# Patient Record
Sex: MALE | Race: OTHER | HISPANIC OR LATINO | URBAN - METROPOLITAN AREA
[De-identification: names, ages, dates, MRNs, and addresses within clinical notes are randomized per-mention and may not be internally consistent; named-entity substitution may affect disease eponyms.]

---

## 2018-06-08 ENCOUNTER — OUTPATIENT (OUTPATIENT)
Dept: OUTPATIENT SERVICES | Facility: HOSPITAL | Age: 78
LOS: 1 days | Discharge: HOME | End: 2018-06-08

## 2018-06-08 ENCOUNTER — RESULT REVIEW (OUTPATIENT)
Age: 78
End: 2018-06-08

## 2018-06-08 VITALS
RESPIRATION RATE: 18 BRPM | TEMPERATURE: 98 F | DIASTOLIC BLOOD PRESSURE: 78 MMHG | SYSTOLIC BLOOD PRESSURE: 166 MMHG | OXYGEN SATURATION: 98 % | HEART RATE: 85 BPM | WEIGHT: 175.05 LBS | HEIGHT: 70 IN

## 2018-06-08 VITALS — DIASTOLIC BLOOD PRESSURE: 80 MMHG | SYSTOLIC BLOOD PRESSURE: 160 MMHG | HEART RATE: 82 BPM | RESPIRATION RATE: 17 BRPM

## 2018-06-08 DIAGNOSIS — Z12.11 ENCOUNTER FOR SCREENING FOR MALIGNANT NEOPLASM OF COLON: Chronic | ICD-10-CM

## 2018-06-08 NOTE — H&P PST ADULT - HISTORY OF PRESENT ILLNESS
77-year-old male for colorectal screening  HPI: The patient has never had a colonoscopy.  He has no nausea or vomiting or blood in the stools or change in bowel habit  Past Medical History:  COPD, hyperlipidemia    Allergies:  [None]  Medications: And metformin trazodone atorvastatin olanzapine Lantus budesonide  Social History:[Nonsmoker, Nondrinker]  Family History:[Noncontributory]  Chronic Diseases Present      [None]

## 2018-06-08 NOTE — PRE-ANESTHESIA EVALUATION ADULT - NSANTHOSAYNRD_GEN_A_CORE
No. OLIMPIA screening performed.  STOP BANG Legend: 0-2 = LOW Risk; 3-4 = INTERMEDIATE Risk; 5-8 = HIGH Risk

## 2018-06-08 NOTE — PRE-ANESTHESIA EVALUATION ADULT - NSANTHADDINFOFT_GEN_ALL_CORE
77 y/o WM evaluated for colonoscopy.  ASA 3.  Plan IV sedation / GA backup.  Plan, benefits, foreseeable risks, viable alternatives discussed with patient and all his pertinent questions answered and he understands and elects to proceed.

## 2018-06-11 LAB — SURGICAL PATHOLOGY STUDY: SIGNIFICANT CHANGE UP

## 2018-06-12 DIAGNOSIS — J44.9 CHRONIC OBSTRUCTIVE PULMONARY DISEASE, UNSPECIFIED: ICD-10-CM

## 2018-06-12 DIAGNOSIS — D12.4 BENIGN NEOPLASM OF DESCENDING COLON: ICD-10-CM

## 2018-06-12 DIAGNOSIS — Z79.4 LONG TERM (CURRENT) USE OF INSULIN: ICD-10-CM

## 2018-06-12 DIAGNOSIS — F20.9 SCHIZOPHRENIA, UNSPECIFIED: ICD-10-CM

## 2018-06-12 DIAGNOSIS — D12.5 BENIGN NEOPLASM OF SIGMOID COLON: ICD-10-CM

## 2018-06-12 DIAGNOSIS — Z12.11 ENCOUNTER FOR SCREENING FOR MALIGNANT NEOPLASM OF COLON: ICD-10-CM

## 2018-06-12 DIAGNOSIS — K64.1 SECOND DEGREE HEMORRHOIDS: ICD-10-CM

## 2018-06-12 DIAGNOSIS — D12.3 BENIGN NEOPLASM OF TRANSVERSE COLON: ICD-10-CM

## 2018-06-12 DIAGNOSIS — E11.9 TYPE 2 DIABETES MELLITUS WITHOUT COMPLICATIONS: ICD-10-CM

## 2018-06-12 DIAGNOSIS — D12.0 BENIGN NEOPLASM OF CECUM: ICD-10-CM

## 2018-06-12 DIAGNOSIS — F17.210 NICOTINE DEPENDENCE, CIGARETTES, UNCOMPLICATED: ICD-10-CM

## 2019-07-07 ENCOUNTER — INPATIENT (INPATIENT)
Facility: HOSPITAL | Age: 79
LOS: 3 days | Discharge: NURSING, REHAB OR LONG-TERM CARE | End: 2019-07-11
Attending: INTERNAL MEDICINE | Admitting: INTERNAL MEDICINE
Payer: MEDICARE

## 2019-07-07 VITALS
HEART RATE: 86 BPM | DIASTOLIC BLOOD PRESSURE: 64 MMHG | TEMPERATURE: 98 F | OXYGEN SATURATION: 100 % | SYSTOLIC BLOOD PRESSURE: 130 MMHG | RESPIRATION RATE: 15 BRPM

## 2019-07-07 DIAGNOSIS — Z12.11 ENCOUNTER FOR SCREENING FOR MALIGNANT NEOPLASM OF COLON: Chronic | ICD-10-CM

## 2019-07-07 LAB
ALBUMIN SERPL ELPH-MCNC: 4.5 G/DL — SIGNIFICANT CHANGE UP (ref 3.5–5.2)
ALP SERPL-CCNC: 100 U/L — SIGNIFICANT CHANGE UP (ref 30–115)
ALT FLD-CCNC: 14 U/L — SIGNIFICANT CHANGE UP (ref 0–41)
ANION GAP SERPL CALC-SCNC: 15 MMOL/L — HIGH (ref 7–14)
APTT BLD: 31.4 SEC — SIGNIFICANT CHANGE UP (ref 27–39.2)
AST SERPL-CCNC: 31 U/L — SIGNIFICANT CHANGE UP (ref 0–41)
BASOPHILS # BLD AUTO: 0.02 K/UL — SIGNIFICANT CHANGE UP (ref 0–0.2)
BASOPHILS NFR BLD AUTO: 0.2 % — SIGNIFICANT CHANGE UP (ref 0–1)
BILIRUB SERPL-MCNC: 0.4 MG/DL — SIGNIFICANT CHANGE UP (ref 0.2–1.2)
BUN SERPL-MCNC: 7 MG/DL — LOW (ref 10–20)
CALCIUM SERPL-MCNC: 9.6 MG/DL — SIGNIFICANT CHANGE UP (ref 8.5–10.1)
CHLORIDE SERPL-SCNC: 84 MMOL/L — LOW (ref 98–110)
CO2 SERPL-SCNC: 22 MMOL/L — SIGNIFICANT CHANGE UP (ref 17–32)
CREAT SERPL-MCNC: 1.1 MG/DL — SIGNIFICANT CHANGE UP (ref 0.7–1.5)
EOSINOPHIL # BLD AUTO: 0.02 K/UL — SIGNIFICANT CHANGE UP (ref 0–0.7)
EOSINOPHIL NFR BLD AUTO: 0.2 % — SIGNIFICANT CHANGE UP (ref 0–8)
GLUCOSE SERPL-MCNC: 115 MG/DL — HIGH (ref 70–99)
HCT VFR BLD CALC: 39.3 % — LOW (ref 42–52)
HGB BLD-MCNC: 13.9 G/DL — LOW (ref 14–18)
IMM GRANULOCYTES NFR BLD AUTO: 0.3 % — SIGNIFICANT CHANGE UP (ref 0.1–0.3)
INR BLD: 1.18 RATIO — SIGNIFICANT CHANGE UP (ref 0.65–1.3)
LYMPHOCYTES # BLD AUTO: 2.06 K/UL — SIGNIFICANT CHANGE UP (ref 1.2–3.4)
LYMPHOCYTES # BLD AUTO: 22.9 % — SIGNIFICANT CHANGE UP (ref 20.5–51.1)
MCHC RBC-ENTMCNC: 32.8 PG — HIGH (ref 27–31)
MCHC RBC-ENTMCNC: 35.4 G/DL — SIGNIFICANT CHANGE UP (ref 32–37)
MCV RBC AUTO: 92.7 FL — SIGNIFICANT CHANGE UP (ref 80–94)
MONOCYTES # BLD AUTO: 0.69 K/UL — HIGH (ref 0.1–0.6)
MONOCYTES NFR BLD AUTO: 7.7 % — SIGNIFICANT CHANGE UP (ref 1.7–9.3)
NEUTROPHILS # BLD AUTO: 6.16 K/UL — SIGNIFICANT CHANGE UP (ref 1.4–6.5)
NEUTROPHILS NFR BLD AUTO: 68.7 % — SIGNIFICANT CHANGE UP (ref 42.2–75.2)
NRBC # BLD: 0 /100 WBCS — SIGNIFICANT CHANGE UP (ref 0–0)
PLATELET # BLD AUTO: 183 K/UL — SIGNIFICANT CHANGE UP (ref 130–400)
POTASSIUM SERPL-MCNC: 4.6 MMOL/L — SIGNIFICANT CHANGE UP (ref 3.5–5)
POTASSIUM SERPL-SCNC: 4.6 MMOL/L — SIGNIFICANT CHANGE UP (ref 3.5–5)
PROT SERPL-MCNC: 7.2 G/DL — SIGNIFICANT CHANGE UP (ref 6–8)
PROTHROM AB SERPL-ACNC: 13.5 SEC — HIGH (ref 9.95–12.87)
RBC # BLD: 4.24 M/UL — LOW (ref 4.7–6.1)
RBC # FLD: 13.1 % — SIGNIFICANT CHANGE UP (ref 11.5–14.5)
SODIUM SERPL-SCNC: 121 MMOL/L — LOW (ref 135–146)
TROPONIN T SERPL-MCNC: <0.01 NG/ML — SIGNIFICANT CHANGE UP
WBC # BLD: 8.98 K/UL — SIGNIFICANT CHANGE UP (ref 4.8–10.8)
WBC # FLD AUTO: 8.98 K/UL — SIGNIFICANT CHANGE UP (ref 4.8–10.8)

## 2019-07-07 PROCEDURE — 99285 EMERGENCY DEPT VISIT HI MDM: CPT

## 2019-07-07 PROCEDURE — 71045 X-RAY EXAM CHEST 1 VIEW: CPT | Mod: 26

## 2019-07-07 PROCEDURE — 93010 ELECTROCARDIOGRAM REPORT: CPT

## 2019-07-07 RX ORDER — SODIUM CHLORIDE 9 MG/ML
2000 INJECTION, SOLUTION INTRAVENOUS ONCE
Refills: 0 | Status: COMPLETED | OUTPATIENT
Start: 2019-07-07 | End: 2019-07-07

## 2019-07-07 RX ADMIN — SODIUM CHLORIDE 2000 MILLILITER(S): 9 INJECTION, SOLUTION INTRAVENOUS at 23:01

## 2019-07-07 NOTE — ED ADULT NURSE NOTE - CHIEF COMPLAINT QUOTE
PT BIBA from [facility] for altered mental status and slurred speech starting this morning, pt found with increased lethargy, arousal only upon pain stimulation starting one hour ago, pupils pinpoint upon arrival. Upon assessment pt, continues to say only "Nothing."

## 2019-07-07 NOTE — ED ADULT NURSE NOTE - NSIMPLEMENTINTERV_GEN_ALL_ED
Implemented All Fall with Harm Risk Interventions:  [place] to call system. Call bell, personal items and telephone within reach. Instruct patient to call for assistance. Room bathroom lighting operational. Non-slip footwear when patient is off stretcher. Physically safe environment: no spills, clutter or unnecessary equipment. Stretcher in lowest position, wheels locked, appropriate side rails in place. Provide visual cue, wrist band, yellow gown, etc. Monitor gait and stability. Monitor for mental status changes and reorient to person, place, and time. Review medications for side effects contributing to fall risk. Reinforce activity limits and safety measures with patient and family. Provide visual clues: red socks.

## 2019-07-07 NOTE — ED PROVIDER NOTE - ATTENDING CONTRIBUTION TO CARE
78yo M history of HTN DL DM schizophrenia COPD BIBEMS for AMS- per EMS and SNF, pt has been having fluctuating mental status and confusion since this AM. Approx 1hr PTA, pt was found slumped in front of TV but was easily awakened. Per SNF, no recent illness- no fevers/chills, cough, vomiting, diarrhea, rash. Pt currently with no complaints. Constitutional: Mildly disheveled  Eyes: pupils 1mm b/l. Extraocular movements intact, no entrapment. Conjunctiva normal.   ENT: No nasal discharge. Moist mucus membranes.  Neck: Supple, non tender, full range of motion.  CV: RRR no murmurs, rubs, or gallops. +S1S2.   Pulm: Clear to auscultation bilaterally. Normal work of breathing.  Abd: soft NT ND +BS.   Ext: Warm and well perfused x4, moving all extremities, no edema.   Psy: Cooperative, appropriate.   Skin: Warm, dry, no rash  Neuro: A+Ox1. CN2-12 grossly intact no sensory or motor deficits throughout, no drift    a/p: AMS - labs, IVF, r/o sepsis, CTH, reeval

## 2019-07-07 NOTE — ED PROVIDER NOTE - CLINICAL SUMMARY MEDICAL DECISION MAKING FREE TEXT BOX
78yo M with PMHx HTN, DLD, DM, COPD, schizophrenia, BIBEMS for AMS from SNF. Per EMS, pt with fluctuating mental status, confusion since this morning. Was found slumped over in chair in front of TV 1 hour PTA. Pt without complaints. Pupils constricted on initial exam though no narcan given as pt without narcotic use history and is awake and answering questions without evidence of respiratory depression. Workup revealed hyponatremia. Pt remains AAOx1. No seizures or worsening of mental status in ED. Remainder of ED workup largely unrevealing. Admitted for hyponatremia and AMS.

## 2019-07-07 NOTE — ED PROVIDER NOTE - PHYSICAL EXAMINATION
Constitutional: Mildly disheveled  Eyes: pupils 1mm b/l. Extraocular movements intact, no entrapment. Conjunctiva normal.   ENT: No nasal discharge. Moist mucus membranes.  Neck: Supple, non tender, full range of motion.  CV: RRR no murmurs, rubs, or gallops. +S1S2.   Pulm: Clear to auscultation bilaterally. Normal work of breathing.  Abd: soft NT ND +BS.   Ext: Warm and well perfused x4, moving all extremities, no edema.   Psy: Cooperative, appropriate.   Skin: Warm, dry, no rash  Neuro: A+Ox1. CN2-12 grossly intact no sensory or motor deficits throughout, no drift

## 2019-07-07 NOTE — ED ADULT NURSE NOTE - OBJECTIVE STATEMENT
pt biba for possible alter mental status. as per EMS the patient was sleeping in the TV room at the adult home and as per home staff the patient has been not himself all day. Pt is alert and oriented in the ER. pt biba for possible alter mental status. as per EMS the patient was sleeping in the TV room at the adult home and as per home staff the patient has been not himself all day. Pt  responds when you ask him questions in  the ER.

## 2019-07-07 NOTE — ED PROVIDER NOTE - NS ED ROS FT
Constitutional: No fever  Eyes:  No visual changes  ENMT:  No neck pain  Cardiac:  No chest pain  Respiratory:  No cough, SOB  GI:  No nausea, vomiting, diarrhea, abdominal pain.  :  No dysuria  MS:  No back pain.  Neuro:  No headache   Skin:  No skin rash  Endocrine: No history of thyroid disease or diabetes.  Except as documented in the HPI,  all other systems are negative.

## 2019-07-07 NOTE — ED ADULT TRIAGE NOTE - CHIEF COMPLAINT QUOTE
PT BIBA from [facility] for altered mental status and slurred speech starting this morning, pt found with increased lethargy, arousal only upon pain stimulation starting one hour ago, pupils pinpoint upon arrival. Upon assessment pt, continues to say only "Nothing."

## 2019-07-07 NOTE — ED PROVIDER NOTE - PROGRESS NOTE DETAILS
mental status stable, pending [facility] read, signed out to Dr. Swenson PT wandering around, A&Ox1, will admit, for hyponatremia. VSS

## 2019-07-07 NOTE — ED PROVIDER NOTE - OBJECTIVE STATEMENT
79 y.o. M assisted living facility [facility] of schizophrenia, BIBA for AMS. Pt was found slouching infront of the TV. Pt was noted to have multiple episodes of AMS since this morning, no nausea/vomitting, no diarrhea, no fever, no headache.

## 2019-07-08 LAB
ALBUMIN SERPL ELPH-MCNC: 4.4 G/DL — SIGNIFICANT CHANGE UP (ref 3.5–5.2)
ALP SERPL-CCNC: 94 U/L — SIGNIFICANT CHANGE UP (ref 30–115)
ALT FLD-CCNC: 14 U/L — SIGNIFICANT CHANGE UP (ref 0–41)
ANION GAP SERPL CALC-SCNC: 14 MMOL/L — SIGNIFICANT CHANGE UP (ref 7–14)
ANION GAP SERPL CALC-SCNC: 14 MMOL/L — SIGNIFICANT CHANGE UP (ref 7–14)
APPEARANCE UR: CLEAR — SIGNIFICANT CHANGE UP
AST SERPL-CCNC: 39 U/L — SIGNIFICANT CHANGE UP (ref 0–41)
BASOPHILS # BLD AUTO: 0.01 K/UL — SIGNIFICANT CHANGE UP (ref 0–0.2)
BASOPHILS NFR BLD AUTO: 0.1 % — SIGNIFICANT CHANGE UP (ref 0–1)
BILIRUB SERPL-MCNC: 0.5 MG/DL — SIGNIFICANT CHANGE UP (ref 0.2–1.2)
BILIRUB UR-MCNC: NEGATIVE — SIGNIFICANT CHANGE UP
BUN SERPL-MCNC: 8 MG/DL — LOW (ref 10–20)
BUN SERPL-MCNC: 8 MG/DL — LOW (ref 10–20)
CALCIUM SERPL-MCNC: 9.4 MG/DL — SIGNIFICANT CHANGE UP (ref 8.5–10.1)
CALCIUM SERPL-MCNC: 9.5 MG/DL — SIGNIFICANT CHANGE UP (ref 8.5–10.1)
CHLORIDE SERPL-SCNC: 85 MMOL/L — LOW (ref 98–110)
CHLORIDE SERPL-SCNC: 85 MMOL/L — LOW (ref 98–110)
CHOLEST SERPL-MCNC: 98 MG/DL — LOW (ref 100–200)
CO2 SERPL-SCNC: 23 MMOL/L — SIGNIFICANT CHANGE UP (ref 17–32)
CO2 SERPL-SCNC: 23 MMOL/L — SIGNIFICANT CHANGE UP (ref 17–32)
COLOR SPEC: YELLOW — SIGNIFICANT CHANGE UP
CREAT SERPL-MCNC: 0.9 MG/DL — SIGNIFICANT CHANGE UP (ref 0.7–1.5)
CREAT SERPL-MCNC: 0.9 MG/DL — SIGNIFICANT CHANGE UP (ref 0.7–1.5)
DIFF PNL FLD: NEGATIVE — SIGNIFICANT CHANGE UP
EOSINOPHIL # BLD AUTO: 0 K/UL — SIGNIFICANT CHANGE UP (ref 0–0.7)
EOSINOPHIL NFR BLD AUTO: 0 % — SIGNIFICANT CHANGE UP (ref 0–8)
GLUCOSE BLDC GLUCOMTR-MCNC: 110 MG/DL — HIGH (ref 70–99)
GLUCOSE BLDC GLUCOMTR-MCNC: 114 MG/DL — HIGH (ref 70–99)
GLUCOSE BLDC GLUCOMTR-MCNC: 148 MG/DL — HIGH (ref 70–99)
GLUCOSE SERPL-MCNC: 138 MG/DL — HIGH (ref 70–99)
GLUCOSE SERPL-MCNC: 140 MG/DL — HIGH (ref 70–99)
GLUCOSE UR QL: NEGATIVE MG/DL — SIGNIFICANT CHANGE UP
HCT VFR BLD CALC: 36.9 % — LOW (ref 42–52)
HDLC SERPL-MCNC: 52 MG/DL — SIGNIFICANT CHANGE UP
HGB BLD-MCNC: 13.3 G/DL — LOW (ref 14–18)
IMM GRANULOCYTES NFR BLD AUTO: 0.5 % — HIGH (ref 0.1–0.3)
KETONES UR-MCNC: NEGATIVE — SIGNIFICANT CHANGE UP
LEUKOCYTE ESTERASE UR-ACNC: NEGATIVE — SIGNIFICANT CHANGE UP
LIPID PNL WITH DIRECT LDL SERPL: 40 MG/DL — SIGNIFICANT CHANGE UP (ref 4–129)
LYMPHOCYTES # BLD AUTO: 0.61 K/UL — LOW (ref 1.2–3.4)
LYMPHOCYTES # BLD AUTO: 5.7 % — LOW (ref 20.5–51.1)
MCHC RBC-ENTMCNC: 32.8 PG — HIGH (ref 27–31)
MCHC RBC-ENTMCNC: 36 G/DL — SIGNIFICANT CHANGE UP (ref 32–37)
MCV RBC AUTO: 90.9 FL — SIGNIFICANT CHANGE UP (ref 80–94)
MONOCYTES # BLD AUTO: 0.65 K/UL — HIGH (ref 0.1–0.6)
MONOCYTES NFR BLD AUTO: 6.1 % — SIGNIFICANT CHANGE UP (ref 1.7–9.3)
NEUTROPHILS # BLD AUTO: 9.41 K/UL — HIGH (ref 1.4–6.5)
NEUTROPHILS NFR BLD AUTO: 87.6 % — HIGH (ref 42.2–75.2)
NITRITE UR-MCNC: NEGATIVE — SIGNIFICANT CHANGE UP
NRBC # BLD: 0 /100 WBCS — SIGNIFICANT CHANGE UP (ref 0–0)
OSMOLALITY SERPL: 255 MOSMOL/KG — LOW (ref 280–301)
OSMOLALITY UR: 245 MOS/KG — SIGNIFICANT CHANGE UP (ref 50–1400)
PH UR: 7.5 — SIGNIFICANT CHANGE UP (ref 5–8)
PLATELET # BLD AUTO: 186 K/UL — SIGNIFICANT CHANGE UP (ref 130–400)
POTASSIUM SERPL-MCNC: 4 MMOL/L — SIGNIFICANT CHANGE UP (ref 3.5–5)
POTASSIUM SERPL-MCNC: 4 MMOL/L — SIGNIFICANT CHANGE UP (ref 3.5–5)
POTASSIUM SERPL-SCNC: 4 MMOL/L — SIGNIFICANT CHANGE UP (ref 3.5–5)
POTASSIUM SERPL-SCNC: 4 MMOL/L — SIGNIFICANT CHANGE UP (ref 3.5–5)
PROT SERPL-MCNC: 7 G/DL — SIGNIFICANT CHANGE UP (ref 6–8)
PROT UR-MCNC: NEGATIVE MG/DL — SIGNIFICANT CHANGE UP
RBC # BLD: 4.06 M/UL — LOW (ref 4.7–6.1)
RBC # FLD: 12.7 % — SIGNIFICANT CHANGE UP (ref 11.5–14.5)
SODIUM SERPL-SCNC: 122 MMOL/L — LOW (ref 135–146)
SODIUM SERPL-SCNC: 122 MMOL/L — LOW (ref 135–146)
SODIUM UR-SCNC: 72 MMOL/L — SIGNIFICANT CHANGE UP
SP GR SPEC: <=1.005 — SIGNIFICANT CHANGE UP (ref 1.01–1.03)
TOTAL CHOLESTEROL/HDL RATIO MEASUREMENT: 1.9 RATIO — LOW (ref 4–5.5)
TRIGL SERPL-MCNC: 73 MG/DL — SIGNIFICANT CHANGE UP (ref 10–149)
UROBILINOGEN FLD QL: 0.2 MG/DL — SIGNIFICANT CHANGE UP (ref 0.2–0.2)
WBC # BLD: 10.73 K/UL — SIGNIFICANT CHANGE UP (ref 4.8–10.8)
WBC # FLD AUTO: 10.73 K/UL — SIGNIFICANT CHANGE UP (ref 4.8–10.8)

## 2019-07-08 PROCEDURE — 70450 CT HEAD/BRAIN W/O DYE: CPT | Mod: 26

## 2019-07-08 PROCEDURE — 99223 1ST HOSP IP/OBS HIGH 75: CPT | Mod: AI

## 2019-07-08 RX ORDER — HALOPERIDOL DECANOATE 100 MG/ML
5 INJECTION INTRAMUSCULAR ONCE
Refills: 0 | Status: COMPLETED | OUTPATIENT
Start: 2019-07-08 | End: 2019-07-08

## 2019-07-08 RX ORDER — CHLORHEXIDINE GLUCONATE 213 G/1000ML
1 SOLUTION TOPICAL
Refills: 0 | Status: DISCONTINUED | OUTPATIENT
Start: 2019-07-08 | End: 2019-07-11

## 2019-07-08 RX ORDER — LABETALOL HCL 100 MG
10 TABLET ORAL ONCE
Refills: 0 | Status: COMPLETED | OUTPATIENT
Start: 2019-07-08 | End: 2019-07-08

## 2019-07-08 RX ORDER — GABAPENTIN 400 MG/1
600 CAPSULE ORAL
Refills: 0 | Status: DISCONTINUED | OUTPATIENT
Start: 2019-07-08 | End: 2019-07-11

## 2019-07-08 RX ORDER — BUDESONIDE, MICRONIZED 100 %
0.5 POWDER (GRAM) MISCELLANEOUS
Refills: 0 | Status: DISCONTINUED | OUTPATIENT
Start: 2019-07-08 | End: 2019-07-10

## 2019-07-08 RX ORDER — OLANZAPINE 15 MG/1
25 TABLET, FILM COATED ORAL AT BEDTIME
Refills: 0 | Status: DISCONTINUED | OUTPATIENT
Start: 2019-07-08 | End: 2019-07-11

## 2019-07-08 RX ORDER — TRAZODONE HCL 50 MG
150 TABLET ORAL AT BEDTIME
Refills: 0 | Status: DISCONTINUED | OUTPATIENT
Start: 2019-07-08 | End: 2019-07-09

## 2019-07-08 RX ORDER — ATORVASTATIN CALCIUM 80 MG/1
10 TABLET, FILM COATED ORAL DAILY
Refills: 0 | Status: DISCONTINUED | OUTPATIENT
Start: 2019-07-08 | End: 2019-07-11

## 2019-07-08 RX ORDER — ENOXAPARIN SODIUM 100 MG/ML
40 INJECTION SUBCUTANEOUS DAILY
Refills: 0 | Status: DISCONTINUED | OUTPATIENT
Start: 2019-07-08 | End: 2019-07-11

## 2019-07-08 RX ADMIN — Medication 10 MILLIGRAM(S): at 05:51

## 2019-07-08 RX ADMIN — OLANZAPINE 25 MILLIGRAM(S): 15 TABLET, FILM COATED ORAL at 22:29

## 2019-07-08 RX ADMIN — Medication 150 MILLIGRAM(S): at 22:28

## 2019-07-08 RX ADMIN — GABAPENTIN 600 MILLIGRAM(S): 400 CAPSULE ORAL at 17:43

## 2019-07-08 RX ADMIN — ENOXAPARIN SODIUM 40 MILLIGRAM(S): 100 INJECTION SUBCUTANEOUS at 13:43

## 2019-07-08 RX ADMIN — Medication 0.5 MILLIGRAM(S): at 20:00

## 2019-07-08 RX ADMIN — HALOPERIDOL DECANOATE 5 MILLIGRAM(S): 100 INJECTION INTRAMUSCULAR at 04:41

## 2019-07-08 RX ADMIN — ATORVASTATIN CALCIUM 10 MILLIGRAM(S): 80 TABLET, FILM COATED ORAL at 22:31

## 2019-07-08 NOTE — H&P ADULT - NSICDXPASTMEDICALHX_GEN_ALL_CORE_FT
PAST MEDICAL HISTORY:  COPD (chronic obstructive pulmonary disease)     Diabetes type 2, controlled     Dyslipidemia     Schizophrenia

## 2019-07-08 NOTE — H&P ADULT - NSHPLABSRESULTS_GEN_ALL_CORE
13.9   8.98  )-----------( 183      ( 07 Jul 2019 22:35 )             39.3       07-07    121<L>  |  84<L>  |  7<L>  ----------------------------<  115<H>  4.6   |  22  |  1.1    Ca    9.6      07 Jul 2019 22:35    TPro  7.2  /  Alb  4.5  /  TBili  0.4  /  DBili  x   /  AST  31  /  ALT  14  /  AlkPhos  100  07-07    PT/INR - ( 07 Jul 2019 22:35 )   PT: 13.50 sec;   INR: 1.18 ratio      PTT - ( 07 Jul 2019 22:35 )  PTT:31.4 sec    CARDIAC MARKERS ( 07 Jul 2019 22:35 )  x     / <0.01 ng/mL / x     / x     / x        POCT Blood Glucose.: 148 mg/dL (08 Jul 2019 10:54)    ======    < from: CT Head No Cont (07.08.19 @ 01:00) >    IMPRESSION:     1.  No acute intra-cranial pathology.    2.  Moderate to severe chronic microvascular ischemic changes.     3.  Moderate sized cystic-appearing space within the anterior left   temporal lobe/middle cranial fossa may represent an arachnoid cyst vs. an   area of encephalomalacia.     < end of copied text >

## 2019-07-08 NOTE — H&P ADULT - ASSESSMENT
79 year old male with PMH of Schizophrenia, COPD, DM II, DLD presents from [facility] for confusion and altered mentation.    # Metabolic encephalopathy - likely due to hyponatremia (baseline usually ~130)  - Sepsis ruled out on admission  - CTH negative for any acute intracranial pathology; moderate-severe chronic microvascular ischemic changes, moderate sized cystic-appearing space  - CXR portable: pending report however without any apparent focal consolidation  - Correct hyponatremia  - If no improvement after sodium correction, consider neurology eval    # Hyponatremia - possibly SIADH secondary to antipsychotic medications   - Na 121, baseline usually ~130  - Urine lytes pending  - Check serum and urine osmolality  - Repeat BMP  - Fluid restriction    # Urinary retention   - Suprapubic area distended and tender  - Bladder scan and straight catheterization for PVR    # COPD - stable  - Continue Budesonide inhaler    # Schizophrenia  - Continue Olanzapine and Trazodone    # DM II - controlled  - On Metformin and Basaglar at home  - Check FSG  - Start insulin regimen if persistently >180  - DASH diet    # DLD - Continue Lipitor    GI prophylaxis: Not indicated  DVT prophylaxis: Lovenox    Dispo: From [facility] 79 year old male with PMH of Schizophrenia, COPD, DM II, DLD, non-Hodgkin lymphoma in remission presents from [facility] for confusion and altered mentation.    # Metabolic encephalopathy - likely due to hyponatremia (baseline usually ~130)  - Sepsis ruled out on admission  - CTH negative for any acute intracranial pathology; moderate-severe chronic microvascular ischemic changes, moderate sized cystic-appearing space  - CXR portable: pending report however without any apparent focal consolidation  - Correct hyponatremia  - If no improvement after sodium correction, consider neurology eval    # Hyponatremia - possibly SIADH secondary to antipsychotic medications   - Na 121, baseline usually ~130  - Urine lytes pending  - Check serum and urine osmolality  - Repeat BMP  - Fluid restriction    # Urinary retention   - Suprapubic area distended and tender  - Bladder scan and straight catheterization for PVR    # COPD - stable  - Continue Budesonide inhaler    # Schizophrenia  - Continue Olanzapine and Trazodone    # DM II - controlled  - On Metformin and Basaglar at home  - Check FSG  - Start insulin regimen if persistently >180  - DASH diet    # DLD - Continue Lipitor    GI prophylaxis: Not indicated  DVT prophylaxis: Lovenox    DIET: NPO for now. Will start on carb consistent diet when mental status improves    Dispo: From [facility] 79 year old male with PMH of Schizophrenia, COPD, DM II, DLD, non-Hodgkin lymphoma in remission presents from [facility] for confusion and altered mentation.    # Metabolic encephalopathy - likely due to hyponatremia (baseline usually ~130)  - Sepsis ruled out on admission  - CTH negative for any acute intracranial pathology; moderate-severe chronic microvascular ischemic changes, moderate sized cystic-appearing space  - CXR portable: pending report however without any apparent focal consolidation  - Correct hyponatremia  - If no improvement after sodium correction, consider neurology eval    # Hyponatremia - possibly SIADH secondary to antipsychotic medications   - Na 121, baseline usually ~130  - Urine lytes pending  - Check serum and urine osmolality  - Repeat BMP  - Fluid restriction    # Urinary retention   - Suprapubic area distended and tender  - Bladder scan and straight catheterization for PVR    # COPD - stable  - Continue Budesonide inhaler    # Schizophrenia  - Continue Olanzapine and Trazodone    # DM II - controlled  - On Metformin and Basaglar at home  - Check FSG  - Start insulin regimen if persistently >180  - DASH diet    # DLD - Continue Lipitor    # Tobacco use disorder - Will  on smoking cessation and offer nicotine replacement therapy when mentation improves    GI prophylaxis: Not indicated  DVT prophylaxis: Lovenox    DIET: NPO for now. Will start on carb consistent diet when mental status improves    Dispo: From [facility] 79 year old male with PMH of Schizophrenia, COPD, DM II, DLD, non-Hodgkin lymphoma in remission presents from [facility] for confusion and altered mentation.    # Metabolic encephalopathy - likely due to hyponatremia (baseline usually ~130) vs urinary retention  - Sepsis ruled out on admission  - CTH negative for any acute intracranial pathology; moderate-severe chronic microvascular ischemic changes, moderate sized cystic-appearing space  - CXR portable: pending report however without any apparent focal consolidation  - Correct hyponatremia  - Bladder decompression with straight catheterization  - If no improvement after sodium correction, consider neurology eval    # Hyponatremia - possibly SIADH secondary to antipsychotic medications   - Na 121, baseline usually ~130  - Urine lytes pending  - Check serum and urine osmolality  - Repeat BMP  - Fluid restriction    # Urinary retention   - Suprapubic area distended and tender  - Bladder scan and decompression with straight catheterization for PVR    # COPD - stable  - Continue Budesonide inhaler    # Schizophrenia  - Continue Olanzapine and Trazodone    # DM II - controlled  - On Metformin and Basaglar at home  - Check FSG  - Start insulin regimen if persistently >180  - DASH diet    # DLD - Continue Lipitor    # Tobacco use disorder - Will  on smoking cessation and offer nicotine replacement therapy when mentation improves    GI prophylaxis: Not indicated  DVT prophylaxis: Lovenox    DIET: NPO for now. Will start on carb consistent diet when mental status improves    Dispo: From [facility]

## 2019-07-08 NOTE — H&P ADULT - ATTENDING COMMENTS
Patient is seen and examined independently, I agree with resident note above and plan of care -edited and corrected where applicable- with addition:    HOME MEDICATIONS:  atorvastatin 10 mg oral tablet: 1 tab(s) orally once a day (08 Jun 2018 10:07)  bisacodyl 5 mg oral delayed release tablet: orally 2 times a day (08 Jun 2018 10:07)  budesonide 0.5 mg/2 mL inhalation suspension: 2 milliliter(s) inhaled 2 times a day (08 Jun 2018 10:07)  gabapentin 600 mg oral tablet: orally 2 times a day (08 Jun 2018 10:07)  Lantus Solostar Pen 100 units/mL subcutaneous solution: 25 unit(s) subcutaneous once a day (at bedtime) (08 Jun 2018 10:07)  metFORMIN 500 mg oral tablet: 1 tab(s) orally 2 times a day (08 Jun 2018 10:07)  OLANZapine 20 mg oral tablet: 1 tab(s) orally once a day (at bedtime) (08 Jun 2018 10:07)  OLANZapine 5 mg oral tablet: 1 tab(s) orally once a day (at bedtime) (08 Jun 2018 10:07)  traZODone 100 mg oral tablet: orally once a day (at bedtime) (08 Jun 2018 10:07)  traZODone 50 mg oral tablet: orally once a day (at bedtime) (08 Jun 2018 10:07)      TESTS & MEASUREMENTS:                        13.3   10.73 )-----------( 186      ( 08 Jul 2019 12:25 )             36.9     PT/INR - ( 07 Jul 2019 22:35 )   PT: 13.50 sec;   INR: 1.18 ratio         PTT - ( 07 Jul 2019 22:35 )  PTT:31.4 sec  07-08    122<L>  |  85<L>  |  8<L>  ----------------------------<  140<H>  4.0   |  23  |  0.9    Ca    9.5      08 Jul 2019 12:25    TPro  7.0  /  Alb  4.4  /  TBili  0.5  /  DBili  x   /  AST  39  /  ALT  14  /  AlkPhos  94  07-08    LIVER FUNCTIONS - ( 08 Jul 2019 12:25 )  Alb: 4.4 g/dL / Pro: 7.0 g/dL / ALK PHOS: 94 U/L / ALT: 14 U/L / AST: 39 U/L / GGT: x           CARDIAC MARKERS ( 07 Jul 2019 22:35 )  x     / <0.01 ng/mL / x     / x     / x            Urinalysis Basic - ( 07 Jul 2019 22:35 )    Color: Yellow / Appearance: Clear / SG: <=1.005 / pH: x  Gluc: x / Ketone: Negative  / Bili: Negative / Urobili: 0.2 mg/dL   Blood: x / Protein: Negative mg/dL / Nitrite: Negative   Leuk Esterase: Negative / RBC: x / WBC x   Sq Epi: x / Non Sq Epi: x / Bacteria: x      In summary:  .. Hyponatremia, likely euvolemic and with differential diagnoses including SIADH (pain)  .. Urinary retention, severe; possible etiology: outlet issues (BPH) vs detrusor muscle weakness  .. Metabolic encephalopathy, acute; likely due to underlined acute illness     P L A N   .. Will need bains drainage until clear etiology of urinary retention  .. Kidney and bladder sono (attention to prostate)  .. F/U Na level, if SIADH due to pain/retention: should start to improve Patient is seen and examined independently, I agree with resident note above and plan of care -edited and corrected where applicable- with addition:    HOME MEDICATIONS:  atorvastatin 10 mg oral tablet: 1 tab(s) orally once a day (08 Jun 2018 10:07)  bisacodyl 5 mg oral delayed release tablet: orally 2 times a day (08 Jun 2018 10:07)  budesonide 0.5 mg/2 mL inhalation suspension: 2 milliliter(s) inhaled 2 times a day (08 Jun 2018 10:07)  gabapentin 600 mg oral tablet: orally 2 times a day (08 Jun 2018 10:07)  Lantus Solostar Pen 100 units/mL subcutaneous solution: 25 unit(s) subcutaneous once a day (at bedtime) (08 Jun 2018 10:07)  metFORMIN 500 mg oral tablet: 1 tab(s) orally 2 times a day (08 Jun 2018 10:07)  OLANZapine 20 mg oral tablet: 1 tab(s) orally once a day (at bedtime) (08 Jun 2018 10:07)  OLANZapine 5 mg oral tablet: 1 tab(s) orally once a day (at bedtime) (08 Jun 2018 10:07)  traZODone 100 mg oral tablet: orally once a day (at bedtime) (08 Jun 2018 10:07)  traZODone 50 mg oral tablet: orally once a day (at bedtime) (08 Jun 2018 10:07)      TESTS & MEASUREMENTS:                        13.3   10.73 )-----------( 186      ( 08 Jul 2019 12:25 )             36.9     PT/INR - ( 07 Jul 2019 22:35 )   PT: 13.50 sec;   INR: 1.18 ratio         PTT - ( 07 Jul 2019 22:35 )  PTT:31.4 sec  07-08    122<L>  |  85<L>  |  8<L>  ----------------------------<  140<H>  4.0   |  23  |  0.9    Ca    9.5      08 Jul 2019 12:25    TPro  7.0  /  Alb  4.4  /  TBili  0.5  /  DBili  x   /  AST  39  /  ALT  14  /  AlkPhos  94  07-08    LIVER FUNCTIONS - ( 08 Jul 2019 12:25 )  Alb: 4.4 g/dL / Pro: 7.0 g/dL / ALK PHOS: 94 U/L / ALT: 14 U/L / AST: 39 U/L / GGT: x           CARDIAC MARKERS ( 07 Jul 2019 22:35 )  x     / <0.01 ng/mL / x     / x     / x            Urinalysis Basic - ( 07 Jul 2019 22:35 )    Color: Yellow / Appearance: Clear / SG: <=1.005 / pH: x  Gluc: x / Ketone: Negative  / Bili: Negative / Urobili: 0.2 mg/dL   Blood: x / Protein: Negative mg/dL / Nitrite: Negative   Leuk Esterase: Negative / RBC: x / WBC x   Sq Epi: x / Non Sq Epi: x / Bacteria: x      In summary:  .. Hyponatremia, likely euvolemic and with differential diagnoses including SIADH (pain)  .. Urinary retention, severe; possible etiology: outlet issues (BPH) vs detrusor muscle weakness (e.g. gabapentin)   .. Metabolic encephalopathy, acute; likely due to underlined acute illness     P L A N   .. Will need bains drainage until clear etiology of urinary retention  .. Kidney and bladder sono (attention to prostate)  .. F/U Na level, if SIADH due to pain/retention: should start to improve.

## 2019-07-08 NOTE — H&P ADULT - NSHPSOCIALHISTORY_GEN_ALL_CORE
Former smoker (unknown current status)  Denies alcohol or drug use Active smoker all of his adult life >half PPD  Denies alcohol or drug use

## 2019-07-08 NOTE — H&P ADULT - NSHPPHYSICALEXAM_GEN_ALL_CORE
GENERAL: NAD  HEAD:  Atraumatic, Normocephalic  EYES: EOMI, PERRLA, anicteric sclera  NECK: Supple, No JVD  CHEST/LUNG: Decreased breath sounds bilaterally; No wheeze; No crackles; No rhonchi. No accessory muscles used  HEART: Regular rate and rhythm; No murmurs;   ABDOMEN: Soft, Nontender, Nondistended; Bowel sounds present; No guarding. Suprapubic area mild distention and pressure like sensation  EXTREMITIES:  2+ Peripheral Pulses, No cyanosis or edema  PSYCH: AAOx2 (disoriented to time)  NEUROLOGY: non-focal  SKIN: No rashes or lesions

## 2019-07-08 NOTE — H&P ADULT - HISTORY OF PRESENT ILLNESS
79 year old male with PMH of Schizophrenia, COPD, DM II, DLD presents from [facility] for confusion and altered mentation. He is a poor historian at his current state. As per EMS and assisted living (AL) documentation, the patient had fluctuating mental status since the morning of presentation. About an hour prior to presentation he was found slumped in front of TV however easily arousable. As per AL documentation, he did not have any recent fever, chills, cough, dyspnea, CP, abdominal pain.     In ED: On arrival /64, HR 96, RR 15, Temp 98 F. No leukocytosis. Troponin pito. Na 121. Received 2L lactated ringers  CTH negative for any acute intracranial pathology. Moderate-severe chronic microvascular ischemic changes, moderate sized cystic-appearng space.  CXR pending report, however, no apparent focal consolidation  Overnight: agitation and given Haldol 5 mg IM. /90 s/p Labetalol 10 mg IV 79 year old male with PMH of Schizophrenia, COPD, DM II, DLD, non-Hodgkin lymphoma in remission presents from [facility] for confusion and altered mentation. He is a poor historian at his current state. As per EMS and assisted living (AL) documentation, the patient had fluctuating mental status since the morning of presentation. About an hour prior to presentation he was found slumped in front of TV however easily arousable. As per AL documentation, he did not have any recent fever, chills, cough, dyspnea, CP, abdominal pain.     In ED: On arrival /64, HR 96, RR 15, Temp 98 F. No leukocytosis. Troponin pito. Na 121. Received 2L lactated ringers  CTH negative for any acute intracranial pathology. Moderate-severe chronic microvascular ischemic changes, moderate sized cystic-appearng space.  CXR pending report, however, no apparent focal consolidation  Overnight: agitation and given Haldol 5 mg IM. /90 s/p Labetalol 10 mg IV

## 2019-07-09 DIAGNOSIS — R41.0 DISORIENTATION, UNSPECIFIED: ICD-10-CM

## 2019-07-09 DIAGNOSIS — F20.3 UNDIFFERENTIATED SCHIZOPHRENIA: ICD-10-CM

## 2019-07-09 LAB
ALBUMIN SERPL ELPH-MCNC: 3.7 G/DL — SIGNIFICANT CHANGE UP (ref 3.5–5.2)
ALP SERPL-CCNC: 75 U/L — SIGNIFICANT CHANGE UP (ref 30–115)
ALT FLD-CCNC: 13 U/L — SIGNIFICANT CHANGE UP (ref 0–41)
ANION GAP SERPL CALC-SCNC: 11 MMOL/L — SIGNIFICANT CHANGE UP (ref 7–14)
AST SERPL-CCNC: 35 U/L — SIGNIFICANT CHANGE UP (ref 0–41)
BASOPHILS # BLD AUTO: 0.01 K/UL — SIGNIFICANT CHANGE UP (ref 0–0.2)
BASOPHILS NFR BLD AUTO: 0.1 % — SIGNIFICANT CHANGE UP (ref 0–1)
BILIRUB SERPL-MCNC: 0.5 MG/DL — SIGNIFICANT CHANGE UP (ref 0.2–1.2)
BUN SERPL-MCNC: 10 MG/DL — SIGNIFICANT CHANGE UP (ref 10–20)
CALCIUM SERPL-MCNC: 8.3 MG/DL — LOW (ref 8.5–10.1)
CHLORIDE SERPL-SCNC: 88 MMOL/L — LOW (ref 98–110)
CO2 SERPL-SCNC: 26 MMOL/L — SIGNIFICANT CHANGE UP (ref 17–32)
CREAT SERPL-MCNC: 1 MG/DL — SIGNIFICANT CHANGE UP (ref 0.7–1.5)
CULTURE RESULTS: NO GROWTH — SIGNIFICANT CHANGE UP
EOSINOPHIL # BLD AUTO: 0.02 K/UL — SIGNIFICANT CHANGE UP (ref 0–0.7)
EOSINOPHIL NFR BLD AUTO: 0.3 % — SIGNIFICANT CHANGE UP (ref 0–8)
GLUCOSE BLDC GLUCOMTR-MCNC: 111 MG/DL — HIGH (ref 70–99)
GLUCOSE BLDC GLUCOMTR-MCNC: 134 MG/DL — HIGH (ref 70–99)
GLUCOSE BLDC GLUCOMTR-MCNC: 97 MG/DL — SIGNIFICANT CHANGE UP (ref 70–99)
GLUCOSE SERPL-MCNC: 96 MG/DL — SIGNIFICANT CHANGE UP (ref 70–99)
HCT VFR BLD CALC: 33.9 % — LOW (ref 42–52)
HGB BLD-MCNC: 12.3 G/DL — LOW (ref 14–18)
IMM GRANULOCYTES NFR BLD AUTO: 0.4 % — HIGH (ref 0.1–0.3)
LYMPHOCYTES # BLD AUTO: 1.65 K/UL — SIGNIFICANT CHANGE UP (ref 1.2–3.4)
LYMPHOCYTES # BLD AUTO: 23.5 % — SIGNIFICANT CHANGE UP (ref 20.5–51.1)
MCHC RBC-ENTMCNC: 33 PG — HIGH (ref 27–31)
MCHC RBC-ENTMCNC: 36.3 G/DL — SIGNIFICANT CHANGE UP (ref 32–37)
MCV RBC AUTO: 90.9 FL — SIGNIFICANT CHANGE UP (ref 80–94)
MONOCYTES # BLD AUTO: 0.67 K/UL — HIGH (ref 0.1–0.6)
MONOCYTES NFR BLD AUTO: 9.6 % — HIGH (ref 1.7–9.3)
NEUTROPHILS # BLD AUTO: 4.63 K/UL — SIGNIFICANT CHANGE UP (ref 1.4–6.5)
NEUTROPHILS NFR BLD AUTO: 66.1 % — SIGNIFICANT CHANGE UP (ref 42.2–75.2)
NRBC # BLD: 0 /100 WBCS — SIGNIFICANT CHANGE UP (ref 0–0)
PLATELET # BLD AUTO: 165 K/UL — SIGNIFICANT CHANGE UP (ref 130–400)
POTASSIUM SERPL-MCNC: 4.3 MMOL/L — SIGNIFICANT CHANGE UP (ref 3.5–5)
POTASSIUM SERPL-SCNC: 4.3 MMOL/L — SIGNIFICANT CHANGE UP (ref 3.5–5)
PROT SERPL-MCNC: 6 G/DL — SIGNIFICANT CHANGE UP (ref 6–8)
RBC # BLD: 3.73 M/UL — LOW (ref 4.7–6.1)
RBC # FLD: 13.1 % — SIGNIFICANT CHANGE UP (ref 11.5–14.5)
SODIUM SERPL-SCNC: 125 MMOL/L — LOW (ref 135–146)
SPECIMEN SOURCE: SIGNIFICANT CHANGE UP
WBC # BLD: 7.01 K/UL — SIGNIFICANT CHANGE UP (ref 4.8–10.8)
WBC # FLD AUTO: 7.01 K/UL — SIGNIFICANT CHANGE UP (ref 4.8–10.8)

## 2019-07-09 PROCEDURE — 99232 SBSQ HOSP IP/OBS MODERATE 35: CPT

## 2019-07-09 PROCEDURE — 76770 US EXAM ABDO BACK WALL COMP: CPT | Mod: 26

## 2019-07-09 PROCEDURE — 99221 1ST HOSP IP/OBS SF/LOW 40: CPT

## 2019-07-09 RX ORDER — FUROSEMIDE 40 MG
40 TABLET ORAL ONCE
Refills: 0 | Status: COMPLETED | OUTPATIENT
Start: 2019-07-09 | End: 2019-07-09

## 2019-07-09 RX ORDER — TAMSULOSIN HYDROCHLORIDE 0.4 MG/1
0.4 CAPSULE ORAL AT BEDTIME
Refills: 0 | Status: DISCONTINUED | OUTPATIENT
Start: 2019-07-09 | End: 2019-07-11

## 2019-07-09 RX ORDER — TRAZODONE HCL 50 MG
100 TABLET ORAL DAILY
Refills: 0 | Status: DISCONTINUED | OUTPATIENT
Start: 2019-07-10 | End: 2019-07-10

## 2019-07-09 RX ADMIN — Medication 0.5 MILLIGRAM(S): at 21:18

## 2019-07-09 RX ADMIN — Medication 40 MILLIGRAM(S): at 17:11

## 2019-07-09 RX ADMIN — CHLORHEXIDINE GLUCONATE 1 APPLICATION(S): 213 SOLUTION TOPICAL at 05:50

## 2019-07-09 RX ADMIN — GABAPENTIN 600 MILLIGRAM(S): 400 CAPSULE ORAL at 18:42

## 2019-07-09 RX ADMIN — OLANZAPINE 25 MILLIGRAM(S): 15 TABLET, FILM COATED ORAL at 21:14

## 2019-07-09 RX ADMIN — TAMSULOSIN HYDROCHLORIDE 0.4 MILLIGRAM(S): 0.4 CAPSULE ORAL at 21:13

## 2019-07-09 RX ADMIN — ATORVASTATIN CALCIUM 10 MILLIGRAM(S): 80 TABLET, FILM COATED ORAL at 11:29

## 2019-07-09 RX ADMIN — GABAPENTIN 600 MILLIGRAM(S): 400 CAPSULE ORAL at 05:51

## 2019-07-09 RX ADMIN — ENOXAPARIN SODIUM 40 MILLIGRAM(S): 100 INJECTION SUBCUTANEOUS at 11:29

## 2019-07-09 NOTE — PROGRESS NOTE ADULT - ASSESSMENT
79 year old male with PMH of Schizophrenia, COPD, DM II, DLD, non-Hodgkin lymphoma in remission presents from [facility] for confusion and altered mentation.    # Metabolic encephalopathy ( hyponatremia likely more than urinary retention)  - likely due to hyponatremia (baseline usually ~130)   - possibly SIADH secondary to antipsychotic medications   - psych consult  - fluid restriction   - moniter BMP  - CTH negative for any acute intracranial pathology; moderate-severe chronic microvascular ischemic changes, moderate sized cystic-appearing space    # Urinary retention   - bains catheter in situ  - will get bladder sonogram  - flomax daily    # COPD - stable  - Continue Budesonide inhaler    # Schizophrenia  - Continue Olanzapine and Trazodone  - psych consult    # DM II - controlled  - On Metformin and Basaglar at home  - Start insulin regimen if persistently >180    # DLD - Continue Lipitor    GI prophylaxis: Not indicated  DVT prophylaxis: Lovenox    DIET: carb consistent diet    Dispo: From [facility] 79 year old male with PMH of Schizophrenia, COPD, DM II, DLD, non-Hodgkin lymphoma in remission presents from [facility] for confusion and altered mentation.    # Metabolic encephalopathy ( hyponatremia likely more than urinary retention)  - likely due to hyponatremia (baseline usually ~130)     # Hyponatremia, euvolemic; chronic with worsening  - possibly SIADH secondary to antipsychotic medications   - fluid restriction   - moniter BMP  - CTH negative for any acute intracranial pathology; moderate-severe chronic microvascular ischemic changes, moderate sized cystic-appearing space    # Urinary retention   - bains catheter in situ  - will get bladder sonogram  - flomax daily    # COPD - stable  - Continue Budesonide inhaler    # Schizophrenia  - Continue Olanzapine and Trazodone  - psych consult    # DM II - controlled  - On Metformin and Basaglar at home  - Start insulin regimen if persistently >180    # DLD - Continue Lipitor    GI prophylaxis: Not indicated  DVT prophylaxis: Lovenox    DIET: carb consistent diet    Dispo: From [facility]

## 2019-07-09 NOTE — BEHAVIORAL HEALTH ASSESSMENT NOTE - NSBHCONSULTRECOMMENDOTHER_PSY_A_CORE FT
Plan:    - Recommend stopping Trazodone which can worsen hyponatremia     - Taper dose by 50mg a day to prevent antidepressant withdrawal symptoms  - Recommend keeping the patient on Olanzapine 25mg nightly to prevent decompensation of psychosis  - Recommend Atarax 50mg PRN at bedtime for insomnia    Plan has been relayed to Dr. Kidd at [facility] Plan:    - Recommend stopping Trazodone which can worsen hyponatremia     - Taper dose by 50mg a day to prevent antidepressant withdrawal symptoms  - Recommend keeping the patient on Olanzapine 25mg nightly to prevent decompensation of psychosis  - Recommend Atarax 50mg Q 6 hrs PRN at bedtime for insomnia    Plan has been relayed to Dr. Kidd at [facility]

## 2019-07-09 NOTE — BEHAVIORAL HEALTH ASSESSMENT NOTE - OTHER
Unable to assess Natalie (nurse) and Dr. Kidd at [facility] [phone] I don't know

## 2019-07-09 NOTE — BEHAVIORAL HEALTH ASSESSMENT NOTE - PROFESSIONAL COLLATERAL NAME
Natalie (nurse) and Dr. Kidd at [facility] Natalie (nurse) and Dr. Kidd at [facility]

## 2019-07-09 NOTE — PROGRESS NOTE ADULT - ATTENDING COMMENTS
Patient is seen and examined independently. I agree with resident note above and plan of care -edited and corrected where applicable- with addition:   ... Hyponatremia, euvolemic; differential diagnoses includes: trazodone side effect and SIADH (severe pain, urinary retention)   ... Urinary retention, differential diagnoses includes BPH or detrusor muscle dysfunction  ... Chronic schizophrenia, deemed in remission on medications    PLAN    .. Fluid restrictions trials  .. Lasix x1  .. Psych eval re: current pharmacological specimen  .. US bladder if patient allows  .. discussed with patient's niece at bedside and his sister Kaylyn over the phone. Patient is seen and examined independently. I agree with resident note above and plan of care -edited and corrected where applicable- with addition:   ... Hyponatremia, euvolemic; differential diagnoses includes: trazodone side effect and SIADH (severe pain, urinary retention)   ... Urinary retention, differential diagnoses includes BPH or detrusor muscle dysfunction  ... Chronic schizophrenia, deemed in remission on medications    PLAN    .. Cobos for now and urology evaluation  .. Fluid restrictions trials  .. Lasix x1  .. Psych eval re: current pharmacological specimen  .. US bladder if patient allows  .. discussed with patient's niece at bedside and his sister Kaylyn over the phone.

## 2019-07-09 NOTE — BEHAVIORAL HEALTH ASSESSMENT NOTE - CASE SUMMARY
Mr Laguna is a 79 year old man with a history of Schizophrenia who was admitted for altered mental status and was found to have hyponatremia . Psychiatry consult was called for medication management as there is a concern that the hyponatremia was caused by patient's psychotropic medication.   based on the report by the medical resident that patient's altered mental status and sodium level is improving with fluid restriction and the collateral by patient's outpatient providers that patient drinks a lot of coffee and has psychogenic polydipsia, the cause of his hyponatremia is most likely not as a result of his psychotropic medication. Also patient has been said to be on these medications for a long time and has tolerated them very well with no side effects.  Patient continues to be somewhat delirious however does not seem acutely psychotic , manic or depressed.   At this time , patient is not considered a danger to himself or others and does not need inpatient psychiatric hospitalization. Please note that it is not likely that the hyponatremia is caused by Trazodone as patient has been taking this medication for many years and has tolerated it well. In addition, it should be noted that it is rare although not impossible that Zyprexa will contribute to hyponatremia. so, weigh the risks and benefits or adjusting patient's medication, it will be more beneficial to take patient off trazodone ( a taper is recommended to prevent withdrawals symptoms or discontinuation syndrome) and risk reoccurrence of insomnia versus taking patient off Zyprexa and risk acute decompensation of Schizophrenia. Trazodone can be restarted by patient PMD at his discretion after the hyponatremia resolves.

## 2019-07-09 NOTE — BEHAVIORAL HEALTH ASSESSMENT NOTE - NSBHREFERDETAILS_PSY_A_CORE_FT
functional schizophrenia from assisted living facility admitted for AMS.  Hyponatremia likely due to antipsychotic medication.  please evaluate for possible switching of antipsychotic medication

## 2019-07-09 NOTE — BEHAVIORAL HEALTH ASSESSMENT NOTE - NSBHCONSFOLLOWNEEDS_PSY_A_CORE
Reassess medications tomorrow Patient needs further psychiatric safety assessment prior to discharge

## 2019-07-09 NOTE — PROGRESS NOTE ADULT - SUBJECTIVE AND OBJECTIVE BOX
SUBJECTIVE:    Patient is a 79y old Male who presents with a chief complaint of Altered Mental Status (08 Jul 2019 10:37)    Currently admitted to medicine with the primary diagnosis of Hyponatremia     Today is hospital day 1d. This morning he is resting comfortably in bed and reports no new issues or overnight events.     PAST MEDICAL & SURGICAL HISTORY  Dyslipidemia  COPD (chronic obstructive pulmonary disease)  Diabetes type 2, controlled  Schizophrenia  Encounter for screening colonoscopy     ALLERGIES:  No Known Allergies    MEDICATIONS:  STANDING MEDICATIONS  atorvastatin Oral Tab/Cap - Peds 10 milliGRAM(s) Oral daily  buDESOnide   0.5 milliGRAM(s) Respule 0.5 milliGRAM(s) Inhalation two times a day  chlorhexidine 4% Liquid 1 Application(s) Topical <User Schedule>  enoxaparin Injectable 40 milliGRAM(s) SubCutaneous daily  gabapentin 600 milliGRAM(s) Oral two times a day  OLANZapine 25 milliGRAM(s) Oral at bedtime  traZODone 150 milliGRAM(s) Oral at bedtime    PRN MEDICATIONS  bisacodyl 5 milliGRAM(s) Oral every 12 hours PRN    VITALS:   T(F): 96.6  HR: 69  BP: 160/71  RR: 17  SpO2: --    LABS:                        12.3   7.01  )-----------( 165      ( 09 Jul 2019 07:00 )             33.9     07-08    122<L>  |  85<L>  |  8<L>  ----------------------------<  140<H>  4.0   |  23  |  0.9    Ca    9.5      08 Jul 2019 12:25    TPro  7.0  /  Alb  4.4  /  TBili  0.5  /  DBili  x   /  AST  39  /  ALT  14  /  AlkPhos  94  07-08    PT/INR - ( 07 Jul 2019 22:35 )   PT: 13.50 sec;   INR: 1.18 ratio         PTT - ( 07 Jul 2019 22:35 )  PTT:31.4 sec  Urinalysis Basic - ( 07 Jul 2019 22:35 )    Color: Yellow / Appearance: Clear / SG: <=1.005 / pH: x  Gluc: x / Ketone: Negative  / Bili: Negative / Urobili: 0.2 mg/dL   Blood: x / Protein: Negative mg/dL / Nitrite: Negative   Leuk Esterase: Negative / RBC: x / WBC x   Sq Epi: x / Non Sq Epi: x / Bacteria: x    CARDIAC MARKERS ( 07 Jul 2019 22:35 )  x     / <0.01 ng/mL / x     / x     / x          PHYSICAL EXAM:  GEN: No acute distress  LUNGS: Clear to auscultation bilaterally   HEART: Regular  ABD: Soft, non-tender, non-distended.  EXT:   NEURO: AAOX3 SUBJECTIVE:    Patient is a 79y old Male who presents with a chief complaint of Altered Mental Status (08 Jul 2019 10:37)    Currently admitted to medicine with the primary diagnosis of Hyponatremia     Today is hospital day 1d.   Mental status improving  bains catheter in situ  refused for US yesterday  no events overnight.    PAST MEDICAL & SURGICAL HISTORY  Dyslipidemia  COPD (chronic obstructive pulmonary disease)  Diabetes type 2, controlled  Schizophrenia  Encounter for screening colonoscopy     ALLERGIES:  No Known Allergies    MEDICATIONS:  STANDING MEDICATIONS  atorvastatin Oral Tab/Cap - Peds 10 milliGRAM(s) Oral daily  buDESOnide   0.5 milliGRAM(s) Respule 0.5 milliGRAM(s) Inhalation two times a day  chlorhexidine 4% Liquid 1 Application(s) Topical <User Schedule>  enoxaparin Injectable 40 milliGRAM(s) SubCutaneous daily  gabapentin 600 milliGRAM(s) Oral two times a day  OLANZapine 25 milliGRAM(s) Oral at bedtime  traZODone 150 milliGRAM(s) Oral at bedtime    PRN MEDICATIONS  bisacodyl 5 milliGRAM(s) Oral every 12 hours PRN    VITALS:   T(F): 96.6  HR: 69  BP: 160/71  RR: 17  SpO2: --    LABS:                        12.3   7.01  )-----------( 165      ( 09 Jul 2019 07:00 )             33.9     07-08    122<L>  |  85<L>  |  8<L>  ----------------------------<  140<H>  4.0   |  23  |  0.9    Ca    9.5      08 Jul 2019 12:25    TPro  7.0  /  Alb  4.4  /  TBili  0.5  /  DBili  x   /  AST  39  /  ALT  14  /  AlkPhos  94  07-08    PT/INR - ( 07 Jul 2019 22:35 )   PT: 13.50 sec;   INR: 1.18 ratio         PTT - ( 07 Jul 2019 22:35 )  PTT:31.4 sec  Urinalysis Basic - ( 07 Jul 2019 22:35 )    Color: Yellow / Appearance: Clear / SG: <=1.005 / pH: x  Gluc: x / Ketone: Negative  / Bili: Negative / Urobili: 0.2 mg/dL   Blood: x / Protein: Negative mg/dL / Nitrite: Negative   Leuk Esterase: Negative / RBC: x / WBC x   Sq Epi: x / Non Sq Epi: x / Bacteria: x    CARDIAC MARKERS ( 07 Jul 2019 22:35 )  x     / <0.01 ng/mL / x     / x     / x          PHYSICAL EXAM:  GEN: No acute distress  LUNGS: Clear to auscultation bilaterally   HEART: Regular  ABD: Soft, non-tender, non-distended.  EXT: intact  NEURO: AAOX2 SUBJECTIVE:    Patient is a 79y old Male who presents with a chief complaint of Altered Mental Status (08 Jul 2019 10:37)    Currently admitted to medicine with the primary diagnosis of Hyponatremia     Today is hospital day 1d.   Mental status improving  bains catheter in site  refused for US yesterday  no events overnight.    PAST MEDICAL & SURGICAL HISTORY  Dyslipidemia  COPD (chronic obstructive pulmonary disease)  Diabetes type 2, controlled  Schizophrenia      ALLERGIES:  No Known Allergies    MEDICATIONS:  STANDING MEDICATIONS  atorvastatin Oral Tab/Cap - Peds 10 milliGRAM(s) Oral daily  buDESOnide   0.5 milliGRAM(s) Respule 0.5 milliGRAM(s) Inhalation two times a day  chlorhexidine 4% Liquid 1 Application(s) Topical <User Schedule>  enoxaparin Injectable 40 milliGRAM(s) SubCutaneous daily  gabapentin 600 milliGRAM(s) Oral two times a day  OLANZapine 25 milliGRAM(s) Oral at bedtime  traZODone 150 milliGRAM(s) Oral at bedtime    PRN MEDICATIONS  bisacodyl 5 milliGRAM(s) Oral every 12 hours PRN    VITALS:   T(F): 96.6  HR: 69  BP: 160/71  RR: 17      LABS:                        12.3   7.01  )-----------( 165      ( 09 Jul 2019 07:00 )             33.9     07-08    122<L>  |  85<L>  |  8<L>  ----------------------------<  140<H>  4.0   |  23  |  0.9    Ca    9.5      08 Jul 2019 12:25    TPro  7.0  /  Alb  4.4  /  TBili  0.5  /  DBili  x   /  AST  39  /  ALT  14  /  AlkPhos  94  07-08    PT/INR - ( 07 Jul 2019 22:35 )   PT: 13.50 sec;   INR: 1.18 ratio         PTT - ( 07 Jul 2019 22:35 )  PTT:31.4 sec  Urinalysis Basic - ( 07 Jul 2019 22:35 )    Color: Yellow / Appearance: Clear / SG: <=1.005 / pH: x  Gluc: x / Ketone: Negative  / Bili: Negative / Urobili: 0.2 mg/dL   Blood: x / Protein: Negative mg/dL / Nitrite: Negative   Leuk Esterase: Negative / RBC: x / WBC x   Sq Epi: x / Non Sq Epi: x / Bacteria: x    CARDIAC MARKERS ( 07 Jul 2019 22:35 )  x     / <0.01 ng/mL / x     / x     / x          PHYSICAL EXAM:  GEN: No acute distress  LUNGS: Clear to auscultation bilaterally   HEART: Regular  ABD: Soft, non-tender, non-distended.  EXT: intact  NEURO: AAOX3 currently

## 2019-07-09 NOTE — BEHAVIORAL HEALTH ASSESSMENT NOTE - NSBHCHARTREVIEWVS_PSY_A_CORE FT
Vital Signs Last 24 Hrs  T(C): 35.9 (09 Jul 2019 07:27), Max: 36.6 (09 Jul 2019 05:58)  T(F): 96.6 (09 Jul 2019 07:27), Max: 97.8 (09 Jul 2019 05:58)  HR: 69 (09 Jul 2019 07:27) (69 - 92)  BP: 160/71 (09 Jul 2019 07:27) (151/67 - 175/78)  BP(mean): --  RR: 17 (09 Jul 2019 07:27) (17 - 18)  SpO2: --

## 2019-07-09 NOTE — BEHAVIORAL HEALTH ASSESSMENT NOTE - RISK ASSESSMENT
Patient is at high risk while delirious due to poor insight and judgement and inability to perform safety planning

## 2019-07-09 NOTE — BEHAVIORAL HEALTH ASSESSMENT NOTE - NSBHCHARTREVIEWLAB_PSY_A_CORE FT
07-09    125<L>  |  88<L>  |  10  ----------------------------<  96  4.3   |  26  |  1.0    Ca    8.3<L>      09 Jul 2019 07:00    TPro  6.0  /  Alb  3.7  /  TBili  0.5  /  DBili  x   /  AST  35  /  ALT  13  /  AlkPhos  75  07-09                          12.3   7.01  )-----------( 165      ( 09 Jul 2019 07:00 )             33.9   Urinalysis Basic - ( 07 Jul 2019 22:35 )    Color: Yellow / Appearance: Clear / SG: <=1.005 / pH: x  Gluc: x / Ketone: Negative  / Bili: Negative / Urobili: 0.2 mg/dL   Blood: x / Protein: Negative mg/dL / Nitrite: Negative   Leuk Esterase: Negative

## 2019-07-09 NOTE — BEHAVIORAL HEALTH ASSESSMENT NOTE - SUMMARY
Mango     Patient appears delirious secondary to hyponatremia and unable to participate in psychiatric interview. 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 Patient is on Trazodone which can worsen hyponatremia so we will suggest tapering him off the medication to prevent discontinuation syndrome or antidepressant withdrawal symptoms. Patient is on Olanzipine which is much less likely to worsen hyponatremia than trazodone. Given the risk of potential acute decompensation of his psychosis vs the the decompensation of his insomnia we recommend keeping him on olanzapine and discontinuing trazonone Mango Laguna is a 78yo Male, single, living at [facility] living [facility], has one daughter, is on social security disability, with past psych history of Schizophrenia and past medical history of Diabetes, HLD, and asthma, admitted for AMS secondary to hyponatremia. Psychiatry is being consulted to review meds as a possible cause for the patient's hyponatremia. Patient appears delirious secondary to hyponatremia and unable to participate in psychiatric interview. 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 Patient is on Trazodone which can worsen hyponatremia so we will suggest tapering him off the medication to prevent discontinuation syndrome or antidepressant withdrawal symptoms. Patient is on Olanzapine which is much less likely to worsen hyponatremia than trazodone. Given the risk of potential acute decompensation of his psychosis vs the the decompensation of his insomnia we recommend keeping him on olanzapine and discontinuing trazodone.

## 2019-07-09 NOTE — BEHAVIORAL HEALTH ASSESSMENT NOTE - HPI (INCLUDE ILLNESS QUALITY, SEVERITY, DURATION, TIMING, CONTEXT, MODIFYING FACTORS, ASSOCIATED SIGNS AND SYMPTOMS)
Manog Laguna is a 80yo Male, single, living at [facility] assisted living facility, has one daughter, is on social security disability, with past psych history of Schizophrenia and past medical history of Diabetes, HLD, and asthma, admitted for hyponatremia. Psychiatry is being consulted to review meds as a possible cause for the patient's hyponatremia. Upon approach patient was laying in bed, wearing a hospital gown, hanging over the armrest of the bed. Patient is a poor historian, answering most questions with "I don't know" or "I don't know how I got here". Patient reports he was diagnosed with something years ago and can not recall his medications. The patient stopped the interview to ask for a cup of coffee. He denies any feeling of hurting himself or others. He is also denying hearing voices, seeing things that arent there, or other symptoms of psychosis.    Collateral provided by Nurse Krissy and Dr. Kidd from [facility] at [phone].    Krissy reports that the patient has been having altered mental status for one week. She describes this as periods of increased confusion or mind wandering. On the day of admission his AMS worsened and he slumped to the ground and became minimally responsive, which prompted them to call an ambulance. She reports that at baseline he walks on his own, talks to himself, and can perform his ADLs with the help of an aid. When asked if he drinks a lot of water she reports that he doesn't drink water, he drinks coffee instead. She always sees him with a cup of coffee in his hand and will drink it with every meal and inbetween meals.     Dr. Kidd is the patient's primary care doctor. He confirmed the patient's medications as Olanzapine 25mg at nightime and Trazodone 150mg at nighttime. The last time these meds were changed was in August of 2017 when they were increased, and the patient has been compliant with his medications. He reports that the patient has no known history of hyponatremia and his last sodium was 136 on April 3rd 2019. Unprompted the doctor suggested that this is most likely psychogenic polydipsia. Mango Laguna is a 80yo Male, single, living at [facility] assisted living facility, has one daughter, is on social security disability, with past psych history of Schizophrenia and past medical history of Diabetes, HLD, and asthma, admitted for AMS secondary to hyponatremia. Psychiatry is being consulted to review meds as a possible cause for the patient's hyponatremia. Upon approach patient was laying in bed, wearing a hospital gown, hanging over the armrest of the bed. Patient is a poor historian, answering most questions with "I don't know" or "I don't know how I got here". Patient reports he was diagnosed with something years ago and can not recall his medications. The patient stopped the interview to ask for a cup of coffee. He denies any feeling of hurting himself or others. He is also denying hearing voices, seeing things that arent there, feeling persecuted or followed, or other psychosis symptoms. Patient denies symptoms of depression or dillon.    Collateral provided by Nurse Krissy and Dr. Kidd from [facility] at [phone].    Krissy reports that the patient has been having altered mental status for one week. She describes this as periods of increased confusion or mind wandering. On the day of admission his AMS worsened and he slumped to the ground and became minimally responsive, which prompted them to call an ambulance. She reports that at baseline he walks on his own, talks to himself, and can perform his ADLs with the help of an aid. When asked if he drinks a lot of water she reports that he doesn't drink water, he drinks coffee instead. She always sees him with a cup of coffee in his hand and will drink it with every meal and inbetween meals.     Dr. Kidd is the patient's primary care doctor. He confirmed the patient's medications as Olanzapine 25mg at nightime and Trazodone 150mg at nighttime. The last time these meds were changed was in August of 2017 when they were increased, and the patient has been compliant with his medications. He reports that the patient has no known history of hyponatremia and his last sodium was 136 on April 3rd 2019. Unprompted the doctor suggested that this is most likely psychogenic polydipsia.

## 2019-07-09 NOTE — BEHAVIORAL HEALTH ASSESSMENT NOTE - NSBHCHARTREVIEWIMAGING_PSY_A_CORE FT
< from: CT Head No Cont (07.08.19 @ 01:00) >    IMPRESSION:     1.  No acute intra-cranial pathology.    2.  Moderate to severe chronic microvascular ischemic changes.     3.  Moderate sized cystic-appearing space within the anterior left   temporal lobe/middle cranial fossa may represent an arachnoid cyst vs. an   area of encephalomalacia.

## 2019-07-09 NOTE — BEHAVIORAL HEALTH ASSESSMENT NOTE - NSBHCHARTREVIEWINVESTIGATE_PSY_A_CORE FT
< from: 12 Lead ECG (07.07.19 @ 23:45) >      Ventricular Rate 67 BPM    Atrial Rate 67 BPM    P-R Interval 186 ms    QRS Duration 92 ms    Q-T Interval 424 ms    QTC Calculation(Bezet) 448 ms    P Axis 62 degrees    R Axis 25 degrees    T Axis 94 degrees    Diagnosis Line Normal sinus rhythm  Left ventricular hypertrophy with repolarization abnormality  Abnormal ECG

## 2019-07-09 NOTE — BEHAVIORAL HEALTH ASSESSMENT NOTE - OTHER PAST PSYCHIATRIC HISTORY (INCLUDE DETAILS REGARDING ONSET, COURSE OF ILLNESS, INPATIENT/OUTPATIENT TREATMENT)
Past psychiatric history of Schizophrenia. Has been stable on Olanzapine 25mg nightly and Trazadone 150mg nightly.

## 2019-07-10 LAB
ANION GAP SERPL CALC-SCNC: 10 MMOL/L — SIGNIFICANT CHANGE UP (ref 7–14)
ANION GAP SERPL CALC-SCNC: 12 MMOL/L — SIGNIFICANT CHANGE UP (ref 7–14)
BASOPHILS # BLD AUTO: 0.01 K/UL — SIGNIFICANT CHANGE UP (ref 0–0.2)
BASOPHILS NFR BLD AUTO: 0.1 % — SIGNIFICANT CHANGE UP (ref 0–1)
BUN SERPL-MCNC: 13 MG/DL — SIGNIFICANT CHANGE UP (ref 10–20)
BUN SERPL-MCNC: 15 MG/DL — SIGNIFICANT CHANGE UP (ref 10–20)
CALCIUM SERPL-MCNC: 9 MG/DL — SIGNIFICANT CHANGE UP (ref 8.5–10.1)
CALCIUM SERPL-MCNC: 9.3 MG/DL — SIGNIFICANT CHANGE UP (ref 8.5–10.1)
CHLORIDE SERPL-SCNC: 91 MMOL/L — LOW (ref 98–110)
CHLORIDE SERPL-SCNC: 94 MMOL/L — LOW (ref 98–110)
CO2 SERPL-SCNC: 27 MMOL/L — SIGNIFICANT CHANGE UP (ref 17–32)
CO2 SERPL-SCNC: 29 MMOL/L — SIGNIFICANT CHANGE UP (ref 17–32)
CREAT SERPL-MCNC: 1 MG/DL — SIGNIFICANT CHANGE UP (ref 0.7–1.5)
CREAT SERPL-MCNC: 1.1 MG/DL — SIGNIFICANT CHANGE UP (ref 0.7–1.5)
EOSINOPHIL # BLD AUTO: 0.02 K/UL — SIGNIFICANT CHANGE UP (ref 0–0.7)
EOSINOPHIL NFR BLD AUTO: 0.3 % — SIGNIFICANT CHANGE UP (ref 0–8)
GLUCOSE BLDC GLUCOMTR-MCNC: 109 MG/DL — HIGH (ref 70–99)
GLUCOSE BLDC GLUCOMTR-MCNC: 120 MG/DL — HIGH (ref 70–99)
GLUCOSE BLDC GLUCOMTR-MCNC: 124 MG/DL — HIGH (ref 70–99)
GLUCOSE SERPL-MCNC: 102 MG/DL — HIGH (ref 70–99)
GLUCOSE SERPL-MCNC: 70 MG/DL — SIGNIFICANT CHANGE UP (ref 70–99)
HCT VFR BLD CALC: 37.6 % — LOW (ref 42–52)
HGB BLD-MCNC: 13.2 G/DL — LOW (ref 14–18)
IMM GRANULOCYTES NFR BLD AUTO: 0.5 % — HIGH (ref 0.1–0.3)
LYMPHOCYTES # BLD AUTO: 1.68 K/UL — SIGNIFICANT CHANGE UP (ref 1.2–3.4)
LYMPHOCYTES # BLD AUTO: 21.6 % — SIGNIFICANT CHANGE UP (ref 20.5–51.1)
MCHC RBC-ENTMCNC: 32.9 PG — HIGH (ref 27–31)
MCHC RBC-ENTMCNC: 35.1 G/DL — SIGNIFICANT CHANGE UP (ref 32–37)
MCV RBC AUTO: 93.8 FL — SIGNIFICANT CHANGE UP (ref 80–94)
MONOCYTES # BLD AUTO: 0.88 K/UL — HIGH (ref 0.1–0.6)
MONOCYTES NFR BLD AUTO: 11.3 % — HIGH (ref 1.7–9.3)
NEUTROPHILS # BLD AUTO: 5.16 K/UL — SIGNIFICANT CHANGE UP (ref 1.4–6.5)
NEUTROPHILS NFR BLD AUTO: 66.2 % — SIGNIFICANT CHANGE UP (ref 42.2–75.2)
NRBC # BLD: 0 /100 WBCS — SIGNIFICANT CHANGE UP (ref 0–0)
PLATELET # BLD AUTO: 173 K/UL — SIGNIFICANT CHANGE UP (ref 130–400)
POTASSIUM SERPL-MCNC: 4 MMOL/L — SIGNIFICANT CHANGE UP (ref 3.5–5)
POTASSIUM SERPL-MCNC: 4.6 MMOL/L — SIGNIFICANT CHANGE UP (ref 3.5–5)
POTASSIUM SERPL-SCNC: 4 MMOL/L — SIGNIFICANT CHANGE UP (ref 3.5–5)
POTASSIUM SERPL-SCNC: 4.6 MMOL/L — SIGNIFICANT CHANGE UP (ref 3.5–5)
RBC # BLD: 4.01 M/UL — LOW (ref 4.7–6.1)
RBC # FLD: 13.6 % — SIGNIFICANT CHANGE UP (ref 11.5–14.5)
SODIUM SERPL-SCNC: 131 MMOL/L — LOW (ref 135–146)
SODIUM SERPL-SCNC: 132 MMOL/L — LOW (ref 135–146)
TSH SERPL-MCNC: 2.08 UIU/ML — SIGNIFICANT CHANGE UP (ref 0.27–4.2)
WBC # BLD: 7.79 K/UL — SIGNIFICANT CHANGE UP (ref 4.8–10.8)
WBC # FLD AUTO: 7.79 K/UL — SIGNIFICANT CHANGE UP (ref 4.8–10.8)

## 2019-07-10 PROCEDURE — 99233 SBSQ HOSP IP/OBS HIGH 50: CPT

## 2019-07-10 RX ORDER — TRAZODONE HCL 50 MG
100 TABLET ORAL AT BEDTIME
Refills: 0 | Status: COMPLETED | OUTPATIENT
Start: 2019-07-10 | End: 2019-07-10

## 2019-07-10 RX ORDER — IPRATROPIUM/ALBUTEROL SULFATE 18-103MCG
3 AEROSOL WITH ADAPTER (GRAM) INHALATION EVERY 6 HOURS
Refills: 0 | Status: DISCONTINUED | OUTPATIENT
Start: 2019-07-10 | End: 2019-07-11

## 2019-07-10 RX ORDER — SODIUM CHLORIDE 9 MG/ML
1000 INJECTION INTRAMUSCULAR; INTRAVENOUS; SUBCUTANEOUS
Refills: 0 | Status: DISCONTINUED | OUTPATIENT
Start: 2019-07-10 | End: 2019-07-10

## 2019-07-10 RX ORDER — TRAZODONE HCL 50 MG
50 TABLET ORAL AT BEDTIME
Refills: 0 | Status: DISCONTINUED | OUTPATIENT
Start: 2019-07-11 | End: 2019-07-11

## 2019-07-10 RX ORDER — NIFEDIPINE 30 MG
30 TABLET, EXTENDED RELEASE 24 HR ORAL ONCE
Refills: 0 | Status: COMPLETED | OUTPATIENT
Start: 2019-07-10 | End: 2019-07-10

## 2019-07-10 RX ORDER — BUDESONIDE AND FORMOTEROL FUMARATE DIHYDRATE 160; 4.5 UG/1; UG/1
2 AEROSOL RESPIRATORY (INHALATION)
Refills: 0 | Status: DISCONTINUED | OUTPATIENT
Start: 2019-07-10 | End: 2019-07-11

## 2019-07-10 RX ADMIN — TAMSULOSIN HYDROCHLORIDE 0.4 MILLIGRAM(S): 0.4 CAPSULE ORAL at 22:54

## 2019-07-10 RX ADMIN — GABAPENTIN 600 MILLIGRAM(S): 400 CAPSULE ORAL at 18:00

## 2019-07-10 RX ADMIN — OLANZAPINE 25 MILLIGRAM(S): 15 TABLET, FILM COATED ORAL at 22:58

## 2019-07-10 RX ADMIN — Medication 3 MILLILITER(S): at 20:54

## 2019-07-10 RX ADMIN — Medication 0.5 MILLIGRAM(S): at 11:23

## 2019-07-10 RX ADMIN — GABAPENTIN 600 MILLIGRAM(S): 400 CAPSULE ORAL at 05:33

## 2019-07-10 RX ADMIN — Medication 30 MILLIGRAM(S): at 22:57

## 2019-07-10 RX ADMIN — ATORVASTATIN CALCIUM 10 MILLIGRAM(S): 80 TABLET, FILM COATED ORAL at 11:25

## 2019-07-10 RX ADMIN — Medication 100 MILLIGRAM(S): at 22:54

## 2019-07-10 RX ADMIN — SODIUM CHLORIDE 100 MILLILITER(S): 9 INJECTION INTRAMUSCULAR; INTRAVENOUS; SUBCUTANEOUS at 11:54

## 2019-07-10 RX ADMIN — ENOXAPARIN SODIUM 40 MILLIGRAM(S): 100 INJECTION SUBCUTANEOUS at 11:26

## 2019-07-10 NOTE — PROGRESS NOTE ADULT - ASSESSMENT
Assessment and Plan:  79 year old male with PMH of Schizophrenia, COPD, DM II, DLD, non-Hodgkin lymphoma in remission presents from [facility] for confusion and altered mentation. He is a poor historian at his current state. As per EMS and assisted living (AL) documentation, the patient had fluctuating mental status since the morning of presentation. About an hour prior to presentation he was found slumped in front of TV however easily arousable. As per AL documentation, he did not have any recent fever, chills, cough, dyspnea, CP, abdominal pain. Found to be hyponatremic, Na 121      1. Toxic metabolic encephalopathy  unclear etiology unlikely due to hyponatremia, currently a+o x2  confirm baseline with family  needs further collateral for recent medication changes  titrate off trazodone, given 100 mg today, 50 mg last dose tomorrow   Psych consult pending    2. Hyponatremia  responded to LR  cont NS at 100 cc/hr  trend bmp q8  check tsh, uric acid  possible SIADH 2/2 hyponatremia    3. COPD  start symbicort  duoneb q6    4. DM2  wean off gabapentin if possible  fs controlled off medications    5. Schizophrenia  zyprexa 25    6. Nonhodgkins lymphoma  outpt f/u  7. DVT ppx  lovenox

## 2019-07-10 NOTE — PROGRESS NOTE ADULT - SUBJECTIVE AND OBJECTIVE BOX
SUBJECTIVE:       Today is hospital day 2d. This morning he is resting comfortably in bed and reports no new issues or overnight events. Denies cp, sob, abdominal pain.    PAST MEDICAL & SURGICAL HISTORY  Dyslipidemia  COPD (chronic obstructive pulmonary disease)  Diabetes type 2, controlled  Schizophrenia  Encounter for screening colonoscopy    SOCIAL HISTORY:  Negative for smoking/alcohol/drug use.     ALLERGIES:  No Known Allergies    MEDICATIONS:  STANDING MEDICATIONS  ALBUTerol/ipratropium for Nebulization 3 milliLiter(s) Nebulizer every 6 hours  atorvastatin Oral Tab/Cap - Peds 10 milliGRAM(s) Oral daily  buDESOnide 160 MICROgram(s)/formoterol 4.5 MICROgram(s) Inhaler 2 Puff(s) Inhalation two times a day  chlorhexidine 4% Liquid 1 Application(s) Topical <User Schedule>  enoxaparin Injectable 40 milliGRAM(s) SubCutaneous daily  gabapentin 600 milliGRAM(s) Oral two times a day  OLANZapine 25 milliGRAM(s) Oral at bedtime  tamsulosin 0.4 milliGRAM(s) Oral at bedtime  traZODone 100 milliGRAM(s) Oral at bedtime    PRN MEDICATIONS  bisacodyl 5 milliGRAM(s) Oral every 12 hours PRN    VITALS:   T(F): 99.6  HR: 80  BP: 159/70  RR: 18  SpO2: 97%    LABS:                        13.2   7.79  )-----------( 173      ( 10 Jul 2019 12:09 )             37.6     07-10    132<L>  |  91<L>  |  13  ----------------------------<  70  4.0   |  29  |  1.1    Ca    9.3      10 Jul 2019 12:09    TPro  6.0  /  Alb  3.7  /  TBili  0.5  /  DBili  x   /  AST  35  /  ALT  13  /  AlkPhos  75  07-09              Culture - Blood (collected 07 Jul 2019 22:35)  Source: .Blood Blood-Peripheral  Preliminary Report (09 Jul 2019 09:01):    No growth to date.    Culture - Blood (collected 07 Jul 2019 22:35)  Source: .Blood Blood-Peripheral  Preliminary Report (09 Jul 2019 09:01):    No growth to date.    Culture - Urine (collected 07 Jul 2019 22:30)  Source: .Urine Clean Catch (Midstream)  Final Report (09 Jul 2019 19:43):    No growth        PHYSICAL EXAM:  GEN: No acute distress  LUNGS: expiratory wheezes appreciated  HEART: S1/S2 present. RRR.   ABD: Soft, non-tender, non-distended. Bowel sounds present  EXT: No LE edema  NEURO: AAOX3

## 2019-07-10 NOTE — PROGRESS NOTE ADULT - ASSESSMENT
79M PMHx schizophrenia, COPD, DM2, HLD, non Hodgkins lymphoma here with toxic metabolic encephalopathy; found to have hyponatremia.    1. Toxic metabolic encephalopathy  unclear etiology unlikely due to hyponatremia, currently a+o x2  confirm baseline with family  needs further collateral for recent medication changes  titrate off trazodone if possible, psych input appreciated  2. Hyponatremia  responded to LR  cont NS at 100 cc/hr  trend bmp q8  check tsh, uric acid  unlikely siadh  3. COPD  start symbicort  duoneb q6  4. DM2  wean off gabapentin if possible  fs controlled off medications  5. Schizophrenia  zyprexa 25  6. Nonhodgkins lymphoma  outpt f/u  7. DVT ppx  lovenox    #Progress Note Handoff:  Pending (specify):  Consults_________, Tests________, Test Results_______, Other____NS_____  Family discussion:to reach out to family  Disposition: Home___/SNF__x_/Other________/Unknown at this time________ 79M PMHx schizophrenia, COPD, DM2, HLD, non Hodgkins lymphoma here with toxic metabolic encephalopathy; found to have hyponatremia.    1. Toxic metabolic encephalopathy  unclear etiology unlikely due to hyponatremia, currently a+o x2  confirm baseline with family  needs further collateral for recent medication changes  titrate off trazodone if possible, psych input appreciated  2. Hyponatremia  responded to LR  cont NS at 100 cc/hr  trend bmp q8  check tsh, uric acid  ?siadh due to antipsychotic  3. COPD  start symbicort  duoneb q6  4. DM2  wean off gabapentin if possible  fs controlled off medications  5. Schizophrenia  zyprexa 25  6. Nonhodgkins lymphoma  outpt f/u  7. DVT ppx  lovenox    #Progress Note Handoff:  Pending (specify):  Consults_________, Tests________, Test Results_______, Other____NS_____  Family discussion:to reach out to family  Disposition: Home___/SNF__x_/Other________/Unknown at this time________

## 2019-07-10 NOTE — PROGRESS NOTE ADULT - SUBJECTIVE AND OBJECTIVE BOX
Patient is a 79y old  Male who presents with a chief complaint of Altered Mental Status (09 Jul 2019 08:31)      SUBJECTIVE / OVERNIGHT EVENTS:  no cp, sob, abd pain, fever  no sob, orthopnea, pnd, cough    MEDICATIONS  (STANDING):  atorvastatin Oral Tab/Cap - Peds 10 milliGRAM(s) Oral daily  chlorhexidine 4% Liquid 1 Application(s) Topical <User Schedule>  enoxaparin Injectable 40 milliGRAM(s) SubCutaneous daily  gabapentin 600 milliGRAM(s) Oral two times a day  OLANZapine 25 milliGRAM(s) Oral at bedtime  sodium chloride 0.9%. 1000 milliLiter(s) (100 mL/Hr) IV Continuous <Continuous>  tamsulosin 0.4 milliGRAM(s) Oral at bedtime  traZODone 100 milliGRAM(s) Oral at bedtime    MEDICATIONS  (PRN):  bisacodyl 5 milliGRAM(s) Oral every 12 hours PRN Constipation        CAPILLARY BLOOD GLUCOSE      POCT Blood Glucose.: 124 mg/dL (10 Jul 2019 07:21)  POCT Blood Glucose.: 134 mg/dL (09 Jul 2019 21:25)  POCT Blood Glucose.: 111 mg/dL (09 Jul 2019 16:37)    I&O's Summary    09 Jul 2019 07:01  -  10 Jul 2019 07:00  --------------------------------------------------------  IN: 0 mL / OUT: 4350 mL / NET: -4350 mL        PHYSICAL EXAM:  GENERAL: nad, a+o x2  EYES:  NECK:   CHEST/LUNG: exp wheeze R>L  HEART: rrr no m/g/r  ABDOMEN: soft nt nd  EXTREMITIES:  no edema bl  PSYCH:   NEUROLOGY:   SKIN:    LABS:                        12.3   7.01  )-----------( 165      ( 09 Jul 2019 07:00 )             33.9     07-09    125<L>  |  88<L>  |  10  ----------------------------<  96  4.3   |  26  |  1.0    Ca    8.3<L>      09 Jul 2019 07:00    TPro  6.0  /  Alb  3.7  /  TBili  0.5  /  DBili  x   /  AST  35  /  ALT  13  /  AlkPhos  75  07-09              RADIOLOGY & ADDITIONAL TESTS:    Imaging Personally Reviewed:  Yes    Consultant(s) Notes Reviewed:      Care Discussed with Consultants/Other Providers:    Assessment and Plan   PAST MEDICAL & SURGICAL HISTORY:  Dyslipidemia  COPD (chronic obstructive pulmonary disease)  Diabetes type 2, controlled  Schizophrenia  Encounter for screening colonoscopy

## 2019-07-11 ENCOUNTER — TRANSCRIPTION ENCOUNTER (OUTPATIENT)
Age: 79
End: 2019-07-11

## 2019-07-11 VITALS — HEART RATE: 96 BPM | SYSTOLIC BLOOD PRESSURE: 180 MMHG | DIASTOLIC BLOOD PRESSURE: 78 MMHG

## 2019-07-11 LAB
ANION GAP SERPL CALC-SCNC: 13 MMOL/L — SIGNIFICANT CHANGE UP (ref 7–14)
BASOPHILS # BLD AUTO: 0.02 K/UL — SIGNIFICANT CHANGE UP (ref 0–0.2)
BASOPHILS NFR BLD AUTO: 0.3 % — SIGNIFICANT CHANGE UP (ref 0–1)
BUN SERPL-MCNC: 12 MG/DL — SIGNIFICANT CHANGE UP (ref 10–20)
CALCIUM SERPL-MCNC: 9.7 MG/DL — SIGNIFICANT CHANGE UP (ref 8.5–10.1)
CHLORIDE SERPL-SCNC: 95 MMOL/L — LOW (ref 98–110)
CO2 SERPL-SCNC: 27 MMOL/L — SIGNIFICANT CHANGE UP (ref 17–32)
CREAT SERPL-MCNC: 0.9 MG/DL — SIGNIFICANT CHANGE UP (ref 0.7–1.5)
EOSINOPHIL # BLD AUTO: 0.04 K/UL — SIGNIFICANT CHANGE UP (ref 0–0.7)
EOSINOPHIL NFR BLD AUTO: 0.6 % — SIGNIFICANT CHANGE UP (ref 0–8)
GLUCOSE BLDC GLUCOMTR-MCNC: 100 MG/DL — HIGH (ref 70–99)
GLUCOSE BLDC GLUCOMTR-MCNC: 116 MG/DL — HIGH (ref 70–99)
GLUCOSE BLDC GLUCOMTR-MCNC: 118 MG/DL — HIGH (ref 70–99)
GLUCOSE BLDC GLUCOMTR-MCNC: 95 MG/DL — SIGNIFICANT CHANGE UP (ref 70–99)
GLUCOSE SERPL-MCNC: 117 MG/DL — HIGH (ref 70–99)
HCT VFR BLD CALC: 41.6 % — LOW (ref 42–52)
HGB BLD-MCNC: 14.5 G/DL — SIGNIFICANT CHANGE UP (ref 14–18)
IMM GRANULOCYTES NFR BLD AUTO: 0.6 % — HIGH (ref 0.1–0.3)
LYMPHOCYTES # BLD AUTO: 1.83 K/UL — SIGNIFICANT CHANGE UP (ref 1.2–3.4)
LYMPHOCYTES # BLD AUTO: 27.3 % — SIGNIFICANT CHANGE UP (ref 20.5–51.1)
MCHC RBC-ENTMCNC: 33 PG — HIGH (ref 27–31)
MCHC RBC-ENTMCNC: 34.9 G/DL — SIGNIFICANT CHANGE UP (ref 32–37)
MCV RBC AUTO: 94.5 FL — HIGH (ref 80–94)
MONOCYTES # BLD AUTO: 0.53 K/UL — SIGNIFICANT CHANGE UP (ref 0.1–0.6)
MONOCYTES NFR BLD AUTO: 7.9 % — SIGNIFICANT CHANGE UP (ref 1.7–9.3)
NEUTROPHILS # BLD AUTO: 4.24 K/UL — SIGNIFICANT CHANGE UP (ref 1.4–6.5)
NEUTROPHILS NFR BLD AUTO: 63.3 % — SIGNIFICANT CHANGE UP (ref 42.2–75.2)
NRBC # BLD: 0 /100 WBCS — SIGNIFICANT CHANGE UP (ref 0–0)
PLATELET # BLD AUTO: 181 K/UL — SIGNIFICANT CHANGE UP (ref 130–400)
POTASSIUM SERPL-MCNC: 4.2 MMOL/L — SIGNIFICANT CHANGE UP (ref 3.5–5)
POTASSIUM SERPL-SCNC: 4.2 MMOL/L — SIGNIFICANT CHANGE UP (ref 3.5–5)
RBC # BLD: 4.4 M/UL — LOW (ref 4.7–6.1)
RBC # FLD: 13.4 % — SIGNIFICANT CHANGE UP (ref 11.5–14.5)
SODIUM SERPL-SCNC: 135 MMOL/L — SIGNIFICANT CHANGE UP (ref 135–146)
WBC # BLD: 6.7 K/UL — SIGNIFICANT CHANGE UP (ref 4.8–10.8)
WBC # FLD AUTO: 6.7 K/UL — SIGNIFICANT CHANGE UP (ref 4.8–10.8)

## 2019-07-11 PROCEDURE — 99232 SBSQ HOSP IP/OBS MODERATE 35: CPT | Mod: GC

## 2019-07-11 PROCEDURE — 99239 HOSP IP/OBS DSCHRG MGMT >30: CPT

## 2019-07-11 RX ORDER — METOPROLOL TARTRATE 50 MG
25 TABLET ORAL ONCE
Refills: 0 | Status: COMPLETED | OUTPATIENT
Start: 2019-07-11 | End: 2019-07-11

## 2019-07-11 RX ORDER — METOPROLOL TARTRATE 50 MG
1 TABLET ORAL
Qty: 60 | Refills: 0
Start: 2019-07-11 | End: 2019-08-09

## 2019-07-11 RX ORDER — TRAZODONE HCL 50 MG
1 TABLET ORAL
Qty: 30 | Refills: 0
Start: 2019-07-11 | End: 2019-08-09

## 2019-07-11 RX ORDER — TRAZODONE HCL 50 MG
100 TABLET ORAL AT BEDTIME
Refills: 0 | Status: DISCONTINUED | OUTPATIENT
Start: 2019-07-11 | End: 2019-07-11

## 2019-07-11 RX ORDER — TAMSULOSIN HYDROCHLORIDE 0.4 MG/1
1 CAPSULE ORAL
Qty: 0 | Refills: 0 | DISCHARGE
Start: 2019-07-11

## 2019-07-11 RX ADMIN — GABAPENTIN 600 MILLIGRAM(S): 400 CAPSULE ORAL at 05:44

## 2019-07-11 RX ADMIN — ENOXAPARIN SODIUM 40 MILLIGRAM(S): 100 INJECTION SUBCUTANEOUS at 11:46

## 2019-07-11 RX ADMIN — ATORVASTATIN CALCIUM 10 MILLIGRAM(S): 80 TABLET, FILM COATED ORAL at 11:46

## 2019-07-11 RX ADMIN — GABAPENTIN 600 MILLIGRAM(S): 400 CAPSULE ORAL at 17:33

## 2019-07-11 RX ADMIN — BUDESONIDE AND FORMOTEROL FUMARATE DIHYDRATE 2 PUFF(S): 160; 4.5 AEROSOL RESPIRATORY (INHALATION) at 11:50

## 2019-07-11 RX ADMIN — Medication 25 MILLIGRAM(S): at 20:20

## 2019-07-11 RX ADMIN — CHLORHEXIDINE GLUCONATE 1 APPLICATION(S): 213 SOLUTION TOPICAL at 05:44

## 2019-07-11 NOTE — DISCHARGE NOTE PROVIDER - NSDCCPCAREPLAN_GEN_ALL_CORE_FT
PRINCIPAL DISCHARGE DIAGNOSIS  Diagnosis: Hyponatremia  Assessment and Plan of Treatment: Resolved with help of IV Fluids  Please follow up with PCP in 1-3 days.   Please see health provider if worsening mental status, shortness of breath, fevers      SECONDARY DISCHARGE DIAGNOSES  Diagnosis: Urinary retention  Assessment and Plan of Treatment: US showed enlarged prostate, please follow up with Urology as outpatient in 1-3 days    Diagnosis: Altered mental status  Assessment and Plan of Treatment:

## 2019-07-11 NOTE — DISCHARGE NOTE NURSING/CASE MANAGEMENT/SOCIAL WORK - NSDCDPATPORTLINK_GEN_ALL_CORE
You can access the [facility], offered by [facility], by registering with the following website: [email or web address]

## 2019-07-11 NOTE — PHYSICAL THERAPY INITIAL EVALUATION ADULT - IMPAIRMENTS FOUND, PT EVAL
aerobic capacity/endurance/arousal, attention, and cognition/cognitive impairment/gait, locomotion, and balance/gross motor/poor safety awareness/posture/ROM

## 2019-07-11 NOTE — DISCHARGE NOTE PROVIDER - CARE PROVIDER_API CALL
Silverio Hayes)  [address] [place], [place] [id number]  [address] [place] [address]  Phone: [phone]  Fax: [phone]  Follow Up Time: 1-3 days

## 2019-07-11 NOTE — PROGRESS NOTE BEHAVIORAL HEALTH - SUMMARY
Mango Laguna is a 80yo Male, single, living at [facility] living [facility], has one daughter, is on social security disability, with past psych history of Schizophrenia and past medical history of Diabetes, HLD, and asthma, admitted for AMS secondary to hyponatremia. Psychiatry is being consulted to review meds as a possible cause for the patient's hyponatremia. Patient no longer appears delirious and is able to participate in conversation during the interview. 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 Patient was on Trazodone which can worsen hyponatremia, currently being tapered, with tonight being his last dose. Patient is on Olanzapine which is much less likely to worsen hyponatremia than trazodone. Given the risk of potential acute decompensation of his psychosis vs the the decompensation of his insomnia we recommend keeping him on olanzapine and discontinuing trazodone.    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 Mango Laguna is a 80yo Male, single, living at [facility] living [facility], has one daughter, is on social security disability, with past psych history of Schizophrenia and past medical history of Diabetes, HLD, and asthma, admitted for AMS secondary to hyponatremia. Psychiatry is being consulted to review meds as a possible cause for the patient's hyponatremia. Patient no longer appears delirious and is able to participate in conversation during the interview. 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 Patient is on Trazodone which can worsen hyponatremia, which was tapered down to 100mg. Patient is on Olanzapine which is much less likely to worsen hyponatremia than trazodone. Given the normalization of his Na while on still on his medications, we recommend keeping him on olanzapine and trazodone at their current dose.    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

## 2019-07-11 NOTE — PROGRESS NOTE BEHAVIORAL HEALTH - NSBHCONSULTRECOMMENDOTHER_PSY_A_CORE FT
Plan:    - Recommend stopping Trazodone which can worsen hyponatremia     - Taper dose by 50mg a day to prevent antidepressant withdrawal symptoms     - Last dose to be given tonight  - Recommend keeping the patient on Olanzapine 25mg nightly to prevent decompensation of psychosis  - Recommend Atarax 50mg Q 6 hrs PRN at bedtime for insomnia  - Patient safe to discharge to Nursing home. Plan:    - Recommend keeping Trazodone 100mg nightly, now that his Na has been corrected  - Recommend keeping the patient on Olanzapine 25mg nightly to prevent decompensation of psychosis  - Patient safe to discharge to Nursing home.  - Nursing home made aware of medication changes

## 2019-07-11 NOTE — PROGRESS NOTE BEHAVIORAL HEALTH - CASE SUMMARY
Mr Laguna is a 79 year old man with a history of Schizophrenia who was admitted for altered mental status and was found to have hyponatremia . Psychiatry consult was called for medication management as there is a concern that the hyponatremia was caused by patient's psychotropic medication.   Patient appears less delirious today. His sodium level appears to be within normal limits and he seems to have had good night sleep despite the reduction in the dose of Trazodone.   Patient also continues to be at his baseline mental status with regards to his psychiatric illness .   At this time, patient is not considered a danger to himself or others and does not need inpatient psychiatric hospitalization. Upon discharge, patient can follow up with his PMD and or Psychiatrist affiliated with [facility] on the current doses of his psychotropic medication.

## 2019-07-11 NOTE — DISCHARGE NOTE PROVIDER - CARE PROVIDERS DIRECT ADDRESSES
[email or web address]

## 2019-07-11 NOTE — PROGRESS NOTE BEHAVIORAL HEALTH - NSBHCHARTREVIEWLAB_PSY_A_CORE FT
07-11    135  |  95<L>  |  12  ----------------------------<  117<H>  4.2   |  27  |  0.9    Ca    9.7      11 Jul 2019 05:51                          14.5   6.70  )-----------( 181      ( 11 Jul 2019 05:51 )             41.6

## 2019-07-11 NOTE — PROGRESS NOTE BEHAVIORAL HEALTH - NSBHCHARTREVIEWINVESTIGATE_PSY_A_CORE FT
< from: 12 Lead ECG (07.07.19 @ 23:45) >    Ventricular Rate 67 BPM    Atrial Rate 67 BPM    P-R Interval 186 ms    QRS Duration 92 ms    Q-T Interval 424 ms    QTC Calculation(Bezet) 448 ms    P Axis 62 degrees    R Axis 25 degrees    T Axis 94 degrees    Diagnosis Line Normal sinus rhythm  Left ventricular hypertrophy with repolarization abnormality  Abnormal ECG      < end of copied text >

## 2019-07-11 NOTE — PROGRESS NOTE BEHAVIORAL HEALTH - RISK ASSESSMENT
Patient is at low risk due to no longer being delirious, having fair insight and judgement, and having a stable facility to be discharged to where he will be observed and cared for by a full time staff. There is no current concern for the patient's safety or the safety of others.

## 2019-07-11 NOTE — CONSULT NOTE ADULT - ASSESSMENT
IMPRESSION: Rehab of gait ab encephalopathy    PRECAUTIONS: [    ] Cardiac  [    ] Respiratory  [    ] Seizures [    ] Contact Isolation  [    ] Droplet Isolation  [    ] Other    Weight Bearing Status:     RECOMMENDATION:    Out of Bed to Chair     DVT/Decubiti Prophylaxis    REHAB PLAN:     [   x  ] Bedside P/T 3-5 times a week   [     ] Bedside O/T  2-3 times a week   [     ] No Rehab Therapy Indicated   [     ]  Speech Therapy   Conditioning/ROM                                 ADL  Bed Mobility                                            Conditioning/ROM  Transfers                                                  Bed Mobility  Sitting /Standing Balance                      Transfers                                        Gait Training                                            Sitting/Standing Balance  Stair Training [   ]Applicable                 Home equipment Eval                                                                     Splinting  [   ] Only      GOALS:   ADL   [  x  ]   Independent         Transfers  [  x  ] Independent            Ambulation  [   x  ] Independent     [    x ] With device                            [    ]  CG                                               [    ]  CG                                                    [     ] CG                            [    ] Min A                                          [    ] Min A                                                [     ] Min  A          DISCHARGE PLAN:   [     ]  Good candidate for Intensive Rehabilitation/Hospital based                                             Will tolerate 3hrs Intensive Rehab Daily                                       [  x    ]  Short Term Rehab in Skilled Nursing Facility DC TO [facility] AS PER SOC SERVICE                                       [      ]  Home with Outpatient or VN services                                         [      ]  Possible Candidate for Intensive Hospital based Rehab

## 2019-07-11 NOTE — PHYSICAL THERAPY INITIAL EVALUATION ADULT - TRANSFER SAFETY CONCERNS NOTED: BED/CHAIR, REHAB EVAL
decreased balance during turns/decreased safety awareness/losing balance/decreased proprioception/decreased sequencing ability/stand pivot

## 2019-07-11 NOTE — DISCHARGE NOTE NURSING/CASE MANAGEMENT/SOCIAL WORK - NSDCFUADDAPPT_GEN_ALL_CORE_FT
Please call [phone] to schedule an appt with Urology clinic to further evaluate the urinary retention and enlarged prostate. Follow up with Psychiatrist and Primary Care provider within 1-3 days of discharge

## 2019-07-11 NOTE — PROGRESS NOTE ADULT - ASSESSMENT
79 year old male with PMH of Schizophrenia, COPD, DM II, DLD, non-Hodgkin lymphoma in remission presents from [facility] for confusion and altered mentation. He is a poor historian at his current state. As per EMS and assisted living (AL) documentation, the patient had fluctuating mental status since the morning of presentation. About an hour prior to presentation he was found slumped in front of TV however easily arousable. As per AL documentation, he did not have any recent fever, chills, cough, dyspnea, CP, abdominal pain. Found to be hyponatremic, Na 121      1. Toxic metabolic encephalopathy  unclear etiology unlikely due to hyponatremia, currently a+o x2  confirm baseline with family  needs further collateral for recent medication changes  titrate off trazodone, 50 mg last dose today  Psych consult pending    2. Hyponatremia  responded to LR and NS  trend bmp q8, Na 135  check tsh wnl  possible SIADH 2/2 hyponatremia    3. COPD  saturating well on room air  on symbicort  duoneb q6    4. DM2  wean off gabapentin if possible  fs controlled off medications    5. Schizophrenia  zyprexa 25    6. Nonhodgkins lymphoma  outpt f/u  7. DVT ppx  lovenox

## 2019-07-11 NOTE — PHYSICAL THERAPY INITIAL EVALUATION ADULT - PLANNED THERAPY INTERVENTIONS, PT EVAL
balance training/bed mobility training/gait training/neuromuscular re-education/postural re-education/ROM/strengthening/stretching/transfer training

## 2019-07-11 NOTE — PHYSICAL THERAPY INITIAL EVALUATION ADULT - PERTINENT HX OF CURRENT PROBLEM, REHAB EVAL
altered mental status and confusion while at assisted living facility, likely d/t hyponatremia and urinary retention

## 2019-07-11 NOTE — PROGRESS NOTE ADULT - SUBJECTIVE AND OBJECTIVE BOX
Patient is a 79y old  Male who presents with a chief complaint of Altered Mental Status (11 Jul 2019 07:51)    Patient was seen and examined.  Pt awake , alert  Denies any complaints  All systems reviewed positive history as mentioned above.    PAST MEDICAL & SURGICAL HISTORY:  Dyslipidemia  COPD (chronic obstructive pulmonary disease)  Diabetes type 2, controlled  Schizophrenia    Allergies  No Known Allergies    MEDICATIONS  (STANDING):  ALBUTerol/ipratropium for Nebulization 3 milliLiter(s) Nebulizer every 6 hours  atorvastatin Oral Tab/Cap - Peds 10 milliGRAM(s) Oral daily  buDESOnide 160 MICROgram(s)/formoterol 4.5 MICROgram(s) Inhaler 2 Puff(s) Inhalation two times a day  chlorhexidine 4% Liquid 1 Application(s) Topical <User Schedule>  enoxaparin Injectable 40 milliGRAM(s) SubCutaneous daily  gabapentin 600 milliGRAM(s) Oral two times a day  OLANZapine 25 milliGRAM(s) Oral at bedtime  tamsulosin 0.4 milliGRAM(s) Oral at bedtime  traZODone 100 milliGRAM(s) Oral at bedtime    MEDICATIONS  (PRN):  bisacodyl 5 milliGRAM(s) Oral every 12 hours PRN Constipation    Vital Signs Last 24 Hrs  T(C): 36.6  T(F): 97.9  HR: 108  BP: 179/81  BP(mean): --  RR: 18  SpO2: 96%    O/E:  Awake, alert, not in distress.  HEENT: atraumatic, EOMI.  Chest: clear.  CVS: SIS2 +, no murmur.  P/A: Soft, BS+  CNS: non focal.  Ext: no edema feet.  Skin: no rash, no ulcers.  All systems reviewed positive findings as above.  CAPILLARY BLOOD GLUCOSE      POCT Blood Glucose.: 95 mg/dL (11 Jul 2019 10:58)  POCT Blood Glucose.: 118 mg/dL (11 Jul 2019 07:36)  POCT Blood Glucose.: 109 mg/dL (10 Jul 2019 21:52)  POCT Blood Glucose.: 120 mg/dL (10 Jul 2019 16:35)  CAPILLARY BLOOD GLUCOSE      POCT Blood Glucose.: 95 mg/dL (11 Jul 2019 10:58)                          14.5   6.70  )-----------( 181      ( 11 Jul 2019 05:51 )             41.6<L>                        13.2<L>  7.79  )-----------( 173      ( 10 Jul 2019 12:09 )             37.6<L>    07-11    135  |  95<L>  |  12  ----------------------------<  117<H>  4.2   |  27  |  0.9  07-10    131<L>  |  94<L>  |  15  ----------------------------<  102<H>  4.6   |  27  |  1.0    Ca    9.7      11 Jul 2019 05:51  Ca    9.0      10 Jul 2019 21:12  Ca    9.3      10 Jul 2019 12:09

## 2019-07-11 NOTE — PROGRESS NOTE ADULT - REASON FOR ADMISSION
Altered Mental Status

## 2019-07-11 NOTE — PROGRESS NOTE BEHAVIORAL HEALTH - NSBHFUPINTERVALHXFT_PSY_A_CORE
Mango Lagnua is a 78yo Male, single, living at [facility] assisted living [facility], has one daughter, is on social security disability, with past psych history of Schizophrenia and past medical history of Diabetes, HLD, and asthma, admitted for AMS secondary to hyponatremia. Psychiatry is being consulted to review meds as a possible cause for the patient's hyponatremia. This is the patient's third day in the hospital. Upon approach patient was laying in bed, wearing a hospital gown, hanging over the armrest of the bed. Patient did not understand why he was still in the hospital. He reports feeling better and is persistently asking about discharge. He is oriented to self and place, but when asked the date says "Thursday, June 2019." He reports having no trouble sleeping since tapering the Trazodone, and has no change in appetite. He has required no PRNs for sleep or agitation, and his Na is now 135. Patient is denying any thoughts of self harm, as well as denying hearing voices or seeing things that are not there. Patient was seen and interviewed.   This is the patient's third day in the hospital. Upon approach patient was laying in bed, wearing a hospital gown, hanging over the armrest of the bed. Patient did not understand why he was still in the hospital. He reports feeling better and is persistently asking about discharge. He is oriented to self and place, but when asked the date says "Thursday, June 2019." He reports having no trouble sleeping since tapering the Trazodone, and has no change in appetite. He has required no PRNs for sleep or agitation, and his Na is now 135. Patient is denying any thoughts of self harm, as well as denying hearing voices or seeing things that are not there.

## 2019-07-11 NOTE — CONSULT NOTE ADULT - SUBJECTIVE AND OBJECTIVE BOX
Patient is a 79y old  Male who presents with a chief complaint of Altered Mental Status (11 Jul 2019 14:02)    HPI:  79 year old male with PMH of Schizophrenia, COPD, DM II, DLD, non-Hodgkin lymphoma in remission presents from [facility] for confusion and altered mentation. He is a poor historian at his current state. As per EMS and assisted living (AL) documentation, the patient had fluctuating mental status since the morning of presentation. About an hour prior to presentation he was found slumped in front of TV however easily arousable. As per AL documentation, he did not have any recent fever, chills, cough, dyspnea, CP, abdominal pain.     In ED: On arrival /64, HR 96, RR 15, Temp 98 F. No leukocytosis. Troponin pito. Na 121. Received 2L lactated ringers  CTH negative for any acute intracranial pathology. Moderate-severe chronic microvascular ischemic changes, moderate sized cystic-appearng space.  CXR pending report, however, no apparent focal consolidation  Overnight: agitation and given Haldol 5 mg IM. /90 s/p Labetalol 10 mg IV (08 Jul 2019 10:37)      PAST MEDICAL & SURGICAL HISTORY:  Dyslipidemia  COPD (chronic obstructive pulmonary disease)  Diabetes type 2, controlled  Schizophrenia  Encounter for screening colonoscopy      Hospital Course: Metabolic encephalopathy sec to hyponatremia- resolved  -  CT Head No Cont (07.08.19 @ 01:00) >No acute intra-cranial pathology.  Moderate to severe chronic microvascular ischemic changes.  Moderate sized cystic-appearing space within the anterior left temporal lobe/middle cranial fossa may represent an arachnoid cyst vs. an area of encephalomalacia.   - Blood  culture, urine culture neg  - TSH- 2.08    # Hypertension  - Start metoprolol 25 mg q12    # Hyponatremia/ SIADH probably sec to tazodone- resolved  - Osmolality, Serum: 255 mosmol/kg (07.08.19 @ 12:25)  - Osmolality, Random Urine: 245 mos/kg (07.08.19 @ 13:30)  - Sodium, Random Urine: 72.0: Reference Ranges have NOT been established for random urine analytes due  to variability in fluid intake and concentration. mmoL/L (07.08.19 @ 13:30)  - dose of trazodone reduced    # Urinary retention- resolved  -c/w flomax  - US Kidney and Bladder (07.09.19 @ 22:17) >0.8 cm calculus within the rightrenal pelvis without hydronephrosis. Multiple bilateral renal cysts. Prostatomegaly with a volume of 71 cc.    #  H/o Schizophrenia  - C/w olanzapine, tazodone    # H/o COPD  - C/w duoneb, budesonide    # H/o dyslipidemia  - C/w statin    # DM type 2  - monitor FS    # DVT prophylaxis    TODAY'S SUBJECTIVE & REVIEW OF SYMPTOMS:     Constitutional Weakness   Cardio WNL   Resp WNL   GI WNL  Heme WNL  Endo WNL  Skin WNL  MSK WNL  Neuro WNL  Cognitive WNL  Psych WNL      MEDICATIONS  (STANDING):  ALBUTerol/ipratropium for Nebulization 3 milliLiter(s) Nebulizer every 6 hours  atorvastatin Oral Tab/Cap - Peds 10 milliGRAM(s) Oral daily  buDESOnide 160 MICROgram(s)/formoterol 4.5 MICROgram(s) Inhaler 2 Puff(s) Inhalation two times a day  chlorhexidine 4% Liquid 1 Application(s) Topical <User Schedule>  enoxaparin Injectable 40 milliGRAM(s) SubCutaneous daily  gabapentin 600 milliGRAM(s) Oral two times a day  OLANZapine 25 milliGRAM(s) Oral at bedtime  tamsulosin 0.4 milliGRAM(s) Oral at bedtime  traZODone 100 milliGRAM(s) Oral at bedtime    MEDICATIONS  (PRN):  bisacodyl 5 milliGRAM(s) Oral every 12 hours PRN Constipation      FAMILY HISTORY:      Allergies    No Known Allergies    Intolerances        SOCIAL HISTORY:    [    ] Etoh  [ x   ] Smoking  [    ] Substance abuse     Home Environment:  [   x ] Home Alone  [    ] Lives with Family  [    ] Home Health Aid    Dwelling:  [    ] Apartment  [    ] Private House  [  x  ] Adult Home  [    ] Skilled Nursing Facility      [    ] Short Term  [    ] Long Term  [    ] Stairs                           [    ] Elevator   NBV[facility]  FUNCTIONAL STATUS PTA: (Check all that apply)  Ambulation: [  x   ]Independent    [    ] Dependent     [    ] Non-Ambulatory  Assistive Device: [    ] SA Cane  [    ]  Q Cane  [    ] Walker  [    ]  Wheelchair  ADL : [  x  ] Independent  [    ]  Dependent       Vital Signs Last 24 Hrs  T(C): 36.6 (11 Jul 2019 12:21), Max: 36.6 (10 Jul 2019 21:52)  T(F): 97.9 (11 Jul 2019 12:21), Max: 97.9 (10 Jul 2019 21:52)  HR: 108 (11 Jul 2019 12:21) (77 - 108)  BP: 179/81 (11 Jul 2019 12:21) (132/65 - 186/70)  BP(mean): --  RR: 18 (11 Jul 2019 12:21) (18 - 18)  SpO2: 96% (10 Jul 2019 21:52) (96% - 96%)      PHYSICAL EXAM: Alert & Oriented X2 cooperative  GENERAL: NAD, well-groomed, well-developed  HEAD:  Atraumatic, Normocephalic  EYES: EOMI, PERRLA, conjunctiva and sclera clear  NECK: Supple  CHEST/LUNG: Clear bilaterally  HEART: Regular rate and rhythm  ABDOMEN: Soft, Nontender, Nondistended; Bowel sounds present  EXTREMITIES:  no calf tenderness,no edema BLES    NERVOUS SYSTEM:  Cranial Nerves 2-12 intact [  x  ] Abnormal  [    ]  ROM: WFL all extremities [  x  ]  Abnormal [     ]  Motor Strength: WFL all extremities  [ x   ]  Abnormal [    ]  Sensation: intact to light touch [   x ] Abnormal [    ]      FUNCTIONAL STATUS:  Bed Mobility: [   ]  Independent [    ]  Supervision [  x  ]  Needs Assistance [  ]  N/A  Transfers: [    ]  Independent [    ]  Supervision [  x  ]  Needs Assistance [    ]  N/A    Ambulation:  [    ]  Independent [    ]  Supervision [ x   ]  Needs Assistance [    ]  N/A   ADL:  [    ]   Independent [  x  ] Requires Assistance [    ] N/A       LABS:                        14.5   6.70  )-----------( 181      ( 11 Jul 2019 05:51 )             41.6     07-11    135  |  95<L>  |  12  ----------------------------<  117<H>  4.2   |  27  |  0.9    Ca    9.7      11 Jul 2019 05:51            RADIOLOGY & ADDITIONAL STUDIES:

## 2019-07-11 NOTE — PROGRESS NOTE BEHAVIORAL HEALTH - NSBHCHARTREVIEWIMAGING_PSY_A_CORE FT
< from: CT Head No Cont (07.08.19 @ 01:00) >    IMPRESSION:     1.  No acute intra-cranial pathology.    2.  Moderate to severe chronic microvascular ischemic changes.     3.  Moderate sized cystic-appearing space within the anterior left   temporal lobe/middle cranial fossa may represent an arachnoid cyst vs. an   area of encephalomalacia.     < end of copied text >

## 2019-07-11 NOTE — PROGRESS NOTE ADULT - ASSESSMENT
79 year old male with PMH of Schizophrenia, COPD, DM II, DLD, non-Hodgkin lymphoma in remission presents from [facility] for confusion and altered mentation.     # Metabolic encephalopathy sec to hyponatremia- resolved  -  CT Head No Cont (07.08.19 @ 01:00) >No acute intra-cranial pathology.  Moderate to severe chronic microvascular ischemic changes.  Moderate sized cystic-appearing space within the anterior left temporal lobe/middle cranial fossa may represent an arachnoid cyst vs. an area of encephalomalacia.   - Blood  culture, urine culture neg  - TSH- 2.08    # Hypertension  - Start metoprolol 25 mg q12    # Hyponatremia/ SIADH probably sec to tazodone- resolved  - Osmolality, Serum: 255 mosmol/kg (07.08.19 @ 12:25)  - Osmolality, Random Urine: 245 mos/kg (07.08.19 @ 13:30)  - Sodium, Random Urine: 72.0: Reference Ranges have NOT been established for random urine analytes due  to variability in fluid intake and concentration. mmoL/L (07.08.19 @ 13:30)  - dose of trazodone reduced    # Urinary retention- resolved  -c/w flomax  - US Kidney and Bladder (07.09.19 @ 22:17) >0.8 cm calculus within the rightrenal pelvis without hydronephrosis. Multiple bilateral renal cysts. Prostatomegaly with a volume of 71 cc.    #  H/o Schizophrenia  - C/w olanzapine, tazodone    # H/o COPD  - C/w duoneb, budesonide    # H/o dyslipidemia  - C/w statin    # DM type 2  - monitor FS    # DVT prophylaxis    # PT eval    # Pending : PT eval  # Discussed with patient  # Disposition: STR - PT eval pending

## 2019-07-11 NOTE — PROGRESS NOTE BEHAVIORAL HEALTH - NSBHCHARTREVIEWVS_PSY_A_CORE FT
Vital Signs Last 24 Hrs  T(C): 36.6 (10 Jul 2019 21:52), Max: 37.6 (10 Jul 2019 14:56)  T(F): 97.9 (10 Jul 2019 21:52), Max: 99.6 (10 Jul 2019 14:56)  HR: 77 (10 Jul 2019 21:52) (77 - 80)  BP: 132/65 (11 Jul 2019 05:46) (132/65 - 186/70)  BP(mean): --  RR: 18 (10 Jul 2019 21:52) (18 - 18)  SpO2: 96% (10 Jul 2019 21:52) (96% - 97%)

## 2019-07-11 NOTE — PROGRESS NOTE BEHAVIORAL HEALTH - NSBHCONSULTFOLLOWAFTERCARE_PSY_A_CORE FT
Follow up with PCP and Psychiatrist at [facility]. Follow up with PCP and Psychiatrist at [facility].

## 2019-07-11 NOTE — PHYSICAL THERAPY INITIAL EVALUATION ADULT - GAIT DEVIATIONS NOTED, PT EVAL
narrow HARDIK/decreased sunil/decreased velocity of limb motion/decreased step length/decreased stride length

## 2019-07-11 NOTE — CHART NOTE - NSCHARTNOTEFT_GEN_A_CORE
Spoke with sister Fatmata via telephone and updated her about the patients condition and discharge plan. Patient had Cobos removed and passed trial of void which is now resolved in CEU. Made aware that patient will follow up with Urology as outpatient to further evaluate the urinary retention and prostatomegaly seen on Kidney and Bladder US. She agrees with the plan and will  patient this afternoon.

## 2019-07-11 NOTE — DISCHARGE NOTE PROVIDER - NSDCFUADDAPPT_GEN_ALL_CORE_FT
Please call [phone] to schedule an appt with Urology clinic to further evaluate the urinary retention and enlarged prostate. Follow up with Psychiatrist and Primary Care provider within 1-3 days of discharge

## 2019-07-11 NOTE — PROGRESS NOTE ADULT - SUBJECTIVE AND OBJECTIVE BOX
SUBJECTIVE:    Today is hospital day 3d. This morning he is resting comfortably in bed, inquiring about discharge. Denies any pain or shortness of breath overnight.     PAST MEDICAL & SURGICAL HISTORY  Dyslipidemia  COPD (chronic obstructive pulmonary disease)  Diabetes type 2, controlled  Schizophrenia  Encounter for screening colonoscopy    SOCIAL HISTORY:  Negative for smoking/alcohol/drug use.     ALLERGIES:  No Known Allergies    MEDICATIONS:  STANDING MEDICATIONS  ALBUTerol/ipratropium for Nebulization 3 milliLiter(s) Nebulizer every 6 hours  atorvastatin Oral Tab/Cap - Peds 10 milliGRAM(s) Oral daily  buDESOnide 160 MICROgram(s)/formoterol 4.5 MICROgram(s) Inhaler 2 Puff(s) Inhalation two times a day  chlorhexidine 4% Liquid 1 Application(s) Topical <User Schedule>  enoxaparin Injectable 40 milliGRAM(s) SubCutaneous daily  gabapentin 600 milliGRAM(s) Oral two times a day  OLANZapine 25 milliGRAM(s) Oral at bedtime  tamsulosin 0.4 milliGRAM(s) Oral at bedtime  traZODone 50 milliGRAM(s) Oral at bedtime    PRN MEDICATIONS  bisacodyl 5 milliGRAM(s) Oral every 12 hours PRN    VITALS:   T(F): 97.9  HR: 77  BP: 132/65  RR: 18  SpO2: 96%    LABS:                        14.5   6.70  )-----------( 181      ( 11 Jul 2019 05:51 )             41.6     07-11    135  |  95<L>  |  12  ----------------------------<  117<H>  4.2   |  27  |  0.9    Ca    9.7      11 Jul 2019 05:51          RADIOLOGY:    < from: US Kidney and Bladder (07.09.19 @ 22:17) >  0.8 cm calculus within the rightrenal pelvis without hydronephrosis.    Multiple bilateral renal cysts.    Prostatomegaly with a volume of 71 cc.    Urinary bladder volume of 60 cc with a Cobos catheter. Underdistention   limits further evaluation.            PHYSICAL EXAM:  GEN: NAD  LUNGS: Prolonged expiratory phase, but no respiratory distress  HEART: Regular rate and rhythm.   ABD: Soft, non-tender, non-distended. Bowel sounds present  EXT: No LE edema  NEURO: Normal UE and LE strength

## 2019-07-11 NOTE — DISCHARGE NOTE PROVIDER - HOSPITAL COURSE
79 year old male with PMH of Schizophrenia, COPD, DM II, DLD, non-Hodgkin lymphoma in remission presents from [facility] assisted [facility] for confusion and altered mentation. He is a poor historian at his current state. As per EMS and assisted living (AL) documentation, the patient had fluctuating mental status since the morning of presentation. About an hour prior to presentation he was found slumped in front of TV however easily arousable. As per AL documentation, he did not have any recent fever, chills, cough, dyspnea, CP, abdominal pain. Found to have a sodium of 121 and to be  urinary retention. Cobos was placed, Kidney and bladder US showed an enlarged prostate. Patient was sucessfully taken off the Cobos and began to void spontaneously. Low blood sodium resolved with IV fluids. Seen by Behavioral Health and cleared for discharge. 79 year old male with PMH of Schizophrenia, COPD, DM II, DLD, non-Hodgkin lymphoma in remission presents from [facility] for confusion and altered mentation. He is a poor historian at his current state. As per EMS and assisted living (AL) documentation, the patient had fluctuating mental status since the morning of presentation. About an hour prior to presentation he was found slumped in front of TV however easily arousable. As per AL documentation, he did not have any recent fever, chills, cough, dyspnea, CP, abdominal pain. Found to have a sodium of 121 and to be  urinary retention. Cobos was placed, Kidney and bladder US showed an enlarged prostate. Patient was sucessfully taken off the Cobos and began to void spontaneously. Low blood sodium resolved with IV fluids. Seen by Behavioral Health and cleared for discharge.            # Metabolic encephalopathy sec to hyponatremia- resolved    -  CT Head No Cont (07.08.19 @ 01:00) >No acute intra-cranial pathology.  Moderate to severe chronic microvascular ischemic changes.  Moderate sized cystic-appearing space within the anterior left temporal lobe/middle cranial fossa may represent an arachnoid cyst vs. an area of encephalomalacia.     - Blood  culture, urine culture neg    - TSH- 2.08        # Hypertension    - Started on metoprolol 25 mg q12        # Hyponatremia/ SIADH probably sec to tazodone- resolved    - Osmolality, Serum: 255 mosmol/kg (07.08.19 @ 12:25)    - Osmolality, Random Urine: 245 mos/kg (07.08.19 @ 13:30)    - Sodium, Random Urine: 72.0: Reference Ranges have NOT been established for random urine analytes due    to variability in fluid intake and concentration. mmoL/L (07.08.19 @ 13:30)    - dose of trazodone reduced        # Urinary retention- resolved    -c/w flomax    - US Kidney and Bladder (07.09.19 @ 22:17) >0.8 cm calculus within the rightrenal pelvis without hydronephrosis. Multiple bilateral renal cysts. Prostatomegaly with a volume of 71 cc.        #  H/o Schizophrenia    - C/w olanzapine, tazodone        # H/o COPD    - C/w duoneb, budesonide        # H/o dyslipidemia    - C/w statin        # DM type 2    - monitor FS    -Pt did not require isulin during his hospital stay

## 2019-07-13 LAB
CULTURE RESULTS: SIGNIFICANT CHANGE UP
CULTURE RESULTS: SIGNIFICANT CHANGE UP
SPECIMEN SOURCE: SIGNIFICANT CHANGE UP
SPECIMEN SOURCE: SIGNIFICANT CHANGE UP

## 2019-07-15 PROBLEM — Z00.00 ENCOUNTER FOR PREVENTIVE HEALTH EXAMINATION: Status: ACTIVE | Noted: 2019-07-15

## 2019-07-16 DIAGNOSIS — R41.82 ALTERED MENTAL STATUS, UNSPECIFIED: ICD-10-CM

## 2019-07-16 DIAGNOSIS — C85.90 NON-HODGKIN LYMPHOMA, UNSPECIFIED, UNSPECIFIED SITE: ICD-10-CM

## 2019-07-16 DIAGNOSIS — E11.9 TYPE 2 DIABETES MELLITUS WITHOUT COMPLICATIONS: ICD-10-CM

## 2019-07-16 DIAGNOSIS — R33.9 RETENTION OF URINE, UNSPECIFIED: ICD-10-CM

## 2019-07-16 DIAGNOSIS — E78.5 HYPERLIPIDEMIA, UNSPECIFIED: ICD-10-CM

## 2019-07-16 DIAGNOSIS — E22.2 SYNDROME OF INAPPROPRIATE SECRETION OF ANTIDIURETIC HORMONE: ICD-10-CM

## 2019-07-16 DIAGNOSIS — E87.1 HYPO-OSMOLALITY AND HYPONATREMIA: ICD-10-CM

## 2019-07-16 DIAGNOSIS — G93.41 METABOLIC ENCEPHALOPATHY: ICD-10-CM

## 2019-07-16 DIAGNOSIS — F17.210 NICOTINE DEPENDENCE, CIGARETTES, UNCOMPLICATED: ICD-10-CM

## 2019-07-16 DIAGNOSIS — F20.9 SCHIZOPHRENIA, UNSPECIFIED: ICD-10-CM

## 2019-07-16 DIAGNOSIS — J44.9 CHRONIC OBSTRUCTIVE PULMONARY DISEASE, UNSPECIFIED: ICD-10-CM

## 2019-08-29 ENCOUNTER — APPOINTMENT (OUTPATIENT)
Dept: UROLOGY | Facility: CLINIC | Age: 79
End: 2019-08-29

## 2019-09-12 ENCOUNTER — EMERGENCY (EMERGENCY)
Facility: HOSPITAL | Age: 79
LOS: 0 days | Discharge: NURSING, REHAB OR LONG-TERM CARE | End: 2019-09-12
Attending: EMERGENCY MEDICINE | Admitting: EMERGENCY MEDICINE
Payer: MEDICARE

## 2019-09-12 VITALS
HEART RATE: 77 BPM | DIASTOLIC BLOOD PRESSURE: 76 MMHG | HEIGHT: 66 IN | SYSTOLIC BLOOD PRESSURE: 172 MMHG | TEMPERATURE: 99 F | OXYGEN SATURATION: 99 % | RESPIRATION RATE: 18 BRPM | WEIGHT: 175.93 LBS

## 2019-09-12 VITALS
HEART RATE: 74 BPM | TEMPERATURE: 98 F | SYSTOLIC BLOOD PRESSURE: 137 MMHG | RESPIRATION RATE: 18 BRPM | OXYGEN SATURATION: 98 % | DIASTOLIC BLOOD PRESSURE: 72 MMHG

## 2019-09-12 DIAGNOSIS — N39.0 URINARY TRACT INFECTION, SITE NOT SPECIFIED: ICD-10-CM

## 2019-09-12 DIAGNOSIS — Z12.11 ENCOUNTER FOR SCREENING FOR MALIGNANT NEOPLASM OF COLON: Chronic | ICD-10-CM

## 2019-09-12 DIAGNOSIS — F17.200 NICOTINE DEPENDENCE, UNSPECIFIED, UNCOMPLICATED: ICD-10-CM

## 2019-09-12 DIAGNOSIS — J44.9 CHRONIC OBSTRUCTIVE PULMONARY DISEASE, UNSPECIFIED: ICD-10-CM

## 2019-09-12 DIAGNOSIS — R33.9 RETENTION OF URINE, UNSPECIFIED: ICD-10-CM

## 2019-09-12 DIAGNOSIS — E11.9 TYPE 2 DIABETES MELLITUS WITHOUT COMPLICATIONS: ICD-10-CM

## 2019-09-12 DIAGNOSIS — T83.098A OTHER MECHANICAL COMPLICATION OF OTHER URINARY CATHETER, INITIAL ENCOUNTER: ICD-10-CM

## 2019-09-12 LAB
ALBUMIN SERPL ELPH-MCNC: 4.1 G/DL — SIGNIFICANT CHANGE UP (ref 3.5–5.2)
ALP SERPL-CCNC: 97 U/L — SIGNIFICANT CHANGE UP (ref 30–115)
ALT FLD-CCNC: 10 U/L — SIGNIFICANT CHANGE UP (ref 0–41)
ANION GAP SERPL CALC-SCNC: 11 MMOL/L — SIGNIFICANT CHANGE UP (ref 7–14)
APPEARANCE UR: ABNORMAL
AST SERPL-CCNC: 20 U/L — SIGNIFICANT CHANGE UP (ref 0–41)
BACTERIA # UR AUTO: ABNORMAL
BILIRUB SERPL-MCNC: <0.2 MG/DL — SIGNIFICANT CHANGE UP (ref 0.2–1.2)
BILIRUB UR-MCNC: ABNORMAL
BUN SERPL-MCNC: 21 MG/DL — HIGH (ref 10–20)
CALCIUM SERPL-MCNC: 8.5 MG/DL — SIGNIFICANT CHANGE UP (ref 8.5–10.1)
CHLORIDE SERPL-SCNC: 105 MMOL/L — SIGNIFICANT CHANGE UP (ref 98–110)
CO2 SERPL-SCNC: 25 MMOL/L — SIGNIFICANT CHANGE UP (ref 17–32)
COLOR SPEC: ABNORMAL
CREAT SERPL-MCNC: 1.9 MG/DL — HIGH (ref 0.7–1.5)
DIFF PNL FLD: ABNORMAL
EPI CELLS # UR: ABNORMAL /HPF
GLUCOSE SERPL-MCNC: 121 MG/DL — HIGH (ref 70–99)
GLUCOSE UR QL: NEGATIVE MG/DL — SIGNIFICANT CHANGE UP
HCT VFR BLD CALC: 33.8 % — LOW (ref 42–52)
HGB BLD-MCNC: 10.8 G/DL — LOW (ref 14–18)
KETONES UR-MCNC: ABNORMAL
LEUKOCYTE ESTERASE UR-ACNC: ABNORMAL
MCHC RBC-ENTMCNC: 32 G/DL — SIGNIFICANT CHANGE UP (ref 32–37)
MCHC RBC-ENTMCNC: 32.4 PG — HIGH (ref 27–31)
MCV RBC AUTO: 101.5 FL — HIGH (ref 80–94)
NITRITE UR-MCNC: POSITIVE
NRBC # BLD: 0 /100 WBCS — SIGNIFICANT CHANGE UP (ref 0–0)
PH UR: 7 — SIGNIFICANT CHANGE UP (ref 5–8)
PLATELET # BLD AUTO: 158 K/UL — SIGNIFICANT CHANGE UP (ref 130–400)
POTASSIUM SERPL-MCNC: 4.3 MMOL/L — SIGNIFICANT CHANGE UP (ref 3.5–5)
POTASSIUM SERPL-SCNC: 4.3 MMOL/L — SIGNIFICANT CHANGE UP (ref 3.5–5)
PROT SERPL-MCNC: 7.2 G/DL — SIGNIFICANT CHANGE UP (ref 6–8)
PROT UR-MCNC: >=300 MG/DL
RBC # BLD: 3.33 M/UL — LOW (ref 4.7–6.1)
RBC # FLD: 14.2 % — SIGNIFICANT CHANGE UP (ref 11.5–14.5)
RBC CASTS # UR COMP ASSIST: >50 /HPF
SODIUM SERPL-SCNC: 141 MMOL/L — SIGNIFICANT CHANGE UP (ref 135–146)
SP GR SPEC: 1.02 — SIGNIFICANT CHANGE UP (ref 1.01–1.03)
UROBILINOGEN FLD QL: 0.2 MG/DL — SIGNIFICANT CHANGE UP (ref 0.2–0.2)
WBC # BLD: 7.29 K/UL — SIGNIFICANT CHANGE UP (ref 4.8–10.8)
WBC # FLD AUTO: 7.29 K/UL — SIGNIFICANT CHANGE UP (ref 4.8–10.8)
WBC UR QL: SIGNIFICANT CHANGE UP /HPF

## 2019-09-12 PROCEDURE — 76770 US EXAM ABDO BACK WALL COMP: CPT | Mod: 26

## 2019-09-12 PROCEDURE — 99284 EMERGENCY DEPT VISIT MOD MDM: CPT

## 2019-09-12 RX ORDER — SODIUM CHLORIDE 9 MG/ML
3 INJECTION INTRAMUSCULAR; INTRAVENOUS; SUBCUTANEOUS EVERY 8 HOURS
Refills: 0 | Status: DISCONTINUED | OUTPATIENT
Start: 2019-09-12 | End: 2019-09-12

## 2019-09-12 RX ORDER — ACETAMINOPHEN 500 MG
650 TABLET ORAL ONCE
Refills: 0 | Status: COMPLETED | OUTPATIENT
Start: 2019-09-12 | End: 2019-09-12

## 2019-09-12 RX ORDER — CEFPODOXIME PROXETIL 100 MG
1 TABLET ORAL
Qty: 14 | Refills: 0
Start: 2019-09-12 | End: 2019-09-18

## 2019-09-12 RX ADMIN — Medication 650 MILLIGRAM(S): at 14:00

## 2019-09-12 RX ADMIN — SODIUM CHLORIDE 3 MILLILITER(S): 9 INJECTION INTRAMUSCULAR; INTRAVENOUS; SUBCUTANEOUS at 14:00

## 2019-09-12 NOTE — ED PROVIDER NOTE - PATIENT PORTAL LINK FT
You can access the FollowMyHealth Patient Portal offered by [facility] by registering at the following website: [email or web address]. By joining [employer]’s FollowMyHealth portal, you will also be able to view your health information using other applications (apps) compatible with our system.

## 2019-09-12 NOTE — ED PROVIDER NOTE - CLINICAL SUMMARY MEDICAL DECISION MAKING FREE TEXT BOX
Results reviewed, d/w patient.  Pt received abx at Urology office.   I spoke with Dr Holt, will send po abx.  Pt will remain with leg bag.  Will follow up with Dr Holt and Dr Hayes. Results reviewed, d/w patient.  Pt received abx at Urology office.   I spoke with Dr Holt, will send po abx.  Pt will remain with leg bag.  Will follow up with Dr Holt and Dr Hayes. Pt will return if any worsening symptoms, fevers, chills, or any other concerns.

## 2019-09-12 NOTE — ED PROVIDER NOTE - PMH
COPD (chronic obstructive pulmonary disease)    Diabetes type 2, controlled    Dyslipidemia    Schizophrenia

## 2019-09-12 NOTE — ED ADULT NURSE NOTE - NSIMPLEMENTINTERV_GEN_ALL_ED
Implemented All Fall Risk Interventions:  [place] to call system. Call bell, personal items and telephone within reach. Instruct patient to call for assistance. Room bathroom lighting operational. Non-slip footwear when patient is off stretcher. Physically safe environment: no spills, clutter or unnecessary equipment. Stretcher in lowest position, wheels locked, appropriate side rails in place. Provide visual cue, wrist band, yellow gown, etc. Monitor gait and stability. Monitor for mental status changes and reorient to person, place, and time. Review medications for side effects contributing to fall risk. Reinforce activity limits and safety measures with patient and family.

## 2019-09-12 NOTE — ED PROVIDER NOTE - OBJECTIVE STATEMENT
80yo M with a h/o DM BIBA for eval and monitoring of electrolytes and vitals. Pt was seen in the Urology office today and had a bains catheter placed with an output of 2.5L of bloody urine. Pt was given 1gm of Rocehpin. Pt denies any fever, nausea, vomiting, no abdominal pain. 80yo M with a h/o DM BIBA for eval and monitoring of electrolytes and vitals. Pt was seen in the Urology office today and had a bains catheter placed with an output of 2.5L of bloody urine. Pt was given 1gm of Rocephin. Pt denies any fever, nausea, vomiting, no abdominal pain.

## 2019-09-12 NOTE — ED PROVIDER NOTE - ATTENDING CONTRIBUTION TO CARE
80 yo M PMHx noted including enlarged prostate presents from Urology office today for evaluation after Cobos catheter was placed.  Pt with 2.5 Liters output, reports feeling better, but urine in bag is bloody.  He received 1 gram of Rocephin while at Urologist.  On exam pt in NAD AAAO x 3, MMM and pink, abd is soft nt nd, + Cobos in place with blood in bag,

## 2019-09-12 NOTE — ED PROVIDER NOTE - CARE PROVIDER_API CALL
Ramos Holt)  Urology  [address] [place] [address]  Phone: [phone]  Fax: [phone]  Follow Up Time:     Silverio Hayes)  [address] [address]  Phone: [phone]  Fax: [phone]  Follow Up Time:

## 2019-09-13 LAB
CULTURE RESULTS: NO GROWTH — SIGNIFICANT CHANGE UP
SPECIMEN SOURCE: SIGNIFICANT CHANGE UP

## 2019-09-14 ENCOUNTER — EMERGENCY (EMERGENCY)
Facility: HOSPITAL | Age: 79
LOS: 0 days | Discharge: HOME | End: 2019-09-15
Attending: EMERGENCY MEDICINE | Admitting: EMERGENCY MEDICINE
Payer: MEDICARE

## 2019-09-14 VITALS
HEART RATE: 92 BPM | OXYGEN SATURATION: 99 % | TEMPERATURE: 97 F | RESPIRATION RATE: 18 BRPM | SYSTOLIC BLOOD PRESSURE: 167 MMHG | DIASTOLIC BLOOD PRESSURE: 78 MMHG

## 2019-09-14 VITALS
RESPIRATION RATE: 18 BRPM | HEART RATE: 94 BPM | OXYGEN SATURATION: 98 % | WEIGHT: 175.05 LBS | SYSTOLIC BLOOD PRESSURE: 169 MMHG | TEMPERATURE: 98 F | DIASTOLIC BLOOD PRESSURE: 74 MMHG

## 2019-09-14 DIAGNOSIS — R31.9 HEMATURIA, UNSPECIFIED: ICD-10-CM

## 2019-09-14 DIAGNOSIS — Z12.11 ENCOUNTER FOR SCREENING FOR MALIGNANT NEOPLASM OF COLON: Chronic | ICD-10-CM

## 2019-09-14 PROCEDURE — 99284 EMERGENCY DEPT VISIT MOD MDM: CPT | Mod: GC

## 2019-09-14 NOTE — ED PROVIDER NOTE - NS_EDPROVIDERDISPOUSERTYPE_ED_A_ED
Attending Attestation (For Attendings USE Only)... Attending Attestation (For Attendings USE Only).../Medical/PA/NP Students Attestation (For Medical/PA/NP Student USE Only)...

## 2019-09-14 NOTE — ED PROVIDER NOTE - PATIENT PORTAL LINK FT
You can access the FollowMyHealth Patient Portal offered by [facility] by registering at the following website: [email or web address]. By joining [employer]’s FollowMyHealth portal, you will also be able to view your health information using other applications (apps) compatible with our system.

## 2019-09-14 NOTE — ED ADULT NURSE NOTE - OBJECTIVE STATEMENT
patient sent from nursing facility with complaints of hematuria and has a bains previously placed to coming to the ED.  Patient denies any pain or difficulty with urination. Patient denies chest pain and no SOB noted.

## 2019-09-14 NOTE — ED PROVIDER NOTE - PHYSICAL EXAMINATION
VITAL SIGNS: AFebrile, vital signs stable  CONSTITUTIONAL: Well-developed; well-nourished; in no acute distress.  SKIN: Skin exam is warm and dry, no acute rash.  HEAD: Normocephalic; atraumatic.  EYES: Pupils equal round reactive to light, Extraocular movements intact; conjunctiva and sclera clear.  ENT: No nasal discharge; airway clear. Moist mucus membranes.  NECK: Supple; non tender. No rigidity  CARD: Regular rate and rhythm. Normal S1, S2; no murmurs, gallops, or rubs.  RESP: Lungs clear to auscultation bilaterally. No wheezes, rales or rhonchi.  ABD: Abdomen soft; non-tender; non-distended;  no hepatosplenomegaly. No costovertebral angle tenderness.   EXT: Normal ROM. No clubbing, cyanosis or edema. No calf tenderness to palpation.  PSYCH: Non-cooperative with altered judgment. VITAL SIGNS: Afebrile, vital signs stable  CONSTITUTIONAL: Well-developed; well-nourished; in no acute distress.  SKIN: Skin exam is warm and dry, no acute rash.  HEAD: Normocephalic; atraumatic.  EYES: Pupils equal round reactive to light, conjunctiva and sclera clear.  ENT: No nasal discharge; airway clear. Moist mucus membranes.  NECK: Supple; non tender. No rigidity  CARD: Regular rate and rhythm. Normal S1, S2; no murmurs, gallops, or rubs.  RESP: Lungs clear to auscultation bilaterally. No wheezes, rales or rhonchi.  ABD: Abdomen soft; non-tender; non-distended. +Cobos in place with hematuria.  EXT: Normal ROM.   PSYCH: Non-cooperative with altered judgment.

## 2019-09-14 NOTE — ED PROVIDER NOTE - CARE PROVIDER_API CALL
Ramos Holt)  Urology  [address] [place] [address]  Phone: [phone]  Fax: [phone]  Follow Up Time:

## 2019-09-14 NOTE — ED PROVIDER NOTE - OBJECTIVE STATEMENT
78 y/o m w/ PMH of Schizophrenia, COPD, DM II, DLD, non-Hodgkin lymphoma, and recent bains placement as outpt on 9/12 for urinary retention (urology Dr. Holt) presents from [facility] for blood in urine bag.  Pt was seen in the ED on 9/12 for similar symptoms and blood in urine.  Pt was noted to have DAVID with BUN/Cr 21/1.9.  Pt was treated for a UTI and discussion was had with Dr. Holt who reports outpt follow up.  Pt is a poor historian but states that he does not have any complaints at this time.  Denies fevers, headaches, chest pain, SOB, N/V, abdominal pain, diarrhea, and constipation.

## 2019-09-14 NOTE — ED PROVIDER NOTE - NSFOLLOWUPINSTRUCTIONS_ED_ALL_ED_FT
Please follow-up with Dr. Holt as soon as possible.    Hematuria, Adult  Hematuria is blood in the urine. Blood may be visible in the urine, or it may be identified with a test. This condition can be caused by infections of the bladder, urethra, kidney, or prostate. Other possible causes include:  Kidney stones.  Cancer of the urinary tract.  Too much calcium in the urine.  Conditions that are passed from parent to child (inherited conditions).   Exercise that requires a lot of energy.  Infections can usually be treated with medicine, and a kidney stone usually will pass through your urine. If neither of these is the cause of your hematuria, more tests may be needed to identify the cause of your symptoms.    It is very important to tell your health care provider about any blood in your urine, even if it is painless or the blood stops without treatment. Blood in the urine, when it happens and then stops and then happens again, can be a symptom of a very serious condition, including cancer. There is no pain in the initial stages of many urinary cancers.    Follow these instructions at home:  Medicines     Take over-the-counter and prescription medicines only as told by your health care provider.  If you were prescribed an antibiotic medicine, take it as told by your health care provider. Do not stop taking the antibiotic even if you start to feel better.  Eating and drinking     Drink enough fluid to keep your urine clear or pale yellow. It is recommended that you drink 3–4 quarts (2.8–3.8 L) a day. If you have been diagnosed with an infection, it is recommended that you drink cranberry juice in addition to large amounts of water.  Avoid caffeine, tea, and carbonated beverages. These tend to irritate the bladder.  Avoid alcohol because it may irritate the prostate (men).  General instructions     If you have been diagnosed with a kidney stone, follow your health care provider's instructions about straining your urine to catch the stone.  Empty your bladder often. Avoid holding urine for long periods of time.  If you are female:  After a bowel movement, wipe from front to back and use each piece of toilet paper only once.  Empty your bladder before and after sex.  Pay attention to any changes in your symptoms. Tell your health care provider about any changes or any new symptoms.  It is your responsibility to get your test results. Ask your health care provider, or the department performing the test, when your results will be ready.  Keep all follow-up visits as told by your health care provider. This is important.  Contact a health care provider if:  You develop back pain.  You have a fever.  You have nausea or vomiting.  Your symptoms do not improve after 3 days.  Your symptoms get worse.  Get help right away if:  You develop severe vomiting and are unable take medicine without vomiting.  You develop severe pain in your back or abdomen even though you are taking medicine.  You pass a large amount of blood in your urine.  You pass blood clots in your urine.  You feel very weak or like you might faint.  You faint.  Summary  Hematuria is blood in the urine. It has many possible causes.  It is very important that you tell your health care provider about any blood in your urine, even if it is painless or the blood stops without treatment.  Take over-the-counter and prescription medicines only as told by your health care provider.  Drink enough fluid to keep your urine clear or pale yellow.

## 2019-09-14 NOTE — ED PROVIDER NOTE - CLINICAL SUMMARY MEDICAL DECISION MAKING FREE TEXT BOX
all results reviewed and understood, bains in place and draining, hgb stable, bun/cre stable from 9/12, urology report follow up as outpt, pt aware, copy of results provided, pt reports no symptoms, will follow up as discussed.

## 2019-09-14 NOTE — ED PROVIDER NOTE - NS ED ROS FT
Pt is a poor historian but denies all ROS.   Review of Systems:  •	CONSTITUTIONAL - No fever, No diaphoresis, No weight change  •	SKIN - No rash  •	HEMATOLOGIC - No abnormal bleeding or bruising  •	EYES - No blurred vision  •	ENT - No sore throat, No neck pain, No rhinorrhea, No ear pain  •	RESPIRATORY - No shortness of breath, No cough  •	CARDIAC -No chest pain, No palpitations  •	GI - No abdominal pain, No nausea, No vomiting, No diarrhea, No constipation, No bright red blood per rectum or melena. No flank pain  •                 - No dysuria, frequency.  •	ENDO - No polydypsia, No polyuria, No heat/cold intolerance  •	MUSCULOSKELETAL - No joint paint, No swelling, No back pain  •	NEUROLOGIC - No numbness, No focal weakness, No headache, No dizziness  All other systems negative, unless specified in HPI Pt is a poor historian but denies all ROS.   Review of Systems:  •	CONSTITUTIONAL - No fever  •	SKIN - No rash  •	EYES - No blurred vision  •	ENT - No neck pain  •	RESPIRATORY - No shortness of breath, No cough  •	CARDIAC -No chest pain, No palpitations  •	GI - No abdominal pain, No nausea, No vomiting, No diarrhea, No constipation, No bright red blood per rectum or melena.  •             - No dysuria, frequency. +Hematuria.  •	MUSCULOSKELETAL - No joint paint, No swelling, No back pain.  •	NEUROLOGIC - No headache, No dizziness  All other systems negative, unless specified in HPI

## 2019-09-14 NOTE — ED PROVIDER NOTE - PROGRESS NOTE DETAILS
CBC, CMP, U/A pending. Spoke with uro. State patient may f/u outpatient with Dr. Holt, his uro. Full DC instructions and precaution signs and symptoms discussed. Proper follow up ensured.  Medications administered and prescribed/OTC home meds discussed.  All questions and concerns from patient addressed.  Understanding of instructions verbalized.

## 2019-09-14 NOTE — ED PROVIDER NOTE - CARE PLAN
Assessment and plan of treatment:	Plan: labs, urine, reassess. Principal Discharge DX:	Hematuria  Assessment and plan of treatment:	Plan: labs, urine, reassess.

## 2019-09-15 LAB
ALBUMIN SERPL ELPH-MCNC: 4 G/DL — SIGNIFICANT CHANGE UP (ref 3.5–5.2)
ALP SERPL-CCNC: 91 U/L — SIGNIFICANT CHANGE UP (ref 30–115)
ALT FLD-CCNC: 11 U/L — SIGNIFICANT CHANGE UP (ref 0–41)
ANION GAP SERPL CALC-SCNC: 15 MMOL/L — HIGH (ref 7–14)
APPEARANCE UR: ABNORMAL
AST SERPL-CCNC: 30 U/L — SIGNIFICANT CHANGE UP (ref 0–41)
BACTERIA # UR AUTO: NEGATIVE — SIGNIFICANT CHANGE UP
BILIRUB SERPL-MCNC: <0.2 MG/DL — SIGNIFICANT CHANGE UP (ref 0.2–1.2)
BILIRUB UR-MCNC: NEGATIVE — SIGNIFICANT CHANGE UP
BUN SERPL-MCNC: 27 MG/DL — HIGH (ref 10–20)
CALCIUM SERPL-MCNC: 8.9 MG/DL — SIGNIFICANT CHANGE UP (ref 8.5–10.1)
CHLORIDE SERPL-SCNC: 99 MMOL/L — SIGNIFICANT CHANGE UP (ref 98–110)
CO2 SERPL-SCNC: 22 MMOL/L — SIGNIFICANT CHANGE UP (ref 17–32)
COLOR SPEC: ABNORMAL
CREAT SERPL-MCNC: 1.9 MG/DL — HIGH (ref 0.7–1.5)
DIFF PNL FLD: ABNORMAL
EPI CELLS # UR: 4 /HPF — SIGNIFICANT CHANGE UP (ref 0–5)
GLUCOSE SERPL-MCNC: 102 MG/DL — HIGH (ref 70–99)
GLUCOSE UR QL: NEGATIVE — SIGNIFICANT CHANGE UP
HCT VFR BLD CALC: 35.3 % — LOW (ref 42–52)
HGB BLD-MCNC: 11.5 G/DL — LOW (ref 14–18)
HYALINE CASTS # UR AUTO: 6 /LPF — SIGNIFICANT CHANGE UP (ref 0–7)
KETONES UR-MCNC: NEGATIVE — SIGNIFICANT CHANGE UP
LEUKOCYTE ESTERASE UR-ACNC: NEGATIVE — SIGNIFICANT CHANGE UP
MCHC RBC-ENTMCNC: 32 PG — HIGH (ref 27–31)
MCHC RBC-ENTMCNC: 32.6 G/DL — SIGNIFICANT CHANGE UP (ref 32–37)
MCV RBC AUTO: 98.3 FL — HIGH (ref 80–94)
NITRITE UR-MCNC: NEGATIVE — SIGNIFICANT CHANGE UP
NRBC # BLD: 0 /100 WBCS — SIGNIFICANT CHANGE UP (ref 0–0)
PH UR: 6.5 — SIGNIFICANT CHANGE UP (ref 5–8)
PLATELET # BLD AUTO: 153 K/UL — SIGNIFICANT CHANGE UP (ref 130–400)
POTASSIUM SERPL-MCNC: 4.8 MMOL/L — SIGNIFICANT CHANGE UP (ref 3.5–5)
POTASSIUM SERPL-SCNC: 4.8 MMOL/L — SIGNIFICANT CHANGE UP (ref 3.5–5)
PROT SERPL-MCNC: 7.2 G/DL — SIGNIFICANT CHANGE UP (ref 6–8)
PROT UR-MCNC: ABNORMAL
RBC # BLD: 3.59 M/UL — LOW (ref 4.7–6.1)
RBC # FLD: 14 % — SIGNIFICANT CHANGE UP (ref 11.5–14.5)
RBC CASTS # UR COMP ASSIST: >720 /HPF — HIGH (ref 0–4)
SODIUM SERPL-SCNC: 136 MMOL/L — SIGNIFICANT CHANGE UP (ref 135–146)
SP GR SPEC: 1.01 — SIGNIFICANT CHANGE UP (ref 1.01–1.02)
UROBILINOGEN FLD QL: SIGNIFICANT CHANGE UP
WBC # BLD: 6.91 K/UL — SIGNIFICANT CHANGE UP (ref 4.8–10.8)
WBC # FLD AUTO: 6.91 K/UL — SIGNIFICANT CHANGE UP (ref 4.8–10.8)
WBC UR QL: 14 /HPF — HIGH (ref 0–5)

## 2019-09-15 RX ORDER — SODIUM CHLORIDE 9 MG/ML
1000 INJECTION INTRAMUSCULAR; INTRAVENOUS; SUBCUTANEOUS ONCE
Refills: 0 | Status: COMPLETED | OUTPATIENT
Start: 2019-09-15 | End: 2019-09-15

## 2019-09-15 RX ADMIN — SODIUM CHLORIDE 1000 MILLILITER(S): 9 INJECTION INTRAMUSCULAR; INTRAVENOUS; SUBCUTANEOUS at 00:49

## 2019-09-15 NOTE — ED ADULT NURSE REASSESSMENT NOTE - NS ED NURSE REASSESS COMMENT FT1
patient transport ambulette here.  Patient has urine on his sweatpants. and was offered paper gown but patient refused and stated. "I want to wear my own sweatpants". Asst. Grove made aware.. Patient in stable condition and nad. Patient A&Ox4 and ambulates with a steady gait.

## 2019-09-16 LAB
CULTURE RESULTS: SIGNIFICANT CHANGE UP
SPECIMEN SOURCE: SIGNIFICANT CHANGE UP

## 2019-09-26 ENCOUNTER — EMERGENCY (EMERGENCY)
Facility: HOSPITAL | Age: 79
LOS: 0 days | Discharge: NURSING, REHAB OR LONG-TERM CARE | End: 2019-09-26
Attending: EMERGENCY MEDICINE
Payer: MEDICARE

## 2019-09-26 VITALS
OXYGEN SATURATION: 95 % | SYSTOLIC BLOOD PRESSURE: 168 MMHG | DIASTOLIC BLOOD PRESSURE: 60 MMHG | HEART RATE: 95 BPM | TEMPERATURE: 99 F | RESPIRATION RATE: 18 BRPM

## 2019-09-26 VITALS
HEART RATE: 98 BPM | WEIGHT: 160.06 LBS | DIASTOLIC BLOOD PRESSURE: 77 MMHG | RESPIRATION RATE: 17 BRPM | TEMPERATURE: 102 F | OXYGEN SATURATION: 96 % | SYSTOLIC BLOOD PRESSURE: 169 MMHG

## 2019-09-26 DIAGNOSIS — Z02.9 ENCOUNTER FOR ADMINISTRATIVE EXAMINATIONS, UNSPECIFIED: ICD-10-CM

## 2019-09-26 DIAGNOSIS — Z12.11 ENCOUNTER FOR SCREENING FOR MALIGNANT NEOPLASM OF COLON: Chronic | ICD-10-CM

## 2019-09-26 LAB
ALBUMIN SERPL ELPH-MCNC: 3.8 G/DL — SIGNIFICANT CHANGE UP (ref 3.5–5.2)
ALP SERPL-CCNC: 99 U/L — SIGNIFICANT CHANGE UP (ref 30–115)
ALT FLD-CCNC: 13 U/L — SIGNIFICANT CHANGE UP (ref 0–41)
ANION GAP SERPL CALC-SCNC: 12 MMOL/L — SIGNIFICANT CHANGE UP (ref 7–14)
APPEARANCE UR: ABNORMAL
APTT BLD: 34.9 SEC — SIGNIFICANT CHANGE UP (ref 27–39.2)
AST SERPL-CCNC: 19 U/L — SIGNIFICANT CHANGE UP (ref 0–41)
BACTERIA # UR AUTO: ABNORMAL /HPF
BASE EXCESS BLDV CALC-SCNC: -3.3 MMOL/L — LOW (ref -2–2)
BASOPHILS # BLD AUTO: 0.01 K/UL — SIGNIFICANT CHANGE UP (ref 0–0.2)
BASOPHILS NFR BLD AUTO: 0.1 % — SIGNIFICANT CHANGE UP (ref 0–1)
BILIRUB SERPL-MCNC: 0.2 MG/DL — SIGNIFICANT CHANGE UP (ref 0.2–1.2)
BILIRUB UR-MCNC: NEGATIVE — SIGNIFICANT CHANGE UP
BUN SERPL-MCNC: 24 MG/DL — HIGH (ref 10–20)
CA-I SERPL-SCNC: 1.11 MMOL/L — LOW (ref 1.12–1.3)
CALCIUM SERPL-MCNC: 8.2 MG/DL — LOW (ref 8.5–10.1)
CHLORIDE SERPL-SCNC: 97 MMOL/L — LOW (ref 98–110)
CO2 SERPL-SCNC: 23 MMOL/L — SIGNIFICANT CHANGE UP (ref 17–32)
COLOR SPEC: YELLOW — SIGNIFICANT CHANGE UP
CREAT SERPL-MCNC: 1.5 MG/DL — SIGNIFICANT CHANGE UP (ref 0.7–1.5)
DIFF PNL FLD: ABNORMAL
EOSINOPHIL # BLD AUTO: 0 K/UL — SIGNIFICANT CHANGE UP (ref 0–0.7)
EOSINOPHIL NFR BLD AUTO: 0 % — SIGNIFICANT CHANGE UP (ref 0–8)
GAS PNL BLDV: 132 MMOL/L — LOW (ref 136–145)
GAS PNL BLDV: SIGNIFICANT CHANGE UP
GLUCOSE SERPL-MCNC: 122 MG/DL — HIGH (ref 70–99)
GLUCOSE UR QL: NEGATIVE MG/DL — SIGNIFICANT CHANGE UP
HCO3 BLDV-SCNC: 22 MMOL/L — SIGNIFICANT CHANGE UP (ref 22–29)
HCT VFR BLD CALC: 32.3 % — LOW (ref 42–52)
HCT VFR BLDA CALC: 28.8 % — LOW (ref 34–44)
HGB BLD CALC-MCNC: 9.4 G/DL — LOW (ref 14–18)
HGB BLD-MCNC: 10.7 G/DL — LOW (ref 14–18)
HOROWITZ INDEX BLDV+IHG-RTO: 21 — SIGNIFICANT CHANGE UP
IMM GRANULOCYTES NFR BLD AUTO: 0.5 % — HIGH (ref 0.1–0.3)
INR BLD: 1.4 RATIO — HIGH (ref 0.65–1.3)
KETONES UR-MCNC: NEGATIVE — SIGNIFICANT CHANGE UP
LACTATE BLDV-MCNC: 2.2 MMOL/L — HIGH (ref 0.5–1.6)
LEUKOCYTE ESTERASE UR-ACNC: SIGNIFICANT CHANGE UP
LYMPHOCYTES # BLD AUTO: 0.91 K/UL — LOW (ref 1.2–3.4)
LYMPHOCYTES # BLD AUTO: 8.3 % — LOW (ref 20.5–51.1)
MCHC RBC-ENTMCNC: 32.1 PG — HIGH (ref 27–31)
MCHC RBC-ENTMCNC: 33.1 G/DL — SIGNIFICANT CHANGE UP (ref 32–37)
MCV RBC AUTO: 97 FL — HIGH (ref 80–94)
MONOCYTES # BLD AUTO: 0.8 K/UL — HIGH (ref 0.1–0.6)
MONOCYTES NFR BLD AUTO: 7.3 % — SIGNIFICANT CHANGE UP (ref 1.7–9.3)
NEUTROPHILS # BLD AUTO: 9.24 K/UL — HIGH (ref 1.4–6.5)
NEUTROPHILS NFR BLD AUTO: 83.8 % — HIGH (ref 42.2–75.2)
NITRITE UR-MCNC: NEGATIVE — SIGNIFICANT CHANGE UP
NRBC # BLD: 0 /100 WBCS — SIGNIFICANT CHANGE UP (ref 0–0)
PCO2 BLDV: 37 MMHG — LOW (ref 41–51)
PH BLDV: 7.37 — SIGNIFICANT CHANGE UP (ref 7.26–7.43)
PH UR: 6 — SIGNIFICANT CHANGE UP (ref 5–8)
PLATELET # BLD AUTO: 186 K/UL — SIGNIFICANT CHANGE UP (ref 130–400)
PO2 BLDV: 37 MMHG — SIGNIFICANT CHANGE UP (ref 20–40)
POTASSIUM BLDV-SCNC: 4.2 MMOL/L — SIGNIFICANT CHANGE UP (ref 3.3–5.6)
POTASSIUM SERPL-MCNC: 4.8 MMOL/L — SIGNIFICANT CHANGE UP (ref 3.5–5)
POTASSIUM SERPL-SCNC: 4.8 MMOL/L — SIGNIFICANT CHANGE UP (ref 3.5–5)
PROT SERPL-MCNC: 7 G/DL — SIGNIFICANT CHANGE UP (ref 6–8)
PROT UR-MCNC: 30 MG/DL
PROTHROM AB SERPL-ACNC: 16.1 SEC — HIGH (ref 9.95–12.87)
RBC # BLD: 3.33 M/UL — LOW (ref 4.7–6.1)
RBC # FLD: 13.4 % — SIGNIFICANT CHANGE UP (ref 11.5–14.5)
RBC CASTS # UR COMP ASSIST: ABNORMAL /HPF
SAO2 % BLDV: 72 % — SIGNIFICANT CHANGE UP
SODIUM SERPL-SCNC: 132 MMOL/L — LOW (ref 135–146)
SP GR SPEC: 1.01 — SIGNIFICANT CHANGE UP (ref 1.01–1.03)
TROPONIN T SERPL-MCNC: <0.01 NG/ML — SIGNIFICANT CHANGE UP
UROBILINOGEN FLD QL: 0.2 MG/DL — SIGNIFICANT CHANGE UP (ref 0.2–0.2)
WBC # BLD: 11.01 K/UL — HIGH (ref 4.8–10.8)
WBC # FLD AUTO: 11.01 K/UL — HIGH (ref 4.8–10.8)
WBC UR QL: >50 /HPF

## 2019-09-26 PROCEDURE — 71045 X-RAY EXAM CHEST 1 VIEW: CPT | Mod: 26

## 2019-09-26 PROCEDURE — 99285 EMERGENCY DEPT VISIT HI MDM: CPT

## 2019-09-26 PROCEDURE — 74177 CT ABD & PELVIS W/CONTRAST: CPT | Mod: 26

## 2019-09-26 RX ORDER — CEFPODOXIME PROXETIL 100 MG
1 TABLET ORAL
Qty: 14 | Refills: 0
Start: 2019-09-26 | End: 2019-10-02

## 2019-09-26 RX ORDER — ACETAMINOPHEN 500 MG
975 TABLET ORAL ONCE
Refills: 0 | Status: COMPLETED | OUTPATIENT
Start: 2019-09-26 | End: 2019-09-26

## 2019-09-26 RX ORDER — SODIUM CHLORIDE 9 MG/ML
2300 INJECTION INTRAMUSCULAR; INTRAVENOUS; SUBCUTANEOUS ONCE
Refills: 0 | Status: COMPLETED | OUTPATIENT
Start: 2019-09-26 | End: 2019-09-26

## 2019-09-26 RX ORDER — IPRATROPIUM/ALBUTEROL SULFATE 18-103MCG
3 AEROSOL WITH ADAPTER (GRAM) INHALATION ONCE
Refills: 0 | Status: COMPLETED | OUTPATIENT
Start: 2019-09-26 | End: 2019-09-26

## 2019-09-26 RX ADMIN — Medication 3 MILLILITER(S): at 15:35

## 2019-09-26 RX ADMIN — Medication 975 MILLIGRAM(S): at 14:30

## 2019-09-26 RX ADMIN — SODIUM CHLORIDE 2300 MILLILITER(S): 9 INJECTION INTRAMUSCULAR; INTRAVENOUS; SUBCUTANEOUS at 14:50

## 2019-09-26 NOTE — ED PROVIDER NOTE - OBJECTIVE STATEMENT
78 y/o male with hx of COPD, hyperlipidemia, schizophrenia, lymphoma,  neuropathy, NIDDM, hyponatremia, smoker, metabolic enceophalopathy, urinary incontincne with bains catheter presents from [facility] for fever and weakness. patient denies any abdominal pain, back pain, sore throat, headache. patient c/o cough and congestion. patient without any rashes. patient denies any cp, vomiting, diarrhea. patient with decreased po intake

## 2019-09-26 NOTE — ED PROVIDER NOTE - GASTROINTESTINAL, MLM
Abdomen soft, non-tender, no guarding. +soft mass palpated lower abdomen, +bains draining cloudy urine Abdomen soft, non-tender, no guarding. +distended bladder palpated lower abdomen, +bains draining cloudy urine

## 2019-09-26 NOTE — ED PROVIDER NOTE - PROGRESS NOTE DETAILS
bains catheter repositioned into bladder. output 2300 cc cloudy urine. patient abdomen soft. patient feeling better

## 2019-09-26 NOTE — ED PROVIDER NOTE - NSFOLLOWUPINSTRUCTIONS_ED_ALL_ED_FT
Urinary Tract Infection    A urinary tract infection (UTI) is an infection of any part of the urinary tract, which includes the kidneys, ureters, bladder, and urethra. Risk factors include ignoring your need to urinate, wiping back to front if female, being an uncircumcised male, and having diabetes or a weak immune system. Symptoms include frequent urination, pain or burning with urination, foul smelling urine, cloudy urine, pain in the lower abdomen, blood in the urine, and fever. If you were prescribed an antibiotic medicine, take it as told by your health care provider. Do not stop taking the antibiotic even if you start to feel better.    SEEK IMMEDIATE MEDICAL CARE IF YOU HAVE ANY OF THE FOLLOWING SYMPTOMS: severe back or abdominal pain, fever, inability to keep fluids or medicine down, dizziness/lightheadedness, or a change in mental status.      Fever    A fever is an increase in the body's temperature above 100.4°F (38°C) or higher. In adults and children older than three months, a brief mild or moderate fever generally has no long-term effect, and it usually does not require treatment. Many times, fevers are the result of viral infections, which are self-resolving.  However, certain symptoms or diagnostic tests may suggest a bacterial infection that may respond to antibiotics. Take medications as directed by your health care provider.    SEEK IMMEDIATE MEDICAL CARE IF YOU OR YOUR CHILD HAVE ANY OF THE FOLLOWING SYMPTOMS : shortness of breath, seizure, rash/stiff neck/headache, severe abdominal pain, persistent vomiting, any signs of dehydration, or if your child has a fever for over five (5) days.

## 2019-09-26 NOTE — ED PROVIDER NOTE - ATTENDING CONTRIBUTION TO CARE
78 y/o male with hx of COPD, hyperlipidemia, schizophrenia, lymphoma,  neuropathy, NIDDM, hyponatremia, smoker, metabolic enceophalopathy presents with fever and weakness. Coming from NH. Patient has indwellign bains catheter. no n/v/d, no abdominal pain, no cp, no sob, no uri symptoms.     CONSTITUTIONAL: Well-developed; well-nourished; in no acute distress.   SKIN: warm, dry  HEAD: Normocephalic; atraumatic.  EYES: PERRL, EOMI, no conjunctival erythema  ENT: No nasal discharge; airway clear.  NECK: Supple; non tender.  CARD: S1, S2 normal;  Regular rate and rhythm.   RESP: No wheezes, rales or rhonchi.  ABD: soft non tender, + distension, no rebound or guarding  EXT: Normal ROM.    LYMPH: No acute cervical adenopathy.  NEURO: Alert, oriented, grossly unremarkable.

## 2019-09-26 NOTE — ED PROVIDER NOTE - CLINICAL SUMMARY MEDICAL DECISION MAKING FREE TEXT BOX
Patient presented with fever and weakness. labs, xray, ua ct done. found to have bains cathether in wrong place. Upon repositioning patient urinated 2L into bains. UTI found. Discharged with antibiotics.

## 2019-09-26 NOTE — ED ADULT NURSE NOTE - NSIMPLEMENTINTERV_GEN_ALL_ED
Implemented All Universal Safety Interventions:  [place] to call system. Call bell, personal items and telephone within reach. Instruct patient to call for assistance. Room bathroom lighting operational. Non-slip footwear when patient is off stretcher. Physically safe environment: no spills, clutter or unnecessary equipment. Stretcher in lowest position, wheels locked, appropriate side rails in place.

## 2019-09-26 NOTE — ED PROVIDER NOTE - CARE PLAN
Principal Discharge DX:	Fever  Secondary Diagnosis:	UTI (urinary tract infection)  Secondary Diagnosis:	Cobos catheter problem

## 2019-09-27 ENCOUNTER — INPATIENT (INPATIENT)
Facility: HOSPITAL | Age: 79
LOS: 4 days | Discharge: NURSING, REHAB OR LONG-TERM CARE | End: 2019-10-02
Attending: INTERNAL MEDICINE | Admitting: INTERNAL MEDICINE
Payer: MEDICARE

## 2019-09-27 VITALS
OXYGEN SATURATION: 97 % | TEMPERATURE: 101 F | RESPIRATION RATE: 18 BRPM | DIASTOLIC BLOOD PRESSURE: 93 MMHG | SYSTOLIC BLOOD PRESSURE: 204 MMHG | HEART RATE: 113 BPM

## 2019-09-27 DIAGNOSIS — Z12.11 ENCOUNTER FOR SCREENING FOR MALIGNANT NEOPLASM OF COLON: Chronic | ICD-10-CM

## 2019-09-27 LAB
ALBUMIN SERPL ELPH-MCNC: 3.9 G/DL — SIGNIFICANT CHANGE UP (ref 3.5–5.2)
ALP SERPL-CCNC: 92 U/L — SIGNIFICANT CHANGE UP (ref 30–115)
ALT FLD-CCNC: 11 U/L — SIGNIFICANT CHANGE UP (ref 0–41)
ANION GAP SERPL CALC-SCNC: 16 MMOL/L — HIGH (ref 7–14)
APPEARANCE UR: CLEAR — SIGNIFICANT CHANGE UP
AST SERPL-CCNC: 17 U/L — SIGNIFICANT CHANGE UP (ref 0–41)
BACTERIA # UR AUTO: ABNORMAL
BASOPHILS # BLD AUTO: 0.02 K/UL — SIGNIFICANT CHANGE UP (ref 0–0.2)
BASOPHILS NFR BLD AUTO: 0.1 % — SIGNIFICANT CHANGE UP (ref 0–1)
BILIRUB SERPL-MCNC: 0.4 MG/DL — SIGNIFICANT CHANGE UP (ref 0.2–1.2)
BILIRUB UR-MCNC: NEGATIVE — SIGNIFICANT CHANGE UP
BUN SERPL-MCNC: 19 MG/DL — SIGNIFICANT CHANGE UP (ref 10–20)
CALCIUM SERPL-MCNC: 8.7 MG/DL — SIGNIFICANT CHANGE UP (ref 8.5–10.1)
CHLORIDE SERPL-SCNC: 103 MMOL/L — SIGNIFICANT CHANGE UP (ref 98–110)
CO2 SERPL-SCNC: 20 MMOL/L — SIGNIFICANT CHANGE UP (ref 17–32)
COLOR SPEC: SIGNIFICANT CHANGE UP
CREAT SERPL-MCNC: 1.4 MG/DL — SIGNIFICANT CHANGE UP (ref 0.7–1.5)
CULTURE RESULTS: SIGNIFICANT CHANGE UP
DIFF PNL FLD: ABNORMAL
EOSINOPHIL # BLD AUTO: 0 K/UL — SIGNIFICANT CHANGE UP (ref 0–0.7)
EOSINOPHIL NFR BLD AUTO: 0 % — SIGNIFICANT CHANGE UP (ref 0–8)
EPI CELLS # UR: 0 /HPF — SIGNIFICANT CHANGE UP (ref 0–5)
GLUCOSE BLDC GLUCOMTR-MCNC: 109 MG/DL — HIGH (ref 70–99)
GLUCOSE SERPL-MCNC: 132 MG/DL — HIGH (ref 70–99)
GLUCOSE UR QL: NEGATIVE — SIGNIFICANT CHANGE UP
HCT VFR BLD CALC: 32.3 % — LOW (ref 42–52)
HGB BLD-MCNC: 10.7 G/DL — LOW (ref 14–18)
HYALINE CASTS # UR AUTO: 2 /LPF — SIGNIFICANT CHANGE UP (ref 0–7)
IMM GRANULOCYTES NFR BLD AUTO: 0.6 % — HIGH (ref 0.1–0.3)
KETONES UR-MCNC: NEGATIVE — SIGNIFICANT CHANGE UP
LACTATE SERPL-SCNC: 1.4 MMOL/L — SIGNIFICANT CHANGE UP (ref 0.5–2.2)
LEUKOCYTE ESTERASE UR-ACNC: ABNORMAL
LIDOCAIN IGE QN: 19 U/L — SIGNIFICANT CHANGE UP (ref 7–60)
LYMPHOCYTES # BLD AUTO: 0.78 K/UL — LOW (ref 1.2–3.4)
LYMPHOCYTES # BLD AUTO: 5.7 % — LOW (ref 20.5–51.1)
MCHC RBC-ENTMCNC: 32.4 PG — HIGH (ref 27–31)
MCHC RBC-ENTMCNC: 33.1 G/DL — SIGNIFICANT CHANGE UP (ref 32–37)
MCV RBC AUTO: 97.9 FL — HIGH (ref 80–94)
MONOCYTES # BLD AUTO: 1.12 K/UL — HIGH (ref 0.1–0.6)
MONOCYTES NFR BLD AUTO: 8.2 % — SIGNIFICANT CHANGE UP (ref 1.7–9.3)
NEUTROPHILS # BLD AUTO: 11.62 K/UL — HIGH (ref 1.4–6.5)
NEUTROPHILS NFR BLD AUTO: 85.4 % — HIGH (ref 42.2–75.2)
NITRITE UR-MCNC: NEGATIVE — SIGNIFICANT CHANGE UP
NRBC # BLD: 0 /100 WBCS — SIGNIFICANT CHANGE UP (ref 0–0)
PH UR: 6.5 — SIGNIFICANT CHANGE UP (ref 5–8)
PLATELET # BLD AUTO: 194 K/UL — SIGNIFICANT CHANGE UP (ref 130–400)
POTASSIUM SERPL-MCNC: 4.8 MMOL/L — SIGNIFICANT CHANGE UP (ref 3.5–5)
POTASSIUM SERPL-SCNC: 4.8 MMOL/L — SIGNIFICANT CHANGE UP (ref 3.5–5)
PROT SERPL-MCNC: 7 G/DL — SIGNIFICANT CHANGE UP (ref 6–8)
PROT UR-MCNC: ABNORMAL
RBC # BLD: 3.3 M/UL — LOW (ref 4.7–6.1)
RBC # FLD: 13.7 % — SIGNIFICANT CHANGE UP (ref 11.5–14.5)
RBC CASTS # UR COMP ASSIST: 20 /HPF — HIGH (ref 0–4)
SODIUM SERPL-SCNC: 139 MMOL/L — SIGNIFICANT CHANGE UP (ref 135–146)
SP GR SPEC: 1.02 — SIGNIFICANT CHANGE UP (ref 1.01–1.02)
SPECIMEN SOURCE: SIGNIFICANT CHANGE UP
TROPONIN T SERPL-MCNC: <0.01 NG/ML — SIGNIFICANT CHANGE UP
UROBILINOGEN FLD QL: SIGNIFICANT CHANGE UP
WBC # BLD: 13.62 K/UL — HIGH (ref 4.8–10.8)
WBC # FLD AUTO: 13.62 K/UL — HIGH (ref 4.8–10.8)
WBC UR QL: 91 /HPF — HIGH (ref 0–5)

## 2019-09-27 PROCEDURE — 99223 1ST HOSP IP/OBS HIGH 75: CPT | Mod: AI

## 2019-09-27 PROCEDURE — 93010 ELECTROCARDIOGRAM REPORT: CPT

## 2019-09-27 PROCEDURE — 99285 EMERGENCY DEPT VISIT HI MDM: CPT

## 2019-09-27 PROCEDURE — 71045 X-RAY EXAM CHEST 1 VIEW: CPT | Mod: 26

## 2019-09-27 RX ORDER — IBUPROFEN 200 MG
600 TABLET ORAL ONCE
Refills: 0 | Status: COMPLETED | OUTPATIENT
Start: 2019-09-27 | End: 2019-09-27

## 2019-09-27 RX ORDER — OLANZAPINE 15 MG/1
20 TABLET, FILM COATED ORAL DAILY
Refills: 0 | Status: DISCONTINUED | OUTPATIENT
Start: 2019-09-27 | End: 2019-10-02

## 2019-09-27 RX ORDER — PIPERACILLIN AND TAZOBACTAM 4; .5 G/20ML; G/20ML
3.38 INJECTION, POWDER, LYOPHILIZED, FOR SOLUTION INTRAVENOUS EVERY 8 HOURS
Refills: 0 | Status: DISCONTINUED | OUTPATIENT
Start: 2019-09-27 | End: 2019-09-28

## 2019-09-27 RX ORDER — PIPERACILLIN AND TAZOBACTAM 4; .5 G/20ML; G/20ML
INJECTION, POWDER, LYOPHILIZED, FOR SOLUTION INTRAVENOUS
Refills: 0 | Status: DISCONTINUED | OUTPATIENT
Start: 2019-09-27 | End: 2019-09-27

## 2019-09-27 RX ORDER — PIPERACILLIN AND TAZOBACTAM 4; .5 G/20ML; G/20ML
3.38 INJECTION, POWDER, LYOPHILIZED, FOR SOLUTION INTRAVENOUS ONCE
Refills: 0 | Status: DISCONTINUED | OUTPATIENT
Start: 2019-09-27 | End: 2019-09-27

## 2019-09-27 RX ORDER — SODIUM CHLORIDE 9 MG/ML
1 INJECTION INTRAMUSCULAR; INTRAVENOUS; SUBCUTANEOUS DAILY
Refills: 0 | Status: DISCONTINUED | OUTPATIENT
Start: 2019-09-27 | End: 2019-10-02

## 2019-09-27 RX ORDER — ACETAMINOPHEN 500 MG
650 TABLET ORAL ONCE
Refills: 0 | Status: COMPLETED | OUTPATIENT
Start: 2019-09-27 | End: 2019-09-27

## 2019-09-27 RX ORDER — METOPROLOL TARTRATE 50 MG
25 TABLET ORAL ONCE
Refills: 0 | Status: COMPLETED | OUTPATIENT
Start: 2019-09-27 | End: 2019-09-27

## 2019-09-27 RX ORDER — METOPROLOL TARTRATE 50 MG
25 TABLET ORAL
Refills: 0 | Status: DISCONTINUED | OUTPATIENT
Start: 2019-09-28 | End: 2019-10-02

## 2019-09-27 RX ORDER — TAMSULOSIN HYDROCHLORIDE 0.4 MG/1
0.4 CAPSULE ORAL AT BEDTIME
Refills: 0 | Status: DISCONTINUED | OUTPATIENT
Start: 2019-09-27 | End: 2019-10-02

## 2019-09-27 RX ORDER — SODIUM CHLORIDE 9 MG/ML
2000 INJECTION, SOLUTION INTRAVENOUS ONCE
Refills: 0 | Status: COMPLETED | OUTPATIENT
Start: 2019-09-27 | End: 2019-09-27

## 2019-09-27 RX ORDER — HEPARIN SODIUM 5000 [USP'U]/ML
5000 INJECTION INTRAVENOUS; SUBCUTANEOUS EVERY 12 HOURS
Refills: 0 | Status: DISCONTINUED | OUTPATIENT
Start: 2019-09-27 | End: 2019-10-02

## 2019-09-27 RX ORDER — ACETAMINOPHEN 500 MG
650 TABLET ORAL EVERY 6 HOURS
Refills: 0 | Status: DISCONTINUED | OUTPATIENT
Start: 2019-09-27 | End: 2019-10-02

## 2019-09-27 RX ORDER — CEFTRIAXONE 500 MG/1
1000 INJECTION, POWDER, FOR SOLUTION INTRAMUSCULAR; INTRAVENOUS ONCE
Refills: 0 | Status: COMPLETED | OUTPATIENT
Start: 2019-09-27 | End: 2019-09-27

## 2019-09-27 RX ORDER — IPRATROPIUM/ALBUTEROL SULFATE 18-103MCG
3 AEROSOL WITH ADAPTER (GRAM) INHALATION EVERY 6 HOURS
Refills: 0 | Status: DISCONTINUED | OUTPATIENT
Start: 2019-09-27 | End: 2019-10-02

## 2019-09-27 RX ORDER — TRAZODONE HCL 50 MG
100 TABLET ORAL AT BEDTIME
Refills: 0 | Status: DISCONTINUED | OUTPATIENT
Start: 2019-09-27 | End: 2019-10-02

## 2019-09-27 RX ORDER — SIMVASTATIN 20 MG/1
20 TABLET, FILM COATED ORAL AT BEDTIME
Refills: 0 | Status: DISCONTINUED | OUTPATIENT
Start: 2019-09-27 | End: 2019-10-02

## 2019-09-27 RX ORDER — CHLORHEXIDINE GLUCONATE 213 G/1000ML
1 SOLUTION TOPICAL
Refills: 0 | Status: DISCONTINUED | OUTPATIENT
Start: 2019-09-27 | End: 2019-10-02

## 2019-09-27 RX ORDER — GABAPENTIN 400 MG/1
600 CAPSULE ORAL
Refills: 0 | Status: DISCONTINUED | OUTPATIENT
Start: 2019-09-27 | End: 2019-10-02

## 2019-09-27 RX ADMIN — Medication 25 MILLIGRAM(S): at 16:30

## 2019-09-27 RX ADMIN — TAMSULOSIN HYDROCHLORIDE 0.4 MILLIGRAM(S): 0.4 CAPSULE ORAL at 21:19

## 2019-09-27 RX ADMIN — PIPERACILLIN AND TAZOBACTAM 25 GRAM(S): 4; .5 INJECTION, POWDER, LYOPHILIZED, FOR SOLUTION INTRAVENOUS at 22:47

## 2019-09-27 RX ADMIN — CEFTRIAXONE 100 MILLIGRAM(S): 500 INJECTION, POWDER, FOR SOLUTION INTRAMUSCULAR; INTRAVENOUS at 12:24

## 2019-09-27 RX ADMIN — SODIUM CHLORIDE 2000 MILLILITER(S): 9 INJECTION, SOLUTION INTRAVENOUS at 08:30

## 2019-09-27 RX ADMIN — Medication 600 MILLIGRAM(S): at 17:00

## 2019-09-27 RX ADMIN — Medication 100 MILLIGRAM(S): at 21:19

## 2019-09-27 RX ADMIN — Medication 650 MILLIGRAM(S): at 09:28

## 2019-09-27 RX ADMIN — Medication 650 MILLIGRAM(S): at 16:00

## 2019-09-27 RX ADMIN — Medication 650 MILLIGRAM(S): at 13:41

## 2019-09-27 RX ADMIN — Medication 650 MILLIGRAM(S): at 15:33

## 2019-09-27 RX ADMIN — Medication 650 MILLIGRAM(S): at 21:19

## 2019-09-27 NOTE — ED PROVIDER NOTE - OBJECTIVE STATEMENT
78yo M with PMHx COPD, HLD, non-Hodgkin lymphoma in remission, DM, neuropathy, schizophrenia, urinary incontinence with bains catheter sent from [facility] after pulling out bains. Pt was seen in ED yesterday for fever and weakness, had +ve UA, discharged with PO ABx. Bains was also found to be in prostate causing obstruction, adjusted, was functioning well before discharge. On current presentation, febrile, tachycardic. Patient is relatively poor historian. Is c/o generalized weakness, decreased PO intake. Denies headache, lightheadedness, CP, SOB, cough, nausea, vomiting, diarrhea, abd pain, leg swelling.

## 2019-09-27 NOTE — ED PROVIDER NOTE - PHYSICAL EXAMINATION
Vital signs reviewed  GENERAL: Patient nontoxic appearing, frail elderly male, lying in bed incontinent of urine  HEAD: NCAT  EYES: Anicteric  ENT: Dry MM  RESPIRATORY: Normal respiratory effort. CTA B/L. No wheezing, rales, rhonchi  CARDIOVASCULAR: Tachycardic with regular rhythm  ABDOMEN: Soft. Nondistended. Nontender. No guarding or rebound. No CVA tenderness.  MUSCULOSKELETAL/EXTREMITIES: Brisk cap refill. 2+ radial pulses. No leg edema.  SKIN:  Warm and dry  NEURO: AAOx3. No gross FND.

## 2019-09-27 NOTE — H&P ADULT - NSHPPHYSICALEXAM_GEN_ALL_CORE
Constitutional:  Eyes:  ENMT:  Neck:  Breasts:  Back:  Respiratory:  Cardiovascular:  Gastrointestinal:  Genitourinary:  Extremities:  Vascular:  Neurological:  Skin:  Lymph Nodes:  Musculoskeletal:  Psychiatric: Constitutional: NAD, tremors in all four extremities, non-engaging in interview, disheveled, well-nourished  Eyes: PERRLA, EOMI  Neck: trachea midline, no JVD noted  Respiratory: CTA b/l, no rales, rhonchi, or wheezing  Cardiovascular: +S1+S2, tachycardic, regular rhythm, no M/R/G  Gastrointestinal: refused abdominal exam  Extremities: no edema present b/l LE  Neurological: AAOx1 (to name- according to adult home this is baseline)

## 2019-09-27 NOTE — H&P ADULT - ATTENDING COMMENTS
patient seen and examined , agree with pgy 1 assesment and plan except as indicated above,  discussed case with resident  GEN Lying in no acute distress  HEENT Pupils equal and reactive to light and accommodationSupple Neck  PULM Clear to auscultation bilaterally  CV s1s2 regular rate and rhythm  GI + bowel sounds nontnender  EXT no cyanosis or edema  INTEG No Lesions  NEURO JIMENEZ  Progress Note Handoff    Pending:  urinary culture    Family discussion: discussed with eleazar (sister) over telephone at 5:59 PM    Disposition: [facility] patient seen and examined , agree with pgy 1 assesment and plan except as indicated above,  discussed case with resident  GEN Lying in no acute distress  HEENT Pupils equal and reactive to light and accommodationSupple Neck  PULM Clear to auscultation bilaterally  CV s1s2 regular rate and rhythm  GI + bowel sounds nontnender  EXT no cyanosis or edema  INTEG No Lesions  NEURO JIMENEZ  #Atelectasis deep breathing   Progress Note Handoff    Pending:  urinary culture    Family discussion: discussed with eleazar (sister) over telephone at 5:59 PM    Disposition: [facility]

## 2019-09-27 NOTE — H&P ADULT - NSHPREVIEWOFSYSTEMS_GEN_ALL_CORE
General:	  Skin:	  Ophthalmologic:	  ENMT:	  Respiratory and Thorax:	  Cardiovascular:	  Gastrointestinal:	  Genitourinary:	  Musculoskeletal:	  Neurological:	  Psychiatric:	  Hematology/Lymphatics:	  Endocrine: General: no acute distress, non-engaging portions of interview   Skin: did not answer  Ophthalmologic: denies vision changes	  ENMT: did not answer  Respiratory and Thorax: denies shortness of breath, reports cough (did not answer timing or quality)	  Cardiovascular: denies cp	  Gastrointestinal: denies abdominal pain  Genitourinary: denies dysuria	  Musculoskeletal: denies generalized pain

## 2019-09-27 NOTE — ED ADULT NURSE NOTE - NSIMPLEMENTINTERV_GEN_ALL_ED
Implemented All Fall with Harm Risk Interventions:  [place] to call system. Call bell, personal items and telephone within reach. Instruct patient to call for assistance. Room bathroom lighting operational. Non-slip footwear when patient is off stretcher. Physically safe environment: no spills, clutter or unnecessary equipment. Stretcher in lowest position, wheels locked, appropriate side rails in place. Provide visual cue, wrist band, yellow gown, etc. Monitor gait and stability. Monitor for mental status changes and reorient to person, place, and time. Review medications for side effects contributing to fall risk. Reinforce activity limits and safety measures with patient and family. Provide visual clues: red socks.

## 2019-09-27 NOTE — H&P ADULT - HISTORY OF PRESENT ILLNESS
This is a 79 year-old male with PMH of schizophrenia, COPD, hyperlipidemia, lymphoma, neuropathy, T2DM (non-insulin dependent), urinary incontinence with bains catheter presents from [facility] for fever and weakness after pulling out chronic indwelling bains catheter. Patient was seen in ED 9/26 for same complaints and was found to have positive UA for which he was prescribed PO antibiotics and discharged without admission to hospital. During that time, his chronic indwelling bains was found to be in the wrong place, causing obstruction, and re-adjusted. After discharge when patient left hospital, he reportedly pulled out his bains and re-presented on 9/27 with same complaints. Was found this time to be febrile (TMax 102.2) with elevated white count (13.6) and sinus tachycardia. Also complaining of cough, congestion, and decreased PO intake. Patient denies any abdominal pain, back pain, sore throat, headache. Denies any cp, vomiting, diarrhea. This is a 79 year-old male with PMH of schizophrenia, COPD, hyperlipidemia, lymphoma, neuropathy, T2DM (non-insulin dependent), urinary incontinence with bains catheter presents from [facility] for fever and weakness after pulling out chronic indwelling bains catheter. Patient was seen in ED 9/26 for same complaints and was found to have positive UA for which he was prescribed PO antibiotics and discharged without admission to hospital. During that time, his chronic indwelling bains was found to be in the wrong place, causing obstruction, and re-adjusted. After discharge when patient left hospital, he reportedly pulled out his bains and re-presented on 9/27 with same complaints. Was found this time to be febrile (TMax 102.2) with elevated white count (13.6) and sinus tachycardia. Also complaining of cough, congestion, and decreased PO intake. Patient denies any abdominal pain, back pain, sore throat, headache. Denies any cp, vomiting, diarrhea.    In ED, received IV Rocephin 1 g x1, 2L LR bolus, rectal Tylenol x1 This is a 79 year-old male with PMH of schizophrenia, COPD, hyperlipidemia, non-Hodgkin lymphoma in remission, neuropathy, T2DM (non-insulin dependent), urinary incontinence with bains catheter presents from [facility] for fever and weakness after pulling out chronic indwelling bains catheter. Patient was seen in ED 9/26 for same complaints and was found to have positive UA for which he was prescribed PO antibiotics and discharged without admission to hospital. During that time, his chronic indwelling bains was found to be in the wrong place, causing obstruction, and re-adjusted. After discharge when patient left hospital, he reportedly pulled out his bains and re-presented on 9/27 with same complaints. Was found this time to be febrile (TMax 102.2) with elevated white count (13.6) and sinus tachycardia. Also complaining of cough, congestion, and decreased PO intake. Patient denies any abdominal pain, back pain, sore throat, headache. Denies any cp, vomiting, diarrhea.    In ED, received IV Rocephin 1 g x1, 2L LR bolus, rectal Tylenol x1

## 2019-09-27 NOTE — ED PROVIDER NOTE - NS ED ROS FT
Constitutional: +fever, generalized weakness   Eyes:  No visual changes  ENMT:  No neck pain  Cardiac:  No chest pain  Respiratory:  No cough, SOB  GI:  No nausea, vomiting, diarrhea, abdominal pain  :  No dysuria, hematuria. +bains problem  MS:  No back pain.  Neuro:  No headache or lightheadedness  Skin:  No skin rash  Endocrine: No history of thyroid disease or diabetes.  Except as documented in the HPI,  all other systems are negative.

## 2019-09-27 NOTE — H&P ADULT - ASSESSMENT
This is a 79 year-old male with PMH of schizophrenia, COPD, hyperlipidemia, lymphoma, neuropathy, T2DM (non-insulin dependent), urinary incontinence with chronic bains catheter presenting from [facility] for fever and weakness.                   #Activity:  #Diet:  #DVT ppx:  #GI ppx:  #Dispo:  #Code status:  #Follow-up: ______________ This is a 79 year-old male with PMH of schizophrenia, COPD, hyperlipidemia, lymphoma, neuropathy, T2DM (non-insulin dependent), urinary incontinence with chronic bains catheter presenting from [facility] for fever and weakness.     # Sepsis 2/2 urinary tract infection  - Febrile with elevated white count and tachycardia  - Patient with chronic bains that was replaced 9/27 after patient removed (bains had been re-adjusted 9/26 on previous ED visit due to it being out of place)  - C/w IV Ceftriaxone 1 g qD for now  - Urine culture sent. F/u results  - Blood cultures sent. F/u results    # Anemia  - Hgb 10.7, MCV 97  - B12/folate ordered. F/u results  - Iron panel ordered. F/u results    # COPD  - Med rec completed with [facility]. Most recent medication list ordered to continue while hospitalized  - Of note, patient now on Prednisone 25 mg qD (no prior history in our system). Will continue for now    # Hypertension  - Med rec completed with [facility]. Most recent medication list ordered to continue while hospitalized  - Of note, no longer taking Metoprolol Tartrate per home. Not on anti-hypertensive  - Monitor BP and treat accordingly    # Schizophrenia  - Med rec completed with [facility]. Most recent medication list ordered to continue while hospitalized  - C/w Trazodone, Olanzapine    #Activity: out of bed with assistance  #Diet: DASH/TLC (no food consistency restriction per home)  #DVT ppx: Heparin 5000U subcutaneous BID  #GI ppx: not indicated  #Dispo: admit to medical floor for now. Med rec completed as above- unclear why patient is taking Gabapentin, Trazodone, Flomax, daily salt tab. Unable to reach primary physician on admission  #Code status: FULL CODE  #Follow-up: __Further medical history reconciliation____________ This is a 79 year-old male with PMH of schizophrenia, COPD, hyperlipidemia, lymphoma, neuropathy, T2DM (non-insulin dependent), urinary incontinence with chronic bains catheter presenting from [facility] for fever and weakness.     # Sepsis 2/2 urinary tract infection  - Febrile with elevated white count and tachycardia  - Patient with chronic bains that was replaced 9/27 after patient removed (bains had been re-adjusted 9/26 on previous ED visit due to it being out of place)  - Received 1x dose IV Rocephin in ED. Will start on IV Zosyn (3.325 mg q6h) for now. De-escalate coverage based on cultures  - Urine culture sent. F/u results  - Blood cultures sent. F/u results    # Anemia  - Hgb 10.7, MCV 97  - B12/folate ordered. F/u results  - Iron panel ordered. F/u results    # COPD  - Med rec completed with [facility]. Most recent medication list ordered to continue while hospitalized  - Of note, patient now on Prednisone 25 mg qD (no prior history in our system). Will continue for now    # Hypertension  - Med rec completed with [facility]. Most recent medication list ordered to continue while hospitalized  - Of note, no longer taking Metoprolol Tartrate per home (was discharged with this medication July 2019). Not on anti-hypertensive  - Monitor BP and treat accordingly    # Schizophrenia  - Med rec completed with [facility]. Most recent medication list ordered to continue while hospitalized  - C/w Trazodone, Olanzapine    # T2DM non-insulin dependent  - Monitor fingersticks. If consistently >180, start insulin basal/bolus with sliding scale regimen    # Chronic hyponatremia 2/2 Trazodone  - C/w NaCl salt tab 1g qD  - Trazodone dose reduced July 2019  - Follow AM BMP    # Urinary retention  - C/w chronic indwelling bains     #Activity: out of bed with assistance  #Diet: DASH/TLC (no food consistency restriction per home)  #DVT ppx: Heparin 5000U subcutaneous BID  #GI ppx: not indicated  #Dispo: admit to medical floor for now. Med rec completed as above- unclear why patient is taking Gabapentin. Unable to reach primary physician on admission  #Code status: FULL CODE  #Follow-up: __Further medical history reconciliation____________ This is a 79 year-old male with PMH of schizophrenia, COPD, hyperlipidemia, lymphoma, neuropathy, T2DM (non-insulin dependent), urinary incontinence with chronic bains catheter presenting from [facility] for fever and weakness.     # Sepsis 2/2 urinary tract infection  - Febrile with elevated white count and tachycardia  - Patient with chronic bains that was replaced 9/27 after patient removed (bains had been re-adjusted 9/26 on previous ED visit due to it being out of place)  - Received 1x dose IV Rocephin in ED. Will start on IV Zosyn (3.325 mg q6h) for now. De-escalate coverage based on cultures  - Urine culture sent. F/u results  - Blood cultures sent. F/u results  - Tylenol ordered q8h PRN fever    # Anemia  - Hgb 10.7, MCV 97  - B12/folate ordered. F/u results  - Iron panel ordered. F/u results    # COPD  - Med rec completed with [facility]. Most recent medication list ordered to continue while hospitalized  - Of note, patient now on Prednisone 25 mg qD per home; unclear why (no prior history in our system). Will hold for now as does not appear to be in acute exacerbation    # Hypertension  - Med rec completed with [facility]. Most recent medication list ordered to continue while hospitalized  - Of note, no longer taking Metoprolol Tartrate per home (was discharged with this medication July 2019). Was not on anti-hypertensive on admission. Will restart Lopressor 25 mg BID (first dose now) as has been hypertensive since admission  - Monitor BP and treat accordingly    # Schizophrenia  - Med rec completed with [facility]. Most recent medication list ordered to continue while hospitalized  - C/w Trazodone, Olanzapine    # T2DM non-insulin dependent  - Monitor fingersticks. If consistently >180, start insulin basal/bolus with sliding scale regimen    # Chronic hyponatremia 2/2 Trazodone  - C/w NaCl salt tab 1g qD  - Trazodone dose reduced July 2019  - Follow AM BMP    # Urinary retention  - C/w chronic indwelling bains   - C/w Flomax 0.4 mg daily    #Activity: out of bed with assistance  #Diet: DASH/TLC (no food consistency restriction per home)  #DVT ppx: Heparin 5000U subcutaneous BID  #GI ppx: not indicated  #Dispo: admit to medical floor for now. Med rec completed as above- unclear why patient is taking Gabapentin. Unable to reach primary physician on admission  #Code status: FULL CODE  #Follow-up: __Further medical history reconciliation____________ This is a 79 year-old male with PMH of schizophrenia, COPD, hyperlipidemia, lymphoma, neuropathy, T2DM (non-insulin dependent), urinary incontinence with chronic bains catheter presenting from [facility] for fever and weakness.     # Sepsis 2/2 urinary tract infection  - Febrile with elevated white count and tachycardia  - Patient with chronic bains that was replaced 9/27 after patient removed (bains had been re-adjusted 9/26 on previous ED visit due to it being out of place)  - Received 1x dose IV Rocephin in ED. Will start on IV Zosyn (3.325 mg q6h) for now. De-escalate coverage based on cultures  - Urine culture sent. F/u results  - Blood cultures sent. F/u results  - Tylenol ordered q6h PRN fever    # Anemia  - Hgb 10.7, MCV 97  - B12/folate ordered. F/u results  - Iron panel ordered. F/u results    # COPD  - Med rec completed with [facility]. Most recent medication list ordered to continue while hospitalized  - Duonebs ordered PRN for sob/wheezing  - Of note, patient now on Prednisone 25 mg qD per home; unclear why (no prior history in our system). Will hold for now as does not appear to be in acute exacerbation    # Hypertension  - Med rec completed with [facility]. Most recent medication list ordered to continue while hospitalized  - Of note, no longer taking Metoprolol Tartrate per home (was discharged with this medication July 2019). Was not on anti-hypertensive on admission. Will restart Lopressor 25 mg BID (first dose now) as has been hypertensive since admission. D/w attending  - Monitor BP and treat accordingly    # Schizophrenia  - Med rec completed with [facility]. Most recent medication list ordered to continue while hospitalized  - C/w Trazodone, Olanzapine    # T2DM non-insulin dependent  - Monitor fingersticks. If consistently >180, start insulin basal/bolus with sliding scale regimen    # Chronic hyponatremia 2/2 Trazodone  - C/w NaCl salt tab 1g qD  - Trazodone dose reduced July 2019  - Follow AM BMP    # Urinary retention  - C/w chronic indwelling bains   - C/w Flomax 0.4 mg daily    # Neuropathy  - C/w Gabapentin    #Activity: out of bed with assistance  #Diet: DASH/TLC (no food consistency restriction per home)  #DVT ppx: Heparin 5000U subcutaneous BID  #GI ppx: not indicated  #Dispo: admit to medical floor for now. Med rec completed as above. Unable to reach primary physician on admission  #Code status: FULL CODE  #Follow-up: __Further medical history reconciliation____________ This is a 79 year-old male with PMH of schizophrenia, COPD, hyperlipidemia, lymphoma, neuropathy, T2DM (non-insulin dependent), urinary incontinence with chronic bains catheter presenting from [facility] for fever and weakness.     # Sepsis 2/2 urinary tract infection  - Febrile with elevated white count and tachycardia  - Patient with chronic bains that was replaced 9/27 after patient removed (bains had been re-adjusted 9/26 on previous ED visit due to it being out of place)  - Received 1x dose IV Rocephin in ED. Will start on IV Zosyn (3.325 mg q6h) for now. De-escalate coverage based on cultures  - Urine culture sent. F/u results  - Blood cultures sent. F/u results  - Tylenol ordered q6h PRN fever    # Anemia  - Hgb 10.7, MCV 97  - B12/folate ordered. F/u results  - Iron panel ordered. F/u results    # COPD  - Med rec completed with [facility]. Most recent medication list ordered to continue while hospitalized  - Duonebs ordered PRN for sob/wheezing  - Of note, patient now on Prednisone 25 mg qD per home; unclear why (no prior history in our system). Will hold for now as does not appear to be in acute exacerbation    # Hypertension  - Med rec completed with [facility]. Most recent medication list ordered to continue while hospitalized  - Of note, no longer taking Metoprolol Tartrate per home (was discharged with this medication July 2019). Was not on anti-hypertensive on admission. Will restart Lopressor 25 mg BID (first dose now) as has been hypertensive since admission. D/w attending  - Monitor BP and treat accordingly    # Schizophrenia  - Med rec completed with [facility]. Most recent medication list ordered to continue while hospitalized  - C/w Trazodone, Olanzapine    # T2DM non-insulin dependent  - Monitor fingersticks. If consistently >180, start insulin basal/bolus with sliding scale regimen    # Chronic hyponatremia 2/2 Trazodone  - C/w NaCl salt tab 1g qD  - Trazodone dose reduced July 2019  - Follow AM BMP    # Urinary retention  - C/w chronic indwelling bains   - C/w Flomax 0.4 mg daily  - Order renal ultrasound tomorrow after 48 hours of drainage from new bains placement    # Neuropathy  - C/w Gabapentin    #Activity: out of bed with assistance  #Diet: DASH/TLC (no food consistency restriction per home)  #DVT ppx: Heparin 5000U subcutaneous BID  #GI ppx: not indicated  #Dispo: admit to medical floor for now. Med rec completed as above  #Code status: FULL CODE  #Follow-up: __Further medical history reconciliation____________

## 2019-09-27 NOTE — ED ADULT NURSE NOTE - OBJECTIVE STATEMENT
Pt sent from adult home for Cobos pulled out. Pt is febrile, HR tachy. Pt is A&Ox1-2. Denies any pain or discomfort.

## 2019-09-27 NOTE — ED PROVIDER NOTE - CLINICAL SUMMARY MEDICAL DECISION MAKING FREE TEXT BOX
80yo M sent from NH for pulled out bains. Here yesterday fever, found to have UTI, Bains problem causing obstruction. Today febrile, tachycardic, leukocytosis 11 > 13. Remains tachycardic after 30cc/kg fluid bolus. IV ABx given. Will admit for IV ABx.

## 2019-09-27 NOTE — H&P ADULT - NSHPLABSRESULTS_GEN_ALL_CORE
10.7   13.62 )-----------( 194      ( 27 Sep 2019 08:07 )             32.3         139  |  103  |  19  ----------------------------<  132<H>  4.8   |  20  |  1.4    Ca    8.7      27 Sep 2019 08:07    TPro  7.0  /  Alb  3.9  /  TBili  0.4  /  DBili  x   /  AST  17  /  ALT  11  /  AlkPhos  92      Urinalysis Basic - ( 27 Sep 2019 08:32 )  Color: Light Yellow / Appearance: Clear / S.016 / pH: x  Gluc: x / Ketone: Negative  / Bili: Negative / Urobili: <2 mg/dL   Blood: x / Protein: 30 mg/dL / Nitrite: Negative   Leuk Esterase: Large / RBC: 20 /HPF / WBC 91 /HPF   Sq Epi: x / Non Sq Epi: 0 /HPF / Bacteria: Few    Lactate 1.4 | Lipase 14 | Trop neg x1    CT A/P (): 1. Moderate bilateral hydroureteronephrosis extending to a moderately dilated urinary bladder, compatible with bladder outlet obstruction. 2. Cobos catheter in suboptimal position, with Cobos balloon inflated within the prostatic urethra. 3. Cholelithiasis.    CXR (): Trace bibasilar subsegmental atelectasis. No focal consolidation, effusion, or pneumothorax. 10.7   13.62 )-----------( 194      ( 27 Sep 2019 08:07 )             32.3         139  |  103  |  19  ----------------------------<  132<H>  4.8   |  20  |  1.4    Ca    8.7      27 Sep 2019 08:07    TPro  7.0  /  Alb  3.9  /  TBili  0.4  /  DBili  x   /  AST  17  /  ALT  11  /  AlkPhos  92      Urinalysis Basic - ( 27 Sep 2019 08:32 )  Color: Light Yellow / Appearance: Clear / S.016 / pH: x  Gluc: x / Ketone: Negative  / Bili: Negative / Urobili: <2 mg/dL   Blood: x / Protein: 30 mg/dL / Nitrite: Negative   Leuk Esterase: Large / RBC: 20 /HPF / WBC 91 /HPF   Sq Epi: x / Non Sq Epi: 0 /HPF / Bacteria: Few    Lactate 1.4 | Lipase 14 | Trop neg x1    CT A/P (): 1. Moderate bilateral hydroureteronephrosis extending to a moderately dilated urinary bladder, compatible with bladder outlet obstruction. 2. Cobos catheter in suboptimal position, with Cobos balloon inflated within the prostatic urethra. 3. Cholelithiasis.    CXR (): Trace bibasilar subsegmental atelectasis. No focal consolidation, effusion, or pneumothorax.    < from: 12 Lead ECG (19 @ 12:23) >    Sinus tachycardia  Possible Left atrial enlargement  Left ventricular hypertrophy  Abnormal ECG    < end of copied text >

## 2019-09-27 NOTE — H&P ADULT - NSICDXFAMILYHX_GEN_ALL_CORE_FT
FAMILY HISTORY:  No pertinent family history in first degree relatives FAMILY HISTORY:  No pertinent family history in first degree relatives, Unable to ascertain 2/2 chronic psychiatric condition

## 2019-09-28 LAB
ANION GAP SERPL CALC-SCNC: 15 MMOL/L — HIGH (ref 7–14)
BASOPHILS # BLD AUTO: 0.02 K/UL — SIGNIFICANT CHANGE UP (ref 0–0.2)
BASOPHILS NFR BLD AUTO: 0.1 % — SIGNIFICANT CHANGE UP (ref 0–1)
BUN SERPL-MCNC: 20 MG/DL — SIGNIFICANT CHANGE UP (ref 10–20)
CALCIUM SERPL-MCNC: 7.7 MG/DL — LOW (ref 8.5–10.1)
CHLORIDE SERPL-SCNC: 98 MMOL/L — SIGNIFICANT CHANGE UP (ref 98–110)
CO2 SERPL-SCNC: 20 MMOL/L — SIGNIFICANT CHANGE UP (ref 17–32)
CREAT SERPL-MCNC: 1.4 MG/DL — SIGNIFICANT CHANGE UP (ref 0.7–1.5)
EOSINOPHIL # BLD AUTO: 0 K/UL — SIGNIFICANT CHANGE UP (ref 0–0.7)
EOSINOPHIL NFR BLD AUTO: 0 % — SIGNIFICANT CHANGE UP (ref 0–8)
FOLATE SERPL-MCNC: 8.3 NG/ML — SIGNIFICANT CHANGE UP
GLUCOSE BLDC GLUCOMTR-MCNC: 105 MG/DL — HIGH (ref 70–99)
GLUCOSE BLDC GLUCOMTR-MCNC: 131 MG/DL — HIGH (ref 70–99)
GLUCOSE BLDC GLUCOMTR-MCNC: 135 MG/DL — HIGH (ref 70–99)
GLUCOSE BLDC GLUCOMTR-MCNC: 146 MG/DL — HIGH (ref 70–99)
GLUCOSE BLDC GLUCOMTR-MCNC: 441 MG/DL — HIGH (ref 70–99)
GLUCOSE SERPL-MCNC: 113 MG/DL — HIGH (ref 70–99)
HCT VFR BLD CALC: 26.9 % — LOW (ref 42–52)
HGB BLD-MCNC: 9.2 G/DL — LOW (ref 14–18)
IMM GRANULOCYTES NFR BLD AUTO: 1 % — HIGH (ref 0.1–0.3)
LYMPHOCYTES # BLD AUTO: 0.56 K/UL — LOW (ref 1.2–3.4)
LYMPHOCYTES # BLD AUTO: 3.9 % — LOW (ref 20.5–51.1)
MAGNESIUM SERPL-MCNC: 1.5 MG/DL — LOW (ref 1.8–2.4)
MCHC RBC-ENTMCNC: 32.7 PG — HIGH (ref 27–31)
MCHC RBC-ENTMCNC: 34.2 G/DL — SIGNIFICANT CHANGE UP (ref 32–37)
MCV RBC AUTO: 95.7 FL — HIGH (ref 80–94)
MONOCYTES # BLD AUTO: 0.91 K/UL — HIGH (ref 0.1–0.6)
MONOCYTES NFR BLD AUTO: 6.3 % — SIGNIFICANT CHANGE UP (ref 1.7–9.3)
NEUTROPHILS # BLD AUTO: 12.84 K/UL — HIGH (ref 1.4–6.5)
NEUTROPHILS NFR BLD AUTO: 88.7 % — HIGH (ref 42.2–75.2)
NRBC # BLD: 0 /100 WBCS — SIGNIFICANT CHANGE UP (ref 0–0)
PLATELET # BLD AUTO: 163 K/UL — SIGNIFICANT CHANGE UP (ref 130–400)
POTASSIUM SERPL-MCNC: 3.7 MMOL/L — SIGNIFICANT CHANGE UP (ref 3.5–5)
POTASSIUM SERPL-SCNC: 3.7 MMOL/L — SIGNIFICANT CHANGE UP (ref 3.5–5)
RBC # BLD: 2.81 M/UL — LOW (ref 4.7–6.1)
RBC # FLD: 13.7 % — SIGNIFICANT CHANGE UP (ref 11.5–14.5)
SODIUM SERPL-SCNC: 133 MMOL/L — LOW (ref 135–146)
VIT B12 SERPL-MCNC: 221 PG/ML — LOW (ref 232–1245)
WBC # BLD: 14.47 K/UL — HIGH (ref 4.8–10.8)
WBC # FLD AUTO: 14.47 K/UL — HIGH (ref 4.8–10.8)

## 2019-09-28 PROCEDURE — 99233 SBSQ HOSP IP/OBS HIGH 50: CPT

## 2019-09-28 RX ORDER — CEFTRIAXONE 500 MG/1
1000 INJECTION, POWDER, FOR SOLUTION INTRAMUSCULAR; INTRAVENOUS EVERY 24 HOURS
Refills: 0 | Status: DISCONTINUED | OUTPATIENT
Start: 2019-09-28 | End: 2019-09-30

## 2019-09-28 RX ORDER — INSULIN LISPRO 100/ML
10 VIAL (ML) SUBCUTANEOUS ONCE
Refills: 0 | Status: COMPLETED | OUTPATIENT
Start: 2019-09-28 | End: 2019-09-28

## 2019-09-28 RX ORDER — IBUPROFEN 200 MG
600 TABLET ORAL ONCE
Refills: 0 | Status: COMPLETED | OUTPATIENT
Start: 2019-09-28 | End: 2019-09-28

## 2019-09-28 RX ORDER — MAGNESIUM SULFATE 500 MG/ML
2 VIAL (ML) INJECTION ONCE
Refills: 0 | Status: COMPLETED | OUTPATIENT
Start: 2019-09-28 | End: 2019-09-28

## 2019-09-28 RX ADMIN — Medication 10 UNIT(S): at 13:35

## 2019-09-28 RX ADMIN — CHLORHEXIDINE GLUCONATE 1 APPLICATION(S): 213 SOLUTION TOPICAL at 06:29

## 2019-09-28 RX ADMIN — Medication 25 MILLIGRAM(S): at 06:21

## 2019-09-28 RX ADMIN — PIPERACILLIN AND TAZOBACTAM 25 GRAM(S): 4; .5 INJECTION, POWDER, LYOPHILIZED, FOR SOLUTION INTRAVENOUS at 06:21

## 2019-09-28 RX ADMIN — HEPARIN SODIUM 5000 UNIT(S): 5000 INJECTION INTRAVENOUS; SUBCUTANEOUS at 17:16

## 2019-09-28 RX ADMIN — GABAPENTIN 600 MILLIGRAM(S): 400 CAPSULE ORAL at 17:14

## 2019-09-28 RX ADMIN — SIMVASTATIN 20 MILLIGRAM(S): 20 TABLET, FILM COATED ORAL at 21:20

## 2019-09-28 RX ADMIN — Medication 100 MILLIGRAM(S): at 21:20

## 2019-09-28 RX ADMIN — OLANZAPINE 20 MILLIGRAM(S): 15 TABLET, FILM COATED ORAL at 12:51

## 2019-09-28 RX ADMIN — Medication 50 GRAM(S): at 09:32

## 2019-09-28 RX ADMIN — Medication 650 MILLIGRAM(S): at 05:32

## 2019-09-28 RX ADMIN — HEPARIN SODIUM 5000 UNIT(S): 5000 INJECTION INTRAVENOUS; SUBCUTANEOUS at 06:29

## 2019-09-28 RX ADMIN — Medication 650 MILLIGRAM(S): at 04:59

## 2019-09-28 RX ADMIN — Medication 650 MILLIGRAM(S): at 15:10

## 2019-09-28 RX ADMIN — Medication 650 MILLIGRAM(S): at 11:51

## 2019-09-28 RX ADMIN — GABAPENTIN 600 MILLIGRAM(S): 400 CAPSULE ORAL at 07:23

## 2019-09-28 RX ADMIN — Medication 600 MILLIGRAM(S): at 13:30

## 2019-09-28 RX ADMIN — CEFTRIAXONE 100 MILLIGRAM(S): 500 INJECTION, POWDER, FOR SOLUTION INTRAMUSCULAR; INTRAVENOUS at 09:35

## 2019-09-28 RX ADMIN — TAMSULOSIN HYDROCHLORIDE 0.4 MILLIGRAM(S): 0.4 CAPSULE ORAL at 21:20

## 2019-09-28 RX ADMIN — Medication 25 MILLIGRAM(S): at 17:15

## 2019-09-28 RX ADMIN — Medication 600 MILLIGRAM(S): at 13:35

## 2019-09-28 RX ADMIN — SIMVASTATIN 20 MILLIGRAM(S): 20 TABLET, FILM COATED ORAL at 01:22

## 2019-09-28 NOTE — PROGRESS NOTE ADULT - SUBJECTIVE AND OBJECTIVE BOX
S : No new events/complaints      All other pertinent ROS negative.      09-27-19 @ 07:01  -  09-28-19 @ 07:00  --------------------------------------------------------  IN: 400 mL / OUT: 4550 mL / NET: -4150 mL    09-28-19 @ 07:01  -  09-28-19 @ 12:12  --------------------------------------------------------  IN: 150 mL / OUT: 0 mL / NET: 150 mL      Vital Signs Last 24 Hrs  T(C): 37.4 (28 Sep 2019 06:40), Max: 39.1 (27 Sep 2019 15:15)  T(F): 99.4 (28 Sep 2019 06:40), Max: 102.4 (27 Sep 2019 15:15)  HR: 102 (28 Sep 2019 05:00) (72 - 114)  BP: 155/70 (28 Sep 2019 05:00) (113/58 - 196/81)  BP(mean): --  RR: 17 (28 Sep 2019 05:00) (16 - 17)  SpO2: 100% (27 Sep 2019 19:06) (96% - 100%)  PHYSICAL EXAM:    Constitutional: NAD, awake and alert, well-developed  HEENT: PERR, EOMI, Normal Hearing, MMM  Neck: Soft and supple, No LAD, No JVD  Respiratory: Breath sounds are clear bilaterally, No wheezing, rales or rhonchi  Cardiovascular: S1 and S2, regular rate and rhythm, no Murmurs, gallops or rubs  Gastrointestinal: Bowel Sounds present, soft, nontender, nondistended, no guarding, no rebound  Extremities: No peripheral edema  bains +nt    MEDICATIONS:  MEDICATIONS  (STANDING):  cefTRIAXone   IVPB 1000 milliGRAM(s) IV Intermittent every 24 hours  chlorhexidine 4% Liquid 1 Application(s) Topical <User Schedule>  gabapentin 600 milliGRAM(s) Oral two times a day  heparin  Injectable 5000 Unit(s) SubCutaneous every 12 hours  insulin lispro Injectable (HumaLOG) 10 Unit(s) SubCutaneous once  metoprolol tartrate 25 milliGRAM(s) Oral two times a day  OLANZapine 20 milliGRAM(s) Oral daily  simvastatin 20 milliGRAM(s) Oral at bedtime  sodium chloride 1 Gram(s) Oral daily  tamsulosin 0.4 milliGRAM(s) Oral at bedtime  traZODone 100 milliGRAM(s) Oral at bedtime      LABS: All Labs Reviewed:                        9.2    14.47 )-----------( 163      ( 28 Sep 2019 05:45 )             26.9     09-28    133<L>  |  98  |  20  ----------------------------<  113<H>  3.7   |  20  |  1.4    Ca    7.7<L>      28 Sep 2019 05:45  Mg     1.5     09-28    TPro  7.0  /  Alb  3.9  /  TBili  0.4  /  DBili  x   /  AST  17  /  ALT  11  /  AlkPhos  92  09-27    PT/INR - ( 26 Sep 2019 14:55 )   PT: 16.10 sec;   INR: 1.40 ratio         PTT - ( 26 Sep 2019 14:55 )  PTT:34.9 sec  CARDIAC MARKERS ( 27 Sep 2019 08:07 )  x     / <0.01 ng/mL / x     / x     / x      CARDIAC MARKERS ( 26 Sep 2019 14:55 )  x     / <0.01 ng/mL / x     / x     / x          Blood Culture: 09-26 @ 17:15  Organism --  Gram Stain Blood -- Gram Stain --  Specimen Source .Urine Clean Catch (Midstream)  Culture-Blood --    09-26 @ 14:55  Organism --  Gram Stain Blood -- Gram Stain --  Specimen Source .Blood Blood-Peripheral  Culture-Blood --        Radiology: reviewed

## 2019-09-28 NOTE — PROGRESS NOTE ADULT - ASSESSMENT
79 year-old male with PMH of schizophrenia, COPD, hyperlipidemia, lymphoma, neuropathy, T2DM (non-insulin dependent), urinary incontinence with chronic bains catheter presenting from [facility] for fever and weakness.     # Sepsis (POA) 2/2 urinary tract infection  UTI is secondary to traumatic removal of his chronic bains. So it's NOT CAUTI.   - Febrile with elevated white count and tachycardia  IV CTX for now.   - Urine culture sent. F/u results  - Blood cultures sent. F/u results  - Tylenol ordered q6h PRN fever    # chronic Anemia  - Hgb 10.7, MCV 97      # COPD  -home meds    # Hypertension  home meds    # Schizophrenia  - C/w Trazodone, Olanzapine    # T2DM non-insulin dependent  - Monitor fingersticks. If consistently >180, start insulin basal/bolus with sliding scale regimen    # Chronic hyponatremia 2/2 Trazodone  - C/w NaCl salt tab 1g qD  - Trazodone dose reduced July 2019  monitor    # Urinary retention  - C/w chronic indwelling bains (replaced this admission)  - C/w Flomax 0.4 mg daily      # Neuropathy  - C/w Gabapentin    #Activity: out of bed with assistance  #Diet: DASH/TLC (no food consistency restriction per home)  #DVT ppx: Heparin 5000U subcutaneous BID  #GI ppx: not indicated  #Dispo: admit to medical floor for now.  #Code status: FULL CODE

## 2019-09-29 LAB
-  AMPICILLIN: SIGNIFICANT CHANGE UP
-  CIPROFLOXACIN: SIGNIFICANT CHANGE UP
-  LEVOFLOXACIN: SIGNIFICANT CHANGE UP
-  NITROFURANTOIN: SIGNIFICANT CHANGE UP
-  TETRACYCLINE: SIGNIFICANT CHANGE UP
-  VANCOMYCIN: SIGNIFICANT CHANGE UP
ANION GAP SERPL CALC-SCNC: 14 MMOL/L — SIGNIFICANT CHANGE UP (ref 7–14)
BUN SERPL-MCNC: 26 MG/DL — HIGH (ref 10–20)
CALCIUM SERPL-MCNC: 7.9 MG/DL — LOW (ref 8.5–10.1)
CHLORIDE SERPL-SCNC: 100 MMOL/L — SIGNIFICANT CHANGE UP (ref 98–110)
CO2 SERPL-SCNC: 22 MMOL/L — SIGNIFICANT CHANGE UP (ref 17–32)
CREAT SERPL-MCNC: 1.7 MG/DL — HIGH (ref 0.7–1.5)
GLUCOSE BLDC GLUCOMTR-MCNC: 100 MG/DL — HIGH (ref 70–99)
GLUCOSE BLDC GLUCOMTR-MCNC: 137 MG/DL — HIGH (ref 70–99)
GLUCOSE BLDC GLUCOMTR-MCNC: 148 MG/DL — HIGH (ref 70–99)
GLUCOSE BLDC GLUCOMTR-MCNC: 150 MG/DL — HIGH (ref 70–99)
GLUCOSE SERPL-MCNC: 107 MG/DL — HIGH (ref 70–99)
HCT VFR BLD CALC: 28.6 % — LOW (ref 42–52)
HGB BLD-MCNC: 9.6 G/DL — LOW (ref 14–18)
MAGNESIUM SERPL-MCNC: 2.3 MG/DL — SIGNIFICANT CHANGE UP (ref 1.8–2.4)
MCHC RBC-ENTMCNC: 32.2 PG — HIGH (ref 27–31)
MCHC RBC-ENTMCNC: 33.6 G/DL — SIGNIFICANT CHANGE UP (ref 32–37)
MCV RBC AUTO: 96 FL — HIGH (ref 80–94)
METHOD TYPE: SIGNIFICANT CHANGE UP
NRBC # BLD: 0 /100 WBCS — SIGNIFICANT CHANGE UP (ref 0–0)
PLATELET # BLD AUTO: 176 K/UL — SIGNIFICANT CHANGE UP (ref 130–400)
POTASSIUM SERPL-MCNC: 3.5 MMOL/L — SIGNIFICANT CHANGE UP (ref 3.5–5)
POTASSIUM SERPL-SCNC: 3.5 MMOL/L — SIGNIFICANT CHANGE UP (ref 3.5–5)
RBC # BLD: 2.98 M/UL — LOW (ref 4.7–6.1)
RBC # FLD: 13.7 % — SIGNIFICANT CHANGE UP (ref 11.5–14.5)
SODIUM SERPL-SCNC: 136 MMOL/L — SIGNIFICANT CHANGE UP (ref 135–146)
WBC # BLD: 9.01 K/UL — SIGNIFICANT CHANGE UP (ref 4.8–10.8)
WBC # FLD AUTO: 9.01 K/UL — SIGNIFICANT CHANGE UP (ref 4.8–10.8)

## 2019-09-29 PROCEDURE — 99233 SBSQ HOSP IP/OBS HIGH 50: CPT

## 2019-09-29 RX ORDER — SODIUM CHLORIDE 9 MG/ML
1000 INJECTION INTRAMUSCULAR; INTRAVENOUS; SUBCUTANEOUS
Refills: 0 | Status: DISCONTINUED | OUTPATIENT
Start: 2019-09-29 | End: 2019-10-02

## 2019-09-29 RX ADMIN — GABAPENTIN 600 MILLIGRAM(S): 400 CAPSULE ORAL at 05:30

## 2019-09-29 RX ADMIN — TAMSULOSIN HYDROCHLORIDE 0.4 MILLIGRAM(S): 0.4 CAPSULE ORAL at 21:14

## 2019-09-29 RX ADMIN — GABAPENTIN 600 MILLIGRAM(S): 400 CAPSULE ORAL at 17:21

## 2019-09-29 RX ADMIN — CEFTRIAXONE 100 MILLIGRAM(S): 500 INJECTION, POWDER, FOR SOLUTION INTRAMUSCULAR; INTRAVENOUS at 11:23

## 2019-09-29 RX ADMIN — Medication 650 MILLIGRAM(S): at 00:38

## 2019-09-29 RX ADMIN — Medication 25 MILLIGRAM(S): at 05:30

## 2019-09-29 RX ADMIN — SODIUM CHLORIDE 1 GRAM(S): 9 INJECTION INTRAMUSCULAR; INTRAVENOUS; SUBCUTANEOUS at 11:25

## 2019-09-29 RX ADMIN — HEPARIN SODIUM 5000 UNIT(S): 5000 INJECTION INTRAVENOUS; SUBCUTANEOUS at 17:21

## 2019-09-29 RX ADMIN — Medication 25 MILLIGRAM(S): at 17:21

## 2019-09-29 RX ADMIN — Medication 650 MILLIGRAM(S): at 17:35

## 2019-09-29 RX ADMIN — Medication 650 MILLIGRAM(S): at 00:08

## 2019-09-29 RX ADMIN — Medication 650 MILLIGRAM(S): at 11:36

## 2019-09-29 RX ADMIN — SIMVASTATIN 20 MILLIGRAM(S): 20 TABLET, FILM COATED ORAL at 21:14

## 2019-09-29 RX ADMIN — SODIUM CHLORIDE 75 MILLILITER(S): 9 INJECTION INTRAMUSCULAR; INTRAVENOUS; SUBCUTANEOUS at 18:10

## 2019-09-29 RX ADMIN — OLANZAPINE 20 MILLIGRAM(S): 15 TABLET, FILM COATED ORAL at 11:25

## 2019-09-29 RX ADMIN — Medication 100 MILLIGRAM(S): at 21:14

## 2019-09-29 RX ADMIN — HEPARIN SODIUM 5000 UNIT(S): 5000 INJECTION INTRAVENOUS; SUBCUTANEOUS at 05:31

## 2019-09-29 NOTE — PROGRESS NOTE ADULT - SUBJECTIVE AND OBJECTIVE BOX
ADA VILLAGOMEZ 79y Male  MRN#: [id number]   CODE STATUS:________      SUBJECTIVE  Patient is a 79y old Male who presents with a chief complaint of Sepsis 2/2 urinary tract infection (28 Sep 2019 12:11)  Currently admitted to medicine with the primary diagnosis of UTI (urinary tract infection)    Today is hospital day 2d, no overnight complaints    Present Today:           Hicks Catheter ()No/ ()Yes? Indication:          Central Line ()No/ ()Yes? Indication:          IV Fluids ()No/ ()Yes? Type:  Rate:  Indication:      OBJECTIVE  PAST MEDICAL & SURGICAL HISTORY  Dyslipidemia  COPD (chronic obstructive pulmonary disease)  Diabetes type 2, controlled  Schizophrenia  Encounter for screening colonoscopy    ALLERGIES:  No Known Allergies    MEDICATIONS:  STANDING MEDICATIONS  cefTRIAXone   IVPB 1000 milliGRAM(s) IV Intermittent every 24 hours  chlorhexidine 4% Liquid 1 Application(s) Topical <User Schedule>  gabapentin 600 milliGRAM(s) Oral two times a day  heparin  Injectable 5000 Unit(s) SubCutaneous every 12 hours  metoprolol tartrate 25 milliGRAM(s) Oral two times a day  OLANZapine 20 milliGRAM(s) Oral daily  simvastatin 20 milliGRAM(s) Oral at bedtime  sodium chloride 1 Gram(s) Oral daily  tamsulosin 0.4 milliGRAM(s) Oral at bedtime  traZODone 100 milliGRAM(s) Oral at bedtime    PRN MEDICATIONS  acetaminophen   Tablet .. 650 milliGRAM(s) Oral every 6 hours PRN  ALBUTerol/ipratropium for Nebulization 3 milliLiter(s) Nebulizer every 6 hours PRN      VITAL SIGNS: Last 24 Hours  T(C): 36.8 (29 Sep 2019 05:19), Max: 39.4 (28 Sep 2019 11:00)  T(F): 98.2 (29 Sep 2019 05:19), Max: 103 (28 Sep 2019 11:00)  HR: 92 (29 Sep 2019 05:19) (73 - 112)  BP: 152/68 (29 Sep 2019 05:19) (125/58 - 152/68)  BP(mean): --  RR: 18 (29 Sep 2019 05:19) (16 - 18)  SpO2: 97% (28 Sep 2019 20:06) (97% - 98%)    LABS:                        9.6    9.01  )-----------( 176      ( 29 Sep 2019 06:10 )             28.6     09-29    136  |  100  |  26<H>  ----------------------------<  107<H>  3.5   |  22  |  1.7<H>    Ca    7.9<L>      29 Sep 2019 06:10  Mg     2.3     09-29                Culture - Urine (collected 27 Sep 2019 08:32)  Source: .Urine Catheterized  Preliminary Report (28 Sep 2019 18:25):    >100,000 CFU/ml Gram Negative Rods    >100,000 CFU/ml Gram positive organisms    Culture - Blood (collected 27 Sep 2019 08:07)  Source: .Blood Blood  Preliminary Report (28 Sep 2019 14:01):    No growth to date.    Culture - Blood (collected 27 Sep 2019 08:07)  Source: .Blood Blood  Preliminary Report (28 Sep 2019 14:01):    No growth to date.    Culture - Urine (collected 26 Sep 2019 17:15)  Source: .Urine Clean Catch (Midstream)  Final Report (27 Sep 2019 22:43):    >=3 organisms. Probable collection contamination.    Culture - Blood (collected 26 Sep 2019 14:55)  Source: .Blood Blood-Peripheral  Preliminary Report (27 Sep 2019 23:01):    No growth to date.    Culture - Blood (collected 26 Sep 2019 14:55)  Source: .Blood Blood-Peripheral  Preliminary Report (27 Sep 2019 23:01):    No growth to date.          RADIOLOGY:      PHYSICAL EXAM:    GENERAL: NAD, well-developed, AAOx3  HEENT:  Atraumatic, Normocephalic. EOMI, PERRLA, conjunctiva and sclera clear, No JVD  PULMONARY: Clear to auscultation bilaterally; No wheeze  CARDIOVASCULAR: Regular rate and rhythm; No murmurs, rubs, or gallops  GASTROINTESTINAL: Soft, Nontender, Nondistended; Bowel sounds present  MUSCULOSKELETAL:  2+ Peripheral Pulses, No clubbing, cyanosis, or edema  NEUROLOGY: non-focal  SKIN: No rashes or lesions      ADMISSION SUMMARY  Patient is a 79y old Male who presents with a chief complaint of Sepsis 2/2 urinary tract infection (28 Sep 2019 12:11)  Currently admitted to medicine with the primary diagnosis of UTI (urinary tract infection)  Hospital course has been complicated by _______.       ASSESSMENT & PLAN    1. TUI HICKS CATHETER PROBLEM      2.    3. Dyslipidemia  COPD (chronic obstructive pulmonary disease)  Diabetes type 2, controlled  Schizophrenia        Present today:  ( ) Congestive Heart Failure, Yes? ( )Acute / ( )Acute on Chronic / ( )Chronic  :  ( )Systolic / ( )Diastolic               Plan:  ( ) Complicated Pneumonia, Type?  ( )Parapneumonic effusion / ( )Abscess / ( ) Multilobar / ( )Other               Plan:  ( ) Morbid Obesity, Yes? BMI:               Plan:  ( ) Functional Quadriplegia               Plan:  ( ) Encephalopathy               Plan:    ( ) Discussion with patient and/or family regarding goals of care  ( ) Discussed Case and Plan with Medical Attending, Name: ADA VILLAGOMEZ 79y Male  MRN#: [id number]   CODE STATUS: Full    SUBJECTIVE  Patient is a 79y old Male who presents with a chief complaint of Sepsis 2/2 urinary tract infection (28 Sep 2019 12:11)  Currently admitted to medicine with the primary diagnosis of UTI (urinary tract infection)    Today is hospital day 2d, no overnight complaints      OBJECTIVE  PAST MEDICAL & SURGICAL HISTORY  Dyslipidemia  COPD (chronic obstructive pulmonary disease)  Diabetes type 2, controlled  Schizophrenia  Encounter for screening colonoscopy    ALLERGIES:  No Known Allergies    MEDICATIONS:  STANDING MEDICATIONS  cefTRIAXone   IVPB 1000 milliGRAM(s) IV Intermittent every 24 hours  chlorhexidine 4% Liquid 1 Application(s) Topical <User Schedule>  gabapentin 600 milliGRAM(s) Oral two times a day  heparin  Injectable 5000 Unit(s) SubCutaneous every 12 hours  metoprolol tartrate 25 milliGRAM(s) Oral two times a day  OLANZapine 20 milliGRAM(s) Oral daily  simvastatin 20 milliGRAM(s) Oral at bedtime  sodium chloride 1 Gram(s) Oral daily  tamsulosin 0.4 milliGRAM(s) Oral at bedtime  traZODone 100 milliGRAM(s) Oral at bedtime    PRN MEDICATIONS  acetaminophen   Tablet .. 650 milliGRAM(s) Oral every 6 hours PRN  ALBUTerol/ipratropium for Nebulization 3 milliLiter(s) Nebulizer every 6 hours PRN      VITAL SIGNS: Last 24 Hours  T(C): 36.8 (29 Sep 2019 05:19), Max: 39.4 (28 Sep 2019 11:00)  T(F): 98.2 (29 Sep 2019 05:19), Max: 103 (28 Sep 2019 11:00)  HR: 92 (29 Sep 2019 05:19) (73 - 112)  BP: 152/68 (29 Sep 2019 05:19) (125/58 - 152/68)  BP(mean): --  RR: 18 (29 Sep 2019 05:19) (16 - 18)  SpO2: 97% (28 Sep 2019 20:06) (97% - 98%)    LABS:                        9.6    9.01  )-----------( 176      ( 29 Sep 2019 06:10 )             28.6     09-29    136  |  100  |  26<H>  ----------------------------<  107<H>  3.5   |  22  |  1.7<H>    Ca    7.9<L>      29 Sep 2019 06:10  Mg     2.3     09-29                Culture - Urine (collected 27 Sep 2019 08:32)  Source: .Urine Catheterized  Preliminary Report (28 Sep 2019 18:25):    >100,000 CFU/ml Gram Negative Rods    >100,000 CFU/ml Gram positive organisms    Culture - Blood (collected 27 Sep 2019 08:07)  Source: .Blood Blood  Preliminary Report (28 Sep 2019 14:01):    No growth to date.    Culture - Blood (collected 27 Sep 2019 08:07)  Source: .Blood Blood  Preliminary Report (28 Sep 2019 14:01):    No growth to date.    Culture - Urine (collected 26 Sep 2019 17:15)  Source: .Urine Clean Catch (Midstream)  Final Report (27 Sep 2019 22:43):    >=3 organisms. Probable collection contamination.    Culture - Blood (collected 26 Sep 2019 14:55)  Source: .Blood Blood-Peripheral  Preliminary Report (27 Sep 2019 23:01):    No growth to date.    Culture - Blood (collected 26 Sep 2019 14:55)  Source: .Blood Blood-Peripheral  Preliminary Report (27 Sep 2019 23:01):    No growth to date.          RADIOLOGY:      PHYSICAL EXAM:    GENERAL: NAD, well-developed, AAOx3  HEENT:  Atraumatic, Normocephalic. EOMI, PERRLA, conjunctiva and sclera clear, No JVD  PULMONARY: Clear to auscultation bilaterally; No wheeze  CARDIOVASCULAR: Regular rate and rhythm; No murmurs, rubs, or gallops  GASTROINTESTINAL: Soft, Nontender, Nondistended; Bowel sounds present  MUSCULOSKELETAL:  2+ Peripheral Pulses, No clubbing, cyanosis, or edema  NEUROLOGY: non-focal  SKIN: No rashes or lesions ADA VILLAGOMEZ 79y Male  MRN#: [id number]   CODE STATUS: Full    SUBJECTIVE  Patient is a 79y old Male who presents with a chief complaint of Sepsis 2/2 urinary tract infection (28 Sep 2019 12:11)  Currently admitted to medicine with the primary diagnosis of UTI (urinary tract infection)    Today is hospital day 2d, no overnight complaints, patient has bains denies any burning, frequency or any pain. patient wants to be left aloen      OBJECTIVE  PAST MEDICAL & SURGICAL HISTORY  Dyslipidemia  COPD (chronic obstructive pulmonary disease)  Diabetes type 2, controlled  Schizophrenia  Encounter for screening colonoscopy    ALLERGIES:  No Known Allergies    MEDICATIONS:  STANDING MEDICATIONS  cefTRIAXone   IVPB 1000 milliGRAM(s) IV Intermittent every 24 hours  chlorhexidine 4% Liquid 1 Application(s) Topical <User Schedule>  gabapentin 600 milliGRAM(s) Oral two times a day  heparin  Injectable 5000 Unit(s) SubCutaneous every 12 hours  metoprolol tartrate 25 milliGRAM(s) Oral two times a day  OLANZapine 20 milliGRAM(s) Oral daily  simvastatin 20 milliGRAM(s) Oral at bedtime  sodium chloride 1 Gram(s) Oral daily  tamsulosin 0.4 milliGRAM(s) Oral at bedtime  traZODone 100 milliGRAM(s) Oral at bedtime    PRN MEDICATIONS  acetaminophen   Tablet .. 650 milliGRAM(s) Oral every 6 hours PRN  ALBUTerol/ipratropium for Nebulization 3 milliLiter(s) Nebulizer every 6 hours PRN      VITAL SIGNS: Last 24 Hours  T(C): 36.8 (29 Sep 2019 05:19), Max: 39.4 (28 Sep 2019 11:00)  T(F): 98.2 (29 Sep 2019 05:19), Max: 103 (28 Sep 2019 11:00)  HR: 92 (29 Sep 2019 05:19) (73 - 112)  BP: 152/68 (29 Sep 2019 05:19) (125/58 - 152/68)  BP(mean): --  RR: 18 (29 Sep 2019 05:19) (16 - 18)  SpO2: 97% (28 Sep 2019 20:06) (97% - 98%)    LABS:                        9.6    9.01  )-----------( 176      ( 29 Sep 2019 06:10 )             28.6     09-29    136  |  100  |  26<H>  ----------------------------<  107<H>  3.5   |  22  |  1.7<H>    Ca    7.9<L>      29 Sep 2019 06:10  Mg     2.3     09-29                Culture - Urine (collected 27 Sep 2019 08:32)  Source: .Urine Catheterized  Preliminary Report (28 Sep 2019 18:25):    >100,000 CFU/ml Gram Negative Rods    >100,000 CFU/ml Gram positive organisms    Culture - Blood (collected 27 Sep 2019 08:07)  Source: .Blood Blood  Preliminary Report (28 Sep 2019 14:01):    No growth to date.    Culture - Blood (collected 27 Sep 2019 08:07)  Source: .Blood Blood  Preliminary Report (28 Sep 2019 14:01):    No growth to date.    Culture - Urine (collected 26 Sep 2019 17:15)  Source: .Urine Clean Catch (Midstream)  Final Report (27 Sep 2019 22:43):    >=3 organisms. Probable collection contamination.    Culture - Blood (collected 26 Sep 2019 14:55)  Source: .Blood Blood-Peripheral  Preliminary Report (27 Sep 2019 23:01):    No growth to date.    Culture - Blood (collected 26 Sep 2019 14:55)  Source: .Blood Blood-Peripheral  Preliminary Report (27 Sep 2019 23:01):    No growth to date.          RADIOLOGY:      PHYSICAL EXAM:    GENERAL: NAD, well-developed, AAOx3  HEENT:  Atraumatic, Normocephalic. EOMI, PERRLA, conjunctiva and sclera clear, No JVD  PULMONARY: Clear to auscultation bilaterally; No wheeze  CARDIOVASCULAR: Regular rate and rhythm; No murmurs, rubs, or gallops  GASTROINTESTINAL: Soft, Nontender, Nondistended; Bowel sounds present  MUSCULOSKELETAL:  2+ Peripheral Pulses, No clubbing, cyanosis, or edema  NEUROLOGY: non-focal  SKIN: No rashes or lesions

## 2019-09-29 NOTE — PROGRESS NOTE ADULT - ASSESSMENT
79 year-old male with PMH of schizophrenia, COPD, hyperlipidemia, lymphoma, neuropathy, T2DM (non-insulin dependent), urinary incontinence with chronic bains catheter presenting from [facility] for fever and weakness.     # Sepsis (POA) 2/2 urinary tract infection  UTI is secondary to traumatic removal of his chronic bains. So it's NOT CAUTI.   - not febrile, WBC improved  IV CTX for now.   - Urine culture sent. F/u results  - Blood cultures sent. F/u results  - Tylenol ordered q6h PRN fever    # chronic Anemia  - Hgb 10.7, MCV 97    # COPD  -home meds    # Hypertension  home meds    # Schizophrenia  - C/w Trazodone, Olanzapine    # T2DM non-insulin dependent  - Monitor fingersticks. If consistently >180, start insulin basal/bolus with sliding scale regimen    # Chronic hyponatremia 2/2 Trazodone  - C/w NaCl salt tab 1g qD  - Trazodone dose reduced July 2019  monitor    # Urinary retention  - C/w chronic indwelling bains (replaced this admission)  - C/w Flomax 0.4 mg daily      # Neuropathy  - C/w Gabapentin    #Activity: out of bed with assistance  #Diet: DASH/TLC (no food consistency restriction per home)  #DVT ppx: Heparin 5000U subcutaneous BID  #GI ppx: not indicated  #Dispo: admit to medical floor for now.  #Code status: FULL CODE

## 2019-09-30 LAB
-  AMIKACIN: SIGNIFICANT CHANGE UP
-  AMPICILLIN/SULBACTAM: SIGNIFICANT CHANGE UP
-  CEFEPIME: SIGNIFICANT CHANGE UP
-  CEFTAZIDIME: SIGNIFICANT CHANGE UP
-  CEFTRIAXONE: SIGNIFICANT CHANGE UP
-  CIPROFLOXACIN: SIGNIFICANT CHANGE UP
-  GENTAMICIN: SIGNIFICANT CHANGE UP
-  IMIPENEM: SIGNIFICANT CHANGE UP
-  LEVOFLOXACIN: SIGNIFICANT CHANGE UP
-  MEROPENEM: SIGNIFICANT CHANGE UP
-  PIPERACILLIN/TAZOBACTAM: SIGNIFICANT CHANGE UP
-  TOBRAMYCIN: SIGNIFICANT CHANGE UP
-  TRIMETHOPRIM/SULFAMETHOXAZOLE: SIGNIFICANT CHANGE UP
ANION GAP SERPL CALC-SCNC: 15 MMOL/L — HIGH (ref 7–14)
BASOPHILS # BLD AUTO: 0.02 K/UL — SIGNIFICANT CHANGE UP (ref 0–0.2)
BASOPHILS NFR BLD AUTO: 0.3 % — SIGNIFICANT CHANGE UP (ref 0–1)
BUN SERPL-MCNC: 24 MG/DL — HIGH (ref 10–20)
CALCIUM SERPL-MCNC: 8.2 MG/DL — LOW (ref 8.5–10.1)
CHLORIDE SERPL-SCNC: 104 MMOL/L — SIGNIFICANT CHANGE UP (ref 98–110)
CO2 SERPL-SCNC: 20 MMOL/L — SIGNIFICANT CHANGE UP (ref 17–32)
CREAT SERPL-MCNC: 1.4 MG/DL — SIGNIFICANT CHANGE UP (ref 0.7–1.5)
CULTURE RESULTS: SIGNIFICANT CHANGE UP
EOSINOPHIL # BLD AUTO: 0.08 K/UL — SIGNIFICANT CHANGE UP (ref 0–0.7)
EOSINOPHIL NFR BLD AUTO: 1.1 % — SIGNIFICANT CHANGE UP (ref 0–8)
GLUCOSE BLDC GLUCOMTR-MCNC: 118 MG/DL — HIGH (ref 70–99)
GLUCOSE BLDC GLUCOMTR-MCNC: 119 MG/DL — HIGH (ref 70–99)
GLUCOSE BLDC GLUCOMTR-MCNC: 129 MG/DL — HIGH (ref 70–99)
GLUCOSE BLDC GLUCOMTR-MCNC: 138 MG/DL — HIGH (ref 70–99)
GLUCOSE SERPL-MCNC: 118 MG/DL — HIGH (ref 70–99)
HCT VFR BLD CALC: 32.4 % — LOW (ref 42–52)
HGB BLD-MCNC: 10.5 G/DL — LOW (ref 14–18)
IMM GRANULOCYTES NFR BLD AUTO: 0.8 % — HIGH (ref 0.1–0.3)
LYMPHOCYTES # BLD AUTO: 1.15 K/UL — LOW (ref 1.2–3.4)
LYMPHOCYTES # BLD AUTO: 16 % — LOW (ref 20.5–51.1)
MAGNESIUM SERPL-MCNC: 2.1 MG/DL — SIGNIFICANT CHANGE UP (ref 1.8–2.4)
MCHC RBC-ENTMCNC: 31.8 PG — HIGH (ref 27–31)
MCHC RBC-ENTMCNC: 32.4 G/DL — SIGNIFICANT CHANGE UP (ref 32–37)
MCV RBC AUTO: 98.2 FL — HIGH (ref 80–94)
METHOD TYPE: SIGNIFICANT CHANGE UP
METHOD TYPE: SIGNIFICANT CHANGE UP
MONOCYTES # BLD AUTO: 0.57 K/UL — SIGNIFICANT CHANGE UP (ref 0.1–0.6)
MONOCYTES NFR BLD AUTO: 7.9 % — SIGNIFICANT CHANGE UP (ref 1.7–9.3)
NEUTROPHILS # BLD AUTO: 5.33 K/UL — SIGNIFICANT CHANGE UP (ref 1.4–6.5)
NEUTROPHILS NFR BLD AUTO: 73.9 % — SIGNIFICANT CHANGE UP (ref 42.2–75.2)
NRBC # BLD: 0 /100 WBCS — SIGNIFICANT CHANGE UP (ref 0–0)
ORGANISM # SPEC MICROSCOPIC CNT: SIGNIFICANT CHANGE UP
PLATELET # BLD AUTO: 173 K/UL — SIGNIFICANT CHANGE UP (ref 130–400)
POTASSIUM SERPL-MCNC: 4 MMOL/L — SIGNIFICANT CHANGE UP (ref 3.5–5)
POTASSIUM SERPL-SCNC: 4 MMOL/L — SIGNIFICANT CHANGE UP (ref 3.5–5)
RBC # BLD: 3.3 M/UL — LOW (ref 4.7–6.1)
RBC # FLD: 13.8 % — SIGNIFICANT CHANGE UP (ref 11.5–14.5)
SODIUM SERPL-SCNC: 139 MMOL/L — SIGNIFICANT CHANGE UP (ref 135–146)
SPECIMEN SOURCE: SIGNIFICANT CHANGE UP
WBC # BLD: 7.21 K/UL — SIGNIFICANT CHANGE UP (ref 4.8–10.8)
WBC # FLD AUTO: 7.21 K/UL — SIGNIFICANT CHANGE UP (ref 4.8–10.8)

## 2019-09-30 PROCEDURE — 99233 SBSQ HOSP IP/OBS HIGH 50: CPT

## 2019-09-30 RX ORDER — AMPICILLIN SODIUM AND SULBACTAM SODIUM 250; 125 MG/ML; MG/ML
1.5 INJECTION, POWDER, FOR SUSPENSION INTRAMUSCULAR; INTRAVENOUS EVERY 6 HOURS
Refills: 0 | Status: DISCONTINUED | OUTPATIENT
Start: 2019-10-01 | End: 2019-10-01

## 2019-09-30 RX ORDER — AMPICILLIN TRIHYDRATE 250 MG
1 CAPSULE ORAL EVERY 8 HOURS
Refills: 0 | Status: DISCONTINUED | OUTPATIENT
Start: 2019-09-30 | End: 2019-09-30

## 2019-09-30 RX ORDER — AMPICILLIN SODIUM AND SULBACTAM SODIUM 250; 125 MG/ML; MG/ML
INJECTION, POWDER, FOR SUSPENSION INTRAMUSCULAR; INTRAVENOUS
Refills: 0 | Status: DISCONTINUED | OUTPATIENT
Start: 2019-09-30 | End: 2019-10-01

## 2019-09-30 RX ORDER — AMPICILLIN TRIHYDRATE 250 MG
CAPSULE ORAL
Refills: 0 | Status: DISCONTINUED | OUTPATIENT
Start: 2019-09-30 | End: 2019-09-30

## 2019-09-30 RX ORDER — AMPICILLIN SODIUM AND SULBACTAM SODIUM 250; 125 MG/ML; MG/ML
1.5 INJECTION, POWDER, FOR SUSPENSION INTRAMUSCULAR; INTRAVENOUS ONCE
Refills: 0 | Status: COMPLETED | OUTPATIENT
Start: 2019-09-30 | End: 2019-09-30

## 2019-09-30 RX ORDER — AMPICILLIN TRIHYDRATE 250 MG
1 CAPSULE ORAL ONCE
Refills: 0 | Status: COMPLETED | OUTPATIENT
Start: 2019-09-30 | End: 2019-09-30

## 2019-09-30 RX ADMIN — GABAPENTIN 600 MILLIGRAM(S): 400 CAPSULE ORAL at 17:45

## 2019-09-30 RX ADMIN — Medication 650 MILLIGRAM(S): at 00:05

## 2019-09-30 RX ADMIN — OLANZAPINE 20 MILLIGRAM(S): 15 TABLET, FILM COATED ORAL at 11:04

## 2019-09-30 RX ADMIN — TAMSULOSIN HYDROCHLORIDE 0.4 MILLIGRAM(S): 0.4 CAPSULE ORAL at 22:29

## 2019-09-30 RX ADMIN — Medication 100 MILLIGRAM(S): at 22:29

## 2019-09-30 RX ADMIN — HEPARIN SODIUM 5000 UNIT(S): 5000 INJECTION INTRAVENOUS; SUBCUTANEOUS at 17:46

## 2019-09-30 RX ADMIN — CHLORHEXIDINE GLUCONATE 1 APPLICATION(S): 213 SOLUTION TOPICAL at 05:43

## 2019-09-30 RX ADMIN — SODIUM CHLORIDE 1 GRAM(S): 9 INJECTION INTRAMUSCULAR; INTRAVENOUS; SUBCUTANEOUS at 11:10

## 2019-09-30 RX ADMIN — SODIUM CHLORIDE 75 MILLILITER(S): 9 INJECTION INTRAMUSCULAR; INTRAVENOUS; SUBCUTANEOUS at 22:28

## 2019-09-30 RX ADMIN — HEPARIN SODIUM 5000 UNIT(S): 5000 INJECTION INTRAVENOUS; SUBCUTANEOUS at 05:42

## 2019-09-30 RX ADMIN — SODIUM CHLORIDE 75 MILLILITER(S): 9 INJECTION INTRAMUSCULAR; INTRAVENOUS; SUBCUTANEOUS at 06:26

## 2019-09-30 RX ADMIN — CEFTRIAXONE 100 MILLIGRAM(S): 500 INJECTION, POWDER, FOR SOLUTION INTRAMUSCULAR; INTRAVENOUS at 11:03

## 2019-09-30 RX ADMIN — GABAPENTIN 600 MILLIGRAM(S): 400 CAPSULE ORAL at 05:42

## 2019-09-30 RX ADMIN — SIMVASTATIN 20 MILLIGRAM(S): 20 TABLET, FILM COATED ORAL at 22:29

## 2019-09-30 RX ADMIN — Medication 25 MILLIGRAM(S): at 05:42

## 2019-09-30 RX ADMIN — Medication 650 MILLIGRAM(S): at 00:35

## 2019-09-30 RX ADMIN — AMPICILLIN SODIUM AND SULBACTAM SODIUM 100 GRAM(S): 250; 125 INJECTION, POWDER, FOR SUSPENSION INTRAMUSCULAR; INTRAVENOUS at 23:47

## 2019-09-30 RX ADMIN — Medication 108 GRAM(S): at 14:21

## 2019-09-30 RX ADMIN — Medication 25 MILLIGRAM(S): at 17:45

## 2019-09-30 NOTE — PROGRESS NOTE ADULT - SUBJECTIVE AND OBJECTIVE BOX
S : No new events/complaints  Feels fine.       All other pertinent ROS negative.      09-29-19 @ 07:01  -  09-30-19 @ 07:00  --------------------------------------------------------  IN: 75 mL / OUT: 1000 mL / NET: -925 mL    09-30-19 @ 07:01  -  09-30-19 @ 17:48  --------------------------------------------------------  IN: 0 mL / OUT: 2400 mL / NET: -2400 mL      Vital Signs Last 24 Hrs  T(C): 36.4 (30 Sep 2019 13:38), Max: 38.1 (30 Sep 2019 00:04)  T(F): 97.5 (30 Sep 2019 13:38), Max: 100.6 (30 Sep 2019 00:04)  HR: 93 (30 Sep 2019 13:38) (80 - 93)  BP: 139/63 (30 Sep 2019 13:38) (139/63 - 163/70)  BP(mean): --  RR: 18 (30 Sep 2019 13:38) (18 - 18)  SpO2: 97% (30 Sep 2019 07:45) (96% - 97%)  PHYSICAL EXAM:    Constitutional: NAD, awake and alert, well-developed  HEENT: PERR, EOMI, Normal Hearing, MMM  Neck: Soft and supple, No LAD, No JVD  Respiratory: Breath sounds are clear bilaterally, No wheezing, rales or rhonchi  Cardiovascular: S1 and S2, regular rate and rhythm, no Murmurs, gallops or rubs  Gastrointestinal: Bowel Sounds present, soft, nontender, nondistended, no guarding, no rebound  Extremities: No peripheral edema  : Cobos +nt    MEDICATIONS:  MEDICATIONS  (STANDING):  ampicillin  IVPB      ampicillin  IVPB 1 Gram(s) IV Intermittent every 8 hours  cefTRIAXone   IVPB 1000 milliGRAM(s) IV Intermittent every 24 hours  chlorhexidine 4% Liquid 1 Application(s) Topical <User Schedule>  gabapentin 600 milliGRAM(s) Oral two times a day  heparin  Injectable 5000 Unit(s) SubCutaneous every 12 hours  metoprolol tartrate 25 milliGRAM(s) Oral two times a day  OLANZapine 20 milliGRAM(s) Oral daily  simvastatin 20 milliGRAM(s) Oral at bedtime  sodium chloride 1 Gram(s) Oral daily  sodium chloride 0.9%. 1000 milliLiter(s) (75 mL/Hr) IV Continuous <Continuous>  tamsulosin 0.4 milliGRAM(s) Oral at bedtime  traZODone 100 milliGRAM(s) Oral at bedtime      LABS: All Labs Reviewed:                        10.5   7.21  )-----------( 173      ( 30 Sep 2019 12:06 )             32.4     09-30    139  |  104  |  24<H>  ----------------------------<  118<H>  4.0   |  20  |  1.4    Ca    8.2<L>      30 Sep 2019 12:06  Mg     2.1     09-30            Blood Culture: 09-27 @ 08:32  Organism Acinetobacter baumannii  Gram Stain Blood -- Gram Stain --  Specimen Source .Urine Catheterized  Culture-Blood --    09-27 @ 08:07  Organism --  Gram Stain Blood -- Gram Stain --  Specimen Source .Blood Blood  Culture-Blood --    09-26 @ 17:15  Organism --  Gram Stain Blood -- Gram Stain --  Specimen Source .Urine Clean Catch (Midstream)  Culture-Blood --    09-26 @ 14:55  Organism --  Gram Stain Blood -- Gram Stain --  Specimen Source .Blood Blood-Peripheral  Culture-Blood --        Radiology: reviewed

## 2019-09-30 NOTE — PROGRESS NOTE ADULT - ASSESSMENT
79 year-old male with PMH of schizophrenia, COPD, hyperlipidemia, lymphoma, neuropathy, T2DM (non-insulin dependent), urinary incontinence with chronic bains catheter presenting from [facility] for fever and weakness.     # Sepsis (POA) 2/2 urinary tract infection  UTI is secondary to traumatic removal of his chronic bains. So it's NOT CAUTI.   - Febrile with elevated white count and tachycardia    - Urine culture : Acinetobacter baumannii and E. fecalis. Ampicillin/Sulbactam should take care of both. ID c/s.   - Blood cultures NGTD  - Tylenol ordered q6h PRN fever    #DAVID(9/29): NS @75    # chronic Anemia  - Hgb 10.7, MCV 97      # COPD  -home meds    # Hypertension  home meds    # Schizophrenia  - C/w Trazodone, Olanzapine    # T2DM non-insulin dependent  - Monitor fingersticks. If consistently >180, start insulin basal/bolus with sliding scale regimen    # Chronic hyponatremia 2/2 Trazodone  - C/w NaCl salt tab 1g qD  - Trazodone dose reduced July 2019  monitor    # Urinary retention  - C/w chronic indwelling bains (replaced this admission)  - C/w Flomax 0.4 mg daily      # Neuropathy  - C/w Gabapentin    #Activity: out of bed with assistance  #Diet: DASH/TLC (no food consistency restriction per home)  #DVT ppx: Heparin 5000U subcutaneous BID  #GI ppx: not indicated  #Dispo: admit to medical floor for now.  #Code status: FULL CODE .

## 2019-09-30 NOTE — PROGRESS NOTE ADULT - ASSESSMENT
79 year-old male with PMH of schizophrenia, COPD, hyperlipidemia, lymphoma, neuropathy, T2DM (non-insulin dependent), urinary incontinence with chronic bains catheter presenting from [facility] for fever and weakness.     # Sepsis (POA) 2/2 urinary tract infection - resolved  UTI is secondary to traumatic removal of his chronic bains. So it's NOT CAUTI.   - not febrile, WBC improved  - IV CTX for now.   - Urine culture sent. E. Fecalis and A baumanii +ve   - Blood cultures sent. negative  - Tylenol ordered q6h PRN fever  - To cover GP and GN Ceftriaxone and ampicillin started    # chronic Anemia  - Hgb 10.7, MCV 97    # COPD  -home meds    # Hypertension  home meds    # Schizophrenia  - C/w Trazodone, Olanzapine    # T2DM non-insulin dependent  - Monitor fingersticks. If consistently >180, start insulin basal/bolus with sliding scale regimen    # Chronic hyponatremia 2/2 Trazodone  - C/w NaCl salt tab 1g qD  - Trazodone dose reduced July 2019  monitor    # Urinary retention  - C/w chronic indwelling bains (replaced this admission)  - C/w Flomax 0.4 mg daily      # Neuropathy  - C/w Gabapentin    #Activity: out of bed with assistance  #Diet: DASH/TLC (no food consistency restriction per home)  #DVT ppx: Heparin 5000U subcutaneous BID  #GI ppx: not indicated  #Dispo: physiatry   #Code status: FULL CODE 79 year-old male with PMH of schizophrenia, COPD, hyperlipidemia, lymphoma, neuropathy, T2DM (non-insulin dependent), urinary incontinence with chronic bains catheter presenting from [facility] for fever and weakness.     # Sepsis (POA) 2/2 urinary tract infection - resolved  UTI is secondary to traumatic removal of his chronic bains. So it's NOT CAUTI.   - not febrile, WBC improved  - IV CTX for now.   - Urine culture : Acinetobacter baumannii and E. fecalis. Ampicillin/Sulbactam should take care of both. ID c/s.  - Blood cultures sent. negative  - Tylenol ordered q6h PRN fever    # chronic Anemia  - Hgb 10.7, MCV 97    # COPD  -home meds    # Hypertension  home meds    # Schizophrenia  - C/w Trazodone, Olanzapine    # T2DM non-insulin dependent  - Monitor fingersticks. If consistently >180, start insulin basal/bolus with sliding scale regimen    # Chronic hyponatremia 2/2 Trazodone  - C/w NaCl salt tab 1g qD  - Trazodone dose reduced July 2019  monitor    # Urinary retention  - C/w chronic indwelling bains (replaced this admission)  - C/w Flomax 0.4 mg daily      # Neuropathy  - C/w Gabapentin    #Activity: out of bed with assistance  #Diet: DASH/TLC (no food consistency restriction per home)  #DVT ppx: Heparin 5000U subcutaneous BID  #GI ppx: not indicated  #Dispo: physiatry   #Code status: FULL CODE

## 2019-09-30 NOTE — PROGRESS NOTE ADULT - SUBJECTIVE AND OBJECTIVE BOX
LENGTH OF HOSPITAL STAY: 3d    CHIEF COMPLAINT:   Patient is a 79y old  Male who presents with a chief complaint of Sepsis 2/2 urinary tract infection (29 Sep 2019 09:31)      Overnight events:    No acute events overnight    ALLERGIES:  No Known Allergies    MEDICATIONS:  STANDING MEDICATIONS  ampicillin  IVPB      ampicillin  IVPB 1 Gram(s) IV Intermittent every 8 hours  cefTRIAXone   IVPB 1000 milliGRAM(s) IV Intermittent every 24 hours  chlorhexidine 4% Liquid 1 Application(s) Topical <User Schedule>  gabapentin 600 milliGRAM(s) Oral two times a day  heparin  Injectable 5000 Unit(s) SubCutaneous every 12 hours  metoprolol tartrate 25 milliGRAM(s) Oral two times a day  OLANZapine 20 milliGRAM(s) Oral daily  simvastatin 20 milliGRAM(s) Oral at bedtime  sodium chloride 1 Gram(s) Oral daily  sodium chloride 0.9%. 1000 milliLiter(s) IV Continuous <Continuous>  tamsulosin 0.4 milliGRAM(s) Oral at bedtime  traZODone 100 milliGRAM(s) Oral at bedtime    PRN MEDICATIONS  acetaminophen   Tablet .. 650 milliGRAM(s) Oral every 6 hours PRN  ALBUTerol/ipratropium for Nebulization 3 milliLiter(s) Nebulizer every 6 hours PRN    VITALS:   T(F): 97.5  HR: 93  BP: 139/63  RR: 18  SpO2: 97%    LABS:                        10.5   7.21  )-----------( 173      ( 30 Sep 2019 12:06 )             32.4     09-30    139  |  104  |  24<H>  ----------------------------<  118<H>  4.0   |  20  |  1.4    Ca    8.2<L>      30 Sep 2019 12:06  Mg     2.1     09-30                      Cultures:      RADIOLOGY:        < from: Xray Chest 1 View AP/PA (09.27.19 @ 09:01) >    Impression:      Trace bibasilar subsegmental atelectasis. No focal consolidation,   effusion, or pneumothorax.    < end of copied text >  PHYSICAL EXAM:  GENERAL: NAD, well-developed, AAOx3  HEENT:  Atraumatic, Normocephalic. EOMI, PERRLA, conjunctiva and sclera clear, No JVD  PULMONARY: Clear to auscultation bilaterally; No wheeze  CARDIOVASCULAR: Regular rate and rhythm; No murmurs, rubs, or gallops  GASTROINTESTINAL: Soft, Nontender, Nondistended; Bowel sounds present  MUSCULOSKELETAL:  2+ Peripheral Pulses, No clubbing, cyanosis, or edema  NEUROLOGY: non-focal  SKIN: No rashes or lesions

## 2019-10-01 LAB
ACANTHOCYTES BLD QL SMEAR: SLIGHT — SIGNIFICANT CHANGE UP
ANION GAP SERPL CALC-SCNC: 12 MMOL/L — SIGNIFICANT CHANGE UP (ref 7–14)
APTT BLD: 32.5 SEC — SIGNIFICANT CHANGE UP (ref 27–39.2)
BASOPHILS # BLD AUTO: 0 K/UL — SIGNIFICANT CHANGE UP (ref 0–0.2)
BASOPHILS NFR BLD AUTO: 0 % — SIGNIFICANT CHANGE UP (ref 0–1)
BUN SERPL-MCNC: 22 MG/DL — HIGH (ref 10–20)
CALCIUM SERPL-MCNC: 8.5 MG/DL — SIGNIFICANT CHANGE UP (ref 8.5–10.1)
CHLORIDE SERPL-SCNC: 105 MMOL/L — SIGNIFICANT CHANGE UP (ref 98–110)
CO2 SERPL-SCNC: 26 MMOL/L — SIGNIFICANT CHANGE UP (ref 17–32)
CREAT SERPL-MCNC: 1.4 MG/DL — SIGNIFICANT CHANGE UP (ref 0.7–1.5)
CULTURE RESULTS: SIGNIFICANT CHANGE UP
CULTURE RESULTS: SIGNIFICANT CHANGE UP
ELLIPTOCYTES BLD QL SMEAR: SLIGHT — SIGNIFICANT CHANGE UP
EOSINOPHIL # BLD AUTO: 0.17 K/UL — SIGNIFICANT CHANGE UP (ref 0–0.7)
EOSINOPHIL NFR BLD AUTO: 1.8 % — SIGNIFICANT CHANGE UP (ref 0–8)
GIANT PLATELETS BLD QL SMEAR: PRESENT — SIGNIFICANT CHANGE UP
GLUCOSE BLDC GLUCOMTR-MCNC: 104 MG/DL — HIGH (ref 70–99)
GLUCOSE BLDC GLUCOMTR-MCNC: 121 MG/DL — HIGH (ref 70–99)
GLUCOSE BLDC GLUCOMTR-MCNC: 141 MG/DL — HIGH (ref 70–99)
GLUCOSE BLDC GLUCOMTR-MCNC: 98 MG/DL — SIGNIFICANT CHANGE UP (ref 70–99)
GLUCOSE SERPL-MCNC: 125 MG/DL — HIGH (ref 70–99)
HCT VFR BLD CALC: 32.6 % — LOW (ref 42–52)
HGB BLD-MCNC: 10.5 G/DL — LOW (ref 14–18)
LYMPHOCYTES # BLD AUTO: 1.3 K/UL — SIGNIFICANT CHANGE UP (ref 1.2–3.4)
LYMPHOCYTES # BLD AUTO: 13.5 % — LOW (ref 20.5–51.1)
MANUAL SMEAR VERIFICATION: SIGNIFICANT CHANGE UP
MCHC RBC-ENTMCNC: 31.8 PG — HIGH (ref 27–31)
MCHC RBC-ENTMCNC: 32.2 G/DL — SIGNIFICANT CHANGE UP (ref 32–37)
MCV RBC AUTO: 98.8 FL — HIGH (ref 80–94)
MICROCYTES BLD QL: SLIGHT — SIGNIFICANT CHANGE UP
MONOCYTES # BLD AUTO: 0.43 K/UL — SIGNIFICANT CHANGE UP (ref 0.1–0.6)
MONOCYTES NFR BLD AUTO: 4.5 % — SIGNIFICANT CHANGE UP (ref 1.7–9.3)
NEUTROPHILS # BLD AUTO: 7.31 K/UL — HIGH (ref 1.4–6.5)
NEUTROPHILS NFR BLD AUTO: 73 % — SIGNIFICANT CHANGE UP (ref 42.2–75.2)
NEUTS BAND # BLD: 2.7 % — SIGNIFICANT CHANGE UP (ref 0–6)
NEUTS HYPERSEG # BLD: PRESENT — SIGNIFICANT CHANGE UP
PLAT MORPH BLD: NORMAL — SIGNIFICANT CHANGE UP
PLATELET # BLD AUTO: 215 K/UL — SIGNIFICANT CHANGE UP (ref 130–400)
POTASSIUM SERPL-MCNC: 4.2 MMOL/L — SIGNIFICANT CHANGE UP (ref 3.5–5)
POTASSIUM SERPL-SCNC: 4.2 MMOL/L — SIGNIFICANT CHANGE UP (ref 3.5–5)
RBC # BLD: 3.3 M/UL — LOW (ref 4.7–6.1)
RBC # FLD: 13.7 % — SIGNIFICANT CHANGE UP (ref 11.5–14.5)
RBC BLD AUTO: ABNORMAL
SODIUM SERPL-SCNC: 143 MMOL/L — SIGNIFICANT CHANGE UP (ref 135–146)
SPECIMEN SOURCE: SIGNIFICANT CHANGE UP
SPECIMEN SOURCE: SIGNIFICANT CHANGE UP
VARIANT LYMPHS # BLD: 4.5 % — SIGNIFICANT CHANGE UP (ref 0–5)
WBC # BLD: 9.65 K/UL — SIGNIFICANT CHANGE UP (ref 4.8–10.8)
WBC # FLD AUTO: 9.65 K/UL — SIGNIFICANT CHANGE UP (ref 4.8–10.8)

## 2019-10-01 PROCEDURE — 99233 SBSQ HOSP IP/OBS HIGH 50: CPT

## 2019-10-01 RX ORDER — NITROFURANTOIN MACROCRYSTAL 50 MG
1 CAPSULE ORAL
Qty: 14 | Refills: 0
Start: 2019-10-01 | End: 2019-10-07

## 2019-10-01 RX ORDER — AMPICILLIN SODIUM AND SULBACTAM SODIUM 250; 125 MG/ML; MG/ML
3 INJECTION, POWDER, FOR SUSPENSION INTRAMUSCULAR; INTRAVENOUS EVERY 6 HOURS
Refills: 0 | Status: DISCONTINUED | OUTPATIENT
Start: 2019-10-01 | End: 2019-10-02

## 2019-10-01 RX ORDER — HYDRALAZINE HCL 50 MG
10 TABLET ORAL ONCE
Refills: 0 | Status: COMPLETED | OUTPATIENT
Start: 2019-10-01 | End: 2019-10-01

## 2019-10-01 RX ADMIN — GABAPENTIN 600 MILLIGRAM(S): 400 CAPSULE ORAL at 05:58

## 2019-10-01 RX ADMIN — GABAPENTIN 600 MILLIGRAM(S): 400 CAPSULE ORAL at 12:09

## 2019-10-01 RX ADMIN — Medication 10 MILLIGRAM(S): at 22:31

## 2019-10-01 RX ADMIN — Medication 25 MILLIGRAM(S): at 17:26

## 2019-10-01 RX ADMIN — AMPICILLIN SODIUM AND SULBACTAM SODIUM 200 GRAM(S): 250; 125 INJECTION, POWDER, FOR SUSPENSION INTRAMUSCULAR; INTRAVENOUS at 17:25

## 2019-10-01 RX ADMIN — GABAPENTIN 600 MILLIGRAM(S): 400 CAPSULE ORAL at 17:25

## 2019-10-01 RX ADMIN — Medication 25 MILLIGRAM(S): at 05:58

## 2019-10-01 RX ADMIN — Medication 650 MILLIGRAM(S): at 14:43

## 2019-10-01 RX ADMIN — TAMSULOSIN HYDROCHLORIDE 0.4 MILLIGRAM(S): 0.4 CAPSULE ORAL at 21:30

## 2019-10-01 RX ADMIN — Medication 100 MILLIGRAM(S): at 21:30

## 2019-10-01 RX ADMIN — AMPICILLIN SODIUM AND SULBACTAM SODIUM 100 GRAM(S): 250; 125 INJECTION, POWDER, FOR SUSPENSION INTRAMUSCULAR; INTRAVENOUS at 05:58

## 2019-10-01 RX ADMIN — HEPARIN SODIUM 5000 UNIT(S): 5000 INJECTION INTRAVENOUS; SUBCUTANEOUS at 17:26

## 2019-10-01 RX ADMIN — HEPARIN SODIUM 5000 UNIT(S): 5000 INJECTION INTRAVENOUS; SUBCUTANEOUS at 05:58

## 2019-10-01 RX ADMIN — SODIUM CHLORIDE 1 GRAM(S): 9 INJECTION INTRAMUSCULAR; INTRAVENOUS; SUBCUTANEOUS at 12:09

## 2019-10-01 RX ADMIN — OLANZAPINE 20 MILLIGRAM(S): 15 TABLET, FILM COATED ORAL at 12:09

## 2019-10-01 RX ADMIN — SIMVASTATIN 20 MILLIGRAM(S): 20 TABLET, FILM COATED ORAL at 21:30

## 2019-10-01 NOTE — PROGRESS NOTE ADULT - SUBJECTIVE AND OBJECTIVE BOX
LENGTH OF HOSPITAL STAY: 4d    CHIEF COMPLAINT:   Patient is a 79y old  Male who presents with a chief complaint of Sepsis 2/2 urinary tract infection (01 Oct 2019 10:26)      Overnight events:    No acute events overnight    ALLERGIES:  No Known Allergies    MEDICATIONS:  STANDING MEDICATIONS  ampicillin/sulbactam  IVPB 3 Gram(s) IV Intermittent every 6 hours  chlorhexidine 4% Liquid 1 Application(s) Topical <User Schedule>  gabapentin 600 milliGRAM(s) Oral two times a day  heparin  Injectable 5000 Unit(s) SubCutaneous every 12 hours  metoprolol tartrate 25 milliGRAM(s) Oral two times a day  OLANZapine 20 milliGRAM(s) Oral daily  simvastatin 20 milliGRAM(s) Oral at bedtime  sodium chloride 1 Gram(s) Oral daily  sodium chloride 0.9%. 1000 milliLiter(s) IV Continuous <Continuous>  tamsulosin 0.4 milliGRAM(s) Oral at bedtime  traZODone 100 milliGRAM(s) Oral at bedtime    PRN MEDICATIONS  acetaminophen   Tablet .. 650 milliGRAM(s) Oral every 6 hours PRN  ALBUTerol/ipratropium for Nebulization 3 milliLiter(s) Nebulizer every 6 hours PRN    VITALS:   T(F): 98.2  HR: 81  BP: 176/74  RR: 18  SpO2: --    LABS:                        10.5   9.65  )-----------( 215      ( 01 Oct 2019 07:17 )             32.6     10-01    143  |  105  |  22<H>  ----------------------------<  125<H>  4.2   |  26  |  1.4    Ca    8.5      01 Oct 2019 07:17  Mg     2.1     09-30      PTT - ( 01 Oct 2019 07:17 )  PTT:32.5 sec                Cultures:      RADIOLOGY:    PHYSICAL EXAM:  GEN: No acute distress  HEENT: atraumatic, normocephalic	  LUNGS: Clear to auscultation bilaterally   HEART: S1/S2 present. RRR.   ABD: Soft, non-tender, non-distended. Bowel sounds present  EXT: non edenmatous non erethymatous  NEURO: AAOX1

## 2019-10-01 NOTE — PROGRESS NOTE ADULT - ASSESSMENT
79 year-old male with PMH of schizophrenia, COPD, hyperlipidemia, lymphoma, neuropathy, T2DM (non-insulin dependent), urinary incontinence with chronic bains catheter presenting from [facility] for fever and weakness.     # Sepsis 2/2 urinary tract infection  UTI is secondary to traumatic removal of his chronic bains. So it's NOT CAUTI.   - not febrile, WBC improved  - IV CTX for now.   - Urine culture : Acinetobacter baumannii and E. fecalis. Ampicillin/Sulbactam should take care of both. 3gm q6 for 14 days. Last day will be 10/14  - Blood cultures sent. Negative on admission. f/u repeat  - Tylenol ordered q6h PRN fever    # chronic Anemia  - Hgb 10.7, MCV 97    # COPD  -home meds    # Hypertension  home meds    # Schizophrenia  - C/w Trazodone, Olanzapine    # T2DM non-insulin dependent  - Monitor fingersticks. If consistently >180, start insulin basal/bolus with sliding scale regimen    # Chronic hyponatremia 2/2 Trazodone  - C/w NaCl salt tab 1g qD  - Trazodone dose reduced July 2019  monitor    # Urinary retention  - C/w chronic indwelling bains (replaced this admission)  - C/w Flomax 0.4 mg daily      # Neuropathy  - C/w Gabapentin    #Activity: out of bed with assistance  #Diet: DASH/TLC (no food consistency restriction per home)  #DVT ppx: Heparin 5000U subcutaneous BID  #GI ppx: not indicated  #Dispo: physiatry   #Code status: FULL CODE

## 2019-10-01 NOTE — CONSULT NOTE ADULT - SUBJECTIVE AND OBJECTIVE BOX
HERNANDO ADA  79y, Male  Allergy: No Known Allergies      HPI:  This is a 79 year-old male with PMH of schizophrenia, COPD, hyperlipidemia, non-Hodgkin lymphoma in remission, neuropathy, T2DM (non-insulin dependent), urinary incontinence with bains catheter presents from [facility] for fever and weakness after pulling out chronic indwelling bains catheter. Patient was seen in ED 9/26 for same complaints and was found to have positive UA for which he was prescribed PO antibiotics and discharged without admission to hospital. During that time, his chronic indwelling bains was found to be in the wrong place, causing obstruction, and re-adjusted. After discharge when patient left hospital, he reportedly pulled out his bains and re-presented on 9/27 with same complaints. Was found this time to be febrile (TMax 102.2) with elevated white count (13.6) and sinus tachycardia. Also complaining of cough, congestion, and decreased PO intake. Patient denies any abdominal pain, back pain, sore throat, headache. Denies any cp, vomiting, diarrhea.    In ED, received IV Rocephin 1 g x1, 2L LR bolus, rectal Tylenol x1 (27 Sep 2019 13:28)    FAMILY HISTORY:  No pertinent family history in first degree relatives: Unable to ascertain 2/2 chronic psychiatric condition    PAST MEDICAL & SURGICAL HISTORY:  Dyslipidemia  COPD (chronic obstructive pulmonary disease)  Diabetes type 2, controlled  Schizophrenia  Encounter for screening colonoscopy        VITALS:  T(F): 98.2, Max: 98.8 (09-30-19 @ 21:00)  HR: 81  BP: 176/74  RR: 18Vital Signs Last 24 Hrs  T(C): 36.8 (01 Oct 2019 05:32), Max: 37.1 (30 Sep 2019 21:00)  T(F): 98.2 (01 Oct 2019 05:32), Max: 98.8 (30 Sep 2019 21:00)  HR: 81 (01 Oct 2019 06:58) (81 - 93)  BP: 176/74 (01 Oct 2019 06:58) (139/63 - 183/84)  BP(mean): --  RR: 18 (01 Oct 2019 05:32) (18 - 18)  SpO2: --    TESTS & MEASUREMENTS:                        10.5   9.65  )-----------( 215      ( 01 Oct 2019 07:17 )             32.6     10-01    143  |  105  |  22<H>  ----------------------------<  125<H>  4.2   |  26  |  1.4    Ca    8.5      01 Oct 2019 07:17  Mg     2.1     09-30          Culture - Urine (collected 09-27-19 @ 08:32)  Source: .Urine Catheterized  Final Report (09-30-19 @ 16:46):    >100,000 CFU/ml Acinetobacter baumannii    >100,000 CFU/ml Enterococcus faecalis  Organism: Acinetobacter baumannii  Acinetobacter baumannii  Enterococcus faecalis (09-30-19 @ 16:46)  Organism: Enterococcus faecalis (09-30-19 @ 16:46)      -  Ampicillin: S <=2 Predicts results to ampicillin/sulbactam, amoxacillin-clavulanate and  piperacillin-tazobactam.      -  Ciprofloxacin: R >2      -  Levofloxacin: R >4      -  Nitrofurantoin: S <=32 Should not be used to treat pyelonephritis.      -  Tetra/Doxy: R >8      -  Vancomycin: S 2      Method Type: IZABELLA  Organism: Acinetobacter baumannii (09-30-19 @ 16:46)      -  Imipenem: S      -  Piperacillin/Tazobactam: S      Method Type: KB  Organism: Acinetobacter baumannii (09-30-19 @ 16:46)      -  Amikacin: S <=16      -  Ampicillin/Sulbactam: S <=8/4      -  Cefepime: I 16      -  Ceftazidime: S 8      -  Ceftriaxone: I 32      -  Ciprofloxacin: R >2      -  Gentamicin: S <=4      -  Levofloxacin: R >4      -  Meropenem: S <=1      -  Tobramycin: S <=4      -  Trimethoprim/Sulfamethoxazole: R >2/38      Method Type: IZABELLA    Culture - Blood (collected 09-27-19 @ 08:07)  Source: .Blood Blood  Preliminary Report (09-28-19 @ 14:01):    No growth to date.    Culture - Blood (collected 09-27-19 @ 08:07)  Source: .Blood Blood  Preliminary Report (09-28-19 @ 14:01):    No growth to date.    Culture - Urine (collected 09-26-19 @ 17:15)  Source: .Urine Clean Catch (Midstream)  Final Report (09-27-19 @ 22:43):    >=3 organisms. Probable collection contamination.    Culture - Blood (collected 09-26-19 @ 14:55)  Source: .Blood Blood-Peripheral  Preliminary Report (09-27-19 @ 23:01):    No growth to date.    Culture - Blood (collected 09-26-19 @ 14:55)  Source: .Blood Blood-Peripheral  Preliminary Report (09-27-19 @ 23:01):    No growth to date.            RADIOLOGY & ADDITIONAL TESTS:    ANTIBIOTICS:  ampicillin/sulbactam  IVPB      ampicillin/sulbactam  IVPB 1.5 Gram(s) IV Intermittent once  ampicillin/sulbactam  IVPB 1.5 Gram(s) IV Intermittent every 6 hours

## 2019-10-01 NOTE — CONSULT NOTE ADULT - ASSESSMENT
IMPRESSION: Rehab of gait dysfunction    PRECAUTIONS: [  ] Cardiac  [  ] Respiratory  [  ] Seizures [  ] Contact Isolation  [  ] Droplet Isolation  [  ] Other    Weight Bearing Status:     RECOMMENDATION:    Out of Bed to Chair     DVT/Decubiti Prophylaxis    REHAB PLAN:     [ x  ] Bedside P/T 3-5 times a week   [   ]   Bedside O/T  2-3 times a week             [   ] No Rehab Therapy Indicated                   [   ]  Speech Therapy   Conditioning/ROM                                    ADL  Bed Mobility                                               Conditioning/ROM  Transfers                                                     Bed Mobility  Sitting /Standing Balance                         Transfers                                        Gait Training                                               Sitting/Standing Balance  Stair Training [   ]Applicable                    Home equipment Eval                                                                        Splinting  [   ] Only      GOALS:   ADL   [   ]   Independent                    Transfers  [ x  ] Independent                          Ambulation  [ x  ] Independent     [  x  ] With device                            [   ]  CG                                                         [   ]  CG                                                                  [   ] CG                            [    ] Min A                                                   [   ] Min A                                                              [   ] Min  A          DISCHARGE PLAN:   [   ]  Good candidate for Intensive Rehabilitation/Hospital based                                             Will tolerate 3hrs Intensive Rehab Daily                                       [ x   ]  Short Term Rehab in Skilled Nursing Facility / adult home                                       [    ]  Home with Outpatient or  services                                         [    ]  Possible Candidate for Intensive Hospital based Rehab

## 2019-10-01 NOTE — PROGRESS NOTE ADULT - ATTENDING COMMENTS
79 year-old male with PMH of schizophrenia, COPD, hyperlipidemia, lymphoma, neuropathy, T2DM (non-insulin dependent), urinary incontinence with chronic bains catheter presenting from [facility] for fever and weakness.     # Sepsis (POA) 2/2 urinary tract infection  UTI is secondary to traumatic removal of his chronic bains. So it's NOT CAUTI.   - Febrile with elevated white count and tachycardia    - Urine culture : Acinetobacter baumannii and E. fecalis. Ampicillin/Sulbactam per ID for total 14 days.   - Blood cultures NGTD  - Tylenol ordered q6h PRN fever    #DAVID(9/29): NS @75    # chronic Anemia  - Hgb 10.7, MCV 97      # COPD  -home meds    # Hypertension  home meds    # Schizophrenia  - C/w Trazodone, Olanzapine    # T2DM non-insulin dependent  - Monitor fingersticks. If consistently >180, start insulin basal/bolus with sliding scale regimen    # Chronic hyponatremia 2/2 Trazodone  - C/w NaCl salt tab 1g qD  - Trazodone dose reduced July 2019  monitor    # Urinary retention  - C/w chronic indwelling bains (replaced this admission)  - C/w Flomax 0.4 mg daily      # Neuropathy  - C/w Gabapentin    #Activity: out of bed with assistance  #Diet: DASH/TLC (no food consistency restriction per home)  #DVT ppx: Heparin 5000U subcutaneous BID  #GI ppx: not indicated  #Dispo: admit to medical floor for now.  #Code status: FULL CODE .   anticipate d/c tomorrow.
79 year-old male with PMH of schizophrenia, COPD, hyperlipidemia, lymphoma, neuropathy, T2DM (non-insulin dependent), urinary incontinence with chronic bains catheter presenting from [facility] for fever and weakness.     # Sepsis (POA) 2/2 urinary tract infection  UTI is secondary to traumatic removal of his chronic bains. So it's NOT CAUTI.   - Febrile with elevated white count and tachycardia  IV CTX for now.   - Urine culture sent. F/u results  - Blood cultures sent. F/u results  - Tylenol ordered q6h PRN fever    #DAVID(9/29): NS @75    # chronic Anemia  - Hgb 10.7, MCV 97      # COPD  -home meds    # Hypertension  home meds    # Schizophrenia  - C/w Trazodone, Olanzapine    # T2DM non-insulin dependent  - Monitor fingersticks. If consistently >180, start insulin basal/bolus with sliding scale regimen    # Chronic hyponatremia 2/2 Trazodone  - C/w NaCl salt tab 1g qD  - Trazodone dose reduced July 2019  monitor    # Urinary retention  - C/w chronic indwelling bains (replaced this admission)  - C/w Flomax 0.4 mg daily      # Neuropathy  - C/w Gabapentin    #Activity: out of bed with assistance  #Diet: DASH/TLC (no food consistency restriction per home)  #DVT ppx: Heparin 5000U subcutaneous BID  #GI ppx: not indicated  #Dispo: admit to medical floor for now.  #Code status: FULL CODE

## 2019-10-01 NOTE — PROCEDURE NOTE - NSPROCDETAILS_GEN_ALL_CORE
location identified, draped/prepped, sterile technique used/sterile dressing applied/sterile technique, catheter placed/supine position/ultrasound guidance

## 2019-10-01 NOTE — CONSULT NOTE ADULT - SUBJECTIVE AND OBJECTIVE BOX
HPI:  This is a 79 year-old male with PMH of schizophrenia, COPD, hyperlipidemia, non-Hodgkin lymphoma in remission, neuropathy, T2DM (non-insulin dependent), urinary incontinence with bains catheter presents from [facility] for fever and weakness after pulling out chronic indwelling bains catheter. Patient was seen in ED 9/26 for same complaints and was found to have positive UA for which he was prescribed PO antibiotics and discharged without admission to hospital. During that time, his chronic indwelling bains was found to be in the wrong place, causing obstruction, and re-adjusted. After discharge when patient left hospital, he reportedly pulled out his bains and re-presented on 9/27 with same complaints. Was found this time to be febrile (TMax 102.2) with elevated white count (13.6) and sinus tachycardia. Also complaining of cough, congestion, and decreased PO intake. Patient denies any abdominal pain, back pain, sore throat, headache. Denies any cp, vomiting, diarrhea.    In ED, received IV Rocephin 1 g x1, 2L LR bolus, rectal Tylenol x1 (27 Sep 2019 13:28)      PAST MEDICAL & SURGICAL HISTORY:  Dyslipidemia  COPD (chronic obstructive pulmonary disease)  Diabetes type 2, controlled  Schizophrenia  Encounter for screening colonoscopy      Hospital Course:    TODAY'S SUBJECTIVE & REVIEW OF SYMPTOMS:     Constitutional WNL   Cardio WNL   Resp WNL   GI WNL  Heme WNL  Endo WNL  Skin WNL  MSK Weakness  Neuro WNL  Cognitive WNL  Psych schizophrenia      MEDICATIONS  (STANDING):  ampicillin/sulbactam  IVPB 3 Gram(s) IV Intermittent every 6 hours  chlorhexidine 4% Liquid 1 Application(s) Topical <User Schedule>  gabapentin 600 milliGRAM(s) Oral two times a day  heparin  Injectable 5000 Unit(s) SubCutaneous every 12 hours  metoprolol tartrate 25 milliGRAM(s) Oral two times a day  OLANZapine 20 milliGRAM(s) Oral daily  simvastatin 20 milliGRAM(s) Oral at bedtime  sodium chloride 1 Gram(s) Oral daily  sodium chloride 0.9%. 1000 milliLiter(s) (75 mL/Hr) IV Continuous <Continuous>  tamsulosin 0.4 milliGRAM(s) Oral at bedtime  traZODone 100 milliGRAM(s) Oral at bedtime    MEDICATIONS  (PRN):  acetaminophen   Tablet .. 650 milliGRAM(s) Oral every 6 hours PRN Temp greater or equal to 38C (100.4F)  ALBUTerol/ipratropium for Nebulization 3 milliLiter(s) Nebulizer every 6 hours PRN Shortness of Breath and/or Wheezing      FAMILY HISTORY:  No pertinent family history in first degree relatives: Unable to ascertain 2/2 chronic psychiatric condition      Allergies    No Known Allergies    Intolerances        SOCIAL HISTORY:    [  ] Etoh  [  ] Smoking  [  ] Substance abuse     Home Environment:  [  ] Home Alone  [ x ] Lives with Family  [  ] Home Health Aid    Dwelling:  [  ] Apartment  [  ] Private House  [x  ] Adult Home  [  ] Skilled Nursing Facility      [  ] Short Term  [  ] Long Term  [  ] Stairs       Elevator [  ]    FUNCTIONAL STATUS PTA: (Check all that apply)  Ambulation: [ x  ]Independent    [  ] Dependent     [  ] Non-Ambulatory  Assistive Device: [  ] SA Cane  [  ]  Q Cane  [x  ] Walker  [  ]  Wheelchair  ADL : [  ] Independent  [  ]  Dependent       Vital Signs Last 24 Hrs  T(C): 36.8 (01 Oct 2019 05:32), Max: 37.1 (30 Sep 2019 21:00)  T(F): 98.2 (01 Oct 2019 05:32), Max: 98.8 (30 Sep 2019 21:00)  HR: 81 (01 Oct 2019 06:58) (81 - 91)  BP: 176/74 (01 Oct 2019 06:58) (163/81 - 183/84)  BP(mean): --  RR: 18 (01 Oct 2019 05:32) (18 - 18)  SpO2: --      PHYSICAL EXAM: Alert & awake  GENERAL: NAD  HEAD:  Atraumatic, Normocephalic  CHEST/LUNG: Clear   HEART: S1S2+  ABDOMEN: Soft, Nontender  EXTREMITIES:  no calf tenderness    NERVOUS SYSTEM:  Cranial Nerves 2-12 intact [  ] Abnormal  [  ]  ROM: WFL all extremities [x  ]  Abnormal [  ]  Motor Strength: WFL all extremities  [  ]  Abnormal [x  ]4/5 all ext  Sensation: intact to light touch [ x ] Abnormal [  ]  Reflexes: Symmetric [  ]  Abnormal [  ]    FUNCTIONAL STATUS:  Bed Mobility: Independent [  ]  Supervision [  ]  Needs Assistance [ x ]  N/A [  ]  Transfers: Independent [  ]  Supervision [  ]  Needs Assistance [x  ]  N/A [  ]   Ambulation: Independent [  ]  Supervision [  ]  Needs Assistance [  ]  N/A [  ]  ADL: Independent [  ] Requires Assistance [  ] N/A [  ]      LABS:                        10.5   9.65  )-----------( 215      ( 01 Oct 2019 07:17 )             32.6     10-01    143  |  105  |  22<H>  ----------------------------<  125<H>  4.2   |  26  |  1.4    Ca    8.5      01 Oct 2019 07:17  Mg     2.1     09-30      PTT - ( 01 Oct 2019 07:17 )  PTT:32.5 sec      RADIOLOGY & ADDITIONAL STUDIES:    Assesment:

## 2019-10-01 NOTE — CONSULT NOTE ADULT - ASSESSMENT
79 year-old male with PMH of schizophrenia, COPD, hyperlipidemia, lymphoma, neuropathy, T2DM (non-insulin dependent), urinary incontinence with chronic bains catheter presenting from [facility] for fever and weakness.     IMPRESSION:  sepsis secondary to acute pyelonephritis secondary to Acinetobacter/ E fecalis  BCx 9/26, 27 Ng  WBC  7.2    RECOMMENDATIONS;  Unasyn 3 gm iv q6h for 14days  midline 79 year-old male with PMH of schizophrenia, COPD, hyperlipidemia, lymphoma, neuropathy, T2DM (non-insulin dependent), urinary incontinence with chronic bains catheter presenting from [facility] for fever and weakness.     IMPRESSION:  sepsis secondary to acute pyelonephritis secondary to Acinetobacter/ E fecalis  BCx 9/26, 27 Ng  WBC  7.2    RECOMMENDATIONS;  Unasyn 3 gm iv q6h for 14days  midline  recall prn please

## 2019-10-02 ENCOUNTER — TRANSCRIPTION ENCOUNTER (OUTPATIENT)
Age: 79
End: 2019-10-02

## 2019-10-02 VITALS
RESPIRATION RATE: 18 BRPM | TEMPERATURE: 97 F | DIASTOLIC BLOOD PRESSURE: 60 MMHG | HEART RATE: 85 BPM | SYSTOLIC BLOOD PRESSURE: 130 MMHG

## 2019-10-02 LAB
ANION GAP SERPL CALC-SCNC: 12 MMOL/L — SIGNIFICANT CHANGE UP (ref 7–14)
APTT BLD: 32.1 SEC — SIGNIFICANT CHANGE UP (ref 27–39.2)
BASOPHILS # BLD AUTO: 0.02 K/UL — SIGNIFICANT CHANGE UP (ref 0–0.2)
BASOPHILS NFR BLD AUTO: 0.2 % — SIGNIFICANT CHANGE UP (ref 0–1)
BUN SERPL-MCNC: 22 MG/DL — HIGH (ref 10–20)
CALCIUM SERPL-MCNC: 8.3 MG/DL — LOW (ref 8.5–10.1)
CHLORIDE SERPL-SCNC: 107 MMOL/L — SIGNIFICANT CHANGE UP (ref 98–110)
CO2 SERPL-SCNC: 24 MMOL/L — SIGNIFICANT CHANGE UP (ref 17–32)
CREAT SERPL-MCNC: 1.6 MG/DL — HIGH (ref 0.7–1.5)
CULTURE RESULTS: SIGNIFICANT CHANGE UP
CULTURE RESULTS: SIGNIFICANT CHANGE UP
EOSINOPHIL # BLD AUTO: 0.11 K/UL — SIGNIFICANT CHANGE UP (ref 0–0.7)
EOSINOPHIL NFR BLD AUTO: 1.1 % — SIGNIFICANT CHANGE UP (ref 0–8)
GLUCOSE BLDC GLUCOMTR-MCNC: 125 MG/DL — HIGH (ref 70–99)
GLUCOSE BLDC GLUCOMTR-MCNC: 136 MG/DL — HIGH (ref 70–99)
GLUCOSE BLDC GLUCOMTR-MCNC: 154 MG/DL — HIGH (ref 70–99)
GLUCOSE SERPL-MCNC: 102 MG/DL — HIGH (ref 70–99)
HCT VFR BLD CALC: 30.3 % — LOW (ref 42–52)
HGB BLD-MCNC: 9.9 G/DL — LOW (ref 14–18)
IMM GRANULOCYTES NFR BLD AUTO: 0.8 % — HIGH (ref 0.1–0.3)
LYMPHOCYTES # BLD AUTO: 1.73 K/UL — SIGNIFICANT CHANGE UP (ref 1.2–3.4)
LYMPHOCYTES # BLD AUTO: 17.3 % — LOW (ref 20.5–51.1)
MCHC RBC-ENTMCNC: 31.8 PG — HIGH (ref 27–31)
MCHC RBC-ENTMCNC: 32.7 G/DL — SIGNIFICANT CHANGE UP (ref 32–37)
MCV RBC AUTO: 97.4 FL — HIGH (ref 80–94)
MONOCYTES # BLD AUTO: 0.66 K/UL — HIGH (ref 0.1–0.6)
MONOCYTES NFR BLD AUTO: 6.6 % — SIGNIFICANT CHANGE UP (ref 1.7–9.3)
NEUTROPHILS # BLD AUTO: 7.4 K/UL — HIGH (ref 1.4–6.5)
NEUTROPHILS NFR BLD AUTO: 74 % — SIGNIFICANT CHANGE UP (ref 42.2–75.2)
NRBC # BLD: 0 /100 WBCS — SIGNIFICANT CHANGE UP (ref 0–0)
PLATELET # BLD AUTO: 233 K/UL — SIGNIFICANT CHANGE UP (ref 130–400)
POTASSIUM SERPL-MCNC: 4.3 MMOL/L — SIGNIFICANT CHANGE UP (ref 3.5–5)
POTASSIUM SERPL-SCNC: 4.3 MMOL/L — SIGNIFICANT CHANGE UP (ref 3.5–5)
RBC # BLD: 3.11 M/UL — LOW (ref 4.7–6.1)
RBC # FLD: 13.8 % — SIGNIFICANT CHANGE UP (ref 11.5–14.5)
SODIUM SERPL-SCNC: 143 MMOL/L — SIGNIFICANT CHANGE UP (ref 135–146)
SPECIMEN SOURCE: SIGNIFICANT CHANGE UP
SPECIMEN SOURCE: SIGNIFICANT CHANGE UP
WBC # BLD: 10 K/UL — SIGNIFICANT CHANGE UP (ref 4.8–10.8)
WBC # FLD AUTO: 10 K/UL — SIGNIFICANT CHANGE UP (ref 4.8–10.8)

## 2019-10-02 PROCEDURE — 99239 HOSP IP/OBS DSCHRG MGMT >30: CPT

## 2019-10-02 RX ORDER — FOLIC ACID 0.8 MG
1 TABLET ORAL DAILY
Refills: 0 | Status: DISCONTINUED | OUTPATIENT
Start: 2019-10-02 | End: 2019-10-02

## 2019-10-02 RX ORDER — PREGABALIN 225 MG/1
1 CAPSULE ORAL
Qty: 14 | Refills: 0
Start: 2019-10-02 | End: 2019-10-15

## 2019-10-02 RX ORDER — PREGABALIN 225 MG/1
1 CAPSULE ORAL
Qty: 30 | Refills: 0
Start: 2019-10-02

## 2019-10-02 RX ORDER — GABAPENTIN 400 MG/1
1 CAPSULE ORAL
Qty: 0 | Refills: 0 | DISCHARGE
Start: 2019-10-02

## 2019-10-02 RX ORDER — METOPROLOL TARTRATE 50 MG
1 TABLET ORAL
Qty: 0 | Refills: 0 | DISCHARGE
Start: 2019-10-02

## 2019-10-02 RX ORDER — GABAPENTIN 400 MG/1
300 CAPSULE ORAL EVERY 12 HOURS
Refills: 0 | Status: DISCONTINUED | OUTPATIENT
Start: 2019-10-02 | End: 2019-10-02

## 2019-10-02 RX ORDER — FOLIC ACID 0.8 MG
1 TABLET ORAL
Qty: 0 | Refills: 0 | DISCHARGE
Start: 2019-10-02

## 2019-10-02 RX ORDER — IPRATROPIUM/ALBUTEROL SULFATE 18-103MCG
3 AEROSOL WITH ADAPTER (GRAM) INHALATION
Qty: 0 | Refills: 0 | DISCHARGE
Start: 2019-10-02

## 2019-10-02 RX ORDER — PREGABALIN 225 MG/1
1000 CAPSULE ORAL ONCE
Refills: 0 | Status: COMPLETED | OUTPATIENT
Start: 2019-10-02 | End: 2019-10-02

## 2019-10-02 RX ORDER — NIFEDIPINE 30 MG
1 TABLET, EXTENDED RELEASE 24 HR ORAL
Qty: 0 | Refills: 0 | DISCHARGE
Start: 2019-10-02

## 2019-10-02 RX ORDER — LACTOBACILLUS ACIDOPHILUS 100MM CELL
1 CAPSULE ORAL
Qty: 0 | Refills: 0 | DISCHARGE
Start: 2019-10-02

## 2019-10-02 RX ORDER — LACTOBACILLUS ACIDOPHILUS 100MM CELL
1 CAPSULE ORAL
Refills: 0 | Status: DISCONTINUED | OUTPATIENT
Start: 2019-10-02 | End: 2019-10-02

## 2019-10-02 RX ORDER — NIFEDIPINE 30 MG
30 TABLET, EXTENDED RELEASE 24 HR ORAL DAILY
Refills: 0 | Status: DISCONTINUED | OUTPATIENT
Start: 2019-10-02 | End: 2019-10-02

## 2019-10-02 RX ORDER — AMPICILLIN SODIUM AND SULBACTAM SODIUM 250; 125 MG/ML; MG/ML
1 INJECTION, POWDER, FOR SUSPENSION INTRAMUSCULAR; INTRAVENOUS
Qty: 0 | Refills: 0 | DISCHARGE
Start: 2019-10-02

## 2019-10-02 RX ORDER — PREGABALIN 225 MG/1
1000 CAPSULE ORAL DAILY
Refills: 0 | Status: DISCONTINUED | OUTPATIENT
Start: 2019-10-02 | End: 2019-10-02

## 2019-10-02 RX ADMIN — SODIUM CHLORIDE 1 GRAM(S): 9 INJECTION INTRAMUSCULAR; INTRAVENOUS; SUBCUTANEOUS at 11:46

## 2019-10-02 RX ADMIN — AMPICILLIN SODIUM AND SULBACTAM SODIUM 200 GRAM(S): 250; 125 INJECTION, POWDER, FOR SUSPENSION INTRAMUSCULAR; INTRAVENOUS at 06:15

## 2019-10-02 RX ADMIN — AMPICILLIN SODIUM AND SULBACTAM SODIUM 200 GRAM(S): 250; 125 INJECTION, POWDER, FOR SUSPENSION INTRAMUSCULAR; INTRAVENOUS at 00:03

## 2019-10-02 RX ADMIN — GABAPENTIN 600 MILLIGRAM(S): 400 CAPSULE ORAL at 06:16

## 2019-10-02 RX ADMIN — HEPARIN SODIUM 5000 UNIT(S): 5000 INJECTION INTRAVENOUS; SUBCUTANEOUS at 06:16

## 2019-10-02 RX ADMIN — Medication 30 MILLIGRAM(S): at 12:19

## 2019-10-02 RX ADMIN — CHLORHEXIDINE GLUCONATE 1 APPLICATION(S): 213 SOLUTION TOPICAL at 06:16

## 2019-10-02 RX ADMIN — AMPICILLIN SODIUM AND SULBACTAM SODIUM 200 GRAM(S): 250; 125 INJECTION, POWDER, FOR SUSPENSION INTRAMUSCULAR; INTRAVENOUS at 17:08

## 2019-10-02 RX ADMIN — AMPICILLIN SODIUM AND SULBACTAM SODIUM 200 GRAM(S): 250; 125 INJECTION, POWDER, FOR SUSPENSION INTRAMUSCULAR; INTRAVENOUS at 12:33

## 2019-10-02 RX ADMIN — SODIUM CHLORIDE 75 MILLILITER(S): 9 INJECTION INTRAMUSCULAR; INTRAVENOUS; SUBCUTANEOUS at 00:03

## 2019-10-02 RX ADMIN — Medication 25 MILLIGRAM(S): at 17:16

## 2019-10-02 RX ADMIN — HEPARIN SODIUM 5000 UNIT(S): 5000 INJECTION INTRAVENOUS; SUBCUTANEOUS at 17:08

## 2019-10-02 RX ADMIN — Medication 25 MILLIGRAM(S): at 06:16

## 2019-10-02 RX ADMIN — GABAPENTIN 300 MILLIGRAM(S): 400 CAPSULE ORAL at 17:07

## 2019-10-02 RX ADMIN — PREGABALIN 1000 MICROGRAM(S): 225 CAPSULE ORAL at 17:08

## 2019-10-02 RX ADMIN — Medication 1 TABLET(S): at 17:16

## 2019-10-02 RX ADMIN — OLANZAPINE 20 MILLIGRAM(S): 15 TABLET, FILM COATED ORAL at 11:46

## 2019-10-02 NOTE — DISCHARGE NOTE PROVIDER - NSDCCPCAREPLAN_GEN_ALL_CORE_FT
PRINCIPAL DISCHARGE DIAGNOSIS  Diagnosis: UTI (urinary tract infection)  Assessment and Plan of Treatment: You came here with bains problem. urine culture was positive and came back positive with Acinobacter Baumanii.  We started IV antibiotics for 14 days. Last day will be 10/14/2019. Please follow your PCP in 1 week and get your CBC and BMP labs check in 1 week with you rprimary doctor.  Get your midline removed after finishing course of your antibiotic      SECONDARY DISCHARGE DIAGNOSES  Diagnosis: Hypertension  Assessment and Plan of Treatment: Your blood pressure was continuously high. So we added 1 drug Nefidipine XL 3o mg a day . Please tale it regularly and Please follow up with your PCP and cardiologist to adjust your Blood pressure medicines.    Diagnosis: DAVID (acute kidney injury)  Assessment and Plan of Treatment: When you came your Creatine level went up to 1.7 so you were diagnosed with DAVID and we started normal saline. It trended down. Please follow up with your PC with bmp and kidney function check in 1 week

## 2019-10-02 NOTE — DISCHARGE NOTE PROVIDER - CARE PROVIDER_API CALL
Silverio Hayes)  [id number]  [address] [place] [address]  Phone: [phone]  Fax: [phone]  Follow Up Time: 1 week

## 2019-10-02 NOTE — DISCHARGE NOTE PROVIDER - HOSPITAL COURSE
This is a 79 year-old male with PMH of schizophrenia, COPD, hyperlipidemia, non-Hodgkin lymphoma in remission, neuropathy, T2DM (non-insulin dependent), urinary incontinence with bains catheter presents from [facility] for fever and weakness after pulling out chronic indwelling bains catheter. Patient was seen in ED 9/26 for same complaints and was found to have positive UA for which he was prescribed PO antibiotics and discharged without admission to hospital. During that time, his chronic indwelling bains was found to be in the wrong place, causing obstruction, and re-adjusted. After discharge when patient left hospital, he reportedly pulled out his bains and re-presented on 9/27 with same complaints. Was found this time to be febrile (TMax 102.2) with elevated white count (13.6) and sinus tachycardia. Also complaining of cough, congestion, and decreased PO intake.    In ED, received IV Rocephin 1 g x1, 2L LR bolus, rectal Tylenol x1    urine culture was positive and came back with Acinobacter Baumanii     Started IV Unasyn 3g for 14 days. Last day will be 10/14/2019

## 2019-10-02 NOTE — DISCHARGE NOTE NURSING/CASE MANAGEMENT/SOCIAL WORK - PATIENT PORTAL LINK FT
You can access the FollowMyHealth Patient Portal offered by [facility] by registering at the following website: [email or web address]. By joining [facility]’s FollowMyHealth portal, you will also be able to view your health information using other applications (apps) compatible with our system.

## 2019-10-02 NOTE — PROGRESS NOTE ADULT - SUBJECTIVE AND OBJECTIVE BOX
ADA VILLAGOMEZ  [facility] T2-3A 021 B ([facility] T2-3A)            Patient was evaluated and examined  by bedside, remains confused, afebrile, tolerating diet well, patient had 3 loose bm's today                REVIEW OF SYSTEMS:  unable to obtain due to patient's confusion state      T(C): , Max: 37.5 (10-01-19 @ 21:00)  HR: 85 (10-02-19 @ 12:39)  BP: 115/64 (10-02-19 @ 12:39)  RR: 18 (10-02-19 @ 12:39)  SpO2: --  CAPILLARY BLOOD GLUCOSE      POCT Blood Glucose.: 125 mg/dL (02 Oct 2019 11:46)  POCT Blood Glucose.: 154 mg/dL (02 Oct 2019 07:51)  POCT Blood Glucose.: 98 mg/dL (01 Oct 2019 22:01)  POCT Blood Glucose.: 104 mg/dL (01 Oct 2019 16:41)      PHYSICAL EXAM:  General: NAD, Awake, confused, patient is sitting comfortably in a chair  HEENT: AT, NC, Supple, NO JVD, NO CB  Lungs: CTA B/L, no wheezing, no rhonchi  CVS: normal S1, S2, RRR, NO M/G/R  Abdomen: soft, bowel sounds present, non-tender, non-distended  : chronic bains  Extremities: no edema, no clubbing, no cyanosis, positive peripheral pulses b/l  Neuro: no acute focal neurological deficits, confused  Skin: no rash, no ecchymosis      LAB  CBC  Date: 10-02-19 @ 04:56  Mean cell Ybvngmuvlu78.8  Mean cell Hemoglobin Conc32.7  Mean cell Volum 97.4  Platelet count-Automate 233  RBC Count 3.11  Red Cell Distrib Width13.8  WBC Count10.00  % Albumin, Urine--  Hematocrit 30.3  Hemoglobin 9.9  CBC  Date: 10-01-19 @ 07:17  Mean cell Ufyorbhxok23.8  Mean cell Hemoglobin Conc32.2  Mean cell Volum 98.8  Platelet count-Automate 215  RBC Count 3.30  Red Cell Distrib Width13.7  WBC Count9.65  % Albumin, Urine--  Hematocrit 32.6  Hemoglobin 10.5  CBC  Date: 09-30-19 @ 12:06  Mean cell Heexbrhghe47.8  Mean cell Hemoglobin Conc32.4  Mean cell Volum 98.2  Platelet count-Automate 173  RBC Count 3.30  Red Cell Distrib Width13.8  WBC Count7.21  % Albumin, Urine--  Hematocrit 32.4  Hemoglobin 10.5  CBC  Date: 09-29-19 @ 06:10  Mean cell Ibdehjllid79.2  Mean cell Hemoglobin Conc33.6  Mean cell Volum 96.0  Platelet count-Automate 176  RBC Count 2.98  Red Cell Distrib Width13.7  WBC Count9.01  % Albumin, Urine--  Hematocrit 28.6  Hemoglobin 9.6  CBC  Date: 09-28-19 @ 05:45  Mean cell Efufirnhma89.7  Mean cell Hemoglobin Conc34.2  Mean cell Volum 95.7  Platelet count-Automate 163  RBC Count 2.81  Red Cell Distrib Width13.7  WBC Count14.47  % Albumin, Urine--  Hematocrit 26.9  Hemoglobin 9.2  CBC  Date: 09-27-19 @ 08:07  Mean cell Hxtvymszky60.4  Mean cell Hemoglobin Conc33.1  Mean cell Volum 97.9  Platelet count-Automate 194  RBC Count 3.30  Red Cell Distrib Width13.7  WBC Count13.62  % Albumin, Urine--  Hematocrit 32.3  Hemoglobin 10.7  CBC  Date: 09-26-19 @ 14:55  Mean cell Jihyawzslx37.1  Mean cell Hemoglobin Conc33.1  Mean cell Volum 97.0  Platelet count-Automate 186  RBC Count 3.33  Red Cell Distrib Width13.4  WBC Count11.01  % Albumin, Urine--  Hematocrit 32.3  Hemoglobin 10.7    BMP  10-02-19 @ 04:56  Blood Gas Arterial-Calcium,Ionized--  Blood Urea Nitrogen, Serum 22 mg/dL<H> [10 - 20]  Carbon Dioxide, Serum24 mmol/L [17 - 32]  Chloride, Oyayr694 mmol/L [98 - 110]  Creatinie, Serum1.6 mg/dL<H> [0.7 - 1.5]  Glucose, Sedxe647 mg/dL<H> [70 - 99]  Potassium, Serum4.3 mmol/L [3.5 - 5.0]  Sodium, Serum 143 mmol/L [135 - 146]  [facility]  10-01-19 @ 07:17  Blood Gas Arterial-Calcium,Ionized--  Blood Urea Nitrogen, Serum 22 mg/dL<H> [10 - 20]  Carbon Dioxide, Serum26 mmol/L [17 - 32]  Chloride, Mamtu115 mmol/L [98 - 110]  Creatinie, Serum1.4 mg/dL [0.7 - 1.5]  Glucose, Gbrcu176 mg/dL<H> [70 - 99]  Potassium, Serum4.2 mmol/L [3.5 - 5.0]  Sodium, Serum 143 mmol/L [135 - 146]  [facility]  09-30-19 @ 12:06  Blood Gas Arterial-Calcium,Ionized--  Blood Urea Nitrogen, Serum 24 mg/dL<H> [10 - 20]  Carbon Dioxide, Serum20 mmol/L [17 - 32]  Chloride, Oabbu006 mmol/L [98 - 110]  Creatinie, Serum1.4 mg/dL [0.7 - 1.5]  Glucose, Xtgbr672 mg/dL<H> [70 - 99]  Potassium, Serum4.0 mmol/L [3.5 - 5.0]  Sodium, Serum 139 mmol/L [135 - 146]  [facility]  09-29-19 @ 06:10  Blood Gas Arterial-Calcium,Ionized--  Blood Urea Nitrogen, Serum 26 mg/dL<H> [10 - 20]  Carbon Dioxide, Serum22 mmol/L [17 - 32]  Chloride, Mdigo575 mmol/L [98 - 110]  Creatinie, Serum1.7 mg/dL<H> [0.7 - 1.5]  Glucose, Cdtob498 mg/dL<H> [70 - 99]  Potassium, Serum3.5 mmol/L [3.5 - 5.0]  Sodium, Serum 136 mmol/L [135 - 146]  [facility]  09-28-19 @ 05:45  Blood Gas Arterial-Calcium,Ionized--  Blood Urea Nitrogen, Serum 20 mg/dL [10 - 20]  Carbon Dioxide, Serum20 mmol/L [17 - 32]  Chloride, Serum98 mmol/L [98 - 110]  Creatinie, Serum1.4 mg/dL [0.7 - 1.5]  Glucose, Zxepm866 mg/dL<H> [70 - 99]  Potassium, Serum3.7 mmol/L [3.5 - 5.0]  Sodium, Serum 133 mmol/L<L> [135 - 146]        PTT - ( 02 Oct 2019 04:56 )  PTT:32.1 sec      Microbiology:    Culture - Blood (collected 09-30-19 @ 18:00)  Source: .Blood Blood-Peripheral  Preliminary Report (10-02-19 @ 02:00):    No growth to date.    Culture - Urine (collected 09-27-19 @ 08:32)  Source: .Urine Catheterized  Final Report (09-30-19 @ 16:46):    >100,000 CFU/ml Acinetobacter baumannii    >100,000 CFU/ml Enterococcus faecalis  Organism: Acinetobacter baumannii  Acinetobacter baumannii  Enterococcus faecalis (09-30-19 @ 16:46)  Organism: Enterococcus faecalis (09-30-19 @ 16:46)      -  Ampicillin: S <=2 Predicts results to ampicillin/sulbactam, amoxacillin-clavulanate and  piperacillin-tazobactam.      -  Ciprofloxacin: R >2      -  Levofloxacin: R >4      -  Nitrofurantoin: S <=32 Should not be used to treat pyelonephritis.      -  Tetra/Doxy: R >8      -  Vancomycin: S 2      Method Type: IZABELLA  Organism: Acinetobacter baumannii (09-30-19 @ 16:46)      -  Imipenem: S      -  Piperacillin/Tazobactam: S      Method Type: KB  Organism: Acinetobacter baumannii (09-30-19 @ 16:46)      -  Amikacin: S <=16      -  Ampicillin/Sulbactam: S <=8/4      -  Cefepime: I 16      -  Ceftazidime: S 8      -  Ceftriaxone: I 32      -  Ciprofloxacin: R >2      -  Gentamicin: S <=4      -  Levofloxacin: R >4      -  Meropenem: S <=1      -  Tobramycin: S <=4      -  Trimethoprim/Sulfamethoxazole: R >2/38      Method Type: IZABELLA    Culture - Blood (collected 09-27-19 @ 08:07)  Source: .Blood Blood  Preliminary Report (09-28-19 @ 14:01):    No growth to date.    Culture - Blood (collected 09-27-19 @ 08:07)  Source: .Blood Blood  Preliminary Report (09-28-19 @ 14:01):    No growth to date.    Culture - Urine (collected 09-26-19 @ 17:15)  Source: .Urine Clean Catch (Midstream)  Final Report (09-27-19 @ 22:43):    >=3 organisms. Probable collection contamination.    Culture - Blood (collected 09-26-19 @ 14:55)  Source: .Blood Blood-Peripheral  Final Report (10-01-19 @ 23:00):    No growth at 5 days.    Culture - Blood (collected 09-26-19 @ 14:55)  Source: .Blood Blood-Peripheral  Final Report (10-01-19 @ 23:00):    No growth at 5 days.    Culture - Urine (collected 09-15-19 @ 00:23)  Source: .Urine Clean Catch (Midstream)  Final Report (09-16-19 @ 06:49):    <10,000 CFU/mL Normal Urogenital Janey    Culture - Urine (collected 09-12-19 @ 14:13)  Source: .Urine Catheterized  Final Report (09-13-19 @ 20:12):    No growth        Medications:  acetaminophen   Tablet .. 650 milliGRAM(s) Oral every 6 hours PRN  ALBUTerol/ipratropium for Nebulization 3 milliLiter(s) Nebulizer every 6 hours PRN  ampicillin/sulbactam  IVPB 3 Gram(s) IV Intermittent every 6 hours  chlorhexidine 4% Liquid 1 Application(s) Topical <User Schedule>  cyanocobalamin Injectable 1000 MICROGram(s) IntraMuscular daily  folic acid 1 milliGRAM(s) Oral daily  gabapentin 300 milliGRAM(s) Oral every 12 hours  heparin  Injectable 5000 Unit(s) SubCutaneous every 12 hours  metoprolol tartrate 25 milliGRAM(s) Oral two times a day  NIFEdipine XL 30 milliGRAM(s) Oral daily  OLANZapine 20 milliGRAM(s) Oral daily  simvastatin 20 milliGRAM(s) Oral at bedtime  sodium chloride 1 Gram(s) Oral daily  tamsulosin 0.4 milliGRAM(s) Oral at bedtime  traZODone 100 milliGRAM(s) Oral at bedtime        Assessment and Plan:  79 year-old male with PMH of schizophrenia, COPD, hyperlipidemia, lymphoma, neuropathy, T2DM (non-insulin dependent), urinary incontinence with chronic bains catheter presenting from [facility] for fever and weakness.     # Sepsis (POA) 2/2 urinary tract infection- resolved  UTI is secondary to traumatic removal of his chronic bains. So it's NOT CAUTI.     - Urine culture : Acinetobacter baumannii and E. fecalis. Ampicillin/Sulbactam per ID for total 14 days via midlien  - Blood cultures NGTD      #DAVID(9/29): renal function remains stable, patient will need close outpatient renal function monitoring    # chronic Anemia- vit b 12 deficiency- started on vitamins tx.  - Hgb 10.7, MCV 97      # COPD  -home meds    # Hypertension  home meds    # Schizophrenia  - C/w Trazodone, Olanzapine    # T2DM non-insulin dependent  - Monitor fingersticks. If consistently >180, start insulin basal/bolus with sliding scale regimen    # Chronic hyponatremia 2/2 Trazodone  - C/w NaCl salt tab 1g qD  - Trazodone dose reduced July 2019  monitor    # Urinary retention  - C/w chronic indwelling bains (replaced this admission)  - C/w Flomax 0.4 mg daily      # Neuropathy  - C/w Gabapentin    #Activity: out of bed with assistance  #Diet: DASH/TLC (no food consistency restriction per home)  #DVT ppx: Heparin 5000U subcutaneous BID  #GI ppx: not indicated  #Dispo: d/c today  #Code status: FULL CODE .   anticipate d/c today

## 2019-10-02 NOTE — PHYSICAL THERAPY INITIAL EVALUATION ADULT - GENERAL OBSERVATIONS, REHAB EVAL
[id number] am Chart reviewed. Pt. seen out of bed in bedside chair in No apparent distress, somnolent but arousable, IV lock, bains catheter, Pt. agreed to activity/therapy.

## 2019-10-02 NOTE — PROGRESS NOTE ADULT - REASON FOR ADMISSION
Sepsis 2/2 urinary tract infection

## 2019-10-02 NOTE — PHYSICAL THERAPY INITIAL EVALUATION ADULT - GAIT DISTANCE, PT EVAL
2 feet ; however upon starting ambulation, pt had sudden onset of BM; assisted to return to chair to provide hygiene care, with ELLEN Kramer.

## 2019-10-03 ENCOUNTER — OUTPATIENT (OUTPATIENT)
Dept: OUTPATIENT SERVICES | Facility: HOSPITAL | Age: 79
LOS: 1 days | Discharge: HOME | End: 2019-10-03

## 2019-10-03 DIAGNOSIS — Z12.11 ENCOUNTER FOR SCREENING FOR MALIGNANT NEOPLASM OF COLON: Chronic | ICD-10-CM

## 2019-10-03 DIAGNOSIS — N39.0 URINARY TRACT INFECTION, SITE NOT SPECIFIED: ICD-10-CM

## 2019-10-04 ENCOUNTER — OUTPATIENT (OUTPATIENT)
Dept: OUTPATIENT SERVICES | Facility: HOSPITAL | Age: 79
LOS: 1 days | Discharge: HOME | End: 2019-10-04

## 2019-10-04 DIAGNOSIS — E78.5 HYPERLIPIDEMIA, UNSPECIFIED: ICD-10-CM

## 2019-10-04 DIAGNOSIS — Z12.11 ENCOUNTER FOR SCREENING FOR MALIGNANT NEOPLASM OF COLON: Chronic | ICD-10-CM

## 2019-10-06 LAB
CULTURE RESULTS: SIGNIFICANT CHANGE UP
SPECIMEN SOURCE: SIGNIFICANT CHANGE UP

## 2019-10-07 DIAGNOSIS — E87.1 HYPO-OSMOLALITY AND HYPONATREMIA: ICD-10-CM

## 2019-10-07 DIAGNOSIS — C85.90 NON-HODGKIN LYMPHOMA, UNSPECIFIED, UNSPECIFIED SITE: ICD-10-CM

## 2019-10-07 DIAGNOSIS — T43.215A ADVERSE EFFECT OF SELECTIVE SEROTONIN AND NOREPINEPHRINE REUPTAKE INHIBITORS, INITIAL ENCOUNTER: ICD-10-CM

## 2019-10-07 DIAGNOSIS — N39.0 URINARY TRACT INFECTION, SITE NOT SPECIFIED: ICD-10-CM

## 2019-10-07 DIAGNOSIS — B95.2 ENTEROCOCCUS AS THE CAUSE OF DISEASES CLASSIFIED ELSEWHERE: ICD-10-CM

## 2019-10-07 DIAGNOSIS — E53.8 DEFICIENCY OF OTHER SPECIFIED B GROUP VITAMINS: ICD-10-CM

## 2019-10-07 DIAGNOSIS — R32 UNSPECIFIED URINARY INCONTINENCE: ICD-10-CM

## 2019-10-07 DIAGNOSIS — E11.40 TYPE 2 DIABETES MELLITUS WITH DIABETIC NEUROPATHY, UNSPECIFIED: ICD-10-CM

## 2019-10-07 DIAGNOSIS — R00.0 TACHYCARDIA, UNSPECIFIED: ICD-10-CM

## 2019-10-07 DIAGNOSIS — T83.021A DISPLACEMENT OF INDWELLING URETHRAL CATHETER, INITIAL ENCOUNTER: ICD-10-CM

## 2019-10-07 DIAGNOSIS — I10 ESSENTIAL (PRIMARY) HYPERTENSION: ICD-10-CM

## 2019-10-07 DIAGNOSIS — S37.30XA UNSPECIFIED INJURY OF URETHRA, INITIAL ENCOUNTER: ICD-10-CM

## 2019-10-07 DIAGNOSIS — D64.9 ANEMIA, UNSPECIFIED: ICD-10-CM

## 2019-10-07 DIAGNOSIS — A41.9 SEPSIS, UNSPECIFIED ORGANISM: ICD-10-CM
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
## 2019-10-07 DIAGNOSIS — R33.9 RETENTION OF URINE, UNSPECIFIED: ICD-10-CM

## 2019-10-07 DIAGNOSIS — E78.5 HYPERLIPIDEMIA, UNSPECIFIED: ICD-10-CM

## 2019-10-07 DIAGNOSIS — T83.091A OTHER MECHANICAL COMPLICATION OF INDWELLING URETHRAL CATHETER, INITIAL ENCOUNTER: ICD-10-CM

## 2019-10-07 DIAGNOSIS — N17.9 ACUTE KIDNEY FAILURE, UNSPECIFIED: ICD-10-CM

## 2019-10-07 DIAGNOSIS — F20.9 SCHIZOPHRENIA, UNSPECIFIED: ICD-10-CM

## 2019-10-07 DIAGNOSIS — J44.9 CHRONIC OBSTRUCTIVE PULMONARY DISEASE, UNSPECIFIED: ICD-10-CM
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
## 2019-10-08 DIAGNOSIS — N10 ACUTE PYELONEPHRITIS: ICD-10-CM

## 2019-10-08 DIAGNOSIS — B96.89 OTHER SPECIFIED BACTERIAL AGENTS AS THE CAUSE OF DISEASES CLASSIFIED ELSEWHERE: ICD-10-CM

## 2019-11-02 ENCOUNTER — INPATIENT (INPATIENT)
Facility: HOSPITAL | Age: 79
LOS: 3 days | Discharge: NURSING, REHAB OR LONG-TERM CARE | End: 2019-11-06
Attending: HOSPITALIST | Admitting: HOSPITALIST
Payer: MEDICARE

## 2019-11-02 VITALS
HEART RATE: 124 BPM | OXYGEN SATURATION: 99 % | TEMPERATURE: 103 F | DIASTOLIC BLOOD PRESSURE: 67 MMHG | SYSTOLIC BLOOD PRESSURE: 153 MMHG | RESPIRATION RATE: 24 BRPM

## 2019-11-02 DIAGNOSIS — F17.210 NICOTINE DEPENDENCE, CIGARETTES, UNCOMPLICATED: ICD-10-CM

## 2019-11-02 DIAGNOSIS — E11.40 TYPE 2 DIABETES MELLITUS WITH DIABETIC NEUROPATHY, UNSPECIFIED: ICD-10-CM

## 2019-11-02 DIAGNOSIS — Z91.81 HISTORY OF FALLING: ICD-10-CM

## 2019-11-02 DIAGNOSIS — R53.81 OTHER MALAISE: ICD-10-CM

## 2019-11-02 DIAGNOSIS — E87.2 ACIDOSIS: ICD-10-CM

## 2019-11-02 DIAGNOSIS — E87.1 HYPO-OSMOLALITY AND HYPONATREMIA: ICD-10-CM

## 2019-11-02 DIAGNOSIS — R33.8 OTHER RETENTION OF URINE: ICD-10-CM

## 2019-11-02 DIAGNOSIS — T83.511A INFECTION AND INFLAMMATORY REACTION DUE TO INDWELLING URETHRAL CATHETER, INITIAL ENCOUNTER: ICD-10-CM

## 2019-11-02 DIAGNOSIS — C85.90 NON-HODGKIN LYMPHOMA, UNSPECIFIED, UNSPECIFIED SITE: ICD-10-CM

## 2019-11-02 DIAGNOSIS — N39.0 URINARY TRACT INFECTION, SITE NOT SPECIFIED: ICD-10-CM

## 2019-11-02 DIAGNOSIS — Z12.11 ENCOUNTER FOR SCREENING FOR MALIGNANT NEOPLASM OF COLON: Chronic | ICD-10-CM

## 2019-11-02 DIAGNOSIS — N10 ACUTE PYELONEPHRITIS: ICD-10-CM

## 2019-11-02 DIAGNOSIS — E78.5 HYPERLIPIDEMIA, UNSPECIFIED: ICD-10-CM

## 2019-11-02 DIAGNOSIS — A41.9 SEPSIS, UNSPECIFIED ORGANISM: ICD-10-CM

## 2019-11-02 DIAGNOSIS — J44.9 CHRONIC OBSTRUCTIVE PULMONARY DISEASE, UNSPECIFIED: ICD-10-CM

## 2019-11-02 DIAGNOSIS — I10 ESSENTIAL (PRIMARY) HYPERTENSION: ICD-10-CM
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
## 2019-11-02 DIAGNOSIS — F20.9 SCHIZOPHRENIA, UNSPECIFIED: ICD-10-CM

## 2019-11-02 DIAGNOSIS — F03.90 UNSPECIFIED DEMENTIA WITHOUT BEHAVIORAL DISTURBANCE: ICD-10-CM

## 2019-11-02 LAB
ALBUMIN SERPL ELPH-MCNC: 4 G/DL — SIGNIFICANT CHANGE UP (ref 3.5–5.2)
ALP SERPL-CCNC: 90 U/L — SIGNIFICANT CHANGE UP (ref 30–115)
ALT FLD-CCNC: 6 U/L — SIGNIFICANT CHANGE UP (ref 0–41)
ANION GAP SERPL CALC-SCNC: 16 MMOL/L — HIGH (ref 7–14)
APPEARANCE UR: ABNORMAL
APTT BLD: 31.6 SEC — SIGNIFICANT CHANGE UP (ref 27–39.2)
AST SERPL-CCNC: 12 U/L — SIGNIFICANT CHANGE UP (ref 0–41)
BACTERIA # UR AUTO: ABNORMAL
BASE EXCESS BLDV CALC-SCNC: 0.3 MMOL/L — SIGNIFICANT CHANGE UP (ref -2–2)
BASOPHILS # BLD AUTO: 0.01 K/UL — SIGNIFICANT CHANGE UP (ref 0–0.2)
BASOPHILS NFR BLD AUTO: 0.1 % — SIGNIFICANT CHANGE UP (ref 0–1)
BILIRUB SERPL-MCNC: 0.3 MG/DL — SIGNIFICANT CHANGE UP (ref 0.2–1.2)
BILIRUB UR-MCNC: NEGATIVE — SIGNIFICANT CHANGE UP
BUN SERPL-MCNC: 22 MG/DL — HIGH (ref 10–20)
CA-I SERPL-SCNC: 1.13 MMOL/L — SIGNIFICANT CHANGE UP (ref 1.12–1.3)
CALCIUM SERPL-MCNC: 8.4 MG/DL — LOW (ref 8.5–10.1)
CHLORIDE SERPL-SCNC: 95 MMOL/L — LOW (ref 98–110)
CO2 SERPL-SCNC: 22 MMOL/L — SIGNIFICANT CHANGE UP (ref 17–32)
COLOR SPEC: ABNORMAL
CREAT SERPL-MCNC: 1.4 MG/DL — SIGNIFICANT CHANGE UP (ref 0.7–1.5)
DIFF PNL FLD: ABNORMAL
EOSINOPHIL # BLD AUTO: 0 K/UL — SIGNIFICANT CHANGE UP (ref 0–0.7)
EOSINOPHIL NFR BLD AUTO: 0 % — SIGNIFICANT CHANGE UP (ref 0–8)
EPI CELLS # UR: 15 /HPF — HIGH (ref 0–5)
GAS PNL BLDV: 133 MMOL/L — LOW (ref 136–145)
GAS PNL BLDV: SIGNIFICANT CHANGE UP
GLUCOSE SERPL-MCNC: 131 MG/DL — HIGH (ref 70–99)
GLUCOSE UR QL: NEGATIVE — SIGNIFICANT CHANGE UP
HCO3 BLDV-SCNC: 25 MMOL/L — SIGNIFICANT CHANGE UP (ref 22–29)
HCT VFR BLD CALC: 30.1 % — LOW (ref 42–52)
HCT VFR BLDA CALC: 35.3 % — SIGNIFICANT CHANGE UP (ref 34–44)
HGB BLD CALC-MCNC: 11.5 G/DL — LOW (ref 14–18)
HGB BLD-MCNC: 10.2 G/DL — LOW (ref 14–18)
HYALINE CASTS # UR AUTO: NEGATIVE /LPF — SIGNIFICANT CHANGE UP
IMM GRANULOCYTES NFR BLD AUTO: 0.6 % — HIGH (ref 0.1–0.3)
INR BLD: 1.55 RATIO — HIGH (ref 0.65–1.3)
KETONES UR-MCNC: NEGATIVE — SIGNIFICANT CHANGE UP
LACTATE BLDV-MCNC: 1.7 MMOL/L — HIGH (ref 0.5–1.6)
LEUKOCYTE ESTERASE UR-ACNC: ABNORMAL
LYMPHOCYTES # BLD AUTO: 0.81 K/UL — LOW (ref 1.2–3.4)
LYMPHOCYTES # BLD AUTO: 4.8 % — LOW (ref 20.5–51.1)
MCHC RBC-ENTMCNC: 32.8 PG — HIGH (ref 27–31)
MCHC RBC-ENTMCNC: 33.9 G/DL — SIGNIFICANT CHANGE UP (ref 32–37)
MCV RBC AUTO: 96.8 FL — HIGH (ref 80–94)
MONOCYTES # BLD AUTO: 1.27 K/UL — HIGH (ref 0.1–0.6)
MONOCYTES NFR BLD AUTO: 7.5 % — SIGNIFICANT CHANGE UP (ref 1.7–9.3)
NEUTROPHILS # BLD AUTO: 14.84 K/UL — HIGH (ref 1.4–6.5)
NEUTROPHILS NFR BLD AUTO: 87 % — HIGH (ref 42.2–75.2)
NITRITE UR-MCNC: POSITIVE
NRBC # BLD: 0 /100 WBCS — SIGNIFICANT CHANGE UP (ref 0–0)
PCO2 BLDV: 42 MMHG — SIGNIFICANT CHANGE UP (ref 41–51)
PH BLDV: 7.39 — SIGNIFICANT CHANGE UP (ref 7.26–7.43)
PH UR: 6.5 — SIGNIFICANT CHANGE UP (ref 5–8)
PLATELET # BLD AUTO: 176 K/UL — SIGNIFICANT CHANGE UP (ref 130–400)
PO2 BLDV: 20 MMHG — SIGNIFICANT CHANGE UP (ref 20–40)
POTASSIUM BLDV-SCNC: 4 MMOL/L — SIGNIFICANT CHANGE UP (ref 3.3–5.6)
POTASSIUM SERPL-MCNC: 4.4 MMOL/L — SIGNIFICANT CHANGE UP (ref 3.5–5)
POTASSIUM SERPL-SCNC: 4.4 MMOL/L — SIGNIFICANT CHANGE UP (ref 3.5–5)
PROT SERPL-MCNC: 7.3 G/DL — SIGNIFICANT CHANGE UP (ref 6–8)
PROT UR-MCNC: ABNORMAL
PROTHROM AB SERPL-ACNC: 17.7 SEC — HIGH (ref 9.95–12.87)
RBC # BLD: 3.11 M/UL — LOW (ref 4.7–6.1)
RBC # FLD: 13.8 % — SIGNIFICANT CHANGE UP (ref 11.5–14.5)
RBC CASTS # UR COMP ASSIST: 28 /HPF — HIGH (ref 0–4)
SAO2 % BLDV: 34 % — SIGNIFICANT CHANGE UP
SODIUM SERPL-SCNC: 133 MMOL/L — LOW (ref 135–146)
SP GR SPEC: 1.01 — LOW (ref 1.01–1.02)
TROPONIN T SERPL-MCNC: <0.01 NG/ML — SIGNIFICANT CHANGE UP
UROBILINOGEN FLD QL: SIGNIFICANT CHANGE UP
WBC # BLD: 17.03 K/UL — HIGH (ref 4.8–10.8)
WBC # FLD AUTO: 17.03 K/UL — HIGH (ref 4.8–10.8)
WBC UR QL: >720 /HPF — HIGH (ref 0–5)

## 2019-11-02 PROCEDURE — 99291 CRITICAL CARE FIRST HOUR: CPT

## 2019-11-02 PROCEDURE — 70450 CT HEAD/BRAIN W/O DYE: CPT | Mod: 26

## 2019-11-02 PROCEDURE — 72170 X-RAY EXAM OF PELVIS: CPT | Mod: 26

## 2019-11-02 PROCEDURE — 72125 CT NECK SPINE W/O DYE: CPT | Mod: 26

## 2019-11-02 PROCEDURE — 71045 X-RAY EXAM CHEST 1 VIEW: CPT | Mod: 26

## 2019-11-02 PROCEDURE — 93010 ELECTROCARDIOGRAM REPORT: CPT

## 2019-11-02 RX ORDER — IPRATROPIUM/ALBUTEROL SULFATE 18-103MCG
3 AEROSOL WITH ADAPTER (GRAM) INHALATION EVERY 6 HOURS
Refills: 0 | Status: DISCONTINUED | OUTPATIENT
Start: 2019-11-02 | End: 2019-11-06

## 2019-11-02 RX ORDER — SODIUM CHLORIDE 9 MG/ML
1000 INJECTION, SOLUTION INTRAVENOUS ONCE
Refills: 0 | Status: COMPLETED | OUTPATIENT
Start: 2019-11-02 | End: 2019-11-02

## 2019-11-02 RX ORDER — SIMVASTATIN 20 MG/1
20 TABLET, FILM COATED ORAL AT BEDTIME
Refills: 0 | Status: DISCONTINUED | OUTPATIENT
Start: 2019-11-02 | End: 2019-11-06

## 2019-11-02 RX ORDER — TAMSULOSIN HYDROCHLORIDE 0.4 MG/1
0.4 CAPSULE ORAL AT BEDTIME
Refills: 0 | Status: DISCONTINUED | OUTPATIENT
Start: 2019-11-02 | End: 2019-11-06

## 2019-11-02 RX ORDER — AMPICILLIN SODIUM AND SULBACTAM SODIUM 250; 125 MG/ML; MG/ML
3 INJECTION, POWDER, FOR SUSPENSION INTRAMUSCULAR; INTRAVENOUS EVERY 6 HOURS
Refills: 0 | Status: DISCONTINUED | OUTPATIENT
Start: 2019-11-03 | End: 2019-11-03

## 2019-11-02 RX ORDER — PREGABALIN 225 MG/1
1000 CAPSULE ORAL DAILY
Refills: 0 | Status: DISCONTINUED | OUTPATIENT
Start: 2019-11-02 | End: 2019-11-06

## 2019-11-02 RX ORDER — TRAZODONE HCL 50 MG
100 TABLET ORAL AT BEDTIME
Refills: 0 | Status: DISCONTINUED | OUTPATIENT
Start: 2019-11-02 | End: 2019-11-06

## 2019-11-02 RX ORDER — ACETAMINOPHEN 500 MG
975 TABLET ORAL ONCE
Refills: 0 | Status: COMPLETED | OUTPATIENT
Start: 2019-11-02 | End: 2019-11-02

## 2019-11-02 RX ORDER — CEFEPIME 1 G/1
2000 INJECTION, POWDER, FOR SOLUTION INTRAMUSCULAR; INTRAVENOUS ONCE
Refills: 0 | Status: COMPLETED | OUTPATIENT
Start: 2019-11-02 | End: 2019-11-02

## 2019-11-02 RX ORDER — AMPICILLIN SODIUM AND SULBACTAM SODIUM 250; 125 MG/ML; MG/ML
INJECTION, POWDER, FOR SUSPENSION INTRAMUSCULAR; INTRAVENOUS
Refills: 0 | Status: DISCONTINUED | OUTPATIENT
Start: 2019-11-03 | End: 2019-11-03

## 2019-11-02 RX ORDER — CEFTRIAXONE 500 MG/1
1000 INJECTION, POWDER, FOR SOLUTION INTRAMUSCULAR; INTRAVENOUS ONCE
Refills: 0 | Status: COMPLETED | OUTPATIENT
Start: 2019-11-02 | End: 2019-11-02

## 2019-11-02 RX ORDER — OLANZAPINE 15 MG/1
20 TABLET, FILM COATED ORAL AT BEDTIME
Refills: 0 | Status: DISCONTINUED | OUTPATIENT
Start: 2019-11-02 | End: 2019-11-06

## 2019-11-02 RX ORDER — VANCOMYCIN HCL 1 G
1000 VIAL (EA) INTRAVENOUS ONCE
Refills: 0 | Status: COMPLETED | OUTPATIENT
Start: 2019-11-02 | End: 2019-11-02

## 2019-11-02 RX ORDER — METOPROLOL TARTRATE 50 MG
25 TABLET ORAL
Refills: 0 | Status: DISCONTINUED | OUTPATIENT
Start: 2019-11-02 | End: 2019-11-06

## 2019-11-02 RX ORDER — CHLORHEXIDINE GLUCONATE 213 G/1000ML
1 SOLUTION TOPICAL
Refills: 0 | Status: DISCONTINUED | OUTPATIENT
Start: 2019-11-02 | End: 2019-11-06

## 2019-11-02 RX ORDER — FOLIC ACID 0.8 MG
1 TABLET ORAL DAILY
Refills: 0 | Status: DISCONTINUED | OUTPATIENT
Start: 2019-11-02 | End: 2019-11-06

## 2019-11-02 RX ORDER — GABAPENTIN 400 MG/1
300 CAPSULE ORAL EVERY 12 HOURS
Refills: 0 | Status: DISCONTINUED | OUTPATIENT
Start: 2019-11-02 | End: 2019-11-06

## 2019-11-02 RX ORDER — AMPICILLIN SODIUM AND SULBACTAM SODIUM 250; 125 MG/ML; MG/ML
3 INJECTION, POWDER, FOR SUSPENSION INTRAMUSCULAR; INTRAVENOUS ONCE
Refills: 0 | Status: COMPLETED | OUTPATIENT
Start: 2019-11-02 | End: 2019-11-03

## 2019-11-02 RX ORDER — ENOXAPARIN SODIUM 100 MG/ML
40 INJECTION SUBCUTANEOUS DAILY
Refills: 0 | Status: DISCONTINUED | OUTPATIENT
Start: 2019-11-02 | End: 2019-11-06

## 2019-11-02 RX ORDER — SODIUM CHLORIDE 9 MG/ML
2000 INJECTION, SOLUTION INTRAVENOUS ONCE
Refills: 0 | Status: COMPLETED | OUTPATIENT
Start: 2019-11-02 | End: 2019-11-02

## 2019-11-02 RX ADMIN — CEFTRIAXONE 100 MILLIGRAM(S): 500 INJECTION, POWDER, FOR SOLUTION INTRAMUSCULAR; INTRAVENOUS at 18:29

## 2019-11-02 RX ADMIN — CEFEPIME 100 MILLIGRAM(S): 1 INJECTION, POWDER, FOR SOLUTION INTRAMUSCULAR; INTRAVENOUS at 17:30

## 2019-11-02 RX ADMIN — SODIUM CHLORIDE 1000 MILLILITER(S): 9 INJECTION, SOLUTION INTRAVENOUS at 18:39

## 2019-11-02 RX ADMIN — Medication 975 MILLIGRAM(S): at 17:31

## 2019-11-02 RX ADMIN — SODIUM CHLORIDE 1000 MILLILITER(S): 9 INJECTION, SOLUTION INTRAVENOUS at 22:40

## 2019-11-02 RX ADMIN — SODIUM CHLORIDE 2000 MILLILITER(S): 9 INJECTION, SOLUTION INTRAVENOUS at 17:31

## 2019-11-02 RX ADMIN — Medication 975 MILLIGRAM(S): at 18:50

## 2019-11-02 RX ADMIN — Medication 250 MILLIGRAM(S): at 17:31

## 2019-11-02 NOTE — ED ADULT NURSE NOTE - OBJECTIVE STATEMENT
Pt BIBA, s/p fall at nursing home, starting having chills, fever noted upon arrival and distended bladder.

## 2019-11-02 NOTE — H&P ADULT - HISTORY OF PRESENT ILLNESS
79 year-old male with PMH of schizophrenia, COPD, hyperlipidemia, non-Hodgkin lymphoma in remission, neuropathy, T2DM (non-insulin dependent), chronic indwelling bains presents w/ shaking, dizziness and fall. Patient is a resident from [facility] where he was sent in according to documentation from the facility for 'Fell in facility, started feeling dizzy and began shaking'. On phone discussion with staff at the facility patient had fall and then subsequently had difficulty with ambulation. Patient was denying any symptoms however subsequently also started to shake and so EMT was called. Staff endorses that when patient was sent to ED he was at baseline mental status. On my interview patient is wanting to sleep but is arouses went prompted. Reports that he feels fine currently (staff at facility reports that patient does not like hospitals and so says he feels fine in order to go home). He denies any neck pain, headache, abdominal pain, back pain, cough, shortness of breath, sputum production. On presentation to ED - T: 103.5, , /67. CT head and CT cervical no acute changes. Received Cefepime, Ceftriaxone and Vancomycin in ED for sepsis of suspected urinary etiology.

## 2019-11-02 NOTE — H&P ADULT - NSICDXFAMILYHX_GEN_ALL_CORE_FT
FAMILY HISTORY:  No pertinent family history in first degree relatives, Unable to ascertain 2/2 chronic psychiatric condition

## 2019-11-02 NOTE — H&P ADULT - NSHPPHYSICALEXAM_GEN_ALL_CORE
PHYSICAL EXAM:  GENERAL: NAD, well-developed  HEAD:  Atraumatic, Normocephalic  NECK: Supple, No JVD  CHEST/LUNG: Ronchi on auscultation  HEART: tachycardic, ; No murmurs, rubs, or gallops  ABDOMEN: Soft, Nontender, Nondistended; Bowel sounds present  EXTREMITIES:  2+ Peripheral Pulses, No clubbing, cyanosis, or edema  PSYCH: AAOx3  NEUROLOGY: non-focal  SKIN: No rashes or lesions

## 2019-11-02 NOTE — ED PROVIDER NOTE - PHYSICAL EXAMINATION
CONSTITUTIONAL: Well-developed; well-nourished; in no acute distress.   SKIN: warm, dry.   HEAD: Normocephalic; atraumatic. No carlson sign. No raccoon eyes.   EYES: PERRL, EOMI, no conjunctival erythema.   ENT: No nasal discharge; airway clear.  NECK: Supple; non tender.  CARD: S1, S2 normal; no murmurs, gallops, or rubs. tachycardic.   RESP: No wheezes, rales or rhonchi.  ABD: soft nondistended, palpable bladder on exam; nontender.   : Cobos catheter in place however little urine in leg bag.   EXT: Normal ROM.  No clubbing, cyanosis or edema.   NEURO: Alert, oriented, grossly unremarkable.   PSYCH: Cooperative, appropriate.

## 2019-11-02 NOTE — H&P ADULT - ATTENDING COMMENTS
79 year-old male with PMH of schizophrenia, COPD, hyperlipidemia, non-Hodgkin lymphoma in remission, neuropathy, T2DM (non-insulin dependent), chronic indwelling bains presents w/ shaking, dizziness and fall. Patient is a resident from [facility] where he was sent in according to documentation from the facility for 'Fell in facility, started feeling dizzy and began shaking'. On phone discussion with staff at the facility patient had fall and then subsequently had difficulty with ambulation. Patient was denying any symptoms however subsequently also started to shake and so EMT was called. Staff endorses that when patient was sent to ED he was at baseline mental status. On my interview patient is wanting to sleep but is arouses went prompted. Reports that he feels fine currently (staff at facility reports that patient does not like hospitals and so says he feels fine in order to go home). He denies any neck pain, headache, abdominal pain, back pain, cough, shortness of breath, sputum production. On presentation to ED - T: 103.5, , /67. CT head and CT cervical no acute changes. Received Cefepime, Ceftriaxone and Vancomycin in ED for sepsis of suspected urinary etiology.     REVIEW OF SYSTEMS: see cc/HPI  CONSTITUTIONAL: No weakness, fevers or chills  EYES/ENT: No visual changes;  No vertigo or throat pain   NECK: No pain or stiffness  RESPIRATORY: No cough, wheezing, hemoptysis; No shortness of breath  CARDIOVASCULAR: No chest pain or palpitations  GASTROINTESTINAL: No abdominal or epigastric pain. No nausea, vomiting, or hematemesis; No diarrhea or constipation. No melena or hematochezia.  GENITOURINARY: No dysuria, frequency or hematuria  NEUROLOGICAL: No numbness or weakness  SKIN: No itching, rashes    Physical Exam: Responding to questioning w/ limited responses  General: WN/WD NAD  Neurology: A&Ox3, nonfocal, follows commands  Eyes: PERRLA/ EOMI  ENT/Neck: Neck supple, trachea midline, No JVD  Respiratory: CTA B/L, No wheezing, rales, rhonchi  CV: Normal rate regular rhythm, S1S2, no murmurs, rubs or gallops  Abdominal: Soft, NT, ND +BS,   Extremities: No edema, + peripheral pulses  Skin: No Rashes, Hematoma, Ecchymosis  Incisions: n/a  Tubes: n/a    A/P  Sepsis / UTI / complicated- indwelling cath for urinary retention/ elevated lactic acid level  -IV abx and IF fluid resuscitation   -check Ucx ( prior Acinetobacter/ E. Fecalis and Blood Cx)  -ID eval   -Is and Os    HTN / Dyslipidemia   - c/w current Rx    Schizophrenia   -c/w Olanzapine and trazodone    Tobacco abuse/ wheezing   -smoking cessation   -Duoneb PRN     DVT prophylaxis

## 2019-11-02 NOTE — ED PROVIDER NOTE - CLINICAL SUMMARY MEDICAL DECISION MAKING FREE TEXT BOX
78 yo M PMH DM, dyslipidemia, COPD, schizophrenia BIBA s/p fall. According to EMS, pt had a mechanical fall. No injuries. Pt also c/o generalized weakness x few days. No other complaints. Denies cough, congestion, head injury, LOC, abd pain, n/v/d, or rash. labs ekg imaging reviewed. VS improved. HR 110s, BP 160s. Pt feeling improved. PERRLA, neck full ROM, no nuchal rigidity, CN2-12 grossly intact, no sensory or motor deficits throughout, no ataxia, no drift. Rapid alternating intact. Will admit

## 2019-11-02 NOTE — ED PROVIDER NOTE - PROGRESS NOTE DETAILS
Sepsis suspected at this time. Attending Note: 78 yo M PMH DM, dyslipidemia, COPD, schizophrenia BIBA s/p fall. According to EMS, pt had a mechanical fall. No injuries. Pt also c/o generalized weakness x few days. No other complaints. Denies cough, congestion, head injury, LOC, abd pain, n/v/d, or rash. On exam: Constitutional: Chronically ill appearing. Eyes: PERRLA. Extraocular movements intact, no entrapment. Conjunctiva normal. ENT: Dry mucus membranes. Neck: Supple, non tender, full range of motion. CV: (+) Tachycardia. RR. No murmurs, rubs, or gallops. +S1S2. Pulm: Clear to auscultation bilaterally. Normal work of breathing. Abd: (+) Suprapubic distention, NT, no rebound or guarding. +BS. : (+) Cobos catheter in place. Ext: Warm and well perfused x4, moving all extremities, no edema. Psy: Cooperative, appropriate. Skin: Warm, dry, no rash. Neuro: CN2-12 grossly intact no sensory or motor deficits throughout, no drift, no ataxia, rapid alternating within normal limits, neg romberg. bains catheter replaced in ED w/ atleast 1L of thick urine output.

## 2019-11-02 NOTE — ED PROVIDER NOTE - OBJECTIVE STATEMENT
Patient is a 80 yo M w/ hx of schizophrenia, COPD, hyperlipidemia, non-Hodgkin lymphoma in remission, neuropathy, DM, chronic indwelling bains BIBA from [facility] for fall. Per adult home documents, patient was weak today and had fall from standing, hit head, no LOC. Patient bedside denies any complaints.

## 2019-11-02 NOTE — H&P ADULT - ASSESSMENT
#Sepsis on presentation  - CXR showing no consolidation, f/u official read  - No tenderness on abdominal exam  - No skin changes  - No nuchal rigidity - patient denies headache  - Patient with chronic indwelling bains and just one month ago treated for sepsis secondary to pyelonephritis  - Prior cultures show Acinetobacter/ E fecalis sensitive to unasyn  - will treat with same unasyn regiment and f/u cultures  - ID eval  - f/u blood cultures  - f/u urine cultures    #Hyponatremia  - s/p IV hydration  - f/u repeat in AM    #Mild lactate elevation  - s/p 4L rescusitation  - f/u repeat in AM    #HTN  - patient with BP into 180s in ED however BP down to 160s  - restart home medications    #Schizophrenia  - c/w olanzapine and trazodone    #HLD  - c/w atorvastatin    #NPO except meds  - pending speech and swallow eval  #DVT ppx: Lovenox  #Activity: OOBTC

## 2019-11-02 NOTE — H&P ADULT - NSHPLABSRESULTS_GEN_ALL_CORE
10.2   17. )-----------( 176      ( 2019 17:05 )             30.1       11    133<L>  |  95<L>  |  22<H>  ----------------------------<  131<H>  4.4   |  22  |  1.4    Ca    8.4<L>      2019 17:05    TPro  7.3  /  Alb  4.0  /  TBili  0.3  /  DBili  x   /  AST  12  /  ALT  6   /  AlkPhos  90                Urinalysis Basic - ( 2019 17:30 )    Color: Orange / Appearance: Turbid / S.009 / pH: x  Gluc: x / Ketone: Negative  / Bili: Negative / Urobili: <2 mg/dL   Blood: x / Protein: 300 mg/dL / Nitrite: Positive   Leuk Esterase: Large / RBC: 28 /HPF / WBC >720 /HPF   Sq Epi: x / Non Sq Epi: 15 /HPF / Bacteria: Many        PT/INR - ( 2019 17:05 )   PT: 17.70 sec;   INR: 1.55 ratio         PTT - ( 2019 17:05 )  PTT:31.6 sec    Lactate Trend      CARDIAC MARKERS ( 2019 17:05 )  x     / <0.01 ng/mL / x     / x     / x            CAPILLARY BLOOD GLUCOSE      POCT Blood Glucose.: 135 mg/dL (2019 17:04)    < from: CT Head No Cont (19 @ 18:00) >    MPRESSION:     No CT evidence for acute fracture, acute intracranial hemorrhage or   midline shift.    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 This appears stable.    < end of copied text >    < from: CT Cervical Spine No Cont (19 @ 18:00) >    IMPRESSION:    No evidence of acute cervical spine fracture or subluxation.    Multilevel degenerative changes.    < end of copied text >

## 2019-11-03 LAB
E COLI DNA BLD POS QL NAA+NON-PROBE: SIGNIFICANT CHANGE UP
GLUCOSE BLDC GLUCOMTR-MCNC: 102 MG/DL — HIGH (ref 70–99)
GLUCOSE BLDC GLUCOMTR-MCNC: 133 MG/DL — HIGH (ref 70–99)
GRAM STN FLD: SIGNIFICANT CHANGE UP
METHOD TYPE: SIGNIFICANT CHANGE UP
SPECIMEN SOURCE: SIGNIFICANT CHANGE UP

## 2019-11-03 PROCEDURE — 99223 1ST HOSP IP/OBS HIGH 75: CPT | Mod: AI

## 2019-11-03 RX ORDER — CEFTRIAXONE 500 MG/1
2000 INJECTION, POWDER, FOR SOLUTION INTRAMUSCULAR; INTRAVENOUS EVERY 24 HOURS
Refills: 0 | Status: DISCONTINUED | OUTPATIENT
Start: 2019-11-03 | End: 2019-11-04

## 2019-11-03 RX ORDER — IBUPROFEN 200 MG
400 TABLET ORAL ONCE
Refills: 0 | Status: COMPLETED | OUTPATIENT
Start: 2019-11-03 | End: 2019-11-03

## 2019-11-03 RX ORDER — ACETAMINOPHEN 500 MG
650 TABLET ORAL EVERY 6 HOURS
Refills: 0 | Status: DISCONTINUED | OUTPATIENT
Start: 2019-11-03 | End: 2019-11-06

## 2019-11-03 RX ADMIN — Medication 650 MILLIGRAM(S): at 00:56

## 2019-11-03 RX ADMIN — AMPICILLIN SODIUM AND SULBACTAM SODIUM 200 GRAM(S): 250; 125 INJECTION, POWDER, FOR SUSPENSION INTRAMUSCULAR; INTRAVENOUS at 05:41

## 2019-11-03 RX ADMIN — AMPICILLIN SODIUM AND SULBACTAM SODIUM 200 GRAM(S): 250; 125 INJECTION, POWDER, FOR SUSPENSION INTRAMUSCULAR; INTRAVENOUS at 18:10

## 2019-11-03 RX ADMIN — AMPICILLIN SODIUM AND SULBACTAM SODIUM 200 GRAM(S): 250; 125 INJECTION, POWDER, FOR SUSPENSION INTRAMUSCULAR; INTRAVENOUS at 11:17

## 2019-11-03 RX ADMIN — ENOXAPARIN SODIUM 40 MILLIGRAM(S): 100 INJECTION SUBCUTANEOUS at 11:18

## 2019-11-03 RX ADMIN — Medication 650 MILLIGRAM(S): at 21:25

## 2019-11-03 RX ADMIN — Medication 100 MILLIGRAM(S): at 21:24

## 2019-11-03 RX ADMIN — GABAPENTIN 300 MILLIGRAM(S): 400 CAPSULE ORAL at 06:06

## 2019-11-03 RX ADMIN — Medication 650 MILLIGRAM(S): at 08:28

## 2019-11-03 RX ADMIN — TAMSULOSIN HYDROCHLORIDE 0.4 MILLIGRAM(S): 0.4 CAPSULE ORAL at 21:24

## 2019-11-03 RX ADMIN — CEFTRIAXONE 100 MILLIGRAM(S): 500 INJECTION, POWDER, FOR SOLUTION INTRAMUSCULAR; INTRAVENOUS at 21:24

## 2019-11-03 RX ADMIN — AMPICILLIN SODIUM AND SULBACTAM SODIUM 200 GRAM(S): 250; 125 INJECTION, POWDER, FOR SUSPENSION INTRAMUSCULAR; INTRAVENOUS at 00:29

## 2019-11-03 RX ADMIN — Medication 25 MILLIGRAM(S): at 05:52

## 2019-11-03 RX ADMIN — Medication 650 MILLIGRAM(S): at 01:21

## 2019-11-03 RX ADMIN — Medication 650 MILLIGRAM(S): at 14:32

## 2019-11-03 RX ADMIN — GABAPENTIN 300 MILLIGRAM(S): 400 CAPSULE ORAL at 18:10

## 2019-11-03 RX ADMIN — OLANZAPINE 20 MILLIGRAM(S): 15 TABLET, FILM COATED ORAL at 21:24

## 2019-11-03 RX ADMIN — Medication 25 MILLIGRAM(S): at 18:10

## 2019-11-03 RX ADMIN — PREGABALIN 1000 MICROGRAM(S): 225 CAPSULE ORAL at 11:17

## 2019-11-03 RX ADMIN — SIMVASTATIN 20 MILLIGRAM(S): 20 TABLET, FILM COATED ORAL at 21:24

## 2019-11-03 RX ADMIN — Medication 1 MILLIGRAM(S): at 11:18

## 2019-11-03 NOTE — PATIENT PROFILE ADULT - NSPROHMDIABETMGMTSTRAT_GEN_A_NUR
unknown, patient lacks capacity, unaccompanied unknown, patient lacks capacity, unaccompanied/blood glucose testing/diet modification/medication therapy

## 2019-11-03 NOTE — PATIENT PROFILE ADULT - NSPROPTRIGHTBILLOFRIGHTS_GEN_A_NUR
patient lacks capacity,  unaccompanied patient lacks capacity,  unaccompanied/patient representative

## 2019-11-03 NOTE — PATIENT PROFILE ADULT - NSPROSPIRITUALVALUESFT_GEN_A_NUR
unknown, patient lacks capacity, unaccompanied unknown, patient lacks capacity, unaccompanied  pt would now like  to see a

## 2019-11-03 NOTE — PROGRESS NOTE ADULT - ASSESSMENT
ADA VILLAGOMEZ 79y Male  MRN#: [id number]   CODE STATUS:________      SUBJECTIVE  Patient is a 79y old Male who presents with a chief complaint of sepsis (2019 09:56)  Currently admitted to medicine with the primary diagnosis of UTI (urinary tract infection)  Hospital course has been complicated by _______.   Today is hospital day 1d, and this morning he is _________ and reports ________ overnight events.     Present Today:           Cobos Catheter ()No/ ()Yes? Indication:          Central Line ()No/ ()Yes? Indication:          IV Fluids ()No/ ()Yes? Type:  Rate:  Indication:        OBJECTIVE  PAST MEDICAL & SURGICAL HISTORY  Dyslipidemia  COPD (chronic obstructive pulmonary disease)  Diabetes type 2, controlled  Schizophrenia  Encounter for screening colonoscopy    ALLERGIES:  No Known Allergies      HOME MEDICATIONS:  HOME MEDICATIONS:  ampicillin-sulbactam: 1 gram(s) intravenous every 8 hours.  End date 10/14/2019 (02 Oct 2019 14:24)  folic acid 1 mg oral tablet: 1 tab(s) orally once a day (02 Oct 2019 14:24)  gabapentin 300 mg oral capsule: 1 cap(s) orally every 12 hours (02 Oct 2019 14:24)  ipratropium-albuterol 0.5 mg-2.5 mg/3 mLinhalation solution: 3 milliliter(s) inhaled every 6 hours, As needed, Shortness of Breath and/or Wheezing (02 Oct 2019 14:24)  lactobacillus acidophilus oral capsule: 1 tab(s) orally once a day (02 Oct 2019 14:24)  metoprolol tartrate 25 mg oral tablet: 1 tab(s) orally 2 times a day (02 Oct 2019 14:24)  NIFEdipine 30 mg oral tablet, extended release: 1 tab(s) orally once a day (02 Oct 2019 14:24)  OLANZapine 20 mg oral tablet: 1 tab(s) orally once a day (at bedtime) (27 Sep 2019 15:42)  simvastatin 20 mg oral tablet: 1 tab(s) orally once a day (at bedtime) (27 Sep 2019 15:42)  Sodium Chloride 1 g oral tablet: 1 tab(s) orally once a day (27 Sep 2019 15:42)  tamsulosin 0.4 mg oral capsule: 1 cap(s) orally once a day (at bedtime) (27 Sep 2019 15:42)      MEDICATIONS:  STANDING MEDICATIONS  cefTRIAXone   IVPB 2000 milliGRAM(s) IV Intermittent every 24 hours  chlorhexidine 4% Liquid 1 Application(s) Topical <User Schedule>  cyanocobalamin 1000 MICROGram(s) Oral daily  enoxaparin Injectable 40 milliGRAM(s) SubCutaneous daily  folic acid 1 milliGRAM(s) Oral daily  gabapentin 300 milliGRAM(s) Oral every 12 hours  metoprolol tartrate 25 milliGRAM(s) Oral two times a day  OLANZapine 20 milliGRAM(s) Oral at bedtime  simvastatin 20 milliGRAM(s) Oral at bedtime  tamsulosin Oral Tab/Cap - Peds 0.4 milliGRAM(s) Oral at bedtime  traZODone 100 milliGRAM(s) Oral at bedtime    PRN MEDICATIONS  acetaminophen   Tablet .. 650 milliGRAM(s) Oral every 6 hours PRN  albuterol/ipratropium for Nebulization 3 milliLiter(s) Nebulizer every 6 hours PRN      VITAL SIGNS: Last 24 Hours  T(C): 37.3 (2019 23:07), Max: 39.9 (2019 21:38)  T(F): 99.2 (2019 23:07), Max: 103.9 (2019 21:38)  HR: 89 (2019 23:07) (89 - 122)  BP: 118/57 (2019 23:07) (118/57 - 189/72)  BP(mean): --  RR: 20 (2019 21:38) (18 - 20)  SpO2: 98% (2019 12:29) (96% - 98%)    LABS:                        10.2   17.03 )-----------( 176      ( 2019 17:05 )             30.1     11-02    133<L>  |  95<L>  |  22<H>  ----------------------------<  131<H>  4.4   |  22  |  1.4    Ca    8.4<L>      2019 17:05    TPro  7.3  /  Alb  4.0  /  TBili  0.3  /  DBili  x   /  AST  12  /  ALT  6   /  AlkPhos  90  11-02    PT/INR - ( 2019 17:05 )   PT: 17.70 sec;   INR: 1.55 ratio         PTT - ( 2019 17:05 )  PTT:31.6 sec  Urinalysis Basic - ( 2019 17:30 )    Color: Orange / Appearance: Turbid / S.009 / pH: x  Gluc: x / Ketone: Negative  / Bili: Negative / Urobili: <2 mg/dL   Blood: x / Protein: 300 mg/dL / Nitrite: Positive   Leuk Esterase: Large / RBC: 28 /HPF / WBC >720 /HPF   Sq Epi: x / Non Sq Epi: 15 /HPF / Bacteria: Many            Culture - Blood (collected 2019 17:50)  Source: .Blood Blood-Peripheral  Gram Stain (2019 11:20):    Growth in anaerobic bottle: Gram Negative Rods    Growth in aerobic bottle: Gram Negative Rods  Preliminary Report (2019 11:19):    Growth in anaerobic bottle: Gram Negative Rods    Growth in aerobic bottle: Gram Negative Rods    Culture - Blood (collected 2019 17:50)  Source: .Blood Blood-Peripheral  Gram Stain (2019 09:42):    Growth in anaerobic bottle: Gram Negative Rods    Growth in aerobic bottle: Gram Negative Rods  Preliminary Report (2019 09:42):    Growth in anaerobic bottle: Gram Negative Rods    Growth in aerobic bottle: Gram Negative Rods    "Due to technical problems, Proteus sp. will Not be reported as part of    the BCID panel until further notice"    ***Blood Panel PCR results on this specimenare available    approximately 3 hours after the Gram stain result.***    Gram stain, PCR, and/or culture results may not always    correspond due to difference in methodologies.    ************************************************************    This PCR assaywas performed using [employer].    The following targets are tested for: Enterococcus,    vancomycin resistant enterococci, Listeria monocytogenes,    coagulase negative staphylococci, S. aureus,    methicillin resistant S. aureus, Streptococcus agalactiae    (Group B), S. pneumoniae, S. pyogenes (Group A),    Acinetobacter baumannii, Enterobacter cloacae, E. coli,    Klebsiella oxytoca, K. pneumoniae, Proteus sp.,    Serratia marcescens, Haemophilus influenzae,    Neisseria meningitidis, Pseudomonas aeruginosa, Candida    albicans, C. glabrata, C krusei, C parapsilosis,    C. tropicalis and the KPC resistance gene.  Organism: Blood Culture PCR (2019 09:36)  Organism: Blood Culture PCR (2019 09:36)      CARDIAC MARKERS ( 2019 17:05 )  x     / <0.01 ng/mL / x     / x     / x          RADIOLOGY:      PHYSICAL EXAM:    GENERAL: NAD, well-developed, AAOx3  HEENT:  Atraumatic, Normocephalic. EOMI, PERRLA, conjunctiva and sclera clear, No JVD  PULMONARY: Clear to auscultation bilaterally; No wheeze  CARDIOVASCULAR: Regular rate and rhythm; No murmurs, rubs, or gallops  GASTROINTESTINAL: Soft, Nontender, Nondistended; Bowel sounds present  MUSCULOSKELETAL:  2+ Peripheral Pulses, No clubbing, cyanosis, or edema  NEUROLOGY: non-focal  SKIN: No rashes or lesions      ADMISSION SUMMARY  Patient is a 79y old Male who presents with a chief complaint of sepsis (2019 09:56)  Currently admitted to medicine with the primary diagnosis of UTI (urinary tract infection)  Hospital course has been complicated by _______.       ASSESSMENT & PLAN    1. UTI SEPSIS      2.    3. Dyslipidemia  COPD (chronic obstructive pulmonary disease)  Diabetes type 2, controlled  Schizophrenia        Present today:  ( ) Congestive Heart Failure, Yes? ( )Acute / ( )Acute on Chronic / ( )Chronic  :  ( )Systolic / ( )Diastolic               Plan:  ( ) Complicated Pneumonia, Type?  ( )Parapneumonic effusion / ( )Abscess / ( ) Multilobar / ( )Other               Plan:  ( ) Morbid Obesity, Yes? BMI:               Plan:  ( ) Functional Quadriplegia               Plan:  ( ) Encephalopathy               Plan:    DVT ppx:  GI ppx:  Diet:  Activity:  Lines:  Code status:  Dispo: ADA VILLAGOMEZ 79y Male  MRN#: [id number]   CODE STATUS: full code      SUBJECTIVE  Patient is a 79y old Male who presents with a chief complaint of sepsis   Currently admitted to medicine with the primary diagnosis of UTI (urinary tract infection)  Today is hospital day 1d, and this morning he is resting in bed and reports no overnight events.       OBJECTIVE  PAST MEDICAL & SURGICAL HISTORY  Dyslipidemia  COPD (chronic obstructive pulmonary disease)  Diabetes type 2, controlled  Schizophrenia  Encounter for screening colonoscopy    ALLERGIES:  No Known Allergies      HOME MEDICATIONS:  HOME MEDICATIONS:  ampicillin-sulbactam: 1 gram(s) intravenous every 8 hours.  End date 10/14/2019 (02 Oct 2019 14:24)  folic acid 1 mg oral tablet: 1 tab(s) orally once a day (02 Oct 2019 14:24)  gabapentin 300 mg oral capsule: 1 cap(s) orally every 12 hours (02 Oct 2019 14:24)  ipratropium-albuterol 0.5 mg-2.5 mg/3 mLinhalation solution: 3 milliliter(s) inhaled every 6 hours, As needed, Shortness of Breath and/or Wheezing (02 Oct 2019 14:24)  lactobacillus acidophilus oral capsule: 1 tab(s) orally once a day (02 Oct 2019 14:24)  metoprolol tartrate 25 mg oral tablet: 1 tab(s) orally 2 times a day (02 Oct 2019 14:24)  NIFEdipine 30 mg oral tablet, extended release: 1 tab(s) orally once a day (02 Oct 2019 14:24)  OLANZapine 20 mg oral tablet: 1 tab(s) orally once a day (at bedtime) (27 Sep 2019 15:42)  simvastatin 20 mg oral tablet: 1 tab(s) orally once a day (at bedtime) (27 Sep 2019 15:42)  Sodium Chloride 1 g oral tablet: 1 tab(s) orally once a day (27 Sep 2019 15:42)  tamsulosin 0.4 mg oral capsule: 1 cap(s) orally once a day (at bedtime) (27 Sep 2019 15:42)      MEDICATIONS:  STANDING MEDICATIONS  cefTRIAXone   IVPB 2000 milliGRAM(s) IV Intermittent every 24 hours  chlorhexidine 4% Liquid 1 Application(s) Topical <User Schedule>  cyanocobalamin 1000 MICROGram(s) Oral daily  enoxaparin Injectable 40 milliGRAM(s) SubCutaneous daily  folic acid 1 milliGRAM(s) Oral daily  gabapentin 300 milliGRAM(s) Oral every 12 hours  metoprolol tartrate 25 milliGRAM(s) Oral two times a day  OLANZapine 20 milliGRAM(s) Oral at bedtime  simvastatin 20 milliGRAM(s) Oral at bedtime  tamsulosin Oral Tab/Cap - Peds 0.4 milliGRAM(s) Oral at bedtime  traZODone 100 milliGRAM(s) Oral at bedtime    PRN MEDICATIONS  acetaminophen   Tablet .. 650 milliGRAM(s) Oral every 6 hours PRN  albuterol/ipratropium for Nebulization 3 milliLiter(s) Nebulizer every 6 hours PRN      VITAL SIGNS: Last 24 Hours  T(C): 37.3 (2019 23:07), Max: 39.9 (2019 21:38)  T(F): 99.2 (2019 23:07), Max: 103.9 (2019 21:38)  HR: 89 (2019 23:07) (89 - 122)  BP: 118/57 (2019 23:07) (118/57 - 189/72)  BP(mean): --  RR: 20 (2019 21:38) (18 - 20)  SpO2: 98% (2019 12:29) (96% - 98%)    LABS:                        10.2   17.03 )-----------( 176      ( 2019 17:05 )             30.1     1102    133<L>  |  95<L>  |  22<H>  ----------------------------<  131<H>  4.4   |  22  |  1.4    Ca    8.4<L>      2019 17:05    TPro  7.3  /  Alb  4.0  /  TBili  0.3  /  DBili  x   /  AST  12  /  ALT  6   /  AlkPhos  90  11    PT/INR - ( 2019 17:05 )   PT: 17.70 sec;   INR: 1.55 ratio         PTT - ( 2019 17:05 )  PTT:31.6 sec  Urinalysis Basic - ( 2019 17:30 )    Color: Orange / Appearance: Turbid / S.009 / pH: x  Gluc: x / Ketone: Negative  / Bili: Negative / Urobili: <2 mg/dL   Blood: x / Protein: 300 mg/dL / Nitrite: Positive   Leuk Esterase: Large / RBC: 28 /HPF / WBC >720 /HPF   Sq Epi: x / Non Sq Epi: 15 /HPF / Bacteria: Many    Culture - Blood (collected 2019 17:50)  Source: .Blood Blood-Peripheral  Gram Stain (2019 11:20):    Growth in anaerobic bottle: Gram Negative Rods    Growth in aerobic bottle: Gram Negative Rods  Preliminary Report (2019 11:19):    Growth in anaerobic bottle: Gram Negative Rods    Growth in aerobic bottle: Gram Negative Rods    Culture - Blood (collected 2019 17:50)  Source: .Blood Blood-Peripheral  Gram Stain (2019 09:42):    Growth in anaerobic bottle: Gram Negative Rods    Growth in aerobic bottle: Gram Negative Rods  Preliminary Report (2019 09:42):    Growth in anaerobic bottle: Gram Negative Rods    Growth in aerobic bottle: Gram Negative Rods    "Due to technical problems, Proteus sp. will Not be reported as part of    the BCID panel until further notice"    ***Blood Panel PCR results on this specimenare available    approximately 3 hours after the Gram stain result.***    Gram stain, PCR, and/or culture results may not always    correspond due to difference in methodologies.    ************************************************************    This PCR assaywas performed using [employer].    The following targets are tested for: Enterococcus,    vancomycin resistant enterococci, Listeria monocytogenes,    coagulase negative staphylococci, S. aureus,    methicillin resistant S. aureus, Streptococcus agalactiae    (Group B), S. pneumoniae, S. pyogenes (Group A),    Acinetobacter baumannii, Enterobacter cloacae, E. coli,    Klebsiella oxytoca, K. pneumoniae, Proteus sp.,    Serratia marcescens, Haemophilus influenzae,    Neisseria meningitidis, Pseudomonas aeruginosa, Candida    albicans, C. glabrata, C krusei, C parapsilosis,    C. tropicalis and the KPC resistance gene.  Organism: Blood Culture PCR (2019 09:36)  Organism: Blood Culture PCR (2019 09:36)      CARDIAC MARKERS ( 2019 17:05 )  x     / <0.01 ng/mL / x     / x     / x        RADIOLOGY:    < from: Xray Chest 1 View-PORTABLE IMMEDIATE (19 @ 17:44) >  No radiographic evidence of acute pulmonary disease.    < end of copied text >      PHYSICAL EXAM:    GENERAL: NAD, well-developed, AAOx3  HEENT:  Atraumatic, Normocephalic. EOMI, PERRLA, conjunctiva and sclera clear, No JVD  PULMONARY: Clear to auscultation bilaterally; No wheeze  CARDIOVASCULAR: Regular rate and rhythm; No murmurs, rubs, or gallops  GASTROINTESTINAL: Soft, Nontender, Nondistended; Bowel sounds present  MUSCULOSKELETAL:  2+ Peripheral Pulses, No clubbing, cyanosis, or edema  NEUROLOGY: non-focal  SKIN: No rashes or lesions      ADMISSION SUMMARY  Patient is a 79y old Male who presents with a chief complaint of sepsis   Currently admitted to medicine with the primary diagnosis of UTI (urinary tract infection)    ASSESSMENT & PLAN    # Acute pyelonephtirits  - Patient with chronic indwelling bains and just one month ago treated for sepsis secondary to pyelonephritis  - Prior cultures show Acinetobacter/ E fecalis sensitive to Unasyn  - BCx grew E. Coli, pending sensitivities  - ID recs appreciated: Rocephin 2g q24hrs as per ID  - Blood culture grew E. coli  - f/u urine cultures    #Hyponatremia, improved  - s/p IV hydration  - f/u repeat in AM    #Mild lactate elevation  - s/p 4L rescusitation  - f/u repeat in AM    #HTN  - patient with BP into 180s in ED however BP down to 160s  - restart home medications    #Schizophrenia  - c/w olanzapine and trazodone    #HLD  - c/w atorvastatin           DVT ppx: Lovenox  GI ppx: Not indicated  Diet: Dysphagia 2 with thin  Activity: increase as tolerated  Lines: Pericephalic IVs  Code status: full code  Dispo: acute pending BCx

## 2019-11-03 NOTE — PATIENT PROFILE ADULT - NSPROEDALEARNPREF_GEN_A_NUR
unknown, patient lacks capacity, unaccompanied unknown, patient lacks capacity, unaccompanied/verbal instruction

## 2019-11-03 NOTE — PATIENT PROFILE ADULT - NSPROGENDIFFINTUB_GEN_A_NUR
unknown, patient lacks capacity, unaccompanied unknown, patient lacks capacity, unaccompanied/never intubated

## 2019-11-03 NOTE — PATIENT PROFILE ADULT - NSPROGENANESREACTION_GEN_A_NUR
unknown, patient lacks capacity, unaccompanied unknown, patient lacks capacity, unaccompanied/never had anesthesia

## 2019-11-04 LAB
GLUCOSE BLDC GLUCOMTR-MCNC: 113 MG/DL — HIGH (ref 70–99)
GLUCOSE BLDC GLUCOMTR-MCNC: 116 MG/DL — HIGH (ref 70–99)
GLUCOSE BLDC GLUCOMTR-MCNC: 126 MG/DL — HIGH (ref 70–99)
GLUCOSE BLDC GLUCOMTR-MCNC: 137 MG/DL — HIGH (ref 70–99)
GLUCOSE BLDC GLUCOMTR-MCNC: 91 MG/DL — SIGNIFICANT CHANGE UP (ref 70–99)

## 2019-11-04 PROCEDURE — 76770 US EXAM ABDO BACK WALL COMP: CPT | Mod: 26

## 2019-11-04 PROCEDURE — 99233 SBSQ HOSP IP/OBS HIGH 50: CPT

## 2019-11-04 RX ORDER — MEROPENEM 1 G/30ML
1000 INJECTION INTRAVENOUS EVERY 12 HOURS
Refills: 0 | Status: DISCONTINUED | OUTPATIENT
Start: 2019-11-04 | End: 2019-11-05

## 2019-11-04 RX ADMIN — Medication 650 MILLIGRAM(S): at 21:10

## 2019-11-04 RX ADMIN — Medication 25 MILLIGRAM(S): at 17:27

## 2019-11-04 RX ADMIN — CHLORHEXIDINE GLUCONATE 1 APPLICATION(S): 213 SOLUTION TOPICAL at 05:31

## 2019-11-04 RX ADMIN — PREGABALIN 1000 MICROGRAM(S): 225 CAPSULE ORAL at 11:21

## 2019-11-04 RX ADMIN — Medication 25 MILLIGRAM(S): at 05:30

## 2019-11-04 RX ADMIN — MEROPENEM 100 MILLIGRAM(S): 1 INJECTION INTRAVENOUS at 17:27

## 2019-11-04 RX ADMIN — Medication 100 MILLIGRAM(S): at 21:10

## 2019-11-04 RX ADMIN — Medication 650 MILLIGRAM(S): at 05:31

## 2019-11-04 RX ADMIN — OLANZAPINE 20 MILLIGRAM(S): 15 TABLET, FILM COATED ORAL at 21:11

## 2019-11-04 RX ADMIN — GABAPENTIN 300 MILLIGRAM(S): 400 CAPSULE ORAL at 05:30

## 2019-11-04 RX ADMIN — Medication 650 MILLIGRAM(S): at 13:25

## 2019-11-04 RX ADMIN — ENOXAPARIN SODIUM 40 MILLIGRAM(S): 100 INJECTION SUBCUTANEOUS at 11:21

## 2019-11-04 RX ADMIN — TAMSULOSIN HYDROCHLORIDE 0.4 MILLIGRAM(S): 0.4 CAPSULE ORAL at 21:10

## 2019-11-04 RX ADMIN — Medication 650 MILLIGRAM(S): at 22:13

## 2019-11-04 RX ADMIN — GABAPENTIN 300 MILLIGRAM(S): 400 CAPSULE ORAL at 17:27

## 2019-11-04 RX ADMIN — Medication 650 MILLIGRAM(S): at 14:46

## 2019-11-04 RX ADMIN — SIMVASTATIN 20 MILLIGRAM(S): 20 TABLET, FILM COATED ORAL at 21:10

## 2019-11-04 RX ADMIN — Medication 1 MILLIGRAM(S): at 11:21

## 2019-11-04 NOTE — PROGRESS NOTE ADULT - SUBJECTIVE AND OBJECTIVE BOX
SUBJECTIVE:    Patient is a 79y old Male who presents with a chief complaint of E coli sepsis (2019 14:02)    Currently admitted to medicine with the primary diagnosis of UTI (urinary tract infection)     Today is hospital day 2d. Denies subjective fevers, chills. AOx2. No somatic complaints   INTERVAL EVENTS: still w fevers     PAST MEDICAL & SURGICAL HISTORY  Dyslipidemia  COPD (chronic obstructive pulmonary disease)  Diabetes type 2, controlled  Schizophrenia  Encounter for screening colonoscopy      ALLERGIES:  No Known Allergies    MEDICATIONS:  STANDING MEDICATIONS  chlorhexidine 4% Liquid 1 Application(s) Topical <User Schedule>  cyanocobalamin 1000 MICROGram(s) Oral daily  enoxaparin Injectable 40 milliGRAM(s) SubCutaneous daily  folic acid 1 milliGRAM(s) Oral daily  gabapentin 300 milliGRAM(s) Oral every 12 hours  meropenem  IVPB 1000 milliGRAM(s) IV Intermittent every 12 hours  metoprolol tartrate 25 milliGRAM(s) Oral two times a day  OLANZapine 20 milliGRAM(s) Oral at bedtime  simvastatin 20 milliGRAM(s) Oral at bedtime  tamsulosin Oral Tab/Cap - Peds 0.4 milliGRAM(s) Oral at bedtime  traZODone 100 milliGRAM(s) Oral at bedtime    PRN MEDICATIONS  acetaminophen   Tablet .. 650 milliGRAM(s) Oral every 6 hours PRN  albuterol/ipratropium for Nebulization 3 milliLiter(s) Nebulizer every 6 hours PRN    VITALS:   T(F): 103.4  HR: 113  BP: 176/77  RR: 19  SpO2: --    LABS:                        10.2   17.03 )-----------( 176      ( 2019 17:05 )             30.1     11-02    133<L>  |  95<L>  |  22<H>  ----------------------------<  131<H>  4.4   |  22  |  1.4    Ca    8.4<L>      2019 17:05    TPro  7.3  /  Alb  4.0  /  TBili  0.3  /  DBili  x   /  AST  12  /  ALT  6   /  AlkPhos  90  11-    PT/INR - ( 2019 17:05 )   PT: 17.70 sec;   INR: 1.55 ratio         PTT - ( 2019 17:05 )  PTT:31.6 sec  Urinalysis Basic - ( 2019 17:30 )    Color: Orange / Appearance: Turbid / S.009 / pH: x  Gluc: x / Ketone: Negative  / Bili: Negative / Urobili: <2 mg/dL   Blood: x / Protein: 300 mg/dL / Nitrite: Positive   Leuk Esterase: Large / RBC: 28 /HPF / WBC >720 /HPF   Sq Epi: x / Non Sq Epi: 15 /HPF / Bacteria: Many    Culture - Blood (collected 2019 17:50)  Source: .Blood Blood-Peripheral  Gram Stain (2019 11:20):    Growth in anaerobic bottle: Gram Negative Rods    Growth in aerobic bottle: Gram Negative Rods  Preliminary Report (2019 12:49):    Growth in aerobic and anaerobic bottles: Escherichia coli    Culture - Blood (collected 2019 17:50)  Source: .Blood Blood-Peripheral  Gram Stain (2019 09:42):    Growth in anaerobic bottle: Gram Negative Rods    Growth in aerobic bottle: Gram Negative Rods  Preliminary Report (2019 12:04):    Growth in aerobic and anaerobic bottles: Escherichia coli    Susceptibility to follow.    "Due to technical problems, Proteus sp. will Not be reported as part of    the BCID panel until further notice"    ***Blood Panel PCR results on this specimen are available    approximately 3 hours after the Gram stain result.***    Gram stain, PCR, and/or culture results may not always    correspond due to difference in methodologies.    ************************************************************    This PCR assay was performed using [employer].    The following targets are tested for: Enterococcus,    vancomycin resistant enterococci, Listeria monocytogenes,    coagulase negative staphylococci, S. aureus,    methicillin resistant S. aureus, Streptococcus agalactiae    (Group B), S. pneumoniae, S. pyogenes (Group A),    Acinetobacter baumannii, Enterobacter cloacae, E. coli,    Klebsiella oxytoca, K. pneumoniae, Proteus sp.,    Serratia marcescens, Haemophilus influenzae,    Neisseria meningitidis, Pseudomonas aeruginosa, Candida    albicans, C. glabrata, C krusei, C parapsilosis,    C. tropicalis and the KPC resistance gene.  Organism: Blood Culture PCR (2019 09:36)  Organism: Blood Culture PCR (2019 09:36)    Culture - Urine (collected 2019 17:30)  Source: .Urine Clean Catch (Midstream)  Preliminary Report (2019 08:20):    >100,000 CFU/ml Gram Negative Rods      CARDIAC MARKERS ( 2019 17:05 )  x     / <0.01 ng/mL / x     / x     / x          PHYSICAL EXAM:  GEN: No acute distress  PULM/CHEST: Clear to auscultation bilaterally, no rales, rhonchi or wheezes   CVS: normal rate, regular rhythm, S1-S2, no murmurs  ABD: Soft, non-tender, non-distended, +BS  EXT: No edema  NEURO: AAOx2    Cobos Catheter:   Indwelling Urethral Catheter:     Connect To:  Straight Drainage/Gravity    Indication:  Urine Output Monitoring in Critically Ill (19 @ 17:04) (not performed) [active]

## 2019-11-04 NOTE — PROGRESS NOTE ADULT - SUBJECTIVE AND OBJECTIVE BOX
ADA VILLAGOMEZ  79y, Male  Allergy: No Known Allergies      CHIEF COMPLAINT: sepsis (2019 09:56)      INTERVAL EVENTS/HPI  - No acute events overnight  - T(F): , Max: 103.9 (19 @ 21:38)  - Denies any worsening symptoms  - Tolerating medication  -     ROS  General: Denies fevers, chills, nightsweats, weight loss  HEENT: Denies headache, rhinorrhea, sore throat, eye pain  CV: Denies CP, palpitations  PULM: Denies SOB, cough  GI: Denies abdominal pain, diarrhea  : Denies dysuria, hematuria  MSK: Denies arthralgias  SKIN: Denies rash   NEURO: Denies paresthesias, weakness  PSYCH: Denies depression    FH non-contributory   Social Hx non-contributory    VITALS:  T(F): 100.9, Max: 103.9 (19 @ 21:38)  HR: 84  BP: 150/67  RR: 20Vital Signs Last 24 Hrs  T(C): 38.3 (2019 05:25), Max: 39.9 (2019 21:38)  T(F): 100.9 (2019 05:25), Max: 103.9 (2019 21:38)  HR: 84 (2019 05:25) (84 - 122)  BP: 150/67 (2019 05:25) (118/57 - 182/74)  BP(mean): --  RR: 20 (2019 21:38) (20 - 20)  SpO2: --    PHYSICAL EXAM:  Gen: NAD, resting in bed  HEENT: Normocephalic, atraumatic  Neck: supple, no lymphadenopathy  CV: Regular rate & regular rhythm  Lungs: decreased BS at bases, no fremitus  Abdomen: Soft, BS present  Ext: Warm, well perfused  Neuro: non focal, awake  Skin: no rash, no erythema      TESTS & MEASUREMENTS:                        10.2   17.03 )-----------( 176      ( 2019 17:05 )             30.1     11    133<L>  |  95<L>  |  22<H>  ----------------------------<  131<H>  4.4   |  22  |  1.4    Ca    8.4<L>      2019 17:05    TPro  7.3  /  Alb  4.0  /  TBili  0.3  /  DBili  x   /  AST  12  /  ALT  6   /  AlkPhos  90  11      LIVER FUNCTIONS - ( 2019 17:05 )  Alb: 4.0 g/dL / Pro: 7.3 g/dL / ALK PHOS: 90 U/L / ALT: 6 U/L / AST: 12 U/L / GGT: x           Urinalysis Basic - ( 2019 17:30 )    Color: Orange / Appearance: Turbid / S.009 / pH: x  Gluc: x / Ketone: Negative  / Bili: Negative / Urobili: <2 mg/dL   Blood: x / Protein: 300 mg/dL / Nitrite: Positive   Leuk Esterase: Large / RBC: 28 /HPF / WBC >720 /HPF   Sq Epi: x / Non Sq Epi: 15 /HPF / Bacteria: Many        Culture - Blood (collected 19 @ 17:50)  Source: .Blood Blood-Peripheral  Gram Stain (19 @ 11:20):    Growth in anaerobic bottle: Gram Negative Rods    Growth in aerobic bottle: Gram Negative Rods  Preliminary Report (19 @ 12:49):    Growth in aerobic and anaerobic bottles: Escherichia coli    Culture - Blood (collected 19 @ 17:50)  Source: .Blood Blood-Peripheral  Gram Stain (19 @ 09:42):    Growth in anaerobic bottle: Gram Negative Rods    Growth in aerobic bottle: Gram Negative Rods  Preliminary Report (19 @ 12:04):    Growth in aerobic and anaerobic bottles: Escherichia coli    Susceptibility to follow.    "Due to technical problems, Proteus sp. will Not be reported as part of    the BCID panel until further notice"    ***Blood Panel PCR results on this specimen are available    approximately 3 hours after the Gram stain result.***    Gram stain, PCR, and/or culture results may not always    correspond due to difference in methodologies.    ************************************************************    This PCR assay was performed using [employer].    The following targets are tested for: Enterococcus,    vancomycin resistant enterococci, Listeria monocytogenes,    coagulase negative staphylococci, S. aureus,    methicillin resistant S. aureus, Streptococcus agalactiae    (Group B), S. pneumoniae, S. pyogenes (Group A),    Acinetobacter baumannii, Enterobacter cloacae, E. coli,    Klebsiella oxytoca, K. pneumoniae, Proteus sp.,    Serratia marcescens, Haemophilus influenzae,    Neisseria meningitidis, Pseudomonas aeruginosa, Candida    albicans, C. glabrata, C krusei, C parapsilosis,    C. tropicalis and the KPC resistance gene.  Organism: Blood Culture PCR (19 @ 09:36)  Organism: Blood Culture PCR (19 @ 09:36)      -  Escherichia coli: Detec      Method Type: PCR    Culture - Urine (collected 19 @ 17:30)  Source: .Urine Clean Catch (Midstream)  Preliminary Report (19 @ 08:20):    >100,000 CFU/ml Gram Negative Rods        Blood Gas Venous - Lactate: 1.7 mmoL/L (19 @ 16:56)      INFECTIOUS DISEASES TESTING      RADIOLOGY & ADDITIONAL TESTS:  I have personally reviewed the last Chest xray  CXR      CT      CARDIOLOGY TESTING  12 Lead ECG:   Ventricular Rate 116 BPM    Atrial Rate 116 BPM    P-R Interval 160 ms    QRS Duration 96 ms    Q-T Interval 354 ms    QTC Calculation(Bezet) 492 ms    P Axis 55 degrees    R Axis 26 degrees    T Axis 85 degrees    Diagnosis Line Sinus tachycardia  Left ventricular hypertrophy with repolarization abnormality  Abnormal ECG    Confirmed by Sarah Padilla MD ([id number]) on 2019 9:27:22 PM (19 @ 18:08)      MEDICATIONS  cefTRIAXone   IVPB 2000  chlorhexidine 4% Liquid 1  cyanocobalamin 1000  enoxaparin Injectable 40  folic acid 1  gabapentin 300  metoprolol tartrate 25  OLANZapine 20  simvastatin 20  tamsulosin Oral Tab/Cap - Peds 0.4  traZODone 100      ANTIBIOTICS:  cefTRIAXone   IVPB 2000 milliGRAM(s) IV Intermittent every 24 hours      All available historical data has been reviewed

## 2019-11-04 NOTE — PROGRESS NOTE ADULT - ASSESSMENT
79y M admitted with UTI SEPSIS      PROBLEMS  1. Severe Sepsis (T>101F, Pulse>90, Resp Rate>20, WBC>12), lactic acidosis due to Escherichia coli bacteremia due to UTI in pt with DM, NHL    U/A Nitrite: Positive /Leuk Esterase: Large / RBC: 28 /HPF / WBC >720 /HPF  / Bacteria: Many      New problem with additional W/U  acute illness with systemic symptoms      2. Lactic acidosis by VBG    3. Hyponatremia    PLAN  - Await urine culture  - Await susceptibilities of Escherichia coli on B/C  ContinueCeftriaxone 21gm IVPB q24h  - Repeat Blood Cultures x2  - Repeat sodium, wbc  Renal sono if urine also grows E coli

## 2019-11-04 NOTE — PROGRESS NOTE ADULT - ASSESSMENT
79y old Male who presents with a chief complaint of sepsis, currently admitted to medicine with the primary diagnosis of UTI (urinary tract infection), on antibiotics, still with fevers.     # Acute pyelonephtirits  - Patient with chronic indwelling bains and just one month ago treated for sepsis secondary to pyelonephritis  - Prior cultures show Acinetobacter/ E fecalis sensitive to Unasyn  - BCx grew E. Coli, pending sensitivities  -Rocephin 2g q24hrs as per infectious disease team, will d/c today for maribeth   -f/u urine cultures: E coli   -renal US placed   -will repeat Bcx x2     #Hyponatremia, improving   - pt has been refusing labs, f.u am labs      #HTN  - patient with BP into 180s in ED however BP down to 160s  - restart home medications    #Schizophrenia  - c/w olanzapine and trazodone    #HLD  - c/w atorvastatin         DVT ppx: Lovenox  GI ppx: Not indicated  Diet: Dysphagia 2 with thin  Activity: increase as tolerated  Lines: Pericephalic IVs  Code status: full code  Dispo: acute pending BCx

## 2019-11-04 NOTE — PROGRESS NOTE ADULT - ASSESSMENT
79M PMHx schizophrenia, COPD, HTN, nonhodgkins lymphoma, DM2 c/b neuropathy, chronic bains here with sepsis, present on admission, due to e coli bacteremia presumed due to uti.    #Sepsis, present on admission, e coli bacteremia presumed due to uti  ucx with gnr, f/u speciation  ceftriaxone  cont to have fever, suspect pyelo, check renal us  bcx until neg  confirm bains exchanged in ed  #COPD  duoneb prn  #HTN  lopressor 25 bid  #Schizophrenia  zyprexa 20  trazodone 100 qhs  #Nonhodgkins lymphoma  outpt f/u  #DM2  controlled off medications  #DVT ppx  lovenox  #Folic acid def  replete    #Progress Note Handoff:  Pending (specify):  Consults_________, Tests________, Test Results_______, Other____bcx_____  Family discussion: d/w pt at bedside re: treatment plan, primary dx  Disposition: Home___/SNF___/Other________/Unknown at this time___x_____

## 2019-11-04 NOTE — PROGRESS NOTE ADULT - SUBJECTIVE AND OBJECTIVE BOX
INTERVAL HPI/OVERNIGHT EVENTS:    SUBJECTIVE: Patient seen and examined at bedside.     no cp, sob, abd pain,  +Fever, no ha, lightheadedness, dizziness    OBJECTIVE:    VITAL SIGNS:  ICU Vital Signs Last 24 Hrs  T(C): 38.3 (2019 05:25), Max: 39.9 (2019 21:38)  T(F): 100.9 (2019 05:25), Max: 103.9 (2019 21:38)  HR: 84 (2019 05:25) (84 - 122)  BP: 150/67 (2019 05:25) (118/57 - 182/74)  BP(mean): --  ABP: --  ABP(mean): --  RR: 20 (2019 21:38) (20 - 20)  SpO2: --         @ 07:01  -   @ 07:00  --------------------------------------------------------  IN: 0 mL / OUT: 1975 mL / NET: -1975 mL      CAPILLARY BLOOD GLUCOSE      POCT Blood Glucose.: 113 mg/dL (2019 11:13)      PHYSICAL EXAM:    General: NAD  HEENT: NC/AT; PERRL, clear conjunctiva  Neck: supple  Respiratory: CTA b/l  Cardiovascular: +S1/S2; RRR  Abdomen: soft, NT/ND; +BS x4  Extremities: WWP, 2+ peripheral pulses b/l; no LE edema  Skin: normal color and turgor; no rash  Neurological:    MEDICATIONS:  MEDICATIONS  (STANDING):  cefTRIAXone   IVPB 2000 milliGRAM(s) IV Intermittent every 24 hours  chlorhexidine 4% Liquid 1 Application(s) Topical <User Schedule>  cyanocobalamin 1000 MICROGram(s) Oral daily  enoxaparin Injectable 40 milliGRAM(s) SubCutaneous daily  folic acid 1 milliGRAM(s) Oral daily  gabapentin 300 milliGRAM(s) Oral every 12 hours  metoprolol tartrate 25 milliGRAM(s) Oral two times a day  OLANZapine 20 milliGRAM(s) Oral at bedtime  simvastatin 20 milliGRAM(s) Oral at bedtime  tamsulosin Oral Tab/Cap - Peds 0.4 milliGRAM(s) Oral at bedtime  traZODone 100 milliGRAM(s) Oral at bedtime    MEDICATIONS  (PRN):  acetaminophen   Tablet .. 650 milliGRAM(s) Oral every 6 hours PRN Temp greater or equal to 38C (100.4F)  albuterol/ipratropium for Nebulization 3 milliLiter(s) Nebulizer every 6 hours PRN Bronchospasm      ALLERGIES:  Allergies    No Known Allergies    Intolerances        LABS:                        10.2   17.03 )-----------( 176      ( 2019 17:05 )             30.1     Hemoglobin: 10.2 g/dL ( @ 17:05)    CBC Full  -  ( 2019 17:05 )  WBC Count : 17.03 K/uL  RBC Count : 3.11 M/uL  Hemoglobin : 10.2 g/dL  Hematocrit : 30.1 %  Platelet Count - Automated : 176 K/uL  Mean Cell Volume : 96.8 fL  Mean Cell Hemoglobin : 32.8 pg  Mean Cell Hemoglobin Concentration : 33.9 g/dL  Auto Neutrophil # : 14.84 K/uL  Auto Lymphocyte # : 0.81 K/uL  Auto Monocyte # : 1.27 K/uL  Auto Eosinophil # : 0.00 K/uL  Auto Basophil # : 0.01 K/uL  Auto Neutrophil % : 87.0 %  Auto Lymphocyte % : 4.8 %  Auto Monocyte % : 7.5 %  Auto Eosinophil % : 0.0 %  Auto Basophil % : 0.1 %        133<L>  |  95<L>  |  22<H>  ----------------------------<  131<H>  4.4   |  22  |  1.4    Ca    8.4<L>      2019 17:05    TPro  7.3  /  Alb  4.0  /  TBili  0.3  /  DBili  x   /  AST  12  /  ALT  6   /  AlkPhos  90      Creatinine Trend: 1.4<--  LIVER FUNCTIONS - ( 2019 17:05 )  Alb: 4.0 g/dL / Pro: 7.3 g/dL / ALK PHOS: 90 U/L / ALT: 6 U/L / AST: 12 U/L / GGT: x           PT/INR - ( 2019 17:05 )   PT: 17.70 sec;   INR: 1.55 ratio         PTT - ( 2019 17:05 )  PTT:31.6 sec    hs Troponin:            Urinalysis Basic - ( 2019 17:30 )    Color: Orange / Appearance: Turbid / S.009 / pH: x  Gluc: x / Ketone: Negative  / Bili: Negative / Urobili: <2 mg/dL   Blood: x / Protein: 300 mg/dL / Nitrite: Positive   Leuk Esterase: Large / RBC: 28 /HPF / WBC >720 /HPF   Sq Epi: x / Non Sq Epi: 15 /HPF / Bacteria: Many      CSF:                      EKG:   MICROBIOLOGY:    Culture - Blood (collected 2019 17:50)  Source: .Blood Blood-Peripheral  Gram Stain (2019 11:20):    Growth in anaerobic bottle: Gram Negative Rods    Growth in aerobic bottle: Gram Negative Rods  Preliminary Report (2019 12:49):    Growth in aerobic and anaerobic bottles: Escherichia coli    Culture - Blood (collected 2019 17:50)  Source: .Blood Blood-Peripheral  Gram Stain (2019 09:42):    Growth in anaerobic bottle: Gram Negative Rods    Growth in aerobic bottle: Gram Negative Rods  Preliminary Report (2019 12:04):    Growth in aerobic and anaerobic bottles: Escherichia coli    Susceptibility to follow.    "Due to technical problems, Proteus sp. will Not be reported as part of    the BCID panel until further notice"    ***Blood Panel PCR results on this specimen are available    approximately 3 hours after the Gram stain result.***    Gram stain, PCR, and/or culture results may not always    correspond due to difference in methodologies.    ************************************************************    This PCR assay was performed using [employer].    The following targets are tested for: Enterococcus,    vancomycin resistant enterococci, Listeria monocytogenes,    coagulase negative staphylococci, S. aureus,    methicillin resistant S. aureus, Streptococcus agalactiae    (Group B), S. pneumoniae, S. pyogenes (Group A),    Acinetobacter baumannii, Enterobacter cloacae, E. coli,    Klebsiella oxytoca, K. pneumoniae, Proteus sp.,    Serratia marcescens, Haemophilus influenzae,    Neisseria meningitidis, Pseudomonas aeruginosa, Candida    albicans, C. glabrata, C krusei, C parapsilosis,    C. tropicalis and the KPC resistance gene.  Organism: Blood Culture PCR (2019 09:36)  Organism: Blood Culture PCR (2019 09:36)    Culture - Urine (collected 2019 17:30)  Source: .Urine Clean Catch (Midstream)  Preliminary Report (2019 08:20):    >100,000 CFU/ml Gram Negative Rods      IMAGING:      Labs, imaging, EKG personally reviewed    RADIOLOGY & ADDITIONAL TESTS: Reviewed.

## 2019-11-04 NOTE — SWALLOW BEDSIDE ASSESSMENT ADULT - SLP PERTINENT HISTORY OF CURRENT PROBLEM
Pt admittted s/p fall from [facility]. Sepsis, UTI. PMHx: schizophrenia, COPD, NHL
Pt admitted s/p fall from [facility]. Sepsis, UTI. PMHx: schizophrenia, COPD, NHL

## 2019-11-04 NOTE — SWALLOW BEDSIDE ASSESSMENT ADULT - SWALLOW EVAL: DIAGNOSIS
No s/s aspiration/penetration for thin liquids, puree, mechancial soft. Mild-moderate oral dysphagia
moderate oral dysphagia for Dysphagia Diet II mechanical soft consistency with ground meat, thins with no overt s/s of aspiration vs penetration

## 2019-11-04 NOTE — SWALLOW BEDSIDE ASSESSMENT ADULT - ORAL PHASE
Decreased anterior-posterior movement of the bolus/Delayed oral transit time
Decreased anterior-posterior movement of the bolus/Delayed oral transit time

## 2019-11-05 LAB
-  AMIKACIN: SIGNIFICANT CHANGE UP
-  AMIKACIN: SIGNIFICANT CHANGE UP
-  AMPICILLIN/SULBACTAM: SIGNIFICANT CHANGE UP
-  AMPICILLIN/SULBACTAM: SIGNIFICANT CHANGE UP
-  AMPICILLIN: SIGNIFICANT CHANGE UP
-  AMPICILLIN: SIGNIFICANT CHANGE UP
-  AZTREONAM: SIGNIFICANT CHANGE UP
-  AZTREONAM: SIGNIFICANT CHANGE UP
-  CEFAZOLIN: SIGNIFICANT CHANGE UP
-  CEFAZOLIN: SIGNIFICANT CHANGE UP
-  CEFEPIME: SIGNIFICANT CHANGE UP
-  CEFEPIME: SIGNIFICANT CHANGE UP
-  CEFOXITIN: SIGNIFICANT CHANGE UP
-  CEFOXITIN: SIGNIFICANT CHANGE UP
-  CEFTRIAXONE: SIGNIFICANT CHANGE UP
-  CEFTRIAXONE: SIGNIFICANT CHANGE UP
-  CIPROFLOXACIN: SIGNIFICANT CHANGE UP
-  CIPROFLOXACIN: SIGNIFICANT CHANGE UP
-  ERTAPENEM: SIGNIFICANT CHANGE UP
-  ERTAPENEM: SIGNIFICANT CHANGE UP
-  GENTAMICIN: SIGNIFICANT CHANGE UP
-  GENTAMICIN: SIGNIFICANT CHANGE UP
-  IMIPENEM: SIGNIFICANT CHANGE UP
-  IMIPENEM: SIGNIFICANT CHANGE UP
-  LEVOFLOXACIN: SIGNIFICANT CHANGE UP
-  LEVOFLOXACIN: SIGNIFICANT CHANGE UP
-  MEROPENEM: SIGNIFICANT CHANGE UP
-  MEROPENEM: SIGNIFICANT CHANGE UP
-  NITROFURANTOIN: SIGNIFICANT CHANGE UP
-  PIPERACILLIN/TAZOBACTAM: SIGNIFICANT CHANGE UP
-  PIPERACILLIN/TAZOBACTAM: SIGNIFICANT CHANGE UP
-  TIGECYCLINE: SIGNIFICANT CHANGE UP
-  TOBRAMYCIN: SIGNIFICANT CHANGE UP
-  TOBRAMYCIN: SIGNIFICANT CHANGE UP
-  TRIMETHOPRIM/SULFAMETHOXAZOLE: SIGNIFICANT CHANGE UP
-  TRIMETHOPRIM/SULFAMETHOXAZOLE: SIGNIFICANT CHANGE UP
ANION GAP SERPL CALC-SCNC: 15 MMOL/L — HIGH (ref 7–14)
BASOPHILS # BLD AUTO: 0.01 K/UL — SIGNIFICANT CHANGE UP (ref 0–0.2)
BASOPHILS NFR BLD AUTO: 0.2 % — SIGNIFICANT CHANGE UP (ref 0–1)
BUN SERPL-MCNC: 16 MG/DL — SIGNIFICANT CHANGE UP (ref 10–20)
CALCIUM SERPL-MCNC: 8.2 MG/DL — LOW (ref 8.5–10.1)
CHLORIDE SERPL-SCNC: 99 MMOL/L — SIGNIFICANT CHANGE UP (ref 98–110)
CO2 SERPL-SCNC: 23 MMOL/L — SIGNIFICANT CHANGE UP (ref 17–32)
CREAT SERPL-MCNC: 1.1 MG/DL — SIGNIFICANT CHANGE UP (ref 0.7–1.5)
CULTURE RESULTS: SIGNIFICANT CHANGE UP
EOSINOPHIL # BLD AUTO: 0.03 K/UL — SIGNIFICANT CHANGE UP (ref 0–0.7)
EOSINOPHIL NFR BLD AUTO: 0.5 % — SIGNIFICANT CHANGE UP (ref 0–8)
GLUCOSE BLDC GLUCOMTR-MCNC: 110 MG/DL — HIGH (ref 70–99)
GLUCOSE BLDC GLUCOMTR-MCNC: 137 MG/DL — HIGH (ref 70–99)
GLUCOSE SERPL-MCNC: 130 MG/DL — HIGH (ref 70–99)
HCT VFR BLD CALC: 28.9 % — LOW (ref 42–52)
HGB BLD-MCNC: 9.4 G/DL — LOW (ref 14–18)
IMM GRANULOCYTES NFR BLD AUTO: 0.5 % — HIGH (ref 0.1–0.3)
LYMPHOCYTES # BLD AUTO: 0.63 K/UL — LOW (ref 1.2–3.4)
LYMPHOCYTES # BLD AUTO: 9.8 % — LOW (ref 20.5–51.1)
MAGNESIUM SERPL-MCNC: 2 MG/DL — SIGNIFICANT CHANGE UP (ref 1.8–2.4)
MCHC RBC-ENTMCNC: 31.6 PG — HIGH (ref 27–31)
MCHC RBC-ENTMCNC: 32.5 G/DL — SIGNIFICANT CHANGE UP (ref 32–37)
MCV RBC AUTO: 97.3 FL — HIGH (ref 80–94)
METHOD TYPE: SIGNIFICANT CHANGE UP
METHOD TYPE: SIGNIFICANT CHANGE UP
MONOCYTES # BLD AUTO: 0.51 K/UL — SIGNIFICANT CHANGE UP (ref 0.1–0.6)
MONOCYTES NFR BLD AUTO: 8 % — SIGNIFICANT CHANGE UP (ref 1.7–9.3)
NEUTROPHILS # BLD AUTO: 5.19 K/UL — SIGNIFICANT CHANGE UP (ref 1.4–6.5)
NEUTROPHILS NFR BLD AUTO: 81 % — HIGH (ref 42.2–75.2)
NRBC # BLD: 0 /100 WBCS — SIGNIFICANT CHANGE UP (ref 0–0)
ORGANISM # SPEC MICROSCOPIC CNT: SIGNIFICANT CHANGE UP
PLATELET # BLD AUTO: 151 K/UL — SIGNIFICANT CHANGE UP (ref 130–400)
POTASSIUM SERPL-MCNC: 3.4 MMOL/L — LOW (ref 3.5–5)
POTASSIUM SERPL-SCNC: 3.4 MMOL/L — LOW (ref 3.5–5)
RBC # BLD: 2.97 M/UL — LOW (ref 4.7–6.1)
RBC # FLD: 14 % — SIGNIFICANT CHANGE UP (ref 11.5–14.5)
SODIUM SERPL-SCNC: 137 MMOL/L — SIGNIFICANT CHANGE UP (ref 135–146)
SPECIMEN SOURCE: SIGNIFICANT CHANGE UP
WBC # BLD: 6.4 K/UL — SIGNIFICANT CHANGE UP (ref 4.8–10.8)
WBC # FLD AUTO: 6.4 K/UL — SIGNIFICANT CHANGE UP (ref 4.8–10.8)

## 2019-11-05 PROCEDURE — 93010 ELECTROCARDIOGRAM REPORT: CPT

## 2019-11-05 PROCEDURE — 99233 SBSQ HOSP IP/OBS HIGH 50: CPT

## 2019-11-05 RX ORDER — POTASSIUM CHLORIDE 20 MEQ
20 PACKET (EA) ORAL ONCE
Refills: 0 | Status: COMPLETED | OUTPATIENT
Start: 2019-11-05 | End: 2019-11-05

## 2019-11-05 RX ADMIN — Medication 100 MILLIGRAM(S): at 21:37

## 2019-11-05 RX ADMIN — GABAPENTIN 300 MILLIGRAM(S): 400 CAPSULE ORAL at 17:55

## 2019-11-05 RX ADMIN — Medication 50 MILLIEQUIVALENT(S): at 18:34

## 2019-11-05 RX ADMIN — OLANZAPINE 20 MILLIGRAM(S): 15 TABLET, FILM COATED ORAL at 21:38

## 2019-11-05 RX ADMIN — ENOXAPARIN SODIUM 40 MILLIGRAM(S): 100 INJECTION SUBCUTANEOUS at 12:12

## 2019-11-05 RX ADMIN — SIMVASTATIN 20 MILLIGRAM(S): 20 TABLET, FILM COATED ORAL at 21:37

## 2019-11-05 RX ADMIN — PREGABALIN 1000 MICROGRAM(S): 225 CAPSULE ORAL at 12:11

## 2019-11-05 RX ADMIN — GABAPENTIN 300 MILLIGRAM(S): 400 CAPSULE ORAL at 05:01

## 2019-11-05 RX ADMIN — Medication 1 TABLET(S): at 18:34

## 2019-11-05 RX ADMIN — CHLORHEXIDINE GLUCONATE 1 APPLICATION(S): 213 SOLUTION TOPICAL at 05:01

## 2019-11-05 RX ADMIN — Medication 25 MILLIGRAM(S): at 17:53

## 2019-11-05 RX ADMIN — TAMSULOSIN HYDROCHLORIDE 0.4 MILLIGRAM(S): 0.4 CAPSULE ORAL at 21:37

## 2019-11-05 RX ADMIN — Medication 1 MILLIGRAM(S): at 12:11

## 2019-11-05 RX ADMIN — MEROPENEM 100 MILLIGRAM(S): 1 INJECTION INTRAVENOUS at 05:06

## 2019-11-05 RX ADMIN — Medication 25 MILLIGRAM(S): at 05:01

## 2019-11-05 NOTE — PROGRESS NOTE ADULT - SUBJECTIVE AND OBJECTIVE BOX
INTERVAL HPI/OVERNIGHT EVENTS:    SUBJECTIVE: Patient seen and examined at bedside.     no cp, sob, abd pain, fever  no sob, orthopnea, pnd, cough    OBJECTIVE:    VITAL SIGNS:  ICU Vital Signs Last 24 Hrs  T(C): 36.1 (05 Nov 2019 06:15), Max: 39.7 (04 Nov 2019 14:32)  T(F): 96.9 (05 Nov 2019 06:15), Max: 103.4 (04 Nov 2019 14:32)  HR: 103 (05 Nov 2019 06:15) (90 - 113)  BP: 142/66 (05 Nov 2019 06:15) (118/59 - 176/77)  BP(mean): --  ABP: --  ABP(mean): --  RR: 18 (05 Nov 2019 06:15) (18 - 19)  SpO2: --        11-04 @ 07:01  -  11-05 @ 07:00  --------------------------------------------------------  IN: 50 mL / OUT: 3200 mL / NET: -3150 mL      CAPILLARY BLOOD GLUCOSE      POCT Blood Glucose.: 137 mg/dL (05 Nov 2019 11:10)      PHYSICAL EXAM:    General: NAD  HEENT: NC/AT; PERRL, clear conjunctiva  Neck: supple  Respiratory: CTA b/l  Cardiovascular: +S1/S2; RRR  Abdomen: soft, NT/ND; +BS x4  Extremities: WWP, 2+ peripheral pulses b/l; no LE edema  Skin: normal color and turgor; no rash  Neurological:    MEDICATIONS:  MEDICATIONS  (STANDING):  chlorhexidine 4% Liquid 1 Application(s) Topical <User Schedule>  cyanocobalamin 1000 MICROGram(s) Oral daily  enoxaparin Injectable 40 milliGRAM(s) SubCutaneous daily  folic acid 1 milliGRAM(s) Oral daily  gabapentin 300 milliGRAM(s) Oral every 12 hours  meropenem  IVPB 1000 milliGRAM(s) IV Intermittent every 12 hours  metoprolol tartrate 25 milliGRAM(s) Oral two times a day  OLANZapine 20 milliGRAM(s) Oral at bedtime  simvastatin 20 milliGRAM(s) Oral at bedtime  tamsulosin Oral Tab/Cap - Peds 0.4 milliGRAM(s) Oral at bedtime  traZODone 100 milliGRAM(s) Oral at bedtime    MEDICATIONS  (PRN):  acetaminophen   Tablet .. 650 milliGRAM(s) Oral every 6 hours PRN Temp greater or equal to 38C (100.4F)  albuterol/ipratropium for Nebulization 3 milliLiter(s) Nebulizer every 6 hours PRN Bronchospasm      ALLERGIES:  Allergies    No Known Allergies    Intolerances        LABS:                        9.4    6.40  )-----------( 151      ( 05 Nov 2019 06:43 )             28.9     Hemoglobin: 9.4 g/dL (11-05 @ 06:43)  Hemoglobin: 10.2 g/dL (11-02 @ 17:05)    CBC Full  -  ( 05 Nov 2019 06:43 )  WBC Count : 6.40 K/uL  RBC Count : 2.97 M/uL  Hemoglobin : 9.4 g/dL  Hematocrit : 28.9 %  Platelet Count - Automated : 151 K/uL  Mean Cell Volume : 97.3 fL  Mean Cell Hemoglobin : 31.6 pg  Mean Cell Hemoglobin Concentration : 32.5 g/dL  Auto Neutrophil # : 5.19 K/uL  Auto Lymphocyte # : 0.63 K/uL  Auto Monocyte # : 0.51 K/uL  Auto Eosinophil # : 0.03 K/uL  Auto Basophil # : 0.01 K/uL  Auto Neutrophil % : 81.0 %  Auto Lymphocyte % : 9.8 %  Auto Monocyte % : 8.0 %  Auto Eosinophil % : 0.5 %  Auto Basophil % : 0.2 %    11-05    137  |  99  |  16  ----------------------------<  130<H>  3.4<L>   |  23  |  1.1    Ca    8.2<L>      05 Nov 2019 06:43  Mg     2.0     11-05      Creatinine Trend: 1.1<--, 1.4<--        hs Troponin:              CSF:                      EKG:   MICROBIOLOGY:    Culture - Blood (collected 02 Nov 2019 17:50)  Source: .Blood Blood-Peripheral  Gram Stain (03 Nov 2019 11:20):    Growth in anaerobic bottle: Gram Negative Rods    Growth in aerobic bottle: Gram Negative Rods  Preliminary Report (04 Nov 2019 12:49):    Growth in aerobic and anaerobic bottles: Escherichia coli    Culture - Blood (collected 02 Nov 2019 17:50)  Source: .Blood Blood-Peripheral  Gram Stain (03 Nov 2019 09:42):    Growth in anaerobic bottle: Gram Negative Rods    Growth in aerobic bottle: Gram Negative Rods  Preliminary Report (04 Nov 2019 12:04):    Growth in aerobic and anaerobic bottles: Escherichia coli    Susceptibility to follow.    "Due to technical problems, Proteus sp. will Not be reported as part of    the BCID panel until further notice"    ***Blood Panel PCR results on this specimen are available    approximately 3 hours after the Gram stain result.***    Gram stain, PCR, and/or culture results may not always    correspond due to difference in methodologies.    ************************************************************    This PCR assay was performed using [email or web address].    The following targets are tested for: Enterococcus,    vancomycin resistant enterococci, Listeria monocytogenes,    coagulase negative staphylococci, S. aureus,    methicillin resistant S. aureus, Streptococcus agalactiae    (Group B), S. pneumoniae, S. pyogenes (Group A),    Acinetobacter baumannii, Enterobacter cloacae, E. coli,    Klebsiella oxytoca, K. pneumoniae, Proteus sp.,    Serratia marcescens, Haemophilus influenzae,    Neisseria meningitidis, Pseudomonas aeruginosa, Candida    albicans, C. glabrata, C krusei, C parapsilosis,    C. tropicalis and the KPC resistance gene.  Organism: Blood Culture PCR (03 Nov 2019 09:36)  Organism: Blood Culture PCR (03 Nov 2019 09:36)    Culture - Urine (collected 02 Nov 2019 17:30)  Source: .Urine Clean Catch (Midstream)  Final Report (05 Nov 2019 10:30):    >100,000 CFU/ml Escherichia coli ESBL  Organism: Escherichia coli ESBL (05 Nov 2019 10:30)  Organism: Escherichia coli ESBL (05 Nov 2019 10:30)      IMAGING:      Labs, imaging, EKG personally reviewed    RADIOLOGY & ADDITIONAL TESTS: Reviewed.

## 2019-11-05 NOTE — CONSULT NOTE ADULT - ASSESSMENT
IMPRESSION: Rehab of Debilitation     PRECAUTIONS: [    ] Cardiac  [    ] Respiratory  [    ] Seizures [    ] Contact Isolation  [    ] Droplet Isolation  [    ] Other    Weight Bearing Status:     RECOMMENDATION:    Out of Bed to Chair     DVT/Decubiti Prophylaxis    REHAB PLAN:     [   x  ] Bedside P/T 3-5 times a week   [     ] Bedside O/T  2-3 times a week   [     ] No Rehab Therapy Indicated   [     ]  Speech Therapy   Conditioning/ROM                                 ADL  Bed Mobility                                            Conditioning/ROM  Transfers                                                  Bed Mobility  Sitting /Standing Balance                      Transfers                                        Gait Training                                            Sitting/Standing Balance  Stair Training [   ]Applicable                 Home equipment Eval                                                                     Splinting  [   ] Only      GOALS:   ADL   [ x   ]   Independent         Transfers  [ x   ] Independent            Ambulation  [  x   ] Independent     [ x    ] With device                            [    ]  CG                                               [    ]  CG                                                    [     ] CG                            [    ] Min A                                          [    ] Min A                                                [     ] Min  A          DISCHARGE PLAN:   [     ]  Good candidate for Intensive Rehabilitation/Hospital based                                             Will tolerate 3hrs Intensive Rehab Daily                                       [    x  ]  Short Term Rehab in Skilled Nursing Facility                                       [      ]  Home with Outpatient or VN services                                         [      ]  Possible Candidate for Intensive Hospital based Rehab
ASSESSMENT  79y M admitted with UTI SEPSIS      PROBLEMS  1. Severe Sepsis (T>101F, Pulse>90, Resp Rate>20, WBC>12), lactic acidosis due to Escherichia coli bacteremia due to UTI in pt with DM, NHL    U/A Nitrite: Positive /Leuk Esterase: Large / RBC: 28 /HPF / WBC >720 /HPF  / Bacteria: Many      New problem with additional W/U  acute illness with systemic symptoms      2. Lactic acidosis by VBG    3. Hyponatremia    PLAN  - Await urine culture  - Await susceptibilities of Escherichia coli on B/C  D/C Unasyn  Start Ceftriaxone 21gm IVPB q24h  - Repeat Blood Cultures x2  - Repeat sodium, wbc  Renal sono if urine also grows E coli

## 2019-11-05 NOTE — PROGRESS NOTE ADULT - ASSESSMENT
79M PMHx schizophrenia, COPD, HTN, nonhodgkins lymphoma, DM2 c/b neuropathy, chronic bains here with sepsis, present on admission, due to esbl ecoli bacteremia due to uti.    #Sepsis, present on admission, esbl ecoli bacteremia due to uti  cont maribeth  can probably d/c on bactrim, will d/w id  bcx until neg  exchange bains  #COPD  duoneb prn  #HTN  lopressor 25 bid  #Schizophrenia  zyprexa 20  trazodone 100 qhs  #Nonhodgkins lymphoma  outpt f/u  #DM2  controlled off medications  #DVT ppx  lovenox  #Folic acid def  replete    #Progress Note Handoff:  Pending (specify):  Consults_________, Tests________, Test Results_______, Other____bcx_____  Family discussion: d/w pt at bedside re: treatment plan, primary dx  Disposition: Home___/SNF___/Other________/Unknown at this time___x_____

## 2019-11-05 NOTE — PROGRESS NOTE ADULT - SUBJECTIVE AND OBJECTIVE BOX
SUBJECTIVE:    Patient is a 79y old Male who presents with a chief complaint of pyelonephritis (04 Nov 2019 15:24)    Currently admitted to medicine with the primary diagnosis of UTI (urinary tract infection)     Today is hospital day 3d. This morning he is resting comfortably in bed and reports no new issues or overnight events.     INTERVAL EVENTS:     PAST MEDICAL & SURGICAL HISTORY  Dyslipidemia  COPD (chronic obstructive pulmonary disease)  Diabetes type 2, controlled  Schizophrenia  Encounter for screening colonoscopy      ALLERGIES:  No Known Allergies    MEDICATIONS:  STANDING MEDICATIONS  chlorhexidine 4% Liquid 1 Application(s) Topical <User Schedule>  cyanocobalamin 1000 MICROGram(s) Oral daily  enoxaparin Injectable 40 milliGRAM(s) SubCutaneous daily  folic acid 1 milliGRAM(s) Oral daily  gabapentin 300 milliGRAM(s) Oral every 12 hours  meropenem  IVPB 1000 milliGRAM(s) IV Intermittent every 12 hours  metoprolol tartrate 25 milliGRAM(s) Oral two times a day  OLANZapine 20 milliGRAM(s) Oral at bedtime  simvastatin 20 milliGRAM(s) Oral at bedtime  tamsulosin Oral Tab/Cap - Peds 0.4 milliGRAM(s) Oral at bedtime  traZODone 100 milliGRAM(s) Oral at bedtime    PRN MEDICATIONS  acetaminophen   Tablet .. 650 milliGRAM(s) Oral every 6 hours PRN  albuterol/ipratropium for Nebulization 3 milliLiter(s) Nebulizer every 6 hours PRN    VITALS:   T(F): 96.9  HR: 103  BP: 142/66  RR: 18  SpO2: --    LABS:                        9.4    6.40  )-----------( 151      ( 05 Nov 2019 06:43 )             28.9     11-05    137  |  99  |  16  ----------------------------<  130<H>  3.4<L>   |  23  |  1.1    Ca    8.2<L>      05 Nov 2019 06:43  Mg     2.0     11-05    Culture - Blood (collected 02 Nov 2019 17:50)  Source: .Blood Blood-Peripheral  Gram Stain (03 Nov 2019 11:20):    Growth in anaerobic bottle: Gram Negative Rods    Growth in aerobic bottle: Gram Negative Rods  Preliminary Report (04 Nov 2019 12:49):    Growth in aerobic and anaerobic bottles: Escherichia coli    Culture - Blood (collected 02 Nov 2019 17:50)  Source: .Blood Blood-Peripheral  Gram Stain (03 Nov 2019 09:42):    Growth in anaerobic bottle: Gram Negative Rods    Growth in aerobic bottle: Gram Negative Rods  Preliminary Report (04 Nov 2019 12:04):    Growth in aerobic and anaerobic bottles: Escherichia coli    Susceptibility to follow.    "Due to technical problems, Proteus sp. will Not be reported as part of    the BCID panel until further notice"    ***Blood Panel PCR results on this specimen are available    approximately 3 hours after the Gram stain result.***    Gram stain, PCR, and/or culture results may not always    correspond due to difference in methodologies.    ************************************************************    This PCR assay was performed using [employer].    The following targets are tested for: Enterococcus,    vancomycin resistant enterococci, Listeria monocytogenes,    coagulase negative staphylococci, S. aureus,    methicillin resistant S. aureus, Streptococcus agalactiae    (Group B), S. pneumoniae, S. pyogenes (Group A),    Acinetobacter baumannii, Enterobacter cloacae, E. coli,    Klebsiella oxytoca, K. pneumoniae, Proteus sp.,    Serratia marcescens, Haemophilus influenzae,    Neisseria meningitidis, Pseudomonas aeruginosa, Candida    albicans, C. glabrata, C krusei, C parapsilosis,    C. tropicalis and the KPC resistance gene.  Organism: Blood Culture PCR (03 Nov 2019 09:36)  Organism: Blood Culture PCR (03 Nov 2019 09:36)    Culture - Urine (collected 02 Nov 2019 17:30)  Source: .Urine Clean Catch (Midstream)  Final Report (05 Nov 2019 10:30):    >100,000 CFU/ml Escherichia coli ESBL  Organism: Escherichia coli ESBL (05 Nov 2019 10:30)  Organism: Escherichia coli ESBL (05 Nov 2019 10:30)    PHYSICAL EXAM:  GEN: No acute distress  PULM/CHEST: Clear to auscultation bilaterally, no rales, rhonchi or wheezes   CVS: normal rate, regular rhythm, S1-S2, no murmurs  ABD: Soft, non-tender, non-distended, +BS  EXT: No edema  NEURO: AAOx2    Cobos Catheter:   Indwelling Urethral Catheter:     Connect To:  Straight Drainage/Gravity    Indication:  Urine Output Monitoring in Critically Ill (11-02-19 @ 17:04) (not performed) [active]

## 2019-11-05 NOTE — PROGRESS NOTE ADULT - ASSESSMENT
Dispo: acute pending blood cultures with a chief complaint of sepsis, currently admitted to medicine with the primary diagnosis of UTI (urinary tract infection), on antibiotics, still with fevers.     # Acute pyelonephritis  - Patient with chronic indwelling bains and just one month ago treated for sepsis secondary to pyelonephritis  - Prior cultures show Acinetobacter/ E fecalis sensitive to Unasyn  - infectious disease team is following, 11/2 BCx grew E. Coli, pending sensitivities  -Rocephin 2g q24hrs as per infectious disease team, d/c'ed 1/4  for maribeth  - continue with maribeth 1g q12 (started 11/4)   -f/u urine cultures: E coli - ESBL, sensitive to maribeth   -renal US 11/4: bains balloon in prostate urethra, will d/c and replace; no liam hydronephrosis   -will repeat Bcx x2 -received in lab, pending     #Hyponatremia, resolved     #HTN  -cont w lopressor 25 BID    #Schizophrenia  - c/w olanzapine and trazodone    #HLD  - c/w atorvastatin         DVT ppx: Lovenox  GI ppx: Not indicated  Diet: Dysphagia 2 with thin  Activity: increase as tolerated  Code status: full code  Dispo: acute pending blood culture negative, SNF? pt is from [place], PMR for placement for SNF as pt will need abx Dispo: acute pending blood cultures with a chief complaint of sepsis, currently admitted to medicine with the primary diagnosis of UTI (urinary tract infection), on antibiotics, still with fevers.     # Acute pyelonephritis  - Patient with chronic indwelling bains and just one month ago treated for sepsis secondary to pyelonephritis  - Prior cultures show Acinetobacter/ E fecalis sensitive to Unasyn  - infectious disease team is following, 11/2 BCx grew E. Coli sens to bactrim, same as urine cx   -Rocephin 2g q24hrs as per infectious disease team, d/c'ed 1/4  for maribeth, maribeth d/c'ed 11/5 as sensitive to bactrim  -spoke w Dr. Matson, [place] w bactrim, started 11/5- f.u blood culture, continue bactrim for 14days from 1st negative Bcx (f/u 11/5 blood culture)   -renal US 11/4: bains balloon in prostate urethra, will d/c and replace; no liam hydronephrosis   -f/u 11/5 and 11/6 blood culture     #hypokalemia  - repleted, f/u am labs  -f.u EKG for QTc    #Hyponatremia, resolved     #HTN  -cont w lopressor 25 BID    #Schizophrenia  - c/w olanzapine and trazodone    #HLD  - c/w atorvastatin         DVT ppx: Lovenox  GI ppx: Not indicated  Diet: Dysphagia 2 with thin  Activity: increase as tolerated  Code status: full code  Dispo: acute pending blood culture negative, back to [place] Dispo: acute pending blood cultures with a chief complaint of sepsis, currently admitted to medicine with the primary diagnosis of UTI (urinary tract infection), on antibiotics, still with fevers.     # Acute pyelonephritis  - Patient with chronic indwelling bains and just one month ago treated for sepsis secondary to pyelonephritis  - Prior cultures show Acinetobacter/ E fecalis sensitive to Unasyn  - infectious disease team is following, 11/2 BCx grew E. Coli sens to bactrim, same as urine cx   -Rocephin 2g q24hrs as per infectious disease team, d/c'ed 1/4  for maribeth, maribeth d/c'ed 11/5 as sensitive to bactrim  -spoke w Dr. Matson, [place] w bactrim, started 11/5- f.u blood culture, continue bactrim for 14days from 1st negative Bcx (f/u 11/5 blood culture)   -renal US 11/4: bains balloon in prostate urethra, will d/c and replace; no liam hydronephrosis   -f/u 11/5 and 11/6 blood culture     #Cdiff  - f/u PCR     #hypokalemia  - repleted, f/u am labs  -f.u EKG for QTc    #Hyponatremia, resolved     #HTN  -cont w lopressor 25 BID    #Schizophrenia  - c/w olanzapine and trazodone    #HLD  - c/w atorvastatin         DVT ppx: Lovenox  GI ppx: Not indicated  Diet: Dysphagia 2 with thin  Activity: increase as tolerated  Code status: full code  Dispo: acute pending blood culture negative, back to [place]

## 2019-11-05 NOTE — CONSULT NOTE ADULT - SUBJECTIVE AND OBJECTIVE BOX
ADA VILLAGOMEZ  79y, Male  Allergy: No Known Allergies      CHIEF COMPLAINT:     HPI:  79 year-old male with PMH of schizophrenia, COPD, hyperlipidemia, non-Hodgkin lymphoma in remission, neuropathy, T2DM (non-insulin dependent), chronic indwelling bains presents w/ shaking, dizziness and fall. Patient is a resident from [facility] where he was sent in according to documentation from the facility for 'Fell in facility, started feeling dizzy and began shaking'. On phone discussion with staff at the facility patient had fall and then subsequently had difficulty with ambulation. Patient was denying any symptoms however subsequently also started to shake and so EMT was called. Staff endorses that when patient was sent to ED he was at baseline mental status. On my interview patient is wanting to sleep but is arouses went prompted. Reports that he feels fine currently (staff at facility reports that patient does not like hospitals and so says he feels fine in order to go home). He denies any neck pain, headache, abdominal pain, back pain, cough, shortness of breath, sputum production. On presentation to ED - T: 103.5, , /67. CT head and CT cervical no acute changes. Received Cefepime, Ceftriaxone and Vancomycin in ED for sepsis of suspected urinary etiology. (2019 21:09)    FAMILY HISTORY:  No pertinent family history in first degree relatives: Unable to ascertain 2/2 chronic psychiatric condition    PAST MEDICAL & SURGICAL HISTORY:  Dyslipidemia  COPD (chronic obstructive pulmonary disease)  Diabetes type 2, controlled  Schizophrenia  Encounter for screening colonoscopy    Unable to ascertain due to clinical status  Substance Use (  ) never used  (  ) IVDU (  ) Other:  Tobacco Usage:  (   ) never smoked   (   ) former smoker   (   ) current smoker   Alcohol Usage: (   ) social  (   ) daily use (   ) denies  Sexual History: na      ROS  General: Denies fevers, chills, nightsweats, weight loss  HEENT: Denies headache, rhinorrhea, sore throat, eye pain  CV: Denies CP, palpitations  PULM: Denies SOB, cough  GI: Denies abdominal pain, diarrhea  : Denies dysuria, hematuria  MSK: Denies arthralgias  SKIN: Denies rash   NEURO: Denies paresthesias, weakness  PSYCH: Denies depression    VITALS:  T(F): 99.9, Max: 103.5 (19 @ 16:48)  HR: 105  BP: 189/72  RR: 18Vital Signs Last 24 Hrs  T(C): 37.7 (2019 09:06), Max: 39.7 (2019 16:48)  T(F): 99.9 (2019 09:06), Max: 103.5 (2019 16:48)  HR: 105 (2019 08:48) (91 - 127)  BP: 189/72 (2019 08:48) (123/56 - 189/72)  BP(mean): --  RR: 18 (2019 08:48) (18 - 24)  SpO2: 96% (2019 08:48) (96% - 99%)    PHYSICAL EXAM:  Gen: NAD, resting in bed  HEENT: Normocephalic, atraumatic  Neck: supple, no lymphadenopathy  CV: Regular rate & regular rhythm  Lungs: decreased BS at bases, no fremitus  Abdomen: Soft, BS present  Ext: Warm, well perfused  Neuro: non focal, awake  Skin: no rash, no erythema    TESTS & MEASUREMENTS:                        10.2   17.03 )-----------( 176      ( 2019 17:05 )             30.1         133<L>  |  95<L>  |  22<H>  ----------------------------<  131<H>  4.4   |  22  |  1.4    Ca    8.4<L>      2019 17:05    TPro  7.3  /  Alb  4.0  /  TBili  0.3  /  DBili  x   /  AST  12  /  ALT  6   /  AlkPhos  90      eGFR if Non African American: 48 mL/min/1.73M2 (19 @ 17:05)  eGFR if : 56 mL/min/1.73M2 (19 @ 17:05)    LIVER FUNCTIONS - ( 2019 17:05 )  Alb: 4.0 g/dL / Pro: 7.3 g/dL / ALK PHOS: 90 U/L / ALT: 6 U/L / AST: 12 U/L / GGT: x           Urinalysis Basic - ( 2019 17:30 )    Color: Orange / Appearance: Turbid / S.009 / pH: x  Gluc: x / Ketone: Negative  / Bili: Negative / Urobili: <2 mg/dL   Blood: x / Protein: 300 mg/dL / Nitrite: Positive   Leuk Esterase: Large / RBC: 28 /HPF / WBC >720 /HPF   Sq Epi: x / Non Sq Epi: 15 /HPF / Bacteria: Many        Culture - Blood (collected 19 @ 17:50)  Source: .Blood Blood-Peripheral  Gram Stain (19 @ 08:30):    Growth in anaerobic bottle: Gram Negative Rods  Preliminary Report (19 @ 08:30):    Growth in anaerobic bottle: Gram Negative Rods    Culture - Blood (collected 19 @ 17:50)  Source: .Blood Blood-Peripheral  Gram Stain (19 @ 09:42):    Growth in anaerobic bottle: Gram Negative Rods    Growth in aerobic bottle: Gram Negative Rods  Preliminary Report (19 @ 09:42):    Growth in anaerobic bottle: Gram Negative Rods    Growth in aerobic bottle: Gram Negative Rods    "Due to technical problems, Proteus sp. will Not be reported as part of    the BCID panel until further notice"    ***Blood Panel PCR results on this specimenare available    approximately 3 hours after the Gram stain result.***    Gram stain, PCR, and/or culture results may not always    correspond due to difference in methodologies.    ************************************************************    This PCR assaywas performed using [employer].    The following targets are tested for: Enterococcus,    vancomycin resistant enterococci, Listeria monocytogenes,    coagulase negative staphylococci, S. aureus,    methicillin resistant S. aureus, Streptococcus agalactiae    (Group B), S. pneumoniae, S. pyogenes (Group A),    Acinetobacter baumannii, Enterobacter cloacae, E. coli,    Klebsiella oxytoca, K. pneumoniae, Proteus sp.,    Serratia marcescens, Haemophilus influenzae,    Neisseria meningitidis, Pseudomonas aeruginosa, Candida    albicans, C. glabrata, C krusei, C parapsilosis,    C. tropicalis and the KPC resistance gene.  Organism: Blood Culture PCR (19 @ 09:36)  Organism: Blood Culture PCR (19 @ 09:36)      -  Escherichia coli: Detec      Method Type: PCR        Blood Gas Venous - Lactate: 1.7 mmoL/L (19 @ 16:56)      INFECTIOUS DISEASES TESTING      RADIOLOGY & ADDITIONAL TESTS:  I have personally reviewed the last Chest xray  CXR      CT      CARDIOLOGY TESTING  12 Lead ECG:   Ventricular Rate 116 BPM    Atrial Rate 116 BPM    P-R Interval 160 ms    QRS Duration 96 ms    Q-T Interval 354 ms    QTC Calculation(Bezet) 492 ms    P Axis 55 degrees    R Axis 26 degrees    T Axis 85 degrees    Diagnosis Line Sinus tachycardia  Left ventricular hypertrophy with repolarization abnormality  Abnormal ECG    Confirmed by Sarah Padilla MD ([id number]) on 2019 9:27:22 PM (19 @ 18:08)      MEDICATIONS  ampicillin/sulbactam  IVPB 3  ampicillin/sulbactam  IVPB   chlorhexidine 4% Liquid 1  cyanocobalamin 1000  enoxaparin Injectable 40  folic acid 1  gabapentin 300  metoprolol tartrate 25  OLANZapine 20  simvastatin 20  tamsulosin Oral Tab/Cap - Peds 0.4  traZODone 100      ANTIBIOTICS:  ampicillin/sulbactam  IVPB 3 Gram(s) IV Intermittent every 6 hours  ampicillin/sulbactam  IVPB          All available historical data has been reviewed
Patient is a 79y old  Male who presents with a chief complaint of pyelonephritis (05 Nov 2019 12:13)    HPI:  79 year-old male with PMH of schizophrenia, COPD, hyperlipidemia, non-Hodgkin lymphoma in remission, neuropathy, T2DM (non-insulin dependent), chronic indwelling bains presents w/ shaking, dizziness and fall. Patient is a resident from [facility] where he was sent in according to documentation from the facility for 'Fell in facility, started feeling dizzy and began shaking'. On phone discussion with staff at the facility patient had fall and then subsequently had difficulty with ambulation. Patient was denying any symptoms however subsequently also started to shake and so EMT was called. Staff endorses that when patient was sent to ED he was at baseline mental status. On my interview patient is wanting to sleep but is arouses went prompted. Reports that he feels fine currently (staff at facility reports that patient does not like hospitals and so says he feels fine in order to go home). He denies any neck pain, headache, abdominal pain, back pain, cough, shortness of breath, sputum production. On presentation to ED - T: 103.5, , /67. CT head and CT cervical no acute changes. Received Cefepime, Ceftriaxone and Vancomycin in ED for sepsis of suspected urinary etiology. (02 Nov 2019 21:09)      PAST MEDICAL & SURGICAL HISTORY:  Dyslipidemia  COPD (chronic obstructive pulmonary disease)  Diabetes type 2, controlled  Schizophrenia  Encounter for screening colonoscopy      Hospital Course:    TODAY'S SUBJECTIVE & REVIEW OF SYMPTOMS:     Constitutional WNL   Cardio WNL   Resp WNL   GI WNL  Heme WNL  Endo WNL  Skin WNL  MSK WNL  Neuro WNL  Cognitive WNL  Psych WNL      MEDICATIONS  (STANDING):  chlorhexidine 4% Liquid 1 Application(s) Topical <User Schedule>  cyanocobalamin 1000 MICROGram(s) Oral daily  enoxaparin Injectable 40 milliGRAM(s) SubCutaneous daily  folic acid 1 milliGRAM(s) Oral daily  gabapentin 300 milliGRAM(s) Oral every 12 hours  metoprolol tartrate 25 milliGRAM(s) Oral two times a day  OLANZapine 20 milliGRAM(s) Oral at bedtime  potassium chloride  20 mEq/100 mL IVPB 20 milliEquivalent(s) IV Intermittent once  simvastatin 20 milliGRAM(s) Oral at bedtime  tamsulosin Oral Tab/Cap - Peds 0.4 milliGRAM(s) Oral at bedtime  traZODone 100 milliGRAM(s) Oral at bedtime  trimethoprim  160 mG/sulfamethoxazole 800 mG 1 Tablet(s) Oral every 12 hours    MEDICATIONS  (PRN):  acetaminophen   Tablet .. 650 milliGRAM(s) Oral every 6 hours PRN Temp greater or equal to 38C (100.4F)  albuterol/ipratropium for Nebulization 3 milliLiter(s) Nebulizer every 6 hours PRN Bronchospasm      FAMILY HISTORY:  No pertinent family history in first degree relatives: Unable to ascertain 2/2 chronic psychiatric condition      Allergies    No Known Allergies    Intolerances        SOCIAL HISTORY:    [    ] Etoh  [  x  ] Smoking  [    ] Substance abuse     Home Environment:  [ x   ] Home Alone  [    ] Lives with Family  [    ] Home Health Aid    Dwelling:  [    ] Apartment  [    ] Private House  [ x   ] Adult Home  [    ] Skilled Nursing Facility      [    ] Short Term  [    ] Long Term  [    ] Stairs                           [    ] Elevator     FUNCTIONAL STATUS PTA: (Check all that apply)  Ambulation: [   x  ]Independent    [    ] Dependent     [    ] Non-Ambulatory  Assistive Device: [    ] SA Cane  [    ]  Q Cane  [    ] Walker  [    ]  Wheelchair  ADL : [  x  ] Independent  [    ]  Dependent   CLAIMS TO WALK WITHOUT DEVICE    Vital Signs Last 24 Hrs  T(C): 37.5 (05 Nov 2019 13:56), Max: 38.3 (04 Nov 2019 21:02)  T(F): 99.5 (05 Nov 2019 13:56), Max: 100.9 (04 Nov 2019 21:02)  HR: 96 (05 Nov 2019 13:56) (90 - 104)  BP: 161/71 (05 Nov 2019 13:56) (118/59 - 161/71)  BP(mean): --  RR: 19 (05 Nov 2019 13:56) (18 - 19)  SpO2: --      PHYSICAL EXAM: Alert & Oriented X3 +tremors  GENERAL: NAD, well-groomed, well-developed  HEAD:  Atraumatic, Normocephalic  EYES: EOMI, PERRLA, conjunctiva and sclera clear  NECK: Supple  CHEST/LUNG: Andrew wheeze  HEART: Regular rate and rhythm  ABDOMEN: Soft, Nontender, Nondistended; Bowel sounds present +bains  EXTREMITIES:  no calf tenderness,no edema BLES    NERVOUS SYSTEM:  Cranial Nerves 2-12 intact [   x ] Abnormal  [    ]  ROM: WFL all extremities [  x  ]  Abnormal [     ]  Motor Strength: WFL all extremities  [   x ]  Abnormal [    ]  Sensation: intact to light touch [  x  ] Abnormal [    ]  +tremors    FUNCTIONAL STATUS:  Bed Mobility: [   ]  Independent [    ]  Supervision [  x  ]  Needs Assistance [  ]  N/A  Transfers: [    ]  Independent [    ]  Supervision [ x   ]  Needs Assistance [    ]  N/A    Ambulation:  [    ]  Independent [    ]  Supervision [x    ]  Needs Assistance [    ]  N/A   ADL:  [    ]   Independent [    ] Requires Assistance [  x  ] N/A       LABS:                        9.4    6.40  )-----------( 151      ( 05 Nov 2019 06:43 )             28.9     11-05    137  |  99  |  16  ----------------------------<  130<H>  3.4<L>   |  23  |  1.1    Ca    8.2<L>      05 Nov 2019 06:43  Mg     2.0     11-05            RADIOLOGY & ADDITIONAL STUDIES:

## 2019-11-06 ENCOUNTER — TRANSCRIPTION ENCOUNTER (OUTPATIENT)
Age: 79
End: 2019-11-06

## 2019-11-06 VITALS — HEART RATE: 83 BPM | DIASTOLIC BLOOD PRESSURE: 69 MMHG | SYSTOLIC BLOOD PRESSURE: 168 MMHG

## 2019-11-06 LAB
ANION GAP SERPL CALC-SCNC: 15 MMOL/L — HIGH (ref 7–14)
BASOPHILS # BLD AUTO: 0.01 K/UL — SIGNIFICANT CHANGE UP (ref 0–0.2)
BASOPHILS NFR BLD AUTO: 0.2 % — SIGNIFICANT CHANGE UP (ref 0–1)
BUN SERPL-MCNC: 14 MG/DL — SIGNIFICANT CHANGE UP (ref 10–20)
C DIFF BY PCR RESULT: NEGATIVE — SIGNIFICANT CHANGE UP
C DIFF TOX GENS STL QL NAA+PROBE: SIGNIFICANT CHANGE UP
CALCIUM SERPL-MCNC: 8.5 MG/DL — SIGNIFICANT CHANGE UP (ref 8.5–10.1)
CHLORIDE SERPL-SCNC: 99 MMOL/L — SIGNIFICANT CHANGE UP (ref 98–110)
CO2 SERPL-SCNC: 22 MMOL/L — SIGNIFICANT CHANGE UP (ref 17–32)
CREAT SERPL-MCNC: 1.1 MG/DL — SIGNIFICANT CHANGE UP (ref 0.7–1.5)
EOSINOPHIL # BLD AUTO: 0.04 K/UL — SIGNIFICANT CHANGE UP (ref 0–0.7)
EOSINOPHIL NFR BLD AUTO: 0.8 % — SIGNIFICANT CHANGE UP (ref 0–8)
GLUCOSE BLDC GLUCOMTR-MCNC: 97 MG/DL — SIGNIFICANT CHANGE UP (ref 70–99)
GLUCOSE SERPL-MCNC: 145 MG/DL — HIGH (ref 70–99)
HCT VFR BLD CALC: 29.5 % — LOW (ref 42–52)
HGB BLD-MCNC: 9.7 G/DL — LOW (ref 14–18)
IMM GRANULOCYTES NFR BLD AUTO: 0.6 % — HIGH (ref 0.1–0.3)
LYMPHOCYTES # BLD AUTO: 1.12 K/UL — LOW (ref 1.2–3.4)
LYMPHOCYTES # BLD AUTO: 22.3 % — SIGNIFICANT CHANGE UP (ref 20.5–51.1)
MAGNESIUM SERPL-MCNC: 1.9 MG/DL — SIGNIFICANT CHANGE UP (ref 1.8–2.4)
MCHC RBC-ENTMCNC: 31.5 PG — HIGH (ref 27–31)
MCHC RBC-ENTMCNC: 32.9 G/DL — SIGNIFICANT CHANGE UP (ref 32–37)
MCV RBC AUTO: 95.8 FL — HIGH (ref 80–94)
MONOCYTES # BLD AUTO: 0.52 K/UL — SIGNIFICANT CHANGE UP (ref 0.1–0.6)
MONOCYTES NFR BLD AUTO: 10.4 % — HIGH (ref 1.7–9.3)
NEUTROPHILS # BLD AUTO: 3.3 K/UL — SIGNIFICANT CHANGE UP (ref 1.4–6.5)
NEUTROPHILS NFR BLD AUTO: 65.7 % — SIGNIFICANT CHANGE UP (ref 42.2–75.2)
NRBC # BLD: 0 /100 WBCS — SIGNIFICANT CHANGE UP (ref 0–0)
PLATELET # BLD AUTO: 138 K/UL — SIGNIFICANT CHANGE UP (ref 130–400)
POTASSIUM SERPL-MCNC: 3.8 MMOL/L — SIGNIFICANT CHANGE UP (ref 3.5–5)
POTASSIUM SERPL-SCNC: 3.8 MMOL/L — SIGNIFICANT CHANGE UP (ref 3.5–5)
RBC # BLD: 3.08 M/UL — LOW (ref 4.7–6.1)
RBC # FLD: 13.8 % — SIGNIFICANT CHANGE UP (ref 11.5–14.5)
SODIUM SERPL-SCNC: 136 MMOL/L — SIGNIFICANT CHANGE UP (ref 135–146)
WBC # BLD: 5.02 K/UL — SIGNIFICANT CHANGE UP (ref 4.8–10.8)
WBC # FLD AUTO: 5.02 K/UL — SIGNIFICANT CHANGE UP (ref 4.8–10.8)

## 2019-11-06 PROCEDURE — 99238 HOSP IP/OBS DSCHRG MGMT 30/<: CPT

## 2019-11-06 RX ORDER — LISINOPRIL 2.5 MG/1
5 TABLET ORAL ONCE
Refills: 0 | Status: COMPLETED | OUTPATIENT
Start: 2019-11-06 | End: 2019-11-06

## 2019-11-06 RX ORDER — SENNA PLUS 8.6 MG/1
2 TABLET ORAL AT BEDTIME
Refills: 0 | Status: DISCONTINUED | OUTPATIENT
Start: 2019-11-06 | End: 2019-11-06

## 2019-11-06 RX ORDER — LISINOPRIL 2.5 MG/1
5 TABLET ORAL DAILY
Refills: 0 | Status: DISCONTINUED | OUTPATIENT
Start: 2019-11-06 | End: 2019-11-06

## 2019-11-06 RX ORDER — SENNA PLUS 8.6 MG/1
2 TABLET ORAL
Qty: 0 | Refills: 0 | DISCHARGE
Start: 2019-11-06

## 2019-11-06 RX ADMIN — CHLORHEXIDINE GLUCONATE 1 APPLICATION(S): 213 SOLUTION TOPICAL at 05:27

## 2019-11-06 RX ADMIN — Medication 1 TABLET(S): at 05:28

## 2019-11-06 RX ADMIN — Medication 1 MILLIGRAM(S): at 12:19

## 2019-11-06 RX ADMIN — PREGABALIN 1000 MICROGRAM(S): 225 CAPSULE ORAL at 12:20

## 2019-11-06 RX ADMIN — Medication 25 MILLIGRAM(S): at 05:28

## 2019-11-06 RX ADMIN — LISINOPRIL 5 MILLIGRAM(S): 2.5 TABLET ORAL at 09:46

## 2019-11-06 RX ADMIN — ENOXAPARIN SODIUM 40 MILLIGRAM(S): 100 INJECTION SUBCUTANEOUS at 12:20

## 2019-11-06 RX ADMIN — GABAPENTIN 300 MILLIGRAM(S): 400 CAPSULE ORAL at 05:28

## 2019-11-06 NOTE — DISCHARGE NOTE NURSING/CASE MANAGEMENT/SOCIAL WORK - PATIENT PORTAL LINK FT
You can access the FollowMyHealth Patient Portal offered by [facility] by registering at the following website: [email or web address]. By joining [facility]’s FollowMyHealth portal, you will also be able to view your health information using other applications (apps) compatible with our system.

## 2019-11-06 NOTE — PROGRESS NOTE ADULT - PROVIDER SPECIALTY LIST ADULT
Internal Medicine
Internal Medicine
Infectious Disease
Internal Medicine

## 2019-11-06 NOTE — PHYSICAL THERAPY INITIAL EVALUATION ADULT - GENERAL OBSERVATIONS, REHAB EVAL
1320 pm. patient rec'd/left in bed, nad, + bains, bed alarm. patient without any complaints, oriented to self and hospital, refuses to answer further questions.

## 2019-11-06 NOTE — DISCHARGE NOTE PROVIDER - NSDCFUADDAPPT_GEN_ALL_CORE_FT
- Please also make sure your 11/7 urology appt is rescheduled. - Please also make sure you go to your 11/7 urology appt

## 2019-11-06 NOTE — PROGRESS NOTE ADULT - ATTENDING COMMENTS
80 yo M PMHx schizophrenia, COPD, HTN, nonhodgkins lymphoma, DM2 c/b neuropathy, chronic bains here with sepsis, present on admission, due to esbl ecoli bacteremia due to uti.    Sepsis, present on admission 2/2 esbl ecoli bacteremia due to uti  - change to meropenem  - prelim culture from 11/5 shows no bacteremia    Chronic bains  - outpatient urology    COPD  - duoneb prn    HTN  - controlled  - c/w lopressor 25 bid    Schizophrenia  - c/w zyprexa and trazodone     Nonhodgkins lymphoma  - outpt f/u    DM2  c- ontrolled off medications    Suspected Folic acid def  - c/w supplementation  - outpatient follow up    Normocytic anemia  - outpt work up    #Progress Note Handoff:  Pending (specify):  placement  Family discussion: d/w patient at bedside- d/c to [facility] when set up with casemanagement, changing antibiotics to bactrim  Disposition: Home___/SNF___/Other____adult home____/Unknown at this time________    #Progress Note Handoff:  Pending (specify):  Consults_________, Tests________, Test Results_______, Other____bcx_____  Family discussion: d/w pt at bedside re: treatment plan, primary dx  Disposition: Home___/SNF___/Other________/Unknown at this time___x_____

## 2019-11-06 NOTE — PROGRESS NOTE ADULT - SUBJECTIVE AND OBJECTIVE BOX
SUBJECTIVE:    Patient is a 79y old Male who presents with a chief complaint of pyelonephritis (05 Nov 2019 12:13)    Currently admitted to medicine with the primary diagnosis of UTI (urinary tract infection)     Today is hospital day 4d. This morning he is resting comfortably in bed and reports no new issues or overnight events. Last bowel movement this morning. Denies chest pain, palpitations, shortness of breath, fevers, chills.     INTERVAL EVENTS: NAEON     PAST MEDICAL & SURGICAL HISTORY  Dyslipidemia  COPD (chronic obstructive pulmonary disease)  Diabetes type 2, controlled  Schizophrenia  Encounter for screening colonoscopy      ALLERGIES:  No Known Allergies    MEDICATIONS:  STANDING MEDICATIONS  chlorhexidine 4% Liquid 1 Application(s) Topical <User Schedule>  cyanocobalamin 1000 MICROGram(s) Oral daily  enoxaparin Injectable 40 milliGRAM(s) SubCutaneous daily  folic acid 1 milliGRAM(s) Oral daily  gabapentin 300 milliGRAM(s) Oral every 12 hours  lisinopril 5 milliGRAM(s) Oral daily  metoprolol tartrate 25 milliGRAM(s) Oral two times a day  OLANZapine 20 milliGRAM(s) Oral at bedtime  simvastatin 20 milliGRAM(s) Oral at bedtime  tamsulosin Oral Tab/Cap - Peds 0.4 milliGRAM(s) Oral at bedtime  traZODone 100 milliGRAM(s) Oral at bedtime  trimethoprim  160 mG/sulfamethoxazole 800 mG 1 Tablet(s) Oral every 12 hours    PRN MEDICATIONS  acetaminophen   Tablet .. 650 milliGRAM(s) Oral every 6 hours PRN  albuterol/ipratropium for Nebulization 3 milliLiter(s) Nebulizer every 6 hours PRN    VITALS:   T(F): 97  HR: 75  BP: 143/63  RR: 19  SpO2: --    LABS:                        9.7    5.02  )-----------( 138      ( 06 Nov 2019 06:35 )             29.5     11-06    136  |  99  |  14  ----------------------------<  145<H>  3.8   |  22  |  1.1    Ca    8.5      06 Nov 2019 06:35  Mg     1.9     11-06        PHYSICAL EXAM:  GEN: No acute distress  PULM/CHEST: Clear to auscultation bilaterally, no rales, rhonchi or wheezes   CVS: normal rate, regular rhythm, S1-S2, no murmurs  ABD: Soft, non-tender, non-distended, +BS  EXT: No edema  NEURO: AAOx2    Cobos Catheter:   Indwelling Urethral Catheter:     Connect To:  Straight Drainage/Gravity    Indication:  Urine Output Monitoring in Critically Ill (11-02-19 @ 17:04) (not performed) [active] SUBJECTIVE:    Patient is a 79y old Male who presents with a chief complaint of pyelonephritis (05 Nov 2019 12:13)    Currently admitted to medicine with the primary diagnosis of UTI (urinary tract infection)     Today is hospital day 4d. This morning he is resting comfortably in bed and reports no new issues or overnight events. Last bowel movement this morning. Denies chest pain, palpitations, shortness of breath, fevers, chills.     INTERVAL EVENTS: NAEON     PAST MEDICAL & SURGICAL HISTORY  Dyslipidemia  COPD (chronic obstructive pulmonary disease)  Diabetes type 2, controlled  Schizophrenia  Encounter for screening colonoscopy      ALLERGIES:  No Known Allergies    MEDICATIONS:  STANDING MEDICATIONS  chlorhexidine 4% Liquid 1 Application(s) Topical <User Schedule>  cyanocobalamin 1000 MICROGram(s) Oral daily  enoxaparin Injectable 40 milliGRAM(s) SubCutaneous daily  folic acid 1 milliGRAM(s) Oral daily  gabapentin 300 milliGRAM(s) Oral every 12 hours  lisinopril 5 milliGRAM(s) Oral daily  metoprolol tartrate 25 milliGRAM(s) Oral two times a day  OLANZapine 20 milliGRAM(s) Oral at bedtime  simvastatin 20 milliGRAM(s) Oral at bedtime  tamsulosin Oral Tab/Cap - Peds 0.4 milliGRAM(s) Oral at bedtime  traZODone 100 milliGRAM(s) Oral at bedtime  trimethoprim  160 mG/sulfamethoxazole 800 mG 1 Tablet(s) Oral every 12 hours    PRN MEDICATIONS  acetaminophen   Tablet .. 650 milliGRAM(s) Oral every 6 hours PRN  albuterol/ipratropium for Nebulization 3 milliLiter(s) Nebulizer every 6 hours PRN    VITALS:   T(F): 97  HR: 75  BP: 143/63  RR: 19  SpO2: --    LABS:                        9.7    5.02  )-----------( 138      ( 06 Nov 2019 06:35 )             29.5     11-06    136  |  99  |  14  ----------------------------<  145<H>  3.8   |  22  |  1.1    Ca    8.5      06 Nov 2019 06:35  Mg     1.9     11-06        PHYSICAL EXAM:  PHYSICAL EXAM:  GENERAL: NAD, speaks in full sentences, no signs of respiratory distress  HEAD:  Atraumatic, Normocephalic  EYES: Anicteric  NECK: Supple, No JVD  CHEST/LUNG: Clear to auscultation bilaterally; No wheeze; No crackles; No accessory muscles used  HEART: Regular rate and rhythm; No murmurs;   ABDOMEN: Soft, Nontender, Nondistended; Bowel sounds present; No guarding  EXTREMITIES:  No cyanosis or edema  PSYCH: AAOx3  NEUROLOGY: non-focal  SKIN: No rashes or lesions    Cobos Catheter:   Indwelling Urethral Catheter:     Connect To:  Straight Drainage/Gravity    Indication:  Urine Output Monitoring in Critically Ill (11-02-19 @ 17:04) (not performed) [active]

## 2019-11-06 NOTE — DISCHARGE NOTE PROVIDER - NSDCFUADDINST_GEN_ALL_CORE_FT
Sepsis is a serious condition that occurs when the body overreacts to an infection. It is also called systemic inflammatory response syndrome (SIRS) with infection. An infection is usually caused by bacteria that attack the body. The body's defense system normally fights off infection within the affected body part. With sepsis, the body overreacts and causes symptoms to occur throughout the body. This leads to uncontrolled and widespread inflammation and clotting in small blood vessels. Blood flow to different body parts decreases and may lead to organ failure. Sepsis requires immediate treatment.    You were treated in the hospital with IV antibiotics, and your symptoms resolved.     Please seek immediate medical attention if you develop fever >100.4 F, feel dizzy, have nausea or vomiting, coughing blood, have sudden trouble breathing or chest pain, severe weakness, or your skin or lips are turning blue.

## 2019-11-06 NOTE — DISCHARGE NOTE PROVIDER - HOSPITAL COURSE
79 year-old male with PMH of schizophrenia, COPD, hyperlipidemia, non-Hodgkin lymphoma in remission, neuropathy, T2DM (non-insulin dependent), chronic indwelling bains presents w/ shaking, dizziness and fall. Patient is a resident from [facility] where he was sent in according to documentation from the facility for 'Fell in facility, started feeling dizzy and began shaking'. On phone discussion with staff at the facility patient had fall and then subsequently had difficulty with ambulation. Patient was denying any symptoms however subsequently also started to shake and so EMT was called. Staff endorses that when patient was sent to ED he was at baseline mental status. On my interview patient is wanting to sleep but is arouses went prompted. Reports that he feels fine currently (staff at facility reports that patient does not like hospitals and so says he feels fine in order to go home). He denies any neck pain, headache, abdominal pain, back pain, cough, shortness of breath, sputum production. On presentation to ED - T: 103.5, , /67. CT head and CT cervical no acute changes. Received Cefepime, Ceftriaxone and Vancomycin in ED for sepsis of suspected urinary etiology. Wkup positive for E coli ESBL in urine and blood cultures, pt was treated for pyelonephritis, no hydronephrosis on imaging. As fevers have resolved and repeat blood culture is negative, pt will be discharged with 14day course of bactrim. Pt is to follow up with urology as pt recently became bains-dependent for urinary retention. 79 year-old male with PMH of schizophrenia, COPD, hyperlipidemia, non-Hodgkin lymphoma in remission, neuropathy, T2DM (non-insulin dependent), chronic indwelling bains presents w/ shaking, dizziness and fall. Patient is a resident from [facility] where he was sent in according to documentation from the facility for 'Fell in facility, started feeling dizzy and began shaking'. On phone discussion with staff at the facility patient had fall and then subsequently had difficulty with ambulation. Patient was denying any symptoms however subsequently also started to shake and so EMT was called. Staff endorses that when patient was sent to ED he was at baseline mental status. He denies any neck pain, headache, abdominal pain, back pain, cough, shortness of breath, sputum production. On presentation to ED - T: 103.5, , /67. CT head and CT cervical no acute changes. Received Cefepime, Ceftriaxone and Vancomycin in ED for sepsis of suspected urinary etiology. Wkup positive for E coli ESBL in urine and blood cultures, pt was treated for pyelonephritis, no hydronephrosis on imaging. As fevers have resolved and repeat blood culture is negative, pt will be discharged with 14day course of bactrim. Pt is to follow up with urology as pt recently became bains-dependent for urinary retention.

## 2019-11-06 NOTE — DISCHARGE NOTE PROVIDER - NSDCCPCAREPLAN_GEN_ALL_CORE_FT
PRINCIPAL DISCHARGE DIAGNOSIS  Diagnosis: UTI (urinary tract infection)  Assessment and Plan of Treatment: continue your antibiotics as prescribed. Please follow up with urology as scheduled      SECONDARY DISCHARGE DIAGNOSES  Diagnosis: Sepsis  Assessment and Plan of Treatment: From the UTI, it is important that you complete the course of antibiotics as prescribed PRINCIPAL DISCHARGE DIAGNOSIS  Diagnosis: UTI (urinary tract infection)  Assessment and Plan of Treatment: continue your antibiotics as prescribed. Please follow up with urology as scheduled      SECONDARY DISCHARGE DIAGNOSES  Diagnosis: Sepsis  Assessment and Plan of Treatment: From the UTI, it is important that you complete the course of antibiotics as prescribed   Take antibiotics (trimethoprim-sulbactam until 11/19)    Diagnosis: Urinary retention  Assessment and Plan of Treatment: continue with bains, follow up with urology

## 2019-11-06 NOTE — PROGRESS NOTE ADULT - ASSESSMENT
79y old Male who presents with a chief complaint of sepsis, currently admitted to medicine with the primary diagnosis of UTI (urinary tract infection), on antibiotics, fevers have resolved, on bactrim, pending a negative blood culture, clinically stable.     # Acute pyelonephritis currently afebrile and on bactrim   - Patient with chronic indwelling bains and just one month ago treated for sepsis secondary to pyelonephritis  - Prior cultures show Acinetobacter/ E fecalis sensitive to Unasyn  - infectious disease team is following, 11/2 BCx grew E. Coli sens to bactrim, same as urine cx   -Rocephin 2g q24hrs as per infectious disease team, d/c'ed 1/4  for maribeth, maribeth d/c'ed 11/5 as sensitive to bactrim  -spoke w Dr. Matson 11/5, OK w bactrim, started 11/5-  - f.u blood culture, continue bactrim for 14days from 1st negative Bcx (f/u 11/5 blood culture) -prelim should result in 48hours from time of collection   -renal US 11/4: bains balloon in prostate urethra, will d/c and replace; no liam hydronephrosis-s/p removal and new bains on 11/5   -f/u 11/5 and 11/6 blood culture     #Cdiff  - f/u PCR -negative  - no further episodes today     #hypokalemia  -resolved   -f.u EKG for QTc: 11/5 QTc 450, cont to monitor     #Hyponatremia, resolved     #HTN  -cont w lopressor 25 BID  -added lisinopril 5 today, cont lisinopril QD     #Schizophrenia  - c/w olanzapine and trazodone    #HLD  - c/w atorvastatin         DVT ppx: Lovenox  GI ppx: Not indicated  Diet: Dysphagia 2 with thin  Activity: increase as tolerated  Code status: full code  Dispo: acute pending blood culture negative, back to [place], anticipated for tmrw 79y old Male who presents with a chief complaint of sepsis, currently admitted to medicine with the primary diagnosis of UTI (urinary tract infection), on antibiotics, fevers have resolved, on bactrim, pending a negative blood culture, clinically stable.     # Acute pyelonephritis currently afebrile and on bactrim   - Patient with chronic indwelling bains and just one month ago treated for sepsis secondary to pyelonephritis  - Prior cultures show Acinetobacter/ E fecalis sensitive to Unasyn  - infectious disease team is following, 11/2 BCx grew E. Coli sens to bactrim, same as urine cx   -Rocephin 2g q24hrs as per infectious disease team, d/c'ed 1/4  for maribeth, maribeth d/c'ed 11/5 as sensitive to bactrim  -spoke w Dr. Matson 11/5, OK w bactrim, started 11/5-  - f.u blood culture, continue bactrim for 14days from 1st negative Bcx (f/u 11/5 blood culture) -prelim should result in 48hours from time of collection   -renal US 11/4: bains balloon in prostate urethra, will d/c and replace; no liam hydronephrosis-s/p removal and new bains on 11/5   -f/u 11/5 and 11/6 blood culture     #Cdiff  - f/u PCR -negative  - no further episodes today     #hypokalemia  -resolved   -f.u EKG for QTc: 11/5 QTc 450, cont to monitor     #Hyponatremia, resolved     #HTN  -cont w lopressor 25 BID  -added lisinopril 5 today, cont lisinopril QD     #Schizophrenia  - c/w olanzapine and trazodone    #HLD  - c/w atorvastatin         #B12 and folic deficiencies  - continue with folate and B12    DVT ppx: Lovenox  GI ppx: Not indicated  Diet: Dysphagia 2 with thin  Activity: increase as tolerated  Code status: full code  Dispo: acute pending blood culture negative, back to [place], anticipated for tmrgold Avilez sister [phone] call w updates 79y old Male who presents with a chief complaint of sepsis, currently admitted to medicine with the primary diagnosis of UTI (urinary tract infection), on antibiotics, fevers have resolved, on bactrim, pending a negative blood culture, clinically stable.     # Acute pyelonephritis currently afebrile and on bactrim   - Patient with chronic indwelling bains and just one month ago treated for sepsis secondary to pyelonephritis  - Prior cultures show Acinetobacter/ E fecalis sensitive to Unasyn  - infectious disease team is following, 11/2 BCx grew E. Coli sens to bactrim, same as urine cx   -Rocephin 2g q24hrs as per infectious disease team, d/c'ed 1/4  for maribeth, maribeth d/c'ed 11/5 as sensitive to bactrim  -spoke w Dr. Matson 11/5, OK w bactrim  - f.u blood culture, continue bactrim for 14days from 1st negative Bcx -prelim blood culture from 11/5 negative, plan for 14 days - to end 11/19   -renal US 11/4: bains balloon in prostate urethra, will d/c and replace; no liam hydronephrosis-s/p removal and new bains on 11/5   -continue to follow 11/5 and 11/6 blood cultures final     #Cdiff  - f/u PCR -negative  - no further episodes today     #hypokalemia  -resolved   -f.u EKG for QTc: 11/5 QTc 450, cont to monitor     #Hyponatremia, resolved     #HTN  -cont w lopressor 25 BID  -added lisinopril 5 today, cont lisinopril QD     #Schizophrenia  - c/w olanzapine and trazodone    #HLD  - c/w atorvastatin         #B12 and folic deficiencies  - continue with folate and B12    DVT ppx: Lovenox  GI ppx: Not indicated  Diet: Dysphagia 2 with thin  Activity: increase as tolerated  Code status: full code  Dispo: back to [place], anticipated for tmrw   Fatmata sister [phone] call w updates

## 2019-11-06 NOTE — DISCHARGE NOTE PROVIDER - NSDCMRMEDTOKEN_GEN_ALL_CORE_FT
ampicillin-sulbactam: 1 gram(s) intravenous every 8 hours.  End date 10/14/2019  cyanocobalamin 1000 mcg sublingual tablet: 1 tab(s) sublingually once a day   folic acid 1 mg oral tablet: 1 tab(s) orally once a day  gabapentin 300 mg oral capsule: 1 cap(s) orally every 12 hours  ipratropium-albuterol 0.5 mg-2.5 mg/3 mLinhalation solution: 3 milliliter(s) inhaled every 6 hours, As needed, Shortness of Breath and/or Wheezing  lactobacillus acidophilus oral capsule: 1 tab(s) orally once a day  metoprolol tartrate 25 mg oral tablet: 1 tab(s) orally 2 times a day  NIFEdipine 30 mg oral tablet, extended release: 1 tab(s) orally once a day  OLANZapine 20 mg oral tablet: 1 tab(s) orally once a day (at bedtime)  simvastatin 20 mg oral tablet: 1 tab(s) orally once a day (at bedtime)  Sodium Chloride 1 g oral tablet: 1 tab(s) orally once a day  tamsulosin 0.4 mg oral capsule: 1 cap(s) orally once a day (at bedtime)  traZODone 100 mg oral tablet: 1 tab(s) orally once a day (at bedtime) cyanocobalamin 1000 mcg sublingual tablet: 1 tab(s) sublingually once a day   folic acid 1 mg oral tablet: 1 tab(s) orally once a day  gabapentin 300 mg oral capsule: 1 cap(s) orally every 12 hours  ipratropium-albuterol 0.5 mg-2.5 mg/3 mLinhalation solution: 3 milliliter(s) inhaled every 6 hours, As needed, Shortness of Breath and/or Wheezing  lactobacillus acidophilus oral capsule: 1 tab(s) orally once a day  metoprolol tartrate 25 mg oral tablet: 1 tab(s) orally 2 times a day  NIFEdipine 30 mg oral tablet, extended release: 1 tab(s) orally once a day  OLANZapine 20 mg oral tablet: 1 tab(s) orally once a day (at bedtime)  senna oral tablet: 2 tab(s) orally once a day (at bedtime)  simvastatin 20 mg oral tablet: 1 tab(s) orally once a day (at bedtime)  Sodium Chloride 1 g oral tablet: 1 tab(s) orally once a day  sulfamethoxazole-trimethoprim 800 mg-160 mg oral tablet: 1 tab(s) orally every 12 hours  tamsulosin 0.4 mg oral capsule: 1 cap(s) orally once a day (at bedtime)  traZODone 100 mg oral tablet: 1 tab(s) orally once a day (at bedtime)

## 2019-11-06 NOTE — PHYSICAL THERAPY INITIAL EVALUATION ADULT - PLANNED THERAPY INTERVENTIONS, PT EVAL
balance training/bed mobility training/gait training/orthotic fitting/training/strengthening/transfer training

## 2019-11-06 NOTE — DISCHARGE NOTE PROVIDER - CARE PROVIDER_API CALL
Silverio Hayes)  [id number]  [address] [place] [address]  Phone: [phone]  Fax: [phone]  Follow Up Time: 1 week

## 2019-11-11 LAB
CULTURE RESULTS: SIGNIFICANT CHANGE UP
SPECIMEN SOURCE: SIGNIFICANT CHANGE UP

## 2019-11-13 NOTE — CDI QUERY NOTE - NSCDIOTHERTXTBX_GEN_ALL_CORE_HH
11/2< 79y Male complaining of fall, c/o generalized weakness, Abd: (+) Suprapubic distention, NT, no rebound or guarding. +BS. : (+) Bains catheter in place  V/S 153/67  RR 24 T 103.5    PMH DM, dyslipidemia, COPD, schizophrenia, hyperlipidemia, non-Hodgkin lymphoma in remission, neuropathy, T2DM (non-insulin dependent), chronic indwelling bains    Admitted for Sepsis on presentation, Patient with chronic indwelling bains and just one month ago treated for sepsis secondary to pyelonephritis.  Prior cultures show Acinetobacter/ E fecalis sensitive to unasyn.     - s/p 4L resuscitation, Received Cefepime, Ceftriaxone and Vancomycin in ED for sepsis of suspected urinary etiology.    11/6 DC Summary reflect UTI only.    In order to capture the severity of illness and accurate coding assignment, the DX of UTI need further clarification.    ?	UTI is due to chronic bains catheter.  ?	UTI not related to Chronic Bains Catheter  ?	Other, Please Specify  ?	Clinically unable to determine

## 2020-06-21 NOTE — PHYSICAL THERAPY INITIAL EVALUATION ADULT - PHYSICAL ASSIST/NONPHYSICAL ASSIST: BED TO CHAIR, REHAB EVAL
[Breast Self Exam] : breast self exam [Nutrition] : nutrition [Exercise] : exercise [Vitamins/Supplements] : vitamins/supplements verbal cues/1 person assist

## 2020-07-30 NOTE — ED ADULT NURSE NOTE - CAS TRG GENERAL AIRWAY, MLM
Patients daughter in law Lisa called said she had been to [facility] on Friday July 24,2020 , had EKG and was in AFIB .( has been scanned in chart ) She reports that her BP yesterday was 139/75 HR 61  BP today 111/65 HR 78 . She has not been taking  Valsartan at all , Ekaterina reports medication bottles haven't been opened . She has been having increased confusion secondary to UTI .  She has asked for refills for Eliquis be sent to [facility] pharmacy  D/t  Almost running [facility].  
Please see note attached to EKG. Thanks  
Patent

## 2020-10-13 ENCOUNTER — EMERGENCY (EMERGENCY)
Facility: HOSPITAL | Age: 80
LOS: 0 days | Discharge: HOME | End: 2020-10-13
Admitting: INTERNAL MEDICINE

## 2020-10-13 ENCOUNTER — INPATIENT (INPATIENT)
Facility: HOSPITAL | Age: 80
LOS: 0 days | Discharge: NURSING, REHAB OR LONG-TERM CARE | End: 2020-10-14
Attending: INTERNAL MEDICINE | Admitting: INTERNAL MEDICINE
Payer: MEDICARE

## 2020-10-13 VITALS
HEIGHT: 66 IN | RESPIRATION RATE: 22 BRPM | OXYGEN SATURATION: 95 % | HEART RATE: 125 BPM | TEMPERATURE: 100 F | DIASTOLIC BLOOD PRESSURE: 60 MMHG | SYSTOLIC BLOOD PRESSURE: 137 MMHG

## 2020-10-13 DIAGNOSIS — R41.82 ALTERED MENTAL STATUS, UNSPECIFIED: ICD-10-CM

## 2020-10-13 DIAGNOSIS — E11.40 TYPE 2 DIABETES MELLITUS WITH DIABETIC NEUROPATHY, UNSPECIFIED: ICD-10-CM

## 2020-10-13 DIAGNOSIS — F17.210 NICOTINE DEPENDENCE, CIGARETTES, UNCOMPLICATED: ICD-10-CM

## 2020-10-13 DIAGNOSIS — Z12.11 ENCOUNTER FOR SCREENING FOR MALIGNANT NEOPLASM OF COLON: Chronic | ICD-10-CM

## 2020-10-13 DIAGNOSIS — R50.9 FEVER, UNSPECIFIED: ICD-10-CM

## 2020-10-13 DIAGNOSIS — J44.9 CHRONIC OBSTRUCTIVE PULMONARY DISEASE, UNSPECIFIED: ICD-10-CM

## 2020-10-13 DIAGNOSIS — E78.5 HYPERLIPIDEMIA, UNSPECIFIED: ICD-10-CM

## 2020-10-13 DIAGNOSIS — N39.0 URINARY TRACT INFECTION, SITE NOT SPECIFIED: ICD-10-CM

## 2020-10-13 DIAGNOSIS — F20.9 SCHIZOPHRENIA, UNSPECIFIED: ICD-10-CM

## 2020-10-13 DIAGNOSIS — A41.9 SEPSIS, UNSPECIFIED ORGANISM: ICD-10-CM

## 2020-10-13 LAB
ALBUMIN SERPL ELPH-MCNC: 3.8 G/DL — SIGNIFICANT CHANGE UP (ref 3.5–5.2)
ALBUMIN SERPL ELPH-MCNC: 3.9 G/DL — SIGNIFICANT CHANGE UP (ref 3.5–5.2)
ALP SERPL-CCNC: 84 U/L — SIGNIFICANT CHANGE UP (ref 30–115)
ALP SERPL-CCNC: 87 U/L — SIGNIFICANT CHANGE UP (ref 30–115)
ALT FLD-CCNC: 10 U/L — SIGNIFICANT CHANGE UP (ref 0–41)
ALT FLD-CCNC: 12 U/L — SIGNIFICANT CHANGE UP (ref 0–41)
ANION GAP SERPL CALC-SCNC: 10 MMOL/L — SIGNIFICANT CHANGE UP (ref 7–14)
ANION GAP SERPL CALC-SCNC: 11 MMOL/L — SIGNIFICANT CHANGE UP (ref 7–14)
APPEARANCE UR: ABNORMAL
APTT BLD: 30.3 SEC — SIGNIFICANT CHANGE UP (ref 27–39.2)
AST SERPL-CCNC: 29 U/L — SIGNIFICANT CHANGE UP (ref 0–41)
AST SERPL-CCNC: 34 U/L — SIGNIFICANT CHANGE UP (ref 0–41)
BACTERIA # UR AUTO: ABNORMAL
BASE EXCESS BLDV CALC-SCNC: -0.4 MMOL/L — SIGNIFICANT CHANGE UP (ref -2–2)
BASOPHILS # BLD AUTO: 0 K/UL — SIGNIFICANT CHANGE UP (ref 0–0.2)
BASOPHILS # BLD AUTO: 0.01 K/UL — SIGNIFICANT CHANGE UP (ref 0–0.2)
BASOPHILS NFR BLD AUTO: 0 % — SIGNIFICANT CHANGE UP (ref 0–1)
BASOPHILS NFR BLD AUTO: 0.1 % — SIGNIFICANT CHANGE UP (ref 0–1)
BILIRUB SERPL-MCNC: 0.5 MG/DL — SIGNIFICANT CHANGE UP (ref 0.2–1.2)
BILIRUB SERPL-MCNC: 0.7 MG/DL — SIGNIFICANT CHANGE UP (ref 0.2–1.2)
BILIRUB UR-MCNC: NEGATIVE — SIGNIFICANT CHANGE UP
BUN SERPL-MCNC: 15 MG/DL — SIGNIFICANT CHANGE UP (ref 10–20)
BUN SERPL-MCNC: 17 MG/DL — SIGNIFICANT CHANGE UP (ref 10–20)
CA-I SERPL-SCNC: 1.08 MMOL/L — LOW (ref 1.12–1.3)
CALCIUM SERPL-MCNC: 8.2 MG/DL — LOW (ref 8.5–10.1)
CALCIUM SERPL-MCNC: 8.7 MG/DL — SIGNIFICANT CHANGE UP (ref 8.5–10.1)
CHLORIDE SERPL-SCNC: 100 MMOL/L — SIGNIFICANT CHANGE UP (ref 98–110)
CHLORIDE SERPL-SCNC: 97 MMOL/L — LOW (ref 98–110)
CO2 SERPL-SCNC: 21 MMOL/L — SIGNIFICANT CHANGE UP (ref 17–32)
CO2 SERPL-SCNC: 23 MMOL/L — SIGNIFICANT CHANGE UP (ref 17–32)
COLOR SPEC: YELLOW — SIGNIFICANT CHANGE UP
CREAT ?TM UR-MCNC: 26 MG/DL — SIGNIFICANT CHANGE UP
CREAT SERPL-MCNC: 1 MG/DL — SIGNIFICANT CHANGE UP (ref 0.7–1.5)
CREAT SERPL-MCNC: 1.2 MG/DL — SIGNIFICANT CHANGE UP (ref 0.7–1.5)
DIFF PNL FLD: ABNORMAL
EOSINOPHIL # BLD AUTO: 0 K/UL — SIGNIFICANT CHANGE UP (ref 0–0.7)
EOSINOPHIL # BLD AUTO: 0 K/UL — SIGNIFICANT CHANGE UP (ref 0–0.7)
EOSINOPHIL NFR BLD AUTO: 0 % — SIGNIFICANT CHANGE UP (ref 0–8)
EOSINOPHIL NFR BLD AUTO: 0 % — SIGNIFICANT CHANGE UP (ref 0–8)
EPI CELLS # UR: 1 /HPF — SIGNIFICANT CHANGE UP (ref 0–5)
ETHANOL SERPL-MCNC: <10 MG/DL — SIGNIFICANT CHANGE UP
GAS PNL BLDV: 128 MMOL/L — LOW (ref 136–145)
GAS PNL BLDV: SIGNIFICANT CHANGE UP
GLUCOSE SERPL-MCNC: 128 MG/DL — HIGH (ref 70–99)
GLUCOSE SERPL-MCNC: 228 MG/DL — HIGH (ref 70–99)
GLUCOSE UR QL: NEGATIVE — SIGNIFICANT CHANGE UP
HCO3 BLDV-SCNC: 23 MMOL/L — SIGNIFICANT CHANGE UP (ref 22–29)
HCT VFR BLD CALC: 31.6 % — LOW (ref 42–52)
HCT VFR BLD CALC: 33.8 % — LOW (ref 42–52)
HCT VFR BLDA CALC: 33.6 % — LOW (ref 34–44)
HGB BLD CALC-MCNC: 10.9 G/DL — LOW (ref 14–18)
HGB BLD-MCNC: 10.8 G/DL — LOW (ref 14–18)
HGB BLD-MCNC: 11.3 G/DL — LOW (ref 14–18)
HYALINE CASTS # UR AUTO: 4 /LPF — SIGNIFICANT CHANGE UP (ref 0–7)
IMM GRANULOCYTES NFR BLD AUTO: 0.6 % — HIGH (ref 0.1–0.3)
IMM GRANULOCYTES NFR BLD AUTO: 1 % — HIGH (ref 0.1–0.3)
INR BLD: 1.42 RATIO — HIGH (ref 0.65–1.3)
KETONES UR-MCNC: NEGATIVE — SIGNIFICANT CHANGE UP
LACTATE BLDV-MCNC: 0.9 MMOL/L — SIGNIFICANT CHANGE UP (ref 0.5–1.6)
LEUKOCYTE ESTERASE UR-ACNC: ABNORMAL
LYMPHOCYTES # BLD AUTO: 0.42 K/UL — LOW (ref 1.2–3.4)
LYMPHOCYTES # BLD AUTO: 0.64 K/UL — LOW (ref 1.2–3.4)
LYMPHOCYTES # BLD AUTO: 4.7 % — LOW (ref 20.5–51.1)
LYMPHOCYTES # BLD AUTO: 5 % — LOW (ref 20.5–51.1)
MAGNESIUM SERPL-MCNC: 2.1 MG/DL — SIGNIFICANT CHANGE UP (ref 1.8–2.4)
MCHC RBC-ENTMCNC: 32 PG — HIGH (ref 27–31)
MCHC RBC-ENTMCNC: 32.2 PG — HIGH (ref 27–31)
MCHC RBC-ENTMCNC: 33.4 G/DL — SIGNIFICANT CHANGE UP (ref 32–37)
MCHC RBC-ENTMCNC: 34.2 G/DL — SIGNIFICANT CHANGE UP (ref 32–37)
MCV RBC AUTO: 94.3 FL — HIGH (ref 80–94)
MCV RBC AUTO: 95.8 FL — HIGH (ref 80–94)
MONOCYTES # BLD AUTO: 0.15 K/UL — SIGNIFICANT CHANGE UP (ref 0.1–0.6)
MONOCYTES # BLD AUTO: 0.6 K/UL — SIGNIFICANT CHANGE UP (ref 0.1–0.6)
MONOCYTES NFR BLD AUTO: 1.8 % — SIGNIFICANT CHANGE UP (ref 1.7–9.3)
MONOCYTES NFR BLD AUTO: 4.4 % — SIGNIFICANT CHANGE UP (ref 1.7–9.3)
NEUTROPHILS # BLD AUTO: 12.25 K/UL — HIGH (ref 1.4–6.5)
NEUTROPHILS # BLD AUTO: 7.7 K/UL — HIGH (ref 1.4–6.5)
NEUTROPHILS NFR BLD AUTO: 90.2 % — HIGH (ref 42.2–75.2)
NEUTROPHILS NFR BLD AUTO: 92.2 % — HIGH (ref 42.2–75.2)
NITRITE UR-MCNC: NEGATIVE — SIGNIFICANT CHANGE UP
NRBC # BLD: 0 /100 WBCS — SIGNIFICANT CHANGE UP (ref 0–0)
NRBC # BLD: 0 /100 WBCS — SIGNIFICANT CHANGE UP (ref 0–0)
OSMOLALITY UR: 167 MOS/KG — SIGNIFICANT CHANGE UP (ref 50–1200)
PCO2 BLDV: 33 MMHG — LOW (ref 41–51)
PH BLDV: 7.45 — HIGH (ref 7.26–7.43)
PH UR: 7 — SIGNIFICANT CHANGE UP (ref 5–8)
PLATELET # BLD AUTO: 153 K/UL — SIGNIFICANT CHANGE UP (ref 130–400)
PLATELET # BLD AUTO: 157 K/UL — SIGNIFICANT CHANGE UP (ref 130–400)
PO2 BLDV: 46 MMHG — HIGH (ref 20–40)
POTASSIUM BLDV-SCNC: 3.8 MMOL/L — SIGNIFICANT CHANGE UP (ref 3.3–5.6)
POTASSIUM SERPL-MCNC: 3.9 MMOL/L — SIGNIFICANT CHANGE UP (ref 3.5–5)
POTASSIUM SERPL-MCNC: 4 MMOL/L — SIGNIFICANT CHANGE UP (ref 3.5–5)
POTASSIUM SERPL-SCNC: 3.9 MMOL/L — SIGNIFICANT CHANGE UP (ref 3.5–5)
POTASSIUM SERPL-SCNC: 4 MMOL/L — SIGNIFICANT CHANGE UP (ref 3.5–5)
POTASSIUM UR-SCNC: 21 MMOL/L — SIGNIFICANT CHANGE UP
PROT SERPL-MCNC: 6.4 G/DL — SIGNIFICANT CHANGE UP (ref 6–8)
PROT SERPL-MCNC: 6.5 G/DL — SIGNIFICANT CHANGE UP (ref 6–8)
PROT UR-MCNC: ABNORMAL
PROTHROM AB SERPL-ACNC: 16.3 SEC — HIGH (ref 9.95–12.87)
RAPID RVP RESULT: SIGNIFICANT CHANGE UP
RBC # BLD: 3.35 M/UL — LOW (ref 4.7–6.1)
RBC # BLD: 3.53 M/UL — LOW (ref 4.7–6.1)
RBC # FLD: 13.7 % — SIGNIFICANT CHANGE UP (ref 11.5–14.5)
RBC # FLD: 14 % — SIGNIFICANT CHANGE UP (ref 11.5–14.5)
RBC CASTS # UR COMP ASSIST: 90 /HPF — HIGH (ref 0–4)
SAO2 % BLDV: 86 % — SIGNIFICANT CHANGE UP
SARS-COV-2 RNA SPEC QL NAA+PROBE: SIGNIFICANT CHANGE UP
SODIUM SERPL-SCNC: 129 MMOL/L — LOW (ref 135–146)
SODIUM SERPL-SCNC: 133 MMOL/L — LOW (ref 135–146)
SODIUM UR-SCNC: 31 MMOL/L — SIGNIFICANT CHANGE UP
SP GR SPEC: 1.01 — SIGNIFICANT CHANGE UP (ref 1.01–1.03)
TROPONIN T SERPL-MCNC: <0.01 NG/ML — SIGNIFICANT CHANGE UP
UROBILINOGEN FLD QL: ABNORMAL
WBC # BLD: 13.58 K/UL — HIGH (ref 4.8–10.8)
WBC # BLD: 8.35 K/UL — SIGNIFICANT CHANGE UP (ref 4.8–10.8)
WBC # FLD AUTO: 13.58 K/UL — HIGH (ref 4.8–10.8)
WBC # FLD AUTO: 8.35 K/UL — SIGNIFICANT CHANGE UP (ref 4.8–10.8)
WBC UR QL: 90 /HPF — HIGH (ref 0–5)

## 2020-10-13 PROCEDURE — 76770 US EXAM ABDO BACK WALL COMP: CPT | Mod: 26

## 2020-10-13 PROCEDURE — 71045 X-RAY EXAM CHEST 1 VIEW: CPT | Mod: 26

## 2020-10-13 PROCEDURE — 93010 ELECTROCARDIOGRAM REPORT: CPT

## 2020-10-13 PROCEDURE — 99223 1ST HOSP IP/OBS HIGH 75: CPT | Mod: 25

## 2020-10-13 PROCEDURE — 99406 BEHAV CHNG SMOKING 3-10 MIN: CPT

## 2020-10-13 PROCEDURE — 99285 EMERGENCY DEPT VISIT HI MDM: CPT | Mod: CS,GC

## 2020-10-13 RX ORDER — ATORVASTATIN CALCIUM 80 MG/1
10 TABLET, FILM COATED ORAL DAILY
Refills: 0 | Status: DISCONTINUED | OUTPATIENT
Start: 2020-10-13 | End: 2020-10-14

## 2020-10-13 RX ORDER — IPRATROPIUM/ALBUTEROL SULFATE 18-103MCG
3 AEROSOL WITH ADAPTER (GRAM) INHALATION EVERY 6 HOURS
Refills: 0 | Status: DISCONTINUED | OUTPATIENT
Start: 2020-10-13 | End: 2020-10-14

## 2020-10-13 RX ORDER — ACETAMINOPHEN 500 MG
975 TABLET ORAL ONCE
Refills: 0 | Status: COMPLETED | OUTPATIENT
Start: 2020-10-13 | End: 2020-10-13

## 2020-10-13 RX ORDER — OLANZAPINE 15 MG/1
5 TABLET, FILM COATED ORAL AT BEDTIME
Refills: 0 | Status: DISCONTINUED | OUTPATIENT
Start: 2020-10-13 | End: 2020-10-14

## 2020-10-13 RX ORDER — OLANZAPINE 15 MG/1
20 TABLET, FILM COATED ORAL
Refills: 0 | Status: DISCONTINUED | OUTPATIENT
Start: 2020-10-13 | End: 2020-10-14

## 2020-10-13 RX ORDER — SODIUM CHLORIDE 9 MG/ML
1000 INJECTION INTRAMUSCULAR; INTRAVENOUS; SUBCUTANEOUS
Refills: 0 | Status: DISCONTINUED | OUTPATIENT
Start: 2020-10-13 | End: 2020-10-14

## 2020-10-13 RX ORDER — MEROPENEM 1 G/30ML
1000 INJECTION INTRAVENOUS ONCE
Refills: 0 | Status: COMPLETED | OUTPATIENT
Start: 2020-10-13 | End: 2020-10-13

## 2020-10-13 RX ORDER — MEROPENEM 1 G/30ML
1000 INJECTION INTRAVENOUS EVERY 8 HOURS
Refills: 0 | Status: DISCONTINUED | OUTPATIENT
Start: 2020-10-13 | End: 2020-10-13

## 2020-10-13 RX ORDER — VANCOMYCIN HCL 1 G
1000 VIAL (EA) INTRAVENOUS ONCE
Refills: 0 | Status: DISCONTINUED | OUTPATIENT
Start: 2020-10-13 | End: 2020-10-13

## 2020-10-13 RX ORDER — ERTAPENEM SODIUM 1 G/1
1000 INJECTION, POWDER, LYOPHILIZED, FOR SOLUTION INTRAMUSCULAR; INTRAVENOUS EVERY 24 HOURS
Refills: 0 | Status: DISCONTINUED | OUTPATIENT
Start: 2020-10-13 | End: 2020-10-14

## 2020-10-13 RX ORDER — ENOXAPARIN SODIUM 100 MG/ML
40 INJECTION SUBCUTANEOUS AT BEDTIME
Refills: 0 | Status: DISCONTINUED | OUTPATIENT
Start: 2020-10-13 | End: 2020-10-14

## 2020-10-13 RX ORDER — FOLIC ACID 0.8 MG
1 TABLET ORAL DAILY
Refills: 0 | Status: DISCONTINUED | OUTPATIENT
Start: 2020-10-13 | End: 2020-10-14

## 2020-10-13 RX ORDER — ALBUTEROL 90 UG/1
2 AEROSOL, METERED ORAL EVERY 6 HOURS
Refills: 0 | Status: DISCONTINUED | OUTPATIENT
Start: 2020-10-13 | End: 2020-10-14

## 2020-10-13 RX ORDER — NICOTINE POLACRILEX 2 MG
1 GUM BUCCAL DAILY
Refills: 0 | Status: DISCONTINUED | OUTPATIENT
Start: 2020-10-13 | End: 2020-10-14

## 2020-10-13 RX ORDER — NIFEDIPINE 30 MG
30 TABLET, EXTENDED RELEASE 24 HR ORAL DAILY
Refills: 0 | Status: DISCONTINUED | OUTPATIENT
Start: 2020-10-13 | End: 2020-10-14

## 2020-10-13 RX ORDER — VANCOMYCIN HCL 1 G
VIAL (EA) INTRAVENOUS
Refills: 0 | Status: DISCONTINUED | OUTPATIENT
Start: 2020-10-13 | End: 2020-10-13

## 2020-10-13 RX ORDER — TRAZODONE HCL 50 MG
150 TABLET ORAL AT BEDTIME
Refills: 0 | Status: DISCONTINUED | OUTPATIENT
Start: 2020-10-13 | End: 2020-10-14

## 2020-10-13 RX ORDER — PREGABALIN 225 MG/1
1000 CAPSULE ORAL DAILY
Refills: 0 | Status: DISCONTINUED | OUTPATIENT
Start: 2020-10-13 | End: 2020-10-14

## 2020-10-13 RX ORDER — SODIUM CHLORIDE 9 MG/ML
2200 INJECTION, SOLUTION INTRAVENOUS ONCE
Refills: 0 | Status: COMPLETED | OUTPATIENT
Start: 2020-10-13 | End: 2020-10-13

## 2020-10-13 RX ORDER — ALBUTEROL 90 UG/1
2 AEROSOL, METERED ORAL
Refills: 0 | Status: COMPLETED | OUTPATIENT
Start: 2020-10-13 | End: 2020-10-13

## 2020-10-13 RX ORDER — METOPROLOL TARTRATE 50 MG
25 TABLET ORAL
Refills: 0 | Status: DISCONTINUED | OUTPATIENT
Start: 2020-10-13 | End: 2020-10-14

## 2020-10-13 RX ORDER — SENNA PLUS 8.6 MG/1
2 TABLET ORAL AT BEDTIME
Refills: 0 | Status: DISCONTINUED | OUTPATIENT
Start: 2020-10-13 | End: 2020-10-14

## 2020-10-13 RX ORDER — GABAPENTIN 400 MG/1
300 CAPSULE ORAL
Refills: 0 | Status: DISCONTINUED | OUTPATIENT
Start: 2020-10-13 | End: 2020-10-14

## 2020-10-13 RX ADMIN — MEROPENEM 100 MILLIGRAM(S): 1 INJECTION INTRAVENOUS at 08:27

## 2020-10-13 RX ADMIN — ALBUTEROL 2 PUFF(S): 90 AEROSOL, METERED ORAL at 02:47

## 2020-10-13 RX ADMIN — ERTAPENEM SODIUM 120 MILLIGRAM(S): 1 INJECTION, POWDER, LYOPHILIZED, FOR SOLUTION INTRAMUSCULAR; INTRAVENOUS at 22:31

## 2020-10-13 RX ADMIN — Medication 975 MILLIGRAM(S): at 03:15

## 2020-10-13 RX ADMIN — OLANZAPINE 20 MILLIGRAM(S): 15 TABLET, FILM COATED ORAL at 08:27

## 2020-10-13 RX ADMIN — ALBUTEROL 2 PUFF(S): 90 AEROSOL, METERED ORAL at 02:57

## 2020-10-13 RX ADMIN — Medication 30 MILLIGRAM(S): at 06:51

## 2020-10-13 RX ADMIN — ALBUTEROL 2 PUFF(S): 90 AEROSOL, METERED ORAL at 08:20

## 2020-10-13 RX ADMIN — Medication 150 MILLIGRAM(S): at 22:10

## 2020-10-13 RX ADMIN — GABAPENTIN 300 MILLIGRAM(S): 400 CAPSULE ORAL at 20:04

## 2020-10-13 RX ADMIN — Medication 40 MILLIGRAM(S): at 20:27

## 2020-10-13 RX ADMIN — ATORVASTATIN CALCIUM 10 MILLIGRAM(S): 80 TABLET, FILM COATED ORAL at 22:10

## 2020-10-13 RX ADMIN — MEROPENEM 100 MILLIGRAM(S): 1 INJECTION INTRAVENOUS at 03:27

## 2020-10-13 RX ADMIN — ALBUTEROL 2 PUFF(S): 90 AEROSOL, METERED ORAL at 03:00

## 2020-10-13 RX ADMIN — ALBUTEROL 2 PUFF(S): 90 AEROSOL, METERED ORAL at 02:37

## 2020-10-13 RX ADMIN — ENOXAPARIN SODIUM 40 MILLIGRAM(S): 100 INJECTION SUBCUTANEOUS at 22:10

## 2020-10-13 RX ADMIN — SENNA PLUS 2 TABLET(S): 8.6 TABLET ORAL at 22:10

## 2020-10-13 RX ADMIN — OLANZAPINE 5 MILLIGRAM(S): 15 TABLET, FILM COATED ORAL at 22:10

## 2020-10-13 RX ADMIN — ALBUTEROL 2 PUFF(S): 90 AEROSOL, METERED ORAL at 02:22

## 2020-10-13 RX ADMIN — SODIUM CHLORIDE 2200 MILLILITER(S): 9 INJECTION, SOLUTION INTRAVENOUS at 02:10

## 2020-10-13 RX ADMIN — SODIUM CHLORIDE 75 MILLILITER(S): 9 INJECTION INTRAMUSCULAR; INTRAVENOUS; SUBCUTANEOUS at 20:06

## 2020-10-13 RX ADMIN — SODIUM CHLORIDE 75 MILLILITER(S): 9 INJECTION INTRAMUSCULAR; INTRAVENOUS; SUBCUTANEOUS at 06:51

## 2020-10-13 RX ADMIN — Medication 975 MILLIGRAM(S): at 02:21

## 2020-10-13 RX ADMIN — ALBUTEROL 2 PUFF(S): 90 AEROSOL, METERED ORAL at 02:27

## 2020-10-13 RX ADMIN — Medication 25 MILLIGRAM(S): at 20:04

## 2020-10-13 RX ADMIN — Medication 125 MILLIGRAM(S): at 02:18

## 2020-10-13 NOTE — ED ADULT TRIAGE NOTE - CHIEF COMPLAINT QUOTE
Patient BIBA from assisted living with shaking and alcohol intoxication per EMS call. EMS reports hematuria (patient has bains). On assessment patient states "I don't know why I'm here, why don't you tell me. I have no pain, I don't drink." Patient BIBA from assisted living with shaking and alcohol intoxication per EMS call. EMS reports hematuria (patient has bains). On assessment patient states "I don't know why I'm here, why don't you tell me. I have no pain, I don't drink."  in triage.

## 2020-10-13 NOTE — ED PROVIDER NOTE - NS ED ROS FT
Constitutional: + fevers.   Eyes:  No visual changes, eye pain or discharge.  ENMT:  No sore throat or runny nose.  Cardiac:  No chest pain, SOB or edema.   Respiratory:  +cough.  hx of COPD.   GI:  No nausea, vomiting, diarrhea or abdominal pain.  : chronic bains.   MS:  No myalgia, muscle weakness, joint pain or back pain.  Neuro:  +AMS.   Skin:  No skin rash.   Endocrine: hx of DM.

## 2020-10-13 NOTE — ED PROVIDER NOTE - PHYSICAL EXAMINATION
CONSTITUTIONAL: Well-developed; well-nourished; in no acute distress.   SKIN: warm, diaphoretic.   HEAD: Normocephalic; atraumatic.  EYES: PERRL, EOMI, no conjunctival erythema  ENT: No nasal discharge; airway clear.  NECK: Supple; non tender.  CARD: S1, S2 normal; +holosystolic murmur. tachycardic.   RESP: Tachypneic to 30s. Audible wheeze.   ABD: soft nondistended, nontender to palpation.   EXT: Normal ROM.  No clubbing, cyanosis or edema.   NEURO: Alert, oriented x1, grossly unremarkable.  PSYCH: Cooperative, appropriate.

## 2020-10-13 NOTE — H&P ADULT - ASSESSMENT
Patient is a 79 yo Males with PMH of schizophrenia, COPD, hyperlipidemia, non-Hodgkin lymphoma in remission, Type 2DM (non-insulin dependent) with neuropathy, chronic indwelling Cobos (chronic retention BIBEMS from [facility] for fevers associated with chills since this afternoon.    High grade fevers + chills likely secondary to Urosepsis  - Patient tachycardic, T max (103 F), tachypneic wbc 13 with bandemia on admission, Normotensive  - Patient with chronic indwelling Cobos (chronic urinary retention  - s/p 2 L of LR bolus (improved Tachycardia)  - c/w maintenance IVF  - History of ESBL pyelonephritis on prior admissions  - Cobos replaced in ED, f/u urine culture  - UA + leuk est +, nitrites +  - F/u blood cultures  - c/w meropenem 1 g TID  - ID consult placed  - Renal ultrasound  - Monitor Urine output    Hyponatremia  - Na 129, patient with history of hyponatremia, on sodium tablets at home  - possibly 2/2 Trazadone use   - f/u urine lytes    HTN, controlled  - c/w Nifedipine ER 30 mg daily  - c/w metoprolol tartrate 25 mg daily     Schizophrenia  - c/w olanzapine and trazodone    HLD  - c/w atorvastatin      Type II DM, with neuropathy  - FS AC/HS  - on metformin 500 QD  - c/w gabapentin 300 mg BID      DVT ppx: Lovenox  GI ppx: Not indicated  Diet: Dysphagia 2 with thin  Activity: increase as tolerated  Code status: full code  Dispo: acute        Patient is a 81 yo Males with PMH of schizophrenia, COPD, hyperlipidemia, non-Hodgkin lymphoma in remission, Type 2DM (non-insulin dependent) with neuropathy, chronic indwelling Cobos (chronic retention BIBEMS from [facility] for fevers associated with chills since this afternoon.    High grade fevers + chills likely secondary to Urosepsis  - Patient tachycardic, T max (103 F), tachypneic wbc 13 with bandemia on admission, Normotensive  - Patient with chronic indwelling Cobos (chronic urinary retention  - s/p 2 L of LR bolus (improved Tachycardia)  - c/w maintenance IVF  - History of ESBL pyelonephritis on prior admissions  - Cobos replaced in ED, f/u urine culture  - UA + leuk est +, nitrites +  - F/u blood cultures  - c/w meropenem 1 g TID  - ID consult placed  - Renal ultrasound  - Monitor Urine output    Hyponatremia  - Na 129, patient with history of hyponatremia, on sodium tablets at home  - possibly 2/2 Trazadone use   - f/u urine lytes    HTN, controlled  - c/w Nifedipine ER 30 mg daily  - c/w metoprolol tartrate 25 BID    Schizophrenia  - c/w olanzapine and trazodone    HLD  - c/w atorvastatin      Type II DM, with neuropathy  - FS AC/HS  - on metformin 500 QD at home  - c/w gabapentin 300 mg BID    Macrocytic Anemia with B12 Deficiency  - c/w B12, folate supplement    DVT ppx: Lovenox  GI ppx: Not indicated  Diet: Dysphagia 2 with thin  Activity: increase as tolerated  Code status: full code  Dispo: acute

## 2020-10-13 NOTE — H&P ADULT - NSHPLABSRESULTS_GEN_ALL_CORE
10.8   13.58 )-----------( 153      ( 13 Oct 2020 01:52 )             31.6   10-13    129<L>  |  97<L>  |  17  ----------------------------<  128<H>  4.0   |  21  |  1.2    Ca    8.2<L>      13 Oct 2020 01:52    TPro  6.4  /  Alb  3.9  /  TBili  0.5  /  DBili  x   /  AST  29  /  ALT  10  /  AlkPhos  84  10-13

## 2020-10-13 NOTE — H&P ADULT - HISTORY OF PRESENT ILLNESS
Patient is a 79 yo Males with PMH of schizophrenia, COPD, hyperlipidemia, non-Hodgkin lymphoma in remission, Type 2DM (non-insulin dependent) with neuropathy, chronic indwelling Cobos,  BIBEMS from assisted living center for shaking. Roommate called staff as he thought patient looked sick per  from living facility. Patient denies chest pain, SOB, abdominal pain, N/V/D, alcohol/drug use in ED. Patient diaphoretic, A&Ox1 bedside. Patient is a 79 yo Males with PMH of schizophrenia, COPD, hyperlipidemia, non-Hodgkin lymphoma in remission, Type 2DM (non-insulin dependent) with neuropathy, chronic indwelling Bains (chronic retenstion), BIBEMS from [facility] for fevers associated with chills since this afternoon. Patient's roommate noticed the patient was tachypneic earlier this afternoon, with blood tinged urine in bains, and alerted the staff. Patient was found to be febrile and tachypneic at the facility, with foul smelling, cloudy urine evident from chronic indwelling catheter which prompted them to send him to the ED. Patient currently is AAOx1, and non cooperative to interview. Collateral history obtained by on call staff at NH, who stated that patient was  he was in his usual state of health prior to presentation, and has not had any recent ill contacts and or complained of any dysuria, sob, cp, or abdominal discomfort. Per staff patient, occasionally complains of dizziness that he attributes to his medications. Of note patient has has several admissions in the past for ESBL pyelonephritis.    ED course: /60, HR: 125, RR:20, T: 101.1 SPO2 95% on room air. UA +, + leukocytosis with bandemia.

## 2020-10-13 NOTE — ED PROVIDER NOTE - PROGRESS NOTE DETAILS
bains replaced; ua/culture sent from new bains.  HR, temp improved after fluids. Lactate less than 1 initially. Wheezing improved- tachypnea resolved. -DC

## 2020-10-13 NOTE — CONSULT NOTE ADULT - ASSESSMENT
Patient is a 81 yo Males with PMH of schizophrenia, COPD, hyperlipidemia, non-Hodgkin lymphoma in remission, Type 2DM (non-insulin dependent) with neuropathy, chronic indwelling Cobos (chronic retention BIBEMS from [facility] for fevers associated with chills since this afternoon.    #Sepsis likely ue to complicated UTI  - On admission T 103, tachycardic, WBC 13 with bandemia   - Patient has chronic indwelling Cobos (chronic urinary retention)  - s/p 2 L of LR bolus (improved Tachycardia)  - c/w maintenance IVF  - History of ESBL pyelonephritis on prior admissions  - Cobos replaced in ED, f/u urine culture  - UA significant for + leuk est  and + nitrites   - F/u blood cultures  - c/w meropenem 1 g TID  - add vancomycin 15 mg/kg q12 until cultures are back  - Renal ultrasound       Patient is a 79 yo Males with PMH of schizophrenia, COPD, hyperlipidemia, non-Hodgkin lymphoma in remission, Type 2DM (non-insulin dependent) with neuropathy, chronic indwelling Cobos (chronic retention BIBEMS from [facility] for fevers associated with chills since this afternoon.    #Sepsis due to acute pyelonephritis  - On admission T 103, tachycardic, WBC 13 with bandemia   - Patient has chronic indwelling Cobos (chronic urinary retention)  - s/p 2 L of LR bolus (improved Tachycardia)  - c/w maintenance IVF  - History of ESBL pyelonephritis on prior admissions  - Cobos replaced in ED, f/u urine culture  - UA significant for + leuk est  and + nitrites   - F/u blood cultures  - d/c meropenem   - discharge pt on ertapenem 1g qd, pt needs a midline prior discharge  - add vancomycin 15 mg/kg q12 until cultures are back  - Renal ultrasound       Patient is a 81 yo Males with PMH of schizophrenia, COPD, hyperlipidemia, non-Hodgkin lymphoma in remission, Type 2DM (non-insulin dependent) with neuropathy, chronic indwelling Cobos (chronic retention BIBEMS from [facility] for fevers associated with chills since this afternoon.    #Sepsis due to acute pyelonephritis  - On admission T 103, tachycardic, WBC 13 with bandemia   - Patient has chronic indwelling Cobos (chronic urinary retention)  - s/p 2 L of LR bolus (improved Tachycardia)  - c/w maintenance IVF  - History of ESBL pyelonephritis on prior admissions  - Cobos replaced in ED, f/u urine culture  - UA significant for + leuk est  and + nitrites   - F/u blood cultures  - d/c meropenem   - pt would like to leave. Discharge pt on ertapenem 1g qd, pt needs a midline prior discharge  - add vancomycin 15 mg/kg q12 until cultures are back  - Renal ultrasound       Patient is a 81 yo Males with PMH of schizophrenia, COPD, hyperlipidemia, non-Hodgkin lymphoma in remission, Type 2DM (non-insulin dependent) with neuropathy, chronic indwelling Cobos (chronic retention BIBEMS from [facility] for fevers associated with chills since this afternoon.    IMPRESSION;  #Sepsis due to acute pyelonephritis  - On admission T 103, tachycardic, WBC 13 with bandemia   - Patient has chronic indwelling Cobos (chronic urinary retention)  - History of ESBL pyelonephritis on prior admissions  - Cobos replaced in ED, f/u urine culture  - UA with pyuria    RECOMMENDATIONS;  - F/u blood cultures  - d/c meropenem   - pt would like to leave. Discharge pt on ertapenem 1g qd, pt needs a midline prior discharge  -could adjust once cultures known  - Renal ultrasound

## 2020-10-13 NOTE — ED PROVIDER NOTE - CARE PLAN
Principal Discharge DX:	Sepsis  Secondary Diagnosis:	UTI (urinary tract infection)  Secondary Diagnosis:	AMS (altered mental status)

## 2020-10-13 NOTE — PROVIDER CONTACT NOTE (OTHER) - BACKGROUND
ms states she would tell her attending. second rn called resident  to fix the order. vee states she was in rounds. rn saw md and resident at beside and spoke to them to correct the order

## 2020-10-13 NOTE — H&P ADULT - NSHPREVIEWOFSYSTEMS_GEN_ALL_CORE
REVIEW OF SYSTEMS:    CONSTITUTIONAL: + fever, chills  EYES/ENT: No visual changes;  No vertigo or throat pain   NECK: No pain or stiffness  RESPIRATORY: No cough, wheezing, hemoptysis; No shortness of breath  CARDIOVASCULAR: No chest pain or palpitations  GASTROINTESTINAL: No abdominal or epigastric pain. No nausea, vomiting, or hematemesis; No diarrhea or constipation. No melena or hematochezia.  GENITOURINARY: No dysuria, + hematuria  NEUROLOGICAL: No numbness or weakness  SKIN: No itching, rashes

## 2020-10-13 NOTE — H&P ADULT - ATTENDING COMMENTS
HPI as above.  Interval history: Pt seen and examined at bedside. No cp or sob.  Now oriented.   Vital Signs (24 Hrs):  T(C): 36.7 (10-13-20 @ 07:25), Max: 39.4 (10-13-20 @ 01:50)  HR: 90 (10-13-20 @ 07:25) (90 - 125)  BP: 141/59 (10-13-20 @ 07:25) (112/53 - 141/59)  RR: 16 (10-13-20 @ 07:25) (16 - 22)  SpO2: 98% (10-13-20 @ 07:25) (94% - 100%)  Wt(kg): --  Daily Height in cm: 167.64 (13 Oct 2020 01:16)    Daily     I&O's Summary    13 Oct 2020 07:01  -  13 Oct 2020 12:50  --------------------------------------------------------  IN: 0 mL / OUT: 350 mL / NET: -350 mL      PHYSICAL EXAM:  GENERAL: NAD, well-developed  HEAD:  Atraumatic, Normocephalic  EYES: EOMI, PERRLA, conjunctiva and sclera clear  NECK: Supple, No JVD  CHEST/LUNG: inspiratory and exp wheeze on exam   HEART: Regular rate and rhythm; No murmurs, rubs, or gallops  ABDOMEN: Soft, Nontender, Nondistended; Bowel sounds present  EXTREMITIES:  2+ Peripheral Pulses, No clubbing, cyanosis, or edema  PSYCH: AAOx3  NEUROLOGY: non-focal  SKIN: No rashes or lesions  chronic bains in place.     Labs reviewed  Imaging reviewed: < from: Xray Chest 1 View-PORTABLE IMMEDIATE (10.13.20 @ 02:57) >    Impression:    Scattered bilateral opacifications.    < end of copied text >      EKG reviewed: < from: 12 Lead ECG (10.13.20 @ 02:42) >    Diagnosis Line Sinus tachycardia  Left ventricular hypertrophy with repolarization abnormality  Abnormal ECG    < end of copied text >    Plan  #Sepsis present on admission likely 2/2 UTI- continue maribeth, vanco added as per ID, follow up urine cultures, wbc improving, follow up renal US  #Hyponatremia- improving 129-->133  #COPD exacerbation- chronic smoker-started on prednisone 40 mg x5 days, duo nebs, albuterol on DC, pulm evaluation outpt  #current smoker-smoking cessation counseling done, requested nicotine patch  #rest as above    #Progress Note Handoff  Pending (specify): urine cultures, ID following   Family discussion: gavin pt and agreed to plan   Disposition: Home_[facility]__/SNF___/Other________/Unknown at this time________

## 2020-10-13 NOTE — ED PROVIDER NOTE - OBJECTIVE STATEMENT
Patient is a 79 yo M w/ hx of schizophrenia, COPD, hyperlipidemia, non-Hodgkin lymphoma in remission, neuropathy, T2DM (non-insulin dependent), chronic indwelling [facility] Patient is a 81 yo M w/ hx of schizophrenia, COPD, hyperlipidemia, non-Hodgkin lymphoma in remission, neuropathy, T2DM (non-insulin dependent), chronic indwelling bains BIBEMS from assisted living center for shaking. Roommate called staff as he thought patient looked sick per  from living facility. Patient denies chest pain, SOB, abdominal pain, N/V/D, alcohol/drug use in ED. Patient diaphoretic, A&Ox1 bedside.

## 2020-10-13 NOTE — PROVIDER CONTACT NOTE (OTHER) - SITUATION
came on shift and night rn stated she tried to get admission verfied by provider. I called at 730 am and asked md to correct the order so the patient would be eligible for a bed upstairs.

## 2020-10-13 NOTE — CONSULT NOTE ADULT - SUBJECTIVE AND OBJECTIVE BOX
Consultation Requested by:    Patient is a 80y old  Male who presents with a chief complaint of Fever and chills (13 Oct 2020 04:05)    HPI:  Patient is a 81 yo Males with PMH of schizophrenia, COPD, hyperlipidemia, non-Hodgkin lymphoma in remission, Type 2DM (non-insulin dependent) with neuropathy, chronic indwelling Bains (chronic retenstion), BIBEMS from [facility] for fevers associated with chills since this afternoon. Patient's roommate noticed the patient was tachypneic earlier this afternoon, with blood tinged urine in bains, and alerted the staff. Patient was found to be febrile and tachypneic at the facility, with foul smelling, cloudy urine evident from chronic indwelling catheter which prompted them to send him to the ED. Patient currently is AAOx1, and non cooperative to interview. Collateral history obtained by on call staff at NH, who stated that patient was  he was in his usual state of health prior to presentation, and has not had any recent ill contacts and or complained of any dysuria, sob, cp, or abdominal discomfort. Per staff patient, occasionally complains of dizziness that he attributes to his medications. Of note patient has has several admissions in the past for ESBL pyelonephritis.    ED course: /60, HR: 125, RR:20, T: 101.1 SPO2 95% on room air. UA +, + leukocytosis with bandemia.    (13 Oct 2020 04:05)      REVIEW OF SYSTEMS  All review of systems negative, except for those marked:  General:		[] Abnormal:  	[] Night Sweats		[] Fever		[] Weight Loss  Pulmonary/Cough:	[] Abnormal:  Cardiac/Chest Pain:	[] Abnormal:  Gastrointestinal:	[] Abnormal:  Eyes:			[] Abnormal:  ENT:			[] Abnormal:  Dysuria:		[] Abnormal:  Musculoskeletal	:	[] Abnormal:  Endocrine:		[] Abnormal:  Lymph Nodes:		[] Abnormal:  Headache:		[] Abnormal:  Skin:			[] Abnormal:  Allergy/Immune:	[] Abnormal:  Psychiatric:		[] Abnormal:  [] All other review of systems negative  [] Unable to obtain (explain):    Recent Ill Contacts:	[] No	[] Yes:  Recent Travel History:	[] No	[] Yes:  Recent Animal/Insect Exposure/Tick Bites:	[] No	[] Yes:    Allergies    No Known Allergies    Intolerances      Antimicrobials:  meropenem  IVPB 1000 milliGRAM(s) IV Intermittent every 8 hours      Other Medications:  ALBUTerol    90 MICROgram(s) HFA Inhaler 2 Puff(s) Inhalation every 6 hours  atorvastatin Oral Tab/Cap - Peds 10 milliGRAM(s) Oral daily  cyanocobalamin 1000 MICROGram(s) Oral daily  enoxaparin Injectable 40 milliGRAM(s) SubCutaneous at bedtime  folic acid 1 milliGRAM(s) Oral daily  gabapentin 300 milliGRAM(s) Oral two times a day  metoprolol tartrate 25 milliGRAM(s) Oral two times a day  NIFEdipine XL 30 milliGRAM(s) Oral daily  OLANZapine 20 milliGRAM(s) Oral <User Schedule>  OLANZapine 5 milliGRAM(s) Oral at bedtime  senna 2 Tablet(s) Oral at bedtime  sodium chloride 0.9%. 1000 milliLiter(s) IV Continuous <Continuous>  traZODone 150 milliGRAM(s) Oral at bedtime      FAMILY HISTORY:  No pertinent family history in first degree relatives  Unable to ascertain 2/2 chronic psychiatric condition      PAST MEDICAL & SURGICAL HISTORY:  Dyslipidemia    COPD (chronic obstructive pulmonary disease)    Diabetes type 2, controlled    Schizophrenia    Encounter for screening colonoscopy      SOCIAL HISTORY:    IMMUNIZATIONS  [] Up to Date		[] Not Up to Date:  Recent Immunizations:	[] No	[] Yes:    Daily Height in cm: 167.64 (13 Oct 2020 01:16)    Daily   Head Circumference:  Vital Signs Last 24 Hrs  T(C): 36.7 (13 Oct 2020 07:25), Max: 39.4 (13 Oct 2020 01:50)  T(F): 98.1 (13 Oct 2020 07:25), Max: 103 (13 Oct 2020 01:50)  HR: 90 (13 Oct 2020 07:25) (90 - 125)  BP: 141/59 (13 Oct 2020 07:25) (112/53 - 141/59)  BP(mean): --  RR: 16 (13 Oct 2020 07:25) (16 - 22)  SpO2: 98% (13 Oct 2020 07:25) (94% - 100%)    PHYSICAL EXAM  All physical exam findings normal, except for those marked:  General:	Normal: alert, neither acutely nor chronically ill-appearing, well developed/well   .		nourished, no respiratory distress  .		[] Abnormal:  Eyes		Normal: no conjunctival injection, no discharge, no photophobia, intact   .		extraocular movements, sclera not icteric  .		[] Abnormal:  ENT:		Normal: normal tympanic membranes; external ear normal, nares normal without   .		discharge, no pharyngeal erythema or exudates, no oral mucosal lesions, normal   .		tongue and lips  .		[] Abnormal:  Neck		Normal: supple, full range of motion, no nuchal rigidity  .		[] Abnormal:  Lymph Nodes	Normal: normal size and consistency, non-tender  .		[] Abnormal:  Cardiovascular	Normal: regular rate and variability; Normal S1, S2; No murmur  .		[] Abnormal:  Respiratory	Normal: no wheezing or crackles, bilateral audible breath sounds, no retractions  .		[] Abnormal:  Abdominal	Normal: soft; non-distended; non-tender; no hepatosplenomegaly or masses  .		[] Abnormal:  		Normal: normal external genitalia, no rash  .		[] Abnormal:  Extremities	Normal: FROM x4, no cyanosis or edema, symmetric pulses  .		[] Abnormal:  Skin		Normal: skin intact and not indurated; no rash, no desquamation  .		[] Abnormal:  Neurologic	Normal: alert, oriented as age-appropriate, affect appropriate; no weakness, no   .		facial asymmetry, moves all extremities, normal gait-child older than 18 months  .		[] Abnormal:  Musculoskeletal		Normal: no joint swelling, erythema, or tenderness; full range of motion   .			with no contractures; no muscle tenderness; no clubbing; no cyanosis;   .			no edema  .			[] Abnormal    Respiratory Support:		[] No	[] Yes:  Vasoactive medication infusion:	[] No	[] Yes:  Venous catheters:		[] No	[] Yes:  Bladder catheter:		[] No	[] Yes:  Other catheters or tubes:	[] No	[] Yes:    Lab Results:                        10.8   13.58 )-----------( 153      ( 13 Oct 2020 01:52 )             31.6     10-13    129<L>  |  97<L>  |  17  ----------------------------<  128<H>  4.0   |  21  |  1.2    Ca    8.2<L>      13 Oct 2020 01:52    TPro  6.4  /  Alb  3.9  /  TBili  0.5  /  DBili  x   /  AST  29  /  ALT  10  /  AlkPhos  84  10-13    LIVER FUNCTIONS - ( 13 Oct 2020 01:52 )  Alb: 3.9 g/dL / Pro: 6.4 g/dL / ALK PHOS: 84 U/L / ALT: 10 U/L / AST: 29 U/L / GGT: x           PT/INR - ( 13 Oct 2020 01:52 )   PT: 16.30 sec;   INR: 1.42 ratio         PTT - ( 13 Oct 2020 01:52 )  PTT:30.3 sec  Urinalysis Basic - ( 13 Oct 2020 03:00 )    Color: Yellow / Appearance: Slightly Turbid / S.012 / pH: x  Gluc: x / Ketone: Negative  / Bili: Negative / Urobili: 3 mg/dL   Blood: x / Protein: 30 mg/dL / Nitrite: Negative   Leuk Esterase: Large / RBC: 90 /HPF / WBC 90 /HPF   Sq Epi: x / Non Sq Epi: 1 /HPF / Bacteria: Many        MICROBIOLOGY    [] Pathology slides reviewed and/or discussed with pathologist  [] Microbiology findings discussed with microbiologist or slides reviewed  [] Images erviewed with radiologist  [] Case discussed with an attending physician in addition to the patient's primary physician  [] Records, reports from outside [facility] reviewed    [] Patient requires continued monitoring for:  [] Total critical care time spent by attending physician: __ minutes, excluding procedure time.

## 2020-10-13 NOTE — CHART NOTE - NSCHARTNOTEFT_GEN_A_CORE
The patient was seen and examined this morning. Vital signs are stable. The patient is on room air. Has expiratory wheezing on physical exam. Will start prednisone 40 mg x5 days due to history of copd. will also start duonebs. c/w current medical management for urosepsis.

## 2020-10-13 NOTE — ED PROVIDER NOTE - ATTENDING CONTRIBUTION TO CARE
I personally evaluated the patient. I reviewed the Resident’s or Physician Assistant’s note (as assigned above), and agree with the findings and plan except as documented in my note.    79 yo M w/ hx of schizophrenia, COPD, hyperlipidemia, non-Hodgkin lymphoma in remission, neuropathy, T2DM (non-insulin dependent), chronic indwelling bains BIBEMS from [facility] for 'shaking'. On my exam patient is febrile. Sepsis suspected. Sespsi bundle initiated. Abx ordered. COVID ordered. I personally evaluated the patient. I reviewed the Resident’s or Physician Assistant’s note (as assigned above), and agree with the findings and plan except as documented in my note.    81 yo M w/ hx of schizophrenia, COPD, hyperlipidemia, non-Hodgkin lymphoma in remission, neuropathy, T2DM (non-insulin dependent), chronic indwelling bains BIBEMS from [facility] for 'shaking'. On my exam patient is febrile. Sepsis suspected. Sespsi bundle initiated. Abx ordered. COVID ordered.    CONSTITUTIONAL: Febrile   SKIN:  warm  HEAD: Normocephalic; atraumatic.  EYES: PERRL, 3 mm bilateral, no nystagmus, EOM intact; conjunctiva and sclera clear.  ENT: No nasal discharge; airway clear.  NECK: Supple; non tender.+ full passive ROM in all directions. No JVD  CARD: S1, S2 normal; no murmurs, gallops, or rubs. Regular rate and rhythm. + Symmetric Strong Pulses  RESP: No wheezes, rales or rhonchi. Good air movement bilaterally  ABD: soft; non-distended; non-tender   EXT: Normal ROM. No clubbing, cyanosis or edema   NEURO: CN 2-12 intact, normal romberg, stable gait, no sensory or motor deficits, Alert, but only oriented to self.   PSYCH: Cooperative    Plan- sepsis bundle, abx, covid swap, reassess

## 2020-10-13 NOTE — H&P ADULT - NSHPSOCIALHISTORY_GEN_ALL_CORE
Marital Status:  (   )    (  X ) Single    (   )    (  )   Lives with: (  ) alone  (  ) children   (  ) spouse   (  ) parents  ( X ) other  Recent Travel: No recent travel      Substance Use (street drugs): ( x ) never used  (  ) other:  Tobacco Usage:  ( x  ) never smoked   (   ) former smoker   (   ) current smoker  (     ) pack year  Alcohol Usage: None   Baseline mobility: walks with walker

## 2020-10-13 NOTE — H&P ADULT - NSHPPHYSICALEXAM_GEN_ALL_CORE
General: well developed, diaphoretic  Neuro: awake, alert and oriented x1, able to move extremities spontaneously, no meningeal signs  HEENT: NCAT, EOMI, anicteric, mucosa moist  Respiratory: airway patent, respirations unlabored  CVS: regular rate and rhythm  Abdomen: soft, nontender, nondistended  : bains catheter in place, no CVA tenderness  Extremities: no edema, sensation and movement grossly intact  Skin: warm, dry, appropriate color

## 2020-10-13 NOTE — ED ADULT NURSE NOTE - OBJECTIVE STATEMENT
Pt BIBA from assisted living, EMS states pt was shaking with alcohol intoxication. On arrival, pt denies alcohol consumption but noted to be diaphoretic with tremoring. Blood observed in bains bag. Pt denies SOB/chest pain.

## 2020-10-13 NOTE — PROVIDER CONTACT NOTE (OTHER) - ASSESSMENT
rn made charge nurse,  about the problem. md states pt would get a bed by 5pm upstairs. orders never corrected. pt still remains in the er

## 2020-10-13 NOTE — ED ADULT NURSE NOTE - CHIEF COMPLAINT QUOTE
Patient BIBA from assisted living with shaking and alcohol intoxication per EMS call. EMS reports hematuria (patient has bains). On assessment patient states "I don't know why I'm here, why don't you tell me. I have no pain, I don't drink."  in triage.

## 2020-10-13 NOTE — PHARMACOTHERAPY INTERVENTION NOTE - COMMENTS
I spoke with Dr Cristina and recommended to adjust dose from 1100 mg to 1000 mg- will consider
As per pk calculations : trough 11.36                                     peak 30.25

## 2020-10-14 ENCOUNTER — TRANSCRIPTION ENCOUNTER (OUTPATIENT)
Age: 80
End: 2020-10-14

## 2020-10-14 VITALS
SYSTOLIC BLOOD PRESSURE: 151 MMHG | DIASTOLIC BLOOD PRESSURE: 70 MMHG | RESPIRATION RATE: 18 BRPM | TEMPERATURE: 97 F | HEART RATE: 74 BPM

## 2020-10-14 LAB
ALBUMIN SERPL ELPH-MCNC: 3.5 G/DL — SIGNIFICANT CHANGE UP (ref 3.5–5.2)
ALP SERPL-CCNC: 79 U/L — SIGNIFICANT CHANGE UP (ref 30–115)
ALT FLD-CCNC: 13 U/L — SIGNIFICANT CHANGE UP (ref 0–41)
ANION GAP SERPL CALC-SCNC: 11 MMOL/L — SIGNIFICANT CHANGE UP (ref 7–14)
AST SERPL-CCNC: 32 U/L — SIGNIFICANT CHANGE UP (ref 0–41)
BASOPHILS # BLD AUTO: 0.01 K/UL — SIGNIFICANT CHANGE UP (ref 0–0.2)
BASOPHILS NFR BLD AUTO: 0.1 % — SIGNIFICANT CHANGE UP (ref 0–1)
BILIRUB SERPL-MCNC: <0.2 MG/DL — SIGNIFICANT CHANGE UP (ref 0.2–1.2)
BUN SERPL-MCNC: 21 MG/DL — HIGH (ref 10–20)
CALCIUM SERPL-MCNC: 8.7 MG/DL — SIGNIFICANT CHANGE UP (ref 8.5–10.1)
CHLORIDE SERPL-SCNC: 99 MMOL/L — SIGNIFICANT CHANGE UP (ref 98–110)
CO2 SERPL-SCNC: 22 MMOL/L — SIGNIFICANT CHANGE UP (ref 17–32)
CREAT SERPL-MCNC: 0.9 MG/DL — SIGNIFICANT CHANGE UP (ref 0.7–1.5)
EOSINOPHIL # BLD AUTO: 0 K/UL — SIGNIFICANT CHANGE UP (ref 0–0.7)
EOSINOPHIL NFR BLD AUTO: 0 % — SIGNIFICANT CHANGE UP (ref 0–8)
GLUCOSE BLDC GLUCOMTR-MCNC: 130 MG/DL — HIGH (ref 70–99)
GLUCOSE BLDC GLUCOMTR-MCNC: 133 MG/DL — HIGH (ref 70–99)
GLUCOSE BLDC GLUCOMTR-MCNC: 137 MG/DL — HIGH (ref 70–99)
GLUCOSE BLDC GLUCOMTR-MCNC: 167 MG/DL — HIGH (ref 70–99)
GLUCOSE SERPL-MCNC: 139 MG/DL — HIGH (ref 70–99)
HCT VFR BLD CALC: 32.6 % — LOW (ref 42–52)
HGB BLD-MCNC: 11.1 G/DL — LOW (ref 14–18)
IMM GRANULOCYTES NFR BLD AUTO: 0.7 % — HIGH (ref 0.1–0.3)
LYMPHOCYTES # BLD AUTO: 0.8 K/UL — LOW (ref 1.2–3.4)
LYMPHOCYTES # BLD AUTO: 6.5 % — LOW (ref 20.5–51.1)
MCHC RBC-ENTMCNC: 32.3 PG — HIGH (ref 27–31)
MCHC RBC-ENTMCNC: 34 G/DL — SIGNIFICANT CHANGE UP (ref 32–37)
MCV RBC AUTO: 94.8 FL — HIGH (ref 80–94)
MONOCYTES # BLD AUTO: 0.44 K/UL — SIGNIFICANT CHANGE UP (ref 0.1–0.6)
MONOCYTES NFR BLD AUTO: 3.6 % — SIGNIFICANT CHANGE UP (ref 1.7–9.3)
NEUTROPHILS # BLD AUTO: 10.9 K/UL — HIGH (ref 1.4–6.5)
NEUTROPHILS NFR BLD AUTO: 89.1 % — HIGH (ref 42.2–75.2)
NRBC # BLD: 0 /100 WBCS — SIGNIFICANT CHANGE UP (ref 0–0)
PLATELET # BLD AUTO: 177 K/UL — SIGNIFICANT CHANGE UP (ref 130–400)
POTASSIUM SERPL-MCNC: 3.8 MMOL/L — SIGNIFICANT CHANGE UP (ref 3.5–5)
POTASSIUM SERPL-SCNC: 3.8 MMOL/L — SIGNIFICANT CHANGE UP (ref 3.5–5)
PROT SERPL-MCNC: 6.3 G/DL — SIGNIFICANT CHANGE UP (ref 6–8)
RBC # BLD: 3.44 M/UL — LOW (ref 4.7–6.1)
RBC # FLD: 14 % — SIGNIFICANT CHANGE UP (ref 11.5–14.5)
SODIUM SERPL-SCNC: 132 MMOL/L — LOW (ref 135–146)
WBC # BLD: 12.23 K/UL — HIGH (ref 4.8–10.8)
WBC # FLD AUTO: 12.23 K/UL — HIGH (ref 4.8–10.8)

## 2020-10-14 PROCEDURE — 99233 SBSQ HOSP IP/OBS HIGH 50: CPT

## 2020-10-14 RX ORDER — ERTAPENEM SODIUM 1 G/1
1 INJECTION, POWDER, LYOPHILIZED, FOR SOLUTION INTRAMUSCULAR; INTRAVENOUS
Qty: 0 | Refills: 0 | DISCHARGE
Start: 2020-10-14 | End: 2020-10-26

## 2020-10-14 RX ADMIN — SODIUM CHLORIDE 75 MILLILITER(S): 9 INJECTION INTRAMUSCULAR; INTRAVENOUS; SUBCUTANEOUS at 04:53

## 2020-10-14 RX ADMIN — Medication 1 PATCH: at 20:13

## 2020-10-14 RX ADMIN — Medication 1 MILLIGRAM(S): at 15:05

## 2020-10-14 RX ADMIN — Medication 1 MILLIGRAM(S): at 05:33

## 2020-10-14 RX ADMIN — Medication 25 MILLIGRAM(S): at 17:55

## 2020-10-14 RX ADMIN — PREGABALIN 1000 MICROGRAM(S): 225 CAPSULE ORAL at 15:05

## 2020-10-14 RX ADMIN — GABAPENTIN 300 MILLIGRAM(S): 400 CAPSULE ORAL at 17:56

## 2020-10-14 RX ADMIN — Medication 1 PATCH: at 15:05

## 2020-10-14 RX ADMIN — Medication 25 MILLIGRAM(S): at 05:33

## 2020-10-14 RX ADMIN — OLANZAPINE 20 MILLIGRAM(S): 15 TABLET, FILM COATED ORAL at 08:16

## 2020-10-14 RX ADMIN — Medication 40 MILLIGRAM(S): at 05:33

## 2020-10-14 RX ADMIN — Medication 30 MILLIGRAM(S): at 05:33

## 2020-10-14 RX ADMIN — ATORVASTATIN CALCIUM 10 MILLIGRAM(S): 80 TABLET, FILM COATED ORAL at 15:05

## 2020-10-14 RX ADMIN — GABAPENTIN 300 MILLIGRAM(S): 400 CAPSULE ORAL at 05:33

## 2020-10-14 RX ADMIN — PREGABALIN 1000 MICROGRAM(S): 225 CAPSULE ORAL at 05:34

## 2020-10-14 NOTE — PATIENT PROFILE ADULT - NSPROHMDIABETMGMTSTRAT_GEN_A_NUR
unknown, patient lacks capacity, unaccompanied/blood glucose testing/diet modification/medication therapy

## 2020-10-14 NOTE — DISCHARGE NOTE PROVIDER - CARE PROVIDER_API CALL
Silverio Hayes  [address] [place] [address]  [address] [place] [id number]  Phone: [phone]  Fax: [phone]  Follow Up Time: 2 weeks

## 2020-10-14 NOTE — DISCHARGE NOTE PROVIDER - HOSPITAL COURSE
Patient is a 81 yo Male with PMH of schizophrenia, COPD, hyperlipidemia, non-Hodgkin lymphoma in remission, Type 2DM (non-insulin dependent) with neuropathy, chronic indwelling Bains (chronic retention), BIBEMS from [facility] for fevers. On day of arrival, patient's roommate noticed the patient was tachypneic with blood tinged urine in bains, and alerted the staff, who found patient to be febrile with foul smelling, cloudy urine evident from chronic indwelling catheter.  Of note patient has had several admissions in the past for ESBL pyelonephritis. In the ED, /60, HR: 125, RR:20, T: 101.1 SPO2 95% on room air. UA + with leukocytosis with bandemia on labs. A bains was placed, cultures were drawn and patient was started on abx. The patient was admitted for further evaluation. ID was consulted who adjusted abx. Since admission, blood cultures were NGTD x2 and kidney US was neg for hydro (Urince cultures pending). Patients fever resolved, vitals returned to baseline and labwork unremarkable. Patient  currenlty stable and medically cleared for discharge. Given patient's history of ESBL UTIs - patient will be sent back to facility with a midline on IV ertapenem for 2 weeks.

## 2020-10-14 NOTE — DISCHARGE NOTE PROVIDER - NSDCMRMEDTOKEN_GEN_ALL_CORE_FT
atorvastatin 10 mg oral tablet: 1 tab(s) orally once a day  cyanocobalamin 1000 mcg sublingual tablet: 1 tab(s) sublingually once a day   ertapenem 1 g injection: 1 gram(s) injectable once a day  folic acid 1 mg oral tablet: 1 tab(s) orally once a day  gabapentin 300 mg oral capsule: 1 cap(s) orally every 12 hours  gabapentin 300 mg oral capsule: 1 cap(s) orally 2 times a day  ipratropium-albuterol 0.5 mg-2.5 mg/3 mLinhalation solution: 3 milliliter(s) inhaled every 6 hours, As needed, Shortness of Breath and/or Wheezing  lactobacillus acidophilus oral capsule: 1 tab(s) orally once a day  metFORMIN 500 mg oral tablet: 1 tab(s) orally 2 times a day  metoprolol tartrate 25 mg oral tablet: 1 tab(s) orally 2 times a day  NIFEdipine 30 mg oral tablet, extended release: 1 tab(s) orally once a day  OLANZapine 20 mg oral tablet: 1 tab(s) orally once a day (at bedtime)  OLANZapine 5 mg oral tablet: 1 tab(s) orally once a day  predniSONE 20 mg oral tablet: 2 tab(s) orally once a day for 3 more days ending on 10/17  senna oral tablet: 2 tab(s) orally once a day (at bedtime)  Sodium Chloride 1 g oral tablet: 1 tab(s) orally once a day  traZODone 150 mg oral tablet: 1 tab(s) orally once a day (at bedtime)

## 2020-10-14 NOTE — DISCHARGE NOTE PROVIDER - CARE PROVIDERS DIRECT ADDRESSES
[email or web address].\[facility] [place] [facility]\""[email or web address] license of [facility].[employer] [employer]

## 2020-10-14 NOTE — PROGRESS NOTE ADULT - SUBJECTIVE AND OBJECTIVE BOX
ADA VILLAGOMEZ  80y, Male    All available historical data reviewed    OVERNIGHT EVENTS:  no fevers  feels well and has no complaints    ROS:  General: Denies rigors, nightsweats  HEENT: Denies headache, rhinorrhea, sore throat, eye pain  CV: Denies CP, palpitations  PULM: Denies wheezing, hemoptysis  GI: Denies hematemesis, hematochezia, melena  : Denies discharge, hematuria  MSK: Denies arthralgias, myalgias  SKIN: Denies rash, lesions  NEURO: Denies paresthesias, weakness  PSYCH: Denies depression, anxiety    VITALS:  T(F): 97, Max: 98.2 (10-13-20 @ 19:45)  HR: 96  BP: 129/57  RR: 18Vital Signs Last 24 Hrs  T(C): 36.1 (14 Oct 2020 13:47), Max: 36.8 (13 Oct 2020 19:45)  T(F): 97 (14 Oct 2020 13:47), Max: 98.2 (13 Oct 2020 19:45)  HR: 96 (14 Oct 2020 13:47) (96 - 100)  BP: 129/57 (14 Oct 2020 13:47) (129/57 - 175/86)  BP(mean): --  RR: 18 (14 Oct 2020 13:47) (18 - 19)  SpO2: 100% (14 Oct 2020 05:28) (98% - 100%)    TESTS & MEASUREMENTS:                        11.1   12.23 )-----------( 177      ( 14 Oct 2020 07:24 )             32.6     10-14    132<L>  |  99  |  21<H>  ----------------------------<  139<H>  3.8   |  22  |  0.9    Ca    8.7      14 Oct 2020 07:24  Mg     2.1     10-13    TPro  6.3  /  Alb  3.5  /  TBili  <0.2  /  DBili  x   /  AST  32  /  ALT  13  /  AlkPhos  79  10-14    LIVER FUNCTIONS - ( 14 Oct 2020 07:24 )  Alb: 3.5 g/dL / Pro: 6.3 g/dL / ALK PHOS: 79 U/L / ALT: 13 U/L / AST: 32 U/L / GGT: x             Culture - Blood (collected 10-13-20 @ 01:52)  Source: .Blood Blood-Peripheral  Preliminary Report (10-14-20 @ 07:01):    No growth to date.    Culture - Blood (collected 10-13-20 @ 01:52)  Source: .Blood Blood-Peripheral  Preliminary Report (10-14-20 @ 07:01):    No growth to date.      Urinalysis Basic - ( 13 Oct 2020 03:00 )    Color: Yellow / Appearance: Slightly Turbid / S.012 / pH: x  Gluc: x / Ketone: Negative  / Bili: Negative / Urobili: 3 mg/dL   Blood: x / Protein: 30 mg/dL / Nitrite: Negative   Leuk Esterase: Large / RBC: 90 /HPF / WBC 90 /HPF   Sq Epi: x / Non Sq Epi: 1 /HPF / Bacteria: Many          RADIOLOGY & ADDITIONAL TESTS:  Personal review of radiological diagnostics performed  Echo and EKG results noted when applicable.     MEDICATIONS:  ALBUTerol    90 MICROgram(s) HFA Inhaler 2 Puff(s) Inhalation every 6 hours  albuterol/ipratropium for Nebulization 3 milliLiter(s) Nebulizer every 6 hours  atorvastatin Oral Tab/Cap - Peds 10 milliGRAM(s) Oral daily  cyanocobalamin 1000 MICROGram(s) Oral daily  enoxaparin Injectable 40 milliGRAM(s) SubCutaneous at bedtime  ertapenem  IVPB 1000 milliGRAM(s) IV Intermittent every 24 hours  folic acid 1 milliGRAM(s) Oral daily  gabapentin 300 milliGRAM(s) Oral two times a day  metoprolol tartrate 25 milliGRAM(s) Oral two times a day  nicotine -  14 mG/24Hr(s) Patch 1 patch Transdermal daily  NIFEdipine XL 30 milliGRAM(s) Oral daily  OLANZapine 20 milliGRAM(s) Oral <User Schedule>  OLANZapine 5 milliGRAM(s) Oral at bedtime  predniSONE   Tablet 40 milliGRAM(s) Oral daily  senna 2 Tablet(s) Oral at bedtime  sodium chloride 0.9%. 1000 milliLiter(s) IV Continuous <Continuous>  traZODone 150 milliGRAM(s) Oral at bedtime      ANTIBIOTICS:  ertapenem  IVPB 1000 milliGRAM(s) IV Intermittent every 24 hours    
  ADA VILLAGOMEZ  80y  Male  ***My note supersedes ALL resident notes that I sign.  My corrections for their notes are in my note.***    I can be reached directly on [phone]. My office number is [phone]. My personal cell number is [phone].    INTERVAL EVENTS: Here for f/u of pyleo. Pt doing well. Looking for d/c soon. Agreeable to SNF for IV abx per ID. Eats/drinks well.    T(F): 97 (10-14-20 @ 13:47), Max: 98.2 (10-13-20 @ 19:45)  HR: 96 (10-14-20 @ 13:47) (96 - 100)  BP: 129/57 (10-14-20 @ 13:47) (129/57 - 175/86)  RR: 18 (10-14-20 @ 13:47) (18 - 19)  SpO2: 100% (10-14-20 @ 05:28) (98% - 100%)    10-13-20 @ 07:01  -  10-14-20 @ 07:00  --------------------------------------------------------  IN: 0 mL / OUT: 1250 mL / NET: -1250 mL    Gen: NAD; mostly cooperative  HEENT: PERRL, EOMI, mouth clr, nose clr  Neck: no nodes, no JVD, thyroid nl  lungs: clr  hrt: s1 s2 rrr no murmur  abd: soft, NT/ND, no HS megaly  ext: no edema, no c/c  : + bains w/ yellow urine, little cloudy  neuro: aa ox3, cn intact, can move all 4 ext    LABS:                      11.1    (    94.8   12.23 )-----------( ---------      177      ( 14 Oct 2020 07:24 )             32.6    (    14.0     WBC Count: 12.23 K/uL (10-14-20 @ 07:24)  WBC Count: 8.35 K/uL (10-13-20 @ 11:26)  WBC Count: 13.58 K/uL (10-13-20 @ 01:52)    132   (   99   (   139      10-14-20 @ 07:24  ----------------------               3.8   (   22   (   21                             -----                        0.9  Ca  8.7   Mg  --    P   --     LFT  6.3  (  <0.2  (  32       10-14-20 @ 07:24  -------------------------  3.5  (  79  (  13    PT/INR - ( 13 Oct 2020 01:52 )   PT: 16.30 sec;   INR: 1.42 ratio    PTT - ( 13 Oct 2020 01:52 )  PTT:30.3 sec    CARDIAC MARKERS ( 13 Oct 2020 03:54 )  x     / <0.01 ng/mL / x     / x     / x        Urinalysis Basic - ( 13 Oct 2020 03:00 )    Color: Yellow / Appearance: Slightly Turbid / S.012 / pH: x  Gluc: x / Ketone: Negative  / Bili: Negative / Urobili: 3 mg/dL   Blood: x / Protein: 30 mg/dL / Nitrite: Negative   Leuk Esterase: Large / RBC: 90 /HPF / WBC 90 /HPF   Sq Epi: x / Non Sq Epi: 1 /HPF / Bacteria: Many    Culture - Urine (collected 10-13-20 @ 03:00)  Source: .Urine Clean Catch (Midstream)  Preliminary Report (10-14-20 @ 17:31):    >100,000 CFU/ml Escherichia coli    Culture - Blood (collected 10-13-20 @ 01:52)  Source: .Blood Blood-Peripheral  Preliminary Report (10-14-20 @ 07:01):    No growth to date.    Culture - Blood (collected 10-13-20 @ 01:52)  Source: .Blood Blood-Peripheral  Preliminary Report (10-14-20 @ 07:01):    No growth to date.    CAPILLARY BLOOD GLUCOSE  POCT Blood Glucose.: 167 (10-14-20 @ 17:09)  POCT Blood Glucose.: 133 (10-14-20 @ 11:19)  POCT Blood Glucose.: 137 (10-14-20 @ 08:11)  POCT Blood Glucose.: 135 (10-13-20 @ 01:26)    RADIOLOGY & ADDITIONAL TESTS:  < from: US Kidney and Bladder (10.13.20 @ 06:07) >  Prostate: Measures 4.3 x 3.3 x 3.8 cm, with a volume of approximately 28 cc.    IMPRESSION:    The left kidney was not well visualized due to overlying bowel gas.    No hydronephrosis or visualized renal calculus within the right kidney.    < end of copied text >    < from: Xray Chest 1 View-PORTABLE IMMEDIATE (10.13.20 @ 02:57) >  Impression:    Scattered bilateral opacifications.    < end of copied text >    MEDICATIONS:  ertapenem  IVPB 1000 milliGRAM(s) IV Intermittent every 24 hours    ALBUTerol    90 MICROgram(s) HFA Inhaler 2 Puff(s) Inhalation every 6 hours  albuterol/ipratropium for Nebulization 3 milliLiter(s) Nebulizer every 6 hours  atorvastatin Oral Tab/Cap - Peds 10 milliGRAM(s) Oral daily  cyanocobalamin 1000 MICROGram(s) Oral daily  enoxaparin Injectable 40 milliGRAM(s) SubCutaneous at bedtime  folic acid 1 milliGRAM(s) Oral daily  gabapentin 300 milliGRAM(s) Oral two times a day  metoprolol tartrate 25 milliGRAM(s) Oral two times a day  nicotine -  14 mG/24Hr(s) Patch 1 patch Transdermal daily  NIFEdipine XL 30 milliGRAM(s) Oral daily  OLANZapine 20 milliGRAM(s) Oral <User Schedule>  OLANZapine 5 milliGRAM(s) Oral at bedtime  predniSONE   Tablet 40 milliGRAM(s) Oral daily  senna 2 Tablet(s) Oral at bedtime  sodium chloride 0.9%. 1000 milliLiter(s) IV Continuous <Continuous>  traZODone 150 milliGRAM(s) Oral at bedtime

## 2020-10-14 NOTE — PROGRESS NOTE ADULT - ASSESSMENT
Patient is a 81 yo Man with PMH of schizophrenia, COPD, hyperlipidemia, non-Hodgkin lymphoma in remission, Type 2DM (non-insulin dependent) with neuropathy, chronic indwelling Bains (chronic retention BIBEMS from [facility] for fevers associated with chills since this afternoon.    # Sepsis (resolved) present on admit 2/2 acute pyelo 2/2 E Coli, suspect abx resistance all 2/2 chronic, indwelling bains (for chr urine retention)  Patient tachycardic, T max (103 F), tachypneic wbc 13 with bandemia on admission, Normotensive  s/p 2 L of LR bolus - off IVFs  hx of ESBL pyelonephritis on prior admissions  Bains replaced in ED  UCx: E Coli, f/u suscept  blood cultures neg  ID noted:  abx: erta 1gm iv q24 until 10/24 via midline  Renal ultrasound noted    # Hyponatremia - mild  with history of hyponatremia, on sodium tablets at home  possibly 2/2 psych meds  do not restrict salt in diet  fluid restrict to 1.5 L /day of oral fluid    # COPD, tobacco abuse  d/c tobacco  clint patch  duoneb prn  not on O2    # HTN, controlled  c/w Nifedipine ER 30 mg daily  c/w metoprolol tartrate 25 BID    # Schizophrenia  c/w olanzapine and trazodone    # HLD  c/w atorvastatin      # Type II DM, with neuropathy  FS AC/HS - controlled w/ diet alone  on metformin 500 QD at home  c/w gabapentin 300 mg BID    # Macrocytic Anemia with B12 Deficiency  c/w B12, folate supplement    # DVT ppx: Lovenox    # GI ppx: Not indicated    # Diet: Dysphagia 2 with thin    # Activity: increase as tolerated    # Code status: full code    # COVID neg 10/13    # Dispo: midline for IV abx; CM eval fo SNF for IV abx; anticipate d/c karine      
· Assessment	  Patient is a 79 yo Males with PMH of schizophrenia, COPD, hyperlipidemia, non-Hodgkin lymphoma in remission, Type 2DM (non-insulin dependent) with neuropathy, chronic indwelling Cobos (chronic retention BIBEMS from [facility] for fevers associated with chills since this afternoon.    IMPRESSION;  #Resolved Sepsis due to acute pyelonephritis  - On admission T 103, tachycardic, WBC 13 with bandemia   -BCx 10/12 NGTD  - Patient has chronic indwelling Cobos (chronic urinary retention)  - History of ESBL pyelonephritis on prior admissions  - Cobos replaced in ED, f/u urine culture  - UA with pyuria    RECOMMENDATIONS;  Given that pt had sepsis on admission will treat with ertapenem 1 gm iv q24h till 10/24  recall prn please

## 2020-10-14 NOTE — DISCHARGE NOTE NURSING/CASE MANAGEMENT/SOCIAL WORK - PATIENT PORTAL LINK FT
You can access the FollowMyHealth Patient Portal offered by [facility] by registering at the following website: [email or web address]. By joining [facility]’s FollowMyHealth portal, you will also be able to view your health information using other applications (apps) compatible with our system.

## 2020-10-14 NOTE — DISCHARGE NOTE PROVIDER - NSDCCPCAREPLAN_GEN_ALL_CORE_FT
PRINCIPAL DISCHARGE DIAGNOSIS  Diagnosis: Sepsis  Assessment and Plan of Treatment: Your presenting symptoms were d/t a UTI which you have frequently had in the past. You were treated with IV antibiotics which will need to be taken after discharge until 10/26 via the intravenous line we placed in your arm.       PRINCIPAL DISCHARGE DIAGNOSIS  Diagnosis: Sepsis  Assessment and Plan of Treatment: Your presenting symptoms were d/t a UTI which you have frequently had in the past. You were treated with IV antibiotics which will need to be taken after discharge ending on 10/24 via the intravenous line we placed in your arm.      SECONDARY DISCHARGE DIAGNOSES  Diagnosis: Wheeze  Assessment and Plan of Treatment: Wheezes were heard when listening to your lungs. You were started on prednisone which needs to be taken for 3 more days after discharge.

## 2020-10-14 NOTE — DISCHARGE NOTE PROVIDER - INSTRUCTIONS
Eat more vegetables and fruits.  Swap refined grains for whole grains.  Choose fat-free or low-fat dairy products.  Choose lean protein sources like fish, poultry and beans.  Cook with vegetable oils.  Limit your intake of foods high in added sugars, like soda and candy.

## 2020-10-19 DIAGNOSIS — F17.210 NICOTINE DEPENDENCE, CIGARETTES, UNCOMPLICATED: ICD-10-CM

## 2020-10-19 DIAGNOSIS — E11.40 TYPE 2 DIABETES MELLITUS WITH DIABETIC NEUROPATHY, UNSPECIFIED: ICD-10-CM

## 2020-10-19 DIAGNOSIS — E78.5 HYPERLIPIDEMIA, UNSPECIFIED: ICD-10-CM

## 2020-10-19 DIAGNOSIS — R33.8 OTHER RETENTION OF URINE: ICD-10-CM

## 2020-10-19 DIAGNOSIS — Z79.51 LONG TERM (CURRENT) USE OF INHALED STEROIDS: ICD-10-CM

## 2020-10-19 DIAGNOSIS — E87.1 HYPO-OSMOLALITY AND HYPONATREMIA: ICD-10-CM

## 2020-10-19 DIAGNOSIS — F20.9 SCHIZOPHRENIA, UNSPECIFIED: ICD-10-CM

## 2020-10-19 DIAGNOSIS — Z79.899 OTHER LONG TERM (CURRENT) DRUG THERAPY: ICD-10-CM

## 2020-10-19 DIAGNOSIS — A41.9 SEPSIS, UNSPECIFIED ORGANISM: ICD-10-CM

## 2020-10-19 DIAGNOSIS — Z87.440 PERSONAL HISTORY OF URINARY (TRACT) INFECTIONS: ICD-10-CM

## 2020-10-19 DIAGNOSIS — N10 ACUTE PYELONEPHRITIS: ICD-10-CM

## 2020-10-19 DIAGNOSIS — J44.1 CHRONIC OBSTRUCTIVE PULMONARY DISEASE WITH (ACUTE) EXACERBATION: ICD-10-CM

## 2020-10-19 DIAGNOSIS — Z96.0 PRESENCE OF UROGENITAL IMPLANTS: ICD-10-CM
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
## 2020-10-19 DIAGNOSIS — D51.9 VITAMIN B12 DEFICIENCY ANEMIA, UNSPECIFIED: ICD-10-CM

## 2020-10-19 DIAGNOSIS — T83.511A INFECTION AND INFLAMMATORY REACTION DUE TO INDWELLING URETHRAL CATHETER, INITIAL ENCOUNTER: ICD-10-CM

## 2020-10-19 DIAGNOSIS — B96.20 UNSPECIFIED ESCHERICHIA COLI [E. COLI] AS THE CAUSE OF DISEASES CLASSIFIED ELSEWHERE: ICD-10-CM

## 2020-10-19 DIAGNOSIS — Z16.12 EXTENDED SPECTRUM BETA LACTAMASE (ESBL) RESISTANCE: ICD-10-CM

## 2021-05-19 NOTE — ED PROVIDER NOTE - NS ED MD TWO NIGHTS YN
Yes Please take pain medicine as needed, follow up with your GYN, return to ED if your symptoms worsen.    Please follow up with [facility] services following your ED visit.     [address] [place] [id number]  #[phone]  Hours: Monday - Wednesday, 9am-3pm    PLEASE CALL TO MAKE AN APPOINTMENT PRIOR TO ARRIVAL.

## 2021-05-30 ENCOUNTER — TRANSCRIPTION ENCOUNTER (OUTPATIENT)
Age: 81
End: 2021-05-30

## 2021-07-01 ENCOUNTER — EMERGENCY (EMERGENCY)
Facility: HOSPITAL | Age: 81
LOS: 0 days | Discharge: HOME | End: 2021-07-01
Attending: EMERGENCY MEDICINE | Admitting: EMERGENCY MEDICINE
Payer: MEDICARE

## 2021-07-01 VITALS
SYSTOLIC BLOOD PRESSURE: 107 MMHG | TEMPERATURE: 98 F | HEART RATE: 75 BPM | OXYGEN SATURATION: 97 % | RESPIRATION RATE: 20 BRPM | HEIGHT: 66 IN | DIASTOLIC BLOOD PRESSURE: 83 MMHG

## 2021-07-01 DIAGNOSIS — Z79.84 LONG TERM (CURRENT) USE OF ORAL HYPOGLYCEMIC DRUGS: ICD-10-CM
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
## 2021-07-01 DIAGNOSIS — Z02.9 ENCOUNTER FOR ADMINISTRATIVE EXAMINATIONS, UNSPECIFIED: ICD-10-CM

## 2021-07-01 DIAGNOSIS — R33.8 OTHER RETENTION OF URINE: ICD-10-CM

## 2021-07-01 DIAGNOSIS — Z12.11 ENCOUNTER FOR SCREENING FOR MALIGNANT NEOPLASM OF COLON: Chronic | ICD-10-CM

## 2021-07-01 DIAGNOSIS — J44.9 CHRONIC OBSTRUCTIVE PULMONARY DISEASE, UNSPECIFIED: ICD-10-CM

## 2021-07-01 DIAGNOSIS — T83.098A OTHER MECHANICAL COMPLICATION OF OTHER URINARY CATHETER, INITIAL ENCOUNTER: ICD-10-CM

## 2021-07-01 DIAGNOSIS — N40.1 BENIGN PROSTATIC HYPERPLASIA WITH LOWER URINARY TRACT SYMPTOMS: ICD-10-CM

## 2021-07-01 DIAGNOSIS — N40.0 BENIGN PROSTATIC HYPERPLASIA WITHOUT LOWER URINARY TRACT SYMPTOMS: ICD-10-CM
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
## 2021-07-01 DIAGNOSIS — Z79.899 OTHER LONG TERM (CURRENT) DRUG THERAPY: ICD-10-CM

## 2021-07-01 PROCEDURE — 99283 EMERGENCY DEPT VISIT LOW MDM: CPT

## 2021-07-01 NOTE — ED PROVIDER NOTE - NSFOLLOWUPINSTRUCTIONS_ED_ALL_ED_FT
Cobos Catheter Care, Adult    A Cobos catheter is a soft, flexible tube that is placed into the bladder to drain urine. A Cobos catheter may be inserted if:    You leak urine or are not able to control when you urinate (urinary incontinence).  You are not able to urinate when you need to (urinary retention).   You had prostate surgery or surgery on the genitals.  You have certain medical conditions, such as multiple sclerosis, dementia, or a spinal cord injury.    If you are going home with a Cobos catheter in place, follow the instructions below.    TAKING CARE OF THE CATHETER   Wash your hands with soap and water.   Using mild soap and warm water on a clean washcloth:    Clean the area on your body closest to the catheter insertion site using a circular motion, moving away from the catheter. Never wipe toward the catheter because this could sweep bacteria up into the urethra and cause infection.   Remove all traces of soap. Pat the area dry with a clean towel. For males, reposition the foreskin.    Attach the catheter to your leg so there is no tension on the catheter. Use adhesive tape or a leg strap. If you are using adhesive tape, remove any sticky residue left behind by the previous tape you used.   Keep the drainage bag below the level of the bladder, but keep it off the floor.   Check throughout the day to be sure the catheter is working and urine is draining freely. Make sure the tubing does not become kinked.  Do not pull on the catheter or try to remove it. Pulling could damage internal tissues.    TAKING CARE OF THE DRAINAGE BAGS  You will be given two drainage bags to take home. One is a large overnight drainage bag, and the other is a smaller leg bag that fits underneath clothing. You may wear the overnight bag at any time, but you should never wear the smaller leg bag at night. Follow the instructions below for how to empty, change, and clean your drainage bags.    Emptying the Drainage Bag    You must empty your drainage bag when it is ?–½ full or at least 2–3 times a day.     Wash your hands with soap and water.   Keep the drainage bag below your hips, below the level of your bladder. This stops urine from going back into the tubing and into your bladder.    Hold the dirty bag over the toilet or a clean container.   Open the pour spout at the bottom of the bag and empty the urine into the toilet or container. Do not let the pour spout touch the toilet, container, or any other surface. Doing so can place bacteria on the bag, which can cause an infection.   Clean the pour spout with a gauze pad or cotton ball that has rubbing alcohol on it.   Close the pour spout.   Attach the bag to your leg with adhesive tape or a leg strap.   Wash your hands well.    Changing the Drainage Bag    Change your drainage bag once a month or sooner if it starts to smell bad or look dirty. Below are steps to follow when changing the drainage bag.     Wash your hands with soap and water.   Pinch off the rubber catheter so that urine does not spill out.   Disconnect the catheter tube from the drainage tube at the connection valve. Do not let the tubes touch any surface.    Clean the end of the catheter tube with an alcohol wipe. Use a different alcohol wipe to clean the end of the drainage tube.   Connect the catheter tube to the drainage tube of the clean drainage bag.   Attach the new bag to the leg with adhesive tape or a leg strap. Avoid attaching the new bag too tightly.    Wash your hands well.    Cleaning the Drainage Bag     Wash your hands with soap and water.   Wash the bag in warm, soapy water.   Rinse the bag thoroughly with warm water.   Fill the bag with a solution of white vinegar and water (1 cup vinegar to 1 qt warm water [.2 L vinegar to 1 L warm water]). Close the bag and soak it for 30 minutes in the solution.    Rinse the bag with warm water.    Hang the bag to dry with the pour spout open and hanging downward.   Store the clean bag (once it is dry) in a clean plastic bag.   Wash your hands well.     PREVENTING INFECTION  Wash your hands before and after handling your catheter.  Take showers daily and wash the area where the catheter enters your body. Do not take baths. Replace wet leg straps with dry ones, if this applies.  Do not use powders, sprays, or lotions on the genital area. Only use creams, lotions, or ointments as directed by your caregiver.  For females, wipe from front to back after each bowel movement.   Drink enough fluids to keep your urine clear or pale yellow unless you have a fluid restriction.   Do not let the drainage bag or tubing touch or lie on the floor.   Wear cotton underwear to absorb moisture and to keep your .    SEEK MEDICAL CARE IF:  Your urine is cloudy or smells unusually bad.  Your catheter becomes clogged.  You are not draining urine into the bag or your bladder feels full.  Your catheter starts to leak.    SEEK IMMEDIATE MEDICAL CARE IF:  You have pain, swelling, redness, or pus where the catheter enters the body.  You have pain in the abdomen, legs, lower back, or bladder.  You have a fever.  You see blood fill the catheter, or your urine is pink or red.  You have nausea, vomiting, or chills.  Your catheter gets pulled out.    MAKE SURE YOU:  Understand these instructions.  Will watch your condition.  Will get help right away if you are not doing well or get worse.    ADDITIONAL NOTES AND INSTRUCTIONS    Please follow up with your Primary MD in 24-48 hr.  Seek immediate medical care for any new/worsening signs or symptoms.

## 2021-07-01 NOTE — ED PROVIDER NOTE - MDM PATIENT STATEMENT FOR ADDL TREATMENT
"Physical Therapy   Initial Evaluation     Patient Name: Chico Cedillo  Age:  89 y.o., Sex:  male  Medical Record #: [id number]  Today's Date: 4/22/2021     Precautions: Fall Risk    Assessment  Patient is 89 y.o. 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  Patient demonstrates decreased balance and safety awareness during functional ambulation.  Patient able to perform bed mobility and sit to stand without AD @ SPV level.  Patient ambulatory using no AD with occasional CGA/SBA.  2-3 minor losses of balance which patient able to self correct.  Patient reports he has fallen at home a \"couple times\" but cant remember circumstances.  Patient will benefit from skilled acute physical therapy services to address functional deficits and improve independence.    Plan    Recommend Physical Therapy 3 times per week until therapy goals are met for the following treatments:  Gait Training, Neuro Re-Education / Balance and Stair Training    DC Equipment Recommendations: None  Discharge Recommendations: Recommend home health for continued physical therapy services        Objective       04/22/21 1105   Prior Living Situation   Prior Services Other (Comments)  (Family checks on pt daily)   Housing / Facility Mobile Home   Steps Into Home 2   Steps In Home 0   Equipment Owned Grab Bar(s) In Tub / Shower   Lives with - Patient's Self Care Capacity Alone and Able to Care For Self   Prior Level of Functional Mobility   Bed Mobility Independent   Transfer Status Independent   Ambulation Independent   Distance Ambulation (Feet)   (community)   Assistive Devices Used None   Stairs Independent   Gait Analysis   Gait Level Of Assist Minimal Assist  (Occasional CGA)   Assistive Device None   Distance (Feet) 120   # of Times Distance was Traveled 1   Deviation   (1-2 minor losses of balance - pt able to self correct)   # of Stairs Climbed 0   Weight Bearing Status FWB   Bed " Mobility    Supine to Sit Supervised   Sit to Supine Supervised   Scooting Supervised   Functional Mobility   Sit to Stand Supervised   Bed, Chair, Wheelchair Transfer Supervised   Transfer Method Stand Step   Mobility Ambulated in hallways without AD   Short Term Goals    Short Term Goal # 1 Patient will be able to ambulate x 150ft using no AD and no losses of balance @ SPV in order to improve functional independence in 6 visits   Short Term Goal # 2 Patient will be able to ascend/descend 2 stairs @ Mod I in order to enter/exit home safely in 6 visits          Patient with one or more new problems requiring additional work-up/treatment.

## 2021-07-01 NOTE — ED PROVIDER NOTE - ATTENDING CONTRIBUTION TO CARE
I reached out to pt to let her know she is  Greater then 6 months over due for her INR  She is aware and states she will go next week as she will need a refill and I advised that we could not give the refill until her INR is complete  82 yo male with PMH COPD, DM, HLD, urinary retention with indwelling bains, schizophrenia, sent in for replacement of bains. Per history bains fell out, unclear how. Pt without any pain, bleeding, signs trauma, suprapubic pain or abdominal pain. No fevers, cough, V/D or flank pain.     VITAL SIGNS: noted  CONSTITUTIONAL: Well-developed; well-nourished; in no acute distress  HEAD: Normocephalic; atraumatic  EYES: PERRL, EOM intact; conjunctiva and sclera clear  ENT: No nasal discharge; airway clear. MMM  NECK: Supple; non tender.   CARD: S1, S2 normal; no murmurs, gallops, or rubs. Regular rate and rhythm  RESP: CTAB/L, no wheezes, rales or rhonchi  ABD: Normal bowel sounds; soft; non-distended; non-tender; no CVA tenderness  EXT: Normal ROM. No calf tenderness or edema. Distal pulses intact  NEURO: Alert, oriented. Grossly unremarkable. No focal deficits  SKIN: Skin exam is warm and dry, no acute rash  MS: No midline spinal tenderness

## 2021-07-01 NOTE — ED PROVIDER NOTE - PHYSICAL EXAMINATION
CONSTITUTIONAL: Well-appearing; well-nourished; in no apparent distress.   GI/: with ; uncircumcised, no bains in place. non-distended; non-tender; no palpable organomegaly.   SKIN: Normal for age and race; warm; dry; good turgor; no apparent lesions or exudate.   NEURO/PSYCH: A & O x 4; grossly unremarkable. mood and manner are appropriate.

## 2021-07-01 NOTE — ED PROVIDER NOTE - CLINICAL SUMMARY MEDICAL DECISION MAKING FREE TEXT BOX
pt evaluated after bains was displaced, pt bains replaced. draining clear yellow urine. pt without complaints, stable VS. advised follow up with PMD Dr. Hayes and urology

## 2021-07-01 NOTE — ED PROVIDER NOTE - CARE PROVIDER_API CALL
Solomon Steiner  UROLOGY  [address] [place] [address], [address]  [place] [address]  Phone: [phone]  Fax: [phone]  Follow Up Time: Routine

## 2021-07-01 NOTE — ED PROVIDER NOTE - CARE PROVIDERS DIRECT ADDRESSES
[email or web address]

## 2021-07-01 NOTE — ED PROVIDER NOTE - PATIENT PORTAL LINK FT
You can access the FollowMyHealth Patient Portal offered by [facility] by registering at the following website: [email or web address]. By joining [employer]’s FollowMyHealth portal, you will also be able to view your health information using other applications (apps) compatible with our system.

## 2021-07-01 NOTE — ED PROVIDER NOTE - NS ED ROS FT
Constitutional: no fever, chills, no recent weight loss, change in appetite or malaise  Eyes: no redness/discharge/pain/vision changes  ENT: no rhinorrhea/ear pain/sore throat  Cardiac: No chest pain, SOB or edema.  Respiratory: No cough or respiratory distress  GI: No nausea, vomiting, diarrhea or abdominal pain.  : No dysuria, frequency, urgency or hematuria  MS: no pain to back or extremities, no loss of ROM, no weakness  Neuro: No headache or weakness. No LOC.  Except as documented in the HPI, all other systems are negative.

## 2021-07-01 NOTE — ED PROVIDER NOTE - NSFOLLOWUPCLINICS_GEN_ALL_ED_FT
[facility]  Urology  .  [place]   Phone:   Fax:     [facility]  Urology  .  [place]   Phone: [phone]  Fax:

## 2021-07-01 NOTE — ED PROVIDER NOTE - OBJECTIVE STATEMENT
pt sent to ED from [facility] 2/2 "bains fell out". Denies fever/chill/HA/dizziness/chest pain/palpitation/sob/abd pain/n/v/d/ black stool/bloody stool/urinary sxs

## 2021-07-02 ENCOUNTER — INPATIENT (INPATIENT)
Facility: HOSPITAL | Age: 81
LOS: 9 days | Discharge: HOME | End: 2021-07-12
Attending: STUDENT IN AN ORGANIZED HEALTH CARE EDUCATION/TRAINING PROGRAM | Admitting: STUDENT IN AN ORGANIZED HEALTH CARE EDUCATION/TRAINING PROGRAM
Payer: MEDICARE

## 2021-07-02 VITALS
DIASTOLIC BLOOD PRESSURE: 64 MMHG | TEMPERATURE: 96 F | WEIGHT: 149.91 LBS | RESPIRATION RATE: 20 BRPM | OXYGEN SATURATION: 98 % | HEIGHT: 66 IN | SYSTOLIC BLOOD PRESSURE: 100 MMHG | HEART RATE: 75 BPM

## 2021-07-02 DIAGNOSIS — Z12.11 ENCOUNTER FOR SCREENING FOR MALIGNANT NEOPLASM OF COLON: Chronic | ICD-10-CM

## 2021-07-02 PROCEDURE — 93010 ELECTROCARDIOGRAM REPORT: CPT

## 2021-07-02 PROCEDURE — 99291 CRITICAL CARE FIRST HOUR: CPT

## 2021-07-02 RX ORDER — ONDANSETRON 8 MG/1
4 TABLET, FILM COATED ORAL ONCE
Refills: 0 | Status: COMPLETED | OUTPATIENT
Start: 2021-07-02 | End: 2021-07-02

## 2021-07-02 RX ORDER — SODIUM CHLORIDE 9 MG/ML
1000 INJECTION INTRAMUSCULAR; INTRAVENOUS; SUBCUTANEOUS ONCE
Refills: 0 | Status: COMPLETED | OUTPATIENT
Start: 2021-07-02 | End: 2021-07-02

## 2021-07-02 NOTE — ED PROVIDER NOTE - NS ED MD EM SELECTION
Physical Therapy Daily Progress Note  Visit: 2    Steve Nan reports: I think my last Rx may of helped some.      Subjective     Objective   See Exercise, Manual, and Modality Logs for complete treatment.       Assessment & Plan     Assessment  Assessment details: With the pt's report of subjective improvement, the Epley was performed again.  Will continue to monitor the pt's progress and Rx appropriately.    Plan  Plan details: Progress per Plan of Care                   Manual Therapy:         mins  66863;  Therapeutic Exercise:         mins  28240;     Neuromuscular Madelyn:    15    mins  91383;    Therapeutic Activity:          mins  69566;     Gait Training:           mins  54179;     Ultrasound:          mins  53172;    Electrical Stimulation:         mins  03495 ( );  Dry Needling          mins self-pay    Timed Treatment:   15   mins   Total Treatment:     35   mins    Sam Fontanez, PT  [place] Lic. # [id number]  Physical Therapist      
[id number] Critical Care - 30 to 74 minutes

## 2021-07-02 NOTE — ED PROVIDER NOTE - CARE PLAN
Principal Discharge DX:	Hyponatremia  Secondary Diagnosis:	Hyperkalemia  Secondary Diagnosis:	Elevated troponin  Secondary Diagnosis:	UTI (urinary tract infection)  Secondary Diagnosis:	Cobos catheter problem

## 2021-07-02 NOTE — ED PROVIDER NOTE - OBJECTIVE STATEMENT
80 y/o male with PMH COPD, DM, HLD, urinary retention with indwelling bains, schizophrenia, sent in for vomiting. Pt seen yesterday for replacement of bains which was difficult and required 14 Fr Coude catheter. Pt is poor historian and denies complaints except vomiting earlier. Pt without any abdominal pain, SOB, recent sick contacts, use of alcohol, weakness, back pain, chest pain, headaches, fevers or chills.

## 2021-07-02 NOTE — ED PROVIDER NOTE - CLINICAL SUMMARY MEDICAL DECISION MAKING FREE TEXT BOX
pt evaluated for vomiting, evaluated for pulling out bains catheter and replaced, pt CT suggestive of pt puling bains again as not draining well and malpositioned when previously draining well. found to have UTI and hyponatremia, started on IV abx and IVF, admitted to ICU for further care

## 2021-07-02 NOTE — ED ADULT TRIAGE NOTE - ARRIVAL FROM
[facility]Assisted living [facility]

## 2021-07-02 NOTE — ED PROVIDER NOTE - ATTENDING CONTRIBUTION TO CARE
82 yo male with PMH COPD, DM, HLD, urinary retention with indwelling bains, schizophrenia, sent in for vomiing. Pt seen yesterday for replacement of bains which was difficult and required 14 Fr Coude catheter. Pt is poor historian and denies complaints except vomiting earlier. Pt without any abdominal pain, SOB, CP, fevers or chills.      VITAL SIGNS: noted  CONSTITUTIONAL: Well-developed; well-nourished; in no acute distress  HEAD: Normocephalic; atraumatic  EYES: PERRL, EOM intact; conjunctiva and sclera clear  ENT: No nasal discharge; airway clear. MMM  NECK: Supple; non tender.   CARD: S1, S2 normal; no murmurs, gallops, or rubs. Regular rate and rhythm  RESP: CTAB/L, no wheezes, rales or rhonchi  ABD: Normal bowel sounds; soft; non-distended; non-tender; no CVA tenderness  EXT: Normal ROM. No calf tenderness or edema. Distal pulses intact  NEURO: Alert, oriented. Grossly unremarkable. No focal deficits  SKIN: Skin exam is warm and dry

## 2021-07-02 NOTE — ED PROVIDER NOTE - PHYSICAL EXAMINATION
Physical Exam    Vital Signs: I have reviewed the initial vital signs.  Constitutional: well-nourished, appears stated age, no acute distress  Eyes: Conjunctiva pink, Sclera clear, PERRLA, EOMI without pain.  Cardiovascular: S1 and S2, regular rate, regular rhythm, well-perfused extremities, radial pulses equal and 2+ b/l.   Respiratory: unlabored respiratory effort, clear to auscultation bilaterally no wheezing, rales and rhonchi. pt is speaking full sentences. no accessory muscle use.   Gastrointestinal: soft, non-tender, nondistended abdomen, no pulsatile mass, normal bowl sounds, no rebound, no guarding, no organomegaly.   Musculoskeletal: supple neck, no lower extremity edema, no calf tenderness, no midline tenderness, no palpable spinal step offs  Integumentary: warm, dry, no rash  Neurologic: awake, alert,  Psychiatric: appropriate mood, appropriate affect

## 2021-07-02 NOTE — ED PROVIDER NOTE - NS ED ROS FT
CONST: No fever, chills or bodyaches  EYES: No pain, redness, drainage or visual changes.  ENT: No ear pain or discharge, nasal discharge or congestion. No sore throat  CARD: No chest pain, palpitations  RESP: No SOB, cough, hemoptysis. No hx of asthma or COPD  GI: No abdominal pain, N/D (+) vomiting  : No urinary symptoms  MS: No joint pain, back pain or extremity pain/injury  SKIN: No rashes  NEURO: No headache, dizziness, paresthesias or LOC

## 2021-07-02 NOTE — ED PROVIDER NOTE - PMH
Chronic retention of urine    COPD (chronic obstructive pulmonary disease)    Diabetes type 2, controlled    Dyslipidemia    Dyslipidemia    Schizophrenia

## 2021-07-03 LAB
ALBUMIN SERPL ELPH-MCNC: 4.3 G/DL — SIGNIFICANT CHANGE UP (ref 3.5–5.2)
ALP SERPL-CCNC: 116 U/L — HIGH (ref 30–115)
ALT FLD-CCNC: 9 U/L — SIGNIFICANT CHANGE UP (ref 0–41)
ANION GAP SERPL CALC-SCNC: 13 MMOL/L — SIGNIFICANT CHANGE UP (ref 7–14)
ANION GAP SERPL CALC-SCNC: 13 MMOL/L — SIGNIFICANT CHANGE UP (ref 7–14)
ANION GAP SERPL CALC-SCNC: 8 MMOL/L — SIGNIFICANT CHANGE UP (ref 7–14)
ANION GAP SERPL CALC-SCNC: 8 MMOL/L — SIGNIFICANT CHANGE UP (ref 7–14)
APPEARANCE UR: ABNORMAL
AST SERPL-CCNC: 18 U/L — SIGNIFICANT CHANGE UP (ref 0–41)
BACTERIA # UR AUTO: ABNORMAL
BASOPHILS # BLD AUTO: 0.02 K/UL — SIGNIFICANT CHANGE UP (ref 0–0.2)
BASOPHILS NFR BLD AUTO: 0.1 % — SIGNIFICANT CHANGE UP (ref 0–1)
BILIRUB SERPL-MCNC: 0.7 MG/DL — SIGNIFICANT CHANGE UP (ref 0.2–1.2)
BILIRUB UR-MCNC: NEGATIVE — SIGNIFICANT CHANGE UP
BUN SERPL-MCNC: 10 MG/DL — SIGNIFICANT CHANGE UP (ref 10–20)
BUN SERPL-MCNC: 6 MG/DL — LOW (ref 10–20)
BUN SERPL-MCNC: 6 MG/DL — LOW (ref 10–20)
BUN SERPL-MCNC: 8 MG/DL — LOW (ref 10–20)
CALCIUM SERPL-MCNC: 8 MG/DL — LOW (ref 8.5–10.1)
CALCIUM SERPL-MCNC: 8.1 MG/DL — LOW (ref 8.5–10.1)
CALCIUM SERPL-MCNC: 8.4 MG/DL — LOW (ref 8.5–10.1)
CALCIUM SERPL-MCNC: 9 MG/DL — SIGNIFICANT CHANGE UP (ref 8.5–10.1)
CHLORIDE SERPL-SCNC: 82 MMOL/L — LOW (ref 98–110)
CHLORIDE SERPL-SCNC: 90 MMOL/L — LOW (ref 98–110)
CHLORIDE SERPL-SCNC: 95 MMOL/L — LOW (ref 98–110)
CHLORIDE SERPL-SCNC: 97 MMOL/L — LOW (ref 98–110)
CO2 SERPL-SCNC: 21 MMOL/L — SIGNIFICANT CHANGE UP (ref 17–32)
CO2 SERPL-SCNC: 23 MMOL/L — SIGNIFICANT CHANGE UP (ref 17–32)
CO2 SERPL-SCNC: 24 MMOL/L — SIGNIFICANT CHANGE UP (ref 17–32)
CO2 SERPL-SCNC: 24 MMOL/L — SIGNIFICANT CHANGE UP (ref 17–32)
COLOR SPEC: YELLOW — SIGNIFICANT CHANGE UP
CREAT SERPL-MCNC: 0.8 MG/DL — SIGNIFICANT CHANGE UP (ref 0.7–1.5)
CREAT SERPL-MCNC: 0.8 MG/DL — SIGNIFICANT CHANGE UP (ref 0.7–1.5)
CREAT SERPL-MCNC: 0.9 MG/DL — SIGNIFICANT CHANGE UP (ref 0.7–1.5)
CREAT SERPL-MCNC: 1 MG/DL — SIGNIFICANT CHANGE UP (ref 0.7–1.5)
DIFF PNL FLD: ABNORMAL
EOSINOPHIL # BLD AUTO: 0 K/UL — SIGNIFICANT CHANGE UP (ref 0–0.7)
EOSINOPHIL NFR BLD AUTO: 0 % — SIGNIFICANT CHANGE UP (ref 0–8)
EPI CELLS # UR: 1 /HPF — SIGNIFICANT CHANGE UP (ref 0–5)
ETHANOL SERPL-MCNC: <10 MG/DL — SIGNIFICANT CHANGE UP
GLUCOSE BLDC GLUCOMTR-MCNC: 101 MG/DL — HIGH (ref 70–99)
GLUCOSE BLDC GLUCOMTR-MCNC: 102 MG/DL — HIGH (ref 70–99)
GLUCOSE BLDC GLUCOMTR-MCNC: 115 MG/DL — HIGH (ref 70–99)
GLUCOSE BLDC GLUCOMTR-MCNC: 127 MG/DL — HIGH (ref 70–99)
GLUCOSE BLDC GLUCOMTR-MCNC: 99 MG/DL — SIGNIFICANT CHANGE UP (ref 70–99)
GLUCOSE SERPL-MCNC: 103 MG/DL — HIGH (ref 70–99)
GLUCOSE SERPL-MCNC: 122 MG/DL — HIGH (ref 70–99)
GLUCOSE SERPL-MCNC: 85 MG/DL — SIGNIFICANT CHANGE UP (ref 70–99)
GLUCOSE SERPL-MCNC: 97 MG/DL — SIGNIFICANT CHANGE UP (ref 70–99)
GLUCOSE UR QL: NEGATIVE — SIGNIFICANT CHANGE UP
HCT VFR BLD CALC: 36.9 % — LOW (ref 42–52)
HGB BLD-MCNC: 13.2 G/DL — LOW (ref 14–18)
HYALINE CASTS # UR AUTO: 3 /LPF — SIGNIFICANT CHANGE UP (ref 0–7)
IMM GRANULOCYTES NFR BLD AUTO: 0.6 % — HIGH (ref 0.1–0.3)
KETONES UR-MCNC: NEGATIVE — SIGNIFICANT CHANGE UP
LACTATE SERPL-SCNC: 1.1 MMOL/L — SIGNIFICANT CHANGE UP (ref 0.7–2)
LACTATE SERPL-SCNC: 2.1 MMOL/L — HIGH (ref 0.7–2)
LACTATE SERPL-SCNC: 3.3 MMOL/L — HIGH (ref 0.7–2)
LEUKOCYTE ESTERASE UR-ACNC: ABNORMAL
LIDOCAIN IGE QN: 16 U/L — SIGNIFICANT CHANGE UP (ref 7–60)
LYMPHOCYTES # BLD AUTO: 0.63 K/UL — LOW (ref 1.2–3.4)
LYMPHOCYTES # BLD AUTO: 3.1 % — LOW (ref 20.5–51.1)
MCHC RBC-ENTMCNC: 32.4 PG — HIGH (ref 27–31)
MCHC RBC-ENTMCNC: 35.8 G/DL — SIGNIFICANT CHANGE UP (ref 32–37)
MCV RBC AUTO: 90.4 FL — SIGNIFICANT CHANGE UP (ref 80–94)
MONOCYTES # BLD AUTO: 1.35 K/UL — HIGH (ref 0.1–0.6)
MONOCYTES NFR BLD AUTO: 6.7 % — SIGNIFICANT CHANGE UP (ref 1.7–9.3)
MRSA PCR RESULT.: SIGNIFICANT CHANGE UP
NEUTROPHILS # BLD AUTO: 18.14 K/UL — HIGH (ref 1.4–6.5)
NEUTROPHILS NFR BLD AUTO: 89.5 % — HIGH (ref 42.2–75.2)
NITRITE UR-MCNC: NEGATIVE — SIGNIFICANT CHANGE UP
NRBC # BLD: 0 /100 WBCS — SIGNIFICANT CHANGE UP (ref 0–0)
PH UR: 7 — SIGNIFICANT CHANGE UP (ref 5–8)
PLATELET # BLD AUTO: 236 K/UL — SIGNIFICANT CHANGE UP (ref 130–400)
POTASSIUM SERPL-MCNC: 4 MMOL/L — SIGNIFICANT CHANGE UP (ref 3.5–5)
POTASSIUM SERPL-MCNC: 4.4 MMOL/L — SIGNIFICANT CHANGE UP (ref 3.5–5)
POTASSIUM SERPL-MCNC: 4.9 MMOL/L — SIGNIFICANT CHANGE UP (ref 3.5–5)
POTASSIUM SERPL-MCNC: 5.5 MMOL/L — HIGH (ref 3.5–5)
POTASSIUM SERPL-SCNC: 4 MMOL/L — SIGNIFICANT CHANGE UP (ref 3.5–5)
POTASSIUM SERPL-SCNC: 4.4 MMOL/L — SIGNIFICANT CHANGE UP (ref 3.5–5)
POTASSIUM SERPL-SCNC: 4.9 MMOL/L — SIGNIFICANT CHANGE UP (ref 3.5–5)
POTASSIUM SERPL-SCNC: 5.5 MMOL/L — HIGH (ref 3.5–5)
PROT SERPL-MCNC: 7 G/DL — SIGNIFICANT CHANGE UP (ref 6–8)
PROT UR-MCNC: ABNORMAL
RBC # BLD: 4.08 M/UL — LOW (ref 4.7–6.1)
RBC # FLD: 13.1 % — SIGNIFICANT CHANGE UP (ref 11.5–14.5)
RBC CASTS # UR COMP ASSIST: 15 /HPF — HIGH (ref 0–4)
S AUREUS DNA NOSE QL NAA+PROBE: SIGNIFICANT CHANGE UP
SARS-COV-2 RNA SPEC QL NAA+PROBE: SIGNIFICANT CHANGE UP
SODIUM SERPL-SCNC: 119 MMOL/L — CRITICAL LOW (ref 135–146)
SODIUM SERPL-SCNC: 124 MMOL/L — LOW (ref 135–146)
SODIUM SERPL-SCNC: 127 MMOL/L — LOW (ref 135–146)
SODIUM SERPL-SCNC: 128 MMOL/L — LOW (ref 135–146)
SP GR SPEC: 1.01 — SIGNIFICANT CHANGE UP (ref 1.01–1.03)
TROPONIN T SERPL-MCNC: 0.1 NG/ML — CRITICAL HIGH
TROPONIN T SERPL-MCNC: 0.11 NG/ML — CRITICAL HIGH
UROBILINOGEN FLD QL: SIGNIFICANT CHANGE UP
WBC # BLD: 20.26 K/UL — HIGH (ref 4.8–10.8)
WBC # FLD AUTO: 20.26 K/UL — HIGH (ref 4.8–10.8)
WBC UR QL: 546 /HPF — HIGH (ref 0–5)

## 2021-07-03 PROCEDURE — 76775 US EXAM ABDO BACK WALL LIM: CPT | Mod: 26

## 2021-07-03 PROCEDURE — 70450 CT HEAD/BRAIN W/O DYE: CPT | Mod: 26,MA

## 2021-07-03 PROCEDURE — 99221 1ST HOSP IP/OBS SF/LOW 40: CPT

## 2021-07-03 PROCEDURE — 99291 CRITICAL CARE FIRST HOUR: CPT

## 2021-07-03 PROCEDURE — 74177 CT ABD & PELVIS W/CONTRAST: CPT | Mod: 26,MA

## 2021-07-03 PROCEDURE — 71045 X-RAY EXAM CHEST 1 VIEW: CPT | Mod: 26

## 2021-07-03 RX ORDER — TRAZODONE HCL 50 MG
150 TABLET ORAL AT BEDTIME
Refills: 0 | Status: DISCONTINUED | OUTPATIENT
Start: 2021-07-03 | End: 2021-07-12

## 2021-07-03 RX ORDER — CEFTRIAXONE 500 MG/1
1000 INJECTION, POWDER, FOR SOLUTION INTRAMUSCULAR; INTRAVENOUS ONCE
Refills: 0 | Status: COMPLETED | OUTPATIENT
Start: 2021-07-03 | End: 2021-07-03

## 2021-07-03 RX ORDER — SODIUM CHLORIDE 9 MG/ML
1000 INJECTION, SOLUTION INTRAVENOUS
Refills: 0 | Status: DISCONTINUED | OUTPATIENT
Start: 2021-07-03 | End: 2021-07-12

## 2021-07-03 RX ORDER — INSULIN GLARGINE 100 [IU]/ML
15 INJECTION, SOLUTION SUBCUTANEOUS AT BEDTIME
Refills: 0 | Status: DISCONTINUED | OUTPATIENT
Start: 2021-07-03 | End: 2021-07-11

## 2021-07-03 RX ORDER — LACTOBACILLUS ACIDOPHILUS 100MM CELL
1 CAPSULE ORAL DAILY
Refills: 0 | Status: DISCONTINUED | OUTPATIENT
Start: 2021-07-03 | End: 2021-07-12

## 2021-07-03 RX ORDER — DEXTROSE 50 % IN WATER 50 %
25 SYRINGE (ML) INTRAVENOUS ONCE
Refills: 0 | Status: DISCONTINUED | OUTPATIENT
Start: 2021-07-03 | End: 2021-07-12

## 2021-07-03 RX ORDER — OLANZAPINE 15 MG/1
5 TABLET, FILM COATED ORAL DAILY
Refills: 0 | Status: DISCONTINUED | OUTPATIENT
Start: 2021-07-03 | End: 2021-07-12

## 2021-07-03 RX ORDER — DEXTROSE 50 % IN WATER 50 %
15 SYRINGE (ML) INTRAVENOUS ONCE
Refills: 0 | Status: DISCONTINUED | OUTPATIENT
Start: 2021-07-03 | End: 2021-07-12

## 2021-07-03 RX ORDER — MEROPENEM 1 G/30ML
1000 INJECTION INTRAVENOUS EVERY 8 HOURS
Refills: 0 | Status: DISCONTINUED | OUTPATIENT
Start: 2021-07-03 | End: 2021-07-11

## 2021-07-03 RX ORDER — SODIUM CHLORIDE 9 MG/ML
500 INJECTION INTRAMUSCULAR; INTRAVENOUS; SUBCUTANEOUS ONCE
Refills: 0 | Status: COMPLETED | OUTPATIENT
Start: 2021-07-03 | End: 2021-07-03

## 2021-07-03 RX ORDER — METOPROLOL TARTRATE 50 MG
25 TABLET ORAL
Refills: 0 | Status: DISCONTINUED | OUTPATIENT
Start: 2021-07-03 | End: 2021-07-12

## 2021-07-03 RX ORDER — SODIUM CHLORIDE 9 MG/ML
1000 INJECTION INTRAMUSCULAR; INTRAVENOUS; SUBCUTANEOUS
Refills: 0 | Status: DISCONTINUED | OUTPATIENT
Start: 2021-07-03 | End: 2021-07-04

## 2021-07-03 RX ORDER — VANCOMYCIN HCL 1 G
1000 VIAL (EA) INTRAVENOUS EVERY 12 HOURS
Refills: 0 | Status: DISCONTINUED | OUTPATIENT
Start: 2021-07-03 | End: 2021-07-03

## 2021-07-03 RX ORDER — GABAPENTIN 400 MG/1
300 CAPSULE ORAL EVERY 12 HOURS
Refills: 0 | Status: DISCONTINUED | OUTPATIENT
Start: 2021-07-03 | End: 2021-07-12

## 2021-07-03 RX ORDER — SODIUM CHLORIDE 9 MG/ML
1 INJECTION INTRAMUSCULAR; INTRAVENOUS; SUBCUTANEOUS DAILY
Refills: 0 | Status: DISCONTINUED | OUTPATIENT
Start: 2021-07-03 | End: 2021-07-03

## 2021-07-03 RX ORDER — PREGABALIN 225 MG/1
1000 CAPSULE ORAL DAILY
Refills: 0 | Status: DISCONTINUED | OUTPATIENT
Start: 2021-07-03 | End: 2021-07-12

## 2021-07-03 RX ORDER — NIFEDIPINE 30 MG
30 TABLET, EXTENDED RELEASE 24 HR ORAL DAILY
Refills: 0 | Status: DISCONTINUED | OUTPATIENT
Start: 2021-07-03 | End: 2021-07-05

## 2021-07-03 RX ORDER — SENNA PLUS 8.6 MG/1
2 TABLET ORAL AT BEDTIME
Refills: 0 | Status: DISCONTINUED | OUTPATIENT
Start: 2021-07-03 | End: 2021-07-12

## 2021-07-03 RX ORDER — VANCOMYCIN HCL 1 G
750 VIAL (EA) INTRAVENOUS EVERY 12 HOURS
Refills: 0 | Status: DISCONTINUED | OUTPATIENT
Start: 2021-07-03 | End: 2021-07-05

## 2021-07-03 RX ORDER — SODIUM ZIRCONIUM CYCLOSILICATE 10 G/10G
5 POWDER, FOR SUSPENSION ORAL ONCE
Refills: 0 | Status: COMPLETED | OUTPATIENT
Start: 2021-07-03 | End: 2021-07-03

## 2021-07-03 RX ORDER — INSULIN LISPRO 100/ML
6 VIAL (ML) SUBCUTANEOUS
Refills: 0 | Status: DISCONTINUED | OUTPATIENT
Start: 2021-07-03 | End: 2021-07-12

## 2021-07-03 RX ORDER — GLUCAGON INJECTION, SOLUTION 0.5 MG/.1ML
1 INJECTION, SOLUTION SUBCUTANEOUS ONCE
Refills: 0 | Status: DISCONTINUED | OUTPATIENT
Start: 2021-07-03 | End: 2021-07-12

## 2021-07-03 RX ORDER — OLANZAPINE 15 MG/1
20 TABLET, FILM COATED ORAL AT BEDTIME
Refills: 0 | Status: DISCONTINUED | OUTPATIENT
Start: 2021-07-03 | End: 2021-07-12

## 2021-07-03 RX ORDER — VANCOMYCIN HCL 1 G
750 VIAL (EA) INTRAVENOUS ONCE
Refills: 0 | Status: COMPLETED | OUTPATIENT
Start: 2021-07-03 | End: 2021-07-03

## 2021-07-03 RX ORDER — CHLORHEXIDINE GLUCONATE 213 G/1000ML
1 SOLUTION TOPICAL
Refills: 0 | Status: DISCONTINUED | OUTPATIENT
Start: 2021-07-03 | End: 2021-07-12

## 2021-07-03 RX ORDER — ATORVASTATIN CALCIUM 80 MG/1
10 TABLET, FILM COATED ORAL AT BEDTIME
Refills: 0 | Status: DISCONTINUED | OUTPATIENT
Start: 2021-07-03 | End: 2021-07-12

## 2021-07-03 RX ORDER — ENOXAPARIN SODIUM 100 MG/ML
40 INJECTION SUBCUTANEOUS DAILY
Refills: 0 | Status: DISCONTINUED | OUTPATIENT
Start: 2021-07-03 | End: 2021-07-12

## 2021-07-03 RX ORDER — PANTOPRAZOLE SODIUM 20 MG/1
40 TABLET, DELAYED RELEASE ORAL
Refills: 0 | Status: DISCONTINUED | OUTPATIENT
Start: 2021-07-03 | End: 2021-07-12

## 2021-07-03 RX ORDER — DEXTROSE 50 % IN WATER 50 %
12.5 SYRINGE (ML) INTRAVENOUS ONCE
Refills: 0 | Status: DISCONTINUED | OUTPATIENT
Start: 2021-07-03 | End: 2021-07-12

## 2021-07-03 RX ORDER — ONDANSETRON 8 MG/1
4 TABLET, FILM COATED ORAL EVERY 4 HOURS
Refills: 0 | Status: DISCONTINUED | OUTPATIENT
Start: 2021-07-03 | End: 2021-07-12

## 2021-07-03 RX ORDER — INSULIN LISPRO 100/ML
VIAL (ML) SUBCUTANEOUS
Refills: 0 | Status: DISCONTINUED | OUTPATIENT
Start: 2021-07-03 | End: 2021-07-12

## 2021-07-03 RX ORDER — FOLIC ACID 0.8 MG
1 TABLET ORAL DAILY
Refills: 0 | Status: DISCONTINUED | OUTPATIENT
Start: 2021-07-03 | End: 2021-07-12

## 2021-07-03 RX ORDER — SODIUM CHLORIDE 9 MG/ML
1000 INJECTION INTRAMUSCULAR; INTRAVENOUS; SUBCUTANEOUS ONCE
Refills: 0 | Status: COMPLETED | OUTPATIENT
Start: 2021-07-03 | End: 2021-07-03

## 2021-07-03 RX ADMIN — GABAPENTIN 300 MILLIGRAM(S): 400 CAPSULE ORAL at 17:19

## 2021-07-03 RX ADMIN — INSULIN GLARGINE 15 UNIT(S): 100 INJECTION, SOLUTION SUBCUTANEOUS at 21:36

## 2021-07-03 RX ADMIN — GABAPENTIN 300 MILLIGRAM(S): 400 CAPSULE ORAL at 06:39

## 2021-07-03 RX ADMIN — OLANZAPINE 20 MILLIGRAM(S): 15 TABLET, FILM COATED ORAL at 21:36

## 2021-07-03 RX ADMIN — SODIUM CHLORIDE 100 MILLILITER(S): 9 INJECTION INTRAMUSCULAR; INTRAVENOUS; SUBCUTANEOUS at 06:40

## 2021-07-03 RX ADMIN — MEROPENEM 100 MILLIGRAM(S): 1 INJECTION INTRAVENOUS at 06:39

## 2021-07-03 RX ADMIN — ATORVASTATIN CALCIUM 10 MILLIGRAM(S): 80 TABLET, FILM COATED ORAL at 21:35

## 2021-07-03 RX ADMIN — ONDANSETRON 4 MILLIGRAM(S): 8 TABLET, FILM COATED ORAL at 00:17

## 2021-07-03 RX ADMIN — Medication 250 MILLIGRAM(S): at 17:23

## 2021-07-03 RX ADMIN — SENNA PLUS 2 TABLET(S): 8.6 TABLET ORAL at 21:35

## 2021-07-03 RX ADMIN — Medication 25 MILLIGRAM(S): at 17:19

## 2021-07-03 RX ADMIN — PREGABALIN 1000 MICROGRAM(S): 225 CAPSULE ORAL at 13:39

## 2021-07-03 RX ADMIN — ENOXAPARIN SODIUM 40 MILLIGRAM(S): 100 INJECTION SUBCUTANEOUS at 13:40

## 2021-07-03 RX ADMIN — Medication 6 UNIT(S): at 17:21

## 2021-07-03 RX ADMIN — SODIUM CHLORIDE 1000 MILLILITER(S): 9 INJECTION INTRAMUSCULAR; INTRAVENOUS; SUBCUTANEOUS at 09:11

## 2021-07-03 RX ADMIN — OLANZAPINE 5 MILLIGRAM(S): 15 TABLET, FILM COATED ORAL at 13:39

## 2021-07-03 RX ADMIN — MEROPENEM 100 MILLIGRAM(S): 1 INJECTION INTRAVENOUS at 13:47

## 2021-07-03 RX ADMIN — CEFTRIAXONE 100 MILLIGRAM(S): 500 INJECTION, POWDER, FOR SOLUTION INTRAMUSCULAR; INTRAVENOUS at 01:45

## 2021-07-03 RX ADMIN — SODIUM CHLORIDE 1000 MILLILITER(S): 9 INJECTION INTRAMUSCULAR; INTRAVENOUS; SUBCUTANEOUS at 01:45

## 2021-07-03 RX ADMIN — Medication 250 MILLIGRAM(S): at 10:04

## 2021-07-03 RX ADMIN — MEROPENEM 100 MILLIGRAM(S): 1 INJECTION INTRAVENOUS at 21:38

## 2021-07-03 RX ADMIN — SODIUM ZIRCONIUM CYCLOSILICATE 5 GRAM(S): 10 POWDER, FOR SUSPENSION ORAL at 09:11

## 2021-07-03 RX ADMIN — Medication 150 MILLIGRAM(S): at 21:36

## 2021-07-03 RX ADMIN — SODIUM CHLORIDE 2000 MILLILITER(S): 9 INJECTION INTRAMUSCULAR; INTRAVENOUS; SUBCUTANEOUS at 00:17

## 2021-07-03 RX ADMIN — Medication 1 MILLIGRAM(S): at 13:40

## 2021-07-03 RX ADMIN — Medication 1 TABLET(S): at 13:38

## 2021-07-03 NOTE — CONSULT NOTE ADULT - ASSESSMENT
IMPRESSION:    Sepsis POA  UTI HO ESBL UTI.  HO Chronic Cobos   HO COPD   HO schizophrenia     PLAN:    CNS:  Continue psych medication     HEENT: Oral care    PULMONARY:  HOB @ 45 degrees.  Aspiration precautions     CARDIOVASCULAR:  NS bolus.  Continue Hydration based on progress and oral intake     GI: GI prophylaxis.  Feeding per speech and swallow.  Bowel regimen     RENAL:  Follow up lytes.  Correct as needed.  Kidney US     INFECTIOUS DISEASE: Follow up cultures.  Meropenem and Vanc for now until cultures     HEMATOLOGICAL:  DVT prophylaxis.  Dimer     ENDOCRINE:  Follow up FS.      MUSCULOSKELETAL:   OOB to chair with fall precautions    Downgrade to floor PM after resuscitation

## 2021-07-03 NOTE — CONSULT NOTE ADULT - ASSESSMENT
80 yo M with PMH of schizophrenia, COPD, hyperlipidemia, non-Hodgkin lymphoma in remission, Type 2DM (non-insulin dependent) with neuropathy, chronic indwelling Bains (chronic retention presents to ED from NH with vomiting -  c/s for bains inflated within penile urethra and b/l hydronephrosis on CT.   CT A/P shows Interval development of lobulation and cystic areas within the prostate. Correlate with PSA levels and may obtain MRI of the prostate with and without contrast for further evaluation. Bains balloon inflated within the penile urethra, . Mild to moderate bilateral hydroureteronephrosis with no distal obstructing ureteral lesion or stone. Air is noted within the urinary bladder, likely secondary to instrumentation. WBC 20.26, Cr 1.0, UA +large leuks     Plan:   - Under sterile technique, 14 fr coude readvanced to hub of catheter, aspirated 50cc of cloudy yellow urine using a tumi syringe, blood noted at meatus after advancing  catheter. Foam tape mesentry in place to secure Bains catheter to Right leg. Bains now draining to gravity with 150cc of cloudy yellow urine noted in bag.   - Continue Bains care   - Consider 1:1 to prevent further urethral damage    - Repeat RBUS in 48 -72 hours to assess hydro  - F/u UCx   - Consider Flomax if not CI   - ID c/s for Abx recommendation   - Analgesics for pain control prn   - ED team aware   - Will d/w attending

## 2021-07-03 NOTE — SWALLOW BEDSIDE ASSESSMENT ADULT - SLP GENERAL OBSERVATIONS
Pt received w/ eyes closed, responsive to verbal stim. Pt repositioned upright, verbally responsive at times, oriented x2. Pt in no apparent pain. +room air

## 2021-07-03 NOTE — CONSULT NOTE ADULT - SUBJECTIVE AND OBJECTIVE BOX
80 yo M with PMH of Schizophrenia, DM, COPD, DLD, presents to ED with vomiting -  c/s for bains inflated within penile urethra and b/l hydronephrosis on CT. Patient seen and examined at bedside. Patient poor historian and refuses to cooperate, subjective information obtained from extensive chart review and collateral information. Patient was sleeping, curled in a ball refusing to be assessed. He continued to say "come back later". Patient was seen by Urology on   for difficult Bains placement after patient reported "bains came out" . Patient had a 14 fr catheter placed draining well and was told to follow-up in Urology clinic for further management. Patient now presents to ED for vomitin. He was noted to be curled into a ball on the bed with 14 fr coude attached to leg bag displaced past knee and hand noted to be tugging on catheter in his sleep. Leg bag noted with 400cc of cloudy yellow urine.     In ED, CT A/P shows Interval development of lobulation and cystic areas within the prostate. Correlate with PSA levels and may obtain MRI of the prostate with and without contrast for further evaluation. Bains balloon inflated within the penile urethra, . Mild to moderate bilateral hydroureteronephrosis with no distal obstructing ureteral lesion or stone. Air is noted within the urinary bladder, likely secondary to instrumentation. However infectious process is not excluded.    PAST MEDICAL & SURGICAL HISTORY:  Schizophrenia    Diabetes type 2, controlled    COPD (chronic obstructive pulmonary disease)    Dyslipidemia    Encounter for screening colonoscopy        MEDICATIONS  (STANDING):    MEDICATIONS  (PRN):      Allergies    No Known Allergies    Intolerances        SOCIAL HISTORY: No illicit drug use    FAMILY HISTORY:  No pertinent family history in first degree relatives  Unable to ascertain 2/2 chronic psychiatric condition        REVIEW OF SYSTEMS:    CONSTITUTIONAL:  No fevers or chills  HEENT: No visual changes  ENDO: No sweating  NECK: No pain or stiffness  MUSCULOSKELETAL: No back pain, no joint pain  RESPIRATORY: No shortness of breath  CARDIOVASCULAR: No chest pain  GASTROINTESTINAL: No abdominal or epigastric pain. No nausea, vomiting,  No diarrhea or constipation.   NEUROLOGICAL: No mental status changes  PSYCH: No depression, no mood changes  SKIN: No itching    Vital Signs Last 24 Hrs  T(C): 36.2 (2021 01:14), Max: 36.2 (2021 01:14)  T(F): 97.1 (2021 01:14), Max: 97.1 (2021 01:14)  HR: 78 (2021 02:25) (75 - 86)  BP: 148/66 (2021 02:25) (100/64 - 148/66)  RR: 19 (2021 02:25) (18 - 20)  SpO2: 95% (2021 02:25) (95% - 98%)    PHYSICAL EXAM:    GEN: NAD, well-developed, asleep curled in to a ball pulling Bains further out of penis   SKIN: Good color, non diaphoretic.  HEENT: NC/AT.  RESP: No dyspnea, non-labored breathing. No use of accessory muscles.  CARDIO: +S1/S2  ABDO: Soft, NT/ND, no palpable bladder, no suprapubic tenderness  BACK: No CVAT B/L  : +  Non circumcised male. B/L descended testicles x 2. No lesions or palpable masses noted B/L. No meatal discharge or active bleeding. + 14 fr coude Indwelling bains in place to leg, noted to be level of the knee with catheter stem displaced, 400cc of urine noted in leg bag    I&O's Summary      LABS:                        13.2   20.26 )-----------( 236      ( 2021 23:58 )             36.9     07-02    119<LL>  |  82<L>  |  6<L>  ----------------------------<  122<H>  5.5<H>   |  24  |  1.0    Ca    9.0      2021 23:58    TPro  7.0  /  Alb  4.3  /  TBili  0.7  /  DBili  x   /  AST  18  /  ALT  9   /  AlkPhos  116<H>  07-02      Urinalysis Basic - ( 2021 01:02 )    Color: Yellow / Appearance: Turbid / S.011 / pH: x  Gluc: x / Ketone: Negative  / Bili: Negative / Urobili: <2 mg/dL   Blood: x / Protein: 100 mg/dL / Nitrite: Negative   Leuk Esterase: Large / RBC: 15 /HPF /  /HPF   Sq Epi: x / Non Sq Epi: 1 /HPF / Bacteria: Many            RADIOLOGY & ADDITIONAL STUDIES:  < from: CT Abdomen and Pelvis w/ IV Cont (21 @ 03:39) >  EXAM:  CT ABDOMEN AND PELVIS IC            PROCEDURE DATE:  2021            INTERPRETATION:  CLINICAL STATEMENT: Vomiting.    TECHNIQUE: Contiguous axial CT images were obtained from the lower chest to the pubic symphysis following administration of 100cc Omnipaque 350 intravenous contrast.  Oral contrast was not administered.  Reformatted images in the coronal and sagittal planes were acquired.    COMPARISON CT: CT abdomen and pelvis 10/26/2019.    OTHER STUDIES USED FOR CORRELATION: None.      FINDINGS:    LOWER CHEST: Unremarkable.    HEPATOBILIARY: Cholelithiasis with no evidence of acute cholecystitis. Hepatic cyst (right lobe) and hypodensities too small to further characterize. Patent portal vein    SPLEEN: Unremarkable.    PANCREAS: Unremarkable.    ADRENAL GLANDS: Unremarkable.    KIDNEYS: Is symmetric bilateral renal enhancement. Bilateral renal cysts and hypodensities too small to further characterize. Mild to moderate bilateral hydroureteronephrosis with no distal obstructing ureteral lesion or stone.    ABDOMINOPELVIC NODES: Unremarkable.    PELVIC ORGANS: Enlarged prostate. Interval development of lobulation the prostate with cystic areas. Air is noted within the urinary bladder, likely secondary to instrumentation. Bains balloon inflated within the penile urethra.    PERITONEUM/MESENTERY/BOWEL: No evidence of bowel obstruction, free air or ascites. Normal caliber appendix.    BONES/SOFT TISSUES: Multilevel degenerative changes of the spine. Chronic L1 and [id number] deformities.    OTHER: Severe atherosclerotic disease of the aorta and its branches.      IMPRESSION:      Interval development of lobulation and cystic areas within the prostate. Correlate with PSA levels and may obtain MRI of the prostate with and without contrast for further evaluation.    Bains balloon inflated within the penile urethra, recommend repositioning.    Mild to moderate bilateral hydroureteronephrosis with no distal obstructing ureteral lesion or stone.    Air is notedwithin the urinary bladder, likely secondary to instrumentation. However infectious process is not excluded.        Dr. Maryana Rice discussed preliminary findings with SUMA DAWKINS on 7/3/2021 4:04 AM with readback.        MARYANA RICE MD; Resident Radiologist  This document has been electronically signed.  TI MONTERROSO MD; Attending Radiologist  This document has been electronically signed. Jul  3 2021  4:10AM    < end of copied text >

## 2021-07-03 NOTE — H&P ADULT - NSHPPHYSICALEXAM_GEN_ALL_CORE
Physical Exam: General: well developed, diaphoretic  Neuro: awake, alert and oriented x1, able to move extremities spontaneously, no meningeal signs  HEENT: NCAT, EOMI, anicteric, mucosa moist  Respiratory: airway patent, respirations unlabored  CVS: regular rate and rhythm  Abdomen: soft, nontender, nondistended  : bains catheter in place, no CVA tenderness  Extremities: no edema, sensation and movement grossly intact  Skin: warm, dry, appropriate color Physical Exam: General: well developed, diaphoretic  Neuro: awake, alert and oriented x1, able to move extremities spontaneously, no meningeal signs  HEENT: NCAT, EOMI, anicteric, mucosa moist  Respiratory: airway patent, respirations unlabored  CVS: regular rate and rhythm  Abdomen: soft, nontender, nondistended  : bains catheter in place, no CVA tenderness  Extremities: no edema, sensation and movement grossly intact  Skin: warm, dry, Physical Exam: General: disheveled   Neuro: awake, alert and oriented x1, able to move extremities spontaneously, no meningeal signs  HEENT: NCAT, EOMI, anicteric, mucosa moist  Respiratory: airway patent, respirations unlabored  CVS: regular rate and rhythm  Abdomen: soft, nontender, nondistended  : bains catheter in place, no CVA tenderness  Extremities: no edema, sensation and movement grossly intact  Skin: warm, dry, General: disheveled, NAD  Neuro: awake, alert and oriented x1, able to move extremities spontaneously, no meningeal signs  HEENT: NCAT, EOMI, anicteric, mucosa moist  Respiratory: airway patent, respirations unlabored  CVS: regular rate and rhythm  Abdomen: soft, nontender, nondistended  : bains catheter in place, no CVA tenderness  Extremities: no edema, sensation and movement grossly intact  Skin: warm & dry

## 2021-07-03 NOTE — H&P ADULT - NSICDXPASTMEDICALHX_GEN_ALL_CORE_FT
PAST MEDICAL HISTORY:  COPD (chronic obstructive pulmonary disease)     Diabetes type 2, controlled     Dyslipidemia     Schizophrenia      PAST MEDICAL HISTORY:  Chronic retention of urine     COPD (chronic obstructive pulmonary disease)     Diabetes type 2, controlled     Dyslipidemia     Dyslipidemia     Schizophrenia

## 2021-07-03 NOTE — SWALLOW BEDSIDE ASSESSMENT ADULT - SWALLOW EVAL: DIAGNOSIS
+toleration for soft solids and thin liquids, mild oral dysphagia for hard solids 2' edentulous oral cavity. No overt s/s penetration/aspiration.

## 2021-07-03 NOTE — ED ADULT NURSE NOTE - NSIMPLEMENTINTERV_GEN_ALL_ED
Implemented All Universal Safety Interventions:  [place] to call system. Call bell, personal items and telephone within reach. Instruct patient to call for assistance. Room bathroom lighting operational. Non-slip footwear when patient is off stretcher. Physically safe environment: no spills, clutter or unnecessary equipment. Stretcher in lowest position, wheels locked, appropriate side rails in place.

## 2021-07-03 NOTE — H&P ADULT - HISTORY OF PRESENT ILLNESS
Mr. Laguna ia 81 year old man with PMHx of schizophrenia, COPD, hyperlipidemia, non-Hodgkin lymphoma in remission, Type 2DM (non-insulin dependent) with neuropathy, chronic indwelling Bains (chronic retention BIBEMS from [facility] for fevers associated with chills since this afternoon. Patient's roommate noticed the patient was tachypneic earlier this afternoon, with blood tinged urine in bains, and alerted the staff. Patient was found to be febrile and tachypneic at the facility, with foul smelling, cloudy urine evident from chronic indwelling catheter which prompted them to send him to the ED. Patient currently is AAOx1, and non cooperative to interview. Collateral history obtained by on call staff at NH, who stated that patient was he was in his usual state of health prior to presentation, and has not had any recent ill contacts and or complained of any dysuria, sob, cp, or abdominal discomfort. Per staff patient, occasionally complains of dizziness that he attributes to his medications. Of note patient has has several admissions in the past for ESBL pyelonephritis.   Mr. Laguna ia 81 year old man with PMHx of schizophrenia, COPD, hyperlipidemia, non-Hodgkin lymphoma in remission, Type 2DM (non-insulin dependent) with neuropathy, chronic indwelling Bains (chronic retention BIBEMS from [facility] for nausea/vomiting.  Patient poor historian and refuses to cooperate, subjective information obtained from extensive chart review and collateral information. Patient was recently discharged and was seen by Urology on 7/1 for difficult Bains placement after patient reported "bains came out" . Patient had a 14 fr catheter placed draining well and was told to follow-up in Urology clinic for further management. Of note patient has has several admissions in the past for ESBL pyelonephritis.    In ED,  patient afebrile, BP: 100/64, HR:75 RR:20 Spo2 98% on room air. Labs significant for leukocytosis 20k, Na 119, K 5.5, troponin .10. EKG no ischemic changes. UA positive for leukocyte esterase, blood, bacteria, WBC. Readministration of a left middle cranial fossa cystic area measuring 4.4 x 4.4 cm, which may represent encephalomalacia versus arachnoid cyst.  CT head no contrast on admission negative for acute intracranial hemorrhage or acute large territorial infarct.   CT Abdomen and Pelvis w/ IV Cont:Interval development of lobulation and cystic areas within the prostate. Bains balloon inflated within the penile urethra, recommend repositioning. Mild to moderate bilateral hydroureteronephrosis with no distal obstructing ureteral lesion or stone.Air is noted within the urinary bladder, likely secondary to instrumentation. However infectious process is not excluded.  Patient recieved 1gm rocephin, 2 L NS and zofran x 1 in ED. Urology consulted, patient currently admitted to ICU for further management.      Mr. Laguna ia 81 year old man with PMHx of schizophrenia, COPD, hyperlipidemia, non-Hodgkin lymphoma in remission, Type 2DM (non-insulin dependent) with neuropathy, chronic indwelling Bains (chronic retention) BIBEMS from [facility] for nausea/vomiting.  Patient poor historian and refuses to cooperate, subjective information obtained from extensive chart review and collateral information. Patient was recently discharged and was seen by Urology on 7/1 for difficult Bains placement after patient reported "bains came out" . Patient had a 14 fr catheter placed draining well and was told to follow-up in Urology clinic for further management. Of note patient has has several admissions in the past for ESBL pyelonephritis.    In ED,  patient afebrile, BP: 100/64, HR:75 RR:20 Spo2 98% on room air. Labs significant for leukocytosis 20k, Na 119, K 5.5, troponin .10. EKG no ischemic changes. UA positive for leukocyte esterase, blood, bacteria, WBC. Readministration of a left middle cranial fossa cystic area measuring 4.4 x 4.4 cm, which may represent encephalomalacia versus arachnoid cyst.  CT head no contrast on admission negative for acute intracranial hemorrhage or acute large territorial infarct.   CT Abdomen and Pelvis w/ IV Cont:Interval development of lobulation and cystic areas within the prostate. Bians balloon inflated within the penile urethra, recommend repositioning. Mild to moderate bilateral hydroureteronephrosis with no distal obstructing ureteral lesion or stone.Air is noted within the urinary bladder, likely secondary to instrumentation. However infectious process is not excluded.  Patient recieved 1gm rocephin, 2 L NS and zofran x 1 in ED. Urology consulted, patient currently admitted to ICU for further management.      Mr. Laguna ia 81 year old man with PMHx of schizophrenia, COPD, hyperlipidemia, non-Hodgkin lymphoma in remission, Type 2DM (non-insulin dependent) with neuropathy, chronic indwelling Bains (chronic retention) BIBEMS from [facility] for nausea/vomiting.  Patient poor historian and refuses to cooperate, subjective information obtained from extensive chart review and collateral information. Patient was recently discharged and was seen by Urology on 7/1 for difficult Bains placement after patient reported "bains came out" . Patient had a 14 fr catheter placed draining well and was told to follow-up in Urology clinic for further management. Of note patient has has several admissions in the past for ESBL pyelonephritis.    In ED,  patient afebrile, BP: 100/64, HR:75 RR:20 Spo2 98% on room air. Labs significant for leukocytosis 20k, Na 119, K 5.5, lactate 3.3 troponin .10. EKG no ischemic changes. UA positive for leukocyte esterase, blood, bacteria, WBC. Readministration of a left middle cranial fossa cystic area measuring 4.4 x 4.4 cm, which may represent encephalomalacia versus arachnoid cyst.  CT head no contrast on admission negative for acute intracranial hemorrhage or acute large territorial infarct.   CT Abdomen and Pelvis w/ IV Cont:Interval development of lobulation and cystic areas within the prostate. Bains balloon inflated within the penile urethra, recommend repositioning. Mild to moderate bilateral hydroureteronephrosis with no distal obstructing ureteral lesion or stone.Air is noted within the urinary bladder, likely secondary to instrumentation. However infectious process is not excluded.  Patient recieved 1gm rocephin, 2 L NS and zofran x 1 in ED. Urology consulted, patient currently admitted to ICU for further management.      Mr. Laguna ia 81 year old man with PMHx of schizophrenia, COPD, hyperlipidemia, non-Hodgkin lymphoma in remission, Type 2DM (non-insulin dependent) with neuropathy, chronic indwelling Bains (chronic retention) BIBEMS from [facility] for nausea/vomiting.  Patient poor historian and refuses to cooperate, subjective information obtained from extensive chart review and collateral information. Patient was recently discharged and was seen by Urology on 7/1 for difficult Bains placement after patient reported "bains came out" . Patient had a 14 fr catheter placed draining well and was told to follow-up in Urology clinic for further management. Of note patient has has several admissions in the past for ESBL pyelonephritis.    In ED,  patient afebrile, BP: 100/64, HR:75 RR:20 Spo2 98% on room air. Labs significant for leukocytosis 20k, Na 119, K 5.5, lactate 3.3 troponin .10. EKG no ischemic changes. UA positive for leukocyte esterase, blood, bacteria, WBC. Readministration of a left middle cranial fossa cystic area measuring 4.4 x 4.4 cm, which may represent encephalomalacia versus arachnoid cyst.  CT head no contrast on admission negative for acute intracranial hemorrhage or acute large territorial infarct.   CT Abdomen and Pelvis w/ IV Cont:Interval development of lobulation and cystic areas within the prostate. Bains balloon inflated within the penile urethra, recommend repositioning. Mild to moderate bilateral hydroureteronephrosis with no distal obstructing ureteral lesion or stone.Air is noted within the urinary bladder, likely secondary to instrumentation. However infectious process is not excluded.  Patient recieved 1gm rocephin, 2 L NS and zofran x 1 in ED. Urology consulted, patient currently admitted to ICU for further management of hyponatremia and urosepsis.     Mr. Laguna ia 81 year old man with PMHx of schizophrenia, COPD, hyperlipidemia, non-Hodgkin lymphoma in remission, Type 2DM (non-insulin dependent) with neuropathy, chronic indwelling Bains (chronic retention) BIBEMS from [facility] for nausea/vomiting.  Patient poor historian and refuses to cooperate, AAO1, subjective information obtained from extensive chart review and collateral information. Patient was recently discharged and was seen by Urology on 7/1 for difficult Bains placement after patient reported "bains came out" . Patient had a 14 fr catheter placed draining well and was told to follow-up in Urology clinic for further management. Of note patient has has several admissions in the past for ESBL pyelonephritis.    In ED,  patient afebrile, BP: 100/64, HR:75 RR:20 Spo2 98% on room air. Labs significant for leukocytosis 20k, Na 119, K 5.5, lactate 3.3 troponin .10. EKG no ischemic changes. UA positive for leukocyte esterase, blood, bacteria, WBC. Readministration of a left middle cranial fossa cystic area measuring 4.4 x 4.4 cm, which may represent encephalomalacia versus arachnoid cyst.  CT head no contrast on admission negative for acute intracranial hemorrhage or acute large territorial infarct.   CT Abdomen and Pelvis w/ IV Cont:Interval development of lobulation and cystic areas within the prostate. Bains balloon inflated within the penile urethra, recommend repositioning. Mild to moderate bilateral hydroureteronephrosis with no distal obstructing ureteral lesion or stone.Air is noted within the urinary bladder, likely secondary to instrumentation. However infectious process is not excluded.  Patient recieved 1gm rocephin, 2 L NS and zofran x 1 in ED. Urology consulted, patient currently admitted to ICU for further management of hyponatremia and urosepsis.     Mr. Laguna ia 81 year old man with PMHx of schizophrenia, COPD, hyperlipidemia, non-Hodgkin lymphoma in remission, Type 2DM (non-insulin dependent) with neuropathy, chronic indwelling Bains (chronic retention) BIBEMS from [facility] for nausea/vomiting.  Patient poor historian and refuses to cooperate, AAO1, subjective information obtained from extensive chart review and collateral information. Patient was recently discharged and was seen by Urology on 7/1 for difficult Bains placement after patient reported "bains came out" . Patient had a 14 fr catheter placed draining well and was told to follow-up in Urology clinic for further management. Of note patient has has several admissions in the past for ESBL pyelonephritis.    In ED,  patient afebrile, BP: 100/64, HR:75 RR:20 Spo2 98% on room air. Labs significant for leukocytosis 20k, Na 119, K 5.5, lactate 3.3 troponin .10. EKG no ischemic changes. UA positive for leukocyte esterase, blood, bacteria, WBC. CT head no contrast on admission negative for acute intracranial hemorrhage or acute large territorial infarct. Readministration of a left middle cranial fossa cystic area measuring 4.4 x 4.4 cm, which may represent encephalomalacia versus arachnoid cyst.   CT Abdomen and Pelvis w/ IV Cont:Interval development of lobulation and cystic areas within the prostate. Bains balloon inflated within the penile urethra, recommend repositioning. Mild to moderate bilateral hydroureteronephrosis with no distal obstructing ureteral lesion or stone.Air is noted within the urinary bladder, likely secondary to instrumentation. However infectious process is not excluded.  Patient recieved 1gm rocephin, 2 L NS and zofran x 1 in ED. Urology consulted, patient currently admitted to ICU for further management of hyponatremia and urosepsis.

## 2021-07-03 NOTE — CONSULT NOTE ADULT - TIME BILLING
as per PA note    80 yo M with PMH of schizophrenia, COPD, hyperlipidemia, non-Hodgkin lymphoma in remission, Type 2DM (non-insulin dependent) with neuropathy, chronic indwelling Bains (chronic retention presents to ED from NH with vomiting -  c/s for bains inflated within penile urethra and b/l hydronephrosis on CT.   CT A/P shows Interval development of lobulation and cystic areas within the prostate. Correlate with PSA levels and may obtain MRI of the prostate with and without contrast for further evaluation. Bains balloon inflated within the penile urethra, . Mild to moderate bilateral hydroureteronephrosis with no distal obstructing ureteral lesion or stone. Air is noted within the urinary bladder, likely secondary to instrumentation. WBC 20.26, Cr 1.0, UA +large leuks     Plan:   - Under sterile technique, 14 fr coude readvanced to hub of catheter, aspirated 50cc of cloudy yellow urine using a tumi syringe, blood noted at meatus after advancing  catheter. Foam tape mesentry in place to secure Bains catheter to Right leg. Bains now draining to gravity with 150cc of cloudy yellow urine noted in bag.   - Continue Bains care   - Consider 1:1 to prevent further urethral damage    - Repeat RBUS in 48 -72 hours to assess hydro  - F/u UCx   - Consider Flomax if not CI     I agree with the assessment and plan as outlined
Sepsis secondary to UTI     I have personally seen and examined this patient.    I have reviewed all pertinent clinical information and reviewed all relevant imaging and diagnostic studies personally.   I counseled the patient about diagnostic testing and treatment plan. All questions were answered.   I discussed recommendations with the primary team.

## 2021-07-03 NOTE — CONSULT NOTE ADULT - SUBJECTIVE AND OBJECTIVE BOX
Patient is a 81y old  Male who presents with a chief complaint of     HPI:  Mr. Laguna ia 81 year old man with PMHx of schizophrenia, COPD, hyperlipidemia, non-Hodgkin lymphoma in remission, Type 2DM (non-insulin dependent) with neuropathy, chronic indwelling Bains (chronic retention) BIBEMS from [facility] for nausea/vomiting.  Patient poor historian and refuses to cooperate, AAO1, subjective information obtained from extensive chart review and collateral information. Patient was recently discharged and was seen by Urology on  for difficult Bains placement after patient reported "bains came out" . Patient had a 14 fr catheter placed draining well and was told to follow-up in Urology clinic for further management. Of note patient has has several admissions in the past for ESBL pyelonephritis.    In ED,  patient afebrile, BP: 100/64, HR:75 RR:20 Spo2 98% on room air. Labs significant for leukocytosis 20k, Na 119, K 5.5, lactate 3.3 troponin .10. EKG no ischemic changes. UA positive for leukocyte esterase, blood, bacteria, WBC. CT head no contrast on admission negative for acute intracranial hemorrhage or acute large territorial infarct. Readministration of a left middle cranial fossa cystic area measuring 4.4 x 4.4 cm, which may represent encephalomalacia versus arachnoid cyst.   CT Abdomen and Pelvis w/ IV Cont:Interval development of lobulation and cystic areas within the prostate. Bains balloon inflated within the penile urethra, recommend repositioning. Mild to moderate bilateral hydroureteronephrosis with no distal obstructing ureteral lesion or stone.Air is noted within the urinary bladder, likely secondary to instrumentation. However infectious process is not excluded.  Patient recieved 1gm rocephin, 2 L NS and zofran x 1 in ED. Urology consulted, patient currently admitted to ICU for further management of hyponatremia and urosepsis.     (2021 05:12)      PAST MEDICAL & SURGICAL HISTORY:  Schizophrenia    Diabetes type 2, controlled    COPD (chronic obstructive pulmonary disease)    Dyslipidemia    Chronic retention of urine    Dyslipidemia    Encounter for screening colonoscopy        SOCIAL HX:   Smoking    Former                      ETOH                            Other    FAMILY HISTORY:  No pertinent family history in first degree relatives  Unable to ascertain 2/2 chronic psychiatric condition    :  No known cardiovacular family hisotry     Review Of Systems:     All ROS are negative except per HPI       Allergies    No Known Allergies    Intolerances          PHYSICAL EXAM    ICU Vital Signs Last 24 Hrs  T(C): 36.2 (2021 01:14), Max: 36.2 (2021 01:14)  T(F): 97.1 (2021 01:14), Max: 97.1 (2021 01:14)  HR: 100 (2021 07:05) (75 - 109)  BP: 119/60 (2021 07:05) (100/64 - 148/66)  BP(mean): --  ABP: --  ABP(mean): --  RR: 18 (2021 07:05) (17 - 20)  SpO2: 96% (2021 07:05) (95% - 98%)      CONSTITUTIONAL:  Ill appearing   NAD    ENT:   Airway patent,   Mouth with normal mucosa.   No thrush      CARDIAC:   Tachycardic    Regular rhythm.    No edema      Vascular:   normal systolic impulse  no bruits    RESPIRATORY:   No wheezing  Bilateral BS   Not tachypneic,  No use of accessory muscles    GASTROINTESTINAL:  Abdomen soft,   Non-tender,   No guarding,   + BS      NEUROLOGICAL:   Alert   No motor deficits.    SKIN:   Skin normal color for race,   No evidence of rash.      HEME LYMPH: .  No cervical  lymphadenopathy.  No inguinal lymphadenopathy              LABS:                          13.2   20.26 )-----------( 236      ( 2021 23:58 )             36.9                                               07-02    119<LL>  |  82<L>  |  6<L>  ----------------------------<  122<H>  5.5<H>   |  24  |  1.0    Ca    9.0      2021 23:58    TPro  7.0  /  Alb  4.3  /  TBili  0.7  /  DBili  x   /  AST  18  /  ALT  9   /  AlkPhos  116<H>  07-02                                             Urinalysis Basic - ( 2021 01:02 )    Color: Yellow / Appearance: Turbid / S.011 / pH: x  Gluc: x / Ketone: Negative  / Bili: Negative / Urobili: <2 mg/dL   Blood: x / Protein: 100 mg/dL / Nitrite: Negative   Leuk Esterase: Large / RBC: 15 /HPF /  /HPF   Sq Epi: x / Non Sq Epi: 1 /HPF / Bacteria: Many        CARDIAC MARKERS ( 2021 23:58 )  x     / 0.10 ng/mL / x     / x     / x                                                LIVER FUNCTIONS - ( 2021 23:58 )  Alb: 4.3 g/dL / Pro: 7.0 g/dL / ALK PHOS: 116 U/L / ALT: 9 U/L / AST: 18 U/L / GGT: x                                                                                                                                       X-Rays reviewed                                                                                     ECHO    CXR interpreted by me No infiltrates     MEDICATIONS  (STANDING):  atorvastatin 10 milliGRAM(s) Oral at bedtime  cyanocobalamin 1000 MICROGram(s) Oral daily  dextrose 40% Gel 15 Gram(s) Oral once  dextrose 5%. 1000 milliLiter(s) (50 mL/Hr) IV Continuous <Continuous>  dextrose 5%. 1000 milliLiter(s) (100 mL/Hr) IV Continuous <Continuous>  dextrose 50% Injectable 25 Gram(s) IV Push once  dextrose 50% Injectable 12.5 Gram(s) IV Push once  dextrose 50% Injectable 25 Gram(s) IV Push once  enoxaparin Injectable 40 milliGRAM(s) SubCutaneous daily  folic acid 1 milliGRAM(s) Oral daily  gabapentin 300 milliGRAM(s) Oral every 12 hours  glucagon  Injectable 1 milliGRAM(s) IntraMuscular once  insulin glargine Injectable (LANTUS) 15 Unit(s) SubCutaneous at bedtime  insulin lispro (ADMELOG) corrective regimen sliding scale   SubCutaneous three times a day before meals  insulin lispro Injectable (ADMELOG) 6 Unit(s) SubCutaneous three times a day before meals  lactobacillus acidophilus 1 Tablet(s) Oral daily  meropenem  IVPB 1000 milliGRAM(s) IV Intermittent every 8 hours  metoprolol tartrate 25 milliGRAM(s) Oral two times a day  NIFEdipine XL 30 milliGRAM(s) Oral daily  OLANZapine 20 milliGRAM(s) Oral at bedtime  OLANZapine 5 milliGRAM(s) Oral daily  senna 2 Tablet(s) Oral at bedtime  sodium chloride 1 Gram(s) Oral daily  sodium chloride 0.9%. 1000 milliLiter(s) (100 mL/Hr) IV Continuous <Continuous>  sodium zirconium cyclosilicate 5 Gram(s) Oral once  traZODone 150 milliGRAM(s) Oral at bedtime    MEDICATIONS  (PRN):  ondansetron Injectable 4 milliGRAM(s) IV Push every 4 hours PRN Nausea and/or Vomiting

## 2021-07-03 NOTE — PATIENT PROFILE ADULT - NSPROHMDIABETMGMTSTRAT_GEN_A_NUR
Patient passed clots this morning and is still bleeding slightly.  Will send to [facility]ARC[facility] Patton for advice.   none

## 2021-07-03 NOTE — CONSULT NOTE ADULT - ASSESSMENT
ASSESSMENT   81 year old man with PMHx of schizophrenia, COPD, hyperlipidemia, non-Hodgkin lymphoma in remission, Type 2DM (non-insulin dependent) with neuropathy, chronic indwelling Bains (chronic retention) BIBEMS from [facility] for nausea/vomiting    IMPRESSION  #Sepsis on admission (WBC>12, Pulse>90) secondary to UTI with chronic indwelling bains  - CT Abdomen and Pelvis w/ IV Cont (07.03.21 @ 03:39): Interval development of lobulation and cystic areas within the prostate. Correlate with PSA levels and may obtain MRI of the prostate with and without contrast for further evaluation. Bains balloon inflated within the penile urethra, recommend repositioning. Mild to moderate bilateral hydroureteronephrosis with no distal obstructing ureteral lesion or stone. Air is notedwithin the urinary bladder, likely secondary to instrumentation. However infectious process is not excluded.    #Bilateral Hydronephrosis  #Prostate cyts  #Hyponatremia    #NH Lymphoma in remission  Schizophrenia  #DM II  #Obesity BMI (kg/m2): 24.2  #Abx allergy: NKDA    RECOMMENDATIONS  - continue meropenem 1g q 8 hours  - vancomycin 1g q 12 hours -- if blood cultures negative for MRSA would stop   - follow-up urine and blood culture  - bains management per urology; follow-up repeat Ultrasound for hydro  - trend Na  - monitor Creatinine       Please call or message on [employer] Teams if with any questions.  Spectra [phone]

## 2021-07-03 NOTE — CONSULT NOTE ADULT - SUBJECTIVE AND OBJECTIVE BOX
ADA LAGUNA  81y, Male  Allergy: No Known Allergies      CHIEF COMPLAINT:    LOS      HPI:  Mr. Laguna ia 81 year old man with PMHx of schizophrenia, COPD, hyperlipidemia, non-Hodgkin lymphoma in remission, Type 2DM (non-insulin dependent) with neuropathy, chronic indwelling Bains (chronic retention) BIBEMS from [facility] for nausea/vomiting.  Patient poor historian and refuses to cooperate, AAO1, subjective information obtained from extensive chart review and collateral information. Patient was recently discharged and was seen by Urology on  for difficult Bains placement after patient reported "bains came out" . Patient had a 14 fr catheter placed draining well and was told to follow-up in Urology clinic for further management. Of note patient has has several admissions in the past for ESBL pyelonephritis.    In ED,  patient afebrile, BP: 100/64, HR:75 RR:20 Spo2 98% on room air. Labs significant for leukocytosis 20k, Na 119, K 5.5, lactate 3.3 troponin .10. EKG no ischemic changes. UA positive for leukocyte esterase, blood, bacteria, WBC. CT head no contrast on admission negative for acute intracranial hemorrhage or acute large territorial infarct. Readministration of a left middle cranial fossa cystic area measuring 4.4 x 4.4 cm, which may represent encephalomalacia versus arachnoid cyst.  CT Abdomen and Pelvis w/ IV Cont:Interval development of lobulation and cystic areas within the prostate. Bains balloon inflated within the penile urethra, recommend repositioning. Mild to moderate bilateral hydroureteronephrosis with no distal obstructing ureteral lesion or stone.Air is noted within the urinary bladder, likely secondary to instrumentation. However infectious process is not excluded.  Patient recieved 1gm rocephin, 2 L NS and zofran x 1 in ED. Urology consulted, patient currently admitted to ICU for further management of hyponatremia and urosepsis.     (2021 05:12)      INFECTIOUS DISEASE HISTORY:  History as above.  Patient not requiring pressors.   Appears comfortable on exam today.   Denies any abdominal pain, no nausea, vomiting.   Bains in place.   Limited ROS due to schizophrenia.     PAST MEDICAL & SURGICAL HISTORY:  Schizophrenia    Diabetes type 2, controlled    COPD (chronic obstructive pulmonary disease)    Dyslipidemia    Chronic retention of urine    Dyslipidemia    Encounter for screening colonoscopy        FAMILY HISTORY  No pertinent family history in first degree relatives        SOCIAL HISTORY  Social History:  unable to access social hx (2021 05:12)        ROS  General: Denies rigors, nightsweats  HEENT: Denies headache, rhinorrhea, sore throat, eye pain  CV: Denies CP, palpitations  PULM: Denies wheezing, hemoptysis  GI: Denies hematemesis, hematochezia, melena  : Denies discharge, hematuria  MSK: Denies arthralgias, myalgias  SKIN: Denies rash, lesions  NEURO: Denies paresthesias, weakness  PSYCH: Denies depression, anxiety    VITALS:  T(F): 97.1, Max: 97.1 (21 @ 01:14)  HR: 100  BP: 119/60  RR: 18Vital Signs Last 24 Hrs  T(C): 36.2 (2021 01:14), Max: 36.2 (2021 01:14)  T(F): 97.1 (2021 01:14), Max: 97.1 (2021 01:14)  HR: 100 (2021 07:05) (75 - 109)  BP: 119/60 (2021 07:05) (100/64 - 148/66)  BP(mean): --  RR: 18 (2021 07:05) (17 - 20)  SpO2: 96% (2021 07:05) (95% - 98%)    PHYSICAL EXAM:  Gen: NAD, resting in bed  HEENT: Normocephalic, atraumatic  Neck: supple, no lymphadenopathy  CV: Regular rate & regular rhythm  Lungs: decreased BS at bases, no fremitus  Abdomen: Soft, BS present  Ext: Warm, well perfused  Neuro: non focal, awake  Skin: no rash, no erythema  Lines: no phlebitis    TESTS & MEASUREMENTS:                        13.2  20.26 )-----------( 236      ( 2021 23:58 )             36.9    07-02    119<LL>  |  82<L>  |  6<L>  ----------------------------<  122<H>  5.5<H>   |  24  |  1.0    Ca    9.0      2021 23:58    TPro  7.0  /  Alb  4.3  /  TBili  0.7  /  DBili  x   /  AST  18  /  ALT  9   /  AlkPhos  116<H>      eGFR if Non African American: 71 mL/min/1.73M2 (21 @ 23:58)  eGFR if : 83 mL/min/1.73M2 (21 @ 23:58)    LIVER FUNCTIONS - ( 2021 23:58 )  Alb: 4.3 g/dL / Pro: 7.0 g/dL / ALK PHOS: 116 U/L / ALT: 9 U/L / AST: 18 U/L / GGT: x          Urinalysis Basic - ( 2021 01:02 )    Color: Yellow / Appearance: Turbid / S.011 / pH: x  Gluc: x / Ketone: Negative  / Bili: Negative / Urobili: <2 mg/dL  Blood: x / Protein: 100 mg/dL / Nitrite: Negative  Leuk Esterase: Large / RBC: 15 /HPF /  /HPF  Sq Epi: x / Non Sq Epi: 1 /HPF / Bacteria: Many        Culture - Urine (collected 10-13-20 @ 03:00)  Source: .Urine Clean Catch (Midstream)  Final Report (10-15-20 @ 17:48):    >100,000 CFU/ml Escherichia coli ESBL  Organism: Escherichia coli ESBL (10-15-20 @ 17:48)  Organism: Escherichia coli ESBL (10-15-20 @ 17:48)      -  Amikacin: S <=16      -  Amoxicillin/Clavulanic Acid: S <=8/4      -  Ampicillin: R >16 These ampicillin results predict results for amoxicillin      -  Ampicillin/Sulbactam: I 16/8 Enterobacter, Citrobacter, and Serratia may develop resistance during prolonged therapy (3-4 days)      -  Aztreonam: R >16      -  Cefazolin: R >16 ([id number]) For uncomplicated UTI with K. pneumoniae, E. coli, or P. mirablis: IZABELLA <=16 is sensitive and IZABELLA >=32 is resistant. This also predicts results for oral agents cefaclor, cefdinir, cefpodoxime, cefprozil, cefuroxime axetil, cephalexin and locarbef for uncomplicated UTI. Note that some isolates may be susceptible to these agents while testing resistant to cefazolin.      -  Cefepime: R >16      -  Cefoxitin: S <=8      -  Ceftriaxone: R >32 Enterobacter, Citrobacter, and Serratia may develop resistance during prolonged therapy      -  Ciprofloxacin: R >2      -  Ertapenem: S <=0.5      -  Gentamicin: S <=2      -  Imipenem: S <=1      -  Levofloxacin: R >4      -  Meropenem: S <=1      -  Nitrofurantoin: S <=32 Should not be used to treat pyelonephritis      -  Piperacillin/Tazobactam: S <=8      -  Tigecycline: S <=2      -  Tobramycin: S <=2      -  Trimethoprim/Sulfamethoxazole: S       Method Type: IZABELLA    Culture - Blood (collected 10-13-20 @ 01:52)  Source: .Blood Blood-Peripheral  Final Report (10-18-20 @ 07:01):    No Growth Final    Culture - Blood (collected 10-13-20 @ 01:52)  Source: .Blood Blood-Peripheral  Final Report (10-18-20 @ 07:01):    No Growth Final        Lactate, Blood: 3.3 mmol/L (21 @ 23:58)      INFECTIOUS DISEASES TESTING  COVID-19 PCR: NotDetec (21 @ 01:46)      RADIOLOGY & ADDITIONAL TESTS:  I have personally reviewed the last Chest xray  CXR      CT  CT Abdomen and Pelvis w/ IV Cont:  EXAM:  CT ABDOMEN AND PELVIS IC            PROCEDURE DATE:  2021            INTERPRETATION:  CLINICAL STATEMENT: Vomiting.    TECHNIQUE: Contiguous axial CT images were obtained from the lower chest to the pubic symphysis following administration of 100cc Omnipaque 350 intravenous contrast.  Oral contrast was not administered.  Reformatted images in the coronal and sagittal planes were acquired.    COMPARISON CT: CT abdomen and pelvis 10/26/2019.    OTHER STUDIES USED FOR CORRELATION: None.      FINDINGS:    LOWER CHEST: Unremarkable.    HEPATOBILIARY: Cholelithiasis with no evidence of acute cholecystitis. Hepatic cyst (right lobe) and hypodensities too small to further characterize. Patent portal vein    SPLEEN: Unremarkable.    PANCREAS: Unremarkable.    ADRENAL GLANDS: Unremarkable.    KIDNEYS: Is symmetric bilateral renal enhancement. Bilateral renal cysts and hypodensities too small to further characterize. Mild to moderate bilateral hydroureteronephrosis with no distal obstructing ureteral lesion or stone.    ABDOMINOPELVIC NODES: Unremarkable.    PELVIC ORGANS: Enlarged prostate. Interval development of lobulation the prostate with cystic areas. Air is noted within the urinary bladder, likely secondary to instrumentation. Bains balloon inflated within the penile urethra.    PERITONEUM/MESENTERY/BOWEL: No evidence of bowel obstruction, free air or ascites. Normal caliber appendix.    BONES/SOFT TISSUES: Multilevel degenerative changes of the spine. Chronic L1 and [id number] deformities.    OTHER: Severe atherosclerotic disease of the aorta and its branches.      IMPRESSION:      Interval development of lobulation and cystic areas within the prostate. Correlate with PSA levels and may obtain MRI of the prostate with and without contrast for further evaluation.    Bains balloon inflated within the penile urethra, recommend repositioning.    Mild to moderate bilateral hydroureteronephrosis with no distal obstructing ureteral lesion or stone.    Air is notedwithin the urinary bladder, likely secondary to instrumentation. However infectious process is not excluded.        Dr. Maryana Rice discussed preliminary findings with SUMA DAWKINS on 7/3/2021 4:04 AM with readback.            MARYANA RICE MD; Resident Radiologist  This document has been electronically signed.  TI MONTERROSO MD; Attending Radiologist  This document has been electronically signed. Jul  3 2021  4:10AM (21 @ 03:39)      CARDIOLOGY TESTING      MEDICATIONS  atorvastatin 10 Oral at bedtime  cyanocobalamin 1000 Oral daily  dextrose 40% Gel 15 Oral once  dextrose 5%. 1000 IV Continuous <Continuous>  dextrose 5%. 1000 IV Continuous <Continuous>  dextrose 50% Injectable 25 IV Push once  dextrose 50% Injectable 12.5 IV Push once  dextrose 50% Injectable 25 IV Push once  enoxaparin Injectable 40 SubCutaneous daily  folic acid 1 Oral daily  gabapentin 300 Oral every 12 hours  glucagon  Injectable 1 IntraMuscular once  insulin glargine Injectable (LANTUS) 15 SubCutaneous at bedtime  insulin lispro (ADMELOG) corrective regimen sliding scale  SubCutaneous three times a day before meals  insulin lispro Injectable (ADMELOG) 6 SubCutaneous three times a day before meals  lactobacillus acidophilus 1 Oral daily  meropenem  IVPB 1000 IV Intermittent every 8 hours  metoprolol tartrate 25 Oral two times a day  NIFEdipine XL 30 Oral daily  OLANZapine 20 Oral at bedtime  OLANZapine 5 Oral daily  senna 2 Oral at bedtime  sodium chloride 1 Oral daily  sodium chloride 0.9%. 1000 IV Continuous <Continuous>  sodium zirconium cyclosilicate 5 Oral once  traZODone 150 Oral at bedtime      Weight  Weight (kg): 68 (21 @ 21:55)    ANTIBIOTICS:  meropenem  IVPB 1000 milliGRAM(s) IV Intermittent every 8 hours      ALLERGIES:  No Known Allergies

## 2021-07-03 NOTE — H&P ADULT - ASSESSMENT
Mr. Laguna ia 81 year old man with PMHx of schizophrenia, COPD, hyperlipidemia, non-Hodgkin lymphoma in remission, Type 2DM (non-insulin dependent) with neuropathy, chronic indwelling Bains (chronic retention) BIBEMS from [facility] for nausea/vomiting.     #Nausea/vomiting likely secondary to Urosepsis  -recently discharged and was seen by Urology on 7/1 for difficult Bains placement after patient reported "bains came out" . Patient had a 14 fr catheter placed draining well and was told to follow-up in Urology clinic for further management.  -  wbc 20k with bandemia on admission, Normotensive, + UA; s/p Rocephin 1 gm   - Patient with chronic indwelling Bains (chronic urinary retention)  - had several admissions in the past for ESBL pyelonephritis; last discharged on ertapenem 1 gm 10/24  -CT Abdomen and Pelvis w/ IV Cont: Interval development of lobulation and cystic areas within the prostate. Correlate with PSA levels and may obtain MRI of the prostate with and without contrast for further evaluation.Bains balloon inflated within the penile urethra, recommend repositioning.Mild to moderate bilateral hydroureteronephrosis with no distal obstructing ureteral lesion or stone.Air is notedwithin the urinary bladder, likely secondary to instrumentation  - Urology consult appreciated: aspirated 50cc of cloudy yellow urine  Bains now draining to gravity with 150cc of cloudy yellow urine noted in bag.   - Continue Bains care   - Consider 1:1 to prevent further urethral damage    - Repeat RBUS in 48 -72 hours to assess hydro  - F/u urine cultures/ blood cultures  - c/w meropenem 1 g TID  - Monitor Urine output  - ID c/s for Abx recommendation   - Analgesics for pain control prn       # Hyponatremia - possibly SIADH secondary to antipsychotic medications   patient with history of hyponatremia, on sodium tablets at home  - possibly 2/2 Trazadone use   - Na 119, baseline usually ~130  - Urine lytes pending  - Check serum and urine osmolality  - Repeat BMP  - Fluid restriction      #HTN, controlled  - c/w Nifedipine ER 30 mg daily  - c/w metoprolol tartrate 25 BID    #Schizophrenia  - c/w olanzapine and trazodone    #HLD  - c/w atorvastatin      #Type II DM, with neuropathy  - FS AC/HS  - on metformin 500 QD at home  - c/w gabapentin 300 mg BID    #Macrocytic Anemia with B12 Deficiency  - c/w B12, folate supplement    DVT ppx: Lovenox  GI ppx: Not indicated  Diet: Dysphagia 2 with thin  Activity: increase as tolerated  Code status: full code  Dispo: acute        Mr. Laguna ia 81 year old man with PMHx of schizophrenia, COPD, hyperlipidemia, non-Hodgkin lymphoma in remission, Type 2DM (non-insulin dependent) with neuropathy, chronic indwelling Bains (chronic retention) BIBEMS from [facility] for nausea/vomiting.     #Nausea/vomiting likely secondary to Urosepsis  -recently discharged and was seen by Urology on 7/1 for difficult Bains placement after patient reported "bains came out" . Patient had a 14 fr catheter placed draining well and was told to follow-up in Urology clinic for further management.  -  wbc 20k with bandemia on admission, Normotensive, + UA; s/p Rocephin 1 gm   - Patient with chronic indwelling Bains (chronic urinary retention)  - had several admissions in the past for ESBL pyelonephritis; last discharged on ertapenem 1 gm 10/24  -CT Abdomen and Pelvis w/ IV Cont: Interval development of lobulation and cystic areas within the prostate. Correlate with PSA levels and may obtain MRI of the prostate with and without contrast for further evaluation.Bains balloon inflated within the penile urethra, recommend repositioning.Mild to moderate bilateral hydroureteronephrosis with no distal obstructing ureteral lesion or stone.Air is notedwithin the urinary bladder, likely secondary to instrumentation  - Urology consult appreciated: aspirated 50cc of cloudy yellow urine  Bains now draining to gravity with 150cc of cloudy yellow urine noted in bag.   - Continue Bains care   - Consider 1:1 to prevent further urethral damage    - Repeat RBUS in 48 -72 hours to assess hydro  - F/u urine cultures/ blood cultures  - c/w meropenem 1 g TID  - Monitor Urine output  - ID c/s for Abx recommendation   - Analgesics for pain control prn/ zofran prn      # Hyponatremia - possibly SIADH secondary to antipsychotic medications   patient with history of hyponatremia, on sodium tablets at home  - possibly 2/2 Trazadone use   - Na 119, baseline usually ~130  - c/w with iv hydration  - Urine lytes pending  - Check serum and urine osmolality  - Repeat BMP  - Fluid restriction      #HTN, controlled  - c/w Nifedipine ER 30 mg daily  - c/w metoprolol tartrate 25 BID    #Schizophrenia  - c/w olanzapine and trazodone    #HLD  - c/w atorvastatin      #Type II DM, with neuropathy  - FS AC/HS  - on metformin 500 QD at home  - c/w gabapentin 300 mg BID    #Macrocytic Anemia with B12 Deficiency  - c/w B12, folate supplement    DVT ppx: Lovenox  GI ppx: Not indicated  Diet: NPO until further recs; fluid restriction   Activity: increase as tolerated  Code status: full code  Dispo: acute        Mr. Laguna ia 81 year old man with PMHx of schizophrenia, COPD, hyperlipidemia, non-Hodgkin lymphoma in remission, Type 2DM (non-insulin dependent) with neuropathy, chronic indwelling Bains (chronic retention) BIBEMS from [facility] for nausea/vomiting.     #Nausea/vomiting likely secondary to Urosepsis  -recently discharged and was seen by Urology on 7/1 for difficult Bains placement after patient reported "bains came out" . Patient had a 14 fr catheter placed draining well and was told to follow-up in Urology clinic for further management.  -  wbc 20k with bandemia on admission, Normotensive, + UA; s/p Rocephin 1 gm   - Patient with chronic indwelling Bains (chronic urinary retention)  - had several admissions in the past for ESBL pyelonephritis; last discharged on ertapenem 1 gm 10/24  -CT Abdomen and Pelvis w/ IV Cont: Interval development of lobulation and cystic areas within the prostate. Correlate with PSA levels and may obtain MRI of the prostate with and without contrast for further evaluation.Bains balloon inflated within the penile urethra, recommend repositioning.Mild to moderate bilateral hydroureteronephrosis with no distal obstructing ureteral lesion or stone.Air is notedwithin the urinary bladder, likely secondary to instrumentation  - Urology consult appreciated: aspirated 50cc of cloudy yellow urine  Bains now draining to gravity with 150cc of cloudy yellow urine noted in bag.   - Continue Bains care   - Consider 1:1 to prevent further urethral damage    - Repeat RBUS in 48 -72 hours to assess hydro  - F/u urine cultures/ blood cultures  - c/w meropenem 1 g TID  - Monitor Urine output  - ID c/s for Abx recommendation   - Analgesics for pain control prn/ zofran prn      # Hyponatremia - possibly SIADH secondary to antipsychotic medications   patient with history of hyponatremia, on sodium tablets at home  - possibly 2/2 Trazadone use   - Na 119, baseline usually ~130  - c/w with iv hydration NS @ 100  - Urine lytes pending  - f/u renal ultrasound/lacate  - Check serum and urine osmolality  - Repeat BMP  - Fluid restriction      #HTN, controlled  - c/w Nifedipine ER 30 mg daily  - c/w metoprolol tartrate 25 BID    #Schizophrenia  - c/w olanzapine and trazodone    #HLD  - c/w atorvastatin 10    #Type II DM, with neuropathy  - FS AC/HS  - on metformin 500 QD at home  - c/w gabapentin 300 mg BID    #Macrocytic Anemia with B12 Deficiency  - c/w B12, folate supplement    DVT ppx: Lovenox  GI ppx: Not indicated  Diet: NPO until further recs; fluid restriction   Activity: increase as tolerated  Code status: full code  Dispo: acute        Mr. Laguna ia 81 year old man with PMHx of schizophrenia, COPD, hyperlipidemia, non-Hodgkin lymphoma in remission, Type 2DM (non-insulin dependent) with neuropathy, chronic indwelling Bains (chronic retention) BIBEMS from [facility] for nausea/vomiting.     #Nausea/vomiting likely secondary to Urosepsis, chronic indwelling bains for retention   -recently discharged and was seen by Urology on 7/1 for difficult Bains placement after patient reported "bains came out" . Patient had a 14 fr catheter placed draining well and was told to follow-up in Urology clinic for further management.  -  wbc 20k with bandemia on admission, Normotensive, + UA; s/p Rocephin 1 gm   - Patient with chronic indwelling Bains (chronic urinary retention)  - had several admissions in the past for ESBL pyelonephritis; last discharged on ertapenem 1 gm 10/24  -CT Abdomen and Pelvis w/ IV Cont: Interval development of lobulation and cystic areas within the prostate. Correlate with PSA levels and may obtain MRI of the prostate with and without contrast for further evaluation.Bains balloon inflated within the penile urethra, recommend repositioning.Mild to moderate bilateral hydroureteronephrosis with no distal obstructing ureteral lesion or stone.Air is notedwithin the urinary bladder, likely secondary to instrumentation  - Urology consult appreciated: aspirated 50cc of cloudy yellow urine  Bains now draining to gravity with 150cc of cloudy yellow urine noted in bag.   - Continue Bians care   - Consider 1:1 to prevent further urethral damage    - Repeat RBUS in 48 -72 hours to assess hydro  - F/u urine cultures/ blood cultures  - c/w meropenem 1 g TID  - Monitor Urine output  - ID c/s for Abx recommendation   - Analgesics for pain control prn/ zofran prn      # Hyponatremia - possibly SIADH secondary to antipsychotic medications   patient with history of hyponatremia, on sodium tablets at home  - possibly 2/2 Trazadone use   - Na 119, baseline usually ~130  - c/w with iv hydration NS @ 100  - Urine lytes pending  - f/u renal ultrasound/lacate  - Check serum and urine osmolality  - Repeat BMP  - Fluid restriction    #elevated troponin  - elevated trop .10 on admission; denies chest pain  - EKG on admission: no ischemic changes  - trend cardiac enzymes and serial EKG    #HTN, controlled  - c/w Nifedipine ER 30 mg daily  - c/w metoprolol tartrate 25 BID    #Schizophrenia  - c/w olanzapine and trazodone    #HLD  - c/w atorvastatin 10    #Type II DM, with neuropathy  - FS AC/HS  - on metformin 500 QD at home  - c/w gabapentin 300 mg BID    #Macrocytic Anemia with B12 Deficiency  - c/w B12, folate supplement    #COPD  - c/w albuterol prn     DVT ppx: Lovenox  GI ppx: Not indicated  Diet: NPO until further recs; fluid restriction   Activity: increase as tolerated  Code status: full code  Dispo: acute        Mr. Laguna ia 81 year old man with PMHx of schizophrenia, COPD, hyperlipidemia, non-Hodgkin lymphoma in remission, Type 2DM (non-insulin dependent) with neuropathy, chronic indwelling Bains (chronic retention) BIBEMS from [facility] for nausea/vomiting.     #Nausea/vomiting likely secondary to Urosepsis, chronic indwelling bains for retention   -recently discharged and was seen by Urology on 7/1 for difficult Bains placement after patient reported "bains came out" . Patient had a 14 fr catheter placed draining well and was told to follow-up in Urology clinic for further management.  -  wbc 20k with bandemia on admission, Normotensive, + UA; s/p Rocephin 1 gm   - Patient with chronic indwelling Bains (chronic urinary retention)  - had several admissions in the past for ESBL pyelonephritis; last discharged on ertapenem 1 gm 10/24  -CT Abdomen and Pelvis w/ IV Cont: Interval development of lobulation and cystic areas within the prostate. Correlate with PSA levels and may obtain MRI of the prostate with and without contrast for further evaluation.Bains balloon inflated within the penile urethra, recommend repositioning.Mild to moderate bilateral hydroureteronephrosis with no distal obstructing ureteral lesion or stone.Air is notedwithin the urinary bladder, likely secondary to instrumentation  - Urology consult appreciated: aspirated 50cc of cloudy yellow urine  Bains now draining to gravity with 150cc of cloudy yellow urine noted in bag.   - Continue Bains care   - Consider 1:1 to prevent further urethral damage    - Repeat RBUS in 48 -72 hours to assess hydro  - F/u urine cultures/ blood cultures  - c/w meropenem 1 g TID  - Monitor Urine output  - ID c/s for Abx recommendation   - Analgesics for pain control prn/ zofran prn      # Hyponatremia - possibly SIADH secondary to antipsychotic medications  vs hypovolemia (nausea/vomiting)  patient with history of hyponatremia, on sodium tablets at home  - possibly 2/2 Trazadone use   - Na 119, baseline usually ~130  - c/w with iv hydration NS @ 100  - Urine lytes pending  - f/u renal ultrasound/lacate  - Check serum and urine osmolality  - Repeat BMP  - Fluid restriction    #elevated troponin  - elevated trop .10 on admission; denies chest pain  - EKG on admission: no ischemic changes  - trend cardiac enzymes and serial EKG    #HTN, controlled  - c/w Nifedipine ER 30 mg daily  - c/w metoprolol tartrate 25 BID    #Schizophrenia  - c/w olanzapine and trazodone    #HLD  - c/w atorvastatin 10    #Type II DM, with neuropathy  - FS AC/HS  - on metformin 500 QD at home  - c/w gabapentin 300 mg BID    #Macrocytic Anemia with B12 Deficiency  - c/w B12, folate supplement    #COPD  - c/w albuterol prn     DVT ppx: Lovenox  GI ppx: Not indicated  Diet: NPO until further recs; fluid restriction   Activity: increase as tolerated  Code status: full code  Dispo: acute        Mr. Laguna ia 81 year old man with PMHx of schizophrenia, COPD, hyperlipidemia, non-Hodgkin lymphoma in remission, Type 2DM (non-insulin dependent) with neuropathy, chronic indwelling Bains (chronic retention) BIBEMS from [facility] for nausea/vomiting.     #Nausea/vomiting likely secondary to Urosepsis, chronic indwelling bains for retention   -recently discharged and was seen by Urology on 7/1 for difficult Bains placement after patient reported "bains came out" . Patient had a 14 fr catheter placed draining well and was told to follow-up in Urology clinic for further management.  -  wbc 20k with bandemia on admission, Normotensive, + UA; s/p Rocephin 1 gm   - Patient with chronic indwelling Bains (chronic urinary retention)  - had several admissions in the past for ESBL pyelonephritis; last discharged on ertapenem 1 gm 10/24  -CT Abdomen and Pelvis w/ IV Cont: Interval development of lobulation and cystic areas within the prostate. Correlate with PSA levels and may obtain MRI of the prostate with and without contrast for further evaluation.Bains balloon inflated within the penile urethra, recommend repositioning.Mild to moderate bilateral hydroureteronephrosis with no distal obstructing ureteral lesion or stone.Air is notedwithin the urinary bladder, likely secondary to instrumentation  - Urology consult appreciated: aspirated 50cc of cloudy yellow urine  Bains now draining to gravity with 150cc of cloudy yellow urine noted in bag.   - Continue Bains care   - Consider 1:1 to prevent further urethral damage    - Repeat RBUS in 48 -72 hours to assess hydro  - F/u urine cultures/ blood cultures  - c/w meropenem 1 g TID  - Monitor Urine output  - ID c/s for Abx recommendation   - Analgesics for pain control prn/ zofran prn      # Hyponatremia - possibly SIADH secondary to antipsychotic medications  vs hypovolemia from extrarenal loss (nausea/vomiting)  patient with history of hyponatremia, on sodium tablets at home  - possibly 2/2 Trazadone use   - Na 119, baseline usually ~130  - c/w with iv hydration NS @ 100  - Urine lytes pending  - f/u renal ultrasound/lacate  - Check serum and urine osmolality  - Repeat BMP  - Fluid restriction    #elevated troponin  - elevated trop .10 on admission; denies chest pain  - EKG on admission: no ischemic changes  - trend cardiac enzymes and serial EKG    #HTN, controlled  - c/w Nifedipine ER 30 mg daily  - c/w metoprolol tartrate 25 BID    #Schizophrenia  - c/w olanzapine and trazodone    #HLD  - c/w atorvastatin 10    #Type II DM, with neuropathy  - FS AC/HS  - on metformin 500 QD at home  - c/w gabapentin 300 mg BID    #Macrocytic Anemia with B12 Deficiency  - c/w B12, folate supplement    #COPD  - c/w albuterol prn     DVT ppx: Lovenox  GI ppx: Not indicated  Diet: NPO until further recs; fluid restriction   Activity: increase as tolerated  Code status: full code  Dispo: acute

## 2021-07-03 NOTE — SWALLOW BEDSIDE ASSESSMENT ADULT - ASR SWALLOW DENTITION
pt reports wearing dentures to eat, however they are back at the facility/edentulous, does not have dentures

## 2021-07-03 NOTE — SWALLOW BEDSIDE ASSESSMENT ADULT - SLP PERTINENT HISTORY OF CURRENT PROBLEM
80 y/o M presented from [facility] w/ nausea/vomiting. Pt admitted to ICU for hyponatremia and urosepsis. PMHx of schizophrenia, COPD, hyperlipidemia, non-Hodgkin lymphoma in remission, Type 2DM (non-insulin dependent) with neuropathy, chronic indwelling Bains (chronic retention). Pt w/ recent admission for difficult bains placement. No known history of dysphagia.

## 2021-07-04 LAB
-  ESBL: SIGNIFICANT CHANGE UP
ALBUMIN SERPL ELPH-MCNC: 2.9 G/DL — LOW (ref 3.5–5.2)
ALP SERPL-CCNC: 79 U/L — SIGNIFICANT CHANGE UP (ref 30–115)
ALT FLD-CCNC: <5 U/L — SIGNIFICANT CHANGE UP (ref 0–41)
ANION GAP SERPL CALC-SCNC: 7 MMOL/L — SIGNIFICANT CHANGE UP (ref 7–14)
ANION GAP SERPL CALC-SCNC: 8 MMOL/L — SIGNIFICANT CHANGE UP (ref 7–14)
AST SERPL-CCNC: 15 U/L — SIGNIFICANT CHANGE UP (ref 0–41)
BASOPHILS # BLD AUTO: 0.01 K/UL — SIGNIFICANT CHANGE UP (ref 0–0.2)
BASOPHILS NFR BLD AUTO: 0.1 % — SIGNIFICANT CHANGE UP (ref 0–1)
BILIRUB SERPL-MCNC: <0.2 MG/DL — SIGNIFICANT CHANGE UP (ref 0.2–1.2)
BUN SERPL-MCNC: 10 MG/DL — SIGNIFICANT CHANGE UP (ref 10–20)
BUN SERPL-MCNC: 11 MG/DL — SIGNIFICANT CHANGE UP (ref 10–20)
CALCIUM SERPL-MCNC: 7.8 MG/DL — LOW (ref 8.5–10.1)
CALCIUM SERPL-MCNC: 7.9 MG/DL — LOW (ref 8.5–10.1)
CHLORIDE SERPL-SCNC: 96 MMOL/L — LOW (ref 98–110)
CHLORIDE SERPL-SCNC: 98 MMOL/L — SIGNIFICANT CHANGE UP (ref 98–110)
CO2 SERPL-SCNC: 23 MMOL/L — SIGNIFICANT CHANGE UP (ref 17–32)
CO2 SERPL-SCNC: 23 MMOL/L — SIGNIFICANT CHANGE UP (ref 17–32)
CREAT SERPL-MCNC: 0.8 MG/DL — SIGNIFICANT CHANGE UP (ref 0.7–1.5)
CREAT SERPL-MCNC: 0.8 MG/DL — SIGNIFICANT CHANGE UP (ref 0.7–1.5)
D DIMER BLD IA.RAPID-MCNC: 563 NG/ML DDU — HIGH (ref 0–230)
E COLI DNA BLD POS QL NAA+NON-PROBE: SIGNIFICANT CHANGE UP
EOSINOPHIL # BLD AUTO: 0.01 K/UL — SIGNIFICANT CHANGE UP (ref 0–0.7)
EOSINOPHIL NFR BLD AUTO: 0.1 % — SIGNIFICANT CHANGE UP (ref 0–8)
GLUCOSE BLDC GLUCOMTR-MCNC: 118 MG/DL — HIGH (ref 70–99)
GLUCOSE BLDC GLUCOMTR-MCNC: 134 MG/DL — HIGH (ref 70–99)
GLUCOSE BLDC GLUCOMTR-MCNC: 140 MG/DL — HIGH (ref 70–99)
GLUCOSE BLDC GLUCOMTR-MCNC: 158 MG/DL — HIGH (ref 70–99)
GLUCOSE SERPL-MCNC: 83 MG/DL — SIGNIFICANT CHANGE UP (ref 70–99)
GLUCOSE SERPL-MCNC: 91 MG/DL — SIGNIFICANT CHANGE UP (ref 70–99)
GRAM STN FLD: SIGNIFICANT CHANGE UP
HCT VFR BLD CALC: 30.1 % — LOW (ref 42–52)
HGB BLD-MCNC: 10.5 G/DL — LOW (ref 14–18)
IMM GRANULOCYTES NFR BLD AUTO: 0.5 % — HIGH (ref 0.1–0.3)
LYMPHOCYTES # BLD AUTO: 0.87 K/UL — LOW (ref 1.2–3.4)
LYMPHOCYTES # BLD AUTO: 9.9 % — LOW (ref 20.5–51.1)
MAGNESIUM SERPL-MCNC: 1.8 MG/DL — SIGNIFICANT CHANGE UP (ref 1.8–2.4)
MCHC RBC-ENTMCNC: 31.9 PG — HIGH (ref 27–31)
MCHC RBC-ENTMCNC: 34.9 G/DL — SIGNIFICANT CHANGE UP (ref 32–37)
MCV RBC AUTO: 91.5 FL — SIGNIFICANT CHANGE UP (ref 80–94)
METHOD TYPE: SIGNIFICANT CHANGE UP
MONOCYTES # BLD AUTO: 0.57 K/UL — SIGNIFICANT CHANGE UP (ref 0.1–0.6)
MONOCYTES NFR BLD AUTO: 6.5 % — SIGNIFICANT CHANGE UP (ref 1.7–9.3)
NEUTROPHILS # BLD AUTO: 7.26 K/UL — HIGH (ref 1.4–6.5)
NEUTROPHILS NFR BLD AUTO: 82.9 % — HIGH (ref 42.2–75.2)
NRBC # BLD: 0 /100 WBCS — SIGNIFICANT CHANGE UP (ref 0–0)
PLATELET # BLD AUTO: 175 K/UL — SIGNIFICANT CHANGE UP (ref 130–400)
POTASSIUM SERPL-MCNC: 4.1 MMOL/L — SIGNIFICANT CHANGE UP (ref 3.5–5)
POTASSIUM SERPL-MCNC: 4.2 MMOL/L — SIGNIFICANT CHANGE UP (ref 3.5–5)
POTASSIUM SERPL-SCNC: 4.1 MMOL/L — SIGNIFICANT CHANGE UP (ref 3.5–5)
POTASSIUM SERPL-SCNC: 4.2 MMOL/L — SIGNIFICANT CHANGE UP (ref 3.5–5)
PROCALCITONIN SERPL-MCNC: 1.33 NG/ML — HIGH (ref 0.02–0.1)
PROT SERPL-MCNC: 5.1 G/DL — LOW (ref 6–8)
RBC # BLD: 3.29 M/UL — LOW (ref 4.7–6.1)
RBC # FLD: 13.9 % — SIGNIFICANT CHANGE UP (ref 11.5–14.5)
SODIUM SERPL-SCNC: 127 MMOL/L — LOW (ref 135–146)
SODIUM SERPL-SCNC: 128 MMOL/L — LOW (ref 135–146)
WBC # BLD: 8.76 K/UL — SIGNIFICANT CHANGE UP (ref 4.8–10.8)
WBC # FLD AUTO: 8.76 K/UL — SIGNIFICANT CHANGE UP (ref 4.8–10.8)

## 2021-07-04 PROCEDURE — 71045 X-RAY EXAM CHEST 1 VIEW: CPT | Mod: 26

## 2021-07-04 PROCEDURE — 99232 SBSQ HOSP IP/OBS MODERATE 35: CPT

## 2021-07-04 RX ORDER — MAGNESIUM SULFATE 500 MG/ML
2 VIAL (ML) INJECTION ONCE
Refills: 0 | Status: COMPLETED | OUTPATIENT
Start: 2021-07-04 | End: 2021-07-04

## 2021-07-04 RX ADMIN — CHLORHEXIDINE GLUCONATE 1 APPLICATION(S): 213 SOLUTION TOPICAL at 05:34

## 2021-07-04 RX ADMIN — SENNA PLUS 2 TABLET(S): 8.6 TABLET ORAL at 22:08

## 2021-07-04 RX ADMIN — Medication 25 MILLIGRAM(S): at 05:32

## 2021-07-04 RX ADMIN — GABAPENTIN 300 MILLIGRAM(S): 400 CAPSULE ORAL at 17:35

## 2021-07-04 RX ADMIN — Medication 1 MILLIGRAM(S): at 11:34

## 2021-07-04 RX ADMIN — MEROPENEM 100 MILLIGRAM(S): 1 INJECTION INTRAVENOUS at 05:34

## 2021-07-04 RX ADMIN — Medication 6 UNIT(S): at 12:22

## 2021-07-04 RX ADMIN — Medication 25 GRAM(S): at 10:27

## 2021-07-04 RX ADMIN — Medication 150 MILLIGRAM(S): at 22:07

## 2021-07-04 RX ADMIN — INSULIN GLARGINE 15 UNIT(S): 100 INJECTION, SOLUTION SUBCUTANEOUS at 22:06

## 2021-07-04 RX ADMIN — ATORVASTATIN CALCIUM 10 MILLIGRAM(S): 80 TABLET, FILM COATED ORAL at 22:08

## 2021-07-04 RX ADMIN — OLANZAPINE 20 MILLIGRAM(S): 15 TABLET, FILM COATED ORAL at 22:07

## 2021-07-04 RX ADMIN — PREGABALIN 1000 MICROGRAM(S): 225 CAPSULE ORAL at 11:33

## 2021-07-04 RX ADMIN — ENOXAPARIN SODIUM 40 MILLIGRAM(S): 100 INJECTION SUBCUTANEOUS at 11:34

## 2021-07-04 RX ADMIN — Medication 25 MILLIGRAM(S): at 17:35

## 2021-07-04 RX ADMIN — MEROPENEM 100 MILLIGRAM(S): 1 INJECTION INTRAVENOUS at 22:08

## 2021-07-04 RX ADMIN — Medication 6 UNIT(S): at 17:35

## 2021-07-04 RX ADMIN — OLANZAPINE 5 MILLIGRAM(S): 15 TABLET, FILM COATED ORAL at 11:35

## 2021-07-04 RX ADMIN — Medication 250 MILLIGRAM(S): at 17:36

## 2021-07-04 RX ADMIN — Medication 1 TABLET(S): at 11:34

## 2021-07-04 RX ADMIN — PANTOPRAZOLE SODIUM 40 MILLIGRAM(S): 20 TABLET, DELAYED RELEASE ORAL at 06:09

## 2021-07-04 RX ADMIN — Medication 250 MILLIGRAM(S): at 05:32

## 2021-07-04 RX ADMIN — Medication 30 MILLIGRAM(S): at 05:32

## 2021-07-04 RX ADMIN — Medication 6 UNIT(S): at 07:59

## 2021-07-04 RX ADMIN — MEROPENEM 100 MILLIGRAM(S): 1 INJECTION INTRAVENOUS at 15:30

## 2021-07-04 RX ADMIN — GABAPENTIN 300 MILLIGRAM(S): 400 CAPSULE ORAL at 05:32

## 2021-07-04 NOTE — PHARMACOTHERAPY INTERVENTION NOTE - COMMENTS
vancomycin 1g IV q12h, SCr 1, GFR 71, recommended adjusting to 750mg IV q12h (AUC/IZABELLA predicted ~600)
recommended vanco level for 7/5 am

## 2021-07-04 NOTE — PROGRESS NOTE ADULT - ASSESSMENT
IMPRESSION:    Sepsis POA  UTI HO ESBL UTI.  HO Chronic Cobos   HO COPD   HO schizophrenia     PLAN:    CNS:  Continue psych medication     HEENT: Oral care    PULMONARY:  HOB @ 45 degrees.  Aspiration precautions     CARDIOVASCULAR: DC NS    GI: GI prophylaxis.  Feeding per speech and swallow.  Bowel regimen     RENAL:  Follow up lytes.  Correct as needed.  Kidney US     INFECTIOUS DISEASE: Follow up cultures.  Meropenem and Vanc for now until cultures.  FU Vanc levels     HEMATOLOGICAL:  DVT prophylaxis.       ENDOCRINE:  Follow up FS.      MUSCULOSKELETAL:   OOB to chair with fall precautions    Downgrade to floor

## 2021-07-04 NOTE — PROGRESS NOTE ADULT - SUBJECTIVE AND OBJECTIVE BOX
Patient is a 81y old  Male who presents with a chief complaint of Nausea/vomiting (2021 08:46)        Over Night Events:  Off pressors.  Tolerating PO.          ROS:     All ROS are negative except HPI         PHYSICAL EXAM    ICU Vital Signs Last 24 Hrs  T(C): 36.2 (2021 08:00), Max: 36.6 (2021 00:00)  T(F): 97.1 (2021 08:00), Max: 97.8 (2021 00:00)  HR: 58 (2021 08:00) (58 - 102)  BP: 122/52 (2021 08:00) (97/46 - 155/80)  BP(mean): 88 (2021 08:00) (65 - 112)  ABP: --  ABP(mean): --  RR: 18 (2021 08:00) (14 - 29)  SpO2: 100% (2021 08:00) (96% - 100%)      CONSTITUTIONAL:  Ill appearing.  NAD    ENT:   Airway patent,   Mouth with normal mucosa.   No thrush    EYES:   Pupils equal,   Round and reactive to light.    CARDIAC:   Normal rate,   Regular rhythm.    No edema      Vascular:  Normal systolic impulse  No Carotid bruits    RESPIRATORY:   No wheezing  Bilateral BS  Normal chest expansion  Not tachypneic,  No use of accessory muscles    GASTROINTESTINAL:  Abdomen soft,   Non-tender,   No guarding,   + BS    MUSCULOSKELETAL:   Range of motion is not limited,  No clubbing, cyanosis    NEUROLOGICAL:   Alert   No motor  deficits.    SKIN:   Skin normal color for race,   Warm and dry   No evidence of rash.    PSYCHIATRIC:   Normal mood and affect.   No apparent risk to self or others.    HEMATOLOGICAL:  No cervical  lymphadenopathy.  no inguinal lymphadenopathy      21 @ 07:01  -  21 @ 07:00  --------------------------------------------------------  IN:    IV PiggyBack: 550 mL    Oral Fluid: 900 mL    sodium chloride 0.9%: 2200 mL    Sodium Chloride 0.9% Bolus: 500 mL  Total IN: 4150 mL    OUT:    Voided (mL): 1830 mL  Total OUT: 1830 mL    Total NET: 2320 mL      21 @ 07:01  -  21 @ 09:07  --------------------------------------------------------  IN:    sodium chloride 0.9%: 100 mL  Total IN: 100 mL    OUT:    Voided (mL): 165 mL  Total OUT: 165 mL    Total NET: -65 mL          LABS:                            10.5   8.76  )-----------( 175      ( 2021 05:06 )             30.1                                               07-04    128<L>  |  98  |  10  ----------------------------<  83  4.2   |  23  |  0.8    Ca    7.8<L>      2021 05:06  Mg     1.8     -04    TPro  5.1<L>  /  Alb  2.9<L>  /  TBili  <0.2  /  DBili  x   /  AST  15  /  ALT  <5  /  AlkPhos  79  07-04                                             Urinalysis Basic - ( 2021 01:02 )    Color: Yellow / Appearance: Turbid / S.011 / pH: x  Gluc: x / Ketone: Negative  / Bili: Negative / Urobili: <2 mg/dL   Blood: x / Protein: 100 mg/dL / Nitrite: Negative   Leuk Esterase: Large / RBC: 15 /HPF /  /HPF   Sq Epi: x / Non Sq Epi: 1 /HPF / Bacteria: Many        CARDIAC MARKERS ( 2021 11:57 )  x     / 0.11 ng/mL / x     / x     / x      CARDIAC MARKERS ( 2021 23:58 )  x     / 0.10 ng/mL / x     / x     / x                                                LIVER FUNCTIONS - ( 2021 05:06 )  Alb: 2.9 g/dL / Pro: 5.1 g/dL / ALK PHOS: 79 U/L / ALT: <5 U/L / AST: 15 U/L / GGT: x                                                                                                                                       MEDICATIONS  (STANDING):  atorvastatin 10 milliGRAM(s) Oral at bedtime  chlorhexidine 4% Liquid 1 Application(s) Topical <User Schedule>  cyanocobalamin 1000 MICROGram(s) Oral daily  dextrose 40% Gel 15 Gram(s) Oral once  dextrose 5%. 1000 milliLiter(s) (50 mL/Hr) IV Continuous <Continuous>  dextrose 5%. 1000 milliLiter(s) (100 mL/Hr) IV Continuous <Continuous>  dextrose 50% Injectable 25 Gram(s) IV Push once  dextrose 50% Injectable 12.5 Gram(s) IV Push once  dextrose 50% Injectable 25 Gram(s) IV Push once  enoxaparin Injectable 40 milliGRAM(s) SubCutaneous daily  folic acid 1 milliGRAM(s) Oral daily  gabapentin 300 milliGRAM(s) Oral every 12 hours  glucagon  Injectable 1 milliGRAM(s) IntraMuscular once  insulin glargine Injectable (LANTUS) 15 Unit(s) SubCutaneous at bedtime  insulin lispro (ADMELOG) corrective regimen sliding scale   SubCutaneous three times a day before meals  insulin lispro Injectable (ADMELOG) 6 Unit(s) SubCutaneous three times a day before meals  lactobacillus acidophilus 1 Tablet(s) Oral daily  magnesium sulfate  IVPB 2 Gram(s) IV Intermittent once  meropenem  IVPB 1000 milliGRAM(s) IV Intermittent every 8 hours  metoprolol tartrate 25 milliGRAM(s) Oral two times a day  NIFEdipine XL 30 milliGRAM(s) Oral daily  OLANZapine 20 milliGRAM(s) Oral at bedtime  OLANZapine 5 milliGRAM(s) Oral daily  pantoprazole    Tablet 40 milliGRAM(s) Oral before breakfast  senna 2 Tablet(s) Oral at bedtime  sodium chloride 0.9%. 1000 milliLiter(s) (100 mL/Hr) IV Continuous <Continuous>  traZODone 150 milliGRAM(s) Oral at bedtime  vancomycin  IVPB 750 milliGRAM(s) IV Intermittent every 12 hours    MEDICATIONS  (PRN):  ondansetron Injectable 4 milliGRAM(s) IV Push every 4 hours PRN Nausea and/or Vomiting      New X-rays reviewed:                                                                                  ECHO    CXR interpreted by me:  No infiltrates

## 2021-07-04 NOTE — CHART NOTE - NSCHARTNOTEFT_GEN_A_CORE
MICU Transfer Note    Transfer from: MICU  Transfer to:  (  ) Medicine    (  ) Telemetry    (  ) RCU    (  ) Palliative    (  ) Stroke Unit    (  ) _______________  Accepting physican:      MICU COURSE: 81 year old man with PMHx of schizophrenia, COPD, hyperlipidemia, non-Hodgkin lymphoma in remission, Type 2DM (non-insulin dependent) with neuropathy, chronic indwelling Bains (chronic retention) with several admissions in the past for ESBL pyelonephritis BIBEMS from [facility] for nausea/vomiting.  In the ED pt was hyponatremic to 119, k 5.5 leukocytosis 20k, lactate 3.3, troponin 0.1, ekg showed no ischemic changes. UA positive CTH negative for acute hemorrhage or infarct. Pt remained stable in MICU, transfer to floor.       ASSESSMENT & PLAN:   Mr. Laguna ia 81 year old man with PMHx of schizophrenia, COPD, hyperlipidemia, non-Hodgkin lymphoma in remission, Type 2DM (non-insulin dependent) with neuropathy, chronic indwelling Bains (chronic retention) BIBEMS from [facility] for nausea/vomiting. Pt's status has improved, medically stable for transfer to floor.     #Handoff   - Na 127, follow bmp, keep on NS, fluid restriction   - follow up urine/blood cultures   - Troponin 0.1 on admission, ekg negative for ischemic changes, no chest pain, f/u trops    #Nausea/vomiting likely secondary to Urosepsis, chronic indwelling bains for retention   -recently discharged and was seen by Urology on 7/1 for difficult Bains placement after patient reported "bains came out" . Patient had a 14 fr catheter placed draining well and was told to follow-up in Urology clinic for further management.  -  wbc 20k with bandemia on admission, Normotensive, + UA; s/p Rocephin 1 gm   - Patient with chronic indwelling Bains (chronic urinary retention)  - had several admissions in the past for ESBL pyelonephritis; last discharged on ertapenem 1 gm 10/24  - Urology consult appreciated: aspirated 50cc of cloudy yellow urine  Bains now draining to gravity with 150cc of cloudy yellow urine noted in bag.   - Continue Bains care   - Consider 1:1 to prevent further urethral damage    - Repeat RBUS in 48 -72 hours to assess hydro  - F/u urine cultures/ blood cultures  - c/w meropenem 1 g TID  - Monitor Urine output  - ID c/s for Abx recommendation   - Analgesics for pain control prn/ zofran prn      # Hyponatremia - possibly SIADH secondary to antipsychotic medications  vs hypovolemia from extrarenal loss (nausea/vomiting)  patient with history of hyponatremia, on sodium tablets at home  - possibly 2/2 Trazadone use   - Na 119, baseline usually ~130  - c/w with iv hydration NS @ 100  - Urine lytes pending  - f/u renal ultrasound/lacate  - Check serum and urine osmolality  - Repeat BMP  - Fluid restriction    #elevated troponin  - elevated trop .10 on admission; denies chest pain  - EKG on admission: no ischemic changes  - trend cardiac enzymes and serial EKG    #HTN, controlled  - c/w Nifedipine ER 30 mg daily  - c/w metoprolol tartrate 25 BID    #Schizophrenia  - c/w olanzapine and trazodone    #HLD  - c/w atorvastatin 10    #Type II DM, with neuropathy  - FS AC/HS  - on metformin 500 QD at home  - c/w gabapentin 300 mg BID    #Macrocytic Anemia with B12 Deficiency  - c/w B12, folate supplement    #COPD  - c/w albuterol prn     DVT ppx: Lovenox  GI ppx: Not indicated  Diet: NPO until further recs; fluid restriction   Activity: increase as tolerated  Code status: full code  Dispo: acute             Vital Signs Last 24 Hrs  T(C): 36.2 (04 Jul 2021 08:00), Max: 36.6 (04 Jul 2021 00:00)  T(F): 97.1 (04 Jul 2021 08:00), Max: 97.8 (04 Jul 2021 00:00)  HR: 58 (04 Jul 2021 08:00) (58 - 102)  BP: 122/52 (04 Jul 2021 08:00) (97/46 - 155/80)  BP(mean): 88 (04 Jul 2021 08:00) (65 - 112)  RR: 18 (04 Jul 2021 08:00) (14 - 29)  SpO2: 100% (04 Jul 2021 08:00) (96% - 100%)  I&O's Summary    03 Jul 2021 07:01  -  04 Jul 2021 07:00  --------------------------------------------------------  IN: 4150 mL / OUT: 1830 mL / NET: 2320 mL    04 Jul 2021 07:01  -  04 Jul 2021 09:43  --------------------------------------------------------  IN: 100 mL / OUT: 165 mL / NET: -65 mL          MEDICATIONS  (STANDING):  atorvastatin 10 milliGRAM(s) Oral at bedtime  chlorhexidine 4% Liquid 1 Application(s) Topical <User Schedule>  cyanocobalamin 1000 MICROGram(s) Oral daily  dextrose 40% Gel 15 Gram(s) Oral once  dextrose 5%. 1000 milliLiter(s) (50 mL/Hr) IV Continuous <Continuous>  dextrose 5%. 1000 milliLiter(s) (100 mL/Hr) IV Continuous <Continuous>  dextrose 50% Injectable 25 Gram(s) IV Push once  dextrose 50% Injectable 12.5 Gram(s) IV Push once  dextrose 50% Injectable 25 Gram(s) IV Push once  enoxaparin Injectable 40 milliGRAM(s) SubCutaneous daily  folic acid 1 milliGRAM(s) Oral daily  gabapentin 300 milliGRAM(s) Oral every 12 hours  glucagon  Injectable 1 milliGRAM(s) IntraMuscular once  insulin glargine Injectable (LANTUS) 15 Unit(s) SubCutaneous at bedtime  insulin lispro (ADMELOG) corrective regimen sliding scale   SubCutaneous three times a day before meals  insulin lispro Injectable (ADMELOG) 6 Unit(s) SubCutaneous three times a day before meals  lactobacillus acidophilus 1 Tablet(s) Oral daily  magnesium sulfate  IVPB 2 Gram(s) IV Intermittent once  meropenem  IVPB 1000 milliGRAM(s) IV Intermittent every 8 hours  metoprolol tartrate 25 milliGRAM(s) Oral two times a day  NIFEdipine XL 30 milliGRAM(s) Oral daily  OLANZapine 20 milliGRAM(s) Oral at bedtime  OLANZapine 5 milliGRAM(s) Oral daily  pantoprazole    Tablet 40 milliGRAM(s) Oral before breakfast  senna 2 Tablet(s) Oral at bedtime  traZODone 150 milliGRAM(s) Oral at bedtime  vancomycin  IVPB 750 milliGRAM(s) IV Intermittent every 12 hours    MEDICATIONS  (PRN):  ondansetron Injectable 4 milliGRAM(s) IV Push every 4 hours PRN Nausea and/or Vomiting        LABS                                            10.5                  Neurophils% (auto):   82.9   (07-04 @ 05:06):    8.76 )-----------(175          Lymphocytes% (auto):  9.9                                           30.1                   Eosinphils% (auto):   0.1      Manual%: Neutrophils x    ; Lymphocytes x    ; Eosinophils x    ; Bands%: x    ; Blasts x                                    128    |  98     |  10                  Calcium: 7.8   / iCa: x      (07-04 @ 05:06)    ----------------------------<  83        Magnesium: 1.8                              4.2     |  23     |  0.8              Phosphorous: x        TPro  5.1    /  Alb  2.9    /  TBili  <0.2   /  DBili  x      /  AST  15     /  ALT  <5     /  AlkPhos  79     04 Jul 2021 05:06 MICU Transfer Note    Transfer from: MICU  Transfer to: 3A  Accepting physican: hospitalist      MICU COURSE: 81 year old man with PMHx of schizophrenia, COPD, hyperlipidemia, non-Hodgkin lymphoma in remission, Type 2DM (non-insulin dependent) with neuropathy, chronic indwelling Bains (chronic retention) with several admissions in the past for ESBL pyelonephritis BIBEMS from [facility] for nausea/vomiting.  In the ED pt was hyponatremic to 119, k 5.5 leukocytosis 20k, lactate 3.3, troponin 0.1, ekg showed no ischemic changes. UA positive CTH negative for acute hemorrhage or infarct. Pt remained stable in MICU, transfer to floor.     #Handoff   - Na 127, follow bmp, d/c NS, fluid restriction   - follow up urine/blood cultures   - Troponin 0.1 on admission, ekg negative for ischemic changes, no chest pain, f/u trops      ASSESSMENT & PLAN:   Mr. Laguna ia 81 year old man with PMHx of schizophrenia, COPD, hyperlipidemia, non-Hodgkin lymphoma in remission, Type 2DM (non-insulin dependent) with neuropathy, chronic indwelling Bains (chronic retention) BIBEMS from [facility] for nausea/vomiting. Pt's status has improved, medically stable for transfer to floor.         #Nausea/vomiting likely secondary to Urosepsis, chronic indwelling bains for retention   -recently discharged and was seen by Urology on 7/1 for difficult Bains placement after patient reported "bains came out" . Patient had a 14 fr catheter placed draining well and was told to follow-up in Urology clinic for further management.  -  wbc 20k with bandemia on admission, Normotensive, + UA; s/p Rocephin 1 gm   - Patient with chronic indwelling Bains (chronic urinary retention)  - had several admissions in the past for ESBL pyelonephritis; last discharged on ertapenem 1 gm 10/24  - Urology consult appreciated: aspirated 50cc of cloudy yellow urine  Bains now draining to gravity with 150cc of cloudy yellow urine noted in bag.   - Continue Bains care   - Consider 1:1 to prevent further urethral damage    - Repeat RBUS in 48 -72 hours to assess hydro  - F/u urine cultures/ blood cultures  - c/w meropenem 1 g TID  - Monitor Urine output  - ID c/s for Abx recommendation   - Analgesics for pain control prn/ zofran prn      # Hyponatremia - possibly SIADH secondary to antipsychotic medications  vs hypovolemia from extrarenal loss (nausea/vomiting)  patient with history of hyponatremia, on sodium tablets at home  - possibly 2/2 Trazadone use   - Na 119, baseline usually ~130  - c/w with iv hydration NS @ 100  - Urine lytes pending  - f/u renal ultrasound/lacate  - Check serum and urine osmolality  - Repeat BMP  - Fluid restriction    #elevated troponin  - elevated trop .10 on admission; denies chest pain  - EKG on admission: no ischemic changes  - trend cardiac enzymes and serial EKG    #HTN, controlled  - c/w Nifedipine ER 30 mg daily  - c/w metoprolol tartrate 25 BID    #Schizophrenia  - c/w olanzapine and trazodone    #HLD  - c/w atorvastatin 10    #Type II DM, with neuropathy  - FS AC/HS  - on metformin 500 QD at home  - c/w gabapentin 300 mg BID    #Macrocytic Anemia with B12 Deficiency  - c/w B12, folate supplement    #COPD  - c/w albuterol prn     DVT ppx: Lovenox  GI ppx: Not indicated  Diet: NPO until further recs; fluid restriction   Activity: increase as tolerated  Code status: full code  Dispo: acute       Handed off to team 6      Vital Signs Last 24 Hrs  T(C): 36.2 (04 Jul 2021 08:00), Max: 36.6 (04 Jul 2021 00:00)  T(F): 97.1 (04 Jul 2021 08:00), Max: 97.8 (04 Jul 2021 00:00)  HR: 58 (04 Jul 2021 08:00) (58 - 102)  BP: 122/52 (04 Jul 2021 08:00) (97/46 - 155/80)  BP(mean): 88 (04 Jul 2021 08:00) (65 - 112)  RR: 18 (04 Jul 2021 08:00) (14 - 29)  SpO2: 100% (04 Jul 2021 08:00) (96% - 100%)  I&O's Summary    03 Jul 2021 07:01  -  04 Jul 2021 07:00  --------------------------------------------------------  IN: 4150 mL / OUT: 1830 mL / NET: 2320 mL    04 Jul 2021 07:01  -  04 Jul 2021 09:43  --------------------------------------------------------  IN: 100 mL / OUT: 165 mL / NET: -65 mL          MEDICATIONS  (STANDING):  atorvastatin 10 milliGRAM(s) Oral at bedtime  chlorhexidine 4% Liquid 1 Application(s) Topical <User Schedule>  cyanocobalamin 1000 MICROGram(s) Oral daily  dextrose 40% Gel 15 Gram(s) Oral once  dextrose 5%. 1000 milliLiter(s) (50 mL/Hr) IV Continuous <Continuous>  dextrose 5%. 1000 milliLiter(s) (100 mL/Hr) IV Continuous <Continuous>  dextrose 50% Injectable 25 Gram(s) IV Push once  dextrose 50% Injectable 12.5 Gram(s) IV Push once  dextrose 50% Injectable 25 Gram(s) IV Push once  enoxaparin Injectable 40 milliGRAM(s) SubCutaneous daily  folic acid 1 milliGRAM(s) Oral daily  gabapentin 300 milliGRAM(s) Oral every 12 hours  glucagon  Injectable 1 milliGRAM(s) IntraMuscular once  insulin glargine Injectable (LANTUS) 15 Unit(s) SubCutaneous at bedtime  insulin lispro (ADMELOG) corrective regimen sliding scale   SubCutaneous three times a day before meals  insulin lispro Injectable (ADMELOG) 6 Unit(s) SubCutaneous three times a day before meals  lactobacillus acidophilus 1 Tablet(s) Oral daily  magnesium sulfate  IVPB 2 Gram(s) IV Intermittent once  meropenem  IVPB 1000 milliGRAM(s) IV Intermittent every 8 hours  metoprolol tartrate 25 milliGRAM(s) Oral two times a day  NIFEdipine XL 30 milliGRAM(s) Oral daily  OLANZapine 20 milliGRAM(s) Oral at bedtime  OLANZapine 5 milliGRAM(s) Oral daily  pantoprazole    Tablet 40 milliGRAM(s) Oral before breakfast  senna 2 Tablet(s) Oral at bedtime  traZODone 150 milliGRAM(s) Oral at bedtime  vancomycin  IVPB 750 milliGRAM(s) IV Intermittent every 12 hours    MEDICATIONS  (PRN):  ondansetron Injectable 4 milliGRAM(s) IV Push every 4 hours PRN Nausea and/or Vomiting        LABS                                            10.5                  Neurophils% (auto):   82.9   (07-04 @ 05:06):    8.76 )-----------(175          Lymphocytes% (auto):  9.9                                           30.1                   Eosinphils% (auto):   0.1      Manual%: Neutrophils x    ; Lymphocytes x    ; Eosinophils x    ; Bands%: x    ; Blasts x                                    128    |  98     |  10                  Calcium: 7.8   / iCa: x      (07-04 @ 05:06)    ----------------------------<  83        Magnesium: 1.8                              4.2     |  23     |  0.8              Phosphorous: x        TPro  5.1    /  Alb  2.9    /  TBili  <0.2   /  DBili  x      /  AST  15     /  ALT  <5     /  AlkPhos  79     04 Jul 2021 05:06 MICU Transfer Note    Transfer from: MICU  Transfer to: 3A  Accepting physican: hospitalist      MICU COURSE: 81 year old man with PMHx of schizophrenia, COPD, hyperlipidemia, non-Hodgkin lymphoma in remission, Type 2DM (non-insulin dependent) with neuropathy, chronic indwelling Bains (chronic retention) with several admissions in the past for ESBL pyelonephritis BIBEMS from [facility] for nausea/vomiting.  In the ED pt was hyponatremic to 119, k 5.5 leukocytosis 20k, lactate 3.3, troponin 0.1, ekg showed no ischemic changes. UA positive CTH negative for acute hemorrhage or infarct. Pt remained stable in MICU, transfer to floor.     #Handoff   - Na 127, follow bmp, d/c NS, fluid restriction   - follow up urine cultures  - blood cultures positive for gram negative rods in anaerobic   - Troponin 0.1 on admission, ekg negative for ischemic changes, no chest pain, f/u trops      ASSESSMENT & PLAN:   Mr. Laguna ia 81 year old man with PMHx of schizophrenia, COPD, hyperlipidemia, non-Hodgkin lymphoma in remission, Type 2DM (non-insulin dependent) with neuropathy, chronic indwelling Bains (chronic retention) BIBEMS from [facility] for nausea/vomiting. Pt's status has improved, medically stable for transfer to floor.         #Nausea/vomiting likely secondary to Urosepsis, chronic indwelling bains for retention   -recently discharged and was seen by Urology on 7/1 for difficult Bains placement after patient reported "bains came out" . Patient had a 14 fr catheter placed draining well and was told to follow-up in Urology clinic for further management.  -  wbc 20k with bandemia on admission, Normotensive, + UA; s/p Rocephin 1 gm   - Patient with chronic indwelling Bains (chronic urinary retention)  - had several admissions in the past for ESBL pyelonephritis; last discharged on ertapenem 1 gm 10/24  - Urology consult appreciated: aspirated 50cc of cloudy yellow urine  Banis now draining to gravity with 150cc of cloudy yellow urine noted in bag.   - Continue Bains care   - Consider 1:1 to prevent further urethral damage    - Repeat RBUS in 48 -72 hours to assess hydro  - F/u urine cultures/ blood cultures  - c/w meropenem 1 g TID  - Monitor Urine output  - ID c/s for Abx recommendation   - Analgesics for pain control prn/ zofran prn      # Hyponatremia - possibly SIADH secondary to antipsychotic medications  vs hypovolemia from extrarenal loss (nausea/vomiting)  patient with history of hyponatremia, on sodium tablets at home  - possibly 2/2 Trazadone use   - Na 119, baseline usually ~130  - c/w with iv hydration NS @ 100  - Urine lytes pending  - f/u renal ultrasound/lacate  - Check serum and urine osmolality  - Repeat BMP  - Fluid restriction    #elevated troponin  - elevated trop .10 on admission; denies chest pain  - EKG on admission: no ischemic changes  - trend cardiac enzymes and serial EKG    #HTN, controlled  - c/w Nifedipine ER 30 mg daily  - c/w metoprolol tartrate 25 BID    #Schizophrenia  - c/w olanzapine and trazodone    #HLD  - c/w atorvastatin 10    #Type II DM, with neuropathy  - FS AC/HS  - on metformin 500 QD at home  - c/w gabapentin 300 mg BID    #Macrocytic Anemia with B12 Deficiency  - c/w B12, folate supplement    #COPD  - c/w albuterol prn     DVT ppx: Lovenox  GI ppx: Not indicated  Diet: NPO until further recs; fluid restriction   Activity: increase as tolerated  Code status: full code  Dispo: acute       Handed off to team 6      Vital Signs Last 24 Hrs  T(C): 36.2 (04 Jul 2021 08:00), Max: 36.6 (04 Jul 2021 00:00)  T(F): 97.1 (04 Jul 2021 08:00), Max: 97.8 (04 Jul 2021 00:00)  HR: 58 (04 Jul 2021 08:00) (58 - 102)  BP: 122/52 (04 Jul 2021 08:00) (97/46 - 155/80)  BP(mean): 88 (04 Jul 2021 08:00) (65 - 112)  RR: 18 (04 Jul 2021 08:00) (14 - 29)  SpO2: 100% (04 Jul 2021 08:00) (96% - 100%)  I&O's Summary    03 Jul 2021 07:01  -  04 Jul 2021 07:00  --------------------------------------------------------  IN: 4150 mL / OUT: 1830 mL / NET: 2320 mL    04 Jul 2021 07:01  -  04 Jul 2021 09:43  --------------------------------------------------------  IN: 100 mL / OUT: 165 mL / NET: -65 mL          MEDICATIONS  (STANDING):  atorvastatin 10 milliGRAM(s) Oral at bedtime  chlorhexidine 4% Liquid 1 Application(s) Topical <User Schedule>  cyanocobalamin 1000 MICROGram(s) Oral daily  dextrose 40% Gel 15 Gram(s) Oral once  dextrose 5%. 1000 milliLiter(s) (50 mL/Hr) IV Continuous <Continuous>  dextrose 5%. 1000 milliLiter(s) (100 mL/Hr) IV Continuous <Continuous>  dextrose 50% Injectable 25 Gram(s) IV Push once  dextrose 50% Injectable 12.5 Gram(s) IV Push once  dextrose 50% Injectable 25 Gram(s) IV Push once  enoxaparin Injectable 40 milliGRAM(s) SubCutaneous daily  folic acid 1 milliGRAM(s) Oral daily  gabapentin 300 milliGRAM(s) Oral every 12 hours  glucagon  Injectable 1 milliGRAM(s) IntraMuscular once  insulin glargine Injectable (LANTUS) 15 Unit(s) SubCutaneous at bedtime  insulin lispro (ADMELOG) corrective regimen sliding scale   SubCutaneous three times a day before meals  insulin lispro Injectable (ADMELOG) 6 Unit(s) SubCutaneous three times a day before meals  lactobacillus acidophilus 1 Tablet(s) Oral daily  magnesium sulfate  IVPB 2 Gram(s) IV Intermittent once  meropenem  IVPB 1000 milliGRAM(s) IV Intermittent every 8 hours  metoprolol tartrate 25 milliGRAM(s) Oral two times a day  NIFEdipine XL 30 milliGRAM(s) Oral daily  OLANZapine 20 milliGRAM(s) Oral at bedtime  OLANZapine 5 milliGRAM(s) Oral daily  pantoprazole    Tablet 40 milliGRAM(s) Oral before breakfast  senna 2 Tablet(s) Oral at bedtime  traZODone 150 milliGRAM(s) Oral at bedtime  vancomycin  IVPB 750 milliGRAM(s) IV Intermittent every 12 hours    MEDICATIONS  (PRN):  ondansetron Injectable 4 milliGRAM(s) IV Push every 4 hours PRN Nausea and/or Vomiting        LABS                                            10.5                  Neurophils% (auto):   82.9   (07-04 @ 05:06):    8.76 )-----------(175          Lymphocytes% (auto):  9.9                                           30.1                   Eosinphils% (auto):   0.1      Manual%: Neutrophils x    ; Lymphocytes x    ; Eosinophils x    ; Bands%: x    ; Blasts x                                    128    |  98     |  10                  Calcium: 7.8   / iCa: x      (07-04 @ 05:06)    ----------------------------<  83        Magnesium: 1.8                              4.2     |  23     |  0.8              Phosphorous: x        TPro  5.1    /  Alb  2.9    /  TBili  <0.2   /  DBili  x      /  AST  15     /  ALT  <5     /  AlkPhos  79     04 Jul 2021 05:06

## 2021-07-04 NOTE — PROGRESS NOTE ADULT - SUBJECTIVE AND OBJECTIVE BOX
ADA VILLAGOMEZ 81y Male  MRN#: [id number]   CODE STATUS: FULL     Hospital Day: 1d    Pt is currently admitted with the primary diagnosis of urosepsis, hyponatremia.      SUBJECTIVE  Hospital Course: 81 year old man with PMHx of schizophrenia, COPD, hyperlipidemia, non-Hodgkin lymphoma in remission, Type 2DM (non-insulin dependent) with neuropathy, chronic indwelling Bains (chronic retention) with several admissions in the past for ESBL pyelonephritis BIBEMS from [facility] for nausea/vomiting.  In the ED pt was hyponatremic to 119, k 5.5 leukocytosis 20k, lactate 3.3, troponin 0.1, ekg showed no ischemic changes. UA positive CTH negative for acute hemorrhage or infarct.   Overnight events: none     Subjective complaints:     Present Today:   - Bains:  No [  ], Yes [ x  ] : Indication:     - Type of IV Access:       .. CVC/Piccline:  No [x  ], Yes [   ] : Indication:       .. Midline: No [ x ], Yes [   ] : Indication:                                             ----------------------------------------------------------  OBJECTIVE  PAST MEDICAL & SURGICAL HISTORY  Schizophrenia    Diabetes type 2, controlled    COPD (chronic obstructive pulmonary disease)    Dyslipidemia    Chronic retention of urine    Dyslipidemia    Encounter for screening colonoscopy                                              -----------------------------------------------------------  ALLERGIES:  No Known Allergies                                            ------------------------------------------------------------    HOME MEDICATIONS  Home Medications:  atorvastatin 10 mg oral tablet: 1 tab(s) orally once a day (13 Oct 2020 05:27)  folic acid 1 mg oral tablet: 1 tab(s) orally once a day (13 Oct 2020 05:27)  gabapentin 300 mg oral capsule: 1 cap(s) orally every 12 hours (13 Oct 2020 05:27)  gabapentin 300 mg oral capsule: 1 cap(s) orally 2 times a day (13 Oct 2020 05:27)  ipratropium-albuterol 0.5 mg-2.5 mg/3 mLinhalation solution: 3 milliliter(s) inhaled every 6 hours, As needed, Shortness of Breath and/or Wheezing (13 Oct 2020 05:)  lactobacillus acidophilus oral capsule: 1 tab(s) orally once a day (13 Oct 2020 05:)  metFORMIN 500 mg oral tablet: 1 tab(s) orally 2 times a day (13 Oct 2020 05:)  metoprolol tartrate 25 mg oral tablet: 1 tab(s) orally 2 times a day (13 Oct 2020 05:)  NIFEdipine 30 mg oral tablet, extended release: 1 tab(s) orally once a day (13 Oct 2020 05:)  OLANZapine 20 mg oral tablet: 1 tab(s) orally once a day (at bedtime) (13 Oct 2020 05:)  OLANZapine 5 mg oral tablet: 1 tab(s) orally once a day (13 Oct 2020 05:)  senna oral tablet: 2 tab(s) orally once a day (at bedtime) (13 Oct 2020 05:)  Sodium Chloride 1 g oral tablet: 1 tab(s) orally once a day (13 Oct 2020 05:)  traZODone 150 mg oral tablet: 1 tab(s) orally once a day (at bedtime) (13 Oct 2020 05:)                           MEDICATIONS:  STANDING MEDICATIONS  atorvastatin 10 milliGRAM(s) Oral at bedtime  chlorhexidine 4% Liquid 1 Application(s) Topical <User Schedule>  cyanocobalamin 1000 MICROGram(s) Oral daily  dextrose 40% Gel 15 Gram(s) Oral once  dextrose 5%. 1000 milliLiter(s) IV Continuous <Continuous>  dextrose 5%. 1000 milliLiter(s) IV Continuous <Continuous>  dextrose 50% Injectable 25 Gram(s) IV Push once  dextrose 50% Injectable 12.5 Gram(s) IV Push once  dextrose 50% Injectable 25 Gram(s) IV Push once  enoxaparin Injectable 40 milliGRAM(s) SubCutaneous daily  folic acid 1 milliGRAM(s) Oral daily  gabapentin 300 milliGRAM(s) Oral every 12 hours  glucagon  Injectable 1 milliGRAM(s) IntraMuscular once  insulin glargine Injectable (LANTUS) 15 Unit(s) SubCutaneous at bedtime  insulin lispro (ADMELOG) corrective regimen sliding scale   SubCutaneous three times a day before meals  insulin lispro Injectable (ADMELOG) 6 Unit(s) SubCutaneous three times a day before meals  lactobacillus acidophilus 1 Tablet(s) Oral daily  meropenem  IVPB 1000 milliGRAM(s) IV Intermittent every 8 hours  metoprolol tartrate 25 milliGRAM(s) Oral two times a day  NIFEdipine XL 30 milliGRAM(s) Oral daily  OLANZapine 20 milliGRAM(s) Oral at bedtime  OLANZapine 5 milliGRAM(s) Oral daily  pantoprazole    Tablet 40 milliGRAM(s) Oral before breakfast  senna 2 Tablet(s) Oral at bedtime  sodium chloride 0.9%. 1000 milliLiter(s) IV Continuous <Continuous>  traZODone 150 milliGRAM(s) Oral at bedtime  vancomycin  IVPB 750 milliGRAM(s) IV Intermittent every 12 hours    PRN MEDICATIONS  ondansetron Injectable 4 milliGRAM(s) IV Push every 4 hours PRN                                            ------------------------------------------------------------  VITAL SIGNS: Last 24 Hours  T(C): 36.2 (2021 20:00), Max: 36.2 (2021 01:14)  T(F): 97.2 (2021 20:00), Max: 97.2 (2021 12:00)  HR: 78 (2021 21:00) (76 - 109)  BP: 105/43 (2021 20:00) (96/45 - 152/67)  BP(mean): 66 (2021 20:00) (62 - 103)  RR: 22 (2021 21:00) (17 - 26)  SpO2: 99% (2021 21:00) (95% - 100%)      21 @ 07:01  -  - @ 07:00  --------------------------------------------------------  IN: 100 mL / OUT: 0 mL / NET: 100 mL    21 @ 07:01  -  21 @ 00:38  --------------------------------------------------------  IN: 3350 mL / OUT: 1330 mL / NET: 2020 mL                                             --------------------------------------------------------------  LABS:                        13.2   20.26 )-----------( 236      ( 2021 23:58 )             36.9         128<L>  |  97<L>  |  10  ----------------------------<  97  4.4   |  23  |  0.8    Ca    8.0<L>      2021 20:44    TPro  7.0  /  Alb  4.3  /  TBili  0.7  /  DBili  x   /  AST  18  /  ALT  9   /  AlkPhos  116<H>        Urinalysis Basic - ( 2021 01:02 )    Color: Yellow / Appearance: Turbid / S.011 / pH: x  Gluc: x / Ketone: Negative  / Bili: Negative / Urobili: <2 mg/dL   Blood: x / Protein: 100 mg/dL / Nitrite: Negative   Leuk Esterase: Large / RBC: 15 /HPF /  /HPF   Sq Epi: x / Non Sq Epi: 1 /HPF / Bacteria: Many        Lactate, Blood: 1.1 mmol/L (21 @ 20:44)  Troponin T, Serum: 0.11 ng/mL *HH* (21 @ 11:57)  Lactate, Blood: 2.1 mmol/L *H* (21 @ 11:57)          CARDIAC MARKERS ( 2021 11:57 )  x     / 0.11 ng/mL / x     / x     / x      CARDIAC MARKERS ( 2021 23:58 )  x     / 0.10 ng/mL / x     / x     / x                                                  -------------------------------------------------------------  RADIOLOGY:    < from: CT Abdomen and Pelvis w/ IV Cont (21 @ 03:39) >  IMPRESSION:      Interval development of lobulation and cystic areas within the prostate. Correlate with PSA levels and may obtain MRI of the prostate with and without contrast for further evaluation.    Bains balloon inflated within the penile urethra, recommend repositioning.    Mild to moderate bilateral hydroureteronephrosis with no distal obstructing ureteral lesion or stone.    Air is notedwithin the urinary bladder, likely secondary to instrumentation. However infectious process is not excluded.      < end of copied text >  < from: CT Head No Cont (21 @ 03:37) >  Impression:    No CT evidence of acute intracranial hemorrhage or acute large territorial infarct.    Redemonstration of a left middle cranial fossa cystic area measuring 4.4 x 4.4 cm, which may represent encephalomalacia versus arachnoid cyst.      < end of copied text >  < from: Xray Chest 1 View- PORTABLE-Urgent (Xray Chest 1 View- PORTABLE-Urgent .) (21 @ 02:58) >  Impression:    No radiographic evidence of acute cardiopulmonary disease.    < end of copied text >                                            --------------------------------------------------------------    PHYSICAL EXAM:  General:   HEENT:  LUNGS:  HEART:  ABDOMEN:  EXT:  NEURO:  SKIN:                                           --------------------------------------------------------------  ASSESSMENT & PLAN  Mr. Villagomez ia 81 year old man with PMHx of schizophrenia, COPD, hyperlipidemia, non-Hodgkin lymphoma in remission, Type 2DM (non-insulin dependent) with neuropathy, chronic indwelling Bains (chronic retention) BIBEMS from [facility] for nausea/vomiting.     #Nausea/vomiting likely secondary to Urosepsis, chronic indwelling bains for retention   -recently discharged and was seen by Urology on  for difficult Bains placement after patient reported "bains came out" . Patient had a 14 fr catheter placed draining well and was told to follow-up in Urology clinic for further management.  -  wbc 20k with bandemia on admission, Normotensive, + UA; s/p Rocephin 1 gm   - Patient with chronic indwelling Bains (chronic urinary retention)  - had several admissions in the past for ESBL pyelonephritis; last discharged on ertapenem 1 gm 10/24  - Urology consult appreciated: aspirated 50cc of cloudy yellow urine  Bains now draining to gravity with 150cc of cloudy yellow urine noted in bag.   - Continue Bains care   - Consider 1:1 to prevent further urethral damage    - Repeat RBUS in 48 -72 hours to assess hydro  - F/u urine cultures/ blood cultures  - c/w meropenem 1 g TID  - Monitor Urine output  - ID c/s for Abx recommendation   - Analgesics for pain control prn/ zofran prn      # Hyponatremia - possibly SIADH secondary to antipsychotic medications  vs hypovolemia from extrarenal loss (nausea/vomiting)  patient with history of hyponatremia, on sodium tablets at home  - possibly 2/2 Trazadone use   - Na 119, baseline usually ~130  - c/w with iv hydration NS @ 100  - Urine lytes pending  - f/u renal ultrasound/lacate  - Check serum and urine osmolality  - Repeat BMP  - Fluid restriction    #elevated troponin  - elevated trop .10 on admission; denies chest pain  - EKG on admission: no ischemic changes  - trend cardiac enzymes and serial EKG    #HTN, controlled  - c/w Nifedipine ER 30 mg daily  - c/w metoprolol tartrate 25 BID    #Schizophrenia  - c/w olanzapine and trazodone    #HLD  - c/w atorvastatin 10    #Type II DM, with neuropathy  - FS AC/HS  - on metformin 500 QD at home  - c/w gabapentin 300 mg BID    #Macrocytic Anemia with B12 Deficiency  - c/w B12, folate supplement    #COPD  - c/w albuterol prn     DVT ppx: Lovenox  GI ppx: Not indicated  Diet: NPO until further recs; fluid restriction   Activity: increase as tolerated  Code status: full code  Dispo: acute                            ADA VILLAGOMEZ 81y Male  MRN#: [id number]   CODE STATUS: FULL     Hospital Day: 1d    Pt is currently admitted with the primary diagnosis of urosepsis, hyponatremia.      SUBJECTIVE  Hospital Course: 81 year old man with PMHx of schizophrenia, COPD, hyperlipidemia, non-Hodgkin lymphoma in remission, Type 2DM (non-insulin dependent) with neuropathy, chronic indwelling Bains (chronic retention) with several admissions in the past for ESBL pyelonephritis BIBEMS from [facility] for nausea/vomiting.  In the ED pt was hyponatremic to 119, k 5.5 leukocytosis 20k, lactate 3.3, troponin 0.1, ekg showed no ischemic changes. UA positive CTH negative for acute hemorrhage or infarct.   Overnight events: none     Subjective complaints:     Present Today:   - Bains:  No [  ], Yes [ x  ] : Indication:     - Type of IV Access:       .. CVC/Piccline:  No [x  ], Yes [   ] : Indication:       .. Midline: No [ x ], Yes [   ] : Indication:                                             ----------------------------------------------------------  OBJECTIVE  PAST MEDICAL & SURGICAL HISTORY  Schizophrenia    Diabetes type 2, controlled    COPD (chronic obstructive pulmonary disease)    Dyslipidemia    Chronic retention of urine    Dyslipidemia    Encounter for screening colonoscopy                                              -----------------------------------------------------------  ALLERGIES:  No Known Allergies                                            ------------------------------------------------------------    HOME MEDICATIONS  Home Medications:  atorvastatin 10 mg oral tablet: 1 tab(s) orally once a day (13 Oct 2020 05:27)  folic acid 1 mg oral tablet: 1 tab(s) orally once a day (13 Oct 2020 05:27)  gabapentin 300 mg oral capsule: 1 cap(s) orally every 12 hours (13 Oct 2020 05:27)  gabapentin 300 mg oral capsule: 1 cap(s) orally 2 times a day (13 Oct 2020 05:27)  ipratropium-albuterol 0.5 mg-2.5 mg/3 mLinhalation solution: 3 milliliter(s) inhaled every 6 hours, As needed, Shortness of Breath and/or Wheezing (13 Oct 2020 05:)  lactobacillus acidophilus oral capsule: 1 tab(s) orally once a day (13 Oct 2020 05:)  metFORMIN 500 mg oral tablet: 1 tab(s) orally 2 times a day (13 Oct 2020 05:)  metoprolol tartrate 25 mg oral tablet: 1 tab(s) orally 2 times a day (13 Oct 2020 05:)  NIFEdipine 30 mg oral tablet, extended release: 1 tab(s) orally once a day (13 Oct 2020 05:)  OLANZapine 20 mg oral tablet: 1 tab(s) orally once a day (at bedtime) (13 Oct 2020 05:)  OLANZapine 5 mg oral tablet: 1 tab(s) orally once a day (13 Oct 2020 05:)  senna oral tablet: 2 tab(s) orally once a day (at bedtime) (13 Oct 2020 05:)  Sodium Chloride 1 g oral tablet: 1 tab(s) orally once a day (13 Oct 2020 05:)  traZODone 150 mg oral tablet: 1 tab(s) orally once a day (at bedtime) (13 Oct 2020 05:)                           MEDICATIONS:  STANDING MEDICATIONS  atorvastatin 10 milliGRAM(s) Oral at bedtime  chlorhexidine 4% Liquid 1 Application(s) Topical <User Schedule>  cyanocobalamin 1000 MICROGram(s) Oral daily  dextrose 40% Gel 15 Gram(s) Oral once  dextrose 5%. 1000 milliLiter(s) IV Continuous <Continuous>  dextrose 5%. 1000 milliLiter(s) IV Continuous <Continuous>  dextrose 50% Injectable 25 Gram(s) IV Push once  dextrose 50% Injectable 12.5 Gram(s) IV Push once  dextrose 50% Injectable 25 Gram(s) IV Push once  enoxaparin Injectable 40 milliGRAM(s) SubCutaneous daily  folic acid 1 milliGRAM(s) Oral daily  gabapentin 300 milliGRAM(s) Oral every 12 hours  glucagon  Injectable 1 milliGRAM(s) IntraMuscular once  insulin glargine Injectable (LANTUS) 15 Unit(s) SubCutaneous at bedtime  insulin lispro (ADMELOG) corrective regimen sliding scale   SubCutaneous three times a day before meals  insulin lispro Injectable (ADMELOG) 6 Unit(s) SubCutaneous three times a day before meals  lactobacillus acidophilus 1 Tablet(s) Oral daily  meropenem  IVPB 1000 milliGRAM(s) IV Intermittent every 8 hours  metoprolol tartrate 25 milliGRAM(s) Oral two times a day  NIFEdipine XL 30 milliGRAM(s) Oral daily  OLANZapine 20 milliGRAM(s) Oral at bedtime  OLANZapine 5 milliGRAM(s) Oral daily  pantoprazole    Tablet 40 milliGRAM(s) Oral before breakfast  senna 2 Tablet(s) Oral at bedtime  sodium chloride 0.9%. 1000 milliLiter(s) IV Continuous <Continuous>  traZODone 150 milliGRAM(s) Oral at bedtime  vancomycin  IVPB 750 milliGRAM(s) IV Intermittent every 12 hours    PRN MEDICATIONS  ondansetron Injectable 4 milliGRAM(s) IV Push every 4 hours PRN                                            ------------------------------------------------------------  VITAL SIGNS: Last 24 Hours  T(C): 36.2 (2021 20:00), Max: 36.2 (2021 01:14)  T(F): 97.2 (2021 20:00), Max: 97.2 (2021 12:00)  HR: 78 (2021 21:00) (76 - 109)  BP: 105/43 (2021 20:00) (96/45 - 152/67)  BP(mean): 66 (2021 20:00) (62 - 103)  RR: 22 (2021 21:00) (17 - 26)  SpO2: 99% (2021 21:00) (95% - 100%)      21 @ 07:01  -  - @ 07:00  --------------------------------------------------------  IN: 100 mL / OUT: 0 mL / NET: 100 mL    21 @ 07:01  -  21 @ 00:38  --------------------------------------------------------  IN: 3350 mL / OUT: 1330 mL / NET: 2020 mL                                             --------------------------------------------------------------  LABS:                        13.2   20.26 )-----------( 236      ( 2021 23:58 )             36.9         128<L>  |  97<L>  |  10  ----------------------------<  97  4.4   |  23  |  0.8    Ca    8.0<L>      2021 20:44    TPro  7.0  /  Alb  4.3  /  TBili  0.7  /  DBili  x   /  AST  18  /  ALT  9   /  AlkPhos  116<H>        Urinalysis Basic - ( 2021 01:02 )    Color: Yellow / Appearance: Turbid / S.011 / pH: x  Gluc: x / Ketone: Negative  / Bili: Negative / Urobili: <2 mg/dL   Blood: x / Protein: 100 mg/dL / Nitrite: Negative   Leuk Esterase: Large / RBC: 15 /HPF /  /HPF   Sq Epi: x / Non Sq Epi: 1 /HPF / Bacteria: Many        Lactate, Blood: 1.1 mmol/L (21 @ 20:44)  Troponin T, Serum: 0.11 ng/mL *HH* (21 @ 11:57)  Lactate, Blood: 2.1 mmol/L *H* (21 @ 11:57)          CARDIAC MARKERS ( 2021 11:57 )  x     / 0.11 ng/mL / x     / x     / x      CARDIAC MARKERS ( 2021 23:58 )  x     / 0.10 ng/mL / x     / x     / x                                                  -------------------------------------------------------------  RADIOLOGY:    < from: CT Abdomen and Pelvis w/ IV Cont (21 @ 03:39) >  IMPRESSION:      Interval development of lobulation and cystic areas within the prostate. Correlate with PSA levels and may obtain MRI of the prostate with and without contrast for further evaluation.    Bains balloon inflated within the penile urethra, recommend repositioning.    Mild to moderate bilateral hydroureteronephrosis with no distal obstructing ureteral lesion or stone.    Air is notedwithin the urinary bladder, likely secondary to instrumentation. However infectious process is not excluded.      < end of copied text >  < from: CT Head No Cont (21 @ 03:37) >  Impression:    No CT evidence of acute intracranial hemorrhage or acute large territorial infarct.    Redemonstration of a left middle cranial fossa cystic area measuring 4.4 x 4.4 cm, which may represent encephalomalacia versus arachnoid cyst.      < end of copied text >  < from: Xray Chest 1 View- PORTABLE-Urgent (Xray Chest 1 View- PORTABLE-Urgent .) (21 @ 02:58) >  Impression:    No radiographic evidence of acute cardiopulmonary disease.    < end of copied text >                                            --------------------------------------------------------------    PHYSICAL EXAM:  General: Ill appearing man laying in bed in NAD  HEENT: NC/AT, PERRL  LUNGS: BL coarse crackles   HEART: RRR, no m/r/g  ABDOMEN: Soft, NT/ND, BS+  EXT: WWP, no cyanosis, clubbing, or edema   NEURO: AOx4, no gross neurologic deficits   SKIN: Intact                                            --------------------------------------------------------------  ASSESSMENT & PLAN  Mr. Villagomez ia 81 year old man with PMHx of schizophrenia, COPD, hyperlipidemia, non-Hodgkin lymphoma in remission, Type 2DM (non-insulin dependent) with neuropathy, chronic indwelling Bains (chronic retention) BIBEMS from [facility] for nausea/vomiting.     #Nausea/vomiting likely secondary to Urosepsis, chronic indwelling bains for retention   -recently discharged and was seen by Urology on  for difficult Bains placement after patient reported "bains came out" . Patient had a 14 fr catheter placed draining well and was told to follow-up in Urology clinic for further management.  -  wbc 20k with bandemia on admission, Normotensive, + UA; s/p Rocephin 1 gm   - Patient with chronic indwelling Bains (chronic urinary retention)  - had several admissions in the past for ESBL pyelonephritis; last discharged on ertapenem 1 gm 10/24  - Urology consult appreciated: aspirated 50cc of cloudy yellow urine  Bains now draining to gravity with 150cc of cloudy yellow urine noted in bag.   - Continue Bains care   - Consider 1:1 to prevent further urethral damage    - Repeat RBUS in 48 -72 hours to assess hydro  - F/u urine cultures/ blood cultures  - c/w meropenem 1 g TID  - Monitor Urine output  - ID c/s for Abx recommendation   - Analgesics for pain control prn/ zofran prn      # Hyponatremia - possibly SIADH secondary to antipsychotic medications  vs hypovolemia from extrarenal loss (nausea/vomiting)  patient with history of hyponatremia, on sodium tablets at home  - possibly 2/2 Trazadone use   - Na 119, baseline usually ~130  - c/w with iv hydration NS @ 100  - Urine lytes pending  - f/u renal ultrasound/lacate  - Check serum and urine osmolality  - Repeat BMP  - Fluid restriction    #elevated troponin  - elevated trop .10 on admission; denies chest pain  - EKG on admission: no ischemic changes  - trend cardiac enzymes and serial EKG    #HTN, controlled  - c/w Nifedipine ER 30 mg daily  - c/w metoprolol tartrate 25 BID    #Schizophrenia  - c/w olanzapine and trazodone    #HLD  - c/w atorvastatin 10    #Type II DM, with neuropathy  - FS AC/HS  - on metformin 500 QD at home  - c/w gabapentin 300 mg BID    #Macrocytic Anemia with B12 Deficiency  - c/w B12, folate supplement    #COPD  - c/w albuterol prn     DVT ppx: Lovenox  GI ppx: Not indicated  Diet: NPO until further recs; fluid restriction   Activity: increase as tolerated  Code status: full code  Dispo: acute                            ADA VILLAGOMEZ 81y Male  MRN#: [id number]   CODE STATUS: FULL     Hospital Day: 1d    Pt is currently admitted with the primary diagnosis of urosepsis, hyponatremia.      SUBJECTIVE  Hospital Course: 81 year old man with PMHx of schizophrenia, COPD, hyperlipidemia, non-Hodgkin lymphoma in remission, Type 2DM (non-insulin dependent) with neuropathy, chronic indwelling Bains (chronic retention) with several admissions in the past for ESBL pyelonephritis BIBEMS from [facility] for nausea/vomiting.  In the ED pt was hyponatremic to 119, k 5.5 leukocytosis 20k, lactate 3.3, troponin 0.1, ekg showed no ischemic changes. UA positive CTH negative for acute hemorrhage or infarct.   Overnight events: none     Subjective complaints: Pt reports feeling well and has no complaints. Denies any fevers, chills, CP, SOB, palpitations, cough, abdominal, flank pain, or suprapubic pain.     Present Today:   - Bains:  No [  ], Yes [ x  ] : Indication:     - Type of IV Access:       .. CVC/Piccline:  No [x  ], Yes [   ] : Indication:       .. Midline: No [ x ], Yes [   ] : Indication:                                             ----------------------------------------------------------  OBJECTIVE  PAST MEDICAL & SURGICAL HISTORY  Schizophrenia    Diabetes type 2, controlled    COPD (chronic obstructive pulmonary disease)    Dyslipidemia    Chronic retention of urine    Dyslipidemia    Encounter for screening colonoscopy                                              -----------------------------------------------------------  ALLERGIES:  No Known Allergies                                            ------------------------------------------------------------    HOME MEDICATIONS  Home Medications:  atorvastatin 10 mg oral tablet: 1 tab(s) orally once a day (13 Oct 2020 05:27)  folic acid 1 mg oral tablet: 1 tab(s) orally once a day (13 Oct 2020 05:)  gabapentin 300 mg oral capsule: 1 cap(s) orally every 12 hours (13 Oct 2020 05:)  gabapentin 300 mg oral capsule: 1 cap(s) orally 2 times a day (13 Oct 2020 05:)  ipratropium-albuterol 0.5 mg-2.5 mg/3 mLinhalation solution: 3 milliliter(s) inhaled every 6 hours, As needed, Shortness of Breath and/or Wheezing (13 Oct 2020 05:)  lactobacillus acidophilus oral capsule: 1 tab(s) orally once a day (13 Oct 2020 05:)  metFORMIN 500 mg oral tablet: 1 tab(s) orally 2 times a day (13 Oct 2020 05:27)  metoprolol tartrate 25 mg oral tablet: 1 tab(s) orally 2 times a day (13 Oct 2020 05:)  NIFEdipine 30 mg oral tablet, extended release: 1 tab(s) orally once a day (13 Oct 2020 05:)  OLANZapine 20 mg oral tablet: 1 tab(s) orally once a day (at bedtime) (13 Oct 2020 05:)  OLANZapine 5 mg oral tablet: 1 tab(s) orally once a day (13 Oct 2020 05:)  senna oral tablet: 2 tab(s) orally once a day (at bedtime) (13 Oct 2020 05:)  Sodium Chloride 1 g oral tablet: 1 tab(s) orally once a day (13 Oct 2020 05:)  traZODone 150 mg oral tablet: 1 tab(s) orally once a day (at bedtime) (13 Oct 2020 05:)                           MEDICATIONS:  STANDING MEDICATIONS  atorvastatin 10 milliGRAM(s) Oral at bedtime  chlorhexidine 4% Liquid 1 Application(s) Topical <User Schedule>  cyanocobalamin 1000 MICROGram(s) Oral daily  dextrose 40% Gel 15 Gram(s) Oral once  dextrose 5%. 1000 milliLiter(s) IV Continuous <Continuous>  dextrose 5%. 1000 milliLiter(s) IV Continuous <Continuous>  dextrose 50% Injectable 25 Gram(s) IV Push once  dextrose 50% Injectable 12.5 Gram(s) IV Push once  dextrose 50% Injectable 25 Gram(s) IV Push once  enoxaparin Injectable 40 milliGRAM(s) SubCutaneous daily  folic acid 1 milliGRAM(s) Oral daily  gabapentin 300 milliGRAM(s) Oral every 12 hours  glucagon  Injectable 1 milliGRAM(s) IntraMuscular once  insulin glargine Injectable (LANTUS) 15 Unit(s) SubCutaneous at bedtime  insulin lispro (ADMELOG) corrective regimen sliding scale   SubCutaneous three times a day before meals  insulin lispro Injectable (ADMELOG) 6 Unit(s) SubCutaneous three times a day before meals  lactobacillus acidophilus 1 Tablet(s) Oral daily  meropenem  IVPB 1000 milliGRAM(s) IV Intermittent every 8 hours  metoprolol tartrate 25 milliGRAM(s) Oral two times a day  NIFEdipine XL 30 milliGRAM(s) Oral daily  OLANZapine 20 milliGRAM(s) Oral at bedtime  OLANZapine 5 milliGRAM(s) Oral daily  pantoprazole    Tablet 40 milliGRAM(s) Oral before breakfast  senna 2 Tablet(s) Oral at bedtime  sodium chloride 0.9%. 1000 milliLiter(s) IV Continuous <Continuous>  traZODone 150 milliGRAM(s) Oral at bedtime  vancomycin  IVPB 750 milliGRAM(s) IV Intermittent every 12 hours    PRN MEDICATIONS  ondansetron Injectable 4 milliGRAM(s) IV Push every 4 hours PRN                                            ------------------------------------------------------------  VITAL SIGNS: Last 24 Hours  T(C): 36.2 (2021 20:00), Max: 36.2 (2021 01:14)  T(F): 97.2 (2021 20:00), Max: 97.2 (2021 12:00)  HR: 78 (2021 21:00) (76 - 109)  BP: 105/43 (2021 20:00) (96/45 - 152/67)  BP(mean): 66 (2021 20:00) (62 - 103)  RR: 22 (2021 21:00) (17 - 26)  SpO2: 99% (2021 21:00) (95% - 100%)      21 @ 07:21 @ 07:00  --------------------------------------------------------  IN: 100 mL / OUT: 0 mL / NET: 100 mL    21 @ 07:01  -  21 @ 00:38  --------------------------------------------------------  IN: 3350 mL / OUT: 1330 mL / NET: 2020 mL                                             --------------------------------------------------------------  LABS:                        13.2   20.26 )-----------( 236      ( 2021 23:58 )             36.9         128<L>  |  97<L>  |  10  ----------------------------<  97  4.4   |  23  |  0.8    Ca    8.0<L>      2021 20:44    TPro  7.0  /  Alb  4.3  /  TBili  0.7  /  DBili  x   /  AST  18  /  ALT  9   /  AlkPhos  116<H>        Urinalysis Basic - ( 2021 01:02 )    Color: Yellow / Appearance: Turbid / S.011 / pH: x  Gluc: x / Ketone: Negative  / Bili: Negative / Urobili: <2 mg/dL   Blood: x / Protein: 100 mg/dL / Nitrite: Negative   Leuk Esterase: Large / RBC: 15 /HPF /  /HPF   Sq Epi: x / Non Sq Epi: 1 /HPF / Bacteria: Many        Lactate, Blood: 1.1 mmol/L (21 @ 20:44)  Troponin T, Serum: 0.11 ng/mL *HH* (21 @ 11:57)  Lactate, Blood: 2.1 mmol/L *H* (21 @ 11:57)          CARDIAC MARKERS ( 2021 11:57 )  x     / 0.11 ng/mL / x     / x     / x      CARDIAC MARKERS ( 2021 23:58 )  x     / 0.10 ng/mL / x     / x     / x                                                  -------------------------------------------------------------  RADIOLOGY:    < from: CT Abdomen and Pelvis w/ IV Cont (21 @ 03:39) >  IMPRESSION:      Interval development of lobulation and cystic areas within the prostate. Correlate with PSA levels and may obtain MRI of the prostate with and without contrast for further evaluation.    Bains balloon inflated within the penile urethra, recommend repositioning.    Mild to moderate bilateral hydroureteronephrosis with no distal obstructing ureteral lesion or stone.    Air is notedwithin the urinary bladder, likely secondary to instrumentation. However infectious process is not excluded.      < end of copied text >  < from: CT Head No Cont (21 @ 03:37) >  Impression:    No CT evidence of acute intracranial hemorrhage or acute large territorial infarct.    Redemonstration of a left middle cranial fossa cystic area measuring 4.4 x 4.4 cm, which may represent encephalomalacia versus arachnoid cyst.      < end of copied text >  < from: Xray Chest 1 View- PORTABLE-Urgent (Xray Chest 1 View- PORTABLE-Urgent .) (21 @ 02:58) >  Impression:    No radiographic evidence of acute cardiopulmonary disease.    < end of copied text >                                            --------------------------------------------------------------    PHYSICAL EXAM:  General: Ill appearing man laying in bed in NAD  HEENT: NC/AT, PERRL  LUNGS: BL coarse crackles   HEART: RRR, no m/r/g  ABDOMEN: Soft, NT/ND, BS+  EXT: WWP, no cyanosis, clubbing, or edema   NEURO: AOx4, no gross neurologic deficits   SKIN: Intact                                            --------------------------------------------------------------  ASSESSMENT & PLAN  Mr. Villagomez ia 81 year old man with PMHx of schizophrenia, COPD, hyperlipidemia, non-Hodgkin lymphoma in remission, Type 2DM (non-insulin dependent) with neuropathy, chronic indwelling Bains (chronic retention) BIBEMS from [facility] for nausea/vomiting.     #Nausea/vomiting likely secondary to Urosepsis, chronic indwelling bains for retention   -recently discharged and was seen by Urology on  for difficult Bains placement after patient reported "bains came out" . Patient had a 14 fr catheter placed draining well and was told to follow-up in Urology clinic for further management.  -  wbc 20k with bandemia on admission, Normotensive, + UA; s/p Rocephin 1 gm   - Patient with chronic indwelling Bains (chronic urinary retention)  - had several admissions in the past for ESBL pyelonephritis; last discharged on ertapenem 1 gm 10/24  - Urology consult appreciated: aspirated 50cc of cloudy yellow urine  Bains now draining to gravity with 150cc of cloudy yellow urine noted in bag.   - Continue Bains care   - Consider 1:1 to prevent further urethral damage    - Repeat RBUS in 48 -72 hours to assess hydro  - F/u urine cultures/ blood cultures  - c/w meropenem 1 g TID  - Monitor Urine output  - ID c/s for Abx recommendation   - Analgesics for pain control prn/ zofran prn      # Hyponatremia - possibly SIADH secondary to antipsychotic medications  vs hypovolemia from extrarenal loss (nausea/vomiting)  patient with history of hyponatremia, on sodium tablets at home  - possibly 2/2 Trazadone use   - Na 119, baseline usually ~130  - c/w with iv hydration NS @ 100  - Urine lytes pending  - f/u renal ultrasound/lacate  - Check serum and urine osmolality  - Repeat BMP  - Fluid restriction    #elevated troponin  - elevated trop .10 on admission; denies chest pain  - EKG on admission: no ischemic changes  - trend cardiac enzymes and serial EKG    #HTN, controlled  - c/w Nifedipine ER 30 mg daily  - c/w metoprolol tartrate 25 BID    #Schizophrenia  - c/w olanzapine and trazodone    #HLD  - c/w atorvastatin 10    #Type II DM, with neuropathy  - FS AC/HS  - on metformin 500 QD at home  - c/w gabapentin 300 mg BID    #Macrocytic Anemia with B12 Deficiency  - c/w B12, folate supplement    #COPD  - c/w albuterol prn     DVT ppx: Lovenox  GI ppx: Not indicated  Diet: NPO until further recs; fluid restriction   Activity: increase as tolerated  Code status: full code  Dispo: acute                            ADA VILLAGOMEZ 81y Male  MRN#: [id number]   CODE STATUS: FULL     Hospital Day: 1d    Pt is currently admitted with the primary diagnosis of urosepsis, hyponatremia.      SUBJECTIVE  Hospital Course: 81 year old man with PMHx of schizophrenia, COPD, hyperlipidemia, non-Hodgkin lymphoma in remission, Type 2DM (non-insulin dependent) with neuropathy, chronic indwelling Bains (chronic retention) with several admissions in the past for ESBL pyelonephritis BIBEMS from [facility] for nausea/vomiting.  In the ED pt was hyponatremic to 119, k 5.5 leukocytosis 20k, lactate 3.3, troponin 0.1, ekg showed no ischemic changes. UA positive CTH negative for acute hemorrhage or infarct.   Overnight events: none     Subjective complaints: Pt reports feeling well and has no complaints. Denies any fevers, chills, CP, SOB, palpitations, cough, abdominal, flank pain, or suprapubic pain.     Present Today:   - Bains:  No [  ], Yes [ x  ] : Indication:     - Type of IV Access:       .. CVC/Piccline:  No [x  ], Yes [   ] : Indication:       .. Midline: No [ x ], Yes [   ] : Indication:                                             ----------------------------------------------------------  OBJECTIVE  PAST MEDICAL & SURGICAL HISTORY  Schizophrenia    Diabetes type 2, controlled    COPD (chronic obstructive pulmonary disease)    Dyslipidemia    Chronic retention of urine    Dyslipidemia    Encounter for screening colonoscopy                                              -----------------------------------------------------------  ALLERGIES:  No Known Allergies                                            ------------------------------------------------------------    HOME MEDICATIONS  Home Medications:  atorvastatin 10 mg oral tablet: 1 tab(s) orally once a day (13 Oct 2020 05:27)  folic acid 1 mg oral tablet: 1 tab(s) orally once a day (13 Oct 2020 05:)  gabapentin 300 mg oral capsule: 1 cap(s) orally every 12 hours (13 Oct 2020 05:)  gabapentin 300 mg oral capsule: 1 cap(s) orally 2 times a day (13 Oct 2020 05:)  ipratropium-albuterol 0.5 mg-2.5 mg/3 mLinhalation solution: 3 milliliter(s) inhaled every 6 hours, As needed, Shortness of Breath and/or Wheezing (13 Oct 2020 05:)  lactobacillus acidophilus oral capsule: 1 tab(s) orally once a day (13 Oct 2020 05:)  metFORMIN 500 mg oral tablet: 1 tab(s) orally 2 times a day (13 Oct 2020 05:27)  metoprolol tartrate 25 mg oral tablet: 1 tab(s) orally 2 times a day (13 Oct 2020 05:)  NIFEdipine 30 mg oral tablet, extended release: 1 tab(s) orally once a day (13 Oct 2020 05:)  OLANZapine 20 mg oral tablet: 1 tab(s) orally once a day (at bedtime) (13 Oct 2020 05:)  OLANZapine 5 mg oral tablet: 1 tab(s) orally once a day (13 Oct 2020 05:)  senna oral tablet: 2 tab(s) orally once a day (at bedtime) (13 Oct 2020 05:)  Sodium Chloride 1 g oral tablet: 1 tab(s) orally once a day (13 Oct 2020 05:)  traZODone 150 mg oral tablet: 1 tab(s) orally once a day (at bedtime) (13 Oct 2020 05:)                           MEDICATIONS:  STANDING MEDICATIONS  atorvastatin 10 milliGRAM(s) Oral at bedtime  chlorhexidine 4% Liquid 1 Application(s) Topical <User Schedule>  cyanocobalamin 1000 MICROGram(s) Oral daily  dextrose 40% Gel 15 Gram(s) Oral once  dextrose 5%. 1000 milliLiter(s) IV Continuous <Continuous>  dextrose 5%. 1000 milliLiter(s) IV Continuous <Continuous>  dextrose 50% Injectable 25 Gram(s) IV Push once  dextrose 50% Injectable 12.5 Gram(s) IV Push once  dextrose 50% Injectable 25 Gram(s) IV Push once  enoxaparin Injectable 40 milliGRAM(s) SubCutaneous daily  folic acid 1 milliGRAM(s) Oral daily  gabapentin 300 milliGRAM(s) Oral every 12 hours  glucagon  Injectable 1 milliGRAM(s) IntraMuscular once  insulin glargine Injectable (LANTUS) 15 Unit(s) SubCutaneous at bedtime  insulin lispro (ADMELOG) corrective regimen sliding scale   SubCutaneous three times a day before meals  insulin lispro Injectable (ADMELOG) 6 Unit(s) SubCutaneous three times a day before meals  lactobacillus acidophilus 1 Tablet(s) Oral daily  meropenem  IVPB 1000 milliGRAM(s) IV Intermittent every 8 hours  metoprolol tartrate 25 milliGRAM(s) Oral two times a day  NIFEdipine XL 30 milliGRAM(s) Oral daily  OLANZapine 20 milliGRAM(s) Oral at bedtime  OLANZapine 5 milliGRAM(s) Oral daily  pantoprazole    Tablet 40 milliGRAM(s) Oral before breakfast  senna 2 Tablet(s) Oral at bedtime  sodium chloride 0.9%. 1000 milliLiter(s) IV Continuous <Continuous>  traZODone 150 milliGRAM(s) Oral at bedtime  vancomycin  IVPB 750 milliGRAM(s) IV Intermittent every 12 hours    PRN MEDICATIONS  ondansetron Injectable 4 milliGRAM(s) IV Push every 4 hours PRN                                            ------------------------------------------------------------  VITAL SIGNS: Last 24 Hours  T(C): 36.2 (2021 20:00), Max: 36.2 (2021 01:14)  T(F): 97.2 (2021 20:00), Max: 97.2 (2021 12:00)  HR: 78 (2021 21:00) (76 - 109)  BP: 105/43 (2021 20:00) (96/45 - 152/67)  BP(mean): 66 (2021 20:00) (62 - 103)  RR: 22 (2021 21:00) (17 - 26)  SpO2: 99% (2021 21:00) (95% - 100%)      21 @ 07:21 @ 07:00  --------------------------------------------------------  IN: 100 mL / OUT: 0 mL / NET: 100 mL    21 @ 07:01  -  21 @ 00:38  --------------------------------------------------------  IN: 3350 mL / OUT: 1330 mL / NET: 2020 mL                                             --------------------------------------------------------------  LABS:                        13.2   20.26 )-----------( 236      ( 2021 23:58 )             36.9         128<L>  |  97<L>  |  10  ----------------------------<  97  4.4   |  23  |  0.8    Ca    8.0<L>      2021 20:44    TPro  7.0  /  Alb  4.3  /  TBili  0.7  /  DBili  x   /  AST  18  /  ALT  9   /  AlkPhos  116<H>        Urinalysis Basic - ( 2021 01:02 )    Color: Yellow / Appearance: Turbid / S.011 / pH: x  Gluc: x / Ketone: Negative  / Bili: Negative / Urobili: <2 mg/dL   Blood: x / Protein: 100 mg/dL / Nitrite: Negative   Leuk Esterase: Large / RBC: 15 /HPF /  /HPF   Sq Epi: x / Non Sq Epi: 1 /HPF / Bacteria: Many        Lactate, Blood: 1.1 mmol/L (21 @ 20:44)  Troponin T, Serum: 0.11 ng/mL *HH* (21 @ 11:57)  Lactate, Blood: 2.1 mmol/L *H* (21 @ 11:57)          CARDIAC MARKERS ( 2021 11:57 )  x     / 0.11 ng/mL / x     / x     / x      CARDIAC MARKERS ( 2021 23:58 )  x     / 0.10 ng/mL / x     / x     / x                                                  -------------------------------------------------------------  RADIOLOGY:    < from: CT Abdomen and Pelvis w/ IV Cont (21 @ 03:39) >  IMPRESSION:      Interval development of lobulation and cystic areas within the prostate. Correlate with PSA levels and may obtain MRI of the prostate with and without contrast for further evaluation.    Bains balloon inflated within the penile urethra, recommend repositioning.    Mild to moderate bilateral hydroureteronephrosis with no distal obstructing ureteral lesion or stone.    Air is notedwithin the urinary bladder, likely secondary to instrumentation. However infectious process is not excluded.      < end of copied text >  < from: CT Head No Cont (21 @ 03:37) >  Impression:    No CT evidence of acute intracranial hemorrhage or acute large territorial infarct.    Redemonstration of a left middle cranial fossa cystic area measuring 4.4 x 4.4 cm, which may represent encephalomalacia versus arachnoid cyst.      < end of copied text >  < from: Xray Chest 1 View- PORTABLE-Urgent (Xray Chest 1 View- PORTABLE-Urgent .) (21 @ 02:58) >  Impression:    No radiographic evidence of acute cardiopulmonary disease.    < end of copied text >                                            --------------------------------------------------------------    PHYSICAL EXAM:  General: Ill appearing man laying in bed in NAD  HEENT: NC/AT, PERRL  LUNGS: BL coarse crackles   HEART: RRR, no m/r/g  ABDOMEN: Soft, NT/ND, BS+  EXT: WWP, no cyanosis, clubbing, or edema   NEURO: AOx4, no gross neurologic deficits   SKIN: Intact                                            --------------------------------------------------------------  ASSESSMENT & PLAN  Mr. Villagomez ia 81 year old man with PMHx of schizophrenia, COPD, hyperlipidemia, non-Hodgkin lymphoma in remission, Type 2DM (non-insulin dependent) with neuropathy, chronic indwelling Bains (chronic retention) BIBEMS from [facility] for nausea/vomiting. Pt's status has improved, medically stable for transfer to floor.     #Nausea/vomiting likely secondary to Urosepsis, chronic indwelling bains for retention   -recently discharged and was seen by Urology on  for difficult Bains placement after patient reported "bains came out" . Patient had a 14 fr catheter placed draining well and was told to follow-up in Urology clinic for further management.  -  wbc 20k with bandemia on admission, Normotensive, + UA; s/p Rocephin 1 gm   - Patient with chronic indwelling Bains (chronic urinary retention)  - had several admissions in the past for ESBL pyelonephritis; last discharged on ertapenem 1 gm 10/24  - Urology consult appreciated: aspirated 50cc of cloudy yellow urine  Bains now draining to gravity with 150cc of cloudy yellow urine noted in bag.   - Continue Bains care   - Consider 1:1 to prevent further urethral damage    - Repeat RBUS in 48 -72 hours to assess hydro  - F/u urine cultures/ blood cultures  - c/w meropenem 1 g TID  - Monitor Urine output  - ID c/s for Abx recommendation   - Analgesics for pain control prn/ zofran prn      # Hyponatremia - possibly SIADH secondary to antipsychotic medications  vs hypovolemia from extrarenal loss (nausea/vomiting)  patient with history of hyponatremia, on sodium tablets at home  - possibly 2/2 Trazadone use   - Na 119, baseline usually ~130  - c/w with iv hydration NS @ 100  - Urine lytes pending  - f/u renal ultrasound/lacate  - Check serum and urine osmolality  - Repeat BMP  - Fluid restriction    #elevated troponin  - elevated trop .10 on admission; denies chest pain  - EKG on admission: no ischemic changes  - trend cardiac enzymes and serial EKG    #HTN, controlled  - c/w Nifedipine ER 30 mg daily  - c/w metoprolol tartrate 25 BID    #Schizophrenia  - c/w olanzapine and trazodone    #HLD  - c/w atorvastatin 10    #Type II DM, with neuropathy  - FS AC/HS  - on metformin 500 QD at home  - c/w gabapentin 300 mg BID    #Macrocytic Anemia with B12 Deficiency  - c/w B12, folate supplement    #COPD  - c/w albuterol prn     DVT ppx: Lovenox  GI ppx: Not indicated  Diet: NPO until further recs; fluid restriction   Activity: increase as tolerated  Code status: full code  Dispo: acute

## 2021-07-05 LAB
A1C WITH ESTIMATED AVERAGE GLUCOSE RESULT: 5.2 % — SIGNIFICANT CHANGE UP (ref 4–5.6)
ALBUMIN SERPL ELPH-MCNC: 3.4 G/DL — LOW (ref 3.5–5.2)
ALP SERPL-CCNC: 85 U/L — SIGNIFICANT CHANGE UP (ref 30–115)
ALT FLD-CCNC: 7 U/L — SIGNIFICANT CHANGE UP (ref 0–41)
ANION GAP SERPL CALC-SCNC: 7 MMOL/L — SIGNIFICANT CHANGE UP (ref 7–14)
ANION GAP SERPL CALC-SCNC: 9 MMOL/L — SIGNIFICANT CHANGE UP (ref 7–14)
AST SERPL-CCNC: 16 U/L — SIGNIFICANT CHANGE UP (ref 0–41)
BASOPHILS # BLD AUTO: 0.01 K/UL — SIGNIFICANT CHANGE UP (ref 0–0.2)
BASOPHILS NFR BLD AUTO: 0.1 % — SIGNIFICANT CHANGE UP (ref 0–1)
BILIRUB SERPL-MCNC: <0.2 MG/DL — SIGNIFICANT CHANGE UP (ref 0.2–1.2)
BUN SERPL-MCNC: 10 MG/DL — SIGNIFICANT CHANGE UP (ref 10–20)
BUN SERPL-MCNC: 8 MG/DL — LOW (ref 10–20)
CALCIUM SERPL-MCNC: 7.8 MG/DL — LOW (ref 8.5–10.1)
CALCIUM SERPL-MCNC: 8.2 MG/DL — LOW (ref 8.5–10.1)
CHLORIDE SERPL-SCNC: 98 MMOL/L — SIGNIFICANT CHANGE UP (ref 98–110)
CHLORIDE SERPL-SCNC: 98 MMOL/L — SIGNIFICANT CHANGE UP (ref 98–110)
CO2 SERPL-SCNC: 25 MMOL/L — SIGNIFICANT CHANGE UP (ref 17–32)
CO2 SERPL-SCNC: 26 MMOL/L — SIGNIFICANT CHANGE UP (ref 17–32)
COVID-19 SPIKE DOMAIN AB INTERP: POSITIVE
COVID-19 SPIKE DOMAIN ANTIBODY RESULT: >250 U/ML — HIGH
CREAT SERPL-MCNC: 0.7 MG/DL — SIGNIFICANT CHANGE UP (ref 0.7–1.5)
CREAT SERPL-MCNC: 0.9 MG/DL — SIGNIFICANT CHANGE UP (ref 0.7–1.5)
EOSINOPHIL # BLD AUTO: 0.01 K/UL — SIGNIFICANT CHANGE UP (ref 0–0.7)
EOSINOPHIL NFR BLD AUTO: 0.1 % — SIGNIFICANT CHANGE UP (ref 0–8)
ESTIMATED AVERAGE GLUCOSE: 103 MG/DL — SIGNIFICANT CHANGE UP (ref 68–114)
GLUCOSE BLDC GLUCOMTR-MCNC: 119 MG/DL — HIGH (ref 70–99)
GLUCOSE BLDC GLUCOMTR-MCNC: 119 MG/DL — HIGH (ref 70–99)
GLUCOSE BLDC GLUCOMTR-MCNC: 134 MG/DL — HIGH (ref 70–99)
GLUCOSE SERPL-MCNC: 76 MG/DL — SIGNIFICANT CHANGE UP (ref 70–99)
GLUCOSE SERPL-MCNC: 89 MG/DL — SIGNIFICANT CHANGE UP (ref 70–99)
HCT VFR BLD CALC: 32.6 % — LOW (ref 42–52)
HGB BLD-MCNC: 11.2 G/DL — LOW (ref 14–18)
IMM GRANULOCYTES NFR BLD AUTO: 0.1 % — SIGNIFICANT CHANGE UP (ref 0.1–0.3)
LYMPHOCYTES # BLD AUTO: 1.32 K/UL — SIGNIFICANT CHANGE UP (ref 1.2–3.4)
LYMPHOCYTES # BLD AUTO: 19.1 % — LOW (ref 20.5–51.1)
MAGNESIUM SERPL-MCNC: 1.9 MG/DL — SIGNIFICANT CHANGE UP (ref 1.8–2.4)
MCHC RBC-ENTMCNC: 31.2 PG — HIGH (ref 27–31)
MCHC RBC-ENTMCNC: 34.4 G/DL — SIGNIFICANT CHANGE UP (ref 32–37)
MCV RBC AUTO: 90.8 FL — SIGNIFICANT CHANGE UP (ref 80–94)
MONOCYTES # BLD AUTO: 0.69 K/UL — HIGH (ref 0.1–0.6)
MONOCYTES NFR BLD AUTO: 10 % — HIGH (ref 1.7–9.3)
NEUTROPHILS # BLD AUTO: 4.87 K/UL — SIGNIFICANT CHANGE UP (ref 1.4–6.5)
NEUTROPHILS NFR BLD AUTO: 70.6 % — SIGNIFICANT CHANGE UP (ref 42.2–75.2)
NRBC # BLD: 0 /100 WBCS — SIGNIFICANT CHANGE UP (ref 0–0)
PLATELET # BLD AUTO: 191 K/UL — SIGNIFICANT CHANGE UP (ref 130–400)
POTASSIUM SERPL-MCNC: 4 MMOL/L — SIGNIFICANT CHANGE UP (ref 3.5–5)
POTASSIUM SERPL-MCNC: 4 MMOL/L — SIGNIFICANT CHANGE UP (ref 3.5–5)
POTASSIUM SERPL-SCNC: 4 MMOL/L — SIGNIFICANT CHANGE UP (ref 3.5–5)
POTASSIUM SERPL-SCNC: 4 MMOL/L — SIGNIFICANT CHANGE UP (ref 3.5–5)
PROT SERPL-MCNC: 5.6 G/DL — LOW (ref 6–8)
RBC # BLD: 3.59 M/UL — LOW (ref 4.7–6.1)
RBC # FLD: 13.9 % — SIGNIFICANT CHANGE UP (ref 11.5–14.5)
SARS-COV-2 IGG+IGM SERPL QL IA: >250 U/ML — HIGH
SARS-COV-2 IGG+IGM SERPL QL IA: POSITIVE
SODIUM SERPL-SCNC: 131 MMOL/L — LOW (ref 135–146)
SODIUM SERPL-SCNC: 132 MMOL/L — LOW (ref 135–146)
TROPONIN T SERPL-MCNC: 0.02 NG/ML — HIGH
TROPONIN T SERPL-MCNC: 0.02 NG/ML — HIGH
VANCOMYCIN FLD-MCNC: 11.7 UG/ML — HIGH (ref 5–10)
WBC # BLD: 6.91 K/UL — SIGNIFICANT CHANGE UP (ref 4.8–10.8)
WBC # FLD AUTO: 6.91 K/UL — SIGNIFICANT CHANGE UP (ref 4.8–10.8)

## 2021-07-05 PROCEDURE — 71045 X-RAY EXAM CHEST 1 VIEW: CPT | Mod: 26

## 2021-07-05 PROCEDURE — 93010 ELECTROCARDIOGRAM REPORT: CPT

## 2021-07-05 PROCEDURE — 99231 SBSQ HOSP IP/OBS SF/LOW 25: CPT

## 2021-07-05 PROCEDURE — 99232 SBSQ HOSP IP/OBS MODERATE 35: CPT

## 2021-07-05 RX ORDER — NIFEDIPINE 30 MG
30 TABLET, EXTENDED RELEASE 24 HR ORAL ONCE
Refills: 0 | Status: COMPLETED | OUTPATIENT
Start: 2021-07-05 | End: 2021-07-05

## 2021-07-05 RX ORDER — SODIUM CHLORIDE 9 MG/ML
1 INJECTION INTRAMUSCULAR; INTRAVENOUS; SUBCUTANEOUS DAILY
Refills: 0 | Status: DISCONTINUED | OUTPATIENT
Start: 2021-07-05 | End: 2021-07-12

## 2021-07-05 RX ORDER — NIFEDIPINE 30 MG
60 TABLET, EXTENDED RELEASE 24 HR ORAL DAILY
Refills: 0 | Status: DISCONTINUED | OUTPATIENT
Start: 2021-07-06 | End: 2021-07-12

## 2021-07-05 RX ADMIN — Medication 30 MILLIGRAM(S): at 10:22

## 2021-07-05 RX ADMIN — OLANZAPINE 5 MILLIGRAM(S): 15 TABLET, FILM COATED ORAL at 13:06

## 2021-07-05 RX ADMIN — SENNA PLUS 2 TABLET(S): 8.6 TABLET ORAL at 20:27

## 2021-07-05 RX ADMIN — MEROPENEM 100 MILLIGRAM(S): 1 INJECTION INTRAVENOUS at 13:18

## 2021-07-05 RX ADMIN — Medication 150 MILLIGRAM(S): at 22:01

## 2021-07-05 RX ADMIN — ENOXAPARIN SODIUM 40 MILLIGRAM(S): 100 INJECTION SUBCUTANEOUS at 13:07

## 2021-07-05 RX ADMIN — Medication 6 UNIT(S): at 11:13

## 2021-07-05 RX ADMIN — Medication 6 UNIT(S): at 17:22

## 2021-07-05 RX ADMIN — Medication 30 MILLIGRAM(S): at 05:39

## 2021-07-05 RX ADMIN — GABAPENTIN 300 MILLIGRAM(S): 400 CAPSULE ORAL at 17:23

## 2021-07-05 RX ADMIN — ATORVASTATIN CALCIUM 10 MILLIGRAM(S): 80 TABLET, FILM COATED ORAL at 20:27

## 2021-07-05 RX ADMIN — GABAPENTIN 300 MILLIGRAM(S): 400 CAPSULE ORAL at 05:39

## 2021-07-05 RX ADMIN — OLANZAPINE 20 MILLIGRAM(S): 15 TABLET, FILM COATED ORAL at 23:26

## 2021-07-05 RX ADMIN — PREGABALIN 1000 MICROGRAM(S): 225 CAPSULE ORAL at 13:15

## 2021-07-05 RX ADMIN — Medication 25 MILLIGRAM(S): at 05:39

## 2021-07-05 RX ADMIN — MEROPENEM 100 MILLIGRAM(S): 1 INJECTION INTRAVENOUS at 20:28

## 2021-07-05 RX ADMIN — CHLORHEXIDINE GLUCONATE 1 APPLICATION(S): 213 SOLUTION TOPICAL at 05:39

## 2021-07-05 RX ADMIN — Medication 1 MILLIGRAM(S): at 13:06

## 2021-07-05 RX ADMIN — Medication 250 MILLIGRAM(S): at 05:40

## 2021-07-05 RX ADMIN — Medication 6 UNIT(S): at 08:04

## 2021-07-05 RX ADMIN — Medication 25 MILLIGRAM(S): at 17:23

## 2021-07-05 RX ADMIN — Medication 1 TABLET(S): at 13:06

## 2021-07-05 RX ADMIN — MEROPENEM 100 MILLIGRAM(S): 1 INJECTION INTRAVENOUS at 05:40

## 2021-07-05 NOTE — PROGRESS NOTE ADULT - ASSESSMENT
Mr. Laguna ia 81 year old man with PMHx of schizophrenia, COPD, hyperlipidemia, non-Hodgkin lymphoma in remission, Type 2DM (non-insulin dependent) with neuropathy, chronic indwelling Bains (chronic retention) BIBEMS from [facility] for nausea/vomiting. Pt's status has improved, medically stable for transfer to floor.     #Nausea/vomiting likely secondary to septicemia 2/2 urinary source, chronic indwelling bains for retention   -recently discharged and was seen by Urology on 7/1 for difficult Bains placement after patient reported "bains came out" . Patient had a 14 fr catheter placed draining well and was told to follow-up in Urology clinic for further management.  - wbc 20k with bandemia on admission, Normotensive, + UA; s/p Rocephin 1 gm   - Patient with chronic indwelling Bains (chronic urinary retention)  - had several admissions in the past for ESBL pyelonephritis; last discharged on ertapenem 1 gm 10/24  - Repeat RBUS in 48 -72 hours to assess hydro  - Blood culture:  7/3: ESBL E. Coli  - Urine culture: 7/3: E. Coli   - c/w meropenem 1 g TID, d/c Vancomycin as per ID as BCx negative for MRSA   - Monitor Urine output  - Analgesics for pain control prn/ zofran prn    # Hyponatremia - possibly SIADH secondary to antipsychotic medications  vs hypovolemia from extrarenal loss (nausea/vomiting)  patient with history of hyponatremia, on sodium tablets at home  - Sodium, Serum: 132 mmol/L (07.05.21 @ 06:35)  - will restart home NaCl 1Gram daily    #elevated troponin  - elevated trop .10 on admission trended up to 0.11; denies chest pain  - EKG on admission: no ischemic changes  - will follow with another trop and ekg and reassess today     #HTN, poorly controlled   - last 24 hours sbp 166-131  -- increasing Nifedipine ER  to 60 mg daily  - c/w metoprolol tartrate 25 BID    #Schizophrenia  - c/w olanzapine and trazodone    #HLD  - c/w atorvastatin 10    #Type II DM, with neuropathy  - FS AC/HS  - on metformin 500 QD at home  - c/w gabapentin 300 mg BID    #Macrocytic Anemia with B12 Deficiency  - c/w B12, folate supplement    #COPD  - c/w albuterol prn     DVT ppx: Lovenox  GI ppx: Not indicated  Diet: NPO until further recs; fluid restriction   Activity: increase as tolerated  Code status: full code  Dispo: acute     Sister Fatmata -[phone]-call attempted, message left on voice mail 7/5

## 2021-07-05 NOTE — PROGRESS NOTE ADULT - ASSESSMENT
ASSESSMENT   81 year old man with PMHx of schizophrenia, COPD, hyperlipidemia, non-Hodgkin lymphoma in remission, Type 2DM (non-insulin dependent) with neuropathy, chronic indwelling Bains (chronic retention) BIBEMS from [facility] for nausea/vomiting    IMPRESSION  #Sepsis on admission (WBC>12, Pulse>90) secondary to UTI with chronic indwelling bains with ESBL E coli bacteremia  - CT Abdomen and Pelvis w/ IV Cont (07.03.21 @ 03:39): Interval development of lobulation and cystic areas within the prostate. Correlate with PSA levels and may obtain MRI of the prostate with and without contrast for further evaluation. Bains balloon inflated within the penile urethra, recommend repositioning. Mild to moderate bilateral hydroureteronephrosis with no distal obstructing ureteral lesion or stone. Air is notedwithin the urinary bladder, likely secondary to instrumentation. However infectious process is not excluded.  - Blood Cx 7/3 ESBL E coli     #Bilateral Hydronephrosis  #Prostate cyts  #Hyponatremia    #NH Lymphoma in remission  Schizophrenia  #DM II  #Obesity BMI (kg/m2): 24.2  #Abx allergy: NKDA    RECOMMENDATIONS  - continue meropenem 1g q 8 hours  - follow-up ESBL E coli susceptibilities  - bains management per urology; follow-up repeat Ultrasound for hydro  - hopefully susceptible to fluoroquinolone or bactrim; if not, may need midline ertapenem     Please call or message on [employer] Teams if with any questions.  Spectra [phone]

## 2021-07-05 NOTE — PROGRESS NOTE ADULT - SUBJECTIVE AND OBJECTIVE BOX
Patient is a 81y old  Male who presents with a chief complaint of Nausea/vomiting (2021 08:46)      Patient seen and examined at bedside.  Patient denies any chest pain or shortness of breath.     ALLERGIES:  No Known Allergies    MEDICATIONS:  atorvastatin 10 milliGRAM(s) Oral at bedtime  chlorhexidine 4% Liquid 1 Application(s) Topical <User Schedule>  cyanocobalamin 1000 MICROGram(s) Oral daily  dextrose 40% Gel 15 Gram(s) Oral once  dextrose 5%. 1000 milliLiter(s) IV Continuous <Continuous>  dextrose 5%. 1000 milliLiter(s) IV Continuous <Continuous>  dextrose 50% Injectable 25 Gram(s) IV Push once  dextrose 50% Injectable 12.5 Gram(s) IV Push once  dextrose 50% Injectable 25 Gram(s) IV Push once  enoxaparin Injectable 40 milliGRAM(s) SubCutaneous daily  folic acid 1 milliGRAM(s) Oral daily  gabapentin 300 milliGRAM(s) Oral every 12 hours  glucagon  Injectable 1 milliGRAM(s) IntraMuscular once  insulin glargine Injectable (LANTUS) 15 Unit(s) SubCutaneous at bedtime  insulin lispro (ADMELOG) corrective regimen sliding scale   SubCutaneous three times a day before meals  insulin lispro Injectable (ADMELOG) 6 Unit(s) SubCutaneous three times a day before meals  lactobacillus acidophilus 1 Tablet(s) Oral daily  meropenem  IVPB 1000 milliGRAM(s) IV Intermittent every 8 hours  metoprolol tartrate 25 milliGRAM(s) Oral two times a day  OLANZapine 20 milliGRAM(s) Oral at bedtime  OLANZapine 5 milliGRAM(s) Oral daily  ondansetron Injectable 4 milliGRAM(s) IV Push every 4 hours PRN  pantoprazole    Tablet 40 milliGRAM(s) Oral before breakfast  senna 2 Tablet(s) Oral at bedtime  traZODone 150 milliGRAM(s) Oral at bedtime    Vital Signs Last 24 Hrs  T(F): 98.3 (2021 12:18), Max: 98.6 (2021 06:40)  HR: 94 (2021 12:18) (80 - 94)  BP: 178/84 (2021 12:18) (152/65 - 192/83)  RR: 18 (2021 12:18) (18 - 27)  SpO2: 99% (2021 13:00) (99% - 99%)  I&O's Summary    2021 07:01  -  2021 07:00  --------------------------------------------------------  IN: 950 mL / OUT: 1140 mL / NET: -190 mL    2021 07:01  -  2021 12:51  --------------------------------------------------------  IN: 0 mL / OUT: 1200 mL / NET: -1200 mL        PHYSICAL EXAM:  General: NAD, A/O x 3  ENT: MMM  Neck: Supple, No JVD  Lungs: Clear to auscultation bilaterally  Cardio: RRR, S1/S2, No murmurs  Abdomen: Soft, Nontender, Nondistended; Bowel sounds present  Extremities: No cyanosis, No edema    LABS:                        11.2   6.91  )-----------( 191      ( 2021 06:35 )             32.6         132  |  98  |  8   ----------------------------<  89  4.0   |  25  |  0.7    Ca    8.2      2021 06:35  Mg     1.9         TPro  5.6  /  Alb  3.4  /  TBili  <0.2  /  DBili  x   /  AST  16  /  ALT  7   /  AlkPhos  85      eGFR if Non African American: 90 mL/min/1.73M2 (21 @ 06:35)  eGFR if African American: 104 mL/min/1.73M2 (21 @ 06:35)      Lactate, Blood: 1.1 mmol/L ( @ 20:44)  Lactate, Blood: 2.1 mmol/L ( @ 11:57)  Lactate, Blood: 3.3 mmol/L ( @ 23:58)    CARDIAC MARKERS ( 2021 11:57 )  x     / 0.11 ng/mL / x     / x     / x      CARDIAC MARKERS ( 2021 23:58 )  x     / 0.10 ng/mL / x     / x     / x                        POCT Blood Glucose.: 119 mg/dL (2021 11:05)  POCT Blood Glucose.: 134 mg/dL (2021 07:18)  POCT Blood Glucose.: 158 mg/dL (2021 20:19)  POCT Blood Glucose.: 134 mg/dL (2021 17:02)      Urinalysis Basic - ( 2021 01:02 )    Color: Yellow / Appearance: Turbid / S.011 / pH: x  Gluc: x / Ketone: Negative  / Bili: Negative / Urobili: <2 mg/dL   Blood: x / Protein: 100 mg/dL / Nitrite: Negative   Leuk Esterase: Large / RBC: 15 /HPF /  /HPF   Sq Epi: x / Non Sq Epi: 1 /HPF / Bacteria: Many        Culture - Blood (collected 2021 11:57)  Source: .Blood None  Gram Stain (2021 11:26):    Growth in anaerobic bottle: Gram Negative Rods  Preliminary Report (2021 11:18):    Growth in anaerobic bottle: Escherichia coli Susceptibility to follow.    MDRO detected in BCID PCR, resistance marker = CTX-M (ESBL)    ***Blood Panel PCR results on this specimen are available    approximately 3 hours after the Gram stain result.***    Gram stain, PCR, and/or culture results may not always    correspond due to difference in methodologies.    ************************************************************    This PCR assay was performed by multiplex PCR. This    Assay tests for 66 bacterial and resistance gene targets.    Please refer to the [facility] test directory    at [email or web address] for details.  Organism: Blood Culture PCR (2021 13:52)  Organism: Blood Culture PCR (2021 13:52)      -  ESBL: Detec      -  Escherichia coli: Detec      Method Type: PCR    Culture - Urine (collected 2021 01:02)  Source: .Urine Clean Catch (Midstream)  Preliminary Report (2021 19:30):    >100,000 CFU/ml Gram Negative Rods      COVID-19 PCR: NotDetec (21 @ 01:46)      RADIOLOGY & ADDITIONAL TESTS:    Care Discussed with Consultants/Other Providers:

## 2021-07-05 NOTE — PROGRESS NOTE ADULT - SUBJECTIVE AND OBJECTIVE BOX
Progress Note    Patient is a 82yo Male with pmh of schizophrenia, COPD, hyperlipidemia, non-Hodgkin lymphoma in remission, Type 2DM (non-insulin dependent) with neuropathy, chronic indwelling Bains (chronic retention) a/w hyponatremia. Pt seen and examined at bedside this morning. Pt offering no complaints at this time, lying in bed comfortably. Bains draining clear yellow urine and in correct positioning. Denies fever, chills, N/V, abdominal pain, suprapubic pain, or flank pain.        Vital signs  T(C): , Max: 37 (07-05-21 @ 06:40)  HR: 94 (07-05-21 @ 12:18)  BP: 178/84 (07-05-21 @ 12:18)  SpO2: 99% (07-04-21 @ 13:00)    Physical Exam  Gen: NAD, alert & awake, well developed   HEENT: NC/AT  Respiratory: no accessory muscle use  Back: no CVA tenderness bilaterally   Abd: soft, nontender, nondistended, bladder nonpalpable   : + 14 abins in good positioning, draining clear yellow urine  Ext: no edema  Skin: non diaphoretic     Labs                        11.2   6.91  )-----------( 191      ( 05 Jul 2021 06:35 )             32.6     05 Jul 2021 06:35    132    |  98     |  8      ----------------------------<  89     4.0     |  25     |  0.7      Ca    8.2        05 Jul 2021 06:35  Mg     1.9       05 Jul 2021 06:35    I&O's Detail    04 Jul 2021 07:01  -  05 Jul 2021 07:00  --------------------------------------------------------  IN:    IV PiggyBack: 50 mL    Oral Fluid: 700 mL    sodium chloride 0.9%: 200 mL  Total IN: 950 mL    OUT:    Voided (mL): 1140 mL  Total OUT: 1140 mL    Total NET: -190 mL      05 Jul 2021 07:01  -  05 Jul 2021 12:32  --------------------------------------------------------  IN:  Total IN: 0 mL    OUT:    Voided (mL): 1200 mL  Total OUT: 1200 mL    Total NET: -1200 mL    Culture - Urine (07.03.21 @ 01:02) PRELIMINARY    Specimen Source: .Urine Clean Catch (Midstream)    Culture Results:   >100,000 CFU/ml Gram Negative Rods    Culture - Blood (07.03.21 @ 11:57)    Gram Stain:   Growth in anaerobic bottle: Gram Negative Rods    -  ESBL: Detec    -  Escherichia coli: Detec    Specimen Source: .Blood None    Organism: Blood Culture PCR    Culture Results:   Growth in anaerobic bottle: Escherichia coli Susceptibility to follow.  MDRO detected in BCID PCR, resistance marker = CTX-M (ESBL)  ***Blood Panel PCR results on this specimen are available  approximately 3 hours after the Gram stain result.***  Gram stain, PCR, and/or culture results may not always  correspond due to difference in methodologies.  ************************************************************  This PCR assay was performed by multiplex PCR. This  Assay tests for 66 bacterial and resistance gene targets.  Please refer to the [facility] Labs test directory  at [email or web address] for details.    Organism Identification: Blood Culture PCR    Method Type: PCR                    IMAGING:  < from: US Renal (07.03.21 @ 11:53) >    EXAM:  US KIDNEY(S)            PROCEDURE DATE:  07/03/2021            INTERPRETATION:  CLINICAL INFORMATION: 81-year-old male with history of hydronephrosis.    COMPARISON: Renal ultrasound performed 10/13/2020 and CT scan of the abdomen and pelvisperformed 7/3/2021 at 3:39 AM    TECHNIQUE: Sonography of the kidneys and bladder.  Examination is somewhat limited, as this is done portably in the intensive care unit.    FINDINGS:    Right kidney: 11.8 cm in length, previously measured as 11.6 cm.No renal mass, hydronephrosis or calculi.  There are multiple right renal cysts, measuring up to 5.6 x 5.6 cm, grossly stable since the prior examination.    Left kidney:  12.1 cm in length. No renal mass, hydronephrosis or calculi.  There is an exophytic, 1.4 x 1.8 x 1.9 cm left renal cyst.  Vascular flow is demonstrated at the left renal hilum.  There is no hydronephrosis.    Urinary bladder: The urinary bladder is collapsed around the distended balloon of a urethral Bains catheter.  Evaluationis not possible.      IMPRESSION:  1.  Right renal cysts measuring up to 5.6 x 5.6 cm, stable since the prior exam.  2.  1.9 cm left renal cyst.  3.  Urinary bladder not evaluated, as Bains catheter is present            LISA UGARTE MD; Attending Radiologist  This document has been electronically signed. Jul 4 2021  3:19PM    < end of copied text >

## 2021-07-05 NOTE — PROGRESS NOTE ADULT - SUBJECTIVE AND OBJECTIVE BOX
ADA VILLAGOMEZ 81y Male  MRN#: [id number]   Hospital Day: 2d    SUBJECTIVE  Patient is a 81y old Male who presents with a chief complaint of Nausea/vomiting (03 Jul 2021 08:46)  Currently admitted to medicine with the primary diagnosis of Hyponatremia    INTERVAL HPI AND OVERNIGHT EVENTS:  Patient was examined and seen at bedside. This morning he is resting comfortably in bed and reports no issues or overnight events.    OBJECTIVE  PAST MEDICAL & SURGICAL HISTORY  Schizophrenia  Diabetes type 2, controlled  COPD (chronic obstructive pulmonary disease)  Dyslipidemia  Chronic retention of urine  Dyslipidemia  Encounter for screening colonoscopy    ALLERGIES:  No Known Allergies    MEDICATIONS:  STANDING MEDICATIONS  atorvastatin 10 milliGRAM(s) Oral at bedtime  chlorhexidine 4% Liquid 1 Application(s) Topical <User Schedule>  cyanocobalamin 1000 MICROGram(s) Oral daily  dextrose 40% Gel 15 Gram(s) Oral once  dextrose 5%. 1000 milliLiter(s) IV Continuous <Continuous>  dextrose 5%. 1000 milliLiter(s) IV Continuous <Continuous>  dextrose 50% Injectable 25 Gram(s) IV Push once  dextrose 50% Injectable 12.5 Gram(s) IV Push once  dextrose 50% Injectable 25 Gram(s) IV Push once  enoxaparin Injectable 40 milliGRAM(s) SubCutaneous daily  folic acid 1 milliGRAM(s) Oral daily  gabapentin 300 milliGRAM(s) Oral every 12 hours  glucagon  Injectable 1 milliGRAM(s) IntraMuscular once  insulin glargine Injectable (LANTUS) 15 Unit(s) SubCutaneous at bedtime  insulin lispro (ADMELOG) corrective regimen sliding scale   SubCutaneous three times a day before meals  insulin lispro Injectable (ADMELOG) 6 Unit(s) SubCutaneous three times a day before meals  lactobacillus acidophilus 1 Tablet(s) Oral daily  meropenem  IVPB 1000 milliGRAM(s) IV Intermittent every 8 hours  metoprolol tartrate 25 milliGRAM(s) Oral two times a day  NIFEdipine XL 30 milliGRAM(s) Oral daily  NIFEdipine XL 30 milliGRAM(s) Oral once  OLANZapine 20 milliGRAM(s) Oral at bedtime  OLANZapine 5 milliGRAM(s) Oral daily  pantoprazole    Tablet 40 milliGRAM(s) Oral before breakfast  senna 2 Tablet(s) Oral at bedtime  traZODone 150 milliGRAM(s) Oral at bedtime    PRN MEDICATIONS  ondansetron Injectable 4 milliGRAM(s) IV Push every 4 hours PRN    VITAL SIGNS: Last 24 Hours  T(C): 37 (05 Jul 2021 06:40), Max: 37 (05 Jul 2021 06:40)  T(F): 98.6 (05 Jul 2021 06:40), Max: 98.6 (05 Jul 2021 06:40)  HR: 80 (05 Jul 2021 06:40) (72 - 93)  BP: 159/72 (05 Jul 2021 06:40) (107/48 - 166/64)  BP(mean): 94 (04 Jul 2021 13:00) (77 - 94)  RR: 20 (05 Jul 2021 06:40) (20 - 27)  SpO2: 99% (04 Jul 2021 13:00) (99% - 100%)    LABS:                        11.2   6.91  )-----------( 191      ( 05 Jul 2021 06:35 )             32.6     07-05    132<L>  |  98  |  8<L>  ----------------------------<  89  4.0   |  25  |  0.7    Ca    8.2<L>      05 Jul 2021 06:35  Mg     1.9     07-05    TPro  5.6<L>  /  Alb  3.4<L>  /  TBili  <0.2  /  DBili  x   /  AST  16  /  ALT  7   /  AlkPhos  85  07-05    Culture - Blood (collected 03 Jul 2021 11:57)  Source: .Blood None  Gram Stain (04 Jul 2021 11:26):    Growth in anaerobic bottle: Gram Negative Rods  Preliminary Report (04 Jul 2021 13:33):    Growth in anaerobic bottle: Gram Negative Rods    MDRO detected in BCID PCR, resistance marker = CTX-M (ESBL)    ***Blood Panel PCR results on this specimen are available    approximately 3 hours after the Gram stain result.***    Gram stain, PCR, and/or culture results may not always    correspond due to difference in methodologies.    ************************************************************    This PCR assay was performed by multiplex PCR. This    Assay tests for 66 bacterial and resistance gene targets.    Please refer to the [facility] Labs test directory    at [email or web address] for details.  Organism: Blood Culture PCR (04 Jul 2021 13:52)  Organism: Blood Culture PCR (04 Jul 2021 13:52)    Culture - Urine (collected 03 Jul 2021 01:02)  Source: .Urine Clean Catch (Midstream)  Preliminary Report (04 Jul 2021 19:30):    >100,000 CFU/ml Gram Negative Rods    CARDIAC MARKERS ( 03 Jul 2021 11:57 )  x     / 0.11 ng/mL / x     / x     / x        PHYSICAL EXAM:  CONSTITUTIONAL: No acute distress, well-developed, well-groomed, AAOx3  HEAD: Atraumatic, normocephalic  PULMONARY: Clear to auscultation bilaterally  CARDIOVASCULAR: Regular rate and rhythm  GASTROINTESTINAL: Soft, non-tender, non-distended  MUSCULOSKELETAL:  no edema  NEUROLOGY: non-focal  SKIN: No rashes or lesions    ASSESSMENT & PLAN  Mr. Villagomez ia 81 year old man with PMHx of schizophrenia, COPD, hyperlipidemia, non-Hodgkin lymphoma in remission, Type 2DM (non-insulin dependent) with neuropathy, chronic indwelling Bains (chronic retention) BIBEMS from [facility] for nausea/vomiting. Pt's status has improved, medically stable for transfer to floor.     #Nausea/vomiting likely secondary to Urosepsis, chronic indwelling bains for retention   -recently discharged and was seen by Urology on 7/1 for difficult Bains placement after patient reported "bains came out" . Patient had a 14 fr catheter placed draining well and was told to follow-up in Urology clinic for further management.  - wbc 20k with bandemia on admission, Normotensive, + UA; s/p Rocephin 1 gm   - Patient with chronic indwelling Bains (chronic urinary retention)  - had several admissions in the past for ESBL pyelonephritis; last discharged on ertapenem 1 gm 10/24  - Urology consult appreciated: aspirated 50cc of cloudy yellow urine Bains now draining to gravity with 150cc of cloudy yellow urine noted in bag.   - Continue Bains care   - Repeat RBUS in 48 -72 hours to assess hydro  - Blood culture:  7/3: ESBL E. Coli  - Urine culture: 7/3: E. Coli   - c/w meropenem 1 g TID, d/c Vancomycin as per ID as BCx negative for MRSA   - Monitor Urine output  - ID c/s for Abx recommendation   - Analgesics for pain control prn/ zofran prn      # Hyponatremia - possibly SIADH secondary to antipsychotic medications  vs hypovolemia from extrarenal loss (nausea/vomiting)  patient with history of hyponatremia, on sodium tablets at home  - possibly 2/2 Trazadone use   - Sodium, Serum: 132 mmol/L (07.05.21 @ 06:35)  - no longer on IVF  - Urine lytes pending  - f/u renal ultrasound/lacate  - Check serum and urine osmolality  - Repeat BMP  - Fluid restriction    #elevated troponin  - elevated trop .10 on admission; denies chest pain  - EKG on admission: no ischemic changes  - trend cardiac enzymes and serial EKG    #HTN, controlled  - c/w Nifedipine ER 30 mg daily  - c/w metoprolol tartrate 25 BID    #Schizophrenia  - c/w olanzapine and trazodone    #HLD  - c/w atorvastatin 10    #Type II DM, with neuropathy  - FS AC/HS  - on metformin 500 QD at home  - c/w gabapentin 300 mg BID    #Macrocytic Anemia with B12 Deficiency  - c/w B12, folate supplement    #COPD  - c/w albuterol prn     DVT ppx: Lovenox  GI ppx: Not indicated  Diet: NPO until further recs; fluid restriction   Activity: increase as tolerated  Code status: full code  Dispo: acute     #Misc  - DVT Prophylaxis:  - GI Prophylaxis:  - Diet:  - Activity:  - IV Fluids:  - Code Status:    Dispo: ADA VILLAGOMEZ 81y Male  MRN#: [id number]   Hospital Day: 2d    SUBJECTIVE  Patient is a 81y old Male who presents with a chief complaint of Nausea/vomiting (03 Jul 2021 08:46)  Currently admitted to medicine with the primary diagnosis of Hyponatremia    INTERVAL HPI AND OVERNIGHT EVENTS:  Patient was examined and seen at bedside. This morning he is resting comfortably in bed and reports no issues or overnight events.    OBJECTIVE  PAST MEDICAL & SURGICAL HISTORY  Schizophrenia  Diabetes type 2, controlled  COPD (chronic obstructive pulmonary disease)  Dyslipidemia  Chronic retention of urine  Dyslipidemia  Encounter for screening colonoscopy    ALLERGIES:  No Known Allergies    MEDICATIONS:  STANDING MEDICATIONS  atorvastatin 10 milliGRAM(s) Oral at bedtime  chlorhexidine 4% Liquid 1 Application(s) Topical <User Schedule>  cyanocobalamin 1000 MICROGram(s) Oral daily  dextrose 40% Gel 15 Gram(s) Oral once  dextrose 5%. 1000 milliLiter(s) IV Continuous <Continuous>  dextrose 5%. 1000 milliLiter(s) IV Continuous <Continuous>  dextrose 50% Injectable 25 Gram(s) IV Push once  dextrose 50% Injectable 12.5 Gram(s) IV Push once  dextrose 50% Injectable 25 Gram(s) IV Push once  enoxaparin Injectable 40 milliGRAM(s) SubCutaneous daily  folic acid 1 milliGRAM(s) Oral daily  gabapentin 300 milliGRAM(s) Oral every 12 hours  glucagon  Injectable 1 milliGRAM(s) IntraMuscular once  insulin glargine Injectable (LANTUS) 15 Unit(s) SubCutaneous at bedtime  insulin lispro (ADMELOG) corrective regimen sliding scale   SubCutaneous three times a day before meals  insulin lispro Injectable (ADMELOG) 6 Unit(s) SubCutaneous three times a day before meals  lactobacillus acidophilus 1 Tablet(s) Oral daily  meropenem  IVPB 1000 milliGRAM(s) IV Intermittent every 8 hours  metoprolol tartrate 25 milliGRAM(s) Oral two times a day  NIFEdipine XL 30 milliGRAM(s) Oral daily  NIFEdipine XL 30 milliGRAM(s) Oral once  OLANZapine 20 milliGRAM(s) Oral at bedtime  OLANZapine 5 milliGRAM(s) Oral daily  pantoprazole    Tablet 40 milliGRAM(s) Oral before breakfast  senna 2 Tablet(s) Oral at bedtime  traZODone 150 milliGRAM(s) Oral at bedtime    PRN MEDICATIONS  ondansetron Injectable 4 milliGRAM(s) IV Push every 4 hours PRN    VITAL SIGNS: Last 24 Hours  T(C): 37 (05 Jul 2021 06:40), Max: 37 (05 Jul 2021 06:40)  T(F): 98.6 (05 Jul 2021 06:40), Max: 98.6 (05 Jul 2021 06:40)  HR: 80 (05 Jul 2021 06:40) (72 - 93)  BP: 159/72 (05 Jul 2021 06:40) (107/48 - 166/64)  BP(mean): 94 (04 Jul 2021 13:00) (77 - 94)  RR: 20 (05 Jul 2021 06:40) (20 - 27)  SpO2: 99% (04 Jul 2021 13:00) (99% - 100%)    LABS:                        11.2   6.91  )-----------( 191      ( 05 Jul 2021 06:35 )             32.6     07-05    132<L>  |  98  |  8<L>  ----------------------------<  89  4.0   |  25  |  0.7    Ca    8.2<L>      05 Jul 2021 06:35  Mg     1.9     07-05    TPro  5.6<L>  /  Alb  3.4<L>  /  TBili  <0.2  /  DBili  x   /  AST  16  /  ALT  7   /  AlkPhos  85  07-05    Culture - Blood (collected 03 Jul 2021 11:57)  Source: .Blood None  Gram Stain (04 Jul 2021 11:26):    Growth in anaerobic bottle: Gram Negative Rods  Preliminary Report (04 Jul 2021 13:33):    Growth in anaerobic bottle: Gram Negative Rods    MDRO detected in BCID PCR, resistance marker = CTX-M (ESBL)    ***Blood Panel PCR results on this specimen are available    approximately 3 hours after the Gram stain result.***    Gram stain, PCR, and/or culture results may not always    correspond due to difference in methodologies.    ************************************************************    This PCR assay was performed by multiplex PCR. This    Assay tests for 66 bacterial and resistance gene targets.    Please refer to the [facility] Labs test directory    at [email or web address] for details.  Organism: Blood Culture PCR (04 Jul 2021 13:52)  Organism: Blood Culture PCR (04 Jul 2021 13:52)    Culture - Urine (collected 03 Jul 2021 01:02)  Source: .Urine Clean Catch (Midstream)  Preliminary Report (04 Jul 2021 19:30):    >100,000 CFU/ml Gram Negative Rods    CARDIAC MARKERS ( 03 Jul 2021 11:57 )  x     / 0.11 ng/mL / x     / x     / x        PHYSICAL EXAM:  CONSTITUTIONAL: No acute distress, well-developed, well-groomed, AAOx3  HEAD: Atraumatic, normocephalic  PULMONARY: Clear to auscultation bilaterally  CARDIOVASCULAR: Regular rate and rhythm  GASTROINTESTINAL: Soft, non-tender, non-distended  MUSCULOSKELETAL:  no edema  NEUROLOGY: non-focal  SKIN: No rashes or lesions    ASSESSMENT & PLAN  Mr. Villagomez ia 81 year old man with PMHx of schizophrenia, COPD, hyperlipidemia, non-Hodgkin lymphoma in remission, Type 2DM (non-insulin dependent) with neuropathy, chronic indwelling Bains (chronic retention) BIBEMS from [facility] for nausea/vomiting. Pt's status has improved, medically stable for transfer to floor.     #Nausea/vomiting likely secondary to Urosepsis, chronic indwelling bains for retention   -recently discharged and was seen by Urology on 7/1 for difficult Bains placement after patient reported "bains came out" . Patient had a 14 fr catheter placed draining well and was told to follow-up in Urology clinic for further management.  - wbc 20k with bandemia on admission, Normotensive, + UA; s/p Rocephin 1 gm   - Patient with chronic indwelling Bains (chronic urinary retention)  - had several admissions in the past for ESBL pyelonephritis; last discharged on ertapenem 1 gm 10/24  - Urology consult appreciated: aspirated 50cc of cloudy yellow urine Bains now draining to gravity with 150cc of cloudy yellow urine noted in bag.   - Continue Bains care   - Repeat RBUS in 48 -72 hours to assess hydro  - Blood culture:  7/3: ESBL E. Coli  - Urine culture: 7/3: E. Coli   - c/w meropenem 1 g TID, d/c Vancomycin as per ID as BCx negative for MRSA   - Monitor Urine output  - ID c/s for Abx recommendation   - Analgesics for pain control prn/ zofran prn    # Hyponatremia - possibly SIADH secondary to antipsychotic medications  vs hypovolemia from extrarenal loss (nausea/vomiting)  patient with history of hyponatremia, on sodium tablets at home  - possibly 2/2 Trazadone use   - Sodium, Serum: 132 mmol/L (07.05.21 @ 06:35)  - no longer on IVF  - Urine lytes pending  - f/u renal ultrasound/lacate  - Check serum and urine osmolality  - Repeat BMP  - Fluid restriction    #elevated troponin  - elevated trop .10 on admission; denies chest pain  - EKG on admission: no ischemic changes  - trend cardiac enzymes and serial EKG    #HTN, poorly controlled   - increasging Nifedipine ER  to 60 mg daily  - c/w metoprolol tartrate 25 BID    #Schizophrenia  - c/w olanzapine and trazodone    #HLD  - c/w atorvastatin 10    #Type II DM, with neuropathy  - FS AC/HS  - on metformin 500 QD at home  - c/w gabapentin 300 mg BID    #Macrocytic Anemia with B12 Deficiency  - c/w B12, folate supplement    #COPD  - c/w albuterol prn     DVT ppx: Lovenox  GI ppx: Not indicated  Diet: NPO until further recs; fluid restriction   Activity: increase as tolerated  Code status: full code  Dispo: acute     #Misc  - DVT Prophylaxis:  - GI Prophylaxis:  - Diet:  - Activity:  - IV Fluids:  - Code Status:    Dispo: ADA VILLAGOMEZ 81y Male  MRN#: [id number]   Hospital Day: 2d    SUBJECTIVE  Patient is a 81y old Male who presents with a chief complaint of Nausea/vomiting (03 Jul 2021 08:46)  Currently admitted to medicine with the primary diagnosis of Hyponatremia    INTERVAL HPI AND OVERNIGHT EVENTS:  Patient was examined and seen at bedside. This morning he is resting comfortably in bed and reports no issues or overnight events.    OBJECTIVE  PAST MEDICAL & SURGICAL HISTORY  Schizophrenia  Diabetes type 2, controlled  COPD (chronic obstructive pulmonary disease)  Dyslipidemia  Chronic retention of urine  Dyslipidemia  Encounter for screening colonoscopy    ALLERGIES:  No Known Allergies    MEDICATIONS:  STANDING MEDICATIONS  atorvastatin 10 milliGRAM(s) Oral at bedtime  chlorhexidine 4% Liquid 1 Application(s) Topical <User Schedule>  cyanocobalamin 1000 MICROGram(s) Oral daily  dextrose 40% Gel 15 Gram(s) Oral once  dextrose 5%. 1000 milliLiter(s) IV Continuous <Continuous>  dextrose 5%. 1000 milliLiter(s) IV Continuous <Continuous>  dextrose 50% Injectable 25 Gram(s) IV Push once  dextrose 50% Injectable 12.5 Gram(s) IV Push once  dextrose 50% Injectable 25 Gram(s) IV Push once  enoxaparin Injectable 40 milliGRAM(s) SubCutaneous daily  folic acid 1 milliGRAM(s) Oral daily  gabapentin 300 milliGRAM(s) Oral every 12 hours  glucagon  Injectable 1 milliGRAM(s) IntraMuscular once  insulin glargine Injectable (LANTUS) 15 Unit(s) SubCutaneous at bedtime  insulin lispro (ADMELOG) corrective regimen sliding scale   SubCutaneous three times a day before meals  insulin lispro Injectable (ADMELOG) 6 Unit(s) SubCutaneous three times a day before meals  lactobacillus acidophilus 1 Tablet(s) Oral daily  meropenem  IVPB 1000 milliGRAM(s) IV Intermittent every 8 hours  metoprolol tartrate 25 milliGRAM(s) Oral two times a day  NIFEdipine XL 30 milliGRAM(s) Oral daily  NIFEdipine XL 30 milliGRAM(s) Oral once  OLANZapine 20 milliGRAM(s) Oral at bedtime  OLANZapine 5 milliGRAM(s) Oral daily  pantoprazole    Tablet 40 milliGRAM(s) Oral before breakfast  senna 2 Tablet(s) Oral at bedtime  traZODone 150 milliGRAM(s) Oral at bedtime    PRN MEDICATIONS  ondansetron Injectable 4 milliGRAM(s) IV Push every 4 hours PRN    VITAL SIGNS: Last 24 Hours  T(C): 37 (05 Jul 2021 06:40), Max: 37 (05 Jul 2021 06:40)  T(F): 98.6 (05 Jul 2021 06:40), Max: 98.6 (05 Jul 2021 06:40)  HR: 80 (05 Jul 2021 06:40) (72 - 93)  BP: 159/72 (05 Jul 2021 06:40) (107/48 - 166/64)  BP(mean): 94 (04 Jul 2021 13:00) (77 - 94)  RR: 20 (05 Jul 2021 06:40) (20 - 27)  SpO2: 99% (04 Jul 2021 13:00) (99% - 100%)    LABS:                        11.2   6.91  )-----------( 191      ( 05 Jul 2021 06:35 )             32.6     07-05    132<L>  |  98  |  8<L>  ----------------------------<  89  4.0   |  25  |  0.7    Ca    8.2<L>      05 Jul 2021 06:35  Mg     1.9     07-05    TPro  5.6<L>  /  Alb  3.4<L>  /  TBili  <0.2  /  DBili  x   /  AST  16  /  ALT  7   /  AlkPhos  85  07-05    Culture - Blood (collected 03 Jul 2021 11:57)  Source: .Blood None  Gram Stain (04 Jul 2021 11:26):    Growth in anaerobic bottle: Gram Negative Rods  Preliminary Report (04 Jul 2021 13:33):    Growth in anaerobic bottle: Gram Negative Rods    MDRO detected in BCID PCR, resistance marker = CTX-M (ESBL)    ***Blood Panel PCR results on this specimen are available    approximately 3 hours after the Gram stain result.***    Gram stain, PCR, and/or culture results may not always    correspond due to difference in methodologies.    ************************************************************    This PCR assay was performed by multiplex PCR. This    Assay tests for 66 bacterial and resistance gene targets.    Please refer to the [facility] Labs test directory    at [email or web address] for details.  Organism: Blood Culture PCR (04 Jul 2021 13:52)  Organism: Blood Culture PCR (04 Jul 2021 13:52)    Culture - Urine (collected 03 Jul 2021 01:02)  Source: .Urine Clean Catch (Midstream)  Preliminary Report (04 Jul 2021 19:30):    >100,000 CFU/ml Gram Negative Rods    CARDIAC MARKERS ( 03 Jul 2021 11:57 )  x     / 0.11 ng/mL / x     / x     / x        PHYSICAL EXAM:  CONSTITUTIONAL: No acute distress, well-developed, well-groomed, AAOx3  HEAD: Atraumatic, normocephalic  PULMONARY: Clear to auscultation bilaterally  CARDIOVASCULAR: Regular rate and rhythm  GASTROINTESTINAL: Soft, non-tender, non-distended  MUSCULOSKELETAL:  no edema  NEUROLOGY: non-focal  SKIN: No rashes or lesions    ASSESSMENT & PLAN  Mr. Villagomez ia 81 year old man with PMHx of schizophrenia, COPD, hyperlipidemia, non-Hodgkin lymphoma in remission, Type 2DM (non-insulin dependent) with neuropathy, chronic indwelling Bains (chronic retention) BIBEMS from [facility] for nausea/vomiting. Pt's status has improved, medically stable for transfer to floor.     #Nausea/vomiting likely secondary to Urosepsis, chronic indwelling bains for retention   -recently discharged and was seen by Urology on 7/1 for difficult Bains placement after patient reported "bains came out" . Patient had a 14 fr catheter placed draining well and was told to follow-up in Urology clinic for further management.  - wbc 20k with bandemia on admission, Normotensive, + UA; s/p Rocephin 1 gm   - Patient with chronic indwelling Bains (chronic urinary retention)  - had several admissions in the past for ESBL pyelonephritis; last discharged on ertapenem 1 gm 10/24  - Urology consult appreciated: aspirated 50cc of cloudy yellow urine Bains now draining to gravity with 150cc of cloudy yellow urine noted in bag.   - Continue Bains care   - Repeat RBUS in 48 -72 hours to assess hydro  - Blood culture:  7/3: ESBL E. Coli  - Urine culture: 7/3: E. Coli   - c/w meropenem 1 g TID, d/c Vancomycin as per ID as BCx negative for MRSA   - Monitor Urine output  - ID c/s for Abx recommendation   - Analgesics for pain control prn/ zofran prn    # Hyponatremia - possibly SIADH secondary to antipsychotic medications  vs hypovolemia from extrarenal loss (nausea/vomiting)  patient with history of hyponatremia, on sodium tablets at home  - possibly 2/2 Trazadone use   - Sodium, Serum: 132 mmol/L (07.05.21 @ 06:35)  - no longer on IVF  - US Renal: Right renal cysts measuring up to 5.6 x 5.6 cm, stable since the prior exam. 1.9 cm left renal cyst. Urinary bladder not evaluated, as Bains catheter is present  - Fluid restriction  - start home medication sodium chloride 1 gram     #elevated troponin  - elevated trop .10 on admission --> 0.11 --> Trend troponins  - denies chest pain  - EKG on admission: no ischemic changes, repeat EKG today   - trend cardiac enzymes and serial EKG    #HTN, poorly controlled   - increasing Nifedipine ER  to 60 mg daily  - c/w metoprolol tartrate 25 BID    #Schizophrenia  - c/w olanzapine and trazodone    #HLD  - c/w atorvastatin 10    #Type II DM, with neuropathy  - FS AC/HS  - on metformin 500 QD at home  - c/w gabapentin 300 mg BID    #Macrocytic Anemia with B12 Deficiency  - c/w B12, folate supplement    #COPD  - c/w albuterol prn     DVT ppx: Lovenox  GI ppx: Not indicated  Diet: NPO until further recs; fluid restriction   Activity: increase as tolerated  Code status: full code  Dispo: acute

## 2021-07-05 NOTE — PROGRESS NOTE ADULT - ASSESSMENT
82yo Male with pmh of schizophrenia, COPD, hyperlipidemia, non-Hodgkin lymphoma in remission, Type 2DM (non-insulin dependent) with neuropathy, chronic indwelling Bains (chronic retention) a/w hyponatremia - pt stable, bains draining clear yellow urine    Plan:  ·	repeat RBUS on 7/3 shows right renal cysts measuring up to 5.6 x 5.6 cm, stable since the prior exam. 1.9 cm left renal cyst. no mass hydro, or calculi noted b/l. urinary bladder not evaluated, as Bains catheter is present  ·	continue bains care  ·	urine cultures preliminary results show gram negative rods   ·	continue IV abx as per ID recommendations   ·	Cr stable, 0.8  ·	please make sure bains secured and not under tension   ·	no further acute Urologic intervention at this time   ·	will discuss with attending   clear

## 2021-07-05 NOTE — PROGRESS NOTE ADULT - SUBJECTIVE AND OBJECTIVE BOX
ADA VILLAGOMEZ  81y, Male  Allergy: No Known Allergies      LOS  2d    CHIEF COMPLAINT: nausea and vomiting (05 Jul 2021 12:50)      INTERVAL EVENTS/HPI  - No acute events overnight  - T(F): , Max: 98.6 (07-05-21 @ 06:40)  - Denies any worsening symptoms  - Tolerating medication  - WBC Count: 6.91 (07-05-21 @ 06:35)  WBC Count: 8.76 (07-04-21 @ 05:06)     - Creatinine, Serum: 0.7 (07-05-21 @ 06:35)  Creatinine, Serum: 0.9 (07-04-21 @ 23:16)       ROS  General: Denies rigors, nightsweats  HEENT: Denies headache, rhinorrhea, sore throat, eye pain  CV: Denies CP, palpitations  PULM: Denies wheezing, hemoptysis  GI: Denies hematemesis, hematochezia, melena  : Denies discharge, hematuria  MSK: Denies arthralgias, myalgias  SKIN: Denies rash, lesions  NEURO: Denies paresthesias, weakness  PSYCH: Denies depression, anxiety    VITALS:  T(F): 98.3, Max: 98.6 (07-05-21 @ 06:40)  HR: 82  BP: 160/72  RR: 18Vital Signs Last 24 Hrs  T(C): 36.8 (05 Jul 2021 12:18), Max: 37 (05 Jul 2021 06:40)  T(F): 98.3 (05 Jul 2021 12:18), Max: 98.6 (05 Jul 2021 06:40)  HR: 82 (05 Jul 2021 16:26) (80 - 94)  BP: 160/72 (05 Jul 2021 16:26) (159/72 - 192/83)  BP(mean): --  RR: 18 (05 Jul 2021 12:18) (18 - 20)  SpO2: --    PHYSICAL EXAM:  Gen: NAD, resting in bed  HEENT: Normocephalic, atraumatic  Neck: supple, no lymphadenopathy  CV: Regular rate & regular rhythm  Lungs: decreased BS at bases, no fremitus  Abdomen: Soft, BS present  Ext: Warm, well perfused  Neuro: non focal, awake  Skin: no rash, no erythema  Lines: no phlebitis    FH: Non-contributory  Social Hx: Non-contributory    TESTS & MEASUREMENTS:                        11.2   6.91  )-----------( 191      ( 05 Jul 2021 06:35 )             32.6     07-05    132<L>  |  98  |  8<L>  ----------------------------<  89  4.0   |  25  |  0.7    Ca    8.2<L>      05 Jul 2021 06:35  Mg     1.9     07-05    TPro  5.6<L>  /  Alb  3.4<L>  /  TBili  <0.2  /  DBili  x   /  AST  16  /  ALT  7   /  AlkPhos  85  07-05    eGFR if Non African American: 90 mL/min/1.73M2 (07-05-21 @ 06:35)  eGFR if African American: 104 mL/min/1.73M2 (07-05-21 @ 06:35)  eGFR if Non African American: 81 mL/min/1.73M2 (07-04-21 @ 23:16)  eGFR if : 94 mL/min/1.73M2 (07-04-21 @ 23:16)    LIVER FUNCTIONS - ( 05 Jul 2021 06:35 )  Alb: 3.4 g/dL / Pro: 5.6 g/dL / ALK PHOS: 85 U/L / ALT: 7 U/L / AST: 16 U/L / GGT: x               Culture - Blood (collected 07-03-21 @ 11:57)  Source: .Blood None  Gram Stain (07-04-21 @ 11:26):    Growth in anaerobic bottle: Gram Negative Rods  Preliminary Report (07-05-21 @ 11:18):    Growth in anaerobic bottle: Escherichia coli Susceptibility to follow.    MDRO detected in BCID PCR, resistance marker = CTX-M (ESBL)    ***Blood Panel PCR results on this specimen are available    approximately 3 hours after the Gram stain result.***    Gram stain, PCR, and/or culture results may not always    correspond due to difference in methodologies.    ************************************************************    This PCR assay was performed by multiplex PCR. This    Assay tests for 66 bacterial and resistance gene targets.    Please refer to the [facility] test directory    at [email or web address] for details.  Organism: Blood Culture PCR (07-04-21 @ 13:52)  Organism: Blood Culture PCR (07-04-21 @ 13:52)      -  ESBL: Detec      -  Escherichia coli: Detec      Method Type: PCR    Culture - Urine (collected 07-03-21 @ 01:02)  Source: .Urine Clean Catch (Midstream)  Preliminary Report (07-04-21 @ 19:30):    >100,000 CFU/ml Gram Negative Rods        Lactate, Blood: 1.1 mmol/L (07-03-21 @ 20:44)  Lactate, Blood: 2.1 mmol/L (07-03-21 @ 11:57)  Lactate, Blood: 3.3 mmol/L (07-02-21 @ 23:58)      INFECTIOUS DISEASES TESTING  Procalcitonin, Serum: 1.33 (07-03-21 @ 11:57)  MRSA PCR Result.: NotDetec (07-03-21 @ 08:39)  COVID-19 PCR: NotDetec (07-03-21 @ 01:46)  Rapid RVP Result: NotDetec (10-13-20 @ 01:52)      INFLAMMATORY MARKERS      RADIOLOGY & ADDITIONAL TESTS:  I have personally reviewed the last available Chest xray  CXR  Xray Chest 1 View- PORTABLE-Urgent:   EXAM:  XR CHEST PORTABLE URGENT 1V            PROCEDURE DATE:  07/03/2021            INTERPRETATION:  Clinical History / Reason for exam: Vomiting    Comparison : Chest radiograph October 13, 2020.    Technique/Positioning: Single frontal chest x-ray obtained.    Findings:    Support devices: None.    Cardiac/mediastinum/hilum: Unchanged.    Lung parenchyma/Pleura: No radiographic evidence of acute cardiopulmonary disease.    Skeleton/soft tissues: Unchanged.    Impression:    No radiographic evidence of acute cardiopulmonary disease.                    ELIOT GUTIERREZ MD; Attending Radiologist  This document has been electronically signed. Jul  3 2021  3:34PM (07-03-21 @ 02:58)      CT  CT Abdomen and Pelvis w/ IV Cont:   EXAM:  CT ABDOMEN AND PELVIS IC            PROCEDURE DATE:  07/03/2021            INTERPRETATION:  CLINICAL STATEMENT: Vomiting.    TECHNIQUE: Contiguous axial CT images were obtained from the lower chest to the pubic symphysis following administration of 100cc Omnipaque 350 intravenous contrast.  Oral contrast was not administered.  Reformatted images in the coronal and sagittal planes were acquired.    COMPARISON CT: CT abdomen and pelvis 10/26/2019.    OTHER STUDIES USED FOR CORRELATION: None.      FINDINGS:    LOWER CHEST: Unremarkable.    HEPATOBILIARY: Cholelithiasis with no evidence of acute cholecystitis. Hepatic cyst (right lobe) and hypodensities too small to further characterize. Patent portal vein    SPLEEN: Unremarkable.    PANCREAS: Unremarkable.    ADRENAL GLANDS: Unremarkable.    KIDNEYS: Is symmetric bilateral renal enhancement. Bilateral renal cysts and hypodensities too small to further characterize. Mild to moderate bilateral hydroureteronephrosis with no distal obstructing ureteral lesion or stone.    ABDOMINOPELVIC NODES: Unremarkable.    PELVIC ORGANS: Enlarged prostate. Interval development of lobulation the prostate with cystic areas. Air is noted within the urinary bladder, likely secondary to instrumentation. Cobos balloon inflated within the penile urethra.    PERITONEUM/MESENTERY/BOWEL: No evidence of bowel obstruction, free air or ascites. Normal caliber appendix.    BONES/SOFT TISSUES: Multilevel degenerative changes of the spine. Chronic L1 and [id number] deformities.    OTHER: Severe atherosclerotic disease of the aorta and its branches.      IMPRESSION:      Interval development of lobulation and cystic areas within the prostate. Correlate with PSA levels and may obtain MRI of the prostate with and without contrast for further evaluation.    Cobos balloon inflated within the penile urethra, recommend repositioning.    Mild to moderate bilateral hydroureteronephrosis with no distal obstructing ureteral lesion or stone.    Air is notedwithin the urinary bladder, likely secondary to instrumentation. However infectious process is not excluded.        Dr. Maryana Rice discussed preliminary findings with SUMA DAWKINS on 7/3/2021 4:04 AM with readback.            MARYANA RICE MD; Resident Radiologist  This document has been electronically signed.  TI MONTERROSO MD; Attending Radiologist  This document has been electronically signed. Jul  3 2021  4:10AM (07-03-21 @ 03:39)      CARDIOLOGY TESTING  12 Lead ECG:   Ventricular Rate 82 BPM    Atrial Rate 82 BPM    P-R Interval 158 ms    QRS Duration 82 ms    Q-T Interval 410 ms    QTC Calculation(Bazett) 479 ms    P Axis 45 degrees    R Axis 40 degrees    T Axis 65 degrees    Diagnosis Line Normal sinus rhythmwith sinus arrhythmia  Minimal voltage criteria for LVH, may be normal variant  Marked T wave abnormality, consider anterolateral ischemia  Prolonged QT  Abnormal ECG    Confirmed by DAVID GARCIA MD ([id number]) on 7/5/2021 4:50:18 PM (07-05-21 @ 14:04)  12 Lead ECG:   Ventricular Rate 77 BPM    Atrial Rate 77 BPM    P-R Interval 190 ms    QRS Duration 82 ms    Q-T Interval 414 ms    QTC Calculation(Bazett) 468 ms    P Axis 73 degrees    R Axis 45 degrees    T Axis 24 degrees    Diagnosis Line Normal sinus rhythm  Baseline artifact  Voltage criteria for left ventricular hypertrophy  Abnormal ECG    Confirmed by DAVID GARCIA MD ([id number]) on 7/3/2021 12:02:03 PM (07-02-21 @ 23:46)      MEDICATIONS  atorvastatin 10 Oral at bedtime  chlorhexidine 4% Liquid 1 Topical <User Schedule>  cyanocobalamin 1000 Oral daily  dextrose 40% Gel 15 Oral once  dextrose 5%. 1000 IV Continuous <Continuous>  dextrose 5%. 1000 IV Continuous <Continuous>  dextrose 50% Injectable 25 IV Push once  dextrose 50% Injectable 12.5 IV Push once  dextrose 50% Injectable 25 IV Push once  enoxaparin Injectable 40 SubCutaneous daily  folic acid 1 Oral daily  gabapentin 300 Oral every 12 hours  glucagon  Injectable 1 IntraMuscular once  insulin glargine Injectable (LANTUS) 15 SubCutaneous at bedtime  insulin lispro (ADMELOG) corrective regimen sliding scale  SubCutaneous three times a day before meals  insulin lispro Injectable (ADMELOG) 6 SubCutaneous three times a day before meals  lactobacillus acidophilus 1 Oral daily  meropenem  IVPB 1000 IV Intermittent every 8 hours  metoprolol tartrate 25 Oral two times a day  OLANZapine 20 Oral at bedtime  OLANZapine 5 Oral daily  pantoprazole    Tablet 40 Oral before breakfast  senna 2 Oral at bedtime  sodium chloride 1 Oral daily  traZODone 150 Oral at bedtime      WEIGHT  Weight (kg): 68.5 (07-03-21 @ 08:00)  Creatinine, Serum: 0.7 mg/dL (07-05-21 @ 06:35)  Creatinine, Serum: 0.9 mg/dL (07-04-21 @ 23:16)      ANTIBIOTICS:  meropenem  IVPB 1000 milliGRAM(s) IV Intermittent every 8 hours      All available historical records have been reviewed

## 2021-07-06 LAB
-  AMIKACIN: SIGNIFICANT CHANGE UP
-  AMOXICILLIN/CLAVULANIC ACID: SIGNIFICANT CHANGE UP
-  AMOXICILLIN/CLAVULANIC ACID: SIGNIFICANT CHANGE UP
-  AMPICILLIN/SULBACTAM: SIGNIFICANT CHANGE UP
-  AMPICILLIN: SIGNIFICANT CHANGE UP
-  AZTREONAM: SIGNIFICANT CHANGE UP
-  CEFAZOLIN: SIGNIFICANT CHANGE UP
-  CEFEPIME: SIGNIFICANT CHANGE UP
-  CEFOXITIN: SIGNIFICANT CHANGE UP
-  CEFTRIAXONE: SIGNIFICANT CHANGE UP
-  CIPROFLOXACIN: SIGNIFICANT CHANGE UP
-  ERTAPENEM: SIGNIFICANT CHANGE UP
-  GENTAMICIN: SIGNIFICANT CHANGE UP
-  IMIPENEM: SIGNIFICANT CHANGE UP
-  LEVOFLOXACIN: SIGNIFICANT CHANGE UP
-  MEROPENEM: SIGNIFICANT CHANGE UP
-  NITROFURANTOIN: SIGNIFICANT CHANGE UP
-  NITROFURANTOIN: SIGNIFICANT CHANGE UP
-  PIPERACILLIN/TAZOBACTAM: SIGNIFICANT CHANGE UP
-  TIGECYCLINE: SIGNIFICANT CHANGE UP
-  TIGECYCLINE: SIGNIFICANT CHANGE UP
-  TOBRAMYCIN: SIGNIFICANT CHANGE UP
-  TRIMETHOPRIM/SULFAMETHOXAZOLE: SIGNIFICANT CHANGE UP
ALBUMIN SERPL ELPH-MCNC: 3.9 G/DL — SIGNIFICANT CHANGE UP (ref 3.5–5.2)
ALP SERPL-CCNC: 98 U/L — SIGNIFICANT CHANGE UP (ref 30–115)
ALT FLD-CCNC: 13 U/L — SIGNIFICANT CHANGE UP (ref 0–41)
ANION GAP SERPL CALC-SCNC: 11 MMOL/L — SIGNIFICANT CHANGE UP (ref 7–14)
AST SERPL-CCNC: 19 U/L — SIGNIFICANT CHANGE UP (ref 0–41)
BASOPHILS # BLD AUTO: 0.01 K/UL — SIGNIFICANT CHANGE UP (ref 0–0.2)
BASOPHILS NFR BLD AUTO: 0.1 % — SIGNIFICANT CHANGE UP (ref 0–1)
BILIRUB SERPL-MCNC: <0.2 MG/DL — SIGNIFICANT CHANGE UP (ref 0.2–1.2)
BUN SERPL-MCNC: 10 MG/DL — SIGNIFICANT CHANGE UP (ref 10–20)
CALCIUM SERPL-MCNC: 8.9 MG/DL — SIGNIFICANT CHANGE UP (ref 8.5–10.1)
CHLORIDE SERPL-SCNC: 95 MMOL/L — LOW (ref 98–110)
CO2 SERPL-SCNC: 26 MMOL/L — SIGNIFICANT CHANGE UP (ref 17–32)
CREAT SERPL-MCNC: 0.9 MG/DL — SIGNIFICANT CHANGE UP (ref 0.7–1.5)
CULTURE RESULTS: SIGNIFICANT CHANGE UP
CULTURE RESULTS: SIGNIFICANT CHANGE UP
EOSINOPHIL # BLD AUTO: 0.02 K/UL — SIGNIFICANT CHANGE UP (ref 0–0.7)
EOSINOPHIL NFR BLD AUTO: 0.3 % — SIGNIFICANT CHANGE UP (ref 0–8)
GLUCOSE BLDC GLUCOMTR-MCNC: 106 MG/DL — HIGH (ref 70–99)
GLUCOSE BLDC GLUCOMTR-MCNC: 121 MG/DL — HIGH (ref 70–99)
GLUCOSE BLDC GLUCOMTR-MCNC: 136 MG/DL — HIGH (ref 70–99)
GLUCOSE BLDC GLUCOMTR-MCNC: 175 MG/DL — HIGH (ref 70–99)
GLUCOSE BLDC GLUCOMTR-MCNC: 530 MG/DL — CRITICAL HIGH (ref 70–99)
GLUCOSE SERPL-MCNC: 98 MG/DL — SIGNIFICANT CHANGE UP (ref 70–99)
HCT VFR BLD CALC: 36.3 % — LOW (ref 42–52)
HGB BLD-MCNC: 12.4 G/DL — LOW (ref 14–18)
IMM GRANULOCYTES NFR BLD AUTO: 0.3 % — SIGNIFICANT CHANGE UP (ref 0.1–0.3)
LYMPHOCYTES # BLD AUTO: 1.38 K/UL — SIGNIFICANT CHANGE UP (ref 1.2–3.4)
LYMPHOCYTES # BLD AUTO: 20.5 % — SIGNIFICANT CHANGE UP (ref 20.5–51.1)
MAGNESIUM SERPL-MCNC: 1.9 MG/DL — SIGNIFICANT CHANGE UP (ref 1.8–2.4)
MCHC RBC-ENTMCNC: 30.8 PG — SIGNIFICANT CHANGE UP (ref 27–31)
MCHC RBC-ENTMCNC: 34.2 G/DL — SIGNIFICANT CHANGE UP (ref 32–37)
MCV RBC AUTO: 90.1 FL — SIGNIFICANT CHANGE UP (ref 80–94)
METHOD TYPE: SIGNIFICANT CHANGE UP
MONOCYTES # BLD AUTO: 0.66 K/UL — HIGH (ref 0.1–0.6)
MONOCYTES NFR BLD AUTO: 9.8 % — HIGH (ref 1.7–9.3)
NEUTROPHILS # BLD AUTO: 4.63 K/UL — SIGNIFICANT CHANGE UP (ref 1.4–6.5)
NEUTROPHILS NFR BLD AUTO: 69 % — SIGNIFICANT CHANGE UP (ref 42.2–75.2)
NRBC # BLD: 0 /100 WBCS — SIGNIFICANT CHANGE UP (ref 0–0)
ORGANISM # SPEC MICROSCOPIC CNT: SIGNIFICANT CHANGE UP
PLATELET # BLD AUTO: 226 K/UL — SIGNIFICANT CHANGE UP (ref 130–400)
POTASSIUM SERPL-MCNC: 4.5 MMOL/L — SIGNIFICANT CHANGE UP (ref 3.5–5)
POTASSIUM SERPL-SCNC: 4.5 MMOL/L — SIGNIFICANT CHANGE UP (ref 3.5–5)
PROT SERPL-MCNC: 6.4 G/DL — SIGNIFICANT CHANGE UP (ref 6–8)
RBC # BLD: 4.03 M/UL — LOW (ref 4.7–6.1)
RBC # FLD: 13.8 % — SIGNIFICANT CHANGE UP (ref 11.5–14.5)
SODIUM SERPL-SCNC: 132 MMOL/L — LOW (ref 135–146)
SPECIMEN SOURCE: SIGNIFICANT CHANGE UP
SPECIMEN SOURCE: SIGNIFICANT CHANGE UP
WBC # BLD: 6.72 K/UL — SIGNIFICANT CHANGE UP (ref 4.8–10.8)
WBC # FLD AUTO: 6.72 K/UL — SIGNIFICANT CHANGE UP (ref 4.8–10.8)

## 2021-07-06 PROCEDURE — 99232 SBSQ HOSP IP/OBS MODERATE 35: CPT

## 2021-07-06 PROCEDURE — 93010 ELECTROCARDIOGRAM REPORT: CPT

## 2021-07-06 RX ADMIN — GABAPENTIN 300 MILLIGRAM(S): 400 CAPSULE ORAL at 06:05

## 2021-07-06 RX ADMIN — Medication 1 TABLET(S): at 11:34

## 2021-07-06 RX ADMIN — Medication 1 MILLIGRAM(S): at 11:16

## 2021-07-06 RX ADMIN — MEROPENEM 100 MILLIGRAM(S): 1 INJECTION INTRAVENOUS at 22:06

## 2021-07-06 RX ADMIN — Medication 6: at 08:14

## 2021-07-06 RX ADMIN — INSULIN GLARGINE 15 UNIT(S): 100 INJECTION, SOLUTION SUBCUTANEOUS at 22:05

## 2021-07-06 RX ADMIN — Medication 6 UNIT(S): at 08:19

## 2021-07-06 RX ADMIN — PREGABALIN 1000 MICROGRAM(S): 225 CAPSULE ORAL at 11:15

## 2021-07-06 RX ADMIN — Medication 60 MILLIGRAM(S): at 06:05

## 2021-07-06 RX ADMIN — SODIUM CHLORIDE 1 GRAM(S): 9 INJECTION INTRAMUSCULAR; INTRAVENOUS; SUBCUTANEOUS at 11:35

## 2021-07-06 RX ADMIN — GABAPENTIN 300 MILLIGRAM(S): 400 CAPSULE ORAL at 17:47

## 2021-07-06 RX ADMIN — OLANZAPINE 20 MILLIGRAM(S): 15 TABLET, FILM COATED ORAL at 22:06

## 2021-07-06 RX ADMIN — MEROPENEM 100 MILLIGRAM(S): 1 INJECTION INTRAVENOUS at 14:24

## 2021-07-06 RX ADMIN — ENOXAPARIN SODIUM 40 MILLIGRAM(S): 100 INJECTION SUBCUTANEOUS at 11:15

## 2021-07-06 RX ADMIN — Medication 25 MILLIGRAM(S): at 06:05

## 2021-07-06 RX ADMIN — PANTOPRAZOLE SODIUM 40 MILLIGRAM(S): 20 TABLET, DELAYED RELEASE ORAL at 06:05

## 2021-07-06 RX ADMIN — Medication 6 UNIT(S): at 16:41

## 2021-07-06 RX ADMIN — ATORVASTATIN CALCIUM 10 MILLIGRAM(S): 80 TABLET, FILM COATED ORAL at 22:05

## 2021-07-06 RX ADMIN — SENNA PLUS 2 TABLET(S): 8.6 TABLET ORAL at 22:06

## 2021-07-06 RX ADMIN — CHLORHEXIDINE GLUCONATE 1 APPLICATION(S): 213 SOLUTION TOPICAL at 06:58

## 2021-07-06 RX ADMIN — MEROPENEM 100 MILLIGRAM(S): 1 INJECTION INTRAVENOUS at 06:04

## 2021-07-06 RX ADMIN — Medication 25 MILLIGRAM(S): at 17:47

## 2021-07-06 RX ADMIN — Medication 150 MILLIGRAM(S): at 22:06

## 2021-07-06 RX ADMIN — Medication 6 UNIT(S): at 11:34

## 2021-07-06 RX ADMIN — OLANZAPINE 5 MILLIGRAM(S): 15 TABLET, FILM COATED ORAL at 11:35

## 2021-07-06 NOTE — PROGRESS NOTE ADULT - SUBJECTIVE AND OBJECTIVE BOX
Patient is a 81y old  Male who presents with a chief complaint of nausea and vomiting (05 Jul 2021 12:50)    Patient seen and examined at bedside.  Patient denies any chest pain or shortness of breath.     ALLERGIES:  No Known Allergies    MEDICATIONS:  atorvastatin 10 milliGRAM(s) Oral at bedtime  chlorhexidine 4% Liquid 1 Application(s) Topical <User Schedule>  cyanocobalamin 1000 MICROGram(s) Oral daily  dextrose 40% Gel 15 Gram(s) Oral once  dextrose 5%. 1000 milliLiter(s) IV Continuous <Continuous>  dextrose 5%. 1000 milliLiter(s) IV Continuous <Continuous>  dextrose 50% Injectable 25 Gram(s) IV Push once  dextrose 50% Injectable 12.5 Gram(s) IV Push once  dextrose 50% Injectable 25 Gram(s) IV Push once  enoxaparin Injectable 40 milliGRAM(s) SubCutaneous daily  folic acid 1 milliGRAM(s) Oral daily  gabapentin 300 milliGRAM(s) Oral every 12 hours  glucagon  Injectable 1 milliGRAM(s) IntraMuscular once  insulin glargine Injectable (LANTUS) 15 Unit(s) SubCutaneous at bedtime  insulin lispro (ADMELOG) corrective regimen sliding scale   SubCutaneous three times a day before meals  insulin lispro Injectable (ADMELOG) 6 Unit(s) SubCutaneous three times a day before meals  lactobacillus acidophilus 1 Tablet(s) Oral daily  meropenem  IVPB 1000 milliGRAM(s) IV Intermittent every 8 hours  metoprolol tartrate 25 milliGRAM(s) Oral two times a day  NIFEdipine XL 60 milliGRAM(s) Oral daily  OLANZapine 20 milliGRAM(s) Oral at bedtime  OLANZapine 5 milliGRAM(s) Oral daily  ondansetron Injectable 4 milliGRAM(s) IV Push every 4 hours PRN  pantoprazole    Tablet 40 milliGRAM(s) Oral before breakfast  senna 2 Tablet(s) Oral at bedtime  sodium chloride 1 Gram(s) Oral daily  traZODone 150 milliGRAM(s) Oral at bedtime    Vital Signs Last 24 Hrs  T(F): 96.5 (06 Jul 2021 14:22), Max: 96.9 (06 Jul 2021 05:28)  HR: 65 (06 Jul 2021 14:22) (65 - 82)  BP: 124/84 (06 Jul 2021 14:22) (124/84 - 160/72)  RR: 18 (06 Jul 2021 05:28) (18 - 18)  SpO2: --  I&O's Summary    05 Jul 2021 07:01  -  06 Jul 2021 07:00  --------------------------------------------------------  IN: 360 mL / OUT: 5250 mL / NET: -4890 mL        PHYSICAL EXAM:  General: NAD, A/O x 1  ENT: MMM  Neck: Supple, No JVD  Lungs: Clear to auscultation bilaterally, no crackles or wheezing   Cardio: RRR, S1/S2, No murmurs  Abdomen: Soft, Nontender, Nondistended; Bowel sounds present  Extremities: No cyanosis, No edema    LABS:                        12.4   6.72  )-----------( 226      ( 06 Jul 2021 04:30 )             36.3     07-06    132  |  95  |  10  ----------------------------<  98  4.5   |  26  |  0.9    Ca    8.9      06 Jul 2021 04:30  Mg     1.9     07-06    TPro  6.4  /  Alb  3.9  /  TBili  <0.2  /  DBili  x   /  AST  19  /  ALT  13  /  AlkPhos  98  07-06    eGFR if Non African American: 81 mL/min/1.73M2 (07-06-21 @ 04:30)  eGFR if African American: 94 mL/min/1.73M2 (07-06-21 @ 04:30)      Lactate, Blood: 1.1 mmol/L (07-03 @ 20:44)    CARDIAC MARKERS ( 05 Jul 2021 22:27 )  x     / 0.02 ng/mL / x     / x     / x      CARDIAC MARKERS ( 05 Jul 2021 16:36 )  x     / 0.02 ng/mL / x     / x     / x                        POCT Blood Glucose.: 121 mg/dL (06 Jul 2021 11:21)  POCT Blood Glucose.: 175 mg/dL (06 Jul 2021 09:28)  POCT Blood Glucose.: 530 mg/dL (06 Jul 2021 08:09)  POCT Blood Glucose.: 119 mg/dL (05 Jul 2021 16:38)          Culture - Blood (collected 03 Jul 2021 11:57)  Source: .Blood None  Gram Stain (04 Jul 2021 11:26):    Growth in anaerobic bottle: Gram Negative Rods  Final Report (06 Jul 2021 09:48):    Growth in anaerobic bottle: Escherichia coli ESBL    MDRO detected in BCID PCR, resistance marker = CTX-M (ESBL)    ***Blood Panel PCR results on this specimen are available    approximately 3 hours after the Gram stain result.***    Gram stain, PCR, and/or culture results may not always    correspond due to difference in methodologies.    ************************************************************    This PCR assay was performed by multiplex PCR. This    Assay tests for 66 bacterial and resistance gene targets.    Please refer to the [facility] test directory    at [email or web address] for details.  Organism: Blood Culture PCR  Escherichia coli ESBL (06 Jul 2021 09:48)  Organism: Escherichia coli ESBL (06 Jul 2021 09:48)      -  Amikacin: S <=16      -  Ampicillin: R >16 These ampicillin results predict results for amoxicillin      -  Ampicillin/Sulbactam: R 16/8 Enterobacter, Citrobacter, and Serratia may develop resistance during prolonged therapy (3-4 days)      -  Aztreonam: R >16      -  Cefazolin: R >16 Enterobacter, Citrobacter, and Serratia may develop resistance during prolonged therapy (3-4 days)      -  Cefepime: R >16      -  Cefoxitin: S <=8      -  Ceftriaxone: R >32 Enterobacter, Citrobacter, and Serratia may develop resistance during prolonged therapy      -  Ciprofloxacin: R >2      -  Ertapenem: S <=0.5      -  Gentamicin: S <=2      -  Imipenem: S <=1      -  Levofloxacin: R >4      -  Meropenem: S <=1      -  Piperacillin/Tazobactam: R <=8      -  Tobramycin: S <=2      -  Trimethoprim/Sulfamethoxazole: S 2/38      Method Type: IZABELLA  Organism: Blood Culture PCR (06 Jul 2021 09:48)      -  ESBL: Detec      -  Escherichia coli: Detec      Method Type: PCR    Culture - Urine (collected 03 Jul 2021 01:02)  Source: .Urine Clean Catch (Midstream)  Preliminary Report (05 Jul 2021 20:35):    >100,000 CFU/ml Escherichia coli    >100,000 CFU/ml Providencia rettgeri      COVID-19 PCR: NotDetec (07-03-21 @ 01:46)      RADIOLOGY & ADDITIONAL TESTS:    Care Discussed with Consultants/Other Providers:

## 2021-07-06 NOTE — PROGRESS NOTE ADULT - ASSESSMENT
ASSESSMENT   81 year old man with PMHx of schizophrenia, COPD, hyperlipidemia, non-Hodgkin lymphoma in remission, Type 2DM (non-insulin dependent) with neuropathy, chronic indwelling Bains (chronic retention) BIBEMS from [facility] for nausea/vomiting    IMPRESSION  #Sepsis on admission (WBC>12, Pulse>90) secondary to UTI with chronic indwelling bains with ESBL E coli bacteremia  - CT Abdomen and Pelvis w/ IV Cont (07.03.21 @ 03:39): Interval development of lobulation and cystic areas within the prostate. Correlate with PSA levels and may obtain MRI of the prostate with and without contrast for further evaluation. Bains balloon inflated within the penile urethra, recommend repositioning. Mild to moderate bilateral hydroureteronephrosis with no distal obstructing ureteral lesion or stone. Air is notedwithin the urinary bladder, likely secondary to instrumentation. However infectious process is not excluded.  - Blood Cx 7/3 ESBL E coli     #Bilateral Hydronephrosis  #Prostate cyts  #Hyponatremia    #NH Lymphoma in remission  Schizophrenia  #DM II  #Obesity BMI (kg/m2): 24.2  #Abx allergy: NKDA    RECOMMENDATIONS  - continue meropenem 1g q 8 hours while here  - complete 7 day course (7/3-7/9)  - can finish with PO bactrim 1DS BID  - bains management per urology    This is a preliminary incomplete pended note, all final recommendations to follow after interview and examination of the patient.    Please call or message on [employer] Teams if with any questions.  Spectra [phone] ASSESSMENT   81 year old man with PMHx of schizophrenia, COPD, hyperlipidemia, non-Hodgkin lymphoma in remission, Type 2DM (non-insulin dependent) with neuropathy, chronic indwelling Bains (chronic retention) BIBEMS from [facility] for nausea/vomiting    IMPRESSION  #Sepsis on admission (WBC>12, Pulse>90) secondary to UTI with chronic indwelling bains with ESBL E coli bacteremia  - CT Abdomen and Pelvis w/ IV Cont (07.03.21 @ 03:39): Interval development of lobulation and cystic areas within the prostate. Correlate with PSA levels and may obtain MRI of the prostate with and without contrast for further evaluation. Bains balloon inflated within the penile urethra, recommend repositioning. Mild to moderate bilateral hydroureteronephrosis with no distal obstructing ureteral lesion or stone. Air is notedwithin the urinary bladder, likely secondary to instrumentation. However infectious process is not excluded.  - Blood Cx 7/3 ESBL E coli     #Bilateral Hydronephrosis  #Prostate cyts  #Hyponatremia    #NH Lymphoma in remission  Schizophrenia  #DM II  #Obesity BMI (kg/m2): 24.2  #Abx allergy: NKDA    RECOMMENDATIONS  - continue meropenem 1g q 8 hours while here  - complete 7 day course (7/3-7/9)  - can finish with PO bactrim 1DS BID  - bains management per urology    Please call or message on [employer] Teams if with any questions.  Spectra [phone]

## 2021-07-06 NOTE — PROGRESS NOTE ADULT - ASSESSMENT
Mr. Laguna ia 81 year old man with PMHx of schizophrenia, COPD, hyperlipidemia, non-Hodgkin lymphoma in remission, Type 2DM (non-insulin dependent) with neuropathy, chronic indwelling Bains (chronic retention) BIBEMS from [facility] for nausea/vomiting. Pt's status has improved, medically stable for transfer to floor.     #Nausea/vomiting likely secondary to septicemia 2/2 urinary source, chronic indwelling bains for retention   -recently discharged and was seen by Urology on 7/1 for difficult Bains placement after patient reported "bains came out" . Patient had a 14 fr catheter placed draining well and was told to follow-up in Urology clinic for further management.  - wbc 20k with bandemia on admission, Normotensive, + UA; s/p Rocephin 1 gm   - Patient with chronic indwelling Bains (chronic urinary retention)  - had several admissions in the past for ESBL pyelonephritis; last discharged on ertapenem 1 gm 10/24  - Repeat RBUS in 48 -72 hours to assess hydro  - Blood culture:  7/3: ESBL E. Coli  - Urine culture: 7/3: E. Coli   - c/w meropenem 1 g TID, d/c Vancomycin as per ID as BCx negative for MRSA   - Monitor Urine output  - Analgesics for pain control prn/ zofran prn    # Hyponatremia - possibly SIADH secondary to antipsychotic medications  vs hypovolemia from extrarenal loss (nausea/vomiting)  patient with history of hyponatremia, on sodium tablets at home  - Sodium, Serum: 132 mmol/L (07.05.21 @ 06:35)  - will restart home NaCl 1Gram daily    #elevated troponin  - elevated trop .10 on admission trended up to 0.11; denies chest pain  - EKG on 7/2 compared to 7/5 has new flipped Twaves  - cardiology consulted     #HTN, poorly controlled   - improved BP noted, continue to monitor   -- increasing Nifedipine ER  to 60 mg daily  - c/w metoprolol tartrate 25 BID    #Schizophrenia  - c/w olanzapine and trazodone    #HLD  - c/w atorvastatin 10    #Type II DM, with neuropathy  - FS AC/HS  - on metformin 500 QD at home  - c/w gabapentin 300 mg BID    #Macrocytic Anemia with B12 Deficiency  - c/w B12, folate supplement    #COPD  - c/w albuterol prn     DVT ppx: Lovenox  GI ppx: Not indicated  Diet: NPO until further recs; fluid restriction   Activity: increase as tolerated  Code status: full code  Dispo: [facility] back to [facility] once cardiology clears, will be discharged with antibiotics     Sister Fatmata -[phone] notified of status 7/6

## 2021-07-06 NOTE — PROGRESS NOTE ADULT - SUBJECTIVE AND OBJECTIVE BOX
HERNANDO, ADA  81y, Male  Allergy: No Known Allergies      LOS  3d    CHIEF COMPLAINT: nausea and vomiting (05 Jul 2021 12:50)      INTERVAL EVENTS/HPI  - No acute events overnight  - T(F): , Max: 98.3 (07-05-21 @ 12:18)  - Denies any worsening symptoms  - Tolerating medication  - WBC Count: 6.72 (07-06-21 @ 04:30)  WBC Count: 6.91 (07-05-21 @ 06:35)     - Creatinine, Serum: 0.9 (07-06-21 @ 04:30)  Creatinine, Serum: 0.7 (07-05-21 @ 06:35)       ROS  General: Denies rigors, nightsweats  HEENT: Denies headache, rhinorrhea, sore throat, eye pain  CV: Denies CP, palpitations  PULM: Denies wheezing, hemoptysis  GI: Denies hematemesis, hematochezia, melena  : Denies discharge, hematuria  MSK: Denies arthralgias, myalgias  SKIN: Denies rash, lesions  NEURO: Denies paresthesias, weakness  PSYCH: Denies depression, anxiety    VITALS:  T(F): 96.9, Max: 98.3 (07-05-21 @ 12:18)  HR: 80  BP: 144/70  RR: 18Vital Signs Last 24 Hrs  T(C): 36.1 (06 Jul 2021 05:28), Max: 36.8 (05 Jul 2021 12:18)  T(F): 96.9 (06 Jul 2021 05:28), Max: 98.3 (05 Jul 2021 12:18)  HR: 80 (06 Jul 2021 05:28) (80 - 94)  BP: 144/70 (06 Jul 2021 05:28) (141/63 - 178/84)  BP(mean): --  RR: 18 (06 Jul 2021 05:28) (18 - 18)  SpO2: --    PHYSICAL EXAM:  Gen: NAD, resting in bed  HEENT: Normocephalic, atraumatic  Neck: supple, no lymphadenopathy  CV: Regular rate & regular rhythm  Lungs: decreased BS at bases, no fremitus  Abdomen: Soft, BS present  Ext: Warm, well perfused  Neuro: non focal, awake  Skin: no rash, no erythema  Lines: no phlebitis    FH: Non-contributory  Social Hx: Non-contributory    TESTS & MEASUREMENTS:                        12.4   6.72  )-----------( 226      ( 06 Jul 2021 04:30 )             36.3     07-06    132<L>  |  95<L>  |  10  ----------------------------<  98  4.5   |  26  |  0.9    Ca    8.9      06 Jul 2021 04:30  Mg     1.9     07-06    TPro  6.4  /  Alb  3.9  /  TBili  <0.2  /  DBili  x   /  AST  19  /  ALT  13  /  AlkPhos  98  07-06    eGFR if Non African American: 81 mL/min/1.73M2 (07-06-21 @ 04:30)  eGFR if African American: 94 mL/min/1.73M2 (07-06-21 @ 04:30)    LIVER FUNCTIONS - ( 06 Jul 2021 04:30 )  Alb: 3.9 g/dL / Pro: 6.4 g/dL / ALK PHOS: 98 U/L / ALT: 13 U/L / AST: 19 U/L / GGT: x               Culture - Blood (collected 07-03-21 @ 11:57)  Source: .Blood None  Gram Stain (07-04-21 @ 11:26):    Growth in anaerobic bottle: Gram Negative Rods  Final Report (07-06-21 @ 09:48):    Growth in anaerobic bottle: Escherichia coli ESBL    MDRO detected in BCID PCR, resistance marker = CTX-M (ESBL)    ***Blood Panel PCR results on this specimen are available    approximately 3 hours after the Gram stain result.***    Gram stain, PCR, and/or culture results may not always    correspond due to difference in methodologies.    ************************************************************    This PCR assay was performed by multiplex PCR. This    Assay tests for 66 bacterial and resistance gene targets.    Please refer to the [facility] Labs test directory    at [email or web address] for details.  Organism: Blood Culture PCR  Escherichia coli ESBL (07-06-21 @ 09:48)  Organism: Escherichia coli ESBL (07-06-21 @ 09:48)      -  Amikacin: S <=16      -  Ampicillin: R >16 These ampicillin results predict results for amoxicillin      -  Ampicillin/Sulbactam: R 16/8 Enterobacter, Citrobacter, and Serratia may develop resistance during prolonged therapy (3-4 days)      -  Aztreonam: R >16      -  Cefazolin: R >16 Enterobacter, Citrobacter, and Serratia may develop resistance during prolonged therapy (3-4 days)      -  Cefepime: R >16      -  Cefoxitin: S <=8      -  Ceftriaxone: R >32 Enterobacter, Citrobacter, and Serratia may develop resistance during prolonged therapy      -  Ciprofloxacin: R >2      -  Ertapenem: S <=0.5      -  Gentamicin: S <=2      -  Imipenem: S <=1      -  Levofloxacin: R >4      -  Meropenem: S <=1      -  Piperacillin/Tazobactam: R <=8      -  Tobramycin: S <=2      -  Trimethoprim/Sulfamethoxazole: S 2/38      Method Type: IZABELLA  Organism: Blood Culture PCR (07-06-21 @ 09:48)      -  ESBL: Detec      -  Escherichia coli: Detec      Method Type: PCR    Culture - Urine (collected 07-03-21 @ 01:02)  Source: .Urine Clean Catch (Midstream)  Preliminary Report (07-05-21 @ 20:35):    >100,000 CFU/ml Escherichia coli    >100,000 CFU/ml Providencia rettgeri        Lactate, Blood: 1.1 mmol/L (07-03-21 @ 20:44)  Lactate, Blood: 2.1 mmol/L (07-03-21 @ 11:57)  Lactate, Blood: 3.3 mmol/L (07-02-21 @ 23:58)      INFECTIOUS DISEASES TESTING  Procalcitonin, Serum: 1.33 (07-03-21 @ 11:57)  MRSA PCR Result.: NotDetec (07-03-21 @ 08:39)  COVID-19 PCR: NotDetec (07-03-21 @ 01:46)  Rapid RVP Result: NotDetec (10-13-20 @ 01:52)      INFLAMMATORY MARKERS      RADIOLOGY & ADDITIONAL TESTS:  I have personally reviewed the last available Chest xray  CXR      CT      CARDIOLOGY TESTING  12 Lead ECG:   Ventricular Rate 82 BPM    Atrial Rate 82 BPM    P-R Interval 158 ms    QRS Duration 82 ms    Q-T Interval 410 ms    QTC Calculation(Bazett) 479 ms    P Axis 45 degrees    R Axis 40 degrees    T Axis 65 degrees    Diagnosis Line Normal sinus rhythmwith sinus arrhythmia  Minimal voltage criteria for LVH, may be normal variant  Marked T wave abnormality, consider anterolateral ischemia  Prolonged QT  Abnormal ECG    Confirmed by DAVID GARCIA MD ([id number]) on 7/5/2021 4:50:18 PM (07-05-21 @ 14:04)  12 Lead ECG:   Ventricular Rate 77 BPM    Atrial Rate 77 BPM    P-R Interval 190 ms    QRS Duration 82 ms    Q-T Interval 414 ms    QTC Calculation(Bazett) 468 ms    P Axis 73 degrees    R Axis 45 degrees    T Axis 24 degrees    Diagnosis Line Normal sinus rhythm  Baseline artifact  Voltage criteria for left ventricular hypertrophy  Abnormal ECG    Confirmed by DAVID GARCIA MD ([id number]) on 7/3/2021 12:02:03 PM (07-02-21 @ 23:46)      MEDICATIONS  atorvastatin 10 Oral at bedtime  chlorhexidine 4% Liquid 1 Topical <User Schedule>  cyanocobalamin 1000 Oral daily  dextrose 40% Gel 15 Oral once  dextrose 5%. 1000 IV Continuous <Continuous>  dextrose 5%. 1000 IV Continuous <Continuous>  dextrose 50% Injectable 25 IV Push once  dextrose 50% Injectable 12.5 IV Push once  dextrose 50% Injectable 25 IV Push once  enoxaparin Injectable 40 SubCutaneous daily  folic acid 1 Oral daily  gabapentin 300 Oral every 12 hours  glucagon  Injectable 1 IntraMuscular once  insulin glargine Injectable (LANTUS) 15 SubCutaneous at bedtime  insulin lispro (ADMELOG) corrective regimen sliding scale  SubCutaneous three times a day before meals  insulin lispro Injectable (ADMELOG) 6 SubCutaneous three times a day before meals  lactobacillus acidophilus 1 Oral daily  meropenem  IVPB 1000 IV Intermittent every 8 hours  metoprolol tartrate 25 Oral two times a day  NIFEdipine XL 60 Oral daily  OLANZapine 20 Oral at bedtime  OLANZapine 5 Oral daily  pantoprazole    Tablet 40 Oral before breakfast  senna 2 Oral at bedtime  sodium chloride 1 Oral daily  traZODone 150 Oral at bedtime      WEIGHT  Weight (kg): 68.5 (07-03-21 @ 08:00)  Creatinine, Serum: 0.9 mg/dL (07-06-21 @ 04:30)      ANTIBIOTICS:  meropenem  IVPB 1000 milliGRAM(s) IV Intermittent every 8 hours      All available historical records have been reviewed

## 2021-07-06 NOTE — PROGRESS NOTE ADULT - SUBJECTIVE AND OBJECTIVE BOX
ADA VILLAGOMEZ 81y Male  MRN#: [id number]   Hospital Day: 3d    SUBJECTIVE  Patient is a 81y old Male who presents with a chief complaint of nausea and vomiting (05 Jul 2021 12:50)  Currently admitted to medicine with the primary diagnosis of Hyponatremia      INTERVAL HPI AND OVERNIGHT EVENTS:  Patient was examined and seen at bedside. This morning he is resting comfortably in bed and reports no issues or overnight events.    OBJECTIVE  PAST MEDICAL & SURGICAL HISTORY  Schizophrenia  Diabetes type 2, controlled  COPD (chronic obstructive pulmonary disease)  Dyslipidemia  Chronic retention of urine  Dyslipidemia  Encounter for screening colonoscopy    ALLERGIES:  No Known Allergies    MEDICATIONS:  STANDING MEDICATIONS  atorvastatin 10 milliGRAM(s) Oral at bedtime  chlorhexidine 4% Liquid 1 Application(s) Topical <User Schedule>  cyanocobalamin 1000 MICROGram(s) Oral daily  dextrose 40% Gel 15 Gram(s) Oral once  dextrose 5%. 1000 milliLiter(s) IV Continuous <Continuous>  dextrose 5%. 1000 milliLiter(s) IV Continuous <Continuous>  dextrose 50% Injectable 25 Gram(s) IV Push once  dextrose 50% Injectable 12.5 Gram(s) IV Push once  dextrose 50% Injectable 25 Gram(s) IV Push once  enoxaparin Injectable 40 milliGRAM(s) SubCutaneous daily  folic acid 1 milliGRAM(s) Oral daily  gabapentin 300 milliGRAM(s) Oral every 12 hours  glucagon  Injectable 1 milliGRAM(s) IntraMuscular once  insulin glargine Injectable (LANTUS) 15 Unit(s) SubCutaneous at bedtime  insulin lispro (ADMELOG) corrective regimen sliding scale   SubCutaneous three times a day before meals  insulin lispro Injectable (ADMELOG) 6 Unit(s) SubCutaneous three times a day before meals  lactobacillus acidophilus 1 Tablet(s) Oral daily  meropenem  IVPB 1000 milliGRAM(s) IV Intermittent every 8 hours  metoprolol tartrate 25 milliGRAM(s) Oral two times a day  NIFEdipine XL 60 milliGRAM(s) Oral daily  OLANZapine 20 milliGRAM(s) Oral at bedtime  OLANZapine 5 milliGRAM(s) Oral daily  pantoprazole    Tablet 40 milliGRAM(s) Oral before breakfast  senna 2 Tablet(s) Oral at bedtime  sodium chloride 1 Gram(s) Oral daily  traZODone 150 milliGRAM(s) Oral at bedtime    PRN MEDICATIONS  ondansetron Injectable 4 milliGRAM(s) IV Push every 4 hours PRN      VITAL SIGNS: Last 24 Hours  T(C): 36.1 (06 Jul 2021 05:28), Max: 36.8 (05 Jul 2021 12:18)  T(F): 96.9 (06 Jul 2021 05:28), Max: 98.3 (05 Jul 2021 12:18)  HR: 80 (06 Jul 2021 05:28) (80 - 94)  BP: 144/70 (06 Jul 2021 05:28) (141/63 - 192/83)  BP(mean): --  RR: 18 (06 Jul 2021 05:28) (18 - 18)  SpO2: --    LABS:                        12.4   6.72  )-----------( 226      ( 06 Jul 2021 04:30 )             36.3     07-05    132<L>  |  98  |  8<L>  ----------------------------<  89  4.0   |  25  |  0.7    Ca    8.2<L>      05 Jul 2021 06:35  Mg     1.9     07-05    TPro  5.6<L>  /  Alb  3.4<L>  /  TBili  <0.2  /  DBili  x   /  AST  16  /  ALT  7   /  AlkPhos  85  07-05          Troponin T, Serum: 0.02 ng/mL *H* (07-05-21 @ 22:27)  Troponin T, Serum: 0.02 ng/mL *H* (07-05-21 @ 16:36)      Culture - Blood (collected 03 Jul 2021 11:57)  Source: .Blood None  Gram Stain (04 Jul 2021 11:26):    Growth in anaerobic bottle: Gram Negative Rods  Preliminary Report (05 Jul 2021 11:18):    Growth in anaerobic bottle: Escherichia coli Susceptibility to follow.    MDRO detected in BCID PCR, resistance marker = CTX-M (ESBL)    ***Blood Panel PCR results on this specimen are available    approximately 3 hours after the Gram stain result.***    Gram stain, PCR, and/or culture results may not always    correspond due to difference in methodologies.    ************************************************************    This PCR assay was performed by multiplex PCR. This    Assay tests for 66 bacterial and resistance gene targets.    Please refer to the [facility] Labs test directory    at [email or web address] for details.  Organism: Blood Culture PCR  Escherichia coli ESBL (06 Jul 2021 08:23)  Organism: Escherichia coli ESBL (06 Jul 2021 08:23)  Organism: Blood Culture PCR (04 Jul 2021 13:52)      CARDIAC MARKERS ( 05 Jul 2021 22:27 )  x     / 0.02 ng/mL / x     / x     / x      CARDIAC MARKERS ( 05 Jul 2021 16:36 )  x     / 0.02 ng/mL / x     / x     / x        PHYSICAL EXAM:  CONSTITUTIONAL: No acute distress, well-developed, well-groomed, AAOx3  HEAD: Atraumatic, normocephalic  PULMONARY: Clear to auscultation bilaterally  CARDIOVASCULAR: Regular rate and rhythm  GASTROINTESTINAL: Soft, non-tender, non-distended  MUSCULOSKELETAL: no edema  NEUROLOGY: non-focal  SKIN: No rashes or lesions    ASSESSMENT & PLAN  Mr. Villagomez ia 81 year old man with PMHx of schizophrenia, COPD, hyperlipidemia, non-Hodgkin lymphoma in remission, Type 2DM (non-insulin dependent) with neuropathy, chronic indwelling Bains (chronic retention) BIBEMS from [facility] for nausea/vomiting. Pt's status has improved, medically stable for transfer to floor.     #Nausea/vomiting likely secondary to Urosepsis, chronic indwelling bains for retention   -recently discharged and was seen by Urology on 7/1 for difficult Bains placement after patient reported "bains came out" . Patient had a 14 fr catheter placed draining well and was told to follow-up in Urology clinic for further management.  - wbc 20k with bandemia on admission, Normotensive, + UA; s/p Rocephin 1 gm   - Patient with chronic indwelling Bains (chronic urinary retention)  - had several admissions in the past for ESBL pyelonephritis; last discharged on ertapenem 1 gm 10/24  - Urology consult appreciated: aspirated 50cc of cloudy yellow urine Bains now draining to gravity with 150cc of cloudy yellow urine noted in bag.   - Continue Bains care   - Repeat RBUS in 48 -72 hours to assess hydro  - Blood culture:  7/3: ESBL E. Coli  - Urine culture: 7/3: E. Coli   - c/w meropenem 1 g TID --> follow up ID recomendations   - Monitor Urine output  - ID c/s for Abx recommendation   - Analgesics for pain control prn/ zofran prn    # Hyponatremia - possibly SIADH secondary to antipsychotic medications  vs hypovolemia from extrarenal loss (nausea/vomiting)  patient with history of hyponatremia, on sodium tablets at home  - possibly 2/2 Trazadone use   - Sodium, Serum: 132 mmol/L (07.05.21 @ 06:35)  - no longer on IVF  - US Renal: Right renal cysts measuring up to 5.6 x 5.6 cm, stable since the prior exam. 1.9 cm left renal cyst. Urinary bladder not evaluated, as Bains catheter is present  - Fluid restriction  - start home medication sodium chloride 1 gram     #elevated troponin  - elevated trop .10 on admission --> 0.11 --> Trend troponin  - denies chest pain  - EKG on admission: no ischemic changes, repeat EKG today   - trend cardiac enzymes and serial EKG    #HTN, poorly controlled   - increasing Nifedipine ER  to 60 mg daily  - c/w metoprolol tartrate 25 BID    #Schizophrenia  - c/w olanzapine and trazodone    #HLD  - c/w atorvastatin 10    #Type II DM, with neuropathy  - FS AC/HS  - on metformin 500 QD at home  - c/w gabapentin 300 mg BID    #Macrocytic Anemia with B12 Deficiency  - c/w B12, folate supplement    #COPD  - c/w albuterol prn     DVT ppx: Lovenox  GI ppx: Not indicated  Diet: NPO until further recs; fluid restriction   Activity: increase as tolerated  Code status: full code  Dispo: acute  ADA VILLAGOMEZ 81y Male  MRN#: [id number]   Hospital Day: 3d    SUBJECTIVE  Patient is a 81y old Male who presents with a chief complaint of nausea and vomiting (05 Jul 2021 12:50)  Currently admitted to medicine with the primary diagnosis of Hyponatremia      INTERVAL HPI AND OVERNIGHT EVENTS:  Patient was examined and seen at bedside. This morning he is resting comfortably in bed and reports no issues or overnight events.    OBJECTIVE  PAST MEDICAL & SURGICAL HISTORY  Schizophrenia  Diabetes type 2, controlled  COPD (chronic obstructive pulmonary disease)  Dyslipidemia  Chronic retention of urine  Dyslipidemia  Encounter for screening colonoscopy    ALLERGIES:  No Known Allergies    MEDICATIONS:  STANDING MEDICATIONS  atorvastatin 10 milliGRAM(s) Oral at bedtime  chlorhexidine 4% Liquid 1 Application(s) Topical <User Schedule>  cyanocobalamin 1000 MICROGram(s) Oral daily  dextrose 40% Gel 15 Gram(s) Oral once  dextrose 5%. 1000 milliLiter(s) IV Continuous <Continuous>  dextrose 5%. 1000 milliLiter(s) IV Continuous <Continuous>  dextrose 50% Injectable 25 Gram(s) IV Push once  dextrose 50% Injectable 12.5 Gram(s) IV Push once  dextrose 50% Injectable 25 Gram(s) IV Push once  enoxaparin Injectable 40 milliGRAM(s) SubCutaneous daily  folic acid 1 milliGRAM(s) Oral daily  gabapentin 300 milliGRAM(s) Oral every 12 hours  glucagon  Injectable 1 milliGRAM(s) IntraMuscular once  insulin glargine Injectable (LANTUS) 15 Unit(s) SubCutaneous at bedtime  insulin lispro (ADMELOG) corrective regimen sliding scale   SubCutaneous three times a day before meals  insulin lispro Injectable (ADMELOG) 6 Unit(s) SubCutaneous three times a day before meals  lactobacillus acidophilus 1 Tablet(s) Oral daily  meropenem  IVPB 1000 milliGRAM(s) IV Intermittent every 8 hours  metoprolol tartrate 25 milliGRAM(s) Oral two times a day  NIFEdipine XL 60 milliGRAM(s) Oral daily  OLANZapine 20 milliGRAM(s) Oral at bedtime  OLANZapine 5 milliGRAM(s) Oral daily  pantoprazole    Tablet 40 milliGRAM(s) Oral before breakfast  senna 2 Tablet(s) Oral at bedtime  sodium chloride 1 Gram(s) Oral daily  traZODone 150 milliGRAM(s) Oral at bedtime    PRN MEDICATIONS  ondansetron Injectable 4 milliGRAM(s) IV Push every 4 hours PRN    VITAL SIGNS: Last 24 Hours  T(C): 36.1 (06 Jul 2021 05:28), Max: 36.8 (05 Jul 2021 12:18)  T(F): 96.9 (06 Jul 2021 05:28), Max: 98.3 (05 Jul 2021 12:18)  HR: 80 (06 Jul 2021 05:28) (80 - 94)  BP: 144/70 (06 Jul 2021 05:28) (141/63 - 192/83)  BP(mean): --  RR: 18 (06 Jul 2021 05:28) (18 - 18)  SpO2: --    LABS:                        12.4   6.72  )-----------( 226      ( 06 Jul 2021 04:30 )             36.3     07-05    132<L>  |  98  |  8<L>  ----------------------------<  89  4.0   |  25  |  0.7    Ca    8.2<L>      05 Jul 2021 06:35  Mg     1.9     07-05    TPro  5.6<L>  /  Alb  3.4<L>  /  TBili  <0.2  /  DBili  x   /  AST  16  /  ALT  7   /  AlkPhos  85  07-05    Troponin T, Serum: 0.02 ng/mL *H* (07-05-21 @ 22:27)  Troponin T, Serum: 0.02 ng/mL *H* (07-05-21 @ 16:36)    Culture - Blood (collected 03 Jul 2021 11:57)  Source: .Blood None  Gram Stain (04 Jul 2021 11:26):    Growth in anaerobic bottle: Gram Negative Rods  Preliminary Report (05 Jul 2021 11:18):    Growth in anaerobic bottle: Escherichia coli Susceptibility to follow.    MDRO detected in BCID PCR, resistance marker = CTX-M (ESBL)    ***Blood Panel PCR results on this specimen are available    approximately 3 hours after the Gram stain result.***    Gram stain, PCR, and/or culture results may not always    correspond due to difference in methodologies.    ************************************************************    This PCR assay was performed by multiplex PCR. This    Assay tests for 66 bacterial and resistance gene targets.    Please refer to the [facility] Labs test directory    at [email or web address] for details.  Organism: Blood Culture PCR  Escherichia coli ESBL (06 Jul 2021 08:23)  Organism: Escherichia coli ESBL (06 Jul 2021 08:23)  Organism: Blood Culture PCR (04 Jul 2021 13:52)      CARDIAC MARKERS ( 05 Jul 2021 22:27 )  x     / 0.02 ng/mL / x     / x     / x      CARDIAC MARKERS ( 05 Jul 2021 16:36 )  x     / 0.02 ng/mL / x     / x     / x        PHYSICAL EXAM:  CONSTITUTIONAL: No acute distress, well-developed, well-groomed, AAOx3  HEAD: Atraumatic, normocephalic  PULMONARY: Clear to auscultation bilaterally  CARDIOVASCULAR: Regular rate and rhythm  GASTROINTESTINAL: Soft, non-tender, non-distended  MUSCULOSKELETAL: no edema  NEUROLOGY: non-focal  SKIN: No rashes or lesions    ASSESSMENT & PLAN  Mr. Villagomez ia 81 year old man with PMHx of schizophrenia, COPD, hyperlipidemia, non-Hodgkin lymphoma in remission, Type 2DM (non-insulin dependent) with neuropathy, chronic indwelling Bains (chronic retention) BIBEMS from [facility] for nausea/vomiting. Pt's status has improved, medically stable for transfer to floor.     #Nausea/vomiting likely secondary to Urosepsis, chronic indwelling bains for retention   -recently discharged and was seen by Urology on 7/1 for difficult Bains placement after patient reported "bains came out" . Patient had a 14 fr catheter placed draining well and was told to follow-up in Urology clinic for further management.  - wbc 20k with bandemia on admission, Normotensive, + UA; s/p Rocephin 1 gm   - Patient with chronic indwelling Bains (chronic urinary retention)  - had several admissions in the past for ESBL pyelonephritis; last discharged on ertapenem 1 gm 10/24  - Urology consult appreciated: aspirated 50cc of cloudy yellow urine Bains now draining to gravity with 150cc of cloudy yellow urine noted in bag.   - Continue Bains care   - Repeat RBUS in 48 -72 hours to assess hydro  - Blood culture:  7/3: ESBL E. Coli  - Urine culture: 7/3: E. Coli   - c/w meropenem 1 g TID --> follow up ID recomendations   - Monitor Urine output  - ID c/s for Abx recommendation   - Analgesics for pain control prn/ zofran prn    # Hyponatremia - possibly SIADH secondary to antipsychotic medications  vs hypovolemia from extrarenal loss (nausea/vomiting)  patient with history of hyponatremia, on sodium tablets at home  - possibly 2/2 Trazadone use   - Sodium, Serum: 132  (7/5), 132 (7/6)  - US Renal: Right renal cysts measuring up to 5.6 x 5.6 cm, stable since the prior exam. 1.9 cm left renal cyst. Urinary bladder not evaluated, as Bains catheter is present  - Fluid restriction  - start home medication sodium chloride 1 gram     #elevated troponin  - elevated trop .10 on admission --> 0.11 > 0.02 > 0.02   - denies chest pain  - EKG on admission: no ischemic changes, repeat EKG today   - trend cardiac enzymes and serial EKG    #HTN, poorly controlled   - increasing Nifedipine ER  to 60 mg daily  - c/w metoprolol tartrate 25 BID    #Schizophrenia  - c/w olanzapine and trazodone    #HLD  - c/w atorvastatin 10    #Type II DM, with neuropathy  - FS AC/HS  - c/w lantus 15 + 6/6/6 with sliding scale   - on metformin 500 QD at home  - c/w gabapentin 300 mg BID    #Macrocytic Anemia with B12 Deficiency  - c/w B12, folate supplement    #COPD  - c/w albuterol prn     DVT ppx: Lovenox  GI ppx: Not indicated  Diet: dysphagia III  Activity: increase as tolerated  Code status: full code  Dispo: acute

## 2021-07-06 NOTE — PROGRESS NOTE ADULT - TIME BILLING
I have personally seen and examined this patient.    I have reviewed all pertinent clinical information and reviewed all relevant imaging and diagnostic studies personally.   I counseled the patient about diagnostic testing and treatment plan. All questions were answered.   I discussed recommendations with the primary team.
as per PA note    82yo Male with pmh of schizophrenia, COPD, hyperlipidemia, non-Hodgkin lymphoma in remission, Type 2DM (non-insulin dependent) with neuropathy, chronic indwelling Bains (chronic retention) a/w hyponatremia - pt stable, bains draining clear yellow urine    Plan:  repeat RBUS on 7/3 shows right renal cysts measuring up to 5.6 x 5.6 cm, stable since the prior exam. 1.9 cm left renal cyst. no mass hydro, or calculi noted b/l. urinary bladder not evaluated, as Bains catheter is present  continue bains care  urine cultures preliminary results show gram negative rods   continue IV abx as per ID recommendations   Cr stable, 0.8  please make sure bains secured and not under tension   no further acute Urologic intervention at this time     I agree with the assessment and plan as outlined
I have personally seen and examined this patient.    I have reviewed all pertinent clinical information and reviewed all relevant imaging and diagnostic studies personally.   I counseled the patient about diagnostic testing and treatment plan. All questions were answered.   I discussed recommendations with the primary team.

## 2021-07-07 LAB
ALBUMIN SERPL ELPH-MCNC: 3.7 G/DL — SIGNIFICANT CHANGE UP (ref 3.5–5.2)
ALP SERPL-CCNC: 99 U/L — SIGNIFICANT CHANGE UP (ref 30–115)
ALT FLD-CCNC: 13 U/L — SIGNIFICANT CHANGE UP (ref 0–41)
ANION GAP SERPL CALC-SCNC: 12 MMOL/L — SIGNIFICANT CHANGE UP (ref 7–14)
AST SERPL-CCNC: 21 U/L — SIGNIFICANT CHANGE UP (ref 0–41)
BASOPHILS # BLD AUTO: 0.01 K/UL — SIGNIFICANT CHANGE UP (ref 0–0.2)
BASOPHILS NFR BLD AUTO: 0.2 % — SIGNIFICANT CHANGE UP (ref 0–1)
BILIRUB SERPL-MCNC: <0.2 MG/DL — SIGNIFICANT CHANGE UP (ref 0.2–1.2)
BUN SERPL-MCNC: 13 MG/DL — SIGNIFICANT CHANGE UP (ref 10–20)
CALCIUM SERPL-MCNC: 8.9 MG/DL — SIGNIFICANT CHANGE UP (ref 8.5–10.1)
CHLORIDE SERPL-SCNC: 94 MMOL/L — LOW (ref 98–110)
CO2 SERPL-SCNC: 25 MMOL/L — SIGNIFICANT CHANGE UP (ref 17–32)
CREAT SERPL-MCNC: 0.7 MG/DL — SIGNIFICANT CHANGE UP (ref 0.7–1.5)
EOSINOPHIL # BLD AUTO: 0.04 K/UL — SIGNIFICANT CHANGE UP (ref 0–0.7)
EOSINOPHIL NFR BLD AUTO: 0.8 % — SIGNIFICANT CHANGE UP (ref 0–8)
GLUCOSE BLDC GLUCOMTR-MCNC: 137 MG/DL — HIGH (ref 70–99)
GLUCOSE BLDC GLUCOMTR-MCNC: 159 MG/DL — HIGH (ref 70–99)
GLUCOSE BLDC GLUCOMTR-MCNC: 88 MG/DL — SIGNIFICANT CHANGE UP (ref 70–99)
GLUCOSE BLDC GLUCOMTR-MCNC: 97 MG/DL — SIGNIFICANT CHANGE UP (ref 70–99)
GLUCOSE SERPL-MCNC: 94 MG/DL — SIGNIFICANT CHANGE UP (ref 70–99)
HCT VFR BLD CALC: 36.9 % — LOW (ref 42–52)
HGB BLD-MCNC: 12.5 G/DL — LOW (ref 14–18)
IMM GRANULOCYTES NFR BLD AUTO: 0.2 % — SIGNIFICANT CHANGE UP (ref 0.1–0.3)
LYMPHOCYTES # BLD AUTO: 1.39 K/UL — SIGNIFICANT CHANGE UP (ref 1.2–3.4)
LYMPHOCYTES # BLD AUTO: 29.5 % — SIGNIFICANT CHANGE UP (ref 20.5–51.1)
MAGNESIUM SERPL-MCNC: 2 MG/DL — SIGNIFICANT CHANGE UP (ref 1.8–2.4)
MCHC RBC-ENTMCNC: 31.3 PG — HIGH (ref 27–31)
MCHC RBC-ENTMCNC: 33.9 G/DL — SIGNIFICANT CHANGE UP (ref 32–37)
MCV RBC AUTO: 92.3 FL — SIGNIFICANT CHANGE UP (ref 80–94)
MONOCYTES # BLD AUTO: 0.49 K/UL — SIGNIFICANT CHANGE UP (ref 0.1–0.6)
MONOCYTES NFR BLD AUTO: 10.4 % — HIGH (ref 1.7–9.3)
NEUTROPHILS # BLD AUTO: 2.77 K/UL — SIGNIFICANT CHANGE UP (ref 1.4–6.5)
NEUTROPHILS NFR BLD AUTO: 58.9 % — SIGNIFICANT CHANGE UP (ref 42.2–75.2)
NRBC # BLD: 0 /100 WBCS — SIGNIFICANT CHANGE UP (ref 0–0)
PLATELET # BLD AUTO: 124 K/UL — LOW (ref 130–400)
POTASSIUM SERPL-MCNC: 4.9 MMOL/L — SIGNIFICANT CHANGE UP (ref 3.5–5)
POTASSIUM SERPL-SCNC: 4.9 MMOL/L — SIGNIFICANT CHANGE UP (ref 3.5–5)
PROT SERPL-MCNC: 6.5 G/DL — SIGNIFICANT CHANGE UP (ref 6–8)
RBC # BLD: 4 M/UL — LOW (ref 4.7–6.1)
RBC # FLD: 13.8 % — SIGNIFICANT CHANGE UP (ref 11.5–14.5)
SODIUM SERPL-SCNC: 131 MMOL/L — LOW (ref 135–146)
WBC # BLD: 4.71 K/UL — LOW (ref 4.8–10.8)
WBC # FLD AUTO: 4.71 K/UL — LOW (ref 4.8–10.8)

## 2021-07-07 PROCEDURE — 99232 SBSQ HOSP IP/OBS MODERATE 35: CPT

## 2021-07-07 RX ADMIN — Medication 6 UNIT(S): at 12:01

## 2021-07-07 RX ADMIN — MEROPENEM 100 MILLIGRAM(S): 1 INJECTION INTRAVENOUS at 13:12

## 2021-07-07 RX ADMIN — Medication 1 MILLIGRAM(S): at 12:02

## 2021-07-07 RX ADMIN — Medication 25 MILLIGRAM(S): at 17:06

## 2021-07-07 RX ADMIN — OLANZAPINE 20 MILLIGRAM(S): 15 TABLET, FILM COATED ORAL at 21:20

## 2021-07-07 RX ADMIN — GABAPENTIN 300 MILLIGRAM(S): 400 CAPSULE ORAL at 05:28

## 2021-07-07 RX ADMIN — ENOXAPARIN SODIUM 40 MILLIGRAM(S): 100 INJECTION SUBCUTANEOUS at 12:01

## 2021-07-07 RX ADMIN — Medication 6 UNIT(S): at 08:27

## 2021-07-07 RX ADMIN — Medication 1 TABLET(S): at 12:02

## 2021-07-07 RX ADMIN — Medication 25 MILLIGRAM(S): at 05:28

## 2021-07-07 RX ADMIN — Medication 6 UNIT(S): at 17:07

## 2021-07-07 RX ADMIN — PANTOPRAZOLE SODIUM 40 MILLIGRAM(S): 20 TABLET, DELAYED RELEASE ORAL at 06:08

## 2021-07-07 RX ADMIN — PREGABALIN 1000 MICROGRAM(S): 225 CAPSULE ORAL at 12:02

## 2021-07-07 RX ADMIN — Medication 60 MILLIGRAM(S): at 05:28

## 2021-07-07 RX ADMIN — MEROPENEM 100 MILLIGRAM(S): 1 INJECTION INTRAVENOUS at 05:28

## 2021-07-07 RX ADMIN — SODIUM CHLORIDE 1 GRAM(S): 9 INJECTION INTRAMUSCULAR; INTRAVENOUS; SUBCUTANEOUS at 12:02

## 2021-07-07 RX ADMIN — Medication 1: at 12:00

## 2021-07-07 RX ADMIN — OLANZAPINE 5 MILLIGRAM(S): 15 TABLET, FILM COATED ORAL at 12:02

## 2021-07-07 RX ADMIN — ATORVASTATIN CALCIUM 10 MILLIGRAM(S): 80 TABLET, FILM COATED ORAL at 21:20

## 2021-07-07 RX ADMIN — Medication 150 MILLIGRAM(S): at 21:20

## 2021-07-07 RX ADMIN — SENNA PLUS 2 TABLET(S): 8.6 TABLET ORAL at 21:20

## 2021-07-07 RX ADMIN — GABAPENTIN 300 MILLIGRAM(S): 400 CAPSULE ORAL at 17:06

## 2021-07-07 RX ADMIN — MEROPENEM 100 MILLIGRAM(S): 1 INJECTION INTRAVENOUS at 21:20

## 2021-07-07 NOTE — PROGRESS NOTE ADULT - SUBJECTIVE AND OBJECTIVE BOX
ADA VILLAGOMEZ 81y Male  MRN#: [id number]   Hospital Day: 4d    SUBJECTIVE  Patient is a 81y old Male who presents with a chief complaint of shortness of breath (06 Jul 2021 15:06)  Currently admitted to medicine with the primary diagnosis of Hyponatremia    INTERVAL HPI AND OVERNIGHT EVENTS:  Patient was examined and seen at bedside. This morning he is resting comfortably in bed and reports no issues or overnight events.    OBJECTIVE  PAST MEDICAL & SURGICAL HISTORY  Schizophrenia  Diabetes type 2, controlled  COPD (chronic obstructive pulmonary disease)  Dyslipidemia  Chronic retention of urine  Dyslipidemia  Encounter for screening colonoscopy    ALLERGIES:  No Known Allergies    MEDICATIONS:  STANDING MEDICATIONS  atorvastatin 10 milliGRAM(s) Oral at bedtime  chlorhexidine 4% Liquid 1 Application(s) Topical <User Schedule>  cyanocobalamin 1000 MICROGram(s) Oral daily  dextrose 40% Gel 15 Gram(s) Oral once  dextrose 5%. 1000 milliLiter(s) IV Continuous <Continuous>  dextrose 5%. 1000 milliLiter(s) IV Continuous <Continuous>  dextrose 50% Injectable 25 Gram(s) IV Push once  dextrose 50% Injectable 12.5 Gram(s) IV Push once  dextrose 50% Injectable 25 Gram(s) IV Push once  enoxaparin Injectable 40 milliGRAM(s) SubCutaneous daily  folic acid 1 milliGRAM(s) Oral daily  gabapentin 300 milliGRAM(s) Oral every 12 hours  glucagon  Injectable 1 milliGRAM(s) IntraMuscular once  insulin glargine Injectable (LANTUS) 15 Unit(s) SubCutaneous at bedtime  insulin lispro (ADMELOG) corrective regimen sliding scale   SubCutaneous three times a day before meals  insulin lispro Injectable (ADMELOG) 6 Unit(s) SubCutaneous three times a day before meals  lactobacillus acidophilus 1 Tablet(s) Oral daily  meropenem  IVPB 1000 milliGRAM(s) IV Intermittent every 8 hours  metoprolol tartrate 25 milliGRAM(s) Oral two times a day  NIFEdipine XL 60 milliGRAM(s) Oral daily  OLANZapine 20 milliGRAM(s) Oral at bedtime  OLANZapine 5 milliGRAM(s) Oral daily  pantoprazole    Tablet 40 milliGRAM(s) Oral before breakfast  senna 2 Tablet(s) Oral at bedtime  sodium chloride 1 Gram(s) Oral daily  traZODone 150 milliGRAM(s) Oral at bedtime    PRN MEDICATIONS  ondansetron Injectable 4 milliGRAM(s) IV Push every 4 hours PRN      VITAL SIGNS: Last 24 Hours  T(C): 35.7 (07 Jul 2021 05:30), Max: 35.8 (06 Jul 2021 14:22)  T(F): 96.3 (07 Jul 2021 05:30), Max: 96.5 (06 Jul 2021 14:22)  HR: 98 (07 Jul 2021 05:30) (65 - 98)  BP: 166/71 (07 Jul 2021 05:30) (124/84 - 166/71)  BP(mean): --  RR: 18 (07 Jul 2021 05:30) (18 - 18)  SpO2: --    LABS:                        12.4   6.72  )-----------( 226      ( 06 Jul 2021 04:30 )             36.3     07-06    132<L>  |  95<L>  |  10  ----------------------------<  98  4.5   |  26  |  0.9    Ca    8.9      06 Jul 2021 04:30  Mg     1.9     07-06    TPro  6.4  /  Alb  3.9  /  TBili  <0.2  /  DBili  x   /  AST  19  /  ALT  13  /  AlkPhos  98  07-06    CARDIAC MARKERS ( 05 Jul 2021 22:27 )  x     / 0.02 ng/mL / x     / x     / x      CARDIAC MARKERS ( 05 Jul 2021 16:36 )  x     / 0.02 ng/mL / x     / x     / x        PHYSICAL EXAM:  CONSTITUTIONAL: No acute distressAAOx3  HEAD: Atraumatic, normocephalic  PULMONARY: Clear to auscultation bilaterally  CARDIOVASCULAR: Regular rate and rhythm  GASTROINTESTINAL: Soft, non-tender, non-distended  MUSCULOSKELETAL: no edema  NEUROLOGY: non-focal  SKIN: No rashes or lesions    ASSESSMENT & PLAN  Mr. Villagomez ia 81 year old man with PMHx of schizophrenia, COPD, hyperlipidemia, non-Hodgkin lymphoma in remission, Type 2DM (non-insulin dependent) with neuropathy, chronic indwelling Bains (chronic retention) BIBEMS from [facility] for nausea/vomiting. Pt's status has improved, medically stable for transfer to floor.     #Nausea/vomiting likely secondary to Urosepsis, chronic indwelling bains for retention   -recently discharged and was seen by Urology on 7/1 for difficult Bains placement after patient reported "bains came out" . Patient had a 14 fr catheter placed draining well and was told to follow-up in Urology clinic for further management.  - wbc 20k with bandemia on admission, Normotensive, + UA; s/p Rocephin 1 gm   - Patient with chronic indwelling Bains (chronic urinary retention)  - had several admissions in the past for ESBL pyelonephritis; last discharged on ertapenem 1 gm 10/24  - Urology consult appreciated: aspirated 50cc of cloudy yellow urine Bains now draining to gravity with 150cc of cloudy yellow urine noted in bag.   - c/w Bains care   - Repeat RBUS in 48 -72 hours to assess hydro  - Blood culture:  7/3: ESBL E. Coli  - Urine culture: 7/3: E. Coli   - c/w meropenem 1 g TID --> c/w meropenem until 7/9, then switch to Bactrim   - Monitor Urine output  - Analgesics for pain control prn/ zofran prn    # Hyponatremia - possibly SIADH secondary to antipsychotic medications  vs hypovolemia from extrarenal loss (nausea/vomiting)  patient with history of hyponatremia, on sodium tablets at home  - possibly 2/2 Trazadone use   - Sodium, Serum: 132  (7/5), 132 (7/6)  - US Renal: Right renal cysts measuring up to 5.6 x 5.6 cm, stable since the prior exam. 1.9 cm left renal cyst. Urinary bladder not evaluated, as Bains catheter is present  - Fluid restriction  - start home medication sodium chloride 1 gram     #elevated troponin w/ EKG changes (T-Wave inversion)  - elevated trop .10 on admission --> 0.11 > 0.02 > 0.02   - denies chest pain  - EKG on admission: no ischemic changes, repeat showing T-Wave inversion   - Cardiology consult for possible NSTEMI on admission due to elevated trops and EKG changes     #HTN, poorly controlled   - increasing Nifedipine ER  to 60 mg daily  - c/w metoprolol tartrate 25 BID    #Schizophrenia  - c/w olanzapine and trazodone    #HLD  - c/w atorvastatin 10    #Type II DM, with neuropathy  - FS AC/HS  - c/w lantus 15 + 6/6/6 with sliding scale   - on metformin 500 QD at home  - c/w gabapentin 300 mg BID    #Macrocytic Anemia with B12 Deficiency  - c/w B12, folate supplement    #COPD  - c/w albuterol prn     DVT ppx: Lovenox  GI ppx: Not indicated  Diet: dysphagia III  Activity: increase as tolerated  Code status: full code  Dispo: acute

## 2021-07-08 LAB
ALBUMIN SERPL ELPH-MCNC: 3.6 G/DL — SIGNIFICANT CHANGE UP (ref 3.5–5.2)
ALP SERPL-CCNC: 96 U/L — SIGNIFICANT CHANGE UP (ref 30–115)
ALT FLD-CCNC: 13 U/L — SIGNIFICANT CHANGE UP (ref 0–41)
ANION GAP SERPL CALC-SCNC: 5 MMOL/L — LOW (ref 7–14)
ANION GAP SERPL CALC-SCNC: 9 MMOL/L — SIGNIFICANT CHANGE UP (ref 7–14)
AST SERPL-CCNC: 17 U/L — SIGNIFICANT CHANGE UP (ref 0–41)
BASOPHILS # BLD AUTO: 0.02 K/UL — SIGNIFICANT CHANGE UP (ref 0–0.2)
BASOPHILS NFR BLD AUTO: 0.4 % — SIGNIFICANT CHANGE UP (ref 0–1)
BILIRUB SERPL-MCNC: 0.2 MG/DL — SIGNIFICANT CHANGE UP (ref 0.2–1.2)
BUN SERPL-MCNC: 14 MG/DL — SIGNIFICANT CHANGE UP (ref 10–20)
BUN SERPL-MCNC: 16 MG/DL — SIGNIFICANT CHANGE UP (ref 10–20)
CALCIUM SERPL-MCNC: 8.7 MG/DL — SIGNIFICANT CHANGE UP (ref 8.5–10.1)
CALCIUM SERPL-MCNC: 8.8 MG/DL — SIGNIFICANT CHANGE UP (ref 8.5–10.1)
CHLORIDE SERPL-SCNC: 90 MMOL/L — LOW (ref 98–110)
CHLORIDE SERPL-SCNC: 93 MMOL/L — LOW (ref 98–110)
CO2 SERPL-SCNC: 29 MMOL/L — SIGNIFICANT CHANGE UP (ref 17–32)
CO2 SERPL-SCNC: 29 MMOL/L — SIGNIFICANT CHANGE UP (ref 17–32)
CREAT SERPL-MCNC: 0.7 MG/DL — SIGNIFICANT CHANGE UP (ref 0.7–1.5)
CREAT SERPL-MCNC: 0.9 MG/DL — SIGNIFICANT CHANGE UP (ref 0.7–1.5)
EOSINOPHIL # BLD AUTO: 0.04 K/UL — SIGNIFICANT CHANGE UP (ref 0–0.7)
EOSINOPHIL NFR BLD AUTO: 0.8 % — SIGNIFICANT CHANGE UP (ref 0–8)
GLUCOSE BLDC GLUCOMTR-MCNC: 104 MG/DL — HIGH (ref 70–99)
GLUCOSE BLDC GLUCOMTR-MCNC: 116 MG/DL — HIGH (ref 70–99)
GLUCOSE BLDC GLUCOMTR-MCNC: 78 MG/DL — SIGNIFICANT CHANGE UP (ref 70–99)
GLUCOSE BLDC GLUCOMTR-MCNC: 98 MG/DL — SIGNIFICANT CHANGE UP (ref 70–99)
GLUCOSE SERPL-MCNC: 66 MG/DL — LOW (ref 70–99)
GLUCOSE SERPL-MCNC: 85 MG/DL — SIGNIFICANT CHANGE UP (ref 70–99)
HCT VFR BLD CALC: 36.3 % — LOW (ref 42–52)
HGB BLD-MCNC: 12.2 G/DL — LOW (ref 14–18)
IMM GRANULOCYTES NFR BLD AUTO: 0.6 % — HIGH (ref 0.1–0.3)
LYMPHOCYTES # BLD AUTO: 1.93 K/UL — SIGNIFICANT CHANGE UP (ref 1.2–3.4)
LYMPHOCYTES # BLD AUTO: 36.3 % — SIGNIFICANT CHANGE UP (ref 20.5–51.1)
MAGNESIUM SERPL-MCNC: 2 MG/DL — SIGNIFICANT CHANGE UP (ref 1.8–2.4)
MCHC RBC-ENTMCNC: 30.7 PG — SIGNIFICANT CHANGE UP (ref 27–31)
MCHC RBC-ENTMCNC: 33.6 G/DL — SIGNIFICANT CHANGE UP (ref 32–37)
MCV RBC AUTO: 91.4 FL — SIGNIFICANT CHANGE UP (ref 80–94)
MONOCYTES # BLD AUTO: 0.57 K/UL — SIGNIFICANT CHANGE UP (ref 0.1–0.6)
MONOCYTES NFR BLD AUTO: 10.7 % — HIGH (ref 1.7–9.3)
NEUTROPHILS # BLD AUTO: 2.73 K/UL — SIGNIFICANT CHANGE UP (ref 1.4–6.5)
NEUTROPHILS NFR BLD AUTO: 51.2 % — SIGNIFICANT CHANGE UP (ref 42.2–75.2)
NRBC # BLD: 0 /100 WBCS — SIGNIFICANT CHANGE UP (ref 0–0)
PLATELET # BLD AUTO: 233 K/UL — SIGNIFICANT CHANGE UP (ref 130–400)
POTASSIUM SERPL-MCNC: 4.8 MMOL/L — SIGNIFICANT CHANGE UP (ref 3.5–5)
POTASSIUM SERPL-MCNC: 5.4 MMOL/L — HIGH (ref 3.5–5)
POTASSIUM SERPL-SCNC: 4.8 MMOL/L — SIGNIFICANT CHANGE UP (ref 3.5–5)
POTASSIUM SERPL-SCNC: 5.4 MMOL/L — HIGH (ref 3.5–5)
PROT SERPL-MCNC: 6.1 G/DL — SIGNIFICANT CHANGE UP (ref 6–8)
RBC # BLD: 3.97 M/UL — LOW (ref 4.7–6.1)
RBC # FLD: 13.6 % — SIGNIFICANT CHANGE UP (ref 11.5–14.5)
SODIUM SERPL-SCNC: 124 MMOL/L — LOW (ref 135–146)
SODIUM SERPL-SCNC: 131 MMOL/L — LOW (ref 135–146)
WBC # BLD: 5.32 K/UL — SIGNIFICANT CHANGE UP (ref 4.8–10.8)
WBC # FLD AUTO: 5.32 K/UL — SIGNIFICANT CHANGE UP (ref 4.8–10.8)

## 2021-07-08 PROCEDURE — 99233 SBSQ HOSP IP/OBS HIGH 50: CPT

## 2021-07-08 PROCEDURE — 99222 1ST HOSP IP/OBS MODERATE 55: CPT

## 2021-07-08 RX ORDER — LISINOPRIL 2.5 MG/1
5 TABLET ORAL ONCE
Refills: 0 | Status: COMPLETED | OUTPATIENT
Start: 2021-07-08 | End: 2021-07-08

## 2021-07-08 RX ORDER — LISINOPRIL 2.5 MG/1
5 TABLET ORAL DAILY
Refills: 0 | Status: DISCONTINUED | OUTPATIENT
Start: 2021-07-08 | End: 2021-07-08

## 2021-07-08 RX ORDER — SODIUM ZIRCONIUM CYCLOSILICATE 10 G/10G
5 POWDER, FOR SUSPENSION ORAL ONCE
Refills: 0 | Status: COMPLETED | OUTPATIENT
Start: 2021-07-08 | End: 2021-07-08

## 2021-07-08 RX ADMIN — GABAPENTIN 300 MILLIGRAM(S): 400 CAPSULE ORAL at 05:48

## 2021-07-08 RX ADMIN — SODIUM CHLORIDE 1 GRAM(S): 9 INJECTION INTRAMUSCULAR; INTRAVENOUS; SUBCUTANEOUS at 12:26

## 2021-07-08 RX ADMIN — OLANZAPINE 5 MILLIGRAM(S): 15 TABLET, FILM COATED ORAL at 12:26

## 2021-07-08 RX ADMIN — SENNA PLUS 2 TABLET(S): 8.6 TABLET ORAL at 22:10

## 2021-07-08 RX ADMIN — ENOXAPARIN SODIUM 40 MILLIGRAM(S): 100 INJECTION SUBCUTANEOUS at 12:27

## 2021-07-08 RX ADMIN — Medication 25 MILLIGRAM(S): at 17:31

## 2021-07-08 RX ADMIN — GABAPENTIN 300 MILLIGRAM(S): 400 CAPSULE ORAL at 17:31

## 2021-07-08 RX ADMIN — Medication 150 MILLIGRAM(S): at 22:10

## 2021-07-08 RX ADMIN — Medication 1 MILLIGRAM(S): at 12:26

## 2021-07-08 RX ADMIN — ATORVASTATIN CALCIUM 10 MILLIGRAM(S): 80 TABLET, FILM COATED ORAL at 22:10

## 2021-07-08 RX ADMIN — MEROPENEM 100 MILLIGRAM(S): 1 INJECTION INTRAVENOUS at 15:04

## 2021-07-08 RX ADMIN — Medication 25 MILLIGRAM(S): at 05:48

## 2021-07-08 RX ADMIN — PANTOPRAZOLE SODIUM 40 MILLIGRAM(S): 20 TABLET, DELAYED RELEASE ORAL at 05:48

## 2021-07-08 RX ADMIN — SODIUM ZIRCONIUM CYCLOSILICATE 5 GRAM(S): 10 POWDER, FOR SUSPENSION ORAL at 12:28

## 2021-07-08 RX ADMIN — Medication 1 TABLET(S): at 12:26

## 2021-07-08 RX ADMIN — OLANZAPINE 20 MILLIGRAM(S): 15 TABLET, FILM COATED ORAL at 22:10

## 2021-07-08 RX ADMIN — Medication 60 MILLIGRAM(S): at 05:48

## 2021-07-08 RX ADMIN — MEROPENEM 100 MILLIGRAM(S): 1 INJECTION INTRAVENOUS at 22:05

## 2021-07-08 RX ADMIN — CHLORHEXIDINE GLUCONATE 1 APPLICATION(S): 213 SOLUTION TOPICAL at 05:48

## 2021-07-08 RX ADMIN — MEROPENEM 100 MILLIGRAM(S): 1 INJECTION INTRAVENOUS at 05:48

## 2021-07-08 RX ADMIN — PREGABALIN 1000 MICROGRAM(S): 225 CAPSULE ORAL at 12:26

## 2021-07-08 NOTE — PROGRESS NOTE ADULT - SUBJECTIVE AND OBJECTIVE BOX
ADA VILLAGOMEZ 81y Male  MRN#: [id number]   Hospital Day: 5d    SUBJECTIVE  Patient is a 81y old Male who presents with a chief complaint of shortness of breath (06 Jul 2021 15:06)  Currently admitted to medicine with the primary diagnosis of Hyponatremia      INTERVAL HPI AND OVERNIGHT EVENTS:  Patient was examined and seen at bedside. This morning he is resting comfortably in bed and reports no issues or overnight events.    OBJECTIVE  PAST MEDICAL & SURGICAL HISTORY  Schizophrenia  Diabetes type 2, controlled  COPD (chronic obstructive pulmonary disease)  Dyslipidemia  Chronic retention of urine  Dyslipidemia  Encounter for screening colonoscopy    ALLERGIES:  No Known Allergies    MEDICATIONS:  STANDING MEDICATIONS  atorvastatin 10 milliGRAM(s) Oral at bedtime  chlorhexidine 4% Liquid 1 Application(s) Topical <User Schedule>  cyanocobalamin 1000 MICROGram(s) Oral daily  dextrose 40% Gel 15 Gram(s) Oral once  dextrose 5%. 1000 milliLiter(s) IV Continuous <Continuous>  dextrose 5%. 1000 milliLiter(s) IV Continuous <Continuous>  dextrose 50% Injectable 25 Gram(s) IV Push once  dextrose 50% Injectable 12.5 Gram(s) IV Push once  dextrose 50% Injectable 25 Gram(s) IV Push once  enoxaparin Injectable 40 milliGRAM(s) SubCutaneous daily  folic acid 1 milliGRAM(s) Oral daily  gabapentin 300 milliGRAM(s) Oral every 12 hours  glucagon  Injectable 1 milliGRAM(s) IntraMuscular once  insulin glargine Injectable (LANTUS) 15 Unit(s) SubCutaneous at bedtime  insulin lispro (ADMELOG) corrective regimen sliding scale   SubCutaneous three times a day before meals  insulin lispro Injectable (ADMELOG) 6 Unit(s) SubCutaneous three times a day before meals  lactobacillus acidophilus 1 Tablet(s) Oral daily  meropenem  IVPB 1000 milliGRAM(s) IV Intermittent every 8 hours  metoprolol tartrate 25 milliGRAM(s) Oral two times a day  NIFEdipine XL 60 milliGRAM(s) Oral daily  OLANZapine 20 milliGRAM(s) Oral at bedtime  OLANZapine 5 milliGRAM(s) Oral daily  pantoprazole    Tablet 40 milliGRAM(s) Oral before breakfast  senna 2 Tablet(s) Oral at bedtime  sodium chloride 1 Gram(s) Oral daily  sodium zirconium cyclosilicate 5 Gram(s) Oral once  traZODone 150 milliGRAM(s) Oral at bedtime    PRN MEDICATIONS  ondansetron Injectable 4 milliGRAM(s) IV Push every 4 hours PRN      VITAL SIGNS: Last 24 Hours  T(C): 35.9 (08 Jul 2021 05:16), Max: 36.2 (07 Jul 2021 21:49)  T(F): 96.6 (08 Jul 2021 05:16), Max: 97.1 (07 Jul 2021 21:49)  HR: 71 (08 Jul 2021 05:16) (60 - 88)  BP: 168/70 (08 Jul 2021 05:16) (121/58 - 168/70)  BP(mean): --  RR: 18 (08 Jul 2021 05:16) (18 - 18)  SpO2: --    LABS:                        12.2   5.32  )-----------( 233      ( 08 Jul 2021 06:00 )             36.3     07-08    131<L>  |  93<L>  |  14  ----------------------------<  85  5.4<H>   |  29  |  0.7    Ca    8.7      08 Jul 2021 06:00  Mg     2.0     07-08    TPro  6.1  /  Alb  3.6  /  TBili  0.2  /  DBili  x   /  AST  17  /  ALT  13  /  AlkPhos  96  07-08    Culture - Blood (collected 06 Jul 2021 04:30)  Source: .Blood Blood  Preliminary Report (07 Jul 2021 18:02):    No growth to date.    PHYSICAL EXAM:  CONSTITUTIONAL: No acute distress, well-developed, well-groomed, AAOx3  HEAD: Atraumatic, normocephalic  PULMONARY: Clear to auscultation bilaterally  CARDIOVASCULAR: Regular rate and rhythm  GASTROINTESTINAL: Soft, non-tender, non-distended  MUSCULOSKELETAL:  no edema  NEUROLOGY: non-focal  SKIN: No rashes or lesions    ASSESSMENT & PLAN  Mr. Villagomez ia 81 year old man with PMHx of schizophrenia, COPD, hyperlipidemia, non-Hodgkin lymphoma in remission, Type 2DM (non-insulin dependent) with neuropathy, chronic indwelling Bains (chronic retention) BIBEMS from [facility] for nausea/vomiting. Pt's status has improved, medically stable for transfer to floor.     #Nausea/vomiting likely secondary to Urosepsis, chronic indwelling bains for retention   -recently discharged and was seen by Urology on 7/1 for difficult Bains placement after patient reported "bains came out" . Patient had a 14 fr catheter placed draining well and was told to follow-up in Urology clinic for further management.  - wbc 20k with bandemia on admission, Normotensive, + UA; s/p Rocephin 1 gm   - Patient with chronic indwelling Bains (chronic urinary retention)  - had several admissions in the past for ESBL pyelonephritis; last discharged on ertapenem 1 gm 10/24  - Urology consult appreciated: aspirated 50cc of cloudy yellow urine Bains now draining to gravity with 150cc of cloudy yellow urine noted in bag.   - c/w Bains care   - Repeat RBUS in 48 -72 hours to assess hydro  - Blood culture:  7/3: ESBL E. Coli  - Urine culture: 7/3: E. Coli   - c/w meropenem 1 g TID --> c/w meropenem until 7/9, then switch to Bactrim   - Monitor Urine output  - Analgesics for pain control prn/ zofran prn    # Hyponatremia - possibly SIADH secondary to antipsychotic medications  vs hypovolemia from extrarenal loss (nausea/vomiting)  patient with history of hyponatremia, on sodium tablets at home  - possibly 2/2 Trazadone use   - Sodium, Serum: 132  (7/5), 132 (7/6), 131 (7/7 -7/8)  - US Renal: Right renal cysts measuring up to 5.6 x 5.6 cm, stable since the prior exam. 1.9 cm left renal cyst. Urinary bladder not evaluated, as Bains catheter is present  - Fluid restriction  - c/w home medication sodium chloride 1 gram     #elevated troponin w/ EKG changes (T-Wave inversion)  - elevated trop .10 on admission --> 0.11 > 0.02 > 0.02   - denies chest pain  - EKG on admission: no ischemic changes, repeat showing T-Wave inversion   - Cardiology consult for possible NSTEMI on admission due to elevated trops and EKG changes     #HTN, poorly controlled   - c/w Nifedipine ER  to 60 mg daily  - c/w metoprolol tartrate 25 BID    #Schizophrenia  - c/w olanzapine and trazodone    #HLD  - c/w atorvastatin 10    #Type II DM, with neuropathy  - FS AC/HS  - c/w lantus 15 + 6/6/6 with sliding scale   - on metformin 500 QD at home  - c/w gabapentin 300 mg BID    #Macrocytic Anemia with B12 Deficiency  - c/w B12, folate supplement    #COPD  - c/w albuterol prn     DVT ppx: Lovenox  GI ppx: Not indicated  Diet: dysphagia III  Activity: increase as tolerated  Code status: full code  Dispo: acute

## 2021-07-08 NOTE — CONSULT NOTE ADULT - SUBJECTIVE AND OBJECTIVE BOX
HPI:  Mr. Laguna ia 81 year old man with PMHx of schizophrenia, COPD, hyperlipidemia, non-Hodgkin lymphoma in remission, Type 2DM (non-insulin dependent) with neuropathy, chronic indwelling Bains (chronic retention) BIBEMS from [facility] for nausea/vomiting.  Patient poor historian and refuses to cooperate, AAO1, subjective information obtained from extensive chart review and collateral information. Patient was recently discharged and was seen by Urology on 7/1 for difficult Bains placement after patient reported "bains came out" . Patient had a 14 fr catheter placed draining well and was told to follow-up in Urology clinic for further management. Of note patient has has several admissions in the past for ESBL pyelonephritis.    In ED,  patient afebrile, BP: 100/64, HR:75 RR:20 Spo2 98% on room air. Labs significant for leukocytosis 20k, Na 119, K 5.5, lactate 3.3 troponin .10. EKG no ischemic changes. UA positive for leukocyte esterase, blood, bacteria, WBC. CT head no contrast on admission negative for acute intracranial hemorrhage or acute large territorial infarct. Readministration of a left middle cranial fossa cystic area measuring 4.4 x 4.4 cm, which may represent encephalomalacia versus arachnoid cyst.   CT Abdomen and Pelvis w/ IV Cont:Interval development of lobulation and cystic areas within the prostate. Bains balloon inflated within the penile urethra, recommend repositioning. Mild to moderate bilateral hydroureteronephrosis with no distal obstructing ureteral lesion or stone.Air is noted within the urinary bladder, likely secondary to instrumentation. However infectious process is not excluded.  Patient recieved 1gm rocephin, 2 L NS and zofran x 1 in ED. Urology consulted, patient currently admitted to ICU for further management of hyponatremia and urosepsis.     (03 Jul 2021 05:12)      Patient denies any cardiac  Hx  lives at a nursing home, is able to walk one block before  experiencing  TOVAR.    PAST MEDICAL & SURGICAL HISTORY  Schizophrenia    Diabetes type 2, controlled    COPD (chronic obstructive pulmonary disease)    Dyslipidemia    Chronic retention of urine    Dyslipidemia    Encounter for screening colonoscopy        FAMILY HISTORY:  FAMILY HISTORY:  No pertinent family history in first degree relatives  Unable to ascertain 2/2 chronic psychiatric condition        SOCIAL HISTORY:  []smoker  []Alcohol  []Drug    ALLERGIES:  No Known Allergies      MEDICATIONS:  MEDICATIONS  (STANDING):  atorvastatin 10 milliGRAM(s) Oral at bedtime  chlorhexidine 4% Liquid 1 Application(s) Topical <User Schedule>  cyanocobalamin 1000 MICROGram(s) Oral daily  dextrose 40% Gel 15 Gram(s) Oral once  dextrose 5%. 1000 milliLiter(s) (50 mL/Hr) IV Continuous <Continuous>  dextrose 5%. 1000 milliLiter(s) (100 mL/Hr) IV Continuous <Continuous>  dextrose 50% Injectable 25 Gram(s) IV Push once  dextrose 50% Injectable 12.5 Gram(s) IV Push once  dextrose 50% Injectable 25 Gram(s) IV Push once  enoxaparin Injectable 40 milliGRAM(s) SubCutaneous daily  folic acid 1 milliGRAM(s) Oral daily  gabapentin 300 milliGRAM(s) Oral every 12 hours  glucagon  Injectable 1 milliGRAM(s) IntraMuscular once  insulin glargine Injectable (LANTUS) 15 Unit(s) SubCutaneous at bedtime  insulin lispro (ADMELOG) corrective regimen sliding scale   SubCutaneous three times a day before meals  insulin lispro Injectable (ADMELOG) 6 Unit(s) SubCutaneous three times a day before meals  lactobacillus acidophilus 1 Tablet(s) Oral daily  lisinopril 5 milliGRAM(s) Oral once  meropenem  IVPB 1000 milliGRAM(s) IV Intermittent every 8 hours  metoprolol tartrate 25 milliGRAM(s) Oral two times a day  NIFEdipine XL 60 milliGRAM(s) Oral daily  OLANZapine 20 milliGRAM(s) Oral at bedtime  OLANZapine 5 milliGRAM(s) Oral daily  pantoprazole    Tablet 40 milliGRAM(s) Oral before breakfast  senna 2 Tablet(s) Oral at bedtime  sodium chloride 1 Gram(s) Oral daily  traZODone 150 milliGRAM(s) Oral at bedtime    MEDICATIONS  (PRN):  ondansetron Injectable 4 milliGRAM(s) IV Push every 4 hours PRN Nausea and/or Vomiting      HOME MEDICATIONS:  Home Medications:  atorvastatin 10 mg oral tablet: 1 tab(s) orally once a day (13 Oct 2020 05:27)  folic acid 1 mg oral tablet: 1 tab(s) orally once a day (13 Oct 2020 05:27)  gabapentin 300 mg oral capsule: 1 cap(s) orally every 12 hours (13 Oct 2020 05:27)  gabapentin 300 mg oral capsule: 1 cap(s) orally 2 times a day (13 Oct 2020 05:27)  ipratropium-albuterol 0.5 mg-2.5 mg/3 mLinhalation solution: 3 milliliter(s) inhaled every 6 hours, As needed, Shortness of Breath and/or Wheezing (13 Oct 2020 05:27)  lactobacillus acidophilus oral capsule: 1 tab(s) orally once a day (13 Oct 2020 05:27)  metFORMIN 500 mg oral tablet: 1 tab(s) orally 2 times a day (13 Oct 2020 05:27)  metoprolol tartrate 25 mg oral tablet: 1 tab(s) orally 2 times a day (13 Oct 2020 05:27)  NIFEdipine 30 mg oral tablet, extended release: 1 tab(s) orally once a day (13 Oct 2020 05:27)  OLANZapine 20 mg oral tablet: 1 tab(s) orally once a day (at bedtime) (13 Oct 2020 05:27)  OLANZapine 5 mg oral tablet: 1 tab(s) orally once a day (13 Oct 2020 05:27)  senna oral tablet: 2 tab(s) orally once a day (at bedtime) (13 Oct 2020 05:27)  Sodium Chloride 1 g oral tablet: 1 tab(s) orally once a day (13 Oct 2020 05:27)  traZODone 150 mg oral tablet: 1 tab(s) orally once a day (at bedtime) (13 Oct 2020 05:27)      VITALS:   T(F): 96.6 (07-08 @ 05:16), Max: 98.3 (07-05 @ 12:18)  HR: 71 (07-08 @ 05:16) (60 - 98)  BP: 168/70 (07-08 @ 05:16) (121/58 - 192/83)  BP(mean): --  RR: 18 (07-08 @ 05:16) (18 - 18)  SpO2: --    I&O's Summary    07 Jul 2021 07:01  -  08 Jul 2021 07:00  --------------------------------------------------------  IN: 340 mL / OUT: 1450 mL / NET: -1110 mL        REVIEW OF SYSTEMS:  CONSTITUTIONAL: No weakness, fevers or chills  EYES: No visual changes  ENT: No vertigo or throat pain   NECK: No pain or stiffness  RESPIRATORY: No cough, wheezing, hemoptysis; No shortness of breath  CARDIOVASCULAR: No chest pain or palpitations  GASTROINTESTINAL: No abdominal or epigastric pain. No nausea, vomiting, or hematemesis; No diarrhea or constipation. No melena or hematochezia.  GENITOURINARY:  dysuria, frequency or hematuria  NEUROLOGICAL: No numbness or weakness  SKIN: No itching, no rashes  MSK: no    PHYSICAL EXAM:  NEURO: patient is awake , alert and oriented  GEN: Not in acute distress  NECK: no thyroid enlargement, no JVD  LUNGS: Clear to auscultation bilaterally   CARDIOVASCULAR: S1/S2 present, RRR , holosystolic  murmur, Pectus excavatum, No rubs, no carotid bruits,  + PP bilaterally  ABD: Soft, non-tender, non-distended, +BS  EXT: No WENDY  SKIN: Intact    LABS:                        12.2   5.32  )-----------( 233      ( 08 Jul 2021 06:00 )             36.3     07-08    131<L>  |  93<L>  |  14  ----------------------------<  85  5.4<H>   |  29  |  0.7    Ca    8.7      08 Jul 2021 06:00  Mg     2.0     07-08    TPro  6.1  /  Alb  3.6  /  TBili  0.2  /  DBili  x   /  AST  17  /  ALT  13  /  AlkPhos  96  07-08              Troponin trend:            RADIOLOGY:  -CXR:  < from: Xray Chest 1 View- PORTABLE-Routine (07.05.21 @ 08:04) >      < end of copied text >    ECG:  `< from: 12 Lead ECG (07.06.21 @ 11:02) >  Diagnosis Line Sinus rhythm with Premature atrial complexes  Left ventricular hypertrophy with repolarization abnormality  Anterolateral ischemia Possible  Abnormal ECG    < end of copied text >       HPI:    Mr. Laguna ia 81 year old man with PMHx of schizophrenia, COPD, hyperlipidemia, non-Hodgkin lymphoma in remission, Type 2DM (non-insulin dependent) with neuropathy, chronic indwelling Bains (chronic retention) BIBEMS from [facility] for nausea/vomiting.  Patient poor historian and refuses to cooperate, AAO1, subjective information obtained from extensive chart review and collateral information. Patient was recently discharged and was seen by Urology on 7/1 for difficult Bains placement after patient reported "bains came out" . Patient had a 14 fr catheter placed draining well and was told to follow-up in Urology clinic for further management. Of note patient has has several admissions in the past for ESBL pyelonephritis.    In ED,  patient afebrile, BP: 100/64, HR:75 RR:20 Spo2 98% on room air. Labs significant for leukocytosis 20k, Na 119, K 5.5, lactate 3.3 troponin .10. EKG no ischemic changes. UA positive for leukocyte esterase, blood, bacteria, WBC. CT head no contrast on admission negative for acute intracranial hemorrhage or acute large territorial infarct. Readministration of a left middle cranial fossa cystic area measuring 4.4 x 4.4 cm, which may represent encephalomalacia versus arachnoid cyst.   CT Abdomen and Pelvis w/ IV Cont:Interval development of lobulation and cystic areas within the prostate. Bains balloon inflated within the penile urethra, recommend repositioning. Mild to moderate bilateral hydroureteronephrosis with no distal obstructing ureteral lesion or stone.Air is noted within the urinary bladder, likely secondary to instrumentation. However infectious process is not excluded.  Patient recieved 1gm rocephin, 2 L NS and zofran x 1 in ED. Urology consulted, patient currently admitted to ICU for further management of hyponatremia and urosepsis.     (03 Jul 2021 05:12)      Patient denies any cardiac  Hx  lives at a nursing home, is able to walk one block before  experiencing  TOVAR. Consult called for asymptomatic TWI.    PAST MEDICAL & SURGICAL HISTORY  Schizophrenia    Diabetes type 2, controlled    COPD (chronic obstructive pulmonary disease)    Dyslipidemia    Chronic retention of urine    Dyslipidemia    Encounter for screening colonoscopy        FAMILY HISTORY:  FAMILY HISTORY:  No pertinent family history in first degree relatives  Unable to ascertain 2/2 chronic psychiatric condition        SOCIAL HISTORY:  []smoker  []Alcohol  []Drug    ALLERGIES:  No Known Allergies      MEDICATIONS:  MEDICATIONS  (STANDING):  atorvastatin 10 milliGRAM(s) Oral at bedtime  chlorhexidine 4% Liquid 1 Application(s) Topical <User Schedule>  cyanocobalamin 1000 MICROGram(s) Oral daily  dextrose 40% Gel 15 Gram(s) Oral once  dextrose 5%. 1000 milliLiter(s) (50 mL/Hr) IV Continuous <Continuous>  dextrose 5%. 1000 milliLiter(s) (100 mL/Hr) IV Continuous <Continuous>  dextrose 50% Injectable 25 Gram(s) IV Push once  dextrose 50% Injectable 12.5 Gram(s) IV Push once  dextrose 50% Injectable 25 Gram(s) IV Push once  enoxaparin Injectable 40 milliGRAM(s) SubCutaneous daily  folic acid 1 milliGRAM(s) Oral daily  gabapentin 300 milliGRAM(s) Oral every 12 hours  glucagon  Injectable 1 milliGRAM(s) IntraMuscular once  insulin glargine Injectable (LANTUS) 15 Unit(s) SubCutaneous at bedtime  insulin lispro (ADMELOG) corrective regimen sliding scale   SubCutaneous three times a day before meals  insulin lispro Injectable (ADMELOG) 6 Unit(s) SubCutaneous three times a day before meals  lactobacillus acidophilus 1 Tablet(s) Oral daily  lisinopril 5 milliGRAM(s) Oral once  meropenem  IVPB 1000 milliGRAM(s) IV Intermittent every 8 hours  metoprolol tartrate 25 milliGRAM(s) Oral two times a day  NIFEdipine XL 60 milliGRAM(s) Oral daily  OLANZapine 20 milliGRAM(s) Oral at bedtime  OLANZapine 5 milliGRAM(s) Oral daily  pantoprazole    Tablet 40 milliGRAM(s) Oral before breakfast  senna 2 Tablet(s) Oral at bedtime  sodium chloride 1 Gram(s) Oral daily  traZODone 150 milliGRAM(s) Oral at bedtime    MEDICATIONS  (PRN):  ondansetron Injectable 4 milliGRAM(s) IV Push every 4 hours PRN Nausea and/or Vomiting      HOME MEDICATIONS:  Home Medications:  atorvastatin 10 mg oral tablet: 1 tab(s) orally once a day (13 Oct 2020 05:27)  folic acid 1 mg oral tablet: 1 tab(s) orally once a day (13 Oct 2020 05:27)  gabapentin 300 mg oral capsule: 1 cap(s) orally every 12 hours (13 Oct 2020 05:27)  gabapentin 300 mg oral capsule: 1 cap(s) orally 2 times a day (13 Oct 2020 05:27)  ipratropium-albuterol 0.5 mg-2.5 mg/3 mLinhalation solution: 3 milliliter(s) inhaled every 6 hours, As needed, Shortness of Breath and/or Wheezing (13 Oct 2020 05:27)  lactobacillus acidophilus oral capsule: 1 tab(s) orally once a day (13 Oct 2020 05:27)  metFORMIN 500 mg oral tablet: 1 tab(s) orally 2 times a day (13 Oct 2020 05:27)  metoprolol tartrate 25 mg oral tablet: 1 tab(s) orally 2 times a day (13 Oct 2020 05:27)  NIFEdipine 30 mg oral tablet, extended release: 1 tab(s) orally once a day (13 Oct 2020 05:27)  OLANZapine 20 mg oral tablet: 1 tab(s) orally once a day (at bedtime) (13 Oct 2020 05:27)  OLANZapine 5 mg oral tablet: 1 tab(s) orally once a day (13 Oct 2020 05:27)  senna oral tablet: 2 tab(s) orally once a day (at bedtime) (13 Oct 2020 05:27)  Sodium Chloride 1 g oral tablet: 1 tab(s) orally once a day (13 Oct 2020 05:27)  traZODone 150 mg oral tablet: 1 tab(s) orally once a day (at bedtime) (13 Oct 2020 05:27)      VITALS:   T(F): 96.6 (07-08 @ 05:16), Max: 98.3 (07-05 @ 12:18)  HR: 71 (07-08 @ 05:16) (60 - 98)  BP: 168/70 (07-08 @ 05:16) (121/58 - 192/83)  BP(mean): --  RR: 18 (07-08 @ 05:16) (18 - 18)  SpO2: --    I&O's Summary    07 Jul 2021 07:01  -  08 Jul 2021 07:00  --------------------------------------------------------  IN: 340 mL / OUT: 1450 mL / NET: -1110 mL        REVIEW OF SYSTEMS:  As per HPI.  Otherwise negative    PHYSICAL EXAM:  NEURO: patient is awake , alert and oriented x 2  GEN: Not in acute distress  NECK: no thyroid enlargement, no JVD  LUNGS: Clear to auscultation bilaterally   CARDIOVASCULAR: S1/S2 present, RRR , holosystolic  murmur, Pectus excavatum, No rubs, no carotid bruits,  + PP bilaterally  ABD: Soft, non-tender, non-distended, +BS  EXT: No WENDY  SKIN: Intact    LABS:                        12.2   5.32  )-----------( 233      ( 08 Jul 2021 06:00 )             36.3     07-08    131<L>  |  93<L>  |  14  ----------------------------<  85  5.4<H>   |  29  |  0.7    Ca    8.7      08 Jul 2021 06:00  Mg     2.0     07-08    TPro  6.1  /  Alb  3.6  /  TBili  0.2  /  DBili  x   /  AST  17  /  ALT  13  /  AlkPhos  96  07-08              Troponin trend:            RADIOLOGY:  -CXR:  < from: Xray Chest 1 View- PORTABLE-Routine (07.05.21 @ 08:04) >      < end of copied text >    ECG:  `< from: 12 Lead ECG (07.06.21 @ 11:02) >  Diagnosis Line Sinus rhythm with Premature atrial complexes  Left ventricular hypertrophy with repolarization abnormality  Anterolateral ischemia Possible  Abnormal ECG    < end of copied text >

## 2021-07-08 NOTE — CONSULT NOTE ADULT - ASSESSMENT
IMPRESSION  Likely Type  II NSTEMI  in the setting of  Sepsis Bacteremia (ESBL)  > trops trending down   > T-wave inversion noted on  ecg  > Patient Asymptomatic  Hyperkalemia   HO COPD  HO  of Urinary retention with Chronic  Cobos   HO DM   HO HTN    Ho Non-Hodgkin lymphoma in remission  Poor functional status     Plan  IMPRESSION  Likely Type  II NSTEMI  in the setting of  Sepsis Bacteremia (ESBL)  > trops trending down   > T-wave inversion noted on  ecg  > Patient Asymptomatic  Hyperkalemia   HO COPD  HO  of Urinary retention with Chronic  Cobos   HO DM   HO HTN    Ho Non-Hodgkin lymphoma in remission  Poor functional status     Plan   -Metoprolol 2.5 mg BID   -Start Echo IMPRESSION  Likely Type  II NSTEMI  in the setting of  Sepsis Bacteremia (ESBL)  > trops trending down   > T-wave inversion noted on  ecg  > Patient Asymptomatic  Hyperkalemia   HO COPD  HO  of Urinary retention with Chronic  Cobos   HO DM   HO HTN    Ho Non-Hodgkin lymphoma in remission  Poor functional status     Plan   -Metoprolol 25 mg BID   - Echo

## 2021-07-08 NOTE — PROGRESS NOTE ADULT - ATTENDING COMMENTS
***My note supersedes any discrepancies that may be above in the resident's note***    81 year old man with PMHx of schizophrenia, COPD, hyperlipidemia, non-Hodgkin lymphoma in remission, Type 2DM (non-insulin dependent) with neuropathy, chronic indwelling Bains (chronic retention) BIBEMS from [facility] for nausea/vomiting. Pt's status has improved, medically stable for transfer to floor.     #Nausea/vomiting likely secondary to Urosepsis, chronic indwelling bains for retention   -recently discharged and was seen by Urology on 7/1 for difficult Bains placement after patient reported "bains came out" . Patient had a 14 fr catheter placed draining well and was told to follow-up in Urology clinic for further management.  - wbc 20k with bandemia on admission, Normotensive, + UA; s/p Rocephin 1 gm   - Patient with chronic indwelling Bains (chronic urinary retention)  - had several admissions in the past for ESBL pyelonephritis; last discharged on ertapenem 1 gm 10/24  - Urology consult appreciated: aspirated 50cc of cloudy yellow urine Bains now draining to gravity with 150cc of cloudy yellow urine noted in bag.   - c/w Bains care   - Repeat RBUS in 48 -72 hours to assess hydro  - Blood culture:  7/3: ESBL E. Coli  - Urine culture: 7/3: E. Coli   - c/w meropenem 1 g TID --> c/w meropenem until 7/9, then switch to Bactrim   - Monitor Urine output  - Analgesics for pain control prn/ zofran prn    # Hyponatremia, STABLE   - possibly SIADH secondary to antipsychotic medications  vs hypovolemia from extrarenal loss (nausea/vomiting)  patient with history of hyponatremia, on sodium tablets at home  - possibly 2/2 Trazadone use   - Sodium, Serum: 132  (7/5), 132 (7/6)  - US Renal: Right renal cysts measuring up to 5.6 x 5.6 cm, stable since the prior exam. 1.9 cm left renal cyst. Urinary bladder not evaluated, as Bains catheter is present  - Fluid restriction  - start home medication sodium chloride 1 gram   - stable at baseline ~131    #elevated troponin w/ EKG changes (T-Wave inversion)  - elevated trop .10 on admission --> 0.11 > 0.02 > 0.02   - denies chest pain  - EKG on admission: no ischemic changes, repeat showing T-Wave inversion   - Cardiology consult for possible NSTEMI on admission due to elevated trops and EKG changes     #HTN, poorly controlled   - increasing Nifedipine ER  to 60 mg daily  - c/w metoprolol tartrate 25 BID    #Schizophrenia  - c/w olanzapine and trazodone    #HLD  - c/w atorvastatin 10    #Type II DM, with neuropathy  - FS AC/HS  - c/w lantus 15 + 6/6/6 with sliding scale   - on metformin 500 QD at home  - c/w gabapentin 300 mg BID    #Macrocytic Anemia with B12 Deficiency  - c/w B12, folate supplement    #COPD  - c/w albuterol prn .    #Progress Note Handoff  Pending (specify):  cardiology  Family discussion: patient aware  Disposition: discharge
81 year old man with PMHx of schizophrenia, COPD, hyperlipidemia, non-Hodgkin lymphoma in remission, Type 2DM (non-insulin dependent) with neuropathy, chronic indwelling Bains (chronic retention) BIBEMS from [facility] for nausea/vomiting. Pt's status has improved, medically stable for transfer to floor.     #Nausea/vomiting likely secondary to Urosepsis, chronic indwelling bains for retention   -recently discharged and was seen by Urology on 7/1 for difficult Bains placement after patient reported "bains came out" . Patient had a 14 fr catheter placed draining well and was told to follow-up in Urology clinic for further management.  - wbc 20k with bandemia on admission, Normotensive, + UA; s/p Rocephin 1 gm   - Patient with chronic indwelling Bains (chronic urinary retention)  - had several admissions in the past for ESBL pyelonephritis; last discharged on ertapenem 1 gm 10/24  - Urology consult appreciated: aspirated 50cc of cloudy yellow urine Bains now draining to gravity with 150cc of cloudy yellow urine noted in bag.   - c/w Bains care   - Repeat RBUS in 48 -72 hours to assess hydro  - Blood culture:  7/3: ESBL E. Coli  - Urine culture: 7/3: E. Coli   - c/w meropenem 1 g TID --> c/w meropenem until 7/9, then switch to Bactrim   - Monitor Urine output  - Analgesics for pain control prn/ zofran prn    # Hyponatremia - possibly SIADH secondary to antipsychotic medications  vs hypovolemia from extrarenal loss (nausea/vomiting)  patient with history of hyponatremia, on sodium tablets at home  - possibly 2/2 Trazadone use   - Sodium, Serum: 132  (7/5), 132 (7/6)  - US Renal: Right renal cysts measuring up to 5.6 x 5.6 cm, stable since the prior exam. 1.9 cm left renal cyst. Urinary bladder not evaluated, as Bains catheter is present  - Fluid restriction  - start home medication sodium chloride 1 gram     #elevated troponin w/ EKG changes (T-Wave inversion)  - elevated trop .10 on admission --> 0.11 > 0.02 > 0.02   - denies chest pain  - EKG on admission: no ischemic changes, repeat showing T-Wave inversion   - Cardiology consult for possible NSTEMI on admission due to elevated trops and EKG changes     #HTN, poorly controlled   - increasing Nifedipine ER  to 60 mg daily  - c/w metoprolol tartrate 25 BID    #Schizophrenia  - c/w olanzapine and trazodone    #HLD  - c/w atorvastatin 10    #Type II DM, with neuropathy  - FS AC/HS  - c/w lantus 15 + 6/6/6 with sliding scale   - on metformin 500 QD at home  - c/w gabapentin 300 mg BID    #Macrocytic Anemia with B12 Deficiency  - c/w B12, folate supplement    #COPD  - c/w albuterol prn

## 2021-07-09 ENCOUNTER — TRANSCRIPTION ENCOUNTER (OUTPATIENT)
Age: 81
End: 2021-07-09

## 2021-07-09 LAB
ALBUMIN SERPL ELPH-MCNC: 3.6 G/DL — SIGNIFICANT CHANGE UP (ref 3.5–5.2)
ALP SERPL-CCNC: 93 U/L — SIGNIFICANT CHANGE UP (ref 30–115)
ALT FLD-CCNC: 17 U/L — SIGNIFICANT CHANGE UP (ref 0–41)
ANION GAP SERPL CALC-SCNC: 8 MMOL/L — SIGNIFICANT CHANGE UP (ref 7–14)
AST SERPL-CCNC: 21 U/L — SIGNIFICANT CHANGE UP (ref 0–41)
BILIRUB SERPL-MCNC: 0.3 MG/DL — SIGNIFICANT CHANGE UP (ref 0.2–1.2)
BUN SERPL-MCNC: 15 MG/DL — SIGNIFICANT CHANGE UP (ref 10–20)
CALCIUM SERPL-MCNC: 8.5 MG/DL — SIGNIFICANT CHANGE UP (ref 8.5–10.1)
CHLORIDE SERPL-SCNC: 93 MMOL/L — LOW (ref 98–110)
CO2 SERPL-SCNC: 29 MMOL/L — SIGNIFICANT CHANGE UP (ref 17–32)
CREAT SERPL-MCNC: 0.8 MG/DL — SIGNIFICANT CHANGE UP (ref 0.7–1.5)
GLUCOSE BLDC GLUCOMTR-MCNC: 106 MG/DL — HIGH (ref 70–99)
GLUCOSE BLDC GLUCOMTR-MCNC: 127 MG/DL — HIGH (ref 70–99)
GLUCOSE BLDC GLUCOMTR-MCNC: 218 MG/DL — HIGH (ref 70–99)
GLUCOSE BLDC GLUCOMTR-MCNC: 75 MG/DL — SIGNIFICANT CHANGE UP (ref 70–99)
GLUCOSE SERPL-MCNC: 78 MG/DL — SIGNIFICANT CHANGE UP (ref 70–99)
HCT VFR BLD CALC: 34.9 % — LOW (ref 42–52)
HGB BLD-MCNC: 12 G/DL — LOW (ref 14–18)
MAGNESIUM SERPL-MCNC: 2.1 MG/DL — SIGNIFICANT CHANGE UP (ref 1.8–2.4)
MCHC RBC-ENTMCNC: 31.9 PG — HIGH (ref 27–31)
MCHC RBC-ENTMCNC: 34.4 G/DL — SIGNIFICANT CHANGE UP (ref 32–37)
MCV RBC AUTO: 92.8 FL — SIGNIFICANT CHANGE UP (ref 80–94)
NRBC # BLD: 0 /100 WBCS — SIGNIFICANT CHANGE UP (ref 0–0)
PLATELET # BLD AUTO: 241 K/UL — SIGNIFICANT CHANGE UP (ref 130–400)
POTASSIUM SERPL-MCNC: 4.7 MMOL/L — SIGNIFICANT CHANGE UP (ref 3.5–5)
POTASSIUM SERPL-SCNC: 4.7 MMOL/L — SIGNIFICANT CHANGE UP (ref 3.5–5)
PROT SERPL-MCNC: 6.1 G/DL — SIGNIFICANT CHANGE UP (ref 6–8)
RBC # BLD: 3.76 M/UL — LOW (ref 4.7–6.1)
RBC # FLD: 13.5 % — SIGNIFICANT CHANGE UP (ref 11.5–14.5)
SODIUM SERPL-SCNC: 130 MMOL/L — LOW (ref 135–146)
WBC # BLD: 5.05 K/UL — SIGNIFICANT CHANGE UP (ref 4.8–10.8)
WBC # FLD AUTO: 5.05 K/UL — SIGNIFICANT CHANGE UP (ref 4.8–10.8)

## 2021-07-09 PROCEDURE — 99233 SBSQ HOSP IP/OBS HIGH 50: CPT

## 2021-07-09 PROCEDURE — 93306 TTE W/DOPPLER COMPLETE: CPT | Mod: 26

## 2021-07-09 RX ORDER — LISINOPRIL 2.5 MG/1
1 TABLET ORAL
Qty: 30 | Refills: 0
Start: 2021-07-09 | End: 2021-08-07

## 2021-07-09 RX ORDER — NIFEDIPINE 30 MG
1 TABLET, EXTENDED RELEASE 24 HR ORAL
Qty: 30 | Refills: 0
Start: 2021-07-09 | End: 2021-08-07

## 2021-07-09 RX ORDER — ASPIRIN/CALCIUM CARB/MAGNESIUM 324 MG
1 TABLET ORAL
Qty: 30 | Refills: 0
Start: 2021-07-09 | End: 2021-08-07

## 2021-07-09 RX ORDER — LISINOPRIL 2.5 MG/1
5 TABLET ORAL DAILY
Refills: 0 | Status: DISCONTINUED | OUTPATIENT
Start: 2021-07-09 | End: 2021-07-12

## 2021-07-09 RX ORDER — ASPIRIN/CALCIUM CARB/MAGNESIUM 324 MG
81 TABLET ORAL DAILY
Refills: 0 | Status: DISCONTINUED | OUTPATIENT
Start: 2021-07-09 | End: 2021-07-12

## 2021-07-09 RX ADMIN — INSULIN GLARGINE 15 UNIT(S): 100 INJECTION, SOLUTION SUBCUTANEOUS at 21:13

## 2021-07-09 RX ADMIN — MEROPENEM 100 MILLIGRAM(S): 1 INJECTION INTRAVENOUS at 05:19

## 2021-07-09 RX ADMIN — Medication 1 MILLIGRAM(S): at 13:07

## 2021-07-09 RX ADMIN — MEROPENEM 100 MILLIGRAM(S): 1 INJECTION INTRAVENOUS at 21:12

## 2021-07-09 RX ADMIN — SODIUM CHLORIDE 1 GRAM(S): 9 INJECTION INTRAMUSCULAR; INTRAVENOUS; SUBCUTANEOUS at 13:07

## 2021-07-09 RX ADMIN — Medication 25 MILLIGRAM(S): at 05:20

## 2021-07-09 RX ADMIN — Medication 150 MILLIGRAM(S): at 21:12

## 2021-07-09 RX ADMIN — Medication 25 MILLIGRAM(S): at 17:11

## 2021-07-09 RX ADMIN — GABAPENTIN 300 MILLIGRAM(S): 400 CAPSULE ORAL at 17:11

## 2021-07-09 RX ADMIN — Medication 81 MILLIGRAM(S): at 13:10

## 2021-07-09 RX ADMIN — ATORVASTATIN CALCIUM 10 MILLIGRAM(S): 80 TABLET, FILM COATED ORAL at 21:12

## 2021-07-09 RX ADMIN — PREGABALIN 1000 MICROGRAM(S): 225 CAPSULE ORAL at 13:07

## 2021-07-09 RX ADMIN — OLANZAPINE 5 MILLIGRAM(S): 15 TABLET, FILM COATED ORAL at 13:07

## 2021-07-09 RX ADMIN — LISINOPRIL 5 MILLIGRAM(S): 2.5 TABLET ORAL at 17:11

## 2021-07-09 RX ADMIN — ENOXAPARIN SODIUM 40 MILLIGRAM(S): 100 INJECTION SUBCUTANEOUS at 13:11

## 2021-07-09 RX ADMIN — Medication 1 TABLET(S): at 13:07

## 2021-07-09 RX ADMIN — OLANZAPINE 20 MILLIGRAM(S): 15 TABLET, FILM COATED ORAL at 21:12

## 2021-07-09 RX ADMIN — SENNA PLUS 2 TABLET(S): 8.6 TABLET ORAL at 21:12

## 2021-07-09 RX ADMIN — GABAPENTIN 300 MILLIGRAM(S): 400 CAPSULE ORAL at 05:20

## 2021-07-09 RX ADMIN — PANTOPRAZOLE SODIUM 40 MILLIGRAM(S): 20 TABLET, DELAYED RELEASE ORAL at 06:05

## 2021-07-09 RX ADMIN — MEROPENEM 100 MILLIGRAM(S): 1 INJECTION INTRAVENOUS at 13:14

## 2021-07-09 RX ADMIN — Medication 60 MILLIGRAM(S): at 05:20

## 2021-07-09 NOTE — DISCHARGE NOTE PROVIDER - NSDCMRMEDTOKEN_GEN_ALL_CORE_FT
atorvastatin 10 mg oral tablet: 1 tab(s) orally once a day  cyanocobalamin 1000 mcg sublingual tablet: 1 tab(s) sublingually once a day   folic acid 1 mg oral tablet: 1 tab(s) orally once a day  gabapentin 300 mg oral capsule: 1 cap(s) orally every 12 hours  gabapentin 300 mg oral capsule: 1 cap(s) orally 2 times a day  ipratropium-albuterol 0.5 mg-2.5 mg/3 mLinhalation solution: 3 milliliter(s) inhaled every 6 hours, As needed, Shortness of Breath and/or Wheezing  lactobacillus acidophilus oral capsule: 1 tab(s) orally once a day  metFORMIN 500 mg oral tablet: 1 tab(s) orally 2 times a day  metoprolol tartrate 25 mg oral tablet: 1 tab(s) orally 2 times a day  NIFEdipine 30 mg oral tablet, extended release: 1 tab(s) orally once a day  OLANZapine 20 mg oral tablet: 1 tab(s) orally once a day (at bedtime)  OLANZapine 5 mg oral tablet: 1 tab(s) orally once a day  senna oral tablet: 2 tab(s) orally once a day (at bedtime)  Sodium Chloride 1 g oral tablet: 1 tab(s) orally once a day  traZODone 150 mg oral tablet: 1 tab(s) orally once a day (at bedtime)   aspirin 81 mg oral tablet, chewable: 1 tab(s) orally once a day  atorvastatin 10 mg oral tablet: 1 tab(s) orally once a day  cyanocobalamin 1000 mcg sublingual tablet: 1 tab(s) sublingually once a day   folic acid 1 mg oral tablet: 1 tab(s) orally once a day  gabapentin 300 mg oral capsule: 1 cap(s) orally 2 times a day  ipratropium-albuterol 0.5 mg-2.5 mg/3 mLinhalation solution: 3 milliliter(s) inhaled every 6 hours, As needed, Shortness of Breath and/or Wheezing  lactobacillus acidophilus oral capsule: 1 tab(s) orally once a day  lisinopril 5 mg oral tablet: 1 tab(s) orally once a day  metFORMIN 500 mg oral tablet: 1 tab(s) orally 2 times a day  metoprolol tartrate 25 mg oral tablet: 1 tab(s) orally 2 times a day  NIFEdipine 60 mg oral tablet, extended release: 1 tab(s) orally once a day  OLANZapine 20 mg oral tablet: 1 tab(s) orally once a day (at bedtime)  OLANZapine 5 mg oral tablet: 1 tab(s) orally once a day  senna oral tablet: 2 tab(s) orally once a day (at bedtime)  Sodium Chloride 1 g oral tablet: 1 tab(s) orally once a day  traZODone 150 mg oral tablet: 1 tab(s) orally once a day (at bedtime)

## 2021-07-09 NOTE — DISCHARGE NOTE PROVIDER - HOSPITAL COURSE
81 year old man with PMHx of schizophrenia, COPD, hyperlipidemia, non-Hodgkin lymphoma in remission, Type 2DM (non-insulin dependent) with neuropathy, chronic indwelling Bains (chronic retention), several admissions for pyelonephritis BIBEMS from [facility] for nausea/vomiting.    In ED,  patient afebrile, BP: 100/64, HR:75 RR:20 Spo2 98% on room air. Labs significant for leukocytosis 20k, Na 119, K 5.5, lactate 3.3 troponin .10. EKG no ischemic changes. UA positive, CTH negative for acute infarct or hemorrhage. CT Abdomen and Pelvis w/ IV Cont:Interval development of lobulation and cystic areas within the prostate. Bains balloon inflated within the penile urethra, recommend repositioning. Mild to moderate bilateral hydroureteronephrosis with no distal obstructing ureteral lesion or stone. Air is noted within the urinary bladder, likely secondary to instrumentation.  Patient recieved 1gm rocephin, 2 L NS and zofran x 1 in ED. Urology consulted, patient admitted to ICU for further management of hyponatremia and urosepsis.       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 1.9 cm left renal cyst. Urinary bladder not evaluated, as Bains catheter is present  - Fluid restriction  - Restarted home medication sodium chloride 1 gram     #Nausea/vomiting likely secondary to Urosepsis, chronic indwelling bains for retention , RESOLVED  -recently discharged and was seen by Urology on 7/1 for difficult Bains placement after patient reported "bains came out" . Patient had a 14 fr catheter placed draining well and was told to follow-up in Urology clinic for further management.  - wbc 20k with bandemia on admission, Normotensive, + UA; s/p Rocephin 1 gm   - Patient with chronic indwelling Bains (chronic urinary retention)  - had several admissions in the past for ESBL pyelonephritis; last discharged on ertapenem 1 gm 10/24  - Blood culture:  7/3: ESBL E. Coli  - Urine culture: 7/3: E. Coli   - Completed meropenem course per ID    #elevated troponin w/ EKG changes (T-Wave inversion), RESOLVED  - elevated trop .10 on admission --> 0.11 > 0.02 > 0.02   - denies chest pain  - EKG on admission: no ischemic changes, repeat showing T-Wave inversion      TTE: Summary:  1. Normal global left ventricular systolic function.  2. Moderately increased LV wall thickness.  3. Spectral Doppler shows impaired relaxation pattern of left ventricular myocardial filling (Grade I diastolic dysfunction).  4. Mild to moderately enlarged left atrium.  5. Normal right atrial size.  6. Mild to moderate mitral annular calcification.  7. No evidence of mitral valve regurgitation.  8. Sclerotic aortic valve with decreased opening.  9. Peak transaortic gradient equals 20.1 mmHg, mean transaortic gradient equals 10.3 mmHg, the calculated aortic valve area equals 1.71 cmï¿½ by the continuity equation consistent with mild aortic stenosis.      #HTN, well controlled now  - increasing Nifedipine ER  to 60 mg daily  - c/w metoprolol tartrate 25 BID  - lisinopril 5mg daily    #Schizophrenia  - c/w olanzapine and trazodone    #HLD  - c/w atorvastatin 10    #Type II DM, with neuropathy  - home medications    #Macrocytic Anemia with B12 Deficiency  - c/w B12, folate supplement    #COPD  - c/w albuterol prn .

## 2021-07-09 NOTE — PROGRESS NOTE ADULT - SUBJECTIVE AND OBJECTIVE BOX
HERNANDOADA  81y  Male      Patient is a 81y old  Male who presents with a chief complaint of Nausea Vomiting (08 Jul 2021 13:45)      INTERVAL HPI/OVERNIGHT EVENTS: no acute events overnight. patient has no complaints.       REVIEW OF SYSTEMS:  CONSTITUTIONAL: No fever, weight loss, or fatigue  RESPIRATORY: No cough, wheezing, chills or hemoptysis; No shortness of breath  CARDIOVASCULAR: No chest pain, palpitations, dizziness, or leg swelling  GASTROINTESTINAL: No abdominal or epigastric pain. No nausea, vomiting, or hematemesis; No diarrhea or constipation. No melena or hematochezia.  GENITOURINARY: No dysuria, frequency, hematuria, or incontinence  NEUROLOGICAL: No headaches, memory loss, loss of strength, numbness, or tremors  SKIN: No itching, burning, rashes, or lesions   MUSCULOSKELETAL: No joint pain or swelling; No muscle, back, or extremity pain  PSYCHIATRIC: No depression, anxiety, mood swings, or difficulty sleeping  All other review of systems negative    T(C): 36.3 (07-09-21 @ 14:37), Max: 36.6 (07-09-21 @ 05:46)  HR: 82 (07-09-21 @ 17:11) (67 - 82)  BP: 166/71 (07-09-21 @ 17:11) (131/63 - 166/71)  RR: 18 (07-09-21 @ 14:37) (18 - 18)  SpO2: 97% (07-09-21 @ 17:11) (95% - 97%)  Wt(kg): --Vital Signs Last 24 Hrs  T(C): 36.3 (09 Jul 2021 14:37), Max: 36.6 (09 Jul 2021 05:46)  T(F): 97.3 (09 Jul 2021 14:37), Max: 97.8 (09 Jul 2021 05:46)  HR: 82 (09 Jul 2021 17:11) (67 - 82)  BP: 166/71 (09 Jul 2021 17:11) (131/63 - 166/71)  BP(mean): --  RR: 18 (09 Jul 2021 14:37) (18 - 18)  SpO2: 97% (09 Jul 2021 17:11) (95% - 97%)      07-08-21 @ 07:01  -  07-09-21 @ 07:00  --------------------------------------------------------  IN: 0 mL / OUT: 3650 mL / NET: -3650 mL        PHYSICAL EXAM:  GENERAL: NAD, well-groomed, well-developed  PSYCH: no agitation, baseline mentation  NERVOUS SYSTEM:  Alert & Oriented X3, Motor Strength 5/5 B/L upper and lower extremities; Sensory intact; FTN WNL  PULMONARY: Clear to percussion bilaterally; No rales, rhonchi, wheezing, or rubs  CARDIOVASCULAR: Regular rate and rhythm; No murmurs, rubs, or gallops  GI: Soft, Nontender, Nondistended; Bowel sounds present  EXTREMITIES:  2+ Peripheral Pulses, No clubbing, cyanosis, or edema  LYMPH: No lymphadenopathy noted  SKIN: No rashes or lesions    Consultant(s) Notes Reviewed:  [x ] YES  [ ] NO    Discussed with Consultants/Other Providers [ x] YES     LABS                          12.0   5.05  )-----------( 241      ( 09 Jul 2021 06:16 )             34.9     07-09    130<L>  |  93<L>  |  15  ----------------------------<  78  4.7   |  29  |  0.8    Ca    8.5      09 Jul 2021 06:16  Mg     2.1     07-09    TPro  6.1  /  Alb  3.6  /  TBili  0.3  /  DBili  x   /  AST  21  /  ALT  17  /  AlkPhos  93  07-09          Lactate Trend        CAPILLARY BLOOD GLUCOSE      POCT Blood Glucose.: 106 mg/dL (09 Jul 2021 16:17)        RADIOLOGY & ADDITIONAL TESTS:    Imaging Personally Reviewed:  [ ] YES  [ ] NO    HEALTH ISSUES - PROBLEM Dx:

## 2021-07-09 NOTE — DISCHARGE NOTE PROVIDER - NSDCCPCAREPLAN_GEN_ALL_CORE_FT
PRINCIPAL DISCHARGE DIAGNOSIS  Diagnosis: E coli bacteremia  Assessment and Plan of Treatment: You were admitted to the hospital and found to have an infection in your genitourinary tract that spread to your blood. You were treated with IV antibiotics, meropenem. Please follow up with your urologist as an outpatient for ongoing chronic bains catheter care and your primary care doctor.    During this admission you were also noted to have chronic hyponatremia, low sodium levels. Please continue to to maintain a fluid restriction, 1 L daily, and continue to take all medications as prescribed.   During this admission you were also noted to have elevated cardiac enzymes and EKG changes. You were seen by cardiology who recomended to continue the metoprolol. You had an echocardiogram, an US of your heart, which was reviewed by the cardiologist. Please follow up with your cardiologist as and outpatient.   Please take all medications as prescribed and follow up with your primary care doctor, urologist, and cardiologist.          PRINCIPAL DISCHARGE DIAGNOSIS  Diagnosis: E coli bacteremia  Assessment and Plan of Treatment: You were admitted to the hospital and found to have an infection in your genitourinary tract that spread to your blood. You were treated with IV antibiotics, meropenem. Please follow up with your urologist as an outpatient for ongoing chronic bains catheter care and your primary care doctor.    During this admission you were also noted to have chronic hyponatremia, low sodium levels. Please continue to to maintain a fluid restriction, 1 L daily, and continue to take all medications as prescribed.   During this admission you were also noted to have elevated cardiac enzymes and EKG changes. You were seen by cardiology who recomended to continue the metoprolol. You had an echocardiogram, an US of your heart, which was reviewed by the cardiologist. Please follow up with your cardiologist as and outpatient.   Please take all medications as prescribed and follow up with your primary care doctor, urologist, and cardiologist.         SECONDARY DISCHARGE DIAGNOSES  Diagnosis: History of SIADH  Assessment and Plan of Treatment:

## 2021-07-09 NOTE — PROGRESS NOTE ADULT - ASSESSMENT
81 year old man with PMHx of schizophrenia, COPD, hyperlipidemia, non-Hodgkin lymphoma in remission, Type 2DM (non-insulin dependent) with neuropathy, chronic indwelling Bains (chronic retention) BIBEMS from [facility] for nausea/vomiting. Pt's status has improved, medically stable for transfer to floor.     #Nausea/vomiting likely secondary to Urosepsis, chronic indwelling bains for retention   -recently discharged and was seen by Urology on 7/1 for difficult Bains placement after patient reported "bains came out" . Patient had a 14 fr catheter placed draining well and was told to follow-up in Urology clinic for further management.  - wbc 20k with bandemia on admission, Normotensive, + UA; s/p Rocephin 1 gm   - Patient with chronic indwelling Bains (chronic urinary retention)  - had several admissions in the past for ESBL pyelonephritis; last discharged on ertapenem 1 gm 10/24  - Urology consult appreciated: aspirated 50cc of cloudy yellow urine Bains now draining to gravity with 150cc of cloudy yellow urine noted in bag.   - c/w Bains care   - Repeat RBUS in 48 -72 hours to assess hydro  - Blood culture:  7/3: ESBL E. Coli  - Urine culture: 7/3: E. Coli   - c/w meropenem 1 g TID --> c/w meropenem until 7/9, then switch to Bactrim   - Monitor Urine output  - Analgesics for pain control prn/ zofran prn    # Hyponatremia, STABLE   - possibly SIADH secondary to antipsychotic medications  vs hypovolemia from extrarenal loss (nausea/vomiting)  patient with history of hyponatremia, on sodium tablets at home  - possibly 2/2 Trazadone use   - Sodium, Serum: 132  (7/5), 132 (7/6)  - US Renal: Right renal cysts measuring up to 5.6 x 5.6 cm, stable since the prior exam. 1.9 cm left renal cyst. Urinary bladder not evaluated, as Bains catheter is present  - Fluid restriction  - start home medication sodium chloride 1 gram   - stable at baseline ~131    #elevated troponin w/ EKG changes (T-Wave inversion)  - elevated trop .10 on admission --> 0.11 > 0.02 > 0.02   - denies chest pain  - EKG on admission: no ischemic changes, repeat showing T-Wave inversion   - Cardiology consult for possible NSTEMI on admission due to elevated trops and EKG changes     #HTN, poorly controlled   - increasing Nifedipine ER  to 60 mg daily  - c/w metoprolol tartrate 25 BID    #Schizophrenia  - c/w olanzapine and trazodone    #HLD  - c/w atorvastatin 10    #Type II DM, with neuropathy  - FS AC/HS  - c/w lantus 15 + 6/6/6 with sliding scale   - on metformin 500 QD at home  - c/w gabapentin 300 mg BID    #Macrocytic Anemia with B12 Deficiency  - c/w B12, folate supplement    #COPD  - c/w albuterol prn .    #Progress Note Handoff  Pending (specify):  cardiology  Family discussion: patient aware  Disposition: discharge .

## 2021-07-09 NOTE — DISCHARGE NOTE PROVIDER - CARE PROVIDERS DIRECT ADDRESSES
[email or web address]

## 2021-07-09 NOTE — DISCHARGE NOTE PROVIDER - CARE PROVIDER_API CALL
Silverio Hayes)  [address] [place], [place] [id number]  [address] [place] [address], [place] [address]  Phone: [phone]  Fax: [phone]  Established Patient  Follow Up Time: 1 week

## 2021-07-09 NOTE — DISCHARGE NOTE PROVIDER - NSDCCPGOAL_GEN_ALL_CORE_FT
.  [facility] Progress Note      Subjective  Pt seen, very lethargic, not following any commands. Sleeping on the bed with mouth breething      Medications  Current Facility-Administered Medications   Medication Dose Route Frequency Provider Last Rate Last Dose   • pantoprazole (PROTONIX) EC tablet 40 mg  40 mg Oral Daily Catrina Thornton CNP   40 mg at 06/05/20 0845   • cefepime (MAXIPIME) 1,000 mg in sodium chloride 0.9 % 100 mL IVPB  1,000 mg Intravenous 3 times per day Candi Andrews  mL/hr at 06/05/20 1322 1,000 mg at 06/05/20 1322   • insulin lispro (HumaLOG) injection 13 Units  13 Units Subcutaneous Daily with breakfast Kyaw Stewart MD   13 Units at 06/05/20 0939   • insulin lispro (HumaLOG) injection 13 Units  13 Units Subcutaneous Daily at noon Kyaw Stewart MD   13 Units at 06/04/20 1239   • insulin lispro (HumaLOG) injection 15 Units  15 Units Subcutaneous Daily with dinner Kyaw Stewart MD   Stopped at 06/04/20 1807   • doxycycline hyclate (VIBRAMYCIN) capsule 100 mg  100 mg Oral 2 times per day Candi Andrews MD   100 mg at 06/05/20 0844   • WARFARIN - PHARMACIST MONITORED   Does not apply See Admin Instructions Ap Soares MD       • fluconazole (DIFLUCAN) tablet 100 mg  100 mg Oral Daily Candi Andrews MD   100 mg at 06/05/20 0844   • sodium chloride 0.9 % flush bag 25 mL  25 mL Intravenous PRN Justino Hudson MD   Stopped at 05/31/20 2237   • insulin glargine (LANTUS) injection 50 Units  50 Units Subcutaneous Daily Kyaw Stewart MD   50 Units at 06/05/20 0846   • dexamethasone (DECADRON) tablet 10 mg  10 mg Oral Daily with breakfast Lucero Carroll MD   10 mg at 06/05/20 0845   • dilTIAZem (CARDIZEM) tablet 60 mg  60 mg Oral 4x Daily Lucero Carroll MD   60 mg at 06/05/20 1329   • hydrALAZINE (APRESOLINE) injection 10 mg  10 mg Intravenous Q4H PRN Lucero Carroll MD   10 mg at 06/01/20 2255   • dextrose 5 % infusion   Intravenous Continuous PRN Catrina Thornton CNP       • dextrose  (GLUTOSE) 40 % gel 15 g  15 g Oral PRN Catrina Thornton, CNP       • dextrose (GLUTOSE) 40 % gel 30 g  30 g Oral PRN Catrina Thornton, CNP       • dextrose 50 % injection 12.5 g  12.5 g Intravenous PRN Catrina Thornton, CNP       • glucagon (GLUCAGEN) injection 1 mg  1 mg Intramuscular PRN Catrina Thornton, CNP       • insulin lispro (HumaLOG) sliding scale injection   Subcutaneous 4x Daily AC & HS Kyaw Stewart MD   2 Units at 06/05/20 0939   • dextrose 50 % injection 25 mL  25 mL Intravenous PRN Catrina Thornton, CNP       • naLOXone (NARCAN) injection 0.1 mg  0.1 mg Intravenous PRN Catrina Thornton, CNP       • atorvastatin (LIPITOR) tablet 40 mg  40 mg Oral Nightly Catrina Thornton, CNP   40 mg at 06/04/20 2006   • bisacodyl (DULCOLAX) suppository 10 mg  10 mg Rectal Daily PRN Catrina Thornton, CNP       • bisacodyl (DULCOLAX) suppository 10 mg  10 mg Rectal Q48H Catrina Tam, CNP   10 mg at 06/04/20 0539   • calcium carbonate-vitamin D (CALTRATE+D) 600-400 MG-UNIT tablet 1 tablet  1 tablet Oral Daily Catrina Tam, CNP   1 tablet at 06/05/20 0844   • docusate sodium (ENEMEEZ MINI) 283 MG rectal enema 283 mg  283 mg Rectal Daily PRN Catrina Thornton, CNP       • [Held by provider] gabapentin (NEURONTIN) capsule 600 mg  600 mg Oral Nightly Catrina Thornton, CNP   600 mg at 05/27/20 2021   • latanoprost (XALATAN) 0.005 % ophthalmic solution 1 drop  1 drop Both Eyes Nightly Catrina Thornton, CNP   1 drop at 06/04/20 2014   • levothyroxine (SYNTHROID, LEVOTHROID) tablet 100 mcg  100 mcg Oral Once per day on Tue Wed Thu Fri Sat Catrina Thornton, CNP   100 mcg at 06/05/20 0525   • [Held by provider] losartan (COZAAR) tablet 50 mg  50 mg Oral TID Catrina Thornton, CNP   50 mg at 05/27/20 2021   • magnesium hydroxide (MILK OF MAGNESIA) 400 MG/5ML suspension 30 mL  30 mL Oral Q6H PRN Catrina Thornton, CNP       • melatonin tablet 9 mg  9 mg Oral Nightly PRN Catrina Thornton CNP       • nystatin (MYCOSTATIN) powder   Topical 2 times per day Catrina Thornton CNP       • oxyCODONE (IMM REL) (ROXICODONE) tablet 10 mg  10 mg Oral Q4H  PRN Catrina Thornton CNP       • psyllium (METAMUCIL MULTIHEALTH FIBER) 58.12 % packet 1 packet  1 packet Oral Daily Catrina Thornton CNP   1 packet at 06/05/20 0844   • tamsulosin (FLOMAX) capsule 0.4 mg  0.4 mg Oral Daily PC Catrina Thornton CNP   0.4 mg at 06/05/20 0845   • oxyCODONE (IMM REL) (ROXICODONE) tablet 5 mg  5 mg Oral Q4H PRN Justino Hudson MD       • metoPROLOL (LOPRESSOR) injection 5 mg  5 mg Intravenous Q6H PRN Aditi Carey MD   5 mg at 06/03/20 0646   • acetaminophen (TYLENOL) tablet 1,000 mg  1,000 mg Oral TID Aditi Carey MD   1,000 mg at 06/05/20 0844   • dextrose 50 % injection 25 mL  25 mL Intravenous PRN Sonny Bailey MD       • dextrose 5 % / sodium chloride 0.45% infusion   Intravenous Continuous PRN Sonny Bailey MD       • Dextrose 10 % - NaCL 0.45% infusion   Intravenous Continuous PRN Sonny Bailey MD       • dextrose 5 % / sodium chloride 0.45% infusion   Intravenous Continuous PRN Sonny Bailey MD       • Dextrose 10 % - NaCL 0.45% infusion   Intravenous Continuous PRN Sonny Bailey MD       • polyethylene glycol (MIRALAX) packet 17 g  17 g Oral Daily PRN Justino Hudson MD       • HYDROcodone-acetaminophen (NORCO)  MG per tablet 1 tablet  1 tablet Oral Q6H PRN Justino Hudson MD              Physical Exam  Physical Exam  Vitals signs reviewed.   Constitutional:       General: He is not in acute distress.     Appearance: Normal appearance. He is obese.      Comments: confused and lethargic, difficult to arouse   HENT:      Head: Normocephalic and atraumatic.   Cardiovascular:      Rate and Rhythm: Normal rate. Rhythm irregular.      Heart sounds: Normal heart sounds.   Pulmonary:      Effort: Pulmonary effort is normal.      Breath sounds: Normal breath sounds.   Abdominal:      General: Bowel sounds are normal.      Palpations: Abdomen is soft.   Genitourinary:     Comments: Richardson cath   Musculoskeletal:      Right lower leg: Edema present.      Left lower leg: Edema present.      Comments: 1+ BLE  edema, azael hoses/ SCD     Skin:     General: Skin is warm and dry.      Capillary Refill: Capillary refill takes less than 2 seconds.   Neurological:      Comments: Not following any of my commands           Vitals:   Vitals with min/max:      Vital Last Value 24 Hour Range   Temperature 97.2 °F (36.2 °C) (06/05/20 1204) Temp  Min: 97 °F (36.1 °C)  Max: 98.1 °F (36.7 °C)   Pulse 78 (06/05/20 1204) Pulse  Min: 74  Max: 89   Respiratory 18 (06/05/20 1204) Resp  Min: 15  Max: 20   Non-Invasive  Blood Pressure 131/80 (06/05/20 1204) BP  Min: 128/79  Max: 159/92   Pulse Oximetry 99 % (06/05/20 1204) SpO2  Min: 93 %  Max: 100 %   Arterial   Blood Pressure   No data recorded       Imaging  No results found.    Labs     Recent Labs   Lab 06/05/20  0447 06/04/20  0459 06/03/20  1420  06/03/20  0445   WBC 6.4 7.7 15.8*  --   --    HCT 29.5* 28.4* 32.2*   < >  --    HGB 9.1* 8.7* 10.2*   < > 9.6*   PLT 53* 53* 78*  --   --    INR 1.6 1.8  --   --  1.2   SODIUM 140 142 140  --   --    POTASSIUM 4.3 4.1 4.8  --   --    CHLORIDE 110* 112* 109*  --   --    CO2 24 25 27  --   --    CALCIUM 8.7 8.3* 8.4  --   --    GLUCOSE 237* 143* 207*  --   --    BUN 42* 52* 51*  --   --    CREATININE 0.66* 0.72 0.79  --   --    AST  --  26 23  --   --    GPT  --  36 39  --   --    ALKPT  --  64 68  --   --    BILIRUBIN  --  1.1* 1.0  --   --    ALBUMIN  --  2.0* 2.2*  --   --    GFRNA >90 87 84  --   --     < > = values in this interval not displayed.         Assessment and Plan:  Acute metabolic encephalopathy 2/2 UTI  CT Head (06/03) no acute intracranial abnormality  Urine culture (06/04) growing > 100,000 CFU/mL Candida albicans  Discontinue cefepime   - WBC WNL (06/05)   - Continue doxycycline 100mg q2d and fluconazole   - Continue decadron 10mg qAM   - ID following     Cauda equina syndrome and severe spinal stenosis with epidural hematoma at L2 s/p T12-L4 laminectomy with evacuation of epidural hematoma (5/14)  Anaerobic and aerobic cx  negative, final (5/18)  Surgical pathology of epidural tissue (5/18) noted fibroadipose tissue with extensive interstitial hemorrhage and fibrin deposition             - YAN Tylenol w/ PRN Norco and Oxycodone. Holding gabapentin             - Continue Decadron 10mg daily, continue wean             - PT/OT             - [facility] is following             - PM&R following, recommend acute inpatient rehab course    Surgical wound dehiscence d/t superficial low wound infection with chronic drainage s/p irrigation debridement and closure of his back wound (5/26/20)   Surgical anaerobe/aerobic cx grew mixed marvin, final (05/30)  Aerobic cx, NGTD, final (6/1)  XR abdomen (6/4) noted fecal impaction    - Continue w/ Hemovac    - Abx and pain control noted above   - Continue specialty mattress    - Plan rehab floor when medically stable   - PT/OT as tolerated, if they clear him and he is stable    Chronic BLE edema  BLE Venous Doppler (05/30) showed no evidence of acute DVT   - Continue SCDs    Hypertensive urgency, resolved, in setting of primary hypertension, improving   - -159 in 24hrs   - Continue w/ diltiazem   - Holding losartan     Chronic Afib    - Pharm to dose Comaudin (INR 1.6 (06/05))   - Rate control w/ diltiazem   - Cards following     Leukocytosis 2/2 E. Coli UTI, infected epidural hematoma, steroids.  UA (06/03) showed trace glucose, trace ketones, small blood, large LE, and few bacteria  S/p bactrim x 7 days and ceftriaxone 5 days    - WBC 15.8 > WNL (6/5)   - Follow Abx above   - ID following     Urinary retention 2/2 hypotonic bladder d/t cord hematoma in setting of BPH  Multifactorial 2/2 neurogenic component from severe spinal stenosis and recent spinal surgery deconditioning, opioid pain meds per urology             - Maintain palacios until patient more mobile, as per urology recs             - Continue home flomax              - Urology recs intermittent caths when able, will need cath dependence  for a while             - Urology following, decrease narcotic use as able    Thrush - continue w/ fluconazole qd     Coagulopathy 2/2 coumadin poa reversed  Reversed with vit k and FFP   - INR 1.6 (6/5)   - Continue w/ pharm to dose coumadin     Hyperbilirubinemia - TBili 1.1 on (06/04)     Hyperglycemia 2/2 steroids in setting of Diabetes Mellitus, type 2    - -287 within past 24hrs   - Increase Lantus 55U qd, Humalog 13/13/15U TID, SSI coverage   - Decadron, wean noted above   - Endo is following and adjusting insulin prn     Acute blood loss anemia 2/2 epidural hematoma and procedure- Hgb 8.7 > 9.1 (6/5), monitoring CBC     Thrombocytopenia - plt 53 (6/5)    Acute atypical chest pain, resolved  Acute kidney injury, resolved    Hyperlipidemia - Continue home atorvastatin  Hypothyroidism - Continue home levothyroxine  Osteoporosis - Continue home vitamins  Goiter  H/o TIA      Code Status    Code Status: Full Resuscitation    DVT Prophylaxis  Coumadin, SCDs, KINGSLEY hose    Disposition: Discharge to 6W when medically stable    Primary Care Physician  Dorothea Ornelas MD      All patient questions answered  All labs and imaging reviewed    Charting performed by scribe Duane Yu and Kori Martines for Dr. Pily Smiley.       All medical record entries made by the riddhi were at my direction. I have reviewed the chart  and agree that the record accurately reflects my personal performance of the history, physical  exam, hospital course, and assessment and plan.      To get better and follow your care plan as instructed.

## 2021-07-10 LAB
ALBUMIN SERPL ELPH-MCNC: 3.2 G/DL — LOW (ref 3.5–5.2)
ALP SERPL-CCNC: 86 U/L — SIGNIFICANT CHANGE UP (ref 30–115)
ALT FLD-CCNC: 21 U/L — SIGNIFICANT CHANGE UP (ref 0–41)
ANION GAP SERPL CALC-SCNC: 6 MMOL/L — LOW (ref 7–14)
ANION GAP SERPL CALC-SCNC: 8 MMOL/L — SIGNIFICANT CHANGE UP (ref 7–14)
AST SERPL-CCNC: 28 U/L — SIGNIFICANT CHANGE UP (ref 0–41)
BASOPHILS # BLD AUTO: 0.03 K/UL — SIGNIFICANT CHANGE UP (ref 0–0.2)
BASOPHILS NFR BLD AUTO: 0.5 % — SIGNIFICANT CHANGE UP (ref 0–1)
BILIRUB SERPL-MCNC: 0.2 MG/DL — SIGNIFICANT CHANGE UP (ref 0.2–1.2)
BUN SERPL-MCNC: 17 MG/DL — SIGNIFICANT CHANGE UP (ref 10–20)
BUN SERPL-MCNC: 17 MG/DL — SIGNIFICANT CHANGE UP (ref 10–20)
CALCIUM SERPL-MCNC: 8.1 MG/DL — LOW (ref 8.5–10.1)
CALCIUM SERPL-MCNC: 8.2 MG/DL — LOW (ref 8.5–10.1)
CHLORIDE SERPL-SCNC: 90 MMOL/L — LOW (ref 98–110)
CHLORIDE SERPL-SCNC: 93 MMOL/L — LOW (ref 98–110)
CO2 SERPL-SCNC: 24 MMOL/L — SIGNIFICANT CHANGE UP (ref 17–32)
CO2 SERPL-SCNC: 25 MMOL/L — SIGNIFICANT CHANGE UP (ref 17–32)
CREAT SERPL-MCNC: 0.8 MG/DL — SIGNIFICANT CHANGE UP (ref 0.7–1.5)
CREAT SERPL-MCNC: 1 MG/DL — SIGNIFICANT CHANGE UP (ref 0.7–1.5)
EOSINOPHIL # BLD AUTO: 0.07 K/UL — SIGNIFICANT CHANGE UP (ref 0–0.7)
EOSINOPHIL NFR BLD AUTO: 1.3 % — SIGNIFICANT CHANGE UP (ref 0–8)
GLUCOSE BLDC GLUCOMTR-MCNC: 124 MG/DL — HIGH (ref 70–99)
GLUCOSE BLDC GLUCOMTR-MCNC: 145 MG/DL — HIGH (ref 70–99)
GLUCOSE BLDC GLUCOMTR-MCNC: 202 MG/DL — HIGH (ref 70–99)
GLUCOSE BLDC GLUCOMTR-MCNC: 98 MG/DL — SIGNIFICANT CHANGE UP (ref 70–99)
GLUCOSE SERPL-MCNC: 69 MG/DL — LOW (ref 70–99)
GLUCOSE SERPL-MCNC: 88 MG/DL — SIGNIFICANT CHANGE UP (ref 70–99)
HCT VFR BLD CALC: 31.7 % — LOW (ref 42–52)
HGB BLD-MCNC: 10.7 G/DL — LOW (ref 14–18)
IMM GRANULOCYTES NFR BLD AUTO: 0.5 % — HIGH (ref 0.1–0.3)
LYMPHOCYTES # BLD AUTO: 2.47 K/UL — SIGNIFICANT CHANGE UP (ref 1.2–3.4)
LYMPHOCYTES # BLD AUTO: 44.6 % — SIGNIFICANT CHANGE UP (ref 20.5–51.1)
MAGNESIUM SERPL-MCNC: 2 MG/DL — SIGNIFICANT CHANGE UP (ref 1.8–2.4)
MCHC RBC-ENTMCNC: 30.7 PG — SIGNIFICANT CHANGE UP (ref 27–31)
MCHC RBC-ENTMCNC: 33.8 G/DL — SIGNIFICANT CHANGE UP (ref 32–37)
MCV RBC AUTO: 91.1 FL — SIGNIFICANT CHANGE UP (ref 80–94)
MONOCYTES # BLD AUTO: 0.51 K/UL — SIGNIFICANT CHANGE UP (ref 0.1–0.6)
MONOCYTES NFR BLD AUTO: 9.2 % — SIGNIFICANT CHANGE UP (ref 1.7–9.3)
NEUTROPHILS # BLD AUTO: 2.43 K/UL — SIGNIFICANT CHANGE UP (ref 1.4–6.5)
NEUTROPHILS NFR BLD AUTO: 43.9 % — SIGNIFICANT CHANGE UP (ref 42.2–75.2)
NRBC # BLD: 0 /100 WBCS — SIGNIFICANT CHANGE UP (ref 0–0)
PLATELET # BLD AUTO: 209 K/UL — SIGNIFICANT CHANGE UP (ref 130–400)
POTASSIUM SERPL-MCNC: 4.8 MMOL/L — SIGNIFICANT CHANGE UP (ref 3.5–5)
POTASSIUM SERPL-MCNC: 5.1 MMOL/L — HIGH (ref 3.5–5)
POTASSIUM SERPL-SCNC: 4.8 MMOL/L — SIGNIFICANT CHANGE UP (ref 3.5–5)
POTASSIUM SERPL-SCNC: 5.1 MMOL/L — HIGH (ref 3.5–5)
PROT SERPL-MCNC: 5.6 G/DL — LOW (ref 6–8)
RBC # BLD: 3.48 M/UL — LOW (ref 4.7–6.1)
RBC # FLD: 13.3 % — SIGNIFICANT CHANGE UP (ref 11.5–14.5)
SODIUM SERPL-SCNC: 123 MMOL/L — LOW (ref 135–146)
SODIUM SERPL-SCNC: 123 MMOL/L — LOW (ref 135–146)
WBC # BLD: 5.54 K/UL — SIGNIFICANT CHANGE UP (ref 4.8–10.8)
WBC # FLD AUTO: 5.54 K/UL — SIGNIFICANT CHANGE UP (ref 4.8–10.8)

## 2021-07-10 PROCEDURE — 99233 SBSQ HOSP IP/OBS HIGH 50: CPT

## 2021-07-10 RX ADMIN — OLANZAPINE 20 MILLIGRAM(S): 15 TABLET, FILM COATED ORAL at 21:58

## 2021-07-10 RX ADMIN — Medication 150 MILLIGRAM(S): at 21:59

## 2021-07-10 RX ADMIN — Medication 1 TABLET(S): at 11:38

## 2021-07-10 RX ADMIN — LISINOPRIL 5 MILLIGRAM(S): 2.5 TABLET ORAL at 05:16

## 2021-07-10 RX ADMIN — SODIUM CHLORIDE 1 GRAM(S): 9 INJECTION INTRAMUSCULAR; INTRAVENOUS; SUBCUTANEOUS at 11:38

## 2021-07-10 RX ADMIN — GABAPENTIN 300 MILLIGRAM(S): 400 CAPSULE ORAL at 05:16

## 2021-07-10 RX ADMIN — Medication 25 MILLIGRAM(S): at 17:13

## 2021-07-10 RX ADMIN — Medication 25 MILLIGRAM(S): at 05:16

## 2021-07-10 RX ADMIN — CHLORHEXIDINE GLUCONATE 1 APPLICATION(S): 213 SOLUTION TOPICAL at 05:16

## 2021-07-10 RX ADMIN — Medication 1 MILLIGRAM(S): at 11:38

## 2021-07-10 RX ADMIN — INSULIN GLARGINE 15 UNIT(S): 100 INJECTION, SOLUTION SUBCUTANEOUS at 21:59

## 2021-07-10 RX ADMIN — Medication 2: at 08:33

## 2021-07-10 RX ADMIN — PANTOPRAZOLE SODIUM 40 MILLIGRAM(S): 20 TABLET, DELAYED RELEASE ORAL at 05:16

## 2021-07-10 RX ADMIN — Medication 6 UNIT(S): at 08:34

## 2021-07-10 RX ADMIN — MEROPENEM 100 MILLIGRAM(S): 1 INJECTION INTRAVENOUS at 21:59

## 2021-07-10 RX ADMIN — MEROPENEM 100 MILLIGRAM(S): 1 INJECTION INTRAVENOUS at 13:11

## 2021-07-10 RX ADMIN — OLANZAPINE 5 MILLIGRAM(S): 15 TABLET, FILM COATED ORAL at 11:38

## 2021-07-10 RX ADMIN — ATORVASTATIN CALCIUM 10 MILLIGRAM(S): 80 TABLET, FILM COATED ORAL at 21:59

## 2021-07-10 RX ADMIN — Medication 60 MILLIGRAM(S): at 05:16

## 2021-07-10 RX ADMIN — SENNA PLUS 2 TABLET(S): 8.6 TABLET ORAL at 21:58

## 2021-07-10 RX ADMIN — Medication 6 UNIT(S): at 17:13

## 2021-07-10 RX ADMIN — ENOXAPARIN SODIUM 40 MILLIGRAM(S): 100 INJECTION SUBCUTANEOUS at 11:38

## 2021-07-10 RX ADMIN — Medication 6 UNIT(S): at 11:37

## 2021-07-10 RX ADMIN — GABAPENTIN 300 MILLIGRAM(S): 400 CAPSULE ORAL at 17:13

## 2021-07-10 RX ADMIN — PREGABALIN 1000 MICROGRAM(S): 225 CAPSULE ORAL at 11:38

## 2021-07-10 RX ADMIN — Medication 81 MILLIGRAM(S): at 11:38

## 2021-07-10 RX ADMIN — MEROPENEM 100 MILLIGRAM(S): 1 INJECTION INTRAVENOUS at 05:15

## 2021-07-10 NOTE — PROGRESS NOTE ADULT - ASSESSMENT
81 year old man with PMHx of schizophrenia, COPD, hyperlipidemia, non-Hodgkin lymphoma in remission, Type 2DM (non-insulin dependent) with neuropathy, chronic indwelling Bains (chronic retention) BIBEMS from [facility] for nausea/vomiting. Pt's status has improved, medically stable for transfer to floor.       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 1.9 cm left renal cyst. Urinary bladder not evaluated, as Bains catheter is present  - Fluid restriction  - start home medication sodium chloride 1 gram   - FREE WATER RESTRICTION    #Nausea/vomiting likely secondary to Urosepsis, chronic indwelling bains for retention   -recently discharged and was seen by Urology on 7/1 for difficult Bains placement after patient reported "bains came out" . Patient had a 14 fr catheter placed draining well and was told to follow-up in Urology clinic for further management.  - wbc 20k with bandemia on admission, Normotensive, + UA; s/p Rocephin 1 gm   - Patient with chronic indwelling Bains (chronic urinary retention)  - had several admissions in the past for ESBL pyelonephritis; last discharged on ertapenem 1 gm 10/24  - Urology consult appreciated: aspirated 50cc of cloudy yellow urine Bains now draining to gravity with 150cc of cloudy yellow urine noted in bag.   - c/w Bains care   - Repeat RBUS in 48 -72 hours to assess hydro  - Blood culture:  7/3: ESBL E. Coli  - Urine culture: 7/3: E. Coli   - c/w meropenem 1 g TID --> c/w meropenem until 7/9, then switch to Bactrim   - Monitor Urine output  - Analgesics for pain control prn/ zofran prn      #elevated troponin w/ EKG changes (T-Wave inversion)  - elevated trop .10 on admission --> 0.11 > 0.02 > 0.02   - denies chest pain  - EKG on admission: no ischemic changes, repeat showing T-Wave inversion   - Cardiology consult for possible NSTEMI on admission due to elevated trops and EKG changes     #HTN, poorly controlled   - increasing Nifedipine ER  to 60 mg daily  - c/w metoprolol tartrate 25 BID    #Schizophrenia  - c/w olanzapine and trazodone    #HLD  - c/w atorvastatin 10    #Type II DM, with neuropathy  - FS AC/HS  - c/w lantus 15 + 6/6/6 with sliding scale   - on metformin 500 QD at home  - c/w gabapentin 300 mg BID    #Macrocytic Anemia with B12 Deficiency  - c/w B12, folate supplement    #COPD  - c/w albuterol prn .    #Progress Note Handoff  Pending (specify):  cardiology  Family discussion: patient aware  Disposition: will have to trend BMP for Na correction

## 2021-07-10 NOTE — PROGRESS NOTE ADULT - SUBJECTIVE AND OBJECTIVE BOX
ADA VILLAGOMEZ  81y  Male      Patient is a 81y old  Male who presents with a chief complaint of Nausea Vomiting (08 Jul 2021 13:45)      INTERVAL HPI/OVERNIGHT EVENTS: no acute events overnight. but nursing staff and patient are continuously not following instructions about free water restrictions. found again with large amount of fluids at bedside.      REVIEW OF SYSTEMS:  CONSTITUTIONAL: No fever, weight loss, or fatigue  RESPIRATORY: No cough, wheezing, chills or hemoptysis; No shortness of breath  CARDIOVASCULAR: No chest pain, palpitations, dizziness, or leg swelling  GASTROINTESTINAL: No abdominal or epigastric pain. No nausea, vomiting, or hematemesis; No diarrhea or constipation. No melena or hematochezia.  GENITOURINARY: No dysuria, frequency, hematuria, or incontinence  NEUROLOGICAL: No headaches, memory loss, loss of strength, numbness, or tremors  SKIN: No itching, burning, rashes, or lesions   MUSCULOSKELETAL: No joint pain or swelling; No muscle, back, or extremity pain  PSYCHIATRIC: No depression, anxiety, mood swings, or difficulty sleeping  All other review of systems negative    VITALS  T(C): 36.5 (07-10-21 @ 12:46), Max: 36.5 (07-10-21 @ 12:46)  HR: 70 (07-10-21 @ 12:46) (65 - 82)  BP: 132/62 (07-10-21 @ 12:46) (129/62 - 166/71)  RR: 18 (07-10-21 @ 12:46) (18 - 18)  SpO2: 97% (07-09-21 @ 17:11) (97% - 97%)  Wt(kg): --Vital Signs Last 24 Hrs  T(C): 36.5 (10 Jul 2021 12:46), Max: 36.5 (10 Jul 2021 12:46)  T(F): 97.7 (10 Jul 2021 12:46), Max: 97.7 (10 Jul 2021 12:46)  HR: 70 (10 Jul 2021 12:46) (65 - 82)  BP: 132/62 (10 Jul 2021 12:46) (129/62 - 166/71)  BP(mean): --  RR: 18 (10 Jul 2021 12:46) (18 - 18)  SpO2: 97% (09 Jul 2021 17:11) (97% - 97%)      07-09-21 @ 07:01  -  07-10-21 @ 07:00  --------------------------------------------------------  IN: 0 mL / OUT: 2300 mL / NET: -2300 mL        PHYSICAL EXAM:  GENERAL: NAD, well-groomed, well-developed  PSYCH: no agitation, baseline mentation  NERVOUS SYSTEM:  Alert & Oriented X3, Motor Strength 5/5 B/L upper and lower extremities; Sensory intact; FTN WNL  PULMONARY: Clear to percussion bilaterally; No rales, rhonchi, wheezing, or rubs  CARDIOVASCULAR: Regular rate and rhythm; No murmurs, rubs, or gallops  GI: Soft, Nontender, Nondistended; Bowel sounds present  EXTREMITIES:  2+ Peripheral Pulses, No clubbing, cyanosis, or edema  LYMPH: No lymphadenopathy noted  SKIN: No rashes or lesions    Consultant(s) Notes Reviewed:  [x ] YES  [ ] NO    Discussed with Consultants/Other Providers [ x] YES     LABS                          10.7   5.54  )-----------( 209      ( 10 Jul 2021 04:52 )             31.7     07-10    123<L>  |  90<L>  |  17  ----------------------------<  88  4.8   |  25  |  0.8    Ca    8.2<L>      10 Jul 2021 04:52  Mg     2.0     07-10    TPro  5.6<L>  /  Alb  3.2<L>  /  TBili  0.2  /  DBili  x   /  AST  28  /  ALT  21  /  AlkPhos  86  07-10      POCT Blood Glucose.: 124 mg/dL (10 Jul 2021 11:17)      RADIOLOGY & ADDITIONAL TESTS:  no images    Imaging Personally Reviewed:  [ ] YES  [ ] NO    HEALTH ISSUES - PROBLEM Dx:      MEDICATIONS  (STANDING):  aspirin  chewable 81 milliGRAM(s) Oral daily  atorvastatin 10 milliGRAM(s) Oral at bedtime  chlorhexidine 4% Liquid 1 Application(s) Topical <User Schedule>  cyanocobalamin 1000 MICROGram(s) Oral daily  dextrose 40% Gel 15 Gram(s) Oral once  dextrose 5%. 1000 milliLiter(s) (50 mL/Hr) IV Continuous <Continuous>  dextrose 5%. 1000 milliLiter(s) (100 mL/Hr) IV Continuous <Continuous>  dextrose 50% Injectable 25 Gram(s) IV Push once  dextrose 50% Injectable 12.5 Gram(s) IV Push once  dextrose 50% Injectable 25 Gram(s) IV Push once  enoxaparin Injectable 40 milliGRAM(s) SubCutaneous daily  folic acid 1 milliGRAM(s) Oral daily  gabapentin 300 milliGRAM(s) Oral every 12 hours  glucagon  Injectable 1 milliGRAM(s) IntraMuscular once  insulin glargine Injectable (LANTUS) 15 Unit(s) SubCutaneous at bedtime  insulin lispro (ADMELOG) corrective regimen sliding scale   SubCutaneous three times a day before meals  insulin lispro Injectable (ADMELOG) 6 Unit(s) SubCutaneous three times a day before meals  lactobacillus acidophilus 1 Tablet(s) Oral daily  lisinopril 5 milliGRAM(s) Oral daily  meropenem  IVPB 1000 milliGRAM(s) IV Intermittent every 8 hours  metoprolol tartrate 25 milliGRAM(s) Oral two times a day  NIFEdipine XL 60 milliGRAM(s) Oral daily  OLANZapine 5 milliGRAM(s) Oral daily  OLANZapine 20 milliGRAM(s) Oral at bedtime  pantoprazole    Tablet 40 milliGRAM(s) Oral before breakfast  senna 2 Tablet(s) Oral at bedtime  sodium chloride 1 Gram(s) Oral daily  traZODone 150 milliGRAM(s) Oral at bedtime    MEDICATIONS  (PRN):  ondansetron Injectable 4 milliGRAM(s) IV Push every 4 hours PRN Nausea and/or Vomiting

## 2021-07-11 LAB
ANION GAP SERPL CALC-SCNC: 10 MMOL/L — SIGNIFICANT CHANGE UP (ref 7–14)
BUN SERPL-MCNC: 15 MG/DL — SIGNIFICANT CHANGE UP (ref 10–20)
CALCIUM SERPL-MCNC: 8.4 MG/DL — LOW (ref 8.5–10.1)
CHLORIDE SERPL-SCNC: 93 MMOL/L — LOW (ref 98–110)
CO2 SERPL-SCNC: 23 MMOL/L — SIGNIFICANT CHANGE UP (ref 17–32)
CREAT SERPL-MCNC: 0.8 MG/DL — SIGNIFICANT CHANGE UP (ref 0.7–1.5)
CULTURE RESULTS: SIGNIFICANT CHANGE UP
GLUCOSE BLDC GLUCOMTR-MCNC: 108 MG/DL — HIGH (ref 70–99)
GLUCOSE BLDC GLUCOMTR-MCNC: 124 MG/DL — HIGH (ref 70–99)
GLUCOSE SERPL-MCNC: 118 MG/DL — HIGH (ref 70–99)
POTASSIUM SERPL-MCNC: 4.8 MMOL/L — SIGNIFICANT CHANGE UP (ref 3.5–5)
POTASSIUM SERPL-SCNC: 4.8 MMOL/L — SIGNIFICANT CHANGE UP (ref 3.5–5)
SODIUM SERPL-SCNC: 126 MMOL/L — LOW (ref 135–146)
SPECIMEN SOURCE: SIGNIFICANT CHANGE UP

## 2021-07-11 PROCEDURE — 99233 SBSQ HOSP IP/OBS HIGH 50: CPT

## 2021-07-11 RX ORDER — INSULIN GLARGINE 100 [IU]/ML
12 INJECTION, SOLUTION SUBCUTANEOUS AT BEDTIME
Refills: 0 | Status: DISCONTINUED | OUTPATIENT
Start: 2021-07-11 | End: 2021-07-12

## 2021-07-11 RX ADMIN — Medication 6 UNIT(S): at 07:36

## 2021-07-11 RX ADMIN — Medication 81 MILLIGRAM(S): at 11:23

## 2021-07-11 RX ADMIN — GABAPENTIN 300 MILLIGRAM(S): 400 CAPSULE ORAL at 17:08

## 2021-07-11 RX ADMIN — Medication 25 MILLIGRAM(S): at 17:08

## 2021-07-11 RX ADMIN — Medication 1 MILLIGRAM(S): at 11:23

## 2021-07-11 RX ADMIN — Medication 60 MILLIGRAM(S): at 05:59

## 2021-07-11 RX ADMIN — OLANZAPINE 5 MILLIGRAM(S): 15 TABLET, FILM COATED ORAL at 11:23

## 2021-07-11 RX ADMIN — Medication 25 MILLIGRAM(S): at 05:59

## 2021-07-11 RX ADMIN — Medication 6 UNIT(S): at 11:23

## 2021-07-11 RX ADMIN — LISINOPRIL 5 MILLIGRAM(S): 2.5 TABLET ORAL at 05:59

## 2021-07-11 RX ADMIN — PANTOPRAZOLE SODIUM 40 MILLIGRAM(S): 20 TABLET, DELAYED RELEASE ORAL at 06:12

## 2021-07-11 RX ADMIN — INSULIN GLARGINE 12 UNIT(S): 100 INJECTION, SOLUTION SUBCUTANEOUS at 21:31

## 2021-07-11 RX ADMIN — Medication 6 UNIT(S): at 17:04

## 2021-07-11 RX ADMIN — PREGABALIN 1000 MICROGRAM(S): 225 CAPSULE ORAL at 11:24

## 2021-07-11 RX ADMIN — SODIUM CHLORIDE 1 GRAM(S): 9 INJECTION INTRAMUSCULAR; INTRAVENOUS; SUBCUTANEOUS at 11:23

## 2021-07-11 RX ADMIN — Medication 1 TABLET(S): at 11:23

## 2021-07-11 RX ADMIN — GABAPENTIN 300 MILLIGRAM(S): 400 CAPSULE ORAL at 05:59

## 2021-07-11 RX ADMIN — ENOXAPARIN SODIUM 40 MILLIGRAM(S): 100 INJECTION SUBCUTANEOUS at 11:25

## 2021-07-11 RX ADMIN — MEROPENEM 100 MILLIGRAM(S): 1 INJECTION INTRAVENOUS at 06:00

## 2021-07-11 NOTE — PROGRESS NOTE ADULT - NSICDXPILOT_GEN_ALL_CORE
[place]

## 2021-07-11 NOTE — PROGRESS NOTE ADULT - SUBJECTIVE AND OBJECTIVE BOX
ADA VILLAGOMEZ  81y  Male      Patient is a 81y old  Male who presents with a chief complaint of Nausea Vomiting (08 Jul 2021 13:45)      INTERVAL HPI/OVERNIGHT EVENTS: no acute events overnihgt. refused labs this AM. Intermittently bizzare behavior and difficultly in re-directing per nursing.      VITALS  T(C): 36.1 (07-11-21 @ 05:50), Max: 36.5 (07-10-21 @ 12:46)  HR: 62 (07-11-21 @ 05:50) (60 - 70)  BP: 123/63 (07-11-21 @ 05:50) (121/67 - 132/62)  RR: 18 (07-11-21 @ 05:50) (18 - 18)  SpO2: --  Wt(kg): --Vital Signs Last 24 Hrs  T(C): 36.1 (11 Jul 2021 05:50), Max: 36.5 (10 Jul 2021 12:46)  T(F): 97 (11 Jul 2021 05:50), Max: 97.7 (10 Jul 2021 12:46)  HR: 62 (11 Jul 2021 05:50) (60 - 70)  BP: 123/63 (11 Jul 2021 05:50) (121/67 - 132/62)  BP(mean): --  RR: 18 (11 Jul 2021 05:50) (18 - 18)  SpO2: --      07-10-21 @ 07:01  -  07-11-21 @ 07:00  --------------------------------------------------------  IN: 0 mL / OUT: 2100 mL / NET: -2100 mL        PHYSICAL EXAM:  GENERAL: elder M, NAD, non toxic  EYES: anicteric sclera, non injeccted conjunctiva, EOMI  ENT: hearing grossly intact, oropharynx clear, MM  PSYCH: no agitation, cooperative  NERVOUS SYSTEM:  Alert & Oriented X3, Motor Strength 5/5 B/L upper and lower extremities; Sensory intact; FTN WNL  PULMONARY: Clear to percussion bilaterally; No rales, rhonchi, wheezing, or rubs  CARDIOVASCULAR: Regular rate and rhythm; No murmurs, rubs, or gallops  GI: Soft, Nontender, Nondistended; Bowel sounds present  EXTREMITIES:  2+ Peripheral Pulses, No clubbing, cyanosis, or edema  LYMPH: No lymphadenopathy noted  SKIN: No rashes or lesions    Consultant(s) Notes Reviewed:  [x ] YES  [ ] NO    Discussed with Consultants/Other Providers [ x] YES     LABS                          10.7   5.54  )-----------( 209      ( 10 Jul 2021 04:52 )             31.7     07-10    123<L>  |  93<L>  |  17  ----------------------------<  69<L>  5.1<H>   |  24  |  1.0    Ca    8.1<L>      10 Jul 2021 20:26  Mg     2.0     07-10    TPro  5.6<L>  /  Alb  3.2<L>  /  TBili  0.2  /  DBili  x   /  AST  28  /  ALT  21  /  AlkPhos  86  07-10      POCT Blood Glucose.: 124 mg/dL (11 Jul 2021 11:20)    RADIOLOGY & ADDITIONAL TESTS:  - no images 7/11    MEDICATIONS  (STANDING):  aspirin  chewable 81 milliGRAM(s) Oral daily  atorvastatin 10 milliGRAM(s) Oral at bedtime  chlorhexidine 4% Liquid 1 Application(s) Topical <User Schedule>  cyanocobalamin 1000 MICROGram(s) Oral daily  dextrose 40% Gel 15 Gram(s) Oral once  dextrose 5%. 1000 milliLiter(s) (50 mL/Hr) IV Continuous <Continuous>  dextrose 5%. 1000 milliLiter(s) (100 mL/Hr) IV Continuous <Continuous>  dextrose 50% Injectable 25 Gram(s) IV Push once  dextrose 50% Injectable 12.5 Gram(s) IV Push once  dextrose 50% Injectable 25 Gram(s) IV Push once  enoxaparin Injectable 40 milliGRAM(s) SubCutaneous daily  folic acid 1 milliGRAM(s) Oral daily  gabapentin 300 milliGRAM(s) Oral every 12 hours  glucagon  Injectable 1 milliGRAM(s) IntraMuscular once  insulin glargine Injectable (LANTUS) 15 Unit(s) SubCutaneous at bedtime  insulin lispro (ADMELOG) corrective regimen sliding scale   SubCutaneous three times a day before meals  insulin lispro Injectable (ADMELOG) 6 Unit(s) SubCutaneous three times a day before meals  lactobacillus acidophilus 1 Tablet(s) Oral daily  lisinopril 5 milliGRAM(s) Oral daily  meropenem  IVPB 1000 milliGRAM(s) IV Intermittent every 8 hours  metoprolol tartrate 25 milliGRAM(s) Oral two times a day  NIFEdipine XL 60 milliGRAM(s) Oral daily  OLANZapine 5 milliGRAM(s) Oral daily  OLANZapine 20 milliGRAM(s) Oral at bedtime  pantoprazole    Tablet 40 milliGRAM(s) Oral before breakfast  senna 2 Tablet(s) Oral at bedtime  sodium chloride 1 Gram(s) Oral daily  traZODone 150 milliGRAM(s) Oral at bedtime    MEDICATIONS  (PRN):  ondansetron Injectable 4 milliGRAM(s) IV Push every 4 hours PRN Nausea and/or Vomiting

## 2021-07-11 NOTE — PROGRESS NOTE ADULT - ASSESSMENT
81 year old man with PMHx of schizophrenia, COPD, hyperlipidemia, non-Hodgkin lymphoma in remission, Type 2DM (non-insulin dependent) with neuropathy, chronic indwelling Bains (chronic retention) BIBEMS from [facility] for nausea/vomiting. Pt's status has improved, medically stable for transfer to floor.       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 1.9 cm left renal cyst. Urinary bladder not evaluated, as Bains catheter is present  - Fluid restriction  - start home medication sodium chloride 1 gram   - FREE WATER RESTRICTION  - serial BMPs  - will discuss with Nephrology if patient requires increase in NaCl tabs 1g daily to 1g BID    #Nausea/vomiting likely secondary to Urosepsis, chronic indwelling bains for retention , RESOLVED  -recently discharged and was seen by Urology on 7/1 for difficult Bains placement after patient reported "bains came out" . Patient had a 14 fr catheter placed draining well and was told to follow-up in Urology clinic for further management.  - wbc 20k with bandemia on admission, Normotensive, + UA; s/p Rocephin 1 gm   - Patient with chronic indwelling Bains (chronic urinary retention)  - had several admissions in the past for ESBL pyelonephritis; last discharged on ertapenem 1 gm 10/24  - Urology consult appreciated: aspirated 50cc of cloudy yellow urine Bains now draining to gravity with 150cc of cloudy yellow urine noted in bag.   - c/w Bains care   - Repeat RBUS in 48 -72 hours to assess hydro  - Blood culture:  7/3: ESBL E. Coli  - Urine culture: 7/3: E. Coli   - c/w meropenem 1 g TID --> c/w meropenem until 7/9, then switch to Bactrim   - Monitor Urine output  - Analgesics for pain control prn/ zofran prn      #elevated troponin w/ EKG changes (T-Wave inversion), RESOLVED  - elevated trop .10 on admission --> 0.11 > 0.02 > 0.02   - denies chest pain  - EKG on admission: no ischemic changes, repeat showing T-Wave inversion   - Cardiology consult for possible NSTEMI on admission due to elevated trops and EKG changes      TTE: Summary:  1. Normal global left ventricular systolic function.  2. Moderately increased LV wall thickness.  3. Spectral Doppler shows impaired relaxation pattern of left ventricular myocardial filling (Grade I diastolic dysfunction).  4. Mild to moderately enlarged left atrium.  5. Normal right atrial size.  6. Mild to moderate mitral annular calcification.  7. No evidence of mitral valve regurgitation.  8. Sclerotic aortic valve with decreased opening.  9. Peak transaortic gradient equals 20.1 mmHg, mean transaortic gradient equals 10.3 mmHg, the calculated aortic valve area equals 1.71 cmï¿½ by the continuity equation consistent with mild aortic stenosis.      #HTN, well controlled now  - increasing Nifedipine ER  to 60 mg daily  - c/w metoprolol tartrate 25 BID  - lisinopril 5mg daily    #Schizophrenia  - c/w olanzapine and trazodone    #HLD  - c/w atorvastatin 10    #Type II DM, with neuropathy  - FS AC/HS  - c/w lantus 15 ; decreased to 12u qhs given low BS in AM  - humalog 6/6/6 with sliding scale   - on metformin 500 QD at home  - c/w gabapentin 300 mg BID    #Macrocytic Anemia with B12 Deficiency  - c/w B12, folate supplement    #COPD  - c/w albuterol prn .    #Progress Note Handoff  Pending (specify):  cardiology  Family discussion: patient aware  Disposition: will have to trend BMP for Na correction

## 2021-07-11 NOTE — PROGRESS NOTE ADULT - PROVIDER SPECIALTY LIST ADULT
Hospitalist
Internal Medicine
Internal Medicine
Infectious Disease
Internal Medicine
Internal Medicine
Critical Care
Hospitalist
Urology
Critical Care
Hospitalist
Infectious Disease

## 2021-07-12 ENCOUNTER — TRANSCRIPTION ENCOUNTER (OUTPATIENT)
Age: 81
End: 2021-07-12

## 2021-07-12 VITALS
TEMPERATURE: 98 F | RESPIRATION RATE: 20 BRPM | DIASTOLIC BLOOD PRESSURE: 60 MMHG | HEART RATE: 72 BPM | SYSTOLIC BLOOD PRESSURE: 129 MMHG

## 2021-07-12 LAB
ALBUMIN SERPL ELPH-MCNC: 3.8 G/DL — SIGNIFICANT CHANGE UP (ref 3.5–5.2)
ALP SERPL-CCNC: 103 U/L — SIGNIFICANT CHANGE UP (ref 30–115)
ALT FLD-CCNC: 31 U/L — SIGNIFICANT CHANGE UP (ref 0–41)
ANION GAP SERPL CALC-SCNC: 8 MMOL/L — SIGNIFICANT CHANGE UP (ref 7–14)
AST SERPL-CCNC: 35 U/L — SIGNIFICANT CHANGE UP (ref 0–41)
BASOPHILS # BLD AUTO: 0.02 K/UL — SIGNIFICANT CHANGE UP (ref 0–0.2)
BASOPHILS NFR BLD AUTO: 0.3 % — SIGNIFICANT CHANGE UP (ref 0–1)
BILIRUB SERPL-MCNC: 0.2 MG/DL — SIGNIFICANT CHANGE UP (ref 0.2–1.2)
BUN SERPL-MCNC: 13 MG/DL — SIGNIFICANT CHANGE UP (ref 10–20)
CALCIUM SERPL-MCNC: 8.7 MG/DL — SIGNIFICANT CHANGE UP (ref 8.5–10.1)
CHLORIDE SERPL-SCNC: 93 MMOL/L — LOW (ref 98–110)
CO2 SERPL-SCNC: 27 MMOL/L — SIGNIFICANT CHANGE UP (ref 17–32)
CREAT SERPL-MCNC: 0.8 MG/DL — SIGNIFICANT CHANGE UP (ref 0.7–1.5)
EOSINOPHIL # BLD AUTO: 0.07 K/UL — SIGNIFICANT CHANGE UP (ref 0–0.7)
EOSINOPHIL NFR BLD AUTO: 1.2 % — SIGNIFICANT CHANGE UP (ref 0–8)
GLUCOSE BLDC GLUCOMTR-MCNC: 101 MG/DL — HIGH (ref 70–99)
GLUCOSE BLDC GLUCOMTR-MCNC: 122 MG/DL — HIGH (ref 70–99)
GLUCOSE BLDC GLUCOMTR-MCNC: 140 MG/DL — HIGH (ref 70–99)
GLUCOSE BLDC GLUCOMTR-MCNC: 95 MG/DL — SIGNIFICANT CHANGE UP (ref 70–99)
GLUCOSE BLDC GLUCOMTR-MCNC: 99 MG/DL — SIGNIFICANT CHANGE UP (ref 70–99)
GLUCOSE SERPL-MCNC: 93 MG/DL — SIGNIFICANT CHANGE UP (ref 70–99)
HCT VFR BLD CALC: 35.8 % — LOW (ref 42–52)
HGB BLD-MCNC: 11.9 G/DL — LOW (ref 14–18)
IMM GRANULOCYTES NFR BLD AUTO: 0.9 % — HIGH (ref 0.1–0.3)
LYMPHOCYTES # BLD AUTO: 2.3 K/UL — SIGNIFICANT CHANGE UP (ref 1.2–3.4)
LYMPHOCYTES # BLD AUTO: 39.1 % — SIGNIFICANT CHANGE UP (ref 20.5–51.1)
MAGNESIUM SERPL-MCNC: 2.2 MG/DL — SIGNIFICANT CHANGE UP (ref 1.8–2.4)
MCHC RBC-ENTMCNC: 31.5 PG — HIGH (ref 27–31)
MCHC RBC-ENTMCNC: 33.2 G/DL — SIGNIFICANT CHANGE UP (ref 32–37)
MCV RBC AUTO: 94.7 FL — HIGH (ref 80–94)
MONOCYTES # BLD AUTO: 0.62 K/UL — HIGH (ref 0.1–0.6)
MONOCYTES NFR BLD AUTO: 10.5 % — HIGH (ref 1.7–9.3)
NEUTROPHILS # BLD AUTO: 2.82 K/UL — SIGNIFICANT CHANGE UP (ref 1.4–6.5)
NEUTROPHILS NFR BLD AUTO: 48 % — SIGNIFICANT CHANGE UP (ref 42.2–75.2)
NRBC # BLD: 0 /100 WBCS — SIGNIFICANT CHANGE UP (ref 0–0)
PLATELET # BLD AUTO: 268 K/UL — SIGNIFICANT CHANGE UP (ref 130–400)
POTASSIUM SERPL-MCNC: 5.1 MMOL/L — HIGH (ref 3.5–5)
POTASSIUM SERPL-SCNC: 5.1 MMOL/L — HIGH (ref 3.5–5)
PROT SERPL-MCNC: 6.3 G/DL — SIGNIFICANT CHANGE UP (ref 6–8)
RBC # BLD: 3.78 M/UL — LOW (ref 4.7–6.1)
RBC # FLD: 13.8 % — SIGNIFICANT CHANGE UP (ref 11.5–14.5)
SARS-COV-2 RNA SPEC QL NAA+PROBE: SIGNIFICANT CHANGE UP
SODIUM SERPL-SCNC: 128 MMOL/L — LOW (ref 135–146)
WBC # BLD: 5.88 K/UL — SIGNIFICANT CHANGE UP (ref 4.8–10.8)
WBC # FLD AUTO: 5.88 K/UL — SIGNIFICANT CHANGE UP (ref 4.8–10.8)

## 2021-07-12 PROCEDURE — 99232 SBSQ HOSP IP/OBS MODERATE 35: CPT

## 2021-07-12 RX ORDER — NIFEDIPINE 30 MG
1 TABLET, EXTENDED RELEASE 24 HR ORAL
Qty: 30 | Refills: 0
Start: 2021-07-12 | End: 2021-08-10

## 2021-07-12 RX ADMIN — ENOXAPARIN SODIUM 40 MILLIGRAM(S): 100 INJECTION SUBCUTANEOUS at 11:34

## 2021-07-12 RX ADMIN — GABAPENTIN 300 MILLIGRAM(S): 400 CAPSULE ORAL at 06:20

## 2021-07-12 RX ADMIN — Medication 6 UNIT(S): at 17:22

## 2021-07-12 RX ADMIN — LISINOPRIL 5 MILLIGRAM(S): 2.5 TABLET ORAL at 06:20

## 2021-07-12 RX ADMIN — Medication 25 MILLIGRAM(S): at 17:22

## 2021-07-12 RX ADMIN — Medication 60 MILLIGRAM(S): at 06:20

## 2021-07-12 RX ADMIN — Medication 81 MILLIGRAM(S): at 11:35

## 2021-07-12 RX ADMIN — Medication 1 MILLIGRAM(S): at 11:35

## 2021-07-12 RX ADMIN — GABAPENTIN 300 MILLIGRAM(S): 400 CAPSULE ORAL at 17:22

## 2021-07-12 RX ADMIN — SODIUM CHLORIDE 1 GRAM(S): 9 INJECTION INTRAMUSCULAR; INTRAVENOUS; SUBCUTANEOUS at 11:37

## 2021-07-12 RX ADMIN — Medication 1 TABLET(S): at 11:35

## 2021-07-12 RX ADMIN — OLANZAPINE 5 MILLIGRAM(S): 15 TABLET, FILM COATED ORAL at 11:37

## 2021-07-12 RX ADMIN — Medication 25 MILLIGRAM(S): at 06:20

## 2021-07-12 RX ADMIN — PREGABALIN 1000 MICROGRAM(S): 225 CAPSULE ORAL at 11:33

## 2021-07-12 RX ADMIN — PANTOPRAZOLE SODIUM 40 MILLIGRAM(S): 20 TABLET, DELAYED RELEASE ORAL at 06:20

## 2021-07-12 RX ADMIN — Medication 6 UNIT(S): at 11:31

## 2021-07-12 NOTE — CHART NOTE - NSCHARTNOTEFT_GEN_A_CORE
<<<RESIDENT DISCHARGE NOTE>>>     ADA VILLAGOMEZ  MRN-[id number]    VITAL SIGNS:  T(F): 97.4 (07-12-21 @ 05:30), Max: 97.8 (07-11-21 @ 12:23)  HR: 64 (07-12-21 @ 05:30)  BP: 125/65 (07-12-21 @ 05:30)  SpO2: --      PHYSICAL EXAMINATION:  General: NAD  Head & Neck: NC AT  Pulmonary: CTAB  Cardiovascular: RRR  Gastrointestinal/Abdomen & Pelvis: ND NT  Neurologic/Motor: NFD    TEST RESULTS:                        11.9   5.88  )-----------( 268      ( 12 Jul 2021 06:21 )             35.8       07-12    128<L>  |  93<L>  |  13  ----------------------------<  93  5.1<H>   |  27  |  0.8    Ca    8.7      12 Jul 2021 06:21  Mg     2.2     07-12    TPro  6.3  /  Alb  3.8  /  TBili  0.2  /  DBili  x   /  AST  35  /  ALT  31  /  AlkPhos  103  07-12      FINAL DISCHARGE INTERVIEW:  Resident(s) Present: (Name: Rafat Lake, RN Present: (Name:  ___________)    DISCHARGE MEDICATION RECONCILIATION  reviewed with Attending (Name: Dr. Cordero)    DISPOSITION:   [  ] Home,    [  ] Home with Visiting Nursing Services,   [    ]  SNF/ NH,    [   ] Acute Rehab (4A),   [   ] Other (Specify:_________)

## 2021-07-12 NOTE — DISCHARGE NOTE NURSING/CASE MANAGEMENT/SOCIAL WORK - PATIENT PORTAL LINK FT
You can access the FollowMyHealth Patient Portal offered by [facility] by registering at the following website: [email or web address]. By joining [employer]’s FollowMyHealth portal, you will also be able to view your health information using other applications (apps) compatible with our system.

## 2021-07-16 DIAGNOSIS — E11.40 TYPE 2 DIABETES MELLITUS WITH DIABETIC NEUROPATHY, UNSPECIFIED: ICD-10-CM

## 2021-07-16 DIAGNOSIS — Z16.12 EXTENDED SPECTRUM BETA LACTAMASE (ESBL) RESISTANCE: ICD-10-CM

## 2021-07-16 DIAGNOSIS — E78.5 HYPERLIPIDEMIA, UNSPECIFIED: ICD-10-CM

## 2021-07-16 DIAGNOSIS — N39.0 URINARY TRACT INFECTION, SITE NOT SPECIFIED: ICD-10-CM

## 2021-07-16 DIAGNOSIS — F20.9 SCHIZOPHRENIA, UNSPECIFIED: ICD-10-CM

## 2021-07-16 DIAGNOSIS — J44.9 CHRONIC OBSTRUCTIVE PULMONARY DISEASE, UNSPECIFIED: ICD-10-CM

## 2021-07-16 DIAGNOSIS — I21.A1 MYOCARDIAL INFARCTION TYPE 2: ICD-10-CM

## 2021-07-16 DIAGNOSIS — N13.6 PYONEPHROSIS: ICD-10-CM

## 2021-07-16 DIAGNOSIS — C85.90 NON-HODGKIN LYMPHOMA, UNSPECIFIED, UNSPECIFIED SITE: ICD-10-CM

## 2021-07-16 DIAGNOSIS — E66.9 OBESITY, UNSPECIFIED: ICD-10-CM

## 2021-07-16 DIAGNOSIS — Z79.84 LONG TERM (CURRENT) USE OF ORAL HYPOGLYCEMIC DRUGS: ICD-10-CM

## 2021-07-16 DIAGNOSIS — R33.9 RETENTION OF URINE, UNSPECIFIED: ICD-10-CM

## 2021-07-16 DIAGNOSIS — T43.505A ADVERSE EFFECT OF UNSPECIFIED ANTIPSYCHOTICS AND NEUROLEPTICS, INITIAL ENCOUNTER: ICD-10-CM

## 2021-07-16 DIAGNOSIS — E87.5 HYPERKALEMIA: ICD-10-CM

## 2021-07-16 DIAGNOSIS — E22.2 SYNDROME OF INAPPROPRIATE SECRETION OF ANTIDIURETIC HORMONE: ICD-10-CM

## 2021-07-16 DIAGNOSIS — D53.9 NUTRITIONAL ANEMIA, UNSPECIFIED: ICD-10-CM

## 2021-07-16 DIAGNOSIS — A41.51 SEPSIS DUE TO ESCHERICHIA COLI [E. COLI]: ICD-10-CM

## 2021-07-16 DIAGNOSIS — N42.83 CYST OF PROSTATE: ICD-10-CM

## 2021-07-16 DIAGNOSIS — D51.9 VITAMIN B12 DEFICIENCY ANEMIA, UNSPECIFIED: ICD-10-CM

## 2021-07-16 DIAGNOSIS — I10 ESSENTIAL (PRIMARY) HYPERTENSION: ICD-10-CM

## 2021-10-01 ENCOUNTER — EMERGENCY (EMERGENCY)
Facility: HOSPITAL | Age: 81
LOS: 0 days | Discharge: NURSING, REHAB OR LONG-TERM CARE | End: 2021-10-02
Attending: EMERGENCY MEDICINE | Admitting: EMERGENCY MEDICINE
Payer: MEDICARE

## 2021-10-01 VITALS
RESPIRATION RATE: 18 BRPM | OXYGEN SATURATION: 99 % | HEIGHT: 69 IN | HEART RATE: 88 BPM | DIASTOLIC BLOOD PRESSURE: 86 MMHG | SYSTOLIC BLOOD PRESSURE: 145 MMHG | TEMPERATURE: 97 F

## 2021-10-01 DIAGNOSIS — Z79.84 LONG TERM (CURRENT) USE OF ORAL HYPOGLYCEMIC DRUGS: ICD-10-CM

## 2021-10-01 DIAGNOSIS — T83.021A DISPLACEMENT OF INDWELLING URETHRAL CATHETER, INITIAL ENCOUNTER: ICD-10-CM

## 2021-10-01 DIAGNOSIS — J44.9 CHRONIC OBSTRUCTIVE PULMONARY DISEASE, UNSPECIFIED: ICD-10-CM

## 2021-10-01 DIAGNOSIS — F20.9 SCHIZOPHRENIA, UNSPECIFIED: ICD-10-CM
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
## 2021-10-01 DIAGNOSIS — Z79.82 LONG TERM (CURRENT) USE OF ASPIRIN: ICD-10-CM

## 2021-10-01 DIAGNOSIS — Z87.891 PERSONAL HISTORY OF NICOTINE DEPENDENCE: ICD-10-CM

## 2021-10-01 DIAGNOSIS — I10 ESSENTIAL (PRIMARY) HYPERTENSION: ICD-10-CM

## 2021-10-01 DIAGNOSIS — Z12.11 ENCOUNTER FOR SCREENING FOR MALIGNANT NEOPLASM OF COLON: Chronic | ICD-10-CM

## 2021-10-01 DIAGNOSIS — E11.9 TYPE 2 DIABETES MELLITUS WITHOUT COMPLICATIONS: ICD-10-CM

## 2021-10-01 DIAGNOSIS — E78.5 HYPERLIPIDEMIA, UNSPECIFIED: ICD-10-CM

## 2021-10-01 PROCEDURE — 99283 EMERGENCY DEPT VISIT LOW MDM: CPT

## 2021-10-01 NOTE — ED ADULT NURSE NOTE - NS ED NURSE RECORD ANOTHER VITAL SIGN
[facility]ANDR[facility]  [address]  [facility] [address]  [phone]    May 12, 2021    Re: Zack Mercado   :   2002  MRN:  [id number]   REFERRING PHYSICIAN:   Lynda DELUCA [facility]  Date of Initial Evaluation:  3/1/21  Visits:  Rxs Used: 5  Reason for Referral: Scapular dyskinesis; Acute pain of left shoulder    DISCHARGE REPORT  Progress reporting period is from 3/1/21 to 21.       SUBJECTIVE  Subjective changes noted by patient: L shoulder has been painfree except he tried 1 pullup in the past week with L shoulder pain around collar bone. Did use ice and pain is now gone. Plans to resume off season weight lifting for wrestling in near future.   Current pain level is 0/10.     Previous pain level was   4/10.   Changes in function:  Yes (See Goal flowsheet attached for changes in current functional level)  Adverse reaction to treatment or activity: None    OBJECTIVE  Changes noted in objective findings:  Yes,  Good posture with effort. L prone shoulder extension grade 5/5, rhomboid grade 5/5. Good serratus control with punch with 10#.  Good scap control with push up with plus on hands and knees. Able to maintain plank x 30 seconds with good scap control.      ASSESSMENT/PLAN  Updated problem list and treatment plan: Diagnosis 1: Left scapular dyskinesis   Pain -  home program  Decreased strength - home program  Impaired muscle performance - home program  Decreased function - home program  STG/LTGs have been met or progress has been made towards goals:  Yes (See Goal flow sheet completed today.)  Assessment of Progress: The patient's progress has slowed.  Self Management Plans:  Patient has been instructed in a home treatment program.  Zack continues to require the following intervention to meet STG and LTG's:  PT intervention is no longer required to meet  STG/LTG.    Recommendations:  This patient is ready to be discharged from therapy and continue their home treatment program.    Thank you for your referral.    INQUIRIES  Therapist: Thao Cassidy PT   [facility]  [address] [place] [address]  Phone: [phone] / Fax: [phone]          Yes

## 2021-10-01 NOTE — ED PROVIDER NOTE - PHYSICAL EXAMINATION
VITAL SIGNS: I have reviewed nursing notes and confirm.  CONSTITUTIONAL: cachectic, chronically ill-appearing   SKIN:  skin exam is warm and dry, no acute rash.    HEAD: Normocephalic; atraumatic.  EYES:  conjunctiva and sclera clear.  ENT: No nasal discharge; airway clear.  CARD: S1, S2 normal; no murmurs, gallops, or rubs. Regular rate and rhythm.   RESP: No wheezes, rales or rhonchi.  ABD: Normal bowel sounds; soft; non-distended; non-tender  EXT: Normal ROM.  No clubbing, cyanosis or edema.   NEURO: Alert, oriented, grossly unremarkable

## 2021-10-01 NOTE — ED PROVIDER NOTE - OBJECTIVE STATEMENT
Pt is a 80y/o male with a pmhx of copd, htn, dm, hld, schizophrenia, chronic Urinary retention with indwelling bains presents today for urinary catheter placement. Pt unable to provide history given chronic condition

## 2021-10-01 NOTE — ED PROVIDER NOTE - PROGRESS NOTE DETAILS
attempted bains placement 3 x without success. During this time pt has urinated multiple times, measured post-void residual at there was 190cc of urine in bladder. Abd non-distended. Discussed case with nurse at facility and was given strict return precautions, but otherwise close f/u with Urology as outpatient

## 2021-10-01 NOTE — ED ADULT NURSE NOTE - NSICDXPASTMEDICALHX_GEN_ALL_CORE_FT
PAST MEDICAL HISTORY:  Chronic retention of urine     COPD (chronic obstructive pulmonary disease)     Diabetes type 2, controlled     Dyslipidemia     Dyslipidemia     Schizophrenia

## 2021-10-01 NOTE — ED PROVIDER NOTE - CLINICAL SUMMARY MEDICAL DECISION MAKING FREE TEXT BOX
pt presenting with urinary retention, chronic bains presenting with catheter dislodgement- pt currently with no other acute complaints. unable to replace bains in ED by ED staff and urology PA- urinating on his own. PVR 100cc. [facility], will discharge and follow-up outpatient with urology.

## 2021-10-01 NOTE — ED PROVIDER NOTE - CARE PROVIDER_API CALL
Hollie Fishman)  Urology  [address] [place] [address], [address]  [place] [address]  Phone: [phone]  Fax: [phone]  Follow Up Time:

## 2021-10-01 NOTE — ED ADULT NURSE NOTE - NSFALLRSKINDICATORS_ED_ALL_ED
Report called to [facility]. Called for telemetry. Patient eating dinner tray now.       Kamlesh Evans RN  12/07/20 9081 no

## 2021-10-01 NOTE — ED PROVIDER NOTE - PATIENT PORTAL LINK FT
You can access the FollowMyHealth Patient Portal offered by [facility] by registering at the following website: [email or web address]. By joining "[employer]’s FollowMyHealth portal, you will also be able to view your health information using other applications (apps) compatible with our system.

## 2021-10-01 NOTE — ED ADULT NURSE REASSESSMENT NOTE - NS ED NURSE REASSESS COMMENT FT1
upon initial assessment of the patient; no Cobos present on patient; patient has depends on has wet diaper; as per patient he is not having difficulty urinating; upon assessment NICHOLAS LEW present; PA will contact [facility] and reassess reason patient was sent to the emergency room; will continue to monitor patient and implement plan of care

## 2021-10-01 NOTE — ED PROVIDER NOTE - NSFOLLOWUPINSTRUCTIONS_ED_ALL_ED_FT
CLOSELY FOLLOW UP WITH UROLOGY  PT IS CURRENTLY URINATING ON HIS OWN WITH MINIMAL URINE LEFT IN BLADDER AFTER EACH EPISODE  IF PATIENT STARTS TO HAVE DIFFICULTY URINATING ON HIS OWN PLEASE RETURN TO EMERGENCY DEPARTMENT

## 2021-10-01 NOTE — CHART NOTE - NSCHARTNOTEFT_GEN_A_CORE
I was notified by the ED NICHOLAS [place] to help with a bains insertion.   Pt came from adult home secondary to dislodged bains.   While attempting to insert the 14F coude pt spontaneously urinated approx 100cc into bains bag and unknown amount onto pt.   No bains was inserted and ED d/c pt home.

## 2021-10-02 VITALS
RESPIRATION RATE: 18 BRPM | OXYGEN SATURATION: 99 % | HEART RATE: 80 BPM | SYSTOLIC BLOOD PRESSURE: 135 MMHG | DIASTOLIC BLOOD PRESSURE: 66 MMHG

## 2021-10-02 NOTE — ED ADULT NURSE REASSESSMENT NOTE - NS ED NURSE REASSESS COMMENT FT1
Pt awake and alert, juice and crackers provided. Urinal at bedside, pt advised to call for help if needed assistance to urinate or with bathroom.

## 2021-10-12 ENCOUNTER — EMERGENCY (EMERGENCY)
Facility: HOSPITAL | Age: 81
LOS: 0 days | Discharge: HOME | End: 2021-10-12
Attending: EMERGENCY MEDICINE | Admitting: EMERGENCY MEDICINE
Payer: MEDICARE

## 2021-10-12 VITALS
DIASTOLIC BLOOD PRESSURE: 55 MMHG | RESPIRATION RATE: 18 BRPM | OXYGEN SATURATION: 99 % | SYSTOLIC BLOOD PRESSURE: 107 MMHG | WEIGHT: 169.98 LBS | HEART RATE: 64 BPM | HEIGHT: 69 IN | TEMPERATURE: 98 F

## 2021-10-12 DIAGNOSIS — Z85.72 PERSONAL HISTORY OF NON-HODGKIN LYMPHOMAS: ICD-10-CM

## 2021-10-12 DIAGNOSIS — Z04.3 ENCOUNTER FOR EXAMINATION AND OBSERVATION FOLLOWING OTHER ACCIDENT: ICD-10-CM

## 2021-10-12 DIAGNOSIS — E78.5 HYPERLIPIDEMIA, UNSPECIFIED: ICD-10-CM

## 2021-10-12 DIAGNOSIS — E11.9 TYPE 2 DIABETES MELLITUS WITHOUT COMPLICATIONS: ICD-10-CM

## 2021-10-12 DIAGNOSIS — I10 ESSENTIAL (PRIMARY) HYPERTENSION: ICD-10-CM
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
## 2021-10-12 DIAGNOSIS — Z12.11 ENCOUNTER FOR SCREENING FOR MALIGNANT NEOPLASM OF COLON: Chronic | ICD-10-CM

## 2021-10-12 DIAGNOSIS — F20.9 SCHIZOPHRENIA, UNSPECIFIED: ICD-10-CM
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
## 2021-10-12 DIAGNOSIS — Z79.82 LONG TERM (CURRENT) USE OF ASPIRIN: ICD-10-CM

## 2021-10-12 DIAGNOSIS — Z87.891 PERSONAL HISTORY OF NICOTINE DEPENDENCE: ICD-10-CM

## 2021-10-12 DIAGNOSIS — J44.9 CHRONIC OBSTRUCTIVE PULMONARY DISEASE, UNSPECIFIED: ICD-10-CM

## 2021-10-12 DIAGNOSIS — Z79.84 LONG TERM (CURRENT) USE OF ORAL HYPOGLYCEMIC DRUGS: ICD-10-CM

## 2021-10-12 PROCEDURE — 70450 CT HEAD/BRAIN W/O DYE: CPT | Mod: 26,MA

## 2021-10-12 PROCEDURE — 72170 X-RAY EXAM OF PELVIS: CPT | Mod: 26

## 2021-10-12 PROCEDURE — 99284 EMERGENCY DEPT VISIT MOD MDM: CPT | Mod: GC

## 2021-10-12 PROCEDURE — 71045 X-RAY EXAM CHEST 1 VIEW: CPT | Mod: 26

## 2021-10-12 NOTE — ED PROVIDER NOTE - CARE PROVIDERS DIRECT ADDRESSES
[email or web address]

## 2021-10-12 NOTE — ED PROVIDER NOTE - ATTENDING CONTRIBUTION TO CARE
81 M to ED s/p fall at NH  pt states he lost his balance and fell to the ground, no head strike, no loc, no N/V  Well appearing non toxic.   AVSS, exam as noted, CTAB, RRR, abdomen soft NTND, 81 M to ED s/p fall at NH  pt states he lost his balance and fell to the ground, no head strike, no loc, no N/V  Well appearing non toxic.   AVSS, exam as noted, CTAB, RRR, abdomen soft NTND.

## 2021-10-12 NOTE — ED PROVIDER NOTE - PHYSICAL EXAMINATION
Vital Signs: I have reviewed the initial vital signs.  Constitutional: well-nourished, appears stated age, no acute distress  Cardiovascular: regular rate, regular rhythm, well-perfused extremities  Respiratory: unlabored respiratory effort, clear to auscultation bilaterally  Gastrointestinal: soft, non-tender abdomen  Musculoskeletal: supple neck, no lower extremity edema. No cervical, thoracic, lumbar midline or paraspinal tenderness. Clavicles intact. No chest wall tenderness. Pelvis stable, non-tender.  Integumentary: warm, dry, no rash  Neurologic: awake, alert, cranial nerves II-XII grossly intact, extremities’ motor and sensory functions grossly intact, ambulating normally   Psychiatric: appropriate mood, appropriate affect

## 2021-10-12 NOTE — ED PROVIDER NOTE - NSFOLLOWUPINSTRUCTIONS_ED_ALL_ED_FT
Fall Prevention in the Home    Falls can cause injuries. They can happen to people of all ages. There are many things you can do to make your home safe and to help prevent falls.     WHAT CAN I DO ON THE OUTSIDE OF MY HOME?  Regularly fix the edges of walkways and driveways and fix any cracks.  Remove anything that might make you trip as you walk through a door, such as a raised step or threshold.  Trim any bushes or trees on the path to your home.  Use bright outdoor lighting.  Clear any walking paths of anything that might make someone trip, such as rocks or tools.  Regularly check to see if handrails are loose or broken. Make sure that both sides of any steps have handrails.  Any raised decks and porches should have guardrails on the edges.  Have any leaves, snow, or ice cleared regularly.  Use sand or salt on walking paths during winter.  Clean up any spills in your garage right away. This includes oil or grease spills.    WHAT CAN I DO IN THE BATHROOM?  Use night lights.  Install grab bars by the toilet and in the tub and shower. Do not use towel bars as grab bars.  Use non-skid mats or decals in the tub or shower.  If you need to sit down in the shower, use a plastic, non-slip stool.  Keep the floor dry. Clean up any water that spills on the floor as soon as it happens.  Remove soap buildup in the tub or shower regularly.  Attach bath mats securely with double-sided non-slip rug tape.  Do not have throw rugs and other things on the floor that can make you trip.    WHAT CAN I DO IN THE BEDROOM?  Use night lights.  Make sure that you have a light by your bed that is easy to reach.  Do not use any sheets or blankets that are too big for your bed. They should not hang down onto the floor.  Have a firm chair that has side arms. You can use this for support while you get dressed.  Do not have throw rugs and other things on the floor that can make you trip.    WHAT CAN I DO IN THE KITCHEN?  Clean up any spills right away.  Avoid walking on wet floors.  Keep items that you use a lot in easy-to-reach places.  If you need to reach something above you, use a strong step stool that has a grab bar.  Keep electrical cords out of the way.  Do not use floor polish or wax that makes floors slippery. If you must use wax, use non-skid floor wax.  Do not have throw rugs and other things on the floor that can make you trip.    WHAT CAN I DO WITH MY STAIRS?  Do not leave any items on the stairs.  Make sure that there are handrails on both sides of the stairs and use them. Fix handrails that are broken or loose. Make sure that handrails are as long as the stairways.  Check any carpeting to make sure that it is firmly attached to the stairs. Fix any carpet that is loose or worn.  Avoid having throw rugs at the top or bottom of the stairs. If you do have throw rugs, attach them to the floor with carpet tape.  Make sure that you have a light switch at the top of the stairs and the bottom of the stairs. If you do not have them, ask someone to add them for you.    WHAT ELSE CAN I DO TO HELP PREVENT FALLS?  Wear shoes that:  Do not have high heels.  Have rubber bottoms.  Are comfortable and fit you well.  Are closed at the toe. Do not wear sandals.  If you use a stepladder:  Make sure that it is fully opened. Do not climb a closed stepladder.  Make sure that both sides of the stepladder are locked into place.  Ask someone to hold it for you, if possible.  Clearly jelly and make sure that you can see:  Any grab bars or handrails.  First and last steps.  Where the edge of each step is.  Use tools that help you move around (mobility aids) if they are needed. These include:  Canes.  Walkers.  Scooters.  Crutches.  Turn on the lights when you go into a dark area. Replace any light bulbs as soon as they burn out.  Set up your furniture so you have a clear path. Avoid moving your furniture around.  If any of your floors are uneven, fix them.  If there are any pets around you, be aware of where they are.  Review your medicines with your doctor. Some medicines can make you feel dizzy. This can increase your chance of falling.    Ask your doctor what other things that you can do to help prevent falls.

## 2021-10-12 NOTE — ED PROVIDER NOTE - PATIENT PORTAL LINK FT
You can access the FollowMyHealth Patient Portal offered by [facility] by registering at the following website: [email or web address]. By joining [employer]’s FollowMyHealth portal, you will also be able to view your health information using other applications (apps) compatible with our system.

## 2021-10-12 NOTE — ED ADULT NURSE NOTE - NSIMPLEMENTINTERV_GEN_ALL_ED
Implemented All Fall with Harm Risk Interventions:  [place] to call system. Call bell, personal items and telephone within reach. Instruct patient to call for assistance. Room bathroom lighting operational. Non-slip footwear when patient is off stretcher. Physically safe environment: no spills, clutter or unnecessary equipment. Stretcher in lowest position, wheels locked, appropriate side rails in place. Provide visual cue, wrist band, yellow gown, etc. Monitor gait and stability. Monitor for mental status changes and reorient to person, place, and time. Review medications for side effects contributing to fall risk. Reinforce activity limits and safety measures with patient and family. Provide visual clues: red socks.

## 2021-10-12 NOTE — ED PROVIDER NOTE - CARE PROVIDER_API CALL
Silverio Hayes)  [address] [place] [address], [place] [address]  Phone: [phone]  Fax: [phone]  Follow Up Time: 1-3 Days

## 2021-10-12 NOTE — ED ADULT TRIAGE NOTE - CHIEF COMPLAINT QUOTE
Patient had mechanical fall from [facility] , as per EMS patient was not able to walk with out assistance when they arrived. No obvious deformities noted. Denies Blood thinners head trama and LOC

## 2021-10-12 NOTE — ED PROVIDER NOTE - OBJECTIVE STATEMENT
The pt is a 81y M w/ hx of HTN, HLD, COPD, DM, non-Hodgkin's lymphoma, schizophrenia BIBEMS from NH for fall. Per EMS, pt non-ambulatory on scene. No injury to head, no LOC, not on AC. Pt not complaining of any pain. Denies headache, chest pain, SOB, N/V, abdominal pain, diarrhea. GCS 15.

## 2021-10-12 NOTE — ED PROVIDER NOTE - NS ED ROS FT
Constitutional: (-) fever, (+) fall   Eyes/ENT: (-) blurry vision, (-) epistaxis  Cardiovascular: (-) chest pain, (-) syncope  Respiratory: (-) cough, (-) shortness of breath  Gastrointestinal: (-) vomiting, (-) diarrhea  Musculoskeletal: (-) neck pain, (-) back pain, (-) joint pain  Integumentary: (-) rash, (-) edema  Neurological: (-) headache, (-) altered mental status  Psychiatric: (-) hallucinations  Allergic/Immunologic: (-) pruritus

## 2021-11-16 NOTE — PATIENT PROFILE ADULT - NSASFUNCLEVELADLAMBULATE_GEN_A_NUR
3 = assistive equipment and person
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

## 2022-05-18 NOTE — PHYSICAL THERAPY INITIAL EVALUATION ADULT - IMPAIRED TRANSFERS: BED/CHAIR, REHAB EVAL
Addended by: MAY RIZVI on: 5/18/2022 12:51 PM     Modules accepted: Orders    
impaired balance/cognition/decreased strength

## 2022-07-18 ENCOUNTER — INPATIENT (INPATIENT)
Facility: HOSPITAL | Age: 82
LOS: 10 days | Discharge: NURSING, REHAB OR LONG-TERM CARE | End: 2022-07-29
Attending: INTERNAL MEDICINE | Admitting: INTERNAL MEDICINE

## 2022-07-18 VITALS
SYSTOLIC BLOOD PRESSURE: 137 MMHG | HEART RATE: 80 BPM | HEIGHT: 69 IN | DIASTOLIC BLOOD PRESSURE: 64 MMHG | RESPIRATION RATE: 18 BRPM | WEIGHT: 149.91 LBS | TEMPERATURE: 98 F

## 2022-07-18 DIAGNOSIS — Z12.11 ENCOUNTER FOR SCREENING FOR MALIGNANT NEOPLASM OF COLON: Chronic | ICD-10-CM

## 2022-07-18 LAB
ALBUMIN SERPL ELPH-MCNC: 4.3 G/DL — SIGNIFICANT CHANGE UP (ref 3.5–5.2)
ALP SERPL-CCNC: 85 U/L — SIGNIFICANT CHANGE UP (ref 30–115)
ALT FLD-CCNC: 7 U/L — SIGNIFICANT CHANGE UP (ref 0–41)
ANION GAP SERPL CALC-SCNC: 14 MMOL/L — SIGNIFICANT CHANGE UP (ref 7–14)
AST SERPL-CCNC: 15 U/L — SIGNIFICANT CHANGE UP (ref 0–41)
BASOPHILS # BLD AUTO: 0.02 K/UL — SIGNIFICANT CHANGE UP (ref 0–0.2)
BASOPHILS NFR BLD AUTO: 0.2 % — SIGNIFICANT CHANGE UP (ref 0–1)
BILIRUB SERPL-MCNC: 0.3 MG/DL — SIGNIFICANT CHANGE UP (ref 0.2–1.2)
BUN SERPL-MCNC: 26 MG/DL — HIGH (ref 10–20)
CALCIUM SERPL-MCNC: 8.7 MG/DL — SIGNIFICANT CHANGE UP (ref 8.5–10.1)
CHLORIDE SERPL-SCNC: 94 MMOL/L — LOW (ref 98–110)
CO2 SERPL-SCNC: 23 MMOL/L — SIGNIFICANT CHANGE UP (ref 17–32)
CREAT SERPL-MCNC: 1.5 MG/DL — SIGNIFICANT CHANGE UP (ref 0.7–1.5)
EGFR: 47 ML/MIN/1.73M2 — LOW
EOSINOPHIL # BLD AUTO: 0.01 K/UL — SIGNIFICANT CHANGE UP (ref 0–0.7)
EOSINOPHIL NFR BLD AUTO: 0.1 % — SIGNIFICANT CHANGE UP (ref 0–8)
GLUCOSE SERPL-MCNC: 100 MG/DL — HIGH (ref 70–99)
HCT VFR BLD CALC: 30.8 % — LOW (ref 42–52)
HGB BLD-MCNC: 10.6 G/DL — LOW (ref 14–18)
IMM GRANULOCYTES NFR BLD AUTO: 0.7 % — HIGH (ref 0.1–0.3)
LACTATE SERPL-SCNC: 1.9 MMOL/L — SIGNIFICANT CHANGE UP (ref 0.7–2)
LIDOCAIN IGE QN: 32 U/L — SIGNIFICANT CHANGE UP (ref 7–60)
LYMPHOCYTES # BLD AUTO: 1.47 K/UL — SIGNIFICANT CHANGE UP (ref 1.2–3.4)
LYMPHOCYTES # BLD AUTO: 12.4 % — LOW (ref 20.5–51.1)
MAGNESIUM SERPL-MCNC: 2 MG/DL — SIGNIFICANT CHANGE UP (ref 1.8–2.4)
MCHC RBC-ENTMCNC: 31.9 PG — HIGH (ref 27–31)
MCHC RBC-ENTMCNC: 34.4 G/DL — SIGNIFICANT CHANGE UP (ref 32–37)
MCV RBC AUTO: 92.8 FL — SIGNIFICANT CHANGE UP (ref 80–94)
MONOCYTES # BLD AUTO: 1.4 K/UL — HIGH (ref 0.1–0.6)
MONOCYTES NFR BLD AUTO: 11.8 % — HIGH (ref 1.7–9.3)
NEUTROPHILS # BLD AUTO: 8.85 K/UL — HIGH (ref 1.4–6.5)
NEUTROPHILS NFR BLD AUTO: 74.8 % — SIGNIFICANT CHANGE UP (ref 42.2–75.2)
NRBC # BLD: 0 /100 WBCS — SIGNIFICANT CHANGE UP (ref 0–0)
PLATELET # BLD AUTO: 199 K/UL — SIGNIFICANT CHANGE UP (ref 130–400)
POTASSIUM SERPL-MCNC: 5.1 MMOL/L — HIGH (ref 3.5–5)
POTASSIUM SERPL-SCNC: 5.1 MMOL/L — HIGH (ref 3.5–5)
PROT SERPL-MCNC: 6.9 G/DL — SIGNIFICANT CHANGE UP (ref 6–8)
RBC # BLD: 3.32 M/UL — LOW (ref 4.7–6.1)
RBC # FLD: 14.3 % — SIGNIFICANT CHANGE UP (ref 11.5–14.5)
SARS-COV-2 RNA SPEC QL NAA+PROBE: DETECTED
SODIUM SERPL-SCNC: 131 MMOL/L — LOW (ref 135–146)
TROPONIN T SERPL-MCNC: <0.01 NG/ML — SIGNIFICANT CHANGE UP
WBC # BLD: 11.83 K/UL — HIGH (ref 4.8–10.8)
WBC # FLD AUTO: 11.83 K/UL — HIGH (ref 4.8–10.8)

## 2022-07-18 PROCEDURE — 93010 ELECTROCARDIOGRAM REPORT: CPT

## 2022-07-18 PROCEDURE — 71045 X-RAY EXAM CHEST 1 VIEW: CPT | Mod: 26

## 2022-07-18 PROCEDURE — 70450 CT HEAD/BRAIN W/O DYE: CPT | Mod: 26,MA

## 2022-07-18 PROCEDURE — 99285 EMERGENCY DEPT VISIT HI MDM: CPT | Mod: FS,CS

## 2022-07-18 RX ORDER — SODIUM CHLORIDE 9 MG/ML
1000 INJECTION INTRAMUSCULAR; INTRAVENOUS; SUBCUTANEOUS ONCE
Refills: 0 | Status: COMPLETED | OUTPATIENT
Start: 2022-07-18 | End: 2022-07-18

## 2022-07-18 RX ADMIN — SODIUM CHLORIDE 2000 MILLILITER(S): 9 INJECTION INTRAMUSCULAR; INTRAVENOUS; SUBCUTANEOUS at 22:18

## 2022-07-18 NOTE — ED ADULT NURSE NOTE - NSFALLRSKINDICATORS_ED_ALL_ED
Bed: B-09  Expected date: 12/12/21  Expected time:   Means of arrival: [facility]  Comments:  Joey Shanks, RN  12/12/21 0092 yes

## 2022-07-18 NOTE — ED ADULT NURSE REASSESSMENT NOTE - NS ED NURSE REASSESS COMMENT FT1
No attempts to get out of bed , able to move all extremities , fall alarm applied , instructed not to get out of bed without nursing staff , fall prevention health teachings done

## 2022-07-18 NOTE — ED ADULT NURSE NOTE - INTERVENTIONS DEFINITIONS
[place] to call system/Instruct patient to call for assistance/Room bathroom lighting operational/Non-slip footwear when patient is off stretcher/Physically safe environment: no spills, clutter or unnecessary equipment/Stretcher in lowest position, wheels locked, appropriate side rails in place/Provide visual cue, wrist band, yellow gown, etc./Monitor gait and stability/Monitor for mental status changes and reorient to person, place, and time/Review medications for side effects contributing to fall risk/Reinforce activity limits and safety measures with patient and family/Provide visual clues: red socks

## 2022-07-19 LAB
ALBUMIN SERPL ELPH-MCNC: 4 G/DL — SIGNIFICANT CHANGE UP (ref 3.5–5.2)
ALP SERPL-CCNC: 82 U/L — SIGNIFICANT CHANGE UP (ref 30–115)
ALT FLD-CCNC: 7 U/L — SIGNIFICANT CHANGE UP (ref 0–41)
ANION GAP SERPL CALC-SCNC: 14 MMOL/L — SIGNIFICANT CHANGE UP (ref 7–14)
AST SERPL-CCNC: 15 U/L — SIGNIFICANT CHANGE UP (ref 0–41)
BASOPHILS # BLD AUTO: 0.01 K/UL — SIGNIFICANT CHANGE UP (ref 0–0.2)
BASOPHILS NFR BLD AUTO: 0.1 % — SIGNIFICANT CHANGE UP (ref 0–1)
BILIRUB SERPL-MCNC: 0.4 MG/DL — SIGNIFICANT CHANGE UP (ref 0.2–1.2)
BUN SERPL-MCNC: 18 MG/DL — SIGNIFICANT CHANGE UP (ref 10–20)
CALCIUM SERPL-MCNC: 9 MG/DL — SIGNIFICANT CHANGE UP (ref 8.5–10.1)
CHLORIDE SERPL-SCNC: 95 MMOL/L — LOW (ref 98–110)
CO2 SERPL-SCNC: 22 MMOL/L — SIGNIFICANT CHANGE UP (ref 17–32)
CREAT SERPL-MCNC: 1.1 MG/DL — SIGNIFICANT CHANGE UP (ref 0.7–1.5)
CRP SERPL-MCNC: 133.6 MG/L — HIGH
CRP SERPL-MCNC: 136.4 MG/L — HIGH
D DIMER BLD IA.RAPID-MCNC: 802 NG/ML DDU — HIGH (ref 0–230)
D DIMER BLD IA.RAPID-MCNC: 804 NG/ML DDU — HIGH (ref 0–230)
EGFR: 68 ML/MIN/1.73M2 — SIGNIFICANT CHANGE UP
EOSINOPHIL # BLD AUTO: 0.01 K/UL — SIGNIFICANT CHANGE UP (ref 0–0.7)
EOSINOPHIL NFR BLD AUTO: 0.1 % — SIGNIFICANT CHANGE UP (ref 0–8)
GLUCOSE SERPL-MCNC: 212 MG/DL — HIGH (ref 70–99)
HCT VFR BLD CALC: 30.3 % — LOW (ref 42–52)
HGB BLD-MCNC: 10.9 G/DL — LOW (ref 14–18)
IMM GRANULOCYTES NFR BLD AUTO: 0.5 % — HIGH (ref 0.1–0.3)
LDH SERPL L TO P-CCNC: 191 U/L — SIGNIFICANT CHANGE UP (ref 50–242)
LDH SERPL L TO P-CCNC: 236 U/L — SIGNIFICANT CHANGE UP (ref 50–242)
LYMPHOCYTES # BLD AUTO: 0.73 K/UL — LOW (ref 1.2–3.4)
LYMPHOCYTES # BLD AUTO: 8.4 % — LOW (ref 20.5–51.1)
MAGNESIUM SERPL-MCNC: 1.7 MG/DL — LOW (ref 1.8–2.4)
MCHC RBC-ENTMCNC: 33.3 PG — HIGH (ref 27–31)
MCHC RBC-ENTMCNC: 36 G/DL — SIGNIFICANT CHANGE UP (ref 32–37)
MCV RBC AUTO: 92.7 FL — SIGNIFICANT CHANGE UP (ref 80–94)
MONOCYTES # BLD AUTO: 0.38 K/UL — SIGNIFICANT CHANGE UP (ref 0.1–0.6)
MONOCYTES NFR BLD AUTO: 4.4 % — SIGNIFICANT CHANGE UP (ref 1.7–9.3)
NEUTROPHILS # BLD AUTO: 7.56 K/UL — HIGH (ref 1.4–6.5)
NEUTROPHILS NFR BLD AUTO: 86.5 % — HIGH (ref 42.2–75.2)
NRBC # BLD: 0 /100 WBCS — SIGNIFICANT CHANGE UP (ref 0–0)
PLATELET # BLD AUTO: 191 K/UL — SIGNIFICANT CHANGE UP (ref 130–400)
POTASSIUM SERPL-MCNC: 4.3 MMOL/L — SIGNIFICANT CHANGE UP (ref 3.5–5)
POTASSIUM SERPL-SCNC: 4.3 MMOL/L — SIGNIFICANT CHANGE UP (ref 3.5–5)
PROT SERPL-MCNC: 6.7 G/DL — SIGNIFICANT CHANGE UP (ref 6–8)
RBC # BLD: 3.27 M/UL — LOW (ref 4.7–6.1)
RBC # FLD: 14 % — SIGNIFICANT CHANGE UP (ref 11.5–14.5)
SODIUM SERPL-SCNC: 131 MMOL/L — LOW (ref 135–146)
TROPONIN T SERPL-MCNC: <0.01 NG/ML — SIGNIFICANT CHANGE UP
TROPONIN T SERPL-MCNC: <0.01 NG/ML — SIGNIFICANT CHANGE UP
WBC # BLD: 8.73 K/UL — SIGNIFICANT CHANGE UP (ref 4.8–10.8)
WBC # FLD AUTO: 8.73 K/UL — SIGNIFICANT CHANGE UP (ref 4.8–10.8)

## 2022-07-19 PROCEDURE — 99222 1ST HOSP IP/OBS MODERATE 55: CPT

## 2022-07-19 RX ORDER — IPRATROPIUM/ALBUTEROL SULFATE 18-103MCG
3 AEROSOL WITH ADAPTER (GRAM) INHALATION EVERY 6 HOURS
Refills: 0 | Status: DISCONTINUED | OUTPATIENT
Start: 2022-07-19 | End: 2022-07-29

## 2022-07-19 RX ORDER — HEPARIN SODIUM 5000 [USP'U]/ML
5000 INJECTION INTRAVENOUS; SUBCUTANEOUS EVERY 12 HOURS
Refills: 0 | Status: DISCONTINUED | OUTPATIENT
Start: 2022-07-19 | End: 2022-07-29

## 2022-07-19 RX ORDER — LANOLIN ALCOHOL/MO/W.PET/CERES
3 CREAM (GRAM) TOPICAL AT BEDTIME
Refills: 0 | Status: DISCONTINUED | OUTPATIENT
Start: 2022-07-19 | End: 2022-07-29

## 2022-07-19 RX ORDER — ONDANSETRON 8 MG/1
4 TABLET, FILM COATED ORAL EVERY 8 HOURS
Refills: 0 | Status: DISCONTINUED | OUTPATIENT
Start: 2022-07-19 | End: 2022-07-29

## 2022-07-19 RX ORDER — TRAZODONE HCL 50 MG
150 TABLET ORAL AT BEDTIME
Refills: 0 | Status: DISCONTINUED | OUTPATIENT
Start: 2022-07-19 | End: 2022-07-29

## 2022-07-19 RX ORDER — OLANZAPINE 15 MG/1
20 TABLET, FILM COATED ORAL AT BEDTIME
Refills: 0 | Status: DISCONTINUED | OUTPATIENT
Start: 2022-07-19 | End: 2022-07-29

## 2022-07-19 RX ORDER — NICOTINE POLACRILEX 2 MG
1 GUM BUCCAL DAILY
Refills: 0 | Status: DISCONTINUED | OUTPATIENT
Start: 2022-07-19 | End: 2022-07-29

## 2022-07-19 RX ORDER — ATORVASTATIN CALCIUM 80 MG/1
10 TABLET, FILM COATED ORAL AT BEDTIME
Refills: 0 | Status: DISCONTINUED | OUTPATIENT
Start: 2022-07-19 | End: 2022-07-29

## 2022-07-19 RX ORDER — NIFEDIPINE 30 MG
60 TABLET, EXTENDED RELEASE 24 HR ORAL DAILY
Refills: 0 | Status: DISCONTINUED | OUTPATIENT
Start: 2022-07-19 | End: 2022-07-29

## 2022-07-19 RX ORDER — SODIUM CHLORIDE 9 MG/ML
500 INJECTION INTRAMUSCULAR; INTRAVENOUS; SUBCUTANEOUS
Refills: 0 | Status: DISCONTINUED | OUTPATIENT
Start: 2022-07-19 | End: 2022-07-20

## 2022-07-19 RX ORDER — METOPROLOL TARTRATE 50 MG
25 TABLET ORAL
Refills: 0 | Status: DISCONTINUED | OUTPATIENT
Start: 2022-07-19 | End: 2022-07-29

## 2022-07-19 RX ORDER — ACETAMINOPHEN 500 MG
650 TABLET ORAL EVERY 6 HOURS
Refills: 0 | Status: DISCONTINUED | OUTPATIENT
Start: 2022-07-19 | End: 2022-07-29

## 2022-07-19 RX ORDER — GABAPENTIN 400 MG/1
300 CAPSULE ORAL
Refills: 0 | Status: DISCONTINUED | OUTPATIENT
Start: 2022-07-19 | End: 2022-07-29

## 2022-07-19 RX ORDER — ALBUTEROL 90 UG/1
2 AEROSOL, METERED ORAL EVERY 6 HOURS
Refills: 0 | Status: DISCONTINUED | OUTPATIENT
Start: 2022-07-19 | End: 2022-07-29

## 2022-07-19 RX ADMIN — Medication 60 MILLIGRAM(S): at 12:12

## 2022-07-19 RX ADMIN — Medication 25 MILLIGRAM(S): at 12:12

## 2022-07-19 RX ADMIN — Medication 150 MILLIGRAM(S): at 21:35

## 2022-07-19 RX ADMIN — SODIUM CHLORIDE 1000 MILLILITER(S): 9 INJECTION INTRAMUSCULAR; INTRAVENOUS; SUBCUTANEOUS at 00:00

## 2022-07-19 RX ADMIN — GABAPENTIN 300 MILLIGRAM(S): 400 CAPSULE ORAL at 19:08

## 2022-07-19 RX ADMIN — HEPARIN SODIUM 5000 UNIT(S): 5000 INJECTION INTRAVENOUS; SUBCUTANEOUS at 19:08

## 2022-07-19 RX ADMIN — Medication 25 MILLIGRAM(S): at 21:34

## 2022-07-19 RX ADMIN — Medication 1 PATCH: at 12:13

## 2022-07-19 RX ADMIN — ATORVASTATIN CALCIUM 10 MILLIGRAM(S): 80 TABLET, FILM COATED ORAL at 21:34

## 2022-07-19 RX ADMIN — OLANZAPINE 20 MILLIGRAM(S): 15 TABLET, FILM COATED ORAL at 21:34

## 2022-07-19 NOTE — ED ADULT NURSE REASSESSMENT NOTE - NS ED NURSE REASSESS COMMENT FT1
Pt c/o generalized weakness , increase lethargy , pt oriented to person , place , time , pt sleepy but easily arousable  , follows commands , no respiratory distress noted , afebrile , denies chest pain , denies headache , denies dizziness , no vomiting noted , + COVID , transferred to room bed 20 for isolation , report given to new primary nurse Lenin

## 2022-07-19 NOTE — H&P ADULT - ATTENDING COMMENTS
3 yo M with PMHx of HTN, HLD, COPD, DM, non-Hodgkin's lymphoma, neuropathy, and urinary retention with chronic bains presents to the ED from [facility] for evaluation of increasing generalized weakness for "a while." Pt states he has had multiple episodes of syncope during this time. Admitted for syncope workup and generalized weakness 2/2 COVID illness.   The patient is a poor historian, he did not recall why he is in the hospital , he stated that he feels just fine, denies any  fever,     # Generalized weakness 2/2 COVID illness  # Syncope workup  - HD stable, afebrile, WBC slightly elevated  - COVID +, asymptomatic on room air, in no acute respiratory distress  - CXR unremarkable  - ID consulted  - obtain inflammatory markers  - Fall precautions  - B12, Folate, TSH   - monitor on tele  - obtain TTE  - f/u blood and urine cx    #DAVID  - Cr. 1.5 on admission  - s/p 1L of NS in the ED  - c/w IVF hydration  - hold lisinopril  - monitor BMP  - avoid nephrotoxic agents    #COPD  - active smoker, not on home O2  - counselled on smoking cessation  - c/w nicotine patch   c/w duonebs and albuterol PRN    #DM  - check A1c  - c/w gabapentin for neuropathy  - monitor FS  - consider insulin regimen if FS >180    #HTN  - takes nifedipine 60mg daily, lisinopril 5mg daily, and metoprolol tartrate 25mg BID  - c/w nifedipine and metoprolol, hold lisinopril     # HLD - c/w atorvastatin    #Dementia/ Insomnia - c/w zyprexa and trazodone    Misc:   DVT ppx: heparin subQ  GI ppx: none  Diet: DASh/TLC, CC  Activity: AAT  Code: Full Code  Dispo: Acute, admit to tele 83 yo M with PMHx of HTN, HLD, COPD, DM, non-Hodgkin's lymphoma, neuropathy, and urinary retention with chronic bains presents to the ED from [facility] for evaluation of increasing generalized weakness for "a while." Pt states he has had multiple episodes of syncope during this time. Admitted for syncope workup and generalized weakness 2/2 COVID illness.   The patient is a poor historian, he did not recall why he is in the hospital , he stated that he feels just fine, denies any  fever, chest pain, cough, dysuria or diarrhea  # Generalized weakness liklely secondary to COVID 19  illness  ? Syncope - unclear hx   - COVID +, asymptomatic on room air, in no acute respiratory distress  - CXR unremarkable  - ID consulted  - obtain inflammatory markers, .6   D dimer 802 (Age adjusted cut off is 400) - give IV saline and get CTA to r/o PE specially with ? hx of syncope  and venous duplex   - Fall precautions  - B12, Folate, TSH   - monitor on tele  - obtain TTE  - f/u blood and urine cx  Get collateral info and med recc   Fall precautions,   consider PT eval     #DAVID improved with IVF, [place] prerenal   Hyponatremia and hypochloremia   Hypomagnesemia   - Cr. 1.5 on admission, improved to 1.1   - s/p 1L of NS in the ED  - c/w IVF hydration with NS and avoid over correction of Na   - hold lisinopril  - monitor BMP  - avoid nephrotoxic agents  correct Mg and monitor     #COPD  - active smoker, not on home O2  - counselled on smoking cessation  - c/w nicotine patch   c/w duonebs and albuterol PRN    #DM  - check A1c  - c/w gabapentin for neuropathy  - monitor FS  - consider insulin regimen if FS >180    #HTN  - takes nifedipine 60mg daily, lisinopril 5mg daily, and metoprolol tartrate 25mg BID  - c/w nifedipine and metoprolol, hold lisinopril     # HLD - c/w atorvastatin    #Dementia/ Insomnia - c/w zyprex and trazodone    DVT ppx    Pending clinical improvement , CTA , ID

## 2022-07-19 NOTE — H&P ADULT - ASSESSMENT
83 yo M with PMHx of HTN, HLD, COPD, DM, non-Hodgkin's lymphoma, neuropathy, and urinary retention with chronic bains presents to the ED from [facility] for evaluation of increasing generalized weakness for "a while." Pt states he has had multiple episodes of syncope during this time. Admitted for syncope workup and generalized weakness 2/2 COVID illness.     # Generalized weakness/ syncope  - HD stable, afebrile, WBC slightly elevated  - COVID +, asymptomatic on room air, in no acute respiratory distress  - CXR unremarkable  - obtain inflammatory markers  - Fall precautions  - B12, Folate, TSH   - monitor on tele  - obtain TTE    #HTN  -     # HLD    #COPD    #DM  - check A1c  - monitor FS    Misc:   DVT ppx:  GI ppx:  Diet:   Activity: AAT  Code: Full Code  Dispo: Acute, admit to tele     83 yo M with PMHx of HTN, HLD, COPD, DM, non-Hodgkin's lymphoma, neuropathy, and urinary retention with chronic bains presents to the ED from [facility] for evaluation of increasing generalized weakness for "a while." Pt states he has had multiple episodes of syncope during this time. Admitted for syncope workup and generalized weakness 2/2 COVID illness.     # Generalized weakness 2/2 COVID illness  # Syncope workup  - HD stable, afebrile, WBC slightly elevated  - COVID +, asymptomatic on room air, in no acute respiratory distress  - CXR unremarkable  - ID consulted  - obtain inflammatory markers  - Fall precautions  - B12, Folate, TSH   - monitor on tele  - obtain TTE    #DAVID  - Cr. 1.5 on admission  - s/p 1L of NS in the ED  - c/w IVF hydration  - hold lisinopril  - monitor BMP  - avoid nephrotoxic agents    #DM  - check A1c  - c/w gabapentin for neuropathy  - monitor FS  - consider insulin regimen if FS >180    #COPD  - active smoker, not on home O2  - counselled on smoking cessation  - c/w nicotine patch   c/w duonebs and albuterol PRN    #HTN  - takes nifedipine 60mg daily, lisinopril 5mg daily, and metoprolol tartrate 25mg BID  - c/w nifedipine and metoprolol, hold lisinopril     # HLD - c/w atorvastatin    Misc:   DVT ppx: heparin subQ  GI ppx: none  Diet: DASh/TLC, CC  Activity: AAT  Code: Full Code  Dispo: Acute, admit to tele     81 yo M with PMHx of HTN, HLD, COPD, DM, non-Hodgkin's lymphoma, neuropathy, and urinary retention with chronic bains presents to the ED from [facility] for evaluation of increasing generalized weakness for "a while." Pt states he has had multiple episodes of syncope during this time. Admitted for syncope workup and generalized weakness 2/2 COVID illness.     # Generalized weakness 2/2 COVID illness  # Syncope workup  - HD stable, afebrile, WBC slightly elevated  - COVID +, asymptomatic on room air, in no acute respiratory distress  - CXR unremarkable  - ID consulted  - obtain inflammatory markers  - Fall precautions  - B12, Folate, TSH   - monitor on tele  - obtain TTE    #DAVID  - Cr. 1.5 on admission  - s/p 1L of NS in the ED  - c/w IVF hydration  - hold lisinopril  - monitor BMP  - avoid nephrotoxic agents    #COPD  - active smoker, not on home O2  - counselled on smoking cessation  - c/w nicotine patch   c/w duonebs and albuterol PRN    #DM  - check A1c  - c/w gabapentin for neuropathy  - monitor FS  - consider insulin regimen if FS >180    #HTN  - takes nifedipine 60mg daily, lisinopril 5mg daily, and metoprolol tartrate 25mg BID  - c/w nifedipine and metoprolol, hold lisinopril     # HLD - c/w atorvastatin    #Dementia/ Insomnia - c/w zyprexa and trazodone    Misc:   DVT ppx: heparin subQ  GI ppx: none  Diet: DASh/TLC, CC  Activity: AAT  Code: Full Code  Dispo: Acute, admit to tele     83 yo M with PMHx of HTN, HLD, COPD, DM, non-Hodgkin's lymphoma, neuropathy, and urinary retention with chronic bains presents to the ED from [facility] for evaluation of increasing generalized weakness for "a while." Pt states he has had multiple episodes of syncope during this time. Admitted for syncope workup and generalized weakness 2/2 COVID illness.     # Generalized weakness 2/2 COVID illness  # Syncope workup  - HD stable, afebrile, WBC slightly elevated  - COVID +, asymptomatic on room air, in no acute respiratory distress  - CXR unremarkable  - ID consulted  - obtain inflammatory markers  - Fall precautions  - B12, Folate, TSH   - monitor on tele  - obtain TTE  - f/u blood and urine cx    #DAVID  - Cr. 1.5 on admission  - s/p 1L of NS in the ED  - c/w IVF hydration  - hold lisinopril  - monitor BMP  - avoid nephrotoxic agents    #COPD  - active smoker, not on home O2  - counselled on smoking cessation  - c/w nicotine patch   c/w duonebs and albuterol PRN    #DM  - check A1c  - c/w gabapentin for neuropathy  - monitor FS  - consider insulin regimen if FS >180    #HTN  - takes nifedipine 60mg daily, lisinopril 5mg daily, and metoprolol tartrate 25mg BID  - c/w nifedipine and metoprolol, hold lisinopril     # HLD - c/w atorvastatin    #Dementia/ Insomnia - c/w zyprexa and trazodone    Misc:   DVT ppx: heparin subQ  GI ppx: none  Diet: DASh/TLC, CC  Activity: AAT  Code: Full Code  Dispo: Acute, admit to tele

## 2022-07-19 NOTE — H&P ADULT - HISTORY OF PRESENT ILLNESS
83 yo M with PMHx of HTN, HLD, COPD, DM, non-Hodgkin's lymphoma, neuropathy, and urinary retention with chronic bains presents to the ED from [facility] for evaluation of increasing generalized weakness for "a while." Pt states he has had multiple episodes of syncope during this time. Pt otherwise denies complaints. Pt denies fever, chills, nausea, vomiting, abdominal pain, diarrhea, headache, dizziness, chest pain, SOB, back pain, LOC, urinary symptoms, cough, calf pain/swelling, recent travel, recent surgery    In the ED:  BP: 137/64, HR 80, Temp 98.3F, satting 97% on room air  Labs: WBC 11.8k, Hgb 10.6, Na 131, Cr 1.5, Trops <0.01  ECG NSR, CTH no acute findings    s/p 1L bolus of LR in the ED  Admitted for generalized weakness 2/2 COVID illness.     83 yo M with PMHx of HTN, HLD, COPD, DM, non-Hodgkin's lymphoma, neuropathy, and urinary retention with chronic bains presents to the ED from [facility] for evaluation of increasing generalized weakness for "a while." Pt states he has had multiple episodes of syncope during this time. Pt otherwise denies complaints. Pt denies fever, chills, nausea, vomiting, abdominal pain, diarrhea, headache, dizziness, chest pain, SOB, back pain, LOC, urinary symptoms, cough, calf pain/swelling, recent travel, recent surgery    In the ED:  BP: 137/64, HR 80, Temp 98.3F, satting 97% on room air  Labs: WBC 11.8k, Hgb 10.6, Na 131, Cr 1.5, Trops <0.01  ECG NSR, CTH no acute findings    s/p 1L bolus of NS in the ED  Admitted for generalized weakness 2/2 COVID illness.     81 yo M with PMHx of HTN, HLD, COPD, DM, non-Hodgkin's lymphoma, neuropathy, and urinary retention with chronic bains presents to the ED from [facility] for evaluation of increasing generalized weakness for a while. Pt is A&Ox2, poor historian and unable to provide details about why he was brought to the ED. According to chart, pt states he has had multiple episodes of syncope during this time. Pt otherwise denies complaints. Pt denies fever, chills, nausea, vomiting, abdominal pain, diarrhea, headache, dizziness, chest pain, SOB, back pain, LOC, urinary symptoms, cough, calf pain/swelling, recent travel, recent surgery.    In the ED:  BP: 137/64, HR 80, Temp 98.3F, satting 97% on room air  Labs: WBC 11.8k, Hgb 10.6, Na 131, Cr 1.5, Trops <0.01  ECG NSR, CTH no acute findings    s/p 1L bolus of NS in the ED  Admitted for generalized weakness 2/2 COVID illness.

## 2022-07-19 NOTE — ED PROVIDER NOTE - OBJECTIVE STATEMENT
81 yo M with PMHx of HTN, HLD, COPD, DM, non-Hodgkin's lymphoma, neuropathy, and urinary retention with chronic bains presents to the ED from [facility] for evaluation of increasing generalized weakness for "a while." Pt states he has had multiple episodes of syncope during this time. Pt otherwise denies complaints. Pt denies fever, chills, nausea, vomiting, abdominal pain, diarrhea, headache, dizziness, chest pain, SOB, back pain, LOC, urinary symptoms, cough, calf pain/swelling, recent travel, recent surgery.

## 2022-07-19 NOTE — PHYSICAL THERAPY INITIAL EVALUATION ADULT - PERTINENT HX OF CURRENT PROBLEM, REHAB EVAL
83 yo M with PMHx of HTN, HLD, COPD, DM, non-Hodgkin's lymphoma, neuropathy, and urinary retention with chronic bains presents to the ED from [place] for evaluation of increasing generalized weakness for "a

## 2022-07-19 NOTE — ED PROVIDER NOTE - PHYSICAL EXAMINATION
VITAL SIGNS: I have reviewed nursing notes and confirm.  CONSTITUTIONAL: Elderly male laying on stretcher; in no acute distress.  SKIN: Skin exam is warm and dry, no acute rash.  HEAD: Normocephalic; atraumatic.  EYES: PERRL, EOM intact; conjunctiva and sclera clear.  ENT: No nasal discharge; airway clear.   NECK: Supple; non tender.  CARD: S1, S2 normal; no murmurs, gallops, or rubs. Regular rate and rhythm.  RESP: No wheezes, rales or rhonchi. Speaking in full sentences.   ABD: Normal bowel sounds; soft; non-distended; non-tender; No rebound or guarding. No CVA tenderness.  EXT: Normal ROM. No clubbing, cyanosis or edema.  NEURO: Alert, oriented. Grossly unremarkable. No focal deficits.

## 2022-07-19 NOTE — ED PROVIDER NOTE - ATTENDING APP SHARED VISIT CONTRIBUTION OF CARE
82-year-old male presented today with increasing generalized weakness and multiple syncopal episodes.  Patient is EKG is unremarkable.  Patient reports that he fell multiple times after passing out.  Patient's labs are significant for COVID-19 and patient will be admitted for syncope work-up with associated COVID-19.  Patient cannot be placed into observation secondary to him having been positive for COVID-19.    I personally evaluated the patient. I reviewed the Resident’s or Physician Assistant’s note (as assigned above), and agree with the findings and plan except as documented in my note.    CONSTITUTIONAL: Well-developed; well-nourished; in no acute distress.   SKIN: warm, dry  HEAD: Normocephalic; atraumatic.  EYES: PERRL, EOMI, normal sclera and conjunctiva   ENT: No nasal discharge; airway clear.  NECK: Supple; non tender.  CARD: S1, S2 normal; no murmurs, gallops, or rubs. Regular rate and rhythm.   RESP: No wheezes, rales or rhonchi.  ABD: soft ntnd  EXT: Normal ROM.  No clubbing, cyanosis or edema.   LYMPH: No acute cervical adenopathy.  NEURO: Alert, oriented, grossly unremarkable  PSYCH:   Flat affect, cooperative

## 2022-07-19 NOTE — ED PROVIDER NOTE - NS ED ROS FT
Review of Systems  Constitutional:  No fever, chills. (+) generalized weakness  Eyes:  No visual changes, eye pain, or discharge.  ENMT:  No hearing changes, pain, or discharge. No nasal congestion, discharge, or bleeding. No throat pain, swelling, or difficulty swallowing.  Cardiac:  No chest pain, palpitations, or edema. (+) syncope  Respiratory:  No dyspnea, cough. No hemoptysis.  GI:  No nausea, vomiting, diarrhea, or abdominal pain.   :  No dysuria, hematuria, frequency, or burning.   MS:  No back pain.  Skin:  No skin rash, pruritis, jaundice, or lesions.  Neuro:  No headache, dizziness, loss of sensation, or focal weakness.  No change in mental status.

## 2022-07-19 NOTE — H&P ADULT - NSHPPHYSICALEXAM_GEN_ALL_CORE
GENERAL: NAD, lying in bed comfortably  HEAD:  Atraumatic, normocephalic  EYES: EOMI, PERRLA, conjunctiva and sclera clear  ENT: Moist mucous membranes  NECK: Supple, no JVD  HEART: Regular rate and rhythm, no murmurs, rubs, or gallops  LUNGS: Unlabored respirations.  Clear to auscultation bilaterally, no crackles, wheezing, or rhonchi  ABDOMEN: Soft, nontender, nondistended, +BS  EXTREMITIES: 2+ peripheral pulses bilaterally. No clubbing, cyanosis, or edema  NERVOUS SYSTEM:  A&Ox3, no focal deficits   SKIN: No rashes or lesions GENERAL: NAD, lying in bed comfortably  HEAD:  Atraumatic, normocephalic  EYES: EOMI, PERRLA, conjunctiva and sclera clear  ENT: Moist mucous membranes  NECK: Supple, no JVD  HEART: Regular rate and rhythm, no murmurs, rubs, or gallops  LUNGS: Unlabored respirations.  Clear to auscultation bilaterally, no crackles, wheezing, or rhonchi  ABDOMEN: Soft, nontender, nondistended, +BS  EXTREMITIES: 2+ peripheral pulses bilaterally. No clubbing, cyanosis, or edema  NERVOUS SYSTEM:  A&Ox2, no focal deficits   SKIN: No rashes or lesions

## 2022-07-19 NOTE — ED PROVIDER NOTE - CARE PLAN
1 Principal Discharge DX:	Syncope  Secondary Diagnosis:	Generalized weakness  Secondary Diagnosis:	COVID-19

## 2022-07-19 NOTE — ED PROVIDER NOTE - NS ED ATTENDING STATEMENT MOD
This was a shared visit with the NITA. I reviewed and verified the documentation and independently performed the documented:

## 2022-07-20 LAB
A1C WITH ESTIMATED AVERAGE GLUCOSE RESULT: 5.5 % — SIGNIFICANT CHANGE UP (ref 4–5.6)
ALBUMIN SERPL ELPH-MCNC: 3.7 G/DL — SIGNIFICANT CHANGE UP (ref 3.5–5.2)
ALP SERPL-CCNC: 86 U/L — SIGNIFICANT CHANGE UP (ref 30–115)
ALT FLD-CCNC: 9 U/L — SIGNIFICANT CHANGE UP (ref 0–41)
ANION GAP SERPL CALC-SCNC: 17 MMOL/L — HIGH (ref 7–14)
AST SERPL-CCNC: 22 U/L — SIGNIFICANT CHANGE UP (ref 0–41)
BASOPHILS # BLD AUTO: 0.02 K/UL — SIGNIFICANT CHANGE UP (ref 0–0.2)
BASOPHILS NFR BLD AUTO: 0.2 % — SIGNIFICANT CHANGE UP (ref 0–1)
BILIRUB SERPL-MCNC: 0.3 MG/DL — SIGNIFICANT CHANGE UP (ref 0.2–1.2)
BUN SERPL-MCNC: 15 MG/DL — SIGNIFICANT CHANGE UP (ref 10–20)
CALCIUM SERPL-MCNC: 8.8 MG/DL — SIGNIFICANT CHANGE UP (ref 8.5–10.1)
CHLORIDE SERPL-SCNC: 94 MMOL/L — LOW (ref 98–110)
CHOLEST SERPL-MCNC: 115 MG/DL — SIGNIFICANT CHANGE UP
CO2 SERPL-SCNC: 19 MMOL/L — SIGNIFICANT CHANGE UP (ref 17–32)
CREAT SERPL-MCNC: 0.9 MG/DL — SIGNIFICANT CHANGE UP (ref 0.7–1.5)
EGFR: 86 ML/MIN/1.73M2 — SIGNIFICANT CHANGE UP
EOSINOPHIL # BLD AUTO: 0.01 K/UL — SIGNIFICANT CHANGE UP (ref 0–0.7)
EOSINOPHIL NFR BLD AUTO: 0.1 % — SIGNIFICANT CHANGE UP (ref 0–8)
ESTIMATED AVERAGE GLUCOSE: 111 MG/DL — SIGNIFICANT CHANGE UP (ref 68–114)
FERRITIN SERPL-MCNC: 207 NG/ML — SIGNIFICANT CHANGE UP (ref 30–400)
FERRITIN SERPL-MCNC: 231 NG/ML — SIGNIFICANT CHANGE UP (ref 30–400)
GLUCOSE BLDC GLUCOMTR-MCNC: 125 MG/DL — HIGH (ref 70–99)
GLUCOSE BLDC GLUCOMTR-MCNC: 127 MG/DL — HIGH (ref 70–99)
GLUCOSE BLDC GLUCOMTR-MCNC: 133 MG/DL — HIGH (ref 70–99)
GLUCOSE SERPL-MCNC: 111 MG/DL — HIGH (ref 70–99)
HCT VFR BLD CALC: 32.2 % — LOW (ref 42–52)
HDLC SERPL-MCNC: 44 MG/DL — SIGNIFICANT CHANGE UP
HGB BLD-MCNC: 11.2 G/DL — LOW (ref 14–18)
IMM GRANULOCYTES NFR BLD AUTO: 0.4 % — HIGH (ref 0.1–0.3)
LIPID PNL WITH DIRECT LDL SERPL: 57 MG/DL — SIGNIFICANT CHANGE UP
LYMPHOCYTES # BLD AUTO: 1.29 K/UL — SIGNIFICANT CHANGE UP (ref 1.2–3.4)
LYMPHOCYTES # BLD AUTO: 16 % — LOW (ref 20.5–51.1)
MAGNESIUM SERPL-MCNC: 1.7 MG/DL — LOW (ref 1.8–2.4)
MCHC RBC-ENTMCNC: 32.4 PG — HIGH (ref 27–31)
MCHC RBC-ENTMCNC: 34.8 G/DL — SIGNIFICANT CHANGE UP (ref 32–37)
MCV RBC AUTO: 93.1 FL — SIGNIFICANT CHANGE UP (ref 80–94)
MONOCYTES # BLD AUTO: 1.19 K/UL — HIGH (ref 0.1–0.6)
MONOCYTES NFR BLD AUTO: 14.8 % — HIGH (ref 1.7–9.3)
NEUTROPHILS # BLD AUTO: 5.52 K/UL — SIGNIFICANT CHANGE UP (ref 1.4–6.5)
NEUTROPHILS NFR BLD AUTO: 68.5 % — SIGNIFICANT CHANGE UP (ref 42.2–75.2)
NON HDL CHOLESTEROL: 71 MG/DL — SIGNIFICANT CHANGE UP
NRBC # BLD: 0 /100 WBCS — SIGNIFICANT CHANGE UP (ref 0–0)
PLATELET # BLD AUTO: 155 K/UL — SIGNIFICANT CHANGE UP (ref 130–400)
POTASSIUM SERPL-MCNC: 4.2 MMOL/L — SIGNIFICANT CHANGE UP (ref 3.5–5)
POTASSIUM SERPL-SCNC: 4.2 MMOL/L — SIGNIFICANT CHANGE UP (ref 3.5–5)
PROCALCITONIN SERPL-MCNC: 0.12 NG/ML — HIGH (ref 0.02–0.1)
PROCALCITONIN SERPL-MCNC: 0.13 NG/ML — HIGH (ref 0.02–0.1)
PROT SERPL-MCNC: 6.8 G/DL — SIGNIFICANT CHANGE UP (ref 6–8)
RBC # BLD: 3.46 M/UL — LOW (ref 4.7–6.1)
RBC # FLD: 14 % — SIGNIFICANT CHANGE UP (ref 11.5–14.5)
SODIUM SERPL-SCNC: 130 MMOL/L — LOW (ref 135–146)
TRIGL SERPL-MCNC: 71 MG/DL — SIGNIFICANT CHANGE UP
TSH SERPL-MCNC: 2.17 UIU/ML — SIGNIFICANT CHANGE UP (ref 0.27–4.2)
WBC # BLD: 8.06 K/UL — SIGNIFICANT CHANGE UP (ref 4.8–10.8)
WBC # FLD AUTO: 8.06 K/UL — SIGNIFICANT CHANGE UP (ref 4.8–10.8)

## 2022-07-20 PROCEDURE — 99233 SBSQ HOSP IP/OBS HIGH 50: CPT

## 2022-07-20 RX ORDER — MAGNESIUM SULFATE 500 MG/ML
2 VIAL (ML) INJECTION ONCE
Refills: 0 | Status: COMPLETED | OUTPATIENT
Start: 2022-07-20 | End: 2022-07-20

## 2022-07-20 RX ADMIN — GABAPENTIN 300 MILLIGRAM(S): 400 CAPSULE ORAL at 18:00

## 2022-07-20 RX ADMIN — Medication 150 MILLIGRAM(S): at 23:24

## 2022-07-20 RX ADMIN — Medication 25 GRAM(S): at 23:43

## 2022-07-20 RX ADMIN — Medication 1 PATCH: at 13:07

## 2022-07-20 RX ADMIN — Medication 60 MILLIGRAM(S): at 05:42

## 2022-07-20 RX ADMIN — HEPARIN SODIUM 5000 UNIT(S): 5000 INJECTION INTRAVENOUS; SUBCUTANEOUS at 05:42

## 2022-07-20 RX ADMIN — Medication 25 MILLIGRAM(S): at 05:42

## 2022-07-20 RX ADMIN — Medication 25 MILLIGRAM(S): at 18:00

## 2022-07-20 RX ADMIN — ATORVASTATIN CALCIUM 10 MILLIGRAM(S): 80 TABLET, FILM COATED ORAL at 22:33

## 2022-07-20 RX ADMIN — GABAPENTIN 300 MILLIGRAM(S): 400 CAPSULE ORAL at 05:42

## 2022-07-20 RX ADMIN — Medication 1 PATCH: at 09:44

## 2022-07-20 RX ADMIN — OLANZAPINE 20 MILLIGRAM(S): 15 TABLET, FILM COATED ORAL at 22:33

## 2022-07-20 RX ADMIN — HEPARIN SODIUM 5000 UNIT(S): 5000 INJECTION INTRAVENOUS; SUBCUTANEOUS at 18:00

## 2022-07-20 NOTE — CONSULT NOTE ADULT - SUBJECTIVE AND OBJECTIVE BOX
ADA VILLAGOMEZ  82y, Male  Allergy: No Known Allergies      All historical available data reviewed.    HPI:  83 yo M with PMHx of HTN, HLD, COPD, DM, non-Hodgkin's lymphoma, neuropathy, and urinary retention with chronic bains presents to the ED from [facility] for evaluation of increasing generalized weakness for a while. Pt is A&Ox2, poor historian and unable to provide details about why he was brought to the ED. According to chart, pt states he has had multiple episodes of syncope during this time. Pt otherwise denies complaints. Pt denies fever, chills, nausea, vomiting, abdominal pain, diarrhea, headache, dizziness, chest pain, SOB, back pain, LOC, urinary symptoms, cough, calf pain/swelling, recent travel, recent surgery.    In the ED:  BP: 137/64, HR 80, Temp 98.3F, satting 97% on room air  Labs: WBC 11.8k, Hgb 10.6, Na 131, Cr 1.5, Trops <0.01  ECG NSR, CTH no acute findings    s/p 1L bolus of NS in the ED  Admitted for generalized weakness 2/2 COVID illness.     (19 Jul 2022 09:30)    ID called for COVID-19     FAMILY HISTORY:    PAST MEDICAL & SURGICAL HISTORY:  Schizophrenia      Diabetes type 2, controlled      COPD (chronic obstructive pulmonary disease)      Dyslipidemia      Chronic retention of urine      Dyslipidemia      Encounter for screening colonoscopy            VITALS:  T(F): 97.5, Max: 99.1 (07-20-22 @ 02:24)  HR: 92  BP: 157/70  RR: 18Vital Signs Last 24 Hrs  T(C): 36.4 (20 Jul 2022 04:57), Max: 37.3 (20 Jul 2022 02:24)  T(F): 97.5 (20 Jul 2022 04:57), Max: 99.1 (20 Jul 2022 02:24)  HR: 92 (20 Jul 2022 04:57) (76 - 98)  BP: 157/70 (20 Jul 2022 04:57) (140/62 - 185/82)  BP(mean): --  RR: 18 (20 Jul 2022 04:57) (18 - 20)  SpO2: 97% (20 Jul 2022 02:24) (97% - 97%)    Parameters below as of 20 Jul 2022 00:21  Patient On (Oxygen Delivery Method): room air        TESTS & MEASUREMENTS:                        10.9   8.73  )-----------( 191      ( 19 Jul 2022 11:40 )             30.3     07-19    131<L>  |  95<L>  |  18  ----------------------------<  212<H>  4.3   |  22  |  1.1    Ca    9.0      19 Jul 2022 11:40  Mg     1.7     07-19    TPro  6.7  /  Alb  4.0  /  TBili  0.4  /  DBili  x   /  AST  15  /  ALT  7   /  AlkPhos  82  07-19    LIVER FUNCTIONS - ( 19 Jul 2022 11:40 )  Alb: 4.0 g/dL / Pro: 6.7 g/dL / ALK PHOS: 82 U/L / ALT: 7 U/L / AST: 15 U/L / GGT: x             Culture - Blood (collected 07-18-22 @ 22:08)  Source: .Blood Blood  Preliminary Report (07-20-22 @ 02:02):    No growth to date.    Culture - Blood (collected 07-18-22 @ 22:08)  Source: .Blood Blood  Preliminary Report (07-20-22 @ 02:02):    No growth to date.            RADIOLOGY & ADDITIONAL TESTS:  Personal review of radiological diagnostics performed  Echo and EKG results noted when applicable.     MEDICATIONS:  acetaminophen     Tablet .. 650 milliGRAM(s) Oral every 6 hours PRN  ALBUTerol    90 MICROgram(s) HFA Inhaler 2 Puff(s) Inhalation every 6 hours PRN  albuterol/ipratropium for Nebulization 3 milliLiter(s) Nebulizer every 6 hours PRN  aluminum hydroxide/magnesium hydroxide/simethicone Suspension 30 milliLiter(s) Oral every 4 hours PRN  atorvastatin 10 milliGRAM(s) Oral at bedtime  gabapentin 300 milliGRAM(s) Oral two times a day  heparin   Injectable 5000 Unit(s) SubCutaneous every 12 hours  melatonin 3 milliGRAM(s) Oral at bedtime PRN  metoprolol tartrate 25 milliGRAM(s) Oral two times a day  nicotine -  14 mG/24Hr(s) Patch 1 Patch Transdermal daily  NIFEdipine XL 60 milliGRAM(s) Oral daily  OLANZapine 20 milliGRAM(s) Oral at bedtime  ondansetron Injectable 4 milliGRAM(s) IV Push every 8 hours PRN  sodium chloride 0.9%. 500 milliLiter(s) IV Continuous <Continuous>  traZODone 150 milliGRAM(s) Oral at bedtime      ANTIBIOTICS:

## 2022-07-20 NOTE — PROGRESS NOTE ADULT - SUBJECTIVE AND OBJECTIVE BOX
INTERVAL HPI/OVERNIGHT EVENTS:    SUBJECTIVE: Patient seen and examined at bedside.     no cp, sob, abd pain, fever  no ha, dizziness, lightheadedness, syncope    OBJECTIVE:    VITAL SIGNS:  Vital Signs Last 24 Hrs  T(C): 35.9 (20 Jul 2022 12:47), Max: 37.3 (20 Jul 2022 02:24)  T(F): 96.7 (20 Jul 2022 12:47), Max: 99.1 (20 Jul 2022 02:24)  HR: 86 (20 Jul 2022 12:47) (76 - 98)  BP: 121/60 (20 Jul 2022 12:47) (121/60 - 161/68)  BP(mean): --  RR: 18 (20 Jul 2022 12:47) (18 - 18)  SpO2: 97% (20 Jul 2022 02:24) (97% - 97%)    Parameters below as of 20 Jul 2022 00:21  Patient On (Oxygen Delivery Method): room air          PHYSICAL EXAM:    General: NAD  HEENT: NC/AT; PERRL, clear conjunctiva  Neck: supple  Respiratory: CTA b/l  Cardiovascular: +S1/S2; RRR  Abdomen: soft, NT/ND; +BS x4  Extremities: WWP, 2+ peripheral pulses b/l; no LE edema  Skin: normal color and turgor; no rash  Neurological:    MEDICATIONS:  MEDICATIONS  (STANDING):  atorvastatin 10 milliGRAM(s) Oral at bedtime  gabapentin 300 milliGRAM(s) Oral two times a day  heparin   Injectable 5000 Unit(s) SubCutaneous every 12 hours  magnesium sulfate  IVPB 2 Gram(s) IV Intermittent once  metoprolol tartrate 25 milliGRAM(s) Oral two times a day  nicotine -  14 mG/24Hr(s) Patch 1 Patch Transdermal daily  NIFEdipine XL 60 milliGRAM(s) Oral daily  OLANZapine 20 milliGRAM(s) Oral at bedtime  traZODone 150 milliGRAM(s) Oral at bedtime    MEDICATIONS  (PRN):  acetaminophen     Tablet .. 650 milliGRAM(s) Oral every 6 hours PRN Temp greater or equal to 38C (100.4F), Mild Pain (1 - 3)  ALBUTerol    90 MICROgram(s) HFA Inhaler 2 Puff(s) Inhalation every 6 hours PRN Shortness of Breath and/or Wheezing  albuterol/ipratropium for Nebulization 3 milliLiter(s) Nebulizer every 6 hours PRN Shortness of Breath and/or Wheezing  aluminum hydroxide/magnesium hydroxide/simethicone Suspension 30 milliLiter(s) Oral every 4 hours PRN Dyspepsia  melatonin 3 milliGRAM(s) Oral at bedtime PRN Insomnia  ondansetron Injectable 4 milliGRAM(s) IV Push every 8 hours PRN Nausea and/or Vomiting      ALLERGIES:  Allergies    No Known Allergies    Intolerances        LABS:                        11.2   8.06  )-----------( 155      ( 20 Jul 2022 04:30 )             32.2     Hemoglobin: 11.2 g/dL (07-20 @ 04:30)  Hemoglobin: 10.9 g/dL (07-19 @ 11:40)  Hemoglobin: 10.6 g/dL (07-18 @ 22:46)    CBC Full  -  ( 20 Jul 2022 04:30 )  WBC Count : 8.06 K/uL  RBC Count : 3.46 M/uL  Hemoglobin : 11.2 g/dL  Hematocrit : 32.2 %  Platelet Count - Automated : 155 K/uL  Mean Cell Volume : 93.1 fL  Mean Cell Hemoglobin : 32.4 pg  Mean Cell Hemoglobin Concentration : 34.8 g/dL  Auto Neutrophil # : 5.52 K/uL  Auto Lymphocyte # : 1.29 K/uL  Auto Monocyte # : 1.19 K/uL  Auto Eosinophil # : 0.01 K/uL  Auto Basophil # : 0.02 K/uL  Auto Neutrophil % : 68.5 %  Auto Lymphocyte % : 16.0 %  Auto Monocyte % : 14.8 %  Auto Eosinophil % : 0.1 %  Auto Basophil % : 0.2 %    07-20    130<L>  |  94<L>  |  15  ----------------------------<  111<H>  4.2   |  19  |  0.9    Ca    8.8      20 Jul 2022 04:30  Mg     1.7     07-20    TPro  6.8  /  Alb  3.7  /  TBili  0.3  /  DBili  x   /  AST  22  /  ALT  9   /  AlkPhos  86  07-20    Creatinine Trend: 0.9<--, 1.1<--, 1.5<--  LIVER FUNCTIONS - ( 20 Jul 2022 04:30 )  Alb: 3.7 g/dL / Pro: 6.8 g/dL / ALK PHOS: 86 U/L / ALT: 9 U/L / AST: 22 U/L / GGT: x               hs Troponin:              CSF:                      EKG:   MICROBIOLOGY:    Culture - Blood (collected 18 Jul 2022 22:08)  Source: .Blood Blood  Preliminary Report (20 Jul 2022 02:02):    No growth to date.    Culture - Blood (collected 18 Jul 2022 22:08)  Source: .Blood Blood  Preliminary Report (20 Jul 2022 02:02):    No growth to date.      IMAGING:      Labs, imaging, EKG personally reviewed    RADIOLOGY & ADDITIONAL TESTS: Reviewed.

## 2022-07-20 NOTE — PROGRESS NOTE ADULT - TIME BILLING
82M PMHx HTN, COPD, DM2, non-Hogkins lymphoma, urinary retention with chronic bains here with generalized weakness, syncope found to have covid.    #Syncope, generalized weakness  in setting of covid  cth neg  cxr clear; no hypoxia  check orthostatics  tte  va duplex  monitor off steroids  appreciate id  PT  #Hyponatremia  s/p ivf  mild, trend  #HTN  lopressor 25 bid  nifedipine 60  #COPD  duoneb q6 prn  #DM2  controlled off medications  #non-Hogkins lymphoma  outpt f/u  #DVT ppx  subq hep    #Progress Note Handoff:  Pending (specify):  Consults_________, Tests________, Test Results_______, Other___tte______  Family discussion: d/w pt at bedside re: treatment plan, primary dx  Disposition: Home___/SNF___/Other________/Unknown at this time___x_____ 82M PMHx HTN, COPD, DM2, non-Hogkins lymphoma, schizophrenia, urinary retention with chronic bains here with generalized weakness, syncope found to have covid.    #Syncope, generalized weakness  in setting of covid  cth neg  cxr clear; no hypoxia  check orthostatics  tte  va duplex  monitor off steroids  appreciate id  PT  #Hyponatremia  s/p ivf  mild, trend  #HTN  lopressor 25 bid  nifedipine 60  #COPD  duoneb q6 prn  #DM2  controlled off medications  #non-Hogkins lymphoma  outpt f/u  #Schizophrenia  zyprexa 20  trazodone 150  #DVT ppx  subq hep    #Progress Note Handoff:  Pending (specify):  Consults_________, Tests________, Test Results_______, Other___tte______  Family discussion: d/w pt at bedside re: treatment plan, primary dx  Disposition: Home___/SNF___/Other________/Unknown at this time___x_____

## 2022-07-20 NOTE — CONSULT NOTE ADULT - ASSESSMENT
81 yo M with PMHx of HTN, HLD, COPD, DM, non-Hodgkin's lymphoma, neuropathy, and urinary retention with chronic bains presents to the ED from [facility] for evaluation of increasing generalized weakness for a while. Pt is A&Ox2, poor historian and unable to provide details about why he was brought to the ED. According to chart, pt states he has had multiple episodes of syncope during this time. Pt otherwise denies complaints. Pt denies fever, chills, nausea, vomiting, abdominal pain, diarrhea, headache, dizziness, chest pain, SOB, back pain, LOC, urinary symptoms, cough, calf pain/swelling, recent travel, recent surgery.  In the ED:  BP: 137/64, HR 80, Temp 98.3F, satting 97% on room air    IMPRESSION;   COVID with a positive test and asymptomatic  S/c vaccination  On RA  CXR no GGO  Timeline of infection is unclear based on the clinical history   No bacterial PNA  WBC 8.7    Will not recommend RDV/bebtelovimab/steroids for now as clinical stable  Off loading to prevent pressure sores and preventive measures to avoid aspiration

## 2022-07-20 NOTE — PATIENT PROFILE ADULT - FALL HARM RISK - HARM RISK INTERVENTIONS
Assistance with ambulation/Assistance OOB with selected safe patient handling equipment/Communicate Risk of Fall with Harm to all staff/Discuss with provider need for PT consult/Monitor gait and stability/Provide patient with walking aids - walker, cane, crutches/Reinforce activity limits and safety measures with patient and family/Tailored Fall Risk Interventions/Use of alarms - bed, chair and/or voice tab/Visual Cue: Yellow wristband and red socks/Bed in lowest position, wheels locked, appropriate side rails in place/Call bell, personal items and telephone in reach/Instruct patient to call for assistance before getting out of bed or chair/Non-slip footwear when patient is out of bed/[place] to call system/Physically safe environment - no spills, clutter or unnecessary equipment/Purposeful Proactive Rounding/Room/bathroom lighting operational, light cord in reach

## 2022-07-21 LAB
GLUCOSE BLDC GLUCOMTR-MCNC: 110 MG/DL — HIGH (ref 70–99)
GLUCOSE BLDC GLUCOMTR-MCNC: 111 MG/DL — HIGH (ref 70–99)
GLUCOSE BLDC GLUCOMTR-MCNC: 117 MG/DL — HIGH (ref 70–99)
GLUCOSE BLDC GLUCOMTR-MCNC: 133 MG/DL — HIGH (ref 70–99)

## 2022-07-21 PROCEDURE — 99233 SBSQ HOSP IP/OBS HIGH 50: CPT

## 2022-07-21 RX ADMIN — HEPARIN SODIUM 5000 UNIT(S): 5000 INJECTION INTRAVENOUS; SUBCUTANEOUS at 17:14

## 2022-07-21 RX ADMIN — Medication 60 MILLIGRAM(S): at 05:23

## 2022-07-21 RX ADMIN — OLANZAPINE 20 MILLIGRAM(S): 15 TABLET, FILM COATED ORAL at 22:41

## 2022-07-21 RX ADMIN — Medication 1 PATCH: at 07:19

## 2022-07-21 RX ADMIN — ATORVASTATIN CALCIUM 10 MILLIGRAM(S): 80 TABLET, FILM COATED ORAL at 22:41

## 2022-07-21 RX ADMIN — GABAPENTIN 300 MILLIGRAM(S): 400 CAPSULE ORAL at 17:14

## 2022-07-21 RX ADMIN — Medication 1 PATCH: at 12:26

## 2022-07-21 RX ADMIN — Medication 150 MILLIGRAM(S): at 22:41

## 2022-07-21 RX ADMIN — Medication 25 MILLIGRAM(S): at 17:14

## 2022-07-21 RX ADMIN — Medication 25 MILLIGRAM(S): at 05:23

## 2022-07-21 RX ADMIN — GABAPENTIN 300 MILLIGRAM(S): 400 CAPSULE ORAL at 05:23

## 2022-07-21 RX ADMIN — HEPARIN SODIUM 5000 UNIT(S): 5000 INJECTION INTRAVENOUS; SUBCUTANEOUS at 05:22

## 2022-07-21 NOTE — PROGRESS NOTE ADULT - SUBJECTIVE AND OBJECTIVE BOX
ADA VILLAGOMEZ 82y Male  MRN#: [id number]   CODE STATUS:__Full______    Hospital Day: 2d    Patient is currently admitted with the primary diagnosis of generalized weakness.       SUBJECTIVE:    Patient seen and examined at bedside this morning.    OBJECTIVE:  PAST MEDICAL & SURGICAL HISTORY:  Schizophrenia    Diabetes type 2, controlled    COPD (chronic obstructive pulmonary disease)    Dyslipidemia    Chronic retention of urine    Dyslipidemia    Encounter for screening colonoscopy                 ALLERGIES:  No Known Allergies                                     HOME MEDICATIONS:  Home Medications:  atorvastatin 10 mg oral tablet: 1 tab(s) orally once a day (13 Oct 2020 05:27)  Centrum oral tablet: 1 tab(s) orally once a day (19 Jul 2022 10:28)  gabapentin 300 mg oral capsule: 1 cap(s) orally 2 times a day (13 Oct 2020 05:27)  metFORMIN 500 mg oral tablet: 1 tab(s) orally 2 times a day (13 Oct 2020 05:27)  metoprolol tartrate 25 mg oral tablet: 1 tab(s) orally 2 times a day (13 Oct 2020 05:27)  OLANZapine 20 mg oral tablet: 1 tab(s) orally once a day (at bedtime) (13 Oct 2020 05:27)  traZODone 150 mg oral tablet: 1 tab(s) orally once a day (at bedtime) (13 Oct 2020 05:27)  Vitamin D2 1.25 mg (50,000 intl units) oral capsule: 1 cap(s) orally once a week (19 Jul 2022 10:28)                           MEDICATIONS:  STANDING MEDICATIONS  atorvastatin 10 milliGRAM(s) Oral at bedtime  gabapentin 300 milliGRAM(s) Oral two times a day  heparin   Injectable 5000 Unit(s) SubCutaneous every 12 hours  metoprolol tartrate 25 milliGRAM(s) Oral two times a day  nicotine -  14 mG/24Hr(s) Patch 1 Patch Transdermal daily  NIFEdipine XL 60 milliGRAM(s) Oral daily  OLANZapine 20 milliGRAM(s) Oral at bedtime  traZODone 150 milliGRAM(s) Oral at bedtime    PRN MEDICATIONS  acetaminophen     Tablet .. 650 milliGRAM(s) Oral every 6 hours PRN  ALBUTerol    90 MICROgram(s) HFA Inhaler 2 Puff(s) Inhalation every 6 hours PRN  albuterol/ipratropium for Nebulization 3 milliLiter(s) Nebulizer every 6 hours PRN  aluminum hydroxide/magnesium hydroxide/simethicone Suspension 30 milliLiter(s) Oral every 4 hours PRN  melatonin 3 milliGRAM(s) Oral at bedtime PRN  ondansetron Injectable 4 milliGRAM(s) IV Push every 8 hours PRN      VITAL SIGNS: Last 24 Hours  T(C): 36.8 (21 Jul 2022 05:30), Max: 36.8 (20 Jul 2022 21:25)  T(F): 98.2 (21 Jul 2022 05:30), Max: 98.3 (20 Jul 2022 21:25)  HR: 95 (21 Jul 2022 05:30) (86 - 95)  BP: 142/63 (21 Jul 2022 05:30) (121/60 - 148/67)  BP(mean): --  RR: 18 (20 Jul 2022 21:25) (18 - 18)  SpO2: --      07-20-22 @ 07:01  -  07-21-22 @ 07:00  --------------------------------------------------------  IN: 660 mL / OUT: 100 mL / NET: 560 mL                                   LABS:                        11.2   8.06  )-----------( 155      ( 20 Jul 2022 04:30 )             32.2     07-20    130<L>  |  94<L>  |  15  ----------------------------<  111<H>  4.2   |  19  |  0.9    Ca    8.8      20 Jul 2022 04:30  Mg     1.7     07-20    TPro  6.8  /  Alb  3.7  /  TBili  0.3  /  DBili  x   /  AST  22  /  ALT  9   /  AlkPhos  86  07-20        Culture - Blood (collected 18 Jul 2022 22:08)  Source: .Blood Blood  Preliminary Report (20 Jul 2022 02:02):    No growth to date.    Culture - Blood (collected 18 Jul 2022 22:08)  Source: .Blood Blood  Preliminary Report (20 Jul 2022 02:02):    No growth to date.        CARDIAC MARKERS ( 19 Jul 2022 16:58 )  x     / <0.01 ng/mL / x     / x     / x      CARDIAC MARKERS ( 19 Jul 2022 11:40 )  x     / <0.01 ng/mL / x     / x     / x                                                    RADIOLOGY:  < from: CT Head No Cont (07.18.22 @ 23:22) >  IMPRESSION:      No acute intracranial hemorrhage.    No significant change since the prior exam.        --- End of Report ---      < end of copied text >        PHYSICAL EXAM:  General: Resting comfortably in no acute painful distress  HEENT: Normocephalic, atraumatic  LUNGS: Clear to auscultation bilaterally, no wheezes, rales, rhonchi  HEART: S1S2 present, regular rate and rhythm, no murmurs, rubs, gallops  ABDOMEN: Soft, nontender, nondistended  EXT: No edema

## 2022-07-21 NOTE — PROGRESS NOTE ADULT - ATTENDING COMMENTS
Patient is an 83 y/o Male with  PMHx HTN, COPD, DM2, non-Hogkins lymphoma, schizophrenia, urinary retention with chronic bains here with generalized weakness, syncope found to have covid.    #Syncope, generalized weakness  in setting of covid viral infection- continue to maintain isolation  cth neg  cxr clear; no hypoxia  monitor orthostatics  - f/up tte/ f/up va duplex  -continue clinical observation  - d/c telemetry     #Hyponatremia- stable low levels, will need outpatient work up   - BMP in  am      #HTN- lopressor 25 bid, nifedipine 60    #h/o COPD- stable, continue duoneb q6 prn    #DM2- controlled off medications    #non-Hogkins lymphoma- outpt f/u    #Schizophrenia- zyprexa 20, trazodone 150    #DVT ppx- subq hep    #Progress Note Handoff: d/c telemetry , f/up venous doppler, 2d echo , d/c planning   Family discussion: yes, medical team Disposition: ? STR once quarantine is completed    Total time spent to complete patient's bedside assessment, review medical chart, discuss medical plan of care with covering medical team was more than 35 minutes

## 2022-07-21 NOTE — PROGRESS NOTE ADULT - ASSESSMENT
ASSESSMENT    83 yo M with PMHx of HTN, HLD, COPD, DM, non-Hodgkin's lymphoma, neuropathy, and urinary retention with chronic bains presents to the ED from [facility] for evaluation of increasing generalized weakness for a while.     PLAN    # Generalized weakness 2/2 COVID illness  # Syncope workup  - in setting of covid  - CTH neg  - CXR clear; no hypoxia  - f/u orthostatics  - f/u tte  - f/u va duplex  - monitor off steroids  - PT    #DAVID  #Hyponatremia   - s/p IVF  - DAVID resolved   - monitor BMP  - avoid nephrotoxic agents    #COPD  - active smoker, not on home O2  - counselled on smoking cessation  - c/w nicotine patch  - c/w duonebs q6 PRN    #DM  - controlled off medications     #HTN  - takes nifedipine 60mg daily, lisinopril 5mg daily, and metoprolol tartrate 25mg BID  - c/w nifedipine and metoprolol, hold lisinopril     #Non-Hogkins lymphoma  - outpt f/u    #Schizophrenia  - zyprexa 20  - trazodone 150    # HLD   - c/w atorvastatin    #Dementia/ Insomnia   - c/w zyprexa and trazodone    Misc:   DVT ppx: heparin subQ  GI ppx: none  Diet: DASh/TLC  Activity: AAT  Code: Full Code  Dispo: Acute

## 2022-07-22 LAB
ALBUMIN SERPL ELPH-MCNC: 3.7 G/DL — SIGNIFICANT CHANGE UP (ref 3.5–5.2)
ALP SERPL-CCNC: 85 U/L — SIGNIFICANT CHANGE UP (ref 30–115)
ALT FLD-CCNC: 17 U/L — SIGNIFICANT CHANGE UP (ref 0–41)
ANION GAP SERPL CALC-SCNC: 11 MMOL/L — SIGNIFICANT CHANGE UP (ref 7–14)
AST SERPL-CCNC: 20 U/L — SIGNIFICANT CHANGE UP (ref 0–41)
BASOPHILS # BLD AUTO: 0.01 K/UL — SIGNIFICANT CHANGE UP (ref 0–0.2)
BASOPHILS NFR BLD AUTO: 0.1 % — SIGNIFICANT CHANGE UP (ref 0–1)
BILIRUB SERPL-MCNC: <0.2 MG/DL — SIGNIFICANT CHANGE UP (ref 0.2–1.2)
BUN SERPL-MCNC: 20 MG/DL — SIGNIFICANT CHANGE UP (ref 10–20)
CALCIUM SERPL-MCNC: 8.9 MG/DL — SIGNIFICANT CHANGE UP (ref 8.5–10.1)
CHLORIDE SERPL-SCNC: 101 MMOL/L — SIGNIFICANT CHANGE UP (ref 98–110)
CO2 SERPL-SCNC: 23 MMOL/L — SIGNIFICANT CHANGE UP (ref 17–32)
CREAT SERPL-MCNC: 0.9 MG/DL — SIGNIFICANT CHANGE UP (ref 0.7–1.5)
EGFR: 86 ML/MIN/1.73M2 — SIGNIFICANT CHANGE UP
EOSINOPHIL # BLD AUTO: 0.01 K/UL — SIGNIFICANT CHANGE UP (ref 0–0.7)
EOSINOPHIL NFR BLD AUTO: 0.1 % — SIGNIFICANT CHANGE UP (ref 0–8)
FOLATE SERPL-MCNC: >20 NG/ML — SIGNIFICANT CHANGE UP
GLUCOSE BLDC GLUCOMTR-MCNC: 112 MG/DL — HIGH (ref 70–99)
GLUCOSE BLDC GLUCOMTR-MCNC: 117 MG/DL — HIGH (ref 70–99)
GLUCOSE BLDC GLUCOMTR-MCNC: 133 MG/DL — HIGH (ref 70–99)
GLUCOSE SERPL-MCNC: 119 MG/DL — HIGH (ref 70–99)
HCT VFR BLD CALC: 28.9 % — LOW (ref 42–52)
HGB BLD-MCNC: 10.1 G/DL — LOW (ref 14–18)
IMM GRANULOCYTES NFR BLD AUTO: 0.4 % — HIGH (ref 0.1–0.3)
LYMPHOCYTES # BLD AUTO: 1.65 K/UL — SIGNIFICANT CHANGE UP (ref 1.2–3.4)
LYMPHOCYTES # BLD AUTO: 24.2 % — SIGNIFICANT CHANGE UP (ref 20.5–51.1)
MAGNESIUM SERPL-MCNC: 1.9 MG/DL — SIGNIFICANT CHANGE UP (ref 1.8–2.4)
MCHC RBC-ENTMCNC: 32.3 PG — HIGH (ref 27–31)
MCHC RBC-ENTMCNC: 34.9 G/DL — SIGNIFICANT CHANGE UP (ref 32–37)
MCV RBC AUTO: 92.3 FL — SIGNIFICANT CHANGE UP (ref 80–94)
MONOCYTES # BLD AUTO: 0.81 K/UL — HIGH (ref 0.1–0.6)
MONOCYTES NFR BLD AUTO: 11.9 % — HIGH (ref 1.7–9.3)
NEUTROPHILS # BLD AUTO: 4.3 K/UL — SIGNIFICANT CHANGE UP (ref 1.4–6.5)
NEUTROPHILS NFR BLD AUTO: 63.3 % — SIGNIFICANT CHANGE UP (ref 42.2–75.2)
NRBC # BLD: 0 /100 WBCS — SIGNIFICANT CHANGE UP (ref 0–0)
PLATELET # BLD AUTO: 208 K/UL — SIGNIFICANT CHANGE UP (ref 130–400)
POTASSIUM SERPL-MCNC: 4.4 MMOL/L — SIGNIFICANT CHANGE UP (ref 3.5–5)
POTASSIUM SERPL-SCNC: 4.4 MMOL/L — SIGNIFICANT CHANGE UP (ref 3.5–5)
PROT SERPL-MCNC: 6.2 G/DL — SIGNIFICANT CHANGE UP (ref 6–8)
RBC # BLD: 3.13 M/UL — LOW (ref 4.7–6.1)
RBC # FLD: 14.2 % — SIGNIFICANT CHANGE UP (ref 11.5–14.5)
SODIUM SERPL-SCNC: 135 MMOL/L — SIGNIFICANT CHANGE UP (ref 135–146)
VIT B12 SERPL-MCNC: 334 PG/ML — SIGNIFICANT CHANGE UP (ref 232–1245)
WBC # BLD: 6.81 K/UL — SIGNIFICANT CHANGE UP (ref 4.8–10.8)
WBC # FLD AUTO: 6.81 K/UL — SIGNIFICANT CHANGE UP (ref 4.8–10.8)

## 2022-07-22 PROCEDURE — 93970 EXTREMITY STUDY: CPT | Mod: 26

## 2022-07-22 PROCEDURE — 99233 SBSQ HOSP IP/OBS HIGH 50: CPT

## 2022-07-22 RX ADMIN — GABAPENTIN 300 MILLIGRAM(S): 400 CAPSULE ORAL at 17:25

## 2022-07-22 RX ADMIN — GABAPENTIN 300 MILLIGRAM(S): 400 CAPSULE ORAL at 06:02

## 2022-07-22 RX ADMIN — Medication 150 MILLIGRAM(S): at 21:56

## 2022-07-22 RX ADMIN — Medication 25 MILLIGRAM(S): at 06:02

## 2022-07-22 RX ADMIN — Medication 1 PATCH: at 11:39

## 2022-07-22 RX ADMIN — Medication 25 MILLIGRAM(S): at 17:25

## 2022-07-22 RX ADMIN — ATORVASTATIN CALCIUM 10 MILLIGRAM(S): 80 TABLET, FILM COATED ORAL at 21:56

## 2022-07-22 RX ADMIN — HEPARIN SODIUM 5000 UNIT(S): 5000 INJECTION INTRAVENOUS; SUBCUTANEOUS at 17:25

## 2022-07-22 RX ADMIN — OLANZAPINE 20 MILLIGRAM(S): 15 TABLET, FILM COATED ORAL at 21:55

## 2022-07-22 RX ADMIN — HEPARIN SODIUM 5000 UNIT(S): 5000 INJECTION INTRAVENOUS; SUBCUTANEOUS at 06:01

## 2022-07-22 RX ADMIN — Medication 60 MILLIGRAM(S): at 06:02

## 2022-07-22 RX ADMIN — Medication 1 PATCH: at 18:42

## 2022-07-22 NOTE — PROGRESS NOTE ADULT - ATTENDING COMMENTS
Patient is an 83 y/o Male with  PMHx HTN, COPD, DM2, non-Hogkins lymphoma, schizophrenia, urinary retention with chronic bains here with generalized weakness, syncope found to have covid.    #Syncope, generalized weakness  in setting of covid viral infection- continue to maintain isolation  cth neg  cxr clear; no hypoxia  monitor orthostatics  - f/up tte/ s/p va duplex- neg. for dvt  -continue clinical observation  - d/c telemetry     #Hyponatremia- stable low levels, will need outpatient work up   - BMP in  am      #HTN- lopressor 25 bid, nifedipine 60    #h/o COPD- stable, continue duoneb q6 prn    #DM2- controlled off medications    #non-Hogkins lymphoma- outpt f/u    #Schizophrenia- zyprexa 20, trazodone 150    #DVT ppx- subq hep    #Progress Note Handoff: d/c telemetry , f/up  2d echo , d/c planning once quarantine is completed  Family discussion: yes, medical team Disposition: [facility] once quarantine is completed    Total time spent to complete patient's bedside assessment, review medical chart, discuss medical plan of care with covering medical team was more than 35 minutes .

## 2022-07-22 NOTE — PROGRESS NOTE ADULT - SUBJECTIVE AND OBJECTIVE BOX
ADA VILLAGOMEZ 82y Male  MRN#: [id number]   CODE STATUS: Full code    Hospital Day: 3d    Pt is currently admitted with the primary diagnosis of COVID 19 and fall    SUBJECTIVE    Subjective complaints   patient is doing well, has a cough  Present Today:   - Bains:  No [  x], Yes [   ] : Indication:     - Type of IV Access:       .. CVC/Piccline:  No [ x ], Yes [   ] : Indication:       .. Midline: No [  x], Yes [   ] : Indication:                                             ----------------------------------------------------------  OBJECTIVE  PAST MEDICAL & SURGICAL HISTORY  Schizophrenia    Diabetes type 2, controlled    COPD (chronic obstructive pulmonary disease)    Dyslipidemia    Chronic retention of urine    Dyslipidemia    Encounter for screening colonoscopy                                              -----------------------------------------------------------  ALLERGIES:  No Known Allergies                                            ------------------------------------------------------------    HOME MEDICATIONS  Home Medications:  atorvastatin 10 mg oral tablet: 1 tab(s) orally once a day (13 Oct 2020 05:27)  Centrum oral tablet: 1 tab(s) orally once a day (19 Jul 2022 10:28)  gabapentin 300 mg oral capsule: 1 cap(s) orally 2 times a day (13 Oct 2020 05:27)  metFORMIN 500 mg oral tablet: 1 tab(s) orally 2 times a day (13 Oct 2020 05:27)  metoprolol tartrate 25 mg oral tablet: 1 tab(s) orally 2 times a day (13 Oct 2020 05:27)  OLANZapine 20 mg oral tablet: 1 tab(s) orally once a day (at bedtime) (13 Oct 2020 05:27)  traZODone 150 mg oral tablet: 1 tab(s) orally once a day (at bedtime) (13 Oct 2020 05:27)  Vitamin D2 1.25 mg (50,000 intl units) oral capsule: 1 cap(s) orally once a week (19 Jul 2022 10:28)                           MEDICATIONS:  STANDING MEDICATIONS  atorvastatin 10 milliGRAM(s) Oral at bedtime  gabapentin 300 milliGRAM(s) Oral two times a day  heparin   Injectable 5000 Unit(s) SubCutaneous every 12 hours  metoprolol tartrate 25 milliGRAM(s) Oral two times a day  nicotine -  14 mG/24Hr(s) Patch 1 Patch Transdermal daily  NIFEdipine XL 60 milliGRAM(s) Oral daily  OLANZapine 20 milliGRAM(s) Oral at bedtime  traZODone 150 milliGRAM(s) Oral at bedtime    PRN MEDICATIONS  acetaminophen     Tablet .. 650 milliGRAM(s) Oral every 6 hours PRN  ALBUTerol    90 MICROgram(s) HFA Inhaler 2 Puff(s) Inhalation every 6 hours PRN  albuterol/ipratropium for Nebulization 3 milliLiter(s) Nebulizer every 6 hours PRN  aluminum hydroxide/magnesium hydroxide/simethicone Suspension 30 milliLiter(s) Oral every 4 hours PRN  melatonin 3 milliGRAM(s) Oral at bedtime PRN  ondansetron Injectable 4 milliGRAM(s) IV Push every 8 hours PRN                                            ------------------------------------------------------------  VITAL SIGNS: Last 24 Hours  T(C): 36.9 (22 Jul 2022 05:00), Max: 36.9 (22 Jul 2022 05:00)  T(F): 98.5 (22 Jul 2022 05:00), Max: 98.5 (22 Jul 2022 05:00)  HR: 90 (22 Jul 2022 05:00) (82 - 90)  BP: 167/74 (22 Jul 2022 05:00) (140/69 - 167/74)  BP(mean): --  RR: 18 (22 Jul 2022 05:00) (18 - 18)  SpO2: --      07-21-22 @ 07:01  -  07-22-22 @ 07:00  --------------------------------------------------------  IN: 650 mL / OUT: 0 mL / NET: 650 mL    07-22-22 @ 07:01  -  07-22-22 @ 16:23  --------------------------------------------------------  IN: 660 mL / OUT: 1 mL / NET: 659 mL                                             --------------------------------------------------------------  LABS:                        10.1   6.81  )-----------( 208 ( 22 Jul 2022 11:54 )             28.9     07-22    135  |  101  |  20  ----------------------------<  119<H>  4.4   |  23  |  0.9    Ca    8.9      22 Jul 2022 11:54  Mg     1.9     07-22    TPro  6.2  /  Alb  3.7  /  TBili  <0.2  /  DBili  x   /  AST  20  /  ALT  17  /  AlkPhos  85  07-22                                                              -------------------------------------------------------------  RADIOLOGY:                                            --------------------------------------------------------------    PHYSICAL EXAM:  General: alert oriented  HEENT: non remarkable  LUNGS: ronchi  HEART: normal s1 s2  ABDOMEN: soft  EXT:no edema                                             --------------------------------------------------------------    ASSESSMENT & PLAN  83 yo M with PMHx of HTN, HLD, COPD, DM, non-Hodgkin's lymphoma, neuropathy, and urinary retention with chronic bains presents to the ED from [facility] for evaluation of increasing generalized weakness for a while.     PLAN    # Generalized weakness 2/2 COVID illness  # Syncope workup  - in setting of covid  - CTH neg  - CXR clear; no hypoxia  - f/u orthostatics  - Echo still pending (got approval by Dr. Tran)  - Duplex negative  - monitor off steroids  - PT    #DAVID  #Hyponatremia   - s/p IVF  - DAVID resolved   - monitor BMP  - avoid nephrotoxic agents    #COPD  - active smoker, not on home O2  - counselled on smoking cessation  - c/w nicotine patch  - c/w duonebs q6 PRN    #DM  - controlled off medications     #HTN  - takes nifedipine 60mg daily, lisinopril 5mg daily, and metoprolol tartrate 25mg BID  - c/w nifedipine and metoprolol, hold lisinopril     #Non-Hogkins lymphoma  - outpt f/u    #Schizophrenia  - zyprexa 20  - trazodone 150    # HLD   - c/w atorvastatin    #Dementia/ Insomnia   - c/w zyprexa and trazodone    Misc:   DVT ppx: heparin subQ  GI ppx: none  Diet: DASh/TLC  Activity: AAT  Code: Full Code  Dispo: Will need quarantine until Monday

## 2022-07-22 NOTE — PRE-ANESTHESIA EVALUATION ADULT - BSA (M2)
[FreeTextEntry1] : PET/CT April 2022: No abnormal FDG avidity.  Resolution of minimal linear bilateral sacral FDG avidity associated with insufficiency fractures.
1.97

## 2022-07-23 LAB
ALBUMIN SERPL ELPH-MCNC: 3.9 G/DL — SIGNIFICANT CHANGE UP (ref 3.5–5.2)
ALP SERPL-CCNC: 99 U/L — SIGNIFICANT CHANGE UP (ref 30–115)
ALT FLD-CCNC: 16 U/L — SIGNIFICANT CHANGE UP (ref 0–41)
ANION GAP SERPL CALC-SCNC: 12 MMOL/L — SIGNIFICANT CHANGE UP (ref 7–14)
AST SERPL-CCNC: 18 U/L — SIGNIFICANT CHANGE UP (ref 0–41)
BASOPHILS # BLD AUTO: 0.02 K/UL — SIGNIFICANT CHANGE UP (ref 0–0.2)
BASOPHILS NFR BLD AUTO: 0.3 % — SIGNIFICANT CHANGE UP (ref 0–1)
BILIRUB SERPL-MCNC: 0.2 MG/DL — SIGNIFICANT CHANGE UP (ref 0.2–1.2)
BUN SERPL-MCNC: 15 MG/DL — SIGNIFICANT CHANGE UP (ref 10–20)
CALCIUM SERPL-MCNC: 9.2 MG/DL — SIGNIFICANT CHANGE UP (ref 8.5–10.1)
CHLORIDE SERPL-SCNC: 94 MMOL/L — LOW (ref 98–110)
CO2 SERPL-SCNC: 27 MMOL/L — SIGNIFICANT CHANGE UP (ref 17–32)
CREAT SERPL-MCNC: 0.9 MG/DL — SIGNIFICANT CHANGE UP (ref 0.7–1.5)
EGFR: 86 ML/MIN/1.73M2 — SIGNIFICANT CHANGE UP
EOSINOPHIL # BLD AUTO: 0.01 K/UL — SIGNIFICANT CHANGE UP (ref 0–0.7)
EOSINOPHIL NFR BLD AUTO: 0.1 % — SIGNIFICANT CHANGE UP (ref 0–8)
GLUCOSE BLDC GLUCOMTR-MCNC: 100 MG/DL — HIGH (ref 70–99)
GLUCOSE BLDC GLUCOMTR-MCNC: 133 MG/DL — HIGH (ref 70–99)
GLUCOSE BLDC GLUCOMTR-MCNC: 133 MG/DL — HIGH (ref 70–99)
GLUCOSE BLDC GLUCOMTR-MCNC: 149 MG/DL — HIGH (ref 70–99)
GLUCOSE SERPL-MCNC: 94 MG/DL — SIGNIFICANT CHANGE UP (ref 70–99)
HCT VFR BLD CALC: 32.7 % — LOW (ref 42–52)
HGB BLD-MCNC: 11.2 G/DL — LOW (ref 14–18)
IMM GRANULOCYTES NFR BLD AUTO: 0.7 % — HIGH (ref 0.1–0.3)
LYMPHOCYTES # BLD AUTO: 1.37 K/UL — SIGNIFICANT CHANGE UP (ref 1.2–3.4)
LYMPHOCYTES # BLD AUTO: 18.5 % — LOW (ref 20.5–51.1)
MCHC RBC-ENTMCNC: 32 PG — HIGH (ref 27–31)
MCHC RBC-ENTMCNC: 34.3 G/DL — SIGNIFICANT CHANGE UP (ref 32–37)
MCV RBC AUTO: 93.4 FL — SIGNIFICANT CHANGE UP (ref 80–94)
MONOCYTES # BLD AUTO: 0.72 K/UL — HIGH (ref 0.1–0.6)
MONOCYTES NFR BLD AUTO: 9.7 % — HIGH (ref 1.7–9.3)
NEUTROPHILS # BLD AUTO: 5.24 K/UL — SIGNIFICANT CHANGE UP (ref 1.4–6.5)
NEUTROPHILS NFR BLD AUTO: 70.7 % — SIGNIFICANT CHANGE UP (ref 42.2–75.2)
NRBC # BLD: 0 /100 WBCS — SIGNIFICANT CHANGE UP (ref 0–0)
PLATELET # BLD AUTO: 230 K/UL — SIGNIFICANT CHANGE UP (ref 130–400)
POTASSIUM SERPL-MCNC: 4.4 MMOL/L — SIGNIFICANT CHANGE UP (ref 3.5–5)
POTASSIUM SERPL-SCNC: 4.4 MMOL/L — SIGNIFICANT CHANGE UP (ref 3.5–5)
PROT SERPL-MCNC: 6.9 G/DL — SIGNIFICANT CHANGE UP (ref 6–8)
RBC # BLD: 3.5 M/UL — LOW (ref 4.7–6.1)
RBC # FLD: 14 % — SIGNIFICANT CHANGE UP (ref 11.5–14.5)
SODIUM SERPL-SCNC: 133 MMOL/L — LOW (ref 135–146)
WBC # BLD: 7.41 K/UL — SIGNIFICANT CHANGE UP (ref 4.8–10.8)
WBC # FLD AUTO: 7.41 K/UL — SIGNIFICANT CHANGE UP (ref 4.8–10.8)

## 2022-07-23 PROCEDURE — 99232 SBSQ HOSP IP/OBS MODERATE 35: CPT

## 2022-07-23 RX ADMIN — Medication 150 MILLIGRAM(S): at 21:40

## 2022-07-23 RX ADMIN — HEPARIN SODIUM 5000 UNIT(S): 5000 INJECTION INTRAVENOUS; SUBCUTANEOUS at 17:43

## 2022-07-23 RX ADMIN — GABAPENTIN 300 MILLIGRAM(S): 400 CAPSULE ORAL at 05:43

## 2022-07-23 RX ADMIN — Medication 25 MILLIGRAM(S): at 05:42

## 2022-07-23 RX ADMIN — Medication 1 PATCH: at 11:43

## 2022-07-23 RX ADMIN — Medication 60 MILLIGRAM(S): at 05:42

## 2022-07-23 RX ADMIN — Medication 1 PATCH: at 08:00

## 2022-07-23 RX ADMIN — HEPARIN SODIUM 5000 UNIT(S): 5000 INJECTION INTRAVENOUS; SUBCUTANEOUS at 05:43

## 2022-07-23 RX ADMIN — ATORVASTATIN CALCIUM 10 MILLIGRAM(S): 80 TABLET, FILM COATED ORAL at 21:40

## 2022-07-23 RX ADMIN — Medication 1 PATCH: at 11:54

## 2022-07-23 RX ADMIN — Medication 25 MILLIGRAM(S): at 17:42

## 2022-07-23 RX ADMIN — GABAPENTIN 300 MILLIGRAM(S): 400 CAPSULE ORAL at 17:39

## 2022-07-23 RX ADMIN — OLANZAPINE 20 MILLIGRAM(S): 15 TABLET, FILM COATED ORAL at 21:40

## 2022-07-23 NOTE — PROGRESS NOTE ADULT - ATTENDING COMMENTS
Patient is an 81 y/o Male with  PMHx HTN, COPD, DM2, non-Hogkins lymphoma, schizophrenia, urinary retention with chronic bains here with generalized weakness, syncope found to have covid.    #Syncope, generalized weakness  in setting of covid viral infection- continue to maintain isolation  cth neg  cxr clear; no hypoxia  monitor orthostatics  - f/up tte- still not completed / s/p va duplex- neg. for dvt  - no cardiac arrythmia ,  d/c telemetry     #Hyponatremia- stable low levels- 133, will need outpatient work up       #HTN- lopressor 25 bid, nifedipine 60    #h/o COPD- stable, continue duoneb q6 prn    #DM2- controlled off medications    #non-Hogkins lymphoma- outpt f/u    #Schizophrenia- zyprexa 20, trazodone 150    #DVT ppx- subq hep    #Progress Note Handoff: f/up  2d echo , d/c planning once quarantine is completed on monday   Family discussion: yes, medical team Disposition: [facility] once quarantine is completed    Total time spent to complete patient's bedside assessment, review medical chart, discuss medical plan of care with covering medical team was more than 35 minutes .

## 2022-07-23 NOTE — PROGRESS NOTE ADULT - SUBJECTIVE AND OBJECTIVE BOX
Hospital Day:  4d    Subjective:    Patient is a 82y old  Male who presents with a chief complaint of generalized weakness (22 Jul 2022 16:23)      Admitted to medicine for a primary diagnosis of     Past Medical Hx:   Schizophrenia    Diabetes type 2, controlled    COPD (chronic obstructive pulmonary disease)    Dyslipidemia    Chronic retention of urine    Dyslipidemia      Past Sx:  Encounter for screening colonoscopy      Allergies:  No Known Allergies    Current Meds:   Standng Meds:  atorvastatin 10 milliGRAM(s) Oral at bedtime  gabapentin 300 milliGRAM(s) Oral two times a day  heparin   Injectable 5000 Unit(s) SubCutaneous every 12 hours  metoprolol tartrate 25 milliGRAM(s) Oral two times a day  nicotine -  14 mG/24Hr(s) Patch 1 Patch Transdermal daily  NIFEdipine XL 60 milliGRAM(s) Oral daily  OLANZapine 20 milliGRAM(s) Oral at bedtime  traZODone 150 milliGRAM(s) Oral at bedtime    PRN Meds:  acetaminophen     Tablet .. 650 milliGRAM(s) Oral every 6 hours PRN Temp greater or equal to 38C (100.4F), Mild Pain (1 - 3)  ALBUTerol    90 MICROgram(s) HFA Inhaler 2 Puff(s) Inhalation every 6 hours PRN Shortness of Breath and/or Wheezing  albuterol/ipratropium for Nebulization 3 milliLiter(s) Nebulizer every 6 hours PRN Shortness of Breath and/or Wheezing  aluminum hydroxide/magnesium hydroxide/simethicone Suspension 30 milliLiter(s) Oral every 4 hours PRN Dyspepsia  melatonin 3 milliGRAM(s) Oral at bedtime PRN Insomnia  ondansetron Injectable 4 milliGRAM(s) IV Push every 8 hours PRN Nausea and/or Vomiting    HOME MEDICATIONS:  atorvastatin 10 mg oral tablet: 1 tab(s) orally once a day  Centrum oral tablet: 1 tab(s) orally once a day  gabapentin 300 mg oral capsule: 1 cap(s) orally 2 times a day  metFORMIN 500 mg oral tablet: 1 tab(s) orally 2 times a day  metoprolol tartrate 25 mg oral tablet: 1 tab(s) orally 2 times a day  OLANZapine 20 mg oral tablet: 1 tab(s) orally once a day (at bedtime)  traZODone 150 mg oral tablet: 1 tab(s) orally once a day (at bedtime)  Vitamin D2 1.25 mg (50,000 intl units) oral capsule: 1 cap(s) orally once a week      Vital Signs:   T(F): 97.3 (07-23-22 @ 04:30), Max: 98.3 (07-22-22 @ 15:50)  HR: 82 (07-23-22 @ 04:30) (73 - 88)  BP: 160/90 (07-23-22 @ 04:30) (144/68 - 160/90)  RR: 17 (07-23-22 @ 04:30) (17 - 19)  SpO2: 96% (07-23-22 @ 04:30) (96% - 97%)      07-22-22 @ 07:01  -  07-23-22 @ 07:00  --------------------------------------------------------  IN: 660 mL / OUT: 1 mL / NET: 659 mL        Physical Exam:   GENERAL: NAD  HEENT: NCAT  CHEST/LUNG: CTAB  HEART: Regular rate and rhythm; s1 s2 appreciated, No murmurs, rubs, or gallops  ABDOMEN: Soft, Nontender, Nondistended; Bowel sounds present  EXTREMITIES: No LE edema b/l  SKIN: no rashes, no new lesions  NERVOUS SYSTEM:  Alert & Oriented X3  LINES/CATHETERS:        Labs:                         10.1   6.81  )-----------( 208      ( 22 Jul 2022 11:54 )             28.9     Neutophil% 63.3, Lymphocyte% 24.2, Monocyte% 11.9, Bands% 0.4 07-22-22 @ 11:54    22 Jul 2022 11:54    135    |  101    |  20     ----------------------------<  119    4.4     |  23     |  0.9      Ca    8.9        22 Jul 2022 11:54  Mg     1.9       22 Jul 2022 11:54    TPro  6.2    /  Alb  3.7    /  TBili  <0.2   /  DBili  x      /  AST  20     /  ALT  17     /  AlkPhos  85     22 Jul 2022 11:54        Amylase --, Lipase 32, 07-18-22 @ 22:46        Troponin <0.01, CKMB --, CK -- 07-19-22 @ 16:58  Troponin <0.01, CKMB --, CK -- 07-19-22 @ 11:40    Culture - Blood (collected 07-18-22 @ 22:08)  Source: .Blood Blood  Preliminary Report (07-20-22 @ 02:02):    No growth to date.    Culture - Blood (collected 07-18-22 @ 22:08)  Source: .Blood Blood  Preliminary Report (07-20-22 @ 02:02):    No growth to date.         Hospital Day:  4d    Subjective:    Patient is a 82y old  Male who presents with a chief complaint of generalized weakness. No acute events overnight. Patient denied any complaints this morning.     Admitted to medicine for a primary diagnosis of generalized weakness.     Past Medical Hx:   Schizophrenia    Diabetes type 2, controlled    COPD (chronic obstructive pulmonary disease)    Dyslipidemia    Chronic retention of urine    Dyslipidemia      Past Sx:  Encounter for screening colonoscopy      Allergies:  No Known Allergies    Current Meds:   Standng Meds:  atorvastatin 10 milliGRAM(s) Oral at bedtime  gabapentin 300 milliGRAM(s) Oral two times a day  heparin   Injectable 5000 Unit(s) SubCutaneous every 12 hours  metoprolol tartrate 25 milliGRAM(s) Oral two times a day  nicotine -  14 mG/24Hr(s) Patch 1 Patch Transdermal daily  NIFEdipine XL 60 milliGRAM(s) Oral daily  OLANZapine 20 milliGRAM(s) Oral at bedtime  traZODone 150 milliGRAM(s) Oral at bedtime    PRN Meds:  acetaminophen     Tablet .. 650 milliGRAM(s) Oral every 6 hours PRN Temp greater or equal to 38C (100.4F), Mild Pain (1 - 3)  ALBUTerol    90 MICROgram(s) HFA Inhaler 2 Puff(s) Inhalation every 6 hours PRN Shortness of Breath and/or Wheezing  albuterol/ipratropium for Nebulization 3 milliLiter(s) Nebulizer every 6 hours PRN Shortness of Breath and/or Wheezing  aluminum hydroxide/magnesium hydroxide/simethicone Suspension 30 milliLiter(s) Oral every 4 hours PRN Dyspepsia  melatonin 3 milliGRAM(s) Oral at bedtime PRN Insomnia  ondansetron Injectable 4 milliGRAM(s) IV Push every 8 hours PRN Nausea and/or Vomiting    HOME MEDICATIONS:  atorvastatin 10 mg oral tablet: 1 tab(s) orally once a day  Centrum oral tablet: 1 tab(s) orally once a day  gabapentin 300 mg oral capsule: 1 cap(s) orally 2 times a day  metFORMIN 500 mg oral tablet: 1 tab(s) orally 2 times a day  metoprolol tartrate 25 mg oral tablet: 1 tab(s) orally 2 times a day  OLANZapine 20 mg oral tablet: 1 tab(s) orally once a day (at bedtime)  traZODone 150 mg oral tablet: 1 tab(s) orally once a day (at bedtime)  Vitamin D2 1.25 mg (50,000 intl units) oral capsule: 1 cap(s) orally once a week      Vital Signs:   T(F): 97.3 (07-23-22 @ 04:30), Max: 98.3 (07-22-22 @ 15:50)  HR: 82 (07-23-22 @ 04:30) (73 - 88)  BP: 160/90 (07-23-22 @ 04:30) (144/68 - 160/90)  RR: 17 (07-23-22 @ 04:30) (17 - 19)  SpO2: 96% (07-23-22 @ 04:30) (96% - 97%)      07-22-22 @ 07:01  -  07-23-22 @ 07:00  --------------------------------------------------------  IN: 660 mL / OUT: 1 mL / NET: 659 mL        Physical Exam:   GENERAL: NAD  HEENT: NCAT  CHEST/LUNG: CTAB  HEART: s1 s2 appreciated  ABDOMEN: Soft, Nontender, Nondistended; Bowel sounds present  EXTREMITIES: No LE edema b/l  SKIN: no rashes, no new lesions  NERVOUS SYSTEM:  Alert & Oriented X3  LINES/CATHETERS:        Labs:                         10.1   6.81  )-----------( 208      ( 22 Jul 2022 11:54 )             28.9     Neutophil% 63.3, Lymphocyte% 24.2, Monocyte% 11.9, Bands% 0.4 07-22-22 @ 11:54    22 Jul 2022 11:54    135    |  101    |  20     ----------------------------<  119    4.4     |  23     |  0.9      Ca    8.9        22 Jul 2022 11:54  Mg     1.9       22 Jul 2022 11:54    TPro  6.2    /  Alb  3.7    /  TBili  <0.2   /  DBili  x      /  AST  20     /  ALT  17     /  AlkPhos  85     22 Jul 2022 11:54        Amylase --, Lipase 32, 07-18-22 @ 22:46        Troponin <0.01, CKMB --, CK -- 07-19-22 @ 16:58  Troponin <0.01, CKMB --, CK -- 07-19-22 @ 11:40    Culture - Blood (collected 07-18-22 @ 22:08)  Source: .Blood Blood  Preliminary Report (07-20-22 @ 02:02):    No growth to date.    Culture - Blood (collected 07-18-22 @ 22:08)  Source: .Blood Blood  Preliminary Report (07-20-22 @ 02:02):    No growth to date.

## 2022-07-23 NOTE — PROGRESS NOTE ADULT - ASSESSMENT
83 yo M with PMHx of HTN, HLD, COPD, DM, non-Hodgkin's lymphoma, neuropathy, and urinary retention with chronic bains presents to the ED from [facility] for evaluation of increasing generalized weakness for a while.     PLAN    # Generalized weakness 2/2 COVID illness  # Syncope workup  - in setting of covid  - CTH neg  - CXR clear; no hypoxia  - f/u orthostatics - pending   - Echo still pending (got approval by Dr. Tran)  - Duplex negative  - monitor off steroids  - PT    #DAVID  #Hyponatremia   - s/p IVF  - DAVID resolved   - monitor BMP  - avoid nephrotoxic agents    #COPD  - active smoker, not on home O2  - counselled on smoking cessation  - c/w nicotine patch  - c/w duonebs q6 PRN    #DM  - controlled off medications     #HTN  - takes nifedipine 60mg daily, lisinopril 5mg daily, and metoprolol tartrate 25mg BID  - c/w nifedipine and metoprolol, hold lisinopril     #Non-Hogkins lymphoma  - outpt f/u    #Schizophrenia  - zyprexa 20  - trazodone 150    # HLD   - c/w atorvastatin    #Dementia/ Insomnia   - c/w zyprexa and trazodone    Misc:   DVT ppx: heparin subQ  GI ppx: none  Diet: DASh/TLC  Activity: AAT  Code: Full Code  Dispo: Will need quarantine until Monday     83 yo M with PMHx of HTN, HLD, COPD, DM, non-Hodgkin's lymphoma, neuropathy, and urinary retention with chronic bains presents to the ED from [facility] for evaluation of increasing generalized weakness for a while.     PLAN    # Generalized weakness 2/2 COVID illness  # Syncope workup  - in setting of covid  - CTH neg  - CXR clear; no hypoxia  - f/u orthostatics - pending   - Echo - pending   - Duplex negative  - monitor off steroids  - PT    #DAVID - resolved   #Hyponatremia - resolved   - s/p IVF  - DAVID resolved   - monitor BMP  - avoid nephrotoxic agents    #COPD  - active smoker, not on home O2  - counselled on smoking cessation  - c/w nicotine patch  - c/w duonebs q6 PRN    #DM  - controlled off medications     #HTN  - takes nifedipine 60mg daily, lisinopril 5mg daily, and metoprolol tartrate 25mg BID  - c/w nifedipine and metoprolol, hold lisinopril     #Non-Hogkins lymphoma  - outpt f/u    #Schizophrenia  - zyprexa 20  - trazodone 150    # HLD   - c/w atorvastatin    #Dementia/ Insomnia   - c/w zyprexa and trazodone    Misc:   DVT ppx: heparin subQ  GI ppx: none  Diet: DASh/TLC  Activity: AAT  Code: Full Code  Dispo: Will need quarantine until Monday

## 2022-07-24 LAB
CULTURE RESULTS: SIGNIFICANT CHANGE UP
CULTURE RESULTS: SIGNIFICANT CHANGE UP
GLUCOSE BLDC GLUCOMTR-MCNC: 100 MG/DL — HIGH (ref 70–99)
GLUCOSE BLDC GLUCOMTR-MCNC: 105 MG/DL — HIGH (ref 70–99)
GLUCOSE BLDC GLUCOMTR-MCNC: 105 MG/DL — HIGH (ref 70–99)
GLUCOSE BLDC GLUCOMTR-MCNC: 88 MG/DL — SIGNIFICANT CHANGE UP (ref 70–99)
SPECIMEN SOURCE: SIGNIFICANT CHANGE UP
SPECIMEN SOURCE: SIGNIFICANT CHANGE UP

## 2022-07-24 PROCEDURE — 99232 SBSQ HOSP IP/OBS MODERATE 35: CPT

## 2022-07-24 RX ADMIN — Medication 1 PATCH: at 11:16

## 2022-07-24 RX ADMIN — Medication 25 MILLIGRAM(S): at 17:34

## 2022-07-24 RX ADMIN — Medication 150 MILLIGRAM(S): at 21:39

## 2022-07-24 RX ADMIN — ATORVASTATIN CALCIUM 10 MILLIGRAM(S): 80 TABLET, FILM COATED ORAL at 21:39

## 2022-07-24 RX ADMIN — OLANZAPINE 20 MILLIGRAM(S): 15 TABLET, FILM COATED ORAL at 21:39

## 2022-07-24 RX ADMIN — GABAPENTIN 300 MILLIGRAM(S): 400 CAPSULE ORAL at 17:34

## 2022-07-24 RX ADMIN — Medication 1 PATCH: at 11:11

## 2022-07-24 RX ADMIN — HEPARIN SODIUM 5000 UNIT(S): 5000 INJECTION INTRAVENOUS; SUBCUTANEOUS at 05:25

## 2022-07-24 RX ADMIN — HEPARIN SODIUM 5000 UNIT(S): 5000 INJECTION INTRAVENOUS; SUBCUTANEOUS at 17:34

## 2022-07-24 RX ADMIN — Medication 25 MILLIGRAM(S): at 05:24

## 2022-07-24 RX ADMIN — Medication 60 MILLIGRAM(S): at 05:24

## 2022-07-24 RX ADMIN — GABAPENTIN 300 MILLIGRAM(S): 400 CAPSULE ORAL at 05:24

## 2022-07-24 NOTE — PROGRESS NOTE ADULT - SUBJECTIVE AND OBJECTIVE BOX
ADA VILLAGOMEZ  [facility] [address] ([facility])      Patient was evaluated and examined  by bedside, no active complains       REVIEW OF SYSTEMS: unable to obtain, patient is forgetful       T(C): , Max: 35.8 (07-23-22 @ 21:00)  HR: 77 (07-24-22 @ 05:15)  BP: 149/68 (07-24-22 @ 05:15)  RR: 18 (07-24-22 @ 05:15)  SpO2: 97% (07-24-22 @ 05:15)  CAPILLARY BLOOD GLUCOSE      POCT Blood Glucose.: 100 mg/dL (24 Jul 2022 07:33)  POCT Blood Glucose.: 149 mg/dL (23 Jul 2022 22:05)  POCT Blood Glucose.: 133 mg/dL (23 Jul 2022 16:22)  POCT Blood Glucose.: 100 mg/dL (23 Jul 2022 11:44)      PHYSICAL EXAM:  General: NAD, Awake , patient is laying comfortably in bed  HEENT: AT, NC, Supple, NO JVD, NO CB  Lungs: CTA B/L, no wheezing, no rhonchi  CVS: normal S1, S2, RRR, NO M/G/R  Abdomen: soft, bowel sounds present, non-tender, non-distended  Extremities: no edema, no clubbing, no cyanosis, positive peripheral pulses b/l  Neuro: no acute focal neurological deficits, forgetful  Skin: no rash, no ecchymosis      LAB  CBC  Date: 07-23-22 @ 07:36  Mean cell Mbhucstmqh77.0  Mean cell Hemoglobin Conc34.3  Mean cell Volum 93.4  Platelet count-Automate 230  RBC Count 3.50  Red Cell Distrib Width14.0  WBC Count7.41  % Albumin, Urine--  Hematocrit 32.7  Hemoglobin 11.2  CBC  Date: 07-22-22 @ 11:54  Mean cell Fgmexqpglv65.3  Mean cell Hemoglobin Conc34.9  Mean cell Volum 92.3  Platelet count-Automate 208  RBC Count 3.13  Red Cell Distrib Width14.2  WBC Count6.81  % Albumin, Urine--  Hematocrit 28.9  Hemoglobin 10.1  CBC  Date: 07-20-22 @ 04:30  Mean cell Wghbvzjoia25.4  Mean cell Hemoglobin Conc34.8  Mean cell Volum 93.1  Platelet count-Automate 155  RBC Count 3.46  Red Cell Distrib Width14.0  WBC Count8.06  % Albumin, Urine--  Hematocrit 32.2  Hemoglobin 11.2  CBC  Date: 07-19-22 @ 11:40  Mean cell Plhyqwmukg16.3  Mean cell Hemoglobin Conc36.0  Mean cell Volum 92.7  Platelet count-Automate 191  RBC Count 3.27  Red Cell Distrib Width14.0  WBC Count8.73  % Albumin, Urine--  Hematocrit 30.3  Hemoglobin 10.9  CBC  Date: 07-18-22 @ 22:46  Mean cell Tbdlyfzvdt68.9  Mean cell Hemoglobin Conc34.4  Mean cell Volum 92.8  Platelet count-Automate 199  RBC Count 3.32  Red Cell Distrib Width14.3  WBC Count11.83  % Albumin, Urine--  Hematocrit 30.8  Hemoglobin 10.6    BMP  07-23-22 @ 07:36  Blood Gas Arterial-Calcium,Ionized--  Blood Urea Nitrogen, Serum 15 mg/dL [10 - 20]  Carbon Dioxide, Serum27 mmol/L [17 - 32]  Chloride, Serum94 mmol/L<L> [98 - 110]  Creatinie, Serum0.9 mg/dL [0.7 - 1.5]  Glucose, Serum94 mg/dL [70 - 99]  Potassium, Serum4.4 mmol/L [3.5 - 5.0]  Sodium, Serum 133 mmol/L<L> [135 - 146]      Microbiology:    Culture - Blood (collected 07-18-22 @ 22:08)  Source: .Blood Blood  Final Report (07-24-22 @ 02:00):    No Growth Final    Culture - Blood (collected 07-18-22 @ 22:08)  Source: .Blood Blood  Final Report (07-24-22 @ 02:00):    No Growth Final      Medications:  acetaminophen     Tablet .. 650 milliGRAM(s) Oral every 6 hours PRN  ALBUTerol    90 MICROgram(s) HFA Inhaler 2 Puff(s) Inhalation every 6 hours PRN  albuterol/ipratropium for Nebulization 3 milliLiter(s) Nebulizer every 6 hours PRN  aluminum hydroxide/magnesium hydroxide/simethicone Suspension 30 milliLiter(s) Oral every 4 hours PRN  atorvastatin 10 milliGRAM(s) Oral at bedtime  gabapentin 300 milliGRAM(s) Oral two times a day  heparin   Injectable 5000 Unit(s) SubCutaneous every 12 hours  melatonin 3 milliGRAM(s) Oral at bedtime PRN  metoprolol tartrate 25 milliGRAM(s) Oral two times a day  nicotine -  14 mG/24Hr(s) Patch 1 Patch Transdermal daily  NIFEdipine XL 60 milliGRAM(s) Oral daily  OLANZapine 20 milliGRAM(s) Oral at bedtime  ondansetron Injectable 4 milliGRAM(s) IV Push every 8 hours PRN  traZODone 150 milliGRAM(s) Oral at bedtime        Assessment and Plan:  Patient is an 81 y/o Male with  PMHx HTN, COPD, DM2, non-Hogkins lymphoma, schizophrenia, urinary retention with chronic bains here with generalized weakness, syncope found to have covid.    #Syncope, generalized weakness  in setting of covid viral infection- continue to maintain isolation  cth neg  cxr clear; no hypoxia  monitor orthostatics  - f/up tte- still not completed / s/p va duplex- neg. for dvt  - no cardiac arrythmia ,  d/c telemetry     #Hyponatremia- stable low levels- 133, will need outpatient work up       #HTN- lopressor 25 bid, nifedipine 60    #h/o COPD- stable, continue duoneb q6 prn    #DM2- controlled off medications    #non-Hogkins lymphoma- outpt f/u    #Schizophrenia- zyprexa 20, trazodone 150    #DVT ppx- subq hep    #Progress Note Handoff:  d/c planning once quarantine is completed on monday   Family discussion: yes, medical team Disposition: [facility] once quarantine is completed    Total time spent to complete patient's bedside assessment, review medical chart, discuss medical plan of care with covering medical team was more than 25 minutes .

## 2022-07-25 LAB
ALBUMIN SERPL ELPH-MCNC: 3.6 G/DL — SIGNIFICANT CHANGE UP (ref 3.5–5.2)
ALP SERPL-CCNC: 97 U/L — SIGNIFICANT CHANGE UP (ref 30–115)
ALT FLD-CCNC: 16 U/L — SIGNIFICANT CHANGE UP (ref 0–41)
ANION GAP SERPL CALC-SCNC: 17 MMOL/L — HIGH (ref 7–14)
AST SERPL-CCNC: 20 U/L — SIGNIFICANT CHANGE UP (ref 0–41)
BASOPHILS # BLD AUTO: 0.01 K/UL — SIGNIFICANT CHANGE UP (ref 0–0.2)
BASOPHILS NFR BLD AUTO: 0.1 % — SIGNIFICANT CHANGE UP (ref 0–1)
BILIRUB SERPL-MCNC: <0.2 MG/DL — SIGNIFICANT CHANGE UP (ref 0.2–1.2)
BUN SERPL-MCNC: 23 MG/DL — HIGH (ref 10–20)
CALCIUM SERPL-MCNC: 8.7 MG/DL — SIGNIFICANT CHANGE UP (ref 8.5–10.1)
CHLORIDE SERPL-SCNC: 100 MMOL/L — SIGNIFICANT CHANGE UP (ref 98–110)
CO2 SERPL-SCNC: 19 MMOL/L — SIGNIFICANT CHANGE UP (ref 17–32)
CREAT SERPL-MCNC: 1 MG/DL — SIGNIFICANT CHANGE UP (ref 0.7–1.5)
EGFR: 76 ML/MIN/1.73M2 — SIGNIFICANT CHANGE UP
EOSINOPHIL # BLD AUTO: 0.04 K/UL — SIGNIFICANT CHANGE UP (ref 0–0.7)
EOSINOPHIL NFR BLD AUTO: 0.5 % — SIGNIFICANT CHANGE UP (ref 0–8)
GLUCOSE BLDC GLUCOMTR-MCNC: 104 MG/DL — HIGH (ref 70–99)
GLUCOSE BLDC GLUCOMTR-MCNC: 117 MG/DL — HIGH (ref 70–99)
GLUCOSE BLDC GLUCOMTR-MCNC: 56 MG/DL — LOW (ref 70–99)
GLUCOSE BLDC GLUCOMTR-MCNC: 82 MG/DL — SIGNIFICANT CHANGE UP (ref 70–99)
GLUCOSE SERPL-MCNC: 81 MG/DL — SIGNIFICANT CHANGE UP (ref 70–99)
HCT VFR BLD CALC: 32.4 % — LOW (ref 42–52)
HGB BLD-MCNC: 11 G/DL — LOW (ref 14–18)
IMM GRANULOCYTES NFR BLD AUTO: 0.8 % — HIGH (ref 0.1–0.3)
LYMPHOCYTES # BLD AUTO: 1.64 K/UL — SIGNIFICANT CHANGE UP (ref 1.2–3.4)
LYMPHOCYTES # BLD AUTO: 19.4 % — LOW (ref 20.5–51.1)
MAGNESIUM SERPL-MCNC: 1.9 MG/DL — SIGNIFICANT CHANGE UP (ref 1.8–2.4)
MCHC RBC-ENTMCNC: 31.7 PG — HIGH (ref 27–31)
MCHC RBC-ENTMCNC: 34 G/DL — SIGNIFICANT CHANGE UP (ref 32–37)
MCV RBC AUTO: 93.4 FL — SIGNIFICANT CHANGE UP (ref 80–94)
MONOCYTES # BLD AUTO: 0.69 K/UL — HIGH (ref 0.1–0.6)
MONOCYTES NFR BLD AUTO: 8.2 % — SIGNIFICANT CHANGE UP (ref 1.7–9.3)
NEUTROPHILS # BLD AUTO: 5.99 K/UL — SIGNIFICANT CHANGE UP (ref 1.4–6.5)
NEUTROPHILS NFR BLD AUTO: 71 % — SIGNIFICANT CHANGE UP (ref 42.2–75.2)
NRBC # BLD: 0 /100 WBCS — SIGNIFICANT CHANGE UP (ref 0–0)
PLATELET # BLD AUTO: 271 K/UL — SIGNIFICANT CHANGE UP (ref 130–400)
POTASSIUM SERPL-MCNC: 4.9 MMOL/L — SIGNIFICANT CHANGE UP (ref 3.5–5)
POTASSIUM SERPL-SCNC: 4.9 MMOL/L — SIGNIFICANT CHANGE UP (ref 3.5–5)
PROT SERPL-MCNC: 6.5 G/DL — SIGNIFICANT CHANGE UP (ref 6–8)
RBC # BLD: 3.47 M/UL — LOW (ref 4.7–6.1)
RBC # FLD: 14 % — SIGNIFICANT CHANGE UP (ref 11.5–14.5)
SODIUM SERPL-SCNC: 136 MMOL/L — SIGNIFICANT CHANGE UP (ref 135–146)
WBC # BLD: 8.44 K/UL — SIGNIFICANT CHANGE UP (ref 4.8–10.8)
WBC # FLD AUTO: 8.44 K/UL — SIGNIFICANT CHANGE UP (ref 4.8–10.8)

## 2022-07-25 PROCEDURE — 99239 HOSP IP/OBS DSCHRG MGMT >30: CPT

## 2022-07-25 RX ORDER — NIFEDIPINE 30 MG
1 TABLET, EXTENDED RELEASE 24 HR ORAL
Qty: 30 | Refills: 0
Start: 2022-07-25 | End: 2022-08-23

## 2022-07-25 RX ADMIN — Medication 25 MILLIGRAM(S): at 06:16

## 2022-07-25 RX ADMIN — Medication 60 MILLIGRAM(S): at 06:16

## 2022-07-25 RX ADMIN — HEPARIN SODIUM 5000 UNIT(S): 5000 INJECTION INTRAVENOUS; SUBCUTANEOUS at 06:16

## 2022-07-25 RX ADMIN — Medication 25 MILLIGRAM(S): at 17:08

## 2022-07-25 RX ADMIN — Medication 150 MILLIGRAM(S): at 21:42

## 2022-07-25 RX ADMIN — Medication 1 PATCH: at 12:00

## 2022-07-25 RX ADMIN — OLANZAPINE 20 MILLIGRAM(S): 15 TABLET, FILM COATED ORAL at 21:43

## 2022-07-25 RX ADMIN — Medication 1 PATCH: at 11:46

## 2022-07-25 RX ADMIN — GABAPENTIN 300 MILLIGRAM(S): 400 CAPSULE ORAL at 06:16

## 2022-07-25 RX ADMIN — HEPARIN SODIUM 5000 UNIT(S): 5000 INJECTION INTRAVENOUS; SUBCUTANEOUS at 17:08

## 2022-07-25 RX ADMIN — GABAPENTIN 300 MILLIGRAM(S): 400 CAPSULE ORAL at 17:08

## 2022-07-25 RX ADMIN — ATORVASTATIN CALCIUM 10 MILLIGRAM(S): 80 TABLET, FILM COATED ORAL at 21:43

## 2022-07-25 NOTE — DISCHARGE NOTE PROVIDER - CARE PROVIDERS DIRECT ADDRESSES
[email or web address]

## 2022-07-25 NOTE — DISCHARGE NOTE PROVIDER - ATTENDING DISCHARGE PHYSICAL EXAMINATION:
GENERAL: NAD  HEENT: NCAT  CHEST/LUNG: CTAB  HEART: Regular rate and rhythm; s1 s2 appreciated  ABDOMEN: Soft, Nontender, Nondistended; Bowel sounds present  EXTREMITIES: No LE edema b/l  SKIN: no rashes, no new lesions  NERVOUS SYSTEM:  Alert & Oriented X2

## 2022-07-25 NOTE — DISCHARGE NOTE PROVIDER - NSDCACTIVITY_GEN_ALL_CORE
activity as tolerated/Do not drive or operate machinery/Do not make important decisions/No heavy lifting/straining

## 2022-07-25 NOTE — DISCHARGE NOTE PROVIDER - NSDCFUADDINST_GEN_ALL_CORE_FT
Ashley has a sinus infection and would like to know if she needs to see Dr Castellanos since she is pregnant inorder to get something to help or if it is ok to see her primary care physician at [facility]. Please let her know.    Please have an echo outpatient and have a hyponatremia work up done outpatient  Please have an echo outpatient and have a hyponatremia work up done outpatient with your PCP  Please have an echo outpatient and have a hyponatremia work up done outpatient with your PCP     Will need post covid DVT ppx for 30 days

## 2022-07-25 NOTE — DISCHARGE NOTE PROVIDER - CARE PROVIDER_API CALL
Silverio Hayes)  [address] [place] [address], [place] [address]  Phone: [phone]  Fax: [phone]  Follow Up Time:

## 2022-07-25 NOTE — PROGRESS NOTE ADULT - SUBJECTIVE AND OBJECTIVE BOX
ADA VILLAGOMEZ  [facility] 004 A ([facility] F1-4A [facility])      Patient was evaluated and examined  by bedside, no active events over night time       REVIEW OF SYSTEMS: unable to obtain , patient is a poor historian        T(C): , Max: 36.8 (07-25-22 @ 05:41)  HR: 80 (07-25-22 @ 05:41)  BP: 184/81 (07-25-22 @ 05:41)  RR: 18 (07-25-22 @ 05:41)  SpO2: 97% (07-24-22 @ 13:25)  CAPILLARY BLOOD GLUCOSE      POCT Blood Glucose.: 104 mg/dL (25 Jul 2022 07:39)  POCT Blood Glucose.: 105 mg/dL (24 Jul 2022 21:15)  POCT Blood Glucose.: 105 mg/dL (24 Jul 2022 16:46)  POCT Blood Glucose.: 88 mg/dL (24 Jul 2022 11:27)      PHYSICAL EXAM:  General: NAD, Awake , patient is laying comfortably in bed  HEENT: AT, NC, Supple, NO JVD, NO CB  Lungs: CTA B/L, no wheezing, no rhonchi  CVS: normal S1, S2, RRR, NO M/G/R  Abdomen: soft, bowel sounds present, non-tender, non-distended  Extremities: no edema, no clubbing, no cyanosis, positive peripheral pulses b/l  Neuro: no acute focal neurological deficits, forgetful  Skin: no rash, no ecchymosis        LAB  CBC  Date: 07-25-22 @ 09:20  Mean cell [id number]  Mean cell Hemoglobin Conc34.0  Mean cell Volum 93.4  Platelet count-Automate 271  RBC Count 3.47  Red Cell Distrib Width14.0  WBC Count8.44  % Albumin, Urine--  Hematocrit 32.4  Hemoglobin 11.0      BMP  07-25-22 @ 09:20  Blood Gas Arterial-Calcium,Ionized--  Blood Urea Nitrogen, Serum 23 mg/dL<H> [10 - 20]  Carbon Dioxide, Serum19 mmol/L [17 - 32]  Chloride, Qsfyi831 mmol/L [98 - 110]  Creatinie, Serum1.0 mg/dL [0.7 - 1.5]  Glucose, Serum81 mg/dL [70 - 99]  Potassium, Serum4.9 mmol/L [3.5 - 5.0]  Sodium, Serum 136 mmol/L [135 - 146]      Microbiology:    Culture - Blood (collected 07-18-22 @ 22:08)  Source: .Blood Blood  Final Report (07-24-22 @ 02:00):    No Growth Final    Culture - Blood (collected 07-18-22 @ 22:08)  Source: .Blood Blood  Final Report (07-24-22 @ 02:00):    No Growth Final      Medications:  acetaminophen     Tablet .. 650 milliGRAM(s) Oral every 6 hours PRN  ALBUTerol    90 MICROgram(s) HFA Inhaler 2 Puff(s) Inhalation every 6 hours PRN  albuterol/ipratropium for Nebulization 3 milliLiter(s) Nebulizer every 6 hours PRN  aluminum hydroxide/magnesium hydroxide/simethicone Suspension 30 milliLiter(s) Oral every 4 hours PRN  atorvastatin 10 milliGRAM(s) Oral at bedtime  gabapentin 300 milliGRAM(s) Oral two times a day  heparin   Injectable 5000 Unit(s) SubCutaneous every 12 hours  melatonin 3 milliGRAM(s) Oral at bedtime PRN  metoprolol tartrate 25 milliGRAM(s) Oral two times a day  nicotine -  14 mG/24Hr(s) Patch 1 Patch Transdermal daily  NIFEdipine XL 60 milliGRAM(s) Oral daily  OLANZapine 20 milliGRAM(s) Oral at bedtime  ondansetron Injectable 4 milliGRAM(s) IV Push every 8 hours PRN  traZODone 150 milliGRAM(s) Oral at bedtime        Assessment and Plan:  Patient is an 83 y/o Male with  PMHx HTN, COPD, DM2, non-Hogkins lymphoma, schizophrenia, urinary retention with chronic bains here with generalized weakness, syncope found to have covid.    #Syncope, generalized weakness  in setting of covid viral infection- continue to maintain isolation  cth neg  cxr clear; no hypoxia  monitor orthostatics  -tte was not completed during inpatient stay and will need to be completed as an outpatient study / s/p va duplex- neg. for dvt  - no cardiac arrythmia ,  d/c telemetry     #Hyponatremia- stable 136, will need outpatient work up       #HTN- lopressor 25 bid, nifedipine 60    #h/o COPD- stable, continue duoneb q6 prn    #DM2- controlled off medications    #non-Hogkins lymphoma- outpt f/u    #Schizophrenia- zyprexa 20, trazodone 150    #DVT ppx- subq hep    #Progress Note Handoff:  d/c planning today   Family discussion: yes, medical team Disposition: [facility] today     Total time spent to complete patient's bedside assessment, review medical chart, discuss discharge  plan of care with covering medical team was more than 25 minutes .

## 2022-07-25 NOTE — DISCHARGE NOTE PROVIDER - NSDCMRMEDTOKEN_GEN_ALL_CORE_FT
atorvastatin 10 mg oral tablet: 1 tab(s) orally once a day  Centrum oral tablet: 1 tab(s) orally once a day  gabapentin 300 mg oral capsule: 1 cap(s) orally 2 times a day  lisinopril 5 mg oral tablet: 1 tab(s) orally once a day  metFORMIN 500 mg oral tablet: 1 tab(s) orally 2 times a day  metoprolol tartrate 25 mg oral tablet: 1 tab(s) orally 2 times a day  NIFEdipine 60 mg oral tablet, extended release: 1 tab(s) orally once a day  OLANZapine 20 mg oral tablet: 1 tab(s) orally once a day (at bedtime)  traZODone 150 mg oral tablet: 1 tab(s) orally once a day (at bedtime)  Vitamin D2 1.25 mg (50,000 intl units) oral capsule: 1 cap(s) orally once a week   atorvastatin 10 mg oral tablet: 1 tab(s) orally once a day  Centrum oral tablet: 1 tab(s) orally once a day  gabapentin 300 mg oral capsule: 1 cap(s) orally 2 times a day  metFORMIN 500 mg oral tablet: 1 tab(s) orally 2 times a day  metoprolol tartrate 25 mg oral tablet: 1 tab(s) orally 2 times a day  NIFEdipine 90 mg oral tablet, extended release: 1 tab(s) orally once a day   OLANZapine 20 mg oral tablet: 1 tab(s) orally once a day (at bedtime)  traZODone 150 mg oral tablet: 1 tab(s) orally once a day (at bedtime)  Vitamin D2 1.25 mg (50,000 intl units) oral capsule: 1 cap(s) orally once a week   atorvastatin 10 mg oral tablet: 1 tab(s) orally once a day  cefpodoxime 200 mg oral tablet: 1 tab(s) orally 2 times a day   Centrum oral tablet: 1 tab(s) orally once a day  gabapentin 300 mg oral capsule: 1 cap(s) orally 2 times a day  Lovenox 40 mg/0.4 mL injectable solution: 40 milligram(s) subcutaneously once a day   metFORMIN 500 mg oral tablet: 1 tab(s) orally 2 times a day  metoprolol tartrate 25 mg oral tablet: 1 tab(s) orally 2 times a day  NIFEdipine 90 mg oral tablet, extended release: 1 tab(s) orally once a day   OLANZapine 20 mg oral tablet: 1 tab(s) orally once a day (at bedtime)  traZODone 150 mg oral tablet: 1 tab(s) orally once a day (at bedtime)  Vitamin D2 1.25 mg (50,000 intl units) oral capsule: 1 cap(s) orally once a week

## 2022-07-25 NOTE — DISCHARGE NOTE PROVIDER - NSDCCPCAREPLAN_GEN_ALL_CORE_FT
PRINCIPAL DISCHARGE DIAGNOSIS  Diagnosis: Syncope  Assessment and Plan of Treatment:       SECONDARY DISCHARGE DIAGNOSES  Diagnosis: Generalized weakness  Assessment and Plan of Treatment:     Diagnosis: COVID-19  Assessment and Plan of Treatment:      PRINCIPAL DISCHARGE DIAGNOSIS  Diagnosis: Syncope  Assessment and Plan of Treatment: You were found to have multiple episodes of syncope. Syncope work up was done. Please continue to take your medications as prescribed and follow up outpatient in one to two weeks with your PCP.   Syncope is the medical term for fainting. It refers to a relatively sudden loss of consciousness, followed by a spontaneous rapid and complete recovery. If you have symptoms of dizziness or lightheadedness without loss of consciousness, this is often called presyncope (or near-syncope); however, dizziness and lightheadedness are nonspecific symptoms that can also be caused by conditions that are unrelated to syncope (such as inner ear disorders).      SECONDARY DISCHARGE DIAGNOSES  Diagnosis: COVID-19  Assessment and Plan of Treatment: You were found to have COVID in the setting of your general weakness. You were treated supportively and were monitored closely for any symptoms. Please continue to take your medications as prescribed and follow up outpatinet in one to two weeks with your PCP   Coronavirus disease 2019 (COVID-19) is a respiratory illness  that can spread from person to person. The virus that causes COVID-19 probably emerged from an  animal source, but is now spreading from person to person.  The virus is thought to spread mainly between people who  are in close contact with one another (within about 6 feet)  through respiratory droplets produced when an infected  person coughs or sneezes. It also may be possible that a person  can get COVID-19 by touching a surface or object that has  the virus on it and then touching their own mouth, nose, or  possibly their eyes, but this is not thought to be the main  way the virus spreads.  Please stay home and avoid contact with others for at least a week after symptoms resolve and follow government guidelines.   Patients with COVID-19 have had mild to severe respiratory  illness with symptoms of  • fever  • cough  • shortness of breath  People can help protect themselves from respiratory illness with  everyday preventive actions.    • Avoid close contact with people who are sick.  • Avoid touching your eyes, nose, and mouth with  unwashed hands.  • Wash your hands often with soap and water for at least 20   seconds. Use an alcohol-based hand  that contains at  least 60% alcohol if soap and water are not available      Diagnosis: Hyponatremia  Assessment and Plan of Treatment:      PRINCIPAL DISCHARGE DIAGNOSIS  Diagnosis: Syncope  Assessment and Plan of Treatment: You were found to have multiple episodes of syncope. Syncope work up was done. Please continue to take your medications as prescribed and follow up outpatient in one to two weeks with your PCP.   Syncope is the medical term for fainting. It refers to a relatively sudden loss of consciousness, followed by a spontaneous rapid and complete recovery. If you have symptoms of dizziness or lightheadedness without loss of consciousness, this is often called presyncope (or near-syncope); however, dizziness and lightheadedness are nonspecific symptoms that can also be caused by conditions that are unrelated to syncope (such as inner ear disorders).      SECONDARY DISCHARGE DIAGNOSES  Diagnosis: COVID-19  Assessment and Plan of Treatment: You were found to have COVID in the setting of your general weakness. You were treated supportively and were monitored closely for any symptoms. Please continue to take your medications as prescribed and follow up outpatinet in one to two weeks with your PCP   Coronavirus disease 2019 (COVID-19) is a respiratory illness  that can spread from person to person. The virus that causes COVID-19 probably emerged from an  animal source, but is now spreading from person to person.  The virus is thought to spread mainly between people who  are in close contact with one another (within about 6 feet)  through respiratory droplets produced when an infected  person coughs or sneezes. It also may be possible that a person  can get COVID-19 by touching a surface or object that has  the virus on it and then touching their own mouth, nose, or  possibly their eyes, but this is not thought to be the main  way the virus spreads.  Please stay home and avoid contact with others for at least a week after symptoms resolve and follow government guidelines.   Patients with COVID-19 have had mild to severe respiratory  illness with symptoms of  • fever  • cough  • shortness of breath  People can help protect themselves from respiratory illness with  everyday preventive actions.    • Avoid close contact with people who are sick.  • Avoid touching your eyes, nose, and mouth with  unwashed hands.  • Wash your hands often with soap and water for at least 20   seconds. Use an alcohol-based hand  that contains at  least 60% alcohol if soap and water are not available      Diagnosis: Hyponatremia  Assessment and Plan of Treatment: You were diagnosed with hyponatremia, which means your blood level of sodium (salt) is too low. Salt is needed for the body and brain to work. Very low blood levels of sodium can be fatal. Symptoms can include headache, confusion, fatigue, muscle cramps, hallucinations, seizures, and coma. You have been treated to raise your blood levels of sodium. These instructions will help you care for yourself at home as you have been instructed.  Home care  Limit your intake of fluids. Drink only the amounts directed by your healthcare provider.  Keep all follow-up appointments. Your provider needs to watch your condition closely.  To help prevent hyponatremia:  Take all medicines exactly as directed. Certain medicines can lower blood sodium levels.  If you have done something that makes you sweat a lot, drink fluids that contain salt and other electrolytes.   Tell all healthcare providers what medicines you take. Mention all prescription and over-the-counter drugs and herbs.  Have your sodium levels checked often. This is vital if you take a diuretic (medicine that helps your body get rid of water).  Please continue to take your medications as prescribed and follow up outpatient with your PCP in one to two weeks.

## 2022-07-26 LAB
GLUCOSE BLDC GLUCOMTR-MCNC: 113 MG/DL — HIGH (ref 70–99)
GLUCOSE BLDC GLUCOMTR-MCNC: 132 MG/DL — HIGH (ref 70–99)
GLUCOSE BLDC GLUCOMTR-MCNC: 143 MG/DL — HIGH (ref 70–99)
GLUCOSE BLDC GLUCOMTR-MCNC: 82 MG/DL — SIGNIFICANT CHANGE UP (ref 70–99)

## 2022-07-26 PROCEDURE — 99233 SBSQ HOSP IP/OBS HIGH 50: CPT

## 2022-07-26 RX ADMIN — GABAPENTIN 300 MILLIGRAM(S): 400 CAPSULE ORAL at 05:56

## 2022-07-26 RX ADMIN — Medication 25 MILLIGRAM(S): at 05:56

## 2022-07-26 RX ADMIN — HEPARIN SODIUM 5000 UNIT(S): 5000 INJECTION INTRAVENOUS; SUBCUTANEOUS at 05:56

## 2022-07-26 RX ADMIN — OLANZAPINE 20 MILLIGRAM(S): 15 TABLET, FILM COATED ORAL at 21:16

## 2022-07-26 RX ADMIN — ATORVASTATIN CALCIUM 10 MILLIGRAM(S): 80 TABLET, FILM COATED ORAL at 21:16

## 2022-07-26 RX ADMIN — Medication 1 PATCH: at 12:44

## 2022-07-26 RX ADMIN — GABAPENTIN 300 MILLIGRAM(S): 400 CAPSULE ORAL at 17:34

## 2022-07-26 RX ADMIN — HEPARIN SODIUM 5000 UNIT(S): 5000 INJECTION INTRAVENOUS; SUBCUTANEOUS at 17:35

## 2022-07-26 RX ADMIN — Medication 150 MILLIGRAM(S): at 21:16

## 2022-07-26 RX ADMIN — Medication 25 MILLIGRAM(S): at 17:35

## 2022-07-26 RX ADMIN — Medication 60 MILLIGRAM(S): at 05:56

## 2022-07-26 NOTE — PROGRESS NOTE ADULT - SUBJECTIVE AND OBJECTIVE BOX
Hospital Day:  7d    Subjective:    Patient is a 82y old  Male who presents with a chief complaint of generalized weakness. No acute events overnight. Patient seen sleeping in bed this morning.       Admitted to medicine for a primary diagnosis of general weakness in the setting of COVID     Past Medical Hx:   Schizophrenia    Diabetes type 2, controlled    COPD (chronic obstructive pulmonary disease)    Dyslipidemia    Chronic retention of urine    Dyslipidemia      Past Sx:  Encounter for screening colonoscopy      Allergies:  No Known Allergies    Current Meds:   Standng Meds:  atorvastatin 10 milliGRAM(s) Oral at bedtime  gabapentin 300 milliGRAM(s) Oral two times a day  heparin   Injectable 5000 Unit(s) SubCutaneous every 12 hours  metoprolol tartrate 25 milliGRAM(s) Oral two times a day  nicotine -  14 mG/24Hr(s) Patch 1 Patch Transdermal daily  NIFEdipine XL 60 milliGRAM(s) Oral daily  OLANZapine 20 milliGRAM(s) Oral at bedtime  traZODone 150 milliGRAM(s) Oral at bedtime    PRN Meds:  acetaminophen     Tablet .. 650 milliGRAM(s) Oral every 6 hours PRN Temp greater or equal to 38C (100.4F), Mild Pain (1 - 3)  ALBUTerol    90 MICROgram(s) HFA Inhaler 2 Puff(s) Inhalation every 6 hours PRN Shortness of Breath and/or Wheezing  albuterol/ipratropium for Nebulization 3 milliLiter(s) Nebulizer every 6 hours PRN Shortness of Breath and/or Wheezing  aluminum hydroxide/magnesium hydroxide/simethicone Suspension 30 milliLiter(s) Oral every 4 hours PRN Dyspepsia  melatonin 3 milliGRAM(s) Oral at bedtime PRN Insomnia  ondansetron Injectable 4 milliGRAM(s) IV Push every 8 hours PRN Nausea and/or Vomiting    HOME MEDICATIONS:  atorvastatin 10 mg oral tablet: 1 tab(s) orally once a day  Centrum oral tablet: 1 tab(s) orally once a day  gabapentin 300 mg oral capsule: 1 cap(s) orally 2 times a day  metFORMIN 500 mg oral tablet: 1 tab(s) orally 2 times a day  metoprolol tartrate 25 mg oral tablet: 1 tab(s) orally 2 times a day  OLANZapine 20 mg oral tablet: 1 tab(s) orally once a day (at bedtime)  traZODone 150 mg oral tablet: 1 tab(s) orally once a day (at bedtime)  Vitamin D2 1.25 mg (50,000 intl units) oral capsule: 1 cap(s) orally once a week      Vital Signs:   T(F): 97.6 (07-26-22 @ 13:35), Max: 98.5 (07-26-22 @ 05:35)  HR: 113 (07-26-22 @ 20:57) (71 - 113)  BP: 147/99 (07-26-22 @ 20:57) (147/99 - 173/74)  RR: 18 (07-26-22 @ 20:57) (18 - 18)  SpO2: 98% (07-26-22 @ 13:35) (98% - 98%)        Physical Exam:   GENERAL: NAD  HEENT: NCAT  CHEST/LUNG: CTAB  HEART: Regular rate and rhythm; s1 s2 appreciated,   ABDOMEN: Soft, Nontender, Nondistended; Bowel sounds present  EXTREMITIES: No LE edema b/l  SKIN: no rashes, no new lesions  NERVOUS SYSTEM:  Alert & Oriented X3  LINES/CATHETERS:        Labs:                         11.0   8.44  )-----------( 271      ( 25 Jul 2022 09:20 )             32.4       25 Jul 2022 09:20    136    |  100    |  23     ----------------------------<  81     4.9     |  19     |  1.0      Ca    8.7        25 Jul 2022 09:20  Mg     1.9       25 Jul 2022 09:20    TPro  6.5    /  Alb  3.6    /  TBili  <0.2   /  DBili  x      /  AST  20     /  ALT  16     /  AlkPhos  97     25 Jul 2022 09:20

## 2022-07-26 NOTE — PROGRESS NOTE ADULT - ASSESSMENT
81 yo M with PMHx of HTN, HLD, COPD, DM, non-Hodgkin's lymphoma, neuropathy, and urinary retention with chronic bains presents to the ED from [facility] for evaluation of increasing generalized weakness for a while.     PLAN    # Generalized weakness 2/2 COVID illness  # Syncope workup  - in setting of covid  - CTH neg  - CXR clear; no hypoxia  - f/u orthostatics - pending   - Echo - pending   - Duplex negative  - monitor off steroids  - Patient unable to get out of bed today, so will be going for rehab not home     #DAVID - resolved   #Hyponatremia - resolved   - s/p IVF  - DAVID resolved   - monitor BMP  - avoid nephrotoxic agents    #COPD  - active smoker, not on home O2  - counselled on smoking cessation  - c/w nicotine patch  - c/w duonebs q6 PRN    #DM  - controlled off medications     #HTN  - takes nifedipine 60mg daily, lisinopril 5mg daily, and metoprolol tartrate 25mg BID  - c/w nifedipine and metoprolol, hold lisinopril     #Non-Hogkins lymphoma  - outpt f/u    #Schizophrenia  - zyprexa 20  - trazodone 150    # HLD   - c/w atorvastatin    #Dementia/ Insomnia   - c/w zyprexa and trazodone    Misc:   DVT ppx: heparin subQ  GI ppx: none  Diet: DASh/TLC  Activity: AAT  Code: Full Code  Dispo: Will need quarantine until Monday

## 2022-07-26 NOTE — PROGRESS NOTE ADULT - SUBJECTIVE AND OBJECTIVE BOX
ADA VILLAGOMEZ  [facility] F14A [facility] 004 A ([facility] F1-4A [facility])      Patient was evaluated and examined  by bedside, no active events over night time      REVIEW OF SYSTEMS: unable to obtain, patient is forgetful        T(C): , Max: 36.9 (07-26-22 @ 05:35)  HR: 88 (07-26-22 @ 05:35)  BP: 173/74 (07-26-22 @ 05:35)  RR: 18 (07-26-22 @ 05:35)  SpO2: 96% (07-25-22 @ 20:03)  CAPILLARY BLOOD GLUCOSE      POCT Blood Glucose.: 82 mg/dL (26 Jul 2022 07:53)  POCT Blood Glucose.: 56 mg/dL (25 Jul 2022 22:05)  POCT Blood Glucose.: 82 mg/dL (25 Jul 2022 16:37)  POCT Blood Glucose.: 117 mg/dL (25 Jul 2022 11:40)      PHYSICAL EXAM:  General: NAD, Awake , patient is laying comfortably in bed  HEENT: AT, NC, Supple, NO JVD, NO CB  Lungs: CTA B/L, no wheezing, no rhonchi  CVS: normal S1, S2, RRR, NO M/G/R  Abdomen: soft, bowel sounds present, non-tender, non-distended  Extremities: no edema, no clubbing, no cyanosis, positive peripheral pulses b/l  Neuro: no acute focal neurological deficits, forgetful  Skin: no rash, no ecchymosis      LAB  CBC  Date: 07-25-22 @ 09:20  Mean cell Nlktrulytc16.7  Mean cell Hemoglobin Conc34.0  Mean cell Volum 93.4  Platelet count-Automate 271  RBC Count 3.47  Red Cell Distrib Width14.0  WBC Count8.44  % Albumin, Urine--  Hematocrit 32.4  Hemoglobin 11.0  CBC  Date: 07-23-22 @ 07:36  Mean cell Gcsaxmpgfx77.0  Mean cell Hemoglobin Conc34.3  Mean cell Volum 93.4  Platelet count-Automate 230  RBC Count 3.50  Red Cell Distrib Width14.0  WBC Count7.41  % Albumin, Urine--  Hematocrit 32.7  Hemoglobin 11.2  CBC  Date: 07-22-22 @ 11:54  Mean cell Zhfaownoub11.3  Mean cell Hemoglobin Conc34.9  Mean cell Volum 92.3  Platelet count-Automate 208  RBC Count 3.13  Red Cell Distrib Width14.2  WBC Count6.81  % Albumin, Urine--  Hematocrit 28.9  Hemoglobin 10.1        BMP  07-25-22 @ 09:20  Blood Gas Arterial-Calcium,Ionized--  Blood Urea Nitrogen, Serum 23 mg/dL<H> [10 - 20]  Carbon Dioxide, Serum19 mmol/L [17 - 32]  Chloride, Bqnvy646 mmol/L [98 - 110]  Creatinie, Serum1.0 mg/dL [0.7 - 1.5]  Glucose, Serum81 mg/dL [70 - 99]  Potassium, Serum4.9 mmol/L [3.5 - 5.0]  Sodium, Serum 136 mmol/L [135 - 146]          Microbiology:    Culture - Blood (collected 07-18-22 @ 22:08)  Source: .Blood Blood  Final Report (07-24-22 @ 02:00):    No Growth Final    Culture - Blood (collected 07-18-22 @ 22:08)  Source: .Blood Blood  Final Report (07-24-22 @ 02:00):    No Growth Final        Medications:  acetaminophen     Tablet .. 650 milliGRAM(s) Oral every 6 hours PRN  ALBUTerol    90 MICROgram(s) HFA Inhaler 2 Puff(s) Inhalation every 6 hours PRN  albuterol/ipratropium for Nebulization 3 milliLiter(s) Nebulizer every 6 hours PRN  aluminum hydroxide/magnesium hydroxide/simethicone Suspension 30 milliLiter(s) Oral every 4 hours PRN  atorvastatin 10 milliGRAM(s) Oral at bedtime  gabapentin 300 milliGRAM(s) Oral two times a day  heparin   Injectable 5000 Unit(s) SubCutaneous every 12 hours  melatonin 3 milliGRAM(s) Oral at bedtime PRN  metoprolol tartrate 25 milliGRAM(s) Oral two times a day  nicotine -  14 mG/24Hr(s) Patch 1 Patch Transdermal daily  NIFEdipine XL 60 milliGRAM(s) Oral daily  OLANZapine 20 milliGRAM(s) Oral at bedtime  ondansetron Injectable 4 milliGRAM(s) IV Push every 8 hours PRN  traZODone 150 milliGRAM(s) Oral at bedtime        Assessment and Plan:  Patient is an 81 y/o Male with  PMHx HTN, COPD, DM2, non-Hogkins lymphoma, schizophrenia, urinary retention with chronic bains here with generalized weakness, syncope found to have covid.    #Syncope, generalized weakness  in setting of covid viral infection- continue to maintain isolation  cth neg  cxr clear; no hypoxia  monitor orthostatics  -tte was not completed during inpatient stay and will need to be completed as an outpatient study / s/p va duplex- neg. for dvt  - no cardiac arrythmia ,  d/c telemetry     #Hyponatremia- stable 136, will need outpatient work up       #HTN- lopressor 25 bid, nifedipine 60    #h/o COPD- stable, continue duoneb q6 prn    #DM2- controlled off medications    #non-Hogkins lymphoma- outpt f/u    #Schizophrenia- zyprexa 20, trazodone 150    #DVT ppx- subq hep    #Progress Note Handoff:  d/c planning today   Family discussion: yes, medical team Disposition: [facility] today     Total time spent to complete patient's bedside assessment, review medical chart, discuss discharge  plan of care with covering medical team was more than 25 minutes .

## 2022-07-27 LAB
ALBUMIN SERPL ELPH-MCNC: 3.9 G/DL — SIGNIFICANT CHANGE UP (ref 3.5–5.2)
ALP SERPL-CCNC: 102 U/L — SIGNIFICANT CHANGE UP (ref 30–115)
ALT FLD-CCNC: 13 U/L — SIGNIFICANT CHANGE UP (ref 0–41)
ANION GAP SERPL CALC-SCNC: 16 MMOL/L — HIGH (ref 7–14)
AST SERPL-CCNC: 21 U/L — SIGNIFICANT CHANGE UP (ref 0–41)
BILIRUB SERPL-MCNC: 0.4 MG/DL — SIGNIFICANT CHANGE UP (ref 0.2–1.2)
BUN SERPL-MCNC: 23 MG/DL — HIGH (ref 10–20)
CALCIUM SERPL-MCNC: 9.1 MG/DL — SIGNIFICANT CHANGE UP (ref 8.5–10.1)
CHLORIDE SERPL-SCNC: 96 MMOL/L — LOW (ref 98–110)
CO2 SERPL-SCNC: 21 MMOL/L — SIGNIFICANT CHANGE UP (ref 17–32)
CREAT SERPL-MCNC: 1.1 MG/DL — SIGNIFICANT CHANGE UP (ref 0.7–1.5)
CRP SERPL-MCNC: 187.3 MG/L — HIGH
EGFR: 68 ML/MIN/1.73M2 — SIGNIFICANT CHANGE UP
ERYTHROCYTE [SEDIMENTATION RATE] IN BLOOD: 98 MM/HR — HIGH (ref 0–10)
GLUCOSE BLDC GLUCOMTR-MCNC: 108 MG/DL — HIGH (ref 70–99)
GLUCOSE BLDC GLUCOMTR-MCNC: 113 MG/DL — HIGH (ref 70–99)
GLUCOSE BLDC GLUCOMTR-MCNC: 117 MG/DL — HIGH (ref 70–99)
GLUCOSE BLDC GLUCOMTR-MCNC: 124 MG/DL — HIGH (ref 70–99)
GLUCOSE SERPL-MCNC: 164 MG/DL — HIGH (ref 70–99)
HCT VFR BLD CALC: 31.8 % — LOW (ref 42–52)
HCT VFR BLD CALC: 32.2 % — LOW (ref 42–52)
HGB BLD-MCNC: 11 G/DL — LOW (ref 14–18)
HGB BLD-MCNC: 11.3 G/DL — LOW (ref 14–18)
MAGNESIUM SERPL-MCNC: 2 MG/DL — SIGNIFICANT CHANGE UP (ref 1.8–2.4)
MCHC RBC-ENTMCNC: 32.1 PG — HIGH (ref 27–31)
MCHC RBC-ENTMCNC: 32.8 PG — HIGH (ref 27–31)
MCHC RBC-ENTMCNC: 34.6 G/DL — SIGNIFICANT CHANGE UP (ref 32–37)
MCHC RBC-ENTMCNC: 35.1 G/DL — SIGNIFICANT CHANGE UP (ref 32–37)
MCV RBC AUTO: 92.7 FL — SIGNIFICANT CHANGE UP (ref 80–94)
MCV RBC AUTO: 93.3 FL — SIGNIFICANT CHANGE UP (ref 80–94)
NRBC # BLD: 0 /100 WBCS — SIGNIFICANT CHANGE UP (ref 0–0)
NRBC # BLD: 0 /100 WBCS — SIGNIFICANT CHANGE UP (ref 0–0)
PLATELET # BLD AUTO: 286 K/UL — SIGNIFICANT CHANGE UP (ref 130–400)
PLATELET # BLD AUTO: 296 K/UL — SIGNIFICANT CHANGE UP (ref 130–400)
POTASSIUM SERPL-MCNC: 4 MMOL/L — SIGNIFICANT CHANGE UP (ref 3.5–5)
POTASSIUM SERPL-SCNC: 4 MMOL/L — SIGNIFICANT CHANGE UP (ref 3.5–5)
PROT SERPL-MCNC: 7.2 G/DL — SIGNIFICANT CHANGE UP (ref 6–8)
RBC # BLD: 3.43 M/UL — LOW (ref 4.7–6.1)
RBC # BLD: 3.45 M/UL — LOW (ref 4.7–6.1)
RBC # FLD: 13.8 % — SIGNIFICANT CHANGE UP (ref 11.5–14.5)
RBC # FLD: 14 % — SIGNIFICANT CHANGE UP (ref 11.5–14.5)
SODIUM SERPL-SCNC: 133 MMOL/L — LOW (ref 135–146)
WBC # BLD: 14.81 K/UL — HIGH (ref 4.8–10.8)
WBC # BLD: 16.39 K/UL — HIGH (ref 4.8–10.8)
WBC # FLD AUTO: 14.81 K/UL — HIGH (ref 4.8–10.8)
WBC # FLD AUTO: 16.39 K/UL — HIGH (ref 4.8–10.8)

## 2022-07-27 PROCEDURE — 71045 X-RAY EXAM CHEST 1 VIEW: CPT | Mod: 26

## 2022-07-27 PROCEDURE — 99233 SBSQ HOSP IP/OBS HIGH 50: CPT

## 2022-07-27 PROCEDURE — 93010 ELECTROCARDIOGRAM REPORT: CPT

## 2022-07-27 RX ADMIN — Medication 1 PATCH: at 12:16

## 2022-07-27 RX ADMIN — Medication 1 PATCH: at 11:23

## 2022-07-27 RX ADMIN — Medication 150 MILLIGRAM(S): at 21:51

## 2022-07-27 RX ADMIN — ATORVASTATIN CALCIUM 10 MILLIGRAM(S): 80 TABLET, FILM COATED ORAL at 21:51

## 2022-07-27 RX ADMIN — Medication 60 MILLIGRAM(S): at 05:15

## 2022-07-27 RX ADMIN — HEPARIN SODIUM 5000 UNIT(S): 5000 INJECTION INTRAVENOUS; SUBCUTANEOUS at 17:55

## 2022-07-27 RX ADMIN — Medication 25 MILLIGRAM(S): at 17:55

## 2022-07-27 RX ADMIN — GABAPENTIN 300 MILLIGRAM(S): 400 CAPSULE ORAL at 05:15

## 2022-07-27 RX ADMIN — HEPARIN SODIUM 5000 UNIT(S): 5000 INJECTION INTRAVENOUS; SUBCUTANEOUS at 05:14

## 2022-07-27 RX ADMIN — Medication 25 MILLIGRAM(S): at 05:15

## 2022-07-27 RX ADMIN — GABAPENTIN 300 MILLIGRAM(S): 400 CAPSULE ORAL at 17:55

## 2022-07-27 RX ADMIN — OLANZAPINE 20 MILLIGRAM(S): 15 TABLET, FILM COATED ORAL at 21:51

## 2022-07-27 NOTE — PROGRESS NOTE ADULT - ASSESSMENT
83 yo M with PMHx of HTN, HLD, COPD, DM, non-Hodgkin's lymphoma, neuropathy, and urinary retention with chronic bains presents to the ED from [facility] for evaluation of increasing generalized weakness for a while.     PLAN    # Generalized weakness 2/2 COVID illness  # Syncope workup  - in setting of covid  - CTH neg  - CXR clear; no hypoxia  - f/u orthostatics - negative   - Echo - pending   - Duplex negative  - monitor off steroids  - Patient unable to get out of bed today, so will be going for SNF not nursing home     #DAVID - resolved   #Hyponatremia - resolved   - s/p IVF  - DAVID resolved   - monitor BMP  - avoid nephrotoxic agents    #COPD  - active smoker, not on home O2  - counselled on smoking cessation  - c/w nicotine patch  - c/w duonebs q6 PRN    #DM  - controlled off medications     #HTN  - takes nifedipine 60mg daily, lisinopril 5mg daily, and metoprolol tartrate 25mg BID  - c/w nifedipine and metoprolol, hold lisinopril     #Non-Hogkins lymphoma  - outpt f/u    #Schizophrenia  - zyprexa 20  - trazodone 150    # HLD   - c/w atorvastatin    #Dementia/ Insomnia   - c/w zyprexa and trazodone    Misc:   DVT ppx: heparin subQ  GI ppx: none  Diet: DASh/TLC  Activity: AAT  Code: Full Code  Dispo: Will need quarantine until Monday   81 yo M with PMHx of HTN, HLD, COPD, DM, non-Hodgkin's lymphoma, neuropathy, and urinary retention with chronic bains presents to the ED from [facility] for evaluation of increasing generalized weakness for a while.     PLAN    # Generalized weakness 2/2 COVID illness  # Syncope workup  - in setting of covid  - CTH neg  - CXR clear; no hypoxia  - f/u orthostatics - negative   - Echo - pending   - Duplex negative  - monitor off steroids  - Patient unable to get out of bed today, so will be going for SNF not nursing home     #Leukocytosis   #Tachycardia   #SIRS positive   - Patient WBC 14.81   - Tachy this morning   - Sepsis w/u   - CXR, CRP, ESR, Bcx, Ucx, procal pending     #DAVID - resolved   #Hyponatremia - resolved   - s/p IVF  - DAVID resolved   - monitor BMP  - avoid nephrotoxic agents    #COPD  - active smoker, not on home O2  - counselled on smoking cessation  - c/w nicotine patch  - c/w duonebs q6 PRN    #DM  - controlled off medications     #HTN  - takes nifedipine 60mg daily, lisinopril 5mg daily, and metoprolol tartrate 25mg BID  - c/w nifedipine and metoprolol, hold lisinopril     #Non-Hogkins lymphoma  - outpt f/u    #Schizophrenia  - zyprexa 20  - trazodone 150    # HLD   - c/w atorvastatin    #Dementia/ Insomnia   - c/w zyprexa and trazodone    Misc:   DVT ppx: heparin subQ  GI ppx: none  Diet: DASh/TLC  Activity: AAT  Code: Full Code  Dispo: Will need quarantine until Monday   83 yo M with PMHx of HTN, HLD, COPD, DM, non-Hodgkin's lymphoma, neuropathy, and urinary retention with chronic bains presents to the ED from [facility] for evaluation of increasing generalized weakness for a while.       # Generalized weakness 2/2 COVID illness  # Syncope workup  - in setting of covid  - CTH neg  - CXR clear; no hypoxia  - f/u orthostatics - negative   - Echo - pending   - Duplex negative  - monitor off steroids  - Patient unable to get out of bed today, so will be going for SNF not nursing home     #Leukocytosis   #Tachycardia   #SIRS positive   - Patient WBC 14.81   - Tachy this morning   - Sepsis w/u   - CXR, CRP, ESR, Bcx, Ucx, procal pending     #DAVID - resolved   #Hyponatremia - resolved   - s/p IVF  - DAVID resolved   - monitor BMP  - avoid nephrotoxic agents    #COPD  - active smoker, not on home O2  - counselled on smoking cessation  - c/w nicotine patch  - c/w duonebs q6 PRN    #DM  - controlled off medications     #HTN  - takes nifedipine 60mg daily, lisinopril 5mg daily, and metoprolol tartrate 25mg BID  - c/w nifedipine and metoprolol, hold lisinopril     #Non-Hogkins lymphoma  - outpt f/u    #Schizophrenia  - zyprexa 20  - trazodone 150    # HLD   - c/w atorvastatin    #Dementia/ Insomnia   - c/w zyprexa and trazodone    Misc:   DVT ppx: heparin subQ  GI ppx: none  Diet: DASh/TLC  Activity: AAT  Code: Full Code  Dispo: Will need quarantine until Monday

## 2022-07-27 NOTE — PROGRESS NOTE ADULT - ATTENDING COMMENTS
Patient is an 83 y/o Male with  PMHx HTN, COPD, DM2, non-Hogkins lymphoma, schizophrenia, urinary retention with chronic bains here with generalized weakness, syncope found to have covid.    #Syncope  #generalized weakness  likely in setting of covid viral infection- continue to maintain isolation  cth neg  cxr clear; no hypoxia  orthostatics reportedly negative, BP stable   no cardiac arrythmia , EKG NSR,  d/c telemetry   -if TTE not completed during inpatient stay, will need to be completed as an outpatient study     #SIRS   - tachycardic with jump in wbc today   - will obtain cultures, UA, CXR   - monitor for fevers, hold off abx unless definitive sepsis/ hemodynamic instability   - repeat EKG     #Hyponatremia- improved, stable     #HTN- lopressor 25 bid, nifedipine 60    #h/o COPD- stable, continue duoneb q6 prn    #DM2- controlled off medications    #non-Hogkins lymphoma- outpt f/u    #Schizophrenia- zyprexa 20, trazodone 150    #DVT ppx- subq hep    #Progress Note Handoff:  d/c planning today   Family discussion: yes, medical team Disposition: [facility] today     Total time spent to complete patient's bedside assessment, review medical chart, discuss discharge  plan of care with covering medical team was more than 25 minutes . Patient is an 81 y/o Male with  PMHx HTN, COPD, DM2, non-Hogkins lymphoma, schizophrenia, urinary retention with chronic bains here with generalized weakness, syncope found to have covid.    #Syncope  #generalized weakness  likely in setting of covid viral infection- continue to maintain isolation  cth neg  cxr clear; no hypoxia  orthostatics reportedly negative, BP stable   no cardiac arrythmia , EKG NSR,  d/c telemetry   -if TTE not completed during inpatient stay, will need to be completed as an outpatient study     #SIRS   - tachycardic with jump in wbc today   - will obtain cultures, UA, CXR   - monitor for fevers, hold off abx unless definitive sepsis/ hemodynamic instability   - repeat EKG     #Hyponatremia- improved, stable     #HTN- lopressor 25 bid, nifedipine 60    #h/o COPD- stable, continue duoneb q6 prn    #DM2- controlled off medications    #non-Hogkins lymphoma- outpt f/u    #Schizophrenia- zyprexa 20, trazodone 150    #DVT ppx- subq hep    #Progress Note Handoff:  d/c pending placement    Total time spent to complete patient's bedside assessment, review medical chart, discuss medical plan of care with covering medical team was more than 35 minutes  with >50% of time spendt face to face with patient, discussion with patient/family and/or coordination of care      Maranda Lama,

## 2022-07-27 NOTE — PROGRESS NOTE ADULT - SUBJECTIVE AND OBJECTIVE BOX
Hospital Day:  8d    Subjective:    Patient is a 82y old  Male who presents with a chief complaint of generalized weakness. No acute events overnight. Patient seen sleeping in bed this morning.     Admitted to medicine for a primary diagnosis of generalized weakness in the setting of COVID    Past Medical Hx:   Schizophrenia    Diabetes type 2, controlled    COPD (chronic obstructive pulmonary disease)    Dyslipidemia    Chronic retention of urine    Dyslipidemia      Past Sx:  Encounter for screening colonoscopy      Allergies:  No Known Allergies    Current Meds:   Standng Meds:  atorvastatin 10 milliGRAM(s) Oral at bedtime  gabapentin 300 milliGRAM(s) Oral two times a day  heparin   Injectable 5000 Unit(s) SubCutaneous every 12 hours  metoprolol tartrate 25 milliGRAM(s) Oral two times a day  nicotine -  14 mG/24Hr(s) Patch 1 Patch Transdermal daily  NIFEdipine XL 60 milliGRAM(s) Oral daily  OLANZapine 20 milliGRAM(s) Oral at bedtime  traZODone 150 milliGRAM(s) Oral at bedtime    PRN Meds:  acetaminophen     Tablet .. 650 milliGRAM(s) Oral every 6 hours PRN Temp greater or equal to 38C (100.4F), Mild Pain (1 - 3)  ALBUTerol    90 MICROgram(s) HFA Inhaler 2 Puff(s) Inhalation every 6 hours PRN Shortness of Breath and/or Wheezing  albuterol/ipratropium for Nebulization 3 milliLiter(s) Nebulizer every 6 hours PRN Shortness of Breath and/or Wheezing  aluminum hydroxide/magnesium hydroxide/simethicone Suspension 30 milliLiter(s) Oral every 4 hours PRN Dyspepsia  melatonin 3 milliGRAM(s) Oral at bedtime PRN Insomnia  ondansetron Injectable 4 milliGRAM(s) IV Push every 8 hours PRN Nausea and/or Vomiting    HOME MEDICATIONS:  atorvastatin 10 mg oral tablet: 1 tab(s) orally once a day  Centrum oral tablet: 1 tab(s) orally once a day  gabapentin 300 mg oral capsule: 1 cap(s) orally 2 times a day  metFORMIN 500 mg oral tablet: 1 tab(s) orally 2 times a day  metoprolol tartrate 25 mg oral tablet: 1 tab(s) orally 2 times a day  OLANZapine 20 mg oral tablet: 1 tab(s) orally once a day (at bedtime)  traZODone 150 mg oral tablet: 1 tab(s) orally once a day (at bedtime)  Vitamin D2 1.25 mg (50,000 intl units) oral capsule: 1 cap(s) orally once a week      Vital Signs:   T(F): 99.5 (07-27-22 @ 05:27), Max: 99.5 (07-27-22 @ 05:27)  HR: 109 (07-27-22 @ 05:27) (71 - 113)  BP: 137/65 (07-27-22 @ 05:27) (137/65 - 149/65)  RR: 18 (07-27-22 @ 05:27) (18 - 18)  SpO2: 98% (07-26-22 @ 13:35) (98% - 98%)        Physical Exam:   GENERAL: NAD  HEENT: NCAT  CHEST/LUNG: CTAB  HEART: Regular rate and rhythm; s1 s2 appreciated  ABDOMEN: Soft, Nontender, Nondistended; Bowel sounds present  EXTREMITIES: No LE edema b/l  SKIN: no rashes, no new lesions  NERVOUS SYSTEM:  Alert & Oriented X2  LINES/CATHETERS:        Labs:                         11.3   14.81 )-----------( 296      ( 27 Jul 2022 09:15 )             32.2       27 Jul 2022 09:15    133    |  96     |  23     ----------------------------<  164    4.0     |  21     |  1.1      Ca    9.1        27 Jul 2022 09:15  Mg     2.0       27 Jul 2022 09:15    TPro  7.2    /  Alb  3.9    /  TBili  0.4    /  DBili  x      /  AST  21     /  ALT  13     /  AlkPhos  102    27 Jul 2022 09:15                             Hospital Day:  8d    Subjective:    Patient is a 82y old  Male who presents with a chief complaint of generalized weakness. No acute events overnight. Patient seen sleeping in bed this morning.     Admitted to medicine for a primary diagnosis of generalized weakness in the setting of COVID    Past Medical Hx:   Schizophrenia    Diabetes type 2, controlled    COPD (chronic obstructive pulmonary disease)    Dyslipidemia    Chronic retention of urine    Dyslipidemia      Past Sx:  Encounter for screening colonoscopy      Allergies:  No Known Allergies    Current Meds:   Standng Meds:  atorvastatin 10 milliGRAM(s) Oral at bedtime  gabapentin 300 milliGRAM(s) Oral two times a day  heparin   Injectable 5000 Unit(s) SubCutaneous every 12 hours  metoprolol tartrate 25 milliGRAM(s) Oral two times a day  nicotine -  14 mG/24Hr(s) Patch 1 Patch Transdermal daily  NIFEdipine XL 60 milliGRAM(s) Oral daily  OLANZapine 20 milliGRAM(s) Oral at bedtime  traZODone 150 milliGRAM(s) Oral at bedtime    PRN Meds:  acetaminophen     Tablet .. 650 milliGRAM(s) Oral every 6 hours PRN Temp greater or equal to 38C (100.4F), Mild Pain (1 - 3)  ALBUTerol    90 MICROgram(s) HFA Inhaler 2 Puff(s) Inhalation every 6 hours PRN Shortness of Breath and/or Wheezing  albuterol/ipratropium for Nebulization 3 milliLiter(s) Nebulizer every 6 hours PRN Shortness of Breath and/or Wheezing  aluminum hydroxide/magnesium hydroxide/simethicone Suspension 30 milliLiter(s) Oral every 4 hours PRN Dyspepsia  melatonin 3 milliGRAM(s) Oral at bedtime PRN Insomnia  ondansetron Injectable 4 milliGRAM(s) IV Push every 8 hours PRN Nausea and/or Vomiting    HOME MEDICATIONS:  atorvastatin 10 mg oral tablet: 1 tab(s) orally once a day  Centrum oral tablet: 1 tab(s) orally once a day  gabapentin 300 mg oral capsule: 1 cap(s) orally 2 times a day  metFORMIN 500 mg oral tablet: 1 tab(s) orally 2 times a day  metoprolol tartrate 25 mg oral tablet: 1 tab(s) orally 2 times a day  OLANZapine 20 mg oral tablet: 1 tab(s) orally once a day (at bedtime)  traZODone 150 mg oral tablet: 1 tab(s) orally once a day (at bedtime)  Vitamin D2 1.25 mg (50,000 intl units) oral capsule: 1 cap(s) orally once a week      Vital Signs:   T(F): 99.5 (07-27-22 @ 05:27), Max: 99.5 (07-27-22 @ 05:27)  HR: 109 (07-27-22 @ 05:27) (71 - 113)  BP: 137/65 (07-27-22 @ 05:27) (137/65 - 149/65)  RR: 18 (07-27-22 @ 05:27) (18 - 18)  SpO2: 98% (07-26-22 @ 13:35) (98% - 98%)        Physical Exam:   GENERAL: NAD  HEENT: NCAT  CHEST/LUNG: CTAB  HEART: Regular rate and rhythm; s1 s2 appreciated  ABDOMEN: Soft, Nontender, Nondistended; Bowel sounds present  EXTREMITIES: No LE edema b/l  SKIN: no rashes, no new lesions  NERVOUS SYSTEM:  Alert & Oriented X2          Labs:                         11.3   14.81 )-----------( 296      ( 27 Jul 2022 09:15 )             32.2       27 Jul 2022 09:15    133    |  96     |  23     ----------------------------<  164    4.0     |  21     |  1.1      Ca    9.1        27 Jul 2022 09:15  Mg     2.0       27 Jul 2022 09:15    TPro  7.2    /  Alb  3.9    /  TBili  0.4    /  DBili  x      /  AST  21     /  ALT  13     /  AlkPhos  102    27 Jul 2022 09:15

## 2022-07-28 LAB
ALBUMIN SERPL ELPH-MCNC: 3.8 G/DL — SIGNIFICANT CHANGE UP (ref 3.5–5.2)
ALP SERPL-CCNC: 113 U/L — SIGNIFICANT CHANGE UP (ref 30–115)
ALT FLD-CCNC: 14 U/L — SIGNIFICANT CHANGE UP (ref 0–41)
ANION GAP SERPL CALC-SCNC: 16 MMOL/L — HIGH (ref 7–14)
AST SERPL-CCNC: 20 U/L — SIGNIFICANT CHANGE UP (ref 0–41)
BASOPHILS # BLD AUTO: 0.01 K/UL — SIGNIFICANT CHANGE UP (ref 0–0.2)
BASOPHILS NFR BLD AUTO: 0.1 % — SIGNIFICANT CHANGE UP (ref 0–1)
BILIRUB SERPL-MCNC: 0.4 MG/DL — SIGNIFICANT CHANGE UP (ref 0.2–1.2)
BUN SERPL-MCNC: 30 MG/DL — HIGH (ref 10–20)
CALCIUM SERPL-MCNC: 8.7 MG/DL — SIGNIFICANT CHANGE UP (ref 8.5–10.1)
CHLORIDE SERPL-SCNC: 98 MMOL/L — SIGNIFICANT CHANGE UP (ref 98–110)
CO2 SERPL-SCNC: 21 MMOL/L — SIGNIFICANT CHANGE UP (ref 17–32)
CREAT SERPL-MCNC: 1.3 MG/DL — SIGNIFICANT CHANGE UP (ref 0.7–1.5)
EGFR: 56 ML/MIN/1.73M2 — LOW
EOSINOPHIL # BLD AUTO: 0 K/UL — SIGNIFICANT CHANGE UP (ref 0–0.7)
EOSINOPHIL NFR BLD AUTO: 0 % — SIGNIFICANT CHANGE UP (ref 0–8)
GLUCOSE BLDC GLUCOMTR-MCNC: 109 MG/DL — HIGH (ref 70–99)
GLUCOSE BLDC GLUCOMTR-MCNC: 111 MG/DL — HIGH (ref 70–99)
GLUCOSE BLDC GLUCOMTR-MCNC: 132 MG/DL — HIGH (ref 70–99)
GLUCOSE SERPL-MCNC: 118 MG/DL — HIGH (ref 70–99)
HCT VFR BLD CALC: 31.5 % — LOW (ref 42–52)
HGB BLD-MCNC: 10.8 G/DL — LOW (ref 14–18)
IMM GRANULOCYTES NFR BLD AUTO: 0.8 % — HIGH (ref 0.1–0.3)
LYMPHOCYTES # BLD AUTO: 1.31 K/UL — SIGNIFICANT CHANGE UP (ref 1.2–3.4)
LYMPHOCYTES # BLD AUTO: 9.1 % — LOW (ref 20.5–51.1)
MAGNESIUM SERPL-MCNC: 2.1 MG/DL — SIGNIFICANT CHANGE UP (ref 1.8–2.4)
MCHC RBC-ENTMCNC: 31.9 PG — HIGH (ref 27–31)
MCHC RBC-ENTMCNC: 34.3 G/DL — SIGNIFICANT CHANGE UP (ref 32–37)
MCV RBC AUTO: 92.9 FL — SIGNIFICANT CHANGE UP (ref 80–94)
MONOCYTES # BLD AUTO: 1.06 K/UL — HIGH (ref 0.1–0.6)
MONOCYTES NFR BLD AUTO: 7.4 % — SIGNIFICANT CHANGE UP (ref 1.7–9.3)
NEUTROPHILS # BLD AUTO: 11.89 K/UL — HIGH (ref 1.4–6.5)
NEUTROPHILS NFR BLD AUTO: 82.6 % — HIGH (ref 42.2–75.2)
NRBC # BLD: 0 /100 WBCS — SIGNIFICANT CHANGE UP (ref 0–0)
PLATELET # BLD AUTO: 291 K/UL — SIGNIFICANT CHANGE UP (ref 130–400)
POTASSIUM SERPL-MCNC: 4.1 MMOL/L — SIGNIFICANT CHANGE UP (ref 3.5–5)
POTASSIUM SERPL-SCNC: 4.1 MMOL/L — SIGNIFICANT CHANGE UP (ref 3.5–5)
PROCALCITONIN SERPL-MCNC: 0.12 NG/ML — HIGH (ref 0.02–0.1)
PROT SERPL-MCNC: 6.9 G/DL — SIGNIFICANT CHANGE UP (ref 6–8)
RBC # BLD: 3.39 M/UL — LOW (ref 4.7–6.1)
RBC # FLD: 14 % — SIGNIFICANT CHANGE UP (ref 11.5–14.5)
SARS-COV-2 RNA SPEC QL NAA+PROBE: SIGNIFICANT CHANGE UP
SODIUM SERPL-SCNC: 135 MMOL/L — SIGNIFICANT CHANGE UP (ref 135–146)
WBC # BLD: 14.39 K/UL — HIGH (ref 4.8–10.8)
WBC # FLD AUTO: 14.39 K/UL — HIGH (ref 4.8–10.8)

## 2022-07-28 PROCEDURE — 99233 SBSQ HOSP IP/OBS HIGH 50: CPT

## 2022-07-28 RX ORDER — CEFTRIAXONE 500 MG/1
INJECTION, POWDER, FOR SOLUTION INTRAMUSCULAR; INTRAVENOUS
Refills: 0 | Status: DISCONTINUED | OUTPATIENT
Start: 2022-07-28 | End: 2022-07-29

## 2022-07-28 RX ORDER — CEFTRIAXONE 500 MG/1
1000 INJECTION, POWDER, FOR SOLUTION INTRAMUSCULAR; INTRAVENOUS EVERY 24 HOURS
Refills: 0 | Status: DISCONTINUED | OUTPATIENT
Start: 2022-07-29 | End: 2022-07-29

## 2022-07-28 RX ORDER — CEFTRIAXONE 500 MG/1
1000 INJECTION, POWDER, FOR SOLUTION INTRAMUSCULAR; INTRAVENOUS ONCE
Refills: 0 | Status: COMPLETED | OUTPATIENT
Start: 2022-07-28 | End: 2022-07-28

## 2022-07-28 RX ADMIN — Medication 60 MILLIGRAM(S): at 17:55

## 2022-07-28 RX ADMIN — CEFTRIAXONE 100 MILLIGRAM(S): 500 INJECTION, POWDER, FOR SOLUTION INTRAMUSCULAR; INTRAVENOUS at 10:03

## 2022-07-28 RX ADMIN — GABAPENTIN 300 MILLIGRAM(S): 400 CAPSULE ORAL at 17:24

## 2022-07-28 RX ADMIN — ATORVASTATIN CALCIUM 10 MILLIGRAM(S): 80 TABLET, FILM COATED ORAL at 21:50

## 2022-07-28 RX ADMIN — Medication 1 PATCH: at 11:17

## 2022-07-28 RX ADMIN — HEPARIN SODIUM 5000 UNIT(S): 5000 INJECTION INTRAVENOUS; SUBCUTANEOUS at 17:25

## 2022-07-28 RX ADMIN — Medication 25 MILLIGRAM(S): at 17:25

## 2022-07-28 RX ADMIN — Medication 1 PATCH: at 19:43

## 2022-07-28 RX ADMIN — Medication 150 MILLIGRAM(S): at 21:50

## 2022-07-28 RX ADMIN — OLANZAPINE 20 MILLIGRAM(S): 15 TABLET, FILM COATED ORAL at 21:50

## 2022-07-28 RX ADMIN — Medication 1 PATCH: at 11:55

## 2022-07-28 RX ADMIN — Medication 1 PATCH: at 11:50

## 2022-07-28 NOTE — PROGRESS NOTE ADULT - ASSESSMENT
81 yo M with PMHx of HTN, HLD, COPD, DM, non-Hodgkin's lymphoma, neuropathy, and urinary retention with chronic bains presents to the ED from [facility] for evaluation of increasing generalized weakness for a while.     # Generalized weakness 2/2 COVID illness  # Syncope workup  - in setting of covid  - CTH neg  - CXR clear; no hypoxia  - f/u orthostatics - negative   - Echo - pending   - Duplex negative  - monitor off steroids  - Patient unable to get out of bed today, so will be going for SNF not nursing home     #Leukocytosis   #Tachycardia   #SIRS positive   - Patient WBC spiked and developed tachy on 7/27  - Sepsis w/u   - CRP, ESR elevated   - CXR - no acute cardiopulmonary issues   - Bcx, Ucx, procal pending   - Start IV Rocephin 1g q24    #DAVID - resolved   #Hyponatremia - resolved   - s/p IVF  - DAVID resolved   - monitor BMP  - avoid nephrotoxic agents    #COPD  - active smoker, not on home O2  - counselled on smoking cessation  - c/w nicotine patch  - c/w duonebs q6 PRN    #DM  - controlled off medications     #HTN  - takes nifedipine 60mg daily, lisinopril 5mg daily, and metoprolol tartrate 25mg BID  - c/w nifedipine and metoprolol, hold lisinopril     #Non-Hogkins lymphoma  - outpt f/u    #Schizophrenia  - zyprexa 20  - trazodone 150    # HLD   - c/w atorvastatin    #Dementia/ Insomnia   - c/w zyprexa and trazodone    Misc:   DVT ppx: heparin subQ  GI ppx: none

## 2022-07-28 NOTE — PROGRESS NOTE ADULT - REASON FOR ADMISSION
generalized weakness

## 2022-07-28 NOTE — PROGRESS NOTE ADULT - SUBJECTIVE AND OBJECTIVE BOX
Hospital Day:  9d    Subjective:    Patient is a 82y old  Male who presents with a chief complaint of generalized weakness. No acute events overnight. Patient denies any complaints this morning.      Admitted to medicine for a primary diagnosis of generalized weakness in the setting of covid     Past Medical Hx:   Schizophrenia    Diabetes type 2, controlled    COPD (chronic obstructive pulmonary disease)    Dyslipidemia    Chronic retention of urine    Dyslipidemia      Past Sx:  Encounter for screening colonoscopy      Allergies:  No Known Allergies    Current Meds:   Standng Meds:  atorvastatin 10 milliGRAM(s) Oral at bedtime  cefTRIAXone   IVPB      gabapentin 300 milliGRAM(s) Oral two times a day  heparin   Injectable 5000 Unit(s) SubCutaneous every 12 hours  metoprolol tartrate 25 milliGRAM(s) Oral two times a day  nicotine -  14 mG/24Hr(s) Patch 1 Patch Transdermal daily  NIFEdipine XL 60 milliGRAM(s) Oral daily  OLANZapine 20 milliGRAM(s) Oral at bedtime  traZODone 150 milliGRAM(s) Oral at bedtime    PRN Meds:  acetaminophen     Tablet .. 650 milliGRAM(s) Oral every 6 hours PRN Temp greater or equal to 38C (100.4F), Mild Pain (1 - 3)  ALBUTerol    90 MICROgram(s) HFA Inhaler 2 Puff(s) Inhalation every 6 hours PRN Shortness of Breath and/or Wheezing  albuterol/ipratropium for Nebulization 3 milliLiter(s) Nebulizer every 6 hours PRN Shortness of Breath and/or Wheezing  aluminum hydroxide/magnesium hydroxide/simethicone Suspension 30 milliLiter(s) Oral every 4 hours PRN Dyspepsia  melatonin 3 milliGRAM(s) Oral at bedtime PRN Insomnia  ondansetron Injectable 4 milliGRAM(s) IV Push every 8 hours PRN Nausea and/or Vomiting    HOME MEDICATIONS:  atorvastatin 10 mg oral tablet: 1 tab(s) orally once a day  Centrum oral tablet: 1 tab(s) orally once a day  gabapentin 300 mg oral capsule: 1 cap(s) orally 2 times a day  metFORMIN 500 mg oral tablet: 1 tab(s) orally 2 times a day  metoprolol tartrate 25 mg oral tablet: 1 tab(s) orally 2 times a day  OLANZapine 20 mg oral tablet: 1 tab(s) orally once a day (at bedtime)  traZODone 150 mg oral tablet: 1 tab(s) orally once a day (at bedtime)  Vitamin D2 1.25 mg (50,000 intl units) oral capsule: 1 cap(s) orally once a week      Vital Signs:   T(F): 98.9 (07-28-22 @ 05:37), Max: 100.2 (07-27-22 @ 13:00)  HR: 108 (07-28-22 @ 05:37) (89 - 108)  BP: 157/71 (07-28-22 @ 05:37) (136/63 - 157/71)  RR: 18 (07-28-22 @ 05:37) (18 - 18)  SpO2: 96% (07-28-22 @ 08:00) (96% - 96%)        Physical Exam:   GENERAL: NAD  HEENT: NCAT  CHEST/LUNG: CTAB  HEART: Regular rate and rhythm; s1 s2 appreciated  ABDOMEN: Soft, Nontender, Nondistended; Bowel sounds present  EXTREMITIES: No LE edema b/l  SKIN: no rashes, no new lesions  NERVOUS SYSTEM:  Alert & Oriented X2  LINES/CATHETERS:        Labs:                         10.8   14.39 )-----------( 291      ( 28 Jul 2022 05:51 )             31.5     Neutophil% 82.6, Lymphocyte% 9.1, Monocyte% 7.4, Bands% 0.8 07-28-22 @ 05:51    28 Jul 2022 05:51    135    |  98     |  30     ----------------------------<  118    4.1     |  21     |  1.3      Ca    8.7        28 Jul 2022 05:51  Mg     2.1       28 Jul 2022 05:51    TPro  6.9    /  Alb  3.8    /  TBili  0.4    /  DBili  x      /  AST  20     /  ALT  14     /  AlkPhos  113    28 Jul 2022 05:51

## 2022-07-28 NOTE — PROGRESS NOTE ADULT - ATTENDING COMMENTS
Patient is an 81 y/o Male with  PMHx HTN, COPD, DM2, non-Hogkins lymphoma, schizophrenia, urinary retention with chronic bains here with generalized weakness, syncope found to have covid.    #Syncope  #generalized weakness  likely in setting of covid viral infection- continue to maintain isolation  cth neg  cxr clear; no hypoxia  orthostatics reportedly negative, BP stable   no cardiac arrythmia , EKG NSR,  d/c telemetry   -if TTE not completed during inpatient stay, will need to be completed as an outpatient study     #SIRS, suspect Sepsis   - tachycardic with jump in wbc   - pending cultures, UA-- CXR unremakrable   - low grade fever 100.2 yesterday   - EKG with sinus rhythm  - start ceftriaxone for empiric coverage, monitor closely     #Hyponatremia- improved, stable     #HTN- lopressor 25 bid, nifedipine 60    #h/o COPD- stable, continue duoneb q6 prn    #DM2- controlled off medications    #non-Hogkins lymphoma- outpt f/u    #Schizophrenia- zyprexa 20, trazodone 150    #DVT ppx- subq hep    #Progress Note Handoff:  d/c pending sepsis workup and then placement     Total time spent to complete patient's bedside assessment, review medical chart, discuss medical plan of care with covering medical team was more than 35 minutes  with >50% of time spendt face to face with patient, discussion with patient/family and/or coordination of care      Maranda Lama DO.

## 2022-07-28 NOTE — PROGRESS NOTE ADULT - PROVIDER SPECIALTY LIST ADULT
Hospitalist
Internal Medicine

## 2022-07-29 ENCOUNTER — TRANSCRIPTION ENCOUNTER (OUTPATIENT)
Age: 82
End: 2022-07-29

## 2022-07-29 VITALS
SYSTOLIC BLOOD PRESSURE: 130 MMHG | RESPIRATION RATE: 18 BRPM | HEART RATE: 101 BPM | TEMPERATURE: 99 F | DIASTOLIC BLOOD PRESSURE: 60 MMHG

## 2022-07-29 LAB
ALBUMIN SERPL ELPH-MCNC: 3.7 G/DL — SIGNIFICANT CHANGE UP (ref 3.5–5.2)
ALP SERPL-CCNC: 118 U/L — HIGH (ref 30–115)
ALT FLD-CCNC: 17 U/L — SIGNIFICANT CHANGE UP (ref 0–41)
ANION GAP SERPL CALC-SCNC: 9 MMOL/L — SIGNIFICANT CHANGE UP (ref 7–14)
AST SERPL-CCNC: 21 U/L — SIGNIFICANT CHANGE UP (ref 0–41)
BASOPHILS # BLD AUTO: 0.01 K/UL — SIGNIFICANT CHANGE UP (ref 0–0.2)
BASOPHILS NFR BLD AUTO: 0.1 % — SIGNIFICANT CHANGE UP (ref 0–1)
BILIRUB SERPL-MCNC: 0.2 MG/DL — SIGNIFICANT CHANGE UP (ref 0.2–1.2)
BUN SERPL-MCNC: 30 MG/DL — HIGH (ref 10–20)
CALCIUM SERPL-MCNC: 8.7 MG/DL — SIGNIFICANT CHANGE UP (ref 8.5–10.1)
CHLORIDE SERPL-SCNC: 98 MMOL/L — SIGNIFICANT CHANGE UP (ref 98–110)
CO2 SERPL-SCNC: 23 MMOL/L — SIGNIFICANT CHANGE UP (ref 17–32)
CREAT SERPL-MCNC: 1.3 MG/DL — SIGNIFICANT CHANGE UP (ref 0.7–1.5)
EGFR: 56 ML/MIN/1.73M2 — LOW
EOSINOPHIL # BLD AUTO: 0.01 K/UL — SIGNIFICANT CHANGE UP (ref 0–0.7)
EOSINOPHIL NFR BLD AUTO: 0.1 % — SIGNIFICANT CHANGE UP (ref 0–8)
GLUCOSE BLDC GLUCOMTR-MCNC: 105 MG/DL — HIGH (ref 70–99)
GLUCOSE BLDC GLUCOMTR-MCNC: 116 MG/DL — HIGH (ref 70–99)
GLUCOSE SERPL-MCNC: 111 MG/DL — HIGH (ref 70–99)
HCT VFR BLD CALC: 29.8 % — LOW (ref 42–52)
HGB BLD-MCNC: 10.4 G/DL — LOW (ref 14–18)
IMM GRANULOCYTES NFR BLD AUTO: 0.6 % — HIGH (ref 0.1–0.3)
LYMPHOCYTES # BLD AUTO: 1.25 K/UL — SIGNIFICANT CHANGE UP (ref 1.2–3.4)
LYMPHOCYTES # BLD AUTO: 11.8 % — LOW (ref 20.5–51.1)
MAGNESIUM SERPL-MCNC: 2.2 MG/DL — SIGNIFICANT CHANGE UP (ref 1.8–2.4)
MCHC RBC-ENTMCNC: 32.8 PG — HIGH (ref 27–31)
MCHC RBC-ENTMCNC: 34.9 G/DL — SIGNIFICANT CHANGE UP (ref 32–37)
MCV RBC AUTO: 94 FL — SIGNIFICANT CHANGE UP (ref 80–94)
MONOCYTES # BLD AUTO: 0.91 K/UL — HIGH (ref 0.1–0.6)
MONOCYTES NFR BLD AUTO: 8.6 % — SIGNIFICANT CHANGE UP (ref 1.7–9.3)
NEUTROPHILS # BLD AUTO: 8.38 K/UL — HIGH (ref 1.4–6.5)
NEUTROPHILS NFR BLD AUTO: 78.8 % — HIGH (ref 42.2–75.2)
NRBC # BLD: 0 /100 WBCS — SIGNIFICANT CHANGE UP (ref 0–0)
PLATELET # BLD AUTO: 299 K/UL — SIGNIFICANT CHANGE UP (ref 130–400)
POTASSIUM SERPL-MCNC: 4.1 MMOL/L — SIGNIFICANT CHANGE UP (ref 3.5–5)
POTASSIUM SERPL-SCNC: 4.1 MMOL/L — SIGNIFICANT CHANGE UP (ref 3.5–5)
PROT SERPL-MCNC: 6.8 G/DL — SIGNIFICANT CHANGE UP (ref 6–8)
RBC # BLD: 3.17 M/UL — LOW (ref 4.7–6.1)
RBC # FLD: 14 % — SIGNIFICANT CHANGE UP (ref 11.5–14.5)
SODIUM SERPL-SCNC: 130 MMOL/L — LOW (ref 135–146)
WBC # BLD: 10.62 K/UL — SIGNIFICANT CHANGE UP (ref 4.8–10.8)
WBC # FLD AUTO: 10.62 K/UL — SIGNIFICANT CHANGE UP (ref 4.8–10.8)

## 2022-07-29 PROCEDURE — 99239 HOSP IP/OBS DSCHRG MGMT >30: CPT

## 2022-07-29 RX ORDER — CEFPODOXIME PROXETIL 100 MG
1 TABLET ORAL
Qty: 10 | Refills: 0
Start: 2022-07-29 | End: 2022-08-02

## 2022-07-29 RX ORDER — ENOXAPARIN SODIUM 100 MG/ML
40 INJECTION SUBCUTANEOUS
Qty: 1 | Refills: 0
Start: 2022-07-29 | End: 2022-08-27

## 2022-07-29 RX ADMIN — HEPARIN SODIUM 5000 UNIT(S): 5000 INJECTION INTRAVENOUS; SUBCUTANEOUS at 05:47

## 2022-07-29 RX ADMIN — Medication 1 PATCH: at 11:38

## 2022-07-29 RX ADMIN — Medication 1 PATCH: at 06:35

## 2022-07-29 RX ADMIN — Medication 60 MILLIGRAM(S): at 05:48

## 2022-07-29 RX ADMIN — Medication 25 MILLIGRAM(S): at 05:48

## 2022-07-29 RX ADMIN — CEFTRIAXONE 100 MILLIGRAM(S): 500 INJECTION, POWDER, FOR SOLUTION INTRAMUSCULAR; INTRAVENOUS at 07:26

## 2022-07-29 RX ADMIN — Medication 1 PATCH: at 11:37

## 2022-07-29 RX ADMIN — GABAPENTIN 300 MILLIGRAM(S): 400 CAPSULE ORAL at 05:48

## 2022-07-29 NOTE — DISCHARGE NOTE NURSING/CASE MANAGEMENT/SOCIAL WORK - PATIENT PORTAL LINK FT
You can access the FollowMyHealth Patient Portal offered by [facility] by registering at the following website: [email or web address]. By joining [employer]’s FollowMyHealth portal, you will also be able to view your health information using other applications (apps) compatible with our system.

## 2022-07-29 NOTE — DISCHARGE NOTE NURSING/CASE MANAGEMENT/SOCIAL WORK - NSDCPEFALRISK_GEN_ALL_CORE
For information on Fall & Injury Prevention, visit: [email or web address] OR  [email or web address] OR  https://www.cdc.gov/steadi/patient.html

## 2022-08-01 NOTE — ED ADULT NURSE NOTE - FINAL NURSING ELECTRONIC SIGNATURE
Wear wrist splints at night (carpal tunnel wrist splint)    Start PT    Follow up in 4-6 weeks to consider other interventions if not improved  
Pt sent in by her hematologist for management of her chronic anemia. Pt placement in EDOU for blood transfusion. Reqired 2 units PRBCs which she tolerated well. Will dc with outpt f/up.
12-Sep-2019 16:49

## 2022-08-02 LAB
CULTURE RESULTS: SIGNIFICANT CHANGE UP
SPECIMEN SOURCE: SIGNIFICANT CHANGE UP

## 2022-08-04 DIAGNOSIS — I10 ESSENTIAL (PRIMARY) HYPERTENSION: ICD-10-CM

## 2022-08-04 DIAGNOSIS — C85.90 NON-HODGKIN LYMPHOMA, UNSPECIFIED, UNSPECIFIED SITE: ICD-10-CM

## 2022-08-04 DIAGNOSIS — Z91.81 HISTORY OF FALLING: ICD-10-CM

## 2022-08-04 DIAGNOSIS — E78.5 HYPERLIPIDEMIA, UNSPECIFIED: ICD-10-CM

## 2022-08-04 DIAGNOSIS — U07.1 COVID-19: ICD-10-CM

## 2022-08-04 DIAGNOSIS — R55 SYNCOPE AND COLLAPSE: ICD-10-CM

## 2022-08-04 DIAGNOSIS — F03.90 UNSPECIFIED DEMENTIA WITHOUT BEHAVIORAL DISTURBANCE: ICD-10-CM

## 2022-08-04 DIAGNOSIS — N17.9 ACUTE KIDNEY FAILURE, UNSPECIFIED: ICD-10-CM

## 2022-08-04 DIAGNOSIS — A41.9 SEPSIS, UNSPECIFIED ORGANISM: ICD-10-CM

## 2022-08-04 DIAGNOSIS — J44.9 CHRONIC OBSTRUCTIVE PULMONARY DISEASE, UNSPECIFIED: ICD-10-CM

## 2022-08-04 DIAGNOSIS — F20.9 SCHIZOPHRENIA, UNSPECIFIED: ICD-10-CM

## 2022-08-04 DIAGNOSIS — Z79.82 LONG TERM (CURRENT) USE OF ASPIRIN: ICD-10-CM

## 2022-08-04 DIAGNOSIS — Z79.84 LONG TERM (CURRENT) USE OF ORAL HYPOGLYCEMIC DRUGS: ICD-10-CM

## 2022-08-04 DIAGNOSIS — F17.210 NICOTINE DEPENDENCE, CIGARETTES, UNCOMPLICATED: ICD-10-CM

## 2022-08-04 DIAGNOSIS — R33.9 RETENTION OF URINE, UNSPECIFIED: ICD-10-CM

## 2022-08-04 DIAGNOSIS — E83.42 HYPOMAGNESEMIA: ICD-10-CM

## 2022-08-04 DIAGNOSIS — R00.0 TACHYCARDIA, UNSPECIFIED: ICD-10-CM

## 2022-08-04 DIAGNOSIS — E87.1 HYPO-OSMOLALITY AND HYPONATREMIA: ICD-10-CM

## 2022-08-04 DIAGNOSIS — G47.00 INSOMNIA, UNSPECIFIED: ICD-10-CM

## 2022-08-04 DIAGNOSIS — E11.42 TYPE 2 DIABETES MELLITUS WITH DIABETIC POLYNEUROPATHY: ICD-10-CM

## 2022-09-14 NOTE — ED ADULT NURSE NOTE - NS ED NURSE REPORT GIVEN DT
"  Assessment & Plan     Local infection of skin and subcutaneous tissue  Infection has resolved  Follow up as needed               BMI:   Estimated body mass index is 25.53 kg/m  as calculated from the following:    Height as of 7/11/22: 1.702 m (5' 7\").    Weight as of this encounter: 73.9 kg (163 lb).           No follow-ups on file.    Irwin Eckert MD  [facility]    Praful Yao is a 53 year old, presenting for the following health issues:  Derm Problem (Rash follow up)      History of Present Illness       Reason for visit:  Follow up on skin infection    She eats 2-3 servings of fruits and vegetables daily.She consumes 2 sweetened beverage(s) daily.She exercises with enough effort to increase her heart rate 20 to 29 minutes per day.  She exercises with enough effort to increase her heart rate 4 days per week. She is missing 3 dose(s) of medications per week.  She is not taking prescribed medications regularly due to remembering to take.             Review of Systems   Skin: Positive for rash.            Objective    /77 (BP Location: Right arm, Patient Position: Sitting)   Pulse 80   Temp 98.6  F (37  C) (Tympanic)   Resp 16   Wt 73.9 kg (163 lb)   LMP 07/11/2022 (Approximate)   BMI 25.53 kg/m    Body mass index is 25.53 kg/m .  Physical Exam   Alert, oriented  Rash has resolved with scarring                            "
30
27-Sep-2019 17:44

## 2022-10-16 NOTE — ED PROCEDURE NOTE - PROCEDURE NAME, MLM
08/06/18    [facility]  [address] [place] [address]  [phone]                               Regarding Patient:  Errol Bahena  [address]  [place] [address]    To Whom It Concern:    This is to certify Errol Bahena was evaluated with HELGA Castaneda on 08/06/18.  He was provided with the following work recommendations:     May return to full duty immediately with no restrictions on 08/07/18.  No restrictions  Additional Comments or Recommendations: Please excuse from work 08/01-06/2018.          ____________________________  HELGA Castaneda                   Peripheral Line Insertion Speaking Coherently

## 2022-10-25 NOTE — SWALLOW BEDSIDE ASSESSMENT ADULT - SWALLOW EVAL: RECOMMENDED FEEDING/EATING TECHNIQUES
small sips/bites
small sips/bites
Xenograft Text: The defect edges were debeveled with a #15 scalpel blade.  Given the location of the defect, shape of the defect and the proximity to free margins a xenograft was deemed most appropriate.  The graft was then trimmed to fit the size of the defect.  The graft was then placed in the primary defect and oriented appropriately.

## 2022-12-19 NOTE — ED ADULT NURSE NOTE - NS PRO AD NO ADVANCE DIRECTIVE
26-year-old male presented to ED for testicular bump.  Patient had UA which was negative for infection.  He had a testicular ultrasound which demonstrated scrotal left.  No testicular torsion.  DC home with urology follow-up. No

## 2023-01-07 ENCOUNTER — INPATIENT (INPATIENT)
Facility: HOSPITAL | Age: 83
LOS: 2 days | Discharge: HOME HEALTH | End: 2023-01-10
Attending: INTERNAL MEDICINE | Admitting: INTERNAL MEDICINE
Payer: MEDICARE

## 2023-01-07 VITALS
RESPIRATION RATE: 20 BRPM | DIASTOLIC BLOOD PRESSURE: 77 MMHG | SYSTOLIC BLOOD PRESSURE: 180 MMHG | OXYGEN SATURATION: 98 % | TEMPERATURE: 103 F

## 2023-01-07 DIAGNOSIS — Z12.11 ENCOUNTER FOR SCREENING FOR MALIGNANT NEOPLASM OF COLON: Chronic | ICD-10-CM

## 2023-01-07 LAB
ALBUMIN SERPL ELPH-MCNC: 4.1 G/DL — SIGNIFICANT CHANGE UP (ref 3.5–5.2)
ALP SERPL-CCNC: 84 U/L — SIGNIFICANT CHANGE UP (ref 30–115)
ALT FLD-CCNC: 10 U/L — SIGNIFICANT CHANGE UP (ref 0–41)
ANION GAP SERPL CALC-SCNC: 10 MMOL/L — SIGNIFICANT CHANGE UP (ref 7–14)
APPEARANCE UR: CLEAR — SIGNIFICANT CHANGE UP
APTT BLD: 31.6 SEC — SIGNIFICANT CHANGE UP (ref 27–39.2)
AST SERPL-CCNC: 21 U/L — SIGNIFICANT CHANGE UP (ref 0–41)
BACTERIA # UR AUTO: NEGATIVE — SIGNIFICANT CHANGE UP
BASOPHILS # BLD AUTO: 0.01 K/UL — SIGNIFICANT CHANGE UP (ref 0–0.2)
BASOPHILS NFR BLD AUTO: 0.1 % — SIGNIFICANT CHANGE UP (ref 0–1)
BILIRUB SERPL-MCNC: 0.2 MG/DL — SIGNIFICANT CHANGE UP (ref 0.2–1.2)
BILIRUB UR-MCNC: NEGATIVE — SIGNIFICANT CHANGE UP
BUN SERPL-MCNC: 18 MG/DL — SIGNIFICANT CHANGE UP (ref 10–20)
CALCIUM SERPL-MCNC: 9.2 MG/DL — SIGNIFICANT CHANGE UP (ref 8.4–10.5)
CHLORIDE SERPL-SCNC: 97 MMOL/L — LOW (ref 98–110)
CO2 SERPL-SCNC: 24 MMOL/L — SIGNIFICANT CHANGE UP (ref 17–32)
COLOR SPEC: ABNORMAL
CREAT SERPL-MCNC: 1.3 MG/DL — SIGNIFICANT CHANGE UP (ref 0.7–1.5)
DIFF PNL FLD: ABNORMAL
EGFR: 56 ML/MIN/1.73M2 — LOW
EOSINOPHIL # BLD AUTO: 0 K/UL — SIGNIFICANT CHANGE UP (ref 0–0.7)
EOSINOPHIL NFR BLD AUTO: 0 % — SIGNIFICANT CHANGE UP (ref 0–8)
EPI CELLS # UR: 1 /HPF — SIGNIFICANT CHANGE UP (ref 0–5)
FLUAV AG NPH QL: SIGNIFICANT CHANGE UP
FLUBV AG NPH QL: SIGNIFICANT CHANGE UP
GLUCOSE SERPL-MCNC: 113 MG/DL — HIGH (ref 70–99)
GLUCOSE UR QL: NEGATIVE — SIGNIFICANT CHANGE UP
HCT VFR BLD CALC: 32.3 % — LOW (ref 42–52)
HGB BLD-MCNC: 11.2 G/DL — LOW (ref 14–18)
HYALINE CASTS # UR AUTO: 0 /LPF — SIGNIFICANT CHANGE UP (ref 0–7)
IMM GRANULOCYTES NFR BLD AUTO: 0.4 % — HIGH (ref 0.1–0.3)
INR BLD: 1.27 RATIO — SIGNIFICANT CHANGE UP (ref 0.65–1.3)
KETONES UR-MCNC: NEGATIVE — SIGNIFICANT CHANGE UP
LACTATE SERPL-SCNC: 0.9 MMOL/L — SIGNIFICANT CHANGE UP (ref 0.7–2)
LEUKOCYTE ESTERASE UR-ACNC: ABNORMAL
LYMPHOCYTES # BLD AUTO: 0.52 K/UL — LOW (ref 1.2–3.4)
LYMPHOCYTES # BLD AUTO: 5.7 % — LOW (ref 20.5–51.1)
MCHC RBC-ENTMCNC: 32 PG — HIGH (ref 27–31)
MCHC RBC-ENTMCNC: 34.7 G/DL — SIGNIFICANT CHANGE UP (ref 32–37)
MCV RBC AUTO: 92.3 FL — SIGNIFICANT CHANGE UP (ref 80–94)
MONOCYTES # BLD AUTO: 0.71 K/UL — HIGH (ref 0.1–0.6)
MONOCYTES NFR BLD AUTO: 7.7 % — SIGNIFICANT CHANGE UP (ref 1.7–9.3)
NEUTROPHILS # BLD AUTO: 7.89 K/UL — HIGH (ref 1.4–6.5)
NEUTROPHILS NFR BLD AUTO: 86.1 % — HIGH (ref 42.2–75.2)
NITRITE UR-MCNC: NEGATIVE — SIGNIFICANT CHANGE UP
NRBC # BLD: 0 /100 WBCS — SIGNIFICANT CHANGE UP (ref 0–0)
PH UR: 7.5 — SIGNIFICANT CHANGE UP (ref 5–8)
PLATELET # BLD AUTO: 176 K/UL — SIGNIFICANT CHANGE UP (ref 130–400)
POTASSIUM SERPL-MCNC: 4.6 MMOL/L — SIGNIFICANT CHANGE UP (ref 3.5–5)
POTASSIUM SERPL-SCNC: 4.6 MMOL/L — SIGNIFICANT CHANGE UP (ref 3.5–5)
PROT SERPL-MCNC: 7 G/DL — SIGNIFICANT CHANGE UP (ref 6–8)
PROT UR-MCNC: ABNORMAL
PROTHROM AB SERPL-ACNC: 14.6 SEC — HIGH (ref 9.95–12.87)
RBC # BLD: 3.5 M/UL — LOW (ref 4.7–6.1)
RBC # FLD: 14.2 % — SIGNIFICANT CHANGE UP (ref 11.5–14.5)
RBC CASTS # UR COMP ASSIST: 9 /HPF — HIGH (ref 0–4)
RSV RNA NPH QL NAA+NON-PROBE: SIGNIFICANT CHANGE UP
SARS-COV-2 RNA SPEC QL NAA+PROBE: SIGNIFICANT CHANGE UP
SODIUM SERPL-SCNC: 131 MMOL/L — LOW (ref 135–146)
SP GR SPEC: 1 — LOW (ref 1.01–1.03)
TROPONIN T SERPL-MCNC: <0.01 NG/ML — SIGNIFICANT CHANGE UP
UROBILINOGEN FLD QL: SIGNIFICANT CHANGE UP
WBC # BLD: 9.17 K/UL — SIGNIFICANT CHANGE UP (ref 4.8–10.8)
WBC # FLD AUTO: 9.17 K/UL — SIGNIFICANT CHANGE UP (ref 4.8–10.8)
WBC UR QL: 24 /HPF — HIGH (ref 0–5)

## 2023-01-07 PROCEDURE — 71045 X-RAY EXAM CHEST 1 VIEW: CPT | Mod: 26

## 2023-01-07 PROCEDURE — 99285 EMERGENCY DEPT VISIT HI MDM: CPT | Mod: CS,GC

## 2023-01-07 PROCEDURE — 99223 1ST HOSP IP/OBS HIGH 75: CPT

## 2023-01-07 RX ORDER — INSULIN LISPRO 100/ML
VIAL (ML) SUBCUTANEOUS
Refills: 0 | Status: DISCONTINUED | OUTPATIENT
Start: 2023-01-07 | End: 2023-01-10

## 2023-01-07 RX ORDER — GLUCAGON INJECTION, SOLUTION 0.5 MG/.1ML
1 INJECTION, SOLUTION SUBCUTANEOUS ONCE
Refills: 0 | Status: DISCONTINUED | OUTPATIENT
Start: 2023-01-07 | End: 2023-01-10

## 2023-01-07 RX ORDER — ACETAMINOPHEN 500 MG
650 TABLET ORAL EVERY 6 HOURS
Refills: 0 | Status: DISCONTINUED | OUTPATIENT
Start: 2023-01-07 | End: 2023-01-10

## 2023-01-07 RX ORDER — OLANZAPINE 15 MG/1
20 TABLET, FILM COATED ORAL AT BEDTIME
Refills: 0 | Status: DISCONTINUED | OUTPATIENT
Start: 2023-01-07 | End: 2023-01-10

## 2023-01-07 RX ORDER — DEXTROSE 50 % IN WATER 50 %
25 SYRINGE (ML) INTRAVENOUS ONCE
Refills: 0 | Status: DISCONTINUED | OUTPATIENT
Start: 2023-01-07 | End: 2023-01-10

## 2023-01-07 RX ORDER — TRAZODONE HCL 50 MG
150 TABLET ORAL AT BEDTIME
Refills: 0 | Status: DISCONTINUED | OUTPATIENT
Start: 2023-01-07 | End: 2023-01-10

## 2023-01-07 RX ORDER — ACETAMINOPHEN 500 MG
975 TABLET ORAL ONCE
Refills: 0 | Status: DISCONTINUED | OUTPATIENT
Start: 2023-01-07 | End: 2023-01-07

## 2023-01-07 RX ORDER — NIFEDIPINE 30 MG
90 TABLET, EXTENDED RELEASE 24 HR ORAL DAILY
Refills: 0 | Status: DISCONTINUED | OUTPATIENT
Start: 2023-01-07 | End: 2023-01-10

## 2023-01-07 RX ORDER — CEFEPIME 1 G/1
2000 INJECTION, POWDER, FOR SOLUTION INTRAMUSCULAR; INTRAVENOUS EVERY 12 HOURS
Refills: 0 | Status: DISCONTINUED | OUTPATIENT
Start: 2023-01-07 | End: 2023-01-09

## 2023-01-07 RX ORDER — PANTOPRAZOLE SODIUM 20 MG/1
40 TABLET, DELAYED RELEASE ORAL
Refills: 0 | Status: DISCONTINUED | OUTPATIENT
Start: 2023-01-07 | End: 2023-01-10

## 2023-01-07 RX ORDER — METOPROLOL TARTRATE 50 MG
25 TABLET ORAL
Refills: 0 | Status: DISCONTINUED | OUTPATIENT
Start: 2023-01-07 | End: 2023-01-10

## 2023-01-07 RX ORDER — VANCOMYCIN HCL 1 G
1500 VIAL (EA) INTRAVENOUS ONCE
Refills: 0 | Status: COMPLETED | OUTPATIENT
Start: 2023-01-07 | End: 2023-01-07

## 2023-01-07 RX ORDER — VANCOMYCIN HCL 1 G
1000 VIAL (EA) INTRAVENOUS EVERY 12 HOURS
Refills: 0 | Status: DISCONTINUED | OUTPATIENT
Start: 2023-01-07 | End: 2023-01-09

## 2023-01-07 RX ORDER — ONDANSETRON 8 MG/1
4 TABLET, FILM COATED ORAL EVERY 8 HOURS
Refills: 0 | Status: DISCONTINUED | OUTPATIENT
Start: 2023-01-07 | End: 2023-01-10

## 2023-01-07 RX ORDER — ACETAMINOPHEN 500 MG
975 TABLET ORAL ONCE
Refills: 0 | Status: COMPLETED | OUTPATIENT
Start: 2023-01-07 | End: 2023-01-07

## 2023-01-07 RX ORDER — ENOXAPARIN SODIUM 100 MG/ML
40 INJECTION SUBCUTANEOUS EVERY 24 HOURS
Refills: 0 | Status: DISCONTINUED | OUTPATIENT
Start: 2023-01-07 | End: 2023-01-10

## 2023-01-07 RX ORDER — SODIUM CHLORIDE 9 MG/ML
1000 INJECTION, SOLUTION INTRAVENOUS
Refills: 0 | Status: DISCONTINUED | OUTPATIENT
Start: 2023-01-07 | End: 2023-01-10

## 2023-01-07 RX ORDER — ACETAMINOPHEN 500 MG
650 TABLET ORAL ONCE
Refills: 0 | Status: DISCONTINUED | OUTPATIENT
Start: 2023-01-07 | End: 2023-01-07

## 2023-01-07 RX ORDER — DEXTROSE 50 % IN WATER 50 %
12.5 SYRINGE (ML) INTRAVENOUS ONCE
Refills: 0 | Status: DISCONTINUED | OUTPATIENT
Start: 2023-01-07 | End: 2023-01-10

## 2023-01-07 RX ORDER — DEXTROSE 50 % IN WATER 50 %
15 SYRINGE (ML) INTRAVENOUS ONCE
Refills: 0 | Status: DISCONTINUED | OUTPATIENT
Start: 2023-01-07 | End: 2023-01-10

## 2023-01-07 RX ORDER — GABAPENTIN 400 MG/1
300 CAPSULE ORAL
Refills: 0 | Status: DISCONTINUED | OUTPATIENT
Start: 2023-01-07 | End: 2023-01-10

## 2023-01-07 RX ORDER — LANOLIN ALCOHOL/MO/W.PET/CERES
5 CREAM (GRAM) TOPICAL AT BEDTIME
Refills: 0 | Status: DISCONTINUED | OUTPATIENT
Start: 2023-01-07 | End: 2023-01-10

## 2023-01-07 RX ORDER — IPRATROPIUM/ALBUTEROL SULFATE 18-103MCG
3 AEROSOL WITH ADAPTER (GRAM) INHALATION EVERY 6 HOURS
Refills: 0 | Status: DISCONTINUED | OUTPATIENT
Start: 2023-01-07 | End: 2023-01-10

## 2023-01-07 RX ORDER — CEFEPIME 1 G/1
2000 INJECTION, POWDER, FOR SOLUTION INTRAMUSCULAR; INTRAVENOUS ONCE
Refills: 0 | Status: COMPLETED | OUTPATIENT
Start: 2023-01-07 | End: 2023-01-07

## 2023-01-07 RX ORDER — SODIUM CHLORIDE 9 MG/ML
2000 INJECTION, SOLUTION INTRAVENOUS ONCE
Refills: 0 | Status: COMPLETED | OUTPATIENT
Start: 2023-01-07 | End: 2023-01-07

## 2023-01-07 RX ORDER — ATORVASTATIN CALCIUM 80 MG/1
10 TABLET, FILM COATED ORAL AT BEDTIME
Refills: 0 | Status: DISCONTINUED | OUTPATIENT
Start: 2023-01-07 | End: 2023-01-10

## 2023-01-07 RX ADMIN — Medication 150 MILLIGRAM(S): at 22:31

## 2023-01-07 RX ADMIN — OLANZAPINE 20 MILLIGRAM(S): 15 TABLET, FILM COATED ORAL at 22:31

## 2023-01-07 RX ADMIN — SODIUM CHLORIDE 2000 MILLILITER(S): 9 INJECTION, SOLUTION INTRAVENOUS at 14:58

## 2023-01-07 RX ADMIN — Medication 300 MILLIGRAM(S): at 15:41

## 2023-01-07 RX ADMIN — CEFEPIME 100 MILLIGRAM(S): 1 INJECTION, POWDER, FOR SOLUTION INTRAMUSCULAR; INTRAVENOUS at 15:41

## 2023-01-07 RX ADMIN — Medication 975 MILLIGRAM(S): at 14:56

## 2023-01-07 RX ADMIN — ENOXAPARIN SODIUM 40 MILLIGRAM(S): 100 INJECTION SUBCUTANEOUS at 22:31

## 2023-01-07 RX ADMIN — ATORVASTATIN CALCIUM 10 MILLIGRAM(S): 80 TABLET, FILM COATED ORAL at 22:54

## 2023-01-07 NOTE — ED ADULT NURSE NOTE - INTERVENTIONS DEFINITIONS
Instruct patient to call for assistance/Non-slip footwear when patient is off stretcher/Physically safe environment: no spills, clutter or unnecessary equipment/Stretcher in lowest position, wheels locked, appropriate side rails in place

## 2023-01-07 NOTE — H&P ADULT - HISTORY OF PRESENT ILLNESS
82M with PMHx of HTN, HLD, DM, COPD (not on home O2), Non-hodgkin's lymphoma, neuropathy, urinary retention with prior chronic bains sent in from [facility] for fever and weakness. Patient sent in without nursing home facesheet, and nursing home not answering on any number/extension, with no medical history or med rec. Hx supplemented by sister who last spoke to patient 2 weeks ago. Pt himself denies any complaints, but endorses suprapubic tenderness to palpation. As per ED staff, pt has been incontinent in the ED, and they were unable to place a bains so urology was called to place it. Sister mentions that she last spoke to him on [place] where he "felt off" and less talkative than usual. Pt denies any CP, SOB, palpitations, headache, vision changes, abd pain, N/V/C/D, or any  symptoms other than superpubic ttp and incontinence as above.     In the ED: /77, HR 89, T 103F, RR 20 satting 98% on RA. Labs notable for WBC 9k (86% N), Mild hyponatremia 131. Cr 1.3 BUN 18, previous baseline 1-1.3 in 7/2022. Trop -ve, EKG **. RVP -ve. CXR (wet read) shows LLL atelectasis vs opacity. Urology called to place a bains as ED staff encountered resistance while attempting; pending UA and UCx. Given 2L LR in ED, vanc, cefepime, and admitted to medicine.  82M with PMHx of Schizophrenia, Dementia, HTN, HLD, DM, COPD (not on home O2), Non-hodgkin's lymphoma, neuropathy, urinary retention with prior chronic bains sent in from [facility] for fever and weakness. Patient sent in without nursing home facesheet, and nursing home not answering on any number/extension, with no medical history or med rec. Hx supplemented by sister who last spoke to patient 2 weeks ago. Pt himself denies any complaints, but endorses suprapubic tenderness to palpation. As per ED staff, pt has been incontinent in the ED, and they were unable to place a bains so urology was called to place it. Sister mentions that she last spoke to him on [place] where he "felt off" and less talkative than usual. Pt denies any CP, SOB, palpitations, headache, vision changes, abd pain, N/V/C/D, or any  symptoms other than superpubic ttp and incontinence as above.     In the ED: /77, HR 89, T 103F, RR 20 satting 98% on RA. Labs notable for WBC 9k (86% N), Mild hyponatremia 131. Cr 1.3 BUN 18, previous baseline 1-1.3 in 7/2022. Trop -ve, EKG **. RVP -ve. CXR (wet read) shows LLL atelectasis vs opacity. Urology called to place a bains as ED staff encountered resistance while attempting; pending UA and UCx. Given 2L LR in ED, vanc, cefepime, and admitted to medicine.  82M with PMHx of Schizophrenia, Dementia, HTN, HLD, DM, COPD (not on home O2), Non-hodgkin's lymphoma, neuropathy, urinary retention with prior chronic bains sent in from [facility] for fever and weakness. Patient sent in without nursing home facesheet, and nursing home not answering on any number/extension, with no medical history or med rec. Hx supplemented by sister who last spoke to patient 2 weeks ago. Pt himself denies any complaints, but endorses suprapubic tenderness to palpation. As per ED staff, pt has been incontinent in the ED, and they were unable to place a bains so urology was called to place it. Sister mentions that she last spoke to him on [place] where he "felt off" and less talkative than usual. Pt denies any CP, SOB, palpitations, headache, vision changes, abd pain, N/V/C/D, or any  symptoms other than superpubic ttp and incontinence as above.     In the ED: /77, HR 89, T 103F, RR 20 satting 98% on RA. Labs notable for WBC 9k (86% N), Mild hyponatremia 131. Cr 1.3 BUN 18, previous baseline 1-1.3 in 7/2022. Trop -ve. RVP -ve. CXR (wet read) shows LLL atelectasis vs opacity. Urology called to place a bains as ED staff encountered resistance while attempting; pending UA and UCx. Given 2L LR in ED, vanc, cefepime, and admitted to medicine.

## 2023-01-07 NOTE — H&P ADULT - ATTENDING COMMENTS
**HX and physical severely limited due to baseline dementia. Supplemental information obtained fom family, house staff, and EMR    83 YO M w/ a PMHx of Schizophrenia, Dementia, HTN, HLD, DM2, COPD (not on home O2), Non-hodgkin's lymphoma, neuropathy, and urinary retention with prior chronic bains who was sent into the hospital from his NH ([facility]) for eval of generalized weakness. Unable to obtain ROS.     In the ED, Urology was called to place bains. UA is positive for LE. Pt started on IVF (LR) and IV ABXs (Cefep) in the ED.     FMHx:   -No family Hx of early cardiac death, CAD, asthma, or genetic disorders identified    Physical exam shows pt in NAD. VSS, afebrile, not hypoxic on RA. A&Ox2. Neuro exam without deficits, motor/sensory intact, no dysarthria, no facial asymmetry. Muscle strength/sensation intact. CTA B/L with no W/C/R. RRR, no M/G/R. ABD is soft and non-tender, normoactive BSs. LEs without swelling. No rashes. Bains in place w/ blood-tinged urine. Labs and radiology as above.     Generalized weakness, rule out infectious etiology vs physical deconditioning vs urinary retention; no sepsis present on admission. FU cultures. Procal. C/w IV ABXs (Ceftri). Pt received IVFs (LR) in the ED. PT. Fall precautions.   -Urology placed a bains in the ED.   -Pt apparently had suprapubic TTP in the ED, not present on my exam    Normocytic anemia, above baseline. Pt denies bleeding symptoms. Replace as necessary.     HX of Schizophrenia, Dementia, HTN, HLD, DM2, COPD (not on home O2), Non-hodgkin's lymphoma, and neuropathy. Restart home meds, except as stated above. DVT PPX. Inform PCP of pt's admission to hospital. My note supersedes the residents note.     Date seen by [email or web address] **HX and physical severely limited due to baseline dementia. Supplemental information obtained fom family, house staff, and EMR    83 YO M w/ a PMHx of Schizophrenia, Dementia, HTN, HLD, DM2, COPD (not on home O2), Non-hodgkin's lymphoma, neuropathy, and urinary retention with prior chronic bains who was sent into the hospital from his NH ([facility]) for eval of generalized weakness. Unable to obtain ROS.     In the ED, Urology was called to place bains. UA is positive for LE. Pt started on IVF (LR) and IV ABXs (Cefep) in the ED.     FMHx:   -No family Hx of early cardiac death, CAD, asthma, or genetic disorders identified    Physical exam shows pt in NAD. VSS, afebrile, not hypoxic on RA. A&Ox2. Neuro exam without deficits, motor/sensory intact, no dysarthria, no facial asymmetry. Muscle strength/sensation intact. CTA B/L with no W/C/R. RRR, no M/G/R. ABD is soft and non-tender, normoactive BSs. LEs without swelling. No rashes. Bains in place w/ blood-tinged urine. Labs and radiology as above.     Generalized weakness, rule out infectious etiology vs physical deconditioning vs urinary retention; SIRs present on admission. FU cultures. Procal. C/w IV ABXs (Ceftri). Pt received IVFs (LR) in the ED. PT. Fall precautions.   -Urology placed a bains in the ED.   -Pt apparently had suprapubic TTP in the ED, not present on my exam    Normocytic anemia, above baseline. Pt denies bleeding symptoms. Replace as necessary.     HX of Schizophrenia, Dementia, HTN, HLD, DM2, COPD (not on home O2), Non-hodgkin's lymphoma, and neuropathy. Restart home meds, except as stated above. DVT PPX. Inform PCP of pt's admission to hospital. My note supersedes the residents note.     Date seen by [email or web address]

## 2023-01-07 NOTE — ED PROVIDER NOTE - PHYSICAL EXAMINATION
_  Vital signs reviewed; ABCs intact  GENERAL: Well nourished, comfortable  SKIN: +Hot to the touch, dry  HEAD & NECK: NCAT, supple neck  EYES: EOMI, PER B/L, no scleral icterus, no conjunctival injection, no conjunctival pallor  ENT: +Mildly dry MM, uvula midline; no oropharyngeal erythema or exudates  CARD: RRR, S1, S2; no murmurs, no rubs, no gallops  RESP: Normal respiratory effort, CTAB, no rales, no wheezing  ABD: Soft, ND, NT, no rebound, no guarding, +BS; no CVAT  EXT: Pulses palpable distally; no edema; no calf tenderness; symmetrical calf circumferences  NEUROMSK: Grossly intact  PSYCH: AAOx3, cooperative, appropriate

## 2023-01-07 NOTE — ED PROVIDER NOTE - OBJECTIVE STATEMENT
Patient is an 82-year-old male with a history of schizophrenia, hypertension, hyperlipidemia, COPD, diabetes, non-Hodgkin's lymphoma, neuropathy, urinary retention with chronic Cobos, patient BIBEMS from [facility] for fever and weakness. Patient came without a facesheet, and patient is only oriented x1 (to self only). Denies any complaints, but febrile to 102 orally.    ADDENDUM: Called [facility], no response, left VM. Spoke with sister Fatmata, who last saw him on 12/25, she visits him every 2 weeks. She states that at his baseline, he has short term memory loss, and only oriented to self, but is much more talkative and active. Patient is an 82-year-old male with a history of schizophrenia, hypertension, hyperlipidemia, COPD, diabetes, non-Hodgkin's lymphoma, neuropathy, urinary retention, patient BIBEMS from [facility] for fever and weakness. Patient came without a facesheet, and patient is only oriented x1 (to self only). Denies any complaints, but febrile to 102 orally.    ADDENDUM: Called [facility], no response, left VM. Spoke with sister Fatmata, who last saw him on 12/25, she visits him every 2 weeks. She states that at his baseline, he has short term memory loss, and only oriented to self, but is much more talkative and active.

## 2023-01-07 NOTE — ED PROVIDER NOTE - CLINICAL SUMMARY MEDICAL DECISION MAKING FREE TEXT BOX
Patient presented with fever and weakness reportedly from NH. Patient is limited historian and unable to provide further history. On arrival patient (+) febrile with signs of sepsis. Obtained labs which were grossly unremarkable including no significant leukocytosis, anemia, signs of dehydration/DAVID, transaminitis or significant electrolyte abnormalities. CXR negative. Several attempts made to straight cath patient unsuccessfully and therefore consulted urology who evaluated patient in the ED and were able to obtain urine - sent to lab and revealed (+) likely UTI. Given the above, will start on IV abx and admit for further management. HD stable at time of admission.

## 2023-01-07 NOTE — H&P ADULT - NSHPREVIEWOFSYSTEMS_GEN_ALL_CORE
CONSTITUTIONAL: No weakness, fevers or chills per patient  EYES/ENT: No visual changes;  No vertigo or throat pain   NECK: No pain or stiffness  RESPIRATORY: No cough, wheezing, hemoptysis; No shortness of breath  CARDIOVASCULAR: No chest pain or palpitations  GASTROINTESTINAL: No abdominal or epigastric pain. No nausea, vomiting, or hematemesis; No diarrhea or constipation. No melena or hematochezia.  GENITOURINARY: No dysuria, frequency or hematuria. (+) Incontinence in ED. (+) Suprapubic ttp  NEUROLOGICAL: No numbness or weakness  SKIN: No itching, rashes

## 2023-01-07 NOTE — ED ADULT NURSE NOTE - OBJECTIVE STATEMENT
SUBJECTIVE:   Natalie Dillard is a 72 year old female who presents to clinic today for the following health issues:      Problem:   Chief Complaint   Patient presents with     Patient Request     would like to discuss family genics  Heart  & Dementia     Otalgia     left ear pain off and on x 2 weeks     ADDITIONAL HPI: 72 year old female here for above issue. Concerned about family history of CHF and cardiomyopathy and mitral valve disorder. She had work-up with cardiology including ECHO that was normal. See cardiology note from 6/3/2015 for details.    Father and brother had dementia. She is worried. On occasionaly she will notice lapses in memory like can't recall a name. No executive function difficulties.    ROS: 10 point review of systems negative except as per HPI.    PAST MEDICAL HISTORY:  Past Medical History:   Diagnosis Date     Anxiety state, unspecified      Fracture of phalanx of finger 10/22/2013     Hypoglycemia, unspecified      Injury, other and unspecified, knee, leg, ankle, and foot     Lt Knee      polyp 3/1/2006    urethral     Unspecified sinusitis (chronic)      Variants of migraine, not elsewhere classified, without mention of intractable migraine without mention of status migrainosus     Visual Change        ACTIVE MEDICAL PROBLEMS:  Patient Active Problem List   Diagnosis     Migraine variant     Sinusitis, chronic     POLYP URETHRA     CARDIOVASCULAR SCREENING; LDL GOAL LESS THAN 160     Advanced directives, counseling/discussion     Osteopenia     Paroxysmal supraventricular tachycardia (H)     RASHARD (generalized anxiety disorder)        FAMILY HISTORY:  Family History   Problem Relation Age of Onset     CEREBROVASCULAR DISEASE Father      HEART DISEASE Father      rheumatic fever     Hypothyroidism Father      HEART DISEASE Brother      HEART DISEASE Brother      pacemaker. Occupational pain exposure     Aneurysm Mother      AAA     Hyperthyroidism Mother        SOCIAL  "HISTORY:  Social History     Social History     Marital status:      Spouse name: N/A     Number of children: N/A     Years of education: N/A     Occupational History     Not on file.     Social History Main Topics     Smoking status: Never Smoker     Smokeless tobacco: Never Used     Alcohol use No     Drug use: No     Sexual activity: Yes     Partners: Male     Other Topics Concern     Parent/Sibling W/ Cabg, Mi Or Angioplasty Before 65f 55m? No     Social History Narrative       MEDICATIONS:  Current Outpatient Prescriptions   Medication Sig Dispense Refill     OVER-THE-COUNTER Hair, Skin & nail Gummies  2-3 daily       OVER-THE-COUNTER Serenagen (Heart Calming Formula) 2 capsules daily       OVER-THE-COUNTER CalMax (Calcium & Magnesium) 2 tsp mixed with hot water before bed       OVER-THE-COUNTER E-Tea (supports immune system)  1 capsule every morning mixed with hot water       OVER-THE-COUNTER ZEAL (Focus  & Health) nutritional drink mix   Uses as needed       COMPRESSION STOCKINGS 1 each daily Thigh high compression stockings. 2 each 1     OVER-THE-COUNTER Revii  Ultimate variety pack    Twice daily       albuterol (ALBUTEROL) 108 (90 BASE) MCG/ACT Inhaler 2 puffs at least TID and 2 puffs Q 4 hours prn. 1 Inhaler 6     OVER-THE-COUNTER Ionic Silver Water   1 teaspoon twice daily         ALLERGIES:     Allergies   Allergen Reactions     Amoxicillin      Amox     Caffeine      Novahistine [Alkylamines]        Problem list, Medication list, Allergies, and Medical/Social/Surgical histories reviewed in [facility] and updated as appropriate.    OBJECTIVE:                                                    VITALS: /68 (BP Location: Right arm, Cuff Size: Adult Regular)  Pulse 62  Temp 97.8  F (36.6  C) (Tympanic)  Ht 5' 2\" (1.575 m)  Wt 133 lb 9.6 oz (60.6 kg)  Breastfeeding? No  BMI 24.44 kg/m2 Body mass index is 24.44 kg/(m^2).  GENERAL: Pleasant, well appearing female.  HEENT: TMs normal.   NEURO: " Cranial nerves II through XII grossly intact. No focal deficits.   PSYCH: Alert and oriented x3, neatly dressed and well groomed, makes good eye contact, fluid speech - non-pressured, no psychomotor agitation or slowing, good memory, judgement and insight, no auditory or visual hallucinations, bright affect which is mood congruent.   MoCA: 28/30    ASSESSMENT/PLAN:                                                    1. Family history of dementia  Normal memory testing. No clinical findings concerning for dementia. Reassurance provided.    2. Family history of heart disease  Reviewed recent cardiac echo and cardiology consult results. No concerns for heart disease. Reassurance provided.    3. Advanced directives, counseling/discussion        Pt BIBA from a group home for weakness per triage. Pt denies any complaints on assessment. Pt doesn't know why he's brought to ED. Denies any HA/nausea/pain/weakness.

## 2023-01-07 NOTE — ED PROVIDER NOTE - PROGRESS NOTE DETAILS
Resident AO: --- DELAYED ENTRY --- Attempted to straight cath with 16 Cuban Cobos, but unable to pass secondary to to resistance, attempted with coudé 16 Cuban also unable.  Urology consult placed.  Admitted to medicine, pending urine catheterization, urinalysis and urine culture.

## 2023-01-07 NOTE — H&P ADULT - NSHPPHYSICALEXAM_GEN_ALL_CORE
General: NAD, AOx2  HEENT: Normocephalic, atraumatic  Lungs: Normal breath sounds, no wheezes/crackles  Heart: S1s2, no mrg  Abdomen: Soft, NT/ND. No rigidity/guarding. + suprapubic ttp  Extremities: Peripheral pulses +1, no cyanosis/edema  Neuro: Grossly normal motor exam, no focal deficits  Psych: AOx2, flat affect  Skin: Small Lt scalp abrasion, ** General: NAD, AOx1  HEENT: Normocephalic, atraumatic  Lungs: Normal breath sounds, no wheezes/crackles  Heart: S1s2, no mrg  Abdomen: Soft, NT/ND. No rigidity/guarding. + suprapubic ttp  Extremities: Peripheral pulses +1, no cyanosis/edema  Neuro: Grossly normal motor exam, no focal deficits  Psych: AOx2, flat affect  Skin: Small Lt scalp abrasion, ** General: NAD, AOx1  HEENT: Normocephalic, atraumatic  Lungs: Normal breath sounds. Mild end-expiratory wheezing b/l  Heart: S1s2, no mrg  Abdomen: Soft, NT/ND. No rigidity/guarding. + suprapubic ttp  Extremities: Peripheral pulses +1, no cyanosis/edema  Neuro: Grossly normal motor exam, no focal deficits  Psych: AOx1, flat affect  Skin: Small Lt scalp abrasion, no bruises/rashes

## 2023-01-07 NOTE — H&P ADULT - ASSESSMENT
82M with PMHx of HTN, HLD, DM, COPD (not on home O2), Non-hodgkin's lymphoma, neuropathy, urinary retention with prior chronic bains sent in from [facility] for fever and weakness.    *Pt presented from [facility] with no facesheet, no med rec, and no one picking up the phone to answer questions after multiple attempts. Please call again in AM to obtain med rec and further history*    #Fever and weakness - suspected UTI  #Urinary retention with Hx of chronic bains in the past  - Tmax 103F, BP and HR normal, satting 98% on RA  - WBC 9k with 86%N, RVP -ve  - ED attempted to place bains but were unable to, urology consulted  - Pt found to be incontinent in ED  - s/p 2L LR, vanc, and cefepime in ED  - RVP -ve, CXR (wet read) LLL atelectasis vs opacity  - F/u UA and UCx  - F/u BCx, procal, urine strep/legionella  - F/u Renal US to rule out obstruction  - c/w IVF  - c/w vanc and cefepime until more workup is resulted    #COPD not on home O2  - c/w **    #HTN/HLD  - c/w *    #DM  - A1c 5.5% in 7/2022  - Monitor off insulin for now, start ISS if BG on BMP > 180    #Non-hodgkin's lymphoma  - **    DVT ppx: Lovenox  GI ppx: Protonix  Diet: DASH/TLC  Activity: IAT  Dispo: Acute 82M with PMHx of Schizophrenia, Dementia, HTN, HLD, DM, COPD (not on home O2), Non-hodgkin's lymphoma, neuropathy, urinary retention with prior chronic bains sent in from [facility] for fever and weakness.    *Pt presented from [facility] with no facesheet, no med rec, and no one picking up the phone to answer questions after multiple attempts. Please call again in AM to obtain med rec and further history*    #Fever and weakness - suspected UTI  #Urinary retention with Hx of chronic bains in the past  - Tmax 103F, BP and HR normal, satting 98% on RA  - WBC 9k with 86%N, RVP -ve  - ED attempted to place bains but were unable to, urology consulted  - Pt found to be incontinent in ED  - s/p 2L LR, vanc, and cefepime in ED  - RVP -ve, CXR (wet read) LLL atelectasis vs opacity  - F/u UA and UCx  - F/u BCx, procal, urine strep/legionella  - F/u Renal US to rule out obstruction  - c/w IVF  - c/w vanc and cefepime until more workup is resulted    #COPD not on home O2  - c/w **    #HTN/HLD  - c/w *    #Schizophrenia  #Dementia/Insomnia  - c/w *    #DM  - A1c 5.5% in 7/2022  - Monitor off insulin for now, start ISS if BG on BMP > 180    #Non-hodgkin's lymphoma  - **    DVT ppx: Lovenox  GI ppx: Protonix  Diet: DASH/TLC  Activity: IAT  Dispo: Acute 82M with PMHx of Schizophrenia, Dementia, HTN, HLD, DM, COPD (not on home O2), Non-hodgkin's lymphoma, neuropathy, urinary retention with prior chronic bains sent in from [facility] for fever and weakness.    *Pt presented from [facility] with no facesheet, no med rec, and no one picking up the phone to answer questions after multiple attempts. Please call again in AM to obtain med rec and further history*    #Fever and weakness - suspected UTI  #Urinary retention with Hx of chronic bains in the past  - Tmax 103F, BP and HR normal, satting 98% on RA  - WBC 9k with 86%N, RVP -ve  - ED attempted to place bains but were unable to, urology consulted to place bains  - Pt found to be incontinent in ED  - s/p 2L LR, vanc, and cefepime in ED  - RVP -ve, CXR (wet read) LLL atelectasis vs opacity  - F/u UA and UCx  - F/u BCx, procal, urine strep/legionella, TSH  - F/u Renal US to rule out obstruction  - c/w IVF  - c/w vanc and cefepime until more workup is resulted    #COPD not on home O2  - Pt not on any meds for COPD  - Start duonebs as pt has mild wheezing    #HTN/HLD  - c/w metop tart 25 bid, nifedipine 90    #Schizophrenia  #Dementia/Insomnia  #Neuropathy  - c/w zyprexa, trazodone, and gabapentin    #DM  - A1c 5.5% in 7/2022, f/u repeat  - Start ISS    #Non-hodgkin's lymphoma  - outpt f/u    DVT ppx: Lovenox  GI ppx: Protonix  Diet: DASH/TLC  Activity: IAT  Dispo: Acute

## 2023-01-07 NOTE — PROCEDURE NOTE - NSPROCDETAILS_GEN_ALL_CORE
Hgb recheck was 7.4 and this nurse informed GI as requested and no new orders at this time.  Continue holding Eliquis at this time.  
sterile technique, non-indwelling urinary device inserted

## 2023-01-07 NOTE — PROCEDURE NOTE - ADDITIONAL PROCEDURE DETAILS
called to assist for straight cath to obtain urine sample after multiple attempts.   Using a 12Fr straight catheter, urine sample obtained-- urine slightly blood tinged.

## 2023-01-08 LAB
ALBUMIN SERPL ELPH-MCNC: 4.1 G/DL — SIGNIFICANT CHANGE UP (ref 3.5–5.2)
ALP SERPL-CCNC: 79 U/L — SIGNIFICANT CHANGE UP (ref 30–115)
ALT FLD-CCNC: 9 U/L — SIGNIFICANT CHANGE UP (ref 0–41)
ANION GAP SERPL CALC-SCNC: 12 MMOL/L — SIGNIFICANT CHANGE UP (ref 7–14)
AST SERPL-CCNC: 19 U/L — SIGNIFICANT CHANGE UP (ref 0–41)
BASOPHILS # BLD AUTO: 0.02 K/UL — SIGNIFICANT CHANGE UP (ref 0–0.2)
BASOPHILS NFR BLD AUTO: 0.3 % — SIGNIFICANT CHANGE UP (ref 0–1)
BILIRUB SERPL-MCNC: <0.2 MG/DL — SIGNIFICANT CHANGE UP (ref 0.2–1.2)
BUN SERPL-MCNC: 13 MG/DL — SIGNIFICANT CHANGE UP (ref 10–20)
CALCIUM SERPL-MCNC: 8.8 MG/DL — SIGNIFICANT CHANGE UP (ref 8.4–10.5)
CHLORIDE SERPL-SCNC: 100 MMOL/L — SIGNIFICANT CHANGE UP (ref 98–110)
CHOLEST SERPL-MCNC: 112 MG/DL — SIGNIFICANT CHANGE UP
CO2 SERPL-SCNC: 23 MMOL/L — SIGNIFICANT CHANGE UP (ref 17–32)
CREAT SERPL-MCNC: 1.2 MG/DL — SIGNIFICANT CHANGE UP (ref 0.7–1.5)
EGFR: 61 ML/MIN/1.73M2 — SIGNIFICANT CHANGE UP
EOSINOPHIL # BLD AUTO: 0 K/UL — SIGNIFICANT CHANGE UP (ref 0–0.7)
EOSINOPHIL NFR BLD AUTO: 0 % — SIGNIFICANT CHANGE UP (ref 0–8)
GLUCOSE BLDC GLUCOMTR-MCNC: 111 MG/DL — HIGH (ref 70–99)
GLUCOSE BLDC GLUCOMTR-MCNC: 153 MG/DL — HIGH (ref 70–99)
GLUCOSE BLDC GLUCOMTR-MCNC: 176 MG/DL — HIGH (ref 70–99)
GLUCOSE SERPL-MCNC: 184 MG/DL — HIGH (ref 70–99)
HCT VFR BLD CALC: 33.5 % — LOW (ref 42–52)
HDLC SERPL-MCNC: 43 MG/DL — SIGNIFICANT CHANGE UP
HGB BLD-MCNC: 11.4 G/DL — LOW (ref 14–18)
IMM GRANULOCYTES NFR BLD AUTO: 0.6 % — HIGH (ref 0.1–0.3)
LIPID PNL WITH DIRECT LDL SERPL: 53 MG/DL — SIGNIFICANT CHANGE UP
LYMPHOCYTES # BLD AUTO: 0.93 K/UL — LOW (ref 1.2–3.4)
LYMPHOCYTES # BLD AUTO: 11.9 % — LOW (ref 20.5–51.1)
MAGNESIUM SERPL-MCNC: 1.7 MG/DL — LOW (ref 1.8–2.4)
MCHC RBC-ENTMCNC: 31.8 PG — HIGH (ref 27–31)
MCHC RBC-ENTMCNC: 34 G/DL — SIGNIFICANT CHANGE UP (ref 32–37)
MCV RBC AUTO: 93.3 FL — SIGNIFICANT CHANGE UP (ref 80–94)
MONOCYTES # BLD AUTO: 0.68 K/UL — HIGH (ref 0.1–0.6)
MONOCYTES NFR BLD AUTO: 8.7 % — SIGNIFICANT CHANGE UP (ref 1.7–9.3)
NEUTROPHILS # BLD AUTO: 6.14 K/UL — SIGNIFICANT CHANGE UP (ref 1.4–6.5)
NEUTROPHILS NFR BLD AUTO: 78.5 % — HIGH (ref 42.2–75.2)
NON HDL CHOLESTEROL: 69 MG/DL — SIGNIFICANT CHANGE UP
NRBC # BLD: 0 /100 WBCS — SIGNIFICANT CHANGE UP (ref 0–0)
PLATELET # BLD AUTO: 164 K/UL — SIGNIFICANT CHANGE UP (ref 130–400)
POTASSIUM SERPL-MCNC: 3.9 MMOL/L — SIGNIFICANT CHANGE UP (ref 3.5–5)
POTASSIUM SERPL-SCNC: 3.9 MMOL/L — SIGNIFICANT CHANGE UP (ref 3.5–5)
PROT SERPL-MCNC: 6.5 G/DL — SIGNIFICANT CHANGE UP (ref 6–8)
RBC # BLD: 3.59 M/UL — LOW (ref 4.7–6.1)
RBC # FLD: 14.4 % — SIGNIFICANT CHANGE UP (ref 11.5–14.5)
SODIUM SERPL-SCNC: 135 MMOL/L — SIGNIFICANT CHANGE UP (ref 135–146)
TRIGL SERPL-MCNC: 78 MG/DL — SIGNIFICANT CHANGE UP
WBC # BLD: 7.82 K/UL — SIGNIFICANT CHANGE UP (ref 4.8–10.8)
WBC # FLD AUTO: 7.82 K/UL — SIGNIFICANT CHANGE UP (ref 4.8–10.8)

## 2023-01-08 PROCEDURE — 99232 SBSQ HOSP IP/OBS MODERATE 35: CPT

## 2023-01-08 PROCEDURE — 76775 US EXAM ABDO BACK WALL LIM: CPT | Mod: 26

## 2023-01-08 RX ORDER — MAGNESIUM SULFATE 500 MG/ML
2 VIAL (ML) INJECTION ONCE
Refills: 0 | Status: COMPLETED | OUTPATIENT
Start: 2023-01-08 | End: 2023-01-08

## 2023-01-08 RX ADMIN — Medication 3 MILLILITER(S): at 21:48

## 2023-01-08 RX ADMIN — GABAPENTIN 300 MILLIGRAM(S): 400 CAPSULE ORAL at 05:37

## 2023-01-08 RX ADMIN — CEFEPIME 100 MILLIGRAM(S): 1 INJECTION, POWDER, FOR SOLUTION INTRAMUSCULAR; INTRAVENOUS at 05:38

## 2023-01-08 RX ADMIN — ATORVASTATIN CALCIUM 10 MILLIGRAM(S): 80 TABLET, FILM COATED ORAL at 22:06

## 2023-01-08 RX ADMIN — Medication 25 MILLIGRAM(S): at 17:20

## 2023-01-08 RX ADMIN — GABAPENTIN 300 MILLIGRAM(S): 400 CAPSULE ORAL at 17:19

## 2023-01-08 RX ADMIN — Medication 250 MILLIGRAM(S): at 06:28

## 2023-01-08 RX ADMIN — PANTOPRAZOLE SODIUM 40 MILLIGRAM(S): 20 TABLET, DELAYED RELEASE ORAL at 06:28

## 2023-01-08 RX ADMIN — Medication 250 MILLIGRAM(S): at 17:24

## 2023-01-08 RX ADMIN — CEFEPIME 100 MILLIGRAM(S): 1 INJECTION, POWDER, FOR SOLUTION INTRAMUSCULAR; INTRAVENOUS at 17:25

## 2023-01-08 RX ADMIN — Medication 1: at 17:08

## 2023-01-08 RX ADMIN — ENOXAPARIN SODIUM 40 MILLIGRAM(S): 100 INJECTION SUBCUTANEOUS at 22:06

## 2023-01-08 RX ADMIN — OLANZAPINE 20 MILLIGRAM(S): 15 TABLET, FILM COATED ORAL at 22:07

## 2023-01-08 RX ADMIN — Medication 3 MILLILITER(S): at 13:23

## 2023-01-08 RX ADMIN — Medication 100 GRAM(S): at 15:08

## 2023-01-08 RX ADMIN — Medication 1: at 11:40

## 2023-01-08 RX ADMIN — Medication 25 MILLIGRAM(S): at 03:02

## 2023-01-08 RX ADMIN — Medication 150 MILLIGRAM(S): at 22:07

## 2023-01-08 RX ADMIN — Medication 3 MILLILITER(S): at 08:25

## 2023-01-08 RX ADMIN — Medication 90 MILLIGRAM(S): at 03:02

## 2023-01-08 NOTE — PROGRESS NOTE ADULT - ASSESSMENT
82M with PMHx of Schizophrenia, Dementia, HTN, HLD, DM, COPD (not on home O2), Non-hodgkin's lymphoma, neuropathy, urinary retention with prior chronic bains sent in from [facility] for fever and weakness.     # Fever  # Urinary retention   - s/p 2L LR, vanc, and cefepime in ED  -  Xray Chest 1 View AP/PA (01.07.23 @ 14:53) >No radiographic evidence of acute cardiopulmonary disease.  - White Blood Cell - Urine: 24 /HPF (01.07.23 @ 19:35)  - c/w foleys catheter  - c/w IVF  - c/w vanc , cefepime  - F/u blood culture, urine culture  - Renal US    # Hypertension  - c/w procardia, metoiprolol    # DM type 2  - C with Estimated Average Glucose Result: 5.5% (07.20.22 @ 04:30)  - monitor FS    # COPD   - c/w duonebs    # Neuropathy  - c/w neurontin    #Schizophrenia  #Dementia  - c/w zyprexa, trazodone    # DVT prophylaxis    # Full code    #Pending: F/u blood culture, urine culture, renal US  # Disposition: [facility] when stable

## 2023-01-08 NOTE — PROGRESS NOTE ADULT - ASSESSMENT
82M with PMHx of Schizophrenia, Dementia, HTN, HLD, DM, COPD (not on home O2), Non-hodgkin's lymphoma, neuropathy, urinary retention with prior chronic bains sent in from [facility] for fever and weakness.     # Fever  # Urinary retention   - s/p 2L LR, vanc, and cefepime in ED  -  Xray Chest 1 View AP/PA (01.07.23 @ 14:53) >No radiographic evidence of acute cardiopulmonary disease.  - White Blood Cell - Urine: 24 /HPF (01.07.23 @ 19:35)  - c/w foleys catheter  - c/w IVF  - c/w vanc , cefepime  - F/u blood culture, urine culture  - Renal US    # Hypertension  - c/w procardia, metoiprolol    # DM type 2  - C with Estimated Average Glucose Result: 5.5% (07.20.22 @ 04:30)  - monitor FS    #COPD   - c/w duonebs    #Neuropathy  - c/w neurontin    #Schizophrenia  #Dementia  - c/w zyprexa, trazodone      #Pending: F/u blood culture, urine culture, renal US  #Disposition: [facility] when stable 82M with PMHx of Schizophrenia, Dementia, HTN, HLD, DM, COPD (not on home O2), Non-hodgkin's lymphoma, neuropathy, urinary retention with prior chronic bains sent in from [facility] for fever and weakness.     # Fever  # Urinary incontinence/hematuria  - s/p 2L LR, vanc, and cefepime in ED  - CXR on admission negative for acute pathology  - UA no bacteria, but + WBC  - straight cath in ED, now with urinary incontinence and hematuria - hematuria likely 2/2 straight cath in ED  - c/w IVF  - c/w vanc , cefepime  - F/u blood culture, urine culture  - Renal US  - Bladder scan Q8H and call urology for bains if signs of retention (Bladder scan showing >350cc)    #HTN  - c/w procardia, metoprolol    #DM type 2  - A1C in July 2022 5.3  - monitor FS    #COPD   - c/w duonebs    #Neuropathy  - c/w neurontin    #Schizophrenia  #Dementia  - c/w zyprexa, trazodone      #Pending: F/u blood culture, urine culture, renal US  #Disposition: [facility] when stable

## 2023-01-09 LAB
A1C WITH ESTIMATED AVERAGE GLUCOSE RESULT: 5.7 % — HIGH (ref 4–5.6)
ALBUMIN SERPL ELPH-MCNC: 4 G/DL — SIGNIFICANT CHANGE UP (ref 3.5–5.2)
ALP SERPL-CCNC: 74 U/L — SIGNIFICANT CHANGE UP (ref 30–115)
ALT FLD-CCNC: 10 U/L — SIGNIFICANT CHANGE UP (ref 0–41)
ANION GAP SERPL CALC-SCNC: 9 MMOL/L — SIGNIFICANT CHANGE UP (ref 7–14)
AST SERPL-CCNC: 17 U/L — SIGNIFICANT CHANGE UP (ref 0–41)
BASOPHILS # BLD AUTO: 0.01 K/UL — SIGNIFICANT CHANGE UP (ref 0–0.2)
BASOPHILS NFR BLD AUTO: 0.2 % — SIGNIFICANT CHANGE UP (ref 0–1)
BILIRUB SERPL-MCNC: <0.2 MG/DL — SIGNIFICANT CHANGE UP (ref 0.2–1.2)
BUN SERPL-MCNC: 16 MG/DL — SIGNIFICANT CHANGE UP (ref 10–20)
CALCIUM SERPL-MCNC: 8.6 MG/DL — SIGNIFICANT CHANGE UP (ref 8.4–10.4)
CHLORIDE SERPL-SCNC: 102 MMOL/L — SIGNIFICANT CHANGE UP (ref 98–110)
CO2 SERPL-SCNC: 28 MMOL/L — SIGNIFICANT CHANGE UP (ref 17–32)
CREAT SERPL-MCNC: 1.2 MG/DL — SIGNIFICANT CHANGE UP (ref 0.7–1.5)
EGFR: 61 ML/MIN/1.73M2 — SIGNIFICANT CHANGE UP
EOSINOPHIL # BLD AUTO: 0.02 K/UL — SIGNIFICANT CHANGE UP (ref 0–0.7)
EOSINOPHIL NFR BLD AUTO: 0.3 % — SIGNIFICANT CHANGE UP (ref 0–8)
ESTIMATED AVERAGE GLUCOSE: 117 MG/DL — HIGH (ref 68–114)
GLUCOSE BLDC GLUCOMTR-MCNC: 120 MG/DL — HIGH (ref 70–99)
GLUCOSE BLDC GLUCOMTR-MCNC: 120 MG/DL — HIGH (ref 70–99)
GLUCOSE BLDC GLUCOMTR-MCNC: 141 MG/DL — HIGH (ref 70–99)
GLUCOSE SERPL-MCNC: 113 MG/DL — HIGH (ref 70–99)
HCT VFR BLD CALC: 30.2 % — LOW (ref 42–52)
HGB BLD-MCNC: 10.4 G/DL — LOW (ref 14–18)
IMM GRANULOCYTES NFR BLD AUTO: 0.3 % — SIGNIFICANT CHANGE UP (ref 0.1–0.3)
LYMPHOCYTES # BLD AUTO: 1.07 K/UL — LOW (ref 1.2–3.4)
LYMPHOCYTES # BLD AUTO: 16.5 % — LOW (ref 20.5–51.1)
MAGNESIUM SERPL-MCNC: 2 MG/DL — SIGNIFICANT CHANGE UP (ref 1.8–2.4)
MCHC RBC-ENTMCNC: 31.8 PG — HIGH (ref 27–31)
MCHC RBC-ENTMCNC: 34.4 G/DL — SIGNIFICANT CHANGE UP (ref 32–37)
MCV RBC AUTO: 92.4 FL — SIGNIFICANT CHANGE UP (ref 80–94)
MONOCYTES # BLD AUTO: 0.72 K/UL — HIGH (ref 0.1–0.6)
MONOCYTES NFR BLD AUTO: 11.1 % — HIGH (ref 1.7–9.3)
NEUTROPHILS # BLD AUTO: 4.66 K/UL — SIGNIFICANT CHANGE UP (ref 1.4–6.5)
NEUTROPHILS NFR BLD AUTO: 71.6 % — SIGNIFICANT CHANGE UP (ref 42.2–75.2)
NRBC # BLD: 0 /100 WBCS — SIGNIFICANT CHANGE UP (ref 0–0)
PLATELET # BLD AUTO: 177 K/UL — SIGNIFICANT CHANGE UP (ref 130–400)
POTASSIUM SERPL-MCNC: 4.5 MMOL/L — SIGNIFICANT CHANGE UP (ref 3.5–5)
POTASSIUM SERPL-SCNC: 4.5 MMOL/L — SIGNIFICANT CHANGE UP (ref 3.5–5)
PROCALCITONIN SERPL-MCNC: 0.72 NG/ML — HIGH (ref 0.02–0.1)
PROT SERPL-MCNC: 6.5 G/DL — SIGNIFICANT CHANGE UP (ref 6–8)
RBC # BLD: 3.27 M/UL — LOW (ref 4.7–6.1)
RBC # FLD: 14.6 % — HIGH (ref 11.5–14.5)
SARS-COV-2 RNA SPEC QL NAA+PROBE: SIGNIFICANT CHANGE UP
SODIUM SERPL-SCNC: 139 MMOL/L — SIGNIFICANT CHANGE UP (ref 135–146)
TSH SERPL-MCNC: 1.18 UIU/ML — SIGNIFICANT CHANGE UP (ref 0.27–4.2)
WBC # BLD: 6.5 K/UL — SIGNIFICANT CHANGE UP (ref 4.8–10.8)
WBC # FLD AUTO: 6.5 K/UL — SIGNIFICANT CHANGE UP (ref 4.8–10.8)

## 2023-01-09 PROCEDURE — 99232 SBSQ HOSP IP/OBS MODERATE 35: CPT

## 2023-01-09 RX ORDER — CEFTRIAXONE 500 MG/1
1000 INJECTION, POWDER, FOR SOLUTION INTRAMUSCULAR; INTRAVENOUS EVERY 24 HOURS
Refills: 0 | Status: DISCONTINUED | OUTPATIENT
Start: 2023-01-10 | End: 2023-01-10

## 2023-01-09 RX ORDER — CEFTRIAXONE 500 MG/1
1000 INJECTION, POWDER, FOR SOLUTION INTRAMUSCULAR; INTRAVENOUS ONCE
Refills: 0 | Status: COMPLETED | OUTPATIENT
Start: 2023-01-09 | End: 2023-01-09

## 2023-01-09 RX ORDER — TAMSULOSIN HYDROCHLORIDE 0.4 MG/1
0.4 CAPSULE ORAL AT BEDTIME
Refills: 0 | Status: DISCONTINUED | OUTPATIENT
Start: 2023-01-09 | End: 2023-01-10

## 2023-01-09 RX ADMIN — ATORVASTATIN CALCIUM 10 MILLIGRAM(S): 80 TABLET, FILM COATED ORAL at 21:37

## 2023-01-09 RX ADMIN — OLANZAPINE 20 MILLIGRAM(S): 15 TABLET, FILM COATED ORAL at 21:36

## 2023-01-09 RX ADMIN — Medication 25 MILLIGRAM(S): at 17:15

## 2023-01-09 RX ADMIN — Medication 3 MILLILITER(S): at 14:34

## 2023-01-09 RX ADMIN — Medication 3 MILLILITER(S): at 08:29

## 2023-01-09 RX ADMIN — GABAPENTIN 300 MILLIGRAM(S): 400 CAPSULE ORAL at 17:15

## 2023-01-09 RX ADMIN — Medication 90 MILLIGRAM(S): at 05:41

## 2023-01-09 RX ADMIN — ENOXAPARIN SODIUM 40 MILLIGRAM(S): 100 INJECTION SUBCUTANEOUS at 21:36

## 2023-01-09 RX ADMIN — CEFEPIME 100 MILLIGRAM(S): 1 INJECTION, POWDER, FOR SOLUTION INTRAMUSCULAR; INTRAVENOUS at 05:42

## 2023-01-09 RX ADMIN — GABAPENTIN 300 MILLIGRAM(S): 400 CAPSULE ORAL at 05:40

## 2023-01-09 RX ADMIN — Medication 150 MILLIGRAM(S): at 21:36

## 2023-01-09 RX ADMIN — CEFTRIAXONE 100 MILLIGRAM(S): 500 INJECTION, POWDER, FOR SOLUTION INTRAMUSCULAR; INTRAVENOUS at 22:36

## 2023-01-09 RX ADMIN — TAMSULOSIN HYDROCHLORIDE 0.4 MILLIGRAM(S): 0.4 CAPSULE ORAL at 21:36

## 2023-01-09 RX ADMIN — Medication 25 MILLIGRAM(S): at 05:40

## 2023-01-09 RX ADMIN — PANTOPRAZOLE SODIUM 40 MILLIGRAM(S): 20 TABLET, DELAYED RELEASE ORAL at 05:41

## 2023-01-09 RX ADMIN — Medication 3 MILLILITER(S): at 20:38

## 2023-01-09 NOTE — PROGRESS NOTE ADULT - SUBJECTIVE AND OBJECTIVE BOX
ADA VILLAGOMEZ 82y Male  MRN#: [id number]   Hospital Day: 1d    SUBJECTIVE  Patient is a 82y old Male who presents with a chief complaint of Fever, weakness.  Currently admitted to medicine with the primary diagnosis of Sepsis      INTERVAL HPI AND OVERNIGHT EVENTS:  Patient was examined and seen at bedside. This morning he is resting comfortably in bed and reports no issues or overnight events.    OBJECTIVE  PAST MEDICAL & SURGICAL HISTORY  Schizophrenia    Diabetes type 2, controlled    COPD (chronic obstructive pulmonary disease)    Dyslipidemia    Chronic retention of urine    Dyslipidemia    Encounter for screening colonoscopy      ALLERGIES:  No Known Allergies    MEDICATIONS:  STANDING MEDICATIONS  albuterol/ipratropium for Nebulization 3 milliLiter(s) Nebulizer every 6 hours  atorvastatin 10 milliGRAM(s) Oral at bedtime  cefepime   IVPB 2000 milliGRAM(s) IV Intermittent every 12 hours  dextrose 5%. 1000 milliLiter(s) IV Continuous <Continuous>  dextrose 5%. 1000 milliLiter(s) IV Continuous <Continuous>  dextrose 50% Injectable 25 Gram(s) IV Push once  dextrose 50% Injectable 12.5 Gram(s) IV Push once  dextrose 50% Injectable 25 Gram(s) IV Push once  enoxaparin Injectable 40 milliGRAM(s) SubCutaneous every 24 hours  gabapentin 300 milliGRAM(s) Oral two times a day  glucagon  Injectable 1 milliGRAM(s) IntraMuscular once  insulin lispro (ADMELOG) corrective regimen sliding scale   SubCutaneous three times a day before meals  lactated ringers. 1000 milliLiter(s) IV Continuous <Continuous>  magnesium sulfate  IVPB 2 Gram(s) IV Intermittent once  metoprolol tartrate 25 milliGRAM(s) Oral two times a day  NIFEdipine XL 90 milliGRAM(s) Oral daily  OLANZapine 20 milliGRAM(s) Oral at bedtime  pantoprazole    Tablet 40 milliGRAM(s) Oral before breakfast  traZODone 150 milliGRAM(s) Oral at bedtime  vancomycin  IVPB 1000 milliGRAM(s) IV Intermittent every 12 hours    PRN MEDICATIONS  acetaminophen     Tablet .. 650 milliGRAM(s) Oral every 6 hours PRN  dextrose Oral Gel 15 Gram(s) Oral once PRN  melatonin 5 milliGRAM(s) Oral at bedtime PRN  ondansetron Injectable 4 milliGRAM(s) IV Push every 8 hours PRN      VITAL SIGNS: Last 24 Hours  T(C): 36.5 (2023 08:00), Max: 37.1 (2023 17:34)  T(F): 97.7 (2023 08:00), Max: 98.8 (2023 17:34)  HR: 87 (2023 08:00) (83 - 103)  BP: 144/61 (2023 08:00) (129/62 - 176/93)  BP(mean): --  RR: 18 (2023 08:00) (18 - 20)  SpO2: 100% (2023 04:22) (97% - 100%)    LABS:                        11.4   7.82  )-----------( 164      ( 2023 09:02 )             33.5     01-08    135  |  100  |  13  ----------------------------<  184<H>  3.9   |  23  |  1.2    Ca    8.8      2023 09:02  Mg     1.7     01-08    TPro  6.5  /  Alb  4.1  /  TBili  <0.2  /  DBili  x   /  AST  19  /  ALT  9   /  AlkPhos  79  01-08    PT/INR - ( 2023 14:17 )   PT: 14.60 sec;   INR: 1.27 ratio         PTT - ( 2023 14:17 )  PTT:31.6 sec  Urinalysis Basic - ( 2023 19:35 )    Color: Light Brown / Appearance: Clear / S.005 / pH: x  Gluc: x / Ketone: Negative  / Bili: Negative / Urobili: <2 mg/dL   Blood: x / Protein: 30 mg/dL / Nitrite: Negative   Leuk Esterase: Large / RBC: 9 /HPF / WBC 24 /HPF   Sq Epi: x / Non Sq Epi: 1 /HPF / Bacteria: Negative            CARDIAC MARKERS ( 2023 14:17 )  x     / <0.01 ng/mL / x     / x     / x            PHYSICAL EXAM:  CONSTITUTIONAL: No acute distress  HEENT: Atraumatic, normocephalic, EOMI, neck supple  PULMONARY: Clear to auscultation bilaterally  CARDIOVASCULAR: Regular rate and rhythm  GASTROINTESTINAL: Soft, non-tender, non-distended; bowel sounds present  : + Hematuria  MUSCULOSKELETAL: no edema  NEUROLOGY: non-focal, AAOx0-1  SKIN: warm and dry    
ADA VILLAGOMEZ 82y Male  MRN#: [id number]   CODE STATUS:________    Hospital Day: 2d    Pt is currently admitted with the primary diagnosis of Fever    SUBJECTIVE  Hospital Course  82M with PMHx of Schizophrenia, Dementia, HTN, HLD, DM, COPD (not on home O2), Non-hodgkin's lymphoma, neuropathy, urinary retention with prior chronic bains sent in from [facility] for fever and weakness.      - Bcx ngtd, Ucx Kleb pneumonia less than 100k, de-escalated Abx to ceftriaxone, PT, renal US normal    Overnight events   Agitated overnight put on rock and roll    Subjective complaints   AA0 X2, calm    Present Today:   - Bains:  No [  ], Yes [   ] : Indication:     - Type of IV Access:       .. CVC/Piccline:  No [  ], Yes [   ] : Indication:       .. Midline: No [  ], Yes [   ] : Indication:                                             ----------------------------------------------------------  OBJECTIVE  PAST MEDICAL & SURGICAL HISTORY  Schizophrenia    Diabetes type 2, controlled    COPD (chronic obstructive pulmonary disease)    Dyslipidemia    Chronic retention of urine    Dyslipidemia    Encounter for screening colonoscopy                                              -----------------------------------------------------------  ALLERGIES:  No Known Allergies                                            ------------------------------------------------------------    HOME MEDICATIONS  Home Medications:  atorvastatin 10 mg oral tablet: 1 tab(s) orally once a day (13 Oct 2020 05:27)  Centrum oral tablet: 1 tab(s) orally once a day (2022 10:28)  gabapentin 300 mg oral capsule: 1 cap(s) orally 2 times a day (13 Oct 2020 05:27)  metFORMIN 500 mg oral tablet: 1 tab(s) orally 2 times a day (13 Oct 2020 05:27)  metoprolol tartrate 25 mg oral tablet: 1 tab(s) orally 2 times a day (13 Oct 2020 05:27)  OLANZapine 20 mg oral tablet: 1 tab(s) orally once a day (at bedtime) (13 Oct 2020 05:27)  traZODone 150 mg oral tablet: 1 tab(s) orally once a day (at bedtime) (13 Oct 2020 05:27)  Vitamin D2 1.25 mg (50,000 intl units) oral capsule: 1 cap(s) orally once a week (2022 10:28)                           MEDICATIONS:  STANDING MEDICATIONS  albuterol/ipratropium for Nebulization 3 milliLiter(s) Nebulizer every 6 hours  atorvastatin 10 milliGRAM(s) Oral at bedtime  cefTRIAXone   IVPB 1000 milliGRAM(s) IV Intermittent once  dextrose 5%. 1000 milliLiter(s) IV Continuous <Continuous>  dextrose 5%. 1000 milliLiter(s) IV Continuous <Continuous>  dextrose 50% Injectable 25 Gram(s) IV Push once  dextrose 50% Injectable 12.5 Gram(s) IV Push once  dextrose 50% Injectable 25 Gram(s) IV Push once  enoxaparin Injectable 40 milliGRAM(s) SubCutaneous every 24 hours  gabapentin 300 milliGRAM(s) Oral two times a day  glucagon  Injectable 1 milliGRAM(s) IntraMuscular once  insulin lispro (ADMELOG) corrective regimen sliding scale   SubCutaneous three times a day before meals  lactated ringers. 1000 milliLiter(s) IV Continuous <Continuous>  metoprolol tartrate 25 milliGRAM(s) Oral two times a day  NIFEdipine XL 90 milliGRAM(s) Oral daily  OLANZapine 20 milliGRAM(s) Oral at bedtime  pantoprazole    Tablet 40 milliGRAM(s) Oral before breakfast  tamsulosin 0.4 milliGRAM(s) Oral at bedtime  traZODone 150 milliGRAM(s) Oral at bedtime    PRN MEDICATIONS  acetaminophen     Tablet .. 650 milliGRAM(s) Oral every 6 hours PRN  dextrose Oral Gel 15 Gram(s) Oral once PRN  melatonin 5 milliGRAM(s) Oral at bedtime PRN  ondansetron Injectable 4 milliGRAM(s) IV Push every 8 hours PRN                                            ------------------------------------------------------------  VITAL SIGNS: Last 24 Hours  T(C): 36.7 (2023 07:19), Max: 37 (2023 00:02)  T(F): 98 (2023 07:19), Max: 98.6 (2023 00:02)  HR: 81 (2023 07:19) (77 - 97)  BP: 111/58 (2023 07:19) (111/58 - 131/58)  BP(mean): --  RR: 18 (2023 07:19) (18 - 18)  SpO2: --      23 @ 07:01  -  23 @ 07:00  --------------------------------------------------------  IN: 0 mL / OUT: 100 mL / NET: -100 mL                                             --------------------------------------------------------------  LABS:                        10.4   6.50  )-----------( 177      ( 2023 05:54 )             30.2         139  |  102  |  16  ----------------------------<  113<H>  4.5   |  28  |  1.2    Ca    8.6      2023 05:54  Mg     2.0         TPro  6.5  /  Alb  4.0  /  TBili  <0.2  /  DBili  x   /  AST  17  /  ALT  10  /  AlkPhos  74  0109      Urinalysis Basic - ( 2023 19:35 )    Color: Light Brown / Appearance: Clear / S.005 / pH: x  Gluc: x / Ketone: Negative  / Bili: Negative / Urobili: <2 mg/dL   Blood: x / Protein: 30 mg/dL / Nitrite: Negative   Leuk Esterase: Large / RBC: 9 /HPF / WBC 24 /HPF   Sq Epi: x / Non Sq Epi: 1 /HPF / Bacteria: Negative              Culture - Urine (collected 2023 19:35)  Source: Clean Catch Clean Catch (Midstream)  Preliminary Report (2023 14:21):    50,000 - 99,000 CFU/mL Klebsiella pneumoniae    Culture - Blood (collected 2023 14:17)  Source: .Blood Blood-Peripheral  Preliminary Report (2023 23:02):    No growth to date.    Culture - Blood (collected 2023 14:17)  Source: .Blood Blood-Peripheral  Preliminary Report (2023 23:02):    No growth to date.                                                    -------------------------------------------------------------  RADIOLOGY:                                            --------------------------------------------------------------    PHYSICAL EXAM:  CONSTITUTIONAL: No acute distress  HEENT: Atraumatic, normocephalic, EOMI, neck supple  PULMONARY: Clear to auscultation bilaterally  CARDIOVASCULAR: Regular rate and rhythm  GASTROINTESTINAL: Soft, non-tender, non-distended; bowel sounds present  : + Hematuria  MUSCULOSKELETAL: no edema  NEUROLOGY: non-focal, AAOx0-1  SKIN: warm and dry           
Patient is a 82y old  Male who presents with a chief complaint of Fever, weakness (07 Jan 2023 19:18)    Patient was seen and examined.  no new complaints    PAST MEDICAL & SURGICAL HISTORY:  Schizophrenia  Diabetes type 2, controlled  COPD (chronic obstructive pulmonary disease)  Dyslipidemia  Chronic retention of urine  Dyslipidemia    Allergies  No Known Allergies    MEDICATIONS  (STANDING):  albuterol/ipratropium for Nebulization 3 milliLiter(s) Nebulizer every 6 hours  atorvastatin 10 milliGRAM(s) Oral at bedtime  cefTRIAXone   IVPB 1000 milliGRAM(s) IV Intermittent once  enoxaparin Injectable 40 milliGRAM(s) SubCutaneous every 24 hours  gabapentin 300 milliGRAM(s) Oral two times a day  glucagon  Injectable 1 milliGRAM(s) IntraMuscular once  insulin lispro (ADMELOG) corrective regimen sliding scale   SubCutaneous three times a day before meals  lactated ringers. 1000 milliLiter(s) (75 mL/Hr) IV Continuous <Continuous>  metoprolol tartrate 25 milliGRAM(s) Oral two times a day  NIFEdipine XL 90 milliGRAM(s) Oral daily  OLANZapine 20 milliGRAM(s) Oral at bedtime  pantoprazole    Tablet 40 milliGRAM(s) Oral before breakfast  traZODone 150 milliGRAM(s) Oral at bedtime    MEDICATIONS  (PRN):  acetaminophen     Tablet .. 650 milliGRAM(s) Oral every 6 hours PRN Temp greater or equal to 38C (100.4F), Mild Pain (1 - 3)  dextrose Oral Gel 15 Gram(s) Oral once PRN Blood Glucose LESS THAN 70 milliGRAM(s)/deciliter  melatonin 5 milliGRAM(s) Oral at bedtime PRN Insomnia  ondansetron Injectable 4 milliGRAM(s) IV Push every 8 hours PRN Nausea and/or Vomiting    T(C): 36.7 (01-09-23 @ 07:19), Max: 37 (01-09-23 @ 00:02)  HR: 81 (01-09-23 @ 07:19) (77 - 97)  BP: 111/58 (01-09-23 @ 07:19) (111/58 - 131/58)  RR: 18 (01-09-23 @ 07:19) (18 - 18)  SpO2: --    O/E:  Awake, alert, not in distress.  HEENT: atraumatic, EOMI.  Chest: clear.  CVS: SIS2 +, no murmur.  P/A: Soft, BS+  CNS: awake, alert  Ext: no edema feet.  All systems reviewed positive findings as above.                           10.4<L>  6.50  )-----------( 177      ( 09 Jan 2023 05:54 )             30.2<L>                        11.4<L>  7.82  )-----------( 164      ( 08 Jan 2023 09:02 )             33.5<L>  01-09    139  |  102  |  16  ----------------------------<  113<H>  4.5   |  28  |  1.2  01-08    135  |  100  |  13  ----------------------------<  184<H>  3.9   |  23  |  1.2    Ca    8.6      09 Jan 2023 05:54  Ca    8.8      08 Jan 2023 09:02  Ca    9.2      07 Jan 2023 14:17  Mg     2.0     01-09    TPro  6.5  /  Alb  4.0  /  TBili  <0.2  /  DBili  x   /  AST  17  /  ALT  10  /  AlkPhos  74  01-09  TPro  6.5  /  Alb  4.1  /  TBili  <0.2  /  DBili  x   /  AST  19  /  ALT  9   /  AlkPhos  79  01-08  TPro  7.0  /  Alb  4.1  /  TBili  0.2  /  DBili  x   /  AST  21  /  ALT  10  /  AlkPhos  84  01-07    
Patient is a 82y old  Male who presents with a chief complaint of Fever, weakness (2023 19:18)    Patient was seen and examined.  no new complaints    PAST MEDICAL & SURGICAL HISTORY:  Schizophrenia  Diabetes type 2, controlled  COPD (chronic obstructive pulmonary disease)  Dyslipidemia  Chronic retention of urine  Dyslipidemia    Allergies  No Known Allergies    MEDICATIONS  (STANDING):  albuterol/ipratropium for Nebulization 3 milliLiter(s) Nebulizer every 6 hours  atorvastatin 10 milliGRAM(s) Oral at bedtime  cefepime   IVPB 2000 milliGRAM(s) IV Intermittent every 12 hours  enoxaparin Injectable 40 milliGRAM(s) SubCutaneous every 24 hours  gabapentin 300 milliGRAM(s) Oral two times a day  glucagon  Injectable 1 milliGRAM(s) IntraMuscular once  insulin lispro (ADMELOG) corrective regimen sliding scale   SubCutaneous three times a day before meals  lactated ringers. 1000 milliLiter(s) (75 mL/Hr) IV Continuous <Continuous>  metoprolol tartrate 25 milliGRAM(s) Oral two times a day  NIFEdipine XL 90 milliGRAM(s) Oral daily  OLANZapine 20 milliGRAM(s) Oral at bedtime  pantoprazole    Tablet 40 milliGRAM(s) Oral before breakfast  traZODone 150 milliGRAM(s) Oral at bedtime  vancomycin  IVPB 1000 milliGRAM(s) IV Intermittent every 12 hours    MEDICATIONS  (PRN):  acetaminophen     Tablet .. 650 milliGRAM(s) Oral every 6 hours PRN Temp greater or equal to 38C (100.4F), Mild Pain (1 - 3)  dextrose Oral Gel 15 Gram(s) Oral once PRN Blood Glucose LESS THAN 70 milliGRAM(s)/deciliter  melatonin 5 milliGRAM(s) Oral at bedtime PRN Insomnia  ondansetron Injectable 4 milliGRAM(s) IV Push every 8 hours PRN Nausea and/or Vomiting    Vital Signs Last 24 Hrs  T(C): 36.5  T(F): 97.7  HR: 87  BP: 144/61  BP(mean): --  RR: 18  SpO2: 100%    O/E:  Awake, alert, not in distress.  HEENT: atraumatic, EOMI.  Chest: clear.  CVS: SIS2 +, no murmur.  P/A: Soft, BS+, foleys+  CNS: awake, alert  Ext: no edema feet.  All systems reviewed positive findings as above.    POCT Blood Glucose.: 111 mg/dL (2023 07:39)                            11.4<L>  7.82  )-----------( 164      ( 2023 09:02 )             33.5<L>                        11.2<L>  9.17  )-----------( 176      ( 2023 14:17 )             32.3<L>    01-07    131<L>  |  97<L>  |  18  ----------------------------<  113<H>  4.6   |  24  |  1.3    Ca    9.2      2023 14:17    TPro  7.0  /  Alb  4.1  /  TBili  0.2  /  DBili  x   /  AST  21  /  ALT  10  /  AlkPhos  84  01-07      CARDIAC MARKERS ( 2023 14:17 )  x     / <0.01 ng/mL / x     / x     / x          PT/INR - ( 2023 14:17 )   PT: 14.60 sec;   INR: 1.27 ratio         PTT - ( 2023 14:17 )  PTT:31.6 sec  Urinalysis Basic - ( 2023 19:35 )    Color: Light Brown / Appearance: Clear / S.005 / pH: x  Gluc: x / Ketone: Negative  / Bili: Negative / Urobili: <2 mg/dL   Blood: x / Protein: 30 mg/dL / Nitrite: Negative   Leuk Esterase: Large / RBC: 9 /HPF / WBC 24 /HPF   Sq Epi: x / Non Sq Epi: 1 /HPF / Bacteria: Negative

## 2023-01-09 NOTE — PROGRESS NOTE ADULT - ASSESSMENT
82M with PMHx of Schizophrenia, Dementia, HTN, HLD, DM, COPD (not on home O2), Non-hodgkin's lymphoma, neuropathy, urinary retention with prior chronic bains sent in from [facility] for fever and weakness.     # Fever  # Urinary incontinence/hematuria  - s/p 2L LR, vanc, and cefepime in ED  - CXR on admission negative for acute pathology  - UA no bacteria, but + WBC  - straight cath in ED, now with urinary incontinence and hematuria - hematuria likely 2/2 straight cath in ED   - renal US normal, No retention, +ve incontinence, Follow up Bladder scan  - patient was started on vancomycin and cefepime  - Bcx NGTD, UCx klebsiella less than 100k  - De-escalated Abx to ceftriaxone, will d/c on oral after PT eval today    #HTN  - Well controlled  - c/w procardia, metoprolol  - Lisinopril held at admission takes 5mg qd     #DM type 2  - A1C in July 2022 5.3  - monitor FS    #COPD   - c/w duonebs    #Neuropathy  - c/w neurontin    #Schizophrenia  #Dementia  - c/w zyprexa, trazodone      #Pending: PT eval, Bladder scan, d/c in 24 hrs if stable  #Disposition: [facility] when stable

## 2023-01-09 NOTE — PROGRESS NOTE ADULT - TIME BILLING
Direct patient care. Discussed with Housestaff
Direct patient care. Discussed on rounds with Housestaff

## 2023-01-09 NOTE — PROGRESS NOTE ADULT - ASSESSMENT
82M with PMHx of Schizophrenia, Dementia, HTN, HLD, DM, COPD (not on home O2), Non-hodgkin's lymphoma, neuropathy, urinary retention with prior chronic bains sent in from [facility] for fever and weakness.     #  UTI  # Urinary retention sec to BPH  - s/p 2L LR, vanc, and cefepime in ED  -  Xray Chest 1 View AP/PA (01.07.23 @ 14:53) >No radiographic evidence of acute cardiopulmonary disease.  - White Blood Cell - Urine: 24 /HPF (01.07.23 @ 19:35)  - urine culture Culture Results: 50,000 - 99,000 CFU/mL Gram Negative Rods (01.07.23 @ 19:35)  - blood Culture Results: No growth to date. (01.07.23 @ 14:17)  -  US Renal (01.08.23 @ 12:36) >No hydronephrosis or calculi.Enlarged prostate gland.  - DC vanc , cefepime  - Start ceftriaxone  - Start flomax  - bladder scan q shift    # Hypertension  - c/w procardia, metoprolol    # DM type 2  - C with Estimated Average Glucose Result: 5.5% (07.20.22 @ 04:30)  - monitor FS    # COPD   - c/w duonebs    # Neuropathy  - c/w neurontin    #Schizophrenia  #Dementia  - c/w zyprexa, trazodone    # DVT prophylaxis    # Full code    #Pending: F/u  urine culture, anticipate for discharge in AM, PT eval, covid  # Disposition: [facility] when stable

## 2023-01-10 ENCOUNTER — TRANSCRIPTION ENCOUNTER (OUTPATIENT)
Age: 83
End: 2023-01-10

## 2023-01-10 VITALS — HEIGHT: 70 IN

## 2023-01-10 LAB
GLUCOSE BLDC GLUCOMTR-MCNC: 104 MG/DL — HIGH (ref 70–99)
GLUCOSE BLDC GLUCOMTR-MCNC: 126 MG/DL — HIGH (ref 70–99)
GLUCOSE BLDC GLUCOMTR-MCNC: 128 MG/DL — HIGH (ref 70–99)

## 2023-01-10 PROCEDURE — 99239 HOSP IP/OBS DSCHRG MGMT >30: CPT

## 2023-01-10 RX ORDER — CEFPODOXIME PROXETIL 100 MG
200 TABLET ORAL EVERY 12 HOURS
Refills: 0 | Status: DISCONTINUED | OUTPATIENT
Start: 2023-01-10 | End: 2023-01-10

## 2023-01-10 RX ORDER — ALBUTEROL 90 UG/1
1 AEROSOL, METERED ORAL
Qty: 30 | Refills: 0
Start: 2023-01-10 | End: 2023-02-08

## 2023-01-10 RX ORDER — NIFEDIPINE 30 MG
1 TABLET, EXTENDED RELEASE 24 HR ORAL
Qty: 45 | Refills: 0
Start: 2023-01-10 | End: 2023-02-23

## 2023-01-10 RX ORDER — TAMSULOSIN HYDROCHLORIDE 0.4 MG/1
1 CAPSULE ORAL
Qty: 45 | Refills: 0
Start: 2023-01-10 | End: 2023-02-23

## 2023-01-10 RX ORDER — CEFPODOXIME PROXETIL 100 MG
1 TABLET ORAL
Qty: 8 | Refills: 0
Start: 2023-01-10 | End: 2023-01-13

## 2023-01-10 RX ADMIN — Medication 25 MILLIGRAM(S): at 05:44

## 2023-01-10 RX ADMIN — PANTOPRAZOLE SODIUM 40 MILLIGRAM(S): 20 TABLET, DELAYED RELEASE ORAL at 05:44

## 2023-01-10 RX ADMIN — CEFTRIAXONE 100 MILLIGRAM(S): 500 INJECTION, POWDER, FOR SOLUTION INTRAMUSCULAR; INTRAVENOUS at 05:43

## 2023-01-10 RX ADMIN — GABAPENTIN 300 MILLIGRAM(S): 400 CAPSULE ORAL at 05:45

## 2023-01-10 RX ADMIN — Medication 90 MILLIGRAM(S): at 05:44

## 2023-01-10 NOTE — DISCHARGE NOTE PROVIDER - CARE PROVIDER_API CALL
Silverio Hayes)  [address] [place], [place] [id number]  [address] [place] [address], [place] [address]  Phone: [phone]  Fax: [phone]  Follow Up Time: 2 weeks

## 2023-01-10 NOTE — PHYSICAL THERAPY INITIAL EVALUATION ADULT - PERTINENT HX OF CURRENT PROBLEM, REHAB EVAL
82M with PMHx of Schizophrenia, Dementia, HTN, HLD, DM, COPD (not on home O2), Non-hodgkin's lymphoma, neuropathy, urinary retention with prior chronic bains sent in from [facility] for fever and weakness.

## 2023-01-10 NOTE — DISCHARGE NOTE PROVIDER - HOSPITAL COURSE
82M with PMHx of Schizophrenia, Dementia, HTN, HLD, DM, COPD (not on home O2), Non-hodgkin's lymphoma, neuropathy, urinary retention with prior chronic bains sent in from [facility] for fever and weakness.     Patient was admitted for evaluation of fever, Urinary incontinence/hematuria. s/p 2L LR, vanc, and cefepime in ED. CXR at admission on admission negative for acute pathology, Pt on RA. UA no bacteria, but + WBC. Urine Cx Kleb < 100k. Straight cath in ED, now with urinary incontinence and hematuria - hematuria likely 2/2 straight cath in ED. Started on flomax. renal US normal, No retention, +ve incontinence, follow up Bladder scans during admission did not show retention. patient was started on vancomycin and cefepime.  De-escalated Abx to ceftriaxone, d/c on oral vantin for total of 7 days.    Patient was seen by PT and discharge recs were obtained. Patient is stable at discharge, no fevers, normal wbc count, stable vitals. Patient is being discharged on oral vantin for total of 7 days and advise to Follow up with PCP in 2 weeks. 82M with PMHx of Schizophrenia, Dementia, HTN, HLD, DM, COPD (not on home O2), Non-hodgkin's lymphoma, neuropathy, urinary retention with prior chronic bains sent in from [facility] for fever and weakness.     Patient was admitted for evaluation of fever, Urinary incontinence/hematuria. s/p 2L LR, vanc, and cefepime in ED. CXR at admission on admission negative for acute pathology, Pt on RA. UA no bacteria, but + WBC. Urine Cx Kleb < 100k. Straight cath in ED, now with urinary incontinence and hematuria - hematuria likely 2/2 straight cath in ED. Started on flomax. renal US normal, No retention, +ve incontinence, follow up Bladder scans during admission did not show retention. patient was started on vancomycin and cefepime.  De-escalated Abx to ceftriaxone, d/c on oral vantin for total of 7 days.    Patient was seen by PT and discharge recs were obtained. Patient is stable at discharge, no fevers, normal wbc count, stable vitals. Patient is being discharged on oral vantin for total of 7 days and advise to Follow up with PCP in 2 weeks.     #  UTI  # Urinary retention sec to BPH  - s/p 2L LR, vanc, and cefepime in ED  -  Xray Chest 1 View AP/PA (01.07.23 @ 14:53) >No radiographic evidence of acute cardiopulmonary disease.  - White Blood Cell - Urine: 24 /HPF (01.07.23 @ 19:35)  - urine Culture Results: 50,000 - 99,000 CFU/mL Klebsiella pneumoniae (01.07.23 @ 19:35)  - blood Culture Results: No growth to date. (01.07.23 @ 14:17)  -  US Renal (01.08.23 @ 12:36) >No hydronephrosis or calculi.Enlarged prostate gland.  - S/p  ceftriaxone. discharged on Vantin  - c/w  flomax    # Hypertension  - c/w procardia, metoprolol    # DM type 2  - C with Estimated Average Glucose Result: 5.5% (07.20.22 @ 04:30)  - monitor FS    # COPD   - c/w duonebs    # Neuropathy  - c/w neurontin    #Schizophrenia  #Dementia  - c/w zyprexa, trazodone

## 2023-01-10 NOTE — DISCHARGE NOTE NURSING/CASE MANAGEMENT/SOCIAL WORK - PATIENT PORTAL LINK FT
You can access the FollowMyHealth Patient Portal offered by [facility] by registering at the following website: [email or web address]. By joining [employer]’s FollowMyHealth portal, you will also be able to view your health information using other applications (apps) compatible with our system.

## 2023-01-10 NOTE — PHYSICAL THERAPY INITIAL EVALUATION ADULT - TRANSFER SAFETY CONCERNS NOTED: SIT/STAND, REHAB EVAL
pt non compkiant with use of RW, poor safety awareness/decreased balance during turns/decreased safety awareness/losing balance

## 2023-01-10 NOTE — DISCHARGE NOTE PROVIDER - NSDCMRMEDTOKEN_GEN_ALL_CORE_FT
atorvastatin 10 mg oral tablet: 1 tab(s) orally once a day  cefpodoxime 200 mg oral tablet: 1 tab(s) orally 2 times a day   Centrum oral tablet: 1 tab(s) orally once a day  gabapentin 300 mg oral capsule: 1 cap(s) orally 2 times a day  Lovenox 40 mg/0.4 mL injectable solution: 40 milligram(s) subcutaneously once a day   metFORMIN 500 mg oral tablet: 1 tab(s) orally 2 times a day  metoprolol tartrate 25 mg oral tablet: 1 tab(s) orally 2 times a day  NIFEdipine 90 mg oral tablet, extended release: 1 tab(s) orally once a day   OLANZapine 20 mg oral tablet: 1 tab(s) orally once a day (at bedtime)  traZODone 150 mg oral tablet: 1 tab(s) orally once a day (at bedtime)  Vitamin D2 1.25 mg (50,000 intl units) oral capsule: 1 cap(s) orally once a week   albuterol 90 mcg/inh inhalation aerosol: 1 puff(s) inhaled prn as needed every 6 hours  atorvastatin 10 mg oral tablet: 1 tab(s) orally once a day  cefpodoxime 200 mg oral tablet: 1 tab(s) orally every 12 hours untill 1/13  Centrum oral tablet: 1 tab(s) orally once a day  Flomax 0.4 mg oral capsule: 1 cap(s) orally once a day (at bedtime)  gabapentin 300 mg oral capsule: 1 cap(s) orally 2 times a day  metFORMIN 500 mg oral tablet: 1 tab(s) orally 2 times a day  metoprolol tartrate 25 mg oral tablet: 1 tab(s) orally 2 times a day  NIFEdipine 90 mg oral tablet, extended release: 1 tab(s) orally once a day   OLANZapine 20 mg oral tablet: 1 tab(s) orally once a day (at bedtime)  traZODone 150 mg oral tablet: 1 tab(s) orally once a day (at bedtime)  Vitamin D2 1.25 mg (50,000 intl units) oral capsule: 1 cap(s) orally once a week

## 2023-01-10 NOTE — DISCHARGE NOTE PROVIDER - ATTENDING DISCHARGE PHYSICAL EXAMINATION:
T(C): 36 (01-10-23 @ 08:10), Max: 36.2 (01-09-23 @ 15:52)  HR: 82 (01-10-23 @ 08:10) (82 - 102)  BP: 142/65 (01-10-23 @ 08:10) (107/55 - 142/65)  RR: 18 (01-10-23 @ 08:10) (18 - 18)  SpO2: --    O/E:  Awake, alert, not in distress.  HEENT: atraumatic, EOMI.  Chest: clear.  CVS: SIS2 +, no murmur.  P/A: Soft, BS+  CNS: awake, alert  Ext: no edema feet.  All systems reviewed positive findings as above.

## 2023-01-10 NOTE — DISCHARGE NOTE PROVIDER - CARE PROVIDERS DIRECT ADDRESSES
[email or web address]

## 2023-01-10 NOTE — PHYSICAL THERAPY INITIAL EVALUATION ADULT - GENERAL OBSERVATIONS, REHAB EVAL
13:25--13:50 pt encountered in bed, +rock and roll restraint alert.  Pt performed bed mobility, transfers and ambulated with assist of PT and RW.  Pt has very poor safety and is high risk for fall.

## 2023-01-10 NOTE — DISCHARGE NOTE PROVIDER - NSDCCPCAREPLAN_GEN_ALL_CORE_FT
PRINCIPAL DISCHARGE DIAGNOSIS  Diagnosis: Urinary tract infection  Assessment and Plan of Treatment: You presented to us with fever and urinary retention. Blood work and imaging studies were obtained. You were started on antibiotics for urinary tract infection and flomax to relieve the urinary retention. During the course of hospitalization your symptoms improved and you are being discharged on oral antibiotics and advise to follow-up with primary care physician in 2 weeks.

## 2023-01-10 NOTE — DISCHARGE NOTE NURSING/CASE MANAGEMENT/SOCIAL WORK - NSDCPEFALRISK_GEN_ALL_CORE
For information on Fall & Injury Prevention, visit: [email or web address] OR  [email or web address] OR  https://www.cdc.gov/steadi/patient.html

## 2023-01-11 LAB
-  AMIKACIN: SIGNIFICANT CHANGE UP
-  AMOXICILLIN/CLAVULANIC ACID: SIGNIFICANT CHANGE UP
-  AMPICILLIN/SULBACTAM: SIGNIFICANT CHANGE UP
-  AMPICILLIN: SIGNIFICANT CHANGE UP
-  AZTREONAM: SIGNIFICANT CHANGE UP
-  CEFAZOLIN: SIGNIFICANT CHANGE UP
-  CEFEPIME: SIGNIFICANT CHANGE UP
-  CEFTRIAXONE: SIGNIFICANT CHANGE UP
-  CEFUROXIME: SIGNIFICANT CHANGE UP
-  CIPROFLOXACIN: SIGNIFICANT CHANGE UP
-  ERTAPENEM: SIGNIFICANT CHANGE UP
-  GENTAMICIN: SIGNIFICANT CHANGE UP
-  IMIPENEM: SIGNIFICANT CHANGE UP
-  LEVOFLOXACIN: SIGNIFICANT CHANGE UP
-  MEROPENEM: SIGNIFICANT CHANGE UP
-  NITROFURANTOIN: SIGNIFICANT CHANGE UP
-  PIPERACILLIN/TAZOBACTAM: SIGNIFICANT CHANGE UP
-  RESISTANCE GENE OXA: SIGNIFICANT CHANGE UP
-  TOBRAMYCIN: SIGNIFICANT CHANGE UP
-  TRIMETHOPRIM/SULFAMETHOXAZOLE: SIGNIFICANT CHANGE UP
BLANDM BLD POS QL PROBE: SIGNIFICANT CHANGE UP
CULTURE RESULTS: SIGNIFICANT CHANGE UP
METHOD TYPE: SIGNIFICANT CHANGE UP
METHOD TYPE: SIGNIFICANT CHANGE UP
ORGANISM # SPEC MICROSCOPIC CNT: SIGNIFICANT CHANGE UP
SPECIMEN SOURCE: SIGNIFICANT CHANGE UP

## 2023-01-12 NOTE — CHART NOTE - NSCHARTNOTEFT_GEN_A_CORE
Called by lab re: urine culture showing carbapenem resistant Klebsiella pneumoniae  Chart reviewed. Pt was discharged on 1/10 on vantin before urine culture sensitivities were known.  Case discussed with ID who recommends fosfomycin 3g PO x 1   I spoke to Mercedes koch [facility] who will relay this information to the physician at that facility so it can be ordered.

## 2023-01-13 DIAGNOSIS — I10 ESSENTIAL (PRIMARY) HYPERTENSION: ICD-10-CM

## 2023-01-13 DIAGNOSIS — Z79.899 OTHER LONG TERM (CURRENT) DRUG THERAPY: ICD-10-CM

## 2023-01-13 DIAGNOSIS — Z79.84 LONG TERM (CURRENT) USE OF ORAL HYPOGLYCEMIC DRUGS: ICD-10-CM

## 2023-01-13 DIAGNOSIS — F17.210 NICOTINE DEPENDENCE, CIGARETTES, UNCOMPLICATED: ICD-10-CM

## 2023-01-13 DIAGNOSIS — N39.0 URINARY TRACT INFECTION, SITE NOT SPECIFIED: ICD-10-CM

## 2023-01-13 DIAGNOSIS — E11.40 TYPE 2 DIABETES MELLITUS WITH DIABETIC NEUROPATHY, UNSPECIFIED: ICD-10-CM

## 2023-01-13 DIAGNOSIS — Z79.01 LONG TERM (CURRENT) USE OF ANTICOAGULANTS: ICD-10-CM

## 2023-01-13 DIAGNOSIS — J44.9 CHRONIC OBSTRUCTIVE PULMONARY DISEASE, UNSPECIFIED: ICD-10-CM

## 2023-01-13 DIAGNOSIS — F03.90 UNSPECIFIED DEMENTIA WITHOUT BEHAVIORAL DISTURBANCE: ICD-10-CM

## 2023-01-13 DIAGNOSIS — R33.9 RETENTION OF URINE, UNSPECIFIED: ICD-10-CM

## 2023-01-13 DIAGNOSIS — R53.1 WEAKNESS: ICD-10-CM

## 2023-01-13 DIAGNOSIS — R32 UNSPECIFIED URINARY INCONTINENCE: ICD-10-CM

## 2023-01-13 DIAGNOSIS — E78.5 HYPERLIPIDEMIA, UNSPECIFIED: ICD-10-CM

## 2023-01-13 DIAGNOSIS — N40.1 BENIGN PROSTATIC HYPERPLASIA WITH LOWER URINARY TRACT SYMPTOMS: ICD-10-CM

## 2023-01-13 DIAGNOSIS — B96.1 KLEBSIELLA PNEUMONIAE [K. PNEUMONIAE] AS THE CAUSE OF DISEASES CLASSIFIED ELSEWHERE: ICD-10-CM

## 2023-01-13 DIAGNOSIS — F20.9 SCHIZOPHRENIA, UNSPECIFIED: ICD-10-CM

## 2023-02-21 ENCOUNTER — INPATIENT (INPATIENT)
Facility: HOSPITAL | Age: 83
LOS: 2 days | Discharge: NURSING, REHAB OR LONG-TERM CARE | DRG: 872 | End: 2023-02-24
Attending: HOSPITALIST | Admitting: HOSPITALIST
Payer: MEDICARE

## 2023-02-21 VITALS
DIASTOLIC BLOOD PRESSURE: 58 MMHG | OXYGEN SATURATION: 99 % | RESPIRATION RATE: 24 BRPM | SYSTOLIC BLOOD PRESSURE: 125 MMHG | TEMPERATURE: 103 F | HEART RATE: 125 BPM | HEIGHT: 70 IN

## 2023-02-21 DIAGNOSIS — Z12.11 ENCOUNTER FOR SCREENING FOR MALIGNANT NEOPLASM OF COLON: Chronic | ICD-10-CM

## 2023-02-21 RX ORDER — ACETAMINOPHEN 500 MG
975 TABLET ORAL ONCE
Refills: 0 | Status: COMPLETED | OUTPATIENT
Start: 2023-02-21 | End: 2023-02-21

## 2023-02-21 RX ORDER — SODIUM CHLORIDE 9 MG/ML
1000 INJECTION INTRAMUSCULAR; INTRAVENOUS; SUBCUTANEOUS ONCE
Refills: 0 | Status: COMPLETED | OUTPATIENT
Start: 2023-02-21 | End: 2023-02-21

## 2023-02-21 NOTE — ED ADULT TRIAGE NOTE - CHIEF COMPLAINT QUOTE
Pt. BIBA from [facility] living [facility] due to fevers and altered mental status.

## 2023-02-21 NOTE — ED ADULT NURSE NOTE - MUSCULOSKELETAL ASSESSMENT
Please bring in a copy of your advance directive at your next visit, or drop off a copy at any [facility] facility so that it can be added to your medical record.     .  .                                                      At [facility], one important tool we use to improve our patient services is our Patient Survey.  Following your visit you may receive our survey in the mail or by email.    Please take the time to complete the survey.    If your visit with us was great, we want to hear about it.    If we can improve, please let us know how.            - - -

## 2023-02-21 NOTE — ED ADULT NURSE NOTE - CHIEF COMPLAINT QUOTE
Pt. BIBA from [facility] living [facility] due to fevers and altered mental status.

## 2023-02-22 DIAGNOSIS — A41.9 SEPSIS, UNSPECIFIED ORGANISM: ICD-10-CM

## 2023-02-22 LAB
ALBUMIN SERPL ELPH-MCNC: 4.3 G/DL — SIGNIFICANT CHANGE UP (ref 3.5–5.2)
ALP SERPL-CCNC: 87 U/L — SIGNIFICANT CHANGE UP (ref 30–115)
ALT FLD-CCNC: 11 U/L — SIGNIFICANT CHANGE UP (ref 0–41)
ANION GAP SERPL CALC-SCNC: 11 MMOL/L — SIGNIFICANT CHANGE UP (ref 7–14)
AST SERPL-CCNC: 31 U/L — SIGNIFICANT CHANGE UP (ref 0–41)
BASOPHILS # BLD AUTO: 0.01 K/UL — SIGNIFICANT CHANGE UP (ref 0–0.2)
BASOPHILS NFR BLD AUTO: 0.1 % — SIGNIFICANT CHANGE UP (ref 0–1)
BILIRUB SERPL-MCNC: 0.3 MG/DL — SIGNIFICANT CHANGE UP (ref 0.2–1.2)
BUN SERPL-MCNC: 24 MG/DL — HIGH (ref 10–20)
CALCIUM SERPL-MCNC: 8.9 MG/DL — SIGNIFICANT CHANGE UP (ref 8.4–10.5)
CHLORIDE SERPL-SCNC: 98 MMOL/L — SIGNIFICANT CHANGE UP (ref 98–110)
CO2 SERPL-SCNC: 25 MMOL/L — SIGNIFICANT CHANGE UP (ref 17–32)
CREAT SERPL-MCNC: 1.5 MG/DL — SIGNIFICANT CHANGE UP (ref 0.7–1.5)
EGFR: 47 ML/MIN/1.73M2 — LOW
EOSINOPHIL # BLD AUTO: 0 K/UL — SIGNIFICANT CHANGE UP (ref 0–0.7)
EOSINOPHIL NFR BLD AUTO: 0 % — SIGNIFICANT CHANGE UP (ref 0–8)
FLUAV AG NPH QL: SIGNIFICANT CHANGE UP
FLUBV AG NPH QL: SIGNIFICANT CHANGE UP
GLUCOSE BLDC GLUCOMTR-MCNC: 109 MG/DL — HIGH (ref 70–99)
GLUCOSE BLDC GLUCOMTR-MCNC: 135 MG/DL — HIGH (ref 70–99)
GLUCOSE BLDC GLUCOMTR-MCNC: 153 MG/DL — HIGH (ref 70–99)
GLUCOSE SERPL-MCNC: 127 MG/DL — HIGH (ref 70–99)
HCT VFR BLD CALC: 33.3 % — LOW (ref 42–52)
HGB BLD-MCNC: 11.3 G/DL — LOW (ref 14–18)
IMM GRANULOCYTES NFR BLD AUTO: 0.6 % — HIGH (ref 0.1–0.3)
LACTATE SERPL-SCNC: 1 MMOL/L — SIGNIFICANT CHANGE UP (ref 0.7–2)
LYMPHOCYTES # BLD AUTO: 1 K/UL — LOW (ref 1.2–3.4)
LYMPHOCYTES # BLD AUTO: 8.2 % — LOW (ref 20.5–51.1)
MCHC RBC-ENTMCNC: 31.5 PG — HIGH (ref 27–31)
MCHC RBC-ENTMCNC: 33.9 G/DL — SIGNIFICANT CHANGE UP (ref 32–37)
MCV RBC AUTO: 92.8 FL — SIGNIFICANT CHANGE UP (ref 80–94)
MONOCYTES # BLD AUTO: 1.38 K/UL — HIGH (ref 0.1–0.6)
MONOCYTES NFR BLD AUTO: 11.3 % — HIGH (ref 1.7–9.3)
NEUTROPHILS # BLD AUTO: 9.7 K/UL — HIGH (ref 1.4–6.5)
NEUTROPHILS NFR BLD AUTO: 79.8 % — HIGH (ref 42.2–75.2)
NRBC # BLD: 0 /100 WBCS — SIGNIFICANT CHANGE UP (ref 0–0)
PLATELET # BLD AUTO: 161 K/UL — SIGNIFICANT CHANGE UP (ref 130–400)
POTASSIUM SERPL-MCNC: 4.2 MMOL/L — SIGNIFICANT CHANGE UP (ref 3.5–5)
POTASSIUM SERPL-SCNC: 4.2 MMOL/L — SIGNIFICANT CHANGE UP (ref 3.5–5)
PROT SERPL-MCNC: 7.3 G/DL — SIGNIFICANT CHANGE UP (ref 6–8)
RBC # BLD: 3.59 M/UL — LOW (ref 4.7–6.1)
RBC # FLD: 14.8 % — HIGH (ref 11.5–14.5)
RSV RNA NPH QL NAA+NON-PROBE: SIGNIFICANT CHANGE UP
SARS-COV-2 RNA SPEC QL NAA+PROBE: SIGNIFICANT CHANGE UP
SODIUM SERPL-SCNC: 134 MMOL/L — LOW (ref 135–146)
WBC # BLD: 12.16 K/UL — HIGH (ref 4.8–10.8)
WBC # FLD AUTO: 12.16 K/UL — HIGH (ref 4.8–10.8)

## 2023-02-22 PROCEDURE — 80053 COMPREHEN METABOLIC PANEL: CPT

## 2023-02-22 PROCEDURE — 71045 X-RAY EXAM CHEST 1 VIEW: CPT | Mod: 26

## 2023-02-22 PROCEDURE — 82962 GLUCOSE BLOOD TEST: CPT

## 2023-02-22 PROCEDURE — 83735 ASSAY OF MAGNESIUM: CPT

## 2023-02-22 PROCEDURE — 84145 PROCALCITONIN (PCT): CPT

## 2023-02-22 PROCEDURE — 81001 URINALYSIS AUTO W/SCOPE: CPT

## 2023-02-22 PROCEDURE — 87641 MR-STAPH DNA AMP PROBE: CPT

## 2023-02-22 PROCEDURE — 99222 1ST HOSP IP/OBS MODERATE 55: CPT | Mod: GC

## 2023-02-22 PROCEDURE — 87086 URINE CULTURE/COLONY COUNT: CPT

## 2023-02-22 PROCEDURE — 87640 STAPH A DNA AMP PROBE: CPT

## 2023-02-22 PROCEDURE — 80202 ASSAY OF VANCOMYCIN: CPT

## 2023-02-22 PROCEDURE — 94640 AIRWAY INHALATION TREATMENT: CPT

## 2023-02-22 PROCEDURE — 36415 COLL VENOUS BLD VENIPUNCTURE: CPT

## 2023-02-22 PROCEDURE — 85025 COMPLETE CBC W/AUTO DIFF WBC: CPT

## 2023-02-22 RX ORDER — NIFEDIPINE 30 MG
60 TABLET, EXTENDED RELEASE 24 HR ORAL DAILY
Refills: 0 | Status: DISCONTINUED | OUTPATIENT
Start: 2023-02-22 | End: 2023-02-24

## 2023-02-22 RX ORDER — METOPROLOL TARTRATE 50 MG
25 TABLET ORAL
Refills: 0 | Status: DISCONTINUED | OUTPATIENT
Start: 2023-02-22 | End: 2023-02-24

## 2023-02-22 RX ORDER — CEFEPIME 1 G/1
2000 INJECTION, POWDER, FOR SOLUTION INTRAMUSCULAR; INTRAVENOUS ONCE
Refills: 0 | Status: COMPLETED | OUTPATIENT
Start: 2023-02-22 | End: 2023-02-22

## 2023-02-22 RX ORDER — OLANZAPINE 15 MG/1
20 TABLET, FILM COATED ORAL AT BEDTIME
Refills: 0 | Status: DISCONTINUED | OUTPATIENT
Start: 2023-02-22 | End: 2023-02-24

## 2023-02-22 RX ORDER — CEFEPIME 1 G/1
1000 INJECTION, POWDER, FOR SOLUTION INTRAMUSCULAR; INTRAVENOUS EVERY 24 HOURS
Refills: 0 | Status: DISCONTINUED | OUTPATIENT
Start: 2023-02-22 | End: 2023-02-22

## 2023-02-22 RX ORDER — DEXTROSE 50 % IN WATER 50 %
12.5 SYRINGE (ML) INTRAVENOUS ONCE
Refills: 0 | Status: DISCONTINUED | OUTPATIENT
Start: 2023-02-22 | End: 2023-02-24

## 2023-02-22 RX ORDER — LISINOPRIL 2.5 MG/1
5 TABLET ORAL DAILY
Refills: 0 | Status: DISCONTINUED | OUTPATIENT
Start: 2023-02-22 | End: 2023-02-24

## 2023-02-22 RX ORDER — VANCOMYCIN HCL 1 G
1000 VIAL (EA) INTRAVENOUS EVERY 24 HOURS
Refills: 0 | Status: DISCONTINUED | OUTPATIENT
Start: 2023-02-22 | End: 2023-02-24

## 2023-02-22 RX ORDER — TAMSULOSIN HYDROCHLORIDE 0.4 MG/1
0.4 CAPSULE ORAL AT BEDTIME
Refills: 0 | Status: DISCONTINUED | OUTPATIENT
Start: 2023-02-22 | End: 2023-02-24

## 2023-02-22 RX ORDER — TRAZODONE HCL 50 MG
150 TABLET ORAL AT BEDTIME
Refills: 0 | Status: DISCONTINUED | OUTPATIENT
Start: 2023-02-22 | End: 2023-02-24

## 2023-02-22 RX ORDER — ALBUTEROL 90 UG/1
2 AEROSOL, METERED ORAL EVERY 6 HOURS
Refills: 0 | Status: DISCONTINUED | OUTPATIENT
Start: 2023-02-22 | End: 2023-02-24

## 2023-02-22 RX ORDER — SODIUM CHLORIDE 9 MG/ML
1000 INJECTION, SOLUTION INTRAVENOUS
Refills: 0 | Status: DISCONTINUED | OUTPATIENT
Start: 2023-02-22 | End: 2023-02-23

## 2023-02-22 RX ORDER — DEXTROSE 50 % IN WATER 50 %
25 SYRINGE (ML) INTRAVENOUS ONCE
Refills: 0 | Status: DISCONTINUED | OUTPATIENT
Start: 2023-02-22 | End: 2023-02-24

## 2023-02-22 RX ORDER — GLUCAGON INJECTION, SOLUTION 0.5 MG/.1ML
1 INJECTION, SOLUTION SUBCUTANEOUS ONCE
Refills: 0 | Status: DISCONTINUED | OUTPATIENT
Start: 2023-02-22 | End: 2023-02-24

## 2023-02-22 RX ORDER — CEFEPIME 1 G/1
INJECTION, POWDER, FOR SOLUTION INTRAMUSCULAR; INTRAVENOUS
Refills: 0 | Status: DISCONTINUED | OUTPATIENT
Start: 2023-02-23 | End: 2023-02-24

## 2023-02-22 RX ORDER — MULTIVIT-MIN/FERROUS GLUCONATE 9 MG/15 ML
15 LIQUID (ML) ORAL DAILY
Refills: 0 | Status: DISCONTINUED | OUTPATIENT
Start: 2023-02-22 | End: 2023-02-22

## 2023-02-22 RX ORDER — INSULIN LISPRO 100/ML
VIAL (ML) SUBCUTANEOUS
Refills: 0 | Status: DISCONTINUED | OUTPATIENT
Start: 2023-02-22 | End: 2023-02-24

## 2023-02-22 RX ORDER — GABAPENTIN 400 MG/1
300 CAPSULE ORAL
Refills: 0 | Status: DISCONTINUED | OUTPATIENT
Start: 2023-02-22 | End: 2023-02-24

## 2023-02-22 RX ORDER — SODIUM CHLORIDE 9 MG/ML
1000 INJECTION, SOLUTION INTRAVENOUS
Refills: 0 | Status: DISCONTINUED | OUTPATIENT
Start: 2023-02-22 | End: 2023-02-24

## 2023-02-22 RX ORDER — ATORVASTATIN CALCIUM 80 MG/1
10 TABLET, FILM COATED ORAL AT BEDTIME
Refills: 0 | Status: DISCONTINUED | OUTPATIENT
Start: 2023-02-22 | End: 2023-02-24

## 2023-02-22 RX ORDER — ACETAMINOPHEN 500 MG
650 TABLET ORAL EVERY 6 HOURS
Refills: 0 | Status: DISCONTINUED | OUTPATIENT
Start: 2023-02-22 | End: 2023-02-24

## 2023-02-22 RX ORDER — IPRATROPIUM/ALBUTEROL SULFATE 18-103MCG
3 AEROSOL WITH ADAPTER (GRAM) INHALATION EVERY 6 HOURS
Refills: 0 | Status: DISCONTINUED | OUTPATIENT
Start: 2023-02-22 | End: 2023-02-24

## 2023-02-22 RX ORDER — DEXTROSE 50 % IN WATER 50 %
15 SYRINGE (ML) INTRAVENOUS ONCE
Refills: 0 | Status: DISCONTINUED | OUTPATIENT
Start: 2023-02-22 | End: 2023-02-24

## 2023-02-22 RX ORDER — HEPARIN SODIUM 5000 [USP'U]/ML
5000 INJECTION INTRAVENOUS; SUBCUTANEOUS EVERY 12 HOURS
Refills: 0 | Status: DISCONTINUED | OUTPATIENT
Start: 2023-02-22 | End: 2023-02-24

## 2023-02-22 RX ORDER — VANCOMYCIN HCL 1 G
1000 VIAL (EA) INTRAVENOUS ONCE
Refills: 0 | Status: COMPLETED | OUTPATIENT
Start: 2023-02-22 | End: 2023-02-22

## 2023-02-22 RX ADMIN — ATORVASTATIN CALCIUM 10 MILLIGRAM(S): 80 TABLET, FILM COATED ORAL at 21:39

## 2023-02-22 RX ADMIN — GABAPENTIN 300 MILLIGRAM(S): 400 CAPSULE ORAL at 19:18

## 2023-02-22 RX ADMIN — OLANZAPINE 20 MILLIGRAM(S): 15 TABLET, FILM COATED ORAL at 21:39

## 2023-02-22 RX ADMIN — Medication 1: at 12:13

## 2023-02-22 RX ADMIN — TAMSULOSIN HYDROCHLORIDE 0.4 MILLIGRAM(S): 0.4 CAPSULE ORAL at 21:39

## 2023-02-22 RX ADMIN — Medication 1 TABLET(S): at 11:25

## 2023-02-22 RX ADMIN — SODIUM CHLORIDE 60 MILLILITER(S): 9 INJECTION, SOLUTION INTRAVENOUS at 10:49

## 2023-02-22 RX ADMIN — Medication 3 MILLILITER(S): at 21:03

## 2023-02-22 RX ADMIN — HEPARIN SODIUM 5000 UNIT(S): 5000 INJECTION INTRAVENOUS; SUBCUTANEOUS at 19:18

## 2023-02-22 RX ADMIN — Medication 250 MILLIGRAM(S): at 02:10

## 2023-02-22 RX ADMIN — Medication 975 MILLIGRAM(S): at 01:12

## 2023-02-22 RX ADMIN — CEFEPIME 100 MILLIGRAM(S): 1 INJECTION, POWDER, FOR SOLUTION INTRAMUSCULAR; INTRAVENOUS at 03:28

## 2023-02-22 RX ADMIN — Medication 25 MILLIGRAM(S): at 19:18

## 2023-02-22 RX ADMIN — Medication 150 MILLIGRAM(S): at 21:40

## 2023-02-22 RX ADMIN — SODIUM CHLORIDE 1000 MILLILITER(S): 9 INJECTION INTRAMUSCULAR; INTRAVENOUS; SUBCUTANEOUS at 01:12

## 2023-02-22 NOTE — CONSULT NOTE ADULT - ASSESSMENT
ASSESSMENT  82M with PMHx of Schizophrenia, Dementia, HTN, HLD, DM, COPD (not on home O2), Non-hodgkin's lymphoma, neuropathy, urinary retention with prior chronic bains sent in from [facility] for fever.     IMPRESSION  #Fever  - CXR without focal opacities     #Urinary retention with chronic bains   - Colonized with CR Klebsiella     #NHL   #COPD   #Dementia/Schizophrenia     #Abx allergy: NKDA    RECOMMENDATIONS  - no clear source - check UA and Urine Cx (although patient received antibiotics)  - continue vancomycin 1g q 24 hours   - continue cefepime -- change to 1g q 12 hours for CrCl 38   - trend creatinine and check renal US   - follow-up blood cx  - if hemodynamic worsening, avycaz 1.25 g q 8 hours (non-formulary)    Please call or message on [employer] Teams if with any questions.  Spectra [phone]

## 2023-02-22 NOTE — ED PROVIDER NOTE - PHYSICAL EXAMINATION
CONST: Well appearing in NAD  EYES: PERRL, EOMI, Sclera and conjunctiva clear.   ENT: No nasal discharge. Oropharynx normal appearing  NECK: Non-tender, no meningeal signs. normal ROM. supple   CARD: Normal S1 S2; Normal rate and rhythm  RESP: Equal BS B/L, No wheezes, rhonchi or rales. No distress  GI: Soft, non-tender, non-distended. no cva tenderness. normal BS  MS: Normal ROM in all extremities. No midline spinal tenderness. pulses 2 +. no calf tenderness or swelling  SKIN: Warm, dry, no acute rashes. Good turgor  NEURO: A&Ox1 (baseline), No focal deficits.

## 2023-02-22 NOTE — CONSULT NOTE ADULT - CONSULT REASON
Attempted to call patient, phone rings continuously, no VM reached, no answer. Will call tomorrow.    Fever

## 2023-02-22 NOTE — H&P ADULT - NSICDXPASTMEDICALHX_GEN_ALL_CORE_FT
Assessment: Appropriate reaction PAST MEDICAL HISTORY:  Chronic retention of urine     COPD (chronic obstructive pulmonary disease)     Diabetes type 2, controlled     Dyslipidemia     Dyslipidemia     Schizophrenia

## 2023-02-22 NOTE — CONSULT NOTE ADULT - SUBJECTIVE AND OBJECTIVE BOX
ADA VILLAGOMEZ  82y, Male  Allergy: No Known Allergies      CHIEF COMPLAINT:     LOS      HPI:  82M with PMHx of Schizophrenia, Dementia, HTN, HLD, DM, COPD (not on home O2), Non-hodgkin's lymphoma, neuropathy, urinary retention with prior chronic bains sent in from [facility] for fever. Patient is AAO X 1 at baseline. No other nursing home charts available, tried calling NH [phone], no response. Spoke to sister Fatmata, she mentioned that nursing home told her that he is being sent to the ED for fever and weakness. Sister not aware of any recent sick contacts/urinary complaints/diarrhea/change in mental status/cough/URTI symptoms/Palpitations/SOB.     Off note patient recently discharged 1/10 on po vantin for UTI, Sensitivities resulted later for Carbapenem resistant Klebsiella pneumoniae      In The ED  Vitals Temp 102.5, BP - 125/58, , RR 24, Sao2 99% on 4 lit NC  Labs - WBC 12.1, Cr 1.5(baseline 1.2/1.3) lactate 1, COVID/RVP -ve  CXR - increased vascular markings, wnl(pending read)    Patient was given 1 lit NS bolus, cefepime 2gm x 1, vanc 1gm x 1 and admitted to medicine for further infectious workup (22 Feb 2023 08:33)      INFECTIOUS DISEASE HISTORY:  History as above.  Limited historian due to schizophrenia and dementia.   Denies any abdominal pain, nausea, vomiting     PAST MEDICAL & SURGICAL HISTORY:  Schizophrenia      Diabetes type 2, controlled      COPD (chronic obstructive pulmonary disease)      Dyslipidemia      Chronic retention of urine      Dyslipidemia      Encounter for screening colonoscopy          FAMILY HISTORY  No pertinent family history in first degree relatives    No pertinent family history in first degree relatives        SOCIAL HISTORY  Social History:  No alcohol, tobacco, or drug use (07 Jan 2023 19:18)        ROS  unable to obtain history secondary to patient's mental status and/or sedation    VITALS:  T(F): 97.1, Max: 102.5 (02-21-23 @ 22:53)  HR: 74  BP: 168/74  RR: 18Vital Signs Last 24 Hrs  T(C): 36.2 (22 Feb 2023 07:39), Max: 39.2 (21 Feb 2023 22:53)  T(F): 97.1 (22 Feb 2023 07:39), Max: 102.5 (21 Feb 2023 22:53)  HR: 74 (22 Feb 2023 07:39) (73 - 125)  BP: 168/74 (22 Feb 2023 07:39) (125/58 - 187/76)  BP(mean): --  RR: 18 (22 Feb 2023 07:39) (18 - 24)  SpO2: 100% (22 Feb 2023 07:39) (97% - 100%)    Parameters below as of 22 Feb 2023 07:39  Patient On (Oxygen Delivery Method): room air        PHYSICAL EXAM:  Gen: NAD, resting in bed  HEENT: Normocephalic, atraumatic  Neck: supple, no lymphadenopathy  CV: Regular rate & regular rhythm  Lungs: decreased BS at bases, no fremitus  Abdomen: Soft, BS present  Ext: Warm, well perfused  Neuro: non focal, awake  Skin: no rash, no erythema  Lines: no phlebitis    TESTS & MEASUREMENTS:                        11.3   12.16 )-----------( 161      ( 22 Feb 2023 00:40 )             33.3     02-22    134<L>  |  98  |  24<H>  ----------------------------<  127<H>  4.2   |  25  |  1.5    Ca    8.9      22 Feb 2023 00:40    TPro  7.3  /  Alb  4.3  /  TBili  0.3  /  DBili  x   /  AST  31  /  ALT  11  /  AlkPhos  87  02-22      LIVER FUNCTIONS - ( 22 Feb 2023 00:40 )  Alb: 4.3 g/dL / Pro: 7.3 g/dL / ALK PHOS: 87 U/L / ALT: 11 U/L / AST: 31 U/L / GGT: x               Culture - Urine (collected 01-07-23 @ 19:35)  Source: Clean Catch Clean Catch (Midstream)  Final Report (01-11-23 @ 15:52):    50,000 - 99,000 CFU/mL Klebsiella pneumoniae (Carbapenem Resistant)  Organism: Klepne MDRO  Klepne MDRO (01-11-23 @ 19:11)  Organism: Klepne MDRO (01-11-23 @ 19:11)      -  Resistance Gene OXA: Detec      -  Resistance Gene to Carbapenem: Detec      Method Type: Saeid  Organism: Nnacy MDRO (01-11-23 @ 19:11)      -  Amikacin: S <=16      -  Amoxicillin/Clavulanic Acid: R >16/8      -  Ampicillin: R >16 These ampicillin results predict results for amoxicillin      -  Ampicillin/Sulbactam: R >16/8 Enterobacter, Klebsiella aerogenes, Citrobacter, and Serratia may develop resistance during prolonged therapy (3-4 days)      -  Aztreonam: R >16      -  Cefazolin: R >16 For uncomplicated UTI with K. pneumoniae, E. coli, or P. mirablis: IZABELLA <=16 is sensitive and IZABELLA >=32 is resistant. This also predicts results for oral agents cefaclor, cefdinir, cefpodoxime, cefprozil, cefuroxime axetil, cephalexin and locarbef for uncomplicated UTI. Note that some isolates may be susceptible to these agents while testing resistant to cefazolin.      -  Cefepime: R >16      -  Ceftriaxone: R >32 Enterobacter, Klebsiella aerogenes, Citrobacter, and Serratia may develop resistance during prolonged therapy      -  Cefuroxime: R >16      -  Ciprofloxacin: R >2      -  Ertapenem: R >1      -  Gentamicin: S <=2      -  Imipenem: R 4      -  Levofloxacin: R >4      -  Meropenem: R 8      -  Nitrofurantoin: R >64 Should not be used to treat pyelonephritis      -  Piperacillin/Tazobactam: R >64      -  Tobramycin: R >8      -  Trimethoprim/Sulfamethoxazole: R >2/38      Method Type: IZABELLA    Culture - Blood (collected 01-07-23 @ 14:17)  Source: .Blood Blood-Peripheral  Final Report (01-12-23 @ 23:01):    No Growth Final    Culture - Blood (collected 01-07-23 @ 14:17)  Source: .Blood Blood-Peripheral  Final Report (01-12-23 @ 23:01):    No Growth Final    Culture - Blood (collected 07-27-22 @ 21:13)  Source: .Blood None  Final Report (08-02-22 @ 10:00):    No Growth Final    Culture - Blood (collected 07-18-22 @ 22:08)  Source: .Blood Blood  Final Report (07-24-22 @ 02:00):    No Growth Final    Culture - Blood (collected 07-18-22 @ 22:08)  Source: .Blood Blood  Final Report (07-24-22 @ 02:00):    No Growth Final        Lactate, Blood: 1.0 mmol/L (02-22-23 @ 00:40)      INFECTIOUS DISEASES TESTING  COVID-19 PCR: NotDetec (01-09-23 @ 12:30)  Procalcitonin, Serum: 0.72 ng/mL (01-08-23 @ 09:02)  COVID-19 PCR: NotDetec (07-28-22 @ 12:43)  Procalcitonin, Serum: 0.12 ng/mL (07-27-22 @ 21:13)  Procalcitonin, Serum: 0.12 ng/mL (07-19-22 @ 16:58)  Procalcitonin, Serum: 0.13 ng/mL (07-19-22 @ 11:40)  COVID-19 PCR: Detected (07-18-22 @ 21:40)      RADIOLOGY & ADDITIONAL TESTS:  I have personally reviewed the last Chest xray  CXR      CT      CARDIOLOGY TESTING      MEDICATIONS  albuterol/ipratropium for Nebulization 3 Nebulizer every 6 hours  atorvastatin 10 Oral at bedtime  cefepime   IVPB 1000 IV Intermittent every 24 hours  dextrose 5%. 1000 IV Continuous <Continuous>  dextrose 5%. 1000 IV Continuous <Continuous>  dextrose 50% Injectable 25 IV Push once  dextrose 50% Injectable 12.5 IV Push once  dextrose 50% Injectable 25 IV Push once  gabapentin 300 Oral two times a day  glucagon  Injectable 1 IntraMuscular once  heparin   Injectable 5000 SubCutaneous every 12 hours  insulin lispro (ADMELOG) corrective regimen sliding scale  SubCutaneous three times a day before meals  lactated ringers. 1000 IV Continuous <Continuous>  lisinopril 5 Oral daily  metoprolol tartrate 25 Oral two times a day  multivitamin 1 Oral daily  NIFEdipine XL 60 Oral daily  OLANZapine 20 Oral at bedtime  tamsulosin 0.4 Oral at bedtime  traZODone 150 Oral at bedtime  vancomycin  IVPB 1000 IV Intermittent every 24 hours      Weight  Weight (kg): 68 (01-07-23 @ 19:36)    ANTIBIOTICS:  cefepime   IVPB 1000 milliGRAM(s) IV Intermittent every 24 hours  vancomycin  IVPB 1000 milliGRAM(s) IV Intermittent every 24 hours      ALLERGIES:  No Known Allergies

## 2023-02-22 NOTE — PHARMACOTHERAPY INTERVENTION NOTE - COMMENTS
As per policy, ordered a vancomycin trough for 2/23 at 8 pm since patient would be due for a trough prior to the third dose of vancomycin.    Audra Vincent, PharmD, [facility]  Clinical Pharmacy Specialist, Infectious Diseases  Tele-Antimicrobial Stewardship Program (Tele-ASP)  Tele-ASP Phone: [phone]  
Consistent with ID note, recommended to adjust cefepime dose from 1g IV q24h to 1g IV q12h since the eGFR is 47 mL/min/1.73 m2 and patient is being treated empirically for UTI.     Audra Vincent, PharmD, [facility]  Clinical Pharmacy Specialist, Infectious Diseases  Tele-Antimicrobial Stewardship Program (Tele-ASP)  Tele-ASP Phone: [phone]

## 2023-02-22 NOTE — H&P ADULT - ATTENDING COMMENTS
Written order for extubation verified. The patient was identified by full name and birth date compared to the identification band. RN Present during the procedure.
# SIRS rule out sepsis  # Hypoxemia due to COPD  # HTN  # DM2 with neuropathy  # Dementia  #DAVID on CKD 3  # H/o ch urinary retention with indwelling Cobos POA      Follow up w cultures  Empiric antibiotics  ID consult  IVF, resume home med  Monitor renal functions and avoid nephrotoxic meds  Monitor labs and vitals

## 2023-02-22 NOTE — H&P ADULT - ASSESSMENT
82M with PMHx of Schizophrenia, Dementia, HTN, HLD, DM, COPD (not on home O2), Non-hodgkin's lymphoma, neuropathy, urinary retention with prior chronic bains sent in from [facility] for fever. Patient is AAO X 1 at baseline. No other nursing home charts available, tried calling NH [phone], no response. Spoke to sister Fatmata, she mentioned that nursing home told her that he is being sent to the ED for fever and weakness. Sister not aware of any recent sick contacts/urinary complaints/diarrhea/change in mental status/cough/URTI symptoms/Palpitations/SOB. Admitted to medicine for further infectious workup    #Fever  SIRS POA(fever 102.5, , RR 24, WBC 12.1)  no localizing signs or symptoms(poor historian)  h/o frequent UTIs  recent discharge 1/10 on Vantin for UTI, Sensitivities resulted later for Carbapenem resistant Klebsiella pneumoniae  HD stable now  Lactate 1.0  CXR No consolidation (pending read)  s/p cefepime 2gm x 1 and vanc 1gm x 1 dose in ED    Plan  - continue with cefepime and Vanc for now  - send mrsa nares, d/c Vanc if negative  - Follow up Urine and Blood cultures  - Consult ID      #COPD   - now using 3lit NC, saturating 98%, Wean as appropriate  - mild scattered expiratory wheezing  - duonebs for now    #HTN  - elevated now 187/76, missed home dose of BP meds  - c/w procardia 60mg qd, metoprolol 25 BID, lisinopril 5mg    #DM type 2  - A1C in Jan 2022 5.7  - monitor FS, ISS    #Neuropathy  - c/w neurontin    #Schizophrenia  #Dementia  - c/w zyprexa, trazodone    Diet - dash diet  DVT ppx - Heparin sq  GI prophylaxis - not indicated  Dispo [facility] manor when stable    Pending - Follow up urine and blood cultures, Follow up ID,      82M with PMHx of Schizophrenia, Dementia, HTN, HLD, DM, COPD (not on home O2), Non-hodgkin's lymphoma, neuropathy, urinary retention with prior chronic bains sent in from [facility] for fever. Patient is AAO X 1 at baseline. No other nursing home charts available, tried calling NH [phone], no response. Spoke to sister Fatmata, she mentioned that nursing home told her that he is being sent to the ED for fever and weakness. Sister not aware of any recent sick contacts/urinary complaints/diarrhea/change in mental status/cough/URTI symptoms/Palpitations/SOB. Admitted to medicine for further infectious workup    #Fever  SIRS POA(fever 102.5, , RR 24, WBC 12.1)  no localizing signs or symptoms(poor historian)  h/o frequent UTIs  recent discharge 1/10 on Vantin for UTI, Sensitivities resulted later for Carbapenem resistant Klebsiella pneumoniae  HD stable now  Lactate 1.0  CXR No consolidation (pending read)  s/p cefepime 2gm x 1 and vanc 1gm x 1 dose in ED    Plan  - continue with cefepime and Vanc for now  - Follow up procal, mrsa nares, d/c Vanc if negative  - Follow up Urine and Blood cultures  - Consult ID      #COPD   - now using 3lit NC, saturating 98%, Wean as appropriate  - mild scattered expiratory wheezing  - duonebs for now    #HTN  - elevated now 187/76, missed home dose of BP meds  - c/w procardia 60mg qd, metoprolol 25 BID, lisinopril 5mg    #Mild DAVID over CKD  - Cr ~1.2/1.3 in jan, now 1.5  - see if improves with fluids, s/p fluids in ED  - avoid nephrotoxic drugs    #h/o DM type 2  - A1C in Jan 2022 5.7  - monitor FS, ISS    #Neuropathy  - c/w neurontin    #Schizophrenia  #Dementia  - c/w zyprexa, trazodone    Diet - dash diet  DVT ppx - Heparin sq  GI prophylaxis - not indicated  Dispo [facility] manor when stable    Pending - Follow up urine and blood cultures, Follow up ID,

## 2023-02-22 NOTE — H&P ADULT - HISTORY OF PRESENT ILLNESS
82M with PMHx of Schizophrenia, Dementia, HTN, HLD, DM, COPD (not on home O2), Non-hodgkin's lymphoma, neuropathy, urinary retention with prior chronic bains sent in from [facility] for fever. Patient is AAO X 1 at baseline. No other nursing home charts available, tried calling NH [phone], no response. Spoke to sister Fatmata, she mentioned that nursing home told her that he is being sent to the ED for fever and weakness. Sister not aware of any recent sick contacts/urinary complaints/diarrhea/change in mental status/cough/URTI symptoms/Palpitations/SOB.     Off note patient recently discharged 1/10 on po vantin for UTI, Sensitivities resulted later for Carbapenem resistant Klebsiella pneumoniae      In The ED  Vitals Temp 102.5, BP - 125/58, , RR 24, Sao2 99% on 4 lit NC  Labs - WBC 12.1, Cr 1.5(baseline 1.2/1.3) lactate 1, COVID/RVP -ve  CXR - increased vascular markings, wnl(pending read)    Patient was given 1 lit NS bolus, cefepime 2gm x 1, vanc 1gm x 1 and admitted to medicine for further infectious workup

## 2023-02-22 NOTE — ED PROVIDER NOTE - OBJECTIVE STATEMENT
82-year-old male with past medical history of schizophrenia, diabetes, COPD and dementia , chronic bains catheter with frequent utis, sent in from [facility] residence for fever for 1 day.  Patient has no active complaints

## 2023-02-22 NOTE — H&P ADULT - NSHPPHYSICALEXAM_GEN_ALL_CORE
Gen: NAD, non-toxic, alert and follows simple comands but AAO x person only  Eyes: PERRLA, EOMI   HENT: Normocephalic, atraumatic. External ears normal, no rhinorrhea, moist mucous membranes.   CV:normal s1/s2  Resp: mild expiratory scattered wheezing b/l  Abd: soft, non tender, non rigid, no guarding or rebound tenderness  Back: No CVAT bilaterally, no midline ttp  Skin: dry  MSK: No LE edema  Neuro: AOx3, speech is fluent and appropriate  Psych: Mood normal, affect euthymic ICU Vital Signs Last 24 Hrs  T(C): 36.2 (22 Feb 2023 07:39), Max: 39.2 (21 Feb 2023 22:53)  T(F): 97.1 (22 Feb 2023 07:39), Max: 102.5 (21 Feb 2023 22:53)  HR: 74 (22 Feb 2023 07:39) (73 - 125)  BP: 168/74 (22 Feb 2023 07:39) (125/58 - 187/76)  BP(mean): --  ABP: --  ABP(mean): --  RR: 18 (22 Feb 2023 07:39) (18 - 24)  SpO2: 100% (22 Feb 2023 07:39) (97% - 100%)    O2 Parameters below as of 22 Feb 2023 07:39  Patient On (Oxygen Delivery Method): room air              Gen: NAD, non-toxic, alert and follows simple commands but AAO x person only  Eyes: PERRLA, EOMI   HENT: Normocephalic, atraumatic. External ears normal, no rhinorrhea, moist mucous membranes.   CV:normal s1/s2  Resp: mild expiratory scattered wheezing b/l  Abd: soft, non tender, non rigid, no guarding or rebound tenderness  Back: No CVAT bilaterally, no midline ttp  Skin: dry  MSK: No LE edema  Neuro: AOx1, speech is fluent and appropriate

## 2023-02-22 NOTE — H&P ADULT - TIME BILLING
History and physical examination, ROS, lab and radiology review, education, medication reconciliation

## 2023-02-22 NOTE — ED PROVIDER NOTE - ATTENDING APP SHARED VISIT CONTRIBUTION OF CARE
[Time Spent: ___ minutes] : I have spent [unfilled] minutes of time on the encounter. I saw pt with pa, agree with h and p.   maria isabel luoal.

## 2023-02-23 LAB
ALBUMIN SERPL ELPH-MCNC: 3.5 G/DL — SIGNIFICANT CHANGE UP (ref 3.5–5.2)
ALP SERPL-CCNC: 78 U/L — SIGNIFICANT CHANGE UP (ref 30–115)
ALT FLD-CCNC: 13 U/L — SIGNIFICANT CHANGE UP (ref 0–41)
ANION GAP SERPL CALC-SCNC: 12 MMOL/L — SIGNIFICANT CHANGE UP (ref 7–14)
APPEARANCE UR: ABNORMAL
AST SERPL-CCNC: 30 U/L — SIGNIFICANT CHANGE UP (ref 0–41)
BACTERIA # UR AUTO: ABNORMAL
BASOPHILS # BLD AUTO: 0.02 K/UL — SIGNIFICANT CHANGE UP (ref 0–0.2)
BASOPHILS NFR BLD AUTO: 0.3 % — SIGNIFICANT CHANGE UP (ref 0–1)
BILIRUB SERPL-MCNC: 0.3 MG/DL — SIGNIFICANT CHANGE UP (ref 0.2–1.2)
BILIRUB UR-MCNC: NEGATIVE — SIGNIFICANT CHANGE UP
BUN SERPL-MCNC: 21 MG/DL — HIGH (ref 10–20)
CALCIUM SERPL-MCNC: 8.6 MG/DL — SIGNIFICANT CHANGE UP (ref 8.4–10.5)
CHLORIDE SERPL-SCNC: 104 MMOL/L — SIGNIFICANT CHANGE UP (ref 98–110)
CO2 SERPL-SCNC: 20 MMOL/L — SIGNIFICANT CHANGE UP (ref 17–32)
COLOR SPEC: YELLOW — SIGNIFICANT CHANGE UP
CREAT SERPL-MCNC: 0.9 MG/DL — SIGNIFICANT CHANGE UP (ref 0.7–1.5)
DIFF PNL FLD: ABNORMAL
EGFR: 86 ML/MIN/1.73M2 — SIGNIFICANT CHANGE UP
EOSINOPHIL # BLD AUTO: 0.03 K/UL — SIGNIFICANT CHANGE UP (ref 0–0.7)
EOSINOPHIL NFR BLD AUTO: 0.4 % — SIGNIFICANT CHANGE UP (ref 0–8)
EPI CELLS # UR: 0 /HPF — SIGNIFICANT CHANGE UP (ref 0–5)
GLUCOSE BLDC GLUCOMTR-MCNC: 115 MG/DL — HIGH (ref 70–99)
GLUCOSE BLDC GLUCOMTR-MCNC: 119 MG/DL — HIGH (ref 70–99)
GLUCOSE BLDC GLUCOMTR-MCNC: 212 MG/DL — HIGH (ref 70–99)
GLUCOSE BLDC GLUCOMTR-MCNC: 90 MG/DL — SIGNIFICANT CHANGE UP (ref 70–99)
GLUCOSE SERPL-MCNC: 86 MG/DL — SIGNIFICANT CHANGE UP (ref 70–99)
GLUCOSE UR QL: NEGATIVE — SIGNIFICANT CHANGE UP
HCT VFR BLD CALC: 32.6 % — LOW (ref 42–52)
HGB BLD-MCNC: 10.8 G/DL — LOW (ref 14–18)
HYALINE CASTS # UR AUTO: 0 /LPF — SIGNIFICANT CHANGE UP (ref 0–7)
IMM GRANULOCYTES NFR BLD AUTO: 0.3 % — SIGNIFICANT CHANGE UP (ref 0.1–0.3)
KETONES UR-MCNC: NEGATIVE — SIGNIFICANT CHANGE UP
LEUKOCYTE ESTERASE UR-ACNC: ABNORMAL
LYMPHOCYTES # BLD AUTO: 1.22 K/UL — SIGNIFICANT CHANGE UP (ref 1.2–3.4)
LYMPHOCYTES # BLD AUTO: 16.6 % — LOW (ref 20.5–51.1)
MAGNESIUM SERPL-MCNC: 1.9 MG/DL — SIGNIFICANT CHANGE UP (ref 1.8–2.4)
MCHC RBC-ENTMCNC: 31.6 PG — HIGH (ref 27–31)
MCHC RBC-ENTMCNC: 33.1 G/DL — SIGNIFICANT CHANGE UP (ref 32–37)
MCV RBC AUTO: 95.3 FL — HIGH (ref 80–94)
MONOCYTES # BLD AUTO: 1.25 K/UL — HIGH (ref 0.1–0.6)
MONOCYTES NFR BLD AUTO: 17 % — HIGH (ref 1.7–9.3)
NEUTROPHILS # BLD AUTO: 4.83 K/UL — SIGNIFICANT CHANGE UP (ref 1.4–6.5)
NEUTROPHILS NFR BLD AUTO: 65.4 % — SIGNIFICANT CHANGE UP (ref 42.2–75.2)
NITRITE UR-MCNC: NEGATIVE — SIGNIFICANT CHANGE UP
NRBC # BLD: 0 /100 WBCS — SIGNIFICANT CHANGE UP (ref 0–0)
PH UR: 6.5 — SIGNIFICANT CHANGE UP (ref 5–8)
PLATELET # BLD AUTO: 152 K/UL — SIGNIFICANT CHANGE UP (ref 130–400)
POTASSIUM SERPL-MCNC: 4.2 MMOL/L — SIGNIFICANT CHANGE UP (ref 3.5–5)
POTASSIUM SERPL-SCNC: 4.2 MMOL/L — SIGNIFICANT CHANGE UP (ref 3.5–5)
PROCALCITONIN SERPL-MCNC: 1.57 NG/ML — HIGH (ref 0.02–0.1)
PROT SERPL-MCNC: 6.3 G/DL — SIGNIFICANT CHANGE UP (ref 6–8)
PROT UR-MCNC: ABNORMAL
RBC # BLD: 3.42 M/UL — LOW (ref 4.7–6.1)
RBC # FLD: 15 % — HIGH (ref 11.5–14.5)
RBC CASTS # UR COMP ASSIST: 0 /HPF — SIGNIFICANT CHANGE UP (ref 0–4)
SODIUM SERPL-SCNC: 136 MMOL/L — SIGNIFICANT CHANGE UP (ref 135–146)
SP GR SPEC: 1.02 — SIGNIFICANT CHANGE UP (ref 1.01–1.03)
URATE CRY FLD QL MICRO: ABNORMAL
UROBILINOGEN FLD QL: SIGNIFICANT CHANGE UP
WBC # BLD: 7.37 K/UL — SIGNIFICANT CHANGE UP (ref 4.8–10.8)
WBC # FLD AUTO: 7.37 K/UL — SIGNIFICANT CHANGE UP (ref 4.8–10.8)
WBC UR QL: 408 /HPF — HIGH (ref 0–5)

## 2023-02-23 PROCEDURE — 99232 SBSQ HOSP IP/OBS MODERATE 35: CPT

## 2023-02-23 RX ORDER — CEFEPIME 1 G/1
1000 INJECTION, POWDER, FOR SOLUTION INTRAMUSCULAR; INTRAVENOUS ONCE
Refills: 0 | Status: COMPLETED | OUTPATIENT
Start: 2023-02-23 | End: 2023-02-23

## 2023-02-23 RX ORDER — SODIUM CHLORIDE 9 MG/ML
1000 INJECTION, SOLUTION INTRAVENOUS
Refills: 0 | Status: DISCONTINUED | OUTPATIENT
Start: 2023-02-23 | End: 2023-02-24

## 2023-02-23 RX ORDER — SENNA PLUS 8.6 MG/1
2 TABLET ORAL AT BEDTIME
Refills: 0 | Status: DISCONTINUED | OUTPATIENT
Start: 2023-02-23 | End: 2023-02-24

## 2023-02-23 RX ORDER — CEFEPIME 1 G/1
1000 INJECTION, POWDER, FOR SOLUTION INTRAMUSCULAR; INTRAVENOUS EVERY 12 HOURS
Refills: 0 | Status: DISCONTINUED | OUTPATIENT
Start: 2023-02-23 | End: 2023-02-24

## 2023-02-23 RX ORDER — POLYETHYLENE GLYCOL 3350 17 G/17G
17 POWDER, FOR SOLUTION ORAL DAILY
Refills: 0 | Status: DISCONTINUED | OUTPATIENT
Start: 2023-02-23 | End: 2023-02-24

## 2023-02-23 RX ADMIN — SODIUM CHLORIDE 75 MILLILITER(S): 9 INJECTION, SOLUTION INTRAVENOUS at 12:26

## 2023-02-23 RX ADMIN — Medication 25 MILLIGRAM(S): at 05:35

## 2023-02-23 RX ADMIN — SODIUM CHLORIDE 75 MILLILITER(S): 9 INJECTION, SOLUTION INTRAVENOUS at 22:44

## 2023-02-23 RX ADMIN — POLYETHYLENE GLYCOL 3350 17 GRAM(S): 17 POWDER, FOR SOLUTION ORAL at 12:21

## 2023-02-23 RX ADMIN — Medication 3 MILLILITER(S): at 08:32

## 2023-02-23 RX ADMIN — Medication 3 MILLILITER(S): at 02:02

## 2023-02-23 RX ADMIN — LISINOPRIL 5 MILLIGRAM(S): 2.5 TABLET ORAL at 05:35

## 2023-02-23 RX ADMIN — Medication 60 MILLIGRAM(S): at 05:35

## 2023-02-23 RX ADMIN — Medication 25 MILLIGRAM(S): at 17:46

## 2023-02-23 RX ADMIN — Medication 250 MILLIGRAM(S): at 21:49

## 2023-02-23 RX ADMIN — HEPARIN SODIUM 5000 UNIT(S): 5000 INJECTION INTRAVENOUS; SUBCUTANEOUS at 17:47

## 2023-02-23 RX ADMIN — Medication 3 MILLILITER(S): at 14:57

## 2023-02-23 RX ADMIN — CEFEPIME 100 MILLIGRAM(S): 1 INJECTION, POWDER, FOR SOLUTION INTRAMUSCULAR; INTRAVENOUS at 17:46

## 2023-02-23 RX ADMIN — GABAPENTIN 300 MILLIGRAM(S): 400 CAPSULE ORAL at 17:46

## 2023-02-23 RX ADMIN — Medication 1 TABLET(S): at 12:21

## 2023-02-23 RX ADMIN — GABAPENTIN 300 MILLIGRAM(S): 400 CAPSULE ORAL at 05:35

## 2023-02-23 RX ADMIN — TAMSULOSIN HYDROCHLORIDE 0.4 MILLIGRAM(S): 0.4 CAPSULE ORAL at 21:49

## 2023-02-23 RX ADMIN — HEPARIN SODIUM 5000 UNIT(S): 5000 INJECTION INTRAVENOUS; SUBCUTANEOUS at 05:39

## 2023-02-23 RX ADMIN — SENNA PLUS 2 TABLET(S): 8.6 TABLET ORAL at 21:49

## 2023-02-23 RX ADMIN — Medication 2: at 12:22

## 2023-02-23 RX ADMIN — Medication 150 MILLIGRAM(S): at 22:44

## 2023-02-23 RX ADMIN — ATORVASTATIN CALCIUM 10 MILLIGRAM(S): 80 TABLET, FILM COATED ORAL at 21:49

## 2023-02-23 RX ADMIN — CEFEPIME 100 MILLIGRAM(S): 1 INJECTION, POWDER, FOR SOLUTION INTRAMUSCULAR; INTRAVENOUS at 00:38

## 2023-02-23 RX ADMIN — OLANZAPINE 20 MILLIGRAM(S): 15 TABLET, FILM COATED ORAL at 22:44

## 2023-02-23 NOTE — ED ADULT NURSE REASSESSMENT NOTE - NS ED NURSE REASSESS COMMENT FT1
Patient reassessed. A&O x2. VSS. Patient admitted to medicine. Patient denies pain/dicomfort. Safety & comfort maintained.

## 2023-02-23 NOTE — PATIENT PROFILE ADULT - FALL HARM RISK - HARM RISK INTERVENTIONS
Assistance with ambulation/Assistance OOB with selected safe patient handling equipment/Communicate Risk of Fall with Harm to all staff/Discuss with provider need for PT consult/Provide patient with walking aids - walker, cane, crutches/Reinforce activity limits and safety measures with patient and family/Reorient to person, place and time as needed/Review medications for side effects contributing to fall risk/Sit up slowly, dangle for a short time, stand at bedside before walking/Tailored Fall Risk Interventions/Use of alarms - bed, chair and/or voice tab/Visual Cue: Yellow wristband and red socks/Bed in lowest position, wheels locked, appropriate side rails in place/Call bell, personal items and telephone in reach/Instruct patient to call for assistance before getting out of bed or chair/Non-slip footwear when patient is out of bed/[place] to call system/Physically safe environment - no spills, clutter or unnecessary equipment/Purposeful Proactive Rounding/Room/bathroom lighting operational, light cord in reach

## 2023-02-23 NOTE — PROGRESS NOTE ADULT - SUBJECTIVE AND OBJECTIVE BOX
ADA VILLAGOMEZ  82y  [facility]-N ED Hold 021 A      Patient is a 82y old  Male who presents with a chief complaint of Fever (22 Feb 2023 16:24)      INTERVAL HPI/OVERNIGHT EVENTS:        REVIEW OF SYSTEMS:        FAMILY HISTORY:  No pertinent family history in first degree relatives      T(C): 36.5 (02-23-23 @ 08:35), Max: 36.5 (02-23-23 @ 08:35)  HR: 75 (02-23-23 @ 08:35) (62 - 84)  BP: 127/59 (02-23-23 @ 08:35) (127/59 - 182/74)  RR: 17 (02-23-23 @ 08:35) (17 - 18)  SpO2: 96% (02-23-23 @ 08:35) (96% - 100%)  Wt(kg): --Vital Signs Last 24 Hrs  T(C): 36.5 (23 Feb 2023 08:35), Max: 36.5 (23 Feb 2023 08:35)  T(F): 97.7 (23 Feb 2023 08:35), Max: 97.7 (23 Feb 2023 08:35)  HR: 75 (23 Feb 2023 08:35) (62 - 84)  BP: 127/59 (23 Feb 2023 08:35) (127/59 - 182/74)  BP(mean): --  RR: 17 (23 Feb 2023 08:35) (17 - 18)  SpO2: 96% (23 Feb 2023 08:35) (96% - 100%)    Parameters below as of 23 Feb 2023 08:35  Patient On (Oxygen Delivery Method): nasal cannula        PHYSICAL EXAM:  GENERAL: NAD, well-groomed, well-developed  HEAD:  Atraumatic, Normocephalic  EYES: EOMI, PERRLA, conjunctiva and sclera clear  ENMT: No tonsillar erythema, exudates, or enlargement; Moist mucous membranes, Good dentition, No lesions  NECK: Supple, No JVD, Normal thyroid  NERVOUS SYSTEM:  Alert & Oriented X3, Good concentration; Motor Strength 5/5 B/L upper and lower extremities; DTRs 2+ intact and symmetric  PULM: Clear to auscultation bilaterally  CARDIAC: Regular rate and rhythm; No murmurs, rubs, or gallops  GI: Soft, Nontender, Nondistended; Bowel sounds present  EXTREMITIES:  2+ Peripheral Pulses, No clubbing, cyanosis, or edema  LYMPH: No lymphadenopathy noted  SKIN: No rashes or lesions    Consultant(s) Notes Reviewed:  [x ] YES  [ ] NO  Care Discussed with Consultants/Other Providers [ x] YES  [ ] NO    LABS:                            10.8   7.37  )-----------( 152      ( 23 Feb 2023 04:30 )             32.6   02-23    136  |  104  |  21<H>  ----------------------------<  86  4.2   |  20  |  0.9    Ca    8.6      23 Feb 2023 04:30  Mg     1.9     02-23    TPro  6.3  /  Alb  3.5  /  TBili  0.3  /  DBili  x   /  AST  30  /  ALT  13  /  AlkPhos  78  02-23            Culture - Blood (collected 22 Feb 2023 00:40)  Source: .Blood Blood  Preliminary Report (23 Feb 2023 10:01):    No growth to date.    Culture - Blood (collected 22 Feb 2023 00:40)  Source: .Blood Blood  Preliminary Report (23 Feb 2023 10:01):    No growth to date.      acetaminophen     Tablet .. 650 milliGRAM(s) Oral every 6 hours PRN  albuterol    90 MICROgram(s) HFA Inhaler 2 Puff(s) Inhalation every 6 hours PRN  albuterol/ipratropium for Nebulization 3 milliLiter(s) Nebulizer every 6 hours  atorvastatin 10 milliGRAM(s) Oral at bedtime  cefepime   IVPB      cefepime   IVPB 1000 milliGRAM(s) IV Intermittent every 12 hours  dextrose 5%. 1000 milliLiter(s) IV Continuous <Continuous>  dextrose 5%. 1000 milliLiter(s) IV Continuous <Continuous>  dextrose 50% Injectable 25 Gram(s) IV Push once  dextrose 50% Injectable 12.5 Gram(s) IV Push once  dextrose 50% Injectable 25 Gram(s) IV Push once  dextrose Oral Gel 15 Gram(s) Oral once PRN  gabapentin 300 milliGRAM(s) Oral two times a day  glucagon  Injectable 1 milliGRAM(s) IntraMuscular once  heparin   Injectable 5000 Unit(s) SubCutaneous every 12 hours  insulin lispro (ADMELOG) corrective regimen sliding scale   SubCutaneous three times a day before meals  lactated ringers. 1000 milliLiter(s) IV Continuous <Continuous>  lisinopril 5 milliGRAM(s) Oral daily  metoprolol tartrate 25 milliGRAM(s) Oral two times a day  multivitamin 1 Tablet(s) Oral daily  NIFEdipine XL 60 milliGRAM(s) Oral daily  OLANZapine 20 milliGRAM(s) Oral at bedtime  tamsulosin 0.4 milliGRAM(s) Oral at bedtime  traZODone 150 milliGRAM(s) Oral at bedtime  vancomycin  IVPB 1000 milliGRAM(s) IV Intermittent every 24 hours      82M with PMHx of Schizophrenia, Dementia, HTN, HLD, DM, COPD (not on home O2), Non-hodgkin's lymphoma, neuropathy, urinary retention with prior chronic bains sent in from [facility] for fever. Patient is AAO X 1 at baseline. No other nursing home charts available, tried calling NH [phone], no response. Spoke to sister Fatmata, she mentioned that nursing home told her that he is being sent to the ED for fever and weakness. Sister not aware of any recent sick contacts/urinary complaints/diarrhea/change in mental status/cough/URTI symptoms/Palpitations/SOB. Admitted to medicine for further infectious workup    1. Sepsis secondary to acute UTI complicated by acute kidney injury resolving   - Admit to medicine           -LA:1   - Cont cefepime and vancomycin   - Blood cx:pending             -Urine cx:pending           - MRSA screening:pending   - Cont IVF LR at 75 ml/hr   - ID consult appreciated         2, COPD   - now using 3lit NC, saturating 98%, Wean as appropriate  - mild scattered expiratory wheezing  - duonebs for now    4. HTN  - elevated now 187/76, missed home dose of BP meds  - c/w procardia 60mg qd, metoprolol 25 BID, lisinopril 5mg    5. h/o DM type 2  - A1C in Jan 2022 5.7  - monitor FS, ISS    6. Neuropathy  - c/w neurontin    7. Schizophrenia/Dementia  - c/w zyprexa, trazodone    Diet - dash diet  DVT ppx - Heparin sq  GI prophylaxis - not indicated  Dispo - [place] [facility] when stable    Pending - Follow up urine and blood cultures, Follow up ID,           Case Discussed with House Staff   39  minutes spent on total encounter; more than 50% of the visit was spent counseling and/or coordinating care by the attending physician.   Spectra [phone]     ADA VILLAGOMEZ  82y  [facility]-N ED Hold 021 A      Patient is a 82y old  Male who presents with a chief complaint of Fever (22 Feb 2023 16:24)      INTERVAL HPI/OVERNIGHT EVENTS:  patient feels well.   he has no complains   he's off oxygen and without sob or other resp symptoms       REVIEW OF SYSTEMS:        FAMILY HISTORY:  No pertinent family history in first degree relatives      T(C): 36.5 (02-23-23 @ 08:35), Max: 36.5 (02-23-23 @ 08:35)  HR: 75 (02-23-23 @ 08:35) (62 - 84)  BP: 127/59 (02-23-23 @ 08:35) (127/59 - 182/74)  RR: 17 (02-23-23 @ 08:35) (17 - 18)  SpO2: 96% (02-23-23 @ 08:35) (96% - 100%)  Wt(kg): --Vital Signs Last 24 Hrs  T(C): 36.5 (23 Feb 2023 08:35), Max: 36.5 (23 Feb 2023 08:35)  T(F): 97.7 (23 Feb 2023 08:35), Max: 97.7 (23 Feb 2023 08:35)  HR: 75 (23 Feb 2023 08:35) (62 - 84)  BP: 127/59 (23 Feb 2023 08:35) (127/59 - 182/74)  BP(mean): --  RR: 17 (23 Feb 2023 08:35) (17 - 18)  SpO2: 96% (23 Feb 2023 08:35) (96% - 100%)    Parameters below as of 23 Feb 2023 08:35  Patient On (Oxygen Delivery Method): nasal cannula        PHYSICAL EXAM:  GENERAL: NAD,  PULM: Faint wheezing   CARDIAC: Regular rate and rhythm;  GI: Soft, Nontender, Nondistended; Bowel sounds present  EXTREMITIES:  2+ Peripheral Pulses,    Consultant(s) Notes Reviewed:  [x ] YES  [ ] NO  Care Discussed with Consultants/Other Providers [ x] YES  [ ] NO    LABS:                            10.8   7.37  )-----------( 152      ( 23 Feb 2023 04:30 )             32.6   02-23    136  |  104  |  21<H>  ----------------------------<  86  4.2   |  20  |  0.9    Ca    8.6      23 Feb 2023 04:30  Mg     1.9     02-23    TPro  6.3  /  Alb  3.5  /  TBili  0.3  /  DBili  x   /  AST  30  /  ALT  13  /  AlkPhos  78  02-23            Culture - Blood (collected 22 Feb 2023 00:40)  Source: .Blood Blood  Preliminary Report (23 Feb 2023 10:01):    No growth to date.    Culture - Blood (collected 22 Feb 2023 00:40)  Source: .Blood Blood  Preliminary Report (23 Feb 2023 10:01):    No growth to date.      acetaminophen     Tablet .. 650 milliGRAM(s) Oral every 6 hours PRN  albuterol    90 MICROgram(s) HFA Inhaler 2 Puff(s) Inhalation every 6 hours PRN  albuterol/ipratropium for Nebulization 3 milliLiter(s) Nebulizer every 6 hours  atorvastatin 10 milliGRAM(s) Oral at bedtime  cefepime   IVPB      cefepime   IVPB 1000 milliGRAM(s) IV Intermittent every 12 hours  dextrose 5%. 1000 milliLiter(s) IV Continuous <Continuous>  dextrose 5%. 1000 milliLiter(s) IV Continuous <Continuous>  dextrose 50% Injectable 25 Gram(s) IV Push once  dextrose 50% Injectable 12.5 Gram(s) IV Push once  dextrose 50% Injectable 25 Gram(s) IV Push once  dextrose Oral Gel 15 Gram(s) Oral once PRN  gabapentin 300 milliGRAM(s) Oral two times a day  glucagon  Injectable 1 milliGRAM(s) IntraMuscular once  heparin   Injectable 5000 Unit(s) SubCutaneous every 12 hours  insulin lispro (ADMELOG) corrective regimen sliding scale   SubCutaneous three times a day before meals  lactated ringers. 1000 milliLiter(s) IV Continuous <Continuous>  lisinopril 5 milliGRAM(s) Oral daily  metoprolol tartrate 25 milliGRAM(s) Oral two times a day  multivitamin 1 Tablet(s) Oral daily  NIFEdipine XL 60 milliGRAM(s) Oral daily  OLANZapine 20 milliGRAM(s) Oral at bedtime  tamsulosin 0.4 milliGRAM(s) Oral at bedtime  traZODone 150 milliGRAM(s) Oral at bedtime  vancomycin  IVPB 1000 milliGRAM(s) IV Intermittent every 24 hours      82M with PMHx of Schizophrenia, Dementia, HTN, HLD, DM, COPD (not on home O2), Non-hodgkin's lymphoma, neuropathy, urinary retention with prior chronic bains sent in from [facility] for fever. Patient is AAO X 1 at baseline. No other nursing home charts available, tried calling NH [phone], no response. Spoke to sister Fatmata, she mentioned that nursing home told her that he is being sent to the ED for fever and weakness. Sister not aware of any recent sick contacts/urinary complaints/diarrhea/change in mental status/cough/URTI symptoms/Palpitations/SOB. Admitted to medicine for further infectious workup    1. Sepsis secondary to acute UTI complicated by acute kidney injury resolving   - Admit to medicine           -LA:1   - Cont cefepime and vancomycin   - Blood cx:pending             -Urine cx:pending           - MRSA screening:pending   - Cont IVF LR at 75 ml/hr   - ID consult appreciated       2, COPD   - mild scattered expiratory wheezing  - duonebs for now  - Was off oxygen this morning without any active complains  - Wean oxygen for sat 88-92%     4. HTN  - elevated now 187/76, missed home dose of BP meds  - c/w procardia 60mg qd, metoprolol 25 BID, lisinopril 5mg    5. h/o DM type 2  - A1C in Jan 2022 5.7  - monitor FS, ISS    6. Neuropathy  - c/w neurontin    7. Schizophrenia/Dementia  - c/w zyprexa, trazodone    Diet - dash diet  DVT ppx - Heparin sq  GI prophylaxis - not indicated  Dispo [facility] when stable

## 2023-02-23 NOTE — PROGRESS NOTE ADULT - SUBJECTIVE AND OBJECTIVE BOX
HERNANDO, ADA  82y, Male  Allergy: No Known Allergies      LOS  1d    CHIEF COMPLAINT: Fever (2023 16:24)      INTERVAL EVENTS/HPI  - No acute events overnight  - T(F): , Max: 97.4 (23 @ 19:30)  - no further fevers overnight - has no complaints   - WBC Count: 7.37 (23 @ 04:30)  WBC Count: 12.16 (23 @ 00:40)     - Creatinine, Serum: 1.5 (23 @ 00:40)       ROS  General: Denies rigors, nightsweats  HEENT: Denies headache, rhinorrhea, sore throat, eye pain  CV: Denies CP, palpitations  PULM: Denies wheezing, hemoptysis  GI: Denies hematemesis, hematochezia, melena  : Denies discharge, hematuria  MSK: Denies arthralgias, myalgias  SKIN: Denies rash, lesions  NEURO: Denies paresthesias, weakness  PSYCH: Denies depression, anxiety    VITALS:  T(F): 97.4, Max: 97.4 (23 @ 19:30)  HR: 62  BP: 170/76  RR: 18Vital Signs Last 24 Hrs  T(C): 36.3 (2023 05:35), Max: 36.3 (2023 19:30)  T(F): 97.4 (2023 05:35), Max: 97.4 (2023 19:30)  HR: 62 (2023 05:35) (62 - 84)  BP: 170/76 (2023 05:35) (170/76 - 182/74)  BP(mean): --  RR: 18 (2023 05:35) (18 - 18)  SpO2: 100% (2023 05:35) (100% - 100%)    Parameters below as of 2023 05:35  Patient On (Oxygen Delivery Method): nasal cannula  O2 Flow (L/min): 4      PHYSICAL EXAM:  Gen: NAD, resting in bed  HEENT: Normocephalic, atraumatic  Neck: supple, no lymphadenopathy  CV: Regular rate & regular rhythm  Lungs: decreased BS at bases, no fremitus  Abdomen: Soft, BS present  Ext: Warm, well perfused  Neuro: non focal, awake  Skin: no rash, no erythema  Lines: no phlebitis    FH: Non-contributory  Social Hx: Non-contributory    TESTS & MEASUREMENTS:                        10.8   7.37  )-----------( 152      ( 2023 04:30 )             32.6         134<L>  |  98  |  24<H>  ----------------------------<  127<H>  4.2   |  25  |  1.5    Ca    8.9      2023 00:40    TPro  7.3  /  Alb  4.3  /  TBili  0.3  /  DBili  x   /  AST  31  /  ALT  11  /  AlkPhos  87        LIVER FUNCTIONS - ( 2023 00:40 )  Alb: 4.3 g/dL / Pro: 7.3 g/dL / ALK PHOS: 87 U/L / ALT: 11 U/L / AST: 31 U/L / GGT: x           Urinalysis Basic - ( 2023 04:20 )    Color: Yellow / Appearance: Slightly Turbid / S.019 / pH: x  Gluc: x / Ketone: Negative  / Bili: Negative / Urobili: <2 mg/dL   Blood: x / Protein: 30 mg/dL / Nitrite: Negative   Leuk Esterase: Large / RBC: 0 /HPF /  /HPF   Sq Epi: x / Non Sq Epi: 0 /HPF / Bacteria: Few          Lactate, Blood: 1.0 mmol/L (23 @ 00:40)      INFECTIOUS DISEASES TESTING  Procalcitonin, Serum: 1.57 (23 @ 10:45)  COVID-19 PCR: NotDetec (23 @ 12:30)  Procalcitonin, Serum: 0.72 (23 @ 09:02)  COVID-19 PCR: NotDetec (22 @ 12:43)  Procalcitonin, Serum: 0.12 (22 @ 21:13)  Procalcitonin, Serum: 0.12 (22 @ 16:58)  Procalcitonin, Serum: 0.13 (22 @ 11:40)  COVID-19 PCR: Detected (22 @ 21:40)      INFLAMMATORY MARKERS      RADIOLOGY & ADDITIONAL TESTS:  I have personally reviewed the last available Chest xray  CXR      CT      CARDIOLOGY TESTING      MEDICATIONS  albuterol/ipratropium for Nebulization 3 Nebulizer every 6 hours  atorvastatin 10 Oral at bedtime  cefepime   IVPB     cefepime   IVPB 1000 IV Intermittent every 12 hours  dextrose 5%. 1000 IV Continuous <Continuous>  dextrose 5%. 1000 IV Continuous <Continuous>  dextrose 50% Injectable 25 IV Push once  dextrose 50% Injectable 12.5 IV Push once  dextrose 50% Injectable 25 IV Push once  gabapentin 300 Oral two times a day  glucagon  Injectable 1 IntraMuscular once  heparin   Injectable 5000 SubCutaneous every 12 hours  insulin lispro (ADMELOG) corrective regimen sliding scale  SubCutaneous three times a day before meals  lactated ringers. 1000 IV Continuous <Continuous>  lisinopril 5 Oral daily  metoprolol tartrate 25 Oral two times a day  multivitamin 1 Oral daily  NIFEdipine XL 60 Oral daily  OLANZapine 20 Oral at bedtime  tamsulosin 0.4 Oral at bedtime  traZODone 150 Oral at bedtime  vancomycin  IVPB 1000 IV Intermittent every 24 hours      WEIGHT  Weight (kg): 68 (23 @ 19:36)      ANTIBIOTICS:  cefepime   IVPB      cefepime   IVPB 1000 milliGRAM(s) IV Intermittent every 12 hours  vancomycin  IVPB 1000 milliGRAM(s) IV Intermittent every 24 hours      All available historical records have been reviewed

## 2023-02-24 ENCOUNTER — TRANSCRIPTION ENCOUNTER (OUTPATIENT)
Age: 83
End: 2023-02-24

## 2023-02-24 VITALS
TEMPERATURE: 98 F | RESPIRATION RATE: 17 BRPM | SYSTOLIC BLOOD PRESSURE: 135 MMHG | OXYGEN SATURATION: 98 % | HEART RATE: 78 BPM | DIASTOLIC BLOOD PRESSURE: 64 MMHG

## 2023-02-24 LAB
ALBUMIN SERPL ELPH-MCNC: 3.5 G/DL — SIGNIFICANT CHANGE UP (ref 3.5–5.2)
ALP SERPL-CCNC: 90 U/L — SIGNIFICANT CHANGE UP (ref 30–115)
ALT FLD-CCNC: 19 U/L — SIGNIFICANT CHANGE UP (ref 0–41)
ANION GAP SERPL CALC-SCNC: 11 MMOL/L — SIGNIFICANT CHANGE UP (ref 7–14)
APPEARANCE UR: CLEAR — SIGNIFICANT CHANGE UP
AST SERPL-CCNC: 29 U/L — SIGNIFICANT CHANGE UP (ref 0–41)
BACTERIA # UR AUTO: NEGATIVE — SIGNIFICANT CHANGE UP
BASOPHILS # BLD AUTO: 0.01 K/UL — SIGNIFICANT CHANGE UP (ref 0–0.2)
BASOPHILS NFR BLD AUTO: 0.2 % — SIGNIFICANT CHANGE UP (ref 0–1)
BILIRUB SERPL-MCNC: 0.3 MG/DL — SIGNIFICANT CHANGE UP (ref 0.2–1.2)
BILIRUB UR-MCNC: NEGATIVE — SIGNIFICANT CHANGE UP
BUN SERPL-MCNC: 13 MG/DL — SIGNIFICANT CHANGE UP (ref 10–20)
CALCIUM SERPL-MCNC: 9.1 MG/DL — SIGNIFICANT CHANGE UP (ref 8.4–10.4)
CHLORIDE SERPL-SCNC: 101 MMOL/L — SIGNIFICANT CHANGE UP (ref 98–110)
CO2 SERPL-SCNC: 24 MMOL/L — SIGNIFICANT CHANGE UP (ref 17–32)
COLOR SPEC: YELLOW — SIGNIFICANT CHANGE UP
COMMENT - URINE: SIGNIFICANT CHANGE UP
CREAT SERPL-MCNC: 0.8 MG/DL — SIGNIFICANT CHANGE UP (ref 0.7–1.5)
CULTURE RESULTS: SIGNIFICANT CHANGE UP
DIFF PNL FLD: NEGATIVE — SIGNIFICANT CHANGE UP
EGFR: 89 ML/MIN/1.73M2 — SIGNIFICANT CHANGE UP
EOSINOPHIL # BLD AUTO: 0.02 K/UL — SIGNIFICANT CHANGE UP (ref 0–0.7)
EOSINOPHIL NFR BLD AUTO: 0.3 % — SIGNIFICANT CHANGE UP (ref 0–8)
EPI CELLS # UR: 2 /HPF — SIGNIFICANT CHANGE UP (ref 0–5)
GLUCOSE BLDC GLUCOMTR-MCNC: 102 MG/DL — HIGH (ref 70–99)
GLUCOSE BLDC GLUCOMTR-MCNC: 114 MG/DL — HIGH (ref 70–99)
GLUCOSE BLDC GLUCOMTR-MCNC: 95 MG/DL — SIGNIFICANT CHANGE UP (ref 70–99)
GLUCOSE SERPL-MCNC: 92 MG/DL — SIGNIFICANT CHANGE UP (ref 70–99)
GLUCOSE UR QL: NEGATIVE — SIGNIFICANT CHANGE UP
HCT VFR BLD CALC: 32 % — LOW (ref 42–52)
HGB BLD-MCNC: 10.9 G/DL — LOW (ref 14–18)
HYALINE CASTS # UR AUTO: 1 /LPF — SIGNIFICANT CHANGE UP (ref 0–7)
IMM GRANULOCYTES NFR BLD AUTO: 0.2 % — SIGNIFICANT CHANGE UP (ref 0.1–0.3)
KETONES UR-MCNC: NEGATIVE — SIGNIFICANT CHANGE UP
LEUKOCYTE ESTERASE UR-ACNC: ABNORMAL
LYMPHOCYTES # BLD AUTO: 1.45 K/UL — SIGNIFICANT CHANGE UP (ref 1.2–3.4)
LYMPHOCYTES # BLD AUTO: 24.7 % — SIGNIFICANT CHANGE UP (ref 20.5–51.1)
MAGNESIUM SERPL-MCNC: 1.7 MG/DL — LOW (ref 1.8–2.4)
MCHC RBC-ENTMCNC: 31.5 PG — HIGH (ref 27–31)
MCHC RBC-ENTMCNC: 34.1 G/DL — SIGNIFICANT CHANGE UP (ref 32–37)
MCV RBC AUTO: 92.5 FL — SIGNIFICANT CHANGE UP (ref 80–94)
MONOCYTES # BLD AUTO: 0.56 K/UL — SIGNIFICANT CHANGE UP (ref 0.1–0.6)
MONOCYTES NFR BLD AUTO: 9.6 % — HIGH (ref 1.7–9.3)
MRSA PCR RESULT.: NEGATIVE — SIGNIFICANT CHANGE UP
NEUTROPHILS # BLD AUTO: 3.81 K/UL — SIGNIFICANT CHANGE UP (ref 1.4–6.5)
NEUTROPHILS NFR BLD AUTO: 65 % — SIGNIFICANT CHANGE UP (ref 42.2–75.2)
NITRITE UR-MCNC: NEGATIVE — SIGNIFICANT CHANGE UP
NRBC # BLD: 0 /100 WBCS — SIGNIFICANT CHANGE UP (ref 0–0)
PH UR: 7 — SIGNIFICANT CHANGE UP (ref 5–8)
PLATELET # BLD AUTO: 150 K/UL — SIGNIFICANT CHANGE UP (ref 130–400)
POTASSIUM SERPL-MCNC: 3.9 MMOL/L — SIGNIFICANT CHANGE UP (ref 3.5–5)
POTASSIUM SERPL-SCNC: 3.9 MMOL/L — SIGNIFICANT CHANGE UP (ref 3.5–5)
PROT SERPL-MCNC: 6.4 G/DL — SIGNIFICANT CHANGE UP (ref 6–8)
PROT UR-MCNC: SIGNIFICANT CHANGE UP
RBC # BLD: 3.46 M/UL — LOW (ref 4.7–6.1)
RBC # FLD: 14.6 % — HIGH (ref 11.5–14.5)
RBC CASTS # UR COMP ASSIST: 2 /HPF — SIGNIFICANT CHANGE UP (ref 0–4)
SODIUM SERPL-SCNC: 136 MMOL/L — SIGNIFICANT CHANGE UP (ref 135–146)
SP GR SPEC: 1.01 — SIGNIFICANT CHANGE UP (ref 1.01–1.03)
SPECIMEN SOURCE: SIGNIFICANT CHANGE UP
UROBILINOGEN FLD QL: SIGNIFICANT CHANGE UP
VANCOMYCIN TROUGH SERPL-MCNC: <4 UG/ML — LOW (ref 5–10)
WBC # BLD: 5.86 K/UL — SIGNIFICANT CHANGE UP (ref 4.8–10.8)
WBC # FLD AUTO: 5.86 K/UL — SIGNIFICANT CHANGE UP (ref 4.8–10.8)
WBC UR QL: 46 /HPF — HIGH (ref 0–5)

## 2023-02-24 PROCEDURE — 99239 HOSP IP/OBS DSCHRG MGMT >30: CPT

## 2023-02-24 RX ORDER — LISINOPRIL 2.5 MG/1
1 TABLET ORAL
Qty: 30 | Refills: 0
Start: 2023-02-24 | End: 2023-03-25

## 2023-02-24 RX ADMIN — Medication 60 MILLIGRAM(S): at 05:07

## 2023-02-24 RX ADMIN — Medication 3 MILLILITER(S): at 14:20

## 2023-02-24 RX ADMIN — POLYETHYLENE GLYCOL 3350 17 GRAM(S): 17 POWDER, FOR SOLUTION ORAL at 12:01

## 2023-02-24 RX ADMIN — CEFEPIME 100 MILLIGRAM(S): 1 INJECTION, POWDER, FOR SOLUTION INTRAMUSCULAR; INTRAVENOUS at 05:06

## 2023-02-24 RX ADMIN — Medication 25 MILLIGRAM(S): at 17:21

## 2023-02-24 RX ADMIN — GABAPENTIN 300 MILLIGRAM(S): 400 CAPSULE ORAL at 17:21

## 2023-02-24 RX ADMIN — Medication 3 MILLILITER(S): at 08:35

## 2023-02-24 RX ADMIN — Medication 25 MILLIGRAM(S): at 05:07

## 2023-02-24 RX ADMIN — HEPARIN SODIUM 5000 UNIT(S): 5000 INJECTION INTRAVENOUS; SUBCUTANEOUS at 05:06

## 2023-02-24 RX ADMIN — GABAPENTIN 300 MILLIGRAM(S): 400 CAPSULE ORAL at 05:06

## 2023-02-24 RX ADMIN — LISINOPRIL 5 MILLIGRAM(S): 2.5 TABLET ORAL at 05:07

## 2023-02-24 RX ADMIN — Medication 1 TABLET(S): at 12:01

## 2023-02-24 NOTE — PROGRESS NOTE ADULT - ASSESSMENT
ASSESSMENT  82M with PMHx of Schizophrenia, Dementia, HTN, HLD, DM, COPD (not on home O2), Non-hodgkin's lymphoma, neuropathy, urinary retention with prior chronic bains sent in from [facility] for fever.     IMPRESSION  #Fever  - CXR without focal opacities     #Urinary retention with chronic bains   - Colonized with CR Klebsiella     #NHL   #COPD   #Dementia/Schizophrenia     #Abx allergy: NKDA    RECOMMENDATIONS  - continue vancomycin 1g q 24 hours -- check level per ID pharmacy  - continue cefepime 1g q 12 hours   - trend creatinine and renal US   - follow-up blood cx -- if negative for MRSA, stop vancomycin   - monitor for fevers    Please call or message on [employer] Teams if with any questions.  Spectra [phone]  
ASSESSMENT  82M with PMHx of Schizophrenia, Dementia, HTN, HLD, DM, COPD (not on home O2), Non-hodgkin's lymphoma, neuropathy, urinary retention with prior chronic bains sent in from [facility] for fever.     IMPRESSION  #Fever - no clear source   - CXR without focal opacities   - Urine Cx 2/22 NG  - Blood Cx 2/22 NG     #Urinary retention with chronic bains   - Colonized with CR Klebsiella     #NHL   #COPD   #Dementia/Schizophrenia     #Abx allergy: NKDA    RECOMMENDATIONS  - no clear infectious source -- blood cx and urine cx negative -- Leukocytosis has resolved   - switch to PO augmentin 875/125 mg BID to complete until 2/28     Please call or message on [employer] Teams if with any questions.  Spectra [phone]

## 2023-02-24 NOTE — DISCHARGE NOTE PROVIDER - ATTENDING DISCHARGE PHYSICAL EXAMINATION:
HEAD: Atraumatic, normocephalic  EYES: EOM intact, conjunctiva and sclera clear  ENT:  moist mucous membranes  PULMONARY: Clear to auscultation bilaterally; no wheezes, rales, or rhonchi  CARDIOVASCULAR: Regular rate and rhythm; no murmurs, rubs, or gallops  GASTROINTESTINAL: non-distended  MUSCULOSKELETAL: no clubbing, no cyanosis, no edema  SKIN: No rashes or lesions; warm and dry  NEURO: AOx1

## 2023-02-24 NOTE — PROGRESS NOTE ADULT - SUBJECTIVE AND OBJECTIVE BOX
ADA VILLAGOMEZ 82y Male  MRN#: [id number]   Hospital Day: 2d    HPI:  82M with PMHx of Schizophrenia, Dementia, HTN, HLD, DM, COPD (not on home O2), Non-hodgkin's lymphoma, neuropathy, urinary retention with prior chronic bains sent in from [facility] for fever. Patient is AAO X 1 at baseline. No other nursing home charts available, tried calling NH [phone], no response. Spoke to sister Fatmata, she mentioned that nursing home told her that he is being sent to the ED for fever and weakness. Sister not aware of any recent sick contacts/urinary complaints/diarrhea/change in mental status/cough/URTI symptoms/Palpitations/SOB.     Off note patient recently discharged 1/10 on po vantin for UTI, Sensitivities resulted later for Carbapenem resistant Klebsiella pneumoniae      In The ED  Vitals Temp 102.5, BP - 125/58, , RR 24, Sao2 99% on 4 lit NC  Labs - WBC 12.1, Cr 1.5(baseline 1.2/1.3) lactate 1, COVID/RVP -ve  CXR - increased vascular markings, wnl(pending read)    Patient was given 1 lit NS bolus, cefepime 2gm x 1, vanc 1gm x 1 and admitted to medicine for further infectious workup (2023 08:33)      SUBJECTIVE  Patient is a 82y old Male who presents with a chief complaint of Fever (2023 16:24)  Currently admitted to medicine with the primary diagnosis of Sepsis      INTERVAL HPI AND OVERNIGHT EVENTS:  Patient was examined and seen at bedside. This morning he is resting comfortably in bed and reports no issues or overnight events.    OBJECTIVE  PAST MEDICAL & SURGICAL HISTORY  Schizophrenia    Diabetes type 2, controlled    COPD (chronic obstructive pulmonary disease)    Dyslipidemia    Chronic retention of urine    Dyslipidemia    Encounter for screening colonoscopy      ALLERGIES:  No Known Allergies    MEDICATIONS:  STANDING MEDICATIONS  albuterol/ipratropium for Nebulization 3 milliLiter(s) Nebulizer every 6 hours  atorvastatin 10 milliGRAM(s) Oral at bedtime  cefepime   IVPB      cefepime   IVPB 1000 milliGRAM(s) IV Intermittent every 12 hours  dextrose 5%. 1000 milliLiter(s) IV Continuous <Continuous>  dextrose 5%. 1000 milliLiter(s) IV Continuous <Continuous>  dextrose 50% Injectable 25 Gram(s) IV Push once  dextrose 50% Injectable 12.5 Gram(s) IV Push once  dextrose 50% Injectable 25 Gram(s) IV Push once  gabapentin 300 milliGRAM(s) Oral two times a day  glucagon  Injectable 1 milliGRAM(s) IntraMuscular once  heparin   Injectable 5000 Unit(s) SubCutaneous every 12 hours  insulin lispro (ADMELOG) corrective regimen sliding scale   SubCutaneous three times a day before meals  lactated ringers. 1000 milliLiter(s) IV Continuous <Continuous>  lisinopril 5 milliGRAM(s) Oral daily  metoprolol tartrate 25 milliGRAM(s) Oral two times a day  multivitamin 1 Tablet(s) Oral daily  NIFEdipine XL 60 milliGRAM(s) Oral daily  OLANZapine 20 milliGRAM(s) Oral at bedtime  polyethylene glycol 3350 17 Gram(s) Oral daily  senna 2 Tablet(s) Oral at bedtime  tamsulosin 0.4 milliGRAM(s) Oral at bedtime  traZODone 150 milliGRAM(s) Oral at bedtime  vancomycin  IVPB 1000 milliGRAM(s) IV Intermittent every 24 hours    PRN MEDICATIONS  acetaminophen     Tablet .. 650 milliGRAM(s) Oral every 6 hours PRN  albuterol    90 MICROgram(s) HFA Inhaler 2 Puff(s) Inhalation every 6 hours PRN  dextrose Oral Gel 15 Gram(s) Oral once PRN      VITAL SIGNS: Last 24 Hours  T(C): 36.2 (2023 05:15), Max: 36.2 (2023 05:15)  T(F): 97.1 (2023 05:15), Max: 97.1 (2023 05:15)  HR: 98 (2023 05:15) (67 - 98)  BP: 156/58 (2023 05:15) (108/57 - 156/58)  BP(mean): 86 (2023 05:15) (86 - 86)  RR: 16 (2023 08:23) (16 - 18)  SpO2: 96% (2023 08:23) (96% - 98%)    LABS:                        10.8   7.37  )-----------( 152      ( 2023 04:30 )             32.6     02-    136  |  104  |  21<H>  ----------------------------<  86  4.2   |  20  |  0.9    Ca    8.6      2023 04:30  Mg     1.9         TPro  6.3  /  Alb  3.5  /  TBili  0.3  /  DBili  x   /  AST  30  /  ALT  13  /  AlkPhos  78        Urinalysis Basic - ( 2023 04:20 )    Color: Yellow / Appearance: Slightly Turbid / S.019 / pH: x  Gluc: x / Ketone: Negative  / Bili: Negative / Urobili: <2 mg/dL   Blood: x / Protein: 30 mg/dL / Nitrite: Negative   Leuk Esterase: Large / RBC: 0 /HPF /  /HPF   Sq Epi: x / Non Sq Epi: 0 /HPF / Bacteria: Few            Culture - Blood (collected 2023 00:40)  Source: .Blood Blood  Preliminary Report (2023 10:01):    No growth to date.    Culture - Blood (collected 2023 00:40)  Source: .Blood Blood  Preliminary Report (2023 10:01):    No growth to date.        PHYSICAL EXAM:  HEAD: Atraumatic, normocephalic  EYES: EOM intact, PERRLA, conjunctiva and sclera clear  ENT: Supple, no masses, no thyromegaly, no bruits, no JVD; moist mucous membranes  PULMONARY: Clear to auscultation bilaterally; no wheezes, rales, or rhonchi  CARDIOVASCULAR: Regular rate and rhythm; no murmurs, rubs, or gallops  GASTROINTESTINAL: Soft, non-tender, non-distended; bowel sounds present  MUSCULOSKELETAL: 2+ peripheral pulses; no clubbing, no cyanosis, no edema  SKIN: No rashes or lesions; warm and dry    ASSESSMENT & PLAN  82M with PMHx of Schizophrenia, Dementia, HTN, HLD, DM, COPD (not on home O2), Non-hodgkin's lymphoma, neuropathy, urinary retention with prior chronic bains sent in from [facility] for fever. Patient is AAO X 1 at baseline. No other nursing home charts available, tried calling NH [phone], no response. Spoke to sister Fatmata, she mentioned that nursing home told her that he is being sent to the ED for fever and weakness. Sister not aware of any recent sick contacts/urinary complaints/diarrhea/change in mental status/cough/URTI symptoms/Palpitations/SOB. Admitted to medicine for further infectious workup    #Sepsis 2/2 acute UTI c/b DAVID  - SIRS POA(fever 102.5, , RR 24, WBC 12.1)  - No localizing signs or symptoms(poor historian)  - CXR: No consolidation, chronic changes   - Hx frequent UTIs  - Recent discharge 1/10 on Vantin for UTI, Sensitivities resulted later for Carbapenem resistant Klebsiella pneumoniae  - Lactate 1.0  - s/p cefepime 2gm x 1 and vanc 1gm x 1 dose in ED  - MRSA swab: Pending collection   - Procal: 1.57  - As per ID: c/w Cefepime 1 g Q12h, c/w Vanco 1g Q24h - Vanc Level: <4, f/u BCx if negative for MRSA -> d/c vanco  - BCx: NG -> Vanco Stopped   - UCx: Pending results (UCx : Kleb Pneumo Carba R)    #COPD   - On RA  - c/w Duonebs     #HTN  - On admission 187/76, as missed home dose of BP meds  - c/w procardia 60mg qd, metoprolol 25 BID, lisinopril 5mg    #Mild DAVID over CKD - RESOLVED  - Cr ~1.2/1.3 in , on admission 1.5  - s/p fluids in ED => Improved to 0.9   - Avoid nephrotoxic drugs    #h/o DM type 2  - A1C in 2022 5.7  - monitor FS, ISS -> Well controlled     #Neuropathy  - c/w neurontin    #Schizophrenia  #Dementia  - c/w zyprexa, trazodone    #Diet: dash diet  #DVT ppx: Heparin sq  #GI ppx: not indicated  #Dispo: [facility] when stable    #Pending: Follow up urine culture    ADA VILLAGOMEZ 82y Male  MRN#: [id number]   Hospital Day: 2d    HPI:  82M with PMHx of Schizophrenia, Dementia, HTN, HLD, DM, COPD (not on home O2), Non-hodgkin's lymphoma, neuropathy, urinary retention with prior chronic bains sent in from [facility] for fever. Patient is AAO X 1 at baseline. No other nursing home charts available, tried calling NH [phone], no response. Spoke to sister Fatmata, she mentioned that nursing home told her that he is being sent to the ED for fever and weakness. Sister not aware of any recent sick contacts/urinary complaints/diarrhea/change in mental status/cough/URTI symptoms/Palpitations/SOB.     Off note patient recently discharged 1/10 on po vantin for UTI, Sensitivities resulted later for Carbapenem resistant Klebsiella pneumoniae      In The ED  Vitals Temp 102.5, BP - 125/58, , RR 24, Sao2 99% on 4 lit NC  Labs - WBC 12.1, Cr 1.5(baseline 1.2/1.3) lactate 1, COVID/RVP -ve  CXR - increased vascular markings, wnl(pending read)    Patient was given 1 lit NS bolus, cefepime 2gm x 1, vanc 1gm x 1 and admitted to medicine for further infectious workup (2023 08:33)      SUBJECTIVE  Patient is a 82y old Male who presents with a chief complaint of Fever (2023 16:24)  Currently admitted to medicine with the primary diagnosis of Sepsis      INTERVAL HPI AND OVERNIGHT EVENTS:  Patient was examined and seen at bedside. This morning he is resting comfortably in bed and reports no issues or overnight events.    OBJECTIVE  PAST MEDICAL & SURGICAL HISTORY  Schizophrenia    Diabetes type 2, controlled    COPD (chronic obstructive pulmonary disease)    Dyslipidemia    Chronic retention of urine    Dyslipidemia    Encounter for screening colonoscopy      ALLERGIES:  No Known Allergies    MEDICATIONS:  STANDING MEDICATIONS  albuterol/ipratropium for Nebulization 3 milliLiter(s) Nebulizer every 6 hours  atorvastatin 10 milliGRAM(s) Oral at bedtime  cefepime   IVPB      cefepime   IVPB 1000 milliGRAM(s) IV Intermittent every 12 hours  dextrose 5%. 1000 milliLiter(s) IV Continuous <Continuous>  dextrose 5%. 1000 milliLiter(s) IV Continuous <Continuous>  dextrose 50% Injectable 25 Gram(s) IV Push once  dextrose 50% Injectable 12.5 Gram(s) IV Push once  dextrose 50% Injectable 25 Gram(s) IV Push once  gabapentin 300 milliGRAM(s) Oral two times a day  glucagon  Injectable 1 milliGRAM(s) IntraMuscular once  heparin   Injectable 5000 Unit(s) SubCutaneous every 12 hours  insulin lispro (ADMELOG) corrective regimen sliding scale   SubCutaneous three times a day before meals  lactated ringers. 1000 milliLiter(s) IV Continuous <Continuous>  lisinopril 5 milliGRAM(s) Oral daily  metoprolol tartrate 25 milliGRAM(s) Oral two times a day  multivitamin 1 Tablet(s) Oral daily  NIFEdipine XL 60 milliGRAM(s) Oral daily  OLANZapine 20 milliGRAM(s) Oral at bedtime  polyethylene glycol 3350 17 Gram(s) Oral daily  senna 2 Tablet(s) Oral at bedtime  tamsulosin 0.4 milliGRAM(s) Oral at bedtime  traZODone 150 milliGRAM(s) Oral at bedtime  vancomycin  IVPB 1000 milliGRAM(s) IV Intermittent every 24 hours    PRN MEDICATIONS  acetaminophen     Tablet .. 650 milliGRAM(s) Oral every 6 hours PRN  albuterol    90 MICROgram(s) HFA Inhaler 2 Puff(s) Inhalation every 6 hours PRN  dextrose Oral Gel 15 Gram(s) Oral once PRN      VITAL SIGNS: Last 24 Hours  T(C): 36.2 (2023 05:15), Max: 36.2 (2023 05:15)  T(F): 97.1 (2023 05:15), Max: 97.1 (2023 05:15)  HR: 98 (2023 05:15) (67 - 98)  BP: 156/58 (2023 05:15) (108/57 - 156/58)  BP(mean): 86 (2023 05:15) (86 - 86)  RR: 16 (2023 08:23) (16 - 18)  SpO2: 96% (2023 08:23) (96% - 98%)    LABS:                        10.8   7.37  )-----------( 152      ( 2023 04:30 )             32.6     02-    136  |  104  |  21<H>  ----------------------------<  86  4.2   |  20  |  0.9    Ca    8.6      2023 04:30  Mg     1.9         TPro  6.3  /  Alb  3.5  /  TBili  0.3  /  DBili  x   /  AST  30  /  ALT  13  /  AlkPhos  78        Urinalysis Basic - ( 2023 04:20 )    Color: Yellow / Appearance: Slightly Turbid / S.019 / pH: x  Gluc: x / Ketone: Negative  / Bili: Negative / Urobili: <2 mg/dL   Blood: x / Protein: 30 mg/dL / Nitrite: Negative   Leuk Esterase: Large / RBC: 0 /HPF /  /HPF   Sq Epi: x / Non Sq Epi: 0 /HPF / Bacteria: Few            Culture - Blood (collected 2023 00:40)  Source: .Blood Blood  Preliminary Report (2023 10:01):    No growth to date.    Culture - Blood (collected 2023 00:40)  Source: .Blood Blood  Preliminary Report (2023 10:01):    No growth to date.        PHYSICAL EXAM:  HEAD: Atraumatic, normocephalic  EYES: EOM intact, PERRLA, conjunctiva and sclera clear  ENT: Supple, no masses, no thyromegaly, no bruits, no JVD; moist mucous membranes  PULMONARY: Clear to auscultation bilaterally; no wheezes, rales, or rhonchi  CARDIOVASCULAR: Regular rate and rhythm; no murmurs, rubs, or gallops  GASTROINTESTINAL: Soft, non-tender, non-distended; bowel sounds present  MUSCULOSKELETAL: 2+ peripheral pulses; no clubbing, no cyanosis, no edema  SKIN: No rashes or lesions; warm and dry    ASSESSMENT & PLAN  82M with PMHx of Schizophrenia, Dementia, HTN, HLD, DM, COPD (not on home O2), Non-hodgkin's lymphoma, neuropathy, urinary retention with prior chronic bains sent in from [facility] for fever. Patient is AAO X 1 at baseline. No other nursing home charts available, tried calling NH [phone], no response. Spoke to sister Fatmata, she mentioned that nursing home told her that he is being sent to the ED for fever and weakness. Sister not aware of any recent sick contacts/urinary complaints/diarrhea/change in mental status/cough/URTI symptoms/Palpitations/SOB. Admitted to medicine for further infectious workup    #Sepsis 2/2 acute UTI c/b DAVID  - SIRS POA(fever 102.5, , RR 24, WBC 12.1)  - No localizing signs or symptoms(poor historian)  - CXR: No consolidation, chronic changes   - Hx frequent UTIs  - Recent discharge 1/10 on Vantin for UTI, Sensitivities resulted later for Carbapenem resistant Klebsiella pneumoniae  - Lactate 1.0  - s/p cefepime 2gm x 1 and vanc 1gm x 1 dose in ED  - MRSA swab: Pending collection   - Procal: 1.57  - As per ID: c/w Cefepime 1 g Q12h, c/w Vanco 1g Q24h - Vanc Level: <4, f/u BCx if negative for MRSA -> d/c vanco  - BCx: NG -> Vanco Stopped   - UCx: Pending results (UCx : Kleb Pneumo Carba R)    #COPD   - On RA  - c/w Duonebs     #HTN  - On admission 187/76, as missed home dose of BP meds  - c/w procardia 60mg qd, metoprolol 25 BID, lisinopril 5mg    #Mild DAVID over CKD - RESOLVED  - Cr ~1.2/1.3 in , on admission 1.5  - s/p fluids in ED => Improved to 0.9   - Avoid nephrotoxic drugs    #h/o DM type 2  - A1C in 2022 5.7  - monitor FS, ISS -> Well controlled     #Neuropathy  - c/w neurontin    #Schizophrenia  #Dementia  - c/w zyprexa, trazodone    #Diet: dash diet  #DVT ppx: Heparin sq  #GI ppx: not indicated  #Dispo: [facility] when stable

## 2023-02-24 NOTE — DISCHARGE NOTE PROVIDER - NSDCMRMEDTOKEN_GEN_ALL_CORE_FT
Doing well, except has been having more headaches. Was told she could use Fioricet, needs Rx.   She has stopped taking her B6 and Unisom, only uses Zofran rarely. Is taking metamucil, helps bowels stay regular.   Would like NIPT, drawn today.   BP has been running about like today's reading, 120s-130s/70s.   See order for fioricet, discuss unknown risk with butalbital, but likely safe if used sparingly.   Will also start her on ASA due to HTN and history of Preeclampsia in first pregnancy.  Discussed afp, will decline due to choosing NIPT and will do routine sono at 20 weeks to evaluate spine.   Also she is having a lot more anxiety. She is short tempered, irritable. Thinks she needs medication. We discussed pros and cons of medications in pregnancy, other options including counseling, time. We agreed that medication will likely be beneficial at this time, see orders for zoloft. Will start low dose and see how she does in next 3-4 weeks. Likely will need increase. Will also place referral for counseling, see orders.   Discussed common second trimester symptoms, hypotension risk, staying hydrated, caffeine use, exercise.   Will follow up in 4 weeks, sooner prn.   Rhoda Redman MD   
albuterol 90 mcg/inh inhalation aerosol: 1 puff(s) inhaled prn as needed every 6 hours  amoxicillin-clavulanate 875 mg-125 mg oral tablet: 1 cap(s) orally 2 times a day   atorvastatin 10 mg oral tablet: 1 tab(s) orally once a day  Centrum oral tablet: 1 tab(s) orally once a day  Flomax 0.4 mg oral capsule: 1 cap(s) orally once a day (at bedtime)  gabapentin 300 mg oral capsule: 1 cap(s) orally 2 times a day  lisinopril 5 mg oral tablet: 1 tab(s) orally once a day  metFORMIN 500 mg oral tablet: 1 tab(s) orally 2 times a day  metoprolol tartrate 25 mg oral tablet: 1 tab(s) orally 2 times a day  NIFEdipine (Eqv-Adalat CC) 90 mg oral tablet, extended release: 1 tab(s) orally once a day  OLANZapine 20 mg oral tablet: 1 tab(s) orally once a day (at bedtime)  traZODone 150 mg oral tablet: 1 tab(s) orally once a day (at bedtime)  Vitamin D2 1.25 mg (50,000 intl units) oral capsule: 1 cap(s) orally once a week

## 2023-02-24 NOTE — DISCHARGE NOTE PROVIDER - HOSPITAL COURSE
HPI:  82M with PMHx of Schizophrenia, Dementia, HTN, HLD, DM, COPD (not on home O2), Non-hodgkin's lymphoma, neuropathy, urinary retention with prior chronic bains sent in from [facility] for fever. Patient is AAO X 1 at baseline. No other nursing home charts available, tried calling NH [phone], no response. Spoke to sister Fatmata, she mentioned that nursing home told her that he is being sent to the ED for fever and weakness. Sister not aware of any recent sick contacts/urinary complaints/diarrhea/change in mental status/cough/URTI symptoms/Palpitations/SOB.     Off note patient recently discharged 1/10 on po vantin for UTI, Sensitivities resulted later for Carbapenem resistant Klebsiella pneumoniae      In The ED  Vitals Temp 102.5, BP - 125/58, , RR 24, Sao2 99% on 4 lit NC  Labs - WBC 12.1, Cr 1.5(baseline 1.2/1.3) lactate 1, COVID/RVP -ve  CXR - increased vascular markings, wnl(pending read)    Patient was given 1 lit NS bolus, cefepime 2gm x 1, vanc 1gm x 1 and admitted to medicine for further infectious workup (22 Feb 2023 08:33)      1. Sepsis secondary to acute UTI complicated by acute kidney injury resolving   - Admit to medicine           -LA:1   - Cont cefepime and vancomycin   - Blood cx: NG   -Urine cx: NG   - s/p IVF LR at 75 ml/hr   - As per ID:  no clear infectious source -- blood cx and urine cx negative -- Leukocytosis has resolved, switch to PO augmentin 875/125 mg BID to complete until 2/28     2, COPD   - On RA  - dionebs     4. HTN  - c/w procardia 60mg qd, metoprolol 25 BID, lisinopril 5mg    5. h/o DM type 2  - A1C in Jan 2022 5.7  - monitor FS, ISS    6. Neuropathy  - c/w neurontin    7. Schizophrenia/Dementia  - c/w zyprexa, trazodone

## 2023-02-24 NOTE — DISCHARGE NOTE NURSING/CASE MANAGEMENT/SOCIAL WORK - NSDCPEPTCAREGIVEDUMATLIST _GEN_ALL_CORE
There are no preventive care reminders to display for this patient.    Patient is up to date, no discussion needed.                 Influenza Vaccination

## 2023-02-24 NOTE — PROGRESS NOTE ADULT - SUBJECTIVE AND OBJECTIVE BOX
HERNANDO, ADA  82y, Male  Allergy: No Known Allergies      LOS  2d    CHIEF COMPLAINT: Fever (2023 16:24)      INTERVAL EVENTS/HPI  - No acute events overnight  - T(F): , Max: 97.1 (23 @ 05:15)  - no further fevers - limited historian but no complaints   - WBC Count: 5.86 (23 @ 08:24)  WBC Count: 7.37 (23 @ 04:30)     - Creatinine, Serum: 0.8 (23 @ 08:24)  Creatinine, Serum: 0.9 (23 @ 04:30)       ROS  General: Denies rigors, nightsweats  HEENT: Denies headache, rhinorrhea, sore throat, eye pain  CV: Denies CP, palpitations  PULM: Denies wheezing, hemoptysis  GI: Denies hematemesis, hematochezia, melena  : Denies discharge, hematuria  MSK: Denies arthralgias, myalgias  SKIN: Denies rash, lesions  NEURO: Denies paresthesias, weakness  PSYCH: Denies depression, anxiety    VITALS:  T(F): 97.1, Max: 97.1 (23 @ 05:15)  HR: 98  BP: 156/58  RR: 16Vital Signs Last 24 Hrs  T(C): 36.2 (2023 05:15), Max: 36.2 (2023 05:15)  T(F): 97.1 (2023 05:15), Max: 97.1 (2023 05:15)  HR: 98 (2023 05:15) (67 - 98)  BP: 156/58 (2023 05:15) (108/57 - 156/58)  BP(mean): 86 (2023 05:15) (86 - 86)  RR: 16 (2023 08:23) (16 - 18)  SpO2: 96% (2023 08:23) (96% - 98%)    Parameters below as of 2023 08:23  Patient On (Oxygen Delivery Method): room air        PHYSICAL EXAM:  Gen: NAD, resting in bed  HEENT: Normocephalic, atraumatic  Neck: supple, no lymphadenopathy  CV: Regular rate & regular rhythm  Lungs: decreased BS at bases, no fremitus  Abdomen: Soft, BS present  Ext: Warm, well perfused  Neuro: non focal, awake  Skin: no rash, no erythema  Lines: no phlebitis    FH: Non-contributory  Social Hx: Non-contributory    TESTS & MEASUREMENTS:                        10.9   5.86  )-----------( 150      ( 2023 08:24 )             32.0         136  |  101  |  13  ----------------------------<  92  3.9   |  24  |  0.8    Ca    9.1      2023 08:24  Mg     1.7         TPro  6.4  /  Alb  3.5  /  TBili  0.3  /  DBili  x   /  AST  29  /  ALT  19  /  AlkPhos  90        LIVER FUNCTIONS - ( 2023 08:24 )  Alb: 3.5 g/dL / Pro: 6.4 g/dL / ALK PHOS: 90 U/L / ALT: 19 U/L / AST: 29 U/L / GGT: x           Urinalysis Basic - ( 2023 04:20 )    Color: Yellow / Appearance: Slightly Turbid / S.019 / pH: x  Gluc: x / Ketone: Negative  / Bili: Negative / Urobili: <2 mg/dL   Blood: x / Protein: 30 mg/dL / Nitrite: Negative   Leuk Esterase: Large / RBC: 0 /HPF /  /HPF   Sq Epi: x / Non Sq Epi: 0 /HPF / Bacteria: Few        Culture - Urine (collected 23 @ 04:20)  Source: Catheterized Catheterized  Final Report (23 @ 09:12):    <10,000 CFU/mL Normal Urogenital Janey    Culture - Blood (collected 23 @ 00:40)  Source: .Blood Blood  Preliminary Report (23 @ 10:01):    No growth to date.    Culture - Blood (collected 23 @ 00:40)  Source: .Blood Blood  Preliminary Report (23 @ 10:01):    No growth to date.        Lactate, Blood: 1.0 mmol/L (23 @ 00:40)      INFECTIOUS DISEASES TESTING  MRSA PCR Result.: Negative (23 @ 09:10)  Procalcitonin, Serum: 1.57 (23 @ 10:45)  COVID-19 PCR: NotDetec (23 @ 12:30)  Procalcitonin, Serum: 0.72 (23 @ 09:02)  COVID-19 PCR: NotDetec (22 @ 12:43)  Procalcitonin, Serum: 0.12 (22 @ 21:13)  Procalcitonin, Serum: 0.12 (22 @ 16:58)  Procalcitonin, Serum: 0.13 (22 @ 11:40)  COVID-19 PCR: Detected (22 @ 21:40)      INFLAMMATORY MARKERS      RADIOLOGY & ADDITIONAL TESTS:  I have personally reviewed the last available Chest xray  CXR      CT      CARDIOLOGY TESTING      MEDICATIONS  albuterol/ipratropium for Nebulization 3 Nebulizer every 6 hours  atorvastatin 10 Oral at bedtime  cefepime   IVPB     cefepime   IVPB 1000 IV Intermittent every 12 hours  dextrose 5%. 1000 IV Continuous <Continuous>  dextrose 5%. 1000 IV Continuous <Continuous>  dextrose 50% Injectable 25 IV Push once  dextrose 50% Injectable 12.5 IV Push once  dextrose 50% Injectable 25 IV Push once  gabapentin 300 Oral two times a day  glucagon  Injectable 1 IntraMuscular once  heparin   Injectable 5000 SubCutaneous every 12 hours  insulin lispro (ADMELOG) corrective regimen sliding scale  SubCutaneous three times a day before meals  lactated ringers. 1000 IV Continuous <Continuous>  lisinopril 5 Oral daily  metoprolol tartrate 25 Oral two times a day  multivitamin 1 Oral daily  NIFEdipine XL 60 Oral daily  OLANZapine 20 Oral at bedtime  polyethylene glycol 3350 17 Oral daily  senna 2 Oral at bedtime  tamsulosin 0.4 Oral at bedtime  traZODone 150 Oral at bedtime      WEIGHT  Weight (kg): 68 (23 @ 19:36)  Creatinine, Serum: 0.8 mg/dL (23 @ 08:24)      ANTIBIOTICS:  cefepime   IVPB      cefepime   IVPB 1000 milliGRAM(s) IV Intermittent every 12 hours      All available historical records have been reviewed

## 2023-02-24 NOTE — DISCHARGE NOTE PROVIDER - NSDCCPCAREPLAN_GEN_ALL_CORE_FT
PRINCIPAL DISCHARGE DIAGNOSIS  Diagnosis: Sepsis  Assessment and Plan of Treatment: Sepsis is a serious condition that occurs when the body overreacts to an infection. It is also called systemic inflammatory response syndrome (SIRS) with infection. An infection is usually caused by bacteria that attack the body. The body's defense system normally fights off infection within the affected body part. With sepsis, the body overreacts and causes symptoms to occur throughout the body. This leads to uncontrolled and widespread inflammation and clotting in small blood vessels. Blood flow to different body parts decreases and may lead to organ failure. Sepsis requires immediate treatment.  You were treated in the hospital with IV antibiotics, and your symptoms resolved.   Please seek immediate medical attention if you develop fever >100.4 F, feel dizzy, have nausea or vomiting, coughing blood, have sudden trouble breathing or chest pain, severe weakness, or your skin or lips are turning blue.

## 2023-02-25 LAB
CULTURE RESULTS: NO GROWTH — SIGNIFICANT CHANGE UP
SPECIMEN SOURCE: SIGNIFICANT CHANGE UP

## 2023-03-02 DIAGNOSIS — N18.9 CHRONIC KIDNEY DISEASE, UNSPECIFIED: ICD-10-CM

## 2023-03-02 DIAGNOSIS — A41.9 SEPSIS, UNSPECIFIED ORGANISM: ICD-10-CM

## 2023-03-02 DIAGNOSIS — R33.9 RETENTION OF URINE, UNSPECIFIED: ICD-10-CM

## 2023-03-02 DIAGNOSIS — E11.22 TYPE 2 DIABETES MELLITUS WITH DIABETIC CHRONIC KIDNEY DISEASE: ICD-10-CM

## 2023-03-02 DIAGNOSIS — N39.0 URINARY TRACT INFECTION, SITE NOT SPECIFIED: ICD-10-CM

## 2023-03-02 DIAGNOSIS — E78.5 HYPERLIPIDEMIA, UNSPECIFIED: ICD-10-CM

## 2023-03-02 DIAGNOSIS — F03.90 UNSPECIFIED DEMENTIA WITHOUT BEHAVIORAL DISTURBANCE: ICD-10-CM

## 2023-03-02 DIAGNOSIS — F20.9 SCHIZOPHRENIA, UNSPECIFIED: ICD-10-CM

## 2023-03-02 DIAGNOSIS — E11.40 TYPE 2 DIABETES MELLITUS WITH DIABETIC NEUROPATHY, UNSPECIFIED: ICD-10-CM

## 2023-03-02 DIAGNOSIS — I12.9 HYPERTENSIVE CHRONIC KIDNEY DISEASE WITH STAGE 1 THROUGH STAGE 4 CHRONIC KIDNEY DISEASE, OR UNSPECIFIED CHRONIC KIDNEY DISEASE: ICD-10-CM

## 2023-03-02 DIAGNOSIS — N17.9 ACUTE KIDNEY FAILURE, UNSPECIFIED: ICD-10-CM

## 2023-03-02 DIAGNOSIS — Z85.72 PERSONAL HISTORY OF NON-HODGKIN LYMPHOMAS: ICD-10-CM

## 2023-03-02 DIAGNOSIS — J44.9 CHRONIC OBSTRUCTIVE PULMONARY DISEASE, UNSPECIFIED: ICD-10-CM

## 2023-03-13 NOTE — ED PROVIDER NOTE - INTERPRETATION
Dupixent Counseling: I discussed with the patient the risks of dupilumab including but not limited to eye infection and irritation, cold sores, injection site reactions, worsening of asthma, allergic reactions and increased risk of parasitic infection.  Live vaccines should be avoided while taking dupilumab. Dupilumab will also interact with certain medications such as warfarin and cyclosporine. The patient understands that monitoring is required and they must alert us or the primary physician if symptoms of infection or other concerning signs are noted. normal sinus rhythm

## 2023-05-10 NOTE — ED ADULT NURSE NOTE - ED STAT RN HANDOFF DETAILS
chlorhexidine Report given to Yosef HUGHES. Pt pending transport back to [facility].

## 2023-05-22 NOTE — ED PROVIDER NOTE - MDM ORDERS SUBMITTED SELECTION
Aklief counseling:  Patient advised to apply a pea-sized amount only at bedtime and wait 30 minutes after washing their face before applying.  If too drying, patient may add a non-comedogenic moisturizer.  The most commonly reported side effects including irritation, redness, scaling, dryness, stinging, burning, itching, and increased risk of sunburn.  The patient verbalized understanding of the proper use and possible adverse effects of retinoids.  All of the patient's questions and concerns were addressed. Minocycline Pregnancy And Lactation Text: This medication is Pregnancy Category D and not consider safe during pregnancy. It is also excreted in breast milk. Topical Retinoid Pregnancy And Lactation Text: This medication is Pregnancy Category C. It is unknown if this medication is excreted in breast milk. Winlevi Counseling:  I discussed with the patient the risks of topical clascoterone including but not limited to erythema, scaling, itching, and stinging. Patient voiced their understanding. Erythromycin Counseling:  I discussed with the patient the risks of erythromycin including but not limited to GI upset, allergic reaction, drug rash, diarrhea, increase in liver enzymes, and yeast infections. Azelaic Acid Counseling: Patient counseled that medicine may cause skin irritation and to avoid applying near the eyes.  In the event of skin irritation, the patient was advised to reduce the amount of the drug applied or use it less frequently.   The patient verbalized understanding of the proper use and possible adverse effects of azelaic acid.  All of the patient's questions and concerns were addressed. Tazorac Pregnancy And Lactation Text: This medication is not safe during pregnancy. It is unknown if this medication is excreted in breast milk. Birth Control Pills Pregnancy And Lactation Text: This medication should be avoided if pregnant and for the first 30 days post-partum. Isotretinoin Counseling: Patient should get monthly blood tests, not donate blood, not drive at night if vision affected, not share medication, and not undergo elective surgery for 6 months after tx completed. Side effects reviewed, pt to contact office should one occur. Bactrim Pregnancy And Lactation Text: This medication is Pregnancy Category D and is known to cause fetal risk.  It is also excreted in breast milk. Topical Clindamycin Counseling: Patient counseled that this medication may cause skin irritation or allergic reactions.  In the event of skin irritation, the patient was advised to reduce the amount of the drug applied or use it less frequently.   The patient verbalized understanding of the proper use and possible adverse effects of clindamycin.  All of the patient's questions and concerns were addressed. Azelaic Acid Pregnancy And Lactation Text: This medication is considered safe during pregnancy and breast feeding. Dapsone Counseling: I discussed with the patient the risks of dapsone including but not limited to hemolytic anemia, agranulocytosis, rashes, methemoglobinemia, kidney failure, peripheral neuropathy, headaches, GI upset, and liver toxicity.  Patients who start dapsone require monitoring including baseline LFTs and weekly CBCs for the first month, then every month thereafter.  The patient verbalized understanding of the proper use and possible adverse effects of dapsone.  All of the patient's questions and concerns were addressed. Isotretinoin Pregnancy And Lactation Text: This medication is Pregnancy Category X and is considered extremely dangerous during pregnancy. It is unknown if it is excreted in breast milk. Spironolactone Counseling: Patient advised regarding risks of diarrhea, abdominal pain, hyperkalemia, birth defects (for female patients), liver toxicity and renal toxicity. The patient may need blood work to monitor liver and kidney function and potassium levels while on therapy. The patient verbalized understanding of the proper use and possible adverse effects of spironolactone.  All of the patient's questions and concerns were addressed. Doxycycline Counseling:  Patient counseled regarding possible photosensitivity and increased risk for sunburn.  Patient instructed to avoid sunlight, if possible.  When exposed to sunlight, patients should wear protective clothing, sunglasses, and sunscreen.  The patient was instructed to call the office immediately if the following severe adverse effects occur:  hearing changes, easy bruising/bleeding, severe headache, or vision changes.  The patient verbalized understanding of the proper use and possible adverse effects of doxycycline.  All of the patient's questions and concerns were addressed. Azithromycin Pregnancy And Lactation Text: This medication is considered safe during pregnancy and is also secreted in breast milk. Tetracycline Counseling: Patient counseled regarding possible photosensitivity and increased risk for sunburn.  Patient instructed to avoid sunlight, if possible.  When exposed to sunlight, patients should wear protective clothing, sunglasses, and sunscreen.  The patient was instructed to call the office immediately if the following severe adverse effects occur:  hearing changes, easy bruising/bleeding, severe headache, or vision changes.  The patient verbalized understanding of the proper use and possible adverse effects of tetracycline.  All of the patient's questions and concerns were addressed. Patient understands to avoid pregnancy while on therapy due to potential birth defects. High Dose Vitamin A Pregnancy And Lactation Text: High dose vitamin A therapy is contraindicated during pregnancy and breast feeding. Benzoyl Peroxide Pregnancy And Lactation Text: This medication is Pregnancy Category C. It is unknown if benzoyl peroxide is excreted in breast milk. Topical Sulfur Applications Counseling: Topical Sulfur Counseling: Patient counseled that this medication may cause skin irritation or allergic reactions.  In the event of skin irritation, the patient was advised to reduce the amount of the drug applied or use it less frequently.   The patient verbalized understanding of the proper use and possible adverse effects of topical sulfur application.  All of the patient's questions and concerns were addressed. Use Enhanced Medication Counseling?: No Bactrim Counseling:  I discussed with the patient the risks of sulfa antibiotics including but not limited to GI upset, allergic reaction, drug rash, diarrhea, dizziness, photosensitivity, and yeast infections.  Rarely, more serious reactions can occur including but not limited to aplastic anemia, agranulocytosis, methemoglobinemia, blood dyscrasias, liver or kidney failure, lung infiltrates or desquamative/blistering drug rashes. Birth Control Pills Counseling: Birth Control Pill Counseling: I discussed with the patient the potential side effects of OCPs including but not limited to increased risk of stroke, heart attack, thrombophlebitis, deep venous thrombosis, hepatic adenomas, breast changes, GI upset, headaches, and depression.  The patient verbalized understanding of the proper use and possible adverse effects of OCPs. All of the patient's questions and concerns were addressed. Tazorac Counseling:  Patient advised that medication is irritating and drying.  Patient may need to apply sparingly and wash off after an hour before eventually leaving it on overnight.  The patient verbalized understanding of the proper use and possible adverse effects of tazorac.  All of the patient's questions and concerns were addressed. Winlevi Pregnancy And Lactation Text: This medication is considered safe during pregnancy and breastfeeding. Sarecycline Counseling: Patient advised regarding possible photosensitivity and discoloration of the teeth, skin, lips, tongue and gums.  Patient instructed to avoid sunlight, if possible.  When exposed to sunlight, patients should wear protective clothing, sunglasses, and sunscreen.  The patient was instructed to call the office immediately if the following severe adverse effects occur:  hearing changes, easy bruising/bleeding, severe headache, or vision changes.  The patient verbalized understanding of the proper use and possible adverse effects of sarecycline.  All of the patient's questions and concerns were addressed. Aklief Pregnancy And Lactation Text: It is unknown if this medication is safe to use during pregnancy.  It is unknown if this medication is excreted in breast milk.  Breastfeeding women should use the topical cream on the smallest area of the skin for the shortest time needed while breastfeeding.  Do not apply to nipple and areola. Erythromycin Pregnancy And Lactation Text: This medication is Pregnancy Category B and is considered safe during pregnancy. It is also excreted in breast milk. Detail Level: Zone Benzoyl Peroxide Counseling: Patient counseled that medicine may cause skin irritation and bleach clothing.  In the event of skin irritation, the patient was advised to reduce the amount of the drug applied or use it less frequently.   The patient verbalized understanding of the proper use and possible adverse effects of benzoyl peroxide.  All of the patient's questions and concerns were addressed. Topical Clindamycin Pregnancy And Lactation Text: This medication is Pregnancy Category B and is considered safe during pregnancy. It is unknown if it is excreted in breast milk. Spironolactone Pregnancy And Lactation Text: This medication can cause feminization of the male fetus and should be avoided during pregnancy. The active metabolite is also found in breast milk. Azithromycin Counseling:  I discussed with the patient the risks of azithromycin including but not limited to GI upset, allergic reaction, drug rash, diarrhea, and yeast infections. Topical Retinoid counseling:  Patient advised to apply a pea-sized amount only at bedtime and wait 30 minutes after washing their face before applying.  If too drying, patient may add a non-comedogenic moisturizer. The patient verbalized understanding of the proper use and possible adverse effects of retinoids.  All of the patient's questions and concerns were addressed. Topical Sulfur Applications Pregnancy And Lactation Text: This medication is Pregnancy Category C and has an unknown safety profile during pregnancy. It is unknown if this topical medication is excreted in breast milk. Minocycline Counseling: Patient advised regarding possible photosensitivity and discoloration of the teeth, skin, lips, tongue and gums.  Patient instructed to avoid sunlight, if possible.  When exposed to sunlight, patients should wear protective clothing, sunglasses, and sunscreen.  The patient was instructed to call the office immediately if the following severe adverse effects occur:  hearing changes, easy bruising/bleeding, severe headache, or vision changes.  The patient verbalized understanding of the proper use and possible adverse effects of minocycline.  All of the patient's questions and concerns were addressed. Doxycycline Pregnancy And Lactation Text: This medication is Pregnancy Category D and not consider safe during pregnancy. It is also excreted in breast milk but is considered safe for shorter treatment courses. Dapsone Pregnancy And Lactation Text: This medication is Pregnancy Category C and is not considered safe during pregnancy or breast feeding. High Dose Vitamin A Counseling: Side effects reviewed, pt to contact office should one occur. Detail Level: Detailed Labs/Imaging Studies/Medications

## 2023-05-27 ENCOUNTER — INPATIENT (INPATIENT)
Facility: HOSPITAL | Age: 83
LOS: 3 days | Discharge: NURSING, REHAB OR LONG-TERM CARE | DRG: 312 | End: 2023-05-31
Attending: HOSPITALIST | Admitting: HOSPITALIST
Payer: MEDICARE

## 2023-05-27 VITALS
SYSTOLIC BLOOD PRESSURE: 142 MMHG | WEIGHT: 149.91 LBS | TEMPERATURE: 98 F | OXYGEN SATURATION: 97 % | RESPIRATION RATE: 18 BRPM | DIASTOLIC BLOOD PRESSURE: 63 MMHG | HEART RATE: 92 BPM

## 2023-05-27 DIAGNOSIS — Z12.11 ENCOUNTER FOR SCREENING FOR MALIGNANT NEOPLASM OF COLON: Chronic | ICD-10-CM

## 2023-05-27 PROCEDURE — 99285 EMERGENCY DEPT VISIT HI MDM: CPT

## 2023-05-27 NOTE — ED ADULT TRIAGE NOTE - CHIEF COMPLAINT QUOTE
Patient BIBA s/p witnessed fall in bathroom from [facility]. Patient states he was walking to bathroom, felt dizzy tried to catch himself and fell. Noted with head trauma. Denies use of anticoagulants.
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
## 2023-05-28 LAB
ALBUMIN SERPL ELPH-MCNC: 4.4 G/DL — SIGNIFICANT CHANGE UP (ref 3.5–5.2)
ALP SERPL-CCNC: 94 U/L — SIGNIFICANT CHANGE UP (ref 30–115)
ALT FLD-CCNC: 8 U/L — SIGNIFICANT CHANGE UP (ref 0–41)
ANION GAP SERPL CALC-SCNC: 11 MMOL/L — SIGNIFICANT CHANGE UP (ref 7–14)
APPEARANCE UR: ABNORMAL
APTT BLD: 34.2 SEC — SIGNIFICANT CHANGE UP (ref 27–39.2)
AST SERPL-CCNC: 19 U/L — SIGNIFICANT CHANGE UP (ref 0–41)
BACTERIA # UR AUTO: ABNORMAL
BASOPHILS # BLD AUTO: 0.02 K/UL — SIGNIFICANT CHANGE UP (ref 0–0.2)
BASOPHILS NFR BLD AUTO: 0.2 % — SIGNIFICANT CHANGE UP (ref 0–1)
BILIRUB SERPL-MCNC: 0.2 MG/DL — SIGNIFICANT CHANGE UP (ref 0.2–1.2)
BILIRUB UR-MCNC: NEGATIVE — SIGNIFICANT CHANGE UP
BUN SERPL-MCNC: 21 MG/DL — HIGH (ref 10–20)
CALCIUM SERPL-MCNC: 9.4 MG/DL — SIGNIFICANT CHANGE UP (ref 8.4–10.5)
CHLORIDE SERPL-SCNC: 96 MMOL/L — LOW (ref 98–110)
CK SERPL-CCNC: 87 U/L — SIGNIFICANT CHANGE UP (ref 0–225)
CO2 SERPL-SCNC: 25 MMOL/L — SIGNIFICANT CHANGE UP (ref 17–32)
COLOR SPEC: YELLOW — SIGNIFICANT CHANGE UP
CREAT SERPL-MCNC: 1.3 MG/DL — SIGNIFICANT CHANGE UP (ref 0.7–1.5)
DIFF PNL FLD: ABNORMAL
EGFR: 56 ML/MIN/1.73M2 — LOW
EOSINOPHIL # BLD AUTO: 0.01 K/UL — SIGNIFICANT CHANGE UP (ref 0–0.7)
EOSINOPHIL NFR BLD AUTO: 0.1 % — SIGNIFICANT CHANGE UP (ref 0–8)
EPI CELLS # UR: 0 /HPF — SIGNIFICANT CHANGE UP (ref 0–5)
ETHANOL SERPL-MCNC: <10 MG/DL — SIGNIFICANT CHANGE UP
GLUCOSE SERPL-MCNC: 110 MG/DL — HIGH (ref 70–99)
GLUCOSE UR QL: NEGATIVE — SIGNIFICANT CHANGE UP
HCT VFR BLD CALC: 34.2 % — LOW (ref 42–52)
HGB BLD-MCNC: 11.7 G/DL — LOW (ref 14–18)
HYALINE CASTS # UR AUTO: 7 /LPF — SIGNIFICANT CHANGE UP (ref 0–7)
IMM GRANULOCYTES NFR BLD AUTO: 0.4 % — HIGH (ref 0.1–0.3)
INR BLD: 1.1 RATIO — SIGNIFICANT CHANGE UP (ref 0.65–1.3)
KETONES UR-MCNC: NEGATIVE — SIGNIFICANT CHANGE UP
LACTATE SERPL-SCNC: 1.7 MMOL/L — SIGNIFICANT CHANGE UP (ref 0.7–2)
LEUKOCYTE ESTERASE UR-ACNC: ABNORMAL
LIDOCAIN IGE QN: 27 U/L — SIGNIFICANT CHANGE UP (ref 7–60)
LYMPHOCYTES # BLD AUTO: 1.11 K/UL — LOW (ref 1.2–3.4)
LYMPHOCYTES # BLD AUTO: 8.6 % — LOW (ref 20.5–51.1)
MCHC RBC-ENTMCNC: 32.8 PG — HIGH (ref 27–31)
MCHC RBC-ENTMCNC: 34.2 G/DL — SIGNIFICANT CHANGE UP (ref 32–37)
MCV RBC AUTO: 95.8 FL — HIGH (ref 80–94)
MONOCYTES # BLD AUTO: 1.19 K/UL — HIGH (ref 0.1–0.6)
MONOCYTES NFR BLD AUTO: 9.2 % — SIGNIFICANT CHANGE UP (ref 1.7–9.3)
NEUTROPHILS # BLD AUTO: 10.6 K/UL — HIGH (ref 1.4–6.5)
NEUTROPHILS NFR BLD AUTO: 81.5 % — HIGH (ref 42.2–75.2)
NITRITE UR-MCNC: POSITIVE
NRBC # BLD: 0 /100 WBCS — SIGNIFICANT CHANGE UP (ref 0–0)
PH UR: 8 — SIGNIFICANT CHANGE UP (ref 5–8)
PLATELET # BLD AUTO: 194 K/UL — SIGNIFICANT CHANGE UP (ref 130–400)
PMV BLD: 10.6 FL — HIGH (ref 7.4–10.4)
POTASSIUM SERPL-MCNC: 4.7 MMOL/L — SIGNIFICANT CHANGE UP (ref 3.5–5)
POTASSIUM SERPL-SCNC: 4.7 MMOL/L — SIGNIFICANT CHANGE UP (ref 3.5–5)
PROT SERPL-MCNC: 7.1 G/DL — SIGNIFICANT CHANGE UP (ref 6–8)
PROT UR-MCNC: ABNORMAL
PROTHROM AB SERPL-ACNC: 12.6 SEC — SIGNIFICANT CHANGE UP (ref 9.95–12.87)
RBC # BLD: 3.57 M/UL — LOW (ref 4.7–6.1)
RBC # FLD: 14.3 % — SIGNIFICANT CHANGE UP (ref 11.5–14.5)
RBC CASTS # UR COMP ASSIST: 19 /HPF — HIGH (ref 0–4)
SODIUM SERPL-SCNC: 132 MMOL/L — LOW (ref 135–146)
SP GR SPEC: 1.04 — HIGH (ref 1.01–1.03)
TROPONIN T SERPL-MCNC: <0.01 NG/ML — SIGNIFICANT CHANGE UP
UROBILINOGEN FLD QL: SIGNIFICANT CHANGE UP
WBC # BLD: 12.98 K/UL — HIGH (ref 4.8–10.8)
WBC # FLD AUTO: 12.98 K/UL — HIGH (ref 4.8–10.8)
WBC UR QL: 700 /HPF — HIGH (ref 0–5)

## 2023-05-28 PROCEDURE — 82962 GLUCOSE BLOOD TEST: CPT

## 2023-05-28 PROCEDURE — 93010 ELECTROCARDIOGRAM REPORT: CPT

## 2023-05-28 PROCEDURE — 99223 1ST HOSP IP/OBS HIGH 75: CPT

## 2023-05-28 PROCEDURE — 97530 THERAPEUTIC ACTIVITIES: CPT | Mod: GP

## 2023-05-28 PROCEDURE — 97162 PT EVAL MOD COMPLEX 30 MIN: CPT | Mod: GP

## 2023-05-28 PROCEDURE — 74177 CT ABD & PELVIS W/CONTRAST: CPT | Mod: 26,MA

## 2023-05-28 PROCEDURE — 72170 X-RAY EXAM OF PELVIS: CPT | Mod: 26

## 2023-05-28 PROCEDURE — 80053 COMPREHEN METABOLIC PANEL: CPT

## 2023-05-28 PROCEDURE — 70450 CT HEAD/BRAIN W/O DYE: CPT | Mod: 26,MA

## 2023-05-28 PROCEDURE — 71260 CT THORAX DX C+: CPT | Mod: 26,MA

## 2023-05-28 PROCEDURE — 87040 BLOOD CULTURE FOR BACTERIA: CPT

## 2023-05-28 PROCEDURE — 72125 CT NECK SPINE W/O DYE: CPT | Mod: 26,MA

## 2023-05-28 PROCEDURE — 85027 COMPLETE CBC AUTOMATED: CPT

## 2023-05-28 PROCEDURE — 97116 GAIT TRAINING THERAPY: CPT | Mod: GP

## 2023-05-28 PROCEDURE — 83735 ASSAY OF MAGNESIUM: CPT

## 2023-05-28 PROCEDURE — 85025 COMPLETE CBC W/AUTO DIFF WBC: CPT

## 2023-05-28 PROCEDURE — 71045 X-RAY EXAM CHEST 1 VIEW: CPT | Mod: 26

## 2023-05-28 PROCEDURE — 36415 COLL VENOUS BLD VENIPUNCTURE: CPT

## 2023-05-28 PROCEDURE — 93306 TTE W/DOPPLER COMPLETE: CPT

## 2023-05-28 RX ORDER — OLANZAPINE 15 MG/1
20 TABLET, FILM COATED ORAL AT BEDTIME
Refills: 0 | Status: DISCONTINUED | OUTPATIENT
Start: 2023-05-28 | End: 2023-06-01

## 2023-05-28 RX ORDER — ATORVASTATIN CALCIUM 80 MG/1
10 TABLET, FILM COATED ORAL AT BEDTIME
Refills: 0 | Status: DISCONTINUED | OUTPATIENT
Start: 2023-05-28 | End: 2023-06-01

## 2023-05-28 RX ORDER — SODIUM CHLORIDE 9 MG/ML
1000 INJECTION INTRAMUSCULAR; INTRAVENOUS; SUBCUTANEOUS
Refills: 0 | Status: DISCONTINUED | OUTPATIENT
Start: 2023-05-28 | End: 2023-05-29

## 2023-05-28 RX ORDER — ACETAMINOPHEN 500 MG
650 TABLET ORAL EVERY 6 HOURS
Refills: 0 | Status: DISCONTINUED | OUTPATIENT
Start: 2023-05-28 | End: 2023-06-01

## 2023-05-28 RX ORDER — TRAZODONE HCL 50 MG
150 TABLET ORAL AT BEDTIME
Refills: 0 | Status: DISCONTINUED | OUTPATIENT
Start: 2023-05-28 | End: 2023-06-01

## 2023-05-28 RX ORDER — TAMSULOSIN HYDROCHLORIDE 0.4 MG/1
0.4 CAPSULE ORAL AT BEDTIME
Refills: 0 | Status: DISCONTINUED | OUTPATIENT
Start: 2023-05-28 | End: 2023-05-29

## 2023-05-28 RX ORDER — MULTIVIT-MIN/FERROUS GLUCONATE 9 MG/15 ML
1 LIQUID (ML) ORAL DAILY
Refills: 0 | Status: DISCONTINUED | OUTPATIENT
Start: 2023-05-28 | End: 2023-06-01

## 2023-05-28 RX ORDER — SODIUM CHLORIDE 9 MG/ML
500 INJECTION INTRAMUSCULAR; INTRAVENOUS; SUBCUTANEOUS ONCE
Refills: 0 | Status: COMPLETED | OUTPATIENT
Start: 2023-05-28 | End: 2023-05-28

## 2023-05-28 RX ORDER — LABETALOL HCL 100 MG
10 TABLET ORAL ONCE
Refills: 0 | Status: COMPLETED | OUTPATIENT
Start: 2023-05-28 | End: 2023-05-28

## 2023-05-28 RX ORDER — NIFEDIPINE 30 MG
90 TABLET, EXTENDED RELEASE 24 HR ORAL DAILY
Refills: 0 | Status: DISCONTINUED | OUTPATIENT
Start: 2023-05-28 | End: 2023-06-01

## 2023-05-28 RX ORDER — CEFTRIAXONE 500 MG/1
1000 INJECTION, POWDER, FOR SOLUTION INTRAMUSCULAR; INTRAVENOUS EVERY 24 HOURS
Refills: 0 | Status: DISCONTINUED | OUTPATIENT
Start: 2023-05-28 | End: 2023-05-29

## 2023-05-28 RX ORDER — HEPARIN SODIUM 5000 [USP'U]/ML
5000 INJECTION INTRAVENOUS; SUBCUTANEOUS EVERY 12 HOURS
Refills: 0 | Status: DISCONTINUED | OUTPATIENT
Start: 2023-05-28 | End: 2023-06-01

## 2023-05-28 RX ORDER — CEFTRIAXONE 500 MG/1
1000 INJECTION, POWDER, FOR SOLUTION INTRAMUSCULAR; INTRAVENOUS ONCE
Refills: 0 | Status: COMPLETED | OUTPATIENT
Start: 2023-05-28 | End: 2023-05-28

## 2023-05-28 RX ORDER — METOPROLOL TARTRATE 50 MG
25 TABLET ORAL
Refills: 0 | Status: DISCONTINUED | OUTPATIENT
Start: 2023-05-28 | End: 2023-06-01

## 2023-05-28 RX ORDER — GABAPENTIN 400 MG/1
300 CAPSULE ORAL
Refills: 0 | Status: DISCONTINUED | OUTPATIENT
Start: 2023-05-28 | End: 2023-06-01

## 2023-05-28 RX ORDER — METOPROLOL TARTRATE 50 MG
25 TABLET ORAL ONCE
Refills: 0 | Status: COMPLETED | OUTPATIENT
Start: 2023-05-28 | End: 2023-05-28

## 2023-05-28 RX ORDER — ALBUTEROL 90 UG/1
2 AEROSOL, METERED ORAL EVERY 6 HOURS
Refills: 0 | Status: DISCONTINUED | OUTPATIENT
Start: 2023-05-28 | End: 2023-06-01

## 2023-05-28 RX ADMIN — GABAPENTIN 300 MILLIGRAM(S): 400 CAPSULE ORAL at 17:09

## 2023-05-28 RX ADMIN — CEFTRIAXONE 100 MILLIGRAM(S): 500 INJECTION, POWDER, FOR SOLUTION INTRAMUSCULAR; INTRAVENOUS at 03:48

## 2023-05-28 RX ADMIN — Medication 25 MILLIGRAM(S): at 10:22

## 2023-05-28 RX ADMIN — CEFTRIAXONE 100 MILLIGRAM(S): 500 INJECTION, POWDER, FOR SOLUTION INTRAMUSCULAR; INTRAVENOUS at 09:07

## 2023-05-28 RX ADMIN — Medication 1 TABLET(S): at 11:21

## 2023-05-28 RX ADMIN — HEPARIN SODIUM 5000 UNIT(S): 5000 INJECTION INTRAVENOUS; SUBCUTANEOUS at 17:09

## 2023-05-28 RX ADMIN — SODIUM CHLORIDE 50 MILLILITER(S): 9 INJECTION INTRAMUSCULAR; INTRAVENOUS; SUBCUTANEOUS at 17:02

## 2023-05-28 RX ADMIN — Medication 25 MILLIGRAM(S): at 17:10

## 2023-05-28 RX ADMIN — Medication 650 MILLIGRAM(S): at 09:07

## 2023-05-28 RX ADMIN — SODIUM CHLORIDE 500 MILLILITER(S): 9 INJECTION INTRAMUSCULAR; INTRAVENOUS; SUBCUTANEOUS at 09:04

## 2023-05-28 RX ADMIN — Medication 10 MILLIGRAM(S): at 23:00

## 2023-05-28 RX ADMIN — Medication 650 MILLIGRAM(S): at 10:07

## 2023-05-28 NOTE — H&P ADULT - ASSESSMENT
83yo M from [facility] pmh of HLD, urinary retention, COPD, DM2 presenting to the ED for fall. Patient's roommate reported to EMS that patient fell in the bathroom and hit his head.     #Witnessed fall: Not mechanical  #Dizziness  - trauma work up (-)  - EKG NSR  - trop (-)  - CK (-)  - telemetry      #Acute cystitis  #Chronic b/l hydronephrosis  #DAVID (BUN?Cr- 21/1.3)    #COPD    #CAD- found on CT  #HTN  #HLD    #DM2  #Neuropathy    #DVT ppx:  #GI ppx:  #Diet:  #Code:  #Activity:   #Dispo: acute     81yo M from [facility] pmh of HLD, urinary retention, COPD, DM2 presenting to the ED for fall. Patient's roommate reported to EMS that patient fell in the bathroom and hit his head.     #Witnessed fall: Not mechanical  #Dizziness- likely suspected from infection  - trauma work up (-)  - EKG NSR  - trop (-)  - CK (-)  - telemetry  - patient unaware of events leading to fall (even is unaware that he fell, but does remember he arrived to hospital)  - check orthostatics  - TTE  - PT  - fall precautions    #Fever on admission  #SIRS (+) on admission (Possible sepsis)  #Acute cystitis  #Chronic b/l hydronephrosis  #DAVID (BUN?Cr- 21/1.3)  - previous hx of requiring bains  - f/u UCx  - obtain Blood Cx x2  - ID consult (prev hx of MDR UTI)  - for now, cont ceftriaxone  - IVF bolus (due to +SIRS)  - cont home flomax  - hold nephrotoxic meds  - trend Cr  - obtain bains and bladder scan if patient is having abd pain    #COPD  - cont albuterol PRN    #CAD- found on CT  #HTN  #HLD  - cont atorvastatin  - cont nifedipine  - hold lisinopril  - cont metoprolol tartrate    #DM2  #Neuropathy  - check A1c  - start insulin if FS>180  - check FS    #Hx of schizophrenia  - cont olanzapine    #DVT ppx: heparin subQ  #GI ppx: none  #Diet: DASH. Add CC if glucose>180  #Code:full  #Activity: AAT  #Dispo: acute

## 2023-05-28 NOTE — PHYSICAL THERAPY INITIAL EVALUATION ADULT - ADDITIONAL COMMENTS
Patient notified of recommendations from Dr. Chen.  Voices understanding of instructions.  No further questions.  Will call back if any other questions or concerns.  LATISHA Cruz RN     Pt resides at [facility]

## 2023-05-28 NOTE — ED PROVIDER NOTE - ATTENDING CONTRIBUTION TO CARE
82 y male to ED s/p fall in the bathroom.  Unk LOC and back to baseline now.   was as [facility] and roomate heard him fall in [place]. States he got dizzy  slipped and lost balance.  AVSS, exam as noted, CTAB, RRR, abdomen soft NTND, (+) bowel sounds, neuro nonfocal

## 2023-05-28 NOTE — ED PROVIDER NOTE - PATIENT'S GENDER IDENTITY
OT reported to RN that pt had some light Headedness  Vitals obtained, all WNL-see flowsheet  Pt rested in bed for 5 minutes, RN did give zofran for pt stating "my belly feels a little funny"  Pt had oxy 5 mg for been earlier  Pt did continue on for her OT therapy, medical is aware  Will continue to monitor  Male

## 2023-05-28 NOTE — PHYSICAL THERAPY INITIAL EVALUATION ADULT - GENERAL OBSERVATIONS, REHAB EVAL
Pt encountered in semi-kaplan position in bed, A & O x 1 to names, +tele, +IV lock, +condom cath, no c/o pain. PT requires Min A in bed mobility, Min A sit/stand transfer mobility and ambulated 5 ft Min A using RW with dec step length. Pt is not motivated to ambulate further with PT despite encouragement made and appears a little agitated. Pt left in bed as found in [place], ELLEN Velazquez aware. Pt will benefit from skilled PT 3-5x/wk for thera ex, transfer act, balance and gait training.

## 2023-05-28 NOTE — PATIENT PROFILE ADULT - FALL HARM RISK - HARM RISK INTERVENTIONS
Assistance with ambulation/Assistance OOB with selected safe patient handling equipment/Communicate Risk of Fall with Harm to all staff/Discuss with provider need for PT consult/Monitor gait and stability/Provide patient with walking aids - walker, cane, crutches/Reinforce activity limits and safety measures with patient and family/Tailored Fall Risk Interventions/Use of alarms - bed, chair and/or voice tab/Visual Cue: Yellow wristband and red socks/Bed in lowest position, wheels locked, appropriate side rails in place/Call bell, personal items and telephone in reach/Instruct patient to call for assistance before getting out of bed or chair/Non-slip footwear when patient is out of bed/[place] to call system/Physically safe environment - no spills, clutter or unnecessary equipment/Purposeful Proactive Rounding/Room/bathroom lighting operational, light cord in reach

## 2023-05-28 NOTE — ED PROVIDER NOTE - OBJECTIVE STATEMENT
82 year old male with pmh of hld, urinary retention, copd, dm, not on any blood thinners presenting to the ED s/p fall. Patient presents from [facility]; patient's roommate reported to EMS that pt fell in the bathroom and hit his head. Pt stated that he felt dizzy, tried to catch himself, and then fell. Unknown down time, unknown LOC. Was found in the bathroom in a supine position on tile. No nausea or vomiting.

## 2023-05-28 NOTE — PHYSICAL THERAPY INITIAL EVALUATION ADULT - PERTINENT HX OF CURRENT PROBLEM, REHAB EVAL
83yo M from [facility] pmh of HLD, urinary retention, COPD, DM2 presenting to the ED for fall. Patient's roommate reported to EMS that patient fell in the bathroom and hit his head. Trauma work up is negative.

## 2023-05-28 NOTE — PATIENT PROFILE ADULT - FUNCTIONAL ASSESSMENT - BASIC MOBILITY 6.
2-calculated by average/Not able to assess (calculate score using [facility] averaging method)

## 2023-05-28 NOTE — H&P ADULT - NSHPPHYSICALEXAM_GEN_ALL_CORE
PHYSICAL EXAM:  GENERAL: NAD, lying in bed comfortably  HEAD:  Atraumatic, Normocephalic  EYES: EOMI, PERRLA, conjunctiva and sclera clear  ENT: Moist mucous membranes  NECK: Supple, No JVD  CHEST/LUNG: Clear to auscultation bilaterally; No rales, rhonchi, wheezing, or rubs. Unlabored respirations  HEART: Regular rate and rhythm; No murmurs, rubs, or gallops  ABDOMEN: Bowel sounds present; Soft, Nontender, Nondistended. No hepatomegaly  EXTREMITIES:  2+ Peripheral Pulses, brisk capillary refill. No clubbing, cyanosis, or edema  NERVOUS SYSTEM:  Alert & Oriented X3, speech clear. No deficits   MSK: FROM all 4 extremities, full and equal strength  SKIN: No rashes or lesions PHYSICAL EXAM:  GENERAL: NAD, lying in bed comfortably, flat affect  HEAD:  Atraumatic, Normocephalic  EYES: EOMI, PERRLA, conjunctiva and sclera clear  ENT: Moist mucous membranes  NECK: Supple, No JVD  CHEST/LUNG: Clear to auscultation bilaterally; No rales, rhonchi, wheezing, or rubs. Unlabored respirations  HEART: Regular rate and rhythm; No murmurs, rubs, or gallops  ABDOMEN: Bowel sounds present; Soft, Nontender, Nondistended. No hepatomegaly  EXTREMITIES:  2+ Peripheral Pulses, brisk capillary refill. No clubbing, cyanosis, or edema  NERVOUS SYSTEM:  Alert & Oriented X3, speech clear. No deficits. No active hallucinations  MSK: FROM all 4 extremities, full and equal strength  SKIN: No rashes or lesions

## 2023-05-28 NOTE — H&P ADULT - HISTORY OF PRESENT ILLNESS
83yo M from [facility] pmh of HLD, urinary retention, COPD, DM2 presenting to the ED for fall. Patient's roommate reported to EMS that patient fell in the bathroom and hit his head. He felt dizzy, tried to catch himself, and fell. Unknown how long he was down for or LOC. Found down. Denied CP, SOB, abd pain, nausea/vomiting. Nt on AC.     Upon arrival to ED, T: 98.3F, BP: 142/63, HR: 92bpm, RR: 18bpm, SpO2: 97% on RA. Labs significant for WBC 13K, mild anemia (Hb at baseline), mild hyponatremia (Na 132). CR elevated (Cr- 1.3 with bl 0l9) CK (-). Troponin (-)  x1. Lactate (-). UA is (+)for nitrite, LE, WBC, bacteria. Pan-CT scan performed. No traumatic injuries. But there is chronic mild bilateral hydroureteronephrosis without obstructing stones in the level of the bladder and mild urothelial enhancement along the course of the ureters. EKG shows NSR, no JANIS    Admitted for fall and cystitis. 1G ceftriaxone is given.  81[facility], COPD, DM2 presenting to the ED for fall. Patient's roommate reported to EMS that patient fell in the bathroom and hit his head. He felt dizzy, tried to catch himself, and fell. Unknown how long he was down for or LOC. Found down. Saw patient in ED3. Denied any active dizziness, urinary/fecal incontinence, tongue biting, palpitations, CP, SOB, abd pain, nausea/vomiting, dysuria. Not on AC.     Upon arrival to ED, T: 98.3F, BP: 142/63, HR: 92bpm, RR: 18bpm, SpO2: 97% on RA. Labs significant for WBC 13K, mild anemia (Hb at baseline), mild hyponatremia (Na 132). CR elevated (Cr- 1.3 with bl 0l9) CK (-). Troponin (-)  x1. Lactate (-). UA is (+)for nitrite, LE, WBC, bacteria. Pan-CT scan performed. No traumatic injuries. But there is chronic mild bilateral hydroureteronephrosis without obstructing stones in the level of the bladder and mild urothelial enhancement along the course of the ureters. EKG shows NSR, no JANIS    Admitted for fall and cystitis. 1G ceftriaxone is given. Developed a temperature of 101.3F while at the ED, and HR 110bpm on telemetry.

## 2023-05-28 NOTE — ED PROVIDER NOTE - PHYSICAL EXAMINATION
CONSTITUTIONAL: Well-developed; well-nourished; in no acute distress.   SKIN: warm, dry; no rashes.  HEAD: Normocephalic; atraumatic.  EYES: PERRL, EOMI, no conjunctival erythema  ENT: No nasal discharge; airway clear. mmm.  NECK: Supple; non tender. No c-spine ttp.  CARD: S1, S2 normal; no murmurs, gallops, or rubs. Regular rate and rhythm.   RESP: No wheezes, rales or rhonchi. lungs ctab.  ABD: soft ntnd; no masses, rebound, or guarding.  MSK/EXT: Normal ROM.  No clubbing, cyanosis or edema. No midline spinal ttp. Pulses 2+, intact.  NEURO: Alert, grossly unremarkable. No focal neurological deficits.  PSYCH: Cooperative, appropriate.

## 2023-05-28 NOTE — H&P ADULT - NSHPLABSRESULTS_GEN_ALL_CORE
LABS:  05-28 @ 00:10 Creatine 87 U/L [0 - 225]  cret                        11.7   12.98 )-----------( 194      ( 28 May 2023 00:10 )             34.2     05-28    132<L>  |  96<L>  |  21<H>  ----------------------------<  110<H>  4.7   |  25  |  1.3    Ca    9.4      28 May 2023 00:10    TPro  7.1  /  Alb  4.4  /  TBili  0.2  /  DBili  x   /  AST  19  /  ALT  8   /  AlkPhos  94  05-28    PT/INR - ( 28 May 2023 00:10 )   PT: 12.60 sec;   INR: 1.10 ratio         PTT - ( 28 May 2023 00:10 )  PTT:34.2 sec    lactate- 1.7  troponin<0.01  Red Blood Cell - Urine: 19 /HPF  White Blood Cell - Urine: 700 /HPF  Hyaline Casts: 7 /LPF  Bacteria: Many  Squamous Epithelial Cells: 0 /HPFGlucose Qualitative, Urine: Negative  Blood, Urine: Small  pH Urine: 8.0  Color: Yellow  Urine Appearance: Slightly Turbid  Bilirubin: Negative  Ketone - Urine: Negative  Specific Gravity: 1.039  Protein, Urine: 30 mg/dL  Urobilinogen: <2 mg/dL  Nitrite: Positive  Leukocyte Esterase Concentration: Large    < from: CT Head No Cont (05.28.23 @ 01:39) >    IMPRESSION:    CT HEAD:  Motion degraded exam. No definite acute intracranial hemorrhage. Stable   chronic findings.    CT CERVICAL SPINE:  Motion degraded exam. No definite displaced cervical spine fracture or   facet subluxation.    --- End of Report ---    < end of copied text >    < from: CT Chest w/ IV Cont (05.28.23 @ 02:03) >    IMPRESSION:  1.  No evidence of acute traumatic injury to the chest, abdomen or pelvis.  2.  Chronic mild bilateral hydroureteronephrosis without obstructing   stone to the level of the urinary bladder. Mild urothelial enhancement   along the course the ureters may represent inflammation or infection,   correlate with urinalysis. Additionally there is mild chronic bladder   wall thickening likely secondary to component of chronic bladder outlet   obstruction though superimposed cystitis cannot be excluded.    --- End of Report ---    < end of copied text >    < from: CT Abdomen and Pelvis w/ IV Cont (05.28.23 @ 02:04) >    IMPRESSION:  1.  No evidence of acute traumatic injury to the chest, abdomen or pelvis.  2.  Chronic mild bilateral hydroureteronephrosis without obstructing   stone to the level of the urinary bladder. Mild urothelial enhancement   along the course the ureters may represent inflammation or infection,   correlate with urinalysis. Additionally there is mild chronic bladder   wall thickening likely secondary to component of chronic bladder outlet   obstruction though superimposed cystitis cannot be excluded.    < end of copied text >    < from: Xray Pelvis AP only (05.28.23 @ 00:43) >    IMPRESSION:    Bones are osteopenic. No evidence of acute displaced fracture or   dislocation. Degenerative changes in the pelvis and hips. Marked   atherosclerosis.    < end of copied text >

## 2023-05-28 NOTE — H&P ADULT - ATTENDING COMMENTS
Pt was seen and examined at bedside independently, pt if awake, comfortable, unable to provide history, demented, can't recall why he came to ER.   I agree with medical resident's findings, assessment  and plan above with a few correction in my note.     Vital Signs Last 24 Hrs  T(C): 36.9 (28 May 2023 09:53), Max: 38.6 (28 May 2023 08:25)  T(F): 98.5 (28 May 2023 09:53), Max: 101.5 (28 May 2023 08:25)  HR: 105 (28 May 2023 08:25) (92 - 105)  BP: 167/74 (28 May 2023 08:25) (142/63 - 167/74)  BP(mean): 102 (28 May 2023 04:26) (102 - 102)  RR: 18 (28 May 2023 08:25) (18 - 18)  SpO2: 97% (28 May 2023 08:25) (97% - 99%)    Parameters below as of 28 May 2023 08:25  Patient On (Oxygen Delivery Method): room air    PHYSICAL EXAM:  GENERAL: NAD, demented, poor historian   HEAD:  Atraumatic, Normocephalic  EYES: EOMI, PERRLA, conjunctiva and sclera clear  ENT: Moist mucous membranes  NECK: Supple, No JVD  CHEST/LUNG: decreased BS b/l   HEART: Regular rate and rhythm; No murmurs, rubs, or gallops  ABDOMEN: Bowel sounds present; Soft, Nontender, Nondistended. No hepatomegaly  EXTREMITIES:  no  edema on LE   NERVOUS SYSTEM:  Awake, answering questions, demented, moving all extremities     LABs:                        11.7   12.98 )-----------( 194      ( 28 May 2023 00:10 )             34.2   05-28    132<L>  |  96<L>  |  21<H>  ----------------------------<  110<H>  4.7   |  25  |  1.3    Ca    9.4      28 May 2023 00:10    TPro  7.1  /  Alb  4.4  /  TBili  0.2  /  DBili  x   /  AST  19  /  ALT  8   /  AlkPhos  94  05-28    A/P   83yo M from [place] pmh of HLD, urinary retention, COPD, DM2 presenting to the ED for fall. Patient's roommate reported to EMS that patient fell in the bathroom and hit his head.     # s/p Fall/ witnessed    - trauma work up is negative,  - EKG NSR  - telemetry monitoring for 24 only, doubt cardiogenic syncope) , get TTE   - check orthostatics  - supportive care, fall precautions  - PT/rehab eval     # UTI / h/o obstructive uropathy/ DAVID   - previous hx of requiring Cobos  - f/u UCx and Blood CX   - ID consult (prev hx of MDR UTI)  - c/w ceftriaxone  - on  Flomax  - start gently IV hydration for 24 hours   - monitor BUN/cr and urine output   - avoid  nephrotoxic meds  - check bladder osullivan ( if pt retains more than 200 ml of urine might need Cobos), avoid straight cat for 48- 72 hours in setting of acute UTI     # h/o COPD  - stable for now   - cont albuterol PRN    # CAD/ HTN/ HLD  - cont atorvastatin  - cont nifedipine,  lisinopril, metoprolol tartrate  - may start ASA     #DM2/ Diabetic Neuropathy  - carb consistent diet   - check A1c  - start insulin if FS>180  - check FS    #Hx of schizophrenia  - cont olanzapine, supportive care    # GI/DVT prophylaxis    #Progress Note Handoff  Pending (specify):  telemetry monitoring for 24 hours, check orthostatic VS, f/u 2Decho, f/u urine and blood Cx, start IV hydration for 24 hours, monitor BUN/cr and urine output, bladder osullivan this afternoon, if pt retains more than 200 ml of urine put Cobos , ID consult , PT/rehab eval Pt was seen and examined at bedside independently, pt if awake, comfortable, unable to provide history, demented, can't recall why he came to ER.   I agree with medical resident's findings, assessment  and plan above with a few correction in my note.     Vital Signs Last 24 Hrs  T(C): 36.9 (28 May 2023 09:53), Max: 38.6 (28 May 2023 08:25)  T(F): 98.5 (28 May 2023 09:53), Max: 101.5 (28 May 2023 08:25)  HR: 105 (28 May 2023 08:25) (92 - 105)  BP: 167/74 (28 May 2023 08:25) (142/63 - 167/74)  BP(mean): 102 (28 May 2023 04:26) (102 - 102)  RR: 18 (28 May 2023 08:25) (18 - 18)  SpO2: 97% (28 May 2023 08:25) (97% - 99%)    Parameters below as of 28 May 2023 08:25  Patient On (Oxygen Delivery Method): room air    PHYSICAL EXAM:  GENERAL: NAD, demented, poor historian   HEAD:  Atraumatic, Normocephalic  EYES: EOMI, PERRLA, conjunctiva and sclera clear  ENT: Moist mucous membranes  NECK: Supple, No JVD  CHEST/LUNG: decreased BS b/l   HEART: Regular rate and rhythm; No murmurs, rubs, or gallops  ABDOMEN: Bowel sounds present; Soft, Nontender, Nondistended. No hepatomegaly  EXTREMITIES:  no  edema on LE   NERVOUS SYSTEM:  Awake, answering questions, demented, moving all extremities     LABs:                        11.7   12.98 )-----------( 194      ( 28 May 2023 00:10 )             34.2   05-28    132<L>  |  96<L>  |  21<H>  ----------------------------<  110<H>  4.7   |  25  |  1.3    Ca    9.4      28 May 2023 00:10    TPro  7.1  /  Alb  4.4  /  TBili  0.2  /  DBili  x   /  AST  19  /  ALT  8   /  AlkPhos  94  05-28    A/P   83yo M from [place] pmh of HLD, urinary retention, COPD, DM2 presenting to the ED for fall. Patient's roommate reported to EMS that patient fell in the bathroom and hit his head.     # s/p Fall/ witnessed    - trauma work up is negative,  - EKG NSR  - telemetry monitoring for 24 only,( doubt cardiogenic syncope) , get TTE   - check orthostatics  - supportive care, fall precautions  - PT/rehab eval     # UTI / h/o obstructive uropathy/ DAVID   - previous hx of requiring Cobos  - f/u UCx and Blood CX   - ID consult (prev hx of MDR UTI)  - c/w ceftriaxone  - on  Flomax  - start gently IV hydration for 24 hours   - monitor BUN/cr and urine output   - avoid  nephrotoxic meds  - check bladder osullivan ( if pt retains more than 200 ml of urine might need Cobos), avoid straight cath  for 48- 72 hours in setting of acute UTI     # h/o COPD  - stable for now   - cont albuterol PRN    # CAD/ HTN/ HLD  - cont atorvastatin  - cont nifedipine,  lisinopril, metoprolol tartrate  - may start ASA     #DM2/ Diabetic Neuropathy  - carb consistent diet   - check A1c  - start insulin if FS>180  - check FS    #Hx of schizophrenia  - cont olanzapine, supportive care    # GI/DVT prophylaxis    #Progress Note Handoff  Pending (specify):  telemetry monitoring for 24 hours, check orthostatic VS, f/u 2Decho, f/u urine and blood Cx, start IV hydration for 24 hours, monitor BUN/cr and urine output, bladder osullivan this afternoon, if pt retains more than 200 ml of urine put Cobos , ID consult , PT/rehab eval

## 2023-05-29 DIAGNOSIS — E11.9 TYPE 2 DIABETES MELLITUS WITHOUT COMPLICATIONS: ICD-10-CM
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
## 2023-05-29 DIAGNOSIS — R33.9 RETENTION OF URINE, UNSPECIFIED: ICD-10-CM

## 2023-05-29 DIAGNOSIS — F20.9 SCHIZOPHRENIA, UNSPECIFIED: ICD-10-CM

## 2023-05-29 DIAGNOSIS — I10 ESSENTIAL (PRIMARY) HYPERTENSION: ICD-10-CM

## 2023-05-29 DIAGNOSIS — Z79.899 OTHER LONG TERM (CURRENT) DRUG THERAPY: ICD-10-CM

## 2023-05-29 DIAGNOSIS — R50.9 FEVER, UNSPECIFIED: ICD-10-CM

## 2023-05-29 DIAGNOSIS — E78.5 HYPERLIPIDEMIA, UNSPECIFIED: ICD-10-CM

## 2023-05-29 DIAGNOSIS — J44.9 CHRONIC OBSTRUCTIVE PULMONARY DISEASE, UNSPECIFIED: ICD-10-CM

## 2023-05-29 LAB
ALBUMIN SERPL ELPH-MCNC: 3.7 G/DL — SIGNIFICANT CHANGE UP (ref 3.5–5.2)
ALP SERPL-CCNC: 91 U/L — SIGNIFICANT CHANGE UP (ref 30–115)
ALT FLD-CCNC: 8 U/L — SIGNIFICANT CHANGE UP (ref 0–41)
ANION GAP SERPL CALC-SCNC: 12 MMOL/L — SIGNIFICANT CHANGE UP (ref 7–14)
AST SERPL-CCNC: 18 U/L — SIGNIFICANT CHANGE UP (ref 0–41)
BASOPHILS # BLD AUTO: 0.01 K/UL — SIGNIFICANT CHANGE UP (ref 0–0.2)
BASOPHILS NFR BLD AUTO: 0.1 % — SIGNIFICANT CHANGE UP (ref 0–1)
BILIRUB SERPL-MCNC: <0.2 MG/DL — SIGNIFICANT CHANGE UP (ref 0.2–1.2)
BUN SERPL-MCNC: 14 MG/DL — SIGNIFICANT CHANGE UP (ref 10–20)
CALCIUM SERPL-MCNC: 8.6 MG/DL — SIGNIFICANT CHANGE UP (ref 8.4–10.5)
CHLORIDE SERPL-SCNC: 98 MMOL/L — SIGNIFICANT CHANGE UP (ref 98–110)
CO2 SERPL-SCNC: 20 MMOL/L — SIGNIFICANT CHANGE UP (ref 17–32)
CREAT SERPL-MCNC: 0.9 MG/DL — SIGNIFICANT CHANGE UP (ref 0.7–1.5)
CULTURE RESULTS: SIGNIFICANT CHANGE UP
EGFR: 86 ML/MIN/1.73M2 — SIGNIFICANT CHANGE UP
EOSINOPHIL # BLD AUTO: 0.01 K/UL — SIGNIFICANT CHANGE UP (ref 0–0.7)
EOSINOPHIL NFR BLD AUTO: 0.1 % — SIGNIFICANT CHANGE UP (ref 0–8)
GLUCOSE BLDC GLUCOMTR-MCNC: 101 MG/DL — HIGH (ref 70–99)
GLUCOSE BLDC GLUCOMTR-MCNC: 125 MG/DL — HIGH (ref 70–99)
GLUCOSE BLDC GLUCOMTR-MCNC: 137 MG/DL — HIGH (ref 70–99)
GLUCOSE SERPL-MCNC: 102 MG/DL — HIGH (ref 70–99)
HCT VFR BLD CALC: 32.3 % — LOW (ref 42–52)
HGB BLD-MCNC: 11.1 G/DL — LOW (ref 14–18)
IMM GRANULOCYTES NFR BLD AUTO: 0.5 % — HIGH (ref 0.1–0.3)
LYMPHOCYTES # BLD AUTO: 1.05 K/UL — LOW (ref 1.2–3.4)
LYMPHOCYTES # BLD AUTO: 9.6 % — LOW (ref 20.5–51.1)
MAGNESIUM SERPL-MCNC: 1.9 MG/DL — SIGNIFICANT CHANGE UP (ref 1.8–2.4)
MCHC RBC-ENTMCNC: 32.5 PG — HIGH (ref 27–31)
MCHC RBC-ENTMCNC: 34.4 G/DL — SIGNIFICANT CHANGE UP (ref 32–37)
MCV RBC AUTO: 94.4 FL — HIGH (ref 80–94)
MONOCYTES # BLD AUTO: 1.12 K/UL — HIGH (ref 0.1–0.6)
MONOCYTES NFR BLD AUTO: 10.2 % — HIGH (ref 1.7–9.3)
NEUTROPHILS # BLD AUTO: 8.73 K/UL — HIGH (ref 1.4–6.5)
NEUTROPHILS NFR BLD AUTO: 79.5 % — HIGH (ref 42.2–75.2)
NRBC # BLD: 0 /100 WBCS — SIGNIFICANT CHANGE UP (ref 0–0)
PLATELET # BLD AUTO: 163 K/UL — SIGNIFICANT CHANGE UP (ref 130–400)
PMV BLD: 11 FL — HIGH (ref 7.4–10.4)
POTASSIUM SERPL-MCNC: 3.8 MMOL/L — SIGNIFICANT CHANGE UP (ref 3.5–5)
POTASSIUM SERPL-SCNC: 3.8 MMOL/L — SIGNIFICANT CHANGE UP (ref 3.5–5)
PROT SERPL-MCNC: 6.4 G/DL — SIGNIFICANT CHANGE UP (ref 6–8)
RBC # BLD: 3.42 M/UL — LOW (ref 4.7–6.1)
RBC # FLD: 14.3 % — SIGNIFICANT CHANGE UP (ref 11.5–14.5)
SODIUM SERPL-SCNC: 130 MMOL/L — LOW (ref 135–146)
SPECIMEN SOURCE: SIGNIFICANT CHANGE UP
WBC # BLD: 10.97 K/UL — HIGH (ref 4.8–10.8)
WBC # FLD AUTO: 10.97 K/UL — HIGH (ref 4.8–10.8)

## 2023-05-29 PROCEDURE — 93306 TTE W/DOPPLER COMPLETE: CPT | Mod: 26

## 2023-05-29 PROCEDURE — 99233 SBSQ HOSP IP/OBS HIGH 50: CPT

## 2023-05-29 RX ORDER — LABETALOL HCL 100 MG
10 TABLET ORAL ONCE
Refills: 0 | Status: COMPLETED | OUTPATIENT
Start: 2023-05-29 | End: 2023-05-29

## 2023-05-29 RX ORDER — HYDRALAZINE HCL 50 MG
10 TABLET ORAL ONCE
Refills: 0 | Status: COMPLETED | OUTPATIENT
Start: 2023-05-29 | End: 2023-05-29

## 2023-05-29 RX ADMIN — GABAPENTIN 300 MILLIGRAM(S): 400 CAPSULE ORAL at 17:20

## 2023-05-29 RX ADMIN — Medication 150 MILLIGRAM(S): at 21:28

## 2023-05-29 RX ADMIN — Medication 1 TABLET(S): at 11:38

## 2023-05-29 RX ADMIN — ATORVASTATIN CALCIUM 10 MILLIGRAM(S): 80 TABLET, FILM COATED ORAL at 21:28

## 2023-05-29 RX ADMIN — Medication 25 MILLIGRAM(S): at 17:20

## 2023-05-29 RX ADMIN — CEFTRIAXONE 100 MILLIGRAM(S): 500 INJECTION, POWDER, FOR SOLUTION INTRAMUSCULAR; INTRAVENOUS at 11:06

## 2023-05-29 RX ADMIN — Medication 10 MILLIGRAM(S): at 04:52

## 2023-05-29 RX ADMIN — HEPARIN SODIUM 5000 UNIT(S): 5000 INJECTION INTRAVENOUS; SUBCUTANEOUS at 17:20

## 2023-05-29 RX ADMIN — OLANZAPINE 20 MILLIGRAM(S): 15 TABLET, FILM COATED ORAL at 21:28

## 2023-05-29 RX ADMIN — Medication 10 MILLIGRAM(S): at 01:49

## 2023-05-29 NOTE — CONSULT NOTE ADULT - GENITOURINARY MALE
no bains/normal external genitalia/no hernia/no discharge/no mass/no tenderness/no ulcer/normal/no penile lesion/no palpable testicular mass/no scrotal mass

## 2023-05-29 NOTE — CONSULT NOTE ADULT - ASSESSMENT
81yo M from [facility] pmh of HLD, schizophrenia, urinary retention, COPD, DM2 presenting to the ED for fall. Patient's roommate reported to EMS that patient fell in the bathroom and hit his head. He felt dizzy, tried to catch himself, and fell. Unknown how long he was down for or if  LOC . Denied any active dizziness, urinary/fecal incontinence, tongue biting, palpitations, CP, SOB, abd pain, nausea/vomiting, dysuria. Not on AC.     5/28 CT chronic mild bilateral hydroureteronephrosis without obstructing stones in the level of the bladder and mild urothelial enhancement along the course of the ureters.    IMPRESSION/RECOMMENDATIONS  No evidence of acute pyelonephritis/cystitis ( is asymptomatic )/sepsis  Clinically asymptomatic  See no need for cultures or ABx  CT findings noted  -d/c ABx

## 2023-05-29 NOTE — CONSULT NOTE ADULT - SUBJECTIVE AND OBJECTIVE BOX
ADA VILLAGOMEZ  83y, Male  Allergy: No Known Allergies      All historical available data reviewed.    HPI:  81yo M from [facility] pmh of HLD, schizophrenia, urinary retention, COPD, DM2 presenting to the ED for fall. Patient's roommate reported to EMS that patient fell in the bathroom and hit his head. He felt dizzy, tried to catch himself, and fell. Unknown how long he was down for or LOC. Found down. Saw patient in ED3. Denied any active dizziness, urinary/fecal incontinence, tongue biting, palpitations, CP, SOB, abd pain, nausea/vomiting, dysuria. Not on AC.     Upon arrival to ED, T: 98.3F, BP: 142/63, HR: 92bpm, RR: 18bpm, SpO2: 97% on RA. Labs significant for WBC 13K, mild anemia (Hb at baseline), mild hyponatremia (Na 132). CR elevated (Cr- 1.3 with bl 0l9) CK (-). Troponin (-)  x1. Lactate (-). UA is (+)for nitrite, LE, WBC, bacteria. Pan-CT scan performed. No traumatic injuries. But there is chronic mild bilateral hydroureteronephrosis without obstructing stones in the level of the bladder and mild urothelial enhancement along the course of the ureters. EKG shows NSR, no JANIS    Admitted for fall and cystitis. 1G ceftriaxone is given. Developed a temperature of 101.3F while at the ED, and HR 110bpm on telemetry.  (28 May 2023 07:50)    FAMILY HISTORY:  No pertinent family history in first degree relatives      PAST MEDICAL & SURGICAL HISTORY:  Schizophrenia      Diabetes type 2, controlled      COPD (chronic obstructive pulmonary disease)      Dyslipidemia      Chronic retention of urine      Dyslipidemia      Encounter for screening colonoscopy            VITALS:  T(F): 98.9, Max: 98.9 (23 @ 07:37)  HR: 98  BP: 183/80  RR: 18Vital Signs Last 24 Hrs  T(C): 37.2 (29 May 2023 07:37), Max: 37.2 (29 May 2023 07:37)  T(F): 98.9 (29 May 2023 07:37), Max: 98.9 (29 May 2023 07:37)  HR: 98 (29 May 2023 07:37) (81 - 98)  BP: 183/80 (29 May 2023 07:37) (165/74 - 203/91)  BP(mean): 115 (29 May 2023 07:37) (108 - 131)  RR: 18 (29 May 2023 07:37) (18 - 18)  SpO2: 96% (29 May 2023 07:37) (96% - 96%)    Parameters below as of 29 May 2023 07:37  Patient On (Oxygen Delivery Method): room air        TESTS & MEASUREMENTS:                        11.7   12.98 )-----------( 194      ( 28 May 2023 00:10 )             34.2         132<L>  |  96<L>  |  21<H>  ----------------------------<  110<H>  4.7   |  25  |  1.3    Ca    9.4      28 May 2023 00:10    TPro  7.1  /  Alb  4.4  /  TBili  0.2  /  DBili  x   /  AST  19  /  ALT  8   /  AlkPhos  94      LIVER FUNCTIONS - ( 28 May 2023 00:10 )  Alb: 4.4 g/dL / Pro: 7.1 g/dL / ALK PHOS: 94 U/L / ALT: 8 U/L / AST: 19 U/L / GGT: x             Urinalysis Basic - ( 28 May 2023 02:29 )    Color: Yellow / Appearance: Slightly Turbid / S.039 / pH: x  Gluc: x / Ketone: Negative  / Bili: Negative / Urobili: <2 mg/dL   Blood: x / Protein: 30 mg/dL / Nitrite: Positive   Leuk Esterase: Large / RBC: 19 /HPF /  /HPF   Sq Epi: x / Non Sq Epi: x / Bacteria: Many          RADIOLOGY & ADDITIONAL TESTS:  Personal review of radiological diagnostics performed  Echo and EKG results noted when applicable.     MEDICATIONS:  acetaminophen     Tablet .. 650 milliGRAM(s) Oral every 6 hours PRN  albuterol    90 MICROgram(s) HFA Inhaler 2 Puff(s) Inhalation every 6 hours PRN  atorvastatin 10 milliGRAM(s) Oral at bedtime  cefTRIAXone   IVPB 1000 milliGRAM(s) IV Intermittent every 24 hours  gabapentin 300 milliGRAM(s) Oral two times a day  heparin   Injectable 5000 Unit(s) SubCutaneous every 12 hours  metoprolol tartrate 25 milliGRAM(s) Oral two times a day  multivitamin/minerals 1 Tablet(s) Oral daily  NIFEdipine XL 90 milliGRAM(s) Oral daily  OLANZapine 20 milliGRAM(s) Oral at bedtime  tamsulosin 0.4 milliGRAM(s) Oral at bedtime  traZODone 150 milliGRAM(s) Oral at bedtime      ANTIBIOTICS:  cefTRIAXone   IVPB 1000 milliGRAM(s) IV Intermittent every 24 hours

## 2023-05-30 LAB
ALBUMIN SERPL ELPH-MCNC: 3.8 G/DL — SIGNIFICANT CHANGE UP (ref 3.5–5.2)
ALP SERPL-CCNC: 89 U/L — SIGNIFICANT CHANGE UP (ref 30–115)
ALT FLD-CCNC: 14 U/L — SIGNIFICANT CHANGE UP (ref 0–41)
ANION GAP SERPL CALC-SCNC: 12 MMOL/L — SIGNIFICANT CHANGE UP (ref 7–14)
AST SERPL-CCNC: 23 U/L — SIGNIFICANT CHANGE UP (ref 0–41)
BILIRUB SERPL-MCNC: 0.2 MG/DL — SIGNIFICANT CHANGE UP (ref 0.2–1.2)
BUN SERPL-MCNC: 14 MG/DL — SIGNIFICANT CHANGE UP (ref 10–20)
CALCIUM SERPL-MCNC: 8.7 MG/DL — SIGNIFICANT CHANGE UP (ref 8.4–10.5)
CHLORIDE SERPL-SCNC: 98 MMOL/L — SIGNIFICANT CHANGE UP (ref 98–110)
CO2 SERPL-SCNC: 22 MMOL/L — SIGNIFICANT CHANGE UP (ref 17–32)
CREAT SERPL-MCNC: 1 MG/DL — SIGNIFICANT CHANGE UP (ref 0.7–1.5)
EGFR: 76 ML/MIN/1.73M2 — SIGNIFICANT CHANGE UP
GLUCOSE SERPL-MCNC: 110 MG/DL — HIGH (ref 70–99)
HCT VFR BLD CALC: 33.5 % — LOW (ref 42–52)
HGB BLD-MCNC: 11.4 G/DL — LOW (ref 14–18)
MAGNESIUM SERPL-MCNC: 2 MG/DL — SIGNIFICANT CHANGE UP (ref 1.8–2.4)
MCHC RBC-ENTMCNC: 32 PG — HIGH (ref 27–31)
MCHC RBC-ENTMCNC: 34 G/DL — SIGNIFICANT CHANGE UP (ref 32–37)
MCV RBC AUTO: 94.1 FL — HIGH (ref 80–94)
NRBC # BLD: 0 /100 WBCS — SIGNIFICANT CHANGE UP (ref 0–0)
PLATELET # BLD AUTO: 173 K/UL — SIGNIFICANT CHANGE UP (ref 130–400)
PMV BLD: 11.2 FL — HIGH (ref 7.4–10.4)
POTASSIUM SERPL-MCNC: 4.1 MMOL/L — SIGNIFICANT CHANGE UP (ref 3.5–5)
POTASSIUM SERPL-SCNC: 4.1 MMOL/L — SIGNIFICANT CHANGE UP (ref 3.5–5)
PROT SERPL-MCNC: 6.4 G/DL — SIGNIFICANT CHANGE UP (ref 6–8)
RBC # BLD: 3.56 M/UL — LOW (ref 4.7–6.1)
RBC # FLD: 13.9 % — SIGNIFICANT CHANGE UP (ref 11.5–14.5)
SODIUM SERPL-SCNC: 132 MMOL/L — LOW (ref 135–146)
WBC # BLD: 7.61 K/UL — SIGNIFICANT CHANGE UP (ref 4.8–10.8)
WBC # FLD AUTO: 7.61 K/UL — SIGNIFICANT CHANGE UP (ref 4.8–10.8)

## 2023-05-30 PROCEDURE — 99233 SBSQ HOSP IP/OBS HIGH 50: CPT

## 2023-05-30 RX ORDER — LISINOPRIL 2.5 MG/1
5 TABLET ORAL DAILY
Refills: 0 | Status: DISCONTINUED | OUTPATIENT
Start: 2023-05-30 | End: 2023-06-01

## 2023-05-30 RX ADMIN — GABAPENTIN 300 MILLIGRAM(S): 400 CAPSULE ORAL at 05:24

## 2023-05-30 RX ADMIN — HEPARIN SODIUM 5000 UNIT(S): 5000 INJECTION INTRAVENOUS; SUBCUTANEOUS at 05:24

## 2023-05-30 RX ADMIN — LISINOPRIL 5 MILLIGRAM(S): 2.5 TABLET ORAL at 11:28

## 2023-05-30 RX ADMIN — Medication 150 MILLIGRAM(S): at 23:47

## 2023-05-30 RX ADMIN — Medication 25 MILLIGRAM(S): at 05:24

## 2023-05-30 RX ADMIN — ATORVASTATIN CALCIUM 10 MILLIGRAM(S): 80 TABLET, FILM COATED ORAL at 23:27

## 2023-05-30 RX ADMIN — Medication 1 TABLET(S): at 11:28

## 2023-05-30 RX ADMIN — GABAPENTIN 300 MILLIGRAM(S): 400 CAPSULE ORAL at 17:20

## 2023-05-30 RX ADMIN — OLANZAPINE 20 MILLIGRAM(S): 15 TABLET, FILM COATED ORAL at 23:27

## 2023-05-30 RX ADMIN — Medication 25 MILLIGRAM(S): at 17:20

## 2023-05-30 RX ADMIN — Medication 90 MILLIGRAM(S): at 05:24

## 2023-05-30 RX ADMIN — HEPARIN SODIUM 5000 UNIT(S): 5000 INJECTION INTRAVENOUS; SUBCUTANEOUS at 17:20

## 2023-05-30 NOTE — PROGRESS NOTE ADULT - PROBLEM SELECTOR PLAN 2
ua positive; f/u ucx  ct abd with chronic bladder outlet obstruction  no abx per id  will monitor; if febrile again; start ceftriaxone
ua positive; ucx contaminated  ct abd with chronic bladder outlet obstruction  no abx per id

## 2023-05-30 NOTE — PROGRESS NOTE ADULT - SUBJECTIVE AND OBJECTIVE BOX
ADA VILLAGOMEZ  83y, Male    All available historical data reviewed    OVERNIGHT EVENTS:  none    ROS:  General: Denies rigors, nightsweats  HEENT: Denies headache, rhinorrhea, sore throat, eye pain  CV: Denies CP, palpitations  PULM: Denies wheezing, hemoptysis  GI: Denies hematemesis, hematochezia, melena  : Denies discharge, hematuria  MSK: Denies arthralgias, myalgias  SKIN: Denies rash, lesions  NEURO: Denies paresthesias, weakness  PSYCH: Denies depression, anxiety    VITALS:  T(F): 98.3, Max: 98.5 (05-29-23 @ 15:49)  HR: 67  BP: 149/68  RR: 18Vital Signs Last 24 Hrs  T(C): 36.8 (30 May 2023 08:14), Max: 36.9 (29 May 2023 15:49)  T(F): 98.3 (30 May 2023 08:14), Max: 98.5 (29 May 2023 15:49)  HR: 67 (30 May 2023 08:14) (67 - 98)  BP: 149/68 (30 May 2023 08:14) (149/68 - 185/78)  BP(mean): --  RR: 18 (30 May 2023 08:14) (18 - 18)  SpO2: 97% (30 May 2023 08:14) (96% - 97%)    Parameters below as of 30 May 2023 08:14  Patient On (Oxygen Delivery Method): room air        TESTS & MEASUREMENTS:                        11.1   10.97 )-----------( 163      ( 29 May 2023 11:45 )             32.3     05-29    130<L>  |  98  |  14  ----------------------------<  102<H>  3.8   |  20  |  0.9    Ca    8.6      29 May 2023 11:45  Mg     1.9     05-29    TPro  6.4  /  Alb  3.7  /  TBili  <0.2  /  DBili  x   /  AST  18  /  ALT  8   /  AlkPhos  91  05-29    LIVER FUNCTIONS - ( 29 May 2023 11:45 )  Alb: 3.7 g/dL / Pro: 6.4 g/dL / ALK PHOS: 91 U/L / ALT: 8 U/L / AST: 18 U/L / GGT: x             Culture - Blood (collected 05-28-23 @ 11:00)  Source: .Blood Blood-Peripheral  Preliminary Report (05-29-23 @ 21:01):    No growth to date.    Culture - Urine (collected 05-28-23 @ 02:29)  Source: Clean Catch Clean Catch (Midstream)  Final Report (05-29-23 @ 19:17):    >=3 organisms. Probable collection contamination.            RADIOLOGY & ADDITIONAL TESTS:  Personal review of radiological diagnostics performed  Echo and EKG results noted when applicable.     MEDICATIONS:  acetaminophen     Tablet .. 650 milliGRAM(s) Oral every 6 hours PRN  albuterol    90 MICROgram(s) HFA Inhaler 2 Puff(s) Inhalation every 6 hours PRN  atorvastatin 10 milliGRAM(s) Oral at bedtime  gabapentin 300 milliGRAM(s) Oral two times a day  heparin   Injectable 5000 Unit(s) SubCutaneous every 12 hours  metoprolol tartrate 25 milliGRAM(s) Oral two times a day  multivitamin/minerals 1 Tablet(s) Oral daily  NIFEdipine XL 90 milliGRAM(s) Oral daily  OLANZapine 20 milliGRAM(s) Oral at bedtime  traZODone 150 milliGRAM(s) Oral at bedtime      ANTIBIOTICS:    
INTERVAL HPI/OVERNIGHT EVENTS:    SUBJECTIVE: Patient seen and examined at bedside.     cc: fall  no cp, sob, abd pain, fever    OBJECTIVE:    VITAL SIGNS:  Vital Signs Last 24 Hrs  T(C): 37.2 (29 May 2023 07:37), Max: 37.2 (29 May 2023 07:37)  T(F): 98.9 (29 May 2023 07:37), Max: 98.9 (29 May 2023 07:37)  HR: 98 (29 May 2023 07:37) (81 - 98)  BP: 183/80 (29 May 2023 07:37) (165/74 - 203/91)  BP(mean): 115 (29 May 2023 07:37) (108 - 131)  RR: 18 (29 May 2023 07:37) (18 - 18)  SpO2: 96% (29 May 2023 07:37) (96% - 96%)    Parameters below as of 29 May 2023 07:37  Patient On (Oxygen Delivery Method): room air          PHYSICAL EXAM:    General: NAD  HEENT: NC/AT; PERRL, clear conjunctiva  Neck: supple  Respiratory: CTA b/l  Cardiovascular: +S1/S2; RRR  Abdomen: soft, NT/ND; +BS x4  Extremities: WWP, 2+ peripheral pulses b/l; no LE edema  Skin: normal color and turgor; no rash  Neurological:    MEDICATIONS:  MEDICATIONS  (STANDING):  atorvastatin 10 milliGRAM(s) Oral at bedtime  gabapentin 300 milliGRAM(s) Oral two times a day  heparin   Injectable 5000 Unit(s) SubCutaneous every 12 hours  metoprolol tartrate 25 milliGRAM(s) Oral two times a day  multivitamin/minerals 1 Tablet(s) Oral daily  NIFEdipine XL 90 milliGRAM(s) Oral daily  OLANZapine 20 milliGRAM(s) Oral at bedtime  tamsulosin 0.4 milliGRAM(s) Oral at bedtime  traZODone 150 milliGRAM(s) Oral at bedtime    MEDICATIONS  (PRN):  acetaminophen     Tablet .. 650 milliGRAM(s) Oral every 6 hours PRN Temp greater or equal to 38C (100.4F)  albuterol    90 MICROgram(s) HFA Inhaler 2 Puff(s) Inhalation every 6 hours PRN Bronchospasm      ALLERGIES:  Allergies    No Known Allergies    Intolerances        LABS:                        11.1   10.97 )-----------( 163      ( 29 May 2023 11:45 )             32.3     Hemoglobin: 11.1 g/dL ( @ 11:45)  Hemoglobin: 11.7 g/dL ( @ 00:10)    CBC Full  -  ( 29 May 2023 11:45 )  WBC Count : 10.97 K/uL  RBC Count : 3.42 M/uL  Hemoglobin : 11.1 g/dL  Hematocrit : 32.3 %  Platelet Count - Automated : 163 K/uL  Mean Cell Volume : 94.4 fL  Mean Cell Hemoglobin : 32.5 pg  Mean Cell Hemoglobin Concentration : 34.4 g/dL  Auto Neutrophil # : 8.73 K/uL  Auto Lymphocyte # : 1.05 K/uL  Auto Monocyte # : 1.12 K/uL  Auto Eosinophil # : 0.01 K/uL  Auto Basophil # : 0.01 K/uL  Auto Neutrophil % : 79.5 %  Auto Lymphocyte % : 9.6 %  Auto Monocyte % : 10.2 %  Auto Eosinophil % : 0.1 %  Auto Basophil % : 0.1 %        130<L>  |  98  |  14  ----------------------------<  102<H>  3.8   |  20  |  0.9    Ca    8.6      29 May 2023 11:45  Mg     1.9         TPro  6.4  /  Alb  3.7  /  TBili  <0.2  /  DBili  x   /  AST  18  /  ALT  8   /  AlkPhos  91      Creatinine Trend: 0.9<--, 1.3<--  LIVER FUNCTIONS - ( 29 May 2023 11:45 )  Alb: 3.7 g/dL / Pro: 6.4 g/dL / ALK PHOS: 91 U/L / ALT: 8 U/L / AST: 18 U/L / GGT: x           PT/INR - ( 28 May 2023 00:10 )   PT: 12.60 sec;   INR: 1.10 ratio         PTT - ( 28 May 2023 00:10 )  PTT:34.2 sec    hs Troponin:            Urinalysis Basic - ( 28 May 2023 02:29 )    Color: Yellow / Appearance: Slightly Turbid / S.039 / pH: x  Gluc: x / Ketone: Negative  / Bili: Negative / Urobili: <2 mg/dL   Blood: x / Protein: 30 mg/dL / Nitrite: Positive   Leuk Esterase: Large / RBC: 19 /HPF /  /HPF   Sq Epi: x / Non Sq Epi: x / Bacteria: Many      CSF:                      EKG:   MICROBIOLOGY:    Culture - Urine (collected 28 May 2023 02:29)  Source: Clean Catch Clean Catch (Midstream)  Preliminary Report (29 May 2023 11:50):    >100,000 CFU/ml Gram Negative Rods      IMAGING:      Labs, imaging, EKG personally reviewed    RADIOLOGY & ADDITIONAL TESTS: Reviewed.
INTERVAL HPI/OVERNIGHT EVENTS:    SUBJECTIVE: Patient seen and examined at bedside.     cc: fall  no cp, sob, abd pain, fever  no ha, dizziness, lightheadedness, syncope    OBJECTIVE:    VITAL SIGNS:  Vital Signs Last 24 Hrs  T(C): 36.8 (30 May 2023 08:14), Max: 36.9 (29 May 2023 15:49)  T(F): 98.3 (30 May 2023 08:14), Max: 98.5 (29 May 2023 15:49)  HR: 67 (30 May 2023 08:14) (67 - 98)  BP: 149/68 (30 May 2023 08:14) (149/68 - 185/78)  BP(mean): --  RR: 18 (30 May 2023 08:14) (18 - 18)  SpO2: 97% (30 May 2023 08:14) (96% - 97%)    Parameters below as of 30 May 2023 08:14  Patient On (Oxygen Delivery Method): room air          PHYSICAL EXAM:    General: NAD  HEENT: NC/AT; PERRL, clear conjunctiva  Neck: supple  Respiratory: CTA b/l  Cardiovascular: +S1/S2; RRR  Abdomen: soft, NT/ND; +BS x4  Extremities: WWP, 2+ peripheral pulses b/l; no LE edema  Skin: normal color and turgor; no rash  Neurological:    MEDICATIONS:  MEDICATIONS  (STANDING):  atorvastatin 10 milliGRAM(s) Oral at bedtime  gabapentin 300 milliGRAM(s) Oral two times a day  heparin   Injectable 5000 Unit(s) SubCutaneous every 12 hours  lisinopril 5 milliGRAM(s) Oral daily  metoprolol tartrate 25 milliGRAM(s) Oral two times a day  multivitamin/minerals 1 Tablet(s) Oral daily  NIFEdipine XL 90 milliGRAM(s) Oral daily  OLANZapine 20 milliGRAM(s) Oral at bedtime  traZODone 150 milliGRAM(s) Oral at bedtime    MEDICATIONS  (PRN):  acetaminophen     Tablet .. 650 milliGRAM(s) Oral every 6 hours PRN Temp greater or equal to 38C (100.4F)  albuterol    90 MICROgram(s) HFA Inhaler 2 Puff(s) Inhalation every 6 hours PRN Bronchospasm      ALLERGIES:  Allergies    No Known Allergies    Intolerances        LABS:                        11.4   7.61  )-----------( 173      ( 30 May 2023 04:30 )             33.5     Hemoglobin: 11.4 g/dL (05-30 @ 04:30)  Hemoglobin: 11.1 g/dL (05-29 @ 11:45)  Hemoglobin: 11.7 g/dL (05-28 @ 00:10)    CBC Full  -  ( 30 May 2023 04:30 )  WBC Count : 7.61 K/uL  RBC Count : 3.56 M/uL  Hemoglobin : 11.4 g/dL  Hematocrit : 33.5 %  Platelet Count - Automated : 173 K/uL  Mean Cell Volume : 94.1 fL  Mean Cell Hemoglobin : 32.0 pg  Mean Cell Hemoglobin Concentration : 34.0 g/dL  Auto Neutrophil # : x  Auto Lymphocyte # : x  Auto Monocyte # : x  Auto Eosinophil # : x  Auto Basophil # : x  Auto Neutrophil % : x  Auto Lymphocyte % : x  Auto Monocyte % : x  Auto Eosinophil % : x  Auto Basophil % : x    05-30    132<L>  |  98  |  14  ----------------------------<  110<H>  4.1   |  22  |  1.0    Ca    8.7      30 May 2023 04:30  Mg     2.0     05-30    TPro  6.4  /  Alb  3.8  /  TBili  0.2  /  DBili  x   /  AST  23  /  ALT  14  /  AlkPhos  89  05-30    Creatinine Trend: 1.0<--, 0.9<--, 1.3<--  LIVER FUNCTIONS - ( 30 May 2023 04:30 )  Alb: 3.8 g/dL / Pro: 6.4 g/dL / ALK PHOS: 89 U/L / ALT: 14 U/L / AST: 23 U/L / GGT: x               hs Troponin:              CSF:                      EKG:   MICROBIOLOGY:    Culture - Blood (collected 28 May 2023 11:00)  Source: .Blood Blood-Peripheral  Preliminary Report (29 May 2023 21:01):    No growth to date.    Culture - Urine (collected 28 May 2023 02:29)  Source: Clean Catch Clean Catch (Midstream)  Final Report (29 May 2023 19:17):    >=3 organisms. Probable collection contamination.      IMAGING:      Labs, imaging, EKG personally reviewed    RADIOLOGY & ADDITIONAL TESTS: Reviewed.

## 2023-05-30 NOTE — PROGRESS NOTE ADULT - PROBLEM SELECTOR PLAN 1
unwitnessed, presumed due to orthostatic hypotension  cth neg; trauma w/u neg  orthostatics positive  he is on a number of medications that can cause orthostasis  resume lisinopril 5  cont nifedipine, lopressor for now; bp control in long run may help with orthostatics  zyprexa and trazodone may contribute as well; but will cont for schizophrenia  hold tamsulosin; monitor uop; if retains will need uro eval  repeat orthostatics  PT
unwitnessed, presumed due to orthostatic hypotension  cth neg; trauma w/u neg  orthostatics positive  he is on a number of medications that can cause orthostasis  acei on hold  cont nifedipine, lopressor for now; bp control in long run may help with orthostatics  zyprexa and trazodone may contribute as well; but will cont for schizophrenia  hold tamsulosin; monitor uop; if retains will need uro eval  repeat orthostatics  PT

## 2023-05-30 NOTE — PROGRESS NOTE ADULT - PROBLEM SELECTOR PROBLEM 9
[FreeTextEntry1] : lab. data reviewed
On deep vein thrombosis (DVT) prophylaxis
On deep vein thrombosis (DVT) prophylaxis

## 2023-05-30 NOTE — PROGRESS NOTE ADULT - NSPROGADDITIONALINFOA_GEN_ALL_CORE
#Progress Note Handoff:  Pending (specify):  Consults_________, Tests________, Test Results_______, Other___orthostatics, PT, ucx______  Family discussion: d/w pt at bedside re: treatment plan, primary dx  Disposition: Home___/SNF___/Other________/Unknown at this time___x_____
#Progress Note Handoff:  Pending (specify):  Consults_________, Tests________, Test Results_______, Other___orthostatics, PT, monitor uop______  Family discussion: d/w pt at bedside re: treatment plan, primary dx  Disposition: Home___/SNF___/Other________/Unknown at this time___x_____

## 2023-05-30 NOTE — PROGRESS NOTE ADULT - ASSESSMENT
· Assessment	  81yo M from [facility] pmh of HLD, schizophrenia, urinary retention, COPD, DM2 presenting to the ED for fall. Patient's roommate reported to EMS that patient fell in the bathroom and hit his head. He felt dizzy, tried to catch himself, and fell. Unknown how long he was down for or if  LOC . Denied any active dizziness, urinary/fecal incontinence, tongue biting, palpitations, CP, SOB, abd pain, nausea/vomiting, dysuria. Not on AC.     5/28 CT chronic mild bilateral hydroureteronephrosis without obstructing stones in the level of the bladder and mild urothelial enhancement along the course of the ureters.    IMPRESSION/RECOMMENDATIONS  No evidence of acute pyelonephritis/cystitis ( is asymptomatic )/sepsis  Clinically asymptomatic  Presently off ABx  CT findings noted  Blood & Urine cxs NGTD 5/28  WBC 10.9  -maintain off ABx    Please do not hesitate to recall ID if any questions arise either through [employer] or through [employer] 
82M PMHx HLD, schizophrenia, HTN, urinary retention, COPD, DM2 here with fall.  
82M PMHx HLD, schizophrenia, HTN, urinary retention, COPD, DM2 here with fall.

## 2023-05-31 ENCOUNTER — TRANSCRIPTION ENCOUNTER (OUTPATIENT)
Age: 83
End: 2023-05-31

## 2023-05-31 VITALS
TEMPERATURE: 98 F | RESPIRATION RATE: 18 BRPM | DIASTOLIC BLOOD PRESSURE: 70 MMHG | SYSTOLIC BLOOD PRESSURE: 152 MMHG | OXYGEN SATURATION: 96 % | HEART RATE: 74 BPM

## 2023-05-31 LAB
GLUCOSE BLDC GLUCOMTR-MCNC: 100 MG/DL — HIGH (ref 70–99)
GLUCOSE BLDC GLUCOMTR-MCNC: 112 MG/DL — HIGH (ref 70–99)

## 2023-05-31 RX ORDER — LISINOPRIL 2.5 MG/1
1 TABLET ORAL
Qty: 0 | Refills: 0 | DISCHARGE
Start: 2023-05-31

## 2023-05-31 RX ADMIN — Medication 25 MILLIGRAM(S): at 06:01

## 2023-05-31 RX ADMIN — LISINOPRIL 5 MILLIGRAM(S): 2.5 TABLET ORAL at 06:01

## 2023-05-31 RX ADMIN — GABAPENTIN 300 MILLIGRAM(S): 400 CAPSULE ORAL at 06:01

## 2023-05-31 RX ADMIN — Medication 1 TABLET(S): at 11:04

## 2023-05-31 RX ADMIN — Medication 90 MILLIGRAM(S): at 06:01

## 2023-05-31 NOTE — DISCHARGE NOTE PROVIDER - HOSPITAL COURSE
83[facility], urinary retention, COPD, DM2 presenting to the ED for fall. Patient's roommate reported to EMS that patient fell in the bathroom and hit his head. He felt dizzy, tried to catch himself, and fell. Unknown how long he was down for or LOC. Found down. Denied any active dizziness, urinary/fecal incontinence, tongue biting, palpitations, CP, SOB, abd pain, nausea/vomiting, dysuria. Not on AC.     Upon arrival to ED, T: 98.3F, BP: 142/63, HR: 92bpm, RR: 18bpm, SpO2: 97% on RA. Labs significant for WBC 13K, mild anemia (Hb at baseline), mild hyponatremia (Na 132). CR elevated (Cr- 1.3 with bl 0l9) CK (-). Troponin (-)  x1. Lactate (-). UA is (+)for nitrite, LE, WBC, bacteria. Pan-CT scan performed. No traumatic injuries. But there is chronic mild bilateral hydroureteronephrosis without obstructing stones in the level of the bladder and mild urothelial enhancement along the course of the ureters. EKG shows NSR.    Admitted for fall and cystitis. 1G ceftriaxone is given. Developed a temperature of 101.3F while at the ED, and HR 110bpm on telemetry.     He was found to have orthostatic changes, blood pressure medications dosages were reduced, tamsulosin were discontinued. He needs follow up for urinary retention, possibly need to resume tamsulosin.     His blood pressure was uncontrolled, BP medications were adjusted.     The patient has no complaints and he is stable for discharge back to a rehab facility     
no

## 2023-05-31 NOTE — DISCHARGE NOTE PROVIDER - NSDCMRMEDTOKEN_GEN_ALL_CORE_FT
albuterol 90 mcg/inh inhalation aerosol: 1 puff(s) inhaled prn as needed every 6 hours  atorvastatin 10 mg oral tablet: 1 tab(s) orally once a day  Centrum oral tablet: 1 tab(s) orally once a day  gabapentin 300 mg oral capsule: 1 cap(s) orally 2 times a day  lisinopril 5 mg oral tablet: 1 tab(s) orally once a day  metFORMIN 500 mg oral tablet: 1 tab(s) orally 2 times a day  metoprolol tartrate 25 mg oral tablet: 1 tab(s) orally 2 times a day  NIFEdipine (Eqv-Adalat CC) 90 mg oral tablet, extended release: 1 tab(s) orally once a day  OLANZapine 20 mg oral tablet: 1 tab(s) orally once a day (at bedtime)  traZODone 150 mg oral tablet: 1 tab(s) orally once a day (at bedtime)

## 2023-05-31 NOTE — DISCHARGE NOTE NURSING/CASE MANAGEMENT/SOCIAL WORK - PATIENT PORTAL LINK FT
You can access the FollowMyHealth Patient Portal offered by [facility] by registering at the following website: [email or web address]. By joining [employer]’s FollowMyHealth portal, you will also be able to view your health information using other applications (apps) compatible with our system.

## 2023-05-31 NOTE — DISCHARGE NOTE NURSING/CASE MANAGEMENT/SOCIAL WORK - NSDCPEFALRISK_GEN_ALL_CORE
For information on Fall & Injury Prevention, visit: [email or web address] OR  [email or web address] OR  https://www.cdc.gov/steadi/patient.html

## 2023-05-31 NOTE — DISCHARGE NOTE PROVIDER - NSDCCPCAREPLAN_GEN_ALL_CORE_FT
PRINCIPAL DISCHARGE DIAGNOSIS  Diagnosis: Fall  Assessment and Plan of Treatment: Orthostatic hypotension is a quick drop in blood pressure. It happens when you get up from sitting or lying down. You may feel faint, light-headed, or dizzy.  When a person sits up or stands up, the body changes the way it pumps blood. This can slow the flow of blood to the brain for a very short time. And that can make you feel light-headed.  Many medicines can cause this problem, especially in older people. Lack of fluids (dehydration) or illnesses such as diabetes or heart disease also can cause it.  If you feel dizzy or light-headed, sit down or lie down for a few minutes. Or you can sit down and put your head between your knees. This will help your blood pressure go back to normal and help your symptoms go away.  Get up slowly from bed or after sitting for a long time. If you are in bed, roll to your side and swing your legs over the edge of the bed and onto the floor. Push your body up to a sitting position. Wait for a while before you slowly stand up.  Drink plenty of fluids. Choose water and other clear liquids.   Avoid or limit alcohol to 3 drinks a day. Alcohol may interfere with your medicine. In addition, alcohol can make your low blood pressure worse by causing your body to lose water.      SECONDARY DISCHARGE DIAGNOSES  Diagnosis: Syncope  Assessment and Plan of Treatment:     Diagnosis: Acute UTI  Assessment and Plan of Treatment:

## 2023-05-31 NOTE — DISCHARGE NOTE PROVIDER - NSDCFUADDINST_GEN_ALL_CORE_FT
If you feel dizzy or light-headed, sit down or lie down for a few minutes. Or you can sit down and put your head between your knees. This will help your blood pressure go back to normal and help your symptoms go away.  Get up slowly from bed or after sitting for a long time.

## 2023-06-02 LAB
CULTURE RESULTS: SIGNIFICANT CHANGE UP
SPECIMEN SOURCE: SIGNIFICANT CHANGE UP

## 2023-06-06 DIAGNOSIS — I10 ESSENTIAL (PRIMARY) HYPERTENSION: ICD-10-CM

## 2023-06-06 DIAGNOSIS — I95.1 ORTHOSTATIC HYPOTENSION: ICD-10-CM

## 2023-06-06 DIAGNOSIS — Z79.84 LONG TERM (CURRENT) USE OF ORAL HYPOGLYCEMIC DRUGS: ICD-10-CM

## 2023-06-06 DIAGNOSIS — F20.9 SCHIZOPHRENIA, UNSPECIFIED: ICD-10-CM

## 2023-06-06 DIAGNOSIS — R65.10 SYSTEMIC INFLAMMATORY RESPONSE SYNDROME (SIRS) OF NON-INFECTIOUS ORIGIN WITHOUT ACUTE ORGAN DYSFUNCTION: ICD-10-CM

## 2023-06-06 DIAGNOSIS — J44.9 CHRONIC OBSTRUCTIVE PULMONARY DISEASE, UNSPECIFIED: ICD-10-CM

## 2023-06-06 DIAGNOSIS — R33.9 RETENTION OF URINE, UNSPECIFIED: ICD-10-CM

## 2023-06-06 DIAGNOSIS — E87.1 HYPO-OSMOLALITY AND HYPONATREMIA: ICD-10-CM

## 2023-06-06 DIAGNOSIS — R55 SYNCOPE AND COLLAPSE: ICD-10-CM

## 2023-06-06 DIAGNOSIS — N17.9 ACUTE KIDNEY FAILURE, UNSPECIFIED: ICD-10-CM

## 2023-06-06 DIAGNOSIS — N32.0 BLADDER-NECK OBSTRUCTION: ICD-10-CM

## 2023-06-06 DIAGNOSIS — I25.10 ATHEROSCLEROTIC HEART DISEASE OF NATIVE CORONARY ARTERY WITHOUT ANGINA PECTORIS: ICD-10-CM

## 2023-06-06 DIAGNOSIS — E11.40 TYPE 2 DIABETES MELLITUS WITH DIABETIC NEUROPATHY, UNSPECIFIED: ICD-10-CM

## 2023-06-06 DIAGNOSIS — E78.5 HYPERLIPIDEMIA, UNSPECIFIED: ICD-10-CM

## 2023-06-06 DIAGNOSIS — D64.9 ANEMIA, UNSPECIFIED: ICD-10-CM

## 2023-06-06 DIAGNOSIS — N13.30 UNSPECIFIED HYDRONEPHROSIS: ICD-10-CM

## 2023-06-13 NOTE — BEHAVIORAL HEALTH ASSESSMENT NOTE - NS ED BHA MED ROS ALLERGIC IMMUNOLOGIC
Brow Lift Text: A midfrontal incision was made medially to the defect to allow access to the tissues just superior to the left eyebrow. Following careful dissection inferiorly in a supraperiosteal plane to the level of the left eyebrow, several 3-0 monocryl sutures were used to resuspend the eyebrow orbicularis oculi muscular unit to the superior frontal bone periosteum. This resulted in an appropriate reapproximation of static eyebrow symmetry and correction of the left brow ptosis. No complaints

## 2023-09-26 NOTE — H&P ADULT - NSHPLABSRESULTS_GEN_ALL_CORE
<<<<<LABS>>>>>                        13.2   20.26 )-----------( 236      ( 2021 23:58 )             36.9         119<LL>  |  82<L>  |  6<L>  ----------------------------<  122<H>  5.5<H>   |  24  |  1.0    Ca    9.0      2021 23:58    TPro  7.0  /  Alb  4.3  /  TBili  0.7  /  DBili  x   /  AST  18  /  ALT  9   /  AlkPhos  116<H>        Urinalysis Basic - ( 2021 01:02 )    Color: Yellow / Appearance: Turbid / S.011 / pH: x  Gluc: x / Ketone: Negative  / Bili: Negative / Urobili: <2 mg/dL   Blood: x / Protein: 100 mg/dL / Nitrite: Negative   Leuk Esterase: Large / RBC: 15 /HPF /  /HPF   Sq Epi: x / Non Sq Epi: 1 /HPF / Bacteria: Many        Troponin T, Serum: 0.10 ng/mL *HH* (21 @ 23:58)  Lactate, Blood: 3.3 mmol/L *H* (21 @ 23:58)    [id number]  CARDIAC MARKERS ( 2021 23:58 )  x     / 0.10 ng/mL / x     / x     / x            < from: CT Abdomen and Pelvis w/ IV Cont (21 @ 03:39) >      IMPRESSION:      Interval development of lobulation and cystic areas within the prostate. Correlate with PSA levels and may obtain MRI of the prostate with and without contrast for further evaluation.    Cobos balloon inflated within the penile urethra, recommend repositioning.    Mild to moderate bilateral hydroureteronephrosis with no distal obstructing ureteral lesion or stone.    Air is notedwithin the urinary bladder, likely secondary to instrumentation. However infectious process is not excluded.    < end of copied text >    < from: CT Head No Cont (21 @ 03:37) >    Impression:    No CT evidence of acute intracranial hemorrhage or acute large territorial infarct.    Redemonstration of a left middle cranial fossa cystic area measuring 4.4 x 4.4 cm, which may represent encephalomalacia versus arachnoid cyst.    < end of copied text > Staging Info: By selecting yes to the question above you will include information on AJCC 8 tumor staging in your Mohs note. Information on tumor staging will be automatically added for SCCs on the head and neck. AJCC 8 includes tumor size, tumor depth, perineural involvement and bone invasion.

## 2023-09-27 NOTE — CONSULT NOTE ADULT - CONSULT REQUESTED BY NAME
9/27/23 Update CM Note. Pt admitted 9/19/23 Acute respiratory failure with hypoxia. Transferred out of ICU 9/24/23. Pt currently on 8L [place] O2, BIPAP at HS. [facility]  will provide home O2. Discharge plan to return home when medically stable, son will provide transportation.   Jennifer Penn BSN RN-BC  [phone] medicine

## 2023-10-27 ENCOUNTER — INPATIENT (INPATIENT)
Facility: HOSPITAL | Age: 83
LOS: 3 days | Discharge: NURSING, REHAB OR LONG-TERM CARE | DRG: 988 | End: 2023-10-31
Attending: INTERNAL MEDICINE | Admitting: HOSPITALIST
Payer: MEDICARE

## 2023-10-27 VITALS
SYSTOLIC BLOOD PRESSURE: 178 MMHG | DIASTOLIC BLOOD PRESSURE: 77 MMHG | TEMPERATURE: 98 F | HEART RATE: 74 BPM | RESPIRATION RATE: 20 BRPM | OXYGEN SATURATION: 98 %

## 2023-10-27 DIAGNOSIS — Z12.11 ENCOUNTER FOR SCREENING FOR MALIGNANT NEOPLASM OF COLON: Chronic | ICD-10-CM

## 2023-10-27 PROCEDURE — 12011 RPR F/E/E/N/L/M 2.5 CM/<: CPT

## 2023-10-27 PROCEDURE — 93010 ELECTROCARDIOGRAM REPORT: CPT

## 2023-10-27 PROCEDURE — 99285 EMERGENCY DEPT VISIT HI MDM: CPT | Mod: 25

## 2023-10-27 PROCEDURE — 72125 CT NECK SPINE W/O DYE: CPT | Mod: 26,MA

## 2023-10-27 PROCEDURE — 70450 CT HEAD/BRAIN W/O DYE: CPT | Mod: 26,MA

## 2023-10-27 PROCEDURE — 71045 X-RAY EXAM CHEST 1 VIEW: CPT | Mod: 26

## 2023-10-27 RX ORDER — ALBUTEROL 90 UG/1
2.5 AEROSOL, METERED ORAL ONCE
Refills: 0 | Status: DISCONTINUED | OUTPATIENT
Start: 2023-10-27 | End: 2023-10-31

## 2023-10-27 RX ORDER — TETANUS TOXOID, REDUCED DIPHTHERIA TOXOID AND ACELLULAR PERTUSSIS VACCINE, ADSORBED 5; 2.5; 8; 8; 2.5 [IU]/.5ML; [IU]/.5ML; UG/.5ML; UG/.5ML; UG/.5ML
0.5 SUSPENSION INTRAMUSCULAR ONCE
Refills: 0 | Status: COMPLETED | OUTPATIENT
Start: 2023-10-27 | End: 2023-10-27

## 2023-10-27 RX ORDER — IPRATROPIUM BROMIDE 0.2 MG/ML
500 SOLUTION, NON-ORAL INHALATION ONCE
Refills: 0 | Status: DISCONTINUED | OUTPATIENT
Start: 2023-10-27 | End: 2023-10-31

## 2023-10-27 NOTE — ED PROVIDER NOTE - ATTENDING CONTRIBUTION TO CARE
83-year-old male history of hyperlipidemia, urinary retention, COPD, diabetes, not on any anticoagulation presents to the ED from [facility] after a unwitnessed fall.  Attempted to call nursing home for collateral but per staff there there is no clinicians overnight.  Patient does not remember what happened.  States she never getting off the ground but does not remember falling.     On exam he has a 2 cm laceration to the right eyebrow, no entrapment, head otherwise normocephalic and atraumatic.  No C or T or L-spine tenderness palpation.  No chest wall tenderness palpation.  Lungs are clear to auscultation bilaterally, heart is regular rate and rhythm.  Abdomen is soft and nontender.  Pelvis is stable.  No focal neurodeficits on exam.    Labs were sent to evaluate for possible syncopal etiology given unclear history, also sent imaging per [place] and [facility] criteria.  His C-spine happened to show incidental T2 fracture which radiology recommended an MRI of.  He has no tenderness in this area.    S/o to oncoming team to follow up labs and neurosurg recs regarding the spinal fx. 83-year-old male history of hyperlipidemia, urinary retention, COPD, diabetes, not on any anticoagulation presents to the ED from [facility] after a unwitnessed fall.  Attempted to call nursing home for collateral but per staff there there is no clinicians overnight.  Patient does not remember what happened.  States she never getting off the ground but does not remember falling.     On exam he has a 2 cm laceration to the right eyebrow, no entrapment, head otherwise normocephalic and atraumatic.  No C or T or L-spine tenderness palpation.  No chest wall tenderness palpation.  Lungs are clear to auscultation bilaterally, heart is regular rate and rhythm.  Abdomen is soft and nontender.  Pelvis is stable.  No focal neurodeficits on exam.    Labs were sent to evaluate for possible syncopal etiology given unclear history, also sent imaging per [place] and [facility] criteria.  His C-spine [place] showed incidental T2 fracture which radiology recommended an MRI of.  He has no tenderness in this area.    Neurosurg cleared patient. Signed out to oncoming team to f/u labs

## 2023-10-27 NOTE — ED ADULT TRIAGE NOTE - CHIEF COMPLAINT QUOTE
Pt BIBA from on adult home/ S/P fall laceration to Rt forehead/ patient states he doesn't know how he fell/ ?LOC/ NO blood thinners

## 2023-10-27 NOTE — ED PROVIDER NOTE - PROGRESS NOTE DETAILS
Resident BINH Garza: CT C-spine with age-indeterminate T2 fracture noted.  On reassessment, patient is nontender to the cervical, thoracic, or lumbar spine.  Patient has no pain in his back.  Neurologically intact.  Neurosurgery consulted, will evaluate the images and call back. Resident BINH Garza: --- DELAYED ENTRY --- CT head with partially imaged slightly leftward displaced fractures of the frontal processes of the bilateral maxilla. CT max-face ordered. OMFS and Trauma consulted. Antibiotics given. Resident BINH Garza: Dental consulted on behalf of OMFS; pending recs.   UTI noted - antibiotics given.   Patient signed out to Dr. Gamboa. Resident BINH Garza: --- DELAYED ENTRY --- CT head with partially imaged slightly leftward displaced fractures of the frontal processes of the bilateral maxilla. CT max-face ordered. OMFS and Trauma consulted. Antibiotics given.  Neurosurgery - no acute intervention.  Laceration repaired. Spoke to dental team, oral surgery resident coming into see patient's, dental resident will follow-up regarding timing.  Patient resting comfortably -CD Spoke to dental team, oral surgery resident advises Afrin nasal spray for 3 days, Ocean nasal spray for 1 week, amoxicillin 500 mg every 8 hours for 1 week, sneezing with mouth open, no nose blowing for 2 weeks, follow-up in dental clinic in 1 week, communicated with patient/RX sent-CD Patient cleared by trauma team, advised admission to medicine, discussed with patient, patient agreeable -CD Patient remained stable in ED. Signed out to Dr. Oreilly at 7am shift change pending OMFS consult, reevaluation and dispo.

## 2023-10-27 NOTE — ED PROVIDER NOTE - CARE PLAN
1 Principal Discharge DX:	DAIVD (acute kidney injury)  Secondary Diagnosis:	Maxillary fracture   Principal Discharge DX:	DAVID (acute kidney injury)  Secondary Diagnosis:	Maxillary fracture  Secondary Diagnosis:	Fall  Secondary Diagnosis:	Forehead laceration   Principal Discharge DX:	DAVID (acute kidney injury)  Secondary Diagnosis:	Maxillary fracture  Secondary Diagnosis:	Fall  Secondary Diagnosis:	Forehead laceration  Secondary Diagnosis:	Urinary tract infection

## 2023-10-27 NOTE — ED PROVIDER NOTE - IV ALTEPLASE EXCL ABS HIDDEN
02/06/23                        To Whom It May Concern:        This is to certify Gopal Rahman was re-evaluated with Jonathon Nunez MD on 02/06/23 and is unable to return to work until further notice.         RESTRICTIONS: n/a      Feel free to contact my office with any questions or concerns.       Electronically signed by:  Jonathon Nunez MD  [facility], [address] [place] [address] [address] [facility] [id number]  Dept Phone: [phone]      show

## 2023-10-27 NOTE — ED PROVIDER NOTE - PHYSICAL EXAMINATION
_  CONSTITUTIONAL: ABC intact, GCS 15, NAD  HEAD: +2 cm laceration above right eyebrow otherwise NCAT, no signs of basilar skull fx, no depressions  EENT: EOMI, PERRL b/l, no epistaxis, MMM  NECK: No midline C-spine tenderness/step-offs/deformities, no anterior swelling  CV: RRR, equal distal pulses  RESP: Normal work of breathing, symmetric rise and fall of chest  ABD: Soft, NT, no R/G  EXT: No obvious deformity, no tenderness of extremities, cap refill < 2 seconds  CHEST: No clavicular/chest wall tenderness/deformity/tenting/crepitus  BACK: No midline T/L/S-spine tenderness/step-offs/deformities  PELVIS: No pelvic instability  SKIN: +2 cm laceration above right eyebrow   NEURO: Awake, alert, moving all extremities  PSYCH: Cooperative, appropriate affect

## 2023-10-27 NOTE — ED PROVIDER NOTE - NSFOLLOWUPINSTRUCTIONS_ED_ALL_ED_FT
As discussed, sneeze with mouth open, no nose blowing for 2 weeks.  Use Afrin nasal spray for 3 days, Ocean nasal spray for 1 week.  Take amoxicillin antibiotic as prescribed for 1 week.  Follow-up in dental clinic in 1 week or sooner if you develop any of the concerning signs or symptoms we discussed

## 2023-10-27 NOTE — ED PROVIDER NOTE - CLINICAL SUMMARY MEDICAL DECISION MAKING FREE TEXT BOX
.    Pt presenting w/ fall, found to have age indeterminate T2 Frx. maxillary frx, DAVID, UTI, forehead lac. Pt given IVF, IV abx. lac cleaned and closed. Pt seen neurosurgery and OMFS- recc appreciated. Pt admitted to medicine for cont w/up and management.    .

## 2023-10-27 NOTE — ED PROVIDER NOTE - OBJECTIVE STATEMENT
Patient is an 83-year-old man with a history of COPD not on home oxygen, diabetes, hyperlipidemia, resident of [facility], brought in by EMS status post fall.  Patient is a poor historian, and cannot describe how he fell.  Unclear whether or not there was loss of consciousness.  No anticoagulation use.  Patient denies any pain anywhere.  Noted to have a laceration on his forehead above the right eyebrow.

## 2023-10-28 DIAGNOSIS — N17.9 ACUTE KIDNEY FAILURE, UNSPECIFIED: ICD-10-CM

## 2023-10-28 LAB
ALBUMIN SERPL ELPH-MCNC: 3.7 G/DL — SIGNIFICANT CHANGE UP (ref 3.5–5.2)
ALBUMIN SERPL ELPH-MCNC: 3.7 G/DL — SIGNIFICANT CHANGE UP (ref 3.5–5.2)
ALBUMIN SERPL ELPH-MCNC: 4 G/DL — SIGNIFICANT CHANGE UP (ref 3.5–5.2)
ALBUMIN SERPL ELPH-MCNC: 4 G/DL — SIGNIFICANT CHANGE UP (ref 3.5–5.2)
ALP SERPL-CCNC: 101 U/L — SIGNIFICANT CHANGE UP (ref 30–115)
ALP SERPL-CCNC: 101 U/L — SIGNIFICANT CHANGE UP (ref 30–115)
ALP SERPL-CCNC: 97 U/L — SIGNIFICANT CHANGE UP (ref 30–115)
ALP SERPL-CCNC: 97 U/L — SIGNIFICANT CHANGE UP (ref 30–115)
ALT FLD-CCNC: 7 U/L — SIGNIFICANT CHANGE UP (ref 0–41)
ANION GAP SERPL CALC-SCNC: 13 MMOL/L — SIGNIFICANT CHANGE UP (ref 7–14)
ANION GAP SERPL CALC-SCNC: 13 MMOL/L — SIGNIFICANT CHANGE UP (ref 7–14)
ANION GAP SERPL CALC-SCNC: 7 MMOL/L — SIGNIFICANT CHANGE UP (ref 7–14)
ANION GAP SERPL CALC-SCNC: 7 MMOL/L — SIGNIFICANT CHANGE UP (ref 7–14)
APPEARANCE UR: CLEAR — SIGNIFICANT CHANGE UP
APPEARANCE UR: CLEAR — SIGNIFICANT CHANGE UP
AST SERPL-CCNC: 17 U/L — SIGNIFICANT CHANGE UP (ref 0–41)
BASOPHILS # BLD AUTO: 0.01 K/UL — SIGNIFICANT CHANGE UP (ref 0–0.2)
BASOPHILS # BLD AUTO: 0.01 K/UL — SIGNIFICANT CHANGE UP (ref 0–0.2)
BASOPHILS NFR BLD AUTO: 0.1 % — SIGNIFICANT CHANGE UP (ref 0–1)
BASOPHILS NFR BLD AUTO: 0.1 % — SIGNIFICANT CHANGE UP (ref 0–1)
BILIRUB SERPL-MCNC: <0.2 MG/DL — SIGNIFICANT CHANGE UP (ref 0.2–1.2)
BILIRUB UR-MCNC: NEGATIVE — SIGNIFICANT CHANGE UP
BILIRUB UR-MCNC: NEGATIVE — SIGNIFICANT CHANGE UP
BUN SERPL-MCNC: 18 MG/DL — SIGNIFICANT CHANGE UP (ref 10–20)
BUN SERPL-MCNC: 18 MG/DL — SIGNIFICANT CHANGE UP (ref 10–20)
BUN SERPL-MCNC: 23 MG/DL — HIGH (ref 10–20)
BUN SERPL-MCNC: 23 MG/DL — HIGH (ref 10–20)
CALCIUM SERPL-MCNC: 8.6 MG/DL — SIGNIFICANT CHANGE UP (ref 8.4–10.5)
CALCIUM SERPL-MCNC: 8.6 MG/DL — SIGNIFICANT CHANGE UP (ref 8.4–10.5)
CALCIUM SERPL-MCNC: 9.1 MG/DL — SIGNIFICANT CHANGE UP (ref 8.4–10.5)
CALCIUM SERPL-MCNC: 9.1 MG/DL — SIGNIFICANT CHANGE UP (ref 8.4–10.5)
CHLORIDE SERPL-SCNC: 100 MMOL/L — SIGNIFICANT CHANGE UP (ref 98–110)
CK SERPL-CCNC: 114 U/L — SIGNIFICANT CHANGE UP (ref 0–225)
CK SERPL-CCNC: 114 U/L — SIGNIFICANT CHANGE UP (ref 0–225)
CO2 SERPL-SCNC: 22 MMOL/L — SIGNIFICANT CHANGE UP (ref 17–32)
CO2 SERPL-SCNC: 22 MMOL/L — SIGNIFICANT CHANGE UP (ref 17–32)
CO2 SERPL-SCNC: 26 MMOL/L — SIGNIFICANT CHANGE UP (ref 17–32)
CO2 SERPL-SCNC: 26 MMOL/L — SIGNIFICANT CHANGE UP (ref 17–32)
COLOR SPEC: YELLOW — SIGNIFICANT CHANGE UP
COLOR SPEC: YELLOW — SIGNIFICANT CHANGE UP
CREAT SERPL-MCNC: 1.1 MG/DL — SIGNIFICANT CHANGE UP (ref 0.7–1.5)
CREAT SERPL-MCNC: 1.1 MG/DL — SIGNIFICANT CHANGE UP (ref 0.7–1.5)
CREAT SERPL-MCNC: 1.4 MG/DL — SIGNIFICANT CHANGE UP (ref 0.7–1.5)
CREAT SERPL-MCNC: 1.4 MG/DL — SIGNIFICANT CHANGE UP (ref 0.7–1.5)
DIFF PNL FLD: ABNORMAL
DIFF PNL FLD: ABNORMAL
EGFR: 50 ML/MIN/1.73M2 — LOW
EGFR: 50 ML/MIN/1.73M2 — LOW
EGFR: 67 ML/MIN/1.73M2 — SIGNIFICANT CHANGE UP
EGFR: 67 ML/MIN/1.73M2 — SIGNIFICANT CHANGE UP
EOSINOPHIL # BLD AUTO: 0.03 K/UL — SIGNIFICANT CHANGE UP (ref 0–0.7)
EOSINOPHIL # BLD AUTO: 0.03 K/UL — SIGNIFICANT CHANGE UP (ref 0–0.7)
EOSINOPHIL NFR BLD AUTO: 0.4 % — SIGNIFICANT CHANGE UP (ref 0–8)
EOSINOPHIL NFR BLD AUTO: 0.4 % — SIGNIFICANT CHANGE UP (ref 0–8)
ETHANOL SERPL-MCNC: <10 MG/DL — SIGNIFICANT CHANGE UP
ETHANOL SERPL-MCNC: <10 MG/DL — SIGNIFICANT CHANGE UP
GLUCOSE BLDC GLUCOMTR-MCNC: 103 MG/DL — HIGH (ref 70–99)
GLUCOSE BLDC GLUCOMTR-MCNC: 103 MG/DL — HIGH (ref 70–99)
GLUCOSE BLDC GLUCOMTR-MCNC: 97 MG/DL — SIGNIFICANT CHANGE UP (ref 70–99)
GLUCOSE BLDC GLUCOMTR-MCNC: 97 MG/DL — SIGNIFICANT CHANGE UP (ref 70–99)
GLUCOSE SERPL-MCNC: 95 MG/DL — SIGNIFICANT CHANGE UP (ref 70–99)
GLUCOSE SERPL-MCNC: 95 MG/DL — SIGNIFICANT CHANGE UP (ref 70–99)
GLUCOSE SERPL-MCNC: 99 MG/DL — SIGNIFICANT CHANGE UP (ref 70–99)
GLUCOSE SERPL-MCNC: 99 MG/DL — SIGNIFICANT CHANGE UP (ref 70–99)
GLUCOSE UR QL: NEGATIVE MG/DL — SIGNIFICANT CHANGE UP
GLUCOSE UR QL: NEGATIVE MG/DL — SIGNIFICANT CHANGE UP
HCT VFR BLD CALC: 32 % — LOW (ref 42–52)
HCT VFR BLD CALC: 32 % — LOW (ref 42–52)
HCT VFR BLD CALC: 32.1 % — LOW (ref 42–52)
HCT VFR BLD CALC: 32.1 % — LOW (ref 42–52)
HGB BLD-MCNC: 10.4 G/DL — LOW (ref 14–18)
HGB BLD-MCNC: 10.4 G/DL — LOW (ref 14–18)
HGB BLD-MCNC: 10.5 G/DL — LOW (ref 14–18)
HGB BLD-MCNC: 10.5 G/DL — LOW (ref 14–18)
IMM GRANULOCYTES NFR BLD AUTO: 0.3 % — SIGNIFICANT CHANGE UP (ref 0.1–0.3)
IMM GRANULOCYTES NFR BLD AUTO: 0.3 % — SIGNIFICANT CHANGE UP (ref 0.1–0.3)
KETONES UR-MCNC: NEGATIVE MG/DL — SIGNIFICANT CHANGE UP
KETONES UR-MCNC: NEGATIVE MG/DL — SIGNIFICANT CHANGE UP
LEUKOCYTE ESTERASE UR-ACNC: ABNORMAL
LEUKOCYTE ESTERASE UR-ACNC: ABNORMAL
LYMPHOCYTES # BLD AUTO: 1.77 K/UL — SIGNIFICANT CHANGE UP (ref 1.2–3.4)
LYMPHOCYTES # BLD AUTO: 1.77 K/UL — SIGNIFICANT CHANGE UP (ref 1.2–3.4)
LYMPHOCYTES # BLD AUTO: 22.2 % — SIGNIFICANT CHANGE UP (ref 20.5–51.1)
LYMPHOCYTES # BLD AUTO: 22.2 % — SIGNIFICANT CHANGE UP (ref 20.5–51.1)
MCHC RBC-ENTMCNC: 31 PG — SIGNIFICANT CHANGE UP (ref 27–31)
MCHC RBC-ENTMCNC: 31 PG — SIGNIFICANT CHANGE UP (ref 27–31)
MCHC RBC-ENTMCNC: 31.1 PG — HIGH (ref 27–31)
MCHC RBC-ENTMCNC: 31.1 PG — HIGH (ref 27–31)
MCHC RBC-ENTMCNC: 32.4 G/DL — SIGNIFICANT CHANGE UP (ref 32–37)
MCHC RBC-ENTMCNC: 32.4 G/DL — SIGNIFICANT CHANGE UP (ref 32–37)
MCHC RBC-ENTMCNC: 32.8 G/DL — SIGNIFICANT CHANGE UP (ref 32–37)
MCHC RBC-ENTMCNC: 32.8 G/DL — SIGNIFICANT CHANGE UP (ref 32–37)
MCV RBC AUTO: 94.7 FL — HIGH (ref 80–94)
MCV RBC AUTO: 94.7 FL — HIGH (ref 80–94)
MCV RBC AUTO: 95.5 FL — HIGH (ref 80–94)
MCV RBC AUTO: 95.5 FL — HIGH (ref 80–94)
MONOCYTES # BLD AUTO: 0.67 K/UL — HIGH (ref 0.1–0.6)
MONOCYTES # BLD AUTO: 0.67 K/UL — HIGH (ref 0.1–0.6)
MONOCYTES NFR BLD AUTO: 8.4 % — SIGNIFICANT CHANGE UP (ref 1.7–9.3)
MONOCYTES NFR BLD AUTO: 8.4 % — SIGNIFICANT CHANGE UP (ref 1.7–9.3)
NEUTROPHILS # BLD AUTO: 5.46 K/UL — SIGNIFICANT CHANGE UP (ref 1.4–6.5)
NEUTROPHILS # BLD AUTO: 5.46 K/UL — SIGNIFICANT CHANGE UP (ref 1.4–6.5)
NEUTROPHILS NFR BLD AUTO: 68.6 % — SIGNIFICANT CHANGE UP (ref 42.2–75.2)
NEUTROPHILS NFR BLD AUTO: 68.6 % — SIGNIFICANT CHANGE UP (ref 42.2–75.2)
NITRITE UR-MCNC: NEGATIVE — SIGNIFICANT CHANGE UP
NITRITE UR-MCNC: NEGATIVE — SIGNIFICANT CHANGE UP
NRBC # BLD: 0 /100 WBCS — SIGNIFICANT CHANGE UP (ref 0–0)
PH UR: 7 — SIGNIFICANT CHANGE UP (ref 5–8)
PH UR: 7 — SIGNIFICANT CHANGE UP (ref 5–8)
PLATELET # BLD AUTO: 192 K/UL — SIGNIFICANT CHANGE UP (ref 130–400)
PLATELET # BLD AUTO: 192 K/UL — SIGNIFICANT CHANGE UP (ref 130–400)
PLATELET # BLD AUTO: 212 K/UL — SIGNIFICANT CHANGE UP (ref 130–400)
PLATELET # BLD AUTO: 212 K/UL — SIGNIFICANT CHANGE UP (ref 130–400)
PMV BLD: 10.4 FL — SIGNIFICANT CHANGE UP (ref 7.4–10.4)
PMV BLD: 10.4 FL — SIGNIFICANT CHANGE UP (ref 7.4–10.4)
PMV BLD: 10.9 FL — HIGH (ref 7.4–10.4)
PMV BLD: 10.9 FL — HIGH (ref 7.4–10.4)
POTASSIUM SERPL-MCNC: 4.3 MMOL/L — SIGNIFICANT CHANGE UP (ref 3.5–5)
POTASSIUM SERPL-MCNC: 4.3 MMOL/L — SIGNIFICANT CHANGE UP (ref 3.5–5)
POTASSIUM SERPL-MCNC: 5.5 MMOL/L — HIGH (ref 3.5–5)
POTASSIUM SERPL-MCNC: 5.5 MMOL/L — HIGH (ref 3.5–5)
POTASSIUM SERPL-SCNC: 4.3 MMOL/L — SIGNIFICANT CHANGE UP (ref 3.5–5)
POTASSIUM SERPL-SCNC: 4.3 MMOL/L — SIGNIFICANT CHANGE UP (ref 3.5–5)
POTASSIUM SERPL-SCNC: 5.5 MMOL/L — HIGH (ref 3.5–5)
POTASSIUM SERPL-SCNC: 5.5 MMOL/L — HIGH (ref 3.5–5)
PROT SERPL-MCNC: 7 G/DL — SIGNIFICANT CHANGE UP (ref 6–8)
PROT SERPL-MCNC: 7 G/DL — SIGNIFICANT CHANGE UP (ref 6–8)
PROT SERPL-MCNC: 7.2 G/DL — SIGNIFICANT CHANGE UP (ref 6–8)
PROT SERPL-MCNC: 7.2 G/DL — SIGNIFICANT CHANGE UP (ref 6–8)
PROT UR-MCNC: NEGATIVE MG/DL — SIGNIFICANT CHANGE UP
PROT UR-MCNC: NEGATIVE MG/DL — SIGNIFICANT CHANGE UP
RBC # BLD: 3.36 M/UL — LOW (ref 4.7–6.1)
RBC # BLD: 3.36 M/UL — LOW (ref 4.7–6.1)
RBC # BLD: 3.38 M/UL — LOW (ref 4.7–6.1)
RBC # BLD: 3.38 M/UL — LOW (ref 4.7–6.1)
RBC # FLD: 14.5 % — SIGNIFICANT CHANGE UP (ref 11.5–14.5)
RBC # FLD: 14.5 % — SIGNIFICANT CHANGE UP (ref 11.5–14.5)
RBC # FLD: 14.6 % — HIGH (ref 11.5–14.5)
RBC # FLD: 14.6 % — HIGH (ref 11.5–14.5)
SODIUM SERPL-SCNC: 133 MMOL/L — LOW (ref 135–146)
SODIUM SERPL-SCNC: 133 MMOL/L — LOW (ref 135–146)
SODIUM SERPL-SCNC: 135 MMOL/L — SIGNIFICANT CHANGE UP (ref 135–146)
SODIUM SERPL-SCNC: 135 MMOL/L — SIGNIFICANT CHANGE UP (ref 135–146)
SP GR SPEC: >1.03 — HIGH (ref 1–1.03)
SP GR SPEC: >1.03 — HIGH (ref 1–1.03)
TROPONIN T SERPL-MCNC: <0.01 NG/ML — SIGNIFICANT CHANGE UP
TROPONIN T SERPL-MCNC: <0.01 NG/ML — SIGNIFICANT CHANGE UP
UROBILINOGEN FLD QL: 0.2 MG/DL — SIGNIFICANT CHANGE UP (ref 0.2–1)
UROBILINOGEN FLD QL: 0.2 MG/DL — SIGNIFICANT CHANGE UP (ref 0.2–1)
WBC # BLD: 7.67 K/UL — SIGNIFICANT CHANGE UP (ref 4.8–10.8)
WBC # BLD: 7.67 K/UL — SIGNIFICANT CHANGE UP (ref 4.8–10.8)
WBC # BLD: 7.96 K/UL — SIGNIFICANT CHANGE UP (ref 4.8–10.8)
WBC # BLD: 7.96 K/UL — SIGNIFICANT CHANGE UP (ref 4.8–10.8)
WBC # FLD AUTO: 7.67 K/UL — SIGNIFICANT CHANGE UP (ref 4.8–10.8)
WBC # FLD AUTO: 7.67 K/UL — SIGNIFICANT CHANGE UP (ref 4.8–10.8)
WBC # FLD AUTO: 7.96 K/UL — SIGNIFICANT CHANGE UP (ref 4.8–10.8)
WBC # FLD AUTO: 7.96 K/UL — SIGNIFICANT CHANGE UP (ref 4.8–10.8)

## 2023-10-28 PROCEDURE — 36415 COLL VENOUS BLD VENIPUNCTURE: CPT

## 2023-10-28 PROCEDURE — 99223 1ST HOSP IP/OBS HIGH 75: CPT

## 2023-10-28 PROCEDURE — 97162 PT EVAL MOD COMPLEX 30 MIN: CPT | Mod: GP

## 2023-10-28 PROCEDURE — 80307 DRUG TEST PRSMV CHEM ANLYZR: CPT

## 2023-10-28 PROCEDURE — 85027 COMPLETE CBC AUTOMATED: CPT

## 2023-10-28 PROCEDURE — 74177 CT ABD & PELVIS W/CONTRAST: CPT | Mod: 26,MA

## 2023-10-28 PROCEDURE — 82962 GLUCOSE BLOOD TEST: CPT

## 2023-10-28 PROCEDURE — 82550 ASSAY OF CK (CPK): CPT

## 2023-10-28 PROCEDURE — 70486 CT MAXILLOFACIAL W/O DYE: CPT | Mod: 26,MA

## 2023-10-28 PROCEDURE — 94640 AIRWAY INHALATION TREATMENT: CPT

## 2023-10-28 PROCEDURE — 85025 COMPLETE CBC W/AUTO DIFF WBC: CPT

## 2023-10-28 PROCEDURE — 87635 SARS-COV-2 COVID-19 AMP PRB: CPT

## 2023-10-28 PROCEDURE — 71260 CT THORAX DX C+: CPT | Mod: 26,MA

## 2023-10-28 PROCEDURE — 83735 ASSAY OF MAGNESIUM: CPT

## 2023-10-28 PROCEDURE — 80053 COMPREHEN METABOLIC PANEL: CPT

## 2023-10-28 PROCEDURE — 95819 EEG AWAKE AND ASLEEP: CPT

## 2023-10-28 PROCEDURE — 76770 US EXAM ABDO BACK WALL COMP: CPT

## 2023-10-28 PROCEDURE — 99222 1ST HOSP IP/OBS MODERATE 55: CPT

## 2023-10-28 RX ORDER — ONDANSETRON 8 MG/1
4 TABLET, FILM COATED ORAL EVERY 8 HOURS
Refills: 0 | Status: DISCONTINUED | OUTPATIENT
Start: 2023-10-28 | End: 2023-10-31

## 2023-10-28 RX ORDER — OXYMETAZOLINE HYDROCHLORIDE 0.5 MG/ML
1 SPRAY NASAL
Refills: 0 | Status: COMPLETED | OUTPATIENT
Start: 2023-10-28 | End: 2023-10-31

## 2023-10-28 RX ORDER — KETOROLAC TROMETHAMINE 30 MG/ML
15 SYRINGE (ML) INJECTION EVERY 6 HOURS
Refills: 0 | Status: DISCONTINUED | OUTPATIENT
Start: 2023-10-28 | End: 2023-10-28

## 2023-10-28 RX ORDER — LANOLIN ALCOHOL/MO/W.PET/CERES
3 CREAM (GRAM) TOPICAL AT BEDTIME
Refills: 0 | Status: DISCONTINUED | OUTPATIENT
Start: 2023-10-28 | End: 2023-10-31

## 2023-10-28 RX ORDER — AMOXICILLIN 250 MG/5ML
1 SUSPENSION, RECONSTITUTED, ORAL (ML) ORAL
Qty: 21 | Refills: 0
Start: 2023-10-28 | End: 2023-11-03

## 2023-10-28 RX ORDER — INSULIN LISPRO 100/ML
VIAL (ML) SUBCUTANEOUS
Refills: 0 | Status: DISCONTINUED | OUTPATIENT
Start: 2023-10-28 | End: 2023-10-31

## 2023-10-28 RX ORDER — LISINOPRIL 2.5 MG/1
5 TABLET ORAL DAILY
Refills: 0 | Status: DISCONTINUED | OUTPATIENT
Start: 2023-10-28 | End: 2023-10-28

## 2023-10-28 RX ORDER — SODIUM CHLORIDE 9 MG/ML
1000 INJECTION, SOLUTION INTRAVENOUS ONCE
Refills: 0 | Status: COMPLETED | OUTPATIENT
Start: 2023-10-28 | End: 2023-10-28

## 2023-10-28 RX ORDER — DEXTROSE 50 % IN WATER 50 %
25 SYRINGE (ML) INTRAVENOUS ONCE
Refills: 0 | Status: DISCONTINUED | OUTPATIENT
Start: 2023-10-28 | End: 2023-10-31

## 2023-10-28 RX ORDER — OXYMETAZOLINE HYDROCHLORIDE 0.5 MG/ML
2 SPRAY NASAL
Qty: 1 | Refills: 0
Start: 2023-10-28 | End: 2023-10-30

## 2023-10-28 RX ORDER — SODIUM CHLORIDE 9 MG/ML
1000 INJECTION INTRAMUSCULAR; INTRAVENOUS; SUBCUTANEOUS
Refills: 0 | Status: DISCONTINUED | OUTPATIENT
Start: 2023-10-28 | End: 2023-10-31

## 2023-10-28 RX ORDER — SODIUM CHLORIDE 9 MG/ML
1000 INJECTION, SOLUTION INTRAVENOUS
Refills: 0 | Status: DISCONTINUED | OUTPATIENT
Start: 2023-10-28 | End: 2023-10-31

## 2023-10-28 RX ORDER — TAMSULOSIN HYDROCHLORIDE 0.4 MG/1
0.4 CAPSULE ORAL AT BEDTIME
Refills: 0 | Status: DISCONTINUED | OUTPATIENT
Start: 2023-10-28 | End: 2023-10-31

## 2023-10-28 RX ORDER — SODIUM CHLORIDE 0.65 %
1 AEROSOL, SPRAY (ML) NASAL
Refills: 0 | Status: DISCONTINUED | OUTPATIENT
Start: 2023-10-28 | End: 2023-10-31

## 2023-10-28 RX ORDER — ACETAMINOPHEN 500 MG
650 TABLET ORAL EVERY 6 HOURS
Refills: 0 | Status: DISCONTINUED | OUTPATIENT
Start: 2023-10-28 | End: 2023-10-31

## 2023-10-28 RX ORDER — SODIUM CHLORIDE 0.65 %
1 AEROSOL, SPRAY (ML) NASAL
Qty: 1 | Refills: 0
Start: 2023-10-28 | End: 2023-11-10

## 2023-10-28 RX ORDER — CEFTRIAXONE 500 MG/1
1000 INJECTION, POWDER, FOR SOLUTION INTRAMUSCULAR; INTRAVENOUS ONCE
Refills: 0 | Status: COMPLETED | OUTPATIENT
Start: 2023-10-28 | End: 2023-10-28

## 2023-10-28 RX ORDER — TRAZODONE HCL 50 MG
50 TABLET ORAL AT BEDTIME
Refills: 0 | Status: DISCONTINUED | OUTPATIENT
Start: 2023-10-28 | End: 2023-10-31

## 2023-10-28 RX ORDER — NIFEDIPINE 30 MG
60 TABLET, EXTENDED RELEASE 24 HR ORAL DAILY
Refills: 0 | Status: DISCONTINUED | OUTPATIENT
Start: 2023-10-28 | End: 2023-10-31

## 2023-10-28 RX ORDER — METOPROLOL TARTRATE 50 MG
25 TABLET ORAL
Refills: 0 | Status: DISCONTINUED | OUTPATIENT
Start: 2023-10-28 | End: 2023-10-31

## 2023-10-28 RX ORDER — DEXTROSE 50 % IN WATER 50 %
15 SYRINGE (ML) INTRAVENOUS ONCE
Refills: 0 | Status: DISCONTINUED | OUTPATIENT
Start: 2023-10-28 | End: 2023-10-31

## 2023-10-28 RX ORDER — IPRATROPIUM/ALBUTEROL SULFATE 18-103MCG
3 AEROSOL WITH ADAPTER (GRAM) INHALATION EVERY 6 HOURS
Refills: 0 | Status: DISCONTINUED | OUTPATIENT
Start: 2023-10-28 | End: 2023-10-31

## 2023-10-28 RX ORDER — AMOXICILLIN 250 MG/5ML
500 SUSPENSION, RECONSTITUTED, ORAL (ML) ORAL EVERY 8 HOURS
Refills: 0 | Status: DISCONTINUED | OUTPATIENT
Start: 2023-10-28 | End: 2023-10-29

## 2023-10-28 RX ORDER — SODIUM ZIRCONIUM CYCLOSILICATE 10 G/10G
5 POWDER, FOR SUSPENSION ORAL ONCE
Refills: 0 | Status: COMPLETED | OUTPATIENT
Start: 2023-10-28 | End: 2023-10-28

## 2023-10-28 RX ORDER — GLUCAGON INJECTION, SOLUTION 0.5 MG/.1ML
1 INJECTION, SOLUTION SUBCUTANEOUS ONCE
Refills: 0 | Status: DISCONTINUED | OUTPATIENT
Start: 2023-10-28 | End: 2023-10-31

## 2023-10-28 RX ORDER — OLANZAPINE 15 MG/1
10 TABLET, FILM COATED ORAL DAILY
Refills: 0 | Status: DISCONTINUED | OUTPATIENT
Start: 2023-10-28 | End: 2023-10-31

## 2023-10-28 RX ORDER — GABAPENTIN 400 MG/1
300 CAPSULE ORAL
Refills: 0 | Status: DISCONTINUED | OUTPATIENT
Start: 2023-10-28 | End: 2023-10-31

## 2023-10-28 RX ADMIN — Medication 1 SPRAY(S): at 17:31

## 2023-10-28 RX ADMIN — OLANZAPINE 10 MILLIGRAM(S): 15 TABLET, FILM COATED ORAL at 17:30

## 2023-10-28 RX ADMIN — CEFTRIAXONE 100 MILLIGRAM(S): 500 INJECTION, POWDER, FOR SOLUTION INTRAMUSCULAR; INTRAVENOUS at 06:10

## 2023-10-28 RX ADMIN — GABAPENTIN 300 MILLIGRAM(S): 400 CAPSULE ORAL at 17:35

## 2023-10-28 RX ADMIN — Medication 500 MILLIGRAM(S): at 13:57

## 2023-10-28 RX ADMIN — Medication 50 MILLIGRAM(S): at 21:45

## 2023-10-28 RX ADMIN — Medication 25 MILLIGRAM(S): at 17:29

## 2023-10-28 RX ADMIN — OXYMETAZOLINE HYDROCHLORIDE 1 SPRAY(S): 0.5 SPRAY NASAL at 17:30

## 2023-10-28 RX ADMIN — SODIUM ZIRCONIUM CYCLOSILICATE 5 GRAM(S): 10 POWDER, FOR SUSPENSION ORAL at 13:55

## 2023-10-28 RX ADMIN — Medication 1 TABLET(S): at 03:21

## 2023-10-28 RX ADMIN — TAMSULOSIN HYDROCHLORIDE 0.4 MILLIGRAM(S): 0.4 CAPSULE ORAL at 21:45

## 2023-10-28 RX ADMIN — SODIUM CHLORIDE 75 MILLILITER(S): 9 INJECTION INTRAMUSCULAR; INTRAVENOUS; SUBCUTANEOUS at 13:53

## 2023-10-28 RX ADMIN — Medication 3 MILLILITER(S): at 20:21

## 2023-10-28 RX ADMIN — Medication 60 MILLIGRAM(S): at 13:53

## 2023-10-28 RX ADMIN — SODIUM CHLORIDE 1000 MILLILITER(S): 9 INJECTION, SOLUTION INTRAVENOUS at 02:42

## 2023-10-28 RX ADMIN — OXYMETAZOLINE HYDROCHLORIDE 1 SPRAY(S): 0.5 SPRAY NASAL at 09:33

## 2023-10-28 RX ADMIN — Medication 500 MILLIGRAM(S): at 21:45

## 2023-10-28 NOTE — CONSULT NOTE ADULT - ASSESSMENT
82M DM HTN Schizophrenia s/p fall with T2 age indeterminate compression fx     PLAN   - No acute neurosurgical intervention   - Pain management / PT if any development of pain   - Follow up outpatient with neurosurgery clinic PRN Basis   - Discussed case with attending

## 2023-10-28 NOTE — ED PROCEDURE NOTE - ATTENDING CONTRIBUTION TO CARE
Patient with laceration and needs repair. Patient permission obtained for wound care. Wound is superficial and no active bleeding noted. Wound irrigated with copious amount of normal saline. Wound explored and no FB noted. Deeper structures are intact. Wound closed with interrupted sutures.  Patient tolerated procedure well and no complications noted. Bacitracin ointment and sterile dressing applied.  Procedure was performed by Dr. Garza and supervised by me.

## 2023-10-28 NOTE — ED ADULT NURSE NOTE - NSFALLHARMRISKINTERV_ED_ALL_ED

## 2023-10-28 NOTE — CONSULT NOTE ADULT - SUBJECTIVE AND OBJECTIVE BOX
TRAUMA ACTIVATION LEVEL CONSULT  ACTIVATED BY: ED  INTUBATED: NO    MECHANISM OF INJURY:   [] Blunt     [] MVC	  [] Fall	  [] Pedestrian Struck	  [] Motorcycle     [] Assault     [] Bicycle collision    [] Sports injury    [] Penetrating    [] Gun Shot Wound      [] Stab Wound    GCS: 15 	E: 4	V: 5	M: 6    HPI:  81yo M from [facility] pmh of HLD, schizophrenia, urinary retention, COPD, DM2 presenting to the ED for fall. + HT - LOC Patient denies any neck pain, radicular pain, numbness, tingling. Neurosurgery was consulted for T2 age indeterminate moderate compression fx with no retropulsion.     83yM w/ PMHx of *** seen as (Code Trauma / Trauma Alert / Trauma Consult) s/p ******.  Trauma assessment in ED: ABCs intact , GCS 15 , AAOx3.    PAST MEDICAL & SURGICAL HISTORY:  Schizophrenia      Diabetes type 2, controlled      COPD (chronic obstructive pulmonary disease)      Dyslipidemia      Chronic retention of urine      Dyslipidemia      Encounter for screening colonoscopy          Allergies    No Known Allergies    Intolerances        Home Medications:  atorvastatin 10 mg oral tablet: 1 tab(s) orally once a day (28 May 2023 09:05)  Centrum oral tablet: 1 tab(s) orally once a day (28 May 2023 09:05)  gabapentin 300 mg oral capsule: 1 cap(s) orally 2 times a day (28 May 2023 09:05)  lisinopril 5 mg oral tablet: 1 tab(s) orally once a day (31 May 2023 10:24)  metFORMIN 500 mg oral tablet: 1 tab(s) orally 2 times a day (28 May 2023 09:05)  metoprolol tartrate 25 mg oral tablet: 1 tab(s) orally 2 times a day (28 May 2023 09:05)  OLANZapine 20 mg oral tablet: 1 tab(s) orally once a day (at bedtime) (28 May 2023 09:05)  traZODone 150 mg oral tablet: 1 tab(s) orally once a day (at bedtime) (28 May 2023 09:05)      ROS: 10-system review is otherwise negative except HPI above.      Primary Survey:    A - airway intact  B - bilateral breath sounds and good chest rise  C - palpable pulses in all extremities  D - GCS 15 on arrival, JIMENEZ  Exposure obtained    Vital Signs Last 24 Hrs  T(C): 36.9 (27 Oct 2023 21:23), Max: 36.9 (27 Oct 2023 21:23)  T(F): 98.4 (27 Oct 2023 21:23), Max: 98.4 (27 Oct 2023 21:23)  HR: 74 (27 Oct 2023 21:23) (74 - 74)  BP: 178/77 (27 Oct 2023 21:23) (178/77 - 178/77)  BP(mean): --  RR: 20 (27 Oct 2023 21:23) (20 - 20)  SpO2: 98% (27 Oct 2023 21:23) (98% - 98%)    Parameters below as of 27 Oct 2023 21:23  Patient On (Oxygen Delivery Method): room air        Secondary Survey:   General: NAD  HEAD: +2 cm laceration above right eyebrow otherwise NCAT, no signs of basilar skull fx, no depressions  EENT: Normocephalic, atraumatic, EOMI, PEERLA. no scalp lacerations   Neck: Soft, midline trachea. no c-spine tenderness  Chest: No chest wall tenderness, no subcutaneous emphysema   Cardiac: S1, S2, RRR  Respiratory: Bilateral breath sounds, clear and equal bilaterally  Abdomen: Soft, non-distended, non-tender, no rebound, no guarding.  Groin: Normal appearing, pelvis stable   Ext:  Moving b/l upper and lower extremities. Palpable Radial b/l UE, b/l DP palpable in LE.   Back: No T/L/S spine tenderness, No palpable runoff/stepoff/deformity      FAST: not performed      Labs:  CAPILLARY BLOOD GLUCOSE  POCT Blood Glucose.: 113 mg/dL (27 Oct 2023 21:23)                        10.4   7.96  )-----------( 212      ( 28 Oct 2023 00:46 )             32.1       Auto Neutrophil %: 68.6 % (10-28-23 @ 00:46)  Auto Immature Granulocyte %: 0.3 % (10-28-23 @ 00:46)    10-28  133<L>  |  100  |  23<H>  ----------------------------<  99  5.5<H>   |  26  |  1.4    Calcium: 9.1 mg/dL (10-28-23 @ 00:46)    LFTs:    7.2  | <0.2 | 17       ------------------[97      ( 28 Oct 2023 00:46 )  4.0  | x    | 7             CARDIAC MARKERS ( 28 Oct 2023 00:46 )  x     / <0.01 ng/mL / x     / x     / x          Urinalysis Basic - ( 28 Oct 2023 00:46 )  Color: x / Appearance: x / SG: x / pH: x  Gluc: 99 mg/dL / Ketone: x  / Bili: x / Urobili: x   Blood: x / Protein: x / Nitrite: x   Leuk Esterase: x / RBC: x / WBC x   Sq Epi: x / Non Sq Epi: x / Bacteria: x      RADIOLOGY & ADDITIONAL STUDIES:  < from: CT Head No Cont (10.27.23 @ 22:42) >  IMPRESSION:  CT HEAD:  No acute intracranial findings.  Stable moderate chronic microvascular ischemic changes.  Partially imaged slightly leftward displaced fractures of the frontal   processes of the bilateral maxilla. Overlying soft tissue swelling and   abrasion in the right forehead.    CT CERVICAL SPINE:  Age-indeterminate moderate compression deformity of T2, new since the   prior cervical CT from 5/28/2023. No associated retropulsion. Further   evaluation with MRI can be considered.  Stable diffuse osteopenia.  < end of copied text >  --------------------------------------------------------------------------------------- TRAUMA ACTIVATION LEVEL CONSULT  ACTIVATED BY: ED  INTUBATED: NO    MECHANISM OF INJURY: [x] Fall	    GCS: 15 	E: 4	V: 5	M: 6    HPI:  81yo M from [facility] pmh of HLD, schizophrenia, urinary retention, COPD, DM2 presenting to the ED for fall. + HT - LOC Patient denies any neck pain, radicular pain, numbness, tingling. Neurosurgery was consulted for T2 age indeterminate moderate compression fx with no retropulsion. Seen as trauma consult.  Trauma assessment in ED: ABCs intact , GCS 15 , AAOx3.      PAST MEDICAL & SURGICAL HISTORY:  Schizophrenia  Diabetes type 2, controlled  COPD (chronic obstructive pulmonary disease)  Dyslipidemia  Chronic retention of urine  Dyslipidemia  Encounter for screening colonoscopy      Allergies  No Known Allergies        Home Medications:  atorvastatin 10 mg oral tablet: 1 tab(s) orally once a day (28 May 2023 09:05)  Centrum oral tablet: 1 tab(s) orally once a day (28 May 2023 09:05)  gabapentin 300 mg oral capsule: 1 cap(s) orally 2 times a day (28 May 2023 09:05)  lisinopril 5 mg oral tablet: 1 tab(s) orally once a day (31 May 2023 10:24)  metFORMIN 500 mg oral tablet: 1 tab(s) orally 2 times a day (28 May 2023 09:05)  metoprolol tartrate 25 mg oral tablet: 1 tab(s) orally 2 times a day (28 May 2023 09:05)  OLANZapine 20 mg oral tablet: 1 tab(s) orally once a day (at bedtime) (28 May 2023 09:05)  traZODone 150 mg oral tablet: 1 tab(s) orally once a day (at bedtime) (28 May 2023 09:05)      ROS: 10-system review is otherwise negative except HPI above.      Primary Survey:    A - airway intact  B - bilateral breath sounds and good chest rise  C - palpable pulses in all extremities  D - GCS 15 on arrival, JIMENEZ  Exposure obtained    Vital Signs Last 24 Hrs  T(C): 36.9 (27 Oct 2023 21:23), Max: 36.9 (27 Oct 2023 21:23)  T(F): 98.4 (27 Oct 2023 21:23), Max: 98.4 (27 Oct 2023 21:23)  HR: 74 (27 Oct 2023 21:23) (74 - 74)  BP: 178/77 (27 Oct 2023 21:23) (178/77 - 178/77)  BP(mean): --  RR: 20 (27 Oct 2023 21:23) (20 - 20)  SpO2: 98% (27 Oct 2023 21:23) (98% - 98%)    Parameters below as of 27 Oct 2023 21:23  Patient On (Oxygen Delivery Method): room air    Secondary Survey:   General: NAD  HEAD: +2 cm laceration above right eyebrow otherwise NCAT, no signs of basilar skull fx, no depressions  EENT: Normocephalic, atraumatic, EOMI, PEERLA. no scalp lacerations   Neck: Soft, midline trachea. no c-spine tenderness  Chest: No chest wall tenderness, no subcutaneous emphysema   Cardiac: S1, S2, RRR  Respiratory: Bilateral breath sounds, clear and equal bilaterally  Abdomen: Soft, non-distended, non-tender, no rebound, no guarding.  Groin: Normal appearing, pelvis stable   Ext:  Moving b/l upper and lower extremities. Palpable Radial b/l UE, b/l DP palpable in LE.   Back: No T/L/S spine tenderness, No palpable runoff/stepoff/deformity    FAST: not performed    Labs:  CAPILLARY BLOOD GLUCOSE  POCT Blood Glucose.: 113 mg/dL (27 Oct 2023 21:23)                        10.4   7.96  )-----------( 212      ( 28 Oct 2023 00:46 )             32.1       Auto Neutrophil %: 68.6 % (10-28-23 @ 00:46)  Auto Immature Granulocyte %: 0.3 % (10-28-23 @ 00:46)    10-28  133<L>  |  100  |  23<H>  ----------------------------<  99  5.5<H>   |  26  |  1.4    Calcium: 9.1 mg/dL (10-28-23 @ 00:46)    LFTs:    7.2  | <0.2 | 17       ------------------[97      ( 28 Oct 2023 00:46 )  4.0  | x    | 7             CARDIAC MARKERS ( 28 Oct 2023 00:46 )  x     / <0.01 ng/mL / x     / x     / x          Urinalysis Basic - ( 28 Oct 2023 00:46 )  Color: x / Appearance: x / SG: x / pH: x  Gluc: 99 mg/dL / Ketone: x  / Bili: x / Urobili: x   Blood: x / Protein: x / Nitrite: x   Leuk Esterase: x / RBC: x / WBC x   Sq Epi: x / Non Sq Epi: x / Bacteria: x      RADIOLOGY & ADDITIONAL STUDIES:  < from: CT Head No Cont (10.27.23 @ 22:42) >  IMPRESSION:  CT HEAD:  No acute intracranial findings.  Stable moderate chronic microvascular ischemic changes.  Partially imaged slightly leftward displaced fractures of the frontal   processes of the bilateral maxilla. Overlying soft tissue swelling and   abrasion in the right forehead.    CT CERVICAL SPINE:  Age-indeterminate moderate compression deformity of T2, new since the   prior cervical CT from 5/28/2023. No associated retropulsion. Further   evaluation with MRI can be considered.  Stable diffuse osteopenia.  < end of copied text >  --------------------------------------------------------------------------------------- TRAUMA ACTIVATION LEVEL CONSULT  ACTIVATED BY: ED  INTUBATED: NO    MECHANISM OF INJURY: [x] Fall	    GCS: 15 	E: 4	V: 5	M: 6    HPI:  83yo M from [facility] pmh of HLD, schizophrenia, urinary retention, COPD, DM2 presenting to the ED for fall. + HT - LOC Patient denies any neck pain, radicular pain, numbness, tingling. Neurosurgery was consulted for T2 age indeterminate moderate compression fx with no retropulsion. Seen as trauma consult.  Trauma assessment in ED: ABCs intact , GCS 15 , AAOx2.      PAST MEDICAL & SURGICAL HISTORY:  Schizophrenia  Diabetes type 2, controlled  COPD (chronic obstructive pulmonary disease)  Dyslipidemia  Chronic retention of urine  Dyslipidemia  Encounter for screening colonoscopy      Allergies  No Known Allergies      Home Medications:  atorvastatin 10 mg oral tablet: 1 tab(s) orally once a day (28 May 2023 09:05)  Centrum oral tablet: 1 tab(s) orally once a day (28 May 2023 09:05)  gabapentin 300 mg oral capsule: 1 cap(s) orally 2 times a day (28 May 2023 09:05)  lisinopril 5 mg oral tablet: 1 tab(s) orally once a day (31 May 2023 10:24)  metFORMIN 500 mg oral tablet: 1 tab(s) orally 2 times a day (28 May 2023 09:05)  metoprolol tartrate 25 mg oral tablet: 1 tab(s) orally 2 times a day (28 May 2023 09:05)  OLANZapine 20 mg oral tablet: 1 tab(s) orally once a day (at bedtime) (28 May 2023 09:05)  traZODone 150 mg oral tablet: 1 tab(s) orally once a day (at bedtime) (28 May 2023 09:05)      ROS: 10-system review is otherwise negative except HPI above.      Primary Survey:    A - airway intact  B - bilateral breath sounds and good chest rise  C - palpable pulses in all extremities  D - GCS 15 on arrival, JIMENEZ  Exposure obtained    Vital Signs Last 24 Hrs  T(C): 36.9 (27 Oct 2023 21:23), Max: 36.9 (27 Oct 2023 21:23)  T(F): 98.4 (27 Oct 2023 21:23), Max: 98.4 (27 Oct 2023 21:23)  HR: 74 (27 Oct 2023 21:23) (74 - 74)  BP: 178/77 (27 Oct 2023 21:23) (178/77 - 178/77)  BP(mean): --  RR: 20 (27 Oct 2023 21:23) (20 - 20)  SpO2: 98% (27 Oct 2023 21:23) (98% - 98%)    Parameters below as of 27 Oct 2023 21:23  Patient On (Oxygen Delivery Method): room air    Secondary Survey:   General: NAD  HEAD: +2 cm laceration above right eyebrow otherwise NCAT, no signs of basilar skull fx, no depressions  EENT: Normocephalic, atraumatic, EOMI  Neck: Soft, midline trachea. no c-spine tenderness  Chest: No chest wall tenderness, no subcutaneous emphysema   Cardiac: S1, S2, RRR  Respiratory: Bilateral breath sounds, clear and equal bilaterally  Abdomen: Soft, non-distended, non-tender, no rebound, no guarding.  Groin: Normal appearing, pelvis stable   Ext:  Moving b/l upper and lower extremities. Palpable Radial b/l UE, b/l DP palpable in LE.   Back: No T/L/S spine tenderness, No palpable runoff/stepoff/deformity    FAST: not performed    Labs:  CAPILLARY BLOOD GLUCOSE  POCT Blood Glucose.: 113 mg/dL (27 Oct 2023 21:23)                        10.4   7.96  )-----------( 212      ( 28 Oct 2023 00:46 )             32.1       Auto Neutrophil %: 68.6 % (10-28-23 @ 00:46)  Auto Immature Granulocyte %: 0.3 % (10-28-23 @ 00:46)    10-28  133<L>  |  100  |  23<H>  ----------------------------<  99  5.5<H>   |  26  |  1.4    Calcium: 9.1 mg/dL (10-28-23 @ 00:46)    LFTs:    7.2  | <0.2 | 17       ------------------[97      ( 28 Oct 2023 00:46 )  4.0  | x    | 7           CARDIAC MARKERS ( 28 Oct 2023 00:46 )  x     / <0.01 ng/mL / x     / x     / x        Urinalysis Basic - ( 28 Oct 2023 00:46 )  Color: x / Appearance: x / SG: x / pH: x  Gluc: 99 mg/dL / Ketone: x  / Bili: x / Urobili: x   Blood: x / Protein: x / Nitrite: x   Leuk Esterase: x / RBC: x / WBC x   Sq Epi: x / Non Sq Epi: x / Bacteria: x      RADIOLOGY & ADDITIONAL STUDIES:  < from: CT Head No Cont (10.27.23 @ 22:42) >  IMPRESSION:  CT HEAD: No acute intracranial findings. Stable moderate chronic microvascular ischemic changes. Partially imaged slightly leftward displaced fractures of the frontal   processes of the bilateral maxilla. Overlying soft tissue swelling and abrasion in the right forehead.    CT CERVICAL SPINE:  Age-indeterminate moderate compression deformity of T2, new since the prior cervical CT from 5/28/2023. No associated retropulsion. Further   evaluation with MRI can be considered. Stable diffuse osteopenia.  < end of copied text >    < from: CT Maxillofacial No Cont (10.28.23 @ 02:52) >  IMPRESSION:  Multiple acute comminuted fractures involving the bilateral nasal bones, frontal processes of the bilateral maxillary bones, the perpendicular plate of the ethmoid bone, and the anterior nasal spine.  ******PRELIMINARY REPORT******    ******PRELIMINARY REPORT******   < end of copied text >    < from: CT Chest w/ IV Cont (10.28.23 @ 03:08) >  IMPRESSION:  Age-indeterminatesuperior endplate compression deformity of T2, new from prior 5/28/2023 study. No additional CT evidence for acute traumatic injury to the chest, abdomen or pelvis.  --- End of Report ---  < end of copied text >  ---------------------------------------------------------------------------------------

## 2023-10-28 NOTE — PATIENT PROFILE ADULT - FALL HARM RISK - HARM RISK INTERVENTIONS
Assistance with ambulation/Assistance OOB with selected safe patient handling equipment/Communicate Risk of Fall with Harm to all staff/Discuss with provider need for PT consult/Monitor for mental status changes/Monitor gait and stability/Move patient closer to nurses' station/Reinforce activity limits and safety measures with patient and family/Reorient to person, place and time as needed/Tailored Fall Risk Interventions/Toileting schedule using arm’s reach rule for commode and bathroom/Use of alarms - bed, chair and/or voice tab/Visual Cue: Yellow wristband and red socks/Bed in lowest position, wheels locked, appropriate side rails in place/Call bell, personal items and telephone in reach/Instruct patient to call for assistance before getting out of bed or chair/Non-slip footwear when patient is out of bed/[place] to call system/Physically safe environment - no spills, clutter or unnecessary equipment/Purposeful Proactive Rounding/Room/bathroom lighting operational, light cord in reach

## 2023-10-28 NOTE — CONSULT NOTE ADULT - SUBJECTIVE AND OBJECTIVE BOX
NEUROSURGERY CONSULT   ADA VILLAGOMEZ   10-28-23 @ 00:13    HPI: 81yo M from [facility] pmh of HLD, schizophrenia, urinary retention, COPD, DM2 presenting to the ED for fall. + HT - LOC Patient denies any neck pain, radicular pain, numbness, tingling. Neurosurgery was consulted for T2 age indeterminate moderate compression fx with no retropulsion.     RADIOLOGY:   < from: CT Cervical Spine No Cont (10.27.23 @ 22:44) >  IMPRESSION:    CT HEAD:  No acute intracranial findings.    Stable moderate chronic microvascular ischemic changes.    Partially imaged slightly leftward displaced fractures of the frontal   processes of the bilateral maxilla. Overlying soft tissue swelling and   abrasion in the right forehead.    CT CERVICAL SPINE:  Age-indeterminate moderate compression deformity of T2, new since the   prior cervical CT from 5/28/2023. No associated retropulsion. Further   evaluation with MRI can be considered.    Stable diffuse osteopenia.      MEDS:   albuterol    0.083%. 2.5 milliGRAM(s) Nebulizer once  diphtheria/tetanus/pertussis (acellular) Vaccine (Adacel) 0.5 milliLiter(s) IntraMuscular once  ipratropium  for Nebulization. 500 MICROGram(s) Nebulizer once      PHYSICAL EXAM:  Vital Signs Last 24 Hrs  T(C): 36.9 (27 Oct 2023 21:23), Max: 36.9 (27 Oct 2023 21:23)  T(F): 98.4 (27 Oct 2023 21:23), Max: 98.4 (27 Oct 2023 21:23)  HR: 74 (27 Oct 2023 21:23) (74 - 74)  BP: 178/77 (27 Oct 2023 21:23) (178/77 - 178/77)  BP(mean): --  RR: 20 (27 Oct 2023 21:23) (20 - 20)  SpO2: 98% (27 Oct 2023 21:23) (98% - 98%)    Parameters below as of 27 Oct 2023 21:23  Patient On (Oxygen Delivery Method): room air      AAOx3  PERRL B/L, EOMI  Full ROM of neck with passive and active movements  Full and equal strength in all extremities                Upper extremity                   Delt       Bicep    Tricep                                                  R       5/5        5/5        5/5       5/5                                               L        5/5        5/5        5/5       5/5         Lower extremity                    HF          KE          KF        DF         PF                                               R       5/5 5/5 5/5 5/5 5/5                                               L        5/5 5/5 5/5 5/5 5/5    Sensation intact to light touch  Reflexes: WNL, no clonus    LABS:  [id number] @ 00:13

## 2023-10-28 NOTE — PATIENT PROFILE ADULT - VISION (WITH CORRECTIVE LENSES IF THE PATIENT USUALLY WEARS THEM):
Pt reports she slipped on tile this morning and fell onto L shoulder. Now pain upon movement. Denies numbness/tingling, cms intact. Reports she hit her head but denies LOC.  
Normal vision: sees adequately in most situations; can see medication labels, newsprint

## 2023-10-28 NOTE — H&P ADULT - HISTORY OF PRESENT ILLNESS
83[facility], urinary [facility], COPD, DM2 presenting to the ED for fall. Patient's roommate reported to EMS that patient fell in the bathroom and hit his head. He felt dizzy, tried to catch himself, and fell. Unknown how long he was down for or LOC. Found down. Denied any active dizziness, urinary/fecal incontinence, tongue biting, palpitations, CP, SOB, abd pain, nausea/vomiting, dysuria. Not on AC.     Upon arrival to ED, T: 98.3F, BP: 142/63, HR: 92bpm, RR: 18bpm, SpO2: 97% on RA. Labs significant for WBC 13K, mild anemia (Hb at baseline), mild hyponatremia (Na 132). CR elevated (Cr- 1.3 with bl 0l9) CK (-). Troponin (-)  x1. Lactate (-). UA is (+)for nitrite, LE, WBC, bacteria. Pan-CT scan performed. No traumatic injuries. But there is chronic mild bilateral hydroureteronephrosis without obstructing stones in the level of the bladder and mild urothelial enhancement along the course of the ureters. EKG shows NSR.    Admitted for fall and cystitis. 1G ceftriaxone is given. Developed a temperature of 101.3F while at the ED, and HR 110bpm on telemetry.     He was found to have orthostatic changes, blood pressure medications dosages were reduced, tamsulosin were discontinued. He needs follow up for urinary retention, possibly need to resume tamsulosin.     His blood pressure was uncontrolled, BP medications were adjusted.     The patient has no complaints and he is stable for discharge back to a rehab facility    82yo M from [facility] pmh of HLD, schizophrenia, COPD, DM2 presenting to the ED for unwitnessed fall and head injury.     Pt is a poor historian and has no recollection of the event. There were no witnesses per NH. He sustained a head injury. Unknown how long he was down for or LOC. Found down. Denied any active dizziness, urinary/fecal incontinence, tongue biting, palpitations, CP, SOB, abd pain, nausea/vomiting, dysuria. Not on AC.     Ed vitals - · BP - 178/77mmhg; HR -78/min  Trauma workup + - facial bones communited fractures, T2 compression fracture    Admitted for fall and cystitis. 1G ceftriaxone is given. Developed a temperature of 101.3F while at the ED, and HR 110bpm on telemetry.     He was found to have orthostatic changes, blood pressure medications dosages were reduced, tamsulosin were discontinued. He needs follow up for urinary retention, possibly need to resume tamsulosin.     His blood pressure was uncontrolled, BP medications were adjusted.     The patient has no complaints and he is stable for discharge back to a rehab facility    82yo M from [facility] pmh of HLD, schizophrenia, COPD, DM2 presenting to the ED for unwitnessed fall and head injury.     Pt is a poor historian and has no recollection of the event. There were no witnesses per NH. He sustained a head injury. Unknown how long he was down for or LOC. Denied any active dizziness, urinary/fecal incontinence, tongue biting, palpitations, CP, SOB, abd pain, nausea/vomiting, dysuria. Not on AC.     Ed vitals - · BP - 178/77mmhg; HR -78/min; RA, afebrile   Trauma workup + - facial bones communited fractures, T2 compression fracture    labs - K 5.5, creat 1.4(baseline ~1); UA +ve    ecg - normal     At baseline, he walks independently, occasionally uses cane. He has history positive orthostatics and fall with a similar history in the past.     Patient was admitted for syncope workup and trauma management.    . 82yo M from [facility] pmh of HLD, schizophrenia, COPD, DM2 presenting to the ED for unwitnessed fall and head injury.     Pt is a poor historian and has no recollection of the event. There were no witnesses per NH. He sustained a head injury. Unknown how long he was down for or LOC. Denied any active dizziness, urinary/fecal incontinence, tongue biting, palpitations, CP, SOB, abd pain, nausea/vomiting, dysuria. Not on AC    Ed vitals - · BP - 178/77mmhg; HR -78/min; RA, afebrile   Trauma workup + - facial bones communited fractures, T2 compression fracture    labs - K 5.5, creat 1.4(baseline ~1); UA +ve    ecg - normal     At baseline, he walks independently, occasionally uses cane. He has history positive orthostatics and fall with a similar history in the past.     Patient was admitted for syncope workup and trauma management.

## 2023-10-28 NOTE — H&P ADULT - ATTENDING COMMENTS
Patient is an 84y/o Male  from [facility] pmh of HLD, schizophrenia, COPD, DM2 presenting to the ED for unwitnessed fall and head injury.   Pt is a poor historian, all info was taken from the chart.  . Patient  sustained a head injury with right eye brow skin laceration , s/p sutures applied in ER with right periorbital edema and bruising . Unknown how long he was down for or LOC. Denied any active dizziness, urinary/fecal incontinence, tongue biting, palpitations, CP, SOB, abd pain, nausea/vomiting, dysuria. Not on AC. Patient was dx . with acute T    # s/p Unwitnessed Fall  with positive trauma   #T2 age indeterminate compression fx   - No acute neurosurgical intervention as per Neurosurgical team rec.- obtain PT eval, maintain falls precautions  #Multiple acute comminuted fractures involving the bilateral nasal bones, frontal processes of the bilateral maxillary bones, and the perpendicular plate of the ethmoid bone.  - Right eyebrow skin laceration- was sutured in ER with right periorbital edema/bruising  - OMFS on board - recs noted Right frontal scalp and paranasal soft tissue swelling.- No acute surgical intervention per OMFS.   - f/up maxillofacial sx. on  Wednesday afternoon in the [facility].   - Afrin x 3 days. an nasal spray prn dry nose. - no nose blowing and sneezing with mouth open for 2 weeks  - amoxicillin for one week proph. tx.     - obtain CK level   - monitor Orthostatic VS  -pain management  - TTE (May 23) - mild AS, rest normal     # Hyperkalemia- repeat BMP today    # h/o COPD -stable, resumed on inhalers, nebs tx.     # DM 2 - bsfs every 6 hours with insulin co. prn. tx.     # CAD/ HTN/ HLD-[facility]. b/p due to missed am meds, will give all stat. reassess b/p    resumed on home meds tx. , hold ace inh due to hyperkalemia     DVT proph- no pharmacological tx. due to mult. trauma and risk for bleeding , started on intermittent compressions to b/l lower ext.  Code status: full code     Total time spent to complete patient's bedside assessment, review medical chart, discuss medical plan of care with covering medical team was more than 75 minutes with >50% of time spent face to face with patient, discussion with patient/family and/or coordination of care

## 2023-10-28 NOTE — CONSULT NOTE ADULT - NS ATTEND AMEND GEN_ALL_CORE FT
Agree with above, Mr. Laguna reports no neck pain, radicular pain or myelopathic symptoms at this time.  Does not require neurosurgical intervention at this time.      He may follow-up with pain management and outpatient physical therapy if he develops neck pain in the future.      No need for neurosurgical follow-up at this time and may follow-up as needed.    60 minutes of consultation time utilized in the history and physical, review of imaging, medical decision-making.

## 2023-10-28 NOTE — H&P ADULT - NSHPPHYSICALEXAM_GEN_ALL_CORE
GENERAL: no active distress, appears anxious and confused  HEAD:  Rt eyebrow laceration noted   EYES: EOMI, PERRLA, conjunctiva and sclera clear  ENMT: No tonsillar erythema, exudates, or enlargement; Dry mucous membranes  NECK: Supple, No JVD, Normal thyroid  HEART: Regular rate and rhythm; No murmurs, rubs, or gallops  RESPIRATORY: CTA B/L, No W/R/R  ABDOMEN: Soft, Nontender, Nondistended; Bowel sounds present  NEUROLOGY: Alert and oriented to person, able to answer where he is but confused about place and couldn't tell date. AAOx2; No focal deficits   EXTREMITIES:  2+ Peripheral Pulses, No clubbing, cyanosis, or edema  SKIN: warm, dry, normal color, no rash or abnormal lesions

## 2023-10-28 NOTE — H&P ADULT - NSHPLABSRESULTS_GEN_ALL_CORE
10.4   7.96  )-----------( 212      ( 28 Oct 2023 00:46 )             32.1     10-28    133<L>  |  100  |  23<H>  ----------------------------<  99  5.5<H>   |  26  |  1.4    Ca    9.1      28 Oct 2023 00:46    TPro  7.2  /  Alb  4.0  /  TBili  <0.2  /  DBili  x   /  AST  17  /  ALT  7   /  AlkPhos  97  10-28      Urinalysis Basic - ( 28 Oct 2023 05:10 )    Color: Yellow / Appearance: Clear / SG: >1.030 / pH: x  Gluc: x / Ketone: Negative mg/dL  / Bili: Negative / Urobili: 0.2 mg/dL   Blood: x / Protein: Negative mg/dL / Nitrite: Negative   Leuk Esterase: Large / RBC: 0 /HPF /  /HPF   Sq Epi: x / Non Sq Epi: 0 /HPF / Bacteria: Moderate /HPF      RADIOLOGY, EKG & ADDITIONAL TESTS: Reviewed.

## 2023-10-28 NOTE — ED PROCEDURE NOTE - CPROC ED TOLERANCE1
Physical Discharge Summary Addendum:  Date: 10/8/2021  Total Number of Visits: 4  Referred by: Charisse Little MD  Medical Diagnosis (from order):   Diagnosis Information      Diagnosis    V58.89, 845.00 (ICD-9-CM) - S93.401D (ICD-10-CM) - Sprain of right ankle, unspecified ligament, subsequent encounter                Please see below daily treatment note for last known status.  Status of goals: per status in last daily treatment note     Patient tolerated procedure well.

## 2023-10-28 NOTE — CONSULT NOTE ADULT - ASSESSMENT
ASSESSMENT:  83y Male  w/ PMHx of *** seen as (Code Trauma / Trauma Alert / Trauma Consult) s/p ****** with complaint of *** , external signs of trauma include *** . Trauma assessment in ED: ABCs intact , GCS 15 , AAOx3,  JIMENEZ.     Injuries identified:   - *** INCOMPLETE ***  -   -     PLAN:   - Trauma Labs: (CBC, BMP, Coags, T&S, UA, EtOH level)  Additional studies:  EKG  Utox    Trauma Imaging to include the following:  - CXR, Pelvic Xray  - CT Head,  CT C-spine, CT Max/Face, CT Chest, CT Abd/Pelvis  - Extremity films: None    Additional consultations:  - Neurosurgery  - Orthopedics  - OMFS  - PT/Rehab/SW  - Hospitalist/Medicine     Disposition pending results of above labs and imaging  Above plan discussed with Trauma attending,  ***  , patient, patient family, and ED team  --------------------------------------------------------------------------------------  10-28-23 @ 04:36 ASSESSMENT:  83y Male  w/ PMHx of *** seen as (Code Trauma / Trauma Alert / Trauma Consult) s/p ****** with complaint of *** , external signs of trauma include *** . Trauma assessment in ED: ABCs intact , GCS 15 , AAOx3,  JIMENEZ.     Injuries identified:   - *** INCOMPLETE ***  -   -     PLAN:   - Trauma Labs: (CBC, BMP, Coags, T&S, UA, EtOH level)  Additional studies:  EKG  Utox    Trauma Imaging to include the following:  - CXR, Pelvic Xray  - CT Head,  CT C-spine, CT Max/Face, CT Chest, CT Abd/Pelvis  - Extremity films: None    Additional consultations:  - Neurosurgery  -     Disposition pending results of above labs and imaging  Above plan discussed with Trauma attending,  ***  , patient, patient family, and ED team  --------------------------------------------------------------------------------------  10-28-23 @ 04:36 ASSESSMENT:  83y Male  w/ PMHx of *** seen as (Code Trauma / Trauma Alert / Trauma Consult) s/p ****** with complaint of *** , external signs of trauma include *** . Trauma assessment in ED: ABCs intact , GCS 15 , AAOx3,  JIMENEZ.     Injuries identified:   Right supraorbital laceration  Multiple acute comminuted fractures involving the bilateral nasal bones, frontal processes of the bilateral maxillary bones, the perpendicular plate of the ethmoid bone, and the anterior nasal spine.  Age-indeterminatesuperior endplate compression deformity of T2, new from prior 5/28/2023 study.    PLAN:   - Trauma Labs: (CBC, BMP, Coags, T&S, UA, EtOH level)  Additional studies:  EKG  Utox    Trauma Imaging to include the following:  - CXR, Pelvic Xray  - CT Head,  CT C-spine, CT Max/Face, CT Chest, CT Abd/Pelvis  - Extremity films: None    Additional consultations:  - Neurosurgery  - OMFS    Disposition pending results of above labs and imaging  Above plan discussed with Trauma attending, Dr. Mendez  10-28-23 @ 04:36  --------------------------------------------------------------------------------------    External signs of injury on exam: laceration over right eyebrow s/p repair by ED  CTs reviewed, as above. facial bone fractures to be seen by OMFS, f/u official CT max face.  No acute trauma surgical intervention  Plain film wet reads as per ED  Dispo as per ED  If pt admitted, trauma team to perform TTS in AM  Surgical Attending aware and agrees with plan  10-28-23 @ 07:17 ASSESSMENT:  83y Male  w/ PMHx of HLD, schizophrenia, COPD, DM2  s/p fall, external signs of trauma include right supraorbital laceration . Trauma assessment in ED: ABCs intact , GCS 15 , AAOx3,  JIMENEZ.     Injuries identified:   Right supraorbital laceration  Multiple acute comminuted fractures involving the bilateral nasal bones, frontal processes of the bilateral maxillary bones, the perpendicular plate of the ethmoid bone, and the anterior nasal spine.  Age-indeterminatesuperior endplate compression deformity of T2, new from prior 5/28/2023 study.    PLAN:   - Trauma Labs: (CBC, BMP, Coags, T&S, UA, EtOH level)  Additional studies:  EKG  Utox    Trauma Imaging to include the following:  - CXR, Pelvic Xray  - CT Head,  CT C-spine, CT Max/Face, CT Chest, CT Abd/Pelvis  - Extremity films: None    Additional consultations:  - Neurosurgery  - OMFS    Disposition pending results of above labs and imaging  Above plan discussed with Trauma attending, Dr. Mendez  10-28-23 @ 04:36  --------------------------------------------------------------------------------------    External signs of injury on exam: laceration over right eyebrow s/p repair by ED  CTs reviewed, as above. facial bone fractures to be seen by OMFS, f/u official CT max face.  No acute trauma surgical intervention  Plain film wet reads as per ED  Dispo as per ED  If pt admitted, trauma team to perform TTS in AM  Surgical Attending aware and agrees with plan  10-28-23 @ 07:17

## 2023-10-28 NOTE — CONSULT NOTE ADULT - ATTENDING COMMENTS
Trauma attestation    Patient was seen and examined at 11:30 PM. Seen and evaluated with residents/PAs bedside. I agree with the above note with the following corrections/additions.    83y Male  w/ PMHx of HLD, schizophrenia, COPD, DM2 s/p fall with Trauma attestation    Patient was initially seen and examined at 11:45 PM. Seen and evaluated with residents/PAs bedside. I agree with the above note with the following corrections/additions.    83y Male  w/ PMHx of HLD, schizophrenia, COPD, DM2 s/p fall. Patient does not remember falling. Found to have R supraorbital laceration and age-indeterminate T2 fracture. Upon initial evaluation, we recommended CT M/F and CT C/A/P. Also found to have multiple facial fractures. Neurosurgery evaluated and found there was no intervention for the T2 fracture. OMFS was consulted for the facial fractures, and there is no plan for urgent intervention. There are no traumatic injuries requiring admission. Found to have UTI that will need to be treated. Recommend admission to medicine for syncopal work up.    Lyndon Mendez MD  Trauma Attending

## 2023-10-28 NOTE — CONSULT NOTE ADULT - SUBJECTIVE AND OBJECTIVE BOX
82M from [facility] pmh of HLD, schizophrenia, urinary retention, COPD, DM2 presenting to the ED s/p mechanical fall. + HT - LOC Patient denies any neck pain. Neurosurgery was consulted for T2 age indeterminate moderate compression fx with no retropulsion. pt denies n/v/f/c/sob. OMFS consulted due to nasal bone fx.    PAST MEDICAL & SURGICAL HISTORY:  Schizophrenia  Diabetes type 2, controlled  COPD (chronic obstructive pulmonary disease)  Dyslipidemia  Chronic retention of urine  Dyslipidemia  Encounter for screening colonoscopy    Allergies  No Known Allergies    Home Medications:  atorvastatin 10 mg oral tablet: 1 tab(s) orally once a day (28 May 2023 09:05)  Centrum oral tablet: 1 tab(s) orally once a day (28 May 2023 09:05)  gabapentin 300 mg oral capsule: 1 cap(s) orally 2 times a day (28 May 2023 09:05)  lisinopril 5 mg oral tablet: 1 tab(s) orally once a day (31 May 2023 10:24)  metFORMIN 500 mg oral tablet: 1 tab(s) orally 2 times a day (28 May 2023 09:05)  metoprolol tartrate 25 mg oral tablet: 1 tab(s) orally 2 times a day (28 May 2023 09:05)  OLANZapine 20 mg oral tablet: 1 tab(s) orally once a day (at bedtime) (28 May 2023 09:05)  traZODone 150 mg oral tablet: 1 tab(s) orally once a day (at bedtime) (28 May 2023 09:05)    VS: reviewed    PE:  general: calm, AAOx3  no carlson sign or raccoon eyes. laceration in the R brow that was sutured and is hemostatic  No step defect on the zygomatic arches. no step on the orbital rims. can fully palpate inferior border of the mandible  ears: b/l shape and symmetry, no ottorhea  eyes: bilateral perrla, EOM intact, no proptosis, no subconjunctival hemorrhage, no periorbital edema   nose: mildy deviated to the right (pt reports that this is normal for him), no septal hematoma or discharge, can breath through nares b/l,  face: v1-v3 intact, cn7 intact  ioe: no laceration or swelling, FOM soft and non elevated, uvula midline, no trismus, completely edentulous    LABS: reviewed    Imaging:  IMPRESSION:  Multiple acute comminuted fractures involving the bilateral nasal bones,   frontal processes of the bilateral maxillary bones, and the perpendicular   plate of the ethmoid bone.    Right frontal scalp and paranasal soft tissue swelling.    ASSESSMENT: 82M s/p fall sustaining nasal bone fracture. No acute surgical intervention per OMFS.     RECOMMENDATIONS:   << Follow up with Oral surgery Wednesday afternoon in the [facility] clinic. Afrin x 3 days. Ocean nasal spray prn dry nose. Pain meds as per primary team.  >>  no nose blowing and sneezing with mouth open for 2 weeks  amoxicillin for one week   If any difficulty swallowing/breathing, fever occur, return to ER.

## 2023-10-28 NOTE — ED PROCEDURE NOTE - PROCEDURE ADDITIONAL DETAILS
subcuticular sutures in subcutaneous layer: Monocryl absorbable 5-0, x2  simple interrupted over skin: Vicryl absorbable 4-0, x9

## 2023-10-28 NOTE — H&P ADULT - NSHPPOASURGSITEINCISION_GEN_ALL_CORE
Dr Libby Garvey, pt forgot to ask you at [address] [facility] on 3/10 if he can return to work  He works as a  in a school  Please advise 
Loly Paiz MD  You 3 minutes ago (11:19 AM)     Yes, he may return to work   Thanks
Pt notified   Letter was generated and faxed to [phone] and emailed to pt per his request 
no

## 2023-10-28 NOTE — H&P ADULT - ASSESSMENT
84 [facility] M from [facility] of HLD, urinary retention, COPD, DM2, recurrent falls presented to the ED for unwitnessed fall. Patient had LOC and head injury.      # s/p Unwitnessed Fall  - etiology under eval   - Pt has no memory of the fall, no witnesses   - EKG NSR, negative trops   - check orthostatics; positive in prev admission in May 23  - TTE (May 23) - mild AS, rest normal   - rEEG ordered   - supportive care, fall precautions  - PT/rehab eval   - tele monitoring for 24hrs       #Trauma work up +  Injuries -   Right supraorbital laceration  Multiple acute comminuted fractures involving the bilateral nasal bones, frontal processes of the bilateral maxillary bones, the perpendicular plate of the ethmoid bone, and the anterior nasal spine.  Age-indeterminate superior endplate compression deformity of T2, new from prior 5/28/2023 study.      #T2 age indeterminate compression fx   - No acute neurosurgical intervention   - Nsx recs noted   - Follow up outpatient with neurosurgery clinic PRN Basis       #Multiple acute comminuted fractures involving the bilateral nasal bones,   - Multiple acute comminuted fractures involving the bilateral nasal bones, frontal processes of the bilateral maxillary bones, and the perpendicular plate of the ethmoid bone.  - OMFS on board - recs noted Right frontal scalp and paranasal soft tissue swelling.  - No acute surgical intervention per OMFS.   - Follow up with Oral surgery Wednesday afternoon in the [facility] clinic.   - Afrin x 3 days. Ocean nasal spray prn dry nose.   - no nose blowing and sneezing with mouth open for 2 weeks  - amoxicillin for one week  - If any difficulty swallowing/breathing, fever occur, return to ER.       #Right supraorbital laceration  - sutured in ED  - pt says he has no pain   - control pain prn       #Asymptomatic bacteruria/ ? UTI   # h/o obstructive uropathy   - previous hx of requiring Cobos - prev hx of MDR UTI  - f/u UCx and Blood CX  - off abx for now, any sign of infection - start abx   - on  Flomax  - start gently IV hydration for 24 hours   - monitor BUN/cr and urine output   - avoid  nephrotoxic meds      # h/o COPD  - stable for now   - cont albuterol PRN      # CAD/ HTN/ HLD  - cont atorvastatin  - cont nifedipine,  lisinopril, metoprolol tartrate  - may start ASA       #DM2/ Diabetic Neuropathy  - carb consistent diet   - start insulin if FS>180  - check FS      #Hx of schizophrenia  - cont olanzapine, supportive care      VTE ppx - scd today, can consider starting heparin if no further bleeding tmrw   GI ppx - not indicated   activity as tolerated/ Pt consult   DASH/DM diet   Full Code        #Progress Note Handoff  Pending (specify):  telemetry monitoring for 24 hours, check orthostatic VS, f/u 2Decho, f/u urine and blood Cx, start IV hydration for 24 hours, monitor BUN/cr and urine output, bladder osullivan this afternoon, if pt retains more than 200 ml of urine put Cobos , ID consult , PT/rehab eval.                    82 yo M from [facility] pmh of HLD, urinary retention, COPD, DM2, recurrent falls presented to the ED for unwitnessed fall. Patient had LOC and head injury.      # s/p Unwitnessed Fall  - etiology under eval   - Pt has no memory of the fall, no witnesses   - asymptomatic now, has no complaints   - EKG NSR, negative trops   - check orthostatics; positive in prev admission in May 23  - TTE (May 23) - mild AS, rest normal   - rEEG ordered   - supportive care, fall precautions  - PT/rehab eval   - tele monitoring for 24hrs       #Trauma work up +  Injuries -   Right supraorbital laceration  Multiple acute comminuted fractures involving the bilateral nasal bones, frontal processes of the bilateral maxillary bones, the perpendicular plate of the ethmoid bone, and the anterior nasal spine.  Age-indeterminate superior endplate compression deformity of T2, new from prior 5/28/2023 study.      #T2 age indeterminate compression fx   - No acute neurosurgical intervention   - Nsx recs noted   - Follow up outpatient with neurosurgery clinic PRN Basis       #Multiple acute comminuted fractures involving the bilateral nasal bones,   - Multiple acute comminuted fractures involving the bilateral nasal bones, frontal processes of the bilateral maxillary bones, and the perpendicular plate of the ethmoid bone.  - OMFS on board - recs noted Right frontal scalp and paranasal soft tissue swelling.  - No acute surgical intervention per OMFS.   - Follow up with Oral surgery Wednesday afternoon in the [facility].   - Afrin x 3 days. Ocean nasal spray prn dry nose.   - no nose blowing and sneezing with mouth open for 2 weeks  - amoxicillin for one week  - If any difficulty swallowing/breathing, fever occur, return to ER.       #Right supraorbital laceration  - sutured in ED  - pt says he has no pain   - control pain prn       #Asymptomatic bacteruria/ ? UTI   # h/o obstructive uropathy   #DAVID   - previous hx of requiring Cobos - prev hx of MDR UTI  - pt has no symptoms of uti, on amoxicillin per ENT for facial injuries  - f/u UCx and Blood CX  - off abx for now, any sign of infection - start abx   - on  Flomax  - start gently IV hydration for 24 hours   - monitor BUN/cr and urine output   - renal US   - avoid  nephrotoxic meds      # h/o COPD  - stable for now   - cont albuterol PRN      # CAD/ HTN/ HLD  - cont atorvastatin  - cont nifedipine,  lisinopril, metoprolol tartrate ---> lisinopril on hold for today high K and creat -- may restart after am labs       #DM2/ Diabetic Neuropathy  - carb consistent diet   - start insulin if FS>180 - SS started  - check FS      #Hx of schizophrenia  - cont olanzapine, supportive care      VTE ppx - scd today, can consider starting heparin if no further bleeding tmrw   GI ppx - not indicated   activity as tolerated/ Pt consult   DASH/DM diet   Full Code

## 2023-10-28 NOTE — ED ADULT NURSE NOTE - OBJECTIVE STATEMENT
pt presented to ed complaining of fall. pt has laceration on top of right eye, right eye remains closed and shows signs of bruising

## 2023-10-29 LAB
GLUCOSE BLDC GLUCOMTR-MCNC: 101 MG/DL — HIGH (ref 70–99)
GLUCOSE BLDC GLUCOMTR-MCNC: 101 MG/DL — HIGH (ref 70–99)
GLUCOSE BLDC GLUCOMTR-MCNC: 115 MG/DL — HIGH (ref 70–99)
GLUCOSE BLDC GLUCOMTR-MCNC: 115 MG/DL — HIGH (ref 70–99)
GLUCOSE BLDC GLUCOMTR-MCNC: 117 MG/DL — HIGH (ref 70–99)
GLUCOSE BLDC GLUCOMTR-MCNC: 117 MG/DL — HIGH (ref 70–99)
GLUCOSE BLDC GLUCOMTR-MCNC: 122 MG/DL — HIGH (ref 70–99)
GLUCOSE BLDC GLUCOMTR-MCNC: 122 MG/DL — HIGH (ref 70–99)

## 2023-10-29 PROCEDURE — 76770 US EXAM ABDO BACK WALL COMP: CPT | Mod: 26

## 2023-10-29 PROCEDURE — 95816 EEG AWAKE AND DROWSY: CPT | Mod: 26

## 2023-10-29 PROCEDURE — 99232 SBSQ HOSP IP/OBS MODERATE 35: CPT

## 2023-10-29 RX ADMIN — OXYMETAZOLINE HYDROCHLORIDE 1 SPRAY(S): 0.5 SPRAY NASAL at 18:36

## 2023-10-29 RX ADMIN — OLANZAPINE 10 MILLIGRAM(S): 15 TABLET, FILM COATED ORAL at 13:10

## 2023-10-29 RX ADMIN — Medication 1 SPRAY(S): at 05:54

## 2023-10-29 RX ADMIN — Medication 50 MILLIGRAM(S): at 22:00

## 2023-10-29 RX ADMIN — TAMSULOSIN HYDROCHLORIDE 0.4 MILLIGRAM(S): 0.4 CAPSULE ORAL at 22:00

## 2023-10-29 RX ADMIN — Medication 1 TABLET(S): at 21:59

## 2023-10-29 RX ADMIN — Medication 1 SPRAY(S): at 18:36

## 2023-10-29 RX ADMIN — Medication 500 MILLIGRAM(S): at 13:10

## 2023-10-29 RX ADMIN — Medication 25 MILLIGRAM(S): at 18:37

## 2023-10-29 RX ADMIN — GABAPENTIN 300 MILLIGRAM(S): 400 CAPSULE ORAL at 05:51

## 2023-10-29 RX ADMIN — Medication 3 MILLILITER(S): at 09:02

## 2023-10-29 RX ADMIN — Medication 60 MILLIGRAM(S): at 05:51

## 2023-10-29 RX ADMIN — Medication 25 MILLIGRAM(S): at 05:50

## 2023-10-29 RX ADMIN — Medication 3 MILLILITER(S): at 19:53

## 2023-10-29 RX ADMIN — Medication 500 MILLIGRAM(S): at 05:51

## 2023-10-29 RX ADMIN — OXYMETAZOLINE HYDROCHLORIDE 1 SPRAY(S): 0.5 SPRAY NASAL at 05:54

## 2023-10-29 RX ADMIN — GABAPENTIN 300 MILLIGRAM(S): 400 CAPSULE ORAL at 18:37

## 2023-10-29 NOTE — EEG REPORT - NS EEG TEXT BOX
[place] Department of Neurology  Inpatient Routine-EEG Report      Patient Name:	ADA VILLAGOMEZ    :	1940  MRN:	-  Study Date/Time:	10/28/2023, 2:24:58 PM  Referred by:	-    Brief Clinical History:  ADA VILLAGOMEZ is a 83 year old Male; study performed to investigate for seizures or markers of epilepsy.   Diagnosis Code: R55 syncope and collapse    Patient Medication:  GABAPENTIN    AMOXICILLIN    DEXTROSE    MELATONIN    LOPRESSOR    PROCARDIA    ZYPREEXA    ZOFRAN    LOKELMA    FLOMAX    TRAZODONE    ALBUTEROL    ATROVENT    -    -      Acquisition Details:  Electroencephalography was acquired using a minimum of 21 channels on an [facility] Neurology system v 9.3.1 with electrode placement according to the standard International 10-20 system following ACNS (American Clinical Neurophysiology Society) guidelines.  Anterior temporal T1 and T2 electrodes were utilized whenever possible.   The [employer] automated spike & seizure detections were all reviewed in detail, in addition to the entire raw EEG.    Findings:  Background:  continuous.   Voltage:  Normal (20uV)  Organization:  Appropriate anterior-posterior gradient  Posterior Dominant Rhythm:  8-8.5 Hz symmetric, well-organized, and well-modulated  Variability:  Yes	Reactivity:  Yes  Sleep:  Absent.  Focal abnormalities:  No persistent asymmetries of voltage or frequency.  Interictal Activity:  None  Focal Slowing:  None  Generalized Slowing:  Mild  Events:  1)	No electrographic seizures or significant clinical events.  Provocations:  1)	Hyperventilation: was not performed.  2)	Photic stimulation: was not performed.  Impression:  Abnormal due to generalized slowing as above    Clinical Correlation:  Findings consistent with diffuse electrocerebral dysfunction secondary to nonspecific etiology    Michelle Cameron MD  Attending Neurologist, Division of Epilepsy

## 2023-10-29 NOTE — CHART NOTE - NSCHARTNOTEFT_GEN_A_CORE
TERTIARY TRAUMA SURVEY  ------------------------------------------------------------------------------------------    Date/Time: 10-29-23 @ 16:36  Admit date: 10-28-23      HPI:  82yo M from [facility] pm[facility] of HLD, schizophrenia, COPD, DM2 presenting to the ED for unwitnessed fall and head injury.   Pt is a poor historian and has no recollection of the event. There were no witnesses per NH. He sustained a head injury. Unknown how long he was down for or LOC. Denied any active dizziness, urinary/fecal incontinence, tongue biting, palpitations, CP, SOB, abd pain, nausea/vomiting, dysuria. Not on AC    Ed vitals - · BP - 178/77mmhg; HR -78/min; RA, afebrile   Trauma workup + - facial bones communited fractures, T2 compression fracture    labs - K 5.5, creat 1.4(baseline ~1); UA +ve    ecg - normal     At baseline, he walks independently, occasionally uses cane. He has history positive orthostatics and fall with a similar history in the past.   Patient was admitted for syncope workup and trauma management.    (28 Oct 2023 11:29)      Patient denies having pain anywhere throughout the body, and says he hasn't noticed any new bruising or deformity      PAST MEDICAL & SURGICAL HISTORY:  Schizophrenia      Diabetes type 2, controlled      COPD (chronic obstructive pulmonary disease)      Dyslipidemia      Chronic retention of urine      Dyslipidemia      Encounter for screening colonoscopy        FAMILY HISTORY:  No pertinent family history in first degree relatives        ALLERGIES: No Known Allergies      CURRENT MEDICATIONS:   MEDICATIONS (STANDING): albuterol    0.083%. 2.5 milliGRAM(s) Nebulizer once  albuterol/ipratropium for Nebulization 3 milliLiter(s) Nebulizer every 6 hours  amoxicillin  875 milliGRAM(s)/clavulanate 1 Tablet(s) Oral every 12 hours  dextrose 5%. 1000 milliLiter(s) IV Continuous <Continuous>  dextrose 50% Injectable 25 Gram(s) IV Push once  gabapentin 300 milliGRAM(s) Oral two times a day  glucagon  Injectable 1 milliGRAM(s) IntraMuscular once  insulin lispro (ADMELOG) corrective regimen sliding scale   SubCutaneous three times a day before meals  ipratropium  for Nebulization. 500 MICROGram(s) Nebulizer once  metoprolol tartrate 25 milliGRAM(s) Oral two times a day  NIFEdipine XL 60 milliGRAM(s) Oral daily  OLANZapine 10 milliGRAM(s) Oral daily  sodium chloride 0.9%. 1000 milliLiter(s) IV Continuous <Continuous>  tamsulosin 0.4 milliGRAM(s) Oral at bedtime  traZODone 50 milliGRAM(s) Oral at bedtime    MEDICATIONS (PRN):acetaminophen     Tablet .. 650 milliGRAM(s) Oral every 6 hours PRN Temp greater or equal to 38C (100.4F), Mild Pain (1 - 3)  aluminum hydroxide/magnesium hydroxide/simethicone Suspension 30 milliLiter(s) Oral every 4 hours PRN Dyspepsia  dextrose Oral Gel 15 Gram(s) Oral once PRN Blood Glucose LESS THAN 70 milliGRAM(s)/deciliter  ketorolac   Injectable 15 milliGRAM(s) IV Push every 6 hours PRN Moderate Pain (4 - 6)  melatonin 3 milliGRAM(s) Oral at bedtime PRN Insomnia  ondansetron Injectable 4 milliGRAM(s) IV Push every 8 hours PRN Nausea and/or Vomiting    ------------------------------------------------------------------------------------------    VITAL SIGNS  T(C): 36 (10-29-23 @ 12:24), Max: 36.2 (10-29-23 @ 05:00)  HR: 71 (10-29-23 @ 12:24) (71 - 93)  BP: 142/63 (10-29-23 @ 12:24) (135/65 - 176/75)  RR: 18 (10-29-23 @ 12:24) (18 - 18)  SpO2: 100% (10-29-23 @ 12:24) (96% - 100%)  CAPILLARY BLOOD GLUCOSE      POCT Blood Glucose.: 117 mg/dL (29 Oct 2023 13:07)  POCT Blood Glucose.: 122 mg/dL (29 Oct 2023 07:56)  POCT Blood Glucose.: 103 mg/dL (28 Oct 2023 22:09)  POCT Blood Glucose.: 97 mg/dL (28 Oct 2023 17:10)      INS/OUTS:    10-28 @ 07:01  -  10-29 @ 07:00  --------------------------------------------------------  IN:    Oral Fluid: 530 mL  Total IN: 530 mL    OUT:    Voided (mL): 950 mL  Total OUT: 950 mL    Total NET: -420 mL        Drug Dosing Weight  Height (cm): 177.8 (28 Oct 2023 10:21)  Weight (kg): 70.6 (28 Oct 2023 10:21)  BMI (kg/m2): 22.3 (28 Oct 2023 10:21)  BSA (m2): 1.88 (28 Oct 2023 10:21)    PHYSICAL EXAM:    General: NAD  HEENT: Face with some bruising around the R orbit, laceration closed with sutures  Neck: Soft  Cardio: S1, S2  Resp: Good effort, non-labored breathing  Thorax: No chest wall tenderness  GI/Abd: Soft, NT/ND, no rebound/guarding, no masses palpated  Vascular: Extremities warm, no bruising or erythema noted  Lymphatic/Nodes: No palpable lymphadenopathy  Musculoskeletal: All 4 extremities moving spontaneously, no limitations  ------------------------------------------------------------------------------------------    LABS  CBC (10-28 @ 22:47)                              10.5<L>                         7.67    )----------------(  192        --    % Neutrophils, --    % Lymphocytes, ANC: --                                  32.0<L>    BMP (10-28 @ 22:47)             135     |  100     |  18    		Ca++ --      Ca 8.6                ---------------------------------( 95    		Mg --                 4.3     |  22      |  1.1   			Ph --        LFTs (10-28 @ 22:47)      TPro 7.0 / Alb 3.7 / TBili <0.2 / DBili -- / AST 17 / ALT 7 / AlkPhos 101      Cardiac Markers (10-28 @ 18:58)     HSTrop: -- / CKMB: -- / CK: 114          MICROBIOLOGY  Urinalysis (10-28 @ 22:47):     Color:  / Appearance:  / SG:  / pH:  / Gluc: 95 / Ketones:  / Bili:  / Urobili:  / Protein : / Nitrites:  / Leuk.Est:  / RBC:  / WBC:  / Sq Epi:  / Non Sq Epi:  / Bacteria        -> Clean Catch Clean Catch (Midstream) Culture (10-28 @ 05:10)     NG    NG    >100,000 CFU/ml Gram Negative Rods<!>      ------------------------------------------------------------------------------------------      List Injuries Identified to Date:    Right supraorbital laceration  Multiple acute comminuted fractures involving the bilateral nasal bones, frontal processes of the bilateral maxillary bones, the perpendicular plate of the ethmoid bone, and the anterior nasal spine.  Age-indeterminatesuperior endplate compression deformity of T2, new from prior 5/28/2023 study.        DAVID (acute kidney injury)        Assessment:  Patient s/p fall, denies any pain, and has not noticed any new bruising any part of his body since prior examination, and says he is feeling good.   - no new injuries identified  - continue care per primary team  - no further trauma workup warranted

## 2023-10-29 NOTE — PROGRESS NOTE ADULT - SUBJECTIVE AND OBJECTIVE BOX
MEDICINE ATTENDING PROGRESS NOTE  82y/o Male  from [facility] pmh of HLD, schizophrenia, COPD, DM2 presenting to the ED for unwitnessed fall and head injury.  Admitted for further management.    Interval/Overnight Events      ROS  General: Denies fevers, chills, nightsweats, weight loss  HEENT: Denies headache, rhinorrhea, sore throat, eye pain  CV: Denies CP, palpitations  PULM: Denies SOB, cough  GI: Denies abdominal pain, diarrhea  : Denies dysuria, hematuria  MSK: Denies arthralgias  SKIN: Denies rash   NEURO: Denies paresthesias, weakness  PSYCH: Denies depression    MEDICATIONS  (STANDING):  albuterol    0.083%. 2.5 milliGRAM(s) Nebulizer once  albuterol/ipratropium for Nebulization 3 milliLiter(s) Nebulizer every 6 hours  amoxicillin 500 milliGRAM(s) Oral every 8 hours  dextrose 5%. 1000 milliLiter(s) (50 mL/Hr) IV Continuous <Continuous>  dextrose 50% Injectable 25 Gram(s) IV Push once  gabapentin 300 milliGRAM(s) Oral two times a day  glucagon  Injectable 1 milliGRAM(s) IntraMuscular once  insulin lispro (ADMELOG) corrective regimen sliding scale   SubCutaneous three times a day before meals  ipratropium  for Nebulization. 500 MICROGram(s) Nebulizer once  metoprolol tartrate 25 milliGRAM(s) Oral two times a day  NIFEdipine XL 60 milliGRAM(s) Oral daily  OLANZapine 10 milliGRAM(s) Oral daily  oxymetazoline 0.05% Nasal Spray 1 Spray(s) Both Nostrils two times a day  sodium chloride 0.65% Nasal 1 Spray(s) Both Nostrils two times a day  sodium chloride 0.9%. 1000 milliLiter(s) (75 mL/Hr) IV Continuous <Continuous>  tamsulosin 0.4 milliGRAM(s) Oral at bedtime  traZODone 50 milliGRAM(s) Oral at bedtime    MEDICATIONS  (PRN):  acetaminophen     Tablet .. 650 milliGRAM(s) Oral every 6 hours PRN Temp greater or equal to 38C (100.4F), Mild Pain (1 - 3)  aluminum hydroxide/magnesium hydroxide/simethicone Suspension 30 milliLiter(s) Oral every 4 hours PRN Dyspepsia  dextrose Oral Gel 15 Gram(s) Oral once PRN Blood Glucose LESS THAN 70 milliGRAM(s)/deciliter  ketorolac   Injectable 15 milliGRAM(s) IV Push every 6 hours PRN Moderate Pain (4 - 6)  melatonin 3 milliGRAM(s) Oral at bedtime PRN Insomnia  ondansetron Injectable 4 milliGRAM(s) IV Push every 8 hours PRN Nausea and/or Vomiting    ANTIBIOTICS:  amoxicillin 500 milliGRAM(s) Oral every 8 hours      VITALS:  T(F): 97.1, Max: 97.1 (10-29-23 @ 05:00)  HR: 91  BP: 135/65  RR: 18Vital Signs Last 24 Hrs  T(C): 36.2 (29 Oct 2023 05:00), Max: 36.2 (29 Oct 2023 05:00)  T(F): 97.1 (29 Oct 2023 05:00), Max: 97.1 (29 Oct 2023 05:00)  HR: 91 (29 Oct 2023 05:00) (81 - 105)  BP: 135/65 (29 Oct 2023 05:00) (135/65 - 189/82)  BP(mean): --  RR: 18 (29 Oct 2023 05:00) (18 - 18)  SpO2: 97% (29 Oct 2023 05:00) (96% - 99%)     I&O's Summary    28 Oct 2023 07:01  -  29 Oct 2023 07:00  --------------------------------------------------------  IN: 530 mL / OUT: 950 mL / NET: -420 mL      PHYSICAL EXAM:  Gen: NAD, resting in bed  HEENT: Normocephalic, atraumatic  Neck: supple, no lymphadenopathy  CV: Regular rate & regular rhythm  Lungs: CTABL no wheeze  Abdomen: Soft, NTND+ BS present  Ext: Warm, well perfused no CCE  Neuro: non focal, awake, CN II-XII intact   Skin: no rash, no erythema  Psych: no SI, HI, Hallucination     LABS:                        10.5   7.67  )-----------( 192      ( 28 Oct 2023 22:47 )             32.0     10-28    135  |  100  |  18  ----------------------------<  95  4.3   |  22  |  1.1    Ca    8.6      28 Oct 2023 22:47    TPro  7.0  /  Alb  3.7  /  TBili  <0.2  /  DBili  x   /  AST  17  /  ALT  7   /  AlkPhos  101  10-28    LIVER FUNCTIONS - ( 28 Oct 2023 22:47 )  Alb: 3.7 g/dL / Pro: 7.0 g/dL / ALK PHOS: 101 U/L / ALT: 7 U/L / AST: 17 U/L / GGT: x             MICROBIOLOGY:  Urinalysis Basic - ( 28 Oct 2023 22:47 )  Color: x / Appearance: x / SG: x / pH: x  Gluc: 95 mg/dL / Ketone: x  / Bili: x / Urobili: x   Blood: x / Protein: x / Nitrite: x   Leuk Esterase: x / RBC: x / WBC x   Sq Epi: x / Non Sq Epi: x / Bacteria: x    MRSA PCR Result.: Negative (02-24-23 @ 09:10)      IMAGING:  CXR      CT  CT Chest w/ IV Cont:   ACC: [id number] EXAM:  CT ABDOMEN AND PELVIS IC   ORDERED BY: TORRES PEDERSON     ACC: [id number] EXAM:  CT CHEST IC   ORDERED BY: TORRES PEDERSON     PROCEDURE DATE:  10/28/2023          INTERPRETATION:  CLINICAL STATEMENT: Trauma    TECHNIQUE: Contiguous CT images were obtained of the chest, abdomen and   pelvis.  Intravenous Contrast:  Intravenous contrast administered.  100 cc Omnipaque 350 intravenous contrast was administered. 0 cc   discarded.  Oral contrast: was not administered.    COMPARISON:  CT chest, abdomen and pelvis dated 5/28/2023    FINDINGS:    CHEST:    LUNGS, PLEURA AND AIRWAYS: No focal consolidation, pleural effusion or   pneumothorax. Paraseptal and centrilobular emphysema. Scattered airways   secretions.    HEART AND VESSELS: Heart size appears within normal limits. No   pericardial effusion is present. Normal caliber thoracic aorta. Normal   caliber main pulmonary artery. Aortic and multivessel coronary artery   calcifications.    THORACIC INLET/MEDIASTINUM/LYMPH NODES: The visualized portion of the   thyroid gland is unremarkable. There are no enlarged thoracic lymph nodes.    ABDOMEN/PELVIS:    LIVER: Multiple subcentimeter hypodensities too small to characterize    SPLEEN: Unremarkable.    PANCREAS: Unremarkable.    GALLBLADDER AND BILIARY TREE: Cholelithiasis.    ADRENALS: Unremarkable.    KIDNEYS: Symmetric pattern of renal enhancement. No hydronephrosis.   Bilateral renal cysts and hypodensities too small to characterize.    LYMPH NODES: Thereare no enlarged abdominal or pelvic lymph nodes.    VASCULATURE: Extensive calcifications of the abdominal aorta and its   branches with areas of bilateral common femoral arteries appearing   occluded with distal reconstitution.    BOWEL: No evidencefor bowel obstruction or bowel wall thickening.   Unremarkable appendix. Small hiatal hernia.    PERITONEUM/RETROPERITONEUM/MESENTERY: There is no ascites or   pneumoperitoneum.    PELVIC VISCERA: Redemonstrated mildly thickened appearance of the urinary   bladder. Enlarged prostate gland.    BONES AND SOFT TISSUES: Diffuse osteopenia. Right shoulder arthroplasty.   Chronic mild compression deformity of T6. New superior endplate   compression deformity of T2.      IMPRESSION:    Age-indeterminatesuperior endplate compression deformity of T2, new from   prior 5/28/2023 study.    No additional CT evidence for acute traumatic injury to the chest,   abdomen or pelvis.    --- End of Report ---            WOLFGANG ROSS MD; Attending Radiologist  This document has been electronically signed. Oct 28 2023  6:13AM (10-28-23 @ 03:08)  CT Abdomen and Pelvis w/ IV Cont:   ACC: [id number] EXAM:  CT ABDOMEN AND PELVIS IC   ORDERED BY: TORRES PEDERSON     ACC: [id number] EXAM:  CT CHEST IC   ORDERED BY: TORRES PEDERSON     PROCEDURE DATE:  10/28/2023          INTERPRETATION:  CLINICAL STATEMENT: Trauma    TECHNIQUE: Contiguous CT images were obtained of the chest, abdomen and   pelvis.  Intravenous Contrast:  Intravenous contrast administered.  100 cc Omnipaque 350 intravenous contrast was administered. 0 cc   discarded.  Oral contrast: was not administered.    COMPARISON:  CT chest, abdomen and pelvis dated 5/28/2023    FINDINGS:    CHEST:    LUNGS, PLEURA AND AIRWAYS: No focal consolidation, pleural effusion or   pneumothorax. Paraseptal and centrilobular emphysema. Scattered airways   secretions.    HEART AND VESSELS: Heart size appears within normal limits. No   pericardial effusion is present. Normal caliber thoracic aorta. Normal   caliber main pulmonary artery. Aortic and multivessel coronary artery   calcifications.    THORACIC INLET/MEDIASTINUM/LYMPH NODES: The visualized portion of the   thyroid gland is unremarkable. There are no enlarged thoracic lymph nodes.    ABDOMEN/PELVIS:    LIVER: Multiple subcentimeter hypodensities too small to characterize    SPLEEN: Unremarkable.    PANCREAS: Unremarkable.    GALLBLADDER AND BILIARY TREE: Cholelithiasis.    ADRENALS: Unremarkable.    KIDNEYS: Symmetric pattern of renal enhancement. No hydronephrosis.   Bilateral renal cysts and hypodensities too small to characterize.    LYMPH NODES: Thereare no enlarged abdominal or pelvic lymph nodes.    VASCULATURE: Extensive calcifications of the abdominal aorta and its   branches with areas of bilateral common femoral arteries appearing   occluded with distal reconstitution.    BOWEL: No evidencefor bowel obstruction or bowel wall thickening.   Unremarkable appendix. Small hiatal hernia.    PERITONEUM/RETROPERITONEUM/MESENTERY: There is no ascites or   pneumoperitoneum.    PELVIC VISCERA: Redemonstrated mildly thickened appearance of the urinary   bladder. Enlarged prostate gland.    BONES AND SOFT TISSUES: Diffuse osteopenia. Right shoulder arthroplasty.   Chronic mild compression deformity of T6. New superior endplate   compression deformity of T2.      IMPRESSION:    Age-indeterminatesuperior endplate compression deformity of T2, new from   prior 5/28/2023 study.    No additional CT evidence for acute traumatic injury to the chest,   abdomen or pelvis.    --- End of Report ---            WOLFGANG ROSS MD; Attending Radiologist  This document has been electronically signed. Oct 28 2023  6:13AM (10-28-23 @ 03:08)    CARDIOLOGY TESTING  12 Lead ECG:   Ventricular Rate 71 BPM    Atrial Rate 71 BPM    P-R Interval 194 ms    QRS Duration 74 ms    Q-T Interval 398 ms    QTC Calculation(Bazett) 432 ms    P Axis 55 degrees    R Axis 48 degrees    T Axis 68 degrees    Diagnosis Line Normal sinus rhythm  Minimal voltage criteria for LVH, may be normal variant  Borderline ECG    Confirmed by TERESA WRIGHT, [facility] ([id number]) on 10/28/2023 11:49:40 PM (10-27-23 @ 22:26)      ASSESSMENT/PLAN:  82[facility], [facility] to the ED for unwitnessed fall and head injury.  Admitted for further management.    # s/p Unwitnessed Fall  with positive trauma   #T2 age indeterminate compression fx   - No acute neurosurgical intervention as per Neurosurgical team rec.- obtain PT eval, maintain falls precautions  #Multiple acute comminuted fractures involving the bilateral nasal bones, frontal processes of the bilateral maxillary bones, and the perpendicular plate of the ethmoid bone.  - Right eyebrow skin laceration- was sutured in ER with right periorbital edema/bruising  - OMFS on board - recs noted Right frontal scalp and paranasal soft tissue swelling.- No acute surgical intervention per OMFS.   - f/up maxillofacial sx. on  Wednesday afternoon in the [facility] clinic.   - Afrin x 3 days. an nasal spray prn dry nose. - no nose blowing and sneezing with mouth open for 2 weeks  - amoxicillin for one week proph. tx.     - obtain CK level   - monitor Orthostatic VS  -pain management  - TTE (May 23) - mild AS, rest normal     # Hyperkalemia- repeat BMP today    # h/o COPD -stable, resumed on inhalers, nebs tx.     # DM 2 - bsfs every 6 hours with insulin co. prn. tx.     # CAD/ HTN/ HLD-[facility]. b/p due to missed am meds, will give all stat. reassess b/p    resumed on home meds tx. , hold ace inh due to hyperkalemia     #Supportive Management:  Dispo:   DVT Ppx: GI Ppx: Diet:  Diet, Regular:   Consistent Carbohydrate Evening Snack  DASH/TLC Sodium & Cholesterol Restricted (10-28-23 @ 12:47) [Active]    Total time spent to complete patient's bedside assessment, review medical chart, discuss medical plan of care with covering medical team was more than 45 minutes with >50% of time spent face to face with patient, discussion with patient/family and/or coordination of care    Resident's notes reviewed. My notes supersede resident's notes in case of discrepancy.    Mandeep Coates MD/Nirmal  Attending Physician MEDICINE ATTENDING PROGRESS NOTE  82y/o Male  from [facility] pmh of HLD, schizophrenia, COPD, DM2 presenting to the ED for unwitnessed fall and head injury.  Admitted for further management.    Interval/Overnight Events  - No acute complaints  - RN: pt is jumping out of bed and is combative. He pulled his IV line and refused bladder scan to r/o urinary retention.  - O/E: unable to access. Patient refuses  - Labs and imaging reviewed.  - UCx positive for GNR -> pt was on amoxicillin to cover facial trauma -> switch to Augmentin to cover UTI and facial trauma  - f/u UCx sensivity  - EEG no seizure activity  - Pending PT eval    ROS  General: Denies fevers, chills, nightsweats, weight loss  HEENT: Denies headache, rhinorrhea, sore throat, eye pain  CV: Denies CP, palpitations  PULM: Denies SOB, cough  GI: Denies abdominal pain, diarrhea  : Denies dysuria, hematuria  MSK: Denies arthralgias  SKIN: Denies rash   NEURO: Denies paresthesias, weakness  PSYCH: Denies depression    MEDICATIONS  (STANDING):  albuterol    0.083%. 2.5 milliGRAM(s) Nebulizer once  albuterol/ipratropium for Nebulization 3 milliLiter(s) Nebulizer every 6 hours  amoxicillin 500 milliGRAM(s) Oral every 8 hours  dextrose 5%. 1000 milliLiter(s) (50 mL/Hr) IV Continuous <Continuous>  dextrose 50% Injectable 25 Gram(s) IV Push once  gabapentin 300 milliGRAM(s) Oral two times a day  glucagon  Injectable 1 milliGRAM(s) IntraMuscular once  insulin lispro (ADMELOG) corrective regimen sliding scale   SubCutaneous three times a day before meals  ipratropium  for Nebulization. 500 MICROGram(s) Nebulizer once  metoprolol tartrate 25 milliGRAM(s) Oral two times a day  NIFEdipine XL 60 milliGRAM(s) Oral daily  OLANZapine 10 milliGRAM(s) Oral daily  oxymetazoline 0.05% Nasal Spray 1 Spray(s) Both Nostrils two times a day  sodium chloride 0.65% Nasal 1 Spray(s) Both Nostrils two times a day  sodium chloride 0.9%. 1000 milliLiter(s) (75 mL/Hr) IV Continuous <Continuous>  tamsulosin 0.4 milliGRAM(s) Oral at bedtime  traZODone 50 milliGRAM(s) Oral at bedtime    MEDICATIONS  (PRN):  acetaminophen     Tablet .. 650 milliGRAM(s) Oral every 6 hours PRN Temp greater or equal to 38C (100.4F), Mild Pain (1 - 3)  aluminum hydroxide/magnesium hydroxide/simethicone Suspension 30 milliLiter(s) Oral every 4 hours PRN Dyspepsia  dextrose Oral Gel 15 Gram(s) Oral once PRN Blood Glucose LESS THAN 70 milliGRAM(s)/deciliter  ketorolac   Injectable 15 milliGRAM(s) IV Push every 6 hours PRN Moderate Pain (4 - 6)  melatonin 3 milliGRAM(s) Oral at bedtime PRN Insomnia  ondansetron Injectable 4 milliGRAM(s) IV Push every 8 hours PRN Nausea and/or Vomiting    ANTIBIOTICS:  amoxicillin 500 milliGRAM(s) Oral every 8 hours    VITALS:  T(F): 97.1, Max: 97.1 (10-29-23 @ 05:00)  HR: 91  BP: 135/65  RR: 18Vital Signs Last 24 Hrs  T(C): 36.2 (29 Oct 2023 05:00), Max: 36.2 (29 Oct 2023 05:00)  T(F): 97.1 (29 Oct 2023 05:00), Max: 97.1 (29 Oct 2023 05:00)  HR: 91 (29 Oct 2023 05:00) (81 - 105)  BP: 135/65 (29 Oct 2023 05:00) (135/65 - 189/82)  BP(mean): --  RR: 18 (29 Oct 2023 05:00) (18 - 18)  SpO2: 97% (29 Oct 2023 05:00) (96% - 99%)     I&O's Summary    28 Oct 2023 07:01  -  29 Oct 2023 07:00  --------------------------------------------------------  IN: 530 mL / OUT: 950 mL / NET: -420 mL      PHYSICAL EXAM:  Gen: NAD, resting in bed  HEENT: Normocephalic, atraumatic  Neck: supple, no lymphadenopathy  CV: Regular rate & regular rhythm  Lungs: CTABL no wheeze  Abdomen: Soft, NTND+ BS present  Ext: Warm, well perfused no CCE  Neuro: non focal, awake, CN II-XII intact   Skin: no rash, no erythema  Psych: no SI, HI, Hallucination     LABS:                        10.5   7.67  )-----------( 192      ( 28 Oct 2023 22:47 )             32.0     10-28    135  |  100  |  18  ----------------------------<  95  4.3   |  22  |  1.1    Ca    8.6      28 Oct 2023 22:47    TPro  7.0  /  Alb  3.7  /  TBili  <0.2  /  DBili  x   /  AST  17  /  ALT  7   /  AlkPhos  101  10-28    LIVER FUNCTIONS - ( 28 Oct 2023 22:47 )  Alb: 3.7 g/dL / Pro: 7.0 g/dL / ALK PHOS: 101 U/L / ALT: 7 U/L / AST: 17 U/L / GGT: x             MICROBIOLOGY:  Urinalysis Basic - ( 28 Oct 2023 22:47 )  Color: x / Appearance: x / SG: x / pH: x  Gluc: 95 mg/dL / Ketone: x  / Bili: x / Urobili: x   Blood: x / Protein: x / Nitrite: x   Leuk Esterase: x / RBC: x / WBC x   Sq Epi: x / Non Sq Epi: x / Bacteria: x    MRSA PCR Result.: Negative (02-24-23 @ 09:10)      IMAGING:  Xray Chest 1 View- PORTABLE-Routine (Xray Chest 1 View- PORTABLE-Routine .) (10.27.23 @ 22:45)  No radiographic evidence of acute cardiopulmonary disease.    CT Chest, Abdomen and Pelvic w/ IV Cont (10-28-23 @ 03:08):   CHEST:    LUNGS, PLEURA AND AIRWAYS: No focal consolidation, pleural effusion or   pneumothorax. Paraseptal and centrilobular emphysema. Scattered airways   secretions.    HEART AND VESSELS: Heart size appears within normal limits. No   pericardial effusion is present. Normal caliber thoracic aorta. Normal   caliber main pulmonary artery. Aortic and multivessel coronary artery   calcifications.    THORACIC INLET/MEDIASTINUM/LYMPH NODES: The visualized portion of the   thyroid gland is unremarkable. There are no enlarged thoracic lymph nodes.    ABDOMEN/PELVIS:  LIVER: Multiple subcentimeter hypodensities too small to characterize    SPLEEN: Unremarkable.    PANCREAS: Unremarkable.    GALLBLADDER AND BILIARY TREE: Cholelithiasis.    ADRENALS: Unremarkable.    KIDNEYS: Symmetric pattern of renal enhancement. No hydronephrosis.   Bilateral renal cysts and hypodensities too small to characterize.    LYMPH NODES: There are no enlarged abdominal or pelvic lymph nodes.    VASCULATURE: Extensive calcifications of the abdominal aorta and its   branches with areas of bilateral common femoral arteries appearing   occluded with distal reconstitution.    BOWEL: No evidence for bowel obstruction or bowel wall thickening.   Unremarkable appendix. Small hiatal hernia.    PERITONEUM/RETROPERITONEUM/MESENTERY: There is no ascites or   pneumoperitoneum.    PELVIC VISCERA: Redemonstrated mildly thickened appearance of the urinary   bladder. Enlarged prostate gland.    BONES AND SOFT TISSUES: Diffuse osteopenia. Right shoulder arthroplasty.   Chronic mild compression deformity of T6. New superior endplate   compression deformity of T2.    IMPRESSION:  Age-indeterminatesuperior endplate compression deformity of T2, new from prior 5/28/2023 study.  No additional CT evidence for acute traumatic injury to the chest, abdomen or pelvis.    CT Maxillofacial No Cont (10.28.23 @ 02:52)  SKIN AND SUBCUTANEOUS SOFT TISSUES: Paranasal and right frontal scalp   soft tissue swelling.    OSSEOUS STRUCTURES: Comminuted mildly displaced fractures of the   bilateral nasal bones and frontal processes of the bilateral maxillary   bones. Comminuted minimally displaced fractures of the perpendicular   plate of the ethmoid bone.    ORBITS: The globes, retrobulbar fat, extraocular muscles, and optic nerve   sheath complexes are intact and normal in morphology.    PARANASAL SINUSES: Mild opacification of the ethmoid sinuses.    MASTOID AIR CELLS: Clear.    OTHER:  Please see separately dictated head CT for intracranial findings.    IMPRESSION:  Multiple acute comminuted fractures involving the bilateral nasal bones,   frontal processes of the bilateral maxillary bones, and the perpendicular   plate of the ethmoid bone.    Right frontal scalp and paranasal soft tissue swelling.    CT Head and Cervical Spine No Cont (10.27.23 @ 22:44)   CT HEAD:  There is stable prominence of the cerebral sulci and fissural spaces   reflecting mild diffuse parenchymal volume loss. There is stable moderate   ventriculomegaly which may be exaggerated by the parenchymal volume loss.    There is stable large arachnoid cyst in the left middle cranial fossa.    There are scattered patchy low attenuations in the bilateral   periventricular cerebral white matter consistent with stable moderate   chronic microvascular ischemic changes.    No evidence of acute territorial infarct, intracranial hemorrhage or mass   lesion.    There are no extra-axialfluid collections.    The visualized intraorbital contents are normal. Mild mucosal thickening   in bilateral ethmoid sinuses. There is trace effusion in the right   mastoid.    There is mildly leftward displaced fractures of the bilateral frontal   processes of the maxilla. There is overlying soft tissue swelling and   abrasion in the right forehead.    CT CERVICAL SPINE:  There is diffuse osteopenia. There is a history exaggerated cervical   lordosis.    There is increased compression deformity of T2 since the prior CT   cervical spine from 5/5/2023 with approximately 50% height loss. No   associated retropulsion.    There is no evidence facet subluxation of the cervical spine.    The atlantoaxial relationships are maintained. The posterior elements are   intact. There is no significant prevertebral soft tissue swelling. The   visualized paraspinal soft tissues are unremarkable.    Moderate multilevel endplate degenerative changes as evidenced by   marginal osteophyte formation, uncovertebral spurring, loss of normal   disc space height as well as facet joint space compartment narrowing.    There is no high-grade spinal canal stenosis. Please note spinal canal   contents are suboptimally evaluated inherent to CT technique.    The visualized lung apices are within normal limits.    IMPRESSION:  CT HEAD:  No acute intracranial findings.  Stable moderate chronic microvascular ischemic changes.  Partially imaged slightly leftward displaced fractures of the frontal   processes of the bilateral maxilla. Overlying soft tissue swelling and   abrasion in the right forehead.    CT CERVICAL SPINE:  Age-indeterminate moderate compression deformity of T2, new since the   prior cervical CT from 5/28/2023. No associated retropulsion. Further   evaluation with MRI can be considered.  Stable diffuse osteopenia.  Communication: The summary of above findings were discussed with readback   confirmation with Dr. Evans by radiologist Dr. Gallego on 10/27/2023 at 11:00 PM.    CARDIOLOGY TESTING  12 Lead ECG:   Ventricular Rate 71 BPM  Atrial Rate 71 BPM  P-R Interval 194 ms  QRS Duration 74 ms  Q-T Interval 398 ms  QTC Calculation(Bazett) 432 ms  P Axis 55 degrees  R Axis 48 degrees  T Axis 68 degrees    Diagnosis Line Normal sinus rhythm  Minimal voltage criteria for LVH, may be normal variant  Borderline ECG    Confirmed by ENE MOORE MD ([id number]) on 10/28/2023 11:49:40 PM (10-27-23 @ 22:26)    ASSESSMENT/PLAN:  82y/o Male  from [facility] pmh of HLD, schizophrenia, COPD, DM2 presenting to the ED for unwitnessed fall and head injury.  Admitted for further management.    # s/p Unwitnessed Fall  with positive Facial/Head Trauma   #T2 age indeterminate compression fx   - No acute neurosurgical intervention as per Neurosurgical team rec.- obtain PT eval, maintain falls precautions  - monitor Orthostatic VS  - f/u CK level   - TTE (May 23) - mild AS, rest normal   - pain management    #Multiple acute comminuted fractures involving the bilateral nasal bones, frontal processes of the bilateral maxillary bones, and the perpendicular plate of the ethmoid bone.  - Right eyebrow skin laceration- was sutured in ER with right periorbital edema/bruising  - OMFS on board - recs noted Right frontal scalp and paranasal soft tissue swelling.- No acute surgical intervention per OMFS.   - Afrin x 3 days. an nasal spray prn dry nose. - no nose blowing and sneezing with mouth open for 2 weeks  - s/p amoxicillin > switch to Augmentin  for one week proph. tx.  - f/up maxillofacial sx. on  Wednesday afternoon in the [facility] clinic.      #UTI due to Obstructive Uropathy  #BPH  - UA: UTI  - UCx: GNR  - CT a/P  Redemonstrated mildly thickened appearance of the urinary bladder. Enlarged prostate gland.  - trial of Augmentin  - trial of flomax  - May benefit from cytoscopy in the outpatient'  - Outpatient urology    # Hyperkalemia (resolved)-     # h/o COPD -stable, resumed on inhalers, nebs tx.     # DM 2 - bsfs every 6 hours with insulin co. prn. tx.     # CAD/ HTN/ HLD  - chloé home meds tx. ,   - can resume ace inh given hyperkalemia resolved     #Incidental Findings (Liver Nodules & Kidney cyst/hypodensity)  - Outpatient workup and follow up    #Supportive Management:  Dispo:   DVT Ppx: GI Ppx: Diet:  Diet, Regular:   Consistent Carbohydrate Evening Snack  DASH/TLC Sodium & Cholesterol Restricted (10-28-23 @ 12:47) [Active]    Total time spent to complete patient's bedside assessment, review medical chart, discuss medical plan of care with covering medical team was more than 45 minutes with >50% of time spent face to face with patient, discussion with patient/family and/or coordination of care    Resident's notes reviewed. My notes supersede resident's notes in case of discrepancy.    Mandeep Coates MD/Nirmal  Attending Physician MEDICINE ATTENDING PROGRESS NOTE  82y/o Male  from [facility] pmh of HLD, schizophrenia, COPD, DM2 presenting to the ED for unwitnessed fall and head injury.  Admitted for further management.    Interval/Overnight Events  - No acute complaints  - RN: pt is jumping out of bed and is combative. He pulled his IV line and refused bladder scan to r/o urinary retention -> can do haldol PRN if safety to self  - O/E: unable to access. Patient refuses  - Labs and imaging reviewed.  - UCx positive for GNR -> pt was on amoxicillin to cover facial trauma -> switch to Augmentin to cover UTI and facial trauma  - f/u UCx sensivity  - EEG no seizure activity  - Pending PT eval    ROS  General: Denies fevers, chills, nightsweats, weight loss  HEENT: Denies headache, rhinorrhea, sore throat, eye pain  CV: Denies CP, palpitations  PULM: Denies SOB, cough  GI: Denies abdominal pain, diarrhea  : Denies dysuria, hematuria  MSK: Denies arthralgias  SKIN: Denies rash   NEURO: Denies paresthesias, weakness  PSYCH: Denies depression    MEDICATIONS  (STANDING):  albuterol    0.083%. 2.5 milliGRAM(s) Nebulizer once  albuterol/ipratropium for Nebulization 3 milliLiter(s) Nebulizer every 6 hours  amoxicillin 500 milliGRAM(s) Oral every 8 hours  dextrose 5%. 1000 milliLiter(s) (50 mL/Hr) IV Continuous <Continuous>  dextrose 50% Injectable 25 Gram(s) IV Push once  gabapentin 300 milliGRAM(s) Oral two times a day  glucagon  Injectable 1 milliGRAM(s) IntraMuscular once  insulin lispro (ADMELOG) corrective regimen sliding scale   SubCutaneous three times a day before meals  ipratropium  for Nebulization. 500 MICROGram(s) Nebulizer once  metoprolol tartrate 25 milliGRAM(s) Oral two times a day  NIFEdipine XL 60 milliGRAM(s) Oral daily  OLANZapine 10 milliGRAM(s) Oral daily  oxymetazoline 0.05% Nasal Spray 1 Spray(s) Both Nostrils two times a day  sodium chloride 0.65% Nasal 1 Spray(s) Both Nostrils two times a day  sodium chloride 0.9%. 1000 milliLiter(s) (75 mL/Hr) IV Continuous <Continuous>  tamsulosin 0.4 milliGRAM(s) Oral at bedtime  traZODone 50 milliGRAM(s) Oral at bedtime    MEDICATIONS  (PRN):  acetaminophen     Tablet .. 650 milliGRAM(s) Oral every 6 hours PRN Temp greater or equal to 38C (100.4F), Mild Pain (1 - 3)  aluminum hydroxide/magnesium hydroxide/simethicone Suspension 30 milliLiter(s) Oral every 4 hours PRN Dyspepsia  dextrose Oral Gel 15 Gram(s) Oral once PRN Blood Glucose LESS THAN 70 milliGRAM(s)/deciliter  ketorolac   Injectable 15 milliGRAM(s) IV Push every 6 hours PRN Moderate Pain (4 - 6)  melatonin 3 milliGRAM(s) Oral at bedtime PRN Insomnia  ondansetron Injectable 4 milliGRAM(s) IV Push every 8 hours PRN Nausea and/or Vomiting    ANTIBIOTICS:  amoxicillin 500 milliGRAM(s) Oral every 8 hours    VITALS:  T(F): 97.1, Max: 97.1 (10-29-23 @ 05:00)  HR: 91  BP: 135/65  RR: 18Vital Signs Last 24 Hrs  T(C): 36.2 (29 Oct 2023 05:00), Max: 36.2 (29 Oct 2023 05:00)  T(F): 97.1 (29 Oct 2023 05:00), Max: 97.1 (29 Oct 2023 05:00)  HR: 91 (29 Oct 2023 05:00) (81 - 105)  BP: 135/65 (29 Oct 2023 05:00) (135/65 - 189/82)  BP(mean): --  RR: 18 (29 Oct 2023 05:00) (18 - 18)  SpO2: 97% (29 Oct 2023 05:00) (96% - 99%)     I&O's Summary    28 Oct 2023 07:01  -  29 Oct 2023 07:00  --------------------------------------------------------  IN: 530 mL / OUT: 950 mL / NET: -420 mL      PHYSICAL EXAM:  Gen: NAD, resting in bed  HEENT: Normocephalic, atraumatic  Neck: supple, no lymphadenopathy  CV: Regular rate & regular rhythm  Lungs: CTABL no wheeze  Abdomen: Soft, NTND+ BS present  Ext: Warm, well perfused no CCE  Neuro: non focal, awake, CN II-XII intact   Skin: no rash, no erythema  Psych: no SI, HI, Hallucination     LABS:                        10.5   7.67  )-----------( 192      ( 28 Oct 2023 22:47 )             32.0     10-28    135  |  100  |  18  ----------------------------<  95  4.3   |  22  |  1.1    Ca    8.6      28 Oct 2023 22:47    TPro  7.0  /  Alb  3.7  /  TBili  <0.2  /  DBili  x   /  AST  17  /  ALT  7   /  AlkPhos  101  10-28    LIVER FUNCTIONS - ( 28 Oct 2023 22:47 )  Alb: 3.7 g/dL / Pro: 7.0 g/dL / ALK PHOS: 101 U/L / ALT: 7 U/L / AST: 17 U/L / GGT: x             MICROBIOLOGY:  Urinalysis Basic - ( 28 Oct 2023 22:47 )  Color: x / Appearance: x / SG: x / pH: x  Gluc: 95 mg/dL / Ketone: x  / Bili: x / Urobili: x   Blood: x / Protein: x / Nitrite: x   Leuk Esterase: x / RBC: x / WBC x   Sq Epi: x / Non Sq Epi: x / Bacteria: x    MRSA PCR Result.: Negative (02-24-23 @ 09:10)      IMAGING:  Xray Chest 1 View- PORTABLE-Routine (Xray Chest 1 View- PORTABLE-Routine .) (10.27.23 @ 22:45)  No radiographic evidence of acute cardiopulmonary disease.    CT Chest, Abdomen and Pelvic w/ IV Cont (10-28-23 @ 03:08):   CHEST:    LUNGS, PLEURA AND AIRWAYS: No focal consolidation, pleural effusion or   pneumothorax. Paraseptal and centrilobular emphysema. Scattered airways   secretions.    HEART AND VESSELS: Heart size appears within normal limits. No   pericardial effusion is present. Normal caliber thoracic aorta. Normal   caliber main pulmonary artery. Aortic and multivessel coronary artery   calcifications.    THORACIC INLET/MEDIASTINUM/LYMPH NODES: The visualized portion of the   thyroid gland is unremarkable. There are no enlarged thoracic lymph nodes.    ABDOMEN/PELVIS:  LIVER: Multiple subcentimeter hypodensities too small to characterize    SPLEEN: Unremarkable.    PANCREAS: Unremarkable.    GALLBLADDER AND BILIARY TREE: Cholelithiasis.    ADRENALS: Unremarkable.    KIDNEYS: Symmetric pattern of renal enhancement. No hydronephrosis.   Bilateral renal cysts and hypodensities too small to characterize.    LYMPH NODES: There are no enlarged abdominal or pelvic lymph nodes.    VASCULATURE: Extensive calcifications of the abdominal aorta and its   branches with areas of bilateral common femoral arteries appearing   occluded with distal reconstitution.    BOWEL: No evidence for bowel obstruction or bowel wall thickening.   Unremarkable appendix. Small hiatal hernia.    PERITONEUM/RETROPERITONEUM/MESENTERY: There is no ascites or   pneumoperitoneum.    PELVIC VISCERA: Redemonstrated mildly thickened appearance of the urinary   bladder. Enlarged prostate gland.    BONES AND SOFT TISSUES: Diffuse osteopenia. Right shoulder arthroplasty.   Chronic mild compression deformity of T6. New superior endplate   compression deformity of T2.    IMPRESSION:  Age-indeterminatesuperior endplate compression deformity of T2, new from prior 5/28/2023 study.  No additional CT evidence for acute traumatic injury to the chest, abdomen or pelvis.    CT Maxillofacial No Cont (10.28.23 @ 02:52)  SKIN AND SUBCUTANEOUS SOFT TISSUES: Paranasal and right frontal scalp   soft tissue swelling.    OSSEOUS STRUCTURES: Comminuted mildly displaced fractures of the   bilateral nasal bones and frontal processes of the bilateral maxillary   bones. Comminuted minimally displaced fractures of the perpendicular   plate of the ethmoid bone.    ORBITS: The globes, retrobulbar fat, extraocular muscles, and optic nerve   sheath complexes are intact and normal in morphology.    PARANASAL SINUSES: Mild opacification of the ethmoid sinuses.    MASTOID AIR CELLS: Clear.    OTHER:  Please see separately dictated head CT for intracranial findings.    IMPRESSION:  Multiple acute comminuted fractures involving the bilateral nasal bones,   frontal processes of the bilateral maxillary bones, and the perpendicular   plate of the ethmoid bone.    Right frontal scalp and paranasal soft tissue swelling.    CT Head and Cervical Spine No Cont (10.27.23 @ 22:44)   CT HEAD:  There is stable prominence of the cerebral sulci and fissural spaces   reflecting mild diffuse parenchymal volume loss. There is stable moderate   ventriculomegaly which may be exaggerated by the parenchymal volume loss.    There is stable large arachnoid cyst in the left middle cranial fossa.    There are scattered patchy low attenuations in the bilateral   periventricular cerebral white matter consistent with stable moderate   chronic microvascular ischemic changes.    No evidence of acute territorial infarct, intracranial hemorrhage or mass   lesion.    There are no extra-axialfluid collections.    The visualized intraorbital contents are normal. Mild mucosal thickening   in bilateral ethmoid sinuses. There is trace effusion in the right   mastoid.    There is mildly leftward displaced fractures of the bilateral frontal   processes of the maxilla. There is overlying soft tissue swelling and   abrasion in the right forehead.    CT CERVICAL SPINE:  There is diffuse osteopenia. There is a history exaggerated cervical   lordosis.    There is increased compression deformity of T2 since the prior CT   cervical spine from 5/5/2023 with approximately 50% height loss. No   associated retropulsion.    There is no evidence facet subluxation of the cervical spine.    The atlantoaxial relationships are maintained. The posterior elements are   intact. There is no significant prevertebral soft tissue swelling. The   visualized paraspinal soft tissues are unremarkable.    Moderate multilevel endplate degenerative changes as evidenced by   marginal osteophyte formation, uncovertebral spurring, loss of normal   disc space height as well as facet joint space compartment narrowing.    There is no high-grade spinal canal stenosis. Please note spinal canal   contents are suboptimally evaluated inherent to CT technique.    The visualized lung apices are within normal limits.    IMPRESSION:  CT HEAD:  No acute intracranial findings.  Stable moderate chronic microvascular ischemic changes.  Partially imaged slightly leftward displaced fractures of the frontal   processes of the bilateral maxilla. Overlying soft tissue swelling and   abrasion in the right forehead.    CT CERVICAL SPINE:  Age-indeterminate moderate compression deformity of T2, new since the   prior cervical CT from 5/28/2023. No associated retropulsion. Further   evaluation with MRI can be considered.  Stable diffuse osteopenia.  Communication: The summary of above findings were discussed with readback   confirmation with Dr. Evans by radiologist Dr. Gallego on 10/27/2023 at 11:00 PM.    CARDIOLOGY TESTING  12 Lead ECG:   Ventricular Rate 71 BPM  Atrial Rate 71 BPM  P-R Interval 194 ms  QRS Duration 74 ms  Q-T Interval 398 ms  QTC Calculation(Bazett) 432 ms  P Axis 55 degrees  R Axis 48 degrees  T Axis 68 degrees    Diagnosis Line Normal sinus rhythm  Minimal voltage criteria for LVH, may be normal variant  Borderline ECG    Confirmed by TERESA WRIGHT, ENE ([id number]) on 10/28/2023 11:49:40 PM (10-27-23 @ 22:26)    ASSESSMENT/PLAN:  82y/o Male  from [facility] pmh of HLD, schizophrenia, COPD, DM2 presenting to the ED for unwitnessed fall and head injury.  Admitted for further management.    # s/p Unwitnessed Fall  with positive Facial/Head Trauma   #T2 age indeterminate compression fx   - No acute neurosurgical intervention as per Neurosurgical team rec.- obtain PT eval, maintain falls precautions  - monitor Orthostatic VS  - f/u CK level   - TTE (May 23) - mild AS, rest normal   - pain management    #Multiple acute comminuted fractures involving the bilateral nasal bones, frontal processes of the bilateral maxillary bones, and the perpendicular plate of the ethmoid bone.  - Right eyebrow skin laceration- was sutured in ER with right periorbital edema/bruising  - OMFS on board - recs noted Right frontal scalp and paranasal soft tissue swelling.- No acute surgical intervention per OMFS.   - Afrin x 3 days. an nasal spray prn dry nose. - no nose blowing and sneezing with mouth open for 2 weeks  - s/p amoxicillin > switch to Augmentin  for one week proph. tx.  - f/up maxillofacial sx. on  Wednesday afternoon in the [facility].      #UTI due to Obstructive Uropathy  #BPH  - UA: UTI  - UCx: GNR  - CT a/P  Redemonstrated mildly thickened appearance of the urinary bladder. Enlarged prostate gland.  - trial of Augmentin  - trial of flomax  - May benefit from cytoscopy in the outpatient'  - Outpatient urology    # Hyperkalemia (resolved)-     # h/o COPD -stable, resumed on inhalers, nebs tx.     # DM 2 - bsfs every 6 hours with insulin co. prn. tx.     # CAD/ HTN/ HLD  - chloé home meds tx. ,   - can resume ace inh given hyperkalemia resolved     #Incidental Findings (Liver Nodules & Kidney cyst/hypodensity)  - Outpatient workup and follow up    #Supportive Management:  Dispo:   DVT Ppx: GI Ppx: Diet:  Diet, Regular:   Consistent Carbohydrate Evening Snack  DASH/TLC Sodium & Cholesterol Restricted (10-28-23 @ 12:47) [Active]    Total time spent to complete patient's bedside assessment, review medical chart, discuss medical plan of care with covering medical team was more than 45 minutes with >50% of time spent face to face with patient, discussion with patient/family and/or coordination of care    Resident's notes reviewed. My notes supersede resident's notes in case of discrepancy.    Mandeep Coates MD/Nirmal  Attending Physician

## 2023-10-30 ENCOUNTER — TRANSCRIPTION ENCOUNTER (OUTPATIENT)
Age: 83
End: 2023-10-30

## 2023-10-30 LAB
ALBUMIN SERPL ELPH-MCNC: 3.8 G/DL — SIGNIFICANT CHANGE UP (ref 3.5–5.2)
ALBUMIN SERPL ELPH-MCNC: 3.8 G/DL — SIGNIFICANT CHANGE UP (ref 3.5–5.2)
ALP SERPL-CCNC: 91 U/L — SIGNIFICANT CHANGE UP (ref 30–115)
ALP SERPL-CCNC: 91 U/L — SIGNIFICANT CHANGE UP (ref 30–115)
ALT FLD-CCNC: 9 U/L — SIGNIFICANT CHANGE UP (ref 0–41)
ALT FLD-CCNC: 9 U/L — SIGNIFICANT CHANGE UP (ref 0–41)
ANION GAP SERPL CALC-SCNC: 12 MMOL/L — SIGNIFICANT CHANGE UP (ref 7–14)
ANION GAP SERPL CALC-SCNC: 12 MMOL/L — SIGNIFICANT CHANGE UP (ref 7–14)
AST SERPL-CCNC: 17 U/L — SIGNIFICANT CHANGE UP (ref 0–41)
AST SERPL-CCNC: 17 U/L — SIGNIFICANT CHANGE UP (ref 0–41)
BASOPHILS # BLD AUTO: 0.02 K/UL — SIGNIFICANT CHANGE UP (ref 0–0.2)
BASOPHILS # BLD AUTO: 0.02 K/UL — SIGNIFICANT CHANGE UP (ref 0–0.2)
BASOPHILS NFR BLD AUTO: 0.3 % — SIGNIFICANT CHANGE UP (ref 0–1)
BASOPHILS NFR BLD AUTO: 0.3 % — SIGNIFICANT CHANGE UP (ref 0–1)
BILIRUB SERPL-MCNC: 0.2 MG/DL — SIGNIFICANT CHANGE UP (ref 0.2–1.2)
BILIRUB SERPL-MCNC: 0.2 MG/DL — SIGNIFICANT CHANGE UP (ref 0.2–1.2)
BUN SERPL-MCNC: 25 MG/DL — HIGH (ref 10–20)
BUN SERPL-MCNC: 25 MG/DL — HIGH (ref 10–20)
CALCIUM SERPL-MCNC: 8.8 MG/DL — SIGNIFICANT CHANGE UP (ref 8.4–10.5)
CALCIUM SERPL-MCNC: 8.8 MG/DL — SIGNIFICANT CHANGE UP (ref 8.4–10.5)
CHLORIDE SERPL-SCNC: 101 MMOL/L — SIGNIFICANT CHANGE UP (ref 98–110)
CHLORIDE SERPL-SCNC: 101 MMOL/L — SIGNIFICANT CHANGE UP (ref 98–110)
CO2 SERPL-SCNC: 23 MMOL/L — SIGNIFICANT CHANGE UP (ref 17–32)
CO2 SERPL-SCNC: 23 MMOL/L — SIGNIFICANT CHANGE UP (ref 17–32)
CREAT SERPL-MCNC: 1.2 MG/DL — SIGNIFICANT CHANGE UP (ref 0.7–1.5)
CREAT SERPL-MCNC: 1.2 MG/DL — SIGNIFICANT CHANGE UP (ref 0.7–1.5)
EGFR: 61 ML/MIN/1.73M2 — SIGNIFICANT CHANGE UP
EGFR: 61 ML/MIN/1.73M2 — SIGNIFICANT CHANGE UP
EOSINOPHIL # BLD AUTO: 0.03 K/UL — SIGNIFICANT CHANGE UP (ref 0–0.7)
EOSINOPHIL # BLD AUTO: 0.03 K/UL — SIGNIFICANT CHANGE UP (ref 0–0.7)
EOSINOPHIL NFR BLD AUTO: 0.4 % — SIGNIFICANT CHANGE UP (ref 0–8)
EOSINOPHIL NFR BLD AUTO: 0.4 % — SIGNIFICANT CHANGE UP (ref 0–8)
GLUCOSE BLDC GLUCOMTR-MCNC: 110 MG/DL — HIGH (ref 70–99)
GLUCOSE BLDC GLUCOMTR-MCNC: 110 MG/DL — HIGH (ref 70–99)
GLUCOSE BLDC GLUCOMTR-MCNC: 115 MG/DL — HIGH (ref 70–99)
GLUCOSE BLDC GLUCOMTR-MCNC: 115 MG/DL — HIGH (ref 70–99)
GLUCOSE BLDC GLUCOMTR-MCNC: 118 MG/DL — HIGH (ref 70–99)
GLUCOSE BLDC GLUCOMTR-MCNC: 118 MG/DL — HIGH (ref 70–99)
GLUCOSE BLDC GLUCOMTR-MCNC: 95 MG/DL — SIGNIFICANT CHANGE UP (ref 70–99)
GLUCOSE BLDC GLUCOMTR-MCNC: 95 MG/DL — SIGNIFICANT CHANGE UP (ref 70–99)
GLUCOSE SERPL-MCNC: 97 MG/DL — SIGNIFICANT CHANGE UP (ref 70–99)
GLUCOSE SERPL-MCNC: 97 MG/DL — SIGNIFICANT CHANGE UP (ref 70–99)
HCT VFR BLD CALC: 31.7 % — LOW (ref 42–52)
HCT VFR BLD CALC: 31.7 % — LOW (ref 42–52)
HGB BLD-MCNC: 10.6 G/DL — LOW (ref 14–18)
HGB BLD-MCNC: 10.6 G/DL — LOW (ref 14–18)
IMM GRANULOCYTES NFR BLD AUTO: 0.4 % — HIGH (ref 0.1–0.3)
IMM GRANULOCYTES NFR BLD AUTO: 0.4 % — HIGH (ref 0.1–0.3)
LYMPHOCYTES # BLD AUTO: 2 K/UL — SIGNIFICANT CHANGE UP (ref 1.2–3.4)
LYMPHOCYTES # BLD AUTO: 2 K/UL — SIGNIFICANT CHANGE UP (ref 1.2–3.4)
LYMPHOCYTES # BLD AUTO: 25.4 % — SIGNIFICANT CHANGE UP (ref 20.5–51.1)
LYMPHOCYTES # BLD AUTO: 25.4 % — SIGNIFICANT CHANGE UP (ref 20.5–51.1)
MAGNESIUM SERPL-MCNC: 2.1 MG/DL — SIGNIFICANT CHANGE UP (ref 1.8–2.4)
MAGNESIUM SERPL-MCNC: 2.1 MG/DL — SIGNIFICANT CHANGE UP (ref 1.8–2.4)
MCHC RBC-ENTMCNC: 31.4 PG — HIGH (ref 27–31)
MCHC RBC-ENTMCNC: 31.4 PG — HIGH (ref 27–31)
MCHC RBC-ENTMCNC: 33.4 G/DL — SIGNIFICANT CHANGE UP (ref 32–37)
MCHC RBC-ENTMCNC: 33.4 G/DL — SIGNIFICANT CHANGE UP (ref 32–37)
MCV RBC AUTO: 93.8 FL — SIGNIFICANT CHANGE UP (ref 80–94)
MCV RBC AUTO: 93.8 FL — SIGNIFICANT CHANGE UP (ref 80–94)
MONOCYTES # BLD AUTO: 0.63 K/UL — HIGH (ref 0.1–0.6)
MONOCYTES # BLD AUTO: 0.63 K/UL — HIGH (ref 0.1–0.6)
MONOCYTES NFR BLD AUTO: 8 % — SIGNIFICANT CHANGE UP (ref 1.7–9.3)
MONOCYTES NFR BLD AUTO: 8 % — SIGNIFICANT CHANGE UP (ref 1.7–9.3)
NEUTROPHILS # BLD AUTO: 5.17 K/UL — SIGNIFICANT CHANGE UP (ref 1.4–6.5)
NEUTROPHILS # BLD AUTO: 5.17 K/UL — SIGNIFICANT CHANGE UP (ref 1.4–6.5)
NEUTROPHILS NFR BLD AUTO: 65.5 % — SIGNIFICANT CHANGE UP (ref 42.2–75.2)
NEUTROPHILS NFR BLD AUTO: 65.5 % — SIGNIFICANT CHANGE UP (ref 42.2–75.2)
NRBC # BLD: 0 /100 WBCS — SIGNIFICANT CHANGE UP (ref 0–0)
NRBC # BLD: 0 /100 WBCS — SIGNIFICANT CHANGE UP (ref 0–0)
PLATELET # BLD AUTO: 216 K/UL — SIGNIFICANT CHANGE UP (ref 130–400)
PLATELET # BLD AUTO: 216 K/UL — SIGNIFICANT CHANGE UP (ref 130–400)
PMV BLD: 10.7 FL — HIGH (ref 7.4–10.4)
PMV BLD: 10.7 FL — HIGH (ref 7.4–10.4)
POTASSIUM SERPL-MCNC: 4.6 MMOL/L — SIGNIFICANT CHANGE UP (ref 3.5–5)
POTASSIUM SERPL-MCNC: 4.6 MMOL/L — SIGNIFICANT CHANGE UP (ref 3.5–5)
POTASSIUM SERPL-SCNC: 4.6 MMOL/L — SIGNIFICANT CHANGE UP (ref 3.5–5)
POTASSIUM SERPL-SCNC: 4.6 MMOL/L — SIGNIFICANT CHANGE UP (ref 3.5–5)
PROT SERPL-MCNC: 6.9 G/DL — SIGNIFICANT CHANGE UP (ref 6–8)
PROT SERPL-MCNC: 6.9 G/DL — SIGNIFICANT CHANGE UP (ref 6–8)
RBC # BLD: 3.38 M/UL — LOW (ref 4.7–6.1)
RBC # BLD: 3.38 M/UL — LOW (ref 4.7–6.1)
RBC # FLD: 14.6 % — HIGH (ref 11.5–14.5)
RBC # FLD: 14.6 % — HIGH (ref 11.5–14.5)
SODIUM SERPL-SCNC: 136 MMOL/L — SIGNIFICANT CHANGE UP (ref 135–146)
SODIUM SERPL-SCNC: 136 MMOL/L — SIGNIFICANT CHANGE UP (ref 135–146)
WBC # BLD: 7.88 K/UL — SIGNIFICANT CHANGE UP (ref 4.8–10.8)
WBC # BLD: 7.88 K/UL — SIGNIFICANT CHANGE UP (ref 4.8–10.8)
WBC # FLD AUTO: 7.88 K/UL — SIGNIFICANT CHANGE UP (ref 4.8–10.8)
WBC # FLD AUTO: 7.88 K/UL — SIGNIFICANT CHANGE UP (ref 4.8–10.8)

## 2023-10-30 PROCEDURE — 99232 SBSQ HOSP IP/OBS MODERATE 35: CPT

## 2023-10-30 RX ORDER — HALOPERIDOL DECANOATE 100 MG/ML
2 INJECTION INTRAMUSCULAR EVERY 8 HOURS
Refills: 0 | Status: DISCONTINUED | OUTPATIENT
Start: 2023-10-30 | End: 2023-10-31

## 2023-10-30 RX ADMIN — TAMSULOSIN HYDROCHLORIDE 0.4 MILLIGRAM(S): 0.4 CAPSULE ORAL at 21:47

## 2023-10-30 RX ADMIN — Medication 1 SPRAY(S): at 18:02

## 2023-10-30 RX ADMIN — OLANZAPINE 10 MILLIGRAM(S): 15 TABLET, FILM COATED ORAL at 11:56

## 2023-10-30 RX ADMIN — Medication 60 MILLIGRAM(S): at 05:32

## 2023-10-30 RX ADMIN — Medication 50 MILLIGRAM(S): at 21:48

## 2023-10-30 RX ADMIN — Medication 1 TABLET(S): at 18:03

## 2023-10-30 RX ADMIN — Medication 1 TABLET(S): at 09:03

## 2023-10-30 RX ADMIN — HALOPERIDOL DECANOATE 2 MILLIGRAM(S): 100 INJECTION INTRAMUSCULAR at 16:08

## 2023-10-30 RX ADMIN — OXYMETAZOLINE HYDROCHLORIDE 1 SPRAY(S): 0.5 SPRAY NASAL at 18:02

## 2023-10-30 RX ADMIN — OXYMETAZOLINE HYDROCHLORIDE 1 SPRAY(S): 0.5 SPRAY NASAL at 05:32

## 2023-10-30 RX ADMIN — Medication 3 MILLILITER(S): at 09:11

## 2023-10-30 RX ADMIN — GABAPENTIN 300 MILLIGRAM(S): 400 CAPSULE ORAL at 05:32

## 2023-10-30 RX ADMIN — Medication 25 MILLIGRAM(S): at 05:32

## 2023-10-30 RX ADMIN — GABAPENTIN 300 MILLIGRAM(S): 400 CAPSULE ORAL at 18:03

## 2023-10-30 RX ADMIN — Medication 1 SPRAY(S): at 05:31

## 2023-10-30 RX ADMIN — Medication 25 MILLIGRAM(S): at 18:03

## 2023-10-30 NOTE — DIETITIAN INITIAL EVALUATION ADULT - EDUCATION DIETARY MODIFICATIONS
discussed oral nutrition supplements and benefits/(1) partially meets; needs review/practice/verbalization

## 2023-10-30 NOTE — PHYSICAL THERAPY INITIAL EVALUATION ADULT - GENERAL OBSERVATIONS, REHAB EVAL
Pt encountered sitting on unit hallway in [facility], Liliana HUGHES present, pt with + stitches to R eyebrow, swollen below R eye. Pt agreeable to PT. 10:59-11:18. Pt encountered sitting on unit hallway in [facility], Liliana HUGHES present, pt with + stitches to R eyebrow, swollen below R eye. Pt agreeable to PT.

## 2023-10-30 NOTE — DIETITIAN INITIAL EVALUATION ADULT - OTHER INFO
84y/o Male  from [facility] pmh of HLD, schizophrenia, COPD, DM2 presenting to the ED for unwitnessed fall and head injury.  Admitted for further management.  sp unwitnessed fall with positive facial/head trauma; T2 age indeterminate compression fx; Multiple acute comminuted fractures involving the bilateral nasal bones, frontal processes of the bilateral maxillary bones, and the perpendicular plate of the ethmoid bone; UTI due to Obstructive Uropathy; BPH; Hyperkalemia - resolved; h/o COPD - stable; DM 2 bsfs every 6hr with insulin co. prn; CAD/HTN/HLD; Incidental findings (Liver Nodules and Kidney cyst / hypodensity)

## 2023-10-30 NOTE — PHYSICAL THERAPY INITIAL EVALUATION ADULT - PERTINENT HX OF CURRENT PROBLEM, REHAB EVAL
82yo M from [facility] pmh of HLD, schizophrenia, COPD, DM2 presenting to the ED for unwitnessed fall and head injury. Pt is a poor historian and has no recollection of the event. There were no witnesses per NH. He sustained a head injury. Unknown how long he was down for or LOC. Denied any active dizziness, urinary/fecal incontinence, tongue biting, palpitations, CP, SOB, abd pain, nausea/vomiting, dysuria. Not on AC.

## 2023-10-30 NOTE — PROGRESS NOTE ADULT - ASSESSMENT
84[facility], [facility]2 presenting to the ED for unwitnessed fall and head injury.  Admitted for further management.    # s/p Unwitnessed Fall  with positive Facial/Head Trauma   #T2 age indeterminate compression fx   Assessment:   - CT abd/pelvis: Age-indeterminatesuperior endplate compression deformity of T2, new from prior 5/28/2023 study.  - CT head and cervical spine with no contrast: There is increased compression deformity of T2 since the prior CT   cervical spine from 5/5/2023 with approximately 50% height loss. No associated retropulsion.  - Lac repair done in the ED (2cm above eyebrow)   - Neurosurgery consult 10/28/23: No acute neurosurgical intervention, pain management / PT if any development of pain; follow up outpatient with neurosurgery clinic PRN basis  - Trauma consult 10/28/23: CBC, BMP, Coags, T&S, UA, EtOH, EKG, Utox, Cxr, pelvic x-ray, CT head, CT c spine, CT max/face CT chest, CT abd/pelvis --> No acute trauma surgical intervention.   - Orthostatic VS negative  - f/u CK level   - TTE (May 23) - mild AS, rest normal   - EEG 10/29/23: diffuse electrocerebral dysfunction 2/2 nonspecific etiology.     Plan:   -  Follow up with oral surgery Wed afternoon (11/1) in dental clinic. Afrin x3 days. Ocean nasal spray PRN dry nose. No nose blowing and sneezing with mouth open for 2 weeks. Amoxicillin for one week.   - pain management      #Multiple acute comminuted fractures involving the bilateral nasal bones, frontal processes of the bilateral maxillary bones, and the perpendicular plate of the ethmoid bone.  Assessment:   - CT Max facial: Multiple acute comminuted fractures involving the bilateral nasal bones, frontal processes of the bilateral maxillary bones, and the perpendicular plate of the ethmoid bone.  - OMFS consult 10/28/23: Follow up with oral surgery Wed afternoon (11/1) in dental clinic.   Plan:  - Afrin x3 days. Ocean nasal spray PRN dry nose. No nose blowing and sneezing with mouth open for 2 weeks.  - s/p amoxicillin > switch to Augmentin  for one week proph. tx.  - f/up maxillofacial sx. on  Wednesday afternoon (11/1) in the [facility] clinic.      #UTI due to Obstructive Uropathy  #BPH  Assessment:   - UA: UTI  - UCx: GNR  - CT a/P  Redemonstrated mildly thickened appearance of the urinary bladder. Enlarged prostate gland.  Plan:  - trial of Augmentin  - trial of flomax  - May benefit from cytoscopy in the outpatient  - Outpatient urology    # Hyperkalemia (resolved)-     # h/o COPD -stable, resumed on inhalers, nebs tx.     # DM 2 - bsfs every 6 hours with insulin co. prn. tx.     # CAD/ HTN/ HLD  - on home meds tx. ,   - can resume ace inh given hyperkalemia resolved     #Incidental Findings (Liver Nodules & Kidney cyst/hypodensity)  - Outpatient workup and follow up    #Supportive Management:  Dispo:   DVT Ppx: GI Ppx: Diet:  Diet, Regular:   Consistent Carbohydrate Evening Snack  DASH/TLC Sodium & Cholesterol Restricted (10-28-23 @ 12:47) [Active]   84[facility] to [facility] ED for unwitnessed fall and head injury.  Admitted for further management.    # s/p Unwitnessed Fall  with positive Facial/Head Trauma   #T2 age indeterminate compression fx   Assessment:   - CT abd/pelvis: Age-indeterminate superior endplate compression deformity of T2, new from prior 5/28/2023 study.  - CT head and cervical spine with no contrast: There is increased compression deformity of T2 since the prior CT   cervical spine from 5/5/2023 with approximately 50% height loss. No associated retropulsion.  - Lac repair done in the ED (2cm above eyebrow)   - Neurosurgery consult 10/28/23: No acute neurosurgical intervention, pain management / PT if any development of pain; follow up outpatient with neurosurgery clinic PRN basis  - Trauma consult 10/28/23: CBC, BMP, Coags, T&S, UA, EtOH, EKG, Utox, Cxr, pelvic x-ray, CT head, CT c spine, CT max/face CT chest, CT abd/pelvis --> No acute trauma surgical intervention.   - Orthostatic VS negative  - f/u CK level   - TTE (May 23) - mild AS, rest normal   - EEG 10/29/23: diffuse electrocerebral dysfunction 2/2 nonspecific etiology.     Plan:   -  Follow up with oral surgery Wed afternoon (11/1) in dental clinic. Afrin x3 days. Ocean nasal spray PRN dry nose. No nose blowing and sneezing with mouth open for 2 weeks. Amoxicillin for one week.   - pain management      #Multiple acute comminuted fractures involving the bilateral nasal bones, frontal processes of the bilateral maxillary bones, and the perpendicular plate of the ethmoid bone.  Assessment:   - CT Max facial: Multiple acute comminuted fractures involving the bilateral nasal bones, frontal processes of the bilateral maxillary bones, and the perpendicular plate of the ethmoid bone.  - OMFS consult 10/28/23: Follow up with oral surgery Wed afternoon (11/1) in dental clinic.   Plan:  - Afrin x3 days. Ocean nasal spray PRN dry nose. No nose blowing and sneezing with mouth open for 2 weeks.  - s/p amoxicillin > switch to Augmentin  for one week proph. tx.  - f/up maxillofacial sx. on  Wednesday afternoon (11/1) in the [facility] clinic.      #UTI due to Obstructive Uropathy  #BPH  Assessment:   - UA: UTI  - UCx: GNR  - CT a/P  Redemonstrated mildly thickened appearance of the urinary bladder. Enlarged prostate gland.  Plan:  - trial of Augmentin for 1 wk   - trial of flomax  - May benefit from cytoscopy in the outpatient  - Outpatient urology    # Hyperkalemia (resolved)-     # h/o COPD -stable, resumed on inhalers, nebs tx.     # DM 2 - bsfs every 6 hours with insulin co. prn. tx.     # CAD/ HTN/ HLD  - on home meds tx. ,   - can resume ace inh given hyperkalemia resolved     #Incidental Findings (Liver Nodules & Kidney cyst/hypodensity)  - Outpatient workup and follow up    #Supportive Management:  Dispo:   DVT Ppx: GI Ppx: Diet:  Diet, Regular:   Consistent Carbohydrate Evening Snack  DASH/TLC Sodium & Cholesterol Restricted (10-28-23 @ 12:47) [Active]

## 2023-10-30 NOTE — DISCHARGE NOTE PROVIDER - NSDCCPCAREPLAN_GEN_ALL_CORE_FT
PRINCIPAL DISCHARGE DIAGNOSIS  Diagnosis: DAVID (acute kidney injury)  Assessment and Plan of Treatment: Please follow up with your primary care doctor in 1 week.  * Continue taking your antibiotics for UTI until 11/4/23  * Flomax was started for prostate hyperplasia found on CTAP   * Follow up with your urology as needed  Please follow up with Dental clinic on 11/1/2023   * Ocean spray nasal spray as needed for dry nose   * Afrin for twice daily for three days    ________  Acute kidney failure occurs when your kidneys suddenly become unable to filter waste products from your blood. When your kidneys lose their filtering ability, dangerous levels of wastes may accumulate, and your blood's chemical makeup may get out of balance.  Acute kidney failure — also called acute renal failure or acute kidney injury — develops rapidly, usually in less than a few days. Acute kidney failure is most common in people who are already hospitalized, particularly in critically ill people who need intensive care.  Acute kidney failure can be fatal and requires intensive treatment. However, acute kidney failure may be reversible. If you're otherwise in good health, you may recover normal or nearly normal kidney function.        SECONDARY DISCHARGE DIAGNOSES  Diagnosis: Maxillary fracture  Assessment and Plan of Treatment:     Diagnosis: Fall  Assessment and Plan of Treatment:     Diagnosis: Forehead laceration  Assessment and Plan of Treatment:     Diagnosis: Urinary tract infection  Assessment and Plan of Treatment:

## 2023-10-30 NOTE — DIETITIAN INITIAL EVALUATION ADULT - PERTINENT LABORATORY DATA
10-30    136  |  101  |  25<H>  ----------------------------<  97  4.6   |  23  |  1.2    Ca    8.8      30 Oct 2023 08:51  Mg     2.1     10-30    TPro  6.9  /  Alb  3.8  /  TBili  0.2  /  DBili  x   /  AST  17  /  ALT  9   /  AlkPhos  91  10-30  POCT Blood Glucose.: 95 mg/dL (10-30-23 @ 07:55)  A1C with Estimated Average Glucose Result: 5.7 % (01-08-23 @ 09:02)

## 2023-10-30 NOTE — PROGRESS NOTE ADULT - SUBJECTIVE AND OBJECTIVE BOX
HPI:    84yo M from [facility] pmh of HLD, schizophrenia, COPD, DM2 presenting to the ED for unwitnessed fall and head injury.     Pt is a poor historian and has no recollection of the event. There were no witnesses per NH. He sustained a head injury. Unknown how long he was down for or LOC. Denied any active dizziness, urinary/fecal incontinence, tongue biting, palpitations, CP, SOB, abd pain, nausea/vomiting, dysuria. Not on AC    Ed vitals - · BP - 178/77mmhg; HR -78/min; RA, afebrile   Trauma workup + - facial bones communited fractures, T2 compression fracture    labs - K 5.5, creat 1.4(baseline ~1); UA +ve    ecg - normal     At baseline, he walks independently, occasionally uses cane. He has history positive orthostatics and fall with a similar history in the past.     Patient was admitted for syncope workup and trauma management.         24H events:    Patient is a 83y old Male who presents with a chief complaint of Unwitnessed Fall and Trauma (29 Oct 2023 11:33)    Primary diagnosis of DAVID (acute kidney injury)      Day 1:  Day 2:  Day 3:     Today is hospital day 2d. This morning patient was seen and examined at bedside, resting comfortably in bed.    No acute or major events overnight.    Code Status:    Family communication:  Contact date:  Name of person contacted:  Relationship to patient:  Communication details:  What matters most:    PAST MEDICAL & SURGICAL HISTORY  Schizophrenia    Diabetes type 2, controlled    COPD (chronic obstructive pulmonary disease)    Dyslipidemia    Chronic retention of urine    Dyslipidemia    Encounter for screening colonoscopy      SOCIAL HISTORY:  Social History:      ALLERGIES:  No Known Allergies    MEDICATIONS:  STANDING MEDICATIONS  albuterol    0.083%. 2.5 milliGRAM(s) Nebulizer once  albuterol/ipratropium for Nebulization 3 milliLiter(s) Nebulizer every 6 hours  amoxicillin  875 milliGRAM(s)/clavulanate 1 Tablet(s) Oral every 12 hours  dextrose 5%. 1000 milliLiter(s) IV Continuous <Continuous>  dextrose 50% Injectable 25 Gram(s) IV Push once  gabapentin 300 milliGRAM(s) Oral two times a day  glucagon  Injectable 1 milliGRAM(s) IntraMuscular once  insulin lispro (ADMELOG) corrective regimen sliding scale   SubCutaneous three times a day before meals  ipratropium  for Nebulization. 500 MICROGram(s) Nebulizer once  metoprolol tartrate 25 milliGRAM(s) Oral two times a day  NIFEdipine XL 60 milliGRAM(s) Oral daily  OLANZapine 10 milliGRAM(s) Oral daily  oxymetazoline 0.05% Nasal Spray 1 Spray(s) Both Nostrils two times a day  sodium chloride 0.65% Nasal 1 Spray(s) Both Nostrils two times a day  sodium chloride 0.9%. 1000 milliLiter(s) IV Continuous <Continuous>  tamsulosin 0.4 milliGRAM(s) Oral at bedtime  traZODone 50 milliGRAM(s) Oral at bedtime    PRN MEDICATIONS  acetaminophen     Tablet .. 650 milliGRAM(s) Oral every 6 hours PRN  aluminum hydroxide/magnesium hydroxide/simethicone Suspension 30 milliLiter(s) Oral every 4 hours PRN  dextrose Oral Gel 15 Gram(s) Oral once PRN  ketorolac   Injectable 15 milliGRAM(s) IV Push every 6 hours PRN  melatonin 3 milliGRAM(s) Oral at bedtime PRN  ondansetron Injectable 4 milliGRAM(s) IV Push every 8 hours PRN    VITALS:   T(F): 97.7  HR: 75  BP: 170/74  RR: 18  SpO2: 99%    PHYSICAL EXAM:    LABS:                        10.5   7.67  )-----------( 192      ( 28 Oct 2023 22:47 )             32.0     10-28    135  |  100  |  18  ----------------------------<  95  4.3   |  22  |  1.1    Ca    8.6      28 Oct 2023 22:47    TPro  7.0  /  Alb  3.7  /  TBili  <0.2  /  DBili  x   /  AST  17  /  ALT  7   /  AlkPhos  101  10-28      Urinalysis Basic - ( 28 Oct 2023 22:47 )    Color: x / Appearance: x / SG: x / pH: x  Gluc: 95 mg/dL / Ketone: x  / Bili: x / Urobili: x   Blood: x / Protein: x / Nitrite: x   Leuk Esterase: x / RBC: x / WBC x   Sq Epi: x / Non Sq Epi: x / Bacteria: x            Culture - Urine (collected 28 Oct 2023 05:10)  Source: Clean Catch Clean Catch (Midstream)  Preliminary Report (29 Oct 2023 11:42):    >100,000 CFU/ml Gram Negative Rods      CARDIAC MARKERS ( 28 Oct 2023 18:58 )  x     / x     / 114 U/L / x     / x          RADIOLOGY:           HPI:    82yo M from [facility] pmh of HLD, schizophrenia, COPD, DM2 presenting to the ED for unwitnessed fall and head injury.     Pt is a poor historian and has no recollection of the event. There were no witnesses per NH. He sustained a head injury. Unknown how long he was down for or LOC. Denied any active dizziness, urinary/fecal incontinence, tongue biting, palpitations, CP, SOB, abd pain, nausea/vomiting, dysuria. Not on AC    Ed vitals - · BP - 178/77mmhg; HR -78/min; RA, afebrile   Trauma workup + - facial bones communited fractures, T2 compression fracture    labs - K 5.5, creat 1.4(baseline ~1); UA +ve    ecg - normal     At baseline, he walks independently, occasionally uses cane. He has history positive orthostatics and fall with a similar history in the past.     Patient was admitted for syncope workup and trauma management.         24H events:    Patient is a 83y old Male who presents with a chief complaint of Unwitnessed Fall and Trauma (29 Oct 2023 11:33)    Primary diagnosis of DAVID (acute kidney injury)    Hospital day 2d (10/30/23)   This morning patient was seen and examined at bedside, resting comfortably in bed.    No acute or major events overnight. Patient denies any pain anywhere. Denies eye pain or back pain.         PAST MEDICAL & SURGICAL HISTORY  Schizophrenia    Diabetes type 2, controlled    COPD (chronic obstructive pulmonary disease)    Dyslipidemia    Chronic retention of urine    Dyslipidemia    Encounter for screening colonoscopy      SOCIAL HISTORY:  Social History:      ALLERGIES:  No Known Allergies    MEDICATIONS:  STANDING MEDICATIONS  albuterol    0.083%. 2.5 milliGRAM(s) Nebulizer once  albuterol/ipratropium for Nebulization 3 milliLiter(s) Nebulizer every 6 hours  amoxicillin  875 milliGRAM(s)/clavulanate 1 Tablet(s) Oral every 12 hours  dextrose 5%. 1000 milliLiter(s) IV Continuous <Continuous>  dextrose 50% Injectable 25 Gram(s) IV Push once  gabapentin 300 milliGRAM(s) Oral two times a day  glucagon  Injectable 1 milliGRAM(s) IntraMuscular once  insulin lispro (ADMELOG) corrective regimen sliding scale   SubCutaneous three times a day before meals  ipratropium  for Nebulization. 500 MICROGram(s) Nebulizer once  metoprolol tartrate 25 milliGRAM(s) Oral two times a day  NIFEdipine XL 60 milliGRAM(s) Oral daily  OLANZapine 10 milliGRAM(s) Oral daily  oxymetazoline 0.05% Nasal Spray 1 Spray(s) Both Nostrils two times a day  sodium chloride 0.65% Nasal 1 Spray(s) Both Nostrils two times a day  sodium chloride 0.9%. 1000 milliLiter(s) IV Continuous <Continuous>  tamsulosin 0.4 milliGRAM(s) Oral at bedtime  traZODone 50 milliGRAM(s) Oral at bedtime    PRN MEDICATIONS  acetaminophen     Tablet .. 650 milliGRAM(s) Oral every 6 hours PRN  aluminum hydroxide/magnesium hydroxide/simethicone Suspension 30 milliLiter(s) Oral every 4 hours PRN  dextrose Oral Gel 15 Gram(s) Oral once PRN  ketorolac   Injectable 15 milliGRAM(s) IV Push every 6 hours PRN  melatonin 3 milliGRAM(s) Oral at bedtime PRN  ondansetron Injectable 4 milliGRAM(s) IV Push every 8 hours PRN    VITALS:   T(F): 97.7  HR: 75  BP: 170/74  RR: 18  SpO2: 99%    PHYSICAL EXAM:      24H events:    Patient is a 83y old Male who presents with a chief complaint of Unwitnessed Fall and Trauma (30 Oct 2023 06:20)    Primary diagnosis of DAVID (acute kidney injury)      Day 1:  Day 2:  Day 3:     Today is hospital day 2d. This morning patient was seen and examined at bedside, resting comfortably in bed.    No acute or major events overnight.    Code Status:    Family communication:  Contact date:  Name of person contacted:  Relationship to patient:  Communication details:  What matters most:    PAST MEDICAL & SURGICAL HISTORY  Schizophrenia    Diabetes type 2, controlled    COPD (chronic obstructive pulmonary disease)    Dyslipidemia    Chronic retention of urine    Dyslipidemia    Encounter for screening colonoscopy      SOCIAL HISTORY:  Social History:      ALLERGIES:  No Known Allergies    MEDICATIONS:  STANDING MEDICATIONS  albuterol    0.083%. 2.5 milliGRAM(s) Nebulizer once  albuterol/ipratropium for Nebulization 3 milliLiter(s) Nebulizer every 6 hours  amoxicillin  875 milliGRAM(s)/clavulanate 1 Tablet(s) Oral every 12 hours  dextrose 5%. 1000 milliLiter(s) IV Continuous <Continuous>  dextrose 50% Injectable 25 Gram(s) IV Push once  gabapentin 300 milliGRAM(s) Oral two times a day  glucagon  Injectable 1 milliGRAM(s) IntraMuscular once  insulin lispro (ADMELOG) corrective regimen sliding scale   SubCutaneous three times a day before meals  ipratropium  for Nebulization. 500 MICROGram(s) Nebulizer once  metoprolol tartrate 25 milliGRAM(s) Oral two times a day  NIFEdipine XL 60 milliGRAM(s) Oral daily  OLANZapine 10 milliGRAM(s) Oral daily  oxymetazoline 0.05% Nasal Spray 1 Spray(s) Both Nostrils two times a day  sodium chloride 0.65% Nasal 1 Spray(s) Both Nostrils two times a day  sodium chloride 0.9%. 1000 milliLiter(s) IV Continuous <Continuous>  tamsulosin 0.4 milliGRAM(s) Oral at bedtime  traZODone 50 milliGRAM(s) Oral at bedtime    PRN MEDICATIONS  acetaminophen     Tablet .. 650 milliGRAM(s) Oral every 6 hours PRN  aluminum hydroxide/magnesium hydroxide/simethicone Suspension 30 milliLiter(s) Oral every 4 hours PRN  dextrose Oral Gel 15 Gram(s) Oral once PRN  ketorolac   Injectable 15 milliGRAM(s) IV Push every 6 hours PRN  melatonin 3 milliGRAM(s) Oral at bedtime PRN  ondansetron Injectable 4 milliGRAM(s) IV Push every 8 hours PRN    VITALS:   T(F): 97.7  HR: 75  BP: 170/74  RR: 18  SpO2: 99%    PHYSICAL EXAM:  GENERAL:   (  ) NAD, lying in bed comfortably     (  ) obtunded     (  ) lethargic     (  ) somnolent    HEAD:   (  ) Atraumatic     (  ) hematoma     (  ) laceration (specify location:       )     NECK:  (  ) Supple     (  ) neck stiffness     (  ) nuchal rigidity     (  )  no JVD     (  ) JVD present ( -- cm)    HEART:  Rate -->     (  ) normal rate     (  ) bradycardic     (  ) tachycardic  Rhythm -->     (  ) regular     (  ) regularly irregular     (  ) irregularly irregular  Murmurs -->     (  ) normal s1s2     (  ) systolic murmur     (  ) diastolic murmur     (  ) continuous murmur      (  ) S3 present     (  ) S4 present    LUNGS:   (  )Unlabored respirations     (  ) tachypnea  (  ) B/L air entry     (  ) decreased breath sounds in:  (location     )    (  ) no adventitious sound     (  ) crackles     (  ) wheezing      (  ) rhonchi      (specify location:       )  (  ) chest wall tenderness (specify location:       )    ABDOMEN:   (  ) Soft     (  ) tense   |   (  ) nondistended     (  ) distended   |   (  ) +BS     (  ) hypoactive bowel sounds     (  ) hyperactive bowel sounds  (  ) nontender     (  ) RUQ tenderness     (  ) RLQ tenderness     (  ) LLQ tenderness     (  ) epigastric tenderness     (  ) diffuse tenderness  (  ) Splenomegaly      (  ) Hepatomegaly      (  ) Jaundice     (  ) ecchymosis     EXTREMITIES: 2+ peripheral pulses bilaterally. No clubbing, cyanosis, or edema  (  ) Normal     (  ) Rash     (  ) ecchymosis     (  ) varicose veins      (  ) pitting edema     (  ) non-pitting edema   (  ) ulceration     (  ) gangrene:     (location:     )    NERVOUS SYSTEM:    (  ) A&Ox3     (  ) confused     (  ) lethargic  CN II-XII:     (  ) Intact     (  ) deficits found     (Specify:     )   Upper extremities:     (  ) no sensorimotor deficits     (  ) weakness     (  ) loss of proprioception/vibration     (  ) loss of touch/temperature (specify:    )  Lower extremities:     (  ) no sensorimotor deficits     (  ) weakness     (  ) loss of proprioception/vibration     (  ) loss of touch/temperature (specify:    )    SKIN:   (  ) No rashes or lesions     (  ) maculopapular rash     (  ) pustules     (  ) vesicles     (  ) ulcer     (  ) ecchymosis     (specify location:     )    AMPAC score:    (  ) Indwelling Cobos Catheter:   Date insterted:    Reason (  ) Critical illness     (  ) urinary retention    (  ) Accurate Ins/Outs Monitoring     (  ) CMO patient    (  ) Central Line:   Date inserted:  Location: (  ) Right IJ     (  ) Left IJ     (  ) Right Fem     (  ) Left Fem    (  ) SPC        (  ) pigtail       (  ) PEG tube       (  ) colostomy       (  ) jejunostomy  (  ) U-Dall    LABS:                        10.5   7.67  )-----------( 192      ( 28 Oct 2023 22:47 )             32.0     10-28    135  |  100  |  18  ----------------------------<  95  4.3   |  22  |  1.1    Ca    8.6      28 Oct 2023 22:47    TPro  7.0  /  Alb  3.7  /  TBili  <0.2  /  DBili  x   /  AST  17  /  ALT  7   /  AlkPhos  101  10-28      Urinalysis Basic - ( 28 Oct 2023 22:47 )    Color: x / Appearance: x / SG: x / pH: x  Gluc: 95 mg/dL / Ketone: x  / Bili: x / Urobili: x   Blood: x / Protein: x / Nitrite: x   Leuk Esterase: x / RBC: x / WBC x   Sq Epi: x / Non Sq Epi: x / Bacteria: x            Culture - Urine (collected 28 Oct 2023 05:10)  Source: Clean Catch Clean Catch (Midstream)  Preliminary Report (29 Oct 2023 11:42):    >100,000 CFU/ml Gram Negative Rods      CARDIAC MARKERS ( 28 Oct 2023 18:58 )  x     / x     / 114 U/L / x     / x          RADIOLOGY:            LABS:                        10.5   7.67  )-----------( 192      ( 28 Oct 2023 22:47 )             32.0     10-28    135  |  100  |  18  ----------------------------<  95  4.3   |  22  |  1.1    Ca    8.6      28 Oct 2023 22:47    TPro  7.0  /  Alb  3.7  /  TBili  <0.2  /  DBili  x   /  AST  17  /  ALT  7   /  AlkPhos  101  10-28      Urinalysis Basic - ( 28 Oct 2023 22:47 )    Color: x / Appearance: x / SG: x / pH: x  Gluc: 95 mg/dL / Ketone: x  / Bili: x / Urobili: x   Blood: x / Protein: x / Nitrite: x   Leuk Esterase: x / RBC: x / WBC x   Sq Epi: x / Non Sq Epi: x / Bacteria: x            Culture - Urine (collected 28 Oct 2023 05:10)  Source: Clean Catch Clean Catch (Midstream)  Preliminary Report (29 Oct 2023 11:42):    >100,000 CFU/ml Gram Negative Rods      CARDIAC MARKERS ( 28 Oct 2023 18:58 )  x     / x     / 114 U/L / x     / x          RADIOLOGY:           HPI:    84yo M from [facility] pmh of HLD, schizophrenia, COPD, DM2 presenting to the ED for unwitnessed fall and head injury.     Pt is a poor historian and has no recollection of the event. There were no witnesses per NH. He sustained a head injury. Unknown how long he was down for or LOC. Denied any active dizziness, urinary/fecal incontinence, tongue biting, palpitations, CP, SOB, abd pain, nausea/vomiting, dysuria. Not on AC    Ed vitals - · BP - 178/77mmhg; HR -78/min; RA, afebrile   Trauma workup + - facial bones communited fractures, T2 compression fracture    labs - K 5.5, creat 1.4(baseline ~1); UA +ve    ecg - normal     At baseline, he walks independently, occasionally uses cane. He has history positive orthostatics and fall with a similar history in the past.     Patient was admitted for syncope workup and trauma management.         24H events:    Patient is a 83y old Male who presents with a chief complaint of Unwitnessed Fall and Trauma (29 Oct 2023 11:33)    Primary diagnosis of DAVID (acute kidney injury)    Hospital day 2d (10/30/23)   This morning patient was seen and examined at bedside, resting comfortably in bed.    No acute or major events overnight. No complaints.         PAST MEDICAL & SURGICAL HISTORY  Schizophrenia    Diabetes type 2, controlled    COPD (chronic obstructive pulmonary disease)    Dyslipidemia    Chronic retention of urine    Dyslipidemia    Encounter for screening colonoscopy      SOCIAL HISTORY:  Social History:      ALLERGIES:  No Known Allergies    MEDICATIONS:  STANDING MEDICATIONS  albuterol    0.083%. 2.5 milliGRAM(s) Nebulizer once  albuterol/ipratropium for Nebulization 3 milliLiter(s) Nebulizer every 6 hours  amoxicillin  875 milliGRAM(s)/clavulanate 1 Tablet(s) Oral every 12 hours  dextrose 5%. 1000 milliLiter(s) IV Continuous <Continuous>  dextrose 50% Injectable 25 Gram(s) IV Push once  gabapentin 300 milliGRAM(s) Oral two times a day  glucagon  Injectable 1 milliGRAM(s) IntraMuscular once  insulin lispro (ADMELOG) corrective regimen sliding scale   SubCutaneous three times a day before meals  ipratropium  for Nebulization. 500 MICROGram(s) Nebulizer once  metoprolol tartrate 25 milliGRAM(s) Oral two times a day  NIFEdipine XL 60 milliGRAM(s) Oral daily  OLANZapine 10 milliGRAM(s) Oral daily  oxymetazoline 0.05% Nasal Spray 1 Spray(s) Both Nostrils two times a day  sodium chloride 0.65% Nasal 1 Spray(s) Both Nostrils two times a day  sodium chloride 0.9%. 1000 milliLiter(s) IV Continuous <Continuous>  tamsulosin 0.4 milliGRAM(s) Oral at bedtime  traZODone 50 milliGRAM(s) Oral at bedtime    PRN MEDICATIONS  acetaminophen     Tablet .. 650 milliGRAM(s) Oral every 6 hours PRN  aluminum hydroxide/magnesium hydroxide/simethicone Suspension 30 milliLiter(s) Oral every 4 hours PRN  dextrose Oral Gel 15 Gram(s) Oral once PRN  ketorolac   Injectable 15 milliGRAM(s) IV Push every 6 hours PRN  melatonin 3 milliGRAM(s) Oral at bedtime PRN  ondansetron Injectable 4 milliGRAM(s) IV Push every 8 hours PRN    VITALS:   T(F): 97.7  HR: 75  BP: 170/74  RR: 18  SpO2: 99%    PHYSICAL EXAM:    GENERAL: patient appears well, no acute distress, appropriate, pleasant  EYES: able to open both eyes with normal extraocular movement   NECK: supple  LUNGS: unlabored breathing, mild wheezing on the L lung, quiet breath sounds   HEART: soft S1/S2, regular rate and rhythm  GASTROINTESTINAL: abdomen non distended, no rebound tenderness, no tenderness to palpation   NEUROLOGIC: awake, alert  PSYCHIATRIC:  linear and logical thought process      LABS:                        10.5   7.67  )-----------( 192      ( 28 Oct 2023 22:47 )             32.0     10-28    135  |  100  |  18  ----------------------------<  95  4.3   |  22  |  1.1    Ca    8.6      28 Oct 2023 22:47    TPro  7.0  /  Alb  3.7  /  TBili  <0.2  /  DBili  x   /  AST  17  /  ALT  7   /  AlkPhos  101  10-28      Urinalysis Basic - ( 28 Oct 2023 22:47 )    Color: x / Appearance: x / SG: x / pH: x  Gluc: 95 mg/dL / Ketone: x  / Bili: x / Urobili: x   Blood: x / Protein: x / Nitrite: x   Leuk Esterase: x / RBC: x / WBC x   Sq Epi: x / Non Sq Epi: x / Bacteria: x            Culture - Urine (collected 28 Oct 2023 05:10)  Source: Clean Catch Clean Catch (Midstream)  Preliminary Report (29 Oct 2023 11:42):    >100,000 CFU/ml Gram Negative Rods      CARDIAC MARKERS ( 28 Oct 2023 18:58 )  x     / x     / 114 U/L / x     / x          RADIOLOGY:

## 2023-10-30 NOTE — DISCHARGE NOTE PROVIDER - NSDCFUADDAPPT_GEN_ALL_CORE_FT
Please follow up with your primary care doctor in 1 week.  * Continue taking your antibiotics for UTI until 11/4/23  * Flomax was started for prostate hyperplasia found on CTAP   * Follow up with your urology as needed    Please follow up with Dental clinic on 11/1/2023   * Ocean spray nasal spray as needed for dry nose   * Afrin for twice daily for three days     Please follow up with your primary care doctor in 1 week.  * Continue taking your antibiotics for UTI until 11/4/23  * Flomax was started for prostate hyperplasia found on CTAP   * Follow up with your urology as needed    Please follow up with Dental clinic on 11/1/2023   * Ocean spray nasal spray as needed for dry nose

## 2023-10-30 NOTE — DIETITIAN INITIAL EVALUATION ADULT - OTHER CALCULATIONS
Energy: 1707 - 1850 kcal/day (MSJ x 1.2 - 1.3 SF) - Estimated needs with consideration for age, weight, acuity of illness

## 2023-10-30 NOTE — DIETITIAN INITIAL EVALUATION ADULT - NS FNS REASON FOR WEIGHT CHANG
Weight hx per EMR within 1 year:    68 kg - 149.6 lbs (5/27/23)  74.5 kg - 163.9 lbs (2/24/23)  68 kg - 149.6 lbs (1/7/23)    No significant weight changes seen - does not meet weight criteria for malnutrition

## 2023-10-30 NOTE — DIETITIAN INITIAL EVALUATION ADULT - COLLABORATION WITH OTHER PROVIDERS
Intervention: meals and snacks, medical food supplements, coordination of care  Monitoring/Evaluation: diet order, energy intake, weight, labs, skin status, NFPE

## 2023-10-30 NOTE — DIETITIAN INITIAL EVALUATION ADULT - NSFNSGIASSESSMENTFT_GEN_A_CORE
This patient has been assessed with a concern for Malnutrition and was treated during this hospitalization for the following Nutrition diagnosis/diagnoses:     -  10/12/2023: Moderate protein-calorie malnutrition   last BM 10/30

## 2023-10-30 NOTE — DISCHARGE NOTE PROVIDER - NSFOLLOWUPCLINICS_GEN_ALL_ED_FT
[facility]  Dental  [address] [place] [id number]  Phone: [phone]  Fax:   Scheduled Appointment: 11/1/2023

## 2023-10-30 NOTE — PHYSICAL THERAPY INITIAL EVALUATION ADULT - NSACTIVITYREC_GEN_A_PT
Reviewed ther ex's for B LE to perform throughout day in sitting: hip flex, knee flex/ext, ankle pumps, 10 reps each. Recommend pt to amb on unit with unit staff using RW to prevent deconditioning.

## 2023-10-30 NOTE — DISCHARGE NOTE PROVIDER - HOSPITAL COURSE
84y[facility]o [facility]  [facility], [facility], COPD, DM2 presenting to the ED for unwitnessed fall and head injury.  Admitted for further management.    # s/p Unwitnessed Fall  with positive Facial/Head Trauma   #T2 age indeterminate compression fx   Assessment:   - CT abd/pelvis: Age-indeterminate superior endplate compression deformity of T2, new from prior 5/28/2023 study.  - CT head and cervical spine with no contrast: There is increased compression deformity of T2 since the prior CT   cervical spine from 5/5/2023 with approximately 50% height loss. No associated retropulsion.  - Lac repair done in the ED (2cm above eyebrow)   - Neurosurgery consult 10/28/23: No acute neurosurgical intervention, pain management / PT if any development of pain; follow up outpatient with neurosurgery clinic PRN basis  - Trauma consult 10/28/23: CBC, BMP, Coags, T&S, UA, EtOH, EKG, Utox, Cxr, pelvic x-ray, CT head, CT c spine, CT max/face CT chest, CT abd/pelvis --> No acute trauma surgical intervention.   - Orthostatic VS negative  - f/u CK level   - TTE (May 23) - mild AS, rest normal   - EEG 10/29/23: diffuse electrocerebral dysfunction 2/2 nonspecific etiology.     Plan:   -  Follow up with oral surgery Wed afternoon (11/1) in dental clinic. Afrin x3 days. Ocean nasal spray PRN dry nose. No nose blowing and sneezing with mouth open for 2 weeks. Amoxicillin for one week.   - pain management      #Multiple acute comminuted fractures involving the bilateral nasal bones, frontal processes of the bilateral maxillary bones, and the perpendicular plate of the ethmoid bone.  Assessment:   - CT Max facial: Multiple acute comminuted fractures involving the bilateral nasal bones, frontal processes of the bilateral maxillary bones, and the perpendicular plate of the ethmoid bone.  - OMFS consult 10/28/23: Follow up with oral surgery Wed afternoon (11/1) in dental clinic.   Plan:  - Afrin x3 days. Ocean nasal spray PRN dry nose. No nose blowing and sneezing with mouth open for 2 weeks.  - s/p amoxicillin > switch to Augmentin  for one week proph. tx.  - f/up maxillofacial sx. on  Wednesday afternoon (11/1) in the [facility] clinic.      #UTI due to Obstructive Uropathy  #BPH  Assessment:   - UA: UTI  - UCx: GNR  - CT a/P  Redemonstrated mildly thickened appearance of the urinary bladder. Enlarged prostate gland.  Plan:  - trial of Augmentin for 1 wk   - trial of flomax  - May benefit from cytoscopy in the outpatient  - Outpatient urology    # Hyperkalemia (resolved)-     # h/o COPD -stable, resumed on inhalers, nebs tx.     # DM 2 - bsfs every 6 hours with insulin co. prn. tx.     # CAD/ HTN/ HLD  - on home meds tx. ,   - can resume ace inh given hyperkalemia resolved     #Incidental Findings (Liver Nodules & Kidney cyst/hypodensity)  - Outpatient workup and follow up    ________________    Please follow up with your primary care doctor in 1 week.  * Continue taking your antibiotics for UTI until 11/4/23  * Flomax was started for prostate hyperplasia found on CTAP   * Follow up with your urology as needed    Please follow up with Dental clinic on 11/1/2023   * Ocean spray nasal spray as needed for dry nose   * Afrin for twice daily for three days    82[facility], [facility], DM2 presenting to the ED for unwitnessed fall and head injury.  Admitted for further management.    # s/p Unwitnessed Fall  with positive Facial/Head Trauma   #T2 age indeterminate compression fx   Assessment:   - CT abd/pelvis: Age-indeterminate superior endplate compression deformity of T2, new from prior 5/28/2023 study.  - CT head and cervical spine with no contrast: There is increased compression deformity of T2 since the prior CT   cervical spine from 5/5/2023 with approximately 50% height loss. No associated retropulsion.  - Lac repair done in the ED (2cm above eyebrow)   - Neurosurgery consult 10/28/23: No acute neurosurgical intervention, pain management / PT if any development of pain; follow up outpatient with neurosurgery clinic PRN basis  - Trauma consult 10/28/23: CBC, BMP, Coags, T&S, UA, EtOH, EKG, Utox, Cxr, pelvic x-ray, CT head, CT c spine, CT max/face CT chest, CT abd/pelvis --> No acute trauma surgical intervention.   - Orthostatic VS negative  - f/u CK level   - TTE (May 23) - mild AS, rest normal   - EEG 10/29/23: diffuse electrocerebral dysfunction 2/2 nonspecific etiology.     Plan:   -  Follow up with oral surgery Wed afternoon (11/1) in dental clinic. Afrin x3 days. Ocean nasal spray PRN dry nose. No nose blowing and sneezing with mouth open for 2 weeks. Amoxicillin for one week.   - pain management    #Multiple acute comminuted fractures involving the bilateral nasal bones, frontal processes of the bilateral maxillary bones, and the perpendicular plate of the ethmoid bone.  Assessment:   - CT Max facial: Multiple acute comminuted fractures involving the bilateral nasal bones, frontal processes of the bilateral maxillary bones, and the perpendicular plate of the ethmoid bone.  - OMFS consult 10/28/23: Follow up with oral surgery Wed afternoon (11/1) in dental clinic.   Plan:  - Afrin x3 days. Ocean nasal spray PRN dry nose. No nose blowing and sneezing with mouth open for 2 weeks.  - s/p amoxicillin > switch to Augmentin  for one week proph. tx.  - f/up maxillofacial sx. on  Wednesday afternoon (11/1) in the [facility] clinic.      #UTI due to Obstructive Uropathy  #BPH  Assessment:   - UA: UTI  - UCx: GNR  - CT a/P  Redemonstrated mildly thickened appearance of the urinary bladder. Enlarged prostate gland.  Plan:  - trial of Augmentin for 1 wk   - trial of flomax  - May benefit from cytoscopy in the outpatient  - Outpatient urology    # Hyperkalemia (resolved)-     # h/o COPD -stable, resumed on inhalers, nebs tx.     # DM 2 - bsfs every 6 hours with insulin co. prn. tx.     # CAD/ HTN/ HLD  - on home meds tx. ,   - can resume ace inh given hyperkalemia resolved     #Incidental Findings (Liver Nodules & Kidney cyst/hypodensity)  - Outpatient workup and follow up    ________________    Please follow up with your primary care doctor in 1 week.  * Continue taking your antibiotics for UTI until 11/4/23  * Flomax was started for prostate hyperplasia found on CTAP   * Follow up with your urology as needed    Please follow up with Dental clinic on 11/1/2023   * Ocean spray nasal spray as needed for dry nose   * Afrin for twice daily for three days

## 2023-10-30 NOTE — DISCHARGE NOTE PROVIDER - NSDCMRMEDTOKEN_GEN_ALL_CORE_FT
albuterol 90 mcg/inh inhalation aerosol: 1 puff(s) inhaled prn as needed every 6 hours  amoxicillin-clavulanate 875 mg-125 mg oral tablet: 1 tab(s) orally every 12 hours Until November 04 2023  Centrum oral tablet: 1 tab(s) orally once a day  gabapentin 300 mg oral capsule: 1 cap(s) orally 2 times a day  lisinopril 5 mg oral tablet: 1 tab(s) orally once a day  metFORMIN 500 mg oral tablet: 1 tab(s) orally 2 times a day  metoprolol tartrate 25 mg oral tablet: 1 tab(s) orally 2 times a day  NIFEdipine (Eqv-Adalat CC) 60 mg oral tablet, extended release: 1 tab(s) orally once a day  Ocean Complete nasal spray: 1 puff(s) in each nostril every 12 hours  OLANZapine 10 mg oral tablet: 1 tab(s) orally once a day  tamsulosin 0.4 mg oral capsule: 1 cap(s) orally once a day (at bedtime)  traZODone 50 mg oral tablet: 1 tab(s) orally once a day (at bedtime)

## 2023-10-30 NOTE — DIETITIAN INITIAL EVALUATION ADULT - ORAL INTAKE PTA/DIET HISTORY
Patient is from [facility] - He was on a NCS, BRAD diet. Received Centrum for supplementation. Patient reports  lbs. NKFA, no food intolerances reported.   RD observed that patient had consumed 100% of breakfast meal.

## 2023-10-30 NOTE — DIETITIAN INITIAL EVALUATION ADULT - PERTINENT MEDS FT
Use albuterol inhaler with spacer device, 4  puffs inhaled every 3-8 hours as needed for wheezing or cough.      Return to Emergency Department for worsening difficulty breathing, lethargy, inability to drink fluids, bluish coloration to lips, or if Dennis  seems worse to you.      COMMUNITY CLINICS     Paresh Acuna   [address]   [phone]     Isaac Rueda   [address] [facility]   [phone]     Coy Brenda    [address]   [phone]     Tacos Varela   [address] [facility] [facility]   [phone]     [facility]   [address] for HIV  Patients   [phone]     OTHER    [facility]   [address]   [phone]     [facility]   [address]   [phone]     [facility]   [address] [facility]   [phone]     [facility]   [address]   [phone]     [facility]   [address]  (Mon- Wed       Fri 1-5pm)   [phone]     [facility]    [phone]     [facility]   [address]   [phone]     [facility]   [address] [facility]   [phone]     [facility]   [address]   [phone]     MENTAL HEALTH    [facility]   [address] [facility] [address] [facility]   [phone]     [facility] [place]   [address] [place] [address]   [phone]     [facility]   [address] [facility]   [phone]     [facility]   [address]   [phone]     [facility]   [address] [facility]   [phone]     [facility]   [address]   [phone]     [facility]    [address] [phone]     [facility]    [phone]         MEDICARE AND MEDICAID SERVICES                                [phone]     [facility]    [facility] Appointment Line All Specialties    [phone]     Medicine Clinic   [address] [facility] [address]   [phone]      Dermatology   [address] [facility]   [phone]     Ophthalmology Clinic   [address] [facility] [facility]   [phone]     Primary Care   [address]   [phone]     Infectious Disease   [address]   [phone]     [facility]   [address] [facility]   [phone]     [facility]    [phone]     [facility]   [address] [facility]   [phone]     U OBGYN   [address] [facility]   [phone]         MEDICATIONS  (STANDING):  albuterol    0.083%. 2.5 milliGRAM(s) Nebulizer once  albuterol/ipratropium for Nebulization 3 milliLiter(s) Nebulizer every 6 hours  amoxicillin  875 milliGRAM(s)/clavulanate 1 Tablet(s) Oral every 12 hours  dextrose 5%. 1000 milliLiter(s) (50 mL/Hr) IV Continuous <Continuous>  dextrose 50% Injectable 25 Gram(s) IV Push once  gabapentin 300 milliGRAM(s) Oral two times a day  glucagon  Injectable 1 milliGRAM(s) IntraMuscular once  insulin lispro (ADMELOG) corrective regimen sliding scale   SubCutaneous three times a day before meals  ipratropium  for Nebulization. 500 MICROGram(s) Nebulizer once  metoprolol tartrate 25 milliGRAM(s) Oral two times a day  NIFEdipine XL 60 milliGRAM(s) Oral daily  OLANZapine 10 milliGRAM(s) Oral daily  oxymetazoline 0.05% Nasal Spray 1 Spray(s) Both Nostrils two times a day  sodium chloride 0.65% Nasal 1 Spray(s) Both Nostrils two times a day  sodium chloride 0.9%. 1000 milliLiter(s) (75 mL/Hr) IV Continuous <Continuous>  tamsulosin 0.4 milliGRAM(s) Oral at bedtime  traZODone 50 milliGRAM(s) Oral at bedtime    MEDICATIONS  (PRN):  acetaminophen     Tablet .. 650 milliGRAM(s) Oral every 6 hours PRN Temp greater or equal to 38C (100.4F), Mild Pain (1 - 3)  aluminum hydroxide/magnesium hydroxide/simethicone Suspension 30 milliLiter(s) Oral every 4 hours PRN Dyspepsia  dextrose Oral Gel 15 Gram(s) Oral once PRN Blood Glucose LESS THAN 70 milliGRAM(s)/deciliter  ketorolac   Injectable 15 milliGRAM(s) IV Push every 6 hours PRN Moderate Pain (4 - 6)  melatonin 3 milliGRAM(s) Oral at bedtime PRN Insomnia  ondansetron Injectable 4 milliGRAM(s) IV Push every 8 hours PRN Nausea and/or Vomiting

## 2023-10-30 NOTE — DIETITIAN INITIAL EVALUATION ADULT - NS FNS DIET ORDER
Diet, Regular:   Consistent Carbohydrate {Evening Snack}  DASH/TLC {Sodium & Cholesterol Restricted} (10-28-23 @ 12:47) [Active]

## 2023-10-30 NOTE — PROGRESS NOTE ADULT - SUBJECTIVE AND OBJECTIVE BOX
MEDICINE ATTENDING PROGRESS NOTE  82y/o Male  from [facility] pmh of HLD, schizophrenia, COPD, DM2 presenting to the ED for unwitnessed fall and head injury.  Admitted for further management.    Interval/Overnight Events  - No acute complaints  - dispo pending    ROS  General: Denies fevers, chills, nightsweats, weight loss  HEENT: Denies headache, rhinorrhea, sore throat, eye pain  CV: Denies CP, palpitations  PULM: Denies SOB, cough  GI: Denies abdominal pain, diarrhea  : Denies dysuria, hematuria  MSK: Denies arthralgias  SKIN: Denies rash   NEURO: Denies paresthesias, weakness  PSYCH: Denies depression    MEDICATIONS  (STANDING):  albuterol    0.083%. 2.5 milliGRAM(s) Nebulizer once  albuterol/ipratropium for Nebulization 3 milliLiter(s) Nebulizer every 6 hours  amoxicillin 500 milliGRAM(s) Oral every 8 hours  dextrose 5%. 1000 milliLiter(s) (50 mL/Hr) IV Continuous <Continuous>  dextrose 50% Injectable 25 Gram(s) IV Push once  gabapentin 300 milliGRAM(s) Oral two times a day  glucagon  Injectable 1 milliGRAM(s) IntraMuscular once  insulin lispro (ADMELOG) corrective regimen sliding scale   SubCutaneous three times a day before meals  ipratropium  for Nebulization. 500 MICROGram(s) Nebulizer once  metoprolol tartrate 25 milliGRAM(s) Oral two times a day  NIFEdipine XL 60 milliGRAM(s) Oral daily  OLANZapine 10 milliGRAM(s) Oral daily  oxymetazoline 0.05% Nasal Spray 1 Spray(s) Both Nostrils two times a day  sodium chloride 0.65% Nasal 1 Spray(s) Both Nostrils two times a day  sodium chloride 0.9%. 1000 milliLiter(s) (75 mL/Hr) IV Continuous <Continuous>  tamsulosin 0.4 milliGRAM(s) Oral at bedtime  traZODone 50 milliGRAM(s) Oral at bedtime    MEDICATIONS  (PRN):  acetaminophen     Tablet .. 650 milliGRAM(s) Oral every 6 hours PRN Temp greater or equal to 38C (100.4F), Mild Pain (1 - 3)  aluminum hydroxide/magnesium hydroxide/simethicone Suspension 30 milliLiter(s) Oral every 4 hours PRN Dyspepsia  dextrose Oral Gel 15 Gram(s) Oral once PRN Blood Glucose LESS THAN 70 milliGRAM(s)/deciliter  ketorolac   Injectable 15 milliGRAM(s) IV Push every 6 hours PRN Moderate Pain (4 - 6)  melatonin 3 milliGRAM(s) Oral at bedtime PRN Insomnia  ondansetron Injectable 4 milliGRAM(s) IV Push every 8 hours PRN Nausea and/or Vomiting    ANTIBIOTICS:  amoxicillin 500 milliGRAM(s) Oral every 8 hours    VITALS:  T(F): 97.1, Max: 97.1 (10-29-23 @ 05:00)  HR: 91  BP: 135/65  RR: 18Vital Signs Last 24 Hrs  T(C): 36.2 (29 Oct 2023 05:00), Max: 36.2 (29 Oct 2023 05:00)  T(F): 97.1 (29 Oct 2023 05:00), Max: 97.1 (29 Oct 2023 05:00)  HR: 91 (29 Oct 2023 05:00) (81 - 105)  BP: 135/65 (29 Oct 2023 05:00) (135/65 - 189/82)  BP(mean): --  RR: 18 (29 Oct 2023 05:00) (18 - 18)  SpO2: 97% (29 Oct 2023 05:00) (96% - 99%)     I&O's Summary    28 Oct 2023 07:01  -  29 Oct 2023 07:00  --------------------------------------------------------  IN: 530 mL / OUT: 950 mL / NET: -420 mL      PHYSICAL EXAM:  Gen: NAD, resting in bed  HEENT: Normocephalic, atraumatic  Neck: supple, no lymphadenopathy  CV: Regular rate & regular rhythm  Lungs: CTABL no wheeze  Abdomen: Soft, NTND+ BS present  Ext: Warm, well perfused no CCE  Neuro: non focal, awake, CN II-XII intact   Skin: no rash, no erythema  Psych: no SI, HI, Hallucination     LABS:  10-28    135  |  100  |  18  ----------------------------<  95  4.3   |  22  |  1.1    Ca    8.6      28 Oct 2023 22:47    TPro  7.0  /  Alb  3.7  /  TBili  <0.2  /  DBili  x   /  AST  17  /  ALT  7   /  AlkPhos  101  10-28    LIVER FUNCTIONS - ( 28 Oct 2023 22:47 )  Alb: 3.7 g/dL / Pro: 7.0 g/dL / ALK PHOS: 101 U/L / ALT: 7 U/L / AST: 17 U/L / GGT: x                              10.5   7.67  )-----------( 192      ( 28 Oct 2023 22:47 )             32.0     MICROBIOLOGY  Culture - Urine (collected 10-28-23 @ 05:10)  Source: Clean Catch Clean Catch (Midstream)  Preliminary Report (10-29-23 @ 11:42):    >100,000 CFU/ml Gram Negative Rods    Urinalysis Basic - ( 28 Oct 2023 22:47 )  Color: x / Appearance: x / SG: x / pH: x  Gluc: 95 mg/dL / Ketone: x  / Bili: x / Urobili: x   Blood: x / Protein: x / Nitrite: x   Leuk Esterase: x / RBC: x / WBC x   Sq Epi: x / Non Sq Epi: x / Bacteria: x    MRSA PCR Result.: Negative (02-24-23 @ 09:10)      IMAGING:  Xray Chest 1 View- PORTABLE-Routine (Xray Chest 1 View- PORTABLE-Routine .) (10.27.23 @ 22:45)  No radiographic evidence of acute cardiopulmonary disease.    CT Chest, Abdomen and Pelvic w/ IV Cont (10-28-23 @ 03:08):   CHEST:    LUNGS, PLEURA AND AIRWAYS: No focal consolidation, pleural effusion or   pneumothorax. Paraseptal and centrilobular emphysema. Scattered airways   secretions.    HEART AND VESSELS: Heart size appears within normal limits. No   pericardial effusion is present. Normal caliber thoracic aorta. Normal   caliber main pulmonary artery. Aortic and multivessel coronary artery   calcifications.    THORACIC INLET/MEDIASTINUM/LYMPH NODES: The visualized portion of the   thyroid gland is unremarkable. There are no enlarged thoracic lymph nodes.    ABDOMEN/PELVIS:  LIVER: Multiple subcentimeter hypodensities too small to characterize    SPLEEN: Unremarkable.    PANCREAS: Unremarkable.    GALLBLADDER AND BILIARY TREE: Cholelithiasis.    ADRENALS: Unremarkable.    KIDNEYS: Symmetric pattern of renal enhancement. No hydronephrosis.   Bilateral renal cysts and hypodensities too small to characterize.    LYMPH NODES: There are no enlarged abdominal or pelvic lymph nodes.    VASCULATURE: Extensive calcifications of the abdominal aorta and its   branches with areas of bilateral common femoral arteries appearing   occluded with distal reconstitution.    BOWEL: No evidence for bowel obstruction or bowel wall thickening.   Unremarkable appendix. Small hiatal hernia.    PERITONEUM/RETROPERITONEUM/MESENTERY: There is no ascites or   pneumoperitoneum.    PELVIC VISCERA: Redemonstrated mildly thickened appearance of the urinary   bladder. Enlarged prostate gland.    BONES AND SOFT TISSUES: Diffuse osteopenia. Right shoulder arthroplasty.   Chronic mild compression deformity of T6. New superior endplate   compression deformity of T2.    IMPRESSION:  Age-indeterminatesuperior endplate compression deformity of T2, new from prior 5/28/2023 study.  No additional CT evidence for acute traumatic injury to the chest, abdomen or pelvis.    CT Maxillofacial No Cont (10.28.23 @ 02:52)  SKIN AND SUBCUTANEOUS SOFT TISSUES: Paranasal and right frontal scalp   soft tissue swelling.    OSSEOUS STRUCTURES: Comminuted mildly displaced fractures of the   bilateral nasal bones and frontal processes of the bilateral maxillary   bones. Comminuted minimally displaced fractures of the perpendicular   plate of the ethmoid bone.    ORBITS: The globes, retrobulbar fat, extraocular muscles, and optic nerve   sheath complexes are intact and normal in morphology.    PARANASAL SINUSES: Mild opacification of the ethmoid sinuses.    MASTOID AIR CELLS: Clear.    OTHER:  Please see separately dictated head CT for intracranial findings.    IMPRESSION:  Multiple acute comminuted fractures involving the bilateral nasal bones,   frontal processes of the bilateral maxillary bones, and the perpendicular   plate of the ethmoid bone.    Right frontal scalp and paranasal soft tissue swelling.    CT Head and Cervical Spine No Cont (10.27.23 @ 22:44)   CT HEAD:  There is stable prominence of the cerebral sulci and fissural spaces   reflecting mild diffuse parenchymal volume loss. There is stable moderate   ventriculomegaly which may be exaggerated by the parenchymal volume loss.    There is stable large arachnoid cyst in the left middle cranial fossa.    There are scattered patchy low attenuations in the bilateral   periventricular cerebral white matter consistent with stable moderate   chronic microvascular ischemic changes.    No evidence of acute territorial infarct, intracranial hemorrhage or mass   lesion.    There are no extra-axialfluid collections.    The visualized intraorbital contents are normal. Mild mucosal thickening   in bilateral ethmoid sinuses. There is trace effusion in the right   mastoid.    There is mildly leftward displaced fractures of the bilateral frontal   processes of the maxilla. There is overlying soft tissue swelling and   abrasion in the right forehead.    CT CERVICAL SPINE:  There is diffuse osteopenia. There is a history exaggerated cervical   lordosis.    There is increased compression deformity of T2 since the prior CT   cervical spine from 5/5/2023 with approximately 50% height loss. No   associated retropulsion.    There is no evidence facet subluxation of the cervical spine.    The atlantoaxial relationships are maintained. The posterior elements are   intact. There is no significant prevertebral soft tissue swelling. The   visualized paraspinal soft tissues are unremarkable.    Moderate multilevel endplate degenerative changes as evidenced by   marginal osteophyte formation, uncovertebral spurring, loss of normal   disc space height as well as facet joint space compartment narrowing.    There is no high-grade spinal canal stenosis. Please note spinal canal   contents are suboptimally evaluated inherent to CT technique.    The visualized lung apices are within normal limits.    IMPRESSION:  CT HEAD:  No acute intracranial findings.  Stable moderate chronic microvascular ischemic changes.  Partially imaged slightly leftward displaced fractures of the frontal   processes of the bilateral maxilla. Overlying soft tissue swelling and   abrasion in the right forehead.    CT CERVICAL SPINE:  Age-indeterminate moderate compression deformity of T2, new since the   prior cervical CT from 5/28/2023. No associated retropulsion. Further   evaluation with MRI can be considered.  Stable diffuse osteopenia.  Communication: The summary of above findings were discussed with readback   confirmation with Dr. Evans by radiologist Dr. Gallego on 10/27/2023 at 11:00 PM.    CARDIOLOGY TESTING  12 Lead ECG:   Ventricular Rate 71 BPM  Atrial Rate 71 BPM  P-R Interval 194 ms  QRS Duration 74 ms  Q-T Interval 398 ms  QTC Calculation(Bazett) 432 ms  P Axis 55 degrees  R Axis 48 degrees  T Axis 68 degrees    Diagnosis Line Normal sinus rhythm  Minimal voltage criteria for LVH, may be normal variant  Borderline ECG    Confirmed by TERESA WRIGHT, ENE ([id number]) on 10/28/2023 11:49:40 PM (10-27-23 @ 22:26)    ASSESSMENT/PLAN:  82y/o Male  from [facility] pmh of HLD, schizophrenia, COPD, DM2 presenting to the ED for unwitnessed fall and head injury.  Admitted for further management.    # s/p Unwitnessed Fall  with positive Facial/Head Trauma   #T2 age indeterminate compression fx   - No acute neurosurgical intervention as per Neurosurgical team rec.- obtain PT eval, maintain falls precautions  - monitor Orthostatic VS  - f/u CK level   - TTE (May 23) - mild AS, rest normal   - pain management    #Multiple acute comminuted fractures involving the bilateral nasal bones, frontal processes of the bilateral maxillary bones, and the perpendicular plate of the ethmoid bone.  - Right eyebrow skin laceration- was sutured in ER with right periorbital edema/bruising  - OMFS on board - recs noted Right frontal scalp and paranasal soft tissue swelling.- No acute surgical intervention per OMFS.   - Afrin x 3 days. an nasal spray prn dry nose. - no nose blowing and sneezing with mouth open for 2 weeks  - s/p amoxicillin > switch to Augmentin  for one week proph. tx.  - f/up maxillofacial sx. on  Wednesday afternoon in the [facility].      #UTI due to Obstructive Uropathy  #BPH  - UA: UTI  - UCx: GNR  - CT a/P  Redemonstrated mildly thickened appearance of the urinary bladder. Enlarged prostate gland.  - trial of Augmentin  - trial of flomax  - May benefit from cytoscopy in the outpatient'  - Outpatient urology    # Hyperkalemia (resolved)-     # h/o COPD -stable, resumed on inhalers, nebs tx.     # DM 2 - bsfs every 6 hours with insulin co. prn. tx.     # CAD/ HTN/ HLD  - chloé home meds tx. ,   - can resume ace inh given hyperkalemia resolved     #Incidental Findings (Liver Nodules & Kidney cyst/hypodensity)  - Outpatient workup and follow up    #Supportive Management:  Dispo:   DVT Ppx: GI Ppx: Diet:  Diet, Regular:   Consistent Carbohydrate Evening Snack  DASH/TLC Sodium & Cholesterol Restricted (10-28-23 @ 12:47) [Active]    Total time spent to complete patient's bedside assessment, review medical chart, discuss medical plan of care with covering medical team was more than 45 minutes with >50% of time spent face to face with patient, discussion with patient/family and/or coordination of care    Resident's notes reviewed. My notes supersede resident's notes in case of discrepancy.    Mandeep Coates MD/Nirmal  Attending Physician

## 2023-10-31 ENCOUNTER — TRANSCRIPTION ENCOUNTER (OUTPATIENT)
Age: 83
End: 2023-10-31

## 2023-10-31 VITALS
SYSTOLIC BLOOD PRESSURE: 152 MMHG | TEMPERATURE: 96 F | RESPIRATION RATE: 17 BRPM | DIASTOLIC BLOOD PRESSURE: 66 MMHG | HEART RATE: 79 BPM | OXYGEN SATURATION: 98 %

## 2023-10-31 LAB
ALBUMIN SERPL ELPH-MCNC: 4 G/DL — SIGNIFICANT CHANGE UP (ref 3.5–5.2)
ALBUMIN SERPL ELPH-MCNC: 4 G/DL — SIGNIFICANT CHANGE UP (ref 3.5–5.2)
ALP SERPL-CCNC: 100 U/L — SIGNIFICANT CHANGE UP (ref 30–115)
ALP SERPL-CCNC: 100 U/L — SIGNIFICANT CHANGE UP (ref 30–115)
ALT FLD-CCNC: 11 U/L — SIGNIFICANT CHANGE UP (ref 0–41)
ALT FLD-CCNC: 11 U/L — SIGNIFICANT CHANGE UP (ref 0–41)
ANION GAP SERPL CALC-SCNC: 9 MMOL/L — SIGNIFICANT CHANGE UP (ref 7–14)
ANION GAP SERPL CALC-SCNC: 9 MMOL/L — SIGNIFICANT CHANGE UP (ref 7–14)
AST SERPL-CCNC: 19 U/L — SIGNIFICANT CHANGE UP (ref 0–41)
AST SERPL-CCNC: 19 U/L — SIGNIFICANT CHANGE UP (ref 0–41)
BASOPHILS # BLD AUTO: 0.02 K/UL — SIGNIFICANT CHANGE UP (ref 0–0.2)
BASOPHILS # BLD AUTO: 0.02 K/UL — SIGNIFICANT CHANGE UP (ref 0–0.2)
BASOPHILS NFR BLD AUTO: 0.3 % — SIGNIFICANT CHANGE UP (ref 0–1)
BASOPHILS NFR BLD AUTO: 0.3 % — SIGNIFICANT CHANGE UP (ref 0–1)
BILIRUB SERPL-MCNC: <0.2 MG/DL — SIGNIFICANT CHANGE UP (ref 0.2–1.2)
BILIRUB SERPL-MCNC: <0.2 MG/DL — SIGNIFICANT CHANGE UP (ref 0.2–1.2)
BUN SERPL-MCNC: 29 MG/DL — HIGH (ref 10–20)
BUN SERPL-MCNC: 29 MG/DL — HIGH (ref 10–20)
CALCIUM SERPL-MCNC: 9 MG/DL — SIGNIFICANT CHANGE UP (ref 8.4–10.5)
CALCIUM SERPL-MCNC: 9 MG/DL — SIGNIFICANT CHANGE UP (ref 8.4–10.5)
CHLORIDE SERPL-SCNC: 100 MMOL/L — SIGNIFICANT CHANGE UP (ref 98–110)
CHLORIDE SERPL-SCNC: 100 MMOL/L — SIGNIFICANT CHANGE UP (ref 98–110)
CO2 SERPL-SCNC: 24 MMOL/L — SIGNIFICANT CHANGE UP (ref 17–32)
CO2 SERPL-SCNC: 24 MMOL/L — SIGNIFICANT CHANGE UP (ref 17–32)
CREAT SERPL-MCNC: 1.4 MG/DL — SIGNIFICANT CHANGE UP (ref 0.7–1.5)
CREAT SERPL-MCNC: 1.4 MG/DL — SIGNIFICANT CHANGE UP (ref 0.7–1.5)
EGFR: 50 ML/MIN/1.73M2 — LOW
EGFR: 50 ML/MIN/1.73M2 — LOW
EOSINOPHIL # BLD AUTO: 0.02 K/UL — SIGNIFICANT CHANGE UP (ref 0–0.7)
EOSINOPHIL # BLD AUTO: 0.02 K/UL — SIGNIFICANT CHANGE UP (ref 0–0.7)
EOSINOPHIL NFR BLD AUTO: 0.3 % — SIGNIFICANT CHANGE UP (ref 0–8)
EOSINOPHIL NFR BLD AUTO: 0.3 % — SIGNIFICANT CHANGE UP (ref 0–8)
GLUCOSE BLDC GLUCOMTR-MCNC: 109 MG/DL — HIGH (ref 70–99)
GLUCOSE BLDC GLUCOMTR-MCNC: 109 MG/DL — HIGH (ref 70–99)
GLUCOSE BLDC GLUCOMTR-MCNC: 91 MG/DL — SIGNIFICANT CHANGE UP (ref 70–99)
GLUCOSE BLDC GLUCOMTR-MCNC: 91 MG/DL — SIGNIFICANT CHANGE UP (ref 70–99)
GLUCOSE SERPL-MCNC: 102 MG/DL — HIGH (ref 70–99)
GLUCOSE SERPL-MCNC: 102 MG/DL — HIGH (ref 70–99)
HCT VFR BLD CALC: 32.2 % — LOW (ref 42–52)
HCT VFR BLD CALC: 32.2 % — LOW (ref 42–52)
HGB BLD-MCNC: 10.5 G/DL — LOW (ref 14–18)
HGB BLD-MCNC: 10.5 G/DL — LOW (ref 14–18)
IMM GRANULOCYTES NFR BLD AUTO: 0.4 % — HIGH (ref 0.1–0.3)
IMM GRANULOCYTES NFR BLD AUTO: 0.4 % — HIGH (ref 0.1–0.3)
LYMPHOCYTES # BLD AUTO: 1.63 K/UL — SIGNIFICANT CHANGE UP (ref 1.2–3.4)
LYMPHOCYTES # BLD AUTO: 1.63 K/UL — SIGNIFICANT CHANGE UP (ref 1.2–3.4)
LYMPHOCYTES # BLD AUTO: 22.6 % — SIGNIFICANT CHANGE UP (ref 20.5–51.1)
LYMPHOCYTES # BLD AUTO: 22.6 % — SIGNIFICANT CHANGE UP (ref 20.5–51.1)
MAGNESIUM SERPL-MCNC: 2.2 MG/DL — SIGNIFICANT CHANGE UP (ref 1.8–2.4)
MAGNESIUM SERPL-MCNC: 2.2 MG/DL — SIGNIFICANT CHANGE UP (ref 1.8–2.4)
MCHC RBC-ENTMCNC: 30.6 PG — SIGNIFICANT CHANGE UP (ref 27–31)
MCHC RBC-ENTMCNC: 30.6 PG — SIGNIFICANT CHANGE UP (ref 27–31)
MCHC RBC-ENTMCNC: 32.6 G/DL — SIGNIFICANT CHANGE UP (ref 32–37)
MCHC RBC-ENTMCNC: 32.6 G/DL — SIGNIFICANT CHANGE UP (ref 32–37)
MCV RBC AUTO: 93.9 FL — SIGNIFICANT CHANGE UP (ref 80–94)
MCV RBC AUTO: 93.9 FL — SIGNIFICANT CHANGE UP (ref 80–94)
MONOCYTES # BLD AUTO: 0.62 K/UL — HIGH (ref 0.1–0.6)
MONOCYTES # BLD AUTO: 0.62 K/UL — HIGH (ref 0.1–0.6)
MONOCYTES NFR BLD AUTO: 8.6 % — SIGNIFICANT CHANGE UP (ref 1.7–9.3)
MONOCYTES NFR BLD AUTO: 8.6 % — SIGNIFICANT CHANGE UP (ref 1.7–9.3)
NEUTROPHILS # BLD AUTO: 4.89 K/UL — SIGNIFICANT CHANGE UP (ref 1.4–6.5)
NEUTROPHILS # BLD AUTO: 4.89 K/UL — SIGNIFICANT CHANGE UP (ref 1.4–6.5)
NEUTROPHILS NFR BLD AUTO: 67.8 % — SIGNIFICANT CHANGE UP (ref 42.2–75.2)
NEUTROPHILS NFR BLD AUTO: 67.8 % — SIGNIFICANT CHANGE UP (ref 42.2–75.2)
NRBC # BLD: 0 /100 WBCS — SIGNIFICANT CHANGE UP (ref 0–0)
NRBC # BLD: 0 /100 WBCS — SIGNIFICANT CHANGE UP (ref 0–0)
PLATELET # BLD AUTO: 201 K/UL — SIGNIFICANT CHANGE UP (ref 130–400)
PLATELET # BLD AUTO: 201 K/UL — SIGNIFICANT CHANGE UP (ref 130–400)
PMV BLD: 10.3 FL — SIGNIFICANT CHANGE UP (ref 7.4–10.4)
PMV BLD: 10.3 FL — SIGNIFICANT CHANGE UP (ref 7.4–10.4)
POTASSIUM SERPL-MCNC: 4.8 MMOL/L — SIGNIFICANT CHANGE UP (ref 3.5–5)
POTASSIUM SERPL-MCNC: 4.8 MMOL/L — SIGNIFICANT CHANGE UP (ref 3.5–5)
POTASSIUM SERPL-SCNC: 4.8 MMOL/L — SIGNIFICANT CHANGE UP (ref 3.5–5)
POTASSIUM SERPL-SCNC: 4.8 MMOL/L — SIGNIFICANT CHANGE UP (ref 3.5–5)
PROT SERPL-MCNC: 7 G/DL — SIGNIFICANT CHANGE UP (ref 6–8)
PROT SERPL-MCNC: 7 G/DL — SIGNIFICANT CHANGE UP (ref 6–8)
RBC # BLD: 3.43 M/UL — LOW (ref 4.7–6.1)
RBC # BLD: 3.43 M/UL — LOW (ref 4.7–6.1)
RBC # FLD: 14.5 % — SIGNIFICANT CHANGE UP (ref 11.5–14.5)
RBC # FLD: 14.5 % — SIGNIFICANT CHANGE UP (ref 11.5–14.5)
SARS-COV-2 RNA SPEC QL NAA+PROBE: SIGNIFICANT CHANGE UP
SARS-COV-2 RNA SPEC QL NAA+PROBE: SIGNIFICANT CHANGE UP
SODIUM SERPL-SCNC: 133 MMOL/L — LOW (ref 135–146)
SODIUM SERPL-SCNC: 133 MMOL/L — LOW (ref 135–146)
WBC # BLD: 7.21 K/UL — SIGNIFICANT CHANGE UP (ref 4.8–10.8)
WBC # BLD: 7.21 K/UL — SIGNIFICANT CHANGE UP (ref 4.8–10.8)
WBC # FLD AUTO: 7.21 K/UL — SIGNIFICANT CHANGE UP (ref 4.8–10.8)
WBC # FLD AUTO: 7.21 K/UL — SIGNIFICANT CHANGE UP (ref 4.8–10.8)

## 2023-10-31 PROCEDURE — 99239 HOSP IP/OBS DSCHRG MGMT >30: CPT

## 2023-10-31 RX ORDER — HALOPERIDOL DECANOATE 100 MG/ML
1 INJECTION INTRAMUSCULAR EVERY 8 HOURS
Refills: 0 | Status: DISCONTINUED | OUTPATIENT
Start: 2023-10-31 | End: 2023-10-31

## 2023-10-31 RX ADMIN — Medication 1 TABLET(S): at 06:20

## 2023-10-31 RX ADMIN — OXYMETAZOLINE HYDROCHLORIDE 1 SPRAY(S): 0.5 SPRAY NASAL at 06:22

## 2023-10-31 RX ADMIN — GABAPENTIN 300 MILLIGRAM(S): 400 CAPSULE ORAL at 06:21

## 2023-10-31 RX ADMIN — Medication 1 SPRAY(S): at 06:22

## 2023-10-31 RX ADMIN — OLANZAPINE 10 MILLIGRAM(S): 15 TABLET, FILM COATED ORAL at 11:14

## 2023-10-31 RX ADMIN — Medication 60 MILLIGRAM(S): at 06:21

## 2023-10-31 RX ADMIN — Medication 25 MILLIGRAM(S): at 06:21

## 2023-10-31 NOTE — DISCHARGE NOTE NURSING/CASE MANAGEMENT/SOCIAL WORK - NSDCFUADDAPPT_GEN_ALL_CORE_FT
Please follow up with your primary care doctor in 1 week.  * Continue taking your antibiotics for UTI until 11/4/23  * Flomax was started for prostate hyperplasia found on CTAP   * Follow up with your urology as needed    Please follow up with Dental clinic on 11/1/2023   * Ocean spray nasal spray as needed for dry nose

## 2023-10-31 NOTE — DISCHARGE NOTE NURSING/CASE MANAGEMENT/SOCIAL WORK - NSDCPEFALRISK_GEN_ALL_CORE
For information on Fall & Injury Prevention, visit: [email or web address] OR  [email or web address] OR  https://www.cdc.gov/steadi/patient.html

## 2023-10-31 NOTE — DISCHARGE NOTE NURSING/CASE MANAGEMENT/SOCIAL WORK - PATIENT PORTAL LINK FT
You can access the FollowMyHealth Patient Portal offered by [facility] by registering at the following website: [email or web address]. By joining [employer]’s FollowMyHealth portal, you will also be able to view your health information using other applications (apps) compatible with our system.

## 2023-11-02 LAB
-  AMIKACIN: SIGNIFICANT CHANGE UP
-  AMIKACIN: SIGNIFICANT CHANGE UP
-  AMOXICILLIN/CLAVULANIC ACID: SIGNIFICANT CHANGE UP
-  AMOXICILLIN/CLAVULANIC ACID: SIGNIFICANT CHANGE UP
-  AMPICILLIN/SULBACTAM: SIGNIFICANT CHANGE UP
-  AMPICILLIN/SULBACTAM: SIGNIFICANT CHANGE UP
-  AMPICILLIN: SIGNIFICANT CHANGE UP
-  AMPICILLIN: SIGNIFICANT CHANGE UP
-  AZTREONAM: SIGNIFICANT CHANGE UP
-  AZTREONAM: SIGNIFICANT CHANGE UP
-  CEFAZOLIN: SIGNIFICANT CHANGE UP
-  CEFAZOLIN: SIGNIFICANT CHANGE UP
-  CEFEPIME: SIGNIFICANT CHANGE UP
-  CEFEPIME: SIGNIFICANT CHANGE UP
-  CEFOXITIN: SIGNIFICANT CHANGE UP
-  CEFOXITIN: SIGNIFICANT CHANGE UP
-  CEFTRIAXONE: SIGNIFICANT CHANGE UP
-  CEFTRIAXONE: SIGNIFICANT CHANGE UP
-  CIPROFLOXACIN: SIGNIFICANT CHANGE UP
-  CIPROFLOXACIN: SIGNIFICANT CHANGE UP
-  ERTAPENEM: SIGNIFICANT CHANGE UP
-  ERTAPENEM: SIGNIFICANT CHANGE UP
-  GENTAMICIN: SIGNIFICANT CHANGE UP
-  GENTAMICIN: SIGNIFICANT CHANGE UP
-  IMIPENEM: SIGNIFICANT CHANGE UP
-  IMIPENEM: SIGNIFICANT CHANGE UP
-  LEVOFLOXACIN: SIGNIFICANT CHANGE UP
-  LEVOFLOXACIN: SIGNIFICANT CHANGE UP
-  MEROPENEM: SIGNIFICANT CHANGE UP
-  MEROPENEM: SIGNIFICANT CHANGE UP
-  NITROFURANTOIN: SIGNIFICANT CHANGE UP
-  NITROFURANTOIN: SIGNIFICANT CHANGE UP
-  PIPERACILLIN/TAZOBACTAM: SIGNIFICANT CHANGE UP
-  PIPERACILLIN/TAZOBACTAM: SIGNIFICANT CHANGE UP
-  RESISTANCE GENE OXA: SIGNIFICANT CHANGE UP
-  RESISTANCE GENE OXA: SIGNIFICANT CHANGE UP
-  TOBRAMYCIN: SIGNIFICANT CHANGE UP
-  TOBRAMYCIN: SIGNIFICANT CHANGE UP
-  TRIMETHOPRIM/SULFAMETHOXAZOLE: SIGNIFICANT CHANGE UP
-  TRIMETHOPRIM/SULFAMETHOXAZOLE: SIGNIFICANT CHANGE UP
BLANDM BLD POS QL PROBE: SIGNIFICANT CHANGE UP
BLANDM BLD POS QL PROBE: SIGNIFICANT CHANGE UP
CULTURE RESULTS: ABNORMAL
CULTURE RESULTS: ABNORMAL
METHOD TYPE: SIGNIFICANT CHANGE UP
ORGANISM # SPEC MICROSCOPIC CNT: ABNORMAL
ORGANISM # SPEC MICROSCOPIC CNT: SIGNIFICANT CHANGE UP
ORGANISM # SPEC MICROSCOPIC CNT: SIGNIFICANT CHANGE UP
SPECIMEN SOURCE: SIGNIFICANT CHANGE UP
SPECIMEN SOURCE: SIGNIFICANT CHANGE UP

## 2023-11-02 NOTE — CHART NOTE - NSCHARTNOTEFT_GEN_A_CORE
Critical culture result obtained  Pt sent home with augmentin for UTI, found to have resistance on culture.  Message left with [facility] [place]. Advised f/u with PMD and urology if any burning with urination.

## 2023-11-07 DIAGNOSIS — N13.6 PYONEPHROSIS: ICD-10-CM

## 2023-11-07 DIAGNOSIS — S02.19XA OTHER FRACTURE OF BASE OF SKULL, INITIAL ENCOUNTER FOR CLOSED FRACTURE: ICD-10-CM

## 2023-11-07 DIAGNOSIS — I10 ESSENTIAL (PRIMARY) HYPERTENSION: ICD-10-CM

## 2023-11-07 DIAGNOSIS — J44.9 CHRONIC OBSTRUCTIVE PULMONARY DISEASE, UNSPECIFIED: ICD-10-CM

## 2023-11-07 DIAGNOSIS — N17.9 ACUTE KIDNEY FAILURE, UNSPECIFIED: ICD-10-CM

## 2023-11-07 DIAGNOSIS — R55 SYNCOPE AND COLLAPSE: ICD-10-CM

## 2023-11-07 DIAGNOSIS — Z79.84 LONG TERM (CURRENT) USE OF ORAL HYPOGLYCEMIC DRUGS: ICD-10-CM
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
## 2023-11-07 DIAGNOSIS — S02.2XXA FRACTURE OF NASAL BONES, INITIAL ENCOUNTER FOR CLOSED FRACTURE: ICD-10-CM

## 2023-11-07 DIAGNOSIS — N40.0 BENIGN PROSTATIC HYPERPLASIA WITHOUT LOWER URINARY TRACT SYMPTOMS: ICD-10-CM

## 2023-11-07 DIAGNOSIS — I25.10 ATHEROSCLEROTIC HEART DISEASE OF NATIVE CORONARY ARTERY WITHOUT ANGINA PECTORIS: ICD-10-CM

## 2023-11-07 DIAGNOSIS — E11.69 TYPE 2 DIABETES MELLITUS WITH OTHER SPECIFIED COMPLICATION: ICD-10-CM
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
## 2023-11-07 DIAGNOSIS — E78.5 HYPERLIPIDEMIA, UNSPECIFIED: ICD-10-CM

## 2023-11-07 DIAGNOSIS — E11.40 TYPE 2 DIABETES MELLITUS WITH DIABETIC NEUROPATHY, UNSPECIFIED: ICD-10-CM

## 2023-11-07 DIAGNOSIS — S02.40CA MAXILLARY FRACTURE, RIGHT SIDE, INITIAL ENCOUNTER FOR CLOSED FRACTURE: ICD-10-CM

## 2023-11-07 DIAGNOSIS — S02.40DA MAXILLARY FRACTURE, LEFT SIDE, INITIAL ENCOUNTER FOR CLOSED FRACTURE: ICD-10-CM

## 2023-11-07 DIAGNOSIS — S22.029A UNSPECIFIED FRACTURE OF SECOND THORACIC VERTEBRA, INITIAL ENCOUNTER FOR CLOSED FRACTURE: ICD-10-CM

## 2023-11-07 DIAGNOSIS — S01.81XA LACERATION WITHOUT FOREIGN BODY OF OTHER PART OF HEAD, INITIAL ENCOUNTER: ICD-10-CM

## 2023-11-07 DIAGNOSIS — F20.9 SCHIZOPHRENIA, UNSPECIFIED: ICD-10-CM

## 2023-11-07 DIAGNOSIS — E87.5 HYPERKALEMIA: ICD-10-CM

## 2023-11-28 NOTE — PHYSICAL THERAPY INITIAL EVALUATION ADULT - DIAGNOSIS, PT EVAL
Debilitation Exclude Pending Lab, Cardiology, and Radiology orders from printing on the Patient's Discharge Instructions, due to Privacy Concerns.

## 2024-01-19 NOTE — ED ADULT NURSE REASSESSMENT NOTE - NS ED NURSE REASSESS COMMENT FT1
pt became increasingly aggitated. unable to verbally redirect the patient. haldol 5 mg IM given . will continue to monitor.
Will agree to consider an appropriate level of outpatient care

## 2024-03-22 NOTE — ED ADULT NURSE NOTE - DOES PATIENT HAVE ADVANCE DIRECTIVE
Oncology Rooming Note    March 22, 2024 12:09 PM   Gallo Navarro is a 58 year old male who presents for:    No chief complaint on file.    Initial Vitals: /81   Pulse 68   Temp 98.5  F (36.9  C)   Resp 16   SpO2 97%  Estimated body mass index is 21.97 kg/m  as calculated from the following:    Height as of 3/15/24: 1.829 m (6').    Weight as of 3/15/24: 73.5 kg (162 lb). There is no height or weight on file to calculate BSA.  No Pain (0) Comment: Data Unavailable   No LMP for male patient.  Allergies reviewed: Yes  Medications reviewed: Yes    Medications: Medication refills not needed today.  Pharmacy name entered into [employer]:    [facility] - [place], [place] - [address] [facility] [place] [phone]  [facility] [place] [facility] AT PORTAL TO REGISTERED [facility] SITES  [facility] - [place], [place] - [address] [place] [id number] [employer] - [place], [place] - [address]    Frailty Screening:   Is the patient here for a new oncology consult visit in cancer care? 2. No      Clinical concerns: Pt feeling fine, denies pain at L4 which is where we are looking to radiate. He's hoping for radiation and then to enroll in a clinic trial at [facility].     Prior radiation here to T10  No ICD  Pt gone next week to [place]      Dr. Reaves was notified.      Berta George RN              
No

## 2024-04-09 NOTE — ED PROVIDER NOTE - ATTENDING CONTRIBUTION TO CARE
Schizophrenia, COPD, DM II, DLD, non-Hodgkin lymphoma 80 y/o m w/ pmhx of Schizophrenia, COPD, DM II, DLD, non-Hodgkin lymphoma, recent [facility] placement as outpt on 9/12 (urology Dr. Holt) for urinary retention presents from [facility] for blood in urine bag which present on 9/12 as well. pt was seen in ed on 9/12 for similar symptoms treated for uti and discussion was had with Dr. Holt who reports outpt follow up. pt denies any symptoms at this time. bun/cre on 9/12 21/1.9. No fever, chills, n/v, cp,  pleuritic cp, sob, palpitations, diaphoresis, cough, ha/lh/dizziness, numbness/tingling, neck pain/ stiffness, abd pain, diarrhea, constipation, melena/brbpr, trauma, weakness, edema, calf pain/swelling/erythema, sick contacts, recent travel or rash. pt poor historian.  Vital Signs: I have reviewed the initial vital signs. Constitutional: male pt sitting on stretcher speaking full sentences. Integumentary: No rash. ENT: MMM NECK: Supple, non-tender, no meningeal signs. Cardiovascular: RRR, radial pulses 2/4 b/l. No JVD. Respiratory: BS present b/l, ctabl, no wheezing or crackles, no accessory muscle use, no stridor. Gastrointestinal: BS present throughout all 4 quadrants, soft, nd, nt, no rebound tenderness or guarding. no cvat. Musculoskeletal: FROM, no edema, no calf pain/swelling/erythema. Neurologic: AAOx3, motor 5/5 and sensation intact throughout upper and lower ext, CN II-XII intact, No facial droop or slurring of speech. No focal deficits. awake, alert, oriented to self and place, not oriented to time; occasionally demonstrates confusion and disorientation

## 2024-05-11 RX ORDER — TAMSULOSIN HYDROCHLORIDE 0.4 MG/1
1 CAPSULE ORAL
Refills: 0 | DISCHARGE

## 2024-05-11 RX ORDER — OLANZAPINE 15 MG/1
1 TABLET, FILM COATED ORAL
Qty: 0 | Refills: 0 | DISCHARGE

## 2024-05-11 RX ORDER — TRAZODONE HCL 50 MG
1 TABLET ORAL
Refills: 0 | DISCHARGE

## 2024-05-11 RX ORDER — OLANZAPINE 15 MG/1
1 TABLET, FILM COATED ORAL
Refills: 0 | DISCHARGE

## 2024-05-11 RX ORDER — TRAZODONE HCL 50 MG
0 TABLET ORAL
Qty: 0 | Refills: 0 | DISCHARGE

## 2024-05-11 RX ORDER — ERGOCALCIFEROL 1.25 MG/1
1 CAPSULE ORAL
Qty: 0 | Refills: 0 | DISCHARGE

## 2024-05-11 RX ORDER — ATORVASTATIN CALCIUM 80 MG/1
1 TABLET, FILM COATED ORAL
Qty: 0 | Refills: 0 | DISCHARGE

## 2024-05-11 RX ORDER — MULTIVIT-MIN/FERROUS GLUCONATE 9 MG/15 ML
1 LIQUID (ML) ORAL
Qty: 0 | Refills: 0 | DISCHARGE

## 2024-05-11 RX ORDER — SIMVASTATIN 20 MG/1
1 TABLET, FILM COATED ORAL
Qty: 0 | Refills: 0 | DISCHARGE

## 2024-05-11 RX ORDER — SODIUM CHLORIDE 9 MG/ML
1 INJECTION INTRAMUSCULAR; INTRAVENOUS; SUBCUTANEOUS
Qty: 0 | Refills: 0 | DISCHARGE

## 2024-05-11 RX ORDER — GABAPENTIN 400 MG/1
1 CAPSULE ORAL
Qty: 0 | Refills: 0 | DISCHARGE

## 2024-05-11 RX ORDER — METFORMIN HYDROCHLORIDE 850 MG/1
1 TABLET ORAL
Qty: 0 | Refills: 0 | DISCHARGE

## 2024-05-11 RX ORDER — BUDESONIDE, MICRONIZED 100 %
2 POWDER (GRAM) MISCELLANEOUS
Qty: 0 | Refills: 0 | DISCHARGE

## 2024-05-11 RX ORDER — INSULIN GLARGINE 100 [IU]/ML
25 INJECTION, SOLUTION SUBCUTANEOUS
Qty: 0 | Refills: 0 | DISCHARGE

## 2024-05-11 RX ORDER — GABAPENTIN 400 MG/1
0 CAPSULE ORAL
Qty: 0 | Refills: 0 | DISCHARGE

## 2024-05-11 RX ORDER — NIFEDIPINE 30 MG
1 TABLET, EXTENDED RELEASE 24 HR ORAL
Refills: 0 | DISCHARGE

## 2024-05-15 ENCOUNTER — EMERGENCY (EMERGENCY)
Facility: HOSPITAL | Age: 84
LOS: 0 days | Discharge: HOME | End: 2024-05-15
Attending: EMERGENCY MEDICINE
Payer: MEDICARE

## 2024-05-15 VITALS
TEMPERATURE: 98 F | DIASTOLIC BLOOD PRESSURE: 55 MMHG | SYSTOLIC BLOOD PRESSURE: 111 MMHG | OXYGEN SATURATION: 99 % | RESPIRATION RATE: 18 BRPM | HEART RATE: 94 BPM

## 2024-05-15 DIAGNOSIS — S09.93XA UNSPECIFIED INJURY OF FACE, INITIAL ENCOUNTER: ICD-10-CM

## 2024-05-15 DIAGNOSIS — S09.90XA UNSPECIFIED INJURY OF HEAD, INITIAL ENCOUNTER: ICD-10-CM

## 2024-05-15 DIAGNOSIS — S00.211A ABRASION OF RIGHT EYELID AND PERIOCULAR AREA, INITIAL ENCOUNTER: ICD-10-CM

## 2024-05-15 DIAGNOSIS — Z12.11 ENCOUNTER FOR SCREENING FOR MALIGNANT NEOPLASM OF COLON: Chronic | ICD-10-CM
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
## 2024-05-15 DIAGNOSIS — F20.9 SCHIZOPHRENIA, UNSPECIFIED: ICD-10-CM

## 2024-05-15 DIAGNOSIS — J44.9 CHRONIC OBSTRUCTIVE PULMONARY DISEASE, UNSPECIFIED: ICD-10-CM

## 2024-05-15 DIAGNOSIS — E78.00 PURE HYPERCHOLESTEROLEMIA, UNSPECIFIED: ICD-10-CM

## 2024-05-15 DIAGNOSIS — I10 ESSENTIAL (PRIMARY) HYPERTENSION: ICD-10-CM

## 2024-05-15 DIAGNOSIS — F17.200 NICOTINE DEPENDENCE, UNSPECIFIED, UNCOMPLICATED: ICD-10-CM

## 2024-05-15 DIAGNOSIS — Z23 ENCOUNTER FOR IMMUNIZATION: ICD-10-CM

## 2024-05-15 DIAGNOSIS — E11.9 TYPE 2 DIABETES MELLITUS WITHOUT COMPLICATIONS: ICD-10-CM

## 2024-05-15 DIAGNOSIS — Z85.72 PERSONAL HISTORY OF NON-HODGKIN LYMPHOMAS: ICD-10-CM

## 2024-05-15 PROBLEM — E78.5 HYPERLIPIDEMIA, UNSPECIFIED: Chronic | Status: ACTIVE | Noted: 2019-07-08

## 2024-05-15 PROBLEM — E78.5 HYPERLIPIDEMIA, UNSPECIFIED: Chronic | Status: ACTIVE | Noted: 2021-07-03

## 2024-05-15 PROBLEM — R33.9 RETENTION OF URINE, UNSPECIFIED: Chronic | Status: ACTIVE | Noted: 2021-07-03

## 2024-05-15 PROCEDURE — 70450 CT HEAD/BRAIN W/O DYE: CPT | Mod: MC

## 2024-05-15 PROCEDURE — 90471 IMMUNIZATION ADMIN: CPT

## 2024-05-15 PROCEDURE — 72125 CT NECK SPINE W/O DYE: CPT | Mod: 26,MC

## 2024-05-15 PROCEDURE — 72125 CT NECK SPINE W/O DYE: CPT | Mod: MC

## 2024-05-15 PROCEDURE — 70450 CT HEAD/BRAIN W/O DYE: CPT | Mod: 26,MC

## 2024-05-15 PROCEDURE — 90715 TDAP VACCINE 7 YRS/> IM: CPT

## 2024-05-15 PROCEDURE — 99284 EMERGENCY DEPT VISIT MOD MDM: CPT | Mod: 25

## 2024-05-15 PROCEDURE — 99284 EMERGENCY DEPT VISIT MOD MDM: CPT | Mod: FS

## 2024-05-15 RX ORDER — TETANUS TOXOID, REDUCED DIPHTHERIA TOXOID AND ACELLULAR PERTUSSIS VACCINE, ADSORBED 5; 2.5; 8; 8; 2.5 [IU]/.5ML; [IU]/.5ML; UG/.5ML; UG/.5ML; UG/.5ML
0.5 SUSPENSION INTRAMUSCULAR ONCE
Refills: 0 | Status: COMPLETED | OUTPATIENT
Start: 2024-05-15 | End: 2024-05-15

## 2024-05-15 RX ADMIN — TETANUS TOXOID, REDUCED DIPHTHERIA TOXOID AND ACELLULAR PERTUSSIS VACCINE, ADSORBED 0.5 MILLILITER(S): 5; 2.5; 8; 8; 2.5 SUSPENSION INTRAMUSCULAR at 15:22

## 2024-05-15 NOTE — ED PROVIDER NOTE - OBJECTIVE STATEMENT
83-year-old male with history of schizophrenia, COPD, diabetes, high cholesterol, hypertension brought in by ambulance from [facility] s/p fall.  Patient states he tripped and hit face on ground.  Patient denies any LOC, headache, dizziness, nausea, vomiting or neck pain.

## 2024-05-15 NOTE — ED PROVIDER NOTE - NSICDXPASTMEDICALHX_GEN_ALL_CORE_FT
PAST MEDICAL HISTORY:  Chronic retention of urine     COPD (chronic obstructive pulmonary disease)     COPD (chronic obstructive pulmonary disease)     Diabetes type 2, controlled     Dyslipidemia     Dyslipidemia     Paranoid schizophrenia     Schizophrenia

## 2024-05-15 NOTE — ED PROVIDER NOTE - PATIENT PORTAL LINK FT
You can access the FollowMyHealth Patient Portal offered by [facility] by registering at the following website: [email or web address]. By joining [employer]’s FollowMyHealth portal, you will also be able to view your health information using other applications (apps) compatible with our system.

## 2024-05-15 NOTE — ED PROVIDER NOTE - NSICDXPASTSURGICALHX_GEN_ALL_CORE_FT
PAST SURGICAL HISTORY:  Encounter for screening colonoscopy     No significant past surgical history

## 2024-05-15 NOTE — ED ADULT NURSE NOTE - PATIENT'S PREFERRED PRONOUN
Name: Fransisca Avila  F/u for Diabetes (Last seen 8/16/2017).  HPI:  Fransisca Avila is a 25 year old female who presents for the evaluation/management of type 1 diabetes.     1. Type 1 DM:  Originally diagnosed at the age of 5.   Induced at 34 weeks on 11/5/2014, preeclampsia.  Baby girl, initially in NICU. Daughter now 2 years old, has also been diagnosed with type 1 diabetes.    Currently pregnant, 29w0d.    LMP - 4/19/2017    Last diabetes ed appointment: 7/24/2017.    Uploads pump to [facility] for diabetes ed review weekly.  No significant low BG's.  Was having more frequent low before the last basal rate changes - has since resolved.    Current Regimen: humalog  Insulin pump - Medtronic 670-G.   Time Rate (U/hr)   00:00 0.950   02:30 1.40   06:00 2.10   10:00 2.30   14:00 2.75   18:00 2.00   20:00 2.10     Carbohydrate Ratio -    Time Ratio   0000 5.5   11:30 6.5   12:30 5.2   14:00 5.8   18:00 6.8     Sensitivity  00:00 - 42  17:00 - 42   Active Insulin Time 3.00 hours   Basal  54% (27 units)   Bolus   46% (23 units)   Total Carbohydrates/day 184   Total Insulin/day  50   Average Blood Sugar  Sensor Average 113  139   BS Checks  6.6 times a day   Care Link Username-   Password-   Target range:  0:00  80-95  5:00   75-95  21:30      Complications:   Diabetes Complications  Description / Detail    Diabetic Retinopathy  No retinopathy, last exam 2/2016 - reminded to schedule an eye exam due to pregnancy,   CAD / PAD  No   Neuropathy  No   Nephropathy / Microalbuminuria  No   Gastroparesis  No   Hypoglycemia Unawareness  No     PMH/PSH:  Past Medical History:   Diagnosis Date     Type 1 diabetes (H) 1998    three years at diagnosis     Varicella 1998     Past Surgical History:   Procedure Laterality Date     C ROOT CANAL THERAPY 2 CANALS       DENTAL SURGERY       Family Hx:  Family History   Problem Relation Age of Onset     Family History Negative Mother      DIABETES Father 35     Type 1      Thyroid  disease: No         DM2: NO         Autoimmune: DM1, SLE, RA, Vitiligo Yes - Father has type 1 DM    Social Hx:  Social History     Social History     Marital status:      Spouse name: Teja     Number of children: 1     Years of education: N/A     Occupational History      [employer]     Social History Main Topics     Smoking status: Never Smoker     Smokeless tobacco: Never Used     Alcohol use No     Drug use: No     Sexual activity: Yes     Partners: Male     Other Topics Concern     Blood Transfusions No     Social History Narrative    Fransisca lives with her spouse           MEDICATIONS:  has a current medication list which includes the following prescription(s): ondansetron, blood glucose monitoring, insulin lispro, microlet lancets, insulin syringe-needle u-100, acetone (urine) test, insulin glargine, insulin pump, prenatal multivit-min-fe-fa, and glucagon.    ROS     ROS: 10 point ROS neg other than the symptoms noted above in the HPI.    Physical Exam   VS: /80 (BP Location: Left arm, Patient Position: Chair, Cuff Size: Adult Large)  Pulse 114  Temp 97.6  F (36.4  C) (Oral)  Resp 16  Wt 84 kg (185 lb 1.6 oz)  LMP 04/21/2017 (Exact Date)  SpO2 99%  BMI 28.99 kg/m2  GENERAL: NAD, well dressed, answering questions appropriately, appears stated age.  HEENT: OP clear, no exophthalmos, no proptosis, EOMI, no lig lag, no retraction, no scleral icterus  RESPIRATORY: Clear.  Normal respiratory effort.  CARDIOVASCULAR: RRR. No peripheral edema.  EXTREMITIES: no edema  NEUROLOGY: CN grossly intact, no tremors  MSK: grossly intact, No digital cyanosis. Normal gait and station.  SKIN:warm and dry with good turgor;  no rashes, no lesions, no ulcers  PSYCH: Intact judgment and insight. A&OX3 with a cordial affect.    LABS:  A1c  Component      Latest Ref Rng & Units 6/30/2017 8/16/2017 11/10/2017   Hemoglobin A1C      4.3 - 6.0 % 6.8 (H) 5.7 5.3     BMP:  !COMPREHENSIVE Latest Ref Rng & Units 11/18/2016  6/30/2017   SODIUM 133 - 144 mmol/L 138    POTASSIUM 3.4 - 5.3 mmol/L 4.3    CHLORIDE 94 - 109 mmol/L 104    BUN 7 - 30 mg/dL 9    Creatinine 0.52 - 1.04 mg/dL 0.61 0.59   Glucose 70 - 99 mg/dL 220 (H)    ANION GAP 3 - 14 mmol/L 7    CALCIUM 8.5 - 10.1 mg/dL 9.0    ALBUMIN 3.4 - 5.0 g/dL 3.9    PROTEIN, TOTAL 6.8 - 8.8 g/dL 7.0      !LIPID/HEPATIC Latest Ref Rng 11/8/2014 12/2/2015 11/18/2016   AST 0 - 45 U/L 15 6 5   ALT 0 - 50 U/L 10 22 15     Urine microalbumin:  Component    Latest Ref Rng & Units 6/30/2017   Protein Random Urine    g/L 0.18   Protein Total Urine g/gr Creatinine    0 - 0.2 g/g Cr 0.14   Creatinine Urine    mg/dL 123     JOSH/C-peptide:  Component    Latest Ref Rng 11/10/2014   Glutamic Acid Decarboxylase Antibody     <5.0    Reference range: 0.0 to 5.0      C-Peptide    0.9 - 6.9 ng/mL <0.1 (L)     TFT:  !THYROID Latest Ref Rng & Units 6/30/2017 11/18/2016 12/2/2015   TSH 0.40 - 4.00 mU/L 1.46 1.42 1.89   T4 FREE 0.76 - 1.46 ng/dL   1.16     !THYROID Latest Ref Rng & Units 11/13/2014   TSH 0.40 - 4.00 mU/L 1.58   T4 FREE 0.76 - 1.46 ng/dL 1.16     TTG Abs:  Component    Latest Ref Rng 11/13/2014   Tissue Transglutaminase Antibody IgA    0 - 3.9 U/mL 1.1   Tissue Transglutaminase Rosina IgG    0 - 5.9 U/mL 1.0     All pertinent notes, labs, and images personally reviewed by me.     A/P  Ms.Jennica Avila is a 25 year old here for the evaluation/management of diabetes:    1. DM1 - Controlled, today's A1c 5.3%.  A1c goal for pregnancy < 6.0%.  Currently pregnant, third trimester.   No changes in pump settings today.  Continue weekly pump upload and review.  Continue checking A1c monthly.  She was reminded to schedule an eye exam.  Rx for glucagon emergency kit, urine ketone strips provided 4/2017.     Diabetes in pregnant women may be pregestational (type 1 or type 2 diabetes) diagnosed before pregnancy, or gestational (diabetes diagnosed during pregnancy).   Frequent measurements of blood glucose  during pregnancy are advised for women with either type 1 or type 2 diabetes to help prevent or treat both hyper and hypoglycemia. Euglycemia is important because it decreases the likelihood of miscarriage, congenital anomalies, macrosomia, fetal death, and  morbidity.  Intensive therapy is now recommended for all patients with diabetes. There are two potential hazards from strict glycemic control: hypoglycemia and worsening of diabetic retinopathy.  It is recommend self-glucose monitoring before and one hour after meals, at bedtime, and occasionally during the night to assess for  nocturnal hypoglycemia. Urine ketones are recommended for woman with type 1 diabetes that are ill .  ADAGuidelines:  -Preprandial, bedtime, and overnight glucose concentrations 60 to 95 mg/dL   -Peak 2-hour postprandial glucose concentrations 100 to 120 mg/dL   -A1C less than 6.0 percent  Diet and exercise is the primary therapy for patients with gestational diabetes.  For type 1 diabetes and patients requiring insulin I suggest a combination of lispro/aspart insulin and NPH insulin for therapy in pregnancy.   Total daily insulin requirements usually vary considerably during gestation. There is an early rise in insulin requirements between weeks 3 and 7, there often a decline between weeks 7 and 15, followed by a rise during the remainder of pregnancy, especially between weeks 28 and 32.    The average insulin requirement in pregnant women with type 1 diabetes is 0.7 units/kg in the first trimester, often increasing to 0.8 U/kg for weeks 13 to 28, 0.9 U/kg for weeks 29 to 34, and 1.0 U/kg for weeks 35 to term.   Meal Insulin (Lispro/Aspart) are 50 % of the insulin requirement.  As an example, an 80 kilogram pregnant diabetic woman would take 12 units of lispro or aspart before each meal. The other 50 percent of her insulin dose should cover basal needs. The dose is calculated as 0.45 times the patient's weight in kilograms. For  example, our 80 kilogram women would take 12 units of NPH three times a day.   Plan:  1. A1C monthly   2. self-glucose monitoring pre-meals and one hour after breakfast, lunch, dinner, and at bedtime   3. check glucose concentration during the night if nocturnal hypoglycemia is suspected   4. measure urinary ketones during illness or when any blood glucose value is > 180 mg/dL in a type 1 diabetic pregnant woman.     RX for glucagon emergency kit and urine ketone strips provided 4/2017.  RX for lantus and syringes provided in the event of insulin pump failure provided 4/2017.  Opthalmology-Yes last exam 3/2016-recommend she schedule an exam, referral provided.    Labs ordered today:   No orders of the defined types were placed in this encounter.      Radiology/Consults ordered today: None    No orders of the defined types were placed in this encounter.      Follow-up:  3 months as long as blood sugars remain controlled.  If any changes or A1c increases > 6.0%, will plan to have her f/u monthly.      Maryann Hammonds NP  Endocrinology  [facility]  CC: Cheyanne Silva      More than 50% of face to face time spent with Ms. Avila on counseling / coordinating her care.  Total face to face time was 25 minutes.      All questions were answered.  The patient indicates understanding of the above issues and agrees with the plan set forth.          Him/He

## 2024-05-15 NOTE — ED PROVIDER NOTE - CLINICAL SUMMARY MEDICAL DECISION MAKING FREE TEXT BOX
83-year-old male with h/o COPD, non-Hodgkin's lymphoma, DM, HTN, schizophrenia, in ER for evaluation s/p fall this morning.  Patient states he tripped and fell on ground.  Hit head, no LOC.  Not on any AC meds.  + Abrasion to R lateral eyebrow.  + Mild headache.  No N/V.  No dizziness.  No visual changes.  No neck pain.  Denies any extremity pain or injury.  No CP/SOB.  No abdominal pain.  PE - nad, nc, + superficial abrasion R lateral eyebrow, no underlying bony tenderness, eomi, perrl, op - clear, mmm, no C-spine tenderness, cta b/l, no w/r/r, rrr, abd- soft, nt/nd, nabs, from x 4, no LE swelling/tenderness, A&O x 3, cn 2-12 intact, no focal motor/sensory deficits.   -CT head/C-spine with no acute injury.  To DC home, patient to follow-up PMD.  Told to return to ER if he feels worse, or for any new/concerning symptoms.  Patient understands and agrees with plan.

## 2024-05-15 NOTE — ED PROVIDER NOTE - PHYSICAL EXAMINATION
Vital Signs: I have reviewed the initial vital signs.  Constitutional: NAD, well-nourished, appears stated age, no acute distress.  HEENT: Airway patent, moist MM, no erythema/swelling/deformity of oral structures. EOMI, PERRLA.  CV: regular rate, regular rhythm, well-perfused extremities, 2+ b/l DP and radial pulses equal.  Lungs: BCTA, no increased WOB.  ABD: NTND, no guarding or rebound, no pulsatile mass, no hernias.   MSK: Neck supple, nontender, nl ROM, no stepoff. Chest nontender. Back nontender. Ext nontender, nl rom, no deformity.   INTEG: + right orbit abrasion  NEURO: A&Ox3, normal strength, nl sensation throughout, normal speech.   PSYCH: Calm, cooperative, normal affect and interaction.

## 2024-05-15 NOTE — ED ADULT NURSE NOTE - NSFALLUNIVINTERV_ED_ALL_ED
Bed/Stretcher in lowest position, wheels locked, appropriate side rails in place/Call bell, personal items and telephone in reach/Instruct patient to call for assistance before getting out of bed/chair/stretcher/Non-slip footwear applied when patient is off stretcher/[place] to call system/Physically safe environment - no spills, clutter or unnecessary equipment/Purposeful proactive rounding/Room/bathroom lighting operational, light cord in reach

## 2024-08-29 NOTE — CONSULT NOTE ADULT - ATTENDING SUPERVISION STATEMENT
[FreeTextEntry1] : 78 yo F presents with hx of osteoporosis for many years presents with severe midline thoracic pain worse with any slight movements, coughing, breathing without trauma. The pain is 10/10 and completely impedes sleep, ADLs and QOL. The patient denies any weakness, bowel/bladder incontinence. The patient denies numbness, tingling but admits to sharp pain. This started 1 week ago.   PRESENTING TODAY 08-: Patient presents to the office today for a follow up visit with continued upper back pain and right shoulder pain. Patient admits to right dull, achy pain with overhead activity without associated numbness, tingling or radicular pain past the elbow for (6 weeks). Patient also has pain with laying on her affected side. Pain is 8/10 in intensity. She has tried NSAIDs, compound potions and HEP without significant relief. Patient denies any bowel or bladder dysfunction, incontinence, or saddle anesthesia. 
Resident/Fellow/Student

## 2024-09-11 NOTE — ED ADULT NURSE NOTE - NS ED NURSE RECORD ANOTHER VITAL SIGN
"Anesthesia Transfer of Care Note    Patient: Ivy Salgado    Procedure(s) Performed: Procedure(s) (LRB):  INSERTION, SINGLE LUMEN CATHETER WITH PORT, WITH FLUOROSCOPIC GUIDANCE, Rt neck or chest, prefers chest (Right)  REMOVAL, CATHETER, CENTRAL VENOUS, TUNNELED, WITH PORT, Left side (Left)    Patient location: PACU    Anesthesia Type: MAC    Transport from OR: Transported from OR on 6-10 L/min O2 by face mask with adequate spontaneous ventilation    Post pain: adequate analgesia    Post assessment: no apparent anesthetic complications    Post vital signs: stable    Level of consciousness: sedated and responds to stimulation    Nausea/Vomiting: no nausea/vomiting    Complications: none    Transfer of care protocol was followed      Last vitals: Visit Vitals  /80 (BP Location: Right arm, Patient Position: Lying)   Pulse 72   Temp 36.6 °C (97.9 °F) (Skin)   Resp 18   Ht 5' 1" (1.549 m)   Wt 49.9 kg (110 lb 0.2 oz)   LMP 03/30/2015   SpO2 100%   Breastfeeding No   BMI 20.79 kg/m²     " Yes

## 2024-10-30 NOTE — SWALLOW BEDSIDE ASSESSMENT ADULT - SWALLOW EVAL: FUNCTIONAL LEVEL AT TIME OF EVAL
Head: normocephalic, atraumatic. Face: within normal limits, no involuntary movements and expected facial expressions. Ears: external ears within normal limits
alert

## 2024-11-18 NOTE — DISCHARGE NOTE PROVIDER - NSDCQMAMI_CARD_ALL_CORE
Care Transitions Note    Follow Up Call     Attempted to reach patient for transitions of care follow up.  Unable to reach patient.      Outreach Attempts:   Unable to leave message.     Care Summary Note: Follow up outreach call attempt, no answer, kept ringing, there was no VM available. CTN will continue with outreach call attempts.      Follow Up Appointment:   Future Appointments         Provider Specialty Dept Phone    2/5/2025 12:40 PM Lindsay Stone MD Endocrinology [phone]            Plan for follow-up call in 2-5 days based on severity of symptoms and risk factors. Plan for next call: symptom management-.  self management-.  follow-up appointment-.    LAM WONG RN       No

## 2024-11-21 ENCOUNTER — INPATIENT (INPATIENT)
Facility: HOSPITAL | Age: 84
LOS: 4 days | Discharge: HOME | DRG: 871 | End: 2024-11-26
Attending: INTERNAL MEDICINE | Admitting: STUDENT IN AN ORGANIZED HEALTH CARE EDUCATION/TRAINING PROGRAM
Payer: MEDICARE

## 2024-11-21 VITALS
HEART RATE: 105 BPM | TEMPERATURE: 102 F | WEIGHT: 154.98 LBS | RESPIRATION RATE: 18 BRPM | SYSTOLIC BLOOD PRESSURE: 155 MMHG | OXYGEN SATURATION: 98 % | DIASTOLIC BLOOD PRESSURE: 70 MMHG

## 2024-11-21 DIAGNOSIS — Z12.11 ENCOUNTER FOR SCREENING FOR MALIGNANT NEOPLASM OF COLON: Chronic | ICD-10-CM

## 2024-11-21 LAB
ALBUMIN SERPL ELPH-MCNC: 4 G/DL — SIGNIFICANT CHANGE UP (ref 3.5–5.2)
ALP SERPL-CCNC: 96 U/L — SIGNIFICANT CHANGE UP (ref 30–115)
ALT FLD-CCNC: 8 U/L — SIGNIFICANT CHANGE UP (ref 0–41)
ANION GAP SERPL CALC-SCNC: 14 MMOL/L — SIGNIFICANT CHANGE UP (ref 7–14)
AST SERPL-CCNC: 22 U/L — SIGNIFICANT CHANGE UP (ref 0–41)
BASE EXCESS BLDV CALC-SCNC: -1.2 MMOL/L — SIGNIFICANT CHANGE UP (ref -2–3)
BASOPHILS # BLD AUTO: 0.02 K/UL — SIGNIFICANT CHANGE UP (ref 0–0.2)
BASOPHILS NFR BLD AUTO: 0.1 % — SIGNIFICANT CHANGE UP (ref 0–1)
BILIRUB SERPL-MCNC: <0.2 MG/DL — SIGNIFICANT CHANGE UP (ref 0.2–1.2)
BUN SERPL-MCNC: 17 MG/DL — SIGNIFICANT CHANGE UP (ref 10–20)
CA-I SERPL-SCNC: 1.21 MMOL/L — SIGNIFICANT CHANGE UP (ref 1.15–1.33)
CALCIUM SERPL-MCNC: 9.1 MG/DL — SIGNIFICANT CHANGE UP (ref 8.4–10.5)
CHLORIDE SERPL-SCNC: 92 MMOL/L — LOW (ref 98–110)
CO2 SERPL-SCNC: 22 MMOL/L — SIGNIFICANT CHANGE UP (ref 17–32)
CREAT SERPL-MCNC: 1.2 MG/DL — SIGNIFICANT CHANGE UP (ref 0.7–1.5)
EGFR: 60 ML/MIN/1.73M2 — SIGNIFICANT CHANGE UP
EOSINOPHIL # BLD AUTO: 0 K/UL — SIGNIFICANT CHANGE UP (ref 0–0.7)
EOSINOPHIL NFR BLD AUTO: 0 % — SIGNIFICANT CHANGE UP (ref 0–8)
GAS PNL BLDV: 125 MMOL/L — LOW (ref 136–145)
GAS PNL BLDV: SIGNIFICANT CHANGE UP
GAS PNL BLDV: SIGNIFICANT CHANGE UP
GLUCOSE SERPL-MCNC: 132 MG/DL — HIGH (ref 70–99)
HCO3 BLDV-SCNC: 25 MMOL/L — SIGNIFICANT CHANGE UP (ref 22–29)
HCT VFR BLD CALC: 31.2 % — LOW (ref 42–52)
HCT VFR BLDA CALC: 30 % — LOW (ref 39–51)
HGB BLD CALC-MCNC: 10 G/DL — LOW (ref 12.6–17.4)
HGB BLD-MCNC: 10.6 G/DL — LOW (ref 14–18)
IMM GRANULOCYTES NFR BLD AUTO: 0.6 % — HIGH (ref 0.1–0.3)
LACTATE BLDV-MCNC: 3.3 MMOL/L — HIGH (ref 0.5–2)
LIDOCAIN IGE QN: 30 U/L — SIGNIFICANT CHANGE UP (ref 7–60)
LYMPHOCYTES # BLD AUTO: 0.65 K/UL — LOW (ref 1.2–3.4)
LYMPHOCYTES # BLD AUTO: 4 % — LOW (ref 20.5–51.1)
MCHC RBC-ENTMCNC: 31.9 PG — HIGH (ref 27–31)
MCHC RBC-ENTMCNC: 34 G/DL — SIGNIFICANT CHANGE UP (ref 32–37)
MCV RBC AUTO: 94 FL — SIGNIFICANT CHANGE UP (ref 80–94)
MONOCYTES # BLD AUTO: 0.89 K/UL — HIGH (ref 0.1–0.6)
MONOCYTES NFR BLD AUTO: 5.5 % — SIGNIFICANT CHANGE UP (ref 1.7–9.3)
NEUTROPHILS # BLD AUTO: 14.42 K/UL — HIGH (ref 1.4–6.5)
NEUTROPHILS NFR BLD AUTO: 89.8 % — HIGH (ref 42.2–75.2)
NRBC # BLD: 0 /100 WBCS — SIGNIFICANT CHANGE UP (ref 0–0)
PCO2 BLDV: 49 MMHG — SIGNIFICANT CHANGE UP (ref 42–55)
PH BLDV: 7.32 — SIGNIFICANT CHANGE UP (ref 7.32–7.43)
PLATELET # BLD AUTO: 208 K/UL — SIGNIFICANT CHANGE UP (ref 130–400)
PMV BLD: 10.9 FL — HIGH (ref 7.4–10.4)
PO2 BLDV: 28 MMHG — SIGNIFICANT CHANGE UP (ref 25–45)
POTASSIUM BLDV-SCNC: 4.6 MMOL/L — SIGNIFICANT CHANGE UP (ref 3.5–5.1)
POTASSIUM SERPL-MCNC: 5.1 MMOL/L — HIGH (ref 3.5–5)
POTASSIUM SERPL-SCNC: 5.1 MMOL/L — HIGH (ref 3.5–5)
PROT SERPL-MCNC: 6.8 G/DL — SIGNIFICANT CHANGE UP (ref 6–8)
RBC # BLD: 3.32 M/UL — LOW (ref 4.7–6.1)
RBC # FLD: 14.6 % — HIGH (ref 11.5–14.5)
SAO2 % BLDV: 39.3 % — LOW (ref 67–88)
SODIUM SERPL-SCNC: 128 MMOL/L — LOW (ref 135–146)
WBC # BLD: 16.07 K/UL — HIGH (ref 4.8–10.8)
WBC # FLD AUTO: 16.07 K/UL — HIGH (ref 4.8–10.8)

## 2024-11-21 PROCEDURE — 71045 X-RAY EXAM CHEST 1 VIEW: CPT | Mod: 26

## 2024-11-21 PROCEDURE — 99285 EMERGENCY DEPT VISIT HI MDM: CPT | Mod: FS

## 2024-11-21 RX ORDER — ACETAMINOPHEN 500MG 500 MG/1
650 TABLET, COATED ORAL ONCE
Refills: 0 | Status: COMPLETED | OUTPATIENT
Start: 2024-11-21 | End: 2024-11-21

## 2024-11-21 RX ORDER — 0.9 % SODIUM CHLORIDE 0.9 %
2000 INTRAVENOUS SOLUTION INTRAVENOUS ONCE
Refills: 0 | Status: COMPLETED | OUTPATIENT
Start: 2024-11-21 | End: 2024-11-21

## 2024-11-21 RX ORDER — CEFEPIME 2 G/1
2000 INJECTION, POWDER, FOR SOLUTION INTRAVENOUS ONCE
Refills: 0 | Status: COMPLETED | OUTPATIENT
Start: 2024-11-21 | End: 2024-11-21

## 2024-11-21 RX ADMIN — ACETAMINOPHEN 500MG 650 MILLIGRAM(S): 500 TABLET, COATED ORAL at 22:44

## 2024-11-21 RX ADMIN — Medication 2000 MILLILITER(S): at 22:41

## 2024-11-21 RX ADMIN — CEFEPIME 100 MILLIGRAM(S): 2 INJECTION, POWDER, FOR SOLUTION INTRAVENOUS at 22:41

## 2024-11-21 RX ADMIN — ACETAMINOPHEN 500MG 650 MILLIGRAM(S): 500 TABLET, COATED ORAL at 23:50

## 2024-11-21 RX ADMIN — Medication 2000 MILLILITER(S): at 23:41

## 2024-11-21 NOTE — ED PROVIDER NOTE - IN ACCORDANCE WITH [place] STATE LAW, WE OFFER EVERY PATIENT A HEPATITIS C TEST. WOULD YOU LIKE TO BE TESTED TODAY?
Infant remains on . 46L MCF NC. A few self resolve desaturations overnight. Infant tolerating NG feedings. Void/stool. Parents updated at bedside on plan of care. Questions answered. Opt out

## 2024-11-21 NOTE — ED PROVIDER NOTE - CARE PLAN
1 Principal Discharge DX:	Pneumonia   Principal Discharge DX:	Pneumonia  Secondary Diagnosis:	Hyponatremia

## 2024-11-21 NOTE — ED PROVIDER NOTE - PHYSICAL EXAMINATION
Constitutional: Well developed, well nourished. NAD  Head: Normocephalic, atraumatic.  Eyes: PERRL, EOMI.  ENT: No nasal discharge. Mucous membranes dry.  Neck: Supple. Painless ROM.  Cardiovascular:  . Regular rate and rhythm.    Pulmonary: + poor insp effort; + ronchi;   Abdominal: Soft. Nondistended. No rebound, guarding, rigidity.  Extremities. Pelvis stable. No lower extremity edema, symmetric calves.  Skin: No rashes, cyanosis.  Neuro: AAOx3. No focal neurological deficits.  Psych: Normal mood. Normal affect.

## 2024-11-21 NOTE — ED PROVIDER NOTE - OBJECTIVE STATEMENT
84 yold male to ED Pmhx COPD, Htn, Hld, Schizophrenia, non-hodgkins lymphoma, hyponatremia; [facility] and [facility]; pt noted to have productive cough with mild difficulty breathing; pt denies any complaints; pt denies n/v, abdominal pain, sob, Headache, neck, chest marcelino pain;

## 2024-11-21 NOTE — ED PROVIDER NOTE - CLINICAL SUMMARY MEDICAL DECISION MAKING FREE TEXT BOX
85 yo male, PMHx of schizophrenia, COPD, diabetes, high cholesterol, hypertension brought in by ambulance from [facility], presenting for evaluation.  Patient states he has been coughing for a few days.  Otherwise unreliable historian.  Upon arrival, febrile, tachycardic, saturating 95-97% on room air, mildly tachypneic.  Decreased breath sound bibasilar.  Heart tachycardic regular rhythm.  Abdomen soft NDNT.  No lower extremity edema.  Labs and EKG were ordered and reviewed.  Imaging was ordered and reviewed by me.  Appropriate medications for patient's presenting complaints were ordered and effects were reassessed.  Patient's records (prior hospital, ED visit, and/or nursing home notes if available) were reviewed.  Escalation to admission/observation was considered.  1) However patient feels much better and is comfortable with discharge.  Appropriate follow-up was arranged. 2) Patient requires inpatient hospitalization - monitored setting. 85 yo male, PMHx of schizophrenia, COPD, diabetes, high cholesterol, hypertension brought in by ambulance from [facility], presenting for evaluation.  Patient states he has been coughing for a few days.  Otherwise unreliable historian.  Upon arrival, febrile, tachycardic, saturating 95-97% on room air, mildly tachypneic.  Decreased breath sound bibasilar.  Heart tachycardic regular rhythm.  Abdomen soft NDNT.  No lower extremity edema.  Labs and EKG were ordered and reviewed.  Imaging was ordered and reviewed by me.  Appropriate medications for patient's presenting complaints were ordered and effects were reassessed.  Patient's records (prior hospital, ED visit, and/or nursing home notes if available) were reviewed.  Escalation to admission/observation was considered.  Elevated WBC.  Hyponatremic.  Treated for sepsis.  Patient requires inpatient hospitalization .

## 2024-11-22 DIAGNOSIS — J18.9 PNEUMONIA, UNSPECIFIED ORGANISM: ICD-10-CM

## 2024-11-22 LAB
ALBUMIN SERPL ELPH-MCNC: 3.8 G/DL — SIGNIFICANT CHANGE UP (ref 3.5–5.2)
ALP SERPL-CCNC: 83 U/L — SIGNIFICANT CHANGE UP (ref 30–115)
ALT FLD-CCNC: 7 U/L — SIGNIFICANT CHANGE UP (ref 0–41)
ANION GAP SERPL CALC-SCNC: 9 MMOL/L — SIGNIFICANT CHANGE UP (ref 7–14)
AST SERPL-CCNC: 18 U/L — SIGNIFICANT CHANGE UP (ref 0–41)
BASOPHILS # BLD AUTO: 0.02 K/UL — SIGNIFICANT CHANGE UP (ref 0–0.2)
BASOPHILS NFR BLD AUTO: 0.1 % — SIGNIFICANT CHANGE UP (ref 0–1)
BILIRUB SERPL-MCNC: 0.4 MG/DL — SIGNIFICANT CHANGE UP (ref 0.2–1.2)
BUN SERPL-MCNC: 15 MG/DL — SIGNIFICANT CHANGE UP (ref 10–20)
CALCIUM SERPL-MCNC: 8.9 MG/DL — SIGNIFICANT CHANGE UP (ref 8.4–10.4)
CHLORIDE SERPL-SCNC: 97 MMOL/L — LOW (ref 98–110)
CO2 SERPL-SCNC: 27 MMOL/L — SIGNIFICANT CHANGE UP (ref 17–32)
CREAT SERPL-MCNC: 1.2 MG/DL — SIGNIFICANT CHANGE UP (ref 0.7–1.5)
EGFR: 60 ML/MIN/1.73M2 — SIGNIFICANT CHANGE UP
EOSINOPHIL # BLD AUTO: 0 K/UL — SIGNIFICANT CHANGE UP (ref 0–0.7)
EOSINOPHIL NFR BLD AUTO: 0 % — SIGNIFICANT CHANGE UP (ref 0–8)
GLUCOSE BLDC GLUCOMTR-MCNC: 102 MG/DL — HIGH (ref 70–99)
GLUCOSE BLDC GLUCOMTR-MCNC: 109 MG/DL — HIGH (ref 70–99)
GLUCOSE BLDC GLUCOMTR-MCNC: 94 MG/DL — SIGNIFICANT CHANGE UP (ref 70–99)
GLUCOSE SERPL-MCNC: 91 MG/DL — SIGNIFICANT CHANGE UP (ref 70–99)
HCT VFR BLD CALC: 32.9 % — LOW (ref 42–52)
HGB BLD-MCNC: 11.1 G/DL — LOW (ref 14–18)
IMM GRANULOCYTES NFR BLD AUTO: 0.5 % — HIGH (ref 0.1–0.3)
LACTATE SERPL-SCNC: 1.4 MMOL/L — SIGNIFICANT CHANGE UP (ref 0.7–2)
LYMPHOCYTES # BLD AUTO: 1.04 K/UL — LOW (ref 1.2–3.4)
LYMPHOCYTES # BLD AUTO: 7.1 % — LOW (ref 20.5–51.1)
MAGNESIUM SERPL-MCNC: 1.9 MG/DL — SIGNIFICANT CHANGE UP (ref 1.8–2.4)
MCHC RBC-ENTMCNC: 32.2 PG — HIGH (ref 27–31)
MCHC RBC-ENTMCNC: 33.7 G/DL — SIGNIFICANT CHANGE UP (ref 32–37)
MCV RBC AUTO: 95.4 FL — HIGH (ref 80–94)
MONOCYTES # BLD AUTO: 0.74 K/UL — HIGH (ref 0.1–0.6)
MONOCYTES NFR BLD AUTO: 5.1 % — SIGNIFICANT CHANGE UP (ref 1.7–9.3)
NEUTROPHILS # BLD AUTO: 12.76 K/UL — HIGH (ref 1.4–6.5)
NEUTROPHILS NFR BLD AUTO: 87.2 % — HIGH (ref 42.2–75.2)
NRBC # BLD: 0 /100 WBCS — SIGNIFICANT CHANGE UP (ref 0–0)
PHOSPHATE SERPL-MCNC: 2.9 MG/DL — SIGNIFICANT CHANGE UP (ref 2.1–4.9)
PLATELET # BLD AUTO: 216 K/UL — SIGNIFICANT CHANGE UP (ref 130–400)
PMV BLD: 10.6 FL — HIGH (ref 7.4–10.4)
POTASSIUM SERPL-MCNC: 5.1 MMOL/L — HIGH (ref 3.5–5)
POTASSIUM SERPL-SCNC: 5.1 MMOL/L — HIGH (ref 3.5–5)
PROT SERPL-MCNC: 6.4 G/DL — SIGNIFICANT CHANGE UP (ref 6–8)
RAPID RVP RESULT: SIGNIFICANT CHANGE UP
RBC # BLD: 3.45 M/UL — LOW (ref 4.7–6.1)
RBC # FLD: 14.7 % — HIGH (ref 11.5–14.5)
SARS-COV-2 RNA SPEC QL NAA+PROBE: SIGNIFICANT CHANGE UP
SODIUM SERPL-SCNC: 133 MMOL/L — LOW (ref 135–146)
WBC # BLD: 14.64 K/UL — HIGH (ref 4.8–10.8)
WBC # FLD AUTO: 14.64 K/UL — HIGH (ref 4.8–10.8)

## 2024-11-22 PROCEDURE — 87899 AGENT NOS ASSAY W/OPTIC: CPT

## 2024-11-22 PROCEDURE — 81001 URINALYSIS AUTO W/SCOPE: CPT

## 2024-11-22 PROCEDURE — 0225U NFCT DS DNA&RNA 21 SARSCOV2: CPT

## 2024-11-22 PROCEDURE — 71045 X-RAY EXAM CHEST 1 VIEW: CPT

## 2024-11-22 PROCEDURE — 83735 ASSAY OF MAGNESIUM: CPT

## 2024-11-22 PROCEDURE — 84100 ASSAY OF PHOSPHORUS: CPT

## 2024-11-22 PROCEDURE — 83036 HEMOGLOBIN GLYCOSYLATED A1C: CPT

## 2024-11-22 PROCEDURE — 36415 COLL VENOUS BLD VENIPUNCTURE: CPT

## 2024-11-22 PROCEDURE — 84145 PROCALCITONIN (PCT): CPT

## 2024-11-22 PROCEDURE — 99232 SBSQ HOSP IP/OBS MODERATE 35: CPT

## 2024-11-22 PROCEDURE — 83605 ASSAY OF LACTIC ACID: CPT

## 2024-11-22 PROCEDURE — 97162 PT EVAL MOD COMPLEX 30 MIN: CPT | Mod: GP

## 2024-11-22 PROCEDURE — 87086 URINE CULTURE/COLONY COUNT: CPT

## 2024-11-22 PROCEDURE — 82962 GLUCOSE BLOOD TEST: CPT

## 2024-11-22 PROCEDURE — 85025 COMPLETE CBC W/AUTO DIFF WBC: CPT

## 2024-11-22 PROCEDURE — 71045 X-RAY EXAM CHEST 1 VIEW: CPT | Mod: 26

## 2024-11-22 PROCEDURE — 80053 COMPREHEN METABOLIC PANEL: CPT

## 2024-11-22 PROCEDURE — 86713 LEGIONELLA ANTIBODY: CPT

## 2024-11-22 RX ORDER — CYANOCOBALAMIN/FOLIC AC/VIT B6 1-2.2-25MG
1 TABLET ORAL DAILY
Refills: 0 | Status: DISCONTINUED | OUTPATIENT
Start: 2024-11-22 | End: 2024-11-26

## 2024-11-22 RX ORDER — MEROPENEM 500 MG/1
1000 INJECTION, POWDER, FOR SOLUTION INTRAVENOUS EVERY 8 HOURS
Refills: 0 | Status: DISCONTINUED | OUTPATIENT
Start: 2024-11-22 | End: 2024-11-22

## 2024-11-22 RX ORDER — LISINOPRIL 20 MG/1
10 TABLET ORAL DAILY
Refills: 0 | Status: DISCONTINUED | OUTPATIENT
Start: 2024-11-22 | End: 2024-11-26

## 2024-11-22 RX ORDER — LISINOPRIL 20 MG/1
5 TABLET ORAL ONCE
Refills: 0 | Status: COMPLETED | OUTPATIENT
Start: 2024-11-22 | End: 2024-11-22

## 2024-11-22 RX ORDER — PANTOPRAZOLE SODIUM 40 MG/1
40 TABLET, DELAYED RELEASE ORAL
Refills: 0 | Status: DISCONTINUED | OUTPATIENT
Start: 2024-11-22 | End: 2024-11-26

## 2024-11-22 RX ORDER — GLUCAGON INJECTION, SOLUTION 0.5 MG/.1ML
1 INJECTION, SOLUTION SUBCUTANEOUS ONCE
Refills: 0 | Status: DISCONTINUED | OUTPATIENT
Start: 2024-11-22 | End: 2024-11-26

## 2024-11-22 RX ORDER — ENOXAPARIN SODIUM 30 MG/.3ML
40 INJECTION SUBCUTANEOUS EVERY 24 HOURS
Refills: 0 | Status: DISCONTINUED | OUTPATIENT
Start: 2024-11-22 | End: 2024-11-26

## 2024-11-22 RX ORDER — 0.9 % SODIUM CHLORIDE 0.9 %
1000 INTRAVENOUS SOLUTION INTRAVENOUS
Refills: 0 | Status: DISCONTINUED | OUTPATIENT
Start: 2024-11-22 | End: 2024-11-26

## 2024-11-22 RX ORDER — TRAZODONE HYDROCHLORIDE 150 MG/1
50 TABLET ORAL AT BEDTIME
Refills: 0 | Status: DISCONTINUED | OUTPATIENT
Start: 2024-11-22 | End: 2024-11-26

## 2024-11-22 RX ORDER — GABAPENTIN 300 MG/1
300 CAPSULE ORAL
Refills: 0 | Status: DISCONTINUED | OUTPATIENT
Start: 2024-11-22 | End: 2024-11-26

## 2024-11-22 RX ORDER — TAMSULOSIN HYDROCHLORIDE 0.4 MG/1
0.4 CAPSULE ORAL AT BEDTIME
Refills: 0 | Status: DISCONTINUED | OUTPATIENT
Start: 2024-11-22 | End: 2024-11-26

## 2024-11-22 RX ORDER — CEFEPIME 2 G/1
2000 INJECTION, POWDER, FOR SOLUTION INTRAVENOUS EVERY 12 HOURS
Refills: 0 | Status: DISCONTINUED | OUTPATIENT
Start: 2024-11-23 | End: 2024-11-26

## 2024-11-22 RX ORDER — CEFEPIME 2 G/1
INJECTION, POWDER, FOR SOLUTION INTRAVENOUS
Refills: 0 | Status: DISCONTINUED | OUTPATIENT
Start: 2024-11-22 | End: 2024-11-26

## 2024-11-22 RX ORDER — SODIUM ZIRCONIUM CYCLOSILICATE 5 G/5G
5 POWDER, FOR SUSPENSION ORAL ONCE
Refills: 0 | Status: COMPLETED | OUTPATIENT
Start: 2024-11-22 | End: 2024-11-22

## 2024-11-22 RX ORDER — CHLORHEXIDINE GLUCONATE 1.2 MG/ML
1 RINSE ORAL
Refills: 0 | Status: DISCONTINUED | OUTPATIENT
Start: 2024-11-22 | End: 2024-11-26

## 2024-11-22 RX ORDER — CEFEPIME 2 G/1
2000 INJECTION, POWDER, FOR SOLUTION INTRAVENOUS ONCE
Refills: 0 | Status: COMPLETED | OUTPATIENT
Start: 2024-11-22 | End: 2024-11-22

## 2024-11-22 RX ORDER — METOPROLOL TARTRATE 100 MG/1
25 TABLET, FILM COATED ORAL DAILY
Refills: 0 | Status: DISCONTINUED | OUTPATIENT
Start: 2024-11-22 | End: 2024-11-26

## 2024-11-22 RX ORDER — OLANZAPINE 20 MG/1
10 TABLET ORAL DAILY
Refills: 0 | Status: DISCONTINUED | OUTPATIENT
Start: 2024-11-22 | End: 2024-11-26

## 2024-11-22 RX ADMIN — TRAZODONE HYDROCHLORIDE 50 MILLIGRAM(S): 150 TABLET ORAL at 22:01

## 2024-11-22 RX ADMIN — OLANZAPINE 10 MILLIGRAM(S): 20 TABLET ORAL at 12:48

## 2024-11-22 RX ADMIN — CHLORHEXIDINE GLUCONATE 1 APPLICATION(S): 1.2 RINSE ORAL at 12:50

## 2024-11-22 RX ADMIN — MEROPENEM 100 MILLIGRAM(S): 500 INJECTION, POWDER, FOR SOLUTION INTRAVENOUS at 05:45

## 2024-11-22 RX ADMIN — TAMSULOSIN HYDROCHLORIDE 0.4 MILLIGRAM(S): 0.4 CAPSULE ORAL at 22:01

## 2024-11-22 RX ADMIN — LISINOPRIL 5 MILLIGRAM(S): 20 TABLET ORAL at 05:44

## 2024-11-22 RX ADMIN — PANTOPRAZOLE SODIUM 40 MILLIGRAM(S): 40 TABLET, DELAYED RELEASE ORAL at 05:45

## 2024-11-22 RX ADMIN — CEFEPIME 2000 MILLIGRAM(S): 2 INJECTION, POWDER, FOR SOLUTION INTRAVENOUS at 00:01

## 2024-11-22 RX ADMIN — CEFEPIME 100 MILLIGRAM(S): 2 INJECTION, POWDER, FOR SOLUTION INTRAVENOUS at 17:42

## 2024-11-22 RX ADMIN — MEROPENEM 100 MILLIGRAM(S): 500 INJECTION, POWDER, FOR SOLUTION INTRAVENOUS at 15:09

## 2024-11-22 RX ADMIN — SODIUM ZIRCONIUM CYCLOSILICATE 5 GRAM(S): 5 POWDER, FOR SUSPENSION ORAL at 14:59

## 2024-11-22 RX ADMIN — ENOXAPARIN SODIUM 40 MILLIGRAM(S): 30 INJECTION SUBCUTANEOUS at 05:44

## 2024-11-22 RX ADMIN — GABAPENTIN 300 MILLIGRAM(S): 300 CAPSULE ORAL at 17:42

## 2024-11-22 RX ADMIN — Medication 1 TABLET(S): at 12:48

## 2024-11-22 NOTE — PROGRESS NOTE ADULT - ASSESSMENT
An 84-year-old male resident of [facility] ([place]) with a past medical history of hyperlipidemia (HLD), schizophrenia, chronic obstructive pulmonary disease (COPD), type 2 diabetes mellitus (DM2), non-Hodgkin's lymphoma, and hyponatremia, presented to the ED with complaints of weakness and fever. He is a poor historian and unable to provide many details. He reports a cough and shortness of breath developing over the past few days. The cough is productive in nature. He experiences mild dyspnea.      Plan    # SEPSIS POA 2/2 to CAP :   -In Ed patient was febrile with temp of  101.7 Degrees F  tachycardic to 105 ,  mildly tachypneic.    - WBCs 16.07 e Neutrophil predominance, lactate on VBG 3.3   -CXR 11/22: stable opacity in RLL  -rcvd cefepime , levofloxacin in Ed   -previous Cultures were positive for ESBL   -started on Meropenem   -f/u UCx BCx  -f/u procal   -f/u RVP   -f/u legionella , strep Ag  -f/u ID     #chronic hyponatremia :   -asymptomatic   - Na 128   -on IVF   -trend sodium on BMP     #BPH  -c/w home meds       # h/o COPD -stable, resumed on inhalers, nebs tx.     # DM 2 -SSI     # CAD/ HTN/ HLD  - resume home meds     #Incidental Findings (Liver Nodules & Kidney cyst/hypodensity)  - Outpatient workup and follow up    #Misc:   DVT Ppx:lovenox   GI Ppx:PPI   Diet: DASH   Activity : ATT     confirm the med rec from [facility] home paper once they arrived      An 84-year-old male resident of [facility] ([place]) with a past medical history of hyperlipidemia (HLD), schizophrenia, chronic obstructive pulmonary disease (COPD), type 2 diabetes mellitus (DM2), non-Hodgkin's lymphoma, and hyponatremia, presented to the ED with complaints of weakness and fever. He is a poor historian and unable to provide many details. He reports a cough and shortness of breath developing over the past few days. The cough is productive in nature. He experiences mild dyspnea.      Plan    # SEPSIS POA 2/2 to CAP :   -In Ed patient was febrile with temp of  101.7 Degrees F  tachycardic to 105 ,  mildly tachypneic.    - WBCs 16.07 e Neutrophil predominance, lactate on VBG 3.3   -CXR 11/22: stable opacity in RLL  -rcvd cefepime , levofloxacin in Ed   -previous urine Cultures were positive for Klebsiella (carbapenum resistent)  -c/w Meropenem   -f/u UCx BCx  -f/u procal   -f/u RVP   -f/u legionella , strep Ag  -f/u ID     #chronic hyponatremia :   -asymptomatic   - Na 128   - dc NS IVF  -trend sodium on BMP     #BPH  -c/w home meds       # h/o COPD -stable, resumed on inhalers, nebs tx.     # DM 2 -SSI     # CAD/ HTN/ HLD  - resume home meds     #Incidental Findings (Liver Nodules & Kidney cyst/hypodensity)  - Outpatient workup and follow up    #Misc:   DVT Ppx:lovenox   GI Ppx:PPI   Diet: DASH   Activity : ATT     confirm the med rec from [facility] home paper once they arrived

## 2024-11-22 NOTE — PATIENT PROFILE ADULT - HEALTH LITERACY
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

## 2024-11-22 NOTE — PATIENT PROFILE ADULT - NSPRESCRUSEDDRG_GEN_A_NUR
pt declined to answer all questions with "beat it. Stop asking me things, get the fuck out of here, miss"

## 2024-11-22 NOTE — CONSULT NOTE ADULT - SUBJECTIVE AND OBJECTIVE BOX
ADA VILLAGOMEZ  84y, Male  Allergy: No Known Allergies      CHIEF COMPLAINT:       LOS      HPI  HPI:  An 84-year-old male resident of [facility] ([place]) with a past medical history of hyperlipidemia (HLD), schizophrenia, chronic obstructive pulmonary disease (COPD), type 2 diabetes mellitus (DM2), non-Hodgkin's lymphoma, and hyponatremia, presented to the ED with complaints of weakness and fever. He is a poor historian and unable to provide many details. He reports a cough and shortness of breath developing over the past few days. The cough is productive in nature. He experiences mild dyspnea.   Denies nausea, vomiting, abdominal pain, sick contacts, or recent travel   Due to his limited ability to provide a history, further details regarding the onset, duration, and character of his symptoms are unavailable. His baseline functional status and cognitive capacity are also unclear.      In Ed patient was febrile with temp of  101.7 Degrees F  tachycardic to 105 ,  mildly tachypneic.    On labs WBCs 16.07 e Neutrophil predominance , Na 128 , lactate on VBG 3.3   xray chest done in Ed showed opacity in RLL (pending official read)     rcvd cefepime , levofloxacin , 2L of LR , tylenol  in Ed       admitted for further workup .  (22 Nov 2024 01:42)      INFECTIOUS DISEASE HISTORY:  ID consulted for PNA, febrile in the ER, WBC 16   CXR showing cardiomegaly, no PNA    Currently ordered for:  meropenem  IVPB 1000 milliGRAM(s) IV Intermittent every 8 hours      PMH  PAST MEDICAL & SURGICAL HISTORY:  COPD (chronic obstructive pulmonary disease)      Paranoid schizophrenia      Schizophrenia      Diabetes type 2, controlled      COPD (chronic obstructive pulmonary disease)      Dyslipidemia      Chronic retention of urine      Dyslipidemia      No significant past surgical history      Encounter for screening colonoscopy          FAMILY HISTORY  No pertinent family history in first degree relatives    No pertinent family history in first degree relatives    No pertinent family history in first degree relatives        SOCIAL HISTORY  Social History:  [employer] [employer] [facility] (28 May 2023 07:50)        ROS  ***    VITALS:  T(F): 98.2, Max: 101.7 (11-21-24 @ 21:48)  HR: 76  BP: 169/71  RR: 17Vital Signs Last 24 Hrs  T(C): 36.8 (22 Nov 2024 05:00), Max: 38.7 (21 Nov 2024 21:48)  T(F): 98.2 (22 Nov 2024 05:00), Max: 101.7 (21 Nov 2024 21:48)  HR: 76 (22 Nov 2024 05:00) (76 - 105)  BP: 169/71 (22 Nov 2024 05:00) (155/70 - 169/71)  BP(mean): 105 (22 Nov 2024 05:00) (105 - 105)  RR: 17 (22 Nov 2024 05:00) (17 - 18)  SpO2: 97% (22 Nov 2024 05:00) (97% - 98%)    Parameters below as of 22 Nov 2024 05:00  Patient On (Oxygen Delivery Method): room air        PHYSICAL EXAM:  ***    TESTS & MEASUREMENTS:                        10.6   16.07 )-----------( 208      ( 21 Nov 2024 22:44 )             31.2     11-21    128[L]  |  92[L]  |  17  ----------------------------<  132[H]  5.1[H]   |  22  |  1.2    Ca    9.1      21 Nov 2024 22:44    TPro  6.8  /  Alb  4.0  /  TBili  <0.2  /  DBili  x   /  AST  22  /  ALT  8   /  AlkPhos  96  11-21      LIVER FUNCTIONS - ( 21 Nov 2024 22:44 )  Alb: 4.0 g/dL / Pro: 6.8 g/dL / ALK PHOS: 96 U/L / ALT: 8 U/L / AST: 22 U/L / GGT: x           Urinalysis Basic - ( 21 Nov 2024 22:44 )    Color: x / Appearance: x / SG: x / pH: x  Gluc: 132 mg/dL / Ketone: x  / Bili: x / Urobili: x   Blood: x / Protein: x / Nitrite: x   Leuk Esterase: x / RBC: x / WBC x   Sq Epi: x / Non Sq Epi: x / Bacteria: x          Blood Gas Venous - Lactate: 3.3 mmol/L (11-21-24 @ 23:07)      INFECTIOUS DISEASES TESTING      INFLAMMATORY MARKERS      RADIOLOGY & ADDITIONAL TESTS:  I have personally reviewed the last Chest xray  CXR  Xray Chest 1 View- PORTABLE-Urgent:   ACC: [id number] EXAM:  XR CHEST PORTABLE URGENT 1V   ORDERED BY: ANA ROSA ZAMORA     PROCEDURE DATE:  11/21/2024          INTERPRETATION:  CLINICAL HISTORY: cough, fever.    COMPARISON: October 27, 2023.    TECHNIQUE: Portable frontal chest radiograph. Adequate positioning.    FINDINGS:    Support devices: Telemetry leads overlie the chest.    Cardiac/mediastinum/hilum: Cardiomegaly    Lung parenchyma/Pleura: No focal parenchymal opacities, pleural   effusions, or pneumothorax.    Skeleton/soft tissues: Stable.      IMPRESSION:    Cardiomegaly without acute opacifications or effusions.    --- End of Report ---            JACKLYN MOJICA MD; Attending Interventional Radiologist  This document has been electronically signed. Nov 22 2024  5:40AM (11-21-24 @ 22:40)      CT      CARDIOLOGY TESTING  12 Lead ECG:   Ventricular Rate 113 BPM    Atrial Rate 113 BPM    P-R Interval 178  <TRUNCATED> (11-21-24 @ 22:26)       MEDICATIONS  dextrose 5%. 1000 IV Continuous <Continuous>  dextrose 5%. 1000 IV Continuous <Continuous>  dextrose 50% Injectable 25 IV Push once  dextrose 50% Injectable 12.5 IV Push once  dextrose 50% Injectable 25 IV Push once  enoxaparin Injectable 40 SubCutaneous every 24 hours  glucagon  Injectable 1 IntraMuscular once  insulin lispro (ADMELOG) corrective regimen sliding scale  SubCutaneous three times a day before meals  meropenem  IVPB 1000 IV Intermittent every 8 hours  pantoprazole    Tablet 40 Oral before breakfast      ANTIBIOTICS:  meropenem  IVPB 1000 milliGRAM(s) IV Intermittent every 8 hours      ALLERGIES:  No Known Allergies           ADA VILLAGOMEZ  84y, Male  Allergy: No Known Allergies      CHIEF COMPLAINT:       LOS      HPI  HPI:  An 84-year-old male resident of [facility] ([place]) with a past medical history of hyperlipidemia (HLD), schizophrenia, chronic obstructive pulmonary disease (COPD), type 2 diabetes mellitus (DM2), non-Hodgkin's lymphoma, and hyponatremia, presented to the ED with complaints of weakness and fever. He is a poor historian and unable to provide many details. He reports a cough and shortness of breath developing over the past few days. The cough is productive in nature. He experiences mild dyspnea.   Denies nausea, vomiting, abdominal pain, sick contacts, or recent travel   Due to his limited ability to provide a history, further details regarding the onset, duration, and character of his symptoms are unavailable. His baseline functional status and cognitive capacity are also unclear.      In Ed patient was febrile with temp of  101.7 Degrees F  tachycardic to 105 ,  mildly tachypneic.    On labs WBCs 16.07 e Neutrophil predominance , Na 128 , lactate on VBG 3.3   xray chest done in Ed showed opacity in RLL (pending official read)     rcvd cefepime , levofloxacin , 2L of LR , tylenol  in Ed       admitted for further workup .  (22 Nov 2024 01:42)      INFECTIOUS DISEASE HISTORY:  ID consulted for PNA, febrile in the ER, WBC 16   CXR showing cardiomegaly, no PNA  Pt denies any symptoms , Pt denies HA, rhinorrhea, sore throat, cough, SOB, abd pain, diarrhea, n/v, dysuria, rash, arthralgias.     Currently ordered for:  meropenem  IVPB 1000 milliGRAM(s) IV Intermittent every 8 hours      PMH  PAST MEDICAL & SURGICAL HISTORY:  COPD (chronic obstructive pulmonary disease)      Paranoid schizophrenia      Schizophrenia      Diabetes type 2, controlled      COPD (chronic obstructive pulmonary disease)      Dyslipidemia      Chronic retention of urine      Dyslipidemia      No significant past surgical history      Encounter for screening colonoscopy          FAMILY HISTORY  No pertinent family history in first degree relatives    No pertinent family history in first degree relatives    No pertinent family history in first degree relatives        SOCIAL HISTORY  Social History:  [employer] (28 May 2023 07:50)        ROS  General: Denies rigors, nightsweats  HEENT: Denies headache, rhinorrhea, sore throat, eye pain  CV: Denies CP, palpitations  PULM: Denies wheezing, hemoptysis  GI: Denies hematemesis, hematochezia, melena  : Denies discharge, hematuria  MSK: Denies arthralgias, myalgias  SKIN: Denies rash, lesions  NEURO: Denies paresthesias, weakness  PSYCH: Denies depression, anxiety     VITALS:  T(F): 98.2, Max: 101.7 (11-21-24 @ 21:48)  HR: 76  BP: 169/71  RR: 17Vital Signs Last 24 Hrs  T(C): 36.8 (22 Nov 2024 05:00), Max: 38.7 (21 Nov 2024 21:48)  T(F): 98.2 (22 Nov 2024 05:00), Max: 101.7 (21 Nov 2024 21:48)  HR: 76 (22 Nov 2024 05:00) (76 - 105)  BP: 169/71 (22 Nov 2024 05:00) (155/70 - 169/71)  BP(mean): 105 (22 Nov 2024 05:00) (105 - 105)  RR: 17 (22 Nov 2024 05:00) (17 - 18)  SpO2: 97% (22 Nov 2024 05:00) (97% - 98%)    Parameters below as of 22 Nov 2024 05:00  Patient On (Oxygen Delivery Method): room air        PHYSICAL EXAM:  Gen: chronically ill appearing   HEENT: Normocephalic, atraumatic  Neck: supple, no lymphadenopathy  CV: Regular rate & regular rhythm  Lungs: decreased BS at bases, no fremitus  Abdomen: Soft, BS present  Ext: Warm, well perfused  Neuro: non focal  Skin: no rash, no erythema  Lines: no phlebitis     TESTS & MEASUREMENTS:                        10.6   16.07 )-----------( 208      ( 21 Nov 2024 22:44 )             31.2     11-21    128[L]  |  92[L]  |  17  ----------------------------<  132[H]  5.1[H]   |  22  |  1.2    Ca    9.1      21 Nov 2024 22:44    TPro  6.8  /  Alb  4.0  /  TBili  <0.2  /  DBili  x   /  AST  22  /  ALT  8   /  AlkPhos  96  11-21      LIVER FUNCTIONS - ( 21 Nov 2024 22:44 )  Alb: 4.0 g/dL / Pro: 6.8 g/dL / ALK PHOS: 96 U/L / ALT: 8 U/L / AST: 22 U/L / GGT: x           Urinalysis Basic - ( 21 Nov 2024 22:44 )    Color: x / Appearance: x / SG: x / pH: x  Gluc: 132 mg/dL / Ketone: x  / Bili: x / Urobili: x   Blood: x / Protein: x / Nitrite: x   Leuk Esterase: x / RBC: x / WBC x   Sq Epi: x / Non Sq Epi: x / Bacteria: x          Blood Gas Venous - Lactate: 3.3 mmol/L (11-21-24 @ 23:07)      INFECTIOUS DISEASES TESTING      INFLAMMATORY MARKERS      RADIOLOGY & ADDITIONAL TESTS:  I have personally reviewed the last Chest xray  CXR  Xray Chest 1 View- PORTABLE-Urgent:   ACC: [id number] EXAM:  XR CHEST PORTABLE URGENT 1V   ORDERED BY: ANA ROSA ZAMORA     PROCEDURE DATE:  11/21/2024          INTERPRETATION:  CLINICAL HISTORY: cough, fever.    COMPARISON: October 27, 2023.    TECHNIQUE: Portable frontal chest radiograph. Adequate positioning.    FINDINGS:    Support devices: Telemetry leads overlie the chest.    Cardiac/mediastinum/hilum: Cardiomegaly    Lung parenchyma/Pleura: No focal parenchymal opacities, pleural   effusions, or pneumothorax.    Skeleton/soft tissues: Stable.      IMPRESSION:    Cardiomegaly without acute opacifications or effusions.    --- End of Report ---            JACKLYN MOJICA MD; Attending Interventional Radiologist  This document has been electronically signed. Nov 22 2024  5:40AM (11-21-24 @ 22:40)      CT      CARDIOLOGY TESTING  12 Lead ECG:   Ventricular Rate 113 BPM    Atrial Rate 113 BPM    P-R Interval 178  <TRUNCATED> (11-21-24 @ 22:26)       MEDICATIONS  dextrose 5%. 1000 IV Continuous <Continuous>  dextrose 5%. 1000 IV Continuous <Continuous>  dextrose 50% Injectable 25 IV Push once  dextrose 50% Injectable 12.5 IV Push once  dextrose 50% Injectable 25 IV Push once  enoxaparin Injectable 40 SubCutaneous every 24 hours  glucagon  Injectable 1 IntraMuscular once  insulin lispro (ADMELOG) corrective regimen sliding scale  SubCutaneous three times a day before meals  meropenem  IVPB 1000 IV Intermittent every 8 hours  pantoprazole    Tablet 40 Oral before breakfast      ANTIBIOTICS:  meropenem  IVPB 1000 milliGRAM(s) IV Intermittent every 8 hours      ALLERGIES:  No Known Allergies

## 2024-11-22 NOTE — H&P ADULT - ASSESSMENT
Unknown 84 yold male M from [facility] pmh of HLD, schizophrenia, COPD, DM2 ,non-hodgkins lymphoma, hyponatremia, poor historian sent to ED for weakness and fever. As per patient hes been having cough from last few days. with shortness of breathe .   Denies any N/V , abdominal pain, any sick contact or recent travel history.   patient is a poor historian and unable to give any other details   pt noted to have productive cough with mild difficulty breathing    # SEPSIS POA 2/2 to CAP :   -In Ed patient was febrile with temp of  101.7 Degrees F  tachycardic to 105 ,  mildly tachypneic.    - WBCs 16.07 e Neutrophil predominance, lactate on VBG 3.3   -xray chest done in Ed showed opacity in RLL (pending official read)   -rcvd cefepime , levofloxacin in Ed   PLAN :   -on Ns 0.9% 1l @ 75 /min   -previous Cultures were positive for ESBL   -started on Meropenem   -f/u Infectious workup i.e cultures and repeat lactate   -f/u procal   -f/u RVP   -f/u legionella , strep Ag  -f/u ID   -f/u repeat labs   -f/u repeat CXray in am     #chronic hyponatremia :   -asymptomatic   - Na 128   -on IVF   -trend sodium on BMP     #BPH  -c/w home meds       # h/o COPD -stable, resumed on inhalers, nebs tx.     # DM 2 -SSI     # CAD/ HTN/ HLD  - resume home meds     #Incidental Findings (Liver Nodules & Kidney cyst/hypodensity)  - Outpatient workup and follow up    #Misc:   DVT Ppx:lovenox   GI Ppx:PPI   Diet: DASH   Activity : ATT      An 84-year-old male resident of [facility] ([place]) with a past medical history of hyperlipidemia (HLD), schizophrenia, chronic obstructive pulmonary disease (COPD), type 2 diabetes mellitus (DM2), non-Hodgkin's lymphoma, and hyponatremia, presented to the ED with complaints of weakness and fever. He is a poor historian and unable to provide many details. He reports a cough and shortness of breath developing over the past few days. The cough is productive in nature. He experiences mild dyspnea.    # SEPSIS POA 2/2 to CAP :   -In Ed patient was febrile with temp of  101.7 Degrees F  tachycardic to 105 ,  mildly tachypneic.    - WBCs 16.07 e Neutrophil predominance, lactate on VBG 3.3   -xray chest done in Ed showed opacity in RLL (pending official read)   -rcvd cefepime , levofloxacin in Ed   PLAN :   -on Ns 0.9% 1l @ 75 /min   -previous Cultures were positive for ESBL   -started on Meropenem   -f/u Infectious workup i.e cultures and repeat lactate   -f/u procal   -f/u RVP   -f/u legionella , strep Ag  -f/u ID   -f/u repeat labs   -f/u repeat CXray in am     #chronic hyponatremia :   -asymptomatic   - Na 128   -on IVF   -trend sodium on BMP     #BPH  -c/w home meds       # h/o COPD -stable, resumed on inhalers, nebs tx.     # DM 2 -SSI     # CAD/ HTN/ HLD  - resume home meds     #Incidental Findings (Liver Nodules & Kidney cyst/hypodensity)  - Outpatient workup and follow up    #Misc:   DVT Ppx:lovenox   GI Ppx:PPI   Diet: DASH   Activity : ATT     confirm the med rec from [facility] home paper once they arrived

## 2024-11-22 NOTE — H&P ADULT - HISTORY OF PRESENT ILLNESS
84 yold male M from [facility] pmh of HLD, schizophrenia, COPD, DM2 ,non-hodgkins lymphoma, hyponatremia, poor historian sent to ED for weakness and fever. As per patient hes been having cough from last few days. with shortness of breathe .   Denies any N/V , abdominal pain, any sick contact or recent travel history.   patient is a poor historian and unable to give any other details   pt noted to have productive cough with mild difficulty breathing      In Ed patient was febrile with temp of  101.7 Degrees F  tachycardic to 105 ,  mildly tachypneic.    On labs WBCs 16.07 e Neutrophil predominance , Na 128 , lactate on VBG 3.3   xray chest done in Ed showed opacity in RLL (pending official read)     rcvd cefepime , levofloxacin , 2L of LR , tylenol  in Ed       admitted for further workup .  An 84-year-old male resident of [facility] ([place]) with a past medical history of hyperlipidemia (HLD), schizophrenia, chronic obstructive pulmonary disease (COPD), type 2 diabetes mellitus (DM2), non-Hodgkin's lymphoma, and hyponatremia, presented to the ED with complaints of weakness and fever. He is a poor historian and unable to provide many details. He reports a cough and shortness of breath developing over the past few days. The cough is productive in nature. He experiences mild dyspnea.   Denies nausea, vomiting, abdominal pain, sick contacts, or recent travel   Due to his limited ability to provide a history, further details regarding the onset, duration, and character of his symptoms are unavailable. His baseline functional status and cognitive capacity are also unclear.      In Ed patient was febrile with temp of  101.7 Degrees F  tachycardic to 105 ,  mildly tachypneic.    On labs WBCs 16.07 e Neutrophil predominance , Na 128 , lactate on VBG 3.3   xray chest done in Ed showed opacity in RLL (pending official read)     rcvd cefepime , levofloxacin , 2L of LR , tylenol  in Ed       admitted for further workup .

## 2024-11-22 NOTE — PROGRESS NOTE ADULT - ASSESSMENT
An 84-year-old male resident of [facility] ([place]) with a past medical history of hyperlipidemia (HLD), schizophrenia, chronic obstructive pulmonary disease (COPD), type 2 diabetes mellitus (DM2), non-Hodgkin's lymphoma, and hyponatremia, presented to the ED with complaints of weakness and fever. He is a poor historian and unable to provide many details. He reports a cough and shortness of breath developing over the past few days. The cough is productive in nature. He experiences mild dyspnea.    # Sepsis POA likely due to PNA   -In Ed patient was febrile with temp of  101.7 Degrees F  tachycardic to 105 ,  mildly tachypneic.    - WBCs 16.07 e Neutrophil predominance, lactate on VBG 3.3   -CXR 11/22: stable opacity in RLL  -rcvd cefepime , levofloxacin in Ed   -previous urine Cultures were positive for Klebsiella (carbapenum resistent)  -c/w Meropenem   -f/u UCx BCx  -f/u procal   -f/u RVP  > nEG   -f/u legionella , strep Ag  -f/u ID     #chronic hyponatremia :   -asymptomatic   - Na 128   - dc NS IVF  -trend sodium on BMP     #BPH  -c/w home meds     # h/o COPD -stable, resumed on inhalers, nebs tx.     # DM 2 -SSI     # CAD/ HTN/ HLD  - resume home meds     #Incidental Findings (Liver Nodules & Kidney cyst/hypodensity)  - Outpatient workup and follow up    #Misc:   DVT Ppx:lovenox   GI Ppx:PPI   Diet: DASH   Activity : ATT     Pending: Sepsis/GOC  Dispo: TBD

## 2024-11-22 NOTE — PATIENT PROFILE ADULT - NSPROGENSOURCEINFO_GEN_A_NUR
pt declined to answer all questions with "beat it. Stop asking me things, get the fuck out of here, miss"/health record

## 2024-11-22 NOTE — PATIENT PROFILE ADULT - FALL HARM RISK - HARM RISK INTERVENTIONS
Assistance OOB with selected safe patient handling equipment/Communicate Risk of Fall with Harm to all staff/Discuss with provider need for PT consult/Monitor for mental status changes/Monitor gait and stability/Move patient closer to nurses' station/Provide patient with walking aids - walker, cane, crutches/Reinforce activity limits and safety measures with patient and family/Reorient to person, place and time as needed/Tailored Fall Risk Interventions/Toileting schedule using arm’s reach rule for commode and bathroom/Use of alarms - bed, chair and/or voice tab/Visual Cue: Yellow wristband and red socks/Bed in lowest position, wheels locked, appropriate side rails in place/Call bell, personal items and telephone in reach/Instruct patient to call for assistance before getting out of bed or chair/Non-slip footwear when patient is out of bed/[place] to call system/Physically safe environment - no spills, clutter or unnecessary equipment/Purposeful Proactive Rounding/Room/bathroom lighting operational, light cord in reach

## 2024-11-22 NOTE — CONSULT NOTE ADULT - TIME BILLING
I have personally seen and examined this patient.  I have reviewed all pertinent clinical information and reviewed all relevant imaging and diagnostic studies personally.   I counseled the patient about diagnostic testing and treatment plan.   I discussed my recommendations with the primary team Dr Fitzgerald

## 2024-11-22 NOTE — CONSULT NOTE ADULT - ASSESSMENT
ASSESSMENT  84-year-old male resident of [facility] ([place]) with a past medical history of hyperlipidemia (HLD), schizophrenia, chronic obstructive pulmonary disease (COPD), type 2 diabetes mellitus (DM2), non-Hodgkin's lymphoma, and hyponatremia, presented to the ED with complaints of weakness and fever. He is a poor historian and unable to provide many details. He reports a cough and shortness of breath developing over the past few days. The cough is productive in nature. He experiences mild dyspnea.   Denies nausea, vomiting, abdominal pain, sick contacts, or recent travel      IMPRESSION  #Severe sepsis on admission T>101 P>90 WBC 16 lactic acidosis   < from: Xray Chest 1 View- PORTABLE-Urgent (11.21.24 @ 22:40) >  Cardiomegaly without acute opacifications or effusions.    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 All final recommendations to follow after interview and examination of the patient. Please follow recommendations noted below.  Influenza/RSV/COVID19 PCR   procalcitonin     If any questions, please send a message or call on [employer] Teams  Please continue to update ID with any pertinent new laboratory, radiographic findings, or change in clinical status ASSESSMENT  84-year-old male resident of [facility] ([place]) with a past medical history of hyperlipidemia (HLD), schizophrenia, chronic obstructive pulmonary disease (COPD), type 2 diabetes mellitus (DM2), non-Hodgkin's lymphoma, and hyponatremia, presented to the ED with complaints of weakness and fever. He is a poor historian and unable to provide many details. He reports a cough and shortness of breath developing over the past few days. The cough is productive in nature. He experiences mild dyspnea.   Denies nausea, vomiting, abdominal pain, sick contacts, or recent travel      IMPRESSION  #Severe sepsis on admission T>101 P>90 WBC 16 lactic acidosis   Unclear source  RVP (-)   Hx CRE Kleb  < from: Xray Chest 1 View- PORTABLE-Urgent (11.21.24 @ 22:40) >  Cardiomegaly without acute opacifications or effusions.  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

## 2024-11-22 NOTE — PHARMACOTHERAPY INTERVENTION NOTE - COMMENTS
Recommended cefepime 2g IV q12h, dosed based on an estimated creatinine clearance of ~42mL/min, as per ID Consult recommendations.    Obed Washington, PharmD, [facility]  Clinical Pharmacy Specialist, Infectious Diseases  Tele-Antimicrobial Stewardship Program (Tele-ASP)  Tele-ASP Phone: [phone]

## 2024-11-22 NOTE — H&P ADULT - NSHPPHYSICALEXAM_GEN_ALL_CORE
Constitutional: Well developed, well nourished. NAD  	Head: Normocephalic, atraumatic.  	Eyes: PERRL, EOMI.  	ENT: No nasal discharge. Mucous membranes dry.  	Neck: Supple. Painless ROM.  	Cardiovascular:  . Regular rate and rhythm.    	Pulmonary: + poor insp effort; + ronchi;   	Abdominal: Soft. Nondistended. No rebound, guarding, rigidity.  	Extremities. Pelvis stable. No lower extremity edema, symmetric calves.  	Skin: No rashes, cyanosis.  	Neuro: AAOx3. No focal neurological deficits.  Psych: Normal mood. Normal affect Constitutional: ill apearing   Head: Normocephalic, atraumatic.  Cardiovascular:  . Regular rate and rhythm. S1, S2   Pulmonary: barrel chest ? , dec breath sounds on bases R> L   Abdominal: Soft. Nondistended. No rebound, guarding, rigidity.  Extremities. Pelvis stable. No lower extremity edema, symmetric calves.  Skin: No rashes, cyanosis.  Neuro: AAOx1

## 2024-11-23 LAB
A1C WITH ESTIMATED AVERAGE GLUCOSE RESULT: 5.7 % — HIGH (ref 4–5.6)
ALBUMIN SERPL ELPH-MCNC: 3.5 G/DL — SIGNIFICANT CHANGE UP (ref 3.5–5.2)
ALP SERPL-CCNC: 71 U/L — SIGNIFICANT CHANGE UP (ref 30–115)
ALT FLD-CCNC: 7 U/L — SIGNIFICANT CHANGE UP (ref 0–41)
ANION GAP SERPL CALC-SCNC: 10 MMOL/L — SIGNIFICANT CHANGE UP (ref 7–14)
APPEARANCE UR: ABNORMAL
AST SERPL-CCNC: 18 U/L — SIGNIFICANT CHANGE UP (ref 0–41)
BASOPHILS # BLD AUTO: 0.02 K/UL — SIGNIFICANT CHANGE UP (ref 0–0.2)
BASOPHILS NFR BLD AUTO: 0.2 % — SIGNIFICANT CHANGE UP (ref 0–1)
BILIRUB SERPL-MCNC: 0.2 MG/DL — SIGNIFICANT CHANGE UP (ref 0.2–1.2)
BILIRUB UR-MCNC: NEGATIVE — SIGNIFICANT CHANGE UP
BUN SERPL-MCNC: 13 MG/DL — SIGNIFICANT CHANGE UP (ref 10–20)
CALCIUM SERPL-MCNC: 8.8 MG/DL — SIGNIFICANT CHANGE UP (ref 8.4–10.5)
CHLORIDE SERPL-SCNC: 94 MMOL/L — LOW (ref 98–110)
CO2 SERPL-SCNC: 26 MMOL/L — SIGNIFICANT CHANGE UP (ref 17–32)
COLOR SPEC: YELLOW — SIGNIFICANT CHANGE UP
CREAT SERPL-MCNC: 1.2 MG/DL — SIGNIFICANT CHANGE UP (ref 0.7–1.5)
DIFF PNL FLD: NEGATIVE — SIGNIFICANT CHANGE UP
EGFR: 60 ML/MIN/1.73M2 — SIGNIFICANT CHANGE UP
EOSINOPHIL # BLD AUTO: 0.01 K/UL — SIGNIFICANT CHANGE UP (ref 0–0.7)
EOSINOPHIL NFR BLD AUTO: 0.1 % — SIGNIFICANT CHANGE UP (ref 0–8)
ESTIMATED AVERAGE GLUCOSE: 117 MG/DL — HIGH (ref 68–114)
GLUCOSE BLDC GLUCOMTR-MCNC: 148 MG/DL — HIGH (ref 70–99)
GLUCOSE BLDC GLUCOMTR-MCNC: 160 MG/DL — HIGH (ref 70–99)
GLUCOSE BLDC GLUCOMTR-MCNC: 88 MG/DL — SIGNIFICANT CHANGE UP (ref 70–99)
GLUCOSE BLDC GLUCOMTR-MCNC: 95 MG/DL — SIGNIFICANT CHANGE UP (ref 70–99)
GLUCOSE SERPL-MCNC: 114 MG/DL — HIGH (ref 70–99)
GLUCOSE UR QL: NEGATIVE MG/DL — SIGNIFICANT CHANGE UP
HCT VFR BLD CALC: 31.9 % — LOW (ref 42–52)
HGB BLD-MCNC: 10.8 G/DL — LOW (ref 14–18)
IMM GRANULOCYTES NFR BLD AUTO: 0.3 % — SIGNIFICANT CHANGE UP (ref 0.1–0.3)
KETONES UR-MCNC: NEGATIVE MG/DL — SIGNIFICANT CHANGE UP
LACTATE SERPL-SCNC: 1.9 MMOL/L — SIGNIFICANT CHANGE UP (ref 0.7–2)
LEUKOCYTE ESTERASE UR-ACNC: ABNORMAL
LYMPHOCYTES # BLD AUTO: 1.03 K/UL — LOW (ref 1.2–3.4)
LYMPHOCYTES # BLD AUTO: 11.3 % — LOW (ref 20.5–51.1)
MAGNESIUM SERPL-MCNC: 1.8 MG/DL — SIGNIFICANT CHANGE UP (ref 1.8–2.4)
MCHC RBC-ENTMCNC: 32 PG — HIGH (ref 27–31)
MCHC RBC-ENTMCNC: 33.9 G/DL — SIGNIFICANT CHANGE UP (ref 32–37)
MCV RBC AUTO: 94.4 FL — HIGH (ref 80–94)
MONOCYTES # BLD AUTO: 0.5 K/UL — SIGNIFICANT CHANGE UP (ref 0.1–0.6)
MONOCYTES NFR BLD AUTO: 5.5 % — SIGNIFICANT CHANGE UP (ref 1.7–9.3)
NEUTROPHILS # BLD AUTO: 7.54 K/UL — HIGH (ref 1.4–6.5)
NEUTROPHILS NFR BLD AUTO: 82.6 % — HIGH (ref 42.2–75.2)
NITRITE UR-MCNC: NEGATIVE — SIGNIFICANT CHANGE UP
NRBC # BLD: 0 /100 WBCS — SIGNIFICANT CHANGE UP (ref 0–0)
PH UR: 7.5 — SIGNIFICANT CHANGE UP (ref 5–8)
PHOSPHATE SERPL-MCNC: 2.8 MG/DL — SIGNIFICANT CHANGE UP (ref 2.1–4.9)
PLATELET # BLD AUTO: 208 K/UL — SIGNIFICANT CHANGE UP (ref 130–400)
PMV BLD: 11.3 FL — HIGH (ref 7.4–10.4)
POTASSIUM SERPL-MCNC: 4.9 MMOL/L — SIGNIFICANT CHANGE UP (ref 3.5–5)
POTASSIUM SERPL-SCNC: 4.9 MMOL/L — SIGNIFICANT CHANGE UP (ref 3.5–5)
PROCALCITONIN SERPL-MCNC: 0.09 NG/ML — SIGNIFICANT CHANGE UP (ref 0.02–0.1)
PROCALCITONIN SERPL-MCNC: 0.11 NG/ML — HIGH (ref 0.02–0.1)
PROT SERPL-MCNC: 6.1 G/DL — SIGNIFICANT CHANGE UP (ref 6–8)
PROT UR-MCNC: 30 MG/DL
RBC # BLD: 3.38 M/UL — LOW (ref 4.7–6.1)
RBC # FLD: 14.6 % — HIGH (ref 11.5–14.5)
SODIUM SERPL-SCNC: 130 MMOL/L — LOW (ref 135–146)
SP GR SPEC: 1.02 — SIGNIFICANT CHANGE UP (ref 1–1.03)
UROBILINOGEN FLD QL: 1 MG/DL — SIGNIFICANT CHANGE UP (ref 0.2–1)
WBC # BLD: 9.13 K/UL — SIGNIFICANT CHANGE UP (ref 4.8–10.8)
WBC # FLD AUTO: 9.13 K/UL — SIGNIFICANT CHANGE UP (ref 4.8–10.8)

## 2024-11-23 PROCEDURE — 99232 SBSQ HOSP IP/OBS MODERATE 35: CPT

## 2024-11-23 PROCEDURE — 71045 X-RAY EXAM CHEST 1 VIEW: CPT | Mod: 26

## 2024-11-23 RX ADMIN — CHLORHEXIDINE GLUCONATE 1 APPLICATION(S): 1.2 RINSE ORAL at 05:09

## 2024-11-23 RX ADMIN — GABAPENTIN 300 MILLIGRAM(S): 300 CAPSULE ORAL at 17:15

## 2024-11-23 RX ADMIN — METOPROLOL TARTRATE 25 MILLIGRAM(S): 100 TABLET, FILM COATED ORAL at 05:06

## 2024-11-23 RX ADMIN — OLANZAPINE 10 MILLIGRAM(S): 20 TABLET ORAL at 11:46

## 2024-11-23 RX ADMIN — Medication 1: at 17:14

## 2024-11-23 RX ADMIN — CEFEPIME 100 MILLIGRAM(S): 2 INJECTION, POWDER, FOR SOLUTION INTRAVENOUS at 17:14

## 2024-11-23 RX ADMIN — TRAZODONE HYDROCHLORIDE 50 MILLIGRAM(S): 150 TABLET ORAL at 21:03

## 2024-11-23 RX ADMIN — PANTOPRAZOLE SODIUM 40 MILLIGRAM(S): 40 TABLET, DELAYED RELEASE ORAL at 05:06

## 2024-11-23 RX ADMIN — TAMSULOSIN HYDROCHLORIDE 0.4 MILLIGRAM(S): 0.4 CAPSULE ORAL at 21:03

## 2024-11-23 RX ADMIN — LISINOPRIL 10 MILLIGRAM(S): 20 TABLET ORAL at 05:06

## 2024-11-23 RX ADMIN — Medication 1 TABLET(S): at 11:47

## 2024-11-23 RX ADMIN — CEFEPIME 100 MILLIGRAM(S): 2 INJECTION, POWDER, FOR SOLUTION INTRAVENOUS at 05:06

## 2024-11-23 RX ADMIN — ENOXAPARIN SODIUM 40 MILLIGRAM(S): 30 INJECTION SUBCUTANEOUS at 05:06

## 2024-11-23 RX ADMIN — GABAPENTIN 300 MILLIGRAM(S): 300 CAPSULE ORAL at 05:06

## 2024-11-23 NOTE — PROGRESS NOTE ADULT - ASSESSMENT
An 84-year-old male resident of [facility] ([place]) with a past medical history of hyperlipidemia (HLD), schizophrenia, chronic obstructive pulmonary disease (COPD), type 2 diabetes mellitus (DM2), non-Hodgkin's lymphoma, and hyponatremia, presented to the ED with complaints of weakness and fever. He is a poor historian and unable to provide many details. He reports a cough and shortness of breath developing over the past few days. The cough is productive in nature. He experiences mild dyspnea.    # Sepsis POA likely due to PNA   -In Ed patient was febrile with temp of  101.7 Degrees F  tachycardic to 105 ,  mildly tachypneic.    - WBCs 16.07 e Neutrophil predominance, lactate on VBG 3.3   -CXR 11/22: stable opacity in RLL  -rcvd cefepime , levofloxacin in Ed   -previous urine Cultures were positive for Klebsiella (carbapenum resistent)  - s/p Meropenem  > switched to Cefepime as per ID  -f/u BCx >>ngtd   - procal 0.11  - RVP  > nEG   -f/u legionella , strep Ag     #chronic hyponatremia :   -asymptomatic   - Na 128  >> 130  - dc NS IVF  -trend sodium on BMP     #BPH  -c/w home meds     # h/o COPD -stable, resumed on inhalers, nebs tx.     # DM 2 -SSI     # CAD/ HTN/ HLD  - resume home meds     #Incidental Findings (Liver Nodules & Kidney cyst/hypodensity)  - Outpatient workup and follow up    #Misc:   DVT Ppx:lovenox   GI Ppx:PPI   Diet: DASH   Activity : ATT     Pending: Sepsis/GOC  Dispo: TBD

## 2024-11-24 LAB
ALBUMIN SERPL ELPH-MCNC: 3.9 G/DL — SIGNIFICANT CHANGE UP (ref 3.5–5.2)
ALP SERPL-CCNC: 91 U/L — SIGNIFICANT CHANGE UP (ref 30–115)
ALT FLD-CCNC: 7 U/L — SIGNIFICANT CHANGE UP (ref 0–41)
ANION GAP SERPL CALC-SCNC: 14 MMOL/L — SIGNIFICANT CHANGE UP (ref 7–14)
AST SERPL-CCNC: 17 U/L — SIGNIFICANT CHANGE UP (ref 0–41)
BASOPHILS # BLD AUTO: 0.01 K/UL — SIGNIFICANT CHANGE UP (ref 0–0.2)
BASOPHILS NFR BLD AUTO: 0.1 % — SIGNIFICANT CHANGE UP (ref 0–1)
BILIRUB SERPL-MCNC: <0.2 MG/DL — SIGNIFICANT CHANGE UP (ref 0.2–1.2)
BUN SERPL-MCNC: 18 MG/DL — SIGNIFICANT CHANGE UP (ref 10–20)
CALCIUM SERPL-MCNC: 9 MG/DL — SIGNIFICANT CHANGE UP (ref 8.4–10.5)
CHLORIDE SERPL-SCNC: 96 MMOL/L — LOW (ref 98–110)
CO2 SERPL-SCNC: 25 MMOL/L — SIGNIFICANT CHANGE UP (ref 17–32)
CREAT SERPL-MCNC: 1.4 MG/DL — SIGNIFICANT CHANGE UP (ref 0.7–1.5)
CULTURE RESULTS: SIGNIFICANT CHANGE UP
EGFR: 50 ML/MIN/1.73M2 — LOW
EOSINOPHIL # BLD AUTO: 0.04 K/UL — SIGNIFICANT CHANGE UP (ref 0–0.7)
EOSINOPHIL NFR BLD AUTO: 0.5 % — SIGNIFICANT CHANGE UP (ref 0–8)
GLUCOSE BLDC GLUCOMTR-MCNC: 108 MG/DL — HIGH (ref 70–99)
GLUCOSE BLDC GLUCOMTR-MCNC: 114 MG/DL — HIGH (ref 70–99)
GLUCOSE BLDC GLUCOMTR-MCNC: 130 MG/DL — HIGH (ref 70–99)
GLUCOSE BLDC GLUCOMTR-MCNC: 99 MG/DL — SIGNIFICANT CHANGE UP (ref 70–99)
GLUCOSE SERPL-MCNC: 95 MG/DL — SIGNIFICANT CHANGE UP (ref 70–99)
HCT VFR BLD CALC: 35.7 % — LOW (ref 42–52)
HGB BLD-MCNC: 12 G/DL — LOW (ref 14–18)
IMM GRANULOCYTES NFR BLD AUTO: 0.3 % — SIGNIFICANT CHANGE UP (ref 0.1–0.3)
LYMPHOCYTES # BLD AUTO: 1.29 K/UL — SIGNIFICANT CHANGE UP (ref 1.2–3.4)
LYMPHOCYTES # BLD AUTO: 17 % — LOW (ref 20.5–51.1)
MAGNESIUM SERPL-MCNC: 2 MG/DL — SIGNIFICANT CHANGE UP (ref 1.8–2.4)
MCHC RBC-ENTMCNC: 32.1 PG — HIGH (ref 27–31)
MCHC RBC-ENTMCNC: 33.6 G/DL — SIGNIFICANT CHANGE UP (ref 32–37)
MCV RBC AUTO: 95.5 FL — HIGH (ref 80–94)
MONOCYTES # BLD AUTO: 0.5 K/UL — SIGNIFICANT CHANGE UP (ref 0.1–0.6)
MONOCYTES NFR BLD AUTO: 6.6 % — SIGNIFICANT CHANGE UP (ref 1.7–9.3)
NEUTROPHILS # BLD AUTO: 5.74 K/UL — SIGNIFICANT CHANGE UP (ref 1.4–6.5)
NEUTROPHILS NFR BLD AUTO: 75.5 % — HIGH (ref 42.2–75.2)
NRBC # BLD: 0 /100 WBCS — SIGNIFICANT CHANGE UP (ref 0–0)
PHOSPHATE SERPL-MCNC: 3.3 MG/DL — SIGNIFICANT CHANGE UP (ref 2.1–4.9)
PLATELET # BLD AUTO: 238 K/UL — SIGNIFICANT CHANGE UP (ref 130–400)
PMV BLD: 11 FL — HIGH (ref 7.4–10.4)
POTASSIUM SERPL-MCNC: 5 MMOL/L — SIGNIFICANT CHANGE UP (ref 3.5–5)
POTASSIUM SERPL-SCNC: 5 MMOL/L — SIGNIFICANT CHANGE UP (ref 3.5–5)
PROT SERPL-MCNC: 6.8 G/DL — SIGNIFICANT CHANGE UP (ref 6–8)
RBC # BLD: 3.74 M/UL — LOW (ref 4.7–6.1)
RBC # FLD: 14.6 % — HIGH (ref 11.5–14.5)
S PNEUM AG UR QL: NEGATIVE — SIGNIFICANT CHANGE UP
SODIUM SERPL-SCNC: 135 MMOL/L — SIGNIFICANT CHANGE UP (ref 135–146)
SPECIMEN SOURCE: SIGNIFICANT CHANGE UP
WBC # BLD: 7.6 K/UL — SIGNIFICANT CHANGE UP (ref 4.8–10.8)
WBC # FLD AUTO: 7.6 K/UL — SIGNIFICANT CHANGE UP (ref 4.8–10.8)

## 2024-11-24 PROCEDURE — 99232 SBSQ HOSP IP/OBS MODERATE 35: CPT

## 2024-11-24 RX ADMIN — OLANZAPINE 10 MILLIGRAM(S): 20 TABLET ORAL at 11:53

## 2024-11-24 RX ADMIN — GABAPENTIN 300 MILLIGRAM(S): 300 CAPSULE ORAL at 05:06

## 2024-11-24 RX ADMIN — TRAZODONE HYDROCHLORIDE 50 MILLIGRAM(S): 150 TABLET ORAL at 21:02

## 2024-11-24 RX ADMIN — METOPROLOL TARTRATE 25 MILLIGRAM(S): 100 TABLET, FILM COATED ORAL at 09:43

## 2024-11-24 RX ADMIN — PANTOPRAZOLE SODIUM 40 MILLIGRAM(S): 40 TABLET, DELAYED RELEASE ORAL at 05:06

## 2024-11-24 RX ADMIN — CEFEPIME 100 MILLIGRAM(S): 2 INJECTION, POWDER, FOR SOLUTION INTRAVENOUS at 05:06

## 2024-11-24 RX ADMIN — CEFEPIME 100 MILLIGRAM(S): 2 INJECTION, POWDER, FOR SOLUTION INTRAVENOUS at 17:39

## 2024-11-24 RX ADMIN — TAMSULOSIN HYDROCHLORIDE 0.4 MILLIGRAM(S): 0.4 CAPSULE ORAL at 21:02

## 2024-11-24 RX ADMIN — Medication 1 TABLET(S): at 11:53

## 2024-11-24 RX ADMIN — GABAPENTIN 300 MILLIGRAM(S): 300 CAPSULE ORAL at 17:39

## 2024-11-24 RX ADMIN — CHLORHEXIDINE GLUCONATE 1 APPLICATION(S): 1.2 RINSE ORAL at 05:12

## 2024-11-24 RX ADMIN — ENOXAPARIN SODIUM 40 MILLIGRAM(S): 30 INJECTION SUBCUTANEOUS at 05:06

## 2024-11-24 RX ADMIN — LISINOPRIL 10 MILLIGRAM(S): 20 TABLET ORAL at 05:06

## 2024-11-24 NOTE — DIETITIAN INITIAL EVALUATION ADULT - NAME AND PHONE
Cailin Nice, RD [phone] or via Teams    Patient is at high nutrition risk, RD to f/u  Cailin Nice, RD [phone] or via Teams    Patient is at moderate nutrition risk, RD to f/u

## 2024-11-24 NOTE — DIETITIAN INITIAL EVALUATION ADULT - ORAL INTAKE PTA/DIET HISTORY
Patient noted to be disoriented x 3 - unable to provide hx. Nutrition hx obtained from Dietitian Initial Evaluation 10/30/23: "Patient is from [facility] - He was on a NCS, BRAD diet. Received Centrum for supplementation. Patient reports  lbs. NKFA, no food intolerances reported."    RD spoke with PCA 1:1 sit present at bedside - patient with good appetite and PO intake - consumed 100% of breakfast meal this morning.

## 2024-11-24 NOTE — DIETITIAN INITIAL EVALUATION ADULT - PERTINENT MEDS FT
MEDICATIONS  (STANDING):  cefepime   IVPB      cefepime   IVPB 2000 milliGRAM(s) IV Intermittent every 12 hours  chlorhexidine 2% Cloths 1 Application(s) Topical <User Schedule>  dextrose 5%. 1000 milliLiter(s) (50 mL/Hr) IV Continuous <Continuous>  dextrose 5%. 1000 milliLiter(s) (100 mL/Hr) IV Continuous <Continuous>  dextrose 50% Injectable 25 Gram(s) IV Push once  dextrose 50% Injectable 12.5 Gram(s) IV Push once  dextrose 50% Injectable 25 Gram(s) IV Push once  enoxaparin Injectable 40 milliGRAM(s) SubCutaneous every 24 hours  gabapentin 300 milliGRAM(s) Oral two times a day  glucagon  Injectable 1 milliGRAM(s) IntraMuscular once  insulin lispro (ADMELOG) corrective regimen sliding scale   SubCutaneous three times a day before meals  lisinopril 10 milliGRAM(s) Oral daily  metoprolol tartrate 25 milliGRAM(s) Oral daily  multivitamin 1 Tablet(s) Oral daily  OLANZapine 10 milliGRAM(s) Oral daily  pantoprazole    Tablet 40 milliGRAM(s) Oral before breakfast  tamsulosin 0.4 milliGRAM(s) Oral at bedtime  traZODone 50 milliGRAM(s) Oral at bedtime    MEDICATIONS  (PRN):  dextrose Oral Gel 15 Gram(s) Oral once PRN Blood Glucose LESS THAN 70 milliGRAM(s)/deciliter

## 2024-11-24 NOTE — PROGRESS NOTE ADULT - ASSESSMENT
An 84-year-old male resident of [facility] ([place]) with a past medical history of hyperlipidemia (HLD), schizophrenia, chronic obstructive pulmonary disease (COPD), type 2 diabetes mellitus (DM2), non-Hodgkin's lymphoma, and hyponatremia, presented to the ED with complaints of weakness and fever. He is a poor historian and unable to provide many details. He reports a cough and shortness of breath developing over the past few days. The cough is productive in nature. He experiences mild dyspnea.    # Sepsis POA likely due to PNA   -In Ed patient was febrile with temp of  101.7 Degrees F  tachycardic to 105 ,  mildly tachypneic.    - WBCs 16.07 e Neutrophil predominance, lactate on VBG 3.3   -CXR 11/22: stable opacity in RLL  -rcvd cefepime , levofloxacin in Ed   -previous urine Cultures were positive for Klebsiella (carbapenum resistent)  - s/p Meropenem  > switched to Cefepime as per ID  -f/u BCx >>ngtd   - procal 0.11  - RVP  > nEG   -f/u legionella , strep Ag     #chronic hyponatremia :   -asymptomatic   - Na 128  >> 130 >> 135   - dc NS IVF  -trend sodium on BMP     #BPH  -c/w home meds     # h/o COPD -stable, resumed on inhalers, nebs tx.     # DM 2 -SSI     # CAD/ HTN/ HLD  - resume home meds     #Incidental Findings (Liver Nodules & Kidney cyst/hypodensity)  - Outpatient workup and follow up    #Misc:   DVT Ppx:lovenox   GI Ppx:PPI   Diet: DASH   Activity : ATT     Pending: Sepsis/GOC  plan d/w the sister over phone, stated will bring HCP and ? MOLST forms from home.   Dispo: TBD

## 2024-11-24 NOTE — DIETITIAN INITIAL EVALUATION ADULT - SIGNS/SYMPTOMS
Pharmacy faxed in request for medication refill. Medication(s) set up as pending orders from medication list.    Preferred pharmacy has been set up and verified            
PO intake ranges from 51 - 100% of meals

## 2024-11-24 NOTE — DIETITIAN INITIAL EVALUATION ADULT - PERTINENT LABORATORY DATA
11-24    135  |  96[L]  |  18  ----------------------------<  95  5.0   |  25  |  1.4    Ca    9.0      24 Nov 2024 05:24  Phos  3.3     11-24  Mg     2.0     11-24    TPro  6.8  /  Alb  3.9  /  TBili  <0.2  /  DBili  x   /  AST  17  /  ALT  7   /  AlkPhos  91  11-24  POCT Blood Glucose.: 130 mg/dL (11-24-24 @ 11:37)  A1C with Estimated Average Glucose Result: 5.7 % (11-23-24 @ 08:30)

## 2024-11-24 NOTE — DIETITIAN INITIAL EVALUATION ADULT - COLLABORATION WITH OTHER PROVIDERS
Interventions: meals and snacks, medical food supplements, coordination of care  Monitoring/Evaluation: energy intake, BM, GI s/s, weight, labs, skin status, NFPF

## 2024-11-24 NOTE — PROGRESS NOTE ADULT - ASSESSMENT
An 84-year-old male resident of [facility] ([place]) with a past medical history of hyperlipidemia (HLD), schizophrenia, chronic obstructive pulmonary disease (COPD), type 2 diabetes mellitus (DM2), non-Hodgkin's lymphoma, and hyponatremia, presented to the ED with complaints of weakness and fever. He is a poor historian and unable to provide many details. He reports a cough and shortness of breath developing over the past few days. The cough is productive in nature. He experiences mild dyspnea.      Plan    # SEPSIS POA 2/2 to CAP :   -In Ed patient was febrile with temp of  101.7 Degrees F  tachycardic to 105 ,  mildly tachypneic.    - WBCs 16.07 e Neutrophil predominance, lactate on VBG 3.3   -CXR 11/22: stable opacity in RLL  -rcvd cefepime , levofloxacin in Ed   -previous urine Cultures were positive for Klebsiella (carbapenum resistent)  -c/w Meropenem   -f/u UCx BCx  -f/u procal   -f/u RVP   -f/u legionella , strep Ag  -f/u ID     #chronic hyponatremia :   -asymptomatic   - Na 128   - dc NS IVF  -trend sodium on BMP     #BPH  -c/w home meds       # h/o COPD -stable, resumed on inhalers, nebs tx.     # DM 2 -SSI     # CAD/ HTN/ HLD  - resume home meds     #Incidental Findings (Liver Nodules & Kidney cyst/hypodensity)  - Outpatient workup and follow up    #Misc:   DVT Ppx:lovenox   GI Ppx:PPI   Diet: DASH   Activity : ATT

## 2024-11-24 NOTE — DIETITIAN INITIAL EVALUATION ADULT - OTHER INFO
Patient is an 84-year-old male resident of [facility] ([place]) with a past medical history of hyperlipidemia (HLD), schizophrenia, chronic obstructive pulmonary disease (COPD), type 2 diabetes mellitus (DM2), non-Hodgkin's lymphoma, and hyponatremia, presented to the ED with complaints of weakness and fever. He is a poor historian and unable to provide many details. He reports a cough and shortness of breath developing over the past few days. The cough is productive in nature. He experiences mild dyspnea.    Sepsis POA likely due to PNA; chronic hyponatremia; BPH; COPD - stable; DM 2 - SSI;     Weight hx per EMR:  70.6 kg (10/28/23) vs 68.9 kg (11/22/24)  indicating 2.5% weight loss x ~1 year indicating not significant for malnutrition

## 2024-11-24 NOTE — DIETITIAN INITIAL EVALUATION ADULT - ADD RECOMMEND
1) Continue current diet order - add Consistent CHO modifier  2) Encouragement and assistance with all meals, snacks, supplement as needed

## 2024-11-24 NOTE — DIETITIAN INITIAL EVALUATION ADULT - OTHER CALCULATIONS
Energy: 1378 - 1723 kcal/day (20 - 25 kcal/kg)  Protein: 68 - 83 gm/day (1-1.2 gm/kg)  Fluid: 1 mL/kcal  estimated needs with consideration for age, BMI, acuity of illness

## 2024-11-25 LAB
ALBUMIN SERPL ELPH-MCNC: 3.9 G/DL — SIGNIFICANT CHANGE UP (ref 3.5–5.2)
ALP SERPL-CCNC: 88 U/L — SIGNIFICANT CHANGE UP (ref 30–115)
ALT FLD-CCNC: 7 U/L — SIGNIFICANT CHANGE UP (ref 0–41)
ANION GAP SERPL CALC-SCNC: 10 MMOL/L — SIGNIFICANT CHANGE UP (ref 7–14)
AST SERPL-CCNC: 14 U/L — SIGNIFICANT CHANGE UP (ref 0–41)
BASOPHILS # BLD AUTO: 0.03 K/UL — SIGNIFICANT CHANGE UP (ref 0–0.2)
BASOPHILS NFR BLD AUTO: 0.4 % — SIGNIFICANT CHANGE UP (ref 0–1)
BILIRUB SERPL-MCNC: <0.2 MG/DL — SIGNIFICANT CHANGE UP (ref 0.2–1.2)
BUN SERPL-MCNC: 27 MG/DL — HIGH (ref 10–20)
CALCIUM SERPL-MCNC: 9 MG/DL — SIGNIFICANT CHANGE UP (ref 8.4–10.4)
CHLORIDE SERPL-SCNC: 97 MMOL/L — LOW (ref 98–110)
CO2 SERPL-SCNC: 25 MMOL/L — SIGNIFICANT CHANGE UP (ref 17–32)
CREAT SERPL-MCNC: 1.4 MG/DL — SIGNIFICANT CHANGE UP (ref 0.7–1.5)
EGFR: 50 ML/MIN/1.73M2 — LOW
EOSINOPHIL # BLD AUTO: 0.04 K/UL — SIGNIFICANT CHANGE UP (ref 0–0.7)
EOSINOPHIL NFR BLD AUTO: 0.6 % — SIGNIFICANT CHANGE UP (ref 0–8)
GLUCOSE BLDC GLUCOMTR-MCNC: 100 MG/DL — HIGH (ref 70–99)
GLUCOSE BLDC GLUCOMTR-MCNC: 109 MG/DL — HIGH (ref 70–99)
GLUCOSE BLDC GLUCOMTR-MCNC: 135 MG/DL — HIGH (ref 70–99)
GLUCOSE BLDC GLUCOMTR-MCNC: 78 MG/DL — SIGNIFICANT CHANGE UP (ref 70–99)
GLUCOSE BLDC GLUCOMTR-MCNC: 90 MG/DL — SIGNIFICANT CHANGE UP (ref 70–99)
GLUCOSE SERPL-MCNC: 92 MG/DL — SIGNIFICANT CHANGE UP (ref 70–99)
HCT VFR BLD CALC: 35.3 % — LOW (ref 42–52)
HGB BLD-MCNC: 11.9 G/DL — LOW (ref 14–18)
IMM GRANULOCYTES NFR BLD AUTO: 0.4 % — HIGH (ref 0.1–0.3)
LYMPHOCYTES # BLD AUTO: 1.57 K/UL — SIGNIFICANT CHANGE UP (ref 1.2–3.4)
LYMPHOCYTES # BLD AUTO: 21.7 % — SIGNIFICANT CHANGE UP (ref 20.5–51.1)
MAGNESIUM SERPL-MCNC: 2 MG/DL — SIGNIFICANT CHANGE UP (ref 1.8–2.4)
MCHC RBC-ENTMCNC: 32 PG — HIGH (ref 27–31)
MCHC RBC-ENTMCNC: 33.7 G/DL — SIGNIFICANT CHANGE UP (ref 32–37)
MCV RBC AUTO: 94.9 FL — HIGH (ref 80–94)
MONOCYTES # BLD AUTO: 0.51 K/UL — SIGNIFICANT CHANGE UP (ref 0.1–0.6)
MONOCYTES NFR BLD AUTO: 7 % — SIGNIFICANT CHANGE UP (ref 1.7–9.3)
NEUTROPHILS # BLD AUTO: 5.06 K/UL — SIGNIFICANT CHANGE UP (ref 1.4–6.5)
NEUTROPHILS NFR BLD AUTO: 69.9 % — SIGNIFICANT CHANGE UP (ref 42.2–75.2)
NRBC # BLD: 0 /100 WBCS — SIGNIFICANT CHANGE UP (ref 0–0)
PHOSPHATE SERPL-MCNC: 4.1 MG/DL — SIGNIFICANT CHANGE UP (ref 2.1–4.9)
PLATELET # BLD AUTO: 243 K/UL — SIGNIFICANT CHANGE UP (ref 130–400)
PMV BLD: 10.6 FL — HIGH (ref 7.4–10.4)
POTASSIUM SERPL-MCNC: 4.6 MMOL/L — SIGNIFICANT CHANGE UP (ref 3.5–5)
POTASSIUM SERPL-SCNC: 4.6 MMOL/L — SIGNIFICANT CHANGE UP (ref 3.5–5)
PROT SERPL-MCNC: 6.7 G/DL — SIGNIFICANT CHANGE UP (ref 6–8)
RBC # BLD: 3.72 M/UL — LOW (ref 4.7–6.1)
RBC # FLD: 14.7 % — HIGH (ref 11.5–14.5)
SODIUM SERPL-SCNC: 132 MMOL/L — LOW (ref 135–146)
WBC # BLD: 7.24 K/UL — SIGNIFICANT CHANGE UP (ref 4.8–10.8)
WBC # FLD AUTO: 7.24 K/UL — SIGNIFICANT CHANGE UP (ref 4.8–10.8)

## 2024-11-25 PROCEDURE — 99232 SBSQ HOSP IP/OBS MODERATE 35: CPT

## 2024-11-25 RX ADMIN — CEFEPIME 100 MILLIGRAM(S): 2 INJECTION, POWDER, FOR SOLUTION INTRAVENOUS at 05:05

## 2024-11-25 RX ADMIN — CEFEPIME 100 MILLIGRAM(S): 2 INJECTION, POWDER, FOR SOLUTION INTRAVENOUS at 18:26

## 2024-11-25 RX ADMIN — CHLORHEXIDINE GLUCONATE 1 APPLICATION(S): 1.2 RINSE ORAL at 05:05

## 2024-11-25 RX ADMIN — GABAPENTIN 300 MILLIGRAM(S): 300 CAPSULE ORAL at 18:26

## 2024-11-25 RX ADMIN — GABAPENTIN 300 MILLIGRAM(S): 300 CAPSULE ORAL at 05:04

## 2024-11-25 RX ADMIN — METOPROLOL TARTRATE 25 MILLIGRAM(S): 100 TABLET, FILM COATED ORAL at 05:04

## 2024-11-25 RX ADMIN — ENOXAPARIN SODIUM 40 MILLIGRAM(S): 30 INJECTION SUBCUTANEOUS at 05:04

## 2024-11-25 RX ADMIN — OLANZAPINE 10 MILLIGRAM(S): 20 TABLET ORAL at 12:54

## 2024-11-25 RX ADMIN — PANTOPRAZOLE SODIUM 40 MILLIGRAM(S): 40 TABLET, DELAYED RELEASE ORAL at 05:04

## 2024-11-25 RX ADMIN — TRAZODONE HYDROCHLORIDE 50 MILLIGRAM(S): 150 TABLET ORAL at 21:04

## 2024-11-25 RX ADMIN — TAMSULOSIN HYDROCHLORIDE 0.4 MILLIGRAM(S): 0.4 CAPSULE ORAL at 21:03

## 2024-11-25 RX ADMIN — Medication 1 TABLET(S): at 12:55

## 2024-11-25 RX ADMIN — LISINOPRIL 10 MILLIGRAM(S): 20 TABLET ORAL at 05:04

## 2024-11-25 NOTE — PHYSICAL THERAPY INITIAL EVALUATION ADULT - ADDITIONAL COMMENTS
Pt is a poor historian. As per chart review pt is a resident at [facility] assisted living, uses a cane and a walker, however pt states he walks by himself.

## 2024-11-25 NOTE — PHYSICAL THERAPY INITIAL EVALUATION ADULT - PHYSICAL ASSIST/NONPHYSICAL ASSIST: GAIT, REHAB EVAL
for sequencing RW and to lift his feet off of the floor/verbal cues/nonverbal cues (demo/gestures)/1 person assist

## 2024-11-25 NOTE — PHYSICAL THERAPY INITIAL EVALUATION ADULT - GENERAL OBSERVATIONS, REHAB EVAL
15:48-16:04. Pt encountered semifowler in bed in NAD, 2:1 sit present, agreeable to PT and OOB activity.

## 2024-11-25 NOTE — PHYSICAL THERAPY INITIAL EVALUATION ADULT - PERTINENT HX OF CURRENT PROBLEM, REHAB EVAL
An 84-year-old male resident of [facility] ([place]) with a past medical history of hyperlipidemia (HLD), schizophrenia, chronic obstructive pulmonary disease (COPD), type 2 diabetes mellitus (DM2), non-Hodgkin's lymphoma, and hyponatremia, presented to the ED with complaints of weakness and fever. He is a poor historian and unable to provide many details. He reports a cough and shortness of breath developing over the past few days. The cough is productive in nature. He experiences mild dyspnea.   Denies nausea, vomiting, abdominal pain, sick contacts, or recent travel   Due to his limited ability to provide a history, further details regarding the onset, duration, and character of his symptoms are unavailable. His baseline functional status and cognitive capacity are also unclear. 194

## 2024-11-25 NOTE — PHYSICAL THERAPY INITIAL EVALUATION ADULT - GAIT DEVIATIONS NOTED, PT EVAL
decreased hip and knee flexion, decreased safety awareness/decreased sunil/decreased step length/decreased stride length

## 2024-11-25 NOTE — PHYSICAL THERAPY INITIAL EVALUATION ADULT - MANUAL MUSCLE TESTING RESULTS, REHAB EVAL
B UEs at least 3/5, B LEs at least 3/5, did not perform formal assessment 2* pt decreased ability to follow commands needed for assessment

## 2024-11-25 NOTE — PROGRESS NOTE ADULT - ASSESSMENT
ASSESSMENT  84-year-old male resident of [facility] ([place]) with a past medical history of hyperlipidemia (HLD), schizophrenia, chronic obstructive pulmonary disease (COPD), type 2 diabetes mellitus (DM2), non-Hodgkin's lymphoma, and hyponatremia, presented to the ED with complaints of weakness and fever. He is a poor historian and unable to provide many details. He reports a cough and shortness of breath developing over the past few days. The cough is productive in nature. He experiences mild dyspnea.   Denies nausea, vomiting, abdominal pain, sick contacts, or recent travel      IMPRESSION  #Severe sepsis on admission T>101 P>90 WBC 16 lactic acidosis   Unclear source, possible viral vs aspiration   RVP (-)     11/21 Blood & Urine cxs NGTD   Hx CRE Kleb  < from: Xray Chest 1 View- PORTABLE-Urgent (11.21.24 @ 22:40) >  Cardiomegaly without acute opacifications or effusions.  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

## 2024-11-25 NOTE — PROGRESS NOTE ADULT - ASSESSMENT
An 84-year-old male resident of [facility] ([place]) with a past medical history of hyperlipidemia (HLD), schizophrenia, chronic obstructive pulmonary disease (COPD), type 2 diabetes mellitus (DM2), non-Hodgkin's lymphoma, and hyponatremia, presented to the ED with complaints of weakness and fever. He is a poor historian and unable to provide many details. He reports a cough and shortness of breath developing over the past few days. The cough is productive in nature. He experiences mild dyspnea.    # Sepsis POA likely due to PNA   -In Ed patient was febrile with temp of  101.7 Degrees F  tachycardic to 105 ,  mildly tachypneic.    - WBCs 16.07 e Neutrophil predominance, lactate on VBG 3.3   -CXR 11/22: stable opacity in RLL  -s/pcefepime , levofloxacin in Ed   -previous urine Cultures were positive for Klebsiella (carbapenum resistent)  - s/p Meropenem, switched to Cefepime per ID  - can be discharged on po augmentin to complete 7 day course, per ID  - BCx ngtd   - procal 0.11  - RVP negative  -f/u legionella   - strep Ag negative     #chronic hyponatremia :   -asymptomatic, improved  - s/p NS IVF  - Na 135 -> 132 today  -trend sodium on BMP     #BPH  -c/w home meds     # h/o COPD -stable, resumed on inhalers, nebs tx.     # DM 2 -SSI     # CAD/ HTN/ HLD  - resume home meds     #Incidental Findings (Liver Nodules & Kidney cyst/hypodensity)  - Outpatient workup and follow up    #Misc:   DVT Ppx: lovenox   GI Ppx: PPI   Diet: DASH   Activity : ATT     Pending: PT, placement, GOC  Dispo: TBD

## 2024-11-25 NOTE — PHYSICAL THERAPY INITIAL EVALUATION ADULT - BED MOBILITY TRAINING, PT EVAL
Jazmín Pablo)  Surgery  [address] [place] [address]  Phone: [phone]  Fax: [phone]  Follow Up Time: 1 week    Dax Cerrato)  Gastroenterology; Internal Medicine  [address] [facility] [address] [place] [address]  Phone: [phone]  Fax: [phone]  Follow Up Time: 1 week  
Improve bed mobility to supervision by d/c.

## 2024-11-25 NOTE — PROGRESS NOTE ADULT - TIME BILLING
I have personally seen and examined this patient.  I have reviewed all pertinent clinical information and reviewed all relevant imaging and diagnostic studies personally.   I counseled the patient about diagnostic testing and treatment plan.   I discussed my recommendations with the primary team Dr Barrett

## 2024-11-25 NOTE — PROGRESS NOTE ADULT - ASSESSMENT
An 84-year-old male resident of [facility] ([place]) with a past medical history of hyperlipidemia (HLD), schizophrenia, chronic obstructive pulmonary disease (COPD), type 2 diabetes mellitus (DM2), non-Hodgkin's lymphoma, and hyponatremia, presented to the ED with complaints of weakness and fever. He is a poor historian and unable to provide many details. He reports a cough and shortness of breath developing over the past few days. The cough is productive in nature. He experiences mild dyspnea.    # Sepsis POA likely due to PNA   -In Ed patient was febrile with temp of  101.7 Degrees F  tachycardic to 105 ,  mildly tachypneic.    - WBCs 16.07 e Neutrophil predominance, lactate on VBG 3.3   -CXR 11/22: stable opacity in RLL  -rcvd cefepime , levofloxacin in Ed   -previous urine Cultures were positive for Klebsiella carbapenem resistant  - s/p Meropenem  > switched to Cefepime    > f/u ID   -f/u BCx >>ngtd   - procal 0.11  - RVP  > nEG   -f/u legionella , strep Ag     #chronic hyponatremia :   -asymptomatic   - Na 128  >> 130 >> 135   - dc NS IVF  -trend sodium on BMP     #BPH  -c/w home meds     # h/o COPD -stable, resumed on inhalers, nebs tx.     # DM 2 -SSI     # CAD/ HTN/ HLD  - resume home meds     #Incidental Findings (Liver Nodules & Kidney cyst/hypodensity)  - Outpatient workup and follow up    #Misc:   DVT Ppx:lovenox   GI Ppx:PPI   Diet: DASH   Activity : ATT     Pending: Sepsis/GOC  plan d/w the sister over phone, stated will bring HCP and ? MOLST forms from home.   Dispo: TBD          An 84-year-old male resident of [facility] ([place]) with a past medical history of hyperlipidemia (HLD), schizophrenia, chronic obstructive pulmonary disease (COPD), type 2 diabetes mellitus (DM2), non-Hodgkin's lymphoma, and hyponatremia, presented to the ED with complaints of weakness and fever. He is a poor historian and unable to provide many details. He reports a cough and shortness of breath developing over the past few days. The cough is productive in nature. He experiences mild dyspnea.    # Sepsis POA likely due to PNA   -In Ed patient was febrile with temp of  101.7 Degrees F  tachycardic to 105 ,  mildly tachypneic.    - WBCs 16.07 e Neutrophil predominance, lactate on VBG 3.3   -CXR 11/22: stable opacity in RLL  -rcvd cefepime , levofloxacin in Ed   -previous urine Cultures were positive for Klebsiella carbapenem resistant  - s/p Meropenem  > switched to Cefepime    > f/u ID   -f/u BCx >>ngtd   - procal 0.11  - RVP  > nEG   -f/u legionella , strep Ag     #chronic hyponatremia :   -asymptomatic   - Na 128  >> 130 >> 135   - dc NS IVF  -trend sodium on BMP     #BPH  -c/w home meds     # h/o COPD -stable, resumed on inhalers, nebs tx.     # DM 2 -SSI     # CAD/ HTN/ HLD  - resume home meds     #Incidental Findings (Liver Nodules & Kidney cyst/hypodensity)  - Outpatient workup and follow up    #Misc:   DVT Ppx:lovenox   GI Ppx:PPI   Diet: DASH   Activity : ATT     Pending: ID/GOC/PT   plan d/w the sister over phone, stated will bring HCP and ? MOLST forms from home.   Dispo: AH vs STR

## 2024-11-26 ENCOUNTER — TRANSCRIPTION ENCOUNTER (OUTPATIENT)
Age: 84
End: 2024-11-26

## 2024-11-26 VITALS
TEMPERATURE: 98 F | DIASTOLIC BLOOD PRESSURE: 70 MMHG | HEART RATE: 86 BPM | RESPIRATION RATE: 18 BRPM | SYSTOLIC BLOOD PRESSURE: 154 MMHG | OXYGEN SATURATION: 98 %

## 2024-11-26 LAB
ALBUMIN SERPL ELPH-MCNC: 4 G/DL — SIGNIFICANT CHANGE UP (ref 3.5–5.2)
ALP SERPL-CCNC: 86 U/L — SIGNIFICANT CHANGE UP (ref 30–115)
ALT FLD-CCNC: 8 U/L — SIGNIFICANT CHANGE UP (ref 0–41)
ANION GAP SERPL CALC-SCNC: 11 MMOL/L — SIGNIFICANT CHANGE UP (ref 7–14)
AST SERPL-CCNC: 15 U/L — SIGNIFICANT CHANGE UP (ref 0–41)
BASOPHILS # BLD AUTO: 0.01 K/UL — SIGNIFICANT CHANGE UP (ref 0–0.2)
BASOPHILS NFR BLD AUTO: 0.1 % — SIGNIFICANT CHANGE UP (ref 0–1)
BILIRUB SERPL-MCNC: <0.2 MG/DL — SIGNIFICANT CHANGE UP (ref 0.2–1.2)
BUN SERPL-MCNC: 31 MG/DL — HIGH (ref 10–20)
CALCIUM SERPL-MCNC: 9.1 MG/DL — SIGNIFICANT CHANGE UP (ref 8.4–10.5)
CHLORIDE SERPL-SCNC: 99 MMOL/L — SIGNIFICANT CHANGE UP (ref 98–110)
CO2 SERPL-SCNC: 24 MMOL/L — SIGNIFICANT CHANGE UP (ref 17–32)
CREAT SERPL-MCNC: 1.3 MG/DL — SIGNIFICANT CHANGE UP (ref 0.7–1.5)
EGFR: 55 ML/MIN/1.73M2 — LOW
EOSINOPHIL # BLD AUTO: 0.05 K/UL — SIGNIFICANT CHANGE UP (ref 0–0.7)
EOSINOPHIL NFR BLD AUTO: 0.7 % — SIGNIFICANT CHANGE UP (ref 0–8)
GLUCOSE BLDC GLUCOMTR-MCNC: 103 MG/DL — HIGH (ref 70–99)
GLUCOSE BLDC GLUCOMTR-MCNC: 113 MG/DL — HIGH (ref 70–99)
GLUCOSE BLDC GLUCOMTR-MCNC: 113 MG/DL — HIGH (ref 70–99)
GLUCOSE SERPL-MCNC: 100 MG/DL — HIGH (ref 70–99)
HCT VFR BLD CALC: 33.9 % — LOW (ref 42–52)
HGB BLD-MCNC: 11.5 G/DL — LOW (ref 14–18)
IMM GRANULOCYTES NFR BLD AUTO: 0.8 % — HIGH (ref 0.1–0.3)
LYMPHOCYTES # BLD AUTO: 1.64 K/UL — SIGNIFICANT CHANGE UP (ref 1.2–3.4)
LYMPHOCYTES # BLD AUTO: 22.5 % — SIGNIFICANT CHANGE UP (ref 20.5–51.1)
MAGNESIUM SERPL-MCNC: 2.1 MG/DL — SIGNIFICANT CHANGE UP (ref 1.8–2.4)
MCHC RBC-ENTMCNC: 32 PG — HIGH (ref 27–31)
MCHC RBC-ENTMCNC: 33.9 G/DL — SIGNIFICANT CHANGE UP (ref 32–37)
MCV RBC AUTO: 94.4 FL — HIGH (ref 80–94)
MONOCYTES # BLD AUTO: 0.47 K/UL — SIGNIFICANT CHANGE UP (ref 0.1–0.6)
MONOCYTES NFR BLD AUTO: 6.5 % — SIGNIFICANT CHANGE UP (ref 1.7–9.3)
NEUTROPHILS # BLD AUTO: 5.05 K/UL — SIGNIFICANT CHANGE UP (ref 1.4–6.5)
NEUTROPHILS NFR BLD AUTO: 69.4 % — SIGNIFICANT CHANGE UP (ref 42.2–75.2)
NRBC # BLD: 0 /100 WBCS — SIGNIFICANT CHANGE UP (ref 0–0)
PLATELET # BLD AUTO: 248 K/UL — SIGNIFICANT CHANGE UP (ref 130–400)
PMV BLD: 10.4 FL — SIGNIFICANT CHANGE UP (ref 7.4–10.4)
POTASSIUM SERPL-MCNC: 5.3 MMOL/L — HIGH (ref 3.5–5)
POTASSIUM SERPL-SCNC: 5.3 MMOL/L — HIGH (ref 3.5–5)
PROT SERPL-MCNC: 6.6 G/DL — SIGNIFICANT CHANGE UP (ref 6–8)
RBC # BLD: 3.59 M/UL — LOW (ref 4.7–6.1)
RBC # FLD: 14.8 % — HIGH (ref 11.5–14.5)
SODIUM SERPL-SCNC: 134 MMOL/L — LOW (ref 135–146)
WBC # BLD: 7.28 K/UL — SIGNIFICANT CHANGE UP (ref 4.8–10.8)
WBC # FLD AUTO: 7.28 K/UL — SIGNIFICANT CHANGE UP (ref 4.8–10.8)

## 2024-11-26 PROCEDURE — 99232 SBSQ HOSP IP/OBS MODERATE 35: CPT

## 2024-11-26 RX ORDER — AMOXICILLIN/POTASSIUM CLAV 250-125 MG
1 TABLET ORAL EVERY 12 HOURS
Refills: 0 | Status: DISCONTINUED | OUTPATIENT
Start: 2024-11-26 | End: 2024-11-26

## 2024-11-26 RX ORDER — AMOXICILLIN/POTASSIUM CLAV 250-125 MG
1 TABLET ORAL
Qty: 4 | Refills: 0
Start: 2024-11-26 | End: 2024-11-27

## 2024-11-26 RX ORDER — SODIUM ZIRCONIUM CYCLOSILICATE 5 G/5G
5 POWDER, FOR SUSPENSION ORAL ONCE
Refills: 0 | Status: COMPLETED | OUTPATIENT
Start: 2024-11-26 | End: 2024-11-26

## 2024-11-26 RX ADMIN — ENOXAPARIN SODIUM 40 MILLIGRAM(S): 30 INJECTION SUBCUTANEOUS at 05:06

## 2024-11-26 RX ADMIN — GABAPENTIN 300 MILLIGRAM(S): 300 CAPSULE ORAL at 05:05

## 2024-11-26 RX ADMIN — GABAPENTIN 300 MILLIGRAM(S): 300 CAPSULE ORAL at 17:00

## 2024-11-26 RX ADMIN — LISINOPRIL 10 MILLIGRAM(S): 20 TABLET ORAL at 05:09

## 2024-11-26 RX ADMIN — OLANZAPINE 10 MILLIGRAM(S): 20 TABLET ORAL at 11:25

## 2024-11-26 RX ADMIN — METOPROLOL TARTRATE 25 MILLIGRAM(S): 100 TABLET, FILM COATED ORAL at 05:05

## 2024-11-26 RX ADMIN — CHLORHEXIDINE GLUCONATE 1 APPLICATION(S): 1.2 RINSE ORAL at 05:09

## 2024-11-26 RX ADMIN — Medication 1 TABLET(S): at 17:00

## 2024-11-26 RX ADMIN — Medication 1 TABLET(S): at 05:06

## 2024-11-26 RX ADMIN — Medication 1 TABLET(S): at 11:25

## 2024-11-26 RX ADMIN — SODIUM ZIRCONIUM CYCLOSILICATE 5 GRAM(S): 5 POWDER, FOR SUSPENSION ORAL at 08:47

## 2024-11-26 RX ADMIN — PANTOPRAZOLE SODIUM 40 MILLIGRAM(S): 40 TABLET, DELAYED RELEASE ORAL at 05:06

## 2024-11-26 NOTE — DISCHARGE NOTE NURSING/CASE MANAGEMENT/SOCIAL WORK - NSDCVIVACCINE_GEN_ALL_CORE_FT
Tdap; 15-May-2024 15:22; Rosa Adamson (ELLEN); [employer]; [id number] (Exp. Date: 23-Nov-2025); IntraMuscular; Deltoid Left.; 0.5 milliLiter(s); VIS (VIS Published: 09-May-2013, VIS Presented: 15-May-2024);

## 2024-11-26 NOTE — PHARMACOTHERAPY INTERVENTION NOTE - COMMENTS
Recommended modifying Amoxicillin-clavulanate order so last day of therapy is 11/28, as per IDs recommendations.    Diane Linares, PharmD  Clinical Pharmacy Specialist, Infectious Diseases  Tele-Antimicrobial Stewardship Program (Tele-ASP)  Tele-ASP Phone: [phone]

## 2024-11-26 NOTE — GOALS OF CARE CONVERSATION - ADVANCED CARE PLANNING - CONVERSATION DETAILS
Spoke to pt's daughter at length.   MOLST form in the chart stating trial of intubation , including trial of long term  feeding tubes if needed, IV Abx.

## 2024-11-26 NOTE — PHARMACOTHERAPY INTERVENTION NOTE - COMMENTS
Recommended discontinuing cefepime, as patient was started on Augmentin, as per IDs recommendations. Of note patient is tolerating PO medications and is clinically stable.     Diane Linares, PharmD  Clinical Pharmacy Specialist, Infectious Diseases  Tele-Antimicrobial Stewardship Program (Tele-ASP)  Tele-ASP Phone: [phone]  Recommended discontinuing cefepime, as per IDs recommendations. Of note patient is tolerating PO medications and is clinically stable.     Diane Linares, PharmD  Clinical Pharmacy Specialist, Infectious Diseases  Tele-Antimicrobial Stewardship Program (Tele-ASP)  Tele-ASP Phone: [phone]

## 2024-11-26 NOTE — DISCHARGE NOTE NURSING/CASE MANAGEMENT/SOCIAL WORK - PATIENT PORTAL LINK FT
You can access the FollowMyHealth Patient Portal offered by [facility] by registering at the following website: [email or web address]. By joining [facility]’s FollowMyHealth portal, you will also be able to view your health information using other applications (apps) compatible with our system.

## 2024-11-26 NOTE — PROGRESS NOTE ADULT - PROVIDER SPECIALTY LIST ADULT
Hospitalist
Internal Medicine
Hospitalist
Internal Medicine
Hospitalist
Internal Medicine
Infectious Disease

## 2024-11-26 NOTE — DISCHARGE NOTE PROVIDER - NSDCMRMEDTOKEN_GEN_ALL_CORE_FT
albuterol 90 mcg/inh inhalation aerosol: 1 puff(s) inhaled prn as needed every 6 hours  amoxicillin-clavulanate 875 mg-125 mg oral tablet: 1 tab(s) orally every 12 hours  Centrum oral tablet: 1 tab(s) orally once a day  gabapentin 300 mg oral capsule: 1 cap(s) orally 2 times a day  lisinopril 5 mg oral tablet: 2 tab(s) orally once a day  metFORMIN 500 mg oral tablet: 1 tab(s) orally 2 times a day  metoprolol tartrate 25 mg oral tablet: 1 tab(s) orally once a day  Ocean Complete nasal spray: 1 puff(s) in each nostril every 12 hours  OLANZapine 10 mg oral tablet: 1 tab(s) orally once a day  tamsulosin 0.4 mg oral capsule: 1 cap(s) orally once a day (at bedtime)  traZODone 50 mg oral tablet: 1 tab(s) orally once a day (at bedtime)

## 2024-11-26 NOTE — DISCHARGE NOTE PROVIDER - NSDCFUADDINST_GEN_ALL_CORE_FT
Please follow up with your primary care provider Dr. Stuart Ball for your regular health check up within a month. You can schedule you appointment by calling [phone].

## 2024-11-26 NOTE — DISCHARGE NOTE NURSING/CASE MANAGEMENT/SOCIAL WORK - NSTOBACCONEVERSMOKERY/N_GEN_A
Situation:   Outreach for routine follow up.    Key Assessments:   General physical-Patient reports that she's feeling well today. Has no new physical concerns.     Actions Taken:   Medication reconciliation completed. Patient is no longer taking several of her medications as they are not needed. Flagged for PCP to review at upcoming appt.     Goals-Patient does feel that she's slowly gaining strength. Discussed that this could take a good amount of time.     Active listening and support provided. Reviewed progress so far and plan for ongoing wellness. All questions answered    Program plan:   Discuss PCP appointment at next outreach.     Next follow up:  In one week.       See hyperlinks within encounter for full documentation.     Yes

## 2024-11-26 NOTE — DISCHARGE NOTE PROVIDER - HOSPITAL COURSE
H&P  An 84-year-old male resident of [facility] ([place]) with a past medical history of hyperlipidemia (HLD), schizophrenia, chronic obstructive pulmonary disease (COPD), type 2 diabetes mellitus (DM2), non-Hodgkin's lymphoma, and hyponatremia, presented to the ED with complaints of weakness and fever. He is a poor historian and unable to provide many details. He reports a cough and shortness of breath developing over the past few days. The cough is productive in nature. He experiences mild dyspnea.   Denies nausea, vomiting, abdominal pain, sick contacts, or recent travel   Due to his limited ability to provide a history, further details regarding the onset, duration, and character of his symptoms are unavailable. His baseline functional status and cognitive capacity are also unclear.    In Ed patient was febrile with temp of  101.7 Degrees F  tachycardic to 105 ,  mildly tachypneic.    On labs WBCs 16.07 e Neutrophil predominance , Na 128 , lactate on VBG 3.3   xray chest done in Ed showed opacity in RLL (pending official read)     rcvd cefepime , levofloxacin , 2L of LR , tylenol  in Ed     admitted for further workup .       A&P  An 84-year-old male resident of [facility] ([place]) with a past medical history of hyperlipidemia (HLD), schizophrenia, chronic obstructive pulmonary disease (COPD), type 2 diabetes mellitus (DM2), non-Hodgkin's lymphoma, and hyponatremia, presented to the ED with complaints of weakness and fever. He is a poor historian and unable to provide many details. He reports a cough and shortness of breath developing over the past few days. The cough is productive in nature. He experiences mild dyspnea.    # Sepsis POA likely due to PNA   -In Ed patient was febrile with temp of  101.7 Degrees F, tachycardic to 105,  mildly tachypneic.    - WBCs 16.07 e Neutrophil predominance, lactate on VBG 3.3   -CXR 11/22: stable opacity in RLL  -s/pcefepime , levofloxacin in Ed   -previous urine Cultures were positive for Klebsiella (carbapenum resistent)  - s/p Meropenem, switched to Cefepime per ID  - can be discharged on po augmentin to complete 7 day course, per ID  - BCx ngtd   - procal 0.11  - RVP negative  -f/u legionella   - strep Ag negative     #chronic hyponatremia :   -asymptomatic, improved  - s/p NS IVF  - Na 135 -> 132 today    #BPH  -c/w home meds     # h/o COPD -stable, resumed on inhalers, nebs tx.     # DM 2 -SSI     # CAD/ HTN/ HLD  - resume home meds     #Incidental Findings (Liver Nodules & Kidney cyst/hypodensity)  - Outpatient workup and follow up    #Misc:   DVT Ppx: lovenox   GI Ppx: PPI   Diet: DASH   Activity : ATT     Pending: PT, placement, GOC  Dispo: Nursing home with PT

## 2024-11-26 NOTE — PROGRESS NOTE ADULT - SUBJECTIVE AND OBJECTIVE BOX
ADA VILLAGOMEZ  84y  Male      Patient is a 84y old  Male who presents with a chief complaint of fever     INTERVAL HPI/OVERNIGHT EVENTS:      ******************************* REVIEW OF SYSTEMS:**********************************************    All other review of systems negative    *********************** VITALS ******************************************    T(F): 98 (11-23-24 @ 04:24)  HR: 83 (11-23-24 @ 04:24) (83 - 88)  BP: 175/72 (11-23-24 @ 04:24) (168/71 - 175/72)  RR: 18 (11-23-24 @ 04:24) (18 - 18)  SpO2: 96% (11-23-24 @ 04:24) (96% - 97%)            ******************************** PHYSICAL EXAM:**************************************************  GENERAL: NAD    PSYCH: no agitation, ? baseline mentation  HEENT:     NERVOUS SYSTEM:  Alert & Oriented X1-2,    PULMONARY: MACHO, CTA    CARDIOVASCULAR: S1S2 RRR    GI: Soft, NT, ND; BS present.    EXTREMITIES:  2+ Peripheral Pulses, No clubbing, cyanosis, or edema    LYMPH: No lymphadenopathy noted    SKIN: No rashes or lesions      **************************** LABS *******************************************************                          10.8   9.13  )-----------( 208      ( 23 Nov 2024 06:19 )             31.9     11-23    130[L]  |  94[L]  |  13  ----------------------------<  114[H]  4.9   |  26  |  1.2    Ca    8.8      23 Nov 2024 06:19  Phos  2.8     11-23  Mg     1.8     11-23    TPro  6.1  /  Alb  3.5  /  TBili  0.2  /  DBili  x   /  AST  18  /  ALT  7   /  AlkPhos  71  11-23      Urinalysis Basic - ( 23 Nov 2024 06:19 )    Color: x / Appearance: x / SG: x / pH: x  Gluc: 114 mg/dL / Ketone: x  / Bili: x / Urobili: x   Blood: x / Protein: x / Nitrite: x   Leuk Esterase: x / RBC: x / WBC x   Sq Epi: x / Non Sq Epi: x / Bacteria: x        Lactate Trend  11-23 @ 06:19 Lactate:1.9   11-22 @ 11:53 Lactate:1.4         CAPILLARY BLOOD GLUCOSE      POCT Blood Glucose.: 148 mg/dL (23 Nov 2024 07:35)          **************************Active Medications *******************************************  No Known Allergies      cefepime   IVPB      cefepime   IVPB 2000 milliGRAM(s) IV Intermittent every 12 hours  chlorhexidine 2% Cloths 1 Application(s) Topical <User Schedule>  dextrose 5%. 1000 milliLiter(s) IV Continuous <Continuous>  dextrose 5%. 1000 milliLiter(s) IV Continuous <Continuous>  dextrose 50% Injectable 25 Gram(s) IV Push once  dextrose 50% Injectable 12.5 Gram(s) IV Push once  dextrose 50% Injectable 25 Gram(s) IV Push once  dextrose Oral Gel 15 Gram(s) Oral once PRN  enoxaparin Injectable 40 milliGRAM(s) SubCutaneous every 24 hours  gabapentin 300 milliGRAM(s) Oral two times a day  glucagon  Injectable 1 milliGRAM(s) IntraMuscular once  insulin lispro (ADMELOG) corrective regimen sliding scale   SubCutaneous three times a day before meals  lisinopril 10 milliGRAM(s) Oral daily  metoprolol tartrate 25 milliGRAM(s) Oral daily  multivitamin 1 Tablet(s) Oral daily  OLANZapine 10 milliGRAM(s) Oral daily  pantoprazole    Tablet 40 milliGRAM(s) Oral before breakfast  tamsulosin 0.4 milliGRAM(s) Oral at bedtime  traZODone 50 milliGRAM(s) Oral at bedtime      ***************************************************  RADIOLOGY & ADDITIONAL TESTS:    Imaging Personally Reviewed:  [ ] YES  [ ] NO    HEALTH ISSUES - PROBLEM Dx:      
  ADA VILLAGOMEZ  84y  Male      Patient is a 84y old  Male who presents with a chief complaint of sob/cough    INTERVAL HPI/OVERNIGHT EVENTS:      ******************************* REVIEW OF SYSTEMS:**********************************************  Feeling better with improved mental status     All other review of systems negative    *********************** VITALS ******************************************    T(F): 98.2 (11-24-24 @ 05:16)  HR: 79 (11-24-24 @ 05:16) (73 - 79)  BP: 101/68 (11-24-24 @ 05:16) (101/68 - 167/60)  RR: 18 (11-24-24 @ 05:16) (18 - 18)  SpO2: 97% (11-24-24 @ 05:16) (94% - 97%)            ******************************** PHYSICAL EXAM:**************************************************  GENERAL: NAD    PSYCH: no agitation, ?baseline mentation  HEENT:     NERVOUS SYSTEM:  Alert & Oriented X1-2  PULMONARY: MACHO, CTA    CARDIOVASCULAR: S1S2 RRR    GI: Soft, NT, ND; BS present.    EXTREMITIES:  2+ Peripheral Pulses, No clubbing, cyanosis, or edema    LYMPH: No lymphadenopathy noted    SKIN: No rashes or lesions      **************************** LABS *******************************************************                          12.0   7.60  )-----------( 238      ( 24 Nov 2024 05:24 )             35.7     11-24    135  |  96[L]  |  18  ----------------------------<  95  5.0   |  25  |  1.4    Ca    9.0      24 Nov 2024 05:24  Phos  3.3     11-24  Mg     2.0     11-24    TPro  6.8  /  Alb  3.9  /  TBili  <0.2  /  DBili  x   /  AST  17  /  ALT  7   /  AlkPhos  91  11-24      Urinalysis Basic - ( 24 Nov 2024 05:24 )    Color: x / Appearance: x / SG: x / pH: x  Gluc: 95 mg/dL / Ketone: x  / Bili: x / Urobili: x   Blood: x / Protein: x / Nitrite: x   Leuk Esterase: x / RBC: x / WBC x   Sq Epi: x / Non Sq Epi: x / Bacteria: x        Lactate Trend  11-23 @ 06:19 Lactate:1.9   11-22 @ 11:53 Lactate:1.4         CAPILLARY BLOOD GLUCOSE      POCT Blood Glucose.: 99 mg/dL (24 Nov 2024 07:47)          **************************Active Medications *******************************************  No Known Allergies      cefepime   IVPB      cefepime   IVPB 2000 milliGRAM(s) IV Intermittent every 12 hours  chlorhexidine 2% Cloths 1 Application(s) Topical <User Schedule>  dextrose 5%. 1000 milliLiter(s) IV Continuous <Continuous>  dextrose 5%. 1000 milliLiter(s) IV Continuous <Continuous>  dextrose 50% Injectable 25 Gram(s) IV Push once  dextrose 50% Injectable 12.5 Gram(s) IV Push once  dextrose 50% Injectable 25 Gram(s) IV Push once  dextrose Oral Gel 15 Gram(s) Oral once PRN  enoxaparin Injectable 40 milliGRAM(s) SubCutaneous every 24 hours  gabapentin 300 milliGRAM(s) Oral two times a day  glucagon  Injectable 1 milliGRAM(s) IntraMuscular once  insulin lispro (ADMELOG) corrective regimen sliding scale   SubCutaneous three times a day before meals  lisinopril 10 milliGRAM(s) Oral daily  metoprolol tartrate 25 milliGRAM(s) Oral daily  multivitamin 1 Tablet(s) Oral daily  OLANZapine 10 milliGRAM(s) Oral daily  pantoprazole    Tablet 40 milliGRAM(s) Oral before breakfast  tamsulosin 0.4 milliGRAM(s) Oral at bedtime  traZODone 50 milliGRAM(s) Oral at bedtime      ***************************************************  RADIOLOGY & ADDITIONAL TESTS:    Imaging Personally Reviewed:  [ ] YES  [ ] NO    HEALTH ISSUES - PROBLEM Dx:      
  ADA VILLAGOMEZ  84y  Male      Patient is a 84y old  Male who presents with a chief complaint of sepsis     INTERVAL HPI/OVERNIGHT EVENTS:      ******************************* REVIEW OF SYSTEMS:**********************************************  Feeling better   All other review of systems negative    *********************** VITALS ******************************************    T(F): 98.1 (11-26-24 @ 04:56)  HR: 99 (11-26-24 @ 04:56) (84 - 99)  BP: 182/72 (11-26-24 @ 04:56) (115/55 - 182/72)  RR: 18 (11-26-24 @ 04:56) (18 - 18)  SpO2: 97% (11-26-24 @ 04:56) (97% - 98%)            ******************************** PHYSICAL EXAM:**************************************************  GENERAL: NAD    PSYCH: no agitation, baseline mentation  HEENT:     NERVOUS SYSTEM:  Alert & Oriented X 2   PULMONARY: MACHO, CTA    CARDIOVASCULAR: S1S2 RRR    GI: Soft, NT, ND; BS present.    EXTREMITIES:  2+ Peripheral Pulses, No clubbing, cyanosis, or edema    LYMPH: No lymphadenopathy noted    SKIN: No rashes or lesions      **************************** LABS *******************************************************                          11.5   7.28  )-----------( 248      ( 26 Nov 2024 05:30 )             33.9     11-26    134[L]  |  99  |  31[H]  ----------------------------<  100[H]  5.3[H]   |  24  |  1.3    Ca    9.1      26 Nov 2024 05:30  Phos  4.1     11-25  Mg     2.1     11-26    TPro  6.6  /  Alb  4.0  /  TBili  <0.2  /  DBili  x   /  AST  15  /  ALT  8   /  AlkPhos  86  11-26      Urinalysis Basic - ( 26 Nov 2024 05:30 )    Color: x / Appearance: x / SG: x / pH: x  Gluc: 100 mg/dL / Ketone: x  / Bili: x / Urobili: x   Blood: x / Protein: x / Nitrite: x   Leuk Esterase: x / RBC: x / WBC x   Sq Epi: x / Non Sq Epi: x / Bacteria: x        Lactate Trend  11-23 @ 06:19 Lactate:1.9   11-22 @ 11:53 Lactate:1.4         CAPILLARY BLOOD GLUCOSE      POCT Blood Glucose.: 113 mg/dL (26 Nov 2024 11:19)          **************************Active Medications *******************************************  No Known Allergies      amoxicillin  875 milliGRAM(s)/clavulanate 1 Tablet(s) Oral every 12 hours  chlorhexidine 2% Cloths 1 Application(s) Topical <User Schedule>  dextrose 5%. 1000 milliLiter(s) IV Continuous <Continuous>  dextrose 5%. 1000 milliLiter(s) IV Continuous <Continuous>  dextrose 50% Injectable 25 Gram(s) IV Push once  dextrose 50% Injectable 12.5 Gram(s) IV Push once  dextrose 50% Injectable 25 Gram(s) IV Push once  dextrose Oral Gel 15 Gram(s) Oral once PRN  enoxaparin Injectable 40 milliGRAM(s) SubCutaneous every 24 hours  gabapentin 300 milliGRAM(s) Oral two times a day  glucagon  Injectable 1 milliGRAM(s) IntraMuscular once  insulin lispro (ADMELOG) corrective regimen sliding scale   SubCutaneous three times a day before meals  lisinopril 10 milliGRAM(s) Oral daily  metoprolol tartrate 25 milliGRAM(s) Oral daily  multivitamin 1 Tablet(s) Oral daily  OLANZapine 10 milliGRAM(s) Oral daily  pantoprazole    Tablet 40 milliGRAM(s) Oral before breakfast  tamsulosin 0.4 milliGRAM(s) Oral at bedtime  traZODone 50 milliGRAM(s) Oral at bedtime      ***************************************************  RADIOLOGY & ADDITIONAL TESTS:    Imaging Personally Reviewed:  [ ] YES  [ ] NO    HEALTH ISSUES - PROBLEM Dx:      
  ADA VILLAGOMEZ  84y  Male      Patient is a 84y old  Male who presents with a chief complaint of sepsis.      INTERVAL HPI/OVERNIGHT EVENTS:      ******************************* REVIEW OF SYSTEMS:**********************************************    All other review of systems negative    *********************** VITALS ******************************************    T(F): 97.6 (11-25-24 @ 05:39)  HR: 81 (11-25-24 @ 05:39) (81 - 92)  BP: 108/72 (11-25-24 @ 05:39) (108/72 - 168/73)  RR: 18 (11-25-24 @ 05:39) (16 - 18)  SpO2: 97% (11-25-24 @ 05:39) (95% - 98%)            ******************************** PHYSICAL EXAM:**************************************************  GENERAL: NAD    PSYCH: no agitation, baseline mentation  HEENT:     NERVOUS SYSTEM:  Alert & Oriented x 2-3     PULMONARY: MACHO, CTA    CARDIOVASCULAR: S1S2 RRR    GI: Soft, NT, ND; BS present.    EXTREMITIES:  2+ Peripheral Pulses, No clubbing, cyanosis, or edema    LYMPH: No lymphadenopathy noted    SKIN: No rashes or lesions      **************************** LABS *******************************************************                          11.9   7.24  )-----------( 243      ( 25 Nov 2024 05:17 )             35.3     11-25    132[L]  |  97[L]  |  27[H]  ----------------------------<  92  4.6   |  25  |  1.4    Ca    9.0      25 Nov 2024 05:17  Phos  4.1     11-25  Mg     2.0     11-25    TPro  6.7  /  Alb  3.9  /  TBili  <0.2  /  DBili  x   /  AST  14  /  ALT  7   /  AlkPhos  88  11-25      Urinalysis Basic - ( 25 Nov 2024 05:17 )    Color: x / Appearance: x / SG: x / pH: x  Gluc: 92 mg/dL / Ketone: x  / Bili: x / Urobili: x   Blood: x / Protein: x / Nitrite: x   Leuk Esterase: x / RBC: x / WBC x   Sq Epi: x / Non Sq Epi: x / Bacteria: x        Lactate Trend  11-23 @ 06:19 Lactate:1.9   11-22 @ 11:53 Lactate:1.4         CAPILLARY BLOOD GLUCOSE      POCT Blood Glucose.: 90 mg/dL (25 Nov 2024 11:37)          **************************Active Medications *******************************************  No Known Allergies      cefepime   IVPB      cefepime   IVPB 2000 milliGRAM(s) IV Intermittent every 12 hours  chlorhexidine 2% Cloths 1 Application(s) Topical <User Schedule>  dextrose 5%. 1000 milliLiter(s) IV Continuous <Continuous>  dextrose 5%. 1000 milliLiter(s) IV Continuous <Continuous>  dextrose 50% Injectable 25 Gram(s) IV Push once  dextrose 50% Injectable 12.5 Gram(s) IV Push once  dextrose 50% Injectable 25 Gram(s) IV Push once  dextrose Oral Gel 15 Gram(s) Oral once PRN  enoxaparin Injectable 40 milliGRAM(s) SubCutaneous every 24 hours  gabapentin 300 milliGRAM(s) Oral two times a day  glucagon  Injectable 1 milliGRAM(s) IntraMuscular once  insulin lispro (ADMELOG) corrective regimen sliding scale   SubCutaneous three times a day before meals  lisinopril 10 milliGRAM(s) Oral daily  metoprolol tartrate 25 milliGRAM(s) Oral daily  multivitamin 1 Tablet(s) Oral daily  OLANZapine 10 milliGRAM(s) Oral daily  pantoprazole    Tablet 40 milliGRAM(s) Oral before breakfast  tamsulosin 0.4 milliGRAM(s) Oral at bedtime  traZODone 50 milliGRAM(s) Oral at bedtime      ***************************************************  RADIOLOGY & ADDITIONAL TESTS:    Imaging Personally Reviewed:  [ ] YES  [ ] NO    HEALTH ISSUES - PROBLEM Dx:      
  ADA VILLAGOMEZ  84y  Male      Patient is a 84y old  Male who presents with a chief complaint of weakness and fever     INTERVAL HPI/OVERNIGHT EVENTS:      ******************************* REVIEW OF SYSTEMS:**********************************************    All other review of systems negative    *********************** VITALS ******************************************    T(F): 98.2 (11-22-24 @ 05:00)  HR: 76 (11-22-24 @ 05:00) (76 - 105)  BP: 169/71 (11-22-24 @ 05:00) (155/70 - 169/71)  RR: 17 (11-22-24 @ 05:00) (17 - 18)  SpO2: 97% (11-22-24 @ 05:00) (97% - 98%)            ******************************** PHYSICAL EXAM:**************************************************  GENERAL: NAD    PSYCH: no agitation, baseline mentation  HEENT:     NERVOUS SYSTEM:  Alert & Oriented X3,   PULMONARY: MACHO, CTA    CARDIOVASCULAR: S1S2 RRR    GI: Soft, NT, ND; BS present.    EXTREMITIES:  2+ Peripheral Pulses, No clubbing, cyanosis, or edema    LYMPH: No lymphadenopathy noted    SKIN: No rashes or lesions      **************************** LABS *******************************************************                          11.1   14.64 )-----------( 216      ( 22 Nov 2024 11:53 )             32.9     11-22    133[L]  |  97[L]  |  15  ----------------------------<  91  5.1[H]   |  27  |  1.2    Ca    8.9      22 Nov 2024 11:53  Phos  2.9     11-22  Mg     1.9     11-22    TPro  6.4  /  Alb  3.8  /  TBili  0.4  /  DBili  x   /  AST  18  /  ALT  7   /  AlkPhos  83  11-22      Urinalysis Basic - ( 22 Nov 2024 11:53 )    Color: x / Appearance: x / SG: x / pH: x  Gluc: 91 mg/dL / Ketone: x  / Bili: x / Urobili: x   Blood: x / Protein: x / Nitrite: x   Leuk Esterase: x / RBC: x / WBC x   Sq Epi: x / Non Sq Epi: x / Bacteria: x        Lactate Trend  11-22 @ 11:53 Lactate:1.4         CAPILLARY BLOOD GLUCOSE      POCT Blood Glucose.: 102 mg/dL (22 Nov 2024 12:05)          **************************Active Medications *******************************************  No Known Allergies      chlorhexidine 2% Cloths 1 Application(s) Topical <User Schedule>  dextrose 5%. 1000 milliLiter(s) IV Continuous <Continuous>  dextrose 5%. 1000 milliLiter(s) IV Continuous <Continuous>  dextrose 50% Injectable 25 Gram(s) IV Push once  dextrose 50% Injectable 12.5 Gram(s) IV Push once  dextrose 50% Injectable 25 Gram(s) IV Push once  dextrose Oral Gel 15 Gram(s) Oral once PRN  enoxaparin Injectable 40 milliGRAM(s) SubCutaneous every 24 hours  gabapentin 300 milliGRAM(s) Oral two times a day  glucagon  Injectable 1 milliGRAM(s) IntraMuscular once  insulin lispro (ADMELOG) corrective regimen sliding scale   SubCutaneous three times a day before meals  lisinopril 10 milliGRAM(s) Oral daily  meropenem  IVPB 1000 milliGRAM(s) IV Intermittent every 8 hours  metoprolol tartrate 25 milliGRAM(s) Oral daily  multivitamin 1 Tablet(s) Oral daily  OLANZapine 10 milliGRAM(s) Oral daily  pantoprazole    Tablet 40 milliGRAM(s) Oral before breakfast  tamsulosin 0.4 milliGRAM(s) Oral at bedtime  traZODone 50 milliGRAM(s) Oral at bedtime      ***************************************************  RADIOLOGY & ADDITIONAL TESTS:    Imaging Personally Reviewed:  [ ] YES  [ ] NO    HEALTH ISSUES - PROBLEM Dx:      
ADA VILLAGOMEZ  84y, Male  Allergy: No Known Allergies      LOS  3d    CHIEF COMPLAINT: sepsis (25 Nov 2024 11:35)      INTERVAL EVENTS/HPI  - No acute events overnight  - T(F): , Max: 97.7 (11-25-24 @ 13:00)  - Denies any worsening symptoms  - Tolerating medication  - WBC Count: 7.24 (11-25-24 @ 05:17)  WBC Count: 7.60 (11-24-24 @ 05:24)     - Creatinine: 1.4 (11-25-24 @ 05:17)  Creatinine: 1.4 (11-24-24 @ 05:24)       ROS  General: Denies rigors, nightsweats  HEENT: Denies headache, rhinorrhea, sore throat, eye pain  CV: Denies CP, palpitations  PULM: Denies wheezing, hemoptysis  GI: Denies hematemesis, hematochezia, melena  : Denies discharge, hematuria  MSK: Denies arthralgias, myalgias  SKIN: Denies rash, lesions  NEURO: Denies paresthesias, weakness  PSYCH: Denies depression, anxiety    VITALS:  T(F): 97.7, Max: 97.7 (11-25-24 @ 13:00)  HR: 84  BP: 154/71  RR: 18Vital Signs Last 24 Hrs  T(C): 36.5 (25 Nov 2024 13:00), Max: 36.5 (25 Nov 2024 13:00)  T(F): 97.7 (25 Nov 2024 13:00), Max: 97.7 (25 Nov 2024 13:00)  HR: 84 (25 Nov 2024 13:00) (81 - 85)  BP: 154/71 (25 Nov 2024 13:00) (108/72 - 168/73)  BP(mean): --  RR: 18 (25 Nov 2024 13:00) (17 - 18)  SpO2: 98% (25 Nov 2024 13:00) (97% - 98%)        PHYSICAL EXAM:  Gen: NAD, resting in bed chronically ill appearing   HEENT: Normocephalic, atraumatic  Neck: supple, no lymphadenopathy  CV: Regular rate & regular rhythm  Lungs: decreased BS at bases, no fremitus  Abdomen: Soft, BS present  Ext: Warm, well perfused  Neuro: non focal, awake  Skin: no rash, no erythema  Lines: no phlebitis    FH: Non-contributory  Social Hx: Non-contributory    TESTS & MEASUREMENTS:                        11.9   7.24  )-----------( 243      ( 25 Nov 2024 05:17 )             35.3     11-25    132[L]  |  97[L]  |  27[H]  ----------------------------<  92  4.6   |  25  |  1.4    Ca    9.0      25 Nov 2024 05:17  Phos  4.1     11-25  Mg     2.0     11-25    TPro  6.7  /  Alb  3.9  /  TBili  <0.2  /  DBili  x   /  AST  14  /  ALT  7   /  AlkPhos  88  11-25      LIVER FUNCTIONS - ( 25 Nov 2024 05:17 )  Alb: 3.9 g/dL / Pro: 6.7 g/dL / ALK PHOS: 88 U/L / ALT: 7 U/L / AST: 14 U/L / GGT: x           Urinalysis Basic - ( 25 Nov 2024 05:17 )    Color: x / Appearance: x / SG: x / pH: x  Gluc: 92 mg/dL / Ketone: x  / Bili: x / Urobili: x   Blood: x / Protein: x / Nitrite: x   Leuk Esterase: x / RBC: x / WBC x   Sq Epi: x / Non Sq Epi: x / Bacteria: x        Culture - Urine (collected 11-23-24 @ 13:05)  Source: Clean Catch Clean Catch (Midstream)  Final Report (11-24-24 @ 15:45):    <10,000 CFU/mL Normal Urogenital Janey    Culture - Blood (collected 11-21-24 @ 22:44)  Source: .Blood BLOOD  Preliminary Report (11-25-24 @ 09:00):    No growth at 72 Hours    Culture - Blood (collected 11-21-24 @ 22:44)  Source: .Blood BLOOD  Preliminary Report (11-25-24 @ 09:00):    No growth at 72 Hours        Lactate, Blood: 1.9 mmol/L (11-23-24 @ 06:19)  Lactate, Blood: 1.4 mmol/L (11-22-24 @ 11:53)  Blood Gas Venous - Lactate: 3.3 mmol/L (11-21-24 @ 23:07)      INFECTIOUS DISEASES TESTING  Streptococcus pneumoniae Ag, Ur Result: Negative (11-23-24 @ 13:05)  Procalcitonin: 0.09 (11-23-24 @ 06:19)  Procalcitonin: 0.11 (11-22-24 @ 13:42)  Rapid RVP Result: NotDetec (11-22-24 @ 02:23)      INFLAMMATORY MARKERS      RADIOLOGY & ADDITIONAL TESTS:  I have personally reviewed the last available Chest xray  CXR      CT      CARDIOLOGY TESTING  12 Lead ECG:   Ventricular Rate 113 BPM    Atrial Rate 113 BPM    P-R Interval 178 ms    QRS Duration 86 ms    Q-T Interval 326 ms    QTC Calculation(Bazett) 447 ms    P Axis 44 degrees    R Axis 12 degrees    T Axis 63 degrees    Diagnosis Line Sinus tachycardia  Otherwise normal ECG    Confirmed by TERESA WRIGHT, [facility] ([id number]) on 11/21/2024 11:45:47 PM (11-21-24 @ 22:26)      MEDICATIONS  cefepime   IVPB     cefepime   IVPB 2000 IV Intermittent every 12 hours  chlorhexidine 2% Cloths 1 Topical <User Schedule>  dextrose 5%. 1000 IV Continuous <Continuous>  dextrose 5%. 1000 IV Continuous <Continuous>  dextrose 50% Injectable 25 IV Push once  dextrose 50% Injectable 12.5 IV Push once  dextrose 50% Injectable 25 IV Push once  enoxaparin Injectable 40 SubCutaneous every 24 hours  gabapentin 300 Oral two times a day  glucagon  Injectable 1 IntraMuscular once  insulin lispro (ADMELOG) corrective regimen sliding scale  SubCutaneous three times a day before meals  lisinopril 10 Oral daily  metoprolol tartrate 25 Oral daily  multivitamin 1 Oral daily  OLANZapine 10 Oral daily  pantoprazole    Tablet 40 Oral before breakfast  tamsulosin 0.4 Oral at bedtime  traZODone 50 Oral at bedtime      WEIGHT  Weight (kg): 68.946 (11-22-24 @ 05:00)  Creatinine: 1.4 mg/dL (11-25-24 @ 05:17)      ANTIBIOTICS:  cefepime   IVPB      cefepime   IVPB 2000 milliGRAM(s) IV Intermittent every 12 hours      All available historical records have been reviewed      
SUBJECTIVE/OVERNIGHT EVENTS  Today is hospital day . This morning patient was seen and examined at bedside, resting comfortably in bed. No acute or major events overnight.    HOSPITAL COURSE      CODE STATUS:    FAMILY COMMUNICATION  Contact date:  Name of person contacted:  Relationship to patient:  Communication details:    MEDICATIONS  STANDING MEDICATIONS  chlorhexidine 2% Cloths 1 Application(s) Topical <User Schedule>  dextrose 5%. 1000 milliLiter(s) IV Continuous <Continuous>  dextrose 5%. 1000 milliLiter(s) IV Continuous <Continuous>  dextrose 50% Injectable 25 Gram(s) IV Push once  dextrose 50% Injectable 12.5 Gram(s) IV Push once  dextrose 50% Injectable 25 Gram(s) IV Push once  enoxaparin Injectable 40 milliGRAM(s) SubCutaneous every 24 hours  glucagon  Injectable 1 milliGRAM(s) IntraMuscular once  insulin lispro (ADMELOG) corrective regimen sliding scale   SubCutaneous three times a day before meals  meropenem  IVPB 1000 milliGRAM(s) IV Intermittent every 8 hours  pantoprazole    Tablet 40 milliGRAM(s) Oral before breakfast    PRN MEDICATIONS  dextrose Oral Gel 15 Gram(s) Oral once PRN    VITALS  T(F): 98.2 (11-22-24 @ 05:00), Max: 101.7 (11-21-24 @ 21:48)  HR: 76 (11-22-24 @ 05:00) (76 - 105)  BP: 169/71 (11-22-24 @ 05:00) (155/70 - 169/71)  RR: 17 (11-22-24 @ 05:00) (17 - 18)  SpO2: 97% (11-22-24 @ 05:00) (97% - 98%)    PHYSICAL EXAM  GENERAL  (x  ) NAD, lying in bed comfortably     (  ) obtunded     (  ) lethargic     (  ) somnolent    HEAD  (x  ) Atraumatic     (  ) hematoma     (  ) laceration (specify location:       )     NECK  ( x ) Supple     (  ) neck stiffness     (  ) nuchal rigidity     (  )  no JVD     (  ) JVD present ( -- cm)    HEART  Rate -->  ( x ) normal rate    (  ) bradycardic    (  ) tachycardic  Rhythm -->  (  ) regular    (  ) regularly irregular    (  ) irregularly irregular  Murmurs -->  (  ) normal s1/s2    (  ) systolic murmur    (  ) diastolic murmur    (  ) continuous murmur     (  ) S3 present    (  ) S4 present    LUNGS  (  x)Unlabored respirations     (  ) tachypnea  (  ) B/L air entry     (  ) decreased breath sounds in:  (location     )    (  ) no adventitious sound     (  ) crackles     (  ) wheezing      (  ) rhonchi      (specify location:       )  (  ) chest wall tenderness (specify location:       )    ABDOMEN  (  x) Soft     (  ) tense   |   (  ) nondistended     (  ) distended   |   (  ) +BS     (  ) hypoactive bowel sounds     (  ) hyperactive bowel sounds  (  ) nontender     (  ) RUQ tenderness     (  ) RLQ tenderness     (  ) LLQ tenderness     (  ) epigastric tenderness     (  ) diffuse tenderness  (  ) Splenomegaly      (  ) Hepatomegaly      (  ) Jaundice     (  ) ecchymosis     EXTREMITIES  ( x ) Normal     (  ) Rash     (  ) ecchymosis     (  ) varicose veins      (  ) pitting edema     (  ) non-pitting edema   (  ) ulceration     (  ) gangrene:     (location:     )    NERVOUS SYSTEM  (  ) A&Ox3     (x  ) confused     (  ) lethargic  CN II-XII:     (  ) Intact     (  ) focal deficits  (Specify:     )   Upper extremities:     (  ) strength X/5     (  ) focal deficit (specify:    )  Lower extremities:     (  ) strength  X/5    (  ) focal deficit (specify:    )    SKIN  ( x ) No rashes or lesions     (  ) maculopapular rash     (  ) pustules     (  ) vesicles     (  ) ulcer     (  ) ecchymosis     (specify location:     )    (  ) Indwelling Cobos Catheter   Date insterted:    Reason (  ) Critical illness     (  ) urinary retention    (  ) Accurate Ins/Outs Monitoring     (  ) CMO patient    (  ) Central Line  Date inserted:  Location: (  ) Right IJ   (  ) Left IJ   (  ) Right Fem   (  ) Left Fem    (  ) SPC  (  ) pigtail  (  ) PEG tube  (  ) colostomy  (  ) jejunostomy  (  ) U-Dall    LABS             10.6   16.07 )-----------( 208      ( 11-21-24 @ 22:44 )             31.2     128  |  92  |  17  -------------------------<  132   11-21-24 @ 22:44  5.1  |  22  |  1.2    Ca      9.1     11-21-24 @ 22:44    TPro  6.8  /  Alb  4.0  /  TBili  <0.2  /  DBili  x   /  AST  22  /  ALT  8   /  AlkPhos  96  /  GGT  x     11-21-24 @ 22:44        Urinalysis Basic - ( 21 Nov 2024 22:44 )    Color: x / Appearance: x / SG: x / pH: x  Gluc: 132 mg/dL / Ketone: x  / Bili: x / Urobili: x   Blood: x / Protein: x / Nitrite: x   Leuk Esterase: x / RBC: x / WBC x   Sq Epi: x / Non Sq Epi: x / Bacteria: x          IMAGING
SUBJECTIVE/OVERNIGHT EVENTS  Today is hospital day 2d. This morning patient was seen and examined at bedside, resting comfortably in bed. No acute or major events overnight.    HOSPITAL COURSE  Day 1:   Day 2:   Day 3:     CODE STATUS:    FAMILY COMMUNICATION  Contact date:  Name of person contacted:  Relationship to patient:  Communication details:    MEDICATIONS  STANDING MEDICATIONS  cefepime   IVPB      cefepime   IVPB 2000 milliGRAM(s) IV Intermittent every 12 hours  chlorhexidine 2% Cloths 1 Application(s) Topical <User Schedule>  dextrose 5%. 1000 milliLiter(s) IV Continuous <Continuous>  dextrose 5%. 1000 milliLiter(s) IV Continuous <Continuous>  dextrose 50% Injectable 25 Gram(s) IV Push once  dextrose 50% Injectable 12.5 Gram(s) IV Push once  dextrose 50% Injectable 25 Gram(s) IV Push once  enoxaparin Injectable 40 milliGRAM(s) SubCutaneous every 24 hours  gabapentin 300 milliGRAM(s) Oral two times a day  glucagon  Injectable 1 milliGRAM(s) IntraMuscular once  insulin lispro (ADMELOG) corrective regimen sliding scale   SubCutaneous three times a day before meals  lisinopril 10 milliGRAM(s) Oral daily  metoprolol tartrate 25 milliGRAM(s) Oral daily  multivitamin 1 Tablet(s) Oral daily  OLANZapine 10 milliGRAM(s) Oral daily  pantoprazole    Tablet 40 milliGRAM(s) Oral before breakfast  tamsulosin 0.4 milliGRAM(s) Oral at bedtime  traZODone 50 milliGRAM(s) Oral at bedtime    PRN MEDICATIONS  dextrose Oral Gel 15 Gram(s) Oral once PRN    VITALS  T(F): 97.9 (24 @ 19:41), Max: 98 (24 @ 04:24)  HR: 74 (24 @ 19:41) (73 - 83)  BP: 167/60 (24 @ 19:41) (156/67 - 175/72)  RR: 18 (24 @ 19:41) (18 - 18)  SpO2: 94% (24 @ 19:41) (94% - 96%)  POCT Blood Glucose.: 95 mg/dL (24 @ 21:13)  POCT Blood Glucose.: 160 mg/dL (24 @ 16:43)  POCT Blood Glucose.: 88 mg/dL (24 @ 12:04)  POCT Blood Glucose.: 148 mg/dL (24 @ 07:35)    PHYSICAL EXAM  GENERAL  (  ) NAD, lying in bed comfortably     (  ) obtunded     (  ) lethargic     (  ) somnolent    HEAD  (  ) Atraumatic     (  ) hematoma     (  ) laceration (specify location:       )     NECK  (  ) Supple     (  ) neck stiffness     (  ) nuchal rigidity     (  )  no JVD     (  ) JVD present ( -- cm)    HEART  Rate -->  (  ) normal rate    (  ) bradycardic    (  ) tachycardic  Rhythm -->  (  ) regular    (  ) regularly irregular    (  ) irregularly irregular  Murmurs -->  (  ) normal s1/s2    (  ) systolic murmur    (  ) diastolic murmur    (  ) continuous murmur     (  ) S3 present    (  ) S4 present    LUNGS  (  )Unlabored respirations     (  ) tachypnea  (  ) B/L air entry     (  ) decreased breath sounds in:  (location     )    (  ) no adventitious sound     (  ) crackles     (  ) wheezing      (  ) rhonchi      (specify location:       )  (  ) chest wall tenderness (specify location:       )    ABDOMEN  (  ) Soft     (  ) tense   |   (  ) nondistended     (  ) distended   |   (  ) +BS     (  ) hypoactive bowel sounds     (  ) hyperactive bowel sounds  (  ) nontender     (  ) RUQ tenderness     (  ) RLQ tenderness     (  ) LLQ tenderness     (  ) epigastric tenderness     (  ) diffuse tenderness  (  ) Splenomegaly      (  ) Hepatomegaly      (  ) Jaundice     (  ) ecchymosis     EXTREMITIES  (  ) Normal     (  ) Rash     (  ) ecchymosis     (  ) varicose veins      (  ) pitting edema     (  ) non-pitting edema   (  ) ulceration     (  ) gangrene:     (location:     )    NERVOUS SYSTEM  (  ) A&Ox3     (  ) confused     (  ) lethargic  CN II-XII:     (  ) Intact     (  ) focal deficits  (Specify:     )   Upper extremities:     (  ) strength X/5     (  ) focal deficit (specify:    )  Lower extremities:     (  ) strength  X/5    (  ) focal deficit (specify:    )    SKIN  (  ) No rashes or lesions     (  ) maculopapular rash     (  ) pustules     (  ) vesicles     (  ) ulcer     (  ) ecchymosis     (specify location:     )    (  ) Indwelling Cobos Catheter   Date insterted:    Reason (  ) Critical illness     (  ) urinary retention    (  ) Accurate Ins/Outs Monitoring     (  ) CMO patient    (  ) Central Line  Date inserted:  Location: (  ) Right IJ   (  ) Left IJ   (  ) Right Fem   (  ) Left Fem    (  ) SPC  (  ) pigtail  (  ) PEG tube  (  ) colostomy  (  ) jejunostomy  (  ) U-Dall    LABS             10.8   9.13  )-----------( 208      ( 24 @ 06:19 )             31.9     130  |  94  |  13  -------------------------<  114   24 @ 06:19  4.9  |  26  |  1.2    Ca      8.8     24 @ 06:19  Phos   2.8     24 @ 06:19  Mg     1.8     24 @ 06:19    TPro  6.1  /  Alb  3.5  /  TBili  0.2  /  DBili  x   /  AST  18  /  ALT  7   /  AlkPhos  71  /  GGT  x     24 @ 06:19        Urinalysis Basic - ( 2024 13:05 )    Color: Yellow / Appearance: Cloudy / S.017 / pH: x  Gluc: x / Ketone: Negative mg/dL  / Bili: Negative / Urobili: 1.0 mg/dL   Blood: x / Protein: 30 mg/dL / Nitrite: Negative   Leuk Esterase: Large / RBC: 1 /HPF /  /HPF   Sq Epi: x / Non Sq Epi: 0 /HPF / Bacteria: Occasional /HPF          Culture - Blood (collected 2024 22:44)  Source: .Blood BLOOD  Preliminary Report (2024 09:00):    No growth at 24 hours    Culture - Blood (collected 2024 22:44)  Source: .Blood BLOOD  Preliminary Report (2024 09:00):    No growth at 24 hours      IMAGING
SUBJECTIVE/OVERNIGHT EVENTS  Today is hospital day 3d. This morning patient was seen and examined at bedside, resting comfortably in bed. No acute or major events overnight.    MEDICATIONS  STANDING MEDICATIONS  cefepime   IVPB      cefepime   IVPB 2000 milliGRAM(s) IV Intermittent every 12 hours  chlorhexidine 2% Cloths 1 Application(s) Topical <User Schedule>  dextrose 5%. 1000 milliLiter(s) IV Continuous <Continuous>  dextrose 5%. 1000 milliLiter(s) IV Continuous <Continuous>  dextrose 50% Injectable 25 Gram(s) IV Push once  dextrose 50% Injectable 12.5 Gram(s) IV Push once  dextrose 50% Injectable 25 Gram(s) IV Push once  enoxaparin Injectable 40 milliGRAM(s) SubCutaneous every 24 hours  gabapentin 300 milliGRAM(s) Oral two times a day  glucagon  Injectable 1 milliGRAM(s) IntraMuscular once  insulin lispro (ADMELOG) corrective regimen sliding scale   SubCutaneous three times a day before meals  lisinopril 10 milliGRAM(s) Oral daily  metoprolol tartrate 25 milliGRAM(s) Oral daily  multivitamin 1 Tablet(s) Oral daily  OLANZapine 10 milliGRAM(s) Oral daily  pantoprazole    Tablet 40 milliGRAM(s) Oral before breakfast  tamsulosin 0.4 milliGRAM(s) Oral at bedtime  traZODone 50 milliGRAM(s) Oral at bedtime    PRN MEDICATIONS  dextrose Oral Gel 15 Gram(s) Oral once PRN    VITALS  T(F): 97.6 (11-25-24 @ 05:39), Max: 97.7 (11-24-24 @ 12:00)  HR: 81 (11-25-24 @ 05:39) (81 - 92)  BP: 108/72 (11-25-24 @ 05:39) (108/72 - 168/73)  RR: 18 (11-25-24 @ 05:39) (16 - 18)  SpO2: 97% (11-25-24 @ 05:39) (95% - 98%)  POCT Blood Glucose.: 100 mg/dL (11-25-24 @ 08:26)  POCT Blood Glucose.: 78 mg/dL (11-25-24 @ 08:16)  POCT Blood Glucose.: 114 mg/dL (11-24-24 @ 21:11)  POCT Blood Glucose.: 108 mg/dL (11-24-24 @ 16:43)    PHYSICAL EXAM  GENERAL  (  x) NAD, lying in bed comfortably     (  ) obtunded     (  ) lethargic     (  ) somnolent    HEAD  (x  ) Atraumatic     (  ) hematoma     (  ) laceration (specify location:       )     NECK  ( x ) Supple     (  ) neck stiffness     (  ) nuchal rigidity     (  )  no JVD     (  ) JVD present ( -- cm)    HEART  Rate -->  (x ) normal rate    (  ) bradycardic    (  ) tachycardic  Rhythm -->  (  ) regular    (  ) regularly irregular    (  ) irregularly irregular  Murmurs -->  (  ) normal s1/s2    (  ) systolic murmur    (  ) diastolic murmur    (  ) continuous murmur     (  ) S3 present    (  ) S4 present    LUNGS  ( x )Unlabored respirations     (  ) tachypnea  (  ) B/L air entry     (  ) decreased breath sounds in:  (location     )    (  ) no adventitious sound     (  ) crackles     (  ) wheezing      (  ) rhonchi      (specify location:       )  (  ) chest wall tenderness (specify location:       )    ABDOMEN  ( x ) Soft     (  ) tense   |   (  ) nondistended     (  ) distended   |   (  ) +BS     (  ) hypoactive bowel sounds     (  ) hyperactive bowel sounds  (  ) nontender     (  ) RUQ tenderness     (  ) RLQ tenderness     (  ) LLQ tenderness     (  ) epigastric tenderness     (  ) diffuse tenderness  (  ) Splenomegaly      (  ) Hepatomegaly      (  ) Jaundice     (  ) ecchymosis     EXTREMITIES  (x  ) Normal     (  ) Rash     (  ) ecchymosis     (  ) varicose veins      (  ) pitting edema     (  ) non-pitting edema   (  ) ulceration     (  ) gangrene:     (location:     )    NERVOUS SYSTEM  (  ) A&Ox3     ( x ) confused     (  ) lethargic  CN II-XII:     (  ) Intact     (  ) focal deficits  (Specify:     )   Upper extremities:     (  ) strength X/5     (  ) focal deficit (specify:    )  Lower extremities:     (  ) strength  X/5    (  ) focal deficit (specify:    )    SKIN  ( x ) No rashes or lesions     (  ) maculopapular rash     (  ) pustules     (  ) vesicles     (  ) ulcer     (  ) ecchymosis     (specify location:     )    (  ) Indwelling Cobos Catheter   Date insterted:    Reason (  ) Critical illness     (  ) urinary retention    (  ) Accurate Ins/Outs Monitoring     (  ) CMO patient    (  ) Central Line  Date inserted:  Location: (  ) Right IJ   (  ) Left IJ   (  ) Right Fem   (  ) Left Fem    (  ) SPC  (  ) pigtail  (  ) PEG tube  (  ) colostomy  (  ) jejunostomy  (  ) U-Dall    LABS             11.9   7.24  )-----------( 243      ( 11-25-24 @ 05:17 )             35.3     132  |  97  |  27  -------------------------<  92   11-25-24 @ 05:17  4.6  |  25  |  1.4    Ca      9.0     11-25-24 @ 05:17  Phos   4.1     11-25-24 @ 05:17  Mg     2.0     11-25-24 @ 05:17    TPro  6.7  /  Alb  3.9  /  TBili  <0.2  /  DBili  x   /  AST  14  /  ALT  7   /  AlkPhos  88  /  GGT  x     11-25-24 @ 05:17        Urinalysis Basic - ( 25 Nov 2024 05:17 )    Color: x / Appearance: x / SG: x / pH: x  Gluc: 92 mg/dL / Ketone: x  / Bili: x / Urobili: x   Blood: x / Protein: x / Nitrite: x   Leuk Esterase: x / RBC: x / WBC x   Sq Epi: x / Non Sq Epi: x / Bacteria: x          Culture - Urine (collected 23 Nov 2024 13:05)  Source: Clean Catch Clean Catch (Midstream)  Final Report (24 Nov 2024 15:45):    <10,000 CFU/mL Normal Urogenital Janey      IMAGING

## 2024-11-26 NOTE — PROGRESS NOTE ADULT - ASSESSMENT
An 84-year-old male resident of [facility] ([place]) with a past medical history of hyperlipidemia (HLD), schizophrenia, chronic obstructive pulmonary disease (COPD), type 2 diabetes mellitus (DM2), non-Hodgkin's lymphoma, and hyponatremia, presented to the ED with complaints of weakness and fever. He is a poor historian and unable to provide many details. He reports a cough and shortness of breath developing over the past few days. The cough is productive in nature. He experiences mild dyspnea.    # Sepsis POA likely due to PNA   -In Ed patient was febrile with temp of  101.7 Degrees F  tachycardic to 105 ,  mildly tachypneic.    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   -f/u BCx >>ngtd   - procal 0.11  - RVP  > nEG     #chronic hyponatremia :   -asymptomatic   - Na 128  >> 130 >> 135   - dc NS IVF  -trend sodium on BMP     #BPH  -c/w home meds     # h/o COPD -stable, resumed on inhalers, nebs tx.     # DM 2 -SSI     # CAD/ HTN/ HLD  - resume home meds     #Incidental Findings (Liver Nodules & Kidney cyst/hypodensity)  - Outpatient workup and follow up    #Misc:   DVT Ppx:lovenox   GI Ppx:PPI   Diet: DASH   Activity : ATT     DC planning today.   plan d/w the sister over phone,   Dispo:

## 2024-11-26 NOTE — DISCHARGE NOTE NURSING/CASE MANAGEMENT/SOCIAL WORK - FINANCIAL ASSISTANCE
[facility] provides services at a reduced cost to those who are determined to be eligible through [facility]’s financial assistance program. Information regarding [facility]’s financial assistance program can be found by going to [email or web address] or by calling [phone].

## 2024-11-26 NOTE — DISCHARGE NOTE PROVIDER - NSDCCPCAREPLAN_GEN_ALL_CORE_FT
PRINCIPAL DISCHARGE DIAGNOSIS  Diagnosis: Pneumonia  Assessment and Plan of Treatment: Mr. Laguna, you came to the hospital due to weakness and fever. Your fever, labwork, and Chest X-ray showed signs of pneumonia and you were treated with antibiotics in the hospital for sepsis, which is a severe systemic infection. Your sodium levels were also low, and you were treated with fluids. Your symptoms improved while you were in the hospital and you were deemed safe for discharge back to your nursing home with home Physical therapy. Please continue to take your antibiotic, augmentin 1 tablet twice a day for 2 more days. Please continue to take your other home medications as prescribed and detailed in the medication section of the packet. Please follow up with your primary care provider Dr. Stuart Ball for your regular health check up within a month. You can schedule you appointment by calling [phone].      SECONDARY DISCHARGE DIAGNOSES  Diagnosis: Hyponatremia  Assessment and Plan of Treatment:

## 2024-11-26 NOTE — PHARMACOTHERAPY INTERVENTION NOTE - NSPHARMCOMMASP
ASP - Dose optimization/Non-Renal dose adjustment
ASP - Therapy recommended/ Alternative therapy
ASP - Duration of therapy
ASP - De-escalation
ASP - De-escalation

## 2024-11-26 NOTE — DISCHARGE NOTE PROVIDER - NSDCHC_MEDRECSTATUS_GEN_ALL_CORE
Admission Reconciliation is Completed  Discharge Reconciliation is Completed
External genitalia is normal.

## 2024-11-26 NOTE — PHARMACOTHERAPY INTERVENTION NOTE - COMMENTS
Recommend initiating Amoxicillin-clavulanate /125mg BID, as per IDs recommendations.  Of note, patient is tolerating PO medications and is clinically stable.     Diane Linares, PharmD  Clinical Pharmacy Specialist, Infectious Diseases  Tele-Antimicrobial Stewardship Program (Tele-ASP)  Tele-ASP Phone: [phone]

## 2024-11-27 ENCOUNTER — TRANSCRIPTION ENCOUNTER (OUTPATIENT)
Age: 84
End: 2024-11-27

## 2024-11-27 LAB
CULTURE RESULTS: SIGNIFICANT CHANGE UP
CULTURE RESULTS: SIGNIFICANT CHANGE UP
LEGIONELLA AB SER-ACNC: SIGNIFICANT CHANGE UP
SPECIMEN SOURCE: SIGNIFICANT CHANGE UP
SPECIMEN SOURCE: SIGNIFICANT CHANGE UP

## 2024-11-29 ENCOUNTER — TRANSCRIPTION ENCOUNTER (OUTPATIENT)
Age: 84
End: 2024-11-29

## 2024-12-03 DIAGNOSIS — C85.90 NON-HODGKIN LYMPHOMA, UNSPECIFIED, UNSPECIFIED SITE: ICD-10-CM

## 2024-12-03 DIAGNOSIS — N40.0 BENIGN PROSTATIC HYPERPLASIA WITHOUT LOWER URINARY TRACT SYMPTOMS: ICD-10-CM

## 2024-12-03 DIAGNOSIS — E11.69 TYPE 2 DIABETES MELLITUS WITH OTHER SPECIFIED COMPLICATION: ICD-10-CM

## 2024-12-03 DIAGNOSIS — J18.9 PNEUMONIA, UNSPECIFIED ORGANISM: ICD-10-CM

## 2024-12-03 DIAGNOSIS — E87.20 ACIDOSIS, UNSPECIFIED: ICD-10-CM

## 2024-12-03 DIAGNOSIS — I25.10 ATHEROSCLEROTIC HEART DISEASE OF NATIVE CORONARY ARTERY WITHOUT ANGINA PECTORIS: ICD-10-CM

## 2024-12-03 DIAGNOSIS — J44.9 CHRONIC OBSTRUCTIVE PULMONARY DISEASE, UNSPECIFIED: ICD-10-CM

## 2024-12-03 DIAGNOSIS — A41.9 SEPSIS, UNSPECIFIED ORGANISM: ICD-10-CM

## 2024-12-03 DIAGNOSIS — D84.81 IMMUNODEFICIENCY DUE TO CONDITIONS CLASSIFIED ELSEWHERE: ICD-10-CM

## 2024-12-03 DIAGNOSIS — E78.5 HYPERLIPIDEMIA, UNSPECIFIED: ICD-10-CM

## 2024-12-03 DIAGNOSIS — E87.1 HYPO-OSMOLALITY AND HYPONATREMIA: ICD-10-CM

## 2024-12-06 ENCOUNTER — TRANSCRIPTION ENCOUNTER (OUTPATIENT)
Age: 84
End: 2024-12-06

## 2024-12-17 ENCOUNTER — TRANSCRIPTION ENCOUNTER (OUTPATIENT)
Age: 84
End: 2024-12-17

## 2024-12-23 ENCOUNTER — TRANSCRIPTION ENCOUNTER (OUTPATIENT)
Age: 84
End: 2024-12-23

## 2025-01-05 ENCOUNTER — INPATIENT (INPATIENT)
Facility: HOSPITAL | Age: 85
LOS: 8 days | Discharge: NURSING, REHAB OR LONG-TERM CARE | DRG: 690 | End: 2025-01-14
Attending: INTERNAL MEDICINE | Admitting: STUDENT IN AN ORGANIZED HEALTH CARE EDUCATION/TRAINING PROGRAM
Payer: MEDICARE

## 2025-01-05 VITALS
TEMPERATURE: 103 F | RESPIRATION RATE: 18 BRPM | SYSTOLIC BLOOD PRESSURE: 154 MMHG | DIASTOLIC BLOOD PRESSURE: 73 MMHG | HEIGHT: 65 IN | OXYGEN SATURATION: 98 % | WEIGHT: 154.32 LBS | HEART RATE: 94 BPM

## 2025-01-05 DIAGNOSIS — Z12.11 ENCOUNTER FOR SCREENING FOR MALIGNANT NEOPLASM OF COLON: Chronic | ICD-10-CM

## 2025-01-05 DIAGNOSIS — N39.0 URINARY TRACT INFECTION, SITE NOT SPECIFIED: ICD-10-CM

## 2025-01-05 LAB
ALBUMIN SERPL ELPH-MCNC: 4.1 G/DL — SIGNIFICANT CHANGE UP (ref 3.5–5.2)
ALP SERPL-CCNC: 92 U/L — SIGNIFICANT CHANGE UP (ref 30–115)
ALT FLD-CCNC: 10 U/L — SIGNIFICANT CHANGE UP (ref 0–41)
ANION GAP SERPL CALC-SCNC: 13 MMOL/L — SIGNIFICANT CHANGE UP (ref 7–14)
APPEARANCE UR: ABNORMAL
APTT BLD: 32.6 SEC — SIGNIFICANT CHANGE UP (ref 27–39.2)
AST SERPL-CCNC: 19 U/L — SIGNIFICANT CHANGE UP (ref 0–41)
BACTERIA # UR AUTO: ABNORMAL /HPF
BASE EXCESS BLDV CALC-SCNC: 1.2 MMOL/L — SIGNIFICANT CHANGE UP (ref -2–3)
BASOPHILS # BLD AUTO: 0.02 K/UL — SIGNIFICANT CHANGE UP (ref 0–0.2)
BASOPHILS NFR BLD AUTO: 0.2 % — SIGNIFICANT CHANGE UP (ref 0–1)
BILIRUB SERPL-MCNC: 0.4 MG/DL — SIGNIFICANT CHANGE UP (ref 0.2–1.2)
BILIRUB UR-MCNC: NEGATIVE — SIGNIFICANT CHANGE UP
BUN SERPL-MCNC: 19 MG/DL — SIGNIFICANT CHANGE UP (ref 10–20)
CA-I SERPL-SCNC: 1.23 MMOL/L — SIGNIFICANT CHANGE UP (ref 1.15–1.33)
CALCIUM SERPL-MCNC: 8.7 MG/DL — SIGNIFICANT CHANGE UP (ref 8.4–10.4)
CAST: 21 /LPF — HIGH (ref 0–4)
CHLORIDE SERPL-SCNC: 98 MMOL/L — SIGNIFICANT CHANGE UP (ref 98–110)
CO2 SERPL-SCNC: 24 MMOL/L — SIGNIFICANT CHANGE UP (ref 17–32)
COLOR SPEC: YELLOW — SIGNIFICANT CHANGE UP
CREAT SERPL-MCNC: 1.3 MG/DL — SIGNIFICANT CHANGE UP (ref 0.7–1.5)
DIFF PNL FLD: ABNORMAL
EGFR: 55 ML/MIN/1.73M2 — LOW
EOSINOPHIL # BLD AUTO: 0.01 K/UL — SIGNIFICANT CHANGE UP (ref 0–0.7)
EOSINOPHIL NFR BLD AUTO: 0.1 % — SIGNIFICANT CHANGE UP (ref 0–8)
FLUAV AG NPH QL: SIGNIFICANT CHANGE UP
FLUBV AG NPH QL: SIGNIFICANT CHANGE UP
GAS PNL BLDV: 131 MMOL/L — LOW (ref 136–145)
GAS PNL BLDV: SIGNIFICANT CHANGE UP
GAS PNL BLDV: SIGNIFICANT CHANGE UP
GLUCOSE SERPL-MCNC: 108 MG/DL — HIGH (ref 70–99)
GLUCOSE UR QL: NEGATIVE MG/DL — SIGNIFICANT CHANGE UP
HCO3 BLDV-SCNC: 26 MMOL/L — SIGNIFICANT CHANGE UP (ref 22–29)
HCT VFR BLD CALC: 32.3 % — LOW (ref 42–52)
HCT VFR BLDA CALC: 34 % — LOW (ref 39–51)
HGB BLD CALC-MCNC: 11.2 G/DL — LOW (ref 12.6–17.4)
HGB BLD-MCNC: 11.1 G/DL — LOW (ref 14–18)
IMM GRANULOCYTES NFR BLD AUTO: 0.5 % — HIGH (ref 0.1–0.3)
INR BLD: 1.15 RATIO — SIGNIFICANT CHANGE UP (ref 0.65–1.3)
KETONES UR-MCNC: ABNORMAL MG/DL
LACTATE BLDV-MCNC: 1 MMOL/L — SIGNIFICANT CHANGE UP (ref 0.5–2)
LACTATE SERPL-SCNC: 0.9 MMOL/L — SIGNIFICANT CHANGE UP (ref 0.7–2)
LEUKOCYTE ESTERASE UR-ACNC: ABNORMAL
LYMPHOCYTES # BLD AUTO: 0.34 K/UL — LOW (ref 1.2–3.4)
LYMPHOCYTES # BLD AUTO: 3.2 % — LOW (ref 20.5–51.1)
MCHC RBC-ENTMCNC: 31.9 PG — HIGH (ref 27–31)
MCHC RBC-ENTMCNC: 34.4 G/DL — SIGNIFICANT CHANGE UP (ref 32–37)
MCV RBC AUTO: 92.8 FL — SIGNIFICANT CHANGE UP (ref 80–94)
MONOCYTES # BLD AUTO: 0.76 K/UL — HIGH (ref 0.1–0.6)
MONOCYTES NFR BLD AUTO: 7.2 % — SIGNIFICANT CHANGE UP (ref 1.7–9.3)
NEUTROPHILS # BLD AUTO: 9.38 K/UL — HIGH (ref 1.4–6.5)
NEUTROPHILS NFR BLD AUTO: 88.8 % — HIGH (ref 42.2–75.2)
NITRITE UR-MCNC: NEGATIVE — SIGNIFICANT CHANGE UP
NRBC # BLD: 0 /100 WBCS — SIGNIFICANT CHANGE UP (ref 0–0)
PCO2 BLDV: 41 MMHG — LOW (ref 42–55)
PH BLDV: 7.41 — SIGNIFICANT CHANGE UP (ref 7.32–7.43)
PH UR: 8 — SIGNIFICANT CHANGE UP (ref 5–8)
PLATELET # BLD AUTO: 223 K/UL — SIGNIFICANT CHANGE UP (ref 130–400)
PMV BLD: 10.9 FL — HIGH (ref 7.4–10.4)
PO2 BLDV: 33 MMHG — SIGNIFICANT CHANGE UP (ref 25–45)
POTASSIUM BLDV-SCNC: 4.3 MMOL/L — SIGNIFICANT CHANGE UP (ref 3.5–5.1)
POTASSIUM SERPL-MCNC: 4.4 MMOL/L — SIGNIFICANT CHANGE UP (ref 3.5–5)
POTASSIUM SERPL-SCNC: 4.4 MMOL/L — SIGNIFICANT CHANGE UP (ref 3.5–5)
PROT SERPL-MCNC: 6.7 G/DL — SIGNIFICANT CHANGE UP (ref 6–8)
PROT UR-MCNC: 30 MG/DL
PROTHROM AB SERPL-ACNC: 13.6 SEC — HIGH (ref 9.95–12.87)
RBC # BLD: 3.48 M/UL — LOW (ref 4.7–6.1)
RBC # FLD: 15 % — HIGH (ref 11.5–14.5)
RBC CASTS # UR COMP ASSIST: 14 /HPF — HIGH (ref 0–4)
RSV RNA NPH QL NAA+NON-PROBE: SIGNIFICANT CHANGE UP
SAO2 % BLDV: 55.6 % — LOW (ref 67–88)
SARS-COV-2 RNA SPEC QL NAA+PROBE: SIGNIFICANT CHANGE UP
SODIUM SERPL-SCNC: 135 MMOL/L — SIGNIFICANT CHANGE UP (ref 135–146)
SP GR SPEC: 1.01 — SIGNIFICANT CHANGE UP (ref 1–1.03)
SQUAMOUS # UR AUTO: 0 /HPF — SIGNIFICANT CHANGE UP (ref 0–5)
UROBILINOGEN FLD QL: 1 MG/DL — SIGNIFICANT CHANGE UP (ref 0.2–1)
WBC # BLD: 10.56 K/UL — SIGNIFICANT CHANGE UP (ref 4.8–10.8)
WBC # FLD AUTO: 10.56 K/UL — SIGNIFICANT CHANGE UP (ref 4.8–10.8)
WBC UR QL: 379 /HPF — HIGH (ref 0–5)

## 2025-01-05 PROCEDURE — 87640 STAPH A DNA AMP PROBE: CPT

## 2025-01-05 PROCEDURE — 92610 EVALUATE SWALLOWING FUNCTION: CPT | Mod: GN

## 2025-01-05 PROCEDURE — 76770 US EXAM ABDO BACK WALL COMP: CPT

## 2025-01-05 PROCEDURE — 97110 THERAPEUTIC EXERCISES: CPT | Mod: GP

## 2025-01-05 PROCEDURE — 97162 PT EVAL MOD COMPLEX 30 MIN: CPT | Mod: GP

## 2025-01-05 PROCEDURE — 97116 GAIT TRAINING THERAPY: CPT | Mod: GP

## 2025-01-05 PROCEDURE — 82962 GLUCOSE BLOOD TEST: CPT

## 2025-01-05 PROCEDURE — 36415 COLL VENOUS BLD VENIPUNCTURE: CPT

## 2025-01-05 PROCEDURE — 99222 1ST HOSP IP/OBS MODERATE 55: CPT

## 2025-01-05 PROCEDURE — 70450 CT HEAD/BRAIN W/O DYE: CPT | Mod: 26,MC

## 2025-01-05 PROCEDURE — 80053 COMPREHEN METABOLIC PANEL: CPT

## 2025-01-05 PROCEDURE — 99285 EMERGENCY DEPT VISIT HI MDM: CPT | Mod: FS

## 2025-01-05 PROCEDURE — 83735 ASSAY OF MAGNESIUM: CPT

## 2025-01-05 PROCEDURE — 80048 BASIC METABOLIC PNL TOTAL CA: CPT

## 2025-01-05 PROCEDURE — 87641 MR-STAPH DNA AMP PROBE: CPT

## 2025-01-05 PROCEDURE — 71045 X-RAY EXAM CHEST 1 VIEW: CPT | Mod: 26

## 2025-01-05 PROCEDURE — 84145 PROCALCITONIN (PCT): CPT

## 2025-01-05 PROCEDURE — 93010 ELECTROCARDIOGRAM REPORT: CPT

## 2025-01-05 PROCEDURE — 97530 THERAPEUTIC ACTIVITIES: CPT | Mod: GP

## 2025-01-05 PROCEDURE — 85025 COMPLETE CBC W/AUTO DIFF WBC: CPT

## 2025-01-05 RX ORDER — SODIUM CHLORIDE 9 MG/ML
2200 INJECTION, SOLUTION INTRAVENOUS ONCE
Refills: 0 | Status: COMPLETED | OUTPATIENT
Start: 2025-01-05 | End: 2025-01-05

## 2025-01-05 RX ORDER — SODIUM CHLORIDE 9 MG/ML
1000 INJECTION, SOLUTION INTRAVENOUS
Refills: 0 | Status: DISCONTINUED | OUTPATIENT
Start: 2025-01-05 | End: 2025-01-14

## 2025-01-05 RX ORDER — TRAZODONE HYDROCHLORIDE 150 MG/1
50 TABLET ORAL AT BEDTIME
Refills: 0 | Status: DISCONTINUED | OUTPATIENT
Start: 2025-01-05 | End: 2025-01-14

## 2025-01-05 RX ORDER — VANCOMYCIN HYDROCHLORIDE 5 G/100ML
1000 INJECTION, POWDER, LYOPHILIZED, FOR SOLUTION INTRAVENOUS ONCE
Refills: 0 | Status: COMPLETED | OUTPATIENT
Start: 2025-01-05 | End: 2025-01-05

## 2025-01-05 RX ORDER — ACETAMINOPHEN 80 MG/.8ML
650 SOLUTION/ DROPS ORAL EVERY 6 HOURS
Refills: 0 | Status: DISCONTINUED | OUTPATIENT
Start: 2025-01-05 | End: 2025-01-14

## 2025-01-05 RX ORDER — LISINOPRIL 30 MG/1
20 TABLET ORAL DAILY
Refills: 0 | Status: DISCONTINUED | OUTPATIENT
Start: 2025-01-05 | End: 2025-01-09

## 2025-01-05 RX ORDER — DEXTROSE MONOHYDRATE 25 G/50ML
15 INJECTION, SOLUTION INTRAVENOUS ONCE
Refills: 0 | Status: DISCONTINUED | OUTPATIENT
Start: 2025-01-05 | End: 2025-01-14

## 2025-01-05 RX ORDER — DEXTROSE MONOHYDRATE 25 G/50ML
12.5 INJECTION, SOLUTION INTRAVENOUS ONCE
Refills: 0 | Status: DISCONTINUED | OUTPATIENT
Start: 2025-01-05 | End: 2025-01-14

## 2025-01-05 RX ORDER — GLUCAGON INJECTION, SOLUTION 0.5 MG/.1ML
1 INJECTION, SOLUTION SUBCUTANEOUS ONCE
Refills: 0 | Status: DISCONTINUED | OUTPATIENT
Start: 2025-01-05 | End: 2025-01-14

## 2025-01-05 RX ORDER — OLANZAPINE 15 MG/1
10 TABLET ORAL DAILY
Refills: 0 | Status: DISCONTINUED | OUTPATIENT
Start: 2025-01-05 | End: 2025-01-14

## 2025-01-05 RX ORDER — DEXTROSE MONOHYDRATE 25 G/50ML
25 INJECTION, SOLUTION INTRAVENOUS ONCE
Refills: 0 | Status: DISCONTINUED | OUTPATIENT
Start: 2025-01-05 | End: 2025-01-14

## 2025-01-05 RX ORDER — ALBUTEROL SULFATE 90 UG/1
2 INHALANT RESPIRATORY (INHALATION) EVERY 6 HOURS
Refills: 0 | Status: DISCONTINUED | OUTPATIENT
Start: 2025-01-05 | End: 2025-01-14

## 2025-01-05 RX ORDER — METOPROLOL TARTRATE 50 MG
25 TABLET ORAL
Refills: 0 | Status: DISCONTINUED | OUTPATIENT
Start: 2025-01-05 | End: 2025-01-14

## 2025-01-05 RX ORDER — INSULIN LISPRO 100/ML
VIAL (ML) SUBCUTANEOUS
Refills: 0 | Status: DISCONTINUED | OUTPATIENT
Start: 2025-01-05 | End: 2025-01-14

## 2025-01-05 RX ORDER — MAG HYDROX/ALUMINUM HYD/SIMETH 200-200-20
30 SUSPENSION, ORAL (FINAL DOSE FORM) ORAL EVERY 4 HOURS
Refills: 0 | Status: DISCONTINUED | OUTPATIENT
Start: 2025-01-05 | End: 2025-01-14

## 2025-01-05 RX ORDER — HEPARIN SODIUM 1000 [USP'U]/ML
5000 INJECTION, SOLUTION INTRAVENOUS; SUBCUTANEOUS EVERY 8 HOURS
Refills: 0 | Status: DISCONTINUED | OUTPATIENT
Start: 2025-01-05 | End: 2025-01-14

## 2025-01-05 RX ORDER — GINKGO BILOBA 40 MG
3 CAPSULE ORAL AT BEDTIME
Refills: 0 | Status: DISCONTINUED | OUTPATIENT
Start: 2025-01-05 | End: 2025-01-14

## 2025-01-05 RX ORDER — TAMSULOSIN HYDROCHLORIDE 0.4 MG/1
0.4 CAPSULE ORAL AT BEDTIME
Refills: 0 | Status: DISCONTINUED | OUTPATIENT
Start: 2025-01-05 | End: 2025-01-14

## 2025-01-05 RX ORDER — SODIUM CHLORIDE 9 MG/ML
1000 INJECTION, SOLUTION INTRAVENOUS
Refills: 0 | Status: DISCONTINUED | OUTPATIENT
Start: 2025-01-05 | End: 2025-01-09

## 2025-01-05 RX ORDER — GABAPENTIN 300 MG/1
300 CAPSULE ORAL
Refills: 0 | Status: DISCONTINUED | OUTPATIENT
Start: 2025-01-05 | End: 2025-01-14

## 2025-01-05 RX ORDER — ONDANSETRON 4 MG/1
4 TABLET ORAL EVERY 8 HOURS
Refills: 0 | Status: DISCONTINUED | OUTPATIENT
Start: 2025-01-05 | End: 2025-01-14

## 2025-01-05 RX ADMIN — TRAZODONE HYDROCHLORIDE 50 MILLIGRAM(S): 150 TABLET ORAL at 21:31

## 2025-01-05 RX ADMIN — Medication 100 MILLIGRAM(S): at 13:09

## 2025-01-05 RX ADMIN — TAMSULOSIN HYDROCHLORIDE 0.4 MILLIGRAM(S): 0.4 CAPSULE ORAL at 21:31

## 2025-01-05 RX ADMIN — SODIUM CHLORIDE 2200 MILLILITER(S): 9 INJECTION, SOLUTION INTRAVENOUS at 13:04

## 2025-01-05 RX ADMIN — HEPARIN SODIUM 5000 UNIT(S): 1000 INJECTION, SOLUTION INTRAVENOUS; SUBCUTANEOUS at 21:29

## 2025-01-05 RX ADMIN — VANCOMYCIN HYDROCHLORIDE 250 MILLIGRAM(S): 5 INJECTION, POWDER, LYOPHILIZED, FOR SOLUTION INTRAVENOUS at 13:08

## 2025-01-05 RX ADMIN — SODIUM CHLORIDE 2200 MILLILITER(S): 9 INJECTION, SOLUTION INTRAVENOUS at 12:04

## 2025-01-05 NOTE — ED ADULT TRIAGE NOTE - CHIEF COMPLAINT QUOTE
c/o AMS from [facility] . Sister went to visit today and he is not acting himself  . Fever noted in triage

## 2025-01-05 NOTE — H&P ADULT - NSHPPHYSICALEXAM_GEN_ALL_CORE
LOS:     VITALS:   T(C): 37.6 (01-05-25 @ 13:45), Max: 39.3 (01-05-25 @ 10:52)  HR: 101 (01-05-25 @ 13:45) (82 - 101)  BP: 154/64 (01-05-25 @ 13:45) (148/77 - 154/73)  RR: 16 (01-05-25 @ 13:45) (16 - 19)  SpO2: 97% (01-05-25 @ 13:45) (97% - 98%)    GENERAL: NAD, lying in bed comfortably  HEAD:  Atraumatic, Normocephalic  EYES: EOMI, PERRLA, conjunctiva and sclera clear  ENT: Moist mucous membranes  NECK: Supple, No JVD  CHEST/LUNG: Clear to auscultation bilaterally; No rales, rhonchi, wheezing, or rubs. Unlabored respirations  HEART: Regular rate and rhythm; No murmurs, rubs, or gallops  ABDOMEN: BSx4; Soft, nontender, nondistended  EXTREMITIES:  no edema   NERVOUS SYSTEM:  A&Ox1, no focal deficits   SKIN: No rashes or lesions

## 2025-01-05 NOTE — H&P ADULT - ATTENDING COMMENTS
84-year-old male resident of [facility] ([place]) with a past medical history of hyperlipidemia (HLD), schizophrenia, chronic obstructive pulmonary disease (COPD), type 2 diabetes mellitus (DM2), non-Hodgkin's lymphoma, and chronic hyponatremia presenting with AMS in the setting of  sepsis secondary to UTI. 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     Vitals: 101.9  /64 RR 16 97% on RA  EKG: Diagnosis Line Sinus tachycardia  Nonspecific ST and T wave abnormality  TTE (5/29/23): G1DD LVEF 55-60%   Imaging: CXR: No focal consolidation.  NCHCT (1/5/25): There is no CT evidence of acute transcortical infarct. Age-related   involutional changes and chronic microvascular ischemic changes. Chronic   left cerebellar infarct.  Microbiology:  UA (1/5/25): LE+, nitrite (-), pyuria, bacteria +  Limited RVP neg  Labs: wbc 10.5 Hb 11.1 MCV 92 plts 223 Na 135 K 4.4 Glu 108 BUN/Cr 19/1.3 (Cr 1.3 in 11/2024) GFR 55    #AMS 2/2 to sepsis from urinary source  #CKD 3A 2/2 to HTN and DM  #HTN  - send urine and blood cultures  - would check volume status as pt received 2L of IVF in the ED  - if euvolemic give 500cc of IVF and assess volume status; if remains euvolemic can give another 500cc and re-assess  - received ceftriaxone and vancomycin in the ED  - can start meropenem   - would obtain US renal  - tylenol prn fever  - zofran prn nausea  - ID and renal consult if not improving  - if becoming fluid overloaded, would obtain new TTE; otherwise not indicated as no cardiac symptoms  - renally dose medications if CrCL is compromised  - continue home tamsulosin 0.4mg qhs  - Cr is at baseline so continue lisinopril 10mg qd for HTN and gabapentin 300mg bid  - npo until SLP eval given AMS  - PT/OT  - aspiration and fall precautions    #DM  - hold home metformin as might need contrast  - would put on ISS    #schizophrenia  - continue olanzapine 10mg qd  - repeat ecg in 3 days as current qtc is 468    #COPD  - switch medications to nebulizers until pt's AMS improves    #liver and renal nodules  - no FHx of HCC or RCC  - CTAP in 2023: Multiple subcentimeter hypodensities too small to characterize on liver and kidney  - generally benign appearing liver lesions <4cm do not require followup; can repeat CT as op for surveillance    #proph  - vte: heparin 5000 q 8hrs  - GI: not indicated  - diet: npo until SLP eval  - precautions: fall and aspiration  - Code: Full    Discussed with resident

## 2025-01-05 NOTE — GOALS OF CARE CONVERSATION - ADVANCED CARE PLANNING - CONVERSATION DETAILS
I spoke with sister ms XIONG via phone call,  who was identified as the health care surrogate / health care proxy and we had an extensive conversation about patient 's care and current condition.     Discussed full vs limited medical management. Extensively explained that full medical management would involve intubating the patient if medically indicated and performing chest compressions in an attempt to resuscitate if the patient were to arrest. Further explained the concept of limited medical management, such as DNR/DNI. Further explained that DNR/DNI would not preclude medical management up until the point of arrest/intubation (such as antibiotics, IV fluids, blood draws, etc.). she expressed understanding of above. Decision made, patient is now FULL CODE

## 2025-01-05 NOTE — H&P ADULT - HISTORY OF PRESENT ILLNESS
An 84-year-old male resident of [facility] ([place]) with a past medical history of hyperlipidemia (HLD), schizophrenia, chronic obstructive pulmonary disease (COPD), type 2 diabetes mellitus (DM2), non-Hodgkin's lymphoma, and hyponatremia brought to the ed for evaluation of the Fever. Patient is aox1 at time of the interview and is unable to provide accurate medical hx. Hx taken from the patient sister Ms Avilez via phone call . Sister states that today she visited his home at his facility and he found him sick. she states that patient was just not his baseline, and he was running a fever, was unable to recognize her, mentally off, inattentive. She states that this is not his baseline and she thought that patient is sick and she brought him to the ed for further evaluation. As per patient sister, patient is aox2 at baseline, can recognize her when she visit him at the facility, and walk with walker     ED VITALS   · BP Systolic	154 mm Hg  · BP Diastolic	73 mm Hg  · Heart Rate	94 /min  · Respiration Rate (breaths/min)	18 /min  · Temp (F)	 102.8 Degrees F  · Temp (C)	 39.3 Degrees C  · Temp site	oral  · SpO2 (%)	98 %  · O2 Delivery/Oxygen Delivery Method	room air  · Temp at ED Arrival (C)	39.3 Degrees C    Labs    hb 11, PT 13, GFR 55  Urine positive for wbc, rbc, leukocyte esterase and bacterias     Imaging    CT head:  No acute pathology     Xray chest  No acute pathology    EKG  Sinus tachycardia, No T wave and ST segment changes

## 2025-01-05 NOTE — ED ADULT NURSE NOTE - NSFALLHARMRISKINTERV_ED_ALL_ED

## 2025-01-05 NOTE — ED ADULT NURSE NOTE - CHIEF COMPLAINT QUOTE
c/o AMS from [facility] . Sister went to visit today and he is not acting himself  . Fever noted in triage

## 2025-01-05 NOTE — H&P ADULT - ASSESSMENT
An 84-year-old male resident of [facility] ([place]) with a past medical history of hyperlipidemia (HLD), schizophrenia, chronic obstructive pulmonary disease (COPD), type 2 diabetes mellitus (DM2), non-Hodgkin's lymphoma, and hyponatremia brought to the ed for evaluation of the Fever    #Altered mental status with Fever  #Toxic metabolic encephalopathy  #Sepsis present on admission secondary to Acute UTI   - Vitally stable on arrival   - hb 11, PT 13, GFR 55Urine positive for wbc, rbc, leukocyte esterase and bacterias   - CT head: No acute pathology   - Xray chest: No acute pathology  - EKG: Sinus tachycardia, No T wave and ST segment changes  - urine culture-- Organism Identification: 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     # DM 2  - monitor blood sugar  - insulin as per protocol  - target blood sugar 140 - 180     # CAD/ HTN/ HLD  - resume home meds     #Incidental Findings (Liver Nodules & Kidney cyst/hypodensity)  - Outpatient workup and follow up        DVT PPX: Lovenox   GI PPX: none   DIET: dash   ACTIVITY:   CODE STATUS: full   DISPOSITION:     PENDING:        An 84-year-old male resident of [facility] ([place]) with a past medical history of hyperlipidemia (HLD), schizophrenia, chronic obstructive pulmonary disease (COPD), type 2 diabetes mellitus (DM2), non-Hodgkin's lymphoma, and hyponatremia brought to the ed for evaluation of the Fever    #Altered mental status with Fever  #Toxic metabolic encephalopathy  #Sepsis present on admission secondary to Acute UTI   - Vitally stable on arrival   - hb 11, PT 13, GFR 55Urine positive for wbc, rbc, leukocyte esterase and bacterias   - CT head: No acute pathology   - Xray chest: No acute pathology  - EKG: Sinus tachycardia, No T wave and ST segment changes  - urine culture-- Organism Identification: 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     # DM 2  - monitor blood sugar  - insulin as per protocol  - target blood sugar 140 - 180     # CAD/ HTN/ HLD  - resume home meds     #Incidental Findings (Liver Nodules & Kidney cyst/hypodensity)  - Outpatient workup and follow up    #schizophrenia        DVT PPX: Lovenox   GI PPX: none   DIET: dash   ACTIVITY:   CODE STATUS: full   DISPOSITION:     PENDING:        An 84-year-old male resident of [facility] ([place]) with a past medical history of hyperlipidemia (HLD), schizophrenia, chronic obstructive pulmonary disease (COPD), type 2 diabetes mellitus (DM2), non-Hodgkin's lymphoma, and hyponatremia brought to the ed for evaluation of the Fever    #Altered mental status with Fever  #Toxic metabolic encephalopathy  #Sepsis present on admission secondary to Acute UTI   - Vitally stable on arrival   - hb 11, PT 13, GFR 55Urine positive for wbc, rbc, leukocyte esterase and bacterias   - CT head: No acute pathology   - Xray chest: No acute pathology  - EKG: Sinus tachycardia, No T wave and ST segment changes  - urine culture-- Organism Identification: 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 discussed with ID.     #BPH  -c/w home meds     #h/o COPD   -stable, resumed on inhalers, nebs tx.     # DM 2  - monitor blood sugar  - insulin as per protocol  - target blood sugar 140 - 180     # CAD/ HTN/ HLD  - resume home meds     #Incidental Findings (Liver Nodules & Kidney cyst/hypodensity)  - Outpatient workup and follow up    #schizophrenia        DVT PPX: Lovenox   GI PPX: none   DIET: dash   ACTIVITY:   CODE STATUS: full   DISPOSITION:     PENDING:        An 84-year-old male resident of [facility] ([place]) with a past medical history of hyperlipidemia (HLD), schizophrenia, chronic obstructive pulmonary disease (COPD), type 2 diabetes mellitus (DM2), non-Hodgkin's lymphoma, and hyponatremia brought to the ed for evaluation of the Fever    #Altered mental status with Fever  #Toxic metabolic encephalopathy  #Sepsis present on admission secondary to Acute UTI   - Vitally stable on arrival   - hb 11, PT 13, GFR 55Urine positive for wbc, rbc, leukocyte esterase and bacterias   - CT head: No acute pathology   - Xray chest: No acute pathology  - EKG: Sinus tachycardia, No T wave and ST segment changes  - urine culture-- Organism Identification: 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 discussed  with ID.     #BPH  - cw tamsulosin     #h/o COPD   -stable, resumed on inhalers, nebs tx.     # DM 2  - hold metformin   - monitor blood sugar  - insulin as per protocol  - target blood sugar 140 - 180     # CAD/ HTN/ HLD  - cw metoprolol, lisinopril     #Incidental Findings (Liver Nodules & Kidney cyst/hypodensity)  - Outpatient workup and follow up    #schizophrenia  - cw olanzapine and Trazodone     DVT PPX: heparin   GI PPX: none   DIET: npo untill speech and swallow   ACTIVITY:   CODE STATUS: full   DISPOSITION:     PENDING:   med rec confirmed with facility staff          An 84-year-old male resident of [facility] ([place]) with a past medical history of hyperlipidemia (HLD), schizophrenia, chronic obstructive pulmonary disease (COPD), type 2 diabetes mellitus (DM2), non-Hodgkin's lymphoma, and hyponatremia brought to the ed for evaluation of the Fever    #Altered mental status with Fever  #Toxic metabolic encephalopathy  #Sepsis present on admission secondary to Acute UTI   - Vitally stable on arrival   - hb 11, PT 13, GFR 55Urine positive for wbc, rbc, leukocyte esterase and bacterias   - CT head: No acute pathology   - Xray chest: No acute pathology  - EKG: Sinus tachycardia, No T wave and ST segment changes  - urine culture-- Organism Identification: 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 discussed   with ID.     #CKD 3  - creat baseline  - adjust meds for GFR   - avoid nephro toxic meds  - avoid volume overload  - strict in/out record     #BPH  - cw tamsulosin     #h/o COPD   -stable, resumed on inhalers, nebs tx.     # DM 2  - hold metformin   - monitor blood sugar  - insulin as per protocol  - target blood sugar 140 - 180     # CAD/ HTN/ HLD  - cw metoprolol, lisinopril     #Incidental Findings (Liver Nodules & Kidney cyst/hypodensity)  - Outpatient workup and follow up    #schizophrenia  - cw olanzapine and Trazodone     DVT PPX: heparin   GI PPX: none   DIET: npo untill speech and swallow   ACTIVITY:   CODE STATUS: full   DISPOSITION:     PENDING:   med rec confirmed with facility staff          An 84-year-old male resident of [facility] ([place]) with a past medical history of hyperlipidemia (HLD), schizophrenia, chronic obstructive pulmonary disease (COPD), type 2 diabetes mellitus (DM2), non-Hodgkin's lymphoma, and hyponatremia brought to the ed for evaluation of the Fever    #Altered mental status with Fever  #Toxic metabolic encephalopathy  #Sepsis present on admission secondary to Acute UTI   - Vitally stable on arrival   - hb 11, PT 13, GFR 55Urine positive for wbc, rbc, leukocyte esterase and bacterias   - CT head: No acute pathology   - Xray chest: No acute pathology  - EKG: Sinus tachycardia, No T wave and ST segment changes  - urine culture-- Organism Identification: 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  discussed    with ID.   - consider LP if no improvement in 24 hours   - ID consult     #CKD 3  - creat baseline  - adjust meds for GFR   - avoid nephro toxic meds  - avoid volume overload  - strict in/out record     #BPH  - cw tamsulosin     #h/o COPD   -stable, resumed on inhalers, nebs tx.     # DM 2  - hold metformin   - monitor blood sugar  - insulin as per protocol  - target blood sugar 140 - 180     # CAD/ HTN/ HLD  - cw metoprolol, lisinopril     #Incidental Findings (Liver Nodules & Kidney cyst/hypodensity)  - Outpatient workup and follow up    #schizophrenia  - cw olanzapine and Trazodone     DVT PPX: heparin   GI PPX: none   DIET: npo untill speech and swallow   ACTIVITY:   CODE STATUS: full   DISPOSITION:     PENDING:   med rec confirmed with facility staff

## 2025-01-05 NOTE — ED PROVIDER NOTE - OBJECTIVE STATEMENT
84-year-old male presented from [facility] with past medical history of schizophrenia, COPD, smoker, hypertension, hyperlipidemia, diabetes, hyponatremia, non-Hodgkin's lymphoma presented for evaluation of fever, altered mental status, weakness x 1 day.  Patient's sister noted patient to be altered and confused and very weak sitting in the chair today.  No difficulty breathing.  No vomiting or diarrhea.  No abdominal pain.

## 2025-01-05 NOTE — ED PROVIDER NOTE - CARE PLAN
1 Principal Discharge DX:	Acute UTI  Secondary Diagnosis:	Altered mental status  Secondary Diagnosis:	Sepsis

## 2025-01-05 NOTE — ED PROVIDER NOTE - PHYSICAL EXAMINATION
VITAL SIGNS: I have reviewed nursing notes and confirm.  CONSTITUTIONAL: Patient is in no acute distress.  SKIN: Skin exam is warm and dry, no acute rash.  HEAD: Normocephalic; atraumatic.  EYES: PERRL, EOM intact; conjunctiva and sclera clear.  ENT: No nasal discharge; airway clear.   NECK: Supple; non tender.  CARD: S1, S2 normal; no murmurs, gallops, or rubs. Regular rate and rhythm.  RESP: Clear to auscultation bilaterally. No wheezes, rales or rhonchi.  ABD: Normal bowel sounds; soft; non-distended; non-tender.   EXT: Normal ROM. No edema.  LYMPH: No acute cervical adenopathy.  NEURO: AAOx1. Altered.

## 2025-01-05 NOTE — ED PROVIDER NOTE - CLINICAL SUMMARY MEDICAL DECISION MAKING FREE TEXT BOX
.    84-year-old male, PMH as noted, sent from [facility] (formerly [facility]), for fever, generalized weakness, and decreased interactiveness, confusion x 1 day.  No chest pain, shortness of breath, vomiting diarrhea or abdominal pain.  No cough or URI symptoms.    Exam as noted above.    Differential diagnose includes but not limited to metabolic process, infectious process, CVA    All available lab tests, imaging tests, and EKGs independently reviewed and interpreted by Roldan pandey.  UA positive for UTI  Labs reviewed  EKG sinus tachycardia, normal axis no STEMI.    Chest x-ray shows no focal consolidation, pneumothorax or free air.  CT head no acute bleed mass or midline shift    Patient received IV fluid and broad-spectrum antibiotics.    Impression UTI confusion.  Patient admitted to medicine for further workup and management    .

## 2025-01-06 LAB
ALBUMIN SERPL ELPH-MCNC: 3.9 G/DL — SIGNIFICANT CHANGE UP (ref 3.5–5.2)
ALP SERPL-CCNC: 75 U/L — SIGNIFICANT CHANGE UP (ref 30–115)
ALT FLD-CCNC: 9 U/L — SIGNIFICANT CHANGE UP (ref 0–41)
ANION GAP SERPL CALC-SCNC: 11 MMOL/L — SIGNIFICANT CHANGE UP (ref 7–14)
AST SERPL-CCNC: 23 U/L — SIGNIFICANT CHANGE UP (ref 0–41)
BASOPHILS # BLD AUTO: 0.01 K/UL — SIGNIFICANT CHANGE UP (ref 0–0.2)
BASOPHILS NFR BLD AUTO: 0.1 % — SIGNIFICANT CHANGE UP (ref 0–1)
BILIRUB SERPL-MCNC: 0.4 MG/DL — SIGNIFICANT CHANGE UP (ref 0.2–1.2)
BUN SERPL-MCNC: 18 MG/DL — SIGNIFICANT CHANGE UP (ref 10–20)
CALCIUM SERPL-MCNC: 8.6 MG/DL — SIGNIFICANT CHANGE UP (ref 8.4–10.5)
CHLORIDE SERPL-SCNC: 98 MMOL/L — SIGNIFICANT CHANGE UP (ref 98–110)
CO2 SERPL-SCNC: 26 MMOL/L — SIGNIFICANT CHANGE UP (ref 17–32)
CREAT SERPL-MCNC: 1.1 MG/DL — SIGNIFICANT CHANGE UP (ref 0.7–1.5)
EGFR: 67 ML/MIN/1.73M2 — SIGNIFICANT CHANGE UP
EOSINOPHIL # BLD AUTO: 0 K/UL — SIGNIFICANT CHANGE UP (ref 0–0.7)
EOSINOPHIL NFR BLD AUTO: 0 % — SIGNIFICANT CHANGE UP (ref 0–8)
GLUCOSE BLDC GLUCOMTR-MCNC: 112 MG/DL — HIGH (ref 70–99)
GLUCOSE SERPL-MCNC: 96 MG/DL — SIGNIFICANT CHANGE UP (ref 70–99)
HCT VFR BLD CALC: 31.5 % — LOW (ref 42–52)
HGB BLD-MCNC: 10.5 G/DL — LOW (ref 14–18)
IMM GRANULOCYTES NFR BLD AUTO: 0.3 % — SIGNIFICANT CHANGE UP (ref 0.1–0.3)
LYMPHOCYTES # BLD AUTO: 0.88 K/UL — LOW (ref 1.2–3.4)
LYMPHOCYTES # BLD AUTO: 12.5 % — LOW (ref 20.5–51.1)
MAGNESIUM SERPL-MCNC: 1.8 MG/DL — SIGNIFICANT CHANGE UP (ref 1.8–2.4)
MCHC RBC-ENTMCNC: 31.5 PG — HIGH (ref 27–31)
MCHC RBC-ENTMCNC: 33.3 G/DL — SIGNIFICANT CHANGE UP (ref 32–37)
MCV RBC AUTO: 94.6 FL — HIGH (ref 80–94)
MONOCYTES # BLD AUTO: 0.69 K/UL — HIGH (ref 0.1–0.6)
MONOCYTES NFR BLD AUTO: 9.8 % — HIGH (ref 1.7–9.3)
NEUTROPHILS # BLD AUTO: 5.45 K/UL — SIGNIFICANT CHANGE UP (ref 1.4–6.5)
NEUTROPHILS NFR BLD AUTO: 77.3 % — HIGH (ref 42.2–75.2)
NRBC # BLD: 0 /100 WBCS — SIGNIFICANT CHANGE UP (ref 0–0)
PLATELET # BLD AUTO: 208 K/UL — SIGNIFICANT CHANGE UP (ref 130–400)
PMV BLD: 11.1 FL — HIGH (ref 7.4–10.4)
POTASSIUM SERPL-MCNC: 4.5 MMOL/L — SIGNIFICANT CHANGE UP (ref 3.5–5)
POTASSIUM SERPL-SCNC: 4.5 MMOL/L — SIGNIFICANT CHANGE UP (ref 3.5–5)
PROCALCITONIN SERPL-MCNC: 1.8 NG/ML — HIGH (ref 0.02–0.1)
PROT SERPL-MCNC: 6.1 G/DL — SIGNIFICANT CHANGE UP (ref 6–8)
RBC # BLD: 3.33 M/UL — LOW (ref 4.7–6.1)
RBC # FLD: 15.4 % — HIGH (ref 11.5–14.5)
SODIUM SERPL-SCNC: 135 MMOL/L — SIGNIFICANT CHANGE UP (ref 135–146)
WBC # BLD: 7.05 K/UL — SIGNIFICANT CHANGE UP (ref 4.8–10.8)
WBC # FLD AUTO: 7.05 K/UL — SIGNIFICANT CHANGE UP (ref 4.8–10.8)

## 2025-01-06 PROCEDURE — 99232 SBSQ HOSP IP/OBS MODERATE 35: CPT

## 2025-01-06 PROCEDURE — 76770 US EXAM ABDO BACK WALL COMP: CPT | Mod: 26

## 2025-01-06 RX ORDER — HYDRALAZINE HYDROCHLORIDE 10 MG/1
5 TABLET ORAL ONCE
Refills: 0 | Status: COMPLETED | OUTPATIENT
Start: 2025-01-06 | End: 2025-01-06

## 2025-01-06 RX ORDER — NIFEDIPINE 60 MG/1
60 TABLET, EXTENDED RELEASE ORAL ONCE
Refills: 0 | Status: COMPLETED | OUTPATIENT
Start: 2025-01-06 | End: 2025-01-06

## 2025-01-06 RX ADMIN — Medication 25 MILLIGRAM(S): at 06:40

## 2025-01-06 RX ADMIN — SODIUM CHLORIDE 75 MILLILITER(S): 9 INJECTION, SOLUTION INTRAVENOUS at 06:39

## 2025-01-06 RX ADMIN — HYDRALAZINE HYDROCHLORIDE 5 MILLIGRAM(S): 10 TABLET ORAL at 16:58

## 2025-01-06 RX ADMIN — NIFEDIPINE 60 MILLIGRAM(S): 60 TABLET, EXTENDED RELEASE ORAL at 16:58

## 2025-01-06 RX ADMIN — LISINOPRIL 20 MILLIGRAM(S): 30 TABLET ORAL at 06:41

## 2025-01-06 RX ADMIN — Medication 25 MILLIGRAM(S): at 17:05

## 2025-01-06 RX ADMIN — TRAZODONE HYDROCHLORIDE 50 MILLIGRAM(S): 150 TABLET ORAL at 20:37

## 2025-01-06 RX ADMIN — HEPARIN SODIUM 5000 UNIT(S): 1000 INJECTION, SOLUTION INTRAVENOUS; SUBCUTANEOUS at 06:41

## 2025-01-06 RX ADMIN — TAMSULOSIN HYDROCHLORIDE 0.4 MILLIGRAM(S): 0.4 CAPSULE ORAL at 20:36

## 2025-01-06 RX ADMIN — Medication 100 MILLIGRAM(S): at 17:05

## 2025-01-06 RX ADMIN — OLANZAPINE 10 MILLIGRAM(S): 15 TABLET ORAL at 17:06

## 2025-01-06 RX ADMIN — GABAPENTIN 300 MILLIGRAM(S): 300 CAPSULE ORAL at 06:41

## 2025-01-06 RX ADMIN — HEPARIN SODIUM 5000 UNIT(S): 1000 INJECTION, SOLUTION INTRAVENOUS; SUBCUTANEOUS at 17:05

## 2025-01-06 RX ADMIN — GABAPENTIN 300 MILLIGRAM(S): 300 CAPSULE ORAL at 17:05

## 2025-01-06 RX ADMIN — HEPARIN SODIUM 5000 UNIT(S): 1000 INJECTION, SOLUTION INTRAVENOUS; SUBCUTANEOUS at 20:36

## 2025-01-06 NOTE — PHYSICAL THERAPY INITIAL EVALUATION ADULT - PERTINENT HX OF CURRENT PROBLEM, REHAB EVAL
84-year-old male resident of [facility] ([place]) with a past medical history of hyperlipidemia (HLD), schizophrenia, chronic obstructive pulmonary disease (COPD), type 2 diabetes mellitus (DM2), non-Hodgkin's lymphoma, and hyponatremia brought to the ed for evaluation of the Fever. Patient is aox1 at time of the interview and is unable to provide accurate medical hx. Hx taken from the patient sister Ms Avilez via phone call . Sister states that today she visited his home at his facility and he found him sick. she states that patient was just not his baseline, and he was running a fever, was unable to recognize her, mentally off, inattentive. She states that this is not his baseline and she thought that patient is sick and she brought him to the ed for further evaluation. As per patient sister, patient is aox2 at baseline, can recognize her when she visit him at the facility, and walk with walker

## 2025-01-06 NOTE — PATIENT PROFILE ADULT - FALL HARM RISK - HARM RISK INTERVENTIONS
Assistance with ambulation/Assistance OOB with selected safe patient handling equipment/Communicate Risk of Fall with Harm to all staff/Discuss with provider need for PT consult/Monitor for mental status changes/Monitor gait and stability/Move patient closer to nurses' station/Provide patient with walking aids - walker, cane, crutches/Reinforce activity limits and safety measures with patient and family/Reorient to person, place and time as needed/Tailored Fall Risk Interventions/Toileting schedule using arm’s reach rule for commode and bathroom/Use of alarms - bed, chair and/or voice tab/Visual Cue: Yellow wristband and red socks/Bed in lowest position, wheels locked, appropriate side rails in place/Call bell, personal items and telephone in reach/Instruct patient to call for assistance before getting out of bed or chair/Non-slip footwear when patient is out of bed/[place] to call system/Physically safe environment - no spills, clutter or unnecessary equipment/Purposeful Proactive Rounding/Room/bathroom lighting operational, light cord in reach

## 2025-01-06 NOTE — SWALLOW BEDSIDE ASSESSMENT ADULT - SWALLOW EVAL: DIAGNOSIS
+toleration observed without overt s/s of aspiration/penetration for puree, soft + bite sized consistency and thin liquids

## 2025-01-06 NOTE — PHYSICAL THERAPY INITIAL EVALUATION ADULT - GENERAL OBSERVATIONS, REHAB EVAL
[id number] pm. 83 y/o M rec'd/left on a stretcher, nad, + IV. pt appears confused and requires max encouragement to get OOB and change wet sheets (pt not aware of being wet). pt required min assist and max VC's for all mobility.

## 2025-01-06 NOTE — SWALLOW BEDSIDE ASSESSMENT ADULT - COMMENTS
CXR 1/5 (-)  Known to acute SLP services 7/2021 w/ recs for soft + bite sized consistency and thin liquids

## 2025-01-06 NOTE — CONSULT NOTE ADULT - SUBJECTIVE AND OBJECTIVE BOX
HERNANDOADA  84y, Male  Allergy: No Known Allergies      All historical available data reviewed.    HPI:  An 84-year-old male resident of [facility] ([place]) with a past medical history of hyperlipidemia (HLD), schizophrenia, chronic obstructive pulmonary disease (COPD), type 2 diabetes mellitus (DM2), non-Hodgkin's lymphoma, and hyponatremia brought to the ed for evaluation of the Fever. Patient is aox1 at time of the interview and is unable to provide accurate medical hx. Hx taken from the patient sister Ms Avilez via phone call . Sister states that today she visited his home at his facility and he found him sick. she states that patient was just not his baseline, and he was running a fever, was unable to recognize her, mentally off, inattentive. She states that this is not his baseline and she thought that patient is sick and she brought him to the ed for further evaluation. As per patient sister, patient is aox2 at baseline, can recognize her when she visit him at the facility, and walk with walker     ED VITALS   · BP Systolic	154 mm Hg  · BP Diastolic	73 mm Hg  · Heart Rate	94 /min  · Respiration Rate (breaths/min)	18 /min  · Temp (F)	 102.8 Degrees F  · Temp (C)	 39.3 Degrees C  · Temp site	oral  · SpO2 (%)	98 %  · O2 Delivery/Oxygen Delivery Method	room air  · Temp at ED Arrival (C)	39.3 Degrees C    Labs    hb 11, PT 13, GFR 55  Urine positive for wbc, rbc, leukocyte esterase and bacterias     Imaging    CT head:  No acute pathology     Xray chest  No acute pathology    EKG  Sinus tachycardia, No T wave and ST segment changes            (05 Jan 2025 17:49)    FAMILY HISTORY:    PAST MEDICAL & SURGICAL HISTORY:  COPD (chronic obstructive pulmonary disease)      Paranoid schizophrenia      Schizophrenia      Diabetes type 2, controlled      COPD (chronic obstructive pulmonary disease)      Dyslipidemia      Chronic retention of urine      Dyslipidemia      Encounter for screening colonoscopy            VITALS:  T(F): 95.6, Max: 99.5 (01-05-25 @ 21:36)  HR: 63  BP: 164/56  RR: 17Vital Signs Last 24 Hrs  T(C): 35.3 (06 Jan 2025 06:36), Max: 37.5 (05 Jan 2025 21:36)  T(F): 95.6 (06 Jan 2025 06:36), Max: 99.5 (05 Jan 2025 21:36)  HR: 63 (06 Jan 2025 06:36) (63 - 94)  BP: 164/56 (06 Jan 2025 06:36) (151/71 - 179/62)  BP(mean): 89 (06 Jan 2025 00:45) (89 - 89)  RR: 17 (06 Jan 2025 06:36) (16 - 18)  SpO2: 98% (06 Jan 2025 06:36) (98% - 98%)    Parameters below as of 06 Jan 2025 06:36  Patient On (Oxygen Delivery Method): room air        TESTS & MEASUREMENTS:                        10.5   7.05  )-----------( 208      ( 06 Jan 2025 05:56 )             31.5     01-06    135  |  98  |  18  ----------------------------<  96  4.5   |  26  |  1.1    Ca    8.6      06 Jan 2025 05:56  Mg     1.8     01-06    TPro  6.1  /  Alb  3.9  /  TBili  0.4  /  DBili  x   /  AST  23  /  ALT  9   /  AlkPhos  75  01-06    LIVER FUNCTIONS - ( 06 Jan 2025 05:56 )  Alb: 3.9 g/dL / Pro: 6.1 g/dL / ALK PHOS: 75 U/L / ALT: 9 U/L / AST: 23 U/L / GGT: x             Urinalysis with Rflx Culture (collected 01-05-25 @ 13:29)      Urinalysis Basic - ( 06 Jan 2025 05:56 )    Color: x / Appearance: x / SG: x / pH: x  Gluc: 96 mg/dL / Ketone: x  / Bili: x / Urobili: x   Blood: x / Protein: x / Nitrite: x   Leuk Esterase: x / RBC: x / WBC x   Sq Epi: x / Non Sq Epi: x / Bacteria: x          RADIOLOGY & ADDITIONAL TESTS:  Personal review of radiological diagnostics performed  Echo and EKG results noted when applicable.     MEDICATIONS:  acetaminophen     Tablet .. 650 milliGRAM(s) Oral every 6 hours PRN  albuterol    90 MICROgram(s) HFA Inhaler 2 Puff(s) Inhalation every 6 hours PRN  aluminum hydroxide/magnesium hydroxide/simethicone Suspension 30 milliLiter(s) Oral every 4 hours PRN  ceftazidime/avibactam IVPB 2.5 Gram(s) IV Intermittent every 8 hours  dextrose 5%. 1000 milliLiter(s) IV Continuous <Continuous>  dextrose 5%. 1000 milliLiter(s) IV Continuous <Continuous>  dextrose 50% Injectable 25 Gram(s) IV Push once  dextrose 50% Injectable 12.5 Gram(s) IV Push once  dextrose 50% Injectable 25 Gram(s) IV Push once  dextrose Oral Gel 15 Gram(s) Oral once PRN  gabapentin 300 milliGRAM(s) Oral two times a day  glucagon  Injectable 1 milliGRAM(s) IntraMuscular once  heparin   Injectable 5000 Unit(s) SubCutaneous every 8 hours  insulin lispro (ADMELOG) corrective regimen sliding scale   SubCutaneous three times a day before meals  lactated ringers. 1000 milliLiter(s) IV Continuous <Continuous>  lisinopril 20 milliGRAM(s) Oral daily  melatonin 3 milliGRAM(s) Oral at bedtime PRN  metoprolol tartrate 25 milliGRAM(s) Oral two times a day  OLANZapine 10 milliGRAM(s) Oral daily  ondansetron Injectable 4 milliGRAM(s) IV Push every 8 hours PRN  tamsulosin 0.4 milliGRAM(s) Oral at bedtime  traZODone 50 milliGRAM(s) Oral at bedtime      ANTIBIOTICS:  ceftazidime/avibactam IVPB 2.5 Gram(s) IV Intermittent every 8 hours

## 2025-01-06 NOTE — CONSULT NOTE ADULT - NS ATTEST RISK PROBLEM GEN_ALL_CORE FT
-My assessment required an independent historian and is independent of the teaching service  -I independently interpreted the most recent imaging ( CXR ) and labs ( CBC, CMP) and cultures ( along with the sensitivities / IZABELLA )  -I discussed my recommendations with the primary team housestaff/Attending  -I assisted with initiation of antibiotics  -Pt is on ABx therapy requiring intensive monitoring for toxicity

## 2025-01-06 NOTE — CONSULT NOTE ADULT - ASSESSMENT
84-year-old male resident of [facility] ([place]) with a past medical history of hyperlipidemia (HLD), schizophrenia, chronic obstructive pulmonary disease (COPD), type 2 diabetes mellitus (DM2), non-Hodgkin's lymphoma, and hyponatremia brought to the ed for evaluation of the Fever. Patient is aox1 at time of the interview and is unable to provide accurate medical hx. Hx taken from the patient sister Ms Avilez via phone call . Sister states that today she visited his home at his facility and he found him sick. she states that patient was just not his baseline, and he was running a fever, was unable to recognize her, mentally off, inattentive. She states that this is not his baseline and she thought that patient is sick and she brought him to the ed for further evaluation. As per patient sister, patient is aox2 at baseline, can recognize her when she visit him at the facility, and walk with walker     ED VITALS  94 /min, T 102.8  CT head: No acute pathology     Xray chest: No acute pathology    IMPRESSION/RECOMMENDATIONS  Immunosuppression/Immunosenescence ( above age 60 yrs there is a exponential decline in immunity which could result in poor clinical outcomes.   Sepsis  Acute pyelonephritis with no obstructive uropathy   1/6 US : no hydronephrosis  Metabolic encephalopathy  1/5 CXR no opacities. ( Independent interpretation of test : no PNA  CBC :  ( WBC 7.0  ), ( Hg  10.5  ), CMP ( Cr  1.1  ) reviewed .   1/5 COVID 19/ Influenza/ RSV NG.   1/5 CT Head : NG    COPD (chronic obstructive pulmonary disease)  Paranoid schizophrenia  Schizophrenia  Diabetes type 2, controlled  COPD (chronic obstructive pulmonary disease)  Dyslipidemia  Chronic retention of urine    -Off loading to prevent pressure sores and preventive measures to avoid aspiration  -UCX  -BCx   -start Cefepime 1 gm iv q12h. Monitor daily for neurotoxicity/encephalopathy by checking clinically daily     Discussion of management/test results/antibiotic regimen  with primary medical team.

## 2025-01-06 NOTE — PROGRESS NOTE ADULT - ASSESSMENT
An 84-year-old male resident of [facility] ([place]) with a past medical history of hyperlipidemia (HLD), schizophrenia, chronic obstructive pulmonary disease (COPD), type 2 diabetes mellitus (DM2), non-Hodgkin's lymphoma, and hyponatremia brought to the ed for evaluation of the Fever    #Altered mental status with Fever  #Toxic metabolic encephalopathy  #Sepsis present on admission secondary to Acute UTI   - Vitally stable on arrival   - hb 11, PT 13, GFR 55Urine positive for wbc, rbc, leukocyte esterase and bacterias   - CT head: No acute pathology   - Xray chest: No acute pathology  - EKG: Sinus tachycardia, No T wave and ST segment changes  - urine culture-- Organism Identification: 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  discussed    with ID.   - consider LP if no improvement in 24 hours   - ID consult     #CKD 3  - creat baseline  - adjust meds for GFR   - avoid nephro toxic meds  - avoid volume overload  - strict in/out record     #BPH  - cw tamsulosin     #h/o COPD   -stable, resumed on inhalers, nebs tx.     # DM 2  - hold metformin   - monitor blood sugar  - insulin as per protocol  - target blood sugar 140 - 180     # CAD/ HTN/ HLD  - cw metoprolol, lisinopril     #Incidental Findings (Liver Nodules & Kidney cyst/hypodensity)  - Outpatient workup and follow up    #schizophrenia  - cw olanzapine and Trazodone     DVT PPX: heparin   GI PPX: none   DIET: npo untill speech and swallow   ACTIVITY:   CODE STATUS: full

## 2025-01-06 NOTE — SWALLOW BEDSIDE ASSESSMENT ADULT - SLP PERTINENT HISTORY OF CURRENT PROBLEM
An 83 y/o M resident of [facility] ([place]) with a pmhx hyperlipidemia (HLD), schizophrenia, COPD, DM2, non-Hodgkin's lymphoma, and hyponatremia brought to the ed for evaluation of the Fever. Patient is aox1 at time of the interview and is unable to provide accurate medical hx. Hx taken from the patient sister Ms Avilez via phone call . Sister states that today she visited his home at his facility and he found him sick. she states that patient was just not his baseline, and he was running a fever, was unable to recognize her, mentally off, inattentive. She states that this is not his baseline and she thought that patient is sick and she brought him to the ed for further evaluation. As per patient sister, patient is aox2 at baseline, can recognize her when she visit him at the facility, and walk with walker. Admitted for management of AMS in the setting of  sepsis secondary to UTI. 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

## 2025-01-07 LAB
-  AMOXICILLIN/CLAVULANIC ACID: SIGNIFICANT CHANGE UP
-  AMPICILLIN/SULBACTAM: SIGNIFICANT CHANGE UP
-  AMPICILLIN: SIGNIFICANT CHANGE UP
-  AZTREONAM: SIGNIFICANT CHANGE UP
-  CEFAZOLIN: SIGNIFICANT CHANGE UP
-  CEFEPIME: SIGNIFICANT CHANGE UP
-  CEFOXITIN: SIGNIFICANT CHANGE UP
-  CEFTRIAXONE: SIGNIFICANT CHANGE UP
-  CEFUROXIME: SIGNIFICANT CHANGE UP
-  CIPROFLOXACIN: SIGNIFICANT CHANGE UP
-  ERTAPENEM: SIGNIFICANT CHANGE UP
-  GENTAMICIN: SIGNIFICANT CHANGE UP
-  IMIPENEM: SIGNIFICANT CHANGE UP
-  LEVOFLOXACIN: SIGNIFICANT CHANGE UP
-  MEROPENEM: SIGNIFICANT CHANGE UP
-  NITROFURANTOIN: SIGNIFICANT CHANGE UP
-  PIPERACILLIN/TAZOBACTAM: SIGNIFICANT CHANGE UP
-  RESISTANCE GENE OXA: SIGNIFICANT CHANGE UP
-  TOBRAMYCIN: SIGNIFICANT CHANGE UP
-  TRIMETHOPRIM/SULFAMETHOXAZOLE: SIGNIFICANT CHANGE UP
BLANDM BLD POS QL PROBE: SIGNIFICANT CHANGE UP
CULTURE RESULTS: ABNORMAL
GLUCOSE BLDC GLUCOMTR-MCNC: 101 MG/DL — HIGH (ref 70–99)
GLUCOSE BLDC GLUCOMTR-MCNC: 95 MG/DL — SIGNIFICANT CHANGE UP (ref 70–99)
GLUCOSE BLDC GLUCOMTR-MCNC: 96 MG/DL — SIGNIFICANT CHANGE UP (ref 70–99)
METHOD TYPE: SIGNIFICANT CHANGE UP
METHOD TYPE: SIGNIFICANT CHANGE UP
ORGANISM # SPEC MICROSCOPIC CNT: ABNORMAL
ORGANISM # SPEC MICROSCOPIC CNT: ABNORMAL
ORGANISM # SPEC MICROSCOPIC CNT: SIGNIFICANT CHANGE UP
SPECIMEN SOURCE: SIGNIFICANT CHANGE UP

## 2025-01-07 PROCEDURE — 99232 SBSQ HOSP IP/OBS MODERATE 35: CPT

## 2025-01-07 RX ORDER — CEFTRIAXONE SODIUM 1 G/1
2000 INJECTION, POWDER, FOR SOLUTION INTRAMUSCULAR; INTRAVENOUS EVERY 24 HOURS
Refills: 0 | Status: DISCONTINUED | OUTPATIENT
Start: 2025-01-07 | End: 2025-01-08

## 2025-01-07 RX ADMIN — HEPARIN SODIUM 5000 UNIT(S): 1000 INJECTION, SOLUTION INTRAVENOUS; SUBCUTANEOUS at 05:21

## 2025-01-07 RX ADMIN — SODIUM CHLORIDE 75 MILLILITER(S): 9 INJECTION, SOLUTION INTRAVENOUS at 02:08

## 2025-01-07 RX ADMIN — SODIUM CHLORIDE 75 MILLILITER(S): 9 INJECTION, SOLUTION INTRAVENOUS at 16:22

## 2025-01-07 RX ADMIN — Medication 100 MILLIGRAM(S): at 05:22

## 2025-01-07 RX ADMIN — TAMSULOSIN HYDROCHLORIDE 0.4 MILLIGRAM(S): 0.4 CAPSULE ORAL at 21:04

## 2025-01-07 RX ADMIN — Medication 25 MILLIGRAM(S): at 18:16

## 2025-01-07 RX ADMIN — HEPARIN SODIUM 5000 UNIT(S): 1000 INJECTION, SOLUTION INTRAVENOUS; SUBCUTANEOUS at 21:04

## 2025-01-07 RX ADMIN — GABAPENTIN 300 MILLIGRAM(S): 300 CAPSULE ORAL at 18:16

## 2025-01-07 RX ADMIN — Medication 25 MILLIGRAM(S): at 05:20

## 2025-01-07 RX ADMIN — HEPARIN SODIUM 5000 UNIT(S): 1000 INJECTION, SOLUTION INTRAVENOUS; SUBCUTANEOUS at 13:31

## 2025-01-07 RX ADMIN — OLANZAPINE 10 MILLIGRAM(S): 15 TABLET ORAL at 12:00

## 2025-01-07 RX ADMIN — LISINOPRIL 20 MILLIGRAM(S): 30 TABLET ORAL at 05:20

## 2025-01-07 RX ADMIN — CEFTRIAXONE SODIUM 100 MILLIGRAM(S): 1 INJECTION, POWDER, FOR SOLUTION INTRAMUSCULAR; INTRAVENOUS at 18:16

## 2025-01-07 RX ADMIN — GABAPENTIN 300 MILLIGRAM(S): 300 CAPSULE ORAL at 05:20

## 2025-01-07 RX ADMIN — TRAZODONE HYDROCHLORIDE 50 MILLIGRAM(S): 150 TABLET ORAL at 21:04

## 2025-01-07 NOTE — PROGRESS NOTE ADULT - ASSESSMENT
· Assessment	  84-year-old male resident of [facility] ([place]) with a past medical history of hyperlipidemia (HLD), schizophrenia, chronic obstructive pulmonary disease (COPD), type 2 diabetes mellitus (DM2), non-Hodgkin's lymphoma, and hyponatremia brought to the ed for evaluation of the Fever. Patient is aox1 at time of the interview and is unable to provide accurate medical hx. Hx taken from the patient sister Ms Avilez via phone call . Sister states that today she visited his home at his facility and he found him sick. she states that patient was just not his baseline, and he was running a fever, was unable to recognize her, mentally off, inattentive. She states that this is not his baseline and she thought that patient is sick and she brought him to the ed for further evaluation. As per patient sister, patient is aox2 at baseline, can recognize her when she visit him at the facility, and walk with walker     ED VITALS  94 /min, T 102.8  CT head: No acute pathology     Xray chest: No acute pathology    IMPRESSION/RECOMMENDATIONS  Immunosuppression/Immunosenescence ( above age 60 yrs there is a exponential decline in immunity which could result in poor clinical outcomes.   Sepsis  Acute pyelonephritis with no obstructive uropathy due to Klebsiella pneumonia  Pyuria  1/5 BCx NG  1/5 UCx Klebsiella pneumonia. Independent analysis of CX results and interpretation   1/6 US : no hydronephrosis  Metabolic encephalopathy  1/5 CXR no opacities. ( Independent interpretation of test : no PNA  CBC :  ( WBC 7.0  ), ( Hg  10.5  ), CMP ( Cr  1.1  ) reviewed .   1/5 COVID 19/ Influenza/ RSV NG.   1/5 CT Head : NG    COPD (chronic obstructive pulmonary disease)  Paranoid schizophrenia  Schizophrenia  Diabetes type 2, controlled  COPD (chronic obstructive pulmonary disease)  Dyslipidemia  Chronic retention of urine    -Off loading to prevent pressure sores and preventive measures to avoid aspiration  -FU final UCX  -start Rocephin 2 gm iv q24h  -d/c Cefepime     Discussion of management/test results/antibiotic regimen  with primary medical team.

## 2025-01-07 NOTE — PROGRESS NOTE ADULT - TIME BILLING
I have personally seen and examined this patient. I have reviewed all pertinent clinical information and reviewed all relevant imaging and diagnostic studies personally.  I counseled the patient about diagnostic and treatment plan.  I discussed my recommendations with the primary team    >=1 chronic illness with severe exarcerbation, progression, treatment side effects.  -The doses of the antimicrobials will be adjusted according to the estimated creatinine clearence ( using the Cockroft-Gault equation ).  -I independently interpreted the most recent microbiological data. I analyzed the culture report and interpreted the MICs. I used my expertise in Infectious Diseases to tailor the antimicrobials.

## 2025-01-07 NOTE — PROGRESS NOTE ADULT - ASSESSMENT
An 84-year-old male resident of [facility] ([place]) with a past medical history of hyperlipidemia (HLD), schizophrenia, chronic obstructive pulmonary disease (COPD), type 2 diabetes mellitus (DM2), non-Hodgkin's lymphoma, and hyponatremia brought to the ed for evaluation of the Fever    #Altered mental status with Fever  #Toxic metabolic encephalopathy  #Sepsis present on admission secondary to Acute UTI   - Vitally stable on arrival   - hb 11, PT 13, GFR 55Urine positive for wbc, rbc, leukocyte esterase and bacterias   - CT head: No acute pathology   - Xray chest: No acute pathology  - EKG: Sinus tachycardia, No T wave and ST segment changes  - urine culture-- Organism Identification: 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) (10.28.23 @ 05:10)   Plan  - BCx NGTD   - UCx growing Klebsiella, f/u sensitivities  - elevated Procalcitonin 1.8  - Renal Bladder US shows no hydronephrosis, renal cysts  - IV fluids -- LR 75ml/hr  - mental status today improved, A&Ox2  - ID recs noted  - Cefepime 1 gm iv q12h, per ID  - f/u UCx sensitivites  - speech and swallow eval noted, soft + bite sized consistency and thin liquids    #CKD 3  - creat baseline  - adjust meds for GFR   - avoid nephro toxic meds  - avoid volume overload  - strict in/out record     #BPH  - cw tamsulosin     #h/o COPD   -stable, resumed on inhalers, nebs tx.     # DM 2  - hold metformin   - monitor blood sugar  - insulin as per protocol  - target blood sugar 140 - 180     # CAD/ HTN/ HLD  - cw metoprolol, lisinopril     #Incidental Findings (Liver Nodules & Kidney cyst/hypodensity)  - Outpatient workup and follow up    #schizophrenia  - cw olanzapine and Trazodone     DVT PPX: heparin   GI PPX: none   DIET: soft + bite sized consistency and thin liquids  ACTIVITY:  AAT  CODE STATUS: full

## 2025-01-08 LAB
ALBUMIN SERPL ELPH-MCNC: 4.1 G/DL — SIGNIFICANT CHANGE UP (ref 3.5–5.2)
ALP SERPL-CCNC: 90 U/L — SIGNIFICANT CHANGE UP (ref 30–115)
ALT FLD-CCNC: 11 U/L — SIGNIFICANT CHANGE UP (ref 0–41)
ANION GAP SERPL CALC-SCNC: 12 MMOL/L — SIGNIFICANT CHANGE UP (ref 7–14)
AST SERPL-CCNC: 23 U/L — SIGNIFICANT CHANGE UP (ref 0–41)
BASOPHILS # BLD AUTO: 0.01 K/UL — SIGNIFICANT CHANGE UP (ref 0–0.2)
BASOPHILS NFR BLD AUTO: 0.2 % — SIGNIFICANT CHANGE UP (ref 0–1)
BILIRUB SERPL-MCNC: <0.2 MG/DL — SIGNIFICANT CHANGE UP (ref 0.2–1.2)
BUN SERPL-MCNC: 18 MG/DL — SIGNIFICANT CHANGE UP (ref 10–20)
CALCIUM SERPL-MCNC: 8.8 MG/DL — SIGNIFICANT CHANGE UP (ref 8.4–10.4)
CHLORIDE SERPL-SCNC: 99 MMOL/L — SIGNIFICANT CHANGE UP (ref 98–110)
CO2 SERPL-SCNC: 25 MMOL/L — SIGNIFICANT CHANGE UP (ref 17–32)
CREAT SERPL-MCNC: 1.1 MG/DL — SIGNIFICANT CHANGE UP (ref 0.7–1.5)
EGFR: 67 ML/MIN/1.73M2 — SIGNIFICANT CHANGE UP
EOSINOPHIL # BLD AUTO: 0.03 K/UL — SIGNIFICANT CHANGE UP (ref 0–0.7)
EOSINOPHIL NFR BLD AUTO: 0.5 % — SIGNIFICANT CHANGE UP (ref 0–8)
GLUCOSE BLDC GLUCOMTR-MCNC: 83 MG/DL — SIGNIFICANT CHANGE UP (ref 70–99)
GLUCOSE BLDC GLUCOMTR-MCNC: 90 MG/DL — SIGNIFICANT CHANGE UP (ref 70–99)
GLUCOSE SERPL-MCNC: 84 MG/DL — SIGNIFICANT CHANGE UP (ref 70–99)
HCT VFR BLD CALC: 34.7 % — LOW (ref 42–52)
HGB BLD-MCNC: 11.5 G/DL — LOW (ref 14–18)
IMM GRANULOCYTES NFR BLD AUTO: 0.3 % — SIGNIFICANT CHANGE UP (ref 0.1–0.3)
LYMPHOCYTES # BLD AUTO: 1.9 K/UL — SIGNIFICANT CHANGE UP (ref 1.2–3.4)
LYMPHOCYTES # BLD AUTO: 31.4 % — SIGNIFICANT CHANGE UP (ref 20.5–51.1)
MAGNESIUM SERPL-MCNC: 1.9 MG/DL — SIGNIFICANT CHANGE UP (ref 1.8–2.4)
MCHC RBC-ENTMCNC: 31.5 PG — HIGH (ref 27–31)
MCHC RBC-ENTMCNC: 33.1 G/DL — SIGNIFICANT CHANGE UP (ref 32–37)
MCV RBC AUTO: 95.1 FL — HIGH (ref 80–94)
MONOCYTES # BLD AUTO: 0.55 K/UL — SIGNIFICANT CHANGE UP (ref 0.1–0.6)
MONOCYTES NFR BLD AUTO: 9.1 % — SIGNIFICANT CHANGE UP (ref 1.7–9.3)
MRSA PCR RESULT.: NEGATIVE — SIGNIFICANT CHANGE UP
NEUTROPHILS # BLD AUTO: 3.55 K/UL — SIGNIFICANT CHANGE UP (ref 1.4–6.5)
NEUTROPHILS NFR BLD AUTO: 58.5 % — SIGNIFICANT CHANGE UP (ref 42.2–75.2)
NRBC # BLD: 0 /100 WBCS — SIGNIFICANT CHANGE UP (ref 0–0)
PLATELET # BLD AUTO: 192 K/UL — SIGNIFICANT CHANGE UP (ref 130–400)
PMV BLD: 11.2 FL — HIGH (ref 7.4–10.4)
POTASSIUM SERPL-MCNC: 4.3 MMOL/L — SIGNIFICANT CHANGE UP (ref 3.5–5)
POTASSIUM SERPL-SCNC: 4.3 MMOL/L — SIGNIFICANT CHANGE UP (ref 3.5–5)
PROT SERPL-MCNC: 6.9 G/DL — SIGNIFICANT CHANGE UP (ref 6–8)
RBC # BLD: 3.65 M/UL — LOW (ref 4.7–6.1)
RBC # FLD: 15 % — HIGH (ref 11.5–14.5)
SODIUM SERPL-SCNC: 136 MMOL/L — SIGNIFICANT CHANGE UP (ref 135–146)
WBC # BLD: 6.06 K/UL — SIGNIFICANT CHANGE UP (ref 4.8–10.8)
WBC # FLD AUTO: 6.06 K/UL — SIGNIFICANT CHANGE UP (ref 4.8–10.8)

## 2025-01-08 PROCEDURE — 99233 SBSQ HOSP IP/OBS HIGH 50: CPT | Mod: FS

## 2025-01-08 RX ORDER — LISINOPRIL 30 MG/1
10 TABLET ORAL ONCE
Refills: 0 | Status: COMPLETED | OUTPATIENT
Start: 2025-01-08 | End: 2025-01-08

## 2025-01-08 RX ORDER — CHLORHEXIDINE GLUCONATE 1.2 MG/ML
1 RINSE ORAL
Refills: 0 | Status: DISCONTINUED | OUTPATIENT
Start: 2025-01-08 | End: 2025-01-14

## 2025-01-08 RX ADMIN — Medication 25 MILLIGRAM(S): at 17:08

## 2025-01-08 RX ADMIN — GABAPENTIN 300 MILLIGRAM(S): 300 CAPSULE ORAL at 05:24

## 2025-01-08 RX ADMIN — LISINOPRIL 20 MILLIGRAM(S): 30 TABLET ORAL at 05:24

## 2025-01-08 RX ADMIN — Medication 25 MILLIGRAM(S): at 05:24

## 2025-01-08 RX ADMIN — OLANZAPINE 10 MILLIGRAM(S): 15 TABLET ORAL at 11:02

## 2025-01-08 RX ADMIN — HEPARIN SODIUM 5000 UNIT(S): 1000 INJECTION, SOLUTION INTRAVENOUS; SUBCUTANEOUS at 05:24

## 2025-01-08 RX ADMIN — SODIUM CHLORIDE 75 MILLILITER(S): 9 INJECTION, SOLUTION INTRAVENOUS at 05:23

## 2025-01-08 RX ADMIN — SODIUM CHLORIDE 75 MILLILITER(S): 9 INJECTION, SOLUTION INTRAVENOUS at 19:32

## 2025-01-08 RX ADMIN — GABAPENTIN 300 MILLIGRAM(S): 300 CAPSULE ORAL at 17:06

## 2025-01-08 NOTE — PROGRESS NOTE ADULT - ASSESSMENT
· Assessment	  84-year-old male resident of [facility] ([place]) with a past medical history of hyperlipidemia (HLD), schizophrenia, chronic obstructive pulmonary disease (COPD), type 2 diabetes mellitus (DM2), non-Hodgkin's lymphoma, and hyponatremia brought to the ed for evaluation of the Fever. Patient is aox1 at time of the interview and is unable to provide accurate medical hx. Hx taken from the patient sister Ms Avilez via phone call . Sister states that today she visited his home at his facility and he found him sick. she states that patient was just not his baseline, and he was running a fever, was unable to recognize her, mentally off, inattentive. She states that this is not his baseline and she thought that patient is sick and she brought him to the ed for further evaluation. As per patient sister, patient is aox2 at baseline, can recognize her when she visit him at the facility, and walk with walker     ED VITALS  94 /min, T 102.8  CT head: No acute pathology     Xray chest: No acute pathology    IMPRESSION/RECOMMENDATIONS  Immunosuppression/Immunosenescence ( above age 60 yrs there is a exponential decline in immunity which could result in poor clinical outcomes.   Sepsis  Acute pyelonephritis with no obstructive uropathy due to Klebsiella pneumonia OXA positive  1/5 UCX Oxa positive Klebsiella pneumonia. Independent analysis of CX results and interpretation of sensitivity results.   Pyuria  1/5 BCx NG  1/5 UCx Klebsiella pneumonia. Independent analysis of CX results and interpretation   1/6 US : no hydronephrosis  Metabolic encephalopathy  1/5 CXR no opacities. ( Independent interpretation of test : no PNA  CBC :  ( WBC 7.0  ), ( Hg  10.5  ), CMP ( Cr  1.1  ) reviewed .   1/5 COVID 19/ Influenza/ RSV NG.   1/5 CT Head : NG    COPD (chronic obstructive pulmonary disease)  Paranoid schizophrenia  Schizophrenia  Diabetes type 2, controlled  COPD (chronic obstructive pulmonary disease)  Dyslipidemia  Chronic retention of urine    -Off loading to prevent pressure sores and preventive measures to avoid aspiration  -FU final UCX  -d/c Rocephin   -start Avycaz 2.5 gm iv q8h     Discussion of management/test results/antibiotic regimen  with primary medical team.  · Assessment	  84-year-old male resident of [facility] ([place]) with a past medical history of hyperlipidemia (HLD), schizophrenia, chronic obstructive pulmonary disease (COPD), type 2 diabetes mellitus (DM2), non-Hodgkin's lymphoma, and hyponatremia brought to the ed for evaluation of the Fever. Patient is aox1 at time of the interview and is unable to provide accurate medical hx. Hx taken from the patient sister Ms Avilez via phone call . Sister states that today she visited his home at his facility and he found him sick. she states that patient was just not his baseline, and he was running a fever, was unable to recognize her, mentally off, inattentive. She states that this is not his baseline and she thought that patient is sick and she brought him to the ed for further evaluation. As per patient sister, patient is aox2 at baseline, can recognize her when she visit him at the facility, and walk with walker     ED VITALS  94 /min, T 102.8  CT head: No acute pathology     Xray chest: No acute pathology    IMPRESSION/RECOMMENDATIONS  Immunosuppression/Immunosenescence ( above age 60 yrs there is a exponential decline in immunity which could result in poor clinical outcomes.   Sepsis  Acute pyelonephritis with no obstructive uropathy due to Klebsiella pneumonia OXA positive  1/5 UCX Oxa positive Klebsiella pneumonia. Independent analysis of CX results and interpretation of sensitivity results.   Pyuria  1/5 BCx NG  1/5 UCx Klebsiella pneumonia. Independent analysis of CX results and interpretation   1/6 US : no hydronephrosis  Metabolic encephalopathy  1/5 CXR no opacities. ( Independent interpretation of test : no PNA  CBC :  ( WBC 7.0  ), ( Hg  10.5  ), CMP ( Cr  1.1  ) reviewed .   1/5 COVID 19/ Influenza/ RSV NG.   1/5 CT Head : NG    COPD (chronic obstructive pulmonary disease)  Paranoid schizophrenia  Schizophrenia  Diabetes type 2, controlled  COPD (chronic obstructive pulmonary disease)  Dyslipidemia  Chronic retention of urine    -Off loading to prevent pressure sores and preventive measures to avoid aspiration  -FU final UCX  -d/c Rocephin   -start Avycaz 1.25 gm iv q8h     Discussion of management/test results/antibiotic regimen  with primary medical team.

## 2025-01-08 NOTE — PROGRESS NOTE ADULT - ASSESSMENT
An 84-year-old male resident of [facility] ([place]) with a past medical history of hyperlipidemia (HLD), schizophrenia, chronic obstructive pulmonary disease (COPD), type 2 diabetes mellitus (DM2), non-Hodgkin's lymphoma, and hyponatremia brought to the ed for evaluation of the Fever    #Altered mental status with Fever  #Toxic metabolic encephalopathy  #Sepsis present on admission secondary to Acute UTI   - BCx NGTD   - UCx growing Klebsiella, sensitivities are back, CRE  - speech and swallow eval noted, soft + bite sized consistency and thin liquids  - elevated Procalcitonin 1.8  - Renal Bladder US shows no hydronephrosis, renal cysts  - IV fluids -- LR 75ml/hr  - mental status today improved, A&Ox2  - Cefepime 1 gm iv q12h, per ID  - f/u ID recs regarding resistant Klebsiella growing in urine    #CKD 3  - creat baseline  - adjust meds for GFR   - avoid nephro toxic meds  - avoid volume overload  - strict in/out record     #BPH  - cw tamsulosin     #h/o COPD   -stable, resumed on inhalers, nebs tx.     # DM 2  - hold metformin   - monitor blood sugar  - insulin as per protocol  - target blood sugar 140 - 180     # CAD/ HTN/ HLD  - cw metoprolol, lisinopril     #Incidental Findings (Liver Nodules & Kidney cyst/hypodensity)  - Outpatient workup and follow up    #schizophrenia  - cw olanzapine and Trazodone     DVT PPX: heparin   GI PPX: none   DIET: soft + bite sized consistency and thin liquids  ACTIVITY:  AAT  CODE STATUS: full

## 2025-01-09 LAB
ANION GAP SERPL CALC-SCNC: 12 MMOL/L — SIGNIFICANT CHANGE UP (ref 7–14)
BASOPHILS # BLD AUTO: 0.01 K/UL — SIGNIFICANT CHANGE UP (ref 0–0.2)
BASOPHILS NFR BLD AUTO: 0.2 % — SIGNIFICANT CHANGE UP (ref 0–1)
BUN SERPL-MCNC: 19 MG/DL — SIGNIFICANT CHANGE UP (ref 10–20)
CALCIUM SERPL-MCNC: 8.9 MG/DL — SIGNIFICANT CHANGE UP (ref 8.4–10.5)
CHLORIDE SERPL-SCNC: 100 MMOL/L — SIGNIFICANT CHANGE UP (ref 98–110)
CO2 SERPL-SCNC: 24 MMOL/L — SIGNIFICANT CHANGE UP (ref 17–32)
CREAT SERPL-MCNC: 1 MG/DL — SIGNIFICANT CHANGE UP (ref 0.7–1.5)
EGFR: 75 ML/MIN/1.73M2 — SIGNIFICANT CHANGE UP
EOSINOPHIL # BLD AUTO: 0.03 K/UL — SIGNIFICANT CHANGE UP (ref 0–0.7)
EOSINOPHIL NFR BLD AUTO: 0.5 % — SIGNIFICANT CHANGE UP (ref 0–8)
GLUCOSE BLDC GLUCOMTR-MCNC: 105 MG/DL — HIGH (ref 70–99)
GLUCOSE BLDC GLUCOMTR-MCNC: 108 MG/DL — HIGH (ref 70–99)
GLUCOSE BLDC GLUCOMTR-MCNC: 78 MG/DL — SIGNIFICANT CHANGE UP (ref 70–99)
GLUCOSE BLDC GLUCOMTR-MCNC: 97 MG/DL — SIGNIFICANT CHANGE UP (ref 70–99)
GLUCOSE SERPL-MCNC: 99 MG/DL — SIGNIFICANT CHANGE UP (ref 70–99)
HCT VFR BLD CALC: 34.6 % — LOW (ref 42–52)
HGB BLD-MCNC: 11.6 G/DL — LOW (ref 14–18)
IMM GRANULOCYTES NFR BLD AUTO: 0.2 % — SIGNIFICANT CHANGE UP (ref 0.1–0.3)
LYMPHOCYTES # BLD AUTO: 1.89 K/UL — SIGNIFICANT CHANGE UP (ref 1.2–3.4)
LYMPHOCYTES # BLD AUTO: 33.9 % — SIGNIFICANT CHANGE UP (ref 20.5–51.1)
MAGNESIUM SERPL-MCNC: 1.9 MG/DL — SIGNIFICANT CHANGE UP (ref 1.8–2.4)
MCHC RBC-ENTMCNC: 31.4 PG — HIGH (ref 27–31)
MCHC RBC-ENTMCNC: 33.5 G/DL — SIGNIFICANT CHANGE UP (ref 32–37)
MCV RBC AUTO: 93.5 FL — SIGNIFICANT CHANGE UP (ref 80–94)
MONOCYTES # BLD AUTO: 0.44 K/UL — SIGNIFICANT CHANGE UP (ref 0.1–0.6)
MONOCYTES NFR BLD AUTO: 7.9 % — SIGNIFICANT CHANGE UP (ref 1.7–9.3)
NEUTROPHILS # BLD AUTO: 3.2 K/UL — SIGNIFICANT CHANGE UP (ref 1.4–6.5)
NEUTROPHILS NFR BLD AUTO: 57.3 % — SIGNIFICANT CHANGE UP (ref 42.2–75.2)
NRBC # BLD: 0 /100 WBCS — SIGNIFICANT CHANGE UP (ref 0–0)
PLATELET # BLD AUTO: 172 K/UL — SIGNIFICANT CHANGE UP (ref 130–400)
PMV BLD: 10.6 FL — HIGH (ref 7.4–10.4)
POTASSIUM SERPL-MCNC: 4.2 MMOL/L — SIGNIFICANT CHANGE UP (ref 3.5–5)
POTASSIUM SERPL-SCNC: 4.2 MMOL/L — SIGNIFICANT CHANGE UP (ref 3.5–5)
RBC # BLD: 3.7 M/UL — LOW (ref 4.7–6.1)
RBC # FLD: 14.6 % — HIGH (ref 11.5–14.5)
SODIUM SERPL-SCNC: 136 MMOL/L — SIGNIFICANT CHANGE UP (ref 135–146)
WBC # BLD: 5.58 K/UL — SIGNIFICANT CHANGE UP (ref 4.8–10.8)
WBC # FLD AUTO: 5.58 K/UL — SIGNIFICANT CHANGE UP (ref 4.8–10.8)

## 2025-01-09 PROCEDURE — 99233 SBSQ HOSP IP/OBS HIGH 50: CPT | Mod: FS

## 2025-01-09 RX ORDER — LISINOPRIL 30 MG/1
40 TABLET ORAL AT BEDTIME
Refills: 0 | Status: DISCONTINUED | OUTPATIENT
Start: 2025-01-09 | End: 2025-01-09

## 2025-01-09 RX ORDER — LISINOPRIL 30 MG/1
40 TABLET ORAL AT BEDTIME
Refills: 0 | Status: DISCONTINUED | OUTPATIENT
Start: 2025-01-09 | End: 2025-01-14

## 2025-01-09 RX ORDER — HYDRALAZINE HYDROCHLORIDE 10 MG/1
10 TABLET ORAL ONCE
Refills: 0 | Status: COMPLETED | OUTPATIENT
Start: 2025-01-09 | End: 2025-01-09

## 2025-01-09 RX ORDER — LISINOPRIL 30 MG/1
20 TABLET ORAL ONCE
Refills: 0 | Status: COMPLETED | OUTPATIENT
Start: 2025-01-09 | End: 2025-01-09

## 2025-01-09 RX ADMIN — LISINOPRIL 40 MILLIGRAM(S): 30 TABLET ORAL at 21:42

## 2025-01-09 RX ADMIN — Medication 25 MILLIGRAM(S): at 17:21

## 2025-01-09 RX ADMIN — GABAPENTIN 300 MILLIGRAM(S): 300 CAPSULE ORAL at 17:21

## 2025-01-09 RX ADMIN — Medication 5 MILLIGRAM(S): at 10:08

## 2025-01-09 RX ADMIN — HEPARIN SODIUM 5000 UNIT(S): 1000 INJECTION, SOLUTION INTRAVENOUS; SUBCUTANEOUS at 21:42

## 2025-01-09 RX ADMIN — LISINOPRIL 20 MILLIGRAM(S): 30 TABLET ORAL at 05:04

## 2025-01-09 RX ADMIN — TRAZODONE HYDROCHLORIDE 50 MILLIGRAM(S): 150 TABLET ORAL at 21:42

## 2025-01-09 RX ADMIN — TAMSULOSIN HYDROCHLORIDE 0.4 MILLIGRAM(S): 0.4 CAPSULE ORAL at 21:42

## 2025-01-09 RX ADMIN — CHLORHEXIDINE GLUCONATE 1 APPLICATION(S): 1.2 RINSE ORAL at 05:05

## 2025-01-09 RX ADMIN — OLANZAPINE 10 MILLIGRAM(S): 15 TABLET ORAL at 11:09

## 2025-01-09 RX ADMIN — LISINOPRIL 20 MILLIGRAM(S): 30 TABLET ORAL at 13:34

## 2025-01-09 RX ADMIN — SODIUM CHLORIDE 75 MILLILITER(S): 9 INJECTION, SOLUTION INTRAVENOUS at 07:51

## 2025-01-09 RX ADMIN — GABAPENTIN 300 MILLIGRAM(S): 300 CAPSULE ORAL at 05:04

## 2025-01-09 RX ADMIN — Medication 25 MILLIGRAM(S): at 05:04

## 2025-01-09 NOTE — PROGRESS NOTE ADULT - ASSESSMENT
· Assessment	  84-year-old male resident of [facility] ([place]) with a past medical history of hyperlipidemia (HLD), schizophrenia, chronic obstructive pulmonary disease (COPD), type 2 diabetes mellitus (DM2), non-Hodgkin's lymphoma, and hyponatremia brought to the ed for evaluation of the Fever. Patient is aox1 at time of the interview and is unable to provide accurate medical hx. Hx taken from the patient sister Ms Avilez via phone call . Sister states that today she visited his home at his facility and he found him sick. she states that patient was just not his baseline, and he was running a fever, was unable to recognize her, mentally off, inattentive. She states that this is not his baseline and she thought that patient is sick and she brought him to the ed for further evaluation. As per patient sister, patient is aox2 at baseline, can recognize her when she visit him at the facility, and walk with walker     ED VITALS  94 /min, T 102.8  CT head: No acute pathology     Xray chest: No acute pathology    IMPRESSION/RECOMMENDATIONS  Immunosuppression/Immunosenescence ( above age 60 yrs there is a exponential decline in immunity which could result in poor clinical outcomes.   Sepsis : resolved  Acute pyelonephritis with no obstructive uropathy due to Klebsiella pneumonia OXA positive  1/5 UCX Oxa positive Klebsiella pneumonia. Independent analysis of CX results and interpretation of sensitivity results.   Pyuria  1/5 BCx NG  1/5 UCx Klebsiella pneumonia. Independent analysis of CX results and interpretation   1/6 US : no hydronephrosis  Metabolic encephalopathy : resolving  1/5 CXR no opacities. ( Independent interpretation of test : no PNA  CBC :  ( WBC 6.0 ), ( Hg  11.5  ), CMP ( Cr  1.1  ) reviewed .   1/5 COVID 19/ Influenza/ RSV NG.   1/5 CT Head : NG    COPD (chronic obstructive pulmonary disease)  Paranoid schizophrenia  Schizophrenia  Diabetes type 2, controlled  COPD (chronic obstructive pulmonary disease)  Dyslipidemia  Chronic retention of urine    -Off loading to prevent pressure sores and preventive measures to avoid aspiration  -continue with Avycaz 1.25 gm iv q8h     Discussion of management/test results/antibiotic regimen  with primary medical team.

## 2025-01-09 NOTE — PROVIDER CONTACT NOTE (OTHER) - SITUATION
pt's repeat Bp taken manually in left arm is 182/ 82 and in right arm 160/82, patient is ordered for hydralazine IV push stat

## 2025-01-09 NOTE — PROVIDER CONTACT NOTE (OTHER) - ACTION/TREATMENT ORDERED:
as per MD  do not give stat dose now, wait one hour and recheck manually again
MD at bedside
as per DM no interventiona t this time.
as per MD will look into this.
as per MD torres do not give lisinopril 10 mg
as per MD will come assess patient

## 2025-01-09 NOTE — PROVIDER CONTACT NOTE (OTHER) - SITUATION
Patients UP=140/81 HR=77patient received metoprolol 25 mg as per MD  orders. Patient is asymptomatic

## 2025-01-09 NOTE — CHART NOTE - NSCHARTNOTEFT_GEN_A_CORE
Rn intimated this covering provider that the pt was hypertensive, and that she had administered the scheduled lopressor 25mg. On review of the chart pt HTN to 200 all day. Noted on the attending note from this am.     STAT Interventions: Hydralazine 10mg IV push, Increased the lisinopril to 40mg at bedtime, recheck the blood pressure Now, will continue to monitor

## 2025-01-09 NOTE — PROGRESS NOTE ADULT - ASSESSMENT
An 84-year-old male resident of [facility] ([place]) with a past medical history of hyperlipidemia (HLD), schizophrenia, chronic obstructive pulmonary disease (COPD), type 2 diabetes mellitus (DM2), non-Hodgkin's lymphoma, and hyponatremia brought to the ed for evaluation of the Fever    #Altered mental status with Fever  #Toxic metabolic encephalopathy  #Sepsis present on admission secondary to Acute UTI   - BCx NGTD   - UCx growing Klebsiella, sensitivities are back, CRE  - speech and swallow eval noted, soft + bite sized consistency and thin liquids  - elevated Procalcitonin 1.8  - Renal Bladder US shows no hydronephrosis, renal cysts  - IV fluids -- LR 75ml/hr  - mental status today improved, A&Ox2  - Avycaz 1.25 gm IV q8, per ID    #CKD 3  - creat baseline  - adjust meds for GFR   - avoid nephro toxic meds  - avoid volume overload  - strict in/out record     #BPH  - cw tamsulosin     #h/o COPD   -stable, resumed on inhalers, nebs tx.     # DM 2  - hold metformin   - monitor blood sugar  - insulin as per protocol  - target blood sugar 140 - 180     # CAD/ HTN/ HLD  - cw metoprolol, lisinopril     #Incidental Findings (Liver Nodules & Kidney cyst/hypodensity)  - Outpatient workup and follow up    #schizophrenia  - cw olanzapine and Trazodone     DVT PPX: heparin   GI PPX: none   DIET: soft + bite sized consistency and thin liquids  ACTIVITY:  AAT  CODE STATUS: full    An 84-year-old male resident of [facility] ([place]) with a past medical history of hyperlipidemia (HLD), schizophrenia, chronic obstructive pulmonary disease (COPD), type 2 diabetes mellitus (DM2), non-Hodgkin's lymphoma, and hyponatremia brought to the ed for evaluation of the Fever    #Altered mental status with Fever  #Toxic metabolic encephalopathy  #Sepsis present on admission secondary to Acute UTI   - BCx NGTD   - UCx growing Klebsiella, sensitivities are back, CRE  - speech and swallow eval noted, soft + bite sized consistency and thin liquids  - elevated Procalcitonin 1.8  - Renal Bladder US shows no hydronephrosis, renal cysts  - IV fluids -- LR 75ml/hr  - mental status today improved, A&Ox2  - Avycaz 1.25 gm IV q8, per ID    #CKD 3  - creat baseline  - adjust meds for GFR   - avoid nephro toxic meds  - avoid volume overload  - strict in/out record     #BPH  - cw tamsulosin     #h/o COPD   -stable, resumed on inhalers, nebs tx.     # DM 2  - hold metformin   - monitor blood sugar  - insulin as per protocol  - target blood sugar 140 - 180     # CAD/ HTN/ HLD  - cw metoprolol, lisinopril   - start amlodipine 5mg  - monitor BPs    #Incidental Findings (Liver Nodules & Kidney cyst/hypodensity)  - Outpatient workup and follow up    #schizophrenia  - cw olanzapine and Trazodone     DVT PPX: heparin   GI PPX: none   DIET: soft + bite sized consistency and thin liquids  ACTIVITY:  AAT  CODE STATUS: full

## 2025-01-10 ENCOUNTER — TRANSCRIPTION ENCOUNTER (OUTPATIENT)
Age: 85
End: 2025-01-10

## 2025-01-10 LAB
ALBUMIN SERPL ELPH-MCNC: 4.1 G/DL — SIGNIFICANT CHANGE UP (ref 3.5–5.2)
ALP SERPL-CCNC: 86 U/L — SIGNIFICANT CHANGE UP (ref 30–115)
ALT FLD-CCNC: 13 U/L — SIGNIFICANT CHANGE UP (ref 0–41)
ANION GAP SERPL CALC-SCNC: 13 MMOL/L — SIGNIFICANT CHANGE UP (ref 7–14)
AST SERPL-CCNC: 21 U/L — SIGNIFICANT CHANGE UP (ref 0–41)
BASOPHILS # BLD AUTO: 0.01 K/UL — SIGNIFICANT CHANGE UP (ref 0–0.2)
BASOPHILS NFR BLD AUTO: 0.2 % — SIGNIFICANT CHANGE UP (ref 0–1)
BILIRUB SERPL-MCNC: <0.2 MG/DL — SIGNIFICANT CHANGE UP (ref 0.2–1.2)
BUN SERPL-MCNC: 17 MG/DL — SIGNIFICANT CHANGE UP (ref 10–20)
CALCIUM SERPL-MCNC: 8.9 MG/DL — SIGNIFICANT CHANGE UP (ref 8.4–10.5)
CHLORIDE SERPL-SCNC: 97 MMOL/L — LOW (ref 98–110)
CO2 SERPL-SCNC: 22 MMOL/L — SIGNIFICANT CHANGE UP (ref 17–32)
CREAT SERPL-MCNC: 1.1 MG/DL — SIGNIFICANT CHANGE UP (ref 0.7–1.5)
CULTURE RESULTS: SIGNIFICANT CHANGE UP
CULTURE RESULTS: SIGNIFICANT CHANGE UP
EGFR: 67 ML/MIN/1.73M2 — SIGNIFICANT CHANGE UP
EOSINOPHIL # BLD AUTO: 0.02 K/UL — SIGNIFICANT CHANGE UP (ref 0–0.7)
EOSINOPHIL NFR BLD AUTO: 0.4 % — SIGNIFICANT CHANGE UP (ref 0–8)
GLUCOSE BLDC GLUCOMTR-MCNC: 100 MG/DL — HIGH (ref 70–99)
GLUCOSE BLDC GLUCOMTR-MCNC: 119 MG/DL — HIGH (ref 70–99)
GLUCOSE BLDC GLUCOMTR-MCNC: 120 MG/DL — HIGH (ref 70–99)
GLUCOSE BLDC GLUCOMTR-MCNC: 127 MG/DL — HIGH (ref 70–99)
GLUCOSE SERPL-MCNC: 104 MG/DL — HIGH (ref 70–99)
HCT VFR BLD CALC: 34.3 % — LOW (ref 42–52)
HGB BLD-MCNC: 11.8 G/DL — LOW (ref 14–18)
IMM GRANULOCYTES NFR BLD AUTO: 0.4 % — HIGH (ref 0.1–0.3)
LYMPHOCYTES # BLD AUTO: 1.64 K/UL — SIGNIFICANT CHANGE UP (ref 1.2–3.4)
LYMPHOCYTES # BLD AUTO: 30.1 % — SIGNIFICANT CHANGE UP (ref 20.5–51.1)
MAGNESIUM SERPL-MCNC: 1.9 MG/DL — SIGNIFICANT CHANGE UP (ref 1.8–2.4)
MCHC RBC-ENTMCNC: 31.8 PG — HIGH (ref 27–31)
MCHC RBC-ENTMCNC: 34.4 G/DL — SIGNIFICANT CHANGE UP (ref 32–37)
MCV RBC AUTO: 92.5 FL — SIGNIFICANT CHANGE UP (ref 80–94)
MONOCYTES # BLD AUTO: 0.44 K/UL — SIGNIFICANT CHANGE UP (ref 0.1–0.6)
MONOCYTES NFR BLD AUTO: 8.1 % — SIGNIFICANT CHANGE UP (ref 1.7–9.3)
NEUTROPHILS # BLD AUTO: 3.31 K/UL — SIGNIFICANT CHANGE UP (ref 1.4–6.5)
NEUTROPHILS NFR BLD AUTO: 60.8 % — SIGNIFICANT CHANGE UP (ref 42.2–75.2)
NRBC # BLD: 0 /100 WBCS — SIGNIFICANT CHANGE UP (ref 0–0)
PLATELET # BLD AUTO: 200 K/UL — SIGNIFICANT CHANGE UP (ref 130–400)
PMV BLD: 11.2 FL — HIGH (ref 7.4–10.4)
POTASSIUM SERPL-MCNC: 4.2 MMOL/L — SIGNIFICANT CHANGE UP (ref 3.5–5)
POTASSIUM SERPL-SCNC: 4.2 MMOL/L — SIGNIFICANT CHANGE UP (ref 3.5–5)
PROT SERPL-MCNC: 6.6 G/DL — SIGNIFICANT CHANGE UP (ref 6–8)
RBC # BLD: 3.71 M/UL — LOW (ref 4.7–6.1)
RBC # FLD: 14.5 % — SIGNIFICANT CHANGE UP (ref 11.5–14.5)
SODIUM SERPL-SCNC: 132 MMOL/L — LOW (ref 135–146)
SPECIMEN SOURCE: SIGNIFICANT CHANGE UP
SPECIMEN SOURCE: SIGNIFICANT CHANGE UP
WBC # BLD: 5.44 K/UL — SIGNIFICANT CHANGE UP (ref 4.8–10.8)
WBC # FLD AUTO: 5.44 K/UL — SIGNIFICANT CHANGE UP (ref 4.8–10.8)

## 2025-01-10 PROCEDURE — 99232 SBSQ HOSP IP/OBS MODERATE 35: CPT | Mod: FS

## 2025-01-10 RX ORDER — LISINOPRIL 30 MG/1
1 TABLET ORAL
Qty: 30 | Refills: 0
Start: 2025-01-10 | End: 2025-02-08

## 2025-01-10 RX ORDER — CEFTAZIDIME, AVIBACTAM 2; .5 G/1; G/1
1.25 POWDER, FOR SOLUTION INTRAVENOUS
Qty: 18.75 | Refills: 0
Start: 2025-01-10 | End: 2025-01-14

## 2025-01-10 RX ORDER — LISINOPRIL 30 MG/1
1 TABLET ORAL
Qty: 0 | Refills: 0 | DISCHARGE
Start: 2025-01-10

## 2025-01-10 RX ORDER — LISINOPRIL 30 MG/1
1 TABLET ORAL
Refills: 0 | DISCHARGE

## 2025-01-10 RX ORDER — NICOTINE POLACRILEX 4 MG/1
1 LOZENGE ORAL
Qty: 30 | Refills: 0
Start: 2025-01-10 | End: 2025-02-08

## 2025-01-10 RX ORDER — NICOTINE POLACRILEX 4 MG/1
1 LOZENGE ORAL DAILY
Refills: 0 | Status: DISCONTINUED | OUTPATIENT
Start: 2025-01-10 | End: 2025-01-14

## 2025-01-10 RX ADMIN — HEPARIN SODIUM 5000 UNIT(S): 1000 INJECTION, SOLUTION INTRAVENOUS; SUBCUTANEOUS at 13:12

## 2025-01-10 RX ADMIN — Medication 5 MILLIGRAM(S): at 05:36

## 2025-01-10 RX ADMIN — OLANZAPINE 10 MILLIGRAM(S): 15 TABLET ORAL at 11:31

## 2025-01-10 RX ADMIN — Medication 5 MILLIGRAM(S): at 09:49

## 2025-01-10 RX ADMIN — CHLORHEXIDINE GLUCONATE 1 APPLICATION(S): 1.2 RINSE ORAL at 05:36

## 2025-01-10 RX ADMIN — GABAPENTIN 300 MILLIGRAM(S): 300 CAPSULE ORAL at 17:07

## 2025-01-10 RX ADMIN — LISINOPRIL 40 MILLIGRAM(S): 30 TABLET ORAL at 21:06

## 2025-01-10 RX ADMIN — TRAZODONE HYDROCHLORIDE 50 MILLIGRAM(S): 150 TABLET ORAL at 21:06

## 2025-01-10 RX ADMIN — HEPARIN SODIUM 5000 UNIT(S): 1000 INJECTION, SOLUTION INTRAVENOUS; SUBCUTANEOUS at 21:06

## 2025-01-10 RX ADMIN — HEPARIN SODIUM 5000 UNIT(S): 1000 INJECTION, SOLUTION INTRAVENOUS; SUBCUTANEOUS at 05:36

## 2025-01-10 RX ADMIN — Medication 25 MILLIGRAM(S): at 17:08

## 2025-01-10 RX ADMIN — NICOTINE POLACRILEX 1 PATCH: 4 LOZENGE ORAL at 19:00

## 2025-01-10 RX ADMIN — GABAPENTIN 300 MILLIGRAM(S): 300 CAPSULE ORAL at 05:38

## 2025-01-10 RX ADMIN — NICOTINE POLACRILEX 1 PATCH: 4 LOZENGE ORAL at 11:31

## 2025-01-10 RX ADMIN — TAMSULOSIN HYDROCHLORIDE 0.4 MILLIGRAM(S): 0.4 CAPSULE ORAL at 21:06

## 2025-01-10 RX ADMIN — Medication 25 MILLIGRAM(S): at 05:36

## 2025-01-10 NOTE — PROGRESS NOTE ADULT - ASSESSMENT
84-year-old male resident of [facility] ([place]) with a past medical history of hyperlipidemia (HLD), schizophrenia, chronic obstructive pulmonary disease (COPD), type 2 diabetes mellitus (DM2), non-Hodgkin's lymphoma, and hyponatremia brought to the ed for evaluation of the Fever. Patient is aox1 at time of the interview and is unable to provide accurate medical hx. Hx taken from the patient sister Ms Avilez via phone call . Sister states that today she visited his home at his facility and he found him sick. she states that patient was just not his baseline, and he was running a fever, was unable to recognize her, mentally off, inattentive. She states that this is not his baseline and she thought that patient is sick and she brought him to the ed for further evaluation. As per patient sister, patient is aox2 at baseline, can recognize her when she visit him at the facility, and walk with walker     ED VITALS  94 /min, T 102.8  CT head: No acute pathology     Xray chest: No acute pathology    IMPRESSION/RECOMMENDATIONS  Immunosuppression/Immunosenescence ( above age 60 yrs there is a exponential decline in immunity which could result in poor clinical outcomes.   Sepsis : resolved  Acute pyelonephritis with no obstructive uropathy due to Klebsiella pneumonia OXA positive  1/5 UCX Oxa positive Klebsiella pneumonia. Independent analysis of CX results and interpretation of sensitivity results.   Pyuria  1/5 BCx NG  1/5 UCx Klebsiella pneumonia. Independent analysis of CX results and interpretation   1/6 US : no hydronephrosis  Metabolic encephalopathy : resolving  1/5 CXR no opacities. ( Independent interpretation of test : no PNA  CBC :  ( WBC 5.4 ), ( Hg  11.8 ), CMP ( Cr  1.1  ) reviewed .   1/5 COVID 19/ Influenza/ RSV NG.   1/5 CT Head : NG    COPD (chronic obstructive pulmonary disease)  Paranoid schizophrenia  Schizophrenia  Diabetes type 2, controlled  COPD (chronic obstructive pulmonary disease)  Dyslipidemia  Chronic retention of urine    -Off loading to prevent pressure sores and preventive measures to avoid aspiration  -continue with Avycaz 1.25 gm iv q8h for 7 days     Discussion of management/test results/antibiotic regimen  with primary medical team.

## 2025-01-10 NOTE — DISCHARGE NOTE PROVIDER - NSDCFUADDINST_GEN_ALL_CORE_FT
Please follow up with your primary care provider within 1 week for repeat labwork (CBC, CMP) as well as for management of your hypertension.

## 2025-01-10 NOTE — DISCHARGE NOTE PROVIDER - NSDCCPCAREPLAN_GEN_ALL_CORE_FT
PRINCIPAL DISCHARGE DIAGNOSIS  Diagnosis: Acute UTI  Assessment and Plan of Treatment: Mr. Laguna, you came to the hospital due to altered mental status and fever. You were found to have urinary tract infection and your urine culture grew carbapenem resistant Klebsiella Pneumoniae, which is a bacteria that is resistant to many of our typical antibiotics. You were treated with IV antibiotics, which you will continue, Avycaz 1.24grams every 8 hours until 1/15. During your hospitalization, you were also found to have very high blood pressures with systolic pressures up to the 220s. We increased your home lisinopril to 40mg daily and started you on amlodipine 10mg daily. Please follow up with your primary care provider within 1 week for repeat labwork (CBC, CMP) as well as for management of your hypertension.      SECONDARY DISCHARGE DIAGNOSES  Diagnosis: Altered mental status  Assessment and Plan of Treatment:     Diagnosis: Sepsis  Assessment and Plan of Treatment:      PRINCIPAL DISCHARGE DIAGNOSIS  Diagnosis: Acute UTI  Assessment and Plan of Treatment: Mr. Laguna, you came to the hospital due to altered mental status and fever. You were found to have urinary tract infection and your urine culture grew carbapenem resistant Klebsiella Pneumoniae, which is a bacteria that is resistant to many of our typical antibiotics. You were treated with IV antibiotics, which you will continue, Avycaz 1.24grams every 8 hours until 1/14. During your hospitalization, you were also found to have very high blood pressures with systolic pressures up to the 220s. We increased your home lisinopril to 40mg daily and started you on Nifedipine XL 90 mg daily. Please follow up with your primary care provider within 1 week for repeat labwork (CBC, CMP) as well as for management of your hypertension. Mild elevation of Cr (<30% of baseline of 1.0) and mild hyperkalemia (<5.5) in the first 3-4 weeks could be expected with increase or initiation of lisinopril. Renal Hemdynamics will eventually adapt. No need to decrease dose necessarily because of that.      SECONDARY DISCHARGE DIAGNOSES  Diagnosis: Altered mental status  Assessment and Plan of Treatment:     Diagnosis: Sepsis  Assessment and Plan of Treatment:      PRINCIPAL DISCHARGE DIAGNOSIS  Diagnosis: Acute UTI  Assessment and Plan of Treatment: Mr. Laguna, you came to the hospital due to altered mental status and fever. You were found to have urinary tract infection and your urine culture grew carbapenem resistant Klebsiella Pneumoniae, which is a bacteria that is resistant to many of our typical antibiotics. You were treated with IV antibiotics, which you will continue, Avycaz 1.24grams every 8 hours until 1/14. Patient took his 6 AM dose, he still has his 2 PM and 10PM doses.  During your hospitalization, you were also found to have very high blood pressures with systolic pressures up to the 220s. We increased your home lisinopril to 40mg daily and started you on Nifedipine XL 90 mg daily. Please follow up with your primary care provider within 1 week for repeat labwork (CBC, CMP) as well as for management of your hypertension. Mild elevation of Cr (<30% of baseline of 1.0) and mild hyperkalemia (<5.5) in the first 3-4 weeks could be expected with increase or initiation of lisinopril. Renal Hemdynamics will eventually adapt. No need to decrease dose necessarily because of that.      SECONDARY DISCHARGE DIAGNOSES  Diagnosis: Altered mental status  Assessment and Plan of Treatment:     Diagnosis: Sepsis  Assessment and Plan of Treatment:

## 2025-01-10 NOTE — DISCHARGE NOTE PROVIDER - HOSPITAL COURSE
H&P      Assessment and Plan  An 84-year-old male resident of [facility] ([place]) with a past medical history of hyperlipidemia (HLD), schizophrenia, chronic obstructive pulmonary disease (COPD), type 2 diabetes mellitus (DM2), non-Hodgkin's lymphoma, and hyponatremia brought to the ed for evaluation of the Fever    #Altered mental status with Fever  #Toxic metabolic encephalopathy  #Sepsis present on admission secondary to Acute UTI   - BCx NGTD   - UCx growing Klebsiella, sensitivities show CRE  - speech and swallow eval noted, soft + bite sized consistency and thin liquids  - elevated Procalcitonin 1.8  - Renal Bladder US shows no hydronephrosis, renal cysts  - IV fluids -- LR 75ml/hr  - mental status today improved, A&Ox2  - Avycaz 1.25 gm IV q8, per ID 7 days, last dose 1/15  - sterile midline placed 1/10    #HTN  # CAD/ HLD  Patient had labile BPs during this hospitalization with SBPs up to 220s.  - cw metoprolol  - increased lisinopril to 40mg daily  - increased amlodipine 10mg  - monitor BPs    #CKD 3  - creat baseline  - adjust meds for GFR   - avoid nephro toxic meds  - avoid volume overload  - strict in/out record     #BPH  - cw tamsulosin     #h/o COPD   -stable, resumed on inhalers, nebs tx.     # DM 2  - hold metformin   - monitor blood sugar  - insulin as per protocol  - target blood sugar 140 - 180     #Incidental Findings (Liver Nodules & Kidney cyst/hypodensity)  - Outpatient workup and follow up    #schizophrenia  - cw olanzapine and Trazodone     DVT PPX: heparin   GI PPX: none   DIET: soft + bite sized consistency and thin liquids  ACTIVITY:  AAT  CODE STATUS: full    An 84-year-old male resident of [facility] ([place]) with a past medical history of hyperlipidemia (HLD), schizophrenia, chronic obstructive pulmonary disease (COPD), type 2 diabetes mellitus (DM2), non-Hodgkin's lymphoma, and hyponatremia brought to the ed for evaluation of the Fever    #Altered mental status with Fever  #Toxic metabolic encephalopathy  #Sepsis present on admission secondary to Acute UTI   #CRE Klebsiella UTI: Panresistant in sensitivity report. Per ID, started Avycaz  1.25 gm IV q8 :  1/8 - 1/14  - Midline inserted 1/10.  - Pending Placement SICC. They saying they cannot do Avycaz there.    Mental status now AAOx2. May be his baseline given Schizo. C/w Zyprexa.  - BCx NGTD   - speech and swallow eval noted, soft + bite sized consistency and thin liquids  - elevated Procalcitonin 1.8  - Renal Bladder US shows no hydronephrosis, renal cysts  - s/p IV fluids -- LR 75ml/hr  - Cw Avycaz until completion on 1/14, then DC    #CKD 3  - baseline Cr around 1.1  - Monitor.     #BPH  - cw tamsulosin     #h/o COPD   -stable, resumed on inhalers, nebs tx.     # DM 2  - hold metformin. SSI here     # CAD/ HTN/ HLD  - BP uncontrolled (1/9) : cw metoprolol, Increased lisinopril 20 to 40 , started amlodipine 5mg, D/kiah LR@75.   1/10: increased to amldipine 10.   1/11: BP at goal. Mild rise in Cr 1.3, K 5.1. Monitor. Upto 30% Rise in Cr within first 2-4 weeks of increasing lisinopril dose and K <5.5 does not necessitate dose adjustment. Renal Hemodynamics will adapt.   1/12: K 5.4 > Lokelma x 2. Cr 1.3. monitor as above.   - Hydralazine PRN.      #Incidental Findings (Liver Nodules & Kidney cyst/hypodensity)  - Outpatient workup and follow up    #schizophrenia  - cw olanzapine and Trazodone     Discussion of discharge plan of care, including discharge diagnoses, medication reconciliation, and follow-ups was conducted with Dr. Laura on 1/13/25 at 12:30PM and discharge was approved.   An 84-year-old male resident of [facility] ([place]) with a past medical history of hyperlipidemia (HLD), schizophrenia, chronic obstructive pulmonary disease (COPD), type 2 diabetes mellitus (DM2), non-Hodgkin's lymphoma, and hyponatremia brought to the ed for evaluation of the Fever    #Altered mental status with Fever  #Toxic metabolic encephalopathy  #Sepsis present on admission secondary to Acute UTI   #CRE Klebsiella UTI: Panresistant in sensitivity report. Per ID, started Avycaz  1.25 gm IV q8 :  1/8 - 1/14  - Midline inserted 1/10.  - Pending Placement SICC. They will take him today after afternoon dose complete.    Mental status now AAOx2. May be his baseline given Schizo. C/w Zyprexa. f/u Psych outpatient.   - BCx NGTD   - speech and swallow eval noted, soft + bite sized consistency and thin liquids  - elevated Procalcitonin 1.8  - Renal Bladder US shows no hydronephrosis, renal cysts  - s/p IV fluids -- LR 75ml/hr  - Cw Avycaz until completion on 1/14, then DC    #CKD 3  - baseline Cr around 1.1  - Monitor.     #BPH  - cw tamsulosin     #h/o COPD   -stable, resumed on inhalers, nebs tx.     # DM 2  - hold metformin. SSI here     # CAD/ HTN/ HLD  - BP uncontrolled (1/9) : cw metoprolol, Increased lisinopril 20 to 40 , started amlodipine 5mg, D/kiah LR@75.   1/10: increased to amldipine 10.   1/11: BP at goal. Mild rise in Cr 1.3, K 5.1. Monitor. Upto 30% Rise in Cr within first 2-4 weeks of increasing lisinopril dose and K <5.5 does not necessitate dose adjustment. Renal Hemodynamics will adapt.   1/12: K 5.4 > Lokelma x 2. Cr 1.3. monitor as above.   - Hydralazine PRN.      #Incidental Findings (Liver Nodules & Kidney cyst/hypodensity)  - Outpatient workup and follow up    #schizophrenia  - cw olanzapine and Trazodone     Discussion of discharge plan of care, including discharge diagnoses, medication reconciliation, and follow-ups was conducted with Dr. Laura on 1/13/25 at 12:30PM and discharge was approved.    Attending Note:  Patient seen and examined independently. I personally had a face-to-face encounter with the patient, examined the patient myself, personally reviewed labs & Radiology images,  and reviewed the plan of care with the housestaff. Agree with resident's note but my note supersedes that of the resident in the matters hereby listed.   D/c to [facility]

## 2025-01-10 NOTE — DISCHARGE NOTE PROVIDER - NSDCMRMEDTOKEN_GEN_ALL_CORE_FT
albuterol 90 mcg/inh inhalation aerosol: 1 puff(s) inhaled prn as needed every 6 hours  amLODIPine 10 mg oral tablet: 1 tab(s) orally once a day  ceftazidime-avibactam 2 g-0.5 g intravenous injection: 1.25 gram(s) intravenous every 8 hours Please continue Avycaz 1.25g IV every 8 hours infused over 3 hours for 4 more days, last dose 1/15  Centrum oral tablet: 1 tab(s) orally once a day  gabapentin 300 mg oral capsule: 1 cap(s) orally 2 times a day  lisinopril 40 mg oral tablet: 1 tab(s) orally once a day (at bedtime)  metFORMIN 500 mg oral tablet: 1 tab(s) orally 2 times a day  metoprolol tartrate 25 mg oral tablet: 1 tab(s) orally 2 times a day  nicotine 14 mg/24 hr transdermal film, extended release: 1 transdermal once a day  OLANZapine 10 mg oral tablet: 1 tab(s) orally once a day  tamsulosin 0.4 mg oral capsule: 1 cap(s) orally once a day (at bedtime)  traZODone 50 mg oral tablet: 1 tab(s) orally once a day (at bedtime)   albuterol 90 mcg/inh inhalation aerosol: 1 puff(s) inhaled prn as needed every 6 hours  ceftazidime-avibactam 2 g-0.5 g intravenous injection: 1.25 gram(s) intravenous every 8 hours Please continue Avycaz 1.25g IV every 8 hours infused over 3 hours for 4 more days, last dose 1/15  Centrum oral tablet: 1 tab(s) orally once a day  gabapentin 300 mg oral capsule: 1 cap(s) orally 2 times a day  lisinopril 40 mg oral tablet: 1 tab(s) orally once a day (at bedtime)  metFORMIN 500 mg oral tablet: 1 tab(s) orally 2 times a day  metoprolol tartrate 25 mg oral tablet: 1 tab(s) orally 2 times a day  nicotine 14 mg/24 hr transdermal film, extended release: 1 transdermal once a day  NIFEdipine 90 mg oral tablet, extended release: 1 tab(s) orally once a day  OLANZapine 10 mg oral tablet: 1 tab(s) orally once a day  tamsulosin 0.4 mg oral capsule: 1 cap(s) orally once a day (at bedtime)  traZODone 50 mg oral tablet: 1 tab(s) orally once a day (at bedtime)   albuterol 90 mcg/inh inhalation aerosol: 1 puff(s) inhaled prn as needed every 6 hours  ceftazidime-avibactam 2 g-0.5 g intravenous injection: 1.25 gram(s) intravenous every 8 hours Please continue Avycaz 1.25g IV every 8 hours infused over 3 hours for 4 more days, last dose 1/14  Centrum oral tablet: 1 tab(s) orally once a day  gabapentin 300 mg oral capsule: 1 cap(s) orally 2 times a day  lisinopril 40 mg oral tablet: 1 tab(s) orally once a day (at bedtime)  metFORMIN 500 mg oral tablet: 1 tab(s) orally 2 times a day  metoprolol tartrate 25 mg oral tablet: 1 tab(s) orally 2 times a day  nicotine 14 mg/24 hr transdermal film, extended release: 1 transdermal once a day  NIFEdipine 90 mg oral tablet, extended release: 1 tab(s) orally once a day  OLANZapine 10 mg oral tablet: 1 tab(s) orally once a day  tamsulosin 0.4 mg oral capsule: 1 cap(s) orally once a day (at bedtime)  traZODone 50 mg oral tablet: 1 tab(s) orally once a day (at bedtime)

## 2025-01-10 NOTE — DISCHARGE NOTE PROVIDER - ATTENDING DISCHARGE PHYSICAL EXAMINATION:
All other pertinent ROS negative.      Vital Signs Last 24 Hrs  T(C): 36.5 (13 Jan 2025 05:00), Max: 36.5 (13 Jan 2025 05:00)  T(F): 97.7 (13 Jan 2025 05:00), Max: 97.7 (13 Jan 2025 05:00)  HR: 66 (13 Jan 2025 09:40) (61 - 78)  BP: 177/61 (13 Jan 2025 09:40) (152/74 - 190/76)  BP(mean): --  RR: 18 (13 Jan 2025 09:40) (18 - 18)  SpO2: 98% (13 Jan 2025 05:00) (98% - 98%)      PHYSICAL EXAM:    Constitutional: NAD, awake and alert  HEENT: PERR, EOMI, Normal Hearing, MMM  Neck: Soft and supple, No LAD, No JVD  Respiratory: Breath sounds are clear bilaterally, No wheezing, rales or rhonchi  Cardiovascular: S1 and S2, regular rate and rhythm, no Murmurs, gallops or rubs  Gastrointestinal: Bowel Sounds present, soft, nontender, nondistended, no guarding, no rebound  Extremities: No peripheral edema      MEDICATIONS:  MEDICATIONS  (STANDING):  amLODIPine   Tablet 10 milliGRAM(s) Oral daily  ceftazidime/avibactam IVPB 1.25 Gram(s) IV Intermittent every 8 hours  chlorhexidine 2% Cloths 1 Application(s) Topical <User Schedule>  dextrose 5%. 1000 milliLiter(s) (100 mL/Hr) IV Continuous <Continuous>  dextrose 5%. 1000 milliLiter(s) (50 mL/Hr) IV Continuous <Continuous>  dextrose 50% Injectable 25 Gram(s) IV Push once  dextrose 50% Injectable 12.5 Gram(s) IV Push once  dextrose 50% Injectable 25 Gram(s) IV Push once  gabapentin 300 milliGRAM(s) Oral two times a day  glucagon  Injectable 1 milliGRAM(s) IntraMuscular once  heparin   Injectable 5000 Unit(s) SubCutaneous every 8 hours  hydrALAZINE Injectable 10 milliGRAM(s) IV Push once  insulin lispro (ADMELOG) corrective regimen sliding scale   SubCutaneous three times a day before meals  lisinopril 40 milliGRAM(s) Oral at bedtime  metoprolol tartrate 25 milliGRAM(s) Oral two times a day  nicotine -  14 mG/24Hr(s) Patch 1 Patch Transdermal daily  OLANZapine 10 milliGRAM(s) Oral daily  tamsulosin 0.4 milliGRAM(s) Oral at bedtime  traZODone 50 milliGRAM(s) Oral at bedtime      LABS: All Labs Reviewed:                        11.4   5.96  )-----------( 227      ( 13 Jan 2025 07:41 )             34.5     01-13    135  |  98  |  24[H]  ----------------------------<  93  4.6   |  26  |  1.2    Ca    9.4      13 Jan 2025 07:41  Mg     2.1     01-13    TPro  6.9  /  Alb  4.0  /  TBili  0.2  /  DBili  x   /  AST  27  /  ALT  20  /  AlkPhos  87  01-13          Blood Culture:     Radiology: reviewed

## 2025-01-10 NOTE — PROGRESS NOTE ADULT - ATTENDING COMMENTS
An 84-year-old male resident of [facility] ([place]) with a past medical history of hyperlipidemia (HLD), schizophrenia, chronic obstructive pulmonary disease (COPD), type 2 diabetes mellitus (DM2), non-Hodgkin's lymphoma, and hyponatremia brought to the ed for evaluation of the Fever    #Altered mental status with Fever  #Toxic metabolic encephalopathy  #Sepsis present on admission secondary to Acute UTI   #CR Klebsiella UTI: Panresistant in sensitivity report. Per ID, start Avycaz (1/8).   Mental status still AAOx1. May be his baseline given Schizo. C/w Zyprexa.  - BCx NGTD   - UCx growing Klebsiella, sensitivities are back, CRE  - speech and swallow eval noted, soft + bite sized consistency and thin liquids  - elevated Procalcitonin 1.8  - Renal Bladder US shows no hydronephrosis, renal cysts  - IV fluids -- LR 75ml/hr  - mental status today improved, A&Ox2  - Avycaz 1.25 gm IV q8. End date 1/15  per ID. Midline inserted 1/10.    #CKD 3  - creat baseline  - adjust meds for GFR   - avoid nephro toxic meds  - avoid volume overload  - strict in/out record     #BPH  - cw tamsulosin     #h/o COPD   -stable, resumed on inhalers, nebs tx.     # DM 2  - hold metformin   - monitor blood sugar  - insulin as per protocol  - target blood sugar 140 - 180     # CAD/ HTN/ HLD  - BP uncontrolled (1/9) : cw metoprolol, Incraese to lisinopril 40 , start amlodipine 5mg, D/c LR@75.   1/10: increased to amldipine 10.  - Hydralazine PRN.  - monitor BPs    #Incidental Findings (Liver Nodules & Kidney cyst/hypodensity)  - Outpatient workup and follow up    #schizophrenia  - cw olanzapine and Trazodone     DVT PPX: heparin   GI PPX: none   DIET: soft + bite sized consistency and thin liquids  ACTIVITY:  AAT  CODE STATUS: full .    MEDICALLY READY. PENDING AUTH FOR SICC. PT advised STR.
Toxic metabolic encephalopathy  Sepsis present on admission secondary to Acute UTI   CKD   BPH  COPD (chronic obstructive pulmonary disease)  Paranoid schizophrenia  Schizophrenia  Diabetes type 2, controlled  CAD/ HTN/ HLD  Incidental Findings (Liver Nodules & Kidney cyst/hypodensity)  schizophrenia      Handoff : continue IV fluids ,ID following on Cefepime 1 gm iv q12h ,f/u UCx sensitivities.
#CR Klebsiella UTI: Panresistant in sensitivity report. Per ID, start Avycaz (1/8). Mental status still AAOx1. May be his baseline given Schizo. C/w Zyprexa.
An 84-year-old male resident of [facility] ([place]) with a past medical history of hyperlipidemia (HLD), schizophrenia, chronic obstructive pulmonary disease (COPD), type 2 diabetes mellitus (DM2), non-Hodgkin's lymphoma, and hyponatremia brought to the ed for evaluation of the Fever    #Altered mental status with Fever  #Toxic metabolic encephalopathy  #Sepsis present on admission secondary to Acute UTI   #CR Klebsiella UTI: Panresistant in sensitivity report. Per ID, start Avycaz (1/8).   Mental status still AAOx1. May be his baseline given Schizo. C/w Zyprexa.  - BCx NGTD   - UCx growing Klebsiella, sensitivities are back, CRE  - speech and swallow eval noted, soft + bite sized consistency and thin liquids  - elevated Procalcitonin 1.8  - Renal Bladder US shows no hydronephrosis, renal cysts  - IV fluids -- LR 75ml/hr  - mental status today improved, A&Ox2  - Avycaz 1.25 gm IV q8, per ID    #CKD 3  - creat baseline  - adjust meds for GFR   - avoid nephro toxic meds  - avoid volume overload  - strict in/out record     #BPH  - cw tamsulosin     #h/o COPD   -stable, resumed on inhalers, nebs tx.     # DM 2  - hold metformin   - monitor blood sugar  - insulin as per protocol  - target blood sugar 140 - 180     # CAD/ HTN/ HLD  - BP uncontrolled (1/9) : cw metoprolol, Incraese to lisinopril 40 , start amlodipine 5mg, D/c LR@75.   - Hydralazine PRN.  - monitor BPs    #Incidental Findings (Liver Nodules & Kidney cyst/hypodensity)  - Outpatient workup and follow up    #schizophrenia  - cw olanzapine and Trazodone     DVT PPX: heparin   GI PPX: none   DIET: soft + bite sized consistency and thin liquids  ACTIVITY:  AAT  CODE STATUS: full

## 2025-01-10 NOTE — PROGRESS NOTE ADULT - NS ATTEST RISK PROBLEM GEN_ALL_CORE FT
-I independently reviewed the completed labs ( CBC, CMP, cultures  along with sensitivities ) and imaging (CT/CXR as available with independent interpretation )  -My assessment required an independent historian  -I discussed my diagnostic/therapeutic recommendations with the primary team housestaff/Attending  -I assisted in the decision regarding continued need for hospitalization / or escalation of care as needed.  -Patient is on drug therapy that requires intense monitoring or toxicity and adjustment of the Antibiotics based on creatinine clearence

## 2025-01-10 NOTE — PROGRESS NOTE ADULT - ASSESSMENT
An 84-year-old male resident of [facility] ([place]) with a past medical history of hyperlipidemia (HLD), schizophrenia, chronic obstructive pulmonary disease (COPD), type 2 diabetes mellitus (DM2), non-Hodgkin's lymphoma, and hyponatremia brought to the ed for evaluation of the Fever    #Altered mental status with Fever  #Toxic metabolic encephalopathy  #Sepsis present on admission secondary to Acute UTI   - BCx NGTD   - UCx growing Klebsiella, sensitivities are back, CRE  - speech and swallow eval noted, soft + bite sized consistency and thin liquids  - elevated Procalcitonin 1.8  - Renal Bladder US shows no hydronephrosis, renal cysts  - IV fluids -- LR 75ml/hr  - mental status today improved, A&Ox2  - Avycaz 1.25 gm IV q8, per ID    #HTN  # CAD/ HLD  - cw metoprolol,   - increased lisinopril to 40mg daily  - increased amlodipine 10mg  - monitor BPs    #CKD 3  - creat baseline  - adjust meds for GFR   - avoid nephro toxic meds  - avoid volume overload  - strict in/out record     #BPH  - cw tamsulosin     #h/o COPD   -stable, resumed on inhalers, nebs tx.     # DM 2  - hold metformin   - monitor blood sugar  - insulin as per protocol  - target blood sugar 140 - 180     #Incidental Findings (Liver Nodules & Kidney cyst/hypodensity)  - Outpatient workup and follow up    #schizophrenia  - cw olanzapine and Trazodone     DVT PPX: heparin   GI PPX: none   DIET: soft + bite sized consistency and thin liquids  ACTIVITY:  AAT  CODE STATUS: full

## 2025-01-11 LAB
ALBUMIN SERPL ELPH-MCNC: 4.4 G/DL — SIGNIFICANT CHANGE UP (ref 3.5–5.2)
ALP SERPL-CCNC: 91 U/L — SIGNIFICANT CHANGE UP (ref 30–115)
ALT FLD-CCNC: 19 U/L — SIGNIFICANT CHANGE UP (ref 0–41)
ANION GAP SERPL CALC-SCNC: 12 MMOL/L — SIGNIFICANT CHANGE UP (ref 7–14)
AST SERPL-CCNC: 29 U/L — SIGNIFICANT CHANGE UP (ref 0–41)
BASOPHILS # BLD AUTO: 0.02 K/UL — SIGNIFICANT CHANGE UP (ref 0–0.2)
BASOPHILS NFR BLD AUTO: 0.2 % — SIGNIFICANT CHANGE UP (ref 0–1)
BILIRUB SERPL-MCNC: <0.2 MG/DL — SIGNIFICANT CHANGE UP (ref 0.2–1.2)
BUN SERPL-MCNC: 23 MG/DL — HIGH (ref 10–20)
CALCIUM SERPL-MCNC: 9.6 MG/DL — SIGNIFICANT CHANGE UP (ref 8.4–10.5)
CHLORIDE SERPL-SCNC: 98 MMOL/L — SIGNIFICANT CHANGE UP (ref 98–110)
CO2 SERPL-SCNC: 25 MMOL/L — SIGNIFICANT CHANGE UP (ref 17–32)
CREAT SERPL-MCNC: 1.3 MG/DL — SIGNIFICANT CHANGE UP (ref 0.7–1.5)
EGFR: 55 ML/MIN/1.73M2 — LOW
EOSINOPHIL # BLD AUTO: 0.05 K/UL — SIGNIFICANT CHANGE UP (ref 0–0.7)
EOSINOPHIL NFR BLD AUTO: 0.6 % — SIGNIFICANT CHANGE UP (ref 0–8)
GLUCOSE BLDC GLUCOMTR-MCNC: 111 MG/DL — HIGH (ref 70–99)
GLUCOSE BLDC GLUCOMTR-MCNC: 117 MG/DL — HIGH (ref 70–99)
GLUCOSE BLDC GLUCOMTR-MCNC: 118 MG/DL — HIGH (ref 70–99)
GLUCOSE BLDC GLUCOMTR-MCNC: 87 MG/DL — SIGNIFICANT CHANGE UP (ref 70–99)
GLUCOSE SERPL-MCNC: 105 MG/DL — HIGH (ref 70–99)
HCT VFR BLD CALC: 35.1 % — LOW (ref 42–52)
HGB BLD-MCNC: 11.7 G/DL — LOW (ref 14–18)
IMM GRANULOCYTES NFR BLD AUTO: 0.5 % — HIGH (ref 0.1–0.3)
LYMPHOCYTES # BLD AUTO: 2.43 K/UL — SIGNIFICANT CHANGE UP (ref 1.2–3.4)
LYMPHOCYTES # BLD AUTO: 29.9 % — SIGNIFICANT CHANGE UP (ref 20.5–51.1)
MAGNESIUM SERPL-MCNC: 2.1 MG/DL — SIGNIFICANT CHANGE UP (ref 1.8–2.4)
MCHC RBC-ENTMCNC: 31 PG — SIGNIFICANT CHANGE UP (ref 27–31)
MCHC RBC-ENTMCNC: 33.3 G/DL — SIGNIFICANT CHANGE UP (ref 32–37)
MCV RBC AUTO: 93.1 FL — SIGNIFICANT CHANGE UP (ref 80–94)
MONOCYTES # BLD AUTO: 0.58 K/UL — SIGNIFICANT CHANGE UP (ref 0.1–0.6)
MONOCYTES NFR BLD AUTO: 7.1 % — SIGNIFICANT CHANGE UP (ref 1.7–9.3)
NEUTROPHILS # BLD AUTO: 5 K/UL — SIGNIFICANT CHANGE UP (ref 1.4–6.5)
NEUTROPHILS NFR BLD AUTO: 61.7 % — SIGNIFICANT CHANGE UP (ref 42.2–75.2)
NRBC # BLD: 0 /100 WBCS — SIGNIFICANT CHANGE UP (ref 0–0)
PLATELET # BLD AUTO: 223 K/UL — SIGNIFICANT CHANGE UP (ref 130–400)
PMV BLD: 10.9 FL — HIGH (ref 7.4–10.4)
POTASSIUM SERPL-MCNC: 5.1 MMOL/L — HIGH (ref 3.5–5)
POTASSIUM SERPL-SCNC: 5.1 MMOL/L — HIGH (ref 3.5–5)
PROT SERPL-MCNC: 7.3 G/DL — SIGNIFICANT CHANGE UP (ref 6–8)
RBC # BLD: 3.77 M/UL — LOW (ref 4.7–6.1)
RBC # FLD: 14.6 % — HIGH (ref 11.5–14.5)
SODIUM SERPL-SCNC: 135 MMOL/L — SIGNIFICANT CHANGE UP (ref 135–146)
WBC # BLD: 8.12 K/UL — SIGNIFICANT CHANGE UP (ref 4.8–10.8)
WBC # FLD AUTO: 8.12 K/UL — SIGNIFICANT CHANGE UP (ref 4.8–10.8)

## 2025-01-11 PROCEDURE — 99232 SBSQ HOSP IP/OBS MODERATE 35: CPT | Mod: FS

## 2025-01-11 RX ADMIN — HEPARIN SODIUM 5000 UNIT(S): 1000 INJECTION, SOLUTION INTRAVENOUS; SUBCUTANEOUS at 13:02

## 2025-01-11 RX ADMIN — NICOTINE POLACRILEX 1 PATCH: 4 LOZENGE ORAL at 10:57

## 2025-01-11 RX ADMIN — NICOTINE POLACRILEX 1 PATCH: 4 LOZENGE ORAL at 11:17

## 2025-01-11 RX ADMIN — HEPARIN SODIUM 5000 UNIT(S): 1000 INJECTION, SOLUTION INTRAVENOUS; SUBCUTANEOUS at 21:39

## 2025-01-11 RX ADMIN — Medication 10 MILLIGRAM(S): at 05:36

## 2025-01-11 RX ADMIN — OLANZAPINE 10 MILLIGRAM(S): 15 TABLET ORAL at 11:17

## 2025-01-11 RX ADMIN — NICOTINE POLACRILEX 1 PATCH: 4 LOZENGE ORAL at 19:37

## 2025-01-11 RX ADMIN — CHLORHEXIDINE GLUCONATE 1 APPLICATION(S): 1.2 RINSE ORAL at 05:45

## 2025-01-11 RX ADMIN — NICOTINE POLACRILEX 1 PATCH: 4 LOZENGE ORAL at 08:00

## 2025-01-11 RX ADMIN — Medication 25 MILLIGRAM(S): at 05:36

## 2025-01-11 RX ADMIN — GABAPENTIN 300 MILLIGRAM(S): 300 CAPSULE ORAL at 17:19

## 2025-01-11 RX ADMIN — Medication 25 MILLIGRAM(S): at 17:19

## 2025-01-11 RX ADMIN — TRAZODONE HYDROCHLORIDE 50 MILLIGRAM(S): 150 TABLET ORAL at 21:39

## 2025-01-11 RX ADMIN — TAMSULOSIN HYDROCHLORIDE 0.4 MILLIGRAM(S): 0.4 CAPSULE ORAL at 21:39

## 2025-01-11 RX ADMIN — LISINOPRIL 40 MILLIGRAM(S): 30 TABLET ORAL at 21:39

## 2025-01-11 RX ADMIN — GABAPENTIN 300 MILLIGRAM(S): 300 CAPSULE ORAL at 05:37

## 2025-01-11 RX ADMIN — HEPARIN SODIUM 5000 UNIT(S): 1000 INJECTION, SOLUTION INTRAVENOUS; SUBCUTANEOUS at 05:36

## 2025-01-11 NOTE — PROGRESS NOTE ADULT - ASSESSMENT
An 84-year-old male resident of [facility] ([place]) with a past medical history of hyperlipidemia (HLD), schizophrenia, chronic obstructive pulmonary disease (COPD), type 2 diabetes mellitus (DM2), non-Hodgkin's lymphoma, and hyponatremia brought to the ed for evaluation of the Fever    #Altered mental status with Fever  #Toxic metabolic encephalopathy  #Sepsis present on admission secondary to Acute UTI   #CRE Klebsiella UTI: Panresistant in sensitivity report. Per ID, started Avycaz  1.25 gm IV q8 :  1/8 - 1/14  - Midline inserted 1/10.  - Pending Placement SICC. They saying they cannot do Avycaz there.    Mental status now AAOx2. May be his baseline given Schizo. C/w Zyprexa.  - BCx NGTD   - speech and swallow eval noted, soft + bite sized consistency and thin liquids  - elevated Procalcitonin 1.8  - Renal Bladder US shows no hydronephrosis, renal cysts  - s/p IV fluids -- LR 75ml/hr    #CKD 3  - baseline Cr around 1.1  - Monitor.     #BPH  - cw tamsulosin     #h/o COPD   -stable, resumed on inhalers, nebs tx.     # DM 2  - hold metformin. SSI here     # CAD/ HTN/ HLD  - BP uncontrolled (1/9) : cw metoprolol, Increased lisinopril 20 to 40 , started amlodipine 5mg, D/kiah LR@75.   1/10: increased to amldipine 10.   1/11: BP at goal. Mild rise in Cr 1.3, K 5.1. Monitor. Upto 30% Rise in Cr within first 2-4 weeks of increasing lisinopril dose and K <5.5 does not necessitate dose adjustment. Renal Hemodynamics will adapt.   - Hydralazine PRN.  - monitor BPs    #Incidental Findings (Liver Nodules & Kidney cyst/hypodensity)  - Outpatient workup and follow up    #schizophrenia  - cw olanzapine and Trazodone     DVT PPX: heparin   GI PPX: none   DIET: soft + bite sized consistency and thin liquids  ACTIVITY:  AAT  CODE STATUS: full .    MEDICALLY READY. PENDING AUTH FOR SICC. PT advised STR.   NH will not take with Avycaz?

## 2025-01-12 LAB
ALBUMIN SERPL ELPH-MCNC: 4.1 G/DL — SIGNIFICANT CHANGE UP (ref 3.5–5.2)
ALP SERPL-CCNC: 85 U/L — SIGNIFICANT CHANGE UP (ref 30–115)
ALT FLD-CCNC: 20 U/L — SIGNIFICANT CHANGE UP (ref 0–41)
ANION GAP SERPL CALC-SCNC: 12 MMOL/L — SIGNIFICANT CHANGE UP (ref 7–14)
AST SERPL-CCNC: 30 U/L — SIGNIFICANT CHANGE UP (ref 0–41)
BASOPHILS # BLD AUTO: 0 K/UL — SIGNIFICANT CHANGE UP (ref 0–0.2)
BASOPHILS NFR BLD AUTO: 0 % — SIGNIFICANT CHANGE UP (ref 0–1)
BILIRUB SERPL-MCNC: <0.2 MG/DL — SIGNIFICANT CHANGE UP (ref 0.2–1.2)
BUN SERPL-MCNC: 26 MG/DL — HIGH (ref 10–20)
CALCIUM SERPL-MCNC: 9.1 MG/DL — SIGNIFICANT CHANGE UP (ref 8.4–10.5)
CHLORIDE SERPL-SCNC: 98 MMOL/L — SIGNIFICANT CHANGE UP (ref 98–110)
CO2 SERPL-SCNC: 23 MMOL/L — SIGNIFICANT CHANGE UP (ref 17–32)
CREAT SERPL-MCNC: 1.3 MG/DL — SIGNIFICANT CHANGE UP (ref 0.7–1.5)
EGFR: 55 ML/MIN/1.73M2 — LOW
EOSINOPHIL # BLD AUTO: 0 K/UL — SIGNIFICANT CHANGE UP (ref 0–0.7)
EOSINOPHIL NFR BLD AUTO: 0 % — SIGNIFICANT CHANGE UP (ref 0–8)
GLUCOSE BLDC GLUCOMTR-MCNC: 102 MG/DL — HIGH (ref 70–99)
GLUCOSE BLDC GLUCOMTR-MCNC: 103 MG/DL — HIGH (ref 70–99)
GLUCOSE BLDC GLUCOMTR-MCNC: 89 MG/DL — SIGNIFICANT CHANGE UP (ref 70–99)
GLUCOSE BLDC GLUCOMTR-MCNC: 94 MG/DL — SIGNIFICANT CHANGE UP (ref 70–99)
GLUCOSE SERPL-MCNC: 89 MG/DL — SIGNIFICANT CHANGE UP (ref 70–99)
HCT VFR BLD CALC: 34.3 % — LOW (ref 42–52)
HGB BLD-MCNC: 11.5 G/DL — LOW (ref 14–18)
LYMPHOCYTES # BLD AUTO: 1.45 K/UL — SIGNIFICANT CHANGE UP (ref 1.2–3.4)
LYMPHOCYTES # BLD AUTO: 20.2 % — LOW (ref 20.5–51.1)
MAGNESIUM SERPL-MCNC: 2.1 MG/DL — SIGNIFICANT CHANGE UP (ref 1.8–2.4)
MCHC RBC-ENTMCNC: 31.3 PG — HIGH (ref 27–31)
MCHC RBC-ENTMCNC: 33.5 G/DL — SIGNIFICANT CHANGE UP (ref 32–37)
MCV RBC AUTO: 93.2 FL — SIGNIFICANT CHANGE UP (ref 80–94)
MONOCYTES # BLD AUTO: 0.4 K/UL — SIGNIFICANT CHANGE UP (ref 0.1–0.6)
MONOCYTES NFR BLD AUTO: 5.6 % — SIGNIFICANT CHANGE UP (ref 1.7–9.3)
NEUTROPHILS # BLD AUTO: 5.25 K/UL — SIGNIFICANT CHANGE UP (ref 1.4–6.5)
NEUTROPHILS NFR BLD AUTO: 73.1 % — SIGNIFICANT CHANGE UP (ref 42.2–75.2)
PLATELET # BLD AUTO: 227 K/UL — SIGNIFICANT CHANGE UP (ref 130–400)
PMV BLD: 10.8 FL — HIGH (ref 7.4–10.4)
POTASSIUM SERPL-MCNC: 5.4 MMOL/L — HIGH (ref 3.5–5)
POTASSIUM SERPL-SCNC: 5.4 MMOL/L — HIGH (ref 3.5–5)
PROT SERPL-MCNC: 6.5 G/DL — SIGNIFICANT CHANGE UP (ref 6–8)
RBC # BLD: 3.68 M/UL — LOW (ref 4.7–6.1)
RBC # FLD: 14.7 % — HIGH (ref 11.5–14.5)
SODIUM SERPL-SCNC: 133 MMOL/L — LOW (ref 135–146)
WBC # BLD: 7.18 K/UL — SIGNIFICANT CHANGE UP (ref 4.8–10.8)
WBC # FLD AUTO: 7.18 K/UL — SIGNIFICANT CHANGE UP (ref 4.8–10.8)

## 2025-01-12 PROCEDURE — 99232 SBSQ HOSP IP/OBS MODERATE 35: CPT | Mod: FS

## 2025-01-12 RX ORDER — SODIUM ZIRCONIUM CYCLOSILICATE 10 G/10G
10 POWDER, FOR SUSPENSION ORAL
Refills: 0 | Status: COMPLETED | OUTPATIENT
Start: 2025-01-12 | End: 2025-01-13

## 2025-01-12 RX ADMIN — HEPARIN SODIUM 5000 UNIT(S): 1000 INJECTION, SOLUTION INTRAVENOUS; SUBCUTANEOUS at 05:34

## 2025-01-12 RX ADMIN — Medication 25 MILLIGRAM(S): at 17:25

## 2025-01-12 RX ADMIN — HEPARIN SODIUM 5000 UNIT(S): 1000 INJECTION, SOLUTION INTRAVENOUS; SUBCUTANEOUS at 13:17

## 2025-01-12 RX ADMIN — GABAPENTIN 300 MILLIGRAM(S): 300 CAPSULE ORAL at 17:25

## 2025-01-12 RX ADMIN — NICOTINE POLACRILEX 1 PATCH: 4 LOZENGE ORAL at 07:30

## 2025-01-12 RX ADMIN — NICOTINE POLACRILEX 1 PATCH: 4 LOZENGE ORAL at 11:31

## 2025-01-12 RX ADMIN — NICOTINE POLACRILEX 1 PATCH: 4 LOZENGE ORAL at 19:31

## 2025-01-12 RX ADMIN — Medication 10 MILLIGRAM(S): at 05:34

## 2025-01-12 RX ADMIN — SODIUM ZIRCONIUM CYCLOSILICATE 10 GRAM(S): 10 POWDER, FOR SUSPENSION ORAL at 17:26

## 2025-01-12 RX ADMIN — CHLORHEXIDINE GLUCONATE 1 APPLICATION(S): 1.2 RINSE ORAL at 05:35

## 2025-01-12 RX ADMIN — Medication 25 MILLIGRAM(S): at 05:34

## 2025-01-12 RX ADMIN — OLANZAPINE 10 MILLIGRAM(S): 15 TABLET ORAL at 11:35

## 2025-01-12 RX ADMIN — NICOTINE POLACRILEX 1 PATCH: 4 LOZENGE ORAL at 11:34

## 2025-01-12 RX ADMIN — GABAPENTIN 300 MILLIGRAM(S): 300 CAPSULE ORAL at 05:34

## 2025-01-12 NOTE — PROGRESS NOTE ADULT - ASSESSMENT
An 84-year-old male resident of [facility] ([place]) with a past medical history of hyperlipidemia (HLD), schizophrenia, chronic obstructive pulmonary disease (COPD), type 2 diabetes mellitus (DM2), non-Hodgkin's lymphoma, and hyponatremia brought to the ed for evaluation of the Fever    #Altered mental status with Fever  #Toxic metabolic encephalopathy  #Sepsis present on admission secondary to Acute UTI   #CRE Klebsiella UTI: Panresistant in sensitivity report. Per ID, started Avycaz  1.25 gm IV q8 :  1/8 - 1/14  - Midline inserted 1/10.  - Pending Placement SICC. They saying they cannot do Avycaz there.    Mental status now AAOx2. May be his baseline given Schizo. C/w Zyprexa.  - BCx NGTD   - speech and swallow eval noted, soft + bite sized consistency and thin liquids  - elevated Procalcitonin 1.8  - Renal Bladder US shows no hydronephrosis, renal cysts  - s/p IV fluids -- LR 75ml/hr    #CKD 3  - baseline Cr around 1.1  - Monitor.     #BPH  - cw tamsulosin     #h/o COPD   -stable, resumed on inhalers, nebs tx.     # DM 2  - hold metformin. SSI here     # CAD/ HTN/ HLD  - BP uncontrolled (1/9) : cw metoprolol, Increased lisinopril 20 to 40 , started amlodipine 5mg, D/kiah LR@75.   1/10: increased to amldipine 10.   1/11: BP at goal. Mild rise in Cr 1.3, K 5.1. Monitor. Upto 30% Rise in Cr within first 2-4 weeks of increasing lisinopril dose and K <5.5 does not necessitate dose adjustment. Renal Hemodynamics will adapt.   1/12: K 5.4 > Lokelma x 2. Cr 1.3. monitor as above.   - Hydralazine PRN.      #Incidental Findings (Liver Nodules & Kidney cyst/hypodensity)  - Outpatient workup and follow up    #schizophrenia  - cw olanzapine and Trazodone     DVT PPX: heparin   GI PPX: none   DIET: soft + bite sized consistency and thin liquids  ACTIVITY:  AAT  CODE STATUS: full .    MEDICALLY READY. PENDING AUTH FOR SICC. PT advised STR.   [facility] will not take with Avycaz?

## 2025-01-13 ENCOUNTER — TRANSCRIPTION ENCOUNTER (OUTPATIENT)
Age: 85
End: 2025-01-13

## 2025-01-13 LAB
ALBUMIN SERPL ELPH-MCNC: 4 G/DL — SIGNIFICANT CHANGE UP (ref 3.5–5.2)
ALP SERPL-CCNC: 87 U/L — SIGNIFICANT CHANGE UP (ref 30–115)
ALT FLD-CCNC: 20 U/L — SIGNIFICANT CHANGE UP (ref 0–41)
ANION GAP SERPL CALC-SCNC: 11 MMOL/L — SIGNIFICANT CHANGE UP (ref 7–14)
AST SERPL-CCNC: 27 U/L — SIGNIFICANT CHANGE UP (ref 0–41)
BASOPHILS # BLD AUTO: 0.02 K/UL — SIGNIFICANT CHANGE UP (ref 0–0.2)
BASOPHILS NFR BLD AUTO: 0.3 % — SIGNIFICANT CHANGE UP (ref 0–1)
BILIRUB SERPL-MCNC: 0.2 MG/DL — SIGNIFICANT CHANGE UP (ref 0.2–1.2)
BUN SERPL-MCNC: 24 MG/DL — HIGH (ref 10–20)
CALCIUM SERPL-MCNC: 9.4 MG/DL — SIGNIFICANT CHANGE UP (ref 8.4–10.4)
CHLORIDE SERPL-SCNC: 98 MMOL/L — SIGNIFICANT CHANGE UP (ref 98–110)
CO2 SERPL-SCNC: 26 MMOL/L — SIGNIFICANT CHANGE UP (ref 17–32)
CREAT SERPL-MCNC: 1.2 MG/DL — SIGNIFICANT CHANGE UP (ref 0.7–1.5)
EGFR: 60 ML/MIN/1.73M2 — SIGNIFICANT CHANGE UP
EOSINOPHIL # BLD AUTO: 0.04 K/UL — SIGNIFICANT CHANGE UP (ref 0–0.7)
EOSINOPHIL NFR BLD AUTO: 0.7 % — SIGNIFICANT CHANGE UP (ref 0–8)
GLUCOSE BLDC GLUCOMTR-MCNC: 123 MG/DL — HIGH (ref 70–99)
GLUCOSE BLDC GLUCOMTR-MCNC: 127 MG/DL — HIGH (ref 70–99)
GLUCOSE BLDC GLUCOMTR-MCNC: 87 MG/DL — SIGNIFICANT CHANGE UP (ref 70–99)
GLUCOSE SERPL-MCNC: 93 MG/DL — SIGNIFICANT CHANGE UP (ref 70–99)
HCT VFR BLD CALC: 34.5 % — LOW (ref 42–52)
HGB BLD-MCNC: 11.4 G/DL — LOW (ref 14–18)
IMM GRANULOCYTES NFR BLD AUTO: 0.5 % — HIGH (ref 0.1–0.3)
LYMPHOCYTES # BLD AUTO: 1.8 K/UL — SIGNIFICANT CHANGE UP (ref 1.2–3.4)
LYMPHOCYTES # BLD AUTO: 30.2 % — SIGNIFICANT CHANGE UP (ref 20.5–51.1)
MAGNESIUM SERPL-MCNC: 2.1 MG/DL — SIGNIFICANT CHANGE UP (ref 1.8–2.4)
MCHC RBC-ENTMCNC: 31.1 PG — HIGH (ref 27–31)
MCHC RBC-ENTMCNC: 33 G/DL — SIGNIFICANT CHANGE UP (ref 32–37)
MCV RBC AUTO: 94.3 FL — HIGH (ref 80–94)
MONOCYTES # BLD AUTO: 0.44 K/UL — SIGNIFICANT CHANGE UP (ref 0.1–0.6)
MONOCYTES NFR BLD AUTO: 7.4 % — SIGNIFICANT CHANGE UP (ref 1.7–9.3)
NEUTROPHILS # BLD AUTO: 3.63 K/UL — SIGNIFICANT CHANGE UP (ref 1.4–6.5)
NEUTROPHILS NFR BLD AUTO: 60.9 % — SIGNIFICANT CHANGE UP (ref 42.2–75.2)
NRBC # BLD: 0 /100 WBCS — SIGNIFICANT CHANGE UP (ref 0–0)
PLATELET # BLD AUTO: 227 K/UL — SIGNIFICANT CHANGE UP (ref 130–400)
PMV BLD: 10.6 FL — HIGH (ref 7.4–10.4)
POTASSIUM SERPL-MCNC: 4.6 MMOL/L — SIGNIFICANT CHANGE UP (ref 3.5–5)
POTASSIUM SERPL-SCNC: 4.6 MMOL/L — SIGNIFICANT CHANGE UP (ref 3.5–5)
PROT SERPL-MCNC: 6.9 G/DL — SIGNIFICANT CHANGE UP (ref 6–8)
RBC # BLD: 3.66 M/UL — LOW (ref 4.7–6.1)
RBC # FLD: 14.8 % — HIGH (ref 11.5–14.5)
SODIUM SERPL-SCNC: 135 MMOL/L — SIGNIFICANT CHANGE UP (ref 135–146)
WBC # BLD: 5.96 K/UL — SIGNIFICANT CHANGE UP (ref 4.8–10.8)
WBC # FLD AUTO: 5.96 K/UL — SIGNIFICANT CHANGE UP (ref 4.8–10.8)

## 2025-01-13 PROCEDURE — 99239 HOSP IP/OBS DSCHRG MGMT >30: CPT

## 2025-01-13 RX ORDER — NIFEDIPINE 60 MG/1
1 TABLET, EXTENDED RELEASE ORAL
Qty: 30 | Refills: 0
Start: 2025-01-13 | End: 2025-02-11

## 2025-01-13 RX ORDER — CEFTAZIDIME, AVIBACTAM 2; .5 G/1; G/1
1.25 POWDER, FOR SOLUTION INTRAVENOUS
Qty: 18.75 | Refills: 0
Start: 2025-01-13 | End: 2025-01-17

## 2025-01-13 RX ORDER — HYDRALAZINE HYDROCHLORIDE 10 MG/1
10 TABLET ORAL ONCE
Refills: 0 | Status: COMPLETED | OUTPATIENT
Start: 2025-01-13 | End: 2025-01-13

## 2025-01-13 RX ADMIN — NICOTINE POLACRILEX 1 PATCH: 4 LOZENGE ORAL at 07:40

## 2025-01-13 RX ADMIN — TAMSULOSIN HYDROCHLORIDE 0.4 MILLIGRAM(S): 0.4 CAPSULE ORAL at 22:16

## 2025-01-13 RX ADMIN — LISINOPRIL 40 MILLIGRAM(S): 30 TABLET ORAL at 22:17

## 2025-01-13 RX ADMIN — Medication 25 MILLIGRAM(S): at 05:28

## 2025-01-13 RX ADMIN — TRAZODONE HYDROCHLORIDE 50 MILLIGRAM(S): 150 TABLET ORAL at 22:16

## 2025-01-13 RX ADMIN — NICOTINE POLACRILEX 1 PATCH: 4 LOZENGE ORAL at 11:46

## 2025-01-13 RX ADMIN — GABAPENTIN 300 MILLIGRAM(S): 300 CAPSULE ORAL at 17:01

## 2025-01-13 RX ADMIN — CHLORHEXIDINE GLUCONATE 1 APPLICATION(S): 1.2 RINSE ORAL at 05:28

## 2025-01-13 RX ADMIN — HEPARIN SODIUM 5000 UNIT(S): 1000 INJECTION, SOLUTION INTRAVENOUS; SUBCUTANEOUS at 13:04

## 2025-01-13 RX ADMIN — SODIUM ZIRCONIUM CYCLOSILICATE 10 GRAM(S): 10 POWDER, FOR SUSPENSION ORAL at 05:27

## 2025-01-13 RX ADMIN — OLANZAPINE 10 MILLIGRAM(S): 15 TABLET ORAL at 11:49

## 2025-01-13 RX ADMIN — HYDRALAZINE HYDROCHLORIDE 10 MILLIGRAM(S): 10 TABLET ORAL at 13:31

## 2025-01-13 RX ADMIN — HEPARIN SODIUM 5000 UNIT(S): 1000 INJECTION, SOLUTION INTRAVENOUS; SUBCUTANEOUS at 22:17

## 2025-01-13 RX ADMIN — Medication 10 MILLIGRAM(S): at 05:28

## 2025-01-13 RX ADMIN — HEPARIN SODIUM 5000 UNIT(S): 1000 INJECTION, SOLUTION INTRAVENOUS; SUBCUTANEOUS at 05:28

## 2025-01-13 RX ADMIN — NICOTINE POLACRILEX 1 PATCH: 4 LOZENGE ORAL at 11:51

## 2025-01-13 RX ADMIN — NICOTINE POLACRILEX 1 PATCH: 4 LOZENGE ORAL at 18:33

## 2025-01-13 RX ADMIN — GABAPENTIN 300 MILLIGRAM(S): 300 CAPSULE ORAL at 05:28

## 2025-01-13 RX ADMIN — Medication 25 MILLIGRAM(S): at 17:01

## 2025-01-13 NOTE — DISCHARGE NOTE NURSING/CASE MANAGEMENT/SOCIAL WORK - NSDCVIVACCINE_GEN_ALL_CORE_FT
Tdap; 15-May-2024 15:22; Rosa Adamson (ELLEN); [employer]; [id number] (Exp. Date: 23-Nov-2025); IntraMuscular; Deltoid Left.; 0.5 milliLiter(s); VIS (VIS Published: 09-May-2013, VIS Presented: 15-May-2024);

## 2025-01-13 NOTE — DISCHARGE NOTE NURSING/CASE MANAGEMENT/SOCIAL WORK - FINANCIAL ASSISTANCE
[facility] provides services at a reduced cost to those who are determined to be eligible through [facility]’s financial assistance program. Information regarding [facility]’s financial assistance program can be found by going to [email or web address] or by calling [phone].

## 2025-01-13 NOTE — PROGRESS NOTE ADULT - ASSESSMENT
An 84-year-old male resident of [facility] ([place]) with a past medical history of hyperlipidemia (HLD), schizophrenia, chronic obstructive pulmonary disease (COPD), type 2 diabetes mellitus (DM2), non-Hodgkin's lymphoma, and hyponatremia brought to the ed for evaluation of the Fever    #Altered mental status with Fever  #Toxic metabolic encephalopathy  #Sepsis present on admission secondary to Acute UTI   #CRE Klebsiella UTI: Panresistant in sensitivity report. Per ID, started Avycaz  1.25 gm IV q8 :  1/8 - 1/14  - Midline inserted 1/10.  - Pending Placement SICC. They saying they cannot do Avycaz there.    Mental status now AAOx2. May be his baseline given Schizo. C/w Zyprexa.  - BCx NGTD   - speech and swallow eval noted, soft + bite sized consistency and thin liquids  - elevated Procalcitonin 1.8  - Renal Bladder US shows no hydronephrosis, renal cysts  - s/p IV fluids -- LR 75ml/hr  - Cw Avycaz until completion on 1/14, then DC    #CKD 3  - baseline Cr around 1.1  - Monitor.     #BPH  - cw tamsulosin     #h/o COPD   -stable, resumed on inhalers, nebs tx.     # DM 2  - hold metformin. SSI here     # CAD/ HTN/ HLD  - BP uncontrolled (1/9) : cw metoprolol, Increased lisinopril 20 to 40 , started amlodipine 5mg, D/kiah LR@75.   1/10: increased to amldipine 10.   1/11: BP at goal. Mild rise in Cr 1.3, K 5.1. Monitor. Upto 30% Rise in Cr within first 2-4 weeks of increasing lisinopril dose and K <5.5 does not necessitate dose adjustment. Renal Hemodynamics will adapt.   1/12: K 5.4 > Lokelma x 2. Cr 1.3. monitor as above.   - Hydralazine PRN.      #Incidental Findings (Liver Nodules & Kidney cyst/hypodensity)  - Outpatient workup and follow up    #schizophrenia  - cw olanzapine and Trazodone     DVT PPX: heparin   GI PPX: none   DIET: soft + bite sized consistency and thin liquids  ACTIVITY:  AAT  CODE STATUS: full .    PENDING AUTH FOR SICC. PT advised STR.   [facility] will not take with Avycaz?

## 2025-01-13 NOTE — DISCHARGE NOTE NURSING/CASE MANAGEMENT/SOCIAL WORK - PATIENT PORTAL LINK FT
You can access the FollowMyHealth Patient Portal offered by [facility] by registering at the following website: [email or web address]. By joining [employer]’s FollowMyHealth portal, you will also be able to view your health information using other applications (apps) compatible with our system.

## 2025-01-14 VITALS
OXYGEN SATURATION: 96 % | DIASTOLIC BLOOD PRESSURE: 69 MMHG | RESPIRATION RATE: 18 BRPM | SYSTOLIC BLOOD PRESSURE: 176 MMHG | HEART RATE: 67 BPM | TEMPERATURE: 97 F

## 2025-01-14 LAB — GLUCOSE BLDC GLUCOMTR-MCNC: 108 MG/DL — HIGH (ref 70–99)

## 2025-01-14 PROCEDURE — 99231 SBSQ HOSP IP/OBS SF/LOW 25: CPT | Mod: FS

## 2025-01-14 RX ADMIN — HEPARIN SODIUM 5000 UNIT(S): 1000 INJECTION, SOLUTION INTRAVENOUS; SUBCUTANEOUS at 06:35

## 2025-01-14 RX ADMIN — Medication 25 MILLIGRAM(S): at 06:35

## 2025-01-14 RX ADMIN — NICOTINE POLACRILEX 1 PATCH: 4 LOZENGE ORAL at 08:28

## 2025-01-14 RX ADMIN — Medication 10 MILLIGRAM(S): at 06:35

## 2025-01-14 RX ADMIN — GABAPENTIN 300 MILLIGRAM(S): 300 CAPSULE ORAL at 06:35

## 2025-01-14 RX ADMIN — CHLORHEXIDINE GLUCONATE 1 APPLICATION(S): 1.2 RINSE ORAL at 06:38

## 2025-01-14 NOTE — PROVIDER CONTACT NOTE (OTHER) - NAME OF MD/NP/PA/DO NOTIFIED:
Keya
Sakina
amadou
robin
Barrett
DR. Benson
Sakina
rachelle huston
Sakina
Taylor
Barrett
Barrett
Javed

## 2025-01-14 NOTE — PROGRESS NOTE ADULT - ASSESSMENT
Discharge ordered yest. Problem with transportation.  Leaving today with peripheral line for remaining 2 Avycaz doses today 2PM & 10PM  See d/c note for further details.

## 2025-01-14 NOTE — PROGRESS NOTE ADULT - GASTROINTESTINAL
normal/soft/nontender/nondistended/normal active bowel sounds

## 2025-01-14 NOTE — PROGRESS NOTE ADULT - MUSCULOSKELETAL
no calf tenderness/no chest wall tenderness/decreased strength
no calf tenderness/no strength/no chest wall tenderness

## 2025-01-14 NOTE — PROGRESS NOTE ADULT - ASSESSMENT
Assessment and Plan:   · Assessment	  84-year-old male resident of [facility] ([place]) with a past medical history of hyperlipidemia (HLD), schizophrenia, chronic obstructive pulmonary disease (COPD), type 2 diabetes mellitus (DM2), non-Hodgkin's lymphoma, and hyponatremia brought to the ed for evaluation of the Fever. Patient is aox1 at time of the interview and is unable to provide accurate medical hx. Hx taken from the patient sister Ms Avilez via phone call . Sister states that today she visited his home at his facility and he found him sick. she states that patient was just not his baseline, and he was running a fever, was unable to recognize her, mentally off, inattentive. She states that this is not his baseline and she thought that patient is sick and she brought him to the ed for further evaluation. As per patient sister, patient is aox2 at baseline, can recognize her when she visit him at the facility, and walk with walker     ED VITALS  94 /min, T 102.8  CT head: No acute pathology     Xray chest: No acute pathology    IMPRESSION/RECOMMENDATIONS  Immunosuppression/Immunosenescence ( above age 60 yrs there is a exponential decline in immunity which could result in poor clinical outcomes.   Sepsis : resolved  Acute pyelonephritis with no obstructive uropathy due to Klebsiella pneumonia OXA positive  1/5 UCX Oxa positive Klebsiella pneumonia. Independent analysis of CX results and interpretation of sensitivity results.   Pyuria  1/5 BCx NG  1/5 UCx Klebsiella pneumonia. Independent analysis of CX results and interpretation   1/6 US : no hydronephrosis  Metabolic encephalopathy : resolving  1/5 CXR no opacities. ( Independent interpretation of test : no PNA  CBC :  ( WBC 5.9 ), ( Hg  11.4 ), CMP ( Cr  1.1  ) reviewed .   1/5 COVID 19/ Influenza/ RSV NG.   1/5 CT Head : NG    COPD (chronic obstructive pulmonary disease)  Paranoid schizophrenia  Schizophrenia  Diabetes type 2, controlled  COPD (chronic obstructive pulmonary disease)  Dyslipidemia  Chronic retention of urine    -Off loading to prevent pressure sores and preventive measures to avoid aspiration  -continue with Avycaz 1.25 gm iv q8h . Hold it on 1/15    Discussion of management/test results/antibiotic regimen  with primary medical team.

## 2025-01-14 NOTE — PROVIDER CONTACT NOTE (OTHER) - DATE AND TIME:
07-Jan-2025 02:18
08-Jan-2025 12:07
08-Jan-2025 17:11
09-Jan-2025 13:09
14-Jan-2025 07:55
08-Jan-2025 18:25
08-Jan-2025 18:42
09-Jan-2025 07:56
09-Jan-2025 10:03
09-Jan-2025 17:26
09-Jan-2025 18:32
08-Jan-2025 17:54
09-Jan-2025 15:47

## 2025-01-14 NOTE — PROGRESS NOTE ADULT - RESPIRATORY
normal/clear to auscultation bilaterally/no wheezes/no rales/no rhonchi
shallow
normal/clear to auscultation bilaterally/no wheezes/no rales/no rhonchi
normal/clear to auscultation bilaterally/no wheezes/no rales/no rhonchi
shallow

## 2025-01-14 NOTE — PROGRESS NOTE ADULT - SUBJECTIVE AND OBJECTIVE BOX
ADA VILLAGOMEZ  84y, Male    All available historical data reviewed    OVERNIGHT EVENTS:  none    ROS:  General: Denies rigors, nightsweats  HEENT: Denies headache, rhinorrhea, sore throat, eye pain  CV: Denies CP, palpitations  PULM: Denies wheezing, hemoptysis  GI: Denies hematemesis, hematochezia, melena  : Denies discharge, hematuria  MSK: Denies arthralgias, myalgias  SKIN: Denies rash, lesions  NEURO: Denies paresthesias, weakness  PSYCH: Denies depression, anxiety    VITALS:  T(F): 97.6, Max: 97.6 (01-10-25 @ 05:22)  HR: 70  BP: 152/70  RR: 18Vital Signs Last 24 Hrs  T(C): 36.4 (10 Kalia 2025 05:22), Max: 36.4 (10 Kalia 2025 05:22)  T(F): 97.6 (10 Kalia 2025 05:22), Max: 97.6 (10 Kalia 2025 05:22)  HR: 70 (10 Kalia 2025 09:39) (65 - 87)  BP: 152/70 (10 Kalia 2025 09:39) (116/78 - 224/81)  BP(mean): 126 (10 Kalia 2025 05:22) (126 - 126)  RR: 18 (10 Kalia 2025 09:39) (18 - 18)  SpO2: 98% (10 Kalia 2025 09:39) (98% - 98%)    Parameters below as of 10 Kalia 2025 09:39  Patient On (Oxygen Delivery Method): room air        TESTS & MEASUREMENTS:                        11.8   5.44  )-----------( 200      ( 10 Kalia 2025 07:15 )             34.3     01-10    132[L]  |  97[L]  |  17  ----------------------------<  104[H]  4.2   |  22  |  1.1    Ca    8.9      10 Kalia 2025 07:15  Mg     1.9     01-10    TPro  6.6  /  Alb  4.1  /  TBili  <0.2  /  DBili  x   /  AST  21  /  ALT  13  /  AlkPhos  86  01-10    LIVER FUNCTIONS - ( 10 Kalia 2025 07:15 )  Alb: 4.1 g/dL / Pro: 6.6 g/dL / ALK PHOS: 86 U/L / ALT: 13 U/L / AST: 21 U/L / GGT: x             Urinalysis with Rflx Culture (collected 01-05-25 @ 13:29)    Culture - Urine (collected 01-05-25 @ 13:29)  Source: Clean Catch None  Final Report (01-07-25 @ 15:40):    >100,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)  Organism: Klebsiella pneumoniae (Carbapenem Resistant)  Klebsiella pneumoniae (Carbapenem Resistant) (01-07-25 @ 15:40)  Organism: Klebsiella pneumoniae (Carbapenem Resistant) (01-07-25 @ 15:40)      Method Type: CarbaR      -  Resistance Gene to Carbapenem: Detec      -  Resistance Gene OXA: Detec  Organism: Klebsiella pneumoniae (Carbapenem Resistant) (01-07-25 @ 15:40)      Method Type: IZABELLA      -  Amoxicillin/Clavulanic Acid: R >16/8      -  Ampicillin: R >16 These ampicillin results predict results for amoxicillin      -  Ampicillin/Sulbactam: R >16/8      -  Aztreonam: R >16      -  Cefazolin: R >16 For uncomplicated UTI with K. pneumoniae, E. coli, or P. mirablis: IZABELLA <=16 is sensitive and IZABELLA >=32 is resistant. This also predicts results for oral agents cefaclor, cefdinir, cefpodoxime, cefprozil, cefuroxime axetil, cephalexin and locarbef for uncomplicated UTI. Note that some isolates may be susceptible to these agents while testing resistant to cefazolin.      -  Cefepime: R >16      -  Cefoxitin: R >16      -  Ceftriaxone: R >32      -  Cefuroxime: R >16      -  Ciprofloxacin: R >2      -  Ertapenem: R >1      -  Gentamicin: R >8      -  Imipenem: R 4      -  Levofloxacin: R >4      -  Meropenem: R 8      -  Nitrofurantoin: R >64 Should not be used to treat pyelonephritis      -  Piperacillin/Tazobactam: R >64      -  Tobramycin: R >8      -  Trimethoprim/Sulfamethoxazole: R >2/38    Culture - Blood (collected 01-05-25 @ 12:00)  Source: .Blood BLOOD  Preliminary Report (01-09-25 @ 15:01):    No growth at 4 days    Culture - Blood (collected 01-05-25 @ 12:00)  Source: .Blood BLOOD  Preliminary Report (01-09-25 @ 15:01):    No growth at 4 days      Urinalysis Basic - ( 10 Kalia 2025 07:15 )    Color: x / Appearance: x / SG: x / pH: x  Gluc: 104 mg/dL / Ketone: x  / Bili: x / Urobili: x   Blood: x / Protein: x / Nitrite: x   Leuk Esterase: x / RBC: x / WBC x   Sq Epi: x / Non Sq Epi: x / Bacteria: x          Social History:  Tobacco Use: No  Alcohol Use: No  Drug Use: No    RADIOLOGY & ADDITIONAL TESTS:  Personal review of radiological diagnostics performed  Echo and EKG results noted when applicable.     MEDICATIONS:  acetaminophen     Tablet .. 650 milliGRAM(s) Oral every 6 hours PRN  albuterol    90 MICROgram(s) HFA Inhaler 2 Puff(s) Inhalation every 6 hours PRN  aluminum hydroxide/magnesium hydroxide/simethicone Suspension 30 milliLiter(s) Oral every 4 hours PRN  ceftazidime/avibactam IVPB 1.25 Gram(s) IV Intermittent every 8 hours  chlorhexidine 2% Cloths 1 Application(s) Topical <User Schedule>  dextrose 5%. 1000 milliLiter(s) IV Continuous <Continuous>  dextrose 5%. 1000 milliLiter(s) IV Continuous <Continuous>  dextrose 50% Injectable 25 Gram(s) IV Push once  dextrose 50% Injectable 12.5 Gram(s) IV Push once  dextrose 50% Injectable 25 Gram(s) IV Push once  dextrose Oral Gel 15 Gram(s) Oral once PRN  gabapentin 300 milliGRAM(s) Oral two times a day  glucagon  Injectable 1 milliGRAM(s) IntraMuscular once  heparin   Injectable 5000 Unit(s) SubCutaneous every 8 hours  insulin lispro (ADMELOG) corrective regimen sliding scale   SubCutaneous three times a day before meals  lisinopril 40 milliGRAM(s) Oral at bedtime  melatonin 3 milliGRAM(s) Oral at bedtime PRN  metoprolol tartrate 25 milliGRAM(s) Oral two times a day  nicotine -  14 mG/24Hr(s) Patch 1 Patch Transdermal daily  OLANZapine 10 milliGRAM(s) Oral daily  ondansetron Injectable 4 milliGRAM(s) IV Push every 8 hours PRN  tamsulosin 0.4 milliGRAM(s) Oral at bedtime  traZODone 50 milliGRAM(s) Oral at bedtime      ANTIBIOTICS:  ceftazidime/avibactam IVPB 1.25 Gram(s) IV Intermittent every 8 hours    
S: No new evnets/complaints      All other pertinent ROS negative.      01-11-25 @ 07:01  -  01-11-25 @ 12:53  --------------------------------------------------------  IN: 0 mL / OUT: 1 mL / NET: -1 mL      Vital Signs Last 24 Hrs  T(C): 36.3 (11 Jan 2025 04:32), Max: 36.4 (10 Kalia 2025 17:02)  T(F): 97.3 (11 Jan 2025 04:32), Max: 97.6 (10 Kalia 2025 19:56)  HR: 89 (11 Jan 2025 04:32) (65 - 89)  BP: 132/72 (11 Jan 2025 04:32) (114/63 - 177/79)  BP(mean): --  RR: 18 (11 Jan 2025 04:32) (15 - 18)  SpO2: 96% (11 Jan 2025 04:32) (93% - 96%)    Parameters below as of 10 Kalia 2025 17:02  Patient On (Oxygen Delivery Method): room air      PHYSICAL EXAM:    Constitutional: NAD, awake and alert  HEENT: PERR, EOMI, Normal Hearing, MMM  Neck: Soft and supple, No LAD, No JVD  Respiratory: Breath sounds are clear bilaterally, No wheezing, rales or rhonchi  Cardiovascular: S1 and S2, regular rate and rhythm, no Murmurs, gallops or rubs  Gastrointestinal: Bowel Sounds present, soft, nontender, nondistended, no guarding, no rebound  Extremities: No peripheral edema      MEDICATIONS:  MEDICATIONS  (STANDING):  amLODIPine   Tablet 10 milliGRAM(s) Oral daily  ceftazidime/avibactam IVPB 1.25 Gram(s) IV Intermittent every 8 hours  chlorhexidine 2% Cloths 1 Application(s) Topical <User Schedule>  dextrose 5%. 1000 milliLiter(s) (50 mL/Hr) IV Continuous <Continuous>  dextrose 5%. 1000 milliLiter(s) (100 mL/Hr) IV Continuous <Continuous>  dextrose 50% Injectable 25 Gram(s) IV Push once  dextrose 50% Injectable 12.5 Gram(s) IV Push once  dextrose 50% Injectable 25 Gram(s) IV Push once  gabapentin 300 milliGRAM(s) Oral two times a day  glucagon  Injectable 1 milliGRAM(s) IntraMuscular once  heparin   Injectable 5000 Unit(s) SubCutaneous every 8 hours  insulin lispro (ADMELOG) corrective regimen sliding scale   SubCutaneous three times a day before meals  lisinopril 40 milliGRAM(s) Oral at bedtime  metoprolol tartrate 25 milliGRAM(s) Oral two times a day  nicotine -  14 mG/24Hr(s) Patch 1 Patch Transdermal daily  OLANZapine 10 milliGRAM(s) Oral daily  tamsulosin 0.4 milliGRAM(s) Oral at bedtime  traZODone 50 milliGRAM(s) Oral at bedtime      LABS: All Labs Reviewed:                        11.7   8.12  )-----------( 223      ( 11 Jan 2025 07:43 )             35.1     01-11    135  |  98  |  23[H]  ----------------------------<  105[H]  5.1[H]   |  25  |  1.3    Ca    9.6      11 Jan 2025 07:43  Mg     2.1     01-11    TPro  7.3  /  Alb  4.4  /  TBili  <0.2  /  DBili  x   /  AST  29  /  ALT  19  /  AlkPhos  91  01-11          Blood Culture:     Radiology: reviewed    
S: No new events/commplaints      All other pertinent ROS negative.      01-11-25 @ 07:01  -  01-12-25 @ 07:00  --------------------------------------------------------  IN: 0 mL / OUT: 1 mL / NET: -1 mL      Vital Signs Last 24 Hrs  T(C): 36.5 (12 Jan 2025 05:00), Max: 36.6 (11 Jan 2025 17:01)  T(F): 97.7 (12 Jan 2025 05:00), Max: 97.8 (11 Jan 2025 17:01)  HR: 77 (12 Jan 2025 05:00) (65 - 89)  BP: 180/73 (12 Jan 2025 05:00) (111/57 - 184/77)  BP(mean): --  RR: 18 (12 Jan 2025 05:00) (18 - 18)  SpO2: 96% (12 Jan 2025 05:00) (95% - 98%)    Parameters below as of 11 Jan 2025 20:05  Patient On (Oxygen Delivery Method): room air      PHYSICAL EXAM:    Constitutional: NAD, awake and alert  HEENT: PERR, EOMI, Normal Hearing, MMM  Neck: Soft and supple, No LAD, No JVD  Respiratory: Breath sounds are clear bilaterally, No wheezing, rales or rhonchi  Cardiovascular: S1 and S2, regular rate and rhythm, no Murmurs, gallops or rubs  Gastrointestinal: Bowel Sounds present, soft, nontender, nondistended, no guarding, no rebound  Extremities: No peripheral edema      MEDICATIONS:  MEDICATIONS  (STANDING):  amLODIPine   Tablet 10 milliGRAM(s) Oral daily  ceftazidime/avibactam IVPB 1.25 Gram(s) IV Intermittent every 8 hours  chlorhexidine 2% Cloths 1 Application(s) Topical <User Schedule>  dextrose 5%. 1000 milliLiter(s) (50 mL/Hr) IV Continuous <Continuous>  dextrose 5%. 1000 milliLiter(s) (100 mL/Hr) IV Continuous <Continuous>  dextrose 50% Injectable 25 Gram(s) IV Push once  dextrose 50% Injectable 12.5 Gram(s) IV Push once  dextrose 50% Injectable 25 Gram(s) IV Push once  gabapentin 300 milliGRAM(s) Oral two times a day  glucagon  Injectable 1 milliGRAM(s) IntraMuscular once  heparin   Injectable 5000 Unit(s) SubCutaneous every 8 hours  insulin lispro (ADMELOG) corrective regimen sliding scale   SubCutaneous three times a day before meals  lisinopril 40 milliGRAM(s) Oral at bedtime  metoprolol tartrate 25 milliGRAM(s) Oral two times a day  nicotine -  14 mG/24Hr(s) Patch 1 Patch Transdermal daily  OLANZapine 10 milliGRAM(s) Oral daily  tamsulosin 0.4 milliGRAM(s) Oral at bedtime  traZODone 50 milliGRAM(s) Oral at bedtime      LABS: All Labs Reviewed:                        11.5   7.18  )-----------( 227      ( 12 Jan 2025 09:48 )             34.3     01-12    133[L]  |  98  |  26[H]  ----------------------------<  89  5.4[H]   |  23  |  1.3    Ca    9.1      12 Jan 2025 09:48  Mg     2.1     01-12    TPro  6.5  /  Alb  4.1  /  TBili  <0.2  /  DBili  x   /  AST  30  /  ALT  20  /  AlkPhos  85  01-12          Blood Culture:     Radiology: reviewed    
S: no new complaints  All other pertinent ROS negative.      Vital Signs Last 24 Hrs  T(C): 36.2 (14 Jan 2025 05:00), Max: 36.4 (13 Jan 2025 20:49)  T(F): 97.1 (14 Jan 2025 05:00), Max: 97.5 (13 Jan 2025 20:49)  HR: 67 (14 Jan 2025 05:00) (67 - 71)  BP: 176/69 (14 Jan 2025 05:00) (158/62 - 176/69)  BP(mean): --  RR: 18 (14 Jan 2025 05:00) (18 - 18)  SpO2: 96% (14 Jan 2025 05:00) (96% - 98%)    Parameters below as of 13 Jan 2025 20:49  Patient On (Oxygen Delivery Method): room air      PHYSICAL EXAM:    Constitutional: NAD, awake and alert  HEENT: PERR, EOMI, Normal Hearing, MMM  Neck: Soft and supple, No LAD, No JVD  Respiratory: Breath sounds are clear bilaterally, No wheezing, rales or rhonchi  Cardiovascular: S1 and S2, regular rate and rhythm, no Murmurs, gallops or rubs  Gastrointestinal: Bowel Sounds present, soft, nontender, nondistended, no guarding, no rebound  Extremities: No peripheral edema      MEDICATIONS:  MEDICATIONS  (STANDING):  amLODIPine   Tablet 10 milliGRAM(s) Oral daily  ceftazidime/avibactam IVPB 1.25 Gram(s) IV Intermittent every 8 hours  chlorhexidine 2% Cloths 1 Application(s) Topical <User Schedule>  dextrose 5%. 1000 milliLiter(s) (50 mL/Hr) IV Continuous <Continuous>  dextrose 5%. 1000 milliLiter(s) (100 mL/Hr) IV Continuous <Continuous>  dextrose 50% Injectable 25 Gram(s) IV Push once  dextrose 50% Injectable 12.5 Gram(s) IV Push once  dextrose 50% Injectable 25 Gram(s) IV Push once  gabapentin 300 milliGRAM(s) Oral two times a day  glucagon  Injectable 1 milliGRAM(s) IntraMuscular once  heparin   Injectable 5000 Unit(s) SubCutaneous every 8 hours  insulin lispro (ADMELOG) corrective regimen sliding scale   SubCutaneous three times a day before meals  lisinopril 40 milliGRAM(s) Oral at bedtime  metoprolol tartrate 25 milliGRAM(s) Oral two times a day  nicotine -  14 mG/24Hr(s) Patch 1 Patch Transdermal daily  OLANZapine 10 milliGRAM(s) Oral daily  tamsulosin 0.4 milliGRAM(s) Oral at bedtime  traZODone 50 milliGRAM(s) Oral at bedtime      LABS: All Labs Reviewed:                        11.4   5.96  )-----------( 227      ( 13 Jan 2025 07:41 )             34.5     01-13    135  |  98  |  24[H]  ----------------------------<  93  4.6   |  26  |  1.2    Ca    9.4      13 Jan 2025 07:41  Mg     2.1     01-13    TPro  6.9  /  Alb  4.0  /  TBili  0.2  /  DBili  x   /  AST  27  /  ALT  20  /  AlkPhos  87  01-13          Blood Culture:     Radiology: reviewed    
SUBJECTIVE/OVERNIGHT EVENTS  Today is hospital day 2d. This morning patient was seen and examined at bedside, resting comfortably in bed. No acute or major events overnight.    MEDICATIONS  STANDING MEDICATIONS  cefepime   IVPB 1000 milliGRAM(s) IV Intermittent every 12 hours  dextrose 5%. 1000 milliLiter(s) IV Continuous <Continuous>  dextrose 5%. 1000 milliLiter(s) IV Continuous <Continuous>  dextrose 50% Injectable 25 Gram(s) IV Push once  dextrose 50% Injectable 12.5 Gram(s) IV Push once  dextrose 50% Injectable 25 Gram(s) IV Push once  gabapentin 300 milliGRAM(s) Oral two times a day  glucagon  Injectable 1 milliGRAM(s) IntraMuscular once  heparin   Injectable 5000 Unit(s) SubCutaneous every 8 hours  insulin lispro (ADMELOG) corrective regimen sliding scale   SubCutaneous three times a day before meals  lactated ringers. 1000 milliLiter(s) IV Continuous <Continuous>  lisinopril 20 milliGRAM(s) Oral daily  metoprolol tartrate 25 milliGRAM(s) Oral two times a day  OLANZapine 10 milliGRAM(s) Oral daily  tamsulosin 0.4 milliGRAM(s) Oral at bedtime  traZODone 50 milliGRAM(s) Oral at bedtime    PRN MEDICATIONS  acetaminophen     Tablet .. 650 milliGRAM(s) Oral every 6 hours PRN  albuterol    90 MICROgram(s) HFA Inhaler 2 Puff(s) Inhalation every 6 hours PRN  aluminum hydroxide/magnesium hydroxide/simethicone Suspension 30 milliLiter(s) Oral every 4 hours PRN  dextrose Oral Gel 15 Gram(s) Oral once PRN  melatonin 3 milliGRAM(s) Oral at bedtime PRN  ondansetron Injectable 4 milliGRAM(s) IV Push every 8 hours PRN    VITALS  T(F): 97.5 (01-07-25 @ 05:03), Max: 98.1 (01-07-25 @ 02:18)  HR: 91 (01-07-25 @ 05:03) (63 - 91)  BP: 121/56 (01-07-25 @ 05:03) (118/49 - 211/67)  RR: 18 (01-07-25 @ 05:03) (18 - 19)  SpO2: 97% (01-07-25 @ 05:03) (95% - 97%)  POCT Blood Glucose.: 100 mg/dL (01-07-25 @ 07:36)  POCT Blood Glucose.: 87 mg/dL (01-06-25 @ 17:05)    PHYSICAL EXAM  GENERAL  ( x ) NAD, lying in bed comfortably     (  ) obtunded     ( ) lethargic     (  ) somnolent    HEAD  ( x ) Atraumatic     (  ) hematoma     (  ) laceration (specify location:       )     NECK  (x  ) Supple     (  ) neck stiffness     (  ) nuchal rigidity     (  )  no JVD     (  ) JVD present ( -- cm)    HEART  Rate -->  (x  ) normal rate    (  ) bradycardic    (  ) tachycardic  Rhythm -->  (  ) regular    (  ) regularly irregular    (  ) irregularly irregular  Murmurs -->  (  ) normal s1/s2    (  ) systolic murmur    (  ) diastolic murmur    (  ) continuous murmur     (  ) S3 present    (  ) S4 present    LUNGS  ( x )Unlabored respirations     (  ) tachypnea  (  ) B/L air entry     (  ) decreased breath sounds in:  (location     )    (  ) no adventitious sound     (  ) crackles     (  ) wheezing      (  ) rhonchi      (specify location:       )  (  ) chest wall tenderness (specify location:       )    ABDOMEN  (x  ) Soft     (  ) tense   |   (  ) nondistended     (  ) distended   |   (  ) +BS     (  ) hypoactive bowel sounds     (  ) hyperactive bowel sounds  (  ) nontender     (  ) RUQ tenderness     (  ) RLQ tenderness     (  ) LLQ tenderness     (  ) epigastric tenderness     (  ) diffuse tenderness  (  ) Splenomegaly      (  ) Hepatomegaly      (  ) Jaundice     (  ) ecchymosis     EXTREMITIES  (x  ) Normal     (  ) Rash     (  ) ecchymosis     (  ) varicose veins      (  ) pitting edema     (  ) non-pitting edema   (  ) ulceration     (  ) gangrene:     (location:     )    NERVOUS SYSTEM  (x  ) A&Ox2     (  ) confused     (  ) lethargic  CN II-XII:     (  ) Intact     (  ) focal deficits  (Specify:     )   Upper extremities:     (  ) strength X/5     (  ) focal deficit (specify:    )  Lower extremities:     (  ) strength  X/5    (  ) focal deficit (specify:    )    SKIN  (  ) No rashes or lesions     (  ) maculopapular rash     (  ) pustules     (  ) vesicles     (  ) ulcer     (  ) ecchymosis     (specify location:     )    (  ) Indwelling Cobos Catheter   Date insterted:    Reason (  ) Critical illness     (  ) urinary retention    (  ) Accurate Ins/Outs Monitoring     (  ) CMO patient    (  ) Central Line  Date inserted:  Location: (  ) Right IJ   (  ) Left IJ   (  ) Right Fem   (  ) Left Fem    (  ) SPC  (  ) pigtail  (  ) PEG tube  (  ) colostomy  (  ) jejunostomy  (  ) U-Dall    LABS             10.5   7.05  )-----------( 208      ( 01-06-25 @ 05:56 )             31.5     135  |  98  |  18  -------------------------<  96   01-06-25 @ 05:56  4.5  |  26  |  1.1    Ca      8.6     01-06-25 @ 05:56  Mg     1.8     01-06-25 @ 05:56    TPro  6.1  /  Alb  3.9  /  TBili  0.4  /  DBili  x   /  AST  23  /  ALT  9   /  AlkPhos  75  /  GGT  x     01-06-25 @ 05:56    PT/INR - ( 01-05-25 @ 12:09 )   PT: 13.60 sec[H];   INR: 1.15 ratio  PTT - ( 01-05-25 @ 12:09 )  PTT:32.6 sec      Urinalysis Basic - ( 06 Jan 2025 05:56 )    Color: x / Appearance: x / SG: x / pH: x  Gluc: 96 mg/dL / Ketone: x  / Bili: x / Urobili: x   Blood: x / Protein: x / Nitrite: x   Leuk Esterase: x / RBC: x / WBC x   Sq Epi: x / Non Sq Epi: x / Bacteria: x          Urinalysis with Rflx Culture (collected 05 Jan 2025 13:29)    Culture - Urine (collected 05 Jan 2025 13:29)  Source: Clean Catch None  Preliminary Report (06 Jan 2025 21:19):    >100,000 CFU/ml Klebsiella pneumoniae    Culture - Blood (collected 05 Jan 2025 12:00)  Source: .Blood BLOOD  Preliminary Report (06 Jan 2025 15:02):    No growth at 24 hours    Culture - Blood (collected 05 Jan 2025 12:00)  Source: .Blood BLOOD  Preliminary Report (06 Jan 2025 15:02):    No growth at 24 hours
INTERVAL HPI/OVERNIGHT EVENTS:    pt is seen and examined      REVIEW OF SYSTEMS:  CONSTITUTIONAL: No fever, weight loss, or fatigue  EYES: No eye pain, visual disturbances, or discharge  ENMT:  No difficulty hearing, tinnitus, vertigo; No sinus or throat pain  NECK: No pain or stiffness  BREASTS: No pain, masses, or nipple discharge  RESPIRATORY: No cough, wheezing, chills or hemoptysis; No shortness of breath  CARDIOVASCULAR: No chest pain, palpitations, dizziness, or leg swelling  GASTROINTESTINAL: No abdominal or epigastric pain. No nausea, vomiting, or hematemesis; No diarrhea or constipation. No melena or hematochezia.  GENITOURINARY: No dysuria, frequency, hematuria, or incontinence  NEUROLOGICAL: No headaches, memory loss, loss of strength, numbness, or tremors  SKIN: No itching, burning, rashes, or lesions   LYMPH NODES: No enlarged glands  ENDOCRINE: No heat or cold intolerance; No hair loss  MUSCULOSKELETAL: No joint pain or swelling; No muscle, back, or extremity pain  PSYCHIATRIC: No depression, anxiety, mood swings, or difficulty sleeping  HEME/LYMPH: No easy bruising, or bleeding gums  ALLERY AND IMMUNOLOGIC: No hives or eczema    VITALS:  T(F): 95.6, Max: 101.9 (01-05-25 @ 13:18)  HR: 63  BP: 164/56  RR: 17  SpO2: 98%    PHYSICAL EXAM:  GENERAL: NAD, well-groomed, well-developed  HEAD:  Atraumatic, Normocephalic  EYES: EOMI, PERRLA, conjunctiva and sclera clear  ENMT: No tonsillar erythema, exudates, or enlargement; Moist mucous membranes, Good dentition, No lesions  NECK: Supple, No JVD, Normal thyroid  NERVOUS SYSTEM:  Alert & Oriented X3, Good concentration; Motor Strength 5/5 B/L upper and lower extremities; DTRs 2+ intact and symmetric  CHEST/LUNG: Clear to percussion bilaterally; No rales, rhonchi, wheezing, or rubs  HEART: Regular rate and rhythm; No murmurs, rubs, or gallops  ABDOMEN: Soft, Nontender, Nondistended; Bowel sounds present  EXTREMITIES:  2+ Peripheral Pulses, No clubbing, cyanosis, or edema  LYMPH: No lymphadenopathy noted  SKIN: No rashes or lesions      LABS:  Urinalysis Basic - ( 06 Jan 2025 05:56 )    Color: x / Appearance: x / SG: x / pH: x  Gluc: 96 mg/dL / Ketone: x  / Bili: x / Urobili: x   Blood: x / Protein: x / Nitrite: x   Leuk Esterase: x / RBC: x / WBC x   Sq Epi: x / Non Sq Epi: x / Bacteria: x      01-06    135  |  98  |  18  ----------------------------<  96  4.5   |  26  |  1.1    Ca    8.6      06 Jan 2025 05:56  Mg     1.8     01-06    TPro  6.1  /  Alb  3.9  /  TBili  0.4  /  DBili  x   /  AST  23  /  ALT  9   /  AlkPhos  75  01-06                          10.5   7.05  )-----------( 208      ( 06 Jan 2025 05:56 )             31.5       Urinalysis with Rflx Culture (collected 05 Jan 2025 13:29)          RADIOLOGY & ADDITIONAL TESTS:    Imaging Personally Reviewed:  [ ] YES  [ ] NO    MEDICATIONS:     MEDICATIONS  (STANDING):  ceftazidime/avibactam IVPB 2.5 Gram(s) IV Intermittent every 8 hours  dextrose 5%. 1000 milliLiter(s) (50 mL/Hr) IV Continuous <Continuous>  dextrose 5%. 1000 milliLiter(s) (100 mL/Hr) IV Continuous <Continuous>  dextrose 50% Injectable 25 Gram(s) IV Push once  dextrose 50% Injectable 12.5 Gram(s) IV Push once  dextrose 50% Injectable 25 Gram(s) IV Push once  gabapentin 300 milliGRAM(s) Oral two times a day  glucagon  Injectable 1 milliGRAM(s) IntraMuscular once  heparin   Injectable 5000 Unit(s) SubCutaneous every 8 hours  insulin lispro (ADMELOG) corrective regimen sliding scale   SubCutaneous three times a day before meals  lactated ringers. 1000 milliLiter(s) (75 mL/Hr) IV Continuous <Continuous>  lisinopril 20 milliGRAM(s) Oral daily  metoprolol tartrate 25 milliGRAM(s) Oral two times a day  OLANZapine 10 milliGRAM(s) Oral daily  tamsulosin 0.4 milliGRAM(s) Oral at bedtime  traZODone 50 milliGRAM(s) Oral at bedtime    MEDICATIONS  (PRN):  acetaminophen     Tablet .. 650 milliGRAM(s) Oral every 6 hours PRN Temp greater or equal to 38C (100.4F), Mild Pain (1 - 3)  albuterol    90 MICROgram(s) HFA Inhaler 2 Puff(s) Inhalation every 6 hours PRN Shortness of Breath  aluminum hydroxide/magnesium hydroxide/simethicone Suspension 30 milliLiter(s) Oral every 4 hours PRN Dyspepsia  dextrose Oral Gel 15 Gram(s) Oral once PRN Blood Glucose LESS THAN 70 milliGRAM(s)/deciliter  melatonin 3 milliGRAM(s) Oral at bedtime PRN Insomnia  ondansetron Injectable 4 milliGRAM(s) IV Push every 8 hours PRN Nausea and/or Vomiting      
SUBJECTIVE/OVERNIGHT EVENTS  Today is hospital day 3d. This morning patient was seen and examined at bedside, resting comfortably in bed. Fevers ON up to 101.5.     MEDICATIONS  STANDING MEDICATIONS  cefTRIAXone   IVPB 2000 milliGRAM(s) IV Intermittent every 24 hours  dextrose 5%. 1000 milliLiter(s) IV Continuous <Continuous>  dextrose 5%. 1000 milliLiter(s) IV Continuous <Continuous>  dextrose 50% Injectable 25 Gram(s) IV Push once  dextrose 50% Injectable 12.5 Gram(s) IV Push once  dextrose 50% Injectable 25 Gram(s) IV Push once  gabapentin 300 milliGRAM(s) Oral two times a day  glucagon  Injectable 1 milliGRAM(s) IntraMuscular once  heparin   Injectable 5000 Unit(s) SubCutaneous every 8 hours  insulin lispro (ADMELOG) corrective regimen sliding scale   SubCutaneous three times a day before meals  lactated ringers. 1000 milliLiter(s) IV Continuous <Continuous>  lisinopril 20 milliGRAM(s) Oral daily  metoprolol tartrate 25 milliGRAM(s) Oral two times a day  OLANZapine 10 milliGRAM(s) Oral daily  tamsulosin 0.4 milliGRAM(s) Oral at bedtime  traZODone 50 milliGRAM(s) Oral at bedtime    PRN MEDICATIONS  acetaminophen     Tablet .. 650 milliGRAM(s) Oral every 6 hours PRN  albuterol    90 MICROgram(s) HFA Inhaler 2 Puff(s) Inhalation every 6 hours PRN  aluminum hydroxide/magnesium hydroxide/simethicone Suspension 30 milliLiter(s) Oral every 4 hours PRN  dextrose Oral Gel 15 Gram(s) Oral once PRN  melatonin 3 milliGRAM(s) Oral at bedtime PRN  ondansetron Injectable 4 milliGRAM(s) IV Push every 8 hours PRN    VITALS  T(F): 97.4 (01-08-25 @ 05:18), Max: 97.7 (01-07-25 @ 12:00)  HR: 74 (01-08-25 @ 07:00) (69 - 80)  BP: 109/70 (01-08-25 @ 07:00) (109/70 - 212/80)  RR: 18 (01-08-25 @ 05:18) (18 - 18)  SpO2: 99% (01-08-25 @ 07:30) (95% - 99%)  POCT Blood Glucose.: 83 mg/dL (01-08-25 @ 08:34)  POCT Blood Glucose.: 96 mg/dL (01-07-25 @ 20:55)  POCT Blood Glucose.: 101 mg/dL (01-07-25 @ 17:30)  POCT Blood Glucose.: 95 mg/dL (01-07-25 @ 11:57)    PHYSICAL EXAM  GENERAL  (  ) NAD, lying in bed comfortably     (  ) obtunded     (x  ) lethargic     (  ) somnolent    HEAD  (  x) Atraumatic     (  ) hematoma     (  ) laceration (specify location:       )     NECK  (  x) Supple     (  ) neck stiffness     (  ) nuchal rigidity     (  )  no JVD     (  ) JVD present ( -- cm)    HEART  Rate -->  (x  ) normal rate    (  ) bradycardic    (  ) tachycardic  Rhythm -->  (  ) regular    (  ) regularly irregular    (  ) irregularly irregular  Murmurs -->  (  ) normal s1/s2    (  ) systolic murmur    (  ) diastolic murmur    (  ) continuous murmur     (  ) S3 present    (  ) S4 present    LUNGS  (  x)Unlabored respirations     (  ) tachypnea  (  ) B/L air entry     (  ) decreased breath sounds in:  (location     )    (  ) no adventitious sound     (  ) crackles     (  ) wheezing      (  ) rhonchi      (specify location:       )  (  ) chest wall tenderness (specify location:       )    ABDOMEN  (x  ) Soft     (  ) tense   |   (  ) nondistended     (  ) distended   |   (  ) +BS     (  ) hypoactive bowel sounds     (  ) hyperactive bowel sounds  (  ) nontender     (  ) RUQ tenderness     (  ) RLQ tenderness     (  ) LLQ tenderness     (  ) epigastric tenderness     (  ) diffuse tenderness  (  ) Splenomegaly      (  ) Hepatomegaly      (  ) Jaundice     (  ) ecchymosis     EXTREMITIES  (  ) Normal     (  ) Rash     (  ) ecchymosis     (  ) varicose veins      (  ) pitting edema     (  ) non-pitting edema   (  ) ulceration     (  ) gangrene:     (location:  b/l lower extremity wounds   )    NERVOUS SYSTEM  (  ) A&Ox3     ( x ) confused     (  ) lethargic  CN II-XII:     (  ) Intact     (  ) focal deficits  (Specify:     )   Upper extremities:     (  ) strength X/5     (  ) focal deficit (specify:    )  Lower extremities:     (  ) strength  X/5    (  ) focal deficit (specify:    )    SKIN  (  ) No rashes or lesions     (  ) maculopapular rash     (  ) pustules     (  ) vesicles     (  ) ulcer     (  ) ecchymosis     (specify location:     )    (  ) Indwelling Cobos Catheter   Date insterted:    Reason (  ) Critical illness     (  ) urinary retention    (  ) Accurate Ins/Outs Monitoring     (  ) CMO patient    (  ) Central Line  Date inserted:  Location: (  ) Right IJ   (  ) Left IJ   (  ) Right Fem   (  ) Left Fem    (  ) SPC  (  ) pigtail  (  ) PEG tube  (  ) colostomy  (  ) jejunostomy  (  ) U-Dall    LABS             11.5   6.06  )-----------( 192      ( 01-08-25 @ 07:40 )             34.7     136  |  99  |  18  -------------------------<  84   01-08-25 @ 07:40  4.3  |  25  |  1.1    Ca      8.8     01-08-25 @ 07:40  Mg     1.9     01-08-25 @ 07:40    TPro  6.9  /  Alb  4.1  /  TBili  <0.2  /  DBili  x   /  AST  23  /  ALT  11  /  AlkPhos  90  /  GGT  x     01-08-25 @ 07:40        Urinalysis Basic - ( 08 Jan 2025 07:40 )    Color: x / Appearance: x / SG: x / pH: x  Gluc: 84 mg/dL / Ketone: x  / Bili: x / Urobili: x   Blood: x / Protein: x / Nitrite: x   Leuk Esterase: x / RBC: x / WBC x   Sq Epi: x / Non Sq Epi: x / Bacteria: x          Urinalysis with Rflx Culture (collected 05 Jan 2025 13:29)    Culture - Urine (collected 05 Jan 2025 13:29)  Source: Clean Catch None  Final Report (07 Jan 2025 15:40):    >100,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)  Organism: Klebsiella pneumoniae (Carbapenem Resistant)  Klebsiella pneumoniae (Carbapenem Resistant) (07 Jan 2025 15:40)  Organism: Klebsiella pneumoniae (Carbapenem Resistant) (07 Jan 2025 15:40)  Organism: Klebsiella pneumoniae (Carbapenem Resistant) (07 Jan 2025 15:40)    Culture - Blood (collected 05 Jan 2025 12:00)  Source: .Blood BLOOD  Preliminary Report (07 Jan 2025 15:01):    No growth at 48 Hours    Culture - Blood (collected 05 Jan 2025 12:00)  Source: .Blood BLOOD  Preliminary Report (07 Jan 2025 15:01):    No growth at 48 Hours
SUBJECTIVE/OVERNIGHT EVENTS  Today is hospital day 4d. This morning patient was seen and examined at bedside, resting comfortably in bed. Hypertensive ON to the 200s. Denies headache, chest pain, SOB. No acute or major events overnight.    MEDICATIONS  STANDING MEDICATIONS  ceftazidime/avibactam IVPB 1.25 Gram(s) IV Intermittent every 8 hours  chlorhexidine 2% Cloths 1 Application(s) Topical <User Schedule>  dextrose 5%. 1000 milliLiter(s) IV Continuous <Continuous>  dextrose 5%. 1000 milliLiter(s) IV Continuous <Continuous>  dextrose 50% Injectable 25 Gram(s) IV Push once  dextrose 50% Injectable 12.5 Gram(s) IV Push once  dextrose 50% Injectable 25 Gram(s) IV Push once  gabapentin 300 milliGRAM(s) Oral two times a day  glucagon  Injectable 1 milliGRAM(s) IntraMuscular once  heparin   Injectable 5000 Unit(s) SubCutaneous every 8 hours  insulin lispro (ADMELOG) corrective regimen sliding scale   SubCutaneous three times a day before meals  lactated ringers. 1000 milliLiter(s) IV Continuous <Continuous>  lisinopril 20 milliGRAM(s) Oral daily  metoprolol tartrate 25 milliGRAM(s) Oral two times a day  OLANZapine 10 milliGRAM(s) Oral daily  tamsulosin 0.4 milliGRAM(s) Oral at bedtime  traZODone 50 milliGRAM(s) Oral at bedtime    PRN MEDICATIONS  acetaminophen     Tablet .. 650 milliGRAM(s) Oral every 6 hours PRN  albuterol    90 MICROgram(s) HFA Inhaler 2 Puff(s) Inhalation every 6 hours PRN  aluminum hydroxide/magnesium hydroxide/simethicone Suspension 30 milliLiter(s) Oral every 4 hours PRN  dextrose Oral Gel 15 Gram(s) Oral once PRN  melatonin 3 milliGRAM(s) Oral at bedtime PRN  ondansetron Injectable 4 milliGRAM(s) IV Push every 8 hours PRN    VITALS  T(F): 97.7 (01-09-25 @ 05:21), Max: 97.7 (01-09-25 @ 05:21)  HR: 69 (01-09-25 @ 07:54) (69 - 85)  BP: 119/72 (01-09-25 @ 07:54) (93/76 - 222/87)  RR: 18 (01-09-25 @ 05:21) (18 - 18)  SpO2: 98% (01-09-25 @ 07:54) (97% - 98%)  POCT Blood Glucose.: 78 mg/dL (01-09-25 @ 07:43)  POCT Blood Glucose.: 90 mg/dL (01-08-25 @ 21:05)    PHYSICAL EXAM  GENERAL  (  x) NAD, lying in bed comfortably     (  ) obtunded     (  ) lethargic     (  ) somnolent    HEAD  (x  ) Atraumatic     (  ) hematoma     (  ) laceration (specify location:       )     NECK  (  x) Supple     (  ) neck stiffness     (  ) nuchal rigidity     (  )  no JVD     (  ) JVD present ( -- cm)    HEART  Rate -->  ( x ) normal rate    (  ) bradycardic    (  ) tachycardic  Rhythm -->  (  ) regular    (  ) regularly irregular    (  ) irregularly irregular  Murmurs -->  (  ) normal s1/s2    (  ) systolic murmur    (  ) diastolic murmur    (  ) continuous murmur     (  ) S3 present    (  ) S4 present    LUNGS  ( x )Unlabored respirations     (  ) tachypnea  (  ) B/L air entry     (  ) decreased breath sounds in:  (location     )    (  ) no adventitious sound     (  ) crackles     (  ) wheezing      (  ) rhonchi      (specify location:       )  (  ) chest wall tenderness (specify location:       )    ABDOMEN  (x  ) Soft     (  ) tense   |   (  ) nondistended     (  ) distended   |   (  ) +BS     (  ) hypoactive bowel sounds     (  ) hyperactive bowel sounds  (  ) nontender     (  ) RUQ tenderness     (  ) RLQ tenderness     (  ) LLQ tenderness     (  ) epigastric tenderness     (  ) diffuse tenderness  (  ) Splenomegaly      (  ) Hepatomegaly      (  ) Jaundice     (  ) ecchymosis     EXTREMITIES  (  x) Normal     (  ) Rash     (  ) ecchymosis     (  ) varicose veins      (  ) pitting edema     (  ) non-pitting edema   (  ) ulceration     (  ) gangrene:     (location:     )    NERVOUS SYSTEM  (  ) A&Ox3     ( x ) confused     (  ) lethargic  CN II-XII:     (  ) Intact     (  ) focal deficits  (Specify:     )   Upper extremities:     (  ) strength X/5     (  ) focal deficit (specify:    )  Lower extremities:     (  ) strength  X/5    (  ) focal deficit (specify:    )    SKIN  ( x ) No rashes or lesions     (  ) maculopapular rash     (  ) pustules     (  ) vesicles     (  ) ulcer     (  ) ecchymosis     (specify location:     )    (  ) Indwelling Cobos Catheter   Date insterted:    Reason (  ) Critical illness     (  ) urinary retention    (  ) Accurate Ins/Outs Monitoring     (  ) CMO patient    (  ) Central Line  Date inserted:  Location: (  ) Right IJ   (  ) Left IJ   (  ) Right Fem   (  ) Left Fem    (  ) SPC  (  ) pigtail  (  ) PEG tube  (  ) colostomy  (  ) jejunostomy  (  ) U-Dall    LABS             11.5   6.06  )-----------( 192      ( 01-08-25 @ 07:40 )             34.7     136  |  99  |  18  -------------------------<  84   01-08-25 @ 07:40  4.3  |  25  |  1.1    Ca      8.8     01-08-25 @ 07:40  Mg     1.9     01-08-25 @ 07:40    TPro  6.9  /  Alb  4.1  /  TBili  <0.2  /  DBili  x   /  AST  23  /  ALT  11  /  AlkPhos  90  /  GGT  x     01-08-25 @ 07:40        Urinalysis Basic - ( 08 Jan 2025 07:40 )    Color: x / Appearance: x / SG: x / pH: x  Gluc: 84 mg/dL / Ketone: x  / Bili: x / Urobili: x   Blood: x / Protein: x / Nitrite: x   Leuk Esterase: x / RBC: x / WBC x   Sq Epi: x / Non Sq Epi: x / Bacteria: x
SUBJECTIVE/OVERNIGHT EVENTS  Today is hospital day 5d. This morning patient was seen and examined at bedside, resting comfortably in bed. Hypertensive overnight with SBPs to the 200s. Currently SBP 150s. Asymptomatic. No acute or major events overnight.    MEDICATIONS  STANDING MEDICATIONS  ceftazidime/avibactam IVPB 1.25 Gram(s) IV Intermittent every 8 hours  chlorhexidine 2% Cloths 1 Application(s) Topical <User Schedule>  dextrose 5%. 1000 milliLiter(s) IV Continuous <Continuous>  dextrose 5%. 1000 milliLiter(s) IV Continuous <Continuous>  dextrose 50% Injectable 25 Gram(s) IV Push once  dextrose 50% Injectable 12.5 Gram(s) IV Push once  dextrose 50% Injectable 25 Gram(s) IV Push once  gabapentin 300 milliGRAM(s) Oral two times a day  glucagon  Injectable 1 milliGRAM(s) IntraMuscular once  heparin   Injectable 5000 Unit(s) SubCutaneous every 8 hours  insulin lispro (ADMELOG) corrective regimen sliding scale   SubCutaneous three times a day before meals  lisinopril 40 milliGRAM(s) Oral at bedtime  metoprolol tartrate 25 milliGRAM(s) Oral two times a day  nicotine -  14 mG/24Hr(s) Patch 1 Patch Transdermal daily  OLANZapine 10 milliGRAM(s) Oral daily  tamsulosin 0.4 milliGRAM(s) Oral at bedtime  traZODone 50 milliGRAM(s) Oral at bedtime    PRN MEDICATIONS  acetaminophen     Tablet .. 650 milliGRAM(s) Oral every 6 hours PRN  albuterol    90 MICROgram(s) HFA Inhaler 2 Puff(s) Inhalation every 6 hours PRN  aluminum hydroxide/magnesium hydroxide/simethicone Suspension 30 milliLiter(s) Oral every 4 hours PRN  dextrose Oral Gel 15 Gram(s) Oral once PRN  melatonin 3 milliGRAM(s) Oral at bedtime PRN  ondansetron Injectable 4 milliGRAM(s) IV Push every 8 hours PRN    VITALS  T(F): 97.6 (01-10-25 @ 05:22), Max: 98.2 (01-09-25 @ 11:30)  HR: 70 (01-10-25 @ 09:39) (63 - 87)  BP: 152/70 (01-10-25 @ 09:39) (116/78 - 226/71)  RR: 18 (01-10-25 @ 09:39) (18 - 18)  SpO2: 98% (01-10-25 @ 09:39) (97% - 98%)  POCT Blood Glucose.: 100 mg/dL (01-10-25 @ 07:45)  POCT Blood Glucose.: 105 mg/dL (01-09-25 @ 21:21)  POCT Blood Glucose.: 97 mg/dL (01-09-25 @ 18:09)  POCT Blood Glucose.: 108 mg/dL (01-09-25 @ 11:16)    PHYSICAL EXAM  GENERAL  (x  ) NAD, lying in bed comfortably     (  ) obtunded     (  ) lethargic     (  ) somnolent    HEAD  (  x) Atraumatic     (  ) hematoma     (  ) laceration (specify location:       )     NECK  ( x ) Supple     (  ) neck stiffness     (  ) nuchal rigidity     (  )  no JVD     (  ) JVD present ( -- cm)    HEART  Rate -->  (x  ) normal rate    (  ) bradycardic    (  ) tachycardic  Rhythm -->  (  ) regular    (  ) regularly irregular    (  ) irregularly irregular  Murmurs -->  (  ) normal s1/s2    (  ) systolic murmur    (  ) diastolic murmur    (  ) continuous murmur     (  ) S3 present    (  ) S4 present    LUNGS  (x  )Unlabored respirations     (  ) tachypnea  (  ) B/L air entry     (  ) decreased breath sounds in:  (location     )    (  ) no adventitious sound     (  ) crackles     (  ) wheezing      (  ) rhonchi      (specify location:       )  (  ) chest wall tenderness (specify location:       )    ABDOMEN  ( x ) Soft     (  ) tense   |   (  ) nondistended     (  ) distended   |   (  ) +BS     (  ) hypoactive bowel sounds     (  ) hyperactive bowel sounds  (  ) nontender     (  ) RUQ tenderness     (  ) RLQ tenderness     (  ) LLQ tenderness     (  ) epigastric tenderness     (  ) diffuse tenderness  (  ) Splenomegaly      (  ) Hepatomegaly      (  ) Jaundice     (  ) ecchymosis     EXTREMITIES  ( x ) Normal     (  ) Rash     (  ) ecchymosis     (  ) varicose veins      (  ) pitting edema     (  ) non-pitting edema   (  ) ulceration     (  ) gangrene:     (location:     )    NERVOUS SYSTEM  (x  ) A&Ox2     (  ) confused     (  ) lethargic  CN II-XII:     (  ) Intact     (  ) focal deficits  (Specify:     )   Upper extremities:     (  ) strength X/5     (  ) focal deficit (specify:    )  Lower extremities:     (  ) strength  X/5    (  ) focal deficit (specify:    )    SKIN  (  ) No rashes or lesions     (  ) maculopapular rash     (  ) pustules     (  ) vesicles     (  ) ulcer     (  ) ecchymosis     (specify location:     )    (  ) Indwelling Cobos Catheter   Date insterted:    Reason (  ) Critical illness     (  ) urinary retention    (  ) Accurate Ins/Outs Monitoring     (  ) CMO patient    (  ) Central Line  Date inserted:  Location: (  ) Right IJ   (  ) Left IJ   (  ) Right Fem   (  ) Left Fem    (  ) SPC  (  ) pigtail  (  ) PEG tube  (  ) colostomy  (  ) jejunostomy  (  ) U-Dall    LABS             11.8   5.44  )-----------( 200      ( 01-10-25 @ 07:15 )             34.3     132  |  97  |  17  -------------------------<  104   01-10-25 @ 07:15  4.2  |  22  |  1.1    Ca      8.9     01-10-25 @ 07:15  Mg     1.9     01-10-25 @ 07:15    TPro  6.6  /  Alb  4.1  /  TBili  <0.2  /  DBili  x   /  AST  21  /  ALT  13  /  AlkPhos  86  /  GGT  x     01-10-25 @ 07:15        Urinalysis Basic - ( 10 Kalia 2025 07:15 )    Color: x / Appearance: x / SG: x / pH: x  Gluc: 104 mg/dL / Ketone: x  / Bili: x / Urobili: x   Blood: x / Protein: x / Nitrite: x   Leuk Esterase: x / RBC: x / WBC x   Sq Epi: x / Non Sq Epi: x / Bacteria: x  
SUBJECTIVE/OVERNIGHT EVENTS  Today is hospital day 8d. This morning patient was seen and examined at bedside, resting comfortably in bed. No acute or major events overnight.    CODE STATUS: FULL     MEDICATIONS  STANDING MEDICATIONS  amLODIPine   Tablet 10 milliGRAM(s) Oral daily  ceftazidime/avibactam IVPB 1.25 Gram(s) IV Intermittent every 8 hours  chlorhexidine 2% Cloths 1 Application(s) Topical <User Schedule>  dextrose 5%. 1000 milliLiter(s) IV Continuous <Continuous>  dextrose 5%. 1000 milliLiter(s) IV Continuous <Continuous>  dextrose 50% Injectable 25 Gram(s) IV Push once  dextrose 50% Injectable 12.5 Gram(s) IV Push once  dextrose 50% Injectable 25 Gram(s) IV Push once  gabapentin 300 milliGRAM(s) Oral two times a day  glucagon  Injectable 1 milliGRAM(s) IntraMuscular once  heparin   Injectable 5000 Unit(s) SubCutaneous every 8 hours  insulin lispro (ADMELOG) corrective regimen sliding scale   SubCutaneous three times a day before meals  lisinopril 40 milliGRAM(s) Oral at bedtime  metoprolol tartrate 25 milliGRAM(s) Oral two times a day  nicotine -  14 mG/24Hr(s) Patch 1 Patch Transdermal daily  OLANZapine 10 milliGRAM(s) Oral daily  tamsulosin 0.4 milliGRAM(s) Oral at bedtime  traZODone 50 milliGRAM(s) Oral at bedtime    PRN MEDICATIONS  acetaminophen     Tablet .. 650 milliGRAM(s) Oral every 6 hours PRN  albuterol    90 MICROgram(s) HFA Inhaler 2 Puff(s) Inhalation every 6 hours PRN  aluminum hydroxide/magnesium hydroxide/simethicone Suspension 30 milliLiter(s) Oral every 4 hours PRN  dextrose Oral Gel 15 Gram(s) Oral once PRN  melatonin 3 milliGRAM(s) Oral at bedtime PRN  ondansetron Injectable 4 milliGRAM(s) IV Push every 8 hours PRN    VITALS  T(F): 97.7 (01-13-25 @ 05:00), Max: 97.7 (01-13-25 @ 05:00)  HR: 66 (01-13-25 @ 09:40) (61 - 78)  BP: 177/61 (01-13-25 @ 09:40) (152/74 - 190/76)  RR: 18 (01-13-25 @ 09:40) (18 - 18)  SpO2: 98% (01-13-25 @ 05:00) (98% - 98%)  POCT Blood Glucose.: 87 mg/dL (01-13-25 @ 08:14)  POCT Blood Glucose.: 102 mg/dL (01-12-25 @ 20:48)  POCT Blood Glucose.: 94 mg/dL (01-12-25 @ 17:10)  POCT Blood Glucose.: 103 mg/dL (01-12-25 @ 11:28)    PHYSICAL EXAM  GENERAL  ( x ) NAD, lying in bed comfortably     (  ) obtunded     (  ) lethargic     (  ) somnolent    HEAD  ( x ) Atraumatic     (  ) hematoma     (  ) laceration (specify location:       )     NECK  (x  ) Supple     (  ) neck stiffness     (  ) nuchal rigidity     (  )  no JVD     (  ) JVD present ( -- cm)    HEART  Rate -->  ( x ) normal rate    (  ) bradycardic    (  ) tachycardic  Rhythm -->  ( x ) regular    (  ) regularly irregular    (  ) irregularly irregular  Murmurs -->  (  x) normal s1/s2    (  ) systolic murmur    (  ) diastolic murmur    (  ) continuous murmur     (  ) S3 present    (  ) S4 present    LUNGS  ( x )Unlabored respirations     (  ) tachypnea  ( x ) B/L air entry     (  ) decreased breath sounds in:  (location     )    (  ) no adventitious sound     (  ) crackles     (  ) wheezing      (  ) rhonchi      (specify location:       )  (  ) chest wall tenderness (specify location:       )    ABDOMEN  ( x ) Soft     (  ) tense   |   ( x ) nondistended     (  ) distended   |   (  ) +BS     (  ) hypoactive bowel sounds     (  ) hyperactive bowel sounds  (  ) nontender     (  ) RUQ tenderness     (  ) RLQ tenderness     (  ) LLQ tenderness     (  ) epigastric tenderness     (  ) diffuse tenderness  (  ) Splenomegaly      (  ) Hepatomegaly      (  ) Jaundice     (  ) ecchymosis     EXTREMITIES  ( x ) Normal     (  ) Rash     (  ) ecchymosis     (  ) varicose veins      (  ) pitting edema     (  ) non-pitting edema   (  ) ulceration     (  ) gangrene:     (location:     )    NERVOUS SYSTEM  ( x ) A&Ox3     (  ) confused     (  ) lethargic  CN II-XII:     (  ) Intact     (  ) focal deficits  (Specify:     )   Upper extremities:     (  ) strength X/5     (  ) focal deficit (specify:    )  Lower extremities:     (  ) strength  X/5    (  ) focal deficit (specify:    )    SKIN  ( x ) No rashes or lesions     (  ) maculopapular rash     (  ) pustules     (  ) vesicles     (  ) ulcer     (  ) ecchymosis     (specify location:     )    LABS             11.4   5.96  )-----------( 227      ( 01-13-25 @ 07:41 )             34.5     135  |  98  |  24  -------------------------<  93   01-13-25 @ 07:41  4.6  |  26  |  1.2    Ca      9.4     01-13-25 @ 07:41  Mg     2.1     01-13-25 @ 07:41    TPro  6.9  /  Alb  4.0  /  TBili  0.2  /  DBili  x   /  AST  27  /  ALT  20  /  AlkPhos  87  /  GGT  x     01-13-25 @ 07:41        Urinalysis Basic - ( 13 Jan 2025 07:41 )    Color: x / Appearance: x / SG: x / pH: x  Gluc: 93 mg/dL / Ketone: x  / Bili: x / Urobili: x   Blood: x / Protein: x / Nitrite: x   Leuk Esterase: x / RBC: x / WBC x   Sq Epi: x / Non Sq Epi: x / Bacteria: x          IMAGING
  ADA VILLAGOMEZ  84y, Male    All available historical data reviewed    OVERNIGHT EVENTS:  no fevers    ROS:  not reliable    VITALS:  T(F): 97.4, Max: 97.6 (01-07-25 @ 21:58)  HR: 74  BP: 109/70  RR: 18Vital Signs Last 24 Hrs  T(C): 36.3 (08 Jan 2025 05:18), Max: 36.4 (07 Jan 2025 21:58)  T(F): 97.4 (08 Jan 2025 05:18), Max: 97.6 (07 Jan 2025 21:58)  HR: 74 (08 Jan 2025 07:00) (69 - 78)  BP: 109/70 (08 Jan 2025 07:00) (109/70 - 212/80)  BP(mean): 83 (08 Jan 2025 07:00) (83 - 124)  RR: 18 (08 Jan 2025 05:18) (18 - 18)  SpO2: 99% (08 Jan 2025 07:30) (97% - 99%)    Parameters below as of 08 Jan 2025 07:30  Patient On (Oxygen Delivery Method): room air        TESTS & MEASUREMENTS:                        11.5   6.06  )-----------( 192      ( 08 Jan 2025 07:40 )             34.7     01-08    136  |  99  |  18  ----------------------------<  84  4.3   |  25  |  1.1    Ca    8.8      08 Jan 2025 07:40  Mg     1.9     01-08    TPro  6.9  /  Alb  4.1  /  TBili  <0.2  /  DBili  x   /  AST  23  /  ALT  11  /  AlkPhos  90  01-08    LIVER FUNCTIONS - ( 08 Jan 2025 07:40 )  Alb: 4.1 g/dL / Pro: 6.9 g/dL / ALK PHOS: 90 U/L / ALT: 11 U/L / AST: 23 U/L / GGT: x             Urinalysis with Rflx Culture (collected 01-05-25 @ 13:29)    Culture - Urine (collected 01-05-25 @ 13:29)  Source: Clean Catch None  Final Report (01-07-25 @ 15:40):    >100,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)  Organism: Klebsiella pneumoniae (Carbapenem Resistant)  Klebsiella pneumoniae (Carbapenem Resistant) (01-07-25 @ 15:40)  Organism: Klebsiella pneumoniae (Carbapenem Resistant) (01-07-25 @ 15:40)      Method Type: CarbaR      -  Resistance Gene to Carbapenem: Detec      -  Resistance Gene OXA: Detec  Organism: Klebsiella pneumoniae (Carbapenem Resistant) (01-07-25 @ 15:40)      Method Type: IZABELLA      -  Amoxicillin/Clavulanic Acid: R >16/8      -  Ampicillin: R >16 These ampicillin results predict results for amoxicillin      -  Ampicillin/Sulbactam: R >16/8      -  Aztreonam: R >16      -  Cefazolin: R >16 For uncomplicated UTI with K. pneumoniae, E. coli, or P. mirablis: IZABELLA <=16 is sensitive and IZABELLA >=32 is resistant. This also predicts results for oral agents cefaclor, cefdinir, cefpodoxime, cefprozil, cefuroxime axetil, cephalexin and locarbef for uncomplicated UTI. Note that some isolates may be susceptible to these agents while testing resistant to cefazolin.      -  Cefepime: R >16      -  Cefoxitin: R >16      -  Ceftriaxone: R >32      -  Cefuroxime: R >16      -  Ciprofloxacin: R >2      -  Ertapenem: R >1      -  Gentamicin: R >8      -  Imipenem: R 4      -  Levofloxacin: R >4      -  Meropenem: R 8      -  Nitrofurantoin: R >64 Should not be used to treat pyelonephritis      -  Piperacillin/Tazobactam: R >64      -  Tobramycin: R >8      -  Trimethoprim/Sulfamethoxazole: R >2/38    Culture - Blood (collected 01-05-25 @ 12:00)  Source: .Blood BLOOD  Preliminary Report (01-07-25 @ 15:01):    No growth at 48 Hours    Culture - Blood (collected 01-05-25 @ 12:00)  Source: .Blood BLOOD  Preliminary Report (01-07-25 @ 15:01):    No growth at 48 Hours      Urinalysis Basic - ( 08 Jan 2025 07:40 )    Color: x / Appearance: x / SG: x / pH: x  Gluc: 84 mg/dL / Ketone: x  / Bili: x / Urobili: x   Blood: x / Protein: x / Nitrite: x   Leuk Esterase: x / RBC: x / WBC x   Sq Epi: x / Non Sq Epi: x / Bacteria: x          Social History:  Tobacco Use: No  Alcohol Use: No  Drug Use: No    RADIOLOGY & ADDITIONAL TESTS:  Personal review of radiological diagnostics performed  Echo and EKG results noted when applicable.     MEDICATIONS:  acetaminophen     Tablet .. 650 milliGRAM(s) Oral every 6 hours PRN  albuterol    90 MICROgram(s) HFA Inhaler 2 Puff(s) Inhalation every 6 hours PRN  aluminum hydroxide/magnesium hydroxide/simethicone Suspension 30 milliLiter(s) Oral every 4 hours PRN  cefTRIAXone   IVPB 2000 milliGRAM(s) IV Intermittent every 24 hours  dextrose 5%. 1000 milliLiter(s) IV Continuous <Continuous>  dextrose 5%. 1000 milliLiter(s) IV Continuous <Continuous>  dextrose 50% Injectable 25 Gram(s) IV Push once  dextrose 50% Injectable 12.5 Gram(s) IV Push once  dextrose 50% Injectable 25 Gram(s) IV Push once  dextrose Oral Gel 15 Gram(s) Oral once PRN  gabapentin 300 milliGRAM(s) Oral two times a day  glucagon  Injectable 1 milliGRAM(s) IntraMuscular once  heparin   Injectable 5000 Unit(s) SubCutaneous every 8 hours  insulin lispro (ADMELOG) corrective regimen sliding scale   SubCutaneous three times a day before meals  lactated ringers. 1000 milliLiter(s) IV Continuous <Continuous>  lisinopril 20 milliGRAM(s) Oral daily  melatonin 3 milliGRAM(s) Oral at bedtime PRN  metoprolol tartrate 25 milliGRAM(s) Oral two times a day  OLANZapine 10 milliGRAM(s) Oral daily  ondansetron Injectable 4 milliGRAM(s) IV Push every 8 hours PRN  tamsulosin 0.4 milliGRAM(s) Oral at bedtime  traZODone 50 milliGRAM(s) Oral at bedtime      ANTIBIOTICS:  cefTRIAXone   IVPB 2000 milliGRAM(s) IV Intermittent every 24 hours    
  ADA VILLAGOMEZ  84y, Male    All available historical data reviewed    OVERNIGHT EVENTS:    none  ROS:  General: Denies rigors, nightsweats  HEENT: Denies headache, rhinorrhea, sore throat, eye pain  CV: Denies CP, palpitations  PULM: Denies wheezing, hemoptysis  GI: Denies hematemesis, hematochezia, melena  : Denies discharge, hematuria  MSK: Denies arthralgias, myalgias  SKIN: Denies rash, lesions  NEURO: Denies paresthesias, weakness  PSYCH: Denies depression, anxiety    VITALS:  T(F): 97.7, Max: 98.1 (01-07-25 @ 02:18)  HR: 80  BP: 152/73  RR: 18Vital Signs Last 24 Hrs  T(C): 36.5 (07 Jan 2025 12:00), Max: 36.7 (07 Jan 2025 02:18)  T(F): 97.7 (07 Jan 2025 12:00), Max: 98.1 (07 Jan 2025 02:18)  HR: 80 (07 Jan 2025 12:00) (63 - 91)  BP: 152/73 (07 Jan 2025 12:00) (118/49 - 211/67)  BP(mean): 70 (07 Jan 2025 02:18) (70 - 99)  RR: 18 (07 Jan 2025 12:00) (18 - 19)  SpO2: 95% (07 Jan 2025 12:00) (95% - 97%)    Parameters below as of 07 Jan 2025 05:03  Patient On (Oxygen Delivery Method): room air        TESTS & MEASUREMENTS:                        10.5   7.05  )-----------( 208      ( 06 Jan 2025 05:56 )             31.5     01-06    135  |  98  |  18  ----------------------------<  96  4.5   |  26  |  1.1    Ca    8.6      06 Jan 2025 05:56  Mg     1.8     01-06    TPro  6.1  /  Alb  3.9  /  TBili  0.4  /  DBili  x   /  AST  23  /  ALT  9   /  AlkPhos  75  01-06    LIVER FUNCTIONS - ( 06 Jan 2025 05:56 )  Alb: 3.9 g/dL / Pro: 6.1 g/dL / ALK PHOS: 75 U/L / ALT: 9 U/L / AST: 23 U/L / GGT: x             Urinalysis with Rflx Culture (collected 01-05-25 @ 13:29)    Culture - Urine (collected 01-05-25 @ 13:29)  Source: Clean Catch None  Preliminary Report (01-06-25 @ 21:19):    >100,000 CFU/ml Klebsiella pneumoniae    Culture - Blood (collected 01-05-25 @ 12:00)  Source: .Blood BLOOD  Preliminary Report (01-06-25 @ 15:02):    No growth at 24 hours    Culture - Blood (collected 01-05-25 @ 12:00)  Source: .Blood BLOOD  Preliminary Report (01-06-25 @ 15:02):    No growth at 24 hours      Urinalysis Basic - ( 06 Jan 2025 05:56 )    Color: x / Appearance: x / SG: x / pH: x  Gluc: 96 mg/dL / Ketone: x  / Bili: x / Urobili: x   Blood: x / Protein: x / Nitrite: x   Leuk Esterase: x / RBC: x / WBC x   Sq Epi: x / Non Sq Epi: x / Bacteria: x          Social History:  Tobacco Use: No  Alcohol Use: No  Drug Use: No    RADIOLOGY & ADDITIONAL TESTS:  Personal review of radiological diagnostics performed  Echo and EKG results noted when applicable.     MEDICATIONS:  acetaminophen     Tablet .. 650 milliGRAM(s) Oral every 6 hours PRN  albuterol    90 MICROgram(s) HFA Inhaler 2 Puff(s) Inhalation every 6 hours PRN  aluminum hydroxide/magnesium hydroxide/simethicone Suspension 30 milliLiter(s) Oral every 4 hours PRN  cefepime   IVPB 1000 milliGRAM(s) IV Intermittent every 12 hours  dextrose 5%. 1000 milliLiter(s) IV Continuous <Continuous>  dextrose 5%. 1000 milliLiter(s) IV Continuous <Continuous>  dextrose 50% Injectable 25 Gram(s) IV Push once  dextrose 50% Injectable 12.5 Gram(s) IV Push once  dextrose 50% Injectable 25 Gram(s) IV Push once  dextrose Oral Gel 15 Gram(s) Oral once PRN  gabapentin 300 milliGRAM(s) Oral two times a day  glucagon  Injectable 1 milliGRAM(s) IntraMuscular once  heparin   Injectable 5000 Unit(s) SubCutaneous every 8 hours  insulin lispro (ADMELOG) corrective regimen sliding scale   SubCutaneous three times a day before meals  lactated ringers. 1000 milliLiter(s) IV Continuous <Continuous>  lisinopril 20 milliGRAM(s) Oral daily  melatonin 3 milliGRAM(s) Oral at bedtime PRN  metoprolol tartrate 25 milliGRAM(s) Oral two times a day  OLANZapine 10 milliGRAM(s) Oral daily  ondansetron Injectable 4 milliGRAM(s) IV Push every 8 hours PRN  tamsulosin 0.4 milliGRAM(s) Oral at bedtime  traZODone 50 milliGRAM(s) Oral at bedtime      ANTIBIOTICS:  cefepime   IVPB 1000 milliGRAM(s) IV Intermittent every 12 hours    
  ADA VILLAGOMEZ  84y, Male    All available historical data reviewed    OVERNIGHT EVENTS:  none    ROS:  General: Denies rigors, nightsweats  HEENT: Denies headache, rhinorrhea, sore throat, eye pain  CV: Denies CP, palpitations  PULM: Denies wheezing, hemoptysis  GI: Denies hematemesis, hematochezia, melena  : Denies discharge, hematuria  MSK: Denies arthralgias, myalgias  SKIN: Denies rash, lesions  NEURO: Denies paresthesias, weakness  PSYCH: Denies depression, anxiety    VITALS:  T(F): 97.1, Max: 97.5 (01-13-25 @ 20:49)  HR: 67  BP: 176/69  RR: 18Vital Signs Last 24 Hrs  T(C): 36.2 (14 Jan 2025 05:00), Max: 36.4 (13 Jan 2025 20:49)  T(F): 97.1 (14 Jan 2025 05:00), Max: 97.5 (13 Jan 2025 20:49)  HR: 67 (14 Jan 2025 05:00) (67 - 71)  BP: 176/69 (14 Jan 2025 05:00) (158/62 - 176/69)  BP(mean): --  RR: 18 (14 Jan 2025 05:00) (18 - 18)  SpO2: 96% (14 Jan 2025 05:00) (96% - 98%)    Parameters below as of 13 Jan 2025 20:49  Patient On (Oxygen Delivery Method): room air        TESTS & MEASUREMENTS:                        11.4   5.96  )-----------( 227      ( 13 Jan 2025 07:41 )             34.5     01-13    135  |  98  |  24[H]  ----------------------------<  93  4.6   |  26  |  1.2    Ca    9.4      13 Jan 2025 07:41  Mg     2.1     01-13    TPro  6.9  /  Alb  4.0  /  TBili  0.2  /  DBili  x   /  AST  27  /  ALT  20  /  AlkPhos  87  01-13    LIVER FUNCTIONS - ( 13 Jan 2025 07:41 )  Alb: 4.0 g/dL / Pro: 6.9 g/dL / ALK PHOS: 87 U/L / ALT: 20 U/L / AST: 27 U/L / GGT: x             Urinalysis Basic - ( 13 Jan 2025 07:41 )    Color: x / Appearance: x / SG: x / pH: x  Gluc: 93 mg/dL / Ketone: x  / Bili: x / Urobili: x   Blood: x / Protein: x / Nitrite: x   Leuk Esterase: x / RBC: x / WBC x   Sq Epi: x / Non Sq Epi: x / Bacteria: x          Social History:  Tobacco Use: No  Alcohol Use: No  Drug Use: No    RADIOLOGY & ADDITIONAL TESTS:  Personal review of radiological diagnostics performed  Echo and EKG results noted when applicable.     MEDICATIONS:  acetaminophen     Tablet .. 650 milliGRAM(s) Oral every 6 hours PRN  albuterol    90 MICROgram(s) HFA Inhaler 2 Puff(s) Inhalation every 6 hours PRN  aluminum hydroxide/magnesium hydroxide/simethicone Suspension 30 milliLiter(s) Oral every 4 hours PRN  amLODIPine   Tablet 10 milliGRAM(s) Oral daily  ceftazidime/avibactam IVPB 1.25 Gram(s) IV Intermittent every 8 hours  chlorhexidine 2% Cloths 1 Application(s) Topical <User Schedule>  dextrose 5%. 1000 milliLiter(s) IV Continuous <Continuous>  dextrose 5%. 1000 milliLiter(s) IV Continuous <Continuous>  dextrose 50% Injectable 25 Gram(s) IV Push once  dextrose 50% Injectable 12.5 Gram(s) IV Push once  dextrose 50% Injectable 25 Gram(s) IV Push once  dextrose Oral Gel 15 Gram(s) Oral once PRN  gabapentin 300 milliGRAM(s) Oral two times a day  glucagon  Injectable 1 milliGRAM(s) IntraMuscular once  heparin   Injectable 5000 Unit(s) SubCutaneous every 8 hours  insulin lispro (ADMELOG) corrective regimen sliding scale   SubCutaneous three times a day before meals  lisinopril 40 milliGRAM(s) Oral at bedtime  melatonin 3 milliGRAM(s) Oral at bedtime PRN  metoprolol tartrate 25 milliGRAM(s) Oral two times a day  nicotine -  14 mG/24Hr(s) Patch 1 Patch Transdermal daily  OLANZapine 10 milliGRAM(s) Oral daily  ondansetron Injectable 4 milliGRAM(s) IV Push every 8 hours PRN  tamsulosin 0.4 milliGRAM(s) Oral at bedtime  traZODone 50 milliGRAM(s) Oral at bedtime      ANTIBIOTICS:  ceftazidime/avibactam IVPB 1.25 Gram(s) IV Intermittent every 8 hours    
  ADA VILLAGOMEZ  84y, Male    All available historical data reviewed    OVERNIGHT EVENTS:  none    ROS:  General: Denies rigors, nightsweats  HEENT: Denies headache, rhinorrhea, sore throat, eye pain  CV: Denies CP, palpitations  PULM: Denies wheezing, hemoptysis  GI: Denies hematemesis, hematochezia, melena  : Denies discharge, hematuria  MSK: Denies arthralgias, myalgias  SKIN: Denies rash, lesions  NEURO: weakness  PSYCH: Denies depression, anxiety    VITALS:  T(F): 98.2, Max: 98.2 (01-09-25 @ 11:30)  HR: 63  BP: 197/72  RR: 18Vital Signs Last 24 Hrs  T(C): 36.8 (09 Jan 2025 11:30), Max: 36.8 (09 Jan 2025 11:30)  T(F): 98.2 (09 Jan 2025 11:30), Max: 98.2 (09 Jan 2025 11:30)  HR: 63 (09 Jan 2025 11:30) (63 - 85)  BP: 197/72 (09 Jan 2025 11:30) (93/76 - 222/87)  BP(mean): 132 (09 Jan 2025 05:21) (132 - 132)  RR: 18 (09 Jan 2025 11:30) (18 - 18)  SpO2: 97% (09 Jan 2025 11:30) (97% - 98%)    Parameters below as of 09 Jan 2025 07:54  Patient On (Oxygen Delivery Method): room air        TESTS & MEASUREMENTS:                        11.5   6.06  )-----------( 192      ( 08 Jan 2025 07:40 )             34.7     01-08    136  |  99  |  18  ----------------------------<  84  4.3   |  25  |  1.1    Ca    8.8      08 Jan 2025 07:40  Mg     1.9     01-08    TPro  6.9  /  Alb  4.1  /  TBili  <0.2  /  DBili  x   /  AST  23  /  ALT  11  /  AlkPhos  90  01-08    LIVER FUNCTIONS - ( 08 Jan 2025 07:40 )  Alb: 4.1 g/dL / Pro: 6.9 g/dL / ALK PHOS: 90 U/L / ALT: 11 U/L / AST: 23 U/L / GGT: x             Urinalysis with Rflx Culture (collected 01-05-25 @ 13:29)    Culture - Urine (collected 01-05-25 @ 13:29)  Source: Clean Catch None  Final Report (01-07-25 @ 15:40):    >100,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)  Organism: Klebsiella pneumoniae (Carbapenem Resistant)  Klebsiella pneumoniae (Carbapenem Resistant) (01-07-25 @ 15:40)  Organism: Klebsiella pneumoniae (Carbapenem Resistant) (01-07-25 @ 15:40)      -  Resistance Gene to Carbapenem: Detec      -  Resistance Gene OXA: Detec      Method Type: Saeid  Organism: Klebsiella pneumoniae (Carbapenem Resistant) (01-07-25 @ 15:40)      -  Levofloxacin: R >4      -  Tobramycin: R >8      -  Nitrofurantoin: R >64 Should not be used to treat pyelonephritis      -  Aztreonam: R >16      -  Gentamicin: R >8      -  Cefazolin: R >16 For uncomplicated UTI with K. pneumoniae, E. coli, or P. mirablis: IZABELLA <=16 is sensitive and IZABELLA >=32 is resistant. This also predicts results for oral agents cefaclor, cefdinir, cefpodoxime, cefprozil, cefuroxime axetil, cephalexin and locarbef for uncomplicated UTI. Note that some isolates may be susceptible to these agents while testing resistant to cefazolin.      -  Cefepime: R >16      -  Piperacillin/Tazobactam: R >64      -  Ciprofloxacin: R >2      -  Imipenem: R 4      -  Ceftriaxone: R >32      -  Ampicillin: R >16 These ampicillin results predict results for amoxicillin      Method Type: IZABELLA      -  Meropenem: R 8      -  Ampicillin/Sulbactam: R >16/8      -  Cefoxitin: R >16      -  Cefuroxime: R >16      -  Amoxicillin/Clavulanic Acid: R >16/8      -  Trimethoprim/Sulfamethoxazole: R >2/38      -  Ertapenem: R >1    Culture - Blood (collected 01-05-25 @ 12:00)  Source: .Blood BLOOD  Preliminary Report (01-08-25 @ 15:01):    No growth at 72 Hours    Culture - Blood (collected 01-05-25 @ 12:00)  Source: .Blood BLOOD  Preliminary Report (01-08-25 @ 15:01):    No growth at 72 Hours      Urinalysis Basic - ( 08 Jan 2025 07:40 )    Color: x / Appearance: x / SG: x / pH: x  Gluc: 84 mg/dL / Ketone: x  / Bili: x / Urobili: x   Blood: x / Protein: x / Nitrite: x   Leuk Esterase: x / RBC: x / WBC x   Sq Epi: x / Non Sq Epi: x / Bacteria: x          Social History:  Tobacco Use: No  Alcohol Use: No  Drug Use: No    RADIOLOGY & ADDITIONAL TESTS:  Personal review of radiological diagnostics performed  Echo and EKG results noted when applicable.     MEDICATIONS:  acetaminophen     Tablet .. 650 milliGRAM(s) Oral every 6 hours PRN  albuterol    90 MICROgram(s) HFA Inhaler 2 Puff(s) Inhalation every 6 hours PRN  aluminum hydroxide/magnesium hydroxide/simethicone Suspension 30 milliLiter(s) Oral every 4 hours PRN  amLODIPine   Tablet 5 milliGRAM(s) Oral daily  ceftazidime/avibactam IVPB 1.25 Gram(s) IV Intermittent every 8 hours  chlorhexidine 2% Cloths 1 Application(s) Topical <User Schedule>  dextrose 5%. 1000 milliLiter(s) IV Continuous <Continuous>  dextrose 5%. 1000 milliLiter(s) IV Continuous <Continuous>  dextrose 50% Injectable 25 Gram(s) IV Push once  dextrose 50% Injectable 12.5 Gram(s) IV Push once  dextrose 50% Injectable 25 Gram(s) IV Push once  dextrose Oral Gel 15 Gram(s) Oral once PRN  gabapentin 300 milliGRAM(s) Oral two times a day  glucagon  Injectable 1 milliGRAM(s) IntraMuscular once  heparin   Injectable 5000 Unit(s) SubCutaneous every 8 hours  insulin lispro (ADMELOG) corrective regimen sliding scale   SubCutaneous three times a day before meals  lactated ringers. 1000 milliLiter(s) IV Continuous <Continuous>  lisinopril 20 milliGRAM(s) Oral daily  melatonin 3 milliGRAM(s) Oral at bedtime PRN  metoprolol tartrate 25 milliGRAM(s) Oral two times a day  OLANZapine 10 milliGRAM(s) Oral daily  ondansetron Injectable 4 milliGRAM(s) IV Push every 8 hours PRN  tamsulosin 0.4 milliGRAM(s) Oral at bedtime  traZODone 50 milliGRAM(s) Oral at bedtime      ANTIBIOTICS:  ceftazidime/avibactam IVPB 1.25 Gram(s) IV Intermittent every 8 hours

## 2025-01-14 NOTE — PROGRESS NOTE ADULT - PROVIDER SPECIALTY LIST ADULT
Hospitalist
Internal Medicine
Infectious Disease
Infectious Disease
Internal Medicine
Infectious Disease
Consent (Temporal Branch)/Introductory Paragraph: The rationale for Mohs was explained to the patient and consent was obtained. The risks, benefits and alternatives to therapy were discussed in detail. Specifically, the risks of damage to the temporal branch of the facial nerve, infection, scarring, bleeding, prolonged wound healing, incomplete removal, allergy to anesthesia, and recurrence were addressed. Prior to the procedure, the treatment site was clearly identified and confirmed by the patient. All components of Universal Protocol/PAUSE Rule completed.

## 2025-01-14 NOTE — PROGRESS NOTE ADULT - NEUROLOGICAL
normal/cranial nerves II-XII intact/sensation intact
normal/cranial nerves II-XII intact/sensation intact
responds to pain/responds to verbal commands

## 2025-01-15 ENCOUNTER — INPATIENT (INPATIENT)
Facility: HOSPITAL | Age: 85
LOS: 9 days | Discharge: NURSING, REHAB OR LONG-TERM CARE | DRG: 872 | End: 2025-01-25
Attending: INTERNAL MEDICINE | Admitting: INTERNAL MEDICINE
Payer: MEDICARE

## 2025-01-15 VITALS
RESPIRATION RATE: 20 BRPM | HEIGHT: 65 IN | HEART RATE: 115 BPM | DIASTOLIC BLOOD PRESSURE: 52 MMHG | SYSTOLIC BLOOD PRESSURE: 70 MMHG | OXYGEN SATURATION: 98 %

## 2025-01-15 DIAGNOSIS — Z12.11 ENCOUNTER FOR SCREENING FOR MALIGNANT NEOPLASM OF COLON: Chronic | ICD-10-CM

## 2025-01-15 DIAGNOSIS — A41.9 SEPSIS, UNSPECIFIED ORGANISM: ICD-10-CM

## 2025-01-15 LAB
ALBUMIN SERPL ELPH-MCNC: 4.7 G/DL — SIGNIFICANT CHANGE UP (ref 3.5–5.2)
ALP SERPL-CCNC: 100 U/L — SIGNIFICANT CHANGE UP (ref 30–115)
ALT FLD-CCNC: 23 U/L — SIGNIFICANT CHANGE UP (ref 0–41)
ANION GAP SERPL CALC-SCNC: 18 MMOL/L — HIGH (ref 7–14)
AST SERPL-CCNC: 42 U/L — HIGH (ref 0–41)
BASOPHILS # BLD AUTO: 0.04 K/UL — SIGNIFICANT CHANGE UP (ref 0–0.2)
BASOPHILS NFR BLD AUTO: 0.2 % — SIGNIFICANT CHANGE UP (ref 0–1)
BILIRUB SERPL-MCNC: 0.3 MG/DL — SIGNIFICANT CHANGE UP (ref 0.2–1.2)
BUN SERPL-MCNC: 48 MG/DL — HIGH (ref 10–20)
C DIFF GDH STL QL: NEGATIVE — SIGNIFICANT CHANGE UP
C DIFF GDH STL QL: SIGNIFICANT CHANGE UP
CALCIUM SERPL-MCNC: 10.1 MG/DL — SIGNIFICANT CHANGE UP (ref 8.4–10.5)
CHLORIDE SERPL-SCNC: 95 MMOL/L — LOW (ref 98–110)
CO2 SERPL-SCNC: 18 MMOL/L — SIGNIFICANT CHANGE UP (ref 17–32)
CREAT SERPL-MCNC: 2.3 MG/DL — HIGH (ref 0.7–1.5)
EGFR: 28 ML/MIN/1.73M2 — LOW
EGFR: 28 ML/MIN/1.73M2 — LOW
EOSINOPHIL # BLD AUTO: 0.01 K/UL — SIGNIFICANT CHANGE UP (ref 0–0.7)
EOSINOPHIL NFR BLD AUTO: 0.1 % — SIGNIFICANT CHANGE UP (ref 0–8)
FLUAV AG NPH QL: SIGNIFICANT CHANGE UP
FLUBV AG NPH QL: SIGNIFICANT CHANGE UP
GI PCR PANEL: SIGNIFICANT CHANGE UP
GLUCOSE BLDC GLUCOMTR-MCNC: 123 MG/DL — HIGH (ref 70–99)
GLUCOSE BLDC GLUCOMTR-MCNC: 125 MG/DL — HIGH (ref 70–99)
GLUCOSE BLDC GLUCOMTR-MCNC: 134 MG/DL — HIGH (ref 70–99)
GLUCOSE SERPL-MCNC: 140 MG/DL — HIGH (ref 70–99)
HCT VFR BLD CALC: 40.1 % — LOW (ref 42–52)
HGB BLD-MCNC: 13.3 G/DL — LOW (ref 14–18)
IMM GRANULOCYTES NFR BLD AUTO: 1 % — HIGH (ref 0.1–0.3)
LACTATE SERPL-SCNC: 2.9 MMOL/L — HIGH (ref 0.7–2)
LACTATE SERPL-SCNC: 3 MMOL/L — HIGH (ref 0.7–2)
LYMPHOCYTES # BLD AUTO: 1.23 K/UL — SIGNIFICANT CHANGE UP (ref 1.2–3.4)
LYMPHOCYTES # BLD AUTO: 6.3 % — LOW (ref 20.5–51.1)
MCHC RBC-ENTMCNC: 32 PG — HIGH (ref 27–31)
MCHC RBC-ENTMCNC: 33.2 G/DL — SIGNIFICANT CHANGE UP (ref 32–37)
MCV RBC AUTO: 96.6 FL — HIGH (ref 80–94)
MONOCYTES # BLD AUTO: 0.72 K/UL — HIGH (ref 0.1–0.6)
MONOCYTES NFR BLD AUTO: 3.7 % — SIGNIFICANT CHANGE UP (ref 1.7–9.3)
NEUTROPHILS # BLD AUTO: 17.26 K/UL — HIGH (ref 1.4–6.5)
NEUTROPHILS NFR BLD AUTO: 88.7 % — HIGH (ref 42.2–75.2)
NRBC # BLD: 0 /100 WBCS — SIGNIFICANT CHANGE UP (ref 0–0)
NRBC BLD-RTO: 0 /100 WBCS — SIGNIFICANT CHANGE UP (ref 0–0)
PLATELET # BLD AUTO: 273 K/UL — SIGNIFICANT CHANGE UP (ref 130–400)
PMV BLD: 10.8 FL — HIGH (ref 7.4–10.4)
POTASSIUM SERPL-MCNC: 4.8 MMOL/L — SIGNIFICANT CHANGE UP (ref 3.5–5)
POTASSIUM SERPL-SCNC: 4.8 MMOL/L — SIGNIFICANT CHANGE UP (ref 3.5–5)
PROT SERPL-MCNC: 7.7 G/DL — SIGNIFICANT CHANGE UP (ref 6–8)
RBC # BLD: 4.15 M/UL — LOW (ref 4.7–6.1)
RBC # FLD: 15.1 % — HIGH (ref 11.5–14.5)
RSV RNA NPH QL NAA+NON-PROBE: SIGNIFICANT CHANGE UP
SARS-COV-2 RNA SPEC QL NAA+PROBE: SIGNIFICANT CHANGE UP
SODIUM SERPL-SCNC: 131 MMOL/L — LOW (ref 135–146)
WBC # BLD: 19.45 K/UL — HIGH (ref 4.8–10.8)
WBC # FLD AUTO: 19.45 K/UL — HIGH (ref 4.8–10.8)

## 2025-01-15 PROCEDURE — 87640 STAPH A DNA AMP PROBE: CPT

## 2025-01-15 PROCEDURE — 87086 URINE CULTURE/COLONY COUNT: CPT

## 2025-01-15 PROCEDURE — 80048 BASIC METABOLIC PNL TOTAL CA: CPT

## 2025-01-15 PROCEDURE — 85025 COMPLETE CBC W/AUTO DIFF WBC: CPT

## 2025-01-15 PROCEDURE — 71045 X-RAY EXAM CHEST 1 VIEW: CPT | Mod: 26

## 2025-01-15 PROCEDURE — 83735 ASSAY OF MAGNESIUM: CPT

## 2025-01-15 PROCEDURE — 74019 RADEX ABDOMEN 2 VIEWS: CPT

## 2025-01-15 PROCEDURE — 76775 US EXAM ABDO BACK WALL LIM: CPT | Mod: 26

## 2025-01-15 PROCEDURE — 80053 COMPREHEN METABOLIC PANEL: CPT

## 2025-01-15 PROCEDURE — 81001 URINALYSIS AUTO W/SCOPE: CPT

## 2025-01-15 PROCEDURE — 74176 CT ABD & PELVIS W/O CONTRAST: CPT | Mod: MC

## 2025-01-15 PROCEDURE — 85027 COMPLETE CBC AUTOMATED: CPT

## 2025-01-15 PROCEDURE — 97530 THERAPEUTIC ACTIVITIES: CPT | Mod: GP

## 2025-01-15 PROCEDURE — 36415 COLL VENOUS BLD VENIPUNCTURE: CPT

## 2025-01-15 PROCEDURE — 87641 MR-STAPH DNA AMP PROBE: CPT

## 2025-01-15 PROCEDURE — 70450 CT HEAD/BRAIN W/O DYE: CPT | Mod: 26

## 2025-01-15 PROCEDURE — 99285 EMERGENCY DEPT VISIT HI MDM: CPT | Mod: FS

## 2025-01-15 PROCEDURE — 70450 CT HEAD/BRAIN W/O DYE: CPT | Mod: MC

## 2025-01-15 PROCEDURE — 83605 ASSAY OF LACTIC ACID: CPT

## 2025-01-15 PROCEDURE — 51703 INSERT BLADDER CATH COMPLEX: CPT

## 2025-01-15 PROCEDURE — 76775 US EXAM ABDO BACK WALL LIM: CPT

## 2025-01-15 PROCEDURE — 99223 1ST HOSP IP/OBS HIGH 75: CPT | Mod: GC

## 2025-01-15 PROCEDURE — 82962 GLUCOSE BLOOD TEST: CPT

## 2025-01-15 PROCEDURE — 0241U: CPT

## 2025-01-15 RX ORDER — DEXTROSE 50 % IN WATER 50 %
25 SYRINGE (ML) INTRAVENOUS ONCE
Refills: 0 | Status: DISCONTINUED | OUTPATIENT
Start: 2025-01-15 | End: 2025-01-25

## 2025-01-15 RX ORDER — NICOTINE POLACRILEX 4 MG/1
1 GUM, CHEWING ORAL DAILY
Refills: 0 | Status: DISCONTINUED | OUTPATIENT
Start: 2025-01-15 | End: 2025-01-25

## 2025-01-15 RX ORDER — VANCOMYCIN HCL IN 5 % DEXTROSE 1.5G/250ML
500 PLASTIC BAG, INJECTION (ML) INTRAVENOUS ONCE
Refills: 0 | Status: COMPLETED | OUTPATIENT
Start: 2025-01-15 | End: 2025-01-15

## 2025-01-15 RX ORDER — SODIUM CHLORIDE 9 G/1000ML
1000 INJECTION, SOLUTION INTRAVENOUS
Refills: 0 | Status: DISCONTINUED | OUTPATIENT
Start: 2025-01-15 | End: 2025-01-20

## 2025-01-15 RX ORDER — MAGNESIUM, ALUMINUM HYDROXIDE 200-200 MG
30 TABLET,CHEWABLE ORAL EVERY 4 HOURS
Refills: 0 | Status: DISCONTINUED | OUTPATIENT
Start: 2025-01-15 | End: 2025-01-25

## 2025-01-15 RX ORDER — MEROPENEM 1 G/30ML
1000 INJECTION INTRAVENOUS EVERY 12 HOURS
Refills: 0 | Status: DISCONTINUED | OUTPATIENT
Start: 2025-01-15 | End: 2025-01-16

## 2025-01-15 RX ORDER — ACETAMINOPHEN 500 MG/5ML
650 LIQUID (ML) ORAL EVERY 6 HOURS
Refills: 0 | Status: DISCONTINUED | OUTPATIENT
Start: 2025-01-15 | End: 2025-01-25

## 2025-01-15 RX ORDER — SODIUM CHLORIDE 9 G/1000ML
1000 INJECTION, SOLUTION INTRAVENOUS ONCE
Refills: 0 | Status: COMPLETED | OUTPATIENT
Start: 2025-01-15 | End: 2025-01-15

## 2025-01-15 RX ORDER — TRAZODONE HCL 100 MG
50 TABLET ORAL AT BEDTIME
Refills: 0 | Status: DISCONTINUED | OUTPATIENT
Start: 2025-01-15 | End: 2025-01-25

## 2025-01-15 RX ORDER — INSULIN LISPRO 100 U/ML
INJECTION, SOLUTION INTRAVENOUS; SUBCUTANEOUS
Refills: 0 | Status: DISCONTINUED | OUTPATIENT
Start: 2025-01-15 | End: 2025-01-25

## 2025-01-15 RX ORDER — SODIUM CHLORIDE 9 G/1000ML
1000 INJECTION, SOLUTION INTRAVENOUS
Refills: 0 | Status: DISCONTINUED | OUTPATIENT
Start: 2025-01-15 | End: 2025-01-15

## 2025-01-15 RX ORDER — DEXTROSE 50 % IN WATER 50 %
15 SYRINGE (ML) INTRAVENOUS ONCE
Refills: 0 | Status: DISCONTINUED | OUTPATIENT
Start: 2025-01-15 | End: 2025-01-25

## 2025-01-15 RX ORDER — METRONIDAZOLE 250 MG
500 TABLET ORAL ONCE
Refills: 0 | Status: COMPLETED | OUTPATIENT
Start: 2025-01-15 | End: 2025-01-15

## 2025-01-15 RX ORDER — ALBUTEROL SULFATE 2.5 MG/3ML
1 VIAL, NEBULIZER (ML) INHALATION EVERY 6 HOURS
Refills: 0 | Status: DISCONTINUED | OUTPATIENT
Start: 2025-01-15 | End: 2025-01-25

## 2025-01-15 RX ORDER — HEPARIN SODIUM 1000 [USP'U]/ML
5000 INJECTION INTRAVENOUS; SUBCUTANEOUS EVERY 12 HOURS
Refills: 0 | Status: DISCONTINUED | OUTPATIENT
Start: 2025-01-15 | End: 2025-01-25

## 2025-01-15 RX ORDER — METOPROLOL SUCCINATE 50 MG/1
25 TABLET, EXTENDED RELEASE ORAL
Refills: 0 | Status: DISCONTINUED | OUTPATIENT
Start: 2025-01-15 | End: 2025-01-17

## 2025-01-15 RX ORDER — ONDANSETRON HCL/PF 4 MG/2 ML
4 VIAL (ML) INJECTION EVERY 8 HOURS
Refills: 0 | Status: DISCONTINUED | OUTPATIENT
Start: 2025-01-15 | End: 2025-01-25

## 2025-01-15 RX ORDER — MELATONIN 5 MG
3 TABLET ORAL AT BEDTIME
Refills: 0 | Status: DISCONTINUED | OUTPATIENT
Start: 2025-01-15 | End: 2025-01-25

## 2025-01-15 RX ORDER — DEXTROSE 50 % IN WATER 50 %
12.5 SYRINGE (ML) INTRAVENOUS ONCE
Refills: 0 | Status: DISCONTINUED | OUTPATIENT
Start: 2025-01-15 | End: 2025-01-25

## 2025-01-15 RX ORDER — GABAPENTIN 400 MG/1
300 CAPSULE ORAL
Refills: 0 | Status: DISCONTINUED | OUTPATIENT
Start: 2025-01-15 | End: 2025-01-25

## 2025-01-15 RX ORDER — GLUCAGON 3 MG/1
1 POWDER NASAL ONCE
Refills: 0 | Status: DISCONTINUED | OUTPATIENT
Start: 2025-01-15 | End: 2025-01-25

## 2025-01-15 RX ORDER — TAMSULOSIN HYDROCHLORIDE 0.4 MG/1
0.4 CAPSULE ORAL AT BEDTIME
Refills: 0 | Status: DISCONTINUED | OUTPATIENT
Start: 2025-01-15 | End: 2025-01-25

## 2025-01-15 RX ORDER — OLANZAPINE 10 MG/1
10 TABLET ORAL DAILY
Refills: 0 | Status: DISCONTINUED | OUTPATIENT
Start: 2025-01-15 | End: 2025-01-25

## 2025-01-15 RX ORDER — NIFEDIPINE 30 MG
60 TABLET, EXTENDED RELEASE 24 HR ORAL DAILY
Refills: 0 | Status: DISCONTINUED | OUTPATIENT
Start: 2025-01-15 | End: 2025-01-17

## 2025-01-15 RX ADMIN — SODIUM CHLORIDE 1000 MILLILITER(S): 9 INJECTION, SOLUTION INTRAVENOUS at 02:30

## 2025-01-15 RX ADMIN — HEPARIN SODIUM 5000 UNIT(S): 1000 INJECTION INTRAVENOUS; SUBCUTANEOUS at 19:04

## 2025-01-15 RX ADMIN — NICOTINE POLACRILEX 1 PATCH: 4 GUM, CHEWING ORAL at 12:55

## 2025-01-15 RX ADMIN — OLANZAPINE 10 MILLIGRAM(S): 10 TABLET ORAL at 12:55

## 2025-01-15 RX ADMIN — MEROPENEM 100 MILLIGRAM(S): 1 INJECTION INTRAVENOUS at 15:56

## 2025-01-15 RX ADMIN — SODIUM CHLORIDE 150 MILLILITER(S): 9 INJECTION, SOLUTION INTRAVENOUS at 04:50

## 2025-01-15 RX ADMIN — TAMSULOSIN HYDROCHLORIDE 0.4 MILLIGRAM(S): 0.4 CAPSULE ORAL at 22:55

## 2025-01-15 RX ADMIN — Medication 100 MILLIGRAM(S): at 03:45

## 2025-01-15 RX ADMIN — Medication 500 MILLIGRAM(S): at 05:09

## 2025-01-15 RX ADMIN — Medication 80 MILLILITER(S): at 08:59

## 2025-01-15 RX ADMIN — Medication 50 MILLIGRAM(S): at 22:55

## 2025-01-16 LAB
ANION GAP SERPL CALC-SCNC: 13 MMOL/L — SIGNIFICANT CHANGE UP (ref 7–14)
BUN SERPL-MCNC: 34 MG/DL — HIGH (ref 10–20)
CALCIUM SERPL-MCNC: 8.7 MG/DL — SIGNIFICANT CHANGE UP (ref 8.4–10.4)
CHLORIDE SERPL-SCNC: 105 MMOL/L — SIGNIFICANT CHANGE UP (ref 98–110)
CO2 SERPL-SCNC: 19 MMOL/L — SIGNIFICANT CHANGE UP (ref 17–32)
CREAT SERPL-MCNC: 1.3 MG/DL — SIGNIFICANT CHANGE UP (ref 0.7–1.5)
EGFR: 55 ML/MIN/1.73M2 — LOW
EGFR: 55 ML/MIN/1.73M2 — LOW
GLUCOSE BLDC GLUCOMTR-MCNC: 112 MG/DL — HIGH (ref 70–99)
GLUCOSE BLDC GLUCOMTR-MCNC: 93 MG/DL — SIGNIFICANT CHANGE UP (ref 70–99)
GLUCOSE SERPL-MCNC: 85 MG/DL — SIGNIFICANT CHANGE UP (ref 70–99)
HCT VFR BLD CALC: 29.9 % — LOW (ref 42–52)
HGB BLD-MCNC: 10 G/DL — LOW (ref 14–18)
MCHC RBC-ENTMCNC: 31.6 PG — HIGH (ref 27–31)
MCHC RBC-ENTMCNC: 33.4 G/DL — SIGNIFICANT CHANGE UP (ref 32–37)
MCV RBC AUTO: 94.6 FL — HIGH (ref 80–94)
NRBC # BLD: 0 /100 WBCS — SIGNIFICANT CHANGE UP (ref 0–0)
NRBC BLD-RTO: 0 /100 WBCS — SIGNIFICANT CHANGE UP (ref 0–0)
PLATELET # BLD AUTO: 226 K/UL — SIGNIFICANT CHANGE UP (ref 130–400)
PMV BLD: 11.3 FL — HIGH (ref 7.4–10.4)
POTASSIUM SERPL-MCNC: 3.8 MMOL/L — SIGNIFICANT CHANGE UP (ref 3.5–5)
POTASSIUM SERPL-SCNC: 3.8 MMOL/L — SIGNIFICANT CHANGE UP (ref 3.5–5)
RBC # BLD: 3.16 M/UL — LOW (ref 4.7–6.1)
RBC # FLD: 15.7 % — HIGH (ref 11.5–14.5)
SODIUM SERPL-SCNC: 137 MMOL/L — SIGNIFICANT CHANGE UP (ref 135–146)
WBC # BLD: 19.54 K/UL — HIGH (ref 4.8–10.8)
WBC # FLD AUTO: 19.54 K/UL — HIGH (ref 4.8–10.8)

## 2025-01-16 PROCEDURE — 99233 SBSQ HOSP IP/OBS HIGH 50: CPT

## 2025-01-16 PROCEDURE — 99231 SBSQ HOSP IP/OBS SF/LOW 25: CPT | Mod: FS

## 2025-01-16 RX ORDER — MEROPENEM 1 G/30ML
1000 INJECTION INTRAVENOUS EVERY 12 HOURS
Refills: 0 | Status: DISCONTINUED | OUTPATIENT
Start: 2025-01-16 | End: 2025-01-17

## 2025-01-16 RX ADMIN — NICOTINE POLACRILEX 1 PATCH: 4 GUM, CHEWING ORAL at 12:13

## 2025-01-16 RX ADMIN — HEPARIN SODIUM 5000 UNIT(S): 1000 INJECTION INTRAVENOUS; SUBCUTANEOUS at 17:44

## 2025-01-16 RX ADMIN — NICOTINE POLACRILEX 1 PATCH: 4 GUM, CHEWING ORAL at 12:12

## 2025-01-16 RX ADMIN — Medication 50 MILLIGRAM(S): at 21:27

## 2025-01-16 RX ADMIN — NICOTINE POLACRILEX 1 PATCH: 4 GUM, CHEWING ORAL at 11:42

## 2025-01-16 RX ADMIN — OLANZAPINE 10 MILLIGRAM(S): 10 TABLET ORAL at 11:42

## 2025-01-16 RX ADMIN — TAMSULOSIN HYDROCHLORIDE 0.4 MILLIGRAM(S): 0.4 CAPSULE ORAL at 21:27

## 2025-01-16 RX ADMIN — GABAPENTIN 300 MILLIGRAM(S): 400 CAPSULE ORAL at 17:43

## 2025-01-16 RX ADMIN — GABAPENTIN 300 MILLIGRAM(S): 400 CAPSULE ORAL at 06:43

## 2025-01-16 RX ADMIN — MEROPENEM 100 MILLIGRAM(S): 1 INJECTION INTRAVENOUS at 17:43

## 2025-01-16 RX ADMIN — NICOTINE POLACRILEX 1 PATCH: 4 GUM, CHEWING ORAL at 18:06

## 2025-01-16 RX ADMIN — HEPARIN SODIUM 5000 UNIT(S): 1000 INJECTION INTRAVENOUS; SUBCUTANEOUS at 06:43

## 2025-01-16 RX ADMIN — METOPROLOL SUCCINATE 25 MILLIGRAM(S): 50 TABLET, EXTENDED RELEASE ORAL at 17:43

## 2025-01-16 RX ADMIN — MEROPENEM 100 MILLIGRAM(S): 1 INJECTION INTRAVENOUS at 06:43

## 2025-01-17 LAB
ALBUMIN SERPL ELPH-MCNC: 3.6 G/DL — SIGNIFICANT CHANGE UP (ref 3.5–5.2)
ALP SERPL-CCNC: 82 U/L — SIGNIFICANT CHANGE UP (ref 30–115)
ALT FLD-CCNC: 15 U/L — SIGNIFICANT CHANGE UP (ref 0–41)
ANION GAP SERPL CALC-SCNC: 10 MMOL/L — SIGNIFICANT CHANGE UP (ref 7–14)
APPEARANCE UR: ABNORMAL
AST SERPL-CCNC: 21 U/L — SIGNIFICANT CHANGE UP (ref 0–41)
BASOPHILS # BLD AUTO: 0.02 K/UL — SIGNIFICANT CHANGE UP (ref 0–0.2)
BASOPHILS NFR BLD AUTO: 0.1 % — SIGNIFICANT CHANGE UP (ref 0–1)
BILIRUB SERPL-MCNC: 0.4 MG/DL — SIGNIFICANT CHANGE UP (ref 0.2–1.2)
BILIRUB UR-MCNC: NEGATIVE — SIGNIFICANT CHANGE UP
BUN SERPL-MCNC: 26 MG/DL — HIGH (ref 10–20)
CALCIUM SERPL-MCNC: 9.1 MG/DL — SIGNIFICANT CHANGE UP (ref 8.4–10.5)
CHLORIDE SERPL-SCNC: 107 MMOL/L — SIGNIFICANT CHANGE UP (ref 98–110)
CO2 SERPL-SCNC: 21 MMOL/L — SIGNIFICANT CHANGE UP (ref 17–32)
COLOR SPEC: YELLOW — SIGNIFICANT CHANGE UP
CREAT SERPL-MCNC: 1.2 MG/DL — SIGNIFICANT CHANGE UP (ref 0.7–1.5)
DIFF PNL FLD: ABNORMAL
EGFR: 60 ML/MIN/1.73M2 — SIGNIFICANT CHANGE UP
EGFR: 60 ML/MIN/1.73M2 — SIGNIFICANT CHANGE UP
EOSINOPHIL # BLD AUTO: 0 K/UL — SIGNIFICANT CHANGE UP (ref 0–0.7)
EOSINOPHIL NFR BLD AUTO: 0 % — SIGNIFICANT CHANGE UP (ref 0–8)
GLUCOSE BLDC GLUCOMTR-MCNC: 116 MG/DL — HIGH (ref 70–99)
GLUCOSE BLDC GLUCOMTR-MCNC: 123 MG/DL — HIGH (ref 70–99)
GLUCOSE BLDC GLUCOMTR-MCNC: 127 MG/DL — HIGH (ref 70–99)
GLUCOSE BLDC GLUCOMTR-MCNC: 182 MG/DL — HIGH (ref 70–99)
GLUCOSE SERPL-MCNC: 101 MG/DL — HIGH (ref 70–99)
GLUCOSE UR QL: NEGATIVE MG/DL — SIGNIFICANT CHANGE UP
HCT VFR BLD CALC: 29.3 % — LOW (ref 42–52)
HGB BLD-MCNC: 9.9 G/DL — LOW (ref 14–18)
IMM GRANULOCYTES NFR BLD AUTO: 0.8 % — HIGH (ref 0.1–0.3)
KETONES UR-MCNC: NEGATIVE MG/DL — SIGNIFICANT CHANGE UP
LACTATE SERPL-SCNC: 1.7 MMOL/L — SIGNIFICANT CHANGE UP (ref 0.7–2)
LEUKOCYTE ESTERASE UR-ACNC: ABNORMAL
LYMPHOCYTES # BLD AUTO: 0.94 K/UL — LOW (ref 1.2–3.4)
LYMPHOCYTES # BLD AUTO: 5.1 % — LOW (ref 20.5–51.1)
MAGNESIUM SERPL-MCNC: 2 MG/DL — SIGNIFICANT CHANGE UP (ref 1.8–2.4)
MCHC RBC-ENTMCNC: 31.5 PG — HIGH (ref 27–31)
MCHC RBC-ENTMCNC: 33.8 G/DL — SIGNIFICANT CHANGE UP (ref 32–37)
MCV RBC AUTO: 93.3 FL — SIGNIFICANT CHANGE UP (ref 80–94)
MONOCYTES # BLD AUTO: 0.87 K/UL — HIGH (ref 0.1–0.6)
MONOCYTES NFR BLD AUTO: 4.7 % — SIGNIFICANT CHANGE UP (ref 1.7–9.3)
MRSA PCR RESULT.: NEGATIVE — SIGNIFICANT CHANGE UP
NEUTROPHILS # BLD AUTO: 16.64 K/UL — HIGH (ref 1.4–6.5)
NEUTROPHILS NFR BLD AUTO: 89.3 % — HIGH (ref 42.2–75.2)
NITRITE UR-MCNC: NEGATIVE — SIGNIFICANT CHANGE UP
NRBC # BLD: 0 /100 WBCS — SIGNIFICANT CHANGE UP (ref 0–0)
NRBC BLD-RTO: 0 /100 WBCS — SIGNIFICANT CHANGE UP (ref 0–0)
PH UR: 6 — SIGNIFICANT CHANGE UP (ref 5–8)
PLATELET # BLD AUTO: 235 K/UL — SIGNIFICANT CHANGE UP (ref 130–400)
PMV BLD: 11.4 FL — HIGH (ref 7.4–10.4)
POTASSIUM SERPL-MCNC: 4 MMOL/L — SIGNIFICANT CHANGE UP (ref 3.5–5)
POTASSIUM SERPL-SCNC: 4 MMOL/L — SIGNIFICANT CHANGE UP (ref 3.5–5)
PROT SERPL-MCNC: 6.3 G/DL — SIGNIFICANT CHANGE UP (ref 6–8)
PROT UR-MCNC: 100 MG/DL
RBC # BLD: 3.14 M/UL — LOW (ref 4.7–6.1)
RBC # FLD: 15.8 % — HIGH (ref 11.5–14.5)
SODIUM SERPL-SCNC: 138 MMOL/L — SIGNIFICANT CHANGE UP (ref 135–146)
SP GR SPEC: 1.02 — SIGNIFICANT CHANGE UP (ref 1–1.03)
UROBILINOGEN FLD QL: 0.2 MG/DL — SIGNIFICANT CHANGE UP (ref 0.2–1)
WBC # BLD: 18.61 K/UL — HIGH (ref 4.8–10.8)
WBC # FLD AUTO: 18.61 K/UL — HIGH (ref 4.8–10.8)

## 2025-01-17 PROCEDURE — 99233 SBSQ HOSP IP/OBS HIGH 50: CPT

## 2025-01-17 RX ORDER — NIFEDIPINE 30 MG
90 TABLET, EXTENDED RELEASE 24 HR ORAL DAILY
Refills: 0 | Status: DISCONTINUED | OUTPATIENT
Start: 2025-01-18 | End: 2025-01-18

## 2025-01-17 RX ORDER — NIFEDIPINE 30 MG
30 TABLET, EXTENDED RELEASE 24 HR ORAL ONCE
Refills: 0 | Status: COMPLETED | OUTPATIENT
Start: 2025-01-17 | End: 2025-01-17

## 2025-01-17 RX ORDER — CARVEDILOL 3.12 MG/1
3.12 TABLET, FILM COATED ORAL EVERY 12 HOURS
Refills: 0 | Status: DISCONTINUED | OUTPATIENT
Start: 2025-01-17 | End: 2025-01-18

## 2025-01-17 RX ADMIN — METOPROLOL SUCCINATE 25 MILLIGRAM(S): 50 TABLET, EXTENDED RELEASE ORAL at 05:42

## 2025-01-17 RX ADMIN — INSULIN LISPRO 1: 100 INJECTION, SOLUTION INTRAVENOUS; SUBCUTANEOUS at 12:13

## 2025-01-17 RX ADMIN — NICOTINE POLACRILEX 1 PATCH: 4 GUM, CHEWING ORAL at 12:15

## 2025-01-17 RX ADMIN — GABAPENTIN 300 MILLIGRAM(S): 400 CAPSULE ORAL at 05:42

## 2025-01-17 RX ADMIN — HEPARIN SODIUM 5000 UNIT(S): 1000 INJECTION INTRAVENOUS; SUBCUTANEOUS at 05:43

## 2025-01-17 RX ADMIN — Medication 50 MILLILITER(S): at 13:12

## 2025-01-17 RX ADMIN — Medication 60 MILLIGRAM(S): at 05:42

## 2025-01-17 RX ADMIN — MEROPENEM 100 MILLIGRAM(S): 1 INJECTION INTRAVENOUS at 05:45

## 2025-01-17 RX ADMIN — TAMSULOSIN HYDROCHLORIDE 0.4 MILLIGRAM(S): 0.4 CAPSULE ORAL at 22:20

## 2025-01-17 RX ADMIN — Medication 30 MILLIGRAM(S): at 09:45

## 2025-01-17 RX ADMIN — NICOTINE POLACRILEX 1 PATCH: 4 GUM, CHEWING ORAL at 18:03

## 2025-01-17 RX ADMIN — CARVEDILOL 3.12 MILLIGRAM(S): 3.12 TABLET, FILM COATED ORAL at 09:46

## 2025-01-17 RX ADMIN — NICOTINE POLACRILEX 1 PATCH: 4 GUM, CHEWING ORAL at 07:57

## 2025-01-17 RX ADMIN — Medication 50 MILLILITER(S): at 22:30

## 2025-01-17 RX ADMIN — HEPARIN SODIUM 5000 UNIT(S): 1000 INJECTION INTRAVENOUS; SUBCUTANEOUS at 17:09

## 2025-01-17 RX ADMIN — CARVEDILOL 3.12 MILLIGRAM(S): 3.12 TABLET, FILM COATED ORAL at 22:21

## 2025-01-17 RX ADMIN — GABAPENTIN 300 MILLIGRAM(S): 400 CAPSULE ORAL at 17:09

## 2025-01-17 RX ADMIN — Medication 50 MILLIGRAM(S): at 22:20

## 2025-01-17 RX ADMIN — OLANZAPINE 10 MILLIGRAM(S): 10 TABLET ORAL at 12:52

## 2025-01-18 LAB
ALBUMIN SERPL ELPH-MCNC: 3.6 G/DL — SIGNIFICANT CHANGE UP (ref 3.5–5.2)
ALP SERPL-CCNC: 80 U/L — SIGNIFICANT CHANGE UP (ref 30–115)
ALT FLD-CCNC: 21 U/L — SIGNIFICANT CHANGE UP (ref 0–41)
ANION GAP SERPL CALC-SCNC: 11 MMOL/L — SIGNIFICANT CHANGE UP (ref 7–14)
AST SERPL-CCNC: 35 U/L — SIGNIFICANT CHANGE UP (ref 0–41)
BASOPHILS # BLD AUTO: 0.02 K/UL — SIGNIFICANT CHANGE UP (ref 0–0.2)
BASOPHILS NFR BLD AUTO: 0.2 % — SIGNIFICANT CHANGE UP (ref 0–1)
BILIRUB SERPL-MCNC: 0.4 MG/DL — SIGNIFICANT CHANGE UP (ref 0.2–1.2)
BUN SERPL-MCNC: 29 MG/DL — HIGH (ref 10–20)
CALCIUM SERPL-MCNC: 8.8 MG/DL — SIGNIFICANT CHANGE UP (ref 8.4–10.5)
CHLORIDE SERPL-SCNC: 102 MMOL/L — SIGNIFICANT CHANGE UP (ref 98–110)
CO2 SERPL-SCNC: 21 MMOL/L — SIGNIFICANT CHANGE UP (ref 17–32)
CREAT SERPL-MCNC: 1.3 MG/DL — SIGNIFICANT CHANGE UP (ref 0.7–1.5)
CULTURE RESULTS: NO GROWTH — SIGNIFICANT CHANGE UP
EGFR: 55 ML/MIN/1.73M2 — LOW
EGFR: 55 ML/MIN/1.73M2 — LOW
EOSINOPHIL # BLD AUTO: 0.01 K/UL — SIGNIFICANT CHANGE UP (ref 0–0.7)
EOSINOPHIL NFR BLD AUTO: 0.1 % — SIGNIFICANT CHANGE UP (ref 0–8)
GLUCOSE BLDC GLUCOMTR-MCNC: 126 MG/DL — HIGH (ref 70–99)
GLUCOSE BLDC GLUCOMTR-MCNC: 127 MG/DL — HIGH (ref 70–99)
GLUCOSE BLDC GLUCOMTR-MCNC: 132 MG/DL — HIGH (ref 70–99)
GLUCOSE BLDC GLUCOMTR-MCNC: 141 MG/DL — HIGH (ref 70–99)
GLUCOSE SERPL-MCNC: 135 MG/DL — HIGH (ref 70–99)
HCT VFR BLD CALC: 30 % — LOW (ref 42–52)
HGB BLD-MCNC: 10.2 G/DL — LOW (ref 14–18)
IMM GRANULOCYTES NFR BLD AUTO: 0.7 % — HIGH (ref 0.1–0.3)
LYMPHOCYTES # BLD AUTO: 1.01 K/UL — LOW (ref 1.2–3.4)
LYMPHOCYTES # BLD AUTO: 9.1 % — LOW (ref 20.5–51.1)
MAGNESIUM SERPL-MCNC: 2.1 MG/DL — SIGNIFICANT CHANGE UP (ref 1.8–2.4)
MCHC RBC-ENTMCNC: 32.1 PG — HIGH (ref 27–31)
MCHC RBC-ENTMCNC: 34 G/DL — SIGNIFICANT CHANGE UP (ref 32–37)
MCV RBC AUTO: 94.3 FL — HIGH (ref 80–94)
MONOCYTES # BLD AUTO: 0.63 K/UL — HIGH (ref 0.1–0.6)
MONOCYTES NFR BLD AUTO: 5.7 % — SIGNIFICANT CHANGE UP (ref 1.7–9.3)
NEUTROPHILS # BLD AUTO: 9.37 K/UL — HIGH (ref 1.4–6.5)
NEUTROPHILS NFR BLD AUTO: 84.2 % — HIGH (ref 42.2–75.2)
NRBC # BLD: 0 /100 WBCS — SIGNIFICANT CHANGE UP (ref 0–0)
NRBC BLD-RTO: 0 /100 WBCS — SIGNIFICANT CHANGE UP (ref 0–0)
PLATELET # BLD AUTO: 241 K/UL — SIGNIFICANT CHANGE UP (ref 130–400)
PMV BLD: 10.6 FL — HIGH (ref 7.4–10.4)
POTASSIUM SERPL-MCNC: 3.7 MMOL/L — SIGNIFICANT CHANGE UP (ref 3.5–5)
POTASSIUM SERPL-SCNC: 3.7 MMOL/L — SIGNIFICANT CHANGE UP (ref 3.5–5)
PROT SERPL-MCNC: 5.9 G/DL — LOW (ref 6–8)
RBC # BLD: 3.18 M/UL — LOW (ref 4.7–6.1)
RBC # FLD: 15.5 % — HIGH (ref 11.5–14.5)
SODIUM SERPL-SCNC: 134 MMOL/L — LOW (ref 135–146)
SPECIMEN SOURCE: SIGNIFICANT CHANGE UP
WBC # BLD: 11.12 K/UL — HIGH (ref 4.8–10.8)
WBC # FLD AUTO: 11.12 K/UL — HIGH (ref 4.8–10.8)

## 2025-01-18 PROCEDURE — 99233 SBSQ HOSP IP/OBS HIGH 50: CPT

## 2025-01-18 RX ORDER — NIFEDIPINE 30 MG
90 TABLET, EXTENDED RELEASE 24 HR ORAL DAILY
Refills: 0 | Status: DISCONTINUED | OUTPATIENT
Start: 2025-01-18 | End: 2025-01-25

## 2025-01-18 RX ORDER — CARVEDILOL 3.12 MG/1
3.12 TABLET, FILM COATED ORAL EVERY 12 HOURS
Refills: 0 | Status: DISCONTINUED | OUTPATIENT
Start: 2025-01-18 | End: 2025-01-25

## 2025-01-18 RX ADMIN — OLANZAPINE 10 MILLIGRAM(S): 10 TABLET ORAL at 12:28

## 2025-01-18 RX ADMIN — HEPARIN SODIUM 5000 UNIT(S): 1000 INJECTION INTRAVENOUS; SUBCUTANEOUS at 06:43

## 2025-01-18 RX ADMIN — Medication 50 MILLILITER(S): at 21:20

## 2025-01-18 RX ADMIN — TAMSULOSIN HYDROCHLORIDE 0.4 MILLIGRAM(S): 0.4 CAPSULE ORAL at 21:18

## 2025-01-18 RX ADMIN — Medication 90 MILLIGRAM(S): at 06:43

## 2025-01-18 RX ADMIN — GABAPENTIN 300 MILLIGRAM(S): 400 CAPSULE ORAL at 06:44

## 2025-01-18 RX ADMIN — CARVEDILOL 3.12 MILLIGRAM(S): 3.12 TABLET, FILM COATED ORAL at 06:43

## 2025-01-18 RX ADMIN — Medication 50 MILLIGRAM(S): at 21:18

## 2025-01-18 RX ADMIN — NICOTINE POLACRILEX 1 PATCH: 4 GUM, CHEWING ORAL at 12:30

## 2025-01-18 RX ADMIN — NICOTINE POLACRILEX 1 PATCH: 4 GUM, CHEWING ORAL at 19:25

## 2025-01-19 LAB
ANION GAP SERPL CALC-SCNC: 12 MMOL/L — SIGNIFICANT CHANGE UP (ref 7–14)
BUN SERPL-MCNC: 29 MG/DL — HIGH (ref 10–20)
CALCIUM SERPL-MCNC: 8.5 MG/DL — SIGNIFICANT CHANGE UP (ref 8.4–10.4)
CHLORIDE SERPL-SCNC: 106 MMOL/L — SIGNIFICANT CHANGE UP (ref 98–110)
CO2 SERPL-SCNC: 19 MMOL/L — SIGNIFICANT CHANGE UP (ref 17–32)
CREAT SERPL-MCNC: 1.2 MG/DL — SIGNIFICANT CHANGE UP (ref 0.7–1.5)
EGFR: 60 ML/MIN/1.73M2 — SIGNIFICANT CHANGE UP
EGFR: 60 ML/MIN/1.73M2 — SIGNIFICANT CHANGE UP
GLUCOSE BLDC GLUCOMTR-MCNC: 101 MG/DL — HIGH (ref 70–99)
GLUCOSE BLDC GLUCOMTR-MCNC: 109 MG/DL — HIGH (ref 70–99)
GLUCOSE BLDC GLUCOMTR-MCNC: 129 MG/DL — HIGH (ref 70–99)
GLUCOSE BLDC GLUCOMTR-MCNC: 149 MG/DL — HIGH (ref 70–99)
GLUCOSE SERPL-MCNC: 123 MG/DL — HIGH (ref 70–99)
HCT VFR BLD CALC: 28.6 % — LOW (ref 42–52)
HGB BLD-MCNC: 9.5 G/DL — LOW (ref 14–18)
MCHC RBC-ENTMCNC: 31.5 PG — HIGH (ref 27–31)
MCHC RBC-ENTMCNC: 33.2 G/DL — SIGNIFICANT CHANGE UP (ref 32–37)
MCV RBC AUTO: 94.7 FL — HIGH (ref 80–94)
NRBC # BLD: 0 /100 WBCS — SIGNIFICANT CHANGE UP (ref 0–0)
NRBC BLD-RTO: 0 /100 WBCS — SIGNIFICANT CHANGE UP (ref 0–0)
PLATELET # BLD AUTO: 232 K/UL — SIGNIFICANT CHANGE UP (ref 130–400)
PMV BLD: 10.7 FL — HIGH (ref 7.4–10.4)
POTASSIUM SERPL-MCNC: 4.3 MMOL/L — SIGNIFICANT CHANGE UP (ref 3.5–5)
POTASSIUM SERPL-SCNC: 4.3 MMOL/L — SIGNIFICANT CHANGE UP (ref 3.5–5)
RBC # BLD: 3.02 M/UL — LOW (ref 4.7–6.1)
RBC # FLD: 15.4 % — HIGH (ref 11.5–14.5)
SODIUM SERPL-SCNC: 137 MMOL/L — SIGNIFICANT CHANGE UP (ref 135–146)
WBC # BLD: 9.07 K/UL — SIGNIFICANT CHANGE UP (ref 4.8–10.8)
WBC # FLD AUTO: 9.07 K/UL — SIGNIFICANT CHANGE UP (ref 4.8–10.8)

## 2025-01-19 PROCEDURE — 99232 SBSQ HOSP IP/OBS MODERATE 35: CPT

## 2025-01-19 RX ADMIN — NICOTINE POLACRILEX 1 PATCH: 4 GUM, CHEWING ORAL at 11:40

## 2025-01-19 RX ADMIN — Medication 4 MILLIGRAM(S): at 01:32

## 2025-01-19 RX ADMIN — Medication 90 MILLIGRAM(S): at 05:21

## 2025-01-19 RX ADMIN — TAMSULOSIN HYDROCHLORIDE 0.4 MILLIGRAM(S): 0.4 CAPSULE ORAL at 21:07

## 2025-01-19 RX ADMIN — GABAPENTIN 300 MILLIGRAM(S): 400 CAPSULE ORAL at 05:21

## 2025-01-19 RX ADMIN — CARVEDILOL 3.12 MILLIGRAM(S): 3.12 TABLET, FILM COATED ORAL at 17:51

## 2025-01-19 RX ADMIN — NICOTINE POLACRILEX 1 PATCH: 4 GUM, CHEWING ORAL at 19:30

## 2025-01-19 RX ADMIN — Medication 50 MILLILITER(S): at 21:07

## 2025-01-19 RX ADMIN — CARVEDILOL 3.12 MILLIGRAM(S): 3.12 TABLET, FILM COATED ORAL at 05:22

## 2025-01-19 RX ADMIN — OLANZAPINE 10 MILLIGRAM(S): 10 TABLET ORAL at 11:42

## 2025-01-19 RX ADMIN — Medication 50 MILLIGRAM(S): at 21:07

## 2025-01-19 RX ADMIN — Medication 50 MILLILITER(S): at 08:25

## 2025-01-19 RX ADMIN — GABAPENTIN 300 MILLIGRAM(S): 400 CAPSULE ORAL at 17:51

## 2025-01-19 RX ADMIN — Medication 50 MILLILITER(S): at 15:28

## 2025-01-19 RX ADMIN — NICOTINE POLACRILEX 1 PATCH: 4 GUM, CHEWING ORAL at 08:25

## 2025-01-20 LAB
ALBUMIN SERPL ELPH-MCNC: 3.1 G/DL — LOW (ref 3.5–5.2)
ALP SERPL-CCNC: 71 U/L — SIGNIFICANT CHANGE UP (ref 30–115)
ALT FLD-CCNC: 21 U/L — SIGNIFICANT CHANGE UP (ref 0–41)
ANION GAP SERPL CALC-SCNC: 12 MMOL/L — SIGNIFICANT CHANGE UP (ref 7–14)
AST SERPL-CCNC: 22 U/L — SIGNIFICANT CHANGE UP (ref 0–41)
BASOPHILS # BLD AUTO: 0.02 K/UL — SIGNIFICANT CHANGE UP (ref 0–0.2)
BASOPHILS NFR BLD AUTO: 0.3 % — SIGNIFICANT CHANGE UP (ref 0–1)
BILIRUB SERPL-MCNC: <0.2 MG/DL — SIGNIFICANT CHANGE UP (ref 0.2–1.2)
BUN SERPL-MCNC: 23 MG/DL — HIGH (ref 10–20)
CALCIUM SERPL-MCNC: 8.4 MG/DL — SIGNIFICANT CHANGE UP (ref 8.4–10.4)
CHLORIDE SERPL-SCNC: 107 MMOL/L — SIGNIFICANT CHANGE UP (ref 98–110)
CO2 SERPL-SCNC: 20 MMOL/L — SIGNIFICANT CHANGE UP (ref 17–32)
CREAT SERPL-MCNC: 1.2 MG/DL — SIGNIFICANT CHANGE UP (ref 0.7–1.5)
CULTURE RESULTS: SIGNIFICANT CHANGE UP
CULTURE RESULTS: SIGNIFICANT CHANGE UP
EGFR: 60 ML/MIN/1.73M2 — SIGNIFICANT CHANGE UP
EGFR: 60 ML/MIN/1.73M2 — SIGNIFICANT CHANGE UP
EOSINOPHIL # BLD AUTO: 0.04 K/UL — SIGNIFICANT CHANGE UP (ref 0–0.7)
EOSINOPHIL NFR BLD AUTO: 0.5 % — SIGNIFICANT CHANGE UP (ref 0–8)
GLUCOSE BLDC GLUCOMTR-MCNC: 107 MG/DL — HIGH (ref 70–99)
GLUCOSE BLDC GLUCOMTR-MCNC: 125 MG/DL — HIGH (ref 70–99)
GLUCOSE BLDC GLUCOMTR-MCNC: 133 MG/DL — HIGH (ref 70–99)
GLUCOSE BLDC GLUCOMTR-MCNC: 97 MG/DL — SIGNIFICANT CHANGE UP (ref 70–99)
GLUCOSE SERPL-MCNC: 99 MG/DL — SIGNIFICANT CHANGE UP (ref 70–99)
HCT VFR BLD CALC: 28.4 % — LOW (ref 42–52)
HGB BLD-MCNC: 9.4 G/DL — LOW (ref 14–18)
IMM GRANULOCYTES NFR BLD AUTO: 0.6 % — HIGH (ref 0.1–0.3)
LYMPHOCYTES # BLD AUTO: 1.23 K/UL — SIGNIFICANT CHANGE UP (ref 1.2–3.4)
LYMPHOCYTES # BLD AUTO: 15.6 % — LOW (ref 20.5–51.1)
MAGNESIUM SERPL-MCNC: 2 MG/DL — SIGNIFICANT CHANGE UP (ref 1.8–2.4)
MCHC RBC-ENTMCNC: 31.5 PG — HIGH (ref 27–31)
MCHC RBC-ENTMCNC: 33.1 G/DL — SIGNIFICANT CHANGE UP (ref 32–37)
MCV RBC AUTO: 95.3 FL — HIGH (ref 80–94)
MONOCYTES # BLD AUTO: 0.63 K/UL — HIGH (ref 0.1–0.6)
MONOCYTES NFR BLD AUTO: 8 % — SIGNIFICANT CHANGE UP (ref 1.7–9.3)
NEUTROPHILS # BLD AUTO: 5.93 K/UL — SIGNIFICANT CHANGE UP (ref 1.4–6.5)
NEUTROPHILS NFR BLD AUTO: 75 % — SIGNIFICANT CHANGE UP (ref 42.2–75.2)
NRBC # BLD: 0 /100 WBCS — SIGNIFICANT CHANGE UP (ref 0–0)
NRBC BLD-RTO: 0 /100 WBCS — SIGNIFICANT CHANGE UP (ref 0–0)
PLATELET # BLD AUTO: 218 K/UL — SIGNIFICANT CHANGE UP (ref 130–400)
PMV BLD: 10.3 FL — SIGNIFICANT CHANGE UP (ref 7.4–10.4)
POTASSIUM SERPL-MCNC: 3.8 MMOL/L — SIGNIFICANT CHANGE UP (ref 3.5–5)
POTASSIUM SERPL-SCNC: 3.8 MMOL/L — SIGNIFICANT CHANGE UP (ref 3.5–5)
PROT SERPL-MCNC: 5.4 G/DL — LOW (ref 6–8)
RBC # BLD: 2.98 M/UL — LOW (ref 4.7–6.1)
RBC # FLD: 15.2 % — HIGH (ref 11.5–14.5)
SODIUM SERPL-SCNC: 139 MMOL/L — SIGNIFICANT CHANGE UP (ref 135–146)
SPECIMEN SOURCE: SIGNIFICANT CHANGE UP
SPECIMEN SOURCE: SIGNIFICANT CHANGE UP
WBC # BLD: 7.9 K/UL — SIGNIFICANT CHANGE UP (ref 4.8–10.8)
WBC # FLD AUTO: 7.9 K/UL — SIGNIFICANT CHANGE UP (ref 4.8–10.8)

## 2025-01-20 PROCEDURE — 99232 SBSQ HOSP IP/OBS MODERATE 35: CPT

## 2025-01-20 RX ADMIN — NICOTINE POLACRILEX 1 PATCH: 4 GUM, CHEWING ORAL at 11:30

## 2025-01-20 RX ADMIN — GABAPENTIN 300 MILLIGRAM(S): 400 CAPSULE ORAL at 17:12

## 2025-01-20 RX ADMIN — Medication 50 MILLIGRAM(S): at 21:21

## 2025-01-20 RX ADMIN — HEPARIN SODIUM 5000 UNIT(S): 1000 INJECTION INTRAVENOUS; SUBCUTANEOUS at 17:12

## 2025-01-20 RX ADMIN — NICOTINE POLACRILEX 1 PATCH: 4 GUM, CHEWING ORAL at 12:48

## 2025-01-20 RX ADMIN — NICOTINE POLACRILEX 1 PATCH: 4 GUM, CHEWING ORAL at 20:16

## 2025-01-20 RX ADMIN — Medication 50 MILLILITER(S): at 07:54

## 2025-01-20 RX ADMIN — CARVEDILOL 3.12 MILLIGRAM(S): 3.12 TABLET, FILM COATED ORAL at 17:12

## 2025-01-20 RX ADMIN — GABAPENTIN 300 MILLIGRAM(S): 400 CAPSULE ORAL at 05:59

## 2025-01-20 RX ADMIN — Medication 1 APPLICATION(S): at 12:48

## 2025-01-20 RX ADMIN — TAMSULOSIN HYDROCHLORIDE 0.4 MILLIGRAM(S): 0.4 CAPSULE ORAL at 21:21

## 2025-01-20 RX ADMIN — HEPARIN SODIUM 5000 UNIT(S): 1000 INJECTION INTRAVENOUS; SUBCUTANEOUS at 05:59

## 2025-01-20 RX ADMIN — NICOTINE POLACRILEX 1 PATCH: 4 GUM, CHEWING ORAL at 07:44

## 2025-01-20 RX ADMIN — OLANZAPINE 10 MILLIGRAM(S): 10 TABLET ORAL at 12:48

## 2025-01-21 LAB
GLUCOSE BLDC GLUCOMTR-MCNC: 100 MG/DL — HIGH (ref 70–99)
GLUCOSE BLDC GLUCOMTR-MCNC: 102 MG/DL — HIGH (ref 70–99)
GLUCOSE BLDC GLUCOMTR-MCNC: 113 MG/DL — HIGH (ref 70–99)
GLUCOSE BLDC GLUCOMTR-MCNC: 114 MG/DL — HIGH (ref 70–99)
GLUCOSE BLDC GLUCOMTR-MCNC: 122 MG/DL — HIGH (ref 70–99)
GLUCOSE BLDC GLUCOMTR-MCNC: 156 MG/DL — HIGH (ref 70–99)

## 2025-01-21 PROCEDURE — 99232 SBSQ HOSP IP/OBS MODERATE 35: CPT

## 2025-01-21 RX ADMIN — GABAPENTIN 300 MILLIGRAM(S): 400 CAPSULE ORAL at 17:03

## 2025-01-21 RX ADMIN — Medication 50 MILLIGRAM(S): at 21:20

## 2025-01-21 RX ADMIN — NICOTINE POLACRILEX 1 PATCH: 4 GUM, CHEWING ORAL at 11:08

## 2025-01-21 RX ADMIN — Medication 1 APPLICATION(S): at 05:22

## 2025-01-21 RX ADMIN — CARVEDILOL 3.12 MILLIGRAM(S): 3.12 TABLET, FILM COATED ORAL at 05:22

## 2025-01-21 RX ADMIN — GABAPENTIN 300 MILLIGRAM(S): 400 CAPSULE ORAL at 05:21

## 2025-01-21 RX ADMIN — OLANZAPINE 10 MILLIGRAM(S): 10 TABLET ORAL at 11:09

## 2025-01-21 RX ADMIN — INSULIN LISPRO 1: 100 INJECTION, SOLUTION INTRAVENOUS; SUBCUTANEOUS at 16:58

## 2025-01-21 RX ADMIN — NICOTINE POLACRILEX 1 PATCH: 4 GUM, CHEWING ORAL at 18:16

## 2025-01-21 RX ADMIN — HEPARIN SODIUM 5000 UNIT(S): 1000 INJECTION INTRAVENOUS; SUBCUTANEOUS at 17:03

## 2025-01-21 RX ADMIN — Medication 90 MILLIGRAM(S): at 05:22

## 2025-01-21 RX ADMIN — TAMSULOSIN HYDROCHLORIDE 0.4 MILLIGRAM(S): 0.4 CAPSULE ORAL at 21:20

## 2025-01-21 RX ADMIN — HEPARIN SODIUM 5000 UNIT(S): 1000 INJECTION INTRAVENOUS; SUBCUTANEOUS at 05:21

## 2025-01-21 RX ADMIN — NICOTINE POLACRILEX 1 PATCH: 4 GUM, CHEWING ORAL at 07:46

## 2025-01-22 LAB
GLUCOSE BLDC GLUCOMTR-MCNC: 102 MG/DL — HIGH (ref 70–99)
GLUCOSE BLDC GLUCOMTR-MCNC: 109 MG/DL — HIGH (ref 70–99)
GLUCOSE BLDC GLUCOMTR-MCNC: 141 MG/DL — HIGH (ref 70–99)
GLUCOSE BLDC GLUCOMTR-MCNC: 96 MG/DL — SIGNIFICANT CHANGE UP (ref 70–99)

## 2025-01-22 PROCEDURE — 99232 SBSQ HOSP IP/OBS MODERATE 35: CPT | Mod: FS

## 2025-01-22 PROCEDURE — 74019 RADEX ABDOMEN 2 VIEWS: CPT | Mod: 26

## 2025-01-22 RX ADMIN — NICOTINE POLACRILEX 1 PATCH: 4 GUM, CHEWING ORAL at 11:42

## 2025-01-22 RX ADMIN — HEPARIN SODIUM 5000 UNIT(S): 1000 INJECTION INTRAVENOUS; SUBCUTANEOUS at 05:22

## 2025-01-22 RX ADMIN — OLANZAPINE 10 MILLIGRAM(S): 10 TABLET ORAL at 11:43

## 2025-01-22 RX ADMIN — Medication 90 MILLIGRAM(S): at 05:22

## 2025-01-22 RX ADMIN — Medication 50 MILLIGRAM(S): at 21:41

## 2025-01-22 RX ADMIN — NICOTINE POLACRILEX 1 PATCH: 4 GUM, CHEWING ORAL at 19:50

## 2025-01-22 RX ADMIN — Medication 1 APPLICATION(S): at 05:22

## 2025-01-22 RX ADMIN — GABAPENTIN 300 MILLIGRAM(S): 400 CAPSULE ORAL at 05:22

## 2025-01-23 LAB
ALBUMIN SERPL ELPH-MCNC: 3.2 G/DL — LOW (ref 3.5–5.2)
ALP SERPL-CCNC: 79 U/L — SIGNIFICANT CHANGE UP (ref 30–115)
ALT FLD-CCNC: 13 U/L — SIGNIFICANT CHANGE UP (ref 0–41)
ANION GAP SERPL CALC-SCNC: 13 MMOL/L — SIGNIFICANT CHANGE UP (ref 7–14)
AST SERPL-CCNC: 16 U/L — SIGNIFICANT CHANGE UP (ref 0–41)
BASOPHILS # BLD AUTO: 0 K/UL — SIGNIFICANT CHANGE UP (ref 0–0.2)
BASOPHILS NFR BLD AUTO: 0 % — SIGNIFICANT CHANGE UP (ref 0–1)
BILIRUB SERPL-MCNC: <0.2 MG/DL — SIGNIFICANT CHANGE UP (ref 0.2–1.2)
BUN SERPL-MCNC: 11 MG/DL — SIGNIFICANT CHANGE UP (ref 10–20)
CALCIUM SERPL-MCNC: 8.6 MG/DL — SIGNIFICANT CHANGE UP (ref 8.4–10.4)
CHLORIDE SERPL-SCNC: 104 MMOL/L — SIGNIFICANT CHANGE UP (ref 98–110)
CO2 SERPL-SCNC: 21 MMOL/L — SIGNIFICANT CHANGE UP (ref 17–32)
CREAT SERPL-MCNC: 1 MG/DL — SIGNIFICANT CHANGE UP (ref 0.7–1.5)
EGFR: 75 ML/MIN/1.73M2 — SIGNIFICANT CHANGE UP
EGFR: 75 ML/MIN/1.73M2 — SIGNIFICANT CHANGE UP
EOSINOPHIL # BLD AUTO: 0.06 K/UL — SIGNIFICANT CHANGE UP (ref 0–0.7)
EOSINOPHIL NFR BLD AUTO: 0.8 % — SIGNIFICANT CHANGE UP (ref 0–8)
GLUCOSE BLDC GLUCOMTR-MCNC: 104 MG/DL — HIGH (ref 70–99)
GLUCOSE BLDC GLUCOMTR-MCNC: 119 MG/DL — HIGH (ref 70–99)
GLUCOSE BLDC GLUCOMTR-MCNC: 149 MG/DL — HIGH (ref 70–99)
GLUCOSE BLDC GLUCOMTR-MCNC: 94 MG/DL — SIGNIFICANT CHANGE UP (ref 70–99)
GLUCOSE BLDC GLUCOMTR-MCNC: 99 MG/DL — SIGNIFICANT CHANGE UP (ref 70–99)
GLUCOSE SERPL-MCNC: 90 MG/DL — SIGNIFICANT CHANGE UP (ref 70–99)
HCT VFR BLD CALC: 30.9 % — LOW (ref 42–52)
HGB BLD-MCNC: 10.4 G/DL — LOW (ref 14–18)
LYMPHOCYTES # BLD AUTO: 1.5 K/UL — SIGNIFICANT CHANGE UP (ref 1.2–3.4)
LYMPHOCYTES # BLD AUTO: 19 % — LOW (ref 20.5–51.1)
MCHC RBC-ENTMCNC: 31.5 PG — HIGH (ref 27–31)
MCHC RBC-ENTMCNC: 33.7 G/DL — SIGNIFICANT CHANGE UP (ref 32–37)
MCV RBC AUTO: 93.6 FL — SIGNIFICANT CHANGE UP (ref 80–94)
MONOCYTES # BLD AUTO: 0.41 K/UL — SIGNIFICANT CHANGE UP (ref 0.1–0.6)
MONOCYTES NFR BLD AUTO: 5.2 % — SIGNIFICANT CHANGE UP (ref 1.7–9.3)
NEUTROPHILS # BLD AUTO: 5.58 K/UL — SIGNIFICANT CHANGE UP (ref 1.4–6.5)
NEUTROPHILS NFR BLD AUTO: 69.8 % — SIGNIFICANT CHANGE UP (ref 42.2–75.2)
PLATELET # BLD AUTO: 279 K/UL — SIGNIFICANT CHANGE UP (ref 130–400)
PMV BLD: 10.4 FL — SIGNIFICANT CHANGE UP (ref 7.4–10.4)
POTASSIUM SERPL-MCNC: 4.2 MMOL/L — SIGNIFICANT CHANGE UP (ref 3.5–5)
POTASSIUM SERPL-SCNC: 4.2 MMOL/L — SIGNIFICANT CHANGE UP (ref 3.5–5)
PROT SERPL-MCNC: 6.1 G/DL — SIGNIFICANT CHANGE UP (ref 6–8)
RBC # BLD: 3.3 M/UL — LOW (ref 4.7–6.1)
RBC # FLD: 14.7 % — HIGH (ref 11.5–14.5)
SODIUM SERPL-SCNC: 138 MMOL/L — SIGNIFICANT CHANGE UP (ref 135–146)
WBC # BLD: 7.91 K/UL — SIGNIFICANT CHANGE UP (ref 4.8–10.8)
WBC # FLD AUTO: 7.91 K/UL — SIGNIFICANT CHANGE UP (ref 4.8–10.8)

## 2025-01-23 PROCEDURE — 99232 SBSQ HOSP IP/OBS MODERATE 35: CPT | Mod: FS

## 2025-01-23 RX ADMIN — CARVEDILOL 3.12 MILLIGRAM(S): 3.12 TABLET, FILM COATED ORAL at 06:52

## 2025-01-23 RX ADMIN — Medication 1 APPLICATION(S): at 06:52

## 2025-01-23 RX ADMIN — GABAPENTIN 300 MILLIGRAM(S): 400 CAPSULE ORAL at 06:52

## 2025-01-23 RX ADMIN — NICOTINE POLACRILEX 1 PATCH: 4 GUM, CHEWING ORAL at 08:37

## 2025-01-23 RX ADMIN — Medication 90 MILLIGRAM(S): at 06:53

## 2025-01-23 RX ADMIN — OLANZAPINE 10 MILLIGRAM(S): 10 TABLET ORAL at 12:33

## 2025-01-24 DIAGNOSIS — G92.8 OTHER TOXIC ENCEPHALOPATHY: ICD-10-CM

## 2025-01-24 DIAGNOSIS — F20.0 PARANOID SCHIZOPHRENIA: ICD-10-CM

## 2025-01-24 DIAGNOSIS — K76.9 LIVER DISEASE, UNSPECIFIED: ICD-10-CM

## 2025-01-24 DIAGNOSIS — E78.5 HYPERLIPIDEMIA, UNSPECIFIED: ICD-10-CM

## 2025-01-24 DIAGNOSIS — F17.200 NICOTINE DEPENDENCE, UNSPECIFIED, UNCOMPLICATED: ICD-10-CM

## 2025-01-24 DIAGNOSIS — N40.1 BENIGN PROSTATIC HYPERPLASIA WITH LOWER URINARY TRACT SYMPTOMS: ICD-10-CM

## 2025-01-24 DIAGNOSIS — E11.22 TYPE 2 DIABETES MELLITUS WITH DIABETIC CHRONIC KIDNEY DISEASE: ICD-10-CM

## 2025-01-24 DIAGNOSIS — Z79.84 LONG TERM (CURRENT) USE OF ORAL HYPOGLYCEMIC DRUGS: ICD-10-CM

## 2025-01-24 DIAGNOSIS — Z85.72 PERSONAL HISTORY OF NON-HODGKIN LYMPHOMAS: ICD-10-CM

## 2025-01-24 DIAGNOSIS — R33.8 OTHER RETENTION OF URINE: ICD-10-CM

## 2025-01-24 DIAGNOSIS — J44.9 CHRONIC OBSTRUCTIVE PULMONARY DISEASE, UNSPECIFIED: ICD-10-CM

## 2025-01-24 DIAGNOSIS — Z11.52 ENCOUNTER FOR SCREENING FOR COVID-19: ICD-10-CM

## 2025-01-24 DIAGNOSIS — N18.31 CHRONIC KIDNEY DISEASE, STAGE 3A: ICD-10-CM

## 2025-01-24 DIAGNOSIS — I25.10 ATHEROSCLEROTIC HEART DISEASE OF NATIVE CORONARY ARTERY WITHOUT ANGINA PECTORIS: ICD-10-CM

## 2025-01-24 DIAGNOSIS — N39.0 URINARY TRACT INFECTION, SITE NOT SPECIFIED: ICD-10-CM

## 2025-01-24 DIAGNOSIS — D84.81 IMMUNODEFICIENCY DUE TO CONDITIONS CLASSIFIED ELSEWHERE: ICD-10-CM

## 2025-01-24 DIAGNOSIS — A41.59 OTHER GRAM-NEGATIVE SEPSIS: ICD-10-CM

## 2025-01-24 DIAGNOSIS — I12.9 HYPERTENSIVE CHRONIC KIDNEY DISEASE WITH STAGE 1 THROUGH STAGE 4 CHRONIC KIDNEY DISEASE, OR UNSPECIFIED CHRONIC KIDNEY DISEASE: ICD-10-CM

## 2025-01-24 DIAGNOSIS — E87.1 HYPO-OSMOLALITY AND HYPONATREMIA: ICD-10-CM

## 2025-01-24 LAB
GLUCOSE BLDC GLUCOMTR-MCNC: 108 MG/DL — HIGH (ref 70–99)
GLUCOSE BLDC GLUCOMTR-MCNC: 96 MG/DL — SIGNIFICANT CHANGE UP (ref 70–99)
GLUCOSE BLDC GLUCOMTR-MCNC: 96 MG/DL — SIGNIFICANT CHANGE UP (ref 70–99)

## 2025-01-24 PROCEDURE — 74176 CT ABD & PELVIS W/O CONTRAST: CPT | Mod: 26

## 2025-01-24 PROCEDURE — 99233 SBSQ HOSP IP/OBS HIGH 50: CPT

## 2025-01-24 RX ADMIN — Medication 50 MILLIGRAM(S): at 21:18

## 2025-01-24 RX ADMIN — NICOTINE POLACRILEX 1 PATCH: 4 GUM, CHEWING ORAL at 11:49

## 2025-01-24 RX ADMIN — CARVEDILOL 3.12 MILLIGRAM(S): 3.12 TABLET, FILM COATED ORAL at 18:06

## 2025-01-24 RX ADMIN — NICOTINE POLACRILEX 1 PATCH: 4 GUM, CHEWING ORAL at 19:05

## 2025-01-24 RX ADMIN — HEPARIN SODIUM 5000 UNIT(S): 1000 INJECTION INTRAVENOUS; SUBCUTANEOUS at 17:28

## 2025-01-24 RX ADMIN — TAMSULOSIN HYDROCHLORIDE 0.4 MILLIGRAM(S): 0.4 CAPSULE ORAL at 21:18

## 2025-01-24 RX ADMIN — GABAPENTIN 300 MILLIGRAM(S): 400 CAPSULE ORAL at 17:29

## 2025-01-24 RX ADMIN — OLANZAPINE 10 MILLIGRAM(S): 10 TABLET ORAL at 11:48

## 2025-01-25 ENCOUNTER — TRANSCRIPTION ENCOUNTER (OUTPATIENT)
Age: 85
End: 2025-01-25

## 2025-01-25 VITALS
DIASTOLIC BLOOD PRESSURE: 69 MMHG | SYSTOLIC BLOOD PRESSURE: 125 MMHG | HEART RATE: 80 BPM | OXYGEN SATURATION: 97 % | TEMPERATURE: 98 F | RESPIRATION RATE: 18 BRPM

## 2025-01-25 LAB
GLUCOSE BLDC GLUCOMTR-MCNC: 105 MG/DL — HIGH (ref 70–99)
GLUCOSE BLDC GLUCOMTR-MCNC: 107 MG/DL — HIGH (ref 70–99)
GLUCOSE BLDC GLUCOMTR-MCNC: 91 MG/DL — SIGNIFICANT CHANGE UP (ref 70–99)

## 2025-01-25 PROCEDURE — 99239 HOSP IP/OBS DSCHRG MGMT >30: CPT

## 2025-01-25 RX ORDER — CEFTAZIDIME, AVIBACTAM 2; .5 G/1; G/1
1 POWDER, FOR SOLUTION INTRAVENOUS
Qty: 0 | Refills: 0 | DISCHARGE
Start: 2025-01-25

## 2025-01-25 RX ORDER — LISINOPRIL 5 MG/1
1 TABLET ORAL
Qty: 0 | Refills: 0 | DISCHARGE
Start: 2025-01-25

## 2025-01-25 RX ORDER — NIFEDIPINE 30 MG
1 TABLET, EXTENDED RELEASE 24 HR ORAL
Qty: 0 | Refills: 0 | DISCHARGE
Start: 2025-01-25

## 2025-01-25 RX ADMIN — NICOTINE POLACRILEX 1 PATCH: 4 GUM, CHEWING ORAL at 07:44

## 2025-01-25 RX ADMIN — NICOTINE POLACRILEX 1 PATCH: 4 GUM, CHEWING ORAL at 13:03

## 2025-01-25 RX ADMIN — GABAPENTIN 300 MILLIGRAM(S): 400 CAPSULE ORAL at 17:48

## 2025-01-25 RX ADMIN — CARVEDILOL 3.12 MILLIGRAM(S): 3.12 TABLET, FILM COATED ORAL at 17:48

## 2025-01-25 RX ADMIN — HEPARIN SODIUM 5000 UNIT(S): 1000 INJECTION INTRAVENOUS; SUBCUTANEOUS at 17:48

## 2025-01-25 RX ADMIN — NICOTINE POLACRILEX 1 PATCH: 4 GUM, CHEWING ORAL at 19:10

## 2025-01-25 RX ADMIN — OLANZAPINE 10 MILLIGRAM(S): 10 TABLET ORAL at 12:44

## 2025-01-25 RX ADMIN — NICOTINE POLACRILEX 1 PATCH: 4 GUM, CHEWING ORAL at 12:44

## 2025-01-27 LAB
GLUCOSE BLDC GLUCOMTR-MCNC: 131 MG/DL — HIGH (ref 70–99)
GLUCOSE BLDC GLUCOMTR-MCNC: 92 MG/DL — SIGNIFICANT CHANGE UP (ref 70–99)

## 2025-02-05 DIAGNOSIS — K76.89 OTHER SPECIFIED DISEASES OF LIVER: ICD-10-CM

## 2025-02-05 DIAGNOSIS — J44.9 CHRONIC OBSTRUCTIVE PULMONARY DISEASE, UNSPECIFIED: ICD-10-CM

## 2025-02-05 DIAGNOSIS — N17.9 ACUTE KIDNEY FAILURE, UNSPECIFIED: ICD-10-CM

## 2025-02-05 DIAGNOSIS — E11.22 TYPE 2 DIABETES MELLITUS WITH DIABETIC CHRONIC KIDNEY DISEASE: ICD-10-CM

## 2025-02-05 DIAGNOSIS — Z16.24 RESISTANCE TO MULTIPLE ANTIBIOTICS: ICD-10-CM

## 2025-02-05 DIAGNOSIS — Z87.891 PERSONAL HISTORY OF NICOTINE DEPENDENCE: ICD-10-CM

## 2025-02-05 DIAGNOSIS — E87.20 ACIDOSIS, UNSPECIFIED: ICD-10-CM

## 2025-02-05 DIAGNOSIS — R19.7 DIARRHEA, UNSPECIFIED: ICD-10-CM

## 2025-02-05 DIAGNOSIS — N40.1 BENIGN PROSTATIC HYPERPLASIA WITH LOWER URINARY TRACT SYMPTOMS: ICD-10-CM

## 2025-02-05 DIAGNOSIS — A41.59 OTHER GRAM-NEGATIVE SEPSIS: ICD-10-CM

## 2025-02-05 DIAGNOSIS — R65.20 SEVERE SEPSIS WITHOUT SEPTIC SHOCK: ICD-10-CM

## 2025-02-05 DIAGNOSIS — Z85.72 PERSONAL HISTORY OF NON-HODGKIN LYMPHOMAS: ICD-10-CM

## 2025-02-05 DIAGNOSIS — Z79.84 LONG TERM (CURRENT) USE OF ORAL HYPOGLYCEMIC DRUGS: ICD-10-CM

## 2025-02-05 DIAGNOSIS — I12.9 HYPERTENSIVE CHRONIC KIDNEY DISEASE WITH STAGE 1 THROUGH STAGE 4 CHRONIC KIDNEY DISEASE, OR UNSPECIFIED CHRONIC KIDNEY DISEASE: ICD-10-CM

## 2025-02-05 DIAGNOSIS — E78.5 HYPERLIPIDEMIA, UNSPECIFIED: ICD-10-CM

## 2025-02-05 DIAGNOSIS — N28.1 CYST OF KIDNEY, ACQUIRED: ICD-10-CM

## 2025-02-05 DIAGNOSIS — R33.8 OTHER RETENTION OF URINE: ICD-10-CM

## 2025-02-05 DIAGNOSIS — I95.9 HYPOTENSION, UNSPECIFIED: ICD-10-CM

## 2025-02-05 DIAGNOSIS — N10 ACUTE PYELONEPHRITIS: ICD-10-CM

## 2025-02-05 DIAGNOSIS — E87.1 HYPO-OSMOLALITY AND HYPONATREMIA: ICD-10-CM

## 2025-02-05 DIAGNOSIS — G93.41 METABOLIC ENCEPHALOPATHY: ICD-10-CM

## 2025-02-05 DIAGNOSIS — F20.0 PARANOID SCHIZOPHRENIA: ICD-10-CM

## 2025-03-23 ENCOUNTER — EMERGENCY (EMERGENCY)
Facility: HOSPITAL | Age: 85
LOS: 0 days | Discharge: HOME | End: 2025-03-23
Attending: EMERGENCY MEDICINE
Payer: MEDICARE

## 2025-03-23 VITALS
HEART RATE: 73 BPM | SYSTOLIC BLOOD PRESSURE: 120 MMHG | DIASTOLIC BLOOD PRESSURE: 68 MMHG | RESPIRATION RATE: 17 BRPM | WEIGHT: 130.07 LBS | OXYGEN SATURATION: 99 % | TEMPERATURE: 97 F

## 2025-03-23 VITALS
RESPIRATION RATE: 18 BRPM | TEMPERATURE: 98 F | DIASTOLIC BLOOD PRESSURE: 78 MMHG | OXYGEN SATURATION: 100 % | SYSTOLIC BLOOD PRESSURE: 155 MMHG | HEART RATE: 83 BPM

## 2025-03-23 DIAGNOSIS — I10 ESSENTIAL (PRIMARY) HYPERTENSION: ICD-10-CM

## 2025-03-23 DIAGNOSIS — Z12.11 ENCOUNTER FOR SCREENING FOR MALIGNANT NEOPLASM OF COLON: Chronic | ICD-10-CM

## 2025-03-23 DIAGNOSIS — E78.5 HYPERLIPIDEMIA, UNSPECIFIED: ICD-10-CM

## 2025-03-23 DIAGNOSIS — J44.9 CHRONIC OBSTRUCTIVE PULMONARY DISEASE, UNSPECIFIED: ICD-10-CM

## 2025-03-23 DIAGNOSIS — E11.9 TYPE 2 DIABETES MELLITUS WITHOUT COMPLICATIONS: ICD-10-CM

## 2025-03-23 DIAGNOSIS — T83.091A OTHER MECHANICAL COMPLICATION OF INDWELLING URETHRAL CATHETER, INITIAL ENCOUNTER: ICD-10-CM

## 2025-03-23 DIAGNOSIS — Z85.72 PERSONAL HISTORY OF NON-HODGKIN LYMPHOMAS: ICD-10-CM

## 2025-03-23 DIAGNOSIS — F20.9 SCHIZOPHRENIA, UNSPECIFIED: ICD-10-CM

## 2025-03-23 LAB
ALBUMIN SERPL ELPH-MCNC: 3.9 G/DL — SIGNIFICANT CHANGE UP (ref 3.5–5.2)
ALP SERPL-CCNC: 88 U/L — SIGNIFICANT CHANGE UP (ref 30–115)
ALT FLD-CCNC: 8 U/L — SIGNIFICANT CHANGE UP (ref 0–41)
ANION GAP SERPL CALC-SCNC: 11 MMOL/L — SIGNIFICANT CHANGE UP (ref 7–14)
AST SERPL-CCNC: 13 U/L — SIGNIFICANT CHANGE UP (ref 0–41)
BASOPHILS # BLD AUTO: 0.01 K/UL — SIGNIFICANT CHANGE UP (ref 0–0.2)
BASOPHILS NFR BLD AUTO: 0.2 % — SIGNIFICANT CHANGE UP (ref 0–1)
BILIRUB SERPL-MCNC: <0.2 MG/DL — SIGNIFICANT CHANGE UP (ref 0.2–1.2)
BUN SERPL-MCNC: 26 MG/DL — HIGH (ref 10–20)
CALCIUM SERPL-MCNC: 9.1 MG/DL — SIGNIFICANT CHANGE UP (ref 8.4–10.5)
CHLORIDE SERPL-SCNC: 101 MMOL/L — SIGNIFICANT CHANGE UP (ref 98–110)
CO2 SERPL-SCNC: 25 MMOL/L — SIGNIFICANT CHANGE UP (ref 17–32)
CREAT SERPL-MCNC: 1.3 MG/DL — SIGNIFICANT CHANGE UP (ref 0.7–1.5)
EGFR: 55 ML/MIN/1.73M2 — LOW
EGFR: 55 ML/MIN/1.73M2 — LOW
EOSINOPHIL # BLD AUTO: 0.07 K/UL — SIGNIFICANT CHANGE UP (ref 0–0.7)
EOSINOPHIL NFR BLD AUTO: 1.1 % — SIGNIFICANT CHANGE UP (ref 0–8)
GLUCOSE SERPL-MCNC: 55 MG/DL — LOW (ref 70–99)
HCT VFR BLD CALC: 29.4 % — LOW (ref 42–52)
HGB BLD-MCNC: 9.8 G/DL — LOW (ref 14–18)
IMM GRANULOCYTES NFR BLD AUTO: 0.3 % — SIGNIFICANT CHANGE UP (ref 0.1–0.3)
LYMPHOCYTES # BLD AUTO: 1.75 K/UL — SIGNIFICANT CHANGE UP (ref 1.2–3.4)
LYMPHOCYTES # BLD AUTO: 28.7 % — SIGNIFICANT CHANGE UP (ref 20.5–51.1)
MCHC RBC-ENTMCNC: 31.2 PG — HIGH (ref 27–31)
MCHC RBC-ENTMCNC: 33.3 G/DL — SIGNIFICANT CHANGE UP (ref 32–37)
MCV RBC AUTO: 93.6 FL — SIGNIFICANT CHANGE UP (ref 80–94)
MONOCYTES # BLD AUTO: 0.62 K/UL — HIGH (ref 0.1–0.6)
MONOCYTES NFR BLD AUTO: 10.2 % — HIGH (ref 1.7–9.3)
NEUTROPHILS # BLD AUTO: 3.63 K/UL — SIGNIFICANT CHANGE UP (ref 1.4–6.5)
NEUTROPHILS NFR BLD AUTO: 59.5 % — SIGNIFICANT CHANGE UP (ref 42.2–75.2)
NRBC BLD AUTO-RTO: 0 /100 WBCS — SIGNIFICANT CHANGE UP (ref 0–0)
PLATELET # BLD AUTO: 224 K/UL — SIGNIFICANT CHANGE UP (ref 130–400)
PMV BLD: 10.4 FL — SIGNIFICANT CHANGE UP (ref 7.4–10.4)
POTASSIUM SERPL-MCNC: 4.5 MMOL/L — SIGNIFICANT CHANGE UP (ref 3.5–5)
POTASSIUM SERPL-SCNC: 4.5 MMOL/L — SIGNIFICANT CHANGE UP (ref 3.5–5)
PROT SERPL-MCNC: 6.7 G/DL — SIGNIFICANT CHANGE UP (ref 6–8)
RBC # BLD: 3.14 M/UL — LOW (ref 4.7–6.1)
RBC # FLD: 14.6 % — HIGH (ref 11.5–14.5)
SODIUM SERPL-SCNC: 137 MMOL/L — SIGNIFICANT CHANGE UP (ref 135–146)
WBC # BLD: 6.1 K/UL — SIGNIFICANT CHANGE UP (ref 4.8–10.8)
WBC # FLD AUTO: 6.1 K/UL — SIGNIFICANT CHANGE UP (ref 4.8–10.8)

## 2025-03-23 PROCEDURE — 99284 EMERGENCY DEPT VISIT MOD MDM: CPT

## 2025-03-23 PROCEDURE — 99284 EMERGENCY DEPT VISIT MOD MDM: CPT | Mod: FS

## 2025-03-23 PROCEDURE — 36415 COLL VENOUS BLD VENIPUNCTURE: CPT

## 2025-03-23 PROCEDURE — 80053 COMPREHEN METABOLIC PANEL: CPT

## 2025-03-23 PROCEDURE — 85025 COMPLETE CBC W/AUTO DIFF WBC: CPT

## 2025-03-23 NOTE — ED PROVIDER NOTE - PROGRESS NOTE DETAILS
Ida: Cobos at bedside flushing with  Tumi syringe. plan to check kidney function and send back to facility

## 2025-03-23 NOTE — ED PROVIDER NOTE - NSFOLLOWUPINSTRUCTIONS_ED_ALL_ED_FT
Please follow up with urology  and Primary care doctor in 1-3 days     ******* Our Emergency Department Referral Coordinators will be reaching out to you in the next 24-48 hours from 9:00am to 5:00pm with a follow up appointment. Please expect a phone call from the hospital in that time frame. If you do not receive a call or if you have any questions or concerns, you can reach them at [phone]

## 2025-03-23 NOTE — ED PROVIDER NOTE - PHYSICAL EXAMINATION
VITAL SIGNS: I have reviewed nursing notes and confirm.  CONSTITUTIONAL:  in no acute distress.  SKIN: Skin exam is warm and dry, no acute rash.  HEAD: Normocephalic; atraumatic.  EYES: PERRL, EOM intact; conjunctiva and sclera clear.  ENT: No nasal discharge; airway clear.  CARD: S1, S2 normal; no murmurs, gallops, or rubs. Regular rate and rhythm.  RESP: No wheezes, rales or rhonchi. Speaking in full sentences.   ABD: Normal bowel sounds; soft; non-distended; non-tender; No rebound or guarding. No CVA tenderness.  US No urinary retention on ultrasound,Cobos balloon in bladder Cobos balloon flushing with Tumi syringe visible fluid in bladder on ultrasound.  Cobos also drawing back with Tumi syringe

## 2025-03-23 NOTE — ED PROVIDER NOTE - PATIENT PORTAL LINK FT
You can access the FollowMyHealth Patient Portal offered by [facility] by registering at the following website: [email or web address]. By joining [facility]’s FollowMyHealth portal, you will also be able to view your health information using other applications (apps) compatible with our system.

## 2025-03-23 NOTE — ED ADULT NURSE NOTE - CAS EDN DISCHARGE INTERVENTIONS
LACTATION NOTE - INFANT    Evaluation Type  Evaluation Type: Inpatient    Problems & Assessment  Problems Diagnosed or Identified: Shallow latch  Infant Assessment: Skin color: pink or appropriate for ethnicity  Muscle tone: Appropriate for GA    Feeding A IV discontinued, cath removed intact

## 2025-03-23 NOTE — ED ADULT NURSE NOTE - NSFALLHARMRISKINTERV_ED_ALL_ED
Assistance OOB with selected safe patient handling equipment if applicable/Assistance with ambulation/Communicate risk of Fall with Harm to all staff, patient, and family/Monitor gait and stability/Monitor for mental status changes and reorient to person, place, and time, as needed/Provide visual cue: red socks, yellow wristband, yellow gown, etc/Reinforce activity limits and safety measures with patient and family/Toileting schedule using arm’s reach rule for commode and bathroom/Use of alarms - bed, stretcher, chair and/or video monitoring/Bed in lowest position, wheels locked, appropriate side rails in place/Call bell, personal items and telephone in reach/Instruct patient to call for assistance before getting out of bed/chair/stretcher/Non-slip footwear applied when patient is off stretcher/[place] to call system/Physically safe environment - no spills, clutter or unnecessary equipment/Purposeful Proactive Rounding/Room/bathroom lighting operational, light cord in reach

## 2025-03-23 NOTE — ED PROVIDER NOTE - OBJECTIVE STATEMENT
84-year-old male history of COPD, diabetes, HTN, non-Hodgkin's lymphoma, schizophrenia, HLD presenting to ED for Cobos cath complication.  As per triage note and paperwork patient here due to malfunctioning Cobos.  States that Cobos is not putting out any urine.  Here in ED patient unable to provide proper history at bedside

## 2025-03-23 NOTE — ED PROVIDER NOTE - CLINICAL SUMMARY MEDICAL DECISION MAKING FREE TEXT BOX
84-year-old male with past medical history of COPD, diabetes, hypertension, lymphoma, schizophrenia presents with Cobos catheter not draining.  Limited history from patient.  Limited urine in the bag.  Bedside ultrasound with no significant amount of volume in the bladder.  Flushing fine.  Abdominal exam benign.  No CVA tenderness.  Feels well and no symptoms otherwise.  Labs overall reassuring.  In my opinion, based on current evaluation and results, an acute medical or surgical emergency does not appear to be occurring at this time and I feel that the pt is stable for further outpatient work up and/or treatment.  Return precautions given.

## 2025-03-23 NOTE — ED ADULT NURSE NOTE - OBJECTIVE STATEMENT
Decrease current dose of warfarin as instructed on dosing calendar provided. Continue to monitor urine and stool for signs and symptoms of bleeding. Please notify the clinic of any medication changes. Please remember to bring all medications (both prescription and OTC) to your next visit. Kindly notify the clinic if you are unable to make to your next appointment.
pt c/o urinary issues. as per NH, cath not draining urine

## 2025-03-25 NOTE — CHART NOTE - NSCHARTNOTEFT_GEN_A_CORE
[facility] MRN [id number]- no answer unable to leave a message, got transferred 3 times - ar 3/24 / Nursing facility has arrangements for pt to f/u 3/25 - ERIS    SPECIALTY: urology

## 2025-03-30 ENCOUNTER — INPATIENT (INPATIENT)
Facility: HOSPITAL | Age: 85
LOS: 3 days | Discharge: HOME | DRG: 699 | End: 2025-04-03
Attending: STUDENT IN AN ORGANIZED HEALTH CARE EDUCATION/TRAINING PROGRAM | Admitting: STUDENT IN AN ORGANIZED HEALTH CARE EDUCATION/TRAINING PROGRAM
Payer: MEDICARE

## 2025-03-30 VITALS
WEIGHT: 130.07 LBS | DIASTOLIC BLOOD PRESSURE: 78 MMHG | TEMPERATURE: 98 F | OXYGEN SATURATION: 98 % | SYSTOLIC BLOOD PRESSURE: 135 MMHG | HEART RATE: 89 BPM | RESPIRATION RATE: 18 BRPM

## 2025-03-30 DIAGNOSIS — Z12.11 ENCOUNTER FOR SCREENING FOR MALIGNANT NEOPLASM OF COLON: Chronic | ICD-10-CM

## 2025-03-30 PROCEDURE — 99285 EMERGENCY DEPT VISIT HI MDM: CPT | Mod: FS

## 2025-03-30 NOTE — ED ADULT TRIAGE NOTE - TEMP AT ED ARRIVAL (C)
36.8 Propranolol Counseling:  I discussed with the patient the risks of propranolol including but not limited to low heart rate, low blood pressure, low blood sugar, restlessness and increased cold sensitivity. They should call the office if they experience any of these side effects.

## 2025-03-30 NOTE — ED ADULT TRIAGE NOTE - MODE OF ARRIVAL
Department of Anesthesiology  Postprocedure Note    Patient: Lizbeth Pickett  MRN: [id number]  YOB: 1983  Date of evaluation: 9/13/2020  Time:  2:51 PM     Procedure Summary     Date:  09/13/20 Room / Location:  [facility] OR 02 / [facility]    Anesthesia Start:  5613 Anesthesia Stop:  1114    Procedure:  LEG LESION BIOPSY EXCISION (N/A ) Diagnosis:  (RIGHT LOWER EXTREMITY WOUND)    Surgeon:  Rosalee Hidalgo MD Responsible Provider:  Mouna Ferguson MD    Anesthesia Type:  general ASA Status:  3          Anesthesia Type: general    Chuyita Phase I: Chuyita Score: 8    Chuyita Phase II:      Last vitals: Reviewed and per EMR flowsheets.        Anesthesia Post Evaluation    Patient location during evaluation: PACU  Level of consciousness: awake  Airway patency: patent  Nausea & Vomiting: no nausea and no vomiting  Complications: no  Cardiovascular status: hemodynamically stable  Respiratory status: acceptable Ambulance EMS

## 2025-03-31 DIAGNOSIS — N39.0 URINARY TRACT INFECTION, SITE NOT SPECIFIED: ICD-10-CM

## 2025-03-31 LAB
ALBUMIN SERPL ELPH-MCNC: 3.8 G/DL — SIGNIFICANT CHANGE UP (ref 3.5–5.2)
ALP SERPL-CCNC: 91 U/L — SIGNIFICANT CHANGE UP (ref 30–115)
ALT FLD-CCNC: 5 U/L — SIGNIFICANT CHANGE UP (ref 0–41)
ANION GAP SERPL CALC-SCNC: 12 MMOL/L — SIGNIFICANT CHANGE UP (ref 7–14)
APPEARANCE UR: ABNORMAL
APPEARANCE UR: ABNORMAL
AST SERPL-CCNC: 12 U/L — SIGNIFICANT CHANGE UP (ref 0–41)
BACTERIA # UR AUTO: ABNORMAL /HPF
BASOPHILS # BLD AUTO: 0.02 K/UL — SIGNIFICANT CHANGE UP (ref 0–0.2)
BASOPHILS NFR BLD AUTO: 0.3 % — SIGNIFICANT CHANGE UP (ref 0–1)
BILIRUB SERPL-MCNC: <0.2 MG/DL — SIGNIFICANT CHANGE UP (ref 0.2–1.2)
BILIRUB UR-MCNC: NEGATIVE — SIGNIFICANT CHANGE UP
BILIRUB UR-MCNC: NEGATIVE — SIGNIFICANT CHANGE UP
BUN SERPL-MCNC: 21 MG/DL — HIGH (ref 10–20)
CALCIUM SERPL-MCNC: 8.7 MG/DL — SIGNIFICANT CHANGE UP (ref 8.4–10.5)
CAST: 0 /LPF — SIGNIFICANT CHANGE UP (ref 0–4)
CHLORIDE SERPL-SCNC: 100 MMOL/L — SIGNIFICANT CHANGE UP (ref 98–110)
CO2 SERPL-SCNC: 23 MMOL/L — SIGNIFICANT CHANGE UP (ref 17–32)
COLOR SPEC: ABNORMAL
COLOR SPEC: ABNORMAL
CREAT SERPL-MCNC: 1.3 MG/DL — SIGNIFICANT CHANGE UP (ref 0.7–1.5)
DIFF PNL FLD: ABNORMAL
DIFF PNL FLD: ABNORMAL
EGFR: 55 ML/MIN/1.73M2 — LOW
EGFR: 55 ML/MIN/1.73M2 — LOW
EOSINOPHIL # BLD AUTO: 0.07 K/UL — SIGNIFICANT CHANGE UP (ref 0–0.7)
EOSINOPHIL NFR BLD AUTO: 0.9 % — SIGNIFICANT CHANGE UP (ref 0–8)
GLUCOSE BLDC GLUCOMTR-MCNC: 99 MG/DL — SIGNIFICANT CHANGE UP (ref 70–99)
GLUCOSE SERPL-MCNC: 101 MG/DL — HIGH (ref 70–99)
GLUCOSE UR QL: NEGATIVE MG/DL — SIGNIFICANT CHANGE UP
GLUCOSE UR QL: NEGATIVE MG/DL — SIGNIFICANT CHANGE UP
HCT VFR BLD CALC: 29.3 % — LOW (ref 42–52)
HGB BLD-MCNC: 10 G/DL — LOW (ref 14–18)
IMM GRANULOCYTES NFR BLD AUTO: 0.3 % — SIGNIFICANT CHANGE UP (ref 0.1–0.3)
KETONES UR-MCNC: NEGATIVE MG/DL — SIGNIFICANT CHANGE UP
KETONES UR-MCNC: NEGATIVE MG/DL — SIGNIFICANT CHANGE UP
LEUKOCYTE ESTERASE UR-ACNC: ABNORMAL
LEUKOCYTE ESTERASE UR-ACNC: ABNORMAL
LIDOCAIN IGE QN: 28 U/L — SIGNIFICANT CHANGE UP (ref 7–60)
LYMPHOCYTES # BLD AUTO: 1.69 K/UL — SIGNIFICANT CHANGE UP (ref 1.2–3.4)
LYMPHOCYTES # BLD AUTO: 21.2 % — SIGNIFICANT CHANGE UP (ref 20.5–51.1)
MCHC RBC-ENTMCNC: 31.7 PG — HIGH (ref 27–31)
MCHC RBC-ENTMCNC: 34.1 G/DL — SIGNIFICANT CHANGE UP (ref 32–37)
MCV RBC AUTO: 93 FL — SIGNIFICANT CHANGE UP (ref 80–94)
MONOCYTES # BLD AUTO: 0.91 K/UL — HIGH (ref 0.1–0.6)
MONOCYTES NFR BLD AUTO: 11.4 % — HIGH (ref 1.7–9.3)
NEUTROPHILS # BLD AUTO: 5.26 K/UL — SIGNIFICANT CHANGE UP (ref 1.4–6.5)
NEUTROPHILS NFR BLD AUTO: 65.9 % — SIGNIFICANT CHANGE UP (ref 42.2–75.2)
NITRITE UR-MCNC: NEGATIVE — SIGNIFICANT CHANGE UP
NITRITE UR-MCNC: NEGATIVE — SIGNIFICANT CHANGE UP
NRBC BLD AUTO-RTO: 0 /100 WBCS — SIGNIFICANT CHANGE UP (ref 0–0)
PH UR: 8 — SIGNIFICANT CHANGE UP (ref 5–8)
PH UR: 8 — SIGNIFICANT CHANGE UP (ref 5–8)
PLATELET # BLD AUTO: 182 K/UL — SIGNIFICANT CHANGE UP (ref 130–400)
PMV BLD: 10.6 FL — HIGH (ref 7.4–10.4)
POTASSIUM SERPL-MCNC: 4.6 MMOL/L — SIGNIFICANT CHANGE UP (ref 3.5–5)
POTASSIUM SERPL-SCNC: 4.6 MMOL/L — SIGNIFICANT CHANGE UP (ref 3.5–5)
PROT SERPL-MCNC: 7.2 G/DL — SIGNIFICANT CHANGE UP (ref 6–8)
PROT UR-MCNC: 30 MG/DL
PROT UR-MCNC: 30 MG/DL
RBC # BLD: 3.15 M/UL — LOW (ref 4.7–6.1)
RBC # FLD: 14.6 % — HIGH (ref 11.5–14.5)
RBC CASTS # UR COMP ASSIST: 561 /HPF — HIGH (ref 0–4)
SODIUM SERPL-SCNC: 135 MMOL/L — SIGNIFICANT CHANGE UP (ref 135–146)
SP GR SPEC: 1.01 — SIGNIFICANT CHANGE UP (ref 1–1.03)
SP GR SPEC: 1.01 — SIGNIFICANT CHANGE UP (ref 1–1.03)
SQUAMOUS # UR AUTO: 3 /HPF — SIGNIFICANT CHANGE UP (ref 0–5)
UROBILINOGEN FLD QL: 0.2 MG/DL — SIGNIFICANT CHANGE UP (ref 0.2–1)
UROBILINOGEN FLD QL: 0.2 MG/DL — SIGNIFICANT CHANGE UP (ref 0.2–1)
WBC # BLD: 7.97 K/UL — SIGNIFICANT CHANGE UP (ref 4.8–10.8)
WBC # FLD AUTO: 7.97 K/UL — SIGNIFICANT CHANGE UP (ref 4.8–10.8)
WBC UR QL: 16 /HPF — HIGH (ref 0–5)

## 2025-03-31 PROCEDURE — 92610 EVALUATE SWALLOWING FUNCTION: CPT | Mod: GN

## 2025-03-31 PROCEDURE — 99222 1ST HOSP IP/OBS MODERATE 55: CPT

## 2025-03-31 PROCEDURE — 97163 PT EVAL HIGH COMPLEX 45 MIN: CPT | Mod: GP

## 2025-03-31 PROCEDURE — 36415 COLL VENOUS BLD VENIPUNCTURE: CPT

## 2025-03-31 PROCEDURE — 80053 COMPREHEN METABOLIC PANEL: CPT

## 2025-03-31 PROCEDURE — 85025 COMPLETE CBC W/AUTO DIFF WBC: CPT

## 2025-03-31 PROCEDURE — 82962 GLUCOSE BLOOD TEST: CPT

## 2025-03-31 RX ORDER — LISINOPRIL 5 MG/1
20 TABLET ORAL DAILY
Refills: 0 | Status: DISCONTINUED | OUTPATIENT
Start: 2025-03-31 | End: 2025-04-03

## 2025-03-31 RX ORDER — ALBUTEROL SULFATE 2.5 MG/3ML
2 VIAL, NEBULIZER (ML) INHALATION EVERY 6 HOURS
Refills: 0 | Status: DISCONTINUED | OUTPATIENT
Start: 2025-03-31 | End: 2025-04-03

## 2025-03-31 RX ORDER — TAMSULOSIN HYDROCHLORIDE 0.4 MG/1
0.4 CAPSULE ORAL AT BEDTIME
Refills: 0 | Status: DISCONTINUED | OUTPATIENT
Start: 2025-03-31 | End: 2025-04-03

## 2025-03-31 RX ORDER — GABAPENTIN 400 MG/1
300 CAPSULE ORAL
Refills: 0 | Status: DISCONTINUED | OUTPATIENT
Start: 2025-03-31 | End: 2025-04-03

## 2025-03-31 RX ORDER — MAGNESIUM, ALUMINUM HYDROXIDE 200-200 MG
30 TABLET,CHEWABLE ORAL EVERY 4 HOURS
Refills: 0 | Status: DISCONTINUED | OUTPATIENT
Start: 2025-03-31 | End: 2025-04-03

## 2025-03-31 RX ORDER — OLANZAPINE 10 MG/1
10 TABLET ORAL DAILY
Refills: 0 | Status: DISCONTINUED | OUTPATIENT
Start: 2025-03-31 | End: 2025-04-03

## 2025-03-31 RX ORDER — MELATONIN 5 MG
3 TABLET ORAL AT BEDTIME
Refills: 0 | Status: DISCONTINUED | OUTPATIENT
Start: 2025-03-31 | End: 2025-04-03

## 2025-03-31 RX ORDER — ACETAMINOPHEN 500 MG/5ML
650 LIQUID (ML) ORAL EVERY 6 HOURS
Refills: 0 | Status: DISCONTINUED | OUTPATIENT
Start: 2025-03-31 | End: 2025-04-03

## 2025-03-31 RX ORDER — METOPROLOL SUCCINATE 50 MG/1
25 TABLET, EXTENDED RELEASE ORAL
Refills: 0 | Status: DISCONTINUED | OUTPATIENT
Start: 2025-03-31 | End: 2025-04-03

## 2025-03-31 RX ORDER — ONDANSETRON HCL/PF 4 MG/2 ML
4 VIAL (ML) INJECTION EVERY 8 HOURS
Refills: 0 | Status: DISCONTINUED | OUTPATIENT
Start: 2025-03-31 | End: 2025-04-03

## 2025-03-31 RX ORDER — NIFEDIPINE 30 MG
90 TABLET, EXTENDED RELEASE 24 HR ORAL DAILY
Refills: 0 | Status: DISCONTINUED | OUTPATIENT
Start: 2025-03-31 | End: 2025-04-03

## 2025-03-31 RX ORDER — TRAZODONE HCL 100 MG
50 TABLET ORAL AT BEDTIME
Refills: 0 | Status: DISCONTINUED | OUTPATIENT
Start: 2025-03-31 | End: 2025-04-03

## 2025-03-31 RX ADMIN — TAMSULOSIN HYDROCHLORIDE 0.4 MILLIGRAM(S): 0.4 CAPSULE ORAL at 21:30

## 2025-03-31 RX ADMIN — METOPROLOL SUCCINATE 25 MILLIGRAM(S): 50 TABLET, EXTENDED RELEASE ORAL at 17:32

## 2025-03-31 RX ADMIN — Medication 50 MILLIGRAM(S): at 21:30

## 2025-03-31 RX ADMIN — OLANZAPINE 10 MILLIGRAM(S): 10 TABLET ORAL at 11:23

## 2025-03-31 RX ADMIN — GABAPENTIN 300 MILLIGRAM(S): 400 CAPSULE ORAL at 17:31

## 2025-03-31 NOTE — ED ADULT NURSE NOTE - NSFALLHARMRISKINTERV_ED_ALL_ED
Provide visual cue: red socks, yellow wristband, yellow gown, etc/Reinforce activity limits and safety measures with patient and family/Bed in lowest position, wheels locked, appropriate side rails in place/Call bell, personal items and telephone in reach/Instruct patient to call for assistance before getting out of bed/chair/stretcher/Non-slip footwear applied when patient is off stretcher/[place] to call system/Physically safe environment - no spills, clutter or unnecessary equipment/Purposeful Proactive Rounding/Room/bathroom lighting operational, light cord in reach

## 2025-03-31 NOTE — ED PROVIDER NOTE - CLINICAL SUMMARY MEDICAL DECISION MAKING FREE TEXT BOX
Throughout ED observation period, pt remained clinically and hemodynamically stable.  Lab results as interpreted by me- stable anemia, no significant electrolyte abnormalities, stable renal function  ua positive vs contaminated  will admit for hgb monitoring, ID to eval for abx for uti vs chronic colonization

## 2025-03-31 NOTE — ED PROVIDER NOTE - ATTENDING APP SHARED VISIT CONTRIBUTION OF CARE
84 yold male to Ed [facility]or  Pmhx Copd, Htn, Dm, schizophrenia, Hld, non-hodgkins lymphoma  pt was sent from facility for blood/hematuria in diaper. pt w/ chronic bains, last changed 1 month ago. pt had small clots around penis and had small blood in diaper.  no trauma, back pain.    vss  gen- NAD, awake, alert   card-rrr  lungs-ctab, no wheezing or rhonchi  abd-sntnd, no guarding or rebound  neuro- full str/sensation, cn ii-xii grossly intact, normal coordination

## 2025-03-31 NOTE — H&P ADULT - HISTORY OF PRESENT ILLNESS
84 yold male to Ed [facility]  Pmhx Copd, Htn, Dm, schizophrenia, Hld, non-hodgkins lymphoma sent to the ED from [facility] for hematuria/blood in diaper. Per patient transfer information patient was found yesterday morning to have blood in the bains bag, with small clots noted around the tip of the penis. Unclear if there was any trauma, patient denying, but poor historian. Patient has had chronic bains in since admission in January where he was treated for MDRO in the urine, retention, and uretal dilation for bains catheter placement by urology, last exchanged aprox 1 month ago.     On presentation to the ED, /78, HR 89,  Afebrile, 98% on RA. Labs significant for hgb 10.0 (baseline), Cr 1.3 (baseline), UA + with many RBC.

## 2025-03-31 NOTE — PATIENT PROFILE ADULT - FALL HARM RISK - FACTORS NURSING JUDGEMENT
Please advise patient X-rays confirm advanced back arthritis. Old hardware appears to remain in good position.
Sent [address] message notifying pt
Yes

## 2025-03-31 NOTE — H&P ADULT - ASSESSMENT
84 yold male to Ed [facility]  Pmhx Copd, Htn, Dm, schizophrenia, Hld, non-hodgkins lymphoma sent to the ED from [facility] Ed for hematuria/blood in diaper.     #Hematuria- likely 2/2 trauma   #Doubt UTI  - Patient with clots at tip of the penis, and blood in urine bag  - No obvious trauma, but patient poor historian  - UA postive, but always positive, given history of MDRO and normal vital signs, likely colonized     Plan:   - Monitor bains bag for clots, if present uro cs for possible bladder irrigation  - Encourage PO hydration  - Monitor fever curve, hold off on abx for now     #BPH  - cw tamsulosin     #h/o COPD   -stable, c/w inhalers, nebs tx.     #DM 2  - hold metformin. SSI here     #HTN/ HLD  - c/w home medications    #schizophrenia  - c/w olanzapine and Trazodone      #Misc  - DVT Prophylaxis: Heparin  - GI Prophylaxis: None  - Diet: DASH/ CCS  - Activity: As tolerated  - IV Fluids: none  - Code Status: Full Code    Dispo: Likely DC in 24-48 hours  84 yold male to Ed [facility]  Pmhx Copd, Htn, Dm, schizophrenia, Hld, non-hodgkins lymphoma sent to the ED from [facility] Ed for hematuria/blood in diaper.     #Hematuria- likely 2/2 trauma   #Doubt UTI  - Patient with clots at tip of the penis, and blood in urine bag  - No obvious trauma, but patient poor historian  - UA postive, but always positive, given history of MDRO and normal vital signs, likely colonized     Plan:   - Monitor bains bag for clots, if present uro cs for possible bladder irrigation  - Encourage PO hydration  - Monitor fever curve, hold off on abx for now     #BPH  - cw tamsulosin     #h/o COPD   -stable, c/w inhalers, nebs tx.     #DM 2  - hold metformin. SSI here     #HTN/ HLD  - c/w home medications    #schizophrenia  - c/w olanzapine and Trazodone      #Misc  - DVT Prophylaxis: Heparin  - GI Prophylaxis: None  - Diet: DASH/ CCS  - Activity: As tolerated  - IV Fluids: none  - Code Status: Full Code    Dispo: Likely DC in 24-48 hours     ** Attempted to do medication rec, however no face sheet in chart, no answer from [facility]. Med rec completed based off prior discharge summary **

## 2025-03-31 NOTE — ED PROVIDER NOTE - PHYSICAL EXAMINATION
Constitutional: Well developed, well nourished. NAD  Head: Normocephalic, atraumatic.  Eyes: PERRL, EOMI.  ENT: No nasal discharge. Mucous membranes dry.  Neck: Supple. Painless ROM.  Cardiovascular:   Regular rate and rhythm.    Pulmonary:  Lungs clear to auscultation bilaterally.    Abdominal: Soft. Nondistended. No rebound, guarding, rigidity.  bains noted draining without gross hematuria  Extremities. Pelvis stable. No lower extremity edema, symmetric calves.  Skin: No rashes, cyanosis.  Neuro: AAOx3. No focal neurological deficits.  Psych: Normal mood. Normal affect.

## 2025-03-31 NOTE — PATIENT PROFILE ADULT - FALL HARM RISK - ATTEMPT OOB
----- Message from Maria Dolores Stuart sent at 1/4/2023 12:32 PM CST -----  Subject: Appointment Request    Reason for Call: New Patient/New to Provider Appointment needed: New   Patient Request Appointment    QUESTIONS    Reason for appointment request? No appointments available during search     Additional Information for Provider? pt needs an appt with merissa RAY. Pt   needs someone to call her to see if she can be seen at the practice. She   was dismissed before but needs a doctor for anxiety. Pt lost her son this   year and is having a hard time.   ---------------------------------------------------------------------------  --------------  Any NOVAK  [phone]; OK to leave message on voicemail  ---------------------------------------------------------------------------  --------------  SCRIPT ANSWERS  COVID Screen: Judith Bess
Pt was dismissed from Primary care the note in the chart states for doctor shopping will this patient be allowed to schedule with IM?
No

## 2025-03-31 NOTE — PATIENT PROFILE ADULT - PATIENT'S GENDER IDENTITY
Patient : Lio Huerta Age: 8 year old Sex: male   MRN: [id number] Encounter Date: 1/16/2023    History     Chief Complaint   Patient presents with   • Fever 9 Weeks to 74 years   • Sore Throat   • Rash       HPI    Lio Huerta is a 8 year old with no ongoing medical history, presenting to the emergency department fever, sore throat, rash intermittently x4 days.  No rash present.  Mother reports rash comes and goes to his face and he also had an on his buttocks.  T-max 100.3.  Mother reports patient gets \"chills\".  Vomiting or diarrhea.  Normal fluid intake.  Sick contact at school. No medications at home today.    Immunizations up to date    No Known Allergies    Home Medications: denies     Past Medical History:   Diagnosis Date   • No pertinent past medical history        Past Surgical History:   Procedure Laterality Date   • No past surgeries         No family history on file.    Social: Lives at home with family     Review of Systems   Constitutional - Reviewed and are neg except as above  Eyes - Reviewed and are neg except as above  ENT - Reviewed and are neg except as above  Cardiovascular - Reviewed and are neg except as above  Respiratory - Reviewed and are neg except as above  GI - Reviewed and are neg except as above   - Reviewed and are neg except as above  MS - Reviewed and are neg except as above  Skin - Reviewed and are neg except as above  Neuro - Reviewed and are neg except as above  Heme/Lymph - Reviewed and are neg except as above  Physical Exam     Vitals:    01/16/23 0903   BP: 98/68   Pulse: 109   Resp: 20   Temp: 99.9 °F (37.7 °C)   TempSrc: Oral   SpO2: 98%      Physical Exam  Vitals and nursing note reviewed.   Constitutional:       General: He is active. He is not in acute distress.     Appearance: Normal appearance.   HENT:      Head: Atraumatic.      Right Ear: Tympanic membrane, ear canal and external ear normal.      Left Ear: Tympanic membrane, ear canal and external ear normal.       Nose: No congestion or rhinorrhea.      Mouth/Throat:      Mouth: Mucous membranes are moist.      Pharynx: Posterior oropharyngeal erythema present. No pharyngeal swelling, oropharyngeal exudate or uvula swelling.      Tonsils: No tonsillar exudate or tonsillar abscesses.      Comments: No trismus.  No palate elevation.     Neck: Normal range of motion and neck supple.   Eyes:      Conjunctiva/sclera: Conjunctivae normal.      Pupils: Pupils are equal, round, and reactive to light.   Cardiovascular:      Rate and Rhythm: Normal rate and regular rhythm.      Pulses: Normal pulses.      Heart sounds: Normal heart sounds.   Pulmonary:      Effort: Pulmonary effort is normal.      Breath sounds: Normal breath sounds.   Abdominal:      Palpations: Abdomen is soft.      Tenderness: There is no abdominal tenderness.   Musculoskeletal:         General: No tenderness. Normal range of motion.   Lymphadenopathy:      Cervical: No cervical adenopathy.   Skin:     General: Skin is warm.      Capillary Refill: Capillary refill takes less than 2 seconds.      Findings: No rash.   Neurological:      General: No focal deficit present.      Mental Status: He is alert.         Medical Decision Making         Medical Decision Making  Lio Huerta is a 8 year old with no ongoing medical history, presenting to the emergency department fever, sore throat, rash intermittently x4 days. Physical exam findings as noted above. Positive for mild pharynx erythema. No rash.  Patient is well-appearing, well hydrated, and in no distress on exam. No focal lung findings. No evidence of OM. Abd exam benign.     Differential diagnosis:  URI, viral syndrome, viral pharyngitis vs strep pharyngitis.      Low concern for serious bacterial infection.    Initial Plan:   - Ensure fever control   - PO challenge  - Quad panel   - Strep PCR (if positive, Mother prefers IM antibiotic)  - COVID test     Dispo: home          Amount and/or Complexity of  Data Reviewed  Independent Historian: parent     Details: Mother  Labs: ordered. Decision-making details documented in ED Course.         ED Medications     Medications   penicillin G benzathine (BICILLIN L-A) IM injection 1.2 Million Units (1.2 Million Units Intramuscular Given 1/16/23 1017)        Lab Results     Results for orders placed or performed during the hospital encounter of 01/16/23   COVID/Flu/RSV panel   Result Value Ref Range    Rapid SARS-COV-2 by PCR Not Detected Not Detected / Detected / Presumptive Positive / Inhibitors present    Influenza A by PCR Not Detected Not Detected    Influenza B by PCR Not Detected Not Detected    RSV BY PCR Not Detected Not Detected    Isolation Guidelines      Procedural Comment     Streptococcus group A PCR    Specimen: Throat; Swab   Result Value Ref Range    STREPTOCOCCUS GROUP A PCR Detected (A) Not Detected       Radiology Results     Imaging Results    None       ED Course     ED Course as of 01/16/23 1048   Mon Jan 16, 2023   0957 Streptococcus group A PCR(!):    STREPTOCOCCUS GROUP A PCR Detected(!)  Positive, will order IM antibiotic per Mother's preference  [NO]   1014 Quad panel negative [NO]   1040 Well-appearing, no distress post IM injection.  Lungs clear and equal.  No vomiting.  Stable for discharge. [NO]      ED Course User Index  [NO] DEON Castle       Disposition     1. Acute streptococcal pharyngitis         Below instructions copied from After Visit Summary     Thank you for allowing me to care for your child. Please follow the below instructions. Always make a follow-up appt with your regular provider for follow-up and routine preventive care.    - Antibiotic injection given in the emergency department, no additional antibiotics required   - Fever and pain control per dosing sheet  - Please push oral fluids   - Please change tooth brush in 48 hours   - Return to emergency department with worsening sore throat, not tolerating oral fluids,  fevers for 3 days total, or new/concerning symptoms.        Summary of your Discharge Medications      You have not been prescribed any medications.        Follow-up with Primary Care Provider    Call   As needed    [facility]  [address] [place] [id number]    Call [phone]  Call   As needed         Discharge 1/16/2023 10:46 AM  Lio Huerta discharge to home.             DEON Castle  01/16/23 1040     Male

## 2025-03-31 NOTE — PATIENT PROFILE ADULT - NSPROPTRIGHTNOTIFY_GEN_A_NUR
Ears: no ear pain and no hearing problems.Nose: no nasal congestion and no nasal drainage.Mouth/Throat: no dysphagia, no hoarseness and no throat pain.Neck: no lumps, no pain, no stiffness and no swollen glands.
declines

## 2025-03-31 NOTE — ED PROVIDER NOTE - OBJECTIVE STATEMENT
84 yold male to Ed [facility]  Pmhx Copd, Htn, Dm, schizophrenia, Hld, non-hodgkins lymphoma; pt sent to Ed for hematuria/blood in diaper; pt  noted to have small blood clots around penis and diaper with mild blood in bians bag; pt with chronic indwelling abins - last changed ~1 month ago; pt denies fever, chills, n/v, back pain;  pt denies pullling bains/direct trauma

## 2025-03-31 NOTE — H&P ADULT - ATTENDING COMMENTS
84 yold male to Ed [facility]  Pmhx Copd, Htn, Dm, schizophrenia, Hld, non-hodgkins lymphoma sent to the ED from [facility] Ed for hematuria/blood in diaper.     #Hematuria- likely 2/2 trauma   #Doubt UTI  - Patient with clots at tip of the penis, and blood in urine bag  - No obvious trauma, but patient poor historian  - UA postive, but always positive, given history of MDRO and normal vital signs, likely colonized     Plan:   - Monitor bains bag for clots, if present uro cs for possible bladder irrigation  - Encourage PO hydration  - Monitor fever curve, hold off on abx for now     #BPH  - cw tamsulosin     #h/o COPD   -stable, c/w inhalers, nebs tx.     #DM 2  - hold metformin. SSI here     #HTN/ HLD  - c/w home medications    #schizophrenia  - c/w olanzapine and Trazodone      #Misc  - DVT Prophylaxis: Heparin  - GI Prophylaxis: None  - Diet: DASH/ CCS  - Activity: As tolerated  - IV Fluids: none  - Code Status: Full Code    Dispo: Likely DC in 24-48 hours

## 2025-03-31 NOTE — ED PROVIDER NOTE - CARE PLAN
Principal Discharge DX:	Complicated UTI (urinary tract infection)  Secondary Diagnosis:	Hematuria   1

## 2025-03-31 NOTE — ED PROVIDER NOTE - CONSIDERATION OF ADMISSION OBSERVATION
admit for serial hgb, ID to eval abx given pt w/ pan resistant prior culture Consideration of Admission/Observation

## 2025-04-01 ENCOUNTER — TRANSCRIPTION ENCOUNTER (OUTPATIENT)
Age: 85
End: 2025-04-01

## 2025-04-01 LAB
GLUCOSE BLDC GLUCOMTR-MCNC: 107 MG/DL — HIGH (ref 70–99)
GLUCOSE BLDC GLUCOMTR-MCNC: 115 MG/DL — HIGH (ref 70–99)
GLUCOSE BLDC GLUCOMTR-MCNC: 116 MG/DL — HIGH (ref 70–99)
GLUCOSE BLDC GLUCOMTR-MCNC: 94 MG/DL — SIGNIFICANT CHANGE UP (ref 70–99)

## 2025-04-01 PROCEDURE — 99232 SBSQ HOSP IP/OBS MODERATE 35: CPT

## 2025-04-01 RX ADMIN — METOPROLOL SUCCINATE 25 MILLIGRAM(S): 50 TABLET, EXTENDED RELEASE ORAL at 17:05

## 2025-04-01 RX ADMIN — LISINOPRIL 20 MILLIGRAM(S): 5 TABLET ORAL at 05:23

## 2025-04-01 RX ADMIN — METOPROLOL SUCCINATE 25 MILLIGRAM(S): 50 TABLET, EXTENDED RELEASE ORAL at 05:23

## 2025-04-01 RX ADMIN — Medication 90 MILLIGRAM(S): at 05:24

## 2025-04-01 RX ADMIN — OLANZAPINE 10 MILLIGRAM(S): 10 TABLET ORAL at 11:07

## 2025-04-01 RX ADMIN — Medication 1 APPLICATION(S): at 21:19

## 2025-04-01 RX ADMIN — TAMSULOSIN HYDROCHLORIDE 0.4 MILLIGRAM(S): 0.4 CAPSULE ORAL at 21:18

## 2025-04-01 RX ADMIN — Medication 50 MILLIGRAM(S): at 21:18

## 2025-04-01 RX ADMIN — GABAPENTIN 300 MILLIGRAM(S): 400 CAPSULE ORAL at 17:06

## 2025-04-01 RX ADMIN — GABAPENTIN 300 MILLIGRAM(S): 400 CAPSULE ORAL at 05:29

## 2025-04-01 NOTE — DISCHARGE NOTE PROVIDER - NSDCCPCAREPLAN_GEN_ALL_CORE_FT
PRINCIPAL DISCHARGE DIAGNOSIS  Diagnosis: Hematuria  Assessment and Plan of Treatment: You were admitted for suspected hematuria (blood in your urine). During this time your vitals were stable. You were given oral hydration and monitored for hematuria. Cobos bag was negative for any obvious blood or clots. Your vitals have remained stable while off antibiotics. You are now stable for discharge.

## 2025-04-01 NOTE — DISCHARGE NOTE PROVIDER - HOSPITAL COURSE
84 year old male PMHx Copd, Htn, Dm, schizophrenia, Hld, non-hodgkins lymphoma sent to the ED from [facility] for hematuria/blood in diaper. Per patient transfer information patient was found yesterday morning to have blood in the bains bag, with small clots noted around the tip of the penis. Unclear if there was any trauma, patient denying, but poor historian. Patient has had chronic bains in since admission in January where he was treated for MDRO in the urine, retention, and uretal dilation for bains catheter placement by urology, last exchanged aprox 1 month ago.     On presentation to the ED, /78, HR 89,  Afebrile, 98% on RA. Labs significant for hgb 10.0 (baseline), Cr 1.3 (baseline), UA + with many RBC.     #Hematuria - likely 2/2 trauma   #Doubt UTI  - Patient with clots at tip of the penis, and blood in urine bag  - No obvious trauma, but patient poor historian  - UA positive, but always positive, given history of MDRO and normal vital signs, likely colonized     Patient given oral hydration and monitored for hematuria. Bains bag negative for any clots. Patient has been afebrile; has been off on abx; now stable for discharge.

## 2025-04-01 NOTE — PHYSICAL THERAPY INITIAL EVALUATION ADULT - PERTINENT HX OF CURRENT PROBLEM, REHAB EVAL
84 yold male to Ed [facility]  Pmhx Copd, Htn, Dm, schizophrenia, Hld, non-hodgkins lymphoma sent to the ED from [facility] Ed for hematuria/blood in diaper. Per patient transfer information patient was found yesterday morning to have blood in the bains bag, with small clots noted around the tip of the penis. Unclear if there was any trauma, patient denying, but poor historian. Patient has had chronic bains in since admission in January where he was treated for MDRO in the urine, retention, and uretal dilation for bains catheter placement by urology, last exchanged aprox 1 month ago.     Pt. referred to PT for eval and tx.

## 2025-04-01 NOTE — PROGRESS NOTE ADULT - ASSESSMENT
84 yold male to Ed [facility]  Pmhx Copd, Htn, Dm, schizophrenia, Hld, non-hodgkins lymphoma sent to the ED from [facility] Ed for hematuria/blood in diaper.     #Hematuria- likely 2/2 trauma   #Doubt UTI  - Patient with clots at tip of the penis, and blood in urine bag  - No obvious trauma, but patient poor historian  - UA postive, but always positive, given history of MDRO and normal vital signs, likely colonized   - Monitor bains bag for clots, if present uro cs for possible bladder irrigation  - Encourage PO hydration  - Monitor fever curve, hold off on abx for now     #BPH  - cw tamsulosin     #h/o COPD   -stable, c/w inhalers, nebs tx.     #DM 2  - hold metformin. SSI here     #HTN/ HLD  - c/w home medications    #schizophrenia  - c/w olanzapine and Trazodone      #Misc  - DVT Prophylaxis: Heparin  - GI Prophylaxis: None  - Diet: DASH/ CCS  - Activity: As tolerated  - IV Fluids: none  - Code Status: Full Code    #Progress Note Handoff  Pending (specify):  dc planning  Family discussion: updated  Disposition:  Unknown at this time________SAR

## 2025-04-01 NOTE — DISCHARGE NOTE PROVIDER - NSDCMRMEDTOKEN_GEN_ALL_CORE_FT
albuterol 90 mcg/inh inhalation aerosol: 1 puff(s) inhaled prn as needed every 6 hours  Centrum oral tablet: 1 tab(s) orally once a day  gabapentin 300 mg oral capsule: 1 cap(s) orally 2 times a day  lisinopril 20 mg oral tablet: 1 tab(s) orally once a day (at bedtime)  metFORMIN 500 mg oral tablet: 1 tab(s) orally 2 times a day  metoprolol tartrate 25 mg oral tablet: 1 tab(s) orally 2 times a day  NIFEdipine 90 mg oral tablet, extended release: 1 tab(s) orally once a day  OLANZapine 10 mg oral tablet: 1 tab(s) orally once a day  tamsulosin 0.4 mg oral capsule: 1 cap(s) orally once a day (at bedtime)  traZODone 50 mg oral tablet: 1 tab(s) orally once a day (at bedtime)   albuterol 90 mcg/inh inhalation aerosol: 1 puff(s) inhaled prn as needed every 6 hours  Centrum oral tablet: 1 tab(s) orally once a day  finasteride 5 mg oral tablet: 1 tab(s) orally once a day  gabapentin 300 mg oral capsule: 1 cap(s) orally 2 times a day  lisinopril 20 mg oral tablet: 1 tab(s) orally once a day (at bedtime)  metFORMIN 500 mg oral tablet: 1 tab(s) orally 2 times a day  metoprolol tartrate 25 mg oral tablet: 1 tab(s) orally 2 times a day  NIFEdipine 90 mg oral tablet, extended release: 1 tab(s) orally once a day  OLANZapine 10 mg oral tablet: 1 tab(s) orally once a day  tamsulosin 0.4 mg oral capsule: 1 cap(s) orally once a day (at bedtime)  traZODone 50 mg oral tablet: 1 tab(s) orally once a day (at bedtime)

## 2025-04-01 NOTE — PHYSICAL THERAPY INITIAL EVALUATION ADULT - GENERAL OBSERVATIONS, REHAB EVAL
[id number] pm Chart reviewed. Pt. seen semirecline in bed , in No apparent distress , + rock and roller belt, + IV lock , bains catheter , + contact precautions , Pt. alert and oriented X 2, Pt. agreed to activity/therapy.

## 2025-04-01 NOTE — DISCHARGE NOTE PROVIDER - NSDCFUADDAPPT_GEN_ALL_CORE_FT
Do you need a primary care doctor or follow-up with a specialist? Our care coordinators will help you find providers near you and schedule any follow-up care visits.    Monday-Friday: 9am-5pm    Call our [facility] team: [phone]

## 2025-04-01 NOTE — DISCHARGE NOTE PROVIDER - ATTENDING DISCHARGE PHYSICAL EXAMINATION:
Gen- elderly M, frail appearing  Eyes- anicteric sclera, non injected conjunctiva, EOMI  ENT- hearing grossly intact  Chest- symmetrical chest rise, no accessory muscle use

## 2025-04-02 LAB
ALBUMIN SERPL ELPH-MCNC: 3.7 G/DL — SIGNIFICANT CHANGE UP (ref 3.5–5.2)
ALP SERPL-CCNC: 86 U/L — SIGNIFICANT CHANGE UP (ref 30–115)
ALT FLD-CCNC: <5 U/L — SIGNIFICANT CHANGE UP (ref 0–41)
ANION GAP SERPL CALC-SCNC: 13 MMOL/L — SIGNIFICANT CHANGE UP (ref 7–14)
AST SERPL-CCNC: 11 U/L — SIGNIFICANT CHANGE UP (ref 0–41)
BASOPHILS # BLD AUTO: 0.01 K/UL — SIGNIFICANT CHANGE UP (ref 0–0.2)
BASOPHILS NFR BLD AUTO: 0.2 % — SIGNIFICANT CHANGE UP (ref 0–1)
BILIRUB SERPL-MCNC: <0.2 MG/DL — SIGNIFICANT CHANGE UP (ref 0.2–1.2)
BUN SERPL-MCNC: 38 MG/DL — HIGH (ref 10–20)
CALCIUM SERPL-MCNC: 8.4 MG/DL — SIGNIFICANT CHANGE UP (ref 8.4–10.5)
CHLORIDE SERPL-SCNC: 97 MMOL/L — LOW (ref 98–110)
CO2 SERPL-SCNC: 23 MMOL/L — SIGNIFICANT CHANGE UP (ref 17–32)
CREAT SERPL-MCNC: 1.7 MG/DL — HIGH (ref 0.7–1.5)
EGFR: 40 ML/MIN/1.73M2 — LOW
EGFR: 40 ML/MIN/1.73M2 — LOW
EOSINOPHIL # BLD AUTO: 0.05 K/UL — SIGNIFICANT CHANGE UP (ref 0–0.7)
EOSINOPHIL NFR BLD AUTO: 0.8 % — SIGNIFICANT CHANGE UP (ref 0–8)
GLUCOSE BLDC GLUCOMTR-MCNC: 106 MG/DL — HIGH (ref 70–99)
GLUCOSE BLDC GLUCOMTR-MCNC: 111 MG/DL — HIGH (ref 70–99)
GLUCOSE BLDC GLUCOMTR-MCNC: 93 MG/DL — SIGNIFICANT CHANGE UP (ref 70–99)
GLUCOSE SERPL-MCNC: 97 MG/DL — SIGNIFICANT CHANGE UP (ref 70–99)
HCT VFR BLD CALC: 30.6 % — LOW (ref 42–52)
HGB BLD-MCNC: 10.3 G/DL — LOW (ref 14–18)
IMM GRANULOCYTES NFR BLD AUTO: 0.3 % — SIGNIFICANT CHANGE UP (ref 0.1–0.3)
LYMPHOCYTES # BLD AUTO: 1.68 K/UL — SIGNIFICANT CHANGE UP (ref 1.2–3.4)
LYMPHOCYTES # BLD AUTO: 25.9 % — SIGNIFICANT CHANGE UP (ref 20.5–51.1)
MCHC RBC-ENTMCNC: 31 PG — SIGNIFICANT CHANGE UP (ref 27–31)
MCHC RBC-ENTMCNC: 33.7 G/DL — SIGNIFICANT CHANGE UP (ref 32–37)
MCV RBC AUTO: 92.2 FL — SIGNIFICANT CHANGE UP (ref 80–94)
MONOCYTES # BLD AUTO: 0.85 K/UL — HIGH (ref 0.1–0.6)
MONOCYTES NFR BLD AUTO: 13.1 % — HIGH (ref 1.7–9.3)
NEUTROPHILS # BLD AUTO: 3.87 K/UL — SIGNIFICANT CHANGE UP (ref 1.4–6.5)
NEUTROPHILS NFR BLD AUTO: 59.7 % — SIGNIFICANT CHANGE UP (ref 42.2–75.2)
NRBC BLD AUTO-RTO: 0 /100 WBCS — SIGNIFICANT CHANGE UP (ref 0–0)
PLATELET # BLD AUTO: 163 K/UL — SIGNIFICANT CHANGE UP (ref 130–400)
PMV BLD: 9.8 FL — SIGNIFICANT CHANGE UP (ref 7.4–10.4)
POTASSIUM SERPL-MCNC: 4.6 MMOL/L — SIGNIFICANT CHANGE UP (ref 3.5–5)
POTASSIUM SERPL-SCNC: 4.6 MMOL/L — SIGNIFICANT CHANGE UP (ref 3.5–5)
PROT SERPL-MCNC: 6.7 G/DL — SIGNIFICANT CHANGE UP (ref 6–8)
RBC # BLD: 3.32 M/UL — LOW (ref 4.7–6.1)
RBC # FLD: 14.5 % — SIGNIFICANT CHANGE UP (ref 11.5–14.5)
SODIUM SERPL-SCNC: 133 MMOL/L — LOW (ref 135–146)
WBC # BLD: 6.48 K/UL — SIGNIFICANT CHANGE UP (ref 4.8–10.8)
WBC # FLD AUTO: 6.48 K/UL — SIGNIFICANT CHANGE UP (ref 4.8–10.8)

## 2025-04-02 PROCEDURE — 99232 SBSQ HOSP IP/OBS MODERATE 35: CPT

## 2025-04-02 RX ORDER — METFORMIN HYDROCHLORIDE 850 MG/1
1 TABLET ORAL
Qty: 60 | Refills: 0
Start: 2025-04-02 | End: 2025-05-01

## 2025-04-02 RX ORDER — NIFEDIPINE 30 MG
1 TABLET, EXTENDED RELEASE 24 HR ORAL
Qty: 30 | Refills: 0
Start: 2025-04-02 | End: 2025-05-01

## 2025-04-02 RX ORDER — OLANZAPINE 10 MG/1
1 TABLET ORAL
Qty: 30 | Refills: 0
Start: 2025-04-02 | End: 2025-05-01

## 2025-04-02 RX ORDER — METOPROLOL SUCCINATE 50 MG/1
1 TABLET, EXTENDED RELEASE ORAL
Qty: 60 | Refills: 0
Start: 2025-04-02 | End: 2025-05-01

## 2025-04-02 RX ORDER — CYST/ALA/Q10/PHOS.SER/DHA/BROC 100-20-50
1 POWDER (GRAM) ORAL
Qty: 30 | Refills: 0
Start: 2025-04-02 | End: 2025-05-01

## 2025-04-02 RX ORDER — HEPARIN SODIUM 1000 [USP'U]/ML
5000 INJECTION INTRAVENOUS; SUBCUTANEOUS EVERY 12 HOURS
Refills: 0 | Status: DISCONTINUED | OUTPATIENT
Start: 2025-04-02 | End: 2025-04-03

## 2025-04-02 RX ADMIN — METOPROLOL SUCCINATE 25 MILLIGRAM(S): 50 TABLET, EXTENDED RELEASE ORAL at 18:19

## 2025-04-02 RX ADMIN — GABAPENTIN 300 MILLIGRAM(S): 400 CAPSULE ORAL at 05:30

## 2025-04-02 RX ADMIN — OLANZAPINE 10 MILLIGRAM(S): 10 TABLET ORAL at 11:18

## 2025-04-02 RX ADMIN — LISINOPRIL 20 MILLIGRAM(S): 5 TABLET ORAL at 05:32

## 2025-04-02 RX ADMIN — HEPARIN SODIUM 5000 UNIT(S): 1000 INJECTION INTRAVENOUS; SUBCUTANEOUS at 18:19

## 2025-04-02 RX ADMIN — METOPROLOL SUCCINATE 25 MILLIGRAM(S): 50 TABLET, EXTENDED RELEASE ORAL at 05:27

## 2025-04-02 RX ADMIN — Medication 50 MILLIGRAM(S): at 21:24

## 2025-04-02 RX ADMIN — GABAPENTIN 300 MILLIGRAM(S): 400 CAPSULE ORAL at 18:19

## 2025-04-02 RX ADMIN — TAMSULOSIN HYDROCHLORIDE 0.4 MILLIGRAM(S): 0.4 CAPSULE ORAL at 21:25

## 2025-04-02 RX ADMIN — Medication 90 MILLIGRAM(S): at 05:27

## 2025-04-02 RX ADMIN — Medication 1 APPLICATION(S): at 05:29

## 2025-04-02 NOTE — PROGRESS NOTE ADULT - ATTENDING COMMENTS
***My note supersedes any discrepancies that may be above in the resident's note***    84 yold male to Ed [facility]  Pmhx Copd, Htn, Dm, schizophrenia, Hld, non-hodgkins lymphoma sent to the ED from [facility] Ed for hematuria/blood in diaper.     #Hematuria- likely 2/2 trauma   #Doubt UTI  - Patient with clots at tip of the penis, and blood in urine bag  - No obvious trauma, but patient poor historian  - UA postive, but always positive, given history of MDRO and normal vital signs, likely colonized   - Monitor bains bag for clots, if present uro cs for possible bladder irrigation  - Encourage PO hydration  - Monitor fever curve, hold off on abx for now     #BPH  - cw tamsulosin     #h/o COPD   -stable, c/w inhalers, nebs tx.     #DM 2  - hold metformin. SSI here     #HTN/ HLD  - c/w home medications    #schizophrenia  - c/w olanzapine and Trazodone      #Misc  - DVT Prophylaxis: Heparin  - GI Prophylaxis: None  - Diet: DASH/ CCS  - Activity: As tolerated  - IV Fluids: none  - Code Status: Full Code    #Progress Note Handoff  Pending (specify):  dc planning  Family discussion: updated  Disposition:  Unknown at this time________SAR

## 2025-04-02 NOTE — PROGRESS NOTE ADULT - ASSESSMENT
84 yold male to Ed [facility]  Pmhx Copd, Htn, Dm, schizophrenia, Hld, non-hodgkins lymphoma sent to the ED from [facility] Ed for hematuria/blood in diaper.     #Hematuria- likely 2/2 trauma   #Doubt UTI  - Patient with clots at tip of the penis, and blood in urine bag  - No obvious trauma, but patient poor historian  - UA postive, but always positive, given history of MDRO and normal vital signs, likely colonized     Plan:   - Monitor bains bag for clots, if present uro cs for possible bladder irrigation  - Encourage PO hydration  - Monitor fever curve, hold off on abx for now     #BPH  - cw tamsulosin     #h/o COPD   -stable, c/w inhalers, nebs tx.     #DM 2  - hold metformin. SSI here     #HTN/ HLD  - c/w home medications    #schizophrenia  - c/w olanzapine and Trazodone      #Misc  - DVT Prophylaxis: Heparin  - GI Prophylaxis: None  - Diet: DASH/ CCS  - Activity: As tolerated  - IV Fluids: none  - Code Status: Full Code    Dispo: d/c planning

## 2025-04-03 ENCOUNTER — TRANSCRIPTION ENCOUNTER (OUTPATIENT)
Age: 85
End: 2025-04-03

## 2025-04-03 VITALS
HEART RATE: 80 BPM | RESPIRATION RATE: 18 BRPM | SYSTOLIC BLOOD PRESSURE: 145 MMHG | TEMPERATURE: 97 F | DIASTOLIC BLOOD PRESSURE: 53 MMHG | OXYGEN SATURATION: 99 %

## 2025-04-03 LAB
GLUCOSE BLDC GLUCOMTR-MCNC: 100 MG/DL — HIGH (ref 70–99)
GLUCOSE BLDC GLUCOMTR-MCNC: 137 MG/DL — HIGH (ref 70–99)

## 2025-04-03 PROCEDURE — 99239 HOSP IP/OBS DSCHRG MGMT >30: CPT

## 2025-04-03 RX ORDER — FINASTERIDE 1 MG/1
1 TABLET, FILM COATED ORAL
Qty: 30 | Refills: 0
Start: 2025-04-03 | End: 2025-05-02

## 2025-04-03 RX ADMIN — HEPARIN SODIUM 5000 UNIT(S): 1000 INJECTION INTRAVENOUS; SUBCUTANEOUS at 05:02

## 2025-04-03 RX ADMIN — OLANZAPINE 10 MILLIGRAM(S): 10 TABLET ORAL at 11:44

## 2025-04-03 RX ADMIN — LISINOPRIL 20 MILLIGRAM(S): 5 TABLET ORAL at 05:02

## 2025-04-03 RX ADMIN — GABAPENTIN 300 MILLIGRAM(S): 400 CAPSULE ORAL at 05:02

## 2025-04-03 RX ADMIN — Medication 1 APPLICATION(S): at 05:02

## 2025-04-03 RX ADMIN — Medication 90 MILLIGRAM(S): at 05:02

## 2025-04-03 RX ADMIN — METOPROLOL SUCCINATE 25 MILLIGRAM(S): 50 TABLET, EXTENDED RELEASE ORAL at 05:02

## 2025-04-03 NOTE — DISCHARGE NOTE NURSING/CASE MANAGEMENT/SOCIAL WORK - NSDCVIVACCINE_GEN_ALL_CORE_FT
Tdap; 15-May-2024 15:22; Rosa Adamson (ELLEN); [employer]; [id number] (Exp. Date: 23-Nov-2025); IntraMuscular; Deltoid Left.; 0.5 milliLiter(s); VIS (VIS Published: 09-May-2013, VIS Presented: 15-May-2024);

## 2025-04-03 NOTE — DISCHARGE NOTE NURSING/CASE MANAGEMENT/SOCIAL WORK - FINANCIAL ASSISTANCE
[facility] provides services at a reduced cost to those who are determined to be eligible through [facility]’s financial assistance program. Information regarding [facility]’s financial assistance program can be found by going to [email or web address] or by calling [phone].

## 2025-04-03 NOTE — DISCHARGE NOTE NURSING/CASE MANAGEMENT/SOCIAL WORK - PATIENT PORTAL LINK FT
You can access the FollowMyHealth Patient Portal offered by [facility] by registering at the following website: [email or web address]. By joining [email or web address] FollowMyHealth portal, you will also be able to view your health information using other applications (apps) compatible with our system.

## 2025-04-03 NOTE — DISCHARGE NOTE NURSING/CASE MANAGEMENT/SOCIAL WORK - NSDCFUADDAPPT_GEN_ALL_CORE_FT
Do you need a primary care doctor or follow-up with a specialist? Our care coordinators will help you find providers near you and schedule any follow-up care visits.    Monday-Friday: 9am-5pm    Call our [facility] team: [phone]

## 2025-04-03 NOTE — PROGRESS NOTE ADULT - SUBJECTIVE AND OBJECTIVE BOX
ED Presentation    HPI:  84 yold male to Ed [facility]  Pmhx Copd, Htn, Dm, schizophrenia, Hld, non-hodgkins lymphoma sent to the ED from [facility] Ed for hematuria/blood in diaper. Per patient transfer information patient was found yesterday morning to have blood in the bains bag, with small clots noted around the tip of the penis. Unclear if there was any trauma, patient denying, but poor historian. Patient has had chronic bains in since admission in January where he was treated for MDRO in the urine, retention, and uretal dilation for bains catheter placement by urology, last exchanged aprox 1 month ago.     On presentation to the ED, /78, HR 89,  Afebrile, 98% on RA. Labs significant for hgb 10.0 (baseline), Cr 1.3 (baseline), UA + with many RBC.    (31 Mar 2025 08:52)       Today is hospital day 2d.     PAST MEDICAL & SURGICAL HISTORY  COPD (chronic obstructive pulmonary disease)    Paranoid schizophrenia    Schizophrenia    Diabetes type 2, controlled    COPD (chronic obstructive pulmonary disease)    Dyslipidemia    Chronic retention of urine    Dyslipidemia    Encounter for screening colonoscopy        SOCIAL HISTORY:  Social History:      Otherwise, as noted in HPI    ALLERGIES:  No Known Allergies      MEDICATIONS:  STANDING MEDICATIONS  chlorhexidine 2% Cloths 1 Application(s) Topical <User Schedule>  gabapentin 300 milliGRAM(s) Oral two times a day  heparin   Injectable 5000 Unit(s) SubCutaneous every 12 hours  lisinopril 20 milliGRAM(s) Oral daily  metoprolol tartrate 25 milliGRAM(s) Oral two times a day  NIFEdipine XL 90 milliGRAM(s) Oral daily  OLANZapine 10 milliGRAM(s) Oral daily  tamsulosin 0.4 milliGRAM(s) Oral at bedtime  traZODone 50 milliGRAM(s) Oral at bedtime    PRN MEDICATIONS  acetaminophen     Tablet .. 650 milliGRAM(s) Oral every 6 hours PRN  albuterol    90 MICROgram(s) HFA Inhaler 2 Puff(s) Inhalation every 6 hours PRN  aluminum hydroxide/magnesium hydroxide/simethicone Suspension 30 milliLiter(s) Oral every 4 hours PRN  melatonin 3 milliGRAM(s) Oral at bedtime PRN  ondansetron Injectable 4 milliGRAM(s) IV Push every 8 hours PRN      VITALS:   T(C): 36.6 (04-02-25 @ 05:35), Max: 36.9 (04-01-25 @ 11:59)  HR: 67 (04-02-25 @ 05:35) (55 - 74)  BP: 150/71 (04-02-25 @ 05:35) (106/58 - 150/71)  RR: 18 (04-02-25 @ 05:35) (17 - 18)  SpO2: 97% (04-02-25 @ 05:35) (97% - 98%)  I&O's Summary        PHYSICAL EXAM:  GENERAL: Nondistressed  HEAD: Atraumatic  EYES: Conjunctiva and sclera clear  LUNG: Decreased BS  HEART: S1 S2 heard  ABDOMEN: Soft, Nontender  EXTREMITIES: No edema      LABS:                        10.3   6.48  )-----------( 163      ( 02 Apr 2025 06:48 )             30.6     04-02    133[L]  |  97[L]  |  38[H]  ----------------------------<  97  4.6   |  23  |  1.7[H]    Ca    8.4      02 Apr 2025 06:48    TPro  6.7  /  Alb  3.7  /  TBili  <0.2  /  DBili  x   /  AST  11  /  ALT  <5  /  AlkPhos  86  04-02      Urinalysis Basic - ( 02 Apr 2025 06:48 )    Color: x / Appearance: x / SG: x / pH: x  Gluc: 97 mg/dL / Ketone: x  / Bili: x / Urobili: x   Blood: x / Protein: x / Nitrite: x   Leuk Esterase: x / RBC: x / WBC x   Sq Epi: x / Non Sq Epi: x / Bacteria: x            Urinalysis with Rflx Culture (collected 31 Mar 2025 04:20)    Culture - Urine (collected 31 Mar 2025 04:20)  Source: Catheterized None  Final Report (01 Apr 2025 12:23):    >=3 organisms. Probable collection contamination.          
  ADA VILLAGOMEZ  84y  Male      Patient is a 84y old  Male who presents with a chief complaint of Blood in urine (2025 10:28)      INTERVAL HPI/OVERNIGHT EVENTS: no acute events overnight.       T(C): 36.9 (25 @ 11:59), Max: 37 (25 @ 16:32)  HR: 74 (25 @ 11:59) (72 - 89)  BP: 106/58 (25 @ 11:59) (106/58 - 182/62)  RR: 18 (25 @ 11:59) (18 - 20)  SpO2: 97% (25 @ 11:59) (97% - 99%)  Wt(kg): --Vital Signs Last 24 Hrs  T(C): 36.9 (2025 11:59), Max: 37 (31 Mar 2025 16:32)  T(F): 98.5 (2025 11:59), Max: 98.6 (31 Mar 2025 16:32)  HR: 74 (2025 11:59) (72 - 89)  BP: 106/58 (2025 11:59) (106/58 - 182/62)  BP(mean): 93 (2025 00:09) (93 - 93)  RR: 18 (2025 11:59) (18 - 20)  SpO2: 97% (2025 11:59) (97% - 99%)    Parameters below as of 2025 11:59  Patient On (Oxygen Delivery Method): room air          25 @ 07:01  -  25 @ 07:00  --------------------------------------------------------  IN: 0 mL / OUT: 800 mL / NET: -800 mL        PHYSICAL EXAM:  GENERAL: elderly M, NAD, non toxic appearing, frail appearing  PSYCH: no agitation, baseline mentation  NERVOUS SYSTEM:  Alert & Oriented X3  PULMONARY: symmetrical chest rise  CARDIOVASCULAR: Regular rate and rhythm; No murmurs, rubs, or gallops  GI: Soft, Nontender, Nondistended; Bowel sounds present  EXTREMITIES:   No clubbing, cyanosis, or edema  SKIN: No rashes or lesions    Consultant(s) Notes Reviewed:  [x ] YES  [ ] NO    Discussed with Consultants/Other Providers [ x] YES     LABS                          10.0   7.97  )-----------( 182      ( 31 Mar 2025 00:30 )             29.3         135  |  100  |  21[H]  ----------------------------<  101[H]  4.6   |  23  |  1.3    Ca    8.7      31 Mar 2025 00:30    TPro  7.2  /  Alb  3.8  /  TBili  <0.2  /  DBili  x   /  AST  12  /  ALT  5   /  AlkPhos  91        Urinalysis Basic - ( 31 Mar 2025 04:20 )    Color: Orange / Appearance: Cloudy / S.008 / pH: x  Gluc: x / Ketone: Negative mg/dL  / Bili: Negative / Urobili: 0.2 mg/dL   Blood: x / Protein: 30 mg/dL / Nitrite: Negative   Leuk Esterase: Large / RBC: 374 /HPF / WBC 56 /HPF   Sq Epi: x / Non Sq Epi: 4 /HPF / Bacteria: Many /HPF      POCT Blood Glucose.: 115 mg/dL (2025 11:12)    RADIOLOGY & ADDITIONAL TESTS:  - no images   Imaging Personally Reviewed:  [ ] YES  [ ] NO    HEALTH ISSUES - PROBLEM Dx:      MEDICATIONS  (STANDING):  chlorhexidine 2% Cloths 1 Application(s) Topical <User Schedule>  gabapentin 300 milliGRAM(s) Oral two times a day  lisinopril 20 milliGRAM(s) Oral daily  metoprolol tartrate 25 milliGRAM(s) Oral two times a day  NIFEdipine XL 90 milliGRAM(s) Oral daily  OLANZapine 10 milliGRAM(s) Oral daily  tamsulosin 0.4 milliGRAM(s) Oral at bedtime  traZODone 50 milliGRAM(s) Oral at bedtime    MEDICATIONS  (PRN):  acetaminophen     Tablet .. 650 milliGRAM(s) Oral every 6 hours PRN Temp greater or equal to 38C (100.4F), Mild Pain (1 - 3)  albuterol    90 MICROgram(s) HFA Inhaler 2 Puff(s) Inhalation every 6 hours PRN Shortness of Breath and/or Wheezing  aluminum hydroxide/magnesium hydroxide/simethicone Suspension 30 milliLiter(s) Oral every 4 hours PRN Dyspepsia  melatonin 3 milliGRAM(s) Oral at bedtime PRN Insomnia  ondansetron Injectable 4 milliGRAM(s) IV Push every 8 hours PRN Nausea and/or Vomiting  
ED Presentation    HPI:  84 yold male to Ed [facility]  Pmhx Copd, Htn, Dm, schizophrenia, Hld, non-hodgkins lymphoma sent to the ED from [facility] Ed for hematuria/blood in diaper. Per patient transfer information patient was found yesterday morning to have blood in the bains bag, with small clots noted around the tip of the penis. Unclear if there was any trauma, patient denying, but poor historian. Patient has had chronic bains in since admission in January where he was treated for MDRO in the urine, retention, and uretal dilation for bains catheter placement by urology, last exchanged aprox 1 month ago.     On presentation to the ED, /78, HR 89,  Afebrile, 98% on RA. Labs significant for hgb 10.0 (baseline), Cr 1.3 (baseline), UA + with many RBC.    (31 Mar 2025 08:52)       Today is hospital day 3d.     PAST MEDICAL & SURGICAL HISTORY  COPD (chronic obstructive pulmonary disease)    Paranoid schizophrenia    Schizophrenia    Diabetes type 2, controlled    COPD (chronic obstructive pulmonary disease)    Dyslipidemia    Chronic retention of urine    Dyslipidemia    Encounter for screening colonoscopy        SOCIAL HISTORY:  Social History:      Otherwise, as noted in HPI    ALLERGIES:  No Known Allergies      MEDICATIONS:  STANDING MEDICATIONS  chlorhexidine 2% Cloths 1 Application(s) Topical <User Schedule>  gabapentin 300 milliGRAM(s) Oral two times a day  heparin   Injectable 5000 Unit(s) SubCutaneous every 12 hours  lisinopril 20 milliGRAM(s) Oral daily  metoprolol tartrate 25 milliGRAM(s) Oral two times a day  NIFEdipine XL 90 milliGRAM(s) Oral daily  OLANZapine 10 milliGRAM(s) Oral daily  tamsulosin 0.4 milliGRAM(s) Oral at bedtime  traZODone 50 milliGRAM(s) Oral at bedtime    PRN MEDICATIONS  acetaminophen     Tablet .. 650 milliGRAM(s) Oral every 6 hours PRN  albuterol    90 MICROgram(s) HFA Inhaler 2 Puff(s) Inhalation every 6 hours PRN  aluminum hydroxide/magnesium hydroxide/simethicone Suspension 30 milliLiter(s) Oral every 4 hours PRN  melatonin 3 milliGRAM(s) Oral at bedtime PRN  ondansetron Injectable 4 milliGRAM(s) IV Push every 8 hours PRN      VITALS:   T(C): 36.7 (04-03-25 @ 04:40), Max: 36.7 (04-03-25 @ 04:40)  HR: 80 (04-03-25 @ 04:40) (59 - 80)  BP: 152/67 (04-03-25 @ 04:40) (113/55 - 180/66)  RR: 18 (04-03-25 @ 04:40) (17 - 18)  SpO2: 97% (04-03-25 @ 04:40) (96% - 97%)  I&O's Summary    02 Apr 2025 07:01  -  03 Apr 2025 07:00  --------------------------------------------------------  IN: 210 mL / OUT: 900 mL / NET: -690 mL          PHYSICAL EXAM:  GENERAL: Nondistressed  HEAD: Atraumatic  EYES: Conjunctiva and sclera clear  LUNG: Decreased BS  HEART: S1 S2 heard  ABDOMEN: Soft, Nontender  PELVIS: Bains in place  EXTREMITIES: No edema      LABS:                        10.3   6.48  )-----------( 163      ( 02 Apr 2025 06:48 )             30.6     04-02    133[L]  |  97[L]  |  38[H]  ----------------------------<  97  4.6   |  23  |  1.7[H]    Ca    8.4      02 Apr 2025 06:48    TPro  6.7  /  Alb  3.7  /  TBili  <0.2  /  DBili  x   /  AST  11  /  ALT  <5  /  AlkPhos  86  04-02      Urinalysis Basic - ( 02 Apr 2025 06:48 )    Color: x / Appearance: x / SG: x / pH: x  Gluc: 97 mg/dL / Ketone: x  / Bili: x / Urobili: x   Blood: x / Protein: x / Nitrite: x   Leuk Esterase: x / RBC: x / WBC x   Sq Epi: x / Non Sq Epi: x / Bacteria: x

## 2025-04-03 NOTE — PROGRESS NOTE ADULT - ASSESSMENT
84 yold male to Ed [facility]  Pmhx Copd, Htn, Dm, schizophrenia, Hld, non-hodgkins lymphoma sent to the ED from [facility] Ed for hematuria/blood in diaper.     #Hematuria- likely 2/2 trauma   #Doubt UTI  - Patient with clots at tip of the penis, and blood in urine bag  - No obvious trauma, but patient poor historian  - UA postive, but always positive, given history of MDRO and normal vital signs, likely colonized     Plan:   - Monitor bains bag for clots, if present uro cs for possible bladder irrigation  - Encourage PO hydration  - Monitor fever curve, hold off on abx for now     #BPH  - cw tamsulosin     #h/o COPD   -stable, c/w inhalers, nebs tx.     #DM 2  - hold metformin. SSI here     #HTN/ HLD  - c/w home medications    #schizophrenia  - c/w olanzapine and Trazodone      #Misc  - DVT Prophylaxis: Heparin  - GI Prophylaxis: None  - Diet: DASH/ CCS  - Activity: As tolerated  - IV Fluids: none  - Code Status: Full Code    Dispo: d/c planning

## 2025-04-08 DIAGNOSIS — R33.9 RETENTION OF URINE, UNSPECIFIED: ICD-10-CM

## 2025-04-08 DIAGNOSIS — Z85.72 PERSONAL HISTORY OF NON-HODGKIN LYMPHOMAS: ICD-10-CM

## 2025-04-08 DIAGNOSIS — J44.9 CHRONIC OBSTRUCTIVE PULMONARY DISEASE, UNSPECIFIED: ICD-10-CM

## 2025-04-08 DIAGNOSIS — R31.9 HEMATURIA, UNSPECIFIED: ICD-10-CM

## 2025-04-08 DIAGNOSIS — Z79.84 LONG TERM (CURRENT) USE OF ORAL HYPOGLYCEMIC DRUGS: ICD-10-CM
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
## 2025-04-08 DIAGNOSIS — E11.9 TYPE 2 DIABETES MELLITUS WITHOUT COMPLICATIONS: ICD-10-CM

## 2025-04-08 DIAGNOSIS — I10 ESSENTIAL (PRIMARY) HYPERTENSION: ICD-10-CM
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
## 2025-04-08 DIAGNOSIS — E78.5 HYPERLIPIDEMIA, UNSPECIFIED: ICD-10-CM

## 2025-04-08 DIAGNOSIS — F20.0 PARANOID SCHIZOPHRENIA: ICD-10-CM

## 2025-04-08 DIAGNOSIS — Z16.24 RESISTANCE TO MULTIPLE ANTIBIOTICS: ICD-10-CM

## 2025-04-08 DIAGNOSIS — S37.30XA UNSPECIFIED INJURY OF URETHRA, INITIAL ENCOUNTER: ICD-10-CM

## 2025-04-13 ENCOUNTER — EMERGENCY (EMERGENCY)
Facility: HOSPITAL | Age: 85
LOS: 0 days | Discharge: HOME | End: 2025-04-13
Attending: EMERGENCY MEDICINE
Payer: MEDICARE

## 2025-04-13 VITALS
SYSTOLIC BLOOD PRESSURE: 153 MMHG | DIASTOLIC BLOOD PRESSURE: 75 MMHG | OXYGEN SATURATION: 98 % | TEMPERATURE: 99 F | RESPIRATION RATE: 18 BRPM | HEART RATE: 88 BPM

## 2025-04-13 VITALS
RESPIRATION RATE: 18 BRPM | OXYGEN SATURATION: 95 % | DIASTOLIC BLOOD PRESSURE: 64 MMHG | WEIGHT: 130.07 LBS | HEART RATE: 71 BPM | TEMPERATURE: 98 F | SYSTOLIC BLOOD PRESSURE: 128 MMHG | HEIGHT: 64 IN

## 2025-04-13 DIAGNOSIS — E78.5 HYPERLIPIDEMIA, UNSPECIFIED: ICD-10-CM

## 2025-04-13 DIAGNOSIS — Z12.11 ENCOUNTER FOR SCREENING FOR MALIGNANT NEOPLASM OF COLON: Chronic | ICD-10-CM

## 2025-04-13 DIAGNOSIS — J44.9 CHRONIC OBSTRUCTIVE PULMONARY DISEASE, UNSPECIFIED: ICD-10-CM

## 2025-04-13 DIAGNOSIS — N39.0 URINARY TRACT INFECTION, SITE NOT SPECIFIED: ICD-10-CM

## 2025-04-13 DIAGNOSIS — C85.90 NON-HODGKIN LYMPHOMA, UNSPECIFIED, UNSPECIFIED SITE: ICD-10-CM

## 2025-04-13 DIAGNOSIS — T83.091A OTHER MECHANICAL COMPLICATION OF INDWELLING URETHRAL CATHETER, INITIAL ENCOUNTER: ICD-10-CM

## 2025-04-13 DIAGNOSIS — F20.9 SCHIZOPHRENIA, UNSPECIFIED: ICD-10-CM

## 2025-04-13 DIAGNOSIS — I10 ESSENTIAL (PRIMARY) HYPERTENSION: ICD-10-CM

## 2025-04-13 LAB
AMORPH PHOS CRY # URNS: PRESENT
APPEARANCE UR: ABNORMAL
BACTERIA # UR AUTO: ABNORMAL /HPF
BILIRUB UR-MCNC: NEGATIVE — SIGNIFICANT CHANGE UP
CAST: 3 /LPF — SIGNIFICANT CHANGE UP (ref 0–4)
COLOR SPEC: YELLOW — SIGNIFICANT CHANGE UP
DIFF PNL FLD: NEGATIVE — SIGNIFICANT CHANGE UP
GLUCOSE UR QL: NEGATIVE MG/DL — SIGNIFICANT CHANGE UP
KETONES UR-MCNC: NEGATIVE MG/DL — SIGNIFICANT CHANGE UP
LEUKOCYTE ESTERASE UR-ACNC: ABNORMAL
NITRITE UR-MCNC: NEGATIVE — SIGNIFICANT CHANGE UP
PH UR: 7.5 — SIGNIFICANT CHANGE UP (ref 5–8)
PROT UR-MCNC: 100 MG/DL
RBC CASTS # UR COMP ASSIST: 2 /HPF — SIGNIFICANT CHANGE UP (ref 0–4)
SP GR SPEC: 1.02 — SIGNIFICANT CHANGE UP (ref 1–1.03)
SQUAMOUS # UR AUTO: 2 /HPF — SIGNIFICANT CHANGE UP (ref 0–5)
TRI-PHOS CRY UR QL COMP ASSIST: PRESENT
UROBILINOGEN FLD QL: 1 MG/DL — SIGNIFICANT CHANGE UP (ref 0.2–1)
WBC UR QL: 58 /HPF — HIGH (ref 0–5)

## 2025-04-13 PROCEDURE — 87186 SC STD MICRODIL/AGAR DIL: CPT

## 2025-04-13 PROCEDURE — 99284 EMERGENCY DEPT VISIT MOD MDM: CPT | Mod: FS

## 2025-04-13 PROCEDURE — 87086 URINE CULTURE/COLONY COUNT: CPT

## 2025-04-13 PROCEDURE — 87077 CULTURE AEROBIC IDENTIFY: CPT

## 2025-04-13 PROCEDURE — 81001 URINALYSIS AUTO W/SCOPE: CPT

## 2025-04-13 PROCEDURE — 99283 EMERGENCY DEPT VISIT LOW MDM: CPT

## 2025-04-13 RX ORDER — CEFPODOXIME PROXETIL 200 MG/1
200 TABLET, FILM COATED ORAL ONCE
Refills: 0 | Status: DISCONTINUED | OUTPATIENT
Start: 2025-04-13 | End: 2025-04-13

## 2025-04-13 RX ORDER — CEFPODOXIME PROXETIL 200 MG/1
1 TABLET, FILM COATED ORAL
Qty: 20 | Refills: 0
Start: 2025-04-13 | End: 2025-04-22

## 2025-04-13 NOTE — ED ADULT TRIAGE NOTE - CHIEF COMPLAINT QUOTE
Pt. BIBA c/o urinary catheter not properly working since yesterday. As per EMS, pt. stated when he urinates, he urinates on himself and that is how they noticed a malfunction.

## 2025-04-13 NOTE — ED PROVIDER NOTE - OBJECTIVE STATEMENT
84 year old male, past medical history copd, htn, hdl, nhl, schizophrenia, chronic bains, bib ems from [facility] with catheter problem. as per aide at nursing home, patient noted to have urine around the bains catheter x3 days. patient without complaints. no fever, abd pain, vomiting, hematuria.

## 2025-04-13 NOTE — ED PROVIDER NOTE - PATIENT PORTAL LINK FT
You can access the FollowMyHealth Patient Portal offered by [facility] by registering at the following website: [email or web address]. By joining [employer]’s FollowMyHealth portal, you will also be able to view your health information using other applications (apps) compatible with our system.

## 2025-04-13 NOTE — ED PROVIDER NOTE - CLINICAL SUMMARY MEDICAL DECISION MAKING FREE TEXT BOX
84-year-old male from [facility] with past medical history of COPD, HTN, HLD, schizophrenia, chronic Cobos placement presents to the ED for urine catheter dysfunction.  Patient states that he is noting that urine is coming around the Cobos catheter for the past 3 days, and less drainage into the bag, but was coming out was bloody.  Catheter was changed in the ED today with, with clear urine coming out with no leakage around the catheter.  UA/urine culture sent.  UA is noted to have positive bacteria therefore will place on antibiotics.  Recommend outpatient follow-up with his primary care physician, return to the ED for any worsening symptoms.  Recommend take abx as directed.

## 2025-04-13 NOTE — ED ADULT NURSE NOTE - NSFALLRISKINTERV_ED_ALL_ED

## 2025-04-13 NOTE — ED PROVIDER NOTE - PHYSICAL EXAMINATION
CONSTITUTIONAL: elderly male  SKIN: skin exam is warm and dry  HEAD: Normocephalic; atraumatic  ENT: MMM  ABD: soft; non-distended; non-tender.   EXT: Normal ROM.   NEURO: awake, alert, following commands, oriented, grossly unremarkable. No Focal deficits. GCS 15.   PSYCH: Cooperative

## 2025-04-13 NOTE — ED PROVIDER NOTE - NSFOLLOWUPINSTRUCTIONS_ED_ALL_ED_FT
Please follow up with your primary care doctor in 1-3 days  Please be aware of any new or worsening signs or symptoms that should prompt your return to the ER.    Cobos Catheter Care, Adult    A Cobos catheter is a soft, flexible tube that is placed into the bladder to drain urine. A Cobos catheter may be inserted if:    You leak urine or are not able to control when you urinate (urinary incontinence).  You are not able to urinate when you need to (urinary retention).   You had prostate surgery or surgery on the genitals.  You have certain medical conditions, such as multiple sclerosis, dementia, or a spinal cord injury.    If you are going home with a Cobos catheter in place, follow the instructions below.    TAKING CARE OF THE CATHETER   Wash your hands with soap and water.   Using mild soap and warm water on a clean washcloth:    Clean the area on your body closest to the catheter insertion site using a circular motion, moving away from the catheter. Never wipe toward the catheter because this could sweep bacteria up into the urethra and cause infection.   Remove all traces of soap. Pat the area dry with a clean towel. For males, reposition the foreskin.    Attach the catheter to your leg so there is no tension on the catheter. Use adhesive tape or a leg strap. If you are using adhesive tape, remove any sticky residue left behind by the previous tape you used.   Keep the drainage bag below the level of the bladder, but keep it off the floor.   Check throughout the day to be sure the catheter is working and urine is draining freely. Make sure the tubing does not become kinked.  Do not pull on the catheter or try to remove it. Pulling could damage internal tissues.    TAKING CARE OF THE DRAINAGE BAGS  You will be given two drainage bags to take home. One is a large overnight drainage bag, and the other is a smaller leg bag that fits underneath clothing. You may wear the overnight bag at any time, but you should never wear the smaller leg bag at night. Follow the instructions below for how to empty, change, and clean your drainage bags.    Emptying the Drainage Bag    You must empty your drainage bag when it is ?–½ full or at least 2–3 times a day.     Wash your hands with soap and water.   Keep the drainage bag below your hips, below the level of your bladder. This stops urine from going back into the tubing and into your bladder.    Hold the dirty bag over the toilet or a clean container.   Open the pour spout at the bottom of the bag and empty the urine into the toilet or container. Do not let the pour spout touch the toilet, container, or any other surface. Doing so can place bacteria on the bag, which can cause an infection.   Clean the pour spout with a gauze pad or cotton ball that has rubbing alcohol on it.   Close the pour spout.   Attach the bag to your leg with adhesive tape or a leg strap.   Wash your hands well.    Changing the Drainage Bag    Change your drainage bag once a month or sooner if it starts to smell bad or look dirty. Below are steps to follow when changing the drainage bag.     Wash your hands with soap and water.   Pinch off the rubber catheter so that urine does not spill out.   Disconnect the catheter tube from the drainage tube at the connection valve. Do not let the tubes touch any surface.    Clean the end of the catheter tube with an alcohol wipe. Use a different alcohol wipe to clean the end of the drainage tube.   Connect the catheter tube to the drainage tube of the clean drainage bag.   Attach the new bag to the leg with adhesive tape or a leg strap. Avoid attaching the new bag too tightly.    Wash your hands well.    Cleaning the Drainage Bag     Wash your hands with soap and water.   Wash the bag in warm, soapy water.   Rinse the bag thoroughly with warm water.   Fill the bag with a solution of white vinegar and water (1 cup vinegar to 1 qt warm water [.2 L vinegar to 1 L warm water]). Close the bag and soak it for 30 minutes in the solution.    Rinse the bag with warm water.    Hang the bag to dry with the pour spout open and hanging downward.   Store the clean bag (once it is dry) in a clean plastic bag.   Wash your hands well.     PREVENTING INFECTION  Wash your hands before and after handling your catheter.  Take showers daily and wash the area where the catheter enters your body. Do not take baths. Replace wet leg straps with dry ones, if this applies.  Do not use powders, sprays, or lotions on the genital area. Only use creams, lotions, or ointments as directed by your caregiver.  For females, wipe from front to back after each bowel movement.   Drink enough fluids to keep your urine clear or pale yellow unless you have a fluid restriction.   Do not let the drainage bag or tubing touch or lie on the floor.   Wear cotton underwear to absorb moisture and to keep your .    SEEK MEDICAL CARE IF:  Your urine is cloudy or smells unusually bad.  Your catheter becomes clogged.  You are not draining urine into the bag or your bladder feels full.  Your catheter starts to leak.    SEEK IMMEDIATE MEDICAL CARE IF:  You have pain, swelling, redness, or pus where the catheter enters the body.  You have pain in the abdomen, legs, lower back, or bladder.  You have a fever.  You see blood fill the catheter, or your urine is pink or red.  You have nausea, vomiting, or chills.  Your catheter gets pulled out.    MAKE SURE YOU:  Understand these instructions.  Will watch your condition.  Will get help right away if you are not doing well or get worse.    ADDITIONAL NOTES AND INSTRUCTIONS    Please follow up with your Primary MD in 24-48 hr.  Seek immediate medical care for any new/worsening signs or symptoms.     Urinary Tract Infection    A urinary tract infection (UTI) is an infection of any part of the urinary tract, which includes the kidneys, ureters, bladder, and urethra. Risk factors include ignoring your need to urinate, wiping back to front if female, being an uncircumcised male, and having diabetes or a weak immune system. Symptoms include frequent urination, pain or burning with urination, foul smelling urine, cloudy urine, pain in the lower abdomen, blood in the urine, and fever. If you were prescribed an antibiotic medicine, take it as told by your health care provider. Do not stop taking the antibiotic even if you start to feel better.    SEEK IMMEDIATE MEDICAL CARE IF YOU HAVE THE FOLLOWING SYMPTOMS: severe back or abdominal pain, inability to keep fluids or medicine down, dizziness/lightheadedness, or a change in mental status.

## 2025-04-13 NOTE — ED PROVIDER NOTE - WET READ LAUNCH FT
Med was refilled on 3/31 and has refills x2. It was already filled and picked up by pt.
There are no Wet Read(s) to document.
No

## 2025-04-13 NOTE — ED PROVIDER NOTE - PROGRESS NOTE DETAILS
bedside us with retention and bains in place  bains changed by rn with +urine output. urine sent with +ua. will send abx to pharmacy.

## 2025-04-13 NOTE — ED PROVIDER NOTE - TOBACCO USE
Placed discharge outreach phone call to pt s/p ER discharge 10/13/18.  Received recording stating that pt's voice mailbox is full.  Discharge outreach letter mailed to pt.  
Never smoker

## 2025-04-14 ENCOUNTER — INPATIENT (INPATIENT)
Facility: HOSPITAL | Age: 85
LOS: 1 days | Discharge: HOME | DRG: 698 | End: 2025-04-16
Attending: INTERNAL MEDICINE | Admitting: STUDENT IN AN ORGANIZED HEALTH CARE EDUCATION/TRAINING PROGRAM
Payer: MEDICARE

## 2025-04-14 VITALS
DIASTOLIC BLOOD PRESSURE: 64 MMHG | HEIGHT: 64 IN | SYSTOLIC BLOOD PRESSURE: 130 MMHG | RESPIRATION RATE: 18 BRPM | OXYGEN SATURATION: 99 % | HEART RATE: 84 BPM | WEIGHT: 145.06 LBS | TEMPERATURE: 100 F

## 2025-04-14 DIAGNOSIS — I10 ESSENTIAL (PRIMARY) HYPERTENSION: ICD-10-CM

## 2025-04-14 DIAGNOSIS — T83.021A DISPLACEMENT OF INDWELLING URETHRAL CATHETER, INITIAL ENCOUNTER: ICD-10-CM

## 2025-04-14 DIAGNOSIS — N40.1 BENIGN PROSTATIC HYPERPLASIA WITH LOWER URINARY TRACT SYMPTOMS: ICD-10-CM

## 2025-04-14 DIAGNOSIS — E87.1 HYPO-OSMOLALITY AND HYPONATREMIA: ICD-10-CM

## 2025-04-14 DIAGNOSIS — E86.1 HYPOVOLEMIA: ICD-10-CM

## 2025-04-14 DIAGNOSIS — N30.00 ACUTE CYSTITIS WITHOUT HEMATURIA: ICD-10-CM

## 2025-04-14 DIAGNOSIS — R53.1 WEAKNESS: ICD-10-CM

## 2025-04-14 DIAGNOSIS — Z85.72 PERSONAL HISTORY OF NON-HODGKIN LYMPHOMAS: ICD-10-CM

## 2025-04-14 DIAGNOSIS — R33.8 OTHER RETENTION OF URINE: ICD-10-CM

## 2025-04-14 DIAGNOSIS — J44.9 CHRONIC OBSTRUCTIVE PULMONARY DISEASE, UNSPECIFIED: ICD-10-CM

## 2025-04-14 DIAGNOSIS — Z12.11 ENCOUNTER FOR SCREENING FOR MALIGNANT NEOPLASM OF COLON: Chronic | ICD-10-CM

## 2025-04-14 DIAGNOSIS — F20.9 SCHIZOPHRENIA, UNSPECIFIED: ICD-10-CM

## 2025-04-14 DIAGNOSIS — R29.6 REPEATED FALLS: ICD-10-CM

## 2025-04-14 DIAGNOSIS — G92.8 OTHER TOXIC ENCEPHALOPATHY: ICD-10-CM

## 2025-04-14 DIAGNOSIS — Z79.84 LONG TERM (CURRENT) USE OF ORAL HYPOGLYCEMIC DRUGS: ICD-10-CM
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
## 2025-04-14 DIAGNOSIS — E11.9 TYPE 2 DIABETES MELLITUS WITHOUT COMPLICATIONS: ICD-10-CM

## 2025-04-14 DIAGNOSIS — E78.5 HYPERLIPIDEMIA, UNSPECIFIED: ICD-10-CM

## 2025-04-14 LAB
ALBUMIN SERPL ELPH-MCNC: 3.9 G/DL — SIGNIFICANT CHANGE UP (ref 3.5–5.2)
ALP SERPL-CCNC: 87 U/L — SIGNIFICANT CHANGE UP (ref 30–115)
ALT FLD-CCNC: 6 U/L — SIGNIFICANT CHANGE UP (ref 0–41)
ANION GAP SERPL CALC-SCNC: 14 MMOL/L — SIGNIFICANT CHANGE UP (ref 7–14)
APPEARANCE UR: CLEAR — SIGNIFICANT CHANGE UP
APTT BLD: 32.5 SEC — SIGNIFICANT CHANGE UP (ref 27–39.2)
AST SERPL-CCNC: 21 U/L — SIGNIFICANT CHANGE UP (ref 0–41)
BACTERIA # UR AUTO: ABNORMAL /HPF
BASOPHILS # BLD AUTO: 0.02 K/UL — SIGNIFICANT CHANGE UP (ref 0–0.2)
BASOPHILS NFR BLD AUTO: 0.2 % — SIGNIFICANT CHANGE UP (ref 0–1)
BILIRUB SERPL-MCNC: 0.2 MG/DL — SIGNIFICANT CHANGE UP (ref 0.2–1.2)
BILIRUB UR-MCNC: NEGATIVE — SIGNIFICANT CHANGE UP
BUN SERPL-MCNC: 31 MG/DL — HIGH (ref 10–20)
CALCIUM SERPL-MCNC: 9.1 MG/DL — SIGNIFICANT CHANGE UP (ref 8.4–10.5)
CAST: 1 /LPF — SIGNIFICANT CHANGE UP (ref 0–4)
CHLORIDE SERPL-SCNC: 95 MMOL/L — LOW (ref 98–110)
CK SERPL-CCNC: 512 U/L — HIGH (ref 0–225)
CO2 SERPL-SCNC: 21 MMOL/L — SIGNIFICANT CHANGE UP (ref 17–32)
COLOR SPEC: YELLOW — SIGNIFICANT CHANGE UP
CREAT SERPL-MCNC: 1.5 MG/DL — SIGNIFICANT CHANGE UP (ref 0.7–1.5)
DIFF PNL FLD: ABNORMAL
EGFR: 46 ML/MIN/1.73M2 — LOW
EGFR: 46 ML/MIN/1.73M2 — LOW
EOSINOPHIL # BLD AUTO: 0.01 K/UL — SIGNIFICANT CHANGE UP (ref 0–0.7)
EOSINOPHIL NFR BLD AUTO: 0.1 % — SIGNIFICANT CHANGE UP (ref 0–8)
GAS PNL BLDV: SIGNIFICANT CHANGE UP
GLUCOSE SERPL-MCNC: 106 MG/DL — HIGH (ref 70–99)
GLUCOSE UR QL: NEGATIVE MG/DL — SIGNIFICANT CHANGE UP
HCT VFR BLD CALC: 30 % — LOW (ref 42–52)
HGB BLD-MCNC: 10.4 G/DL — LOW (ref 14–18)
IMM GRANULOCYTES NFR BLD AUTO: 0.4 % — HIGH (ref 0.1–0.3)
INR BLD: 1.07 RATIO — SIGNIFICANT CHANGE UP (ref 0.65–1.3)
KETONES UR-MCNC: NEGATIVE MG/DL — SIGNIFICANT CHANGE UP
LACTATE SERPL-SCNC: 1.1 MMOL/L — SIGNIFICANT CHANGE UP (ref 0.7–2)
LEUKOCYTE ESTERASE UR-ACNC: ABNORMAL
LYMPHOCYTES # BLD AUTO: 1.46 K/UL — SIGNIFICANT CHANGE UP (ref 1.2–3.4)
LYMPHOCYTES # BLD AUTO: 14.5 % — LOW (ref 20.5–51.1)
MCHC RBC-ENTMCNC: 31.3 PG — HIGH (ref 27–31)
MCHC RBC-ENTMCNC: 34.7 G/DL — SIGNIFICANT CHANGE UP (ref 32–37)
MCV RBC AUTO: 90.4 FL — SIGNIFICANT CHANGE UP (ref 80–94)
MONOCYTES # BLD AUTO: 1.06 K/UL — HIGH (ref 0.1–0.6)
MONOCYTES NFR BLD AUTO: 10.5 % — HIGH (ref 1.7–9.3)
NEUTROPHILS # BLD AUTO: 7.51 K/UL — HIGH (ref 1.4–6.5)
NEUTROPHILS NFR BLD AUTO: 74.3 % — SIGNIFICANT CHANGE UP (ref 42.2–75.2)
NITRITE UR-MCNC: NEGATIVE — SIGNIFICANT CHANGE UP
NRBC BLD AUTO-RTO: 0 /100 WBCS — SIGNIFICANT CHANGE UP (ref 0–0)
PH UR: 7 — SIGNIFICANT CHANGE UP (ref 5–8)
PLATELET # BLD AUTO: 192 K/UL — SIGNIFICANT CHANGE UP (ref 130–400)
PMV BLD: 10.2 FL — SIGNIFICANT CHANGE UP (ref 7.4–10.4)
POTASSIUM SERPL-MCNC: 4.4 MMOL/L — SIGNIFICANT CHANGE UP (ref 3.5–5)
POTASSIUM SERPL-SCNC: 4.4 MMOL/L — SIGNIFICANT CHANGE UP (ref 3.5–5)
PROT SERPL-MCNC: 7.1 G/DL — SIGNIFICANT CHANGE UP (ref 6–8)
PROT UR-MCNC: 30 MG/DL
PROTHROM AB SERPL-ACNC: 12.7 SEC — SIGNIFICANT CHANGE UP (ref 9.95–12.87)
RBC # BLD: 3.32 M/UL — LOW (ref 4.7–6.1)
RBC # FLD: 14.6 % — HIGH (ref 11.5–14.5)
RBC CASTS # UR COMP ASSIST: 5 /HPF — HIGH (ref 0–4)
SODIUM SERPL-SCNC: 130 MMOL/L — LOW (ref 135–146)
SP GR SPEC: 1.02 — SIGNIFICANT CHANGE UP (ref 1–1.03)
SQUAMOUS # UR AUTO: 0 /HPF — SIGNIFICANT CHANGE UP (ref 0–5)
UROBILINOGEN FLD QL: 0.2 MG/DL — SIGNIFICANT CHANGE UP (ref 0.2–1)
WBC # BLD: 10.1 K/UL — SIGNIFICANT CHANGE UP (ref 4.8–10.8)
WBC # FLD AUTO: 10.1 K/UL — SIGNIFICANT CHANGE UP (ref 4.8–10.8)
WBC UR QL: 21 /HPF — HIGH (ref 0–5)

## 2025-04-14 PROCEDURE — 74177 CT ABD & PELVIS W/CONTRAST: CPT | Mod: 26

## 2025-04-14 PROCEDURE — 83930 ASSAY OF BLOOD OSMOLALITY: CPT

## 2025-04-14 PROCEDURE — 99285 EMERGENCY DEPT VISIT HI MDM: CPT

## 2025-04-14 PROCEDURE — 83036 HEMOGLOBIN GLYCOSYLATED A1C: CPT

## 2025-04-14 PROCEDURE — 71045 X-RAY EXAM CHEST 1 VIEW: CPT | Mod: 26

## 2025-04-14 PROCEDURE — 82962 GLUCOSE BLOOD TEST: CPT

## 2025-04-14 PROCEDURE — 84133 ASSAY OF URINE POTASSIUM: CPT

## 2025-04-14 PROCEDURE — 36415 COLL VENOUS BLD VENIPUNCTURE: CPT

## 2025-04-14 PROCEDURE — 82550 ASSAY OF CK (CPK): CPT

## 2025-04-14 PROCEDURE — 80048 BASIC METABOLIC PNL TOTAL CA: CPT

## 2025-04-14 PROCEDURE — 93005 ELECTROCARDIOGRAM TRACING: CPT

## 2025-04-14 PROCEDURE — 84540 ASSAY OF URINE/UREA-N: CPT

## 2025-04-14 PROCEDURE — 72125 CT NECK SPINE W/O DYE: CPT | Mod: 26

## 2025-04-14 PROCEDURE — 83735 ASSAY OF MAGNESIUM: CPT

## 2025-04-14 PROCEDURE — 83935 ASSAY OF URINE OSMOLALITY: CPT

## 2025-04-14 PROCEDURE — 85025 COMPLETE CBC W/AUTO DIFF WBC: CPT

## 2025-04-14 PROCEDURE — 70450 CT HEAD/BRAIN W/O DYE: CPT | Mod: 26

## 2025-04-14 PROCEDURE — 97162 PT EVAL MOD COMPLEX 30 MIN: CPT | Mod: GP

## 2025-04-14 PROCEDURE — 80053 COMPREHEN METABOLIC PANEL: CPT

## 2025-04-14 PROCEDURE — 84156 ASSAY OF PROTEIN URINE: CPT

## 2025-04-14 PROCEDURE — 82570 ASSAY OF URINE CREATININE: CPT

## 2025-04-14 PROCEDURE — 72170 X-RAY EXAM OF PELVIS: CPT | Mod: 26

## 2025-04-14 PROCEDURE — 71260 CT THORAX DX C+: CPT | Mod: 26

## 2025-04-14 PROCEDURE — 84300 ASSAY OF URINE SODIUM: CPT

## 2025-04-14 PROCEDURE — 99223 1ST HOSP IP/OBS HIGH 75: CPT

## 2025-04-14 RX ORDER — TAMSULOSIN HYDROCHLORIDE 0.4 MG/1
0.4 CAPSULE ORAL AT BEDTIME
Refills: 0 | Status: DISCONTINUED | OUTPATIENT
Start: 2025-04-14 | End: 2025-04-16

## 2025-04-14 RX ORDER — GLUCAGON 3 MG/1
1 POWDER NASAL ONCE
Refills: 0 | Status: DISCONTINUED | OUTPATIENT
Start: 2025-04-14 | End: 2025-04-16

## 2025-04-14 RX ORDER — ALBUTEROL SULFATE 2.5 MG/3ML
1 VIAL, NEBULIZER (ML) INHALATION
Refills: 0 | Status: DISCONTINUED | OUTPATIENT
Start: 2025-04-14 | End: 2025-04-16

## 2025-04-14 RX ORDER — SODIUM CHLORIDE 9 G/1000ML
1000 INJECTION, SOLUTION INTRAVENOUS
Refills: 0 | Status: DISCONTINUED | OUTPATIENT
Start: 2025-04-14 | End: 2025-04-16

## 2025-04-14 RX ORDER — DEXTROSE 50 % IN WATER 50 %
25 SYRINGE (ML) INTRAVENOUS ONCE
Refills: 0 | Status: DISCONTINUED | OUTPATIENT
Start: 2025-04-14 | End: 2025-04-16

## 2025-04-14 RX ORDER — SODIUM CHLORIDE 9 G/1000ML
1000 INJECTION, SOLUTION INTRAVENOUS
Refills: 0 | Status: DISCONTINUED | OUTPATIENT
Start: 2025-04-14 | End: 2025-04-15

## 2025-04-14 RX ORDER — FINASTERIDE 1 MG/1
5 TABLET, FILM COATED ORAL DAILY
Refills: 0 | Status: DISCONTINUED | OUTPATIENT
Start: 2025-04-14 | End: 2025-04-16

## 2025-04-14 RX ORDER — HEPARIN SODIUM 1000 [USP'U]/ML
5000 INJECTION INTRAVENOUS; SUBCUTANEOUS EVERY 12 HOURS
Refills: 0 | Status: DISCONTINUED | OUTPATIENT
Start: 2025-04-14 | End: 2025-04-16

## 2025-04-14 RX ORDER — INSULIN LISPRO 100 U/ML
INJECTION, SOLUTION INTRAVENOUS; SUBCUTANEOUS
Refills: 0 | Status: DISCONTINUED | OUTPATIENT
Start: 2025-04-14 | End: 2025-04-16

## 2025-04-14 RX ORDER — NIFEDIPINE 30 MG
90 TABLET, EXTENDED RELEASE 24 HR ORAL DAILY
Refills: 0 | Status: DISCONTINUED | OUTPATIENT
Start: 2025-04-14 | End: 2025-04-16

## 2025-04-14 RX ORDER — DEXTROSE 50 % IN WATER 50 %
12.5 SYRINGE (ML) INTRAVENOUS ONCE
Refills: 0 | Status: DISCONTINUED | OUTPATIENT
Start: 2025-04-14 | End: 2025-04-16

## 2025-04-14 RX ORDER — METFORMIN HYDROCHLORIDE 850 MG/1
500 TABLET ORAL
Refills: 0 | Status: DISCONTINUED | OUTPATIENT
Start: 2025-04-14 | End: 2025-04-14

## 2025-04-14 RX ORDER — CYST/ALA/Q10/PHOS.SER/DHA/BROC 100-20-50
1 POWDER (GRAM) ORAL DAILY
Refills: 0 | Status: DISCONTINUED | OUTPATIENT
Start: 2025-04-14 | End: 2025-04-16

## 2025-04-14 RX ORDER — TRAZODONE HCL 100 MG
50 TABLET ORAL AT BEDTIME
Refills: 0 | Status: DISCONTINUED | OUTPATIENT
Start: 2025-04-14 | End: 2025-04-16

## 2025-04-14 RX ORDER — OLANZAPINE 10 MG/1
10 TABLET ORAL DAILY
Refills: 0 | Status: DISCONTINUED | OUTPATIENT
Start: 2025-04-14 | End: 2025-04-16

## 2025-04-14 RX ORDER — DEXTROSE 50 % IN WATER 50 %
15 SYRINGE (ML) INTRAVENOUS ONCE
Refills: 0 | Status: DISCONTINUED | OUTPATIENT
Start: 2025-04-14 | End: 2025-04-16

## 2025-04-14 RX ORDER — ACETAMINOPHEN 500 MG/5ML
650 LIQUID (ML) ORAL EVERY 6 HOURS
Refills: 0 | Status: DISCONTINUED | OUTPATIENT
Start: 2025-04-14 | End: 2025-04-16

## 2025-04-14 RX ORDER — CEFTRIAXONE 500 MG/1
2000 INJECTION, POWDER, FOR SOLUTION INTRAMUSCULAR; INTRAVENOUS ONCE
Refills: 0 | Status: COMPLETED | OUTPATIENT
Start: 2025-04-14 | End: 2025-04-14

## 2025-04-14 RX ORDER — METOPROLOL SUCCINATE 50 MG/1
25 TABLET, EXTENDED RELEASE ORAL
Refills: 0 | Status: DISCONTINUED | OUTPATIENT
Start: 2025-04-14 | End: 2025-04-16

## 2025-04-14 RX ORDER — CARVEDILOL 3.12 MG/1
12.5 TABLET, FILM COATED ORAL EVERY 12 HOURS
Refills: 0 | Status: DISCONTINUED | OUTPATIENT
Start: 2025-04-14 | End: 2025-04-14

## 2025-04-14 RX ORDER — MELATONIN 5 MG
3 TABLET ORAL AT BEDTIME
Refills: 0 | Status: DISCONTINUED | OUTPATIENT
Start: 2025-04-14 | End: 2025-04-16

## 2025-04-14 RX ORDER — CEFTRIAXONE 500 MG/1
1000 INJECTION, POWDER, FOR SOLUTION INTRAMUSCULAR; INTRAVENOUS EVERY 24 HOURS
Refills: 0 | Status: DISCONTINUED | OUTPATIENT
Start: 2025-04-15 | End: 2025-04-15

## 2025-04-14 RX ORDER — ALBUTEROL SULFATE 2.5 MG/3ML
1 VIAL, NEBULIZER (ML) INHALATION
Refills: 0 | DISCHARGE

## 2025-04-14 RX ORDER — GABAPENTIN 400 MG/1
300 CAPSULE ORAL
Refills: 0 | Status: DISCONTINUED | OUTPATIENT
Start: 2025-04-14 | End: 2025-04-16

## 2025-04-14 RX ORDER — MAGNESIUM, ALUMINUM HYDROXIDE 200-200 MG
30 TABLET,CHEWABLE ORAL EVERY 4 HOURS
Refills: 0 | Status: DISCONTINUED | OUTPATIENT
Start: 2025-04-14 | End: 2025-04-16

## 2025-04-14 RX ORDER — LISINOPRIL 5 MG/1
20 TABLET ORAL DAILY
Refills: 0 | Status: DISCONTINUED | OUTPATIENT
Start: 2025-04-14 | End: 2025-04-16

## 2025-04-14 RX ADMIN — SODIUM CHLORIDE 75 MILLILITER(S): 9 INJECTION, SOLUTION INTRAVENOUS at 23:15

## 2025-04-14 RX ADMIN — Medication 1000 MILLILITER(S): at 18:08

## 2025-04-14 RX ADMIN — Medication 1000 MILLILITER(S): at 16:18

## 2025-04-14 RX ADMIN — CEFTRIAXONE 100 MILLIGRAM(S): 500 INJECTION, POWDER, FOR SOLUTION INTRAMUSCULAR; INTRAVENOUS at 20:58

## 2025-04-14 NOTE — ED PROVIDER NOTE - CLINICAL SUMMARY MEDICAL DECISION MAKING FREE TEXT BOX
84M PMH COPD DM HTN HL schizophrenia lymphoma from [facility] p/w fall. pt unable to provide hx, aaox2, but doesn't know why he is in ED or what happened. has no complaints. pt treated for uti 4/13 given cefpodoxime, has bains in place. denies fever, abd pain nvdc flank pain. denies any injuries from fall. no ha, neck pain, bp, cp, sob. no numbness, weakness, blurry vision slurred speech.     on exam, AFVSS, well andrzej nad, ncat, eomi, perrla, mmm, lctab, rrr nl s1s2 no mrg, abd soft ntnd, aaox2, no focal deficits, no le edema or calf ttp,     a/p; fall, ams, no acute traumatic injuries, iv abx given, admit for freq falls, delirium, needs iv abx, pt/rehab eval , bains adjusted in ED    Labs  were ordered and reviewed.  Imaging was ordered and reviewed by me.  Appropriate medications for patient's presenting complaints were ordered and effects were reassessed.  Patient's records (prior hospital, ED visit, and/or nursing home notes if available) were reviewed.  Additional history was obtained from EMS, family, and/or PCP (where available).  Escalation to admission/observation was considered.  Patient requires inpatient hospitalization - monitored setting.

## 2025-04-14 NOTE — ED ADULT TRIAGE NOTE - HEIGHT IN INCHES
Left message to call back for: Dilia  Information to relay to patient:  ROLDANTCB. Pt has appointment at 8:45 today. Spirometry computer is not working. If pt has other concerns we can see her for that today, otherwise I can call her when the computer is fixed to reschedule.     
Telephone Encounter by Kaleigh Coello LPN at 2/12/2020  8:31 AM     Author: Kaleigh Coello LPN Service: -- Author Type: Licensed Nurse    Filed: 2/12/2020  8:33 AM Encounter Date: 2/12/2020 Status: Signed    : Kaleigh Coello LPN (Licensed Nurse)       Patient Returning Call  Reason for call: Patient returning call   Information relayed to patient:  Marbella Thomas CMA           2/12/20 8:21 AM   Note      Left message to call back for: Dilia  Information to relay to patient:  ROLDANTCJELLY. Pt has appointment at 8:45 today. Spirometry computer is not working. If pt has other concerns we can see her for that today, otherwise I can call her when the computer is fixed to reschedule.          Patient has additional questions:  No  If YES, what are your questions/concerns:  Patient states she has no other concerns to come other than Spirometry . Please call patient with reschedule information .  Okay to leave a detailed message?: No           
4

## 2025-04-14 NOTE — ED PROVIDER NOTE - ATTENDING CONTRIBUTION TO CARE
84M PMH COPD DM HTN HL schizophrenia lymphoma from [facility] p/w fall. pt unable to provide hx, aaox2, but doesn't know why he is in ED or what happened. has no complaints. pt treated for uti 4/13 given cefpodoxime, has bains in place. denies fever, abd pain nvdc flank pain. denies any injuries from fall. no ha, neck pain, bp, cp, sob. no numbness, weakness, blurry vision slurred speech.     on exam, AFVSS, well andrzej nad, ncat, eomi, perrla, mmm, lctab, rrr nl s1s2 no mrg, abd soft ntnd, aaox2, no focal deficits, no le edema or calf ttp,     a/p; fall, ams, no acute traumatic injuries, iv abx given, admit for freq falls, delirium, needs iv abx, pt/rehab eval , bains adjusted in ED

## 2025-04-14 NOTE — ED ADULT TRIAGE NOTE - CHIEF COMPLAINT QUOTE
[facility] and [facility] that's been going on for a bit. Per EMS fall today. (-) HT (-) LOC (-)A/C. PT with no noted injuries or complaints

## 2025-04-14 NOTE — ED ADULT NURSE NOTE - CHIEF COMPLAINT QUOTE
[facility] and [facility] that's been going on for a bit. Per EMS fall today. (-) HT (-) LOC (-)A/C. PT with no noted injuries or complaints

## 2025-04-14 NOTE — H&P ADULT - HISTORY OF PRESENT ILLNESS
84 yold male to with Pmhx of Copd, Htn, Dm, schizophrenia, Hld, non-hodgkins lymphoma, urinary retention with chronic Bains (changed yesterday), presents to the ED from [facility] for evaluation of  multiple falls. The patient is not sure of head trauma or LOC. Not on any anticoagulation. Patient denied any complaints. The patient was in ED 0n 4/13/2024 for urinary retention, Bains was changed, UA was positive for Cystitis and therefore the was discharged on PO Vantin. At presentation to ED today, the bains's was not draining well and therefore was deflated and advanced. Limited history as the patient is Aox1. Denied chest pain, SOB, aura, post ictal symptoms, seizure like activity, palpitations, N/V/D, abd pain or recent travel.     #ED Vitals:  T(C): 37 (04-14-25 @ 15:46), Max: 37.8 (04-14-25 @ 14:50)  HR: 86 (04-14-25 @ 15:46) (84 - 86)  BP: 130/63 (04-14-25 @ 15:46) (130/63 - 130/64)  RR: 18 (04-14-25 @ 15:46) (18 - 18)  SpO2: 97% (04-14-25 @ 15:46) (97% - 99%)    # Labs significant for: Hb 10.4(at baseline), Na 130, BUN 31, Cr 1.5(at baseline), eGFR 46, , UA: WBC 21, Bacteria few, LE moderate    # Imaging:  CT Abdomen and Pelvis w/ IV Cont (04.14.25 @ 17:06): Cystitis with mild urinary bladder wall thickening. Bains catheter balloon inflated within the prostate gland. Recommend removal/repositioning    CT HEAD: No acute intracranial pathology or hemorrhage. No acute calvarial fracture.    CT CERVICAL SPINE: No acute fracture or subluxation. Chronic mild compression deformities of C7 on T1.    Xray Pelvis AP only (04.14.25 @ 16:13): The left femoral neck is shortened compared to prior, suspicious for underlying fracture. Correlate with CT abdomen and pelvis scheduled to be performed same day for further evaluation. Redemonstration of marked atherosclerotic plaques. Generalized degenerative changes.    # S/P: NS bolus 2L, Ceftriaxone 2gm IV x 1 in the ED.     The patient is being admitted to medicine for the further management.

## 2025-04-14 NOTE — ED PROVIDER NOTE - OBJECTIVE STATEMENT
84-year-old male, with past medical history of HLD, DM, COPD not on home O2, schizophrenia, urinary retention with chronic Cobos (changed yesterday), presents to the ED from [facility] for multiple falls.  Unknown head trauma, LOC, no anticoagulation.  Patient has no complaints.  Patient unsure why he is in the ED.  Limited history secondary to a/ox1.

## 2025-04-14 NOTE — ED PROVIDER NOTE - CARE PLAN
Principal Discharge DX:	Frequent falls  Secondary Diagnosis:	Cobos catheter present  Secondary Diagnosis:	Cystitis   1

## 2025-04-14 NOTE — H&P ADULT - NSHPPHYSICALEXAM_GEN_ALL_CORE
CONSTITUTIONAL: Well groomed, no apparent distress  EYES: PERRLA and symmetric, EOMI, No conjunctival or scleral injection, non-icteric  ENMT: Oral mucosa with moist membranes.   RESP: No respiratory distress, no use of accessory muscles  CV: RRR, +S1S2, no MRG  GI: Soft, NT, ND, no rebound  LYMPH: No cervical LAD or tenderness  MSK: No digital clubbing or cyanosis  SKIN: No rashes or ulcers noted; no subcutaneous nodules or induration palpable  NEURO: sensation intact in upper and lower extremities b/l to light touch   PSYCH: Ao x 1

## 2025-04-14 NOTE — H&P ADULT - ASSESSMENT
84 yold male to with Pmhx of Copd, Htn, Dm, schizophrenia, Hld, non-hodgkins lymphoma, urinary retention with chronic Bains (changed yesterday), presents to the ED from [facility] for evaluation of  multiple falls. The patient is not sure of head trauma or LOC. Not on any anticoagulation. Patient denied any complaints. The patient was in ED 0n 4/13/2024 for urinary retention, Bains was changed, UA was positive for Cystitis and therefore the was discharged on PO Vantin. At presentation to ED today, the bains's was not draining well and therefore was deflated and advanced. Limited history as the patient is Aox1.     # Multiple fall 2/2 TME likely 2/2 UTI  - UA(4/14): WBC 21, Bacteria few, LE moderate. 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 Bains catheter balloon inflated within the prostate gland. Recommend removal/repositioning  - CT HEAD: No acute intracranial pathology or hemorrhage. No acute calvarial fracture.  - Xray Pelvis AP only (04.14.25 @ 16:13): The left femoral neck is shortened compared to prior, suspicious for underlying fracture. Correlate with CT abdomen and pelvis scheduled to be performed same day for further evaluation. Redemonstration of marked atherosclerotic plaques. Generalized degenerative changes.  - Vitals stable    Plan:  -     # Hyponatremia  # Elevated CK, ?Rhabdomyolysis   - Na 130,       # Imaging:    CT CERVICAL SPINE: No acute fracture or subluxation. Chronic mild compression deformities of C7 on T1.      # S/P: NS bolus 2L, Ceftriaxone 2gm IV x 1 in the ED.     The patient is being admitted to medicine for the further management.      84 yold male to with Pmhx of Copd, Htn, Dm, schizophrenia, Hld, non-hodgkins lymphoma, urinary retention with chronic Bains (changed yesterday), presents to the ED from [facility] for evaluation of  multiple falls. The patient is not sure of head trauma or LOC. Not on any anticoagulation. Patient denied any complaints. The patient was in ED 0n 4/13/2024 for urinary retention, Bains was changed, UA was positive for Cystitis and therefore the was discharged on PO Vantin. At presentation to ED today, the bains's was not draining well and therefore was deflated and advanced. Limited history as the patient is Aox1.     # Multiple fall 2/2 TME likely 2/2 UTI  - UA(4/14): WBC 21, Bacteria few, LE moderate. 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 Bains catheter balloon inflated within the prostate gland. Recommend removal/repositioning  - CT HEAD: No acute intracranial pathology or hemorrhage. No acute calvarial fracture.  - Xray Pelvis AP only (04.14.25 @ 16:13): The left femoral neck is shortened compared to prior, suspicious for underlying fracture. Correlate with CT abdomen and pelvis scheduled to be performed same day for further evaluation. Redemonstration of marked atherosclerotic plaques. Generalized degenerative changes.  - Vitals stable  - S/P: NS bolus 2L, Ceftriaxone 2gm IV x 1 in the ED.     Plan:  - EKG   - Will continue with Ceftriaxone 1gm IV qDaily>> Consider ID consult if no clinical improvement    - F/U Urine culture   - F/U Blood culture     # Hyponatremia likely hypovolemic   # Elevated CK, ?Rhabdomyolysis   - Na 130,   - S/P: NS bolus 2L 1 in the ED.     Plan:  - Trend BMP  - Encourage PO intake  - Trend CK  - Will continue with gentle hydration LR at 75cc/hr for next 12 hrs     #BPH  - cw tamsulosin     #h/o COPD   -stable, c/w inhalers, nebs tx.     #DM 2  - hold metformin. SSI here     #HTN/ HLD  - c/w home medications    #schizophrenia  - c/w olanzapine and Trazodone      #Misc  - DVT Prophylaxis: Heparin sq  - GI Prophylaxis: None  - Diet: DASH/ CC  - Activity: IAT  - Code Status: Full Code    Hand off: Continue IV Rocephin, F/U Urine cx, Trend CK, Continue Gentle hydration.   Attempted to [facility], no answer. Med rec completed based on nursing home papers.              84 yold male to with Pmhx of Copd, Htn, Dm, schizophrenia, Hld, non-hodgkins lymphoma, urinary retention with chronic Bains (changed yesterday), presents to the ED from [facility] for evaluation of  multiple falls. The patient is not sure of head trauma or LOC. Not on any anticoagulation. Patient denied any complaints. The patient was in ED 0n 4/13/2024 for urinary retention, Bains was changed, UA was positive for Cystitis and therefore the was discharged on PO Vantin. At presentation to ED today, the bains's was not draining well and therefore was deflated and advanced. Limited history as the patient is Aox1.     # Multiple fall 2/2 TME likely 2/2 UTI  - UA(4/14): WBC 21, Bacteria few, LE moderate. 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 Bains catheter balloon inflated within the prostate gland. Recommend removal/repositioning  - CT HEAD: No acute intracranial pathology or hemorrhage. No acute calvarial fracture.  - Xray Pelvis AP only (04.14.25 @ 16:13): The left femoral neck is shortened compared to prior, suspicious for underlying fracture. Correlate with CT abdomen and pelvis scheduled to be performed same day for further evaluation. Redemonstration of marked atherosclerotic plaques. Generalized degenerative changes.  - Vitals stable  - S/P: NS bolus 2L, Ceftriaxone 2gm IV x 1 in the ED.     Plan:  - EKG   - Will continue with Ceftriaxone 1gm IV qDaily>> Consider ID consult if no clinical improvement    - F/U Urine culture   - F/U Blood culture     # Hyponatremia likely hypovolemic   # Elevated CK, ?Rhabdomyolysis   - Na 130,   - S/P: NS bolus 2L 1 in the ED.     Plan:  - Trend BMP  - Encourage PO intake  - Trend CK  - Will continue with gentle hydration LR at 75cc/hr for next 12 hrs     #BPH  - cw tamsulosin     #h/o COPD   -stable, c/w inhalers, nebs tx.     #DM 2  - hold metformin. SSI here     #HTN/ HLD  - c/w home medications    #schizophrenia  - c/w olanzapine and Trazodone      #Misc  - DVT Prophylaxis: Heparin sq  - GI Prophylaxis: None  - Diet: DASH/ CC  - Activity: IAT  - Code Status: Full Code    Hand off: Continue IV Rocephin, F/U Urine cx, Trend CK, Continue Gentle hydration.   Attempted to [facility], unable to reach for more history . Med rec completed based on nursing home papers. However pt in in both metoprolol and carvidelol and there are 3 nifedipine prescriptions. Please confirm medrec with nursing home in AM.

## 2025-04-14 NOTE — ED PROVIDER NOTE - PROGRESS NOTE DETAILS
FF: after ct read I deflated pt bains ballon and advanced in. pt bains is now in the bladder I confirmed with bedside ultrasound and bains draining straw colored urine

## 2025-04-14 NOTE — ED ADULT NURSE NOTE - NSFALLHARMRISKINTERV_ED_ALL_ED

## 2025-04-14 NOTE — ED PROVIDER NOTE - PHYSICAL EXAMINATION
VITAL SIGNS reviewed by me.   CONSTITUTIONAL: NAD. Speaking in few word sentences, moving all extremities.  SKIN: No lacerations or abrasions.  HEAD: Normocephalic, atraumatic.   EYES: 2mm pupils, PERRLA, EOMI, no conjunctival erythema   ENT: Airway intact. No facial trauma   NECK: No posterior midline cervical tenderness  CARD: Regular rate and rhythm. +S1, S2 no murmurs, gallops, or rubs.  Radial, DP, PT 2+/4 bilaterally  RESP: LCTAB. No wheezes, rales or rhonchi.  ABD: Abdomen soft, nontender, nondistended.  NEURO: A/ox1 (self). Moves all extremities. Sensation intact. Follows commands slowly. Needs questions repeated multiple times  MSK: No TTP in UE and LEs. No chest wall tenderness or crepitus  BACK: No posterior midline tenderness

## 2025-04-14 NOTE — H&P ADULT - ATTENDING COMMENTS
Assessment    Multiple falls  Bains balloon deflated and advanced in ED, balloon was in prostate  Chronic urinary retention  Schizophrenia  HTN  DM  COPD  NHL    Plan    - CT head not showing any acute changes, possibly pain/agitation/confusion from bains balloon in prostate causing falls, patient now comfortable in bed, not endorsing any suprapubic tenderness / follow up ID on abx will c/w ceftriaxone for now as patient is non-toxic appearing however has hx of MDROs, also follow up ID on whether to exchange bains as in the ED the current one was advanced after deflating balloon and intent is to keep in chronically  - c/w gentle NS for now, trend down CK  - olanzapine / trazodone  - home inhalers  - metoprolol for now, confirm home meds    Pending: ID follow up    # DVT PPX: heparin sc    GENERAL: NAD, lying in bed comfortably  HEAD:  Atraumatic, normocephalic  NERVOUS SYSTEM:  A&Ox3, moving all extremities, no focal deficits   EYES: EOMI, PERRL  NECK: Supple, trachea midline, no JVD  HEART: Regular rate and rhythm  LUNGS: Clear to auscultation bilaterally, no crackles, wheezing, or rhonchi  ABDOMEN: Soft, nontender, nondistended, +BS  EXTREMITIES: 2+ peripheral pulses bilaterally. No clubbing, cyanosis, or edema    75 minutes spent on review of labs, imaging studies, old records, obtaining history, personally examining patient, counselling and communicating with patient/ family, entering orders for medications/tests/etc, discussions with other health care providers, documentation in electronic health records, independent interpretation of labs, imaging/procedure results and care coordination.

## 2025-04-14 NOTE — H&P ADULT - NSHPLABSRESULTS_GEN_ALL_CORE
LABS:                          10.4   10.10 )-----------( 192      ( 14 Apr 2025 16:16 )             30.0     04-14    130[L]  |  95[L]  |  31[H]  ----------------------------<  106[H]  4.4   |  21  |  1.5    Ca    9.1      14 Apr 2025 16:16    TPro  7.1  /  Alb  3.9  /  TBili  0.2  /  DBili  x   /  AST  21  /  ALT  6   /  AlkPhos  87  04-14    PT/INR - ( 14 Apr 2025 16:16 )   PT: 12.70 sec;   INR: 1.07 ratio         PTT - ( 14 Apr 2025 16:16 )  PTT:32.5 sec

## 2025-04-14 NOTE — ED ADULT NURSE NOTE - OBJECTIVE STATEMENT
[facility] and [facility] that's been going on for a bit. Per EMS fall today. (-) HT (-) LOC (-)A/C. PT with no noted injuries or complaints

## 2025-04-15 LAB
-  AMOXICILLIN/CLAVULANIC ACID: SIGNIFICANT CHANGE UP
-  AMPICILLIN/SULBACTAM: SIGNIFICANT CHANGE UP
-  AMPICILLIN: SIGNIFICANT CHANGE UP
-  AZTREONAM: SIGNIFICANT CHANGE UP
-  CEFAZOLIN: SIGNIFICANT CHANGE UP
-  CEFEPIME: SIGNIFICANT CHANGE UP
-  CEFOXITIN: SIGNIFICANT CHANGE UP
-  CEFTRIAXONE: SIGNIFICANT CHANGE UP
-  CEFUROXIME: SIGNIFICANT CHANGE UP
-  CIPROFLOXACIN: SIGNIFICANT CHANGE UP
-  ERTAPENEM: SIGNIFICANT CHANGE UP
-  GENTAMICIN: SIGNIFICANT CHANGE UP
-  LEVOFLOXACIN: SIGNIFICANT CHANGE UP
-  MEROPENEM: SIGNIFICANT CHANGE UP
-  NITROFURANTOIN: SIGNIFICANT CHANGE UP
-  PIPERACILLIN/TAZOBACTAM: SIGNIFICANT CHANGE UP
-  TOBRAMYCIN: SIGNIFICANT CHANGE UP
-  TRIMETHOPRIM/SULFAMETHOXAZOLE: SIGNIFICANT CHANGE UP
A1C WITH ESTIMATED AVERAGE GLUCOSE RESULT: 5.9 % — HIGH (ref 4–5.6)
ANION GAP SERPL CALC-SCNC: 11 MMOL/L — SIGNIFICANT CHANGE UP (ref 7–14)
BUN SERPL-MCNC: 20 MG/DL — SIGNIFICANT CHANGE UP (ref 10–20)
CALCIUM SERPL-MCNC: 8.6 MG/DL — SIGNIFICANT CHANGE UP (ref 8.4–10.5)
CHLORIDE SERPL-SCNC: 100 MMOL/L — SIGNIFICANT CHANGE UP (ref 98–110)
CK SERPL-CCNC: 278 U/L — HIGH (ref 0–225)
CO2 SERPL-SCNC: 22 MMOL/L — SIGNIFICANT CHANGE UP (ref 17–32)
CREAT ?TM UR-MCNC: 29 MG/DL — SIGNIFICANT CHANGE UP
CREAT SERPL-MCNC: 1.2 MG/DL — SIGNIFICANT CHANGE UP (ref 0.7–1.5)
CULTURE RESULTS: ABNORMAL
EGFR: 60 ML/MIN/1.73M2 — SIGNIFICANT CHANGE UP
EGFR: 60 ML/MIN/1.73M2 — SIGNIFICANT CHANGE UP
ESTIMATED AVERAGE GLUCOSE: 123 MG/DL — HIGH (ref 68–114)
GLUCOSE BLDC GLUCOMTR-MCNC: 108 MG/DL — HIGH (ref 70–99)
GLUCOSE BLDC GLUCOMTR-MCNC: 121 MG/DL — HIGH (ref 70–99)
GLUCOSE BLDC GLUCOMTR-MCNC: 122 MG/DL — HIGH (ref 70–99)
GLUCOSE BLDC GLUCOMTR-MCNC: 91 MG/DL — SIGNIFICANT CHANGE UP (ref 70–99)
GLUCOSE SERPL-MCNC: 101 MG/DL — HIGH (ref 70–99)
METHOD TYPE: SIGNIFICANT CHANGE UP
ORGANISM # SPEC MICROSCOPIC CNT: ABNORMAL
ORGANISM # SPEC MICROSCOPIC CNT: SIGNIFICANT CHANGE UP
OSMOLALITY SERPL: 280 MOS/KG — SIGNIFICANT CHANGE UP (ref 280–301)
OSMOLALITY UR: 341 MOS/KG — SIGNIFICANT CHANGE UP (ref 50–1200)
POTASSIUM SERPL-MCNC: 4 MMOL/L — SIGNIFICANT CHANGE UP (ref 3.5–5)
POTASSIUM SERPL-SCNC: 4 MMOL/L — SIGNIFICANT CHANGE UP (ref 3.5–5)
POTASSIUM UR-SCNC: 15 MMOL/L — SIGNIFICANT CHANGE UP
PROT ?TM UR-MCNC: 42 MG/DL — SIGNIFICANT CHANGE UP
PROT/CREAT UR-RTO: 1.4 RATIO — HIGH (ref 0–0.2)
SODIUM SERPL-SCNC: 133 MMOL/L — LOW (ref 135–146)
SODIUM UR-SCNC: 91 MMOL/L — SIGNIFICANT CHANGE UP
SPECIMEN SOURCE: SIGNIFICANT CHANGE UP
UUN UR-MCNC: 307 MG/DL — SIGNIFICANT CHANGE UP

## 2025-04-15 PROCEDURE — 93010 ELECTROCARDIOGRAM REPORT: CPT

## 2025-04-15 PROCEDURE — 99233 SBSQ HOSP IP/OBS HIGH 50: CPT

## 2025-04-15 RX ADMIN — METOPROLOL SUCCINATE 25 MILLIGRAM(S): 50 TABLET, EXTENDED RELEASE ORAL at 05:31

## 2025-04-15 RX ADMIN — METOPROLOL SUCCINATE 25 MILLIGRAM(S): 50 TABLET, EXTENDED RELEASE ORAL at 18:07

## 2025-04-15 RX ADMIN — GABAPENTIN 300 MILLIGRAM(S): 400 CAPSULE ORAL at 18:07

## 2025-04-15 RX ADMIN — Medication 90 MILLIGRAM(S): at 05:31

## 2025-04-15 RX ADMIN — Medication 1 TABLET(S): at 13:23

## 2025-04-15 RX ADMIN — HEPARIN SODIUM 5000 UNIT(S): 1000 INJECTION INTRAVENOUS; SUBCUTANEOUS at 18:07

## 2025-04-15 RX ADMIN — FINASTERIDE 5 MILLIGRAM(S): 1 TABLET, FILM COATED ORAL at 13:23

## 2025-04-15 RX ADMIN — CEFTRIAXONE 100 MILLIGRAM(S): 500 INJECTION, POWDER, FOR SOLUTION INTRAMUSCULAR; INTRAVENOUS at 13:25

## 2025-04-15 RX ADMIN — LISINOPRIL 20 MILLIGRAM(S): 5 TABLET ORAL at 05:31

## 2025-04-15 RX ADMIN — HEPARIN SODIUM 5000 UNIT(S): 1000 INJECTION INTRAVENOUS; SUBCUTANEOUS at 05:31

## 2025-04-15 RX ADMIN — OLANZAPINE 10 MILLIGRAM(S): 10 TABLET ORAL at 13:23

## 2025-04-15 RX ADMIN — GABAPENTIN 300 MILLIGRAM(S): 400 CAPSULE ORAL at 05:31

## 2025-04-15 NOTE — PHYSICAL THERAPY INITIAL EVALUATION ADULT - GAIT TRAINING, PT EVAL
Goal: pt will ambulate w/ rolling walker 50 feet with contact guard by discharge to facilitate return to PLOF.

## 2025-04-15 NOTE — PHYSICAL THERAPY INITIAL EVALUATION ADULT - ADDITIONAL COMMENTS
pt is a resident at [facility] adult home. he used a walker with assistance prior to admission. pt is a resident at [facility] adult home. He used a walker with assistance prior to admission.

## 2025-04-15 NOTE — CONSULT NOTE ADULT - SUBJECTIVE AND OBJECTIVE BOX
ADA VILLAGOMEZ  84y, Male  Allergy: No Known Allergies      CHIEF COMPLAINT: Fall (15 Apr 2025 10:43)      LOS  1d    HPI:  84 yold male to with Pmhx of Copd, Htn, Dm, schizophrenia, Hld, non-hodgkins lymphoma, urinary retention with chronic Bains (changed yesterday), presents to the ED from [facility] for evaluation of  multiple falls. The patient is not sure of head trauma or LOC. Not on any anticoagulation. Patient denied any complaints. The patient was in ED 0n 4/13/2024 for urinary retention, Bains was changed, UA was positive for Cystitis and therefore the was discharged on PO Vantin. At presentation to ED today, the bains's was not draining well and therefore was deflated and advanced. Limited history as the patient is Aox1. Denied chest pain, SOB, aura, post ictal symptoms, seizure like activity, palpitations, N/V/D, abd pain or recent travel.     #ED Vitals:  T(C): 37 (04-14-25 @ 15:46), Max: 37.8 (04-14-25 @ 14:50)  HR: 86 (04-14-25 @ 15:46) (84 - 86)  BP: 130/63 (04-14-25 @ 15:46) (130/63 - 130/64)  RR: 18 (04-14-25 @ 15:46) (18 - 18)  SpO2: 97% (04-14-25 @ 15:46) (97% - 99%)    # Labs significant for: Hb 10.4(at baseline), Na 130, BUN 31, Cr 1.5(at baseline), eGFR 46, , UA: WBC 21, Bacteria few, LE moderate    # Imaging:  CT Abdomen and Pelvis w/ IV Cont (04.14.25 @ 17:06): Cystitis with mild urinary bladder wall thickening. Bains catheter balloon inflated within the prostate gland. Recommend removal/repositioning    CT HEAD: No acute intracranial pathology or hemorrhage. No acute calvarial fracture.    CT CERVICAL SPINE: No acute fracture or subluxation. Chronic mild compression deformities of C7 on T1.    Xray Pelvis AP only (04.14.25 @ 16:13): The left femoral neck is shortened compared to prior, suspicious for underlying fracture. Correlate with CT abdomen and pelvis scheduled to be performed same day for further evaluation. Redemonstration of marked atherosclerotic plaques. Generalized degenerative changes.    # S/P: NS bolus 2L, Ceftriaxone 2gm IV x 1 in the ED.     The patient is being admitted to medicine for the further management.    (14 Apr 2025 21:34)      INFECTIOUS DISEASE HISTORY:  History as above.  ID consulted for antibiotic management.   Presents for evaluation of multiple falls.   Limited historian, but denies any bladder discomfort.   Denies abdominal pain, nausea, vomiting, flank pain.   No SIRS on admission.       PAST MEDICAL & SURGICAL HISTORY:  COPD (chronic obstructive pulmonary disease)      Paranoid schizophrenia      Schizophrenia      Diabetes type 2, controlled      COPD (chronic obstructive pulmonary disease)      Dyslipidemia      Chronic retention of urine      Dyslipidemia      Encounter for screening colonoscopy          FAMILY HISTORY  No pertinent family history in first degree relatives    No pertinent family history in first degree relatives    No pertinent family history in first degree relatives        SOCIAL HISTORY  Social History:  [facility] (28 May 2023 07:50)        ROS  General: Denies rigors, nightsweats  HEENT: Denies headache, rhinorrhea, sore throat, eye pain  CV: Denies CP, palpitations  PULM: Denies wheezing, hemoptysis  GI: Denies hematemesis, hematochezia, melena  : Denies discharge, hematuria  MSK: Denies arthralgias, myalgias  SKIN: Denies rash, lesions  NEURO: Denies paresthesias, weakness  PSYCH: Denies depression, anxiety    VITALS:  T(F): 98.2, Max: 98.5 (04-14-25 @ 22:46)  HR: 68  BP: 154/52  RR: 19Vital Signs Last 24 Hrs  T(C): 36.8 (15 Apr 2025 08:10), Max: 36.9 (14 Apr 2025 22:46)  T(F): 98.2 (15 Apr 2025 08:10), Max: 98.5 (14 Apr 2025 22:46)  HR: 68 (15 Apr 2025 08:10) (68 - 84)  BP: 154/52 (15 Apr 2025 08:10) (142/83 - 154/52)  BP(mean): --  RR: 19 (15 Apr 2025 08:10) (19 - 19)  SpO2: 96% (15 Apr 2025 08:10) (94% - 98%)    Parameters below as of 15 Apr 2025 08:10  Patient On (Oxygen Delivery Method): room air        PHYSICAL EXAM:  Gen: NAD, resting in bed  HEENT: Normocephalic, atraumatic  Neck: supple, no lymphadenopathy  CV: Regular rate & regular rhythm  Lungs: decreased BS at bases, no fremitus  Abdomen: Soft, BS present  Ext: Warm, well perfused  Neuro: non focal, awake  Skin: no rash, no erythema  Lines: no phlebitis    TESTS & MEASUREMENTS:                        10.4   10.10 )-----------( 192      ( 14 Apr 2025 16:16 )             30.0     04-15    133[L]  |  100  |  20  ----------------------------<  101[H]  4.0   |  22  |  1.2    Ca    8.6      15 Apr 2025 07:25    TPro  7.1  /  Alb  3.9  /  TBili  0.2  /  DBili  x   /  AST  21  /  ALT  6   /  AlkPhos  87  04-14      LIVER FUNCTIONS - ( 14 Apr 2025 16:16 )  Alb: 3.9 g/dL / Pro: 7.1 g/dL / ALK PHOS: 87 U/L / ALT: 6 U/L / AST: 21 U/L / GGT: x           Urinalysis Basic - ( 15 Apr 2025 07:25 )    Color: x / Appearance: x / SG: x / pH: x  Gluc: 101 mg/dL / Ketone: x  / Bili: x / Urobili: x   Blood: x / Protein: x / Nitrite: x   Leuk Esterase: x / RBC: x / WBC x   Sq Epi: x / Non Sq Epi: x / Bacteria: x        Urinalysis with Rflx Culture (collected 04-14-25 @ 18:19)    Urinalysis with Rflx Culture (collected 04-13-25 @ 15:05)    Culture - Urine (collected 04-13-25 @ 15:05)  Source: Catheterized None  Preliminary Report (04-14-25 @ 22:45):    >100,000 CFU/ml Proteus mirabilis    <10,000 CFU/ml Normal Urogenital jake present    Urinalysis with Rflx Culture (collected 03-31-25 @ 04:20)    Culture - Urine (collected 03-31-25 @ 04:20)  Source: Catheterized None  Final Report (04-01-25 @ 12:23):    >=3 organisms. Probable collection contamination.    Culture - Urine (collected 01-17-25 @ 19:45)  Source: Clean Catch Clean Catch (Midstream)  Final Report (01-18-25 @ 21:58):    No growth    Culture - Blood (collected 01-15-25 @ 02:45)  Source: .Blood BLOOD  Final Report (01-20-25 @ 07:00):    No growth at 5 days    Culture - Blood (collected 01-15-25 @ 02:45)  Source: .Blood BLOOD  Final Report (01-20-25 @ 07:00):    No growth at 5 days    Urinalysis with Rflx Culture (collected 01-05-25 @ 13:29)    Culture - Urine (collected 01-05-25 @ 13:29)  Source: Clean Catch None  Final Report (01-07-25 @ 15:40):    >100,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)  Organism: Klebsiella pneumoniae (Carbapenem Resistant)  Klebsiella pneumoniae (Carbapenem Resistant) (01-07-25 @ 15:40)  Organism: Klebsiella pneumoniae (Carbapenem Resistant) (01-07-25 @ 15:40)      Method Type: CarbaR      -  Resistance Gene to Carbapenem: Detec      -  Resistance Gene OXA: Detec  Organism: Klebsiella pneumoniae (Carbapenem Resistant) (01-07-25 @ 15:40)      Method Type: IZABELLA      -  Amoxicillin/Clavulanic Acid: R >16/8      -  Ampicillin: R >16 These ampicillin results predict results for amoxicillin      -  Ampicillin/Sulbactam: R >16/8      -  Aztreonam: R >16      -  Cefazolin: R >16 For uncomplicated UTI with K. pneumoniae, E. coli, or P. mirablis: IZABELLA <=16 is sensitive and IZABELLA >=32 is resistant. This also predicts results for oral agents cefaclor, cefdinir, cefpodoxime, cefprozil, cefuroxime axetil, cephalexin and locarbef for uncomplicated UTI. Note that some isolates may be susceptible to these agents while testing resistant to cefazolin.      -  Cefepime: R >16      -  Cefoxitin: R >16      -  Ceftriaxone: R >32      -  Cefuroxime: R >16      -  Ciprofloxacin: R >2      -  Ertapenem: R >1      -  Gentamicin: R >8      -  Imipenem: R 4      -  Levofloxacin: R >4      -  Meropenem: R 8      -  Nitrofurantoin: R >64 Should not be used to treat pyelonephritis      -  Piperacillin/Tazobactam: R >64      -  Tobramycin: R >8      -  Trimethoprim/Sulfamethoxazole: R >2/38    Culture - Blood (collected 01-05-25 @ 12:00)  Source: .Blood BLOOD  Final Report (01-10-25 @ 15:01):    No growth at 5 days    Culture - Blood (collected 01-05-25 @ 12:00)  Source: .Blood BLOOD  Final Report (01-10-25 @ 15:01):    No growth at 5 days    Culture - Urine (collected 11-23-24 @ 13:05)  Source: Clean Catch Clean Catch (Midstream)  Final Report (11-24-24 @ 15:45):    <10,000 CFU/mL Normal Urogenital Jake    Culture - Blood (collected 11-21-24 @ 22:44)  Source: .Blood BLOOD  Final Report (11-27-24 @ 09:00):    No growth at 5 days    Culture - Blood (collected 11-21-24 @ 22:44)  Source: .Blood BLOOD  Final Report (11-27-24 @ 09:00):    No growth at 5 days        Blood Gas Venous - Lactate: 1.0 mmol/L (04-14-25 @ 20:50)  Lactate, Blood: 1.1 mmol/L (04-14-25 @ 16:16)      INFECTIOUS DISEASES TESTING  MRSA PCR Result.: Negative (01-17-25 @ 18:56)  MRSA PCR Result.: Negative (01-08-25 @ 16:03)  Procalcitonin: 1.80 ng/mL (01-05-25 @ 19:42)  Procalcitonin: 0.09 ng/mL (11-23-24 @ 06:19)  Procalcitonin: 0.11 ng/mL (11-22-24 @ 13:42)      RADIOLOGY & ADDITIONAL TESTS:  I have personally reviewed the last Chest xray  CXR  Xray Chest 1 View AP/PA:   ACC: [id number] EXAM:  XR CHEST 1 VIEW   ORDERED BY: CELESTE LEMUS DATE:  04/14/2025          INTERPRETATION:  CLINICAL HISTORY: Trauma code    COMPARISON: Chest radiograph 1/15/2025, CT chest 4/14/2025.    TECHNIQUE: Portable frontal chest radiograph.    FINDINGS:    Support devices: None.    Cardiac/mediastinum/hilum: Calcified aortic atherosclerotic plaque.    Lung parenchyma/Pleura: No focal parenchymal opacities, pleural   effusions, or pneumothorax.    Skeleton/soft tissues: Right shoulder hemiarthroplasty. Degenerative   changes.      IMPRESSION:    No radiographic evidence of acute cardiopulmonary disease.    --- End of Report ---          BARRIE CRUZ MD; Resident Radiologist  This document has been electronically signed.  FABIÁN FARMER MD; Attending Interventional Radiologist  This document has been electronically signed. Apr 14 2025  5:39PM (04-14-25 @ 16:14)      CT  CT Chest w/ IV Cont:   ACC: [id number] EXAM:  CT ABDOMEN AND PELVIS IC   ORDERED BY: CELESTE CHAMBERS     ACC: [id number] EXAM:  CT CHEST IC   ORDERED BY: CELESTE CHAMBERS     PROCEDURE DATE:  04/14/2025          INTERPRETATION:  CLINICAL INFORMATION: Weakness, falls. Trauma to the   chest, abdomen and pelvis    COMPARISON: CT ABDOMEN/PELVIS 1/24/2025.    CONTRAST/COMPLICATIONS:  IV Contrast: Omnipaque 350 (accession [id number]), IV contrast documented   in unlinked concurrent exam (accession [id number])  95 cc administered   5   cc discarded  Oral Contrast: NONE    PROCEDURE:  CT of the Chest, Abdomen and Pelvis was performed.  Sagittal and coronal reformats were performed.    FINDINGS:  CHEST:  LUNGS AND LARGE AIRWAYS: Upper lobe predominant, centrilobular   emphysematous changes. Patent central airways. No pulmonary nodules.  PLEURA: No pleural effusion.  VESSELS: Coronary artery calcifications.  HEART: Heart size is normal. No pericardial effusion.  MEDIASTINUM AND ROOSEVELT: No lymphadenopathy.  CHEST WALL AND LOWER NECK: Bilateral gynecomastia    ABDOMEN AND PELVIS:  LIVER: Scattered subcentimeter hepatic hypodensities  BILE DUCTS: Normal caliber.  GALLBLADDER: Cholelithiasis.  SPLEEN: Within normal limits.  PANCREAS: Within normal limits.  ADRENALS: Within normal limits.  KIDNEYS/URETERS: Bilateral renal cysts. Subcentimeter hypodensities, too   small to further characterize. No hydronephrosis    BLADDER: Mild urinary bladder wall thickening perivesicular stranding.  REPRODUCTIVE ORGANS: Bains catheter balloon within the prostate (sagittal   series 603, image 198)    BOWEL: Hiatal hernia. No bowel obstruction. Appendix is normal.  PERITONEUM/RETROPERITONEUM: Within normal limits.  VESSELS: Stable right common iliac artery aneurysm measuring   approximately 1.9 cm. Stable infrarenal abdominal aortic aneurysm   measuring 2.8 cm (series 4, image 275)  LYMPH NODES: No lymphadenopathy.  ABDOMINAL WALL: Within normal limits.  BONES: Status post right shoulder arthroplasty. Osteopenia. Mild L1   compression deformity, stable    IMPRESSION:  Cystitis with mild urinary bladder wall thickening. Recommend correlation   with urinalysis.    Bains catheter balloon inflated within the prostate gland. Recommend   removal/repositioning      Findings transmitted to CELESTE TONEY; on 4/14/2025 8:04 PM.    --- End of Report ---            MAYLIN JON MD; Attending Radiologist  This document has been electronically signed. Apr 14 2025  8:04PM (04-14-25 @ 17:06)      CARDIOLOGY TESTING      MEDICATIONS  cefTRIAXone   IVPB 1000 IV Intermittent every 24 hours  dextrose 5%. 1000 IV Continuous <Continuous>  dextrose 5%. 1000 IV Continuous <Continuous>  dextrose 50% Injectable 25 IV Push once  dextrose 50% Injectable 12.5 IV Push once  dextrose 50% Injectable 25 IV Push once  finasteride 5 Oral daily  gabapentin 300 Oral two times a day  glucagon  Injectable 1 IntraMuscular once  heparin   Injectable 5000 SubCutaneous every 12 hours  insulin lispro (ADMELOG) corrective regimen sliding scale  SubCutaneous three times a day before meals  lisinopril 20 Oral daily  metoprolol tartrate 25 Oral two times a day  multivitamin/minerals 1 Oral daily  NIFEdipine XL 90 Oral daily  OLANZapine 10 Oral daily  tamsulosin 0.4 Oral at bedtime  traZODone 50 Oral at bedtime      Weight  Weight (kg): 65.8 (04-14-25 @ 14:50)    ANTIBIOTICS:  cefTRIAXone   IVPB 1000 milliGRAM(s) IV Intermittent every 24 hours      ALLERGIES:  No Known Allergies

## 2025-04-15 NOTE — PHYSICAL THERAPY INITIAL EVALUATION ADULT - PERTINENT HX OF CURRENT PROBLEM, REHAB EVAL
84 yold male to with Pmhx of Copd, Htn, Dm, schizophrenia, Hld, non-hodgkins lymphoma, urinary retention with chronic Bains (changed yesterday), presents to the ED from [facility] for evaluation of  multiple falls. The patient is not sure of head trauma or LOC. Not on any anticoagulation. Patient denied any complaints. The patient was in ED 0n 4/13/2024 for urinary retention, Bains was changed, UA was positive for Cystitis and therefore the was discharged on PO Vantin. At presentation to ED, the bains was not draining well and therefore was deflated and advanced. Limited history as the patient is Aox1.

## 2025-04-15 NOTE — CONSULT NOTE ADULT - ASSESSMENT
ASSESSMENT  84 yold male to with Pmhx of Copd, Htn, Dm, schizophrenia, Hld, non-hodgkins lymphoma, urinary retention with chronic Bains (changed yesterday), presents to the ED from [facility] for evaluation of  multiple falls. The patient is not sure of head trauma or LOC. Not on any anticoagulation. Patient denied any complaints. The patient was in ED 0n 4/13/2024 for urinary retention, Bains was changed, UA was positive for Cystitis and therefore the was discharged on PO Vantin. At presentation to ED today, the bains's was not draining well and therefore was deflated and advanced. Limited history as the patient is Aox1. Denied chest pain, SOB, aura, post ictal symptoms, seizure like activity, palpitations, N/V/D, abd pain or recent travel.     IMPRESSION  #Recurrent Falls   #Bacteuria with Chronic Bains - Malfunctioning bains   - Urine Cx 4/13 Proteus mirabilis   - CT Abdomen and Pelvis w/ IV Cont (04.14.25 @ 17:06): Cystitis with mild urinary bladder wall thickening. Recommend correlation  with urinalysis. Bains catheter balloon inflated within the prostate gland. Recommend  removal/repositioning    #COPD  #DM II   #Schizophrenia  #Non-Hodgekins Lymphoma     #Obesity BMI (kg/m2): 24.9, 22.3, 22.3  #DM   #Abx allergy: No Known Allergies    RECOMMENDATIONS  - unclear if true UTI, recurrent falls are not cardinal sypmtoms for UTI -- but given possible obstruction and malfunctioning bains which was adjusted in ED, can continue ceftriaxone 1g daily   - follow-up Proteus susceptibilities -- at risk for MDR; if this is actually MDR and no clinical significant changes, then likely colonizer   - trend WBC     Please call or message on [employer] Teams if with any questions.  Spectra [phone] ASSESSMENT  84 yold male to with Pmhx of Copd, Htn, Dm, schizophrenia, Hld, non-hodgkins lymphoma, urinary retention with chronic Bains (changed yesterday), presents to the ED from [facility] for evaluation of  multiple falls. The patient is not sure of head trauma or LOC. Not on any anticoagulation. Patient denied any complaints. The patient was in ED 0n 4/13/2024 for urinary retention, Bains was changed, UA was positive for Cystitis and therefore the was discharged on PO Vantin. At presentation to ED today, the bains's was not draining well and therefore was deflated and advanced. Limited history as the patient is Aox1. Denied chest pain, SOB, aura, post ictal symptoms, seizure like activity, palpitations, N/V/D, abd pain or recent travel.     IMPRESSION  #Recurrent Falls   #Bacteuria with Chronic Bains - Malfunctioning bains   - Urine Cx 4/13 Proteus mirabilis   - CT Abdomen and Pelvis w/ IV Cont (04.14.25 @ 17:06): Cystitis with mild urinary bladder wall thickening. Recommend correlation  with urinalysis. Bains catheter balloon inflated within the prostate gland. Recommend  removal/repositioning    #COPD  #DM II   #Schizophrenia  #Non-Hodgekins Lymphoma     #Obesity BMI (kg/m2): 24.9, 22.3, 22.3  #DM   #Abx allergy: No Known Allergies    RECOMMENDATIONS  - Urine Cx reviewed, suspect bacteiuria in chronic bains -- low suspicion for UTI and bains has been repositioned -- favor monitoring of antibiotics while here     Please call or message on [employer] Teams if with any questions.  Spectra [phone]

## 2025-04-15 NOTE — PROGRESS NOTE ADULT - ASSESSMENT
84 yold male to with Pmhx of Copd, Htn, Dm, schizophrenia, Hld, non-hodgkins lymphoma, urinary retention with chronic Bains (changed yesterday), presents to the ED from [facility] for evaluation of  multiple falls. The patient is not sure of head trauma or LOC. Not on any anticoagulation. Patient denied any complaints. The patient was in ED 0n 4/13/2024 for urinary retention, Bains was changed, UA was positive for Cystitis and therefore the was discharged on PO Vantin. At presentation to ED today, the bains's was not draining well and therefore was deflated and advanced. Limited history as the patient is Aox1.     # Multiple fall 2/2 TME likely 2/2 UTI  - UA(4/14): WBC 21, Bacteria few, LE moderate. 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 Bains catheter balloon inflated within the prostate gland. Recommend removal/repositioning  - CT HEAD: No acute intracranial pathology or hemorrhage. No acute calvarial fracture.  - Xray Pelvis AP only (04.14.25 @ 16:13): The left femoral neck is shortened compared to prior, suspicious for underlying fracture. Correlate with CT abdomen and pelvis scheduled to be performed same day for further evaluation. Redemonstration of marked atherosclerotic plaques. Generalized degenerative changes.  - Vitals stable  - S/P: NS bolus 2L, Ceftriaxone 2gm IV x 1 in the ED.   - PT eval    Plan:  - EKG   - Will continue with Ceftriaxone 1gm IV qDaily>> Consider ID consult if no clinical improvement    - F/U Urine culture-> noted with proteus mirabilus CFU >100,000  - F/U Blood culture     # Hyponatremia likely hypovolemic   # Elevated CK, ?Rhabdomyolysis   - Na 130, =-> follow up is 216  - S/P: NS bolus 2L 1 in the ED.     Plan:  - Trend BMP  - Encourage PO intake  - Will continue with gentle hydration LR at 75cc/hr for next 12 hrs     #BPH  - cw tamsulosin     #h/o COPD   -stable, c/w inhalers, nebs tx.     #DM 2  - hold metformin. SSI here     #HTN/ HLD  - c/w home medications    #schizophrenia  - c/w olanzapine and Trazodone      #Misc  - DVT Prophylaxis: Heparin sq  - GI Prophylaxis: None  - Diet: DASH/ CC  - Activity: IAT  - Code Status: Full Code    Hand off: Ceftriaxone for 3 day course, urine cul sensitivities, ID follow up, Pt eval for falls

## 2025-04-15 NOTE — PROGRESS NOTE ADULT - ATTENDING COMMENTS
84 yold male to with Pmhx of Copd, Htn, Dm, schizophrenia, Hld, non-hodgkins lymphoma, urinary retention with chronic Bains (changed yesterday), presents to the ED from [facility] for evaluation of  multiple falls. The patient is not sure of head trauma or LOC. Not on any anticoagulation. Patient denied any complaints. The patient was in ED 0n 4/13/2024 for urinary retention, Bains was changed, UA was positive for Cystitis and therefore the was discharged on PO Vantin. At presentation to ED today, the bains's was not draining well and therefore was deflated and advanced. Limited history as the patient is Aox1.     # Multiple fall 2/2 TME likely 2/2 UTI  - UA(4/14): WBC 21, Bacteria few, LE moderate. 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 Bains catheter balloon inflated within the prostate gland. Recommend removal/repositioning  - CT HEAD: No acute intracranial pathology or hemorrhage. No acute calvarial fracture.  - Xray Pelvis AP only (04.14.25 @ 16:13): The left femoral neck is shortened compared to prior, suspicious for underlying fracture. Correlate with CT abdomen and pelvis scheduled to be performed same day for further evaluation. Redemonstration of marked atherosclerotic plaques. Generalized degenerative changes.  - Vitals stable  - S/P: NS bolus 2L, Ceftriaxone 2gm IV x 1 in the ED.   - PT eval --> rehab  - ID cst appreciated; low suspicion for UTI, monitor off antibiotics.    Plan:  - EKG   - d/kiah Ceftriaxone as per ID recommendations.    - F/U Urine culture-> noted with proteus mirabilus CFU >100,000  - F/U Blood culture   - PT recommendations d/c to rehab    # Hyponatremia likely hypovolemic   # Elevated CK, ?Rhabdomyolysis   - Na 130, =-> follow up is 216  - S/P: NS bolus 2L 1 in the ED.     Plan:  - Trend BMP  - Encourage PO intake  - Will continue with gentle hydration LR at 75cc/hr for next 12 hrs     #BPH  - cw tamsulosin     #h/o COPD   -stable, c/w inhalers, nebs tx.     #DM 2  - hold metformin. SSI here     #HTN/ HLD  - c/w home medications    #schizophrenia  - c/w olanzapine and Trazodone      #Misc  - DVT Prophylaxis: Heparin sq  - GI Prophylaxis: None  - Diet: DASH/ CC  - Activity: IAT  - Code Status: Full Code    Hand off: urine cul sensitivities, ID follow up, Pt eval for falls

## 2025-04-16 ENCOUNTER — TRANSCRIPTION ENCOUNTER (OUTPATIENT)
Age: 85
End: 2025-04-16

## 2025-04-16 VITALS — HEART RATE: 75 BPM | SYSTOLIC BLOOD PRESSURE: 156 MMHG | DIASTOLIC BLOOD PRESSURE: 66 MMHG

## 2025-04-16 LAB
ALBUMIN SERPL ELPH-MCNC: 3.8 G/DL — SIGNIFICANT CHANGE UP (ref 3.5–5.2)
ALP SERPL-CCNC: 98 U/L — SIGNIFICANT CHANGE UP (ref 30–115)
ALT FLD-CCNC: 8 U/L — SIGNIFICANT CHANGE UP (ref 0–41)
ANION GAP SERPL CALC-SCNC: 12 MMOL/L — SIGNIFICANT CHANGE UP (ref 7–14)
AST SERPL-CCNC: 18 U/L — SIGNIFICANT CHANGE UP (ref 0–41)
BASOPHILS # BLD AUTO: 0.01 K/UL — SIGNIFICANT CHANGE UP (ref 0–0.2)
BASOPHILS NFR BLD AUTO: 0.2 % — SIGNIFICANT CHANGE UP (ref 0–1)
BILIRUB SERPL-MCNC: <0.2 MG/DL — SIGNIFICANT CHANGE UP (ref 0.2–1.2)
BUN SERPL-MCNC: 16 MG/DL — SIGNIFICANT CHANGE UP (ref 10–20)
CALCIUM SERPL-MCNC: 9.3 MG/DL — SIGNIFICANT CHANGE UP (ref 8.4–10.5)
CHLORIDE SERPL-SCNC: 98 MMOL/L — SIGNIFICANT CHANGE UP (ref 98–110)
CO2 SERPL-SCNC: 23 MMOL/L — SIGNIFICANT CHANGE UP (ref 17–32)
CREAT SERPL-MCNC: 1.2 MG/DL — SIGNIFICANT CHANGE UP (ref 0.7–1.5)
EGFR: 60 ML/MIN/1.73M2 — SIGNIFICANT CHANGE UP
EGFR: 60 ML/MIN/1.73M2 — SIGNIFICANT CHANGE UP
EOSINOPHIL # BLD AUTO: 0.03 K/UL — SIGNIFICANT CHANGE UP (ref 0–0.7)
EOSINOPHIL NFR BLD AUTO: 0.5 % — SIGNIFICANT CHANGE UP (ref 0–8)
GLUCOSE BLDC GLUCOMTR-MCNC: 116 MG/DL — HIGH (ref 70–99)
GLUCOSE BLDC GLUCOMTR-MCNC: 154 MG/DL — HIGH (ref 70–99)
GLUCOSE SERPL-MCNC: 94 MG/DL — SIGNIFICANT CHANGE UP (ref 70–99)
HCT VFR BLD CALC: 31.9 % — LOW (ref 42–52)
HGB BLD-MCNC: 10.7 G/DL — LOW (ref 14–18)
IMM GRANULOCYTES NFR BLD AUTO: 0.3 % — SIGNIFICANT CHANGE UP (ref 0.1–0.3)
LYMPHOCYTES # BLD AUTO: 1.52 K/UL — SIGNIFICANT CHANGE UP (ref 1.2–3.4)
LYMPHOCYTES # BLD AUTO: 23.3 % — SIGNIFICANT CHANGE UP (ref 20.5–51.1)
MAGNESIUM SERPL-MCNC: 2 MG/DL — SIGNIFICANT CHANGE UP (ref 1.8–2.4)
MCHC RBC-ENTMCNC: 30.6 PG — SIGNIFICANT CHANGE UP (ref 27–31)
MCHC RBC-ENTMCNC: 33.5 G/DL — SIGNIFICANT CHANGE UP (ref 32–37)
MCV RBC AUTO: 91.1 FL — SIGNIFICANT CHANGE UP (ref 80–94)
MONOCYTES # BLD AUTO: 0.67 K/UL — HIGH (ref 0.1–0.6)
MONOCYTES NFR BLD AUTO: 10.3 % — HIGH (ref 1.7–9.3)
NEUTROPHILS # BLD AUTO: 4.27 K/UL — SIGNIFICANT CHANGE UP (ref 1.4–6.5)
NEUTROPHILS NFR BLD AUTO: 65.4 % — SIGNIFICANT CHANGE UP (ref 42.2–75.2)
NRBC BLD AUTO-RTO: 0 /100 WBCS — SIGNIFICANT CHANGE UP (ref 0–0)
PLATELET # BLD AUTO: 207 K/UL — SIGNIFICANT CHANGE UP (ref 130–400)
PMV BLD: 10.4 FL — SIGNIFICANT CHANGE UP (ref 7.4–10.4)
POTASSIUM SERPL-MCNC: 4.2 MMOL/L — SIGNIFICANT CHANGE UP (ref 3.5–5)
POTASSIUM SERPL-SCNC: 4.2 MMOL/L — SIGNIFICANT CHANGE UP (ref 3.5–5)
PROT SERPL-MCNC: 6.9 G/DL — SIGNIFICANT CHANGE UP (ref 6–8)
RBC # BLD: 3.5 M/UL — LOW (ref 4.7–6.1)
RBC # FLD: 14.6 % — HIGH (ref 11.5–14.5)
SODIUM SERPL-SCNC: 133 MMOL/L — LOW (ref 135–146)
WBC # BLD: 6.52 K/UL — SIGNIFICANT CHANGE UP (ref 4.8–10.8)
WBC # FLD AUTO: 6.52 K/UL — SIGNIFICANT CHANGE UP (ref 4.8–10.8)

## 2025-04-16 PROCEDURE — 99239 HOSP IP/OBS DSCHRG MGMT >30: CPT

## 2025-04-16 RX ORDER — CARVEDILOL 3.12 MG/1
1 TABLET, FILM COATED ORAL
Refills: 0 | DISCHARGE

## 2025-04-16 RX ADMIN — TAMSULOSIN HYDROCHLORIDE 0.4 MILLIGRAM(S): 0.4 CAPSULE ORAL at 00:21

## 2025-04-16 RX ADMIN — HEPARIN SODIUM 5000 UNIT(S): 1000 INJECTION INTRAVENOUS; SUBCUTANEOUS at 05:44

## 2025-04-16 RX ADMIN — OLANZAPINE 10 MILLIGRAM(S): 10 TABLET ORAL at 13:38

## 2025-04-16 RX ADMIN — HEPARIN SODIUM 5000 UNIT(S): 1000 INJECTION INTRAVENOUS; SUBCUTANEOUS at 18:10

## 2025-04-16 RX ADMIN — GABAPENTIN 300 MILLIGRAM(S): 400 CAPSULE ORAL at 18:10

## 2025-04-16 RX ADMIN — METOPROLOL SUCCINATE 25 MILLIGRAM(S): 50 TABLET, EXTENDED RELEASE ORAL at 05:44

## 2025-04-16 RX ADMIN — Medication 90 MILLIGRAM(S): at 05:44

## 2025-04-16 RX ADMIN — GABAPENTIN 300 MILLIGRAM(S): 400 CAPSULE ORAL at 05:44

## 2025-04-16 RX ADMIN — Medication 50 MILLIGRAM(S): at 00:21

## 2025-04-16 RX ADMIN — LISINOPRIL 20 MILLIGRAM(S): 5 TABLET ORAL at 05:46

## 2025-04-16 RX ADMIN — FINASTERIDE 5 MILLIGRAM(S): 1 TABLET, FILM COATED ORAL at 13:38

## 2025-04-16 RX ADMIN — METOPROLOL SUCCINATE 25 MILLIGRAM(S): 50 TABLET, EXTENDED RELEASE ORAL at 18:10

## 2025-04-16 RX ADMIN — Medication 1 TABLET(S): at 13:39

## 2025-04-16 NOTE — PROGRESS NOTE ADULT - ASSESSMENT
ASSESSMENT  84 yold male to with Pmhx of Copd, Htn, Dm, schizophrenia, Hld, non-hodgkins lymphoma, urinary retention with chronic Bains (changed yesterday), presents to the ED from [facility] for evaluation of  multiple falls. The patient is not sure of head trauma or LOC. Not on any anticoagulation. Patient denied any complaints. The patient was in ED 0n 4/13/2024 for urinary retention, Bains was changed, UA was positive for Cystitis and therefore the was discharged on PO Vantin. At presentation to ED today, the bains's was not draining well and therefore was deflated and advanced. Limited history as the patient is Aox1. Denied chest pain, SOB, aura, post ictal symptoms, seizure like activity, palpitations, N/V/D, abd pain or recent travel.     IMPRESSION  #Recurrent Falls   #Bacteuria with Chronic Bains - Malfunctioning bains   - Urine Cx 4/13 Proteus mirabilis   - CT Abdomen and Pelvis w/ IV Cont (04.14.25 @ 17:06): Cystitis with mild urinary bladder wall thickening. Recommend correlation  with urinalysis. Bains catheter balloon inflated within the prostate gland. Recommend  removal/repositioning    #COPD  #DM II   #Schizophrenia  #Non-Hodgekins Lymphoma     #Obesity BMI (kg/m2): 24.9, 22.3, 22.3  #DM   #Abx allergy: No Known Allergies    RECOMMENDATIONS  - noted urine Cx 4/14 with GNR -- no new complaints this AM, no leukocytosis, tachycardia -- possible colonizer  - continue to monitor off antibiotics     Please call or message on [employer] Teams if with any questions.  Spectra [phone]

## 2025-04-16 NOTE — DISCHARGE NOTE NURSING/CASE MANAGEMENT/SOCIAL WORK - NSDCVIVACCINE_GEN_ALL_CORE_FT
Tdap; 15-May-2024 15:22; Rosa Adamson (ELLEN); [employer]; [id number] (Exp. Date: 23-Nov-2025); IntraMuscular; Deltoid Left.; 0.5 milliLiter(s); VIS (VIS Published: 09-May-2013, VIS Presented: 15-May-2024);

## 2025-04-16 NOTE — DISCHARGE NOTE PROVIDER - NSDCCPCAREPLAN_GEN_ALL_CORE_FT
PRINCIPAL DISCHARGE DIAGNOSIS  Diagnosis: Frequent falls  Assessment and Plan of Treatment: You came to the hospital because of multiple falls. You also have a urinary catheter in place due to difficulty urinating. The catheter was found to be in an incorrect position, which was fixed and now it is draining normally. We did some tests to figure out why you were falling. A CT scan of your head did not show any bleeding or other problems. We also found that you had a urinary tract infection, which we treated with antibiotics. We gave you fluids to help with this. We also adjusted your other medications. You will be discharged to a rehabilitation facility to help you recover from your falls and possible hip fracture. It’s important to tell the staff there if you experience any pain, weakness, or other problems.     PRINCIPAL DISCHARGE DIAGNOSIS  Diagnosis: Frequent falls  Assessment and Plan of Treatment: You came to the hospital because of multiple falls. You also have a urinary catheter in place due to difficulty urinating. The catheter was found to be in an incorrect position, which was fixed and now it is draining normally. We did some tests to figure out why you were falling. A CT scan of your head did not show any bleeding or other problems. We gave you fluids for mild dehydration. We also adjusted your other medications. You will be discharged to a rehabilitation facility to help you recover from your falls. It’s important to tell the staff there if you experience any pain, weakness, or other problems.     PRINCIPAL DISCHARGE DIAGNOSIS  Diagnosis: Frequent falls  Assessment and Plan of Treatment: You came to the hospital because of two unwitnessed falls. You also have a urinary catheter in place due to difficulty urinating. The catheter was found to be in an incorrect position, which was fixed and now it is draining normally. We did some tests to figure out why you were falling. A CT scan of your head did not show any bleeding or other problems. We gave you fluids for mild dehydration. Avoid sudden and prolonged standing and never get up without assistance. You should have fall precautions including bed in lowest position and physical therapy. It’s important to tell the staff there if you experience any pain, weakness, or other problems.

## 2025-04-16 NOTE — DISCHARGE NOTE PROVIDER - NSDCMRMEDTOKEN_GEN_ALL_CORE_FT
Albuterol (Eqv-ProAir HFA) 90 mcg/inh inhalation aerosol: 1 puff(s) inhaled 4 times a day as needed for  shortness of breath and/or wheezing  carvedilol 12.5 mg oral tablet: 1 tab(s) orally 2 times a day  cefpodoxime 200 mg oral tablet: 1 tab(s) orally every 12 hours  Centrum oral tablet: 1 tab(s) orally once a day  finasteride 5 mg oral tablet: 1 tab(s) orally once a day  gabapentin 300 mg oral capsule: 1 cap(s) orally 2 times a day  lisinopril 20 mg oral tablet: 1 tab(s) orally once a day (at bedtime)  metFORMIN 500 mg oral tablet: 1 tab(s) orally 2 times a day  metoprolol tartrate 25 mg oral tablet: 1 tab(s) orally 2 times a day  NIFEdipine 90 mg oral tablet, extended release: 1 tab(s) orally once a day  OLANZapine 10 mg oral tablet: 1 tab(s) orally once a day  tamsulosin 0.4 mg oral capsule: 1 cap(s) orally once a day (at bedtime)  traZODone 50 mg oral tablet: 1 tab(s) orally once a day (at bedtime)   Albuterol (Eqv-ProAir HFA) 90 mcg/inh inhalation aerosol: 1 puff(s) inhaled 4 times a day as needed for  shortness of breath and/or wheezing  Centrum oral tablet: 1 tab(s) orally once a day  finasteride 5 mg oral tablet: 1 tab(s) orally once a day  gabapentin 300 mg oral capsule: 1 cap(s) orally 2 times a day  lisinopril 20 mg oral tablet: 1 tab(s) orally once a day (at bedtime)  metFORMIN 500 mg oral tablet: 1 tab(s) orally 2 times a day  metoprolol tartrate 25 mg oral tablet: 1 tab(s) orally 2 times a day  NIFEdipine 90 mg oral tablet, extended release: 1 tab(s) orally once a day  OLANZapine 10 mg oral tablet: 1 tab(s) orally once a day  tamsulosin 0.4 mg oral capsule: 1 cap(s) orally once a day (at bedtime)  traZODone 50 mg oral tablet: 1 tab(s) orally once a day (at bedtime)

## 2025-04-16 NOTE — PROGRESS NOTE ADULT - TIME BILLING
I have personally seen and examined this patient.    I have reviewed all pertinent clinical information and reviewed all relevant imaging and diagnostic studies personally.   I counseled the patient about diagnostic testing and treatment plan. All questions were answered.   I discussed recommendations with the primary team.
60 minutes spent to complete patient's bedside assessment, review medical chart, discuss medical plan of care with covering medical team with >50% of time spent face to face with patient, discussion with patient/family and/or coordination of care.

## 2025-04-16 NOTE — DISCHARGE NOTE NURSING/CASE MANAGEMENT/SOCIAL WORK - NSDCPEFALRISK_GEN_ALL_CORE
For information on Fall & Injury Prevention, visit: [email or web address] OR  [email or web address] OR  https://www.cdc.gov/steadi/patient.html

## 2025-04-16 NOTE — PROGRESS NOTE ADULT - SUBJECTIVE AND OBJECTIVE BOX
ADA VILLAGOMEZ  84y, Male  Allergy: No Known Allergies      LOS  2d    CHIEF COMPLAINT: Fall (16 Apr 2025 09:20)      INTERVAL EVENTS/HPI  - No acute events overnight  - T(F): , Max: 98 (04-15-25 @ 23:57)  - denies abdominal pain/suprapubic pain   - WBC Count: 6.52 (04-16-25 @ 08:57)  WBC Count: 10.10 (04-14-25 @ 16:16)     - Creatinine: 1.2 (04-16-25 @ 08:57)  Creatinine: 1.2 (04-15-25 @ 07:25)       ROS  General: Denies rigors, nightsweats  HEENT: Denies headache, rhinorrhea, sore throat, eye pain  CV: Denies CP, palpitations  PULM: Denies wheezing, hemoptysis  GI: Denies hematemesis, hematochezia, melena  : Denies discharge, hematuria  MSK: Denies arthralgias, myalgias  SKIN: Denies rash, lesions  NEURO: Denies paresthesias, weakness  PSYCH: Denies depression, anxiety    VITALS:  T(F): 97.5, Max: 98 (04-15-25 @ 23:57)  HR: 78  BP: 173/65  RR: 18Vital Signs Last 24 Hrs  T(C): 36.4 (16 Apr 2025 05:55), Max: 36.7 (15 Apr 2025 23:57)  T(F): 97.5 (16 Apr 2025 05:55), Max: 98 (15 Apr 2025 23:57)  HR: 78 (16 Apr 2025 05:55) (70 - 80)  BP: 173/65 (16 Apr 2025 05:55) (120/65 - 173/65)  BP(mean): --  RR: 18 (16 Apr 2025 05:55) (16 - 18)  SpO2: 99% (16 Apr 2025 05:55) (95% - 99%)    Parameters below as of 16 Apr 2025 05:55  Patient On (Oxygen Delivery Method): room air        PHYSICAL EXAM:  Gen: NAD, resting in bed  HEENT: Normocephalic, atraumatic  Neck: supple, no lymphadenopathy  CV: Regular rate & regular rhythm  Lungs: decreased BS at bases, no fremitus  Abdomen: Soft, BS present  Ext: Warm, well perfused  Neuro: non focal, awake  Skin: no rash, no erythema  Lines: no phlebitis    FH: Non-contributory  Social Hx: Non-contributory    TESTS & MEASUREMENTS:                        10.7   6.52  )-----------( 207      ( 16 Apr 2025 08:57 )             31.9     04-16    133[L]  |  98  |  16  ----------------------------<  94  4.2   |  23  |  1.2    Ca    9.3      16 Apr 2025 08:57  Mg     2.0     04-16    TPro  6.9  /  Alb  3.8  /  TBili  <0.2  /  DBili  x   /  AST  18  /  ALT  8   /  AlkPhos  98  04-16      LIVER FUNCTIONS - ( 16 Apr 2025 08:57 )  Alb: 3.8 g/dL / Pro: 6.9 g/dL / ALK PHOS: 98 U/L / ALT: 8 U/L / AST: 18 U/L / GGT: x           Urinalysis Basic - ( 16 Apr 2025 08:57 )    Color: x / Appearance: x / SG: x / pH: x  Gluc: 94 mg/dL / Ketone: x  / Bili: x / Urobili: x   Blood: x / Protein: x / Nitrite: x   Leuk Esterase: x / RBC: x / WBC x   Sq Epi: x / Non Sq Epi: x / Bacteria: x        Urinalysis with Rflx Culture (collected 04-14-25 @ 18:19)    Culture - Urine (collected 04-14-25 @ 18:19)  Source: Clean Catch None  Preliminary Report (04-16-25 @ 01:10):    >100,000 CFU/ml Gram Negative Rods    Culture - Blood (collected 04-14-25 @ 18:10)  Source: Blood Blood  Preliminary Report (04-15-25 @ 23:01):    No growth at 24 hours    Culture - Blood (collected 04-14-25 @ 18:00)  Source: Blood Blood  Preliminary Report (04-15-25 @ 23:01):    No growth at 24 hours    Urinalysis with Rflx Culture (collected 04-13-25 @ 15:05)    Culture - Urine (collected 04-13-25 @ 15:05)  Source: Catheterized None  Final Report (04-15-25 @ 19:27):    >100,000 CFU/ml Proteus mirabilis    <10,000 CFU/ml Normal Urogenital jake present  Organism: Proteus mirabilis (04-15-25 @ 19:27)  Organism: Proteus mirabilis (04-15-25 @ 19:27)      -  Levofloxacin: S <=0.5      -  Tobramycin: S <=2      -  Nitrofurantoin: R >64 Should not be used to treat pyelonephritis      -  Aztreonam: S <=4      -  Gentamicin: S <=2      -  Cefazolin: S <=2 For uncomplicated UTI with K. pneumoniae, E. coli, or P. mirablis: IZABELLA <=16 is sensitive and IZABELLA >=32 is resistant. This also predicts results for oral agents cefaclor, cefdinir, cefpodoxime, cefprozil, cefuroxime axetil, cephalexin and locarbef for uncomplicated UTI. Note that some isolates may be susceptible to these agents while testing resistant to cefazolin.      -  Cefepime: S <=2      -  Piperacillin/Tazobactam: S <=8      -  Ciprofloxacin: S <=0.25      -  Ceftriaxone: S <=1      -  Ampicillin: S <=8 These ampicillin results predict results for amoxicillin      Method Type: IZABELLA      -  Meropenem: S <=1      -  Ampicillin/Sulbactam: S <=4/2      -  Cefoxitin: S <=8      -  Cefuroxime: S <=4      -  Amoxicillin/Clavulanic Acid: S <=8/4      -  Trimethoprim/Sulfamethoxazole: S <=0.5/9.5      -  Ertapenem: S <=0.5    Urinalysis with Rflx Culture (collected 03-31-25 @ 04:20)    Culture - Urine (collected 03-31-25 @ 04:20)  Source: Catheterized None  Final Report (04-01-25 @ 12:23):    >=3 organisms. Probable collection contamination.        Blood Gas Venous - Lactate: 1.0 mmol/L (04-14-25 @ 20:50)  Lactate, Blood: 1.1 mmol/L (04-14-25 @ 16:16)      INFECTIOUS DISEASES TESTING  MRSA PCR Result.: Negative (01-17-25 @ 18:56)  MRSA PCR Result.: Negative (01-08-25 @ 16:03)  Procalcitonin: 1.80 (01-05-25 @ 19:42)  Procalcitonin: 0.09 (11-23-24 @ 06:19)  Procalcitonin: 0.11 (11-22-24 @ 13:42)  Rapid RVP Result: NotDetec (11-22-24 @ 02:23)      INFLAMMATORY MARKERS      RADIOLOGY & ADDITIONAL TESTS:  I have personally reviewed the last available Chest xray  CXR  Xray Chest 1 View AP/PA:   ACC: [id number] EXAM:  XR CHEST 1 VIEW   ORDERED BY: CELESTE CHAMBERS     PROCEDURE DATE:  04/14/2025          INTERPRETATION:  CLINICAL HISTORY: Trauma code    COMPARISON: Chest radiograph 1/15/2025, CT chest 4/14/2025.    TECHNIQUE: Portable frontal chest radiograph.    FINDINGS:    Support devices: None.    Cardiac/mediastinum/hilum: Calcified aortic atherosclerotic plaque.    Lung parenchyma/Pleura: No focal parenchymal opacities, pleural   effusions, or pneumothorax.    Skeleton/soft tissues: Right shoulder hemiarthroplasty. Degenerative   changes.      IMPRESSION:    No radiographic evidence of acute cardiopulmonary disease.    --- End of Report ---          BARRIE CRUZ MD; Resident Radiologist  This document has been electronically signed.  FABIÁN FARMER MD; Attending Interventional Radiologist  This document has been electronically signed. Apr 14 2025  5:39PM (04-14-25 @ 16:14)      CT  CT Chest w/ IV Cont:   ACC: [id number] EXAM:  CT ABDOMEN AND PELVIS IC   ORDERED BY: CELESTE CHAMBERS     ACC: [id number] EXAM:  CT CHEST IC   ORDERED BY: CELESTE CHAMBERS     PROCEDURE DATE:  04/14/2025          INTERPRETATION:  CLINICAL INFORMATION: Weakness, falls. Trauma to the   chest, abdomen and pelvis    COMPARISON: CT ABDOMEN/PELVIS 1/24/2025.    CONTRAST/COMPLICATIONS:  IV Contrast: Omnipaque 350 (accession [id number]), IV contrast documented   in unlinked concurrent exam (accession [id number])  95 cc administered   5   cc discarded  Oral Contrast: NONE    PROCEDURE:  CT of the Chest, Abdomen and Pelvis was performed.  Sagittal and coronal reformats were performed.    FINDINGS:  CHEST:  LUNGS AND LARGE AIRWAYS: Upper lobe predominant, centrilobular   emphysematous changes. Patent central airways. No pulmonary nodules.  PLEURA: No pleural effusion.  VESSELS: Coronary artery calcifications.  HEART: Heart size is normal. No pericardial effusion.  MEDIASTINUM AND ROOSEVELT: No lymphadenopathy.  CHEST WALL AND LOWER NECK: Bilateral gynecomastia    ABDOMEN AND PELVIS:  LIVER: Scattered subcentimeter hepatic hypodensities  BILE DUCTS: Normal caliber.  GALLBLADDER: Cholelithiasis.  SPLEEN: Within normal limits.  PANCREAS: Within normal limits.  ADRENALS: Within normal limits.  KIDNEYS/URETERS: Bilateral renal cysts. Subcentimeter hypodensities, too   small to further characterize. No hydronephrosis    BLADDER: Mild urinary bladder wall thickening perivesicular stranding.  REPRODUCTIVE ORGANS: Cobos catheter balloon within the prostate (sagittal   series 603, image 198)    BOWEL: Hiatal hernia. No bowel obstruction. Appendix is normal.  PERITONEUM/RETROPERITONEUM: Within normal limits.  VESSELS: Stable right common iliac artery aneurysm measuring   approximately 1.9 cm. Stable infrarenal abdominal aortic aneurysm   measuring 2.8 cm (series 4, image 275)  LYMPH NODES: No lymphadenopathy.  ABDOMINAL WALL: Within normal limits.  BONES: Status post right shoulder arthroplasty. Osteopenia. Mild L1   compression deformity, stable    IMPRESSION:  Cystitis with mild urinary bladder wall thickening. Recommend correlation   with urinalysis.    Cobos catheter balloon inflated within the prostate gland. Recommend   removal/repositioning      Findings transmitted to CELESTE TONEY; on 4/14/2025 8:04 PM.    --- End of Report ---            MAYLIN JON MD; Attending Radiologist  This document has been electronically signed. Apr 14 2025  8:04PM (04-14-25 @ 17:06)      CARDIOLOGY TESTING      MEDICATIONS  dextrose 5%. 1000 IV Continuous <Continuous>  dextrose 5%. 1000 IV Continuous <Continuous>  dextrose 50% Injectable 25 IV Push once  dextrose 50% Injectable 12.5 IV Push once  dextrose 50% Injectable 25 IV Push once  finasteride 5 Oral daily  gabapentin 300 Oral two times a day  glucagon  Injectable 1 IntraMuscular once  heparin   Injectable 5000 SubCutaneous every 12 hours  insulin lispro (ADMELOG) corrective regimen sliding scale  SubCutaneous three times a day before meals  lisinopril 20 Oral daily  metoprolol tartrate 25 Oral two times a day  multivitamin/minerals 1 Oral daily  NIFEdipine XL 90 Oral daily  OLANZapine 10 Oral daily  tamsulosin 0.4 Oral at bedtime  traZODone 50 Oral at bedtime      WEIGHT  Weight (kg): 65.8 (04-14-25 @ 14:50)  Creatinine: 1.2 mg/dL (04-16-25 @ 08:57)      ANTIBIOTICS:      All available historical records have been reviewed      
SUBJECTIVE/OVERNIGHT EVENTS  Today is hospital day 1d. This morning the patient was seen and examined at bedside, resting comfortably in bed. No acute or major events overnight. Patient is AOx2. ROS negative.     MEDICATIONS  STANDING MEDICATIONS  cefTRIAXone   IVPB 1000 milliGRAM(s) IV Intermittent every 24 hours  dextrose 5%. 1000 milliLiter(s) IV Continuous <Continuous>  dextrose 5%. 1000 milliLiter(s) IV Continuous <Continuous>  dextrose 50% Injectable 25 Gram(s) IV Push once  dextrose 50% Injectable 12.5 Gram(s) IV Push once  dextrose 50% Injectable 25 Gram(s) IV Push once  finasteride 5 milliGRAM(s) Oral daily  gabapentin 300 milliGRAM(s) Oral two times a day  glucagon  Injectable 1 milliGRAM(s) IntraMuscular once  heparin   Injectable 5000 Unit(s) SubCutaneous every 12 hours  insulin lispro (ADMELOG) corrective regimen sliding scale   SubCutaneous three times a day before meals  lisinopril 20 milliGRAM(s) Oral daily  metoprolol tartrate 25 milliGRAM(s) Oral two times a day  multivitamin/minerals 1 Tablet(s) Oral daily  NIFEdipine XL 90 milliGRAM(s) Oral daily  OLANZapine 10 milliGRAM(s) Oral daily  tamsulosin 0.4 milliGRAM(s) Oral at bedtime  traZODone 50 milliGRAM(s) Oral at bedtime    PRN MEDICATIONS  acetaminophen     Tablet .. 650 milliGRAM(s) Oral every 6 hours PRN  albuterol    90 MICROgram(s) HFA Inhaler 1 Puff(s) Inhalation four times a day PRN  aluminum hydroxide/magnesium hydroxide/simethicone Suspension 30 milliLiter(s) Oral every 4 hours PRN  dextrose Oral Gel 15 Gram(s) Oral once PRN  melatonin 3 milliGRAM(s) Oral at bedtime PRN    VITALS  T(F): 98.2 (04-15-25 @ 08:10), Max: 100 (04-14-25 @ 14:50)  HR: 68 (04-15-25 @ 08:10) (68 - 86)  BP: 154/52 (04-15-25 @ 08:10) (130/63 - 154/52)  RR: 19 (04-15-25 @ 08:10) (18 - 19)  SpO2: 96% (04-15-25 @ 08:10) (94% - 99%)  POCT Blood Glucose.: 91 mg/dL (04-15-25 @ 08:18)    PHYSICAL EXAM  GENERAL  NAD, lying in bed comfortably      HEAD  Atraumatic without hematoma  or  laceration. PERRL. EOMI.    NECK  Supple without  neck stiffness  or nuchal rigidity  no JVD.     HEART  Regular rate and rhthym, no murmurs rubs or gallops    LUNGS  Clear to auscultation bilaterally, no wheezing rales or rhonci    ABDOMEN  soft, nontender, nondistended, +BS    EXTREMITIES  no edema    NERVOUS SYSTEM  A&Ox3     CN II-XII:     ( X ) Intact     (  ) focal deficits  (Specify:     )     SKIN  No rashes or lesions. + bains cath in place    LABS             10.4   10.10 )-----------( 192      ( 04-14-25 @ 16:16 )             30.0     133  |  100  |  20  -------------------------<  101   04-15-25 @ 07:25  4.0  |  22  |  1.2    Ca      8.6     04-15-25 @ 07:25    TPro  7.1  /  Alb  3.9  /  TBili  0.2  /  DBili  x   /  AST  21  /  ALT  6   /  AlkPhos  87  /  GGT  x     04-14-25 @ 16:16    PT/INR - ( 04-14-25 @ 16:16 )   PT: 12.70 sec;   INR: 1.07 ratio  PTT - ( 04-14-25 @ 16:16 )  PTT:32.5 sec      Urinalysis Basic - ( 15 Apr 2025 07:25 )    Color: x / Appearance: x / SG: x / pH: x  Gluc: 101 mg/dL / Ketone: x  / Bili: x / Urobili: x   Blood: x / Protein: x / Nitrite: x   Leuk Esterase: x / RBC: x / WBC x   Sq Epi: x / Non Sq Epi: x / Bacteria: x    Urinalysis with Rflx Culture (collected 14 Apr 2025 18:19)  Urinalysis with Rflx Culture (collected 13 Apr 2025 15:05)    Culture - Urine (collected 13 Apr 2025 15:05)  Source: Catheterized None  Preliminary Report (14 Apr 2025 22:45):    >100,000 CFU/ml Proteus mirabilis    <10,000 CFU/ml Normal Urogenital jake present      IMAGING
  HERNANDO ADA  84y Male    INTERVAL HPI/OVERNIGHT EVENTS:    no fever  pt denies pain  appears comfortable in bed  PT recommends STR but I spoke to Julia from [facility] and the family wants the pt to return there.  Pt has a wheelchair and rollator at [facility] and they will have physical therapy see him there.    T(F): 98 (04-16-25 @ 12:15), Max: 98 (04-15-25 @ 23:57)  HR: 75 (04-16-25 @ 12:15) (70 - 80)  BP: 132/61 (04-16-25 @ 12:15) (120/65 - 173/65)  RR: 18 (04-16-25 @ 12:15) (16 - 18)  SpO2: 96% (04-16-25 @ 12:15) (95% - 99%)  I&O's Summary    15 Apr 2025 07:01  -  16 Apr 2025 07:00  --------------------------------------------------------  IN: 0 mL / OUT: 2400 mL / NET: -2400 mL      CAPILLARY BLOOD GLUCOSE      POCT Blood Glucose.: 116 mg/dL (16 Apr 2025 11:07)  POCT Blood Glucose.: 122 mg/dL (15 Apr 2025 22:38)  POCT Blood Glucose.: 108 mg/dL (15 Apr 2025 17:20)        PHYSICAL EXAM:  GENERAL: NAD  HEAD:  Normocephalic  EYES:  conjunctiva and sclera clear  ENMT: Moist mucous membranes  NERVOUS SYSTEM:  Alert, awake, fair concentration, stiff LE extremities, unable to check hip flexion  CHEST/LUNG: CTA b/l  HEART: Regular rate and rhythm  ABDOMEN: Soft, Nontender, Nondistended  EXTREMITIES:   No edema LE  SKIN: warm, dry   bains with clear yellow urine    Consultant(s) Notes Reviewed:  [x ] YES  [ ] NO  Care Discussed with Consultants/Other Providers [ x] YES  [ ] NO    MEDICATIONS  (STANDING):  chlorhexidine 2% Cloths 1 Application(s) Topical <User Schedule>  dextrose 5%. 1000 milliLiter(s) (50 mL/Hr) IV Continuous <Continuous>  dextrose 5%. 1000 milliLiter(s) (100 mL/Hr) IV Continuous <Continuous>  dextrose 50% Injectable 25 Gram(s) IV Push once  dextrose 50% Injectable 12.5 Gram(s) IV Push once  dextrose 50% Injectable 25 Gram(s) IV Push once  finasteride 5 milliGRAM(s) Oral daily  gabapentin 300 milliGRAM(s) Oral two times a day  glucagon  Injectable 1 milliGRAM(s) IntraMuscular once  heparin   Injectable 5000 Unit(s) SubCutaneous every 12 hours  insulin lispro (ADMELOG) corrective regimen sliding scale   SubCutaneous three times a day before meals  lisinopril 20 milliGRAM(s) Oral daily  metoprolol tartrate 25 milliGRAM(s) Oral two times a day  multivitamin/minerals 1 Tablet(s) Oral daily  NIFEdipine XL 90 milliGRAM(s) Oral daily  OLANZapine 10 milliGRAM(s) Oral daily  tamsulosin 0.4 milliGRAM(s) Oral at bedtime  traZODone 50 milliGRAM(s) Oral at bedtime    MEDICATIONS  (PRN):  acetaminophen     Tablet .. 650 milliGRAM(s) Oral every 6 hours PRN Temp greater or equal to 38C (100.4F), Mild Pain (1 - 3)  albuterol    90 MICROgram(s) HFA Inhaler 1 Puff(s) Inhalation four times a day PRN for shortness of breath and/or wheezing  aluminum hydroxide/magnesium hydroxide/simethicone Suspension 30 milliLiter(s) Oral every 4 hours PRN Dyspepsia  dextrose Oral Gel 15 Gram(s) Oral once PRN Blood Glucose LESS THAN 70 milliGRAM(s)/deciliter  melatonin 3 milliGRAM(s) Oral at bedtime PRN Insomnia      LABS:                        10.7   6.52  )-----------( 207      ( 16 Apr 2025 08:57 )             31.9     04-16    133[L]  |  98  |  16  ----------------------------<  94  4.2   |  23  |  1.2    Ca    9.3      16 Apr 2025 08:57  Mg     2.0     04-16    TPro  6.9  /  Alb  3.8  /  TBili  <0.2  /  DBili  x   /  AST  18  /  ALT  8   /  AlkPhos  98  04-16    PT/INR - ( 14 Apr 2025 16:16 )   PT: 12.70 sec;   INR: 1.07 ratio         PTT - ( 14 Apr 2025 16:16 )  PTT:32.5 sec    Urinalysis with Rflx Culture (collected 14 Apr 2025 18:19)    Culture - Urine (collected 14 Apr 2025 18:19)  Source: Clean Catch None  Preliminary Report (16 Apr 2025 01:10):    >100,000 CFU/ml Gram Negative Rods    Culture - Blood (collected 14 Apr 2025 18:10)  Source: Blood Blood  Preliminary Report (15 Apr 2025 23:01):    No growth at 24 hours    Culture - Blood (collected 14 Apr 2025 18:00)  Source: Blood Blood  Preliminary Report (15 Apr 2025 23:01):    No growth at 24 hours      Lactate, Blood: 1.1 mmol/L (04-14 @ 16:16)          RADIOLOGY & ADDITIONAL TESTS:    Imaging or report Personally Reviewed:  [ x] YES  [ ] NO    < from: CT Abdomen and Pelvis w/ IV Cont (04.14.25 @ 17:06) >  IMPRESSION:  Cystitis with mild urinary bladder wall thickening. Recommend correlation   with urinalysis.    Bains catheter balloon inflated within the prostate gland. Recommend   removal/repositioning      < end of copied text >        < from: CT Cervical Spine No Cont (04.14.25 @ 17:00) >  IMPRESSION:    CT HEAD:  No acute intracranial pathology or hemorrhage. No acute calvarial   fracture.    Stable chronic findings as above.    CT CERVICAL SPINE:  No acute fracture or subluxation.    Chronic mild compression deformities of C7 on T1.    < end of copied text >        < from: Xray Chest 1 View AP/PA (04.14.25 @ 16:14) >    IMPRESSION:    No radiographic evidence of acute cardiopulmonary disease.    < end of copied text >        < from: Xray Pelvis AP only (04.14.25 @ 16:13) >  FINDINGS/  IMPRESSION:    The left femoral neck is shortened compared to prior, suspicious for   underlying fracture. Correlate with CT abdomen and pelvis scheduled to be   performed same day for further evaluation. Redemonstration of marked   atherosclerotic plaques. Generalized degenerative changes.      < end of copied text >          < from: TTE Echo Complete w/o Contrast w/ Doppler (05.29.23 @ 10:08) >    Summary:   1. Left ventricular ejection fraction, by visual estimation, is 55 to   60%.   2. Normal global left ventricular systolic function.   3. Mildly increased LV wall thickness.   4. Spectral Doppler shows impaired relaxation pattern of left   ventricular myocardial filling (Grade I diastolic dysfunction).   5. Mildly increased RV wall thickness.   6. No evidence of mitral valve regurgitation.   7. Aortic valve thickening with decreasedleaflet opening.   8. Estimated pulmonary artery systolic pressure is 36.4 mmHg assuming a   right atrial pressure of 3 mmHg, which is consistent with borderline   pulmonary hypertension.   9. The aortic valve mean gradient is 19.0 mmHg consistent with mild   aortic stenosis.  10. Normal left atrial size.  11. Normal right atrial size.    < end of copied text >              Case discussed with residents and RN on rounds today    Care discussed with pt's family        
SUBJECTIVE/OVERNIGHT EVENTS  Today is hospital day 2d. This morning patient was seen and examined at bedside, resting comfortably in bed. No acute or major events overnight.    MEDICATIONS  STANDING MEDICATIONS  dextrose 5%. 1000 milliLiter(s) IV Continuous <Continuous>  dextrose 5%. 1000 milliLiter(s) IV Continuous <Continuous>  dextrose 50% Injectable 25 Gram(s) IV Push once  dextrose 50% Injectable 12.5 Gram(s) IV Push once  dextrose 50% Injectable 25 Gram(s) IV Push once  finasteride 5 milliGRAM(s) Oral daily  gabapentin 300 milliGRAM(s) Oral two times a day  glucagon  Injectable 1 milliGRAM(s) IntraMuscular once  heparin   Injectable 5000 Unit(s) SubCutaneous every 12 hours  insulin lispro (ADMELOG) corrective regimen sliding scale   SubCutaneous three times a day before meals  lisinopril 20 milliGRAM(s) Oral daily  metoprolol tartrate 25 milliGRAM(s) Oral two times a day  multivitamin/minerals 1 Tablet(s) Oral daily  NIFEdipine XL 90 milliGRAM(s) Oral daily  OLANZapine 10 milliGRAM(s) Oral daily  tamsulosin 0.4 milliGRAM(s) Oral at bedtime  traZODone 50 milliGRAM(s) Oral at bedtime    PRN MEDICATIONS  acetaminophen     Tablet .. 650 milliGRAM(s) Oral every 6 hours PRN  albuterol    90 MICROgram(s) HFA Inhaler 1 Puff(s) Inhalation four times a day PRN  aluminum hydroxide/magnesium hydroxide/simethicone Suspension 30 milliLiter(s) Oral every 4 hours PRN  dextrose Oral Gel 15 Gram(s) Oral once PRN  melatonin 3 milliGRAM(s) Oral at bedtime PRN    VITALS  T(F): 97.5 (04-16-25 @ 05:55), Max: 98 (04-15-25 @ 23:57)  HR: 78 (04-16-25 @ 05:55) (70 - 80)  BP: 173/65 (04-16-25 @ 05:55) (120/65 - 173/65)  RR: 18 (04-16-25 @ 05:55) (16 - 18)  SpO2: 99% (04-16-25 @ 05:55) (95% - 99%)  POCT Blood Glucose.: 122 mg/dL (04-15-25 @ 22:38)  POCT Blood Glucose.: 108 mg/dL (04-15-25 @ 17:20)  POCT Blood Glucose.: 121 mg/dL (04-15-25 @ 11:27)    PHYSICAL EXAM  GENERAL  ( X ) NAD, lying in bed comfortably     (  ) obtunded     (  ) lethargic     (  ) somnolent    HEAD  (  ) Atraumatic     (  ) hematoma     (  ) laceration (specify location:       )     NECK  (  ) Supple     (  ) neck stiffness     (  ) nuchal rigidity     (  )  no JVD     (  ) JVD present ( -- cm)    HEART  Rate -->  ( X ) normal rate    (  ) bradycardic    (  ) tachycardic  Rhythm -->  (  ) regular    (  ) regularly irregular    (  ) irregularly irregular  Murmurs -->  (  ) normal s1/s2    (  ) systolic murmur    (  ) diastolic murmur    (  ) continuous murmur     (  ) S3 present    (  ) S4 present    LUNGS  ( X ) Unlabored respirations     (  ) tachypnea  ( X ) B/L air entry     (  ) decreased breath sounds in:  (location     )    (  ) no adventitious sound     (  ) crackles     (  ) wheezing      (  ) rhonchi      (specify location:       )  (  ) chest wall tenderness (specify location:       )    ABDOMEN  ( X ) Soft     (  ) tense   |   (  ) nondistended     (  ) distended   |   (  ) +BS     (  ) hypoactive bowel sounds     (  ) hyperactive bowel sounds  (  ) nontender     (  ) RUQ tenderness     (  ) RLQ tenderness     (  ) LLQ tenderness     (  ) epigastric tenderness     (  ) diffuse tenderness  (  ) Splenomegaly      (  ) Hepatomegaly      (  ) Jaundice     (  ) ecchymosis     EXTREMITIES  (  ) Normal     (  ) Rash     (  ) ecchymosis     (  ) varicose veins      (  ) pitting edema     (  ) non-pitting edema   (  ) ulceration     (  ) gangrene:     (location:     )    NERVOUS SYSTEM  (  ) A&Ox3     (  ) confused     (  ) lethargic  CN II-XII:     (  ) Intact     (  ) focal deficits  (Specify:     )   Upper extremities:     (  ) strength X/5     (  ) focal deficit (specify:    )  Lower extremities:     (  ) strength  X/5    (  ) focal deficit (specify:    )    SKIN  (  ) No rashes or lesions     (  ) maculopapular rash     (  ) pustules     (  ) vesicles     (  ) ulcer     (  ) ecchymosis     (specify location:     )    (  ) Indwelling Cobos Catheter   Date insterted:    Reason (  ) Critical illness     (  ) urinary retention    (  ) Accurate Ins/Outs Monitoring     (  ) CMO patient      LABS             10.4   10.10 )-----------( 192      ( 04-14-25 @ 16:16 )             30.0     133  |  100  |  20  -------------------------<  101   04-15-25 @ 07:25  4.0  |  22  |  1.2    Ca      8.6     04-15-25 @ 07:25    TPro  7.1  /  Alb  3.9  /  TBili  0.2  /  DBili  x   /  AST  21  /  ALT  6   /  AlkPhos  87  /  GGT  x     04-14-25 @ 16:16    PT/INR - ( 04-14-25 @ 16:16 )   PT: 12.70 sec;   INR: 1.07 ratio  PTT - ( 04-14-25 @ 16:16 )  PTT:32.5 sec      Urinalysis Basic - ( 15 Apr 2025 07:25 )    Color: x / Appearance: x / SG: x / pH: x  Gluc: 101 mg/dL / Ketone: x  / Bili: x / Urobili: x   Blood: x / Protein: x / Nitrite: x   Leuk Esterase: x / RBC: x / WBC x   Sq Epi: x / Non Sq Epi: x / Bacteria: x          Urinalysis with Rflx Culture (collected 14 Apr 2025 18:19)    Culture - Urine (collected 14 Apr 2025 18:19)  Source: Clean Catch None  Preliminary Report (16 Apr 2025 01:10):    >100,000 CFU/ml Gram Negative Rods    Culture - Blood (collected 14 Apr 2025 18:10)  Source: Blood Blood  Preliminary Report (15 Apr 2025 23:01):    No growth at 24 hours    Culture - Blood (collected 14 Apr 2025 18:00)  Source: Blood Blood  Preliminary Report (15 Apr 2025 23:01):    No growth at 24 hours    Urinalysis with Rflx Culture (collected 13 Apr 2025 15:05)    Culture - Urine (collected 13 Apr 2025 15:05)  Source: Catheterized None  Final Report (15 Apr 2025 19:27):    >100,000 CFU/ml Proteus mirabilis    <10,000 CFU/ml Normal Urogenital jake present  Organism: Proteus mirabilis (15 Apr 2025 19:27)  Organism: Proteus mirabilis (15 Apr 2025 19:27)      IMAGING

## 2025-04-16 NOTE — PATIENT PROFILE ADULT - FALL HARM RISK - HARM RISK INTERVENTIONS

## 2025-04-16 NOTE — PROGRESS NOTE ADULT - ASSESSMENT
83 y/o man with PMH of COPD, HTN, DM type 2, schizophrenia, hyperlipidemia, UTIs, NHL and urinary retention with chronic Bains (changed in the ED on 4/13/25) presented to the ED from [facility] for evaluation of 2 unwitnessed falls. The patient is not sure of head trauma or LOC. Not on any anticoagulation. Patient denied any complaints. The patient was in ED 0n 4/13/2024 for urinary retention, Bains was changed, UA was positive for Cystitis and therefore he was discharged on PO Vantin. At presentation to the ED on 4/14 the bains was not draining well and therefore was deflated and advanced. Limited history as the patient is Aox1.     1. s/p 2 unwitnessed falls at adult home  Julia at [facility] will speak to the roommate to try to obtain more information  pt appears comfortable and denies pain  no fever  no UTI per ID (pt afebrile, no leukocytosis) - no abx at this time  blood cultures negative  mild dehydration and s/p IVF with improvement   bains repositioned in the ED on 4/14  CK level improved after IV hydration  trauma workup reviewed - radiology was contacted and stated that there was no fracture on the CT scan (re: findings of left femoral neck on xray)  pt seen by physical therapy and STR was recommended  per Julia, family wants pt to return to [facility] and have physical therapy there  he uses a wheelchair and rollator there  needs fall precautions    2. Hyponatremia - improved from admission  s/p IVF  continue PO hydration     3. BPH  on tamsulosin     4. COPD   -stable, c/w inhalers    5. DM 2  resume metformin on discharge    6. HTN  - c/w lisinopril, metoprolol and nifedipine    7. Schizophrenia  - c/w olanzapine and Trazodone    8. DVT prophylaxis  heparin SQ       83 y/o man with PMH of COPD, HTN, DM type 2, schizophrenia, hyperlipidemia, UTIs, NHL and urinary retention with chronic Bains (changed in the ED on 4/13/25) presented to the ED from [facility] for evaluation of 2 unwitnessed falls. The patient is not sure of head trauma or LOC. Not on any anticoagulation. Patient denied any complaints. The patient was in ED 0n 4/13/2024 for urinary retention, Bains was changed, UA was positive for Cystitis and therefore he was discharged on PO Vantin. At presentation to the ED on 4/14 the bains was not draining well and therefore was deflated and advanced. Limited history as the patient is Aox1.     1. s/p 2 unwitnessed falls at adult home  suspect that pt tried to get up by himself and fell down  he denies dizziness and pain  Julia at [facility] will speak to the roommate who was present and to try to obtain more information  no fever  no UTI per ID (pt afebrile, no leukocytosis) - no abx at this time  blood cultures negative  mild dehydration and s/p IVF with improvement   no orthostatic changes from lying to sitting but will send with ANAMIKA stockings for use while standing up for now - can stagger meds - give lisinopril qhs  bains repositioned in the ED on 4/14  CK level improved after IV hydration  trauma workup reviewed - radiology was contacted and stated that there was no fracture on the CT scan (re: findings of left femoral neck on xray)  pt seen by physical therapy and STR was recommended  per Julia, family wants pt to return to [facility] and have physical therapy there  he uses a wheelchair and rollator there  needs fall precautions    2. Hyponatremia - improved from admission  s/p IVF  continue PO hydration     3. BPH  on tamsulosin     4. COPD  stable, c/w inhalers    5. DM 2  resume metformin on discharge    6. HTN  - c/w lisinopril, metoprolol and nifedipine    7. Schizophrenia  - c/w olanzapine and Trazodone    8. DVT prophylaxis  heparin SQ      full code      case discussed with pt's sister Fatmata today - she would like the pt to return to [facility] and obtain physical therapy there  she is aware of the fall risk but does not think that he will benefit much from STR  she states that he does get up at times and tries to ambulate on his own (reminded pt to always ask for assistance before getting up)    med rec and discharge papers reviewed by me    spent 70 minutes on the discharge process of this pt including chart review, discussion with [facility] staff, [facility] staff and family.

## 2025-04-16 NOTE — PROGRESS NOTE ADULT - ASSESSMENT
84 yold male to with Pmhx of Copd, Htn, Dm, schizophrenia, Hld, non-hodgkins lymphoma, urinary retention with chronic Bains (changed yesterday), presents to the ED from [facility] for evaluation of  multiple falls. The patient is not sure of head trauma or LOC. Not on any anticoagulation. Patient denied any complaints. The patient was in ED 0n 4/13/2024 for urinary retention, Bains was changed, UA was positive for Cystitis and therefore the was discharged on PO Vantin. At presentation to ED today, the bains's was not draining well and therefore was deflated and advanced. Limited history as the patient is Aox1.     # Multiple fall 2/2 TME, unlikely UTI  - UA(4/14): WBC 21, Bacteria few, LE moderate. 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 Bains catheter balloon inflated within the prostate gland. Recommend removal/repositioning  - CT HEAD: No acute intracranial pathology or hemorrhage. No acute calvarial fracture.  - Xray Pelvis AP only (04.14.25 @ 16:13): The left femoral neck is shortened compared to prior, suspicious for underlying fracture. Correlate with CT abdomen and pelvis scheduled to be performed same day for further evaluation. Redemonstration of marked atherosclerotic plaques. Generalized degenerative changes.  - Vitals stable  - S/P: NS bolus 2L, Ceftriaxone 2gm IV x 1 in the ED.   - Bains repositioned   - Positive UCx reviewed by ID: likely bacteriuria of chronic bains - monitor off abx for now, unlikely UTI  - PT eval noted: dc to Rehab  - BCx NGTD    # Hyponatremia likely hypovolemic   # Elevated CK, ?Rhabdomyolysis   - Na 130, =-> follow up is 216  - S/P: NS bolus 2L 1 in the ED.   - Trend BMP  - Encourage PO intake  - Will continue with gentle hydration LR at 75cc/hr for next 12 hrs     #BPH  - cw tamsulosin     #h/o COPD  - Stable, c/w inhalers, nebs tx.     #DM 2  - Hold metformin. SSI here     #HTN/ HLD  - C/w home medications    #schizophrenia  - C/w olanzapine and Trazodone    #Misc  - DVT Prophylaxis: Heparin sq  - GI Prophylaxis: None  - Diet: DASH/ CC  - Activity: IAT  - Code Status: Full Code    Pending:   Monitoring clinically

## 2025-04-16 NOTE — DISCHARGE NOTE NURSING/CASE MANAGEMENT/SOCIAL WORK - FINANCIAL ASSISTANCE
[facility] provides services at a reduced cost to those who are determined to be eligible through [facility]’s financial assistance program. Information regarding [facility]’s financial assistance program can be found by going to [email or web address] or by calling [phone].

## 2025-04-16 NOTE — DISCHARGE NOTE PROVIDER - HOSPITAL COURSE
84 yold male to with Pmhx of Copd, Htn, Dm, schizophrenia, Hld, non-hodgkins lymphoma, urinary retention with chronic Bains (changed yesterday), presents to the ED from [facility] for evaluation of  multiple falls. The patient is not sure of head trauma or LOC. Not on any anticoagulation. Patient denied any complaints. The patient was in ED 0n 4/13/2024 for urinary retention, Bains was changed, UA was positive for Cystitis and therefore the was discharged on PO Vantin. At presentation to ED today, the bains was not draining well and therefore was deflated and advanced. Limited history as the patient is Aox1. Denied chest pain, SOB, aura, post ictal symptoms, seizure like activity, palpitations, N/V/D, abd pain or recent travel.     #ED Vitals:  T(C): 37 (04-14-25 @ 15:46), Max: 37.8 (04-14-25 @ 14:50)  HR: 86 (04-14-25 @ 15:46) (84 - 86)  BP: 130/63 (04-14-25 @ 15:46) (130/63 - 130/64)  RR: 18 (04-14-25 @ 15:46) (18 - 18)  SpO2: 97% (04-14-25 @ 15:46) (97% - 99%)    # Labs significant for: Hb 10.4(at baseline), Na 130, BUN 31, Cr 1.5(at baseline), eGFR 46, , UA: WBC 21, Bacteria few, LE moderate    # Imaging:  CT Abdomen and Pelvis w/ IV Cont (04.14.25 @ 17:06): Cystitis with mild urinary bladder wall thickening. Bains catheter balloon inflated within the prostate gland. Recommend removal/repositioning    CT HEAD: No acute intracranial pathology or hemorrhage. No acute calvarial fracture.    CT CERVICAL SPINE: No acute fracture or subluxation. Chronic mild compression deformities of C7 on T1.    Xray Pelvis AP only (04.14.25 @ 16:13): The left femoral neck is shortened compared to prior, suspicious for underlying fracture. Correlate with CT abdomen and pelvis scheduled to be performed same day for further evaluation. Redemonstration of marked atherosclerotic plaques. Generalized degenerative changes.    # S/P: NS bolus 2L, Ceftriaxone 2gm IV x 1 in the ED.     The patient is being admitted to medicine for the further management.     Patient's Bains was repositioned.   ID was consulted on the UCx results, and noted that it was likely not UTI but from Chronic baisn. Given patient does not have any infectious etiology, no WBC elevation, no fevers, VSS, recommending monitoring off abx.   Patient remained stable in 4A.       # Multiple fall 2/2 TME, unlikely UTI  - UA(4/14): WBC 21, Bacteria few, LE moderate. 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 Bains catheter balloon inflated within the prostate gland. Recommend removal/repositioning  - CT HEAD: No acute intracranial pathology or hemorrhage. No acute calvarial fracture.  - Xray Pelvis AP only (04.14.25 @ 16:13): The left femoral neck is shortened compared to prior, suspicious for underlying fracture. Correlate with CT abdomen and pelvis scheduled to be performed same day for further evaluation. Redemonstration of marked atherosclerotic plaques. Generalized degenerative changes.  - Vitals stable  - S/P: NS bolus 2L, Ceftriaxone 2gm IV x 1 in the ED.   - Bains repositioned   - Positive UCx reviewed by ID: likely bacteriuria of chronic bains - monitor off abx for now, unlikely UTI  - PT eval noted: dc to Rehab  - BCx NGTD    # Hyponatremia likely hypovolemic   # Elevated CK, ?Rhabdomyolysis   - Na 130, =-> follow up is 216  - S/P: NS bolus 2L 1 in the ED.   - Trend BMP - hyponatremia now improved  - Encourage PO intake  - s/p gentle hydration LR at 75cc/hr for next 12 hrs     #BPH  - cw tamsulosin     #h/o COPD  - Stable, c/w inhalers, nebs tx.     #DM 2  - resume metformin on discharge     #HTN/ HLD  - C/w home medications    #schizophrenia  - C/w olanzapine and Trazodone      Per radiology, no fracture on CT scan - pt is medically stable for discharge to [facility] today 83 y/o male to with Pmhx of Copd, Htn, Dm, schizophrenia, HLD, non-hodgkins lymphoma, urinary retention with chronic Bains (changed yesterday), presents to the ED from [facility] for evaluation of 2 unwitnessed falls. The patient is not sure of head trauma or LOC. Not on any anticoagulation. Patient denied any complaints. The patient was in ED 0n 4/13/2024 for urinary retention, Bains was changed, UA was positive for Cystitis and therefore the was discharged on PO Vantin. At presentation to ED today, the bains was not draining well and therefore was deflated and advanced. Limited history as the patient is Aox1. Denied chest pain, SOB, aura, post ictal symptoms, seizure like activity, palpitations, N/V/D, abd pain or recent travel.     #ED Vitals:  T(C): 37 (04-14-25 @ 15:46), Max: 37.8 (04-14-25 @ 14:50)  HR: 86 (04-14-25 @ 15:46) (84 - 86)  BP: 130/63 (04-14-25 @ 15:46) (130/63 - 130/64)  RR: 18 (04-14-25 @ 15:46) (18 - 18)  SpO2: 97% (04-14-25 @ 15:46) (97% - 99%)    # Labs significant for: Hb 10.4(at baseline), Na 130, BUN 31, Cr 1.5(at baseline), eGFR 46, , UA: WBC 21, Bacteria few, LE moderate    # Imaging:  CT Abdomen and Pelvis w/ IV Cont (04.14.25 @ 17:06): Cystitis with mild urinary bladder wall thickening. Bains catheter balloon inflated within the prostate gland. Recommend removal/repositioning    CT HEAD: No acute intracranial pathology or hemorrhage. No acute calvarial fracture.    CT CERVICAL SPINE: No acute fracture or subluxation. Chronic mild compression deformities of C7 on T1.    Xray Pelvis AP only (04.14.25 @ 16:13): The left femoral neck is shortened compared to prior, suspicious for underlying fracture. Correlate with CT abdomen and pelvis scheduled to be performed same day for further evaluation. Redemonstration of marked atherosclerotic plaques. Generalized degenerative changes.    # S/P: NS bolus 2L, Ceftriaxone 2gm IV x 1 in the ED.     The patient is being admitted to medicine for the further management.     Patient's Bains was repositioned.   ID was consulted on the UCx results, and noted that it was likely not UTI but from Chronic bains. Given patient does not have any infectious etiology, no WBC elevation, no fevers, VSS, recommending monitoring off abx.   Patient remained stable in 4A.       # Multiple fall 2/2 TME, unlikely UTI  - UA(4/14): WBC 21, Bacteria few, LE moderate. 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 Bains catheter balloon inflated within the prostate gland. Recommend removal/repositioning  - CT HEAD: No acute intracranial pathology or hemorrhage. No acute calvarial fracture.  - Xray Pelvis AP only (04.14.25 @ 16:13): The left femoral neck is shortened compared to prior, suspicious for underlying fracture. Correlate with CT abdomen and pelvis scheduled to be performed same day for further evaluation. Redemonstration of marked atherosclerotic plaques. Generalized degenerative changes.  - Vitals stable  - S/P: NS bolus 2L, Ceftriaxone 2gm IV x 1 in the ED.   - Bains repositioned   - Positive UCx reviewed by ID: likely bacteriuria of chronic bains - monitor off abx for now, unlikely UTI  - PT eval noted: STR recommended but after discussion with sister, family prefers return to [facility] with PT  - BCx NGTD    # Hyponatremia likely hypovolemic   # Elevated CK, ?Rhabdomyolysis   - Na 130, =-> follow up is 216  - S/P: NS bolus 2L 1 in the ED.   - Trend BMP - hyponatremia now improved  - Encourage PO intake  - s/p gentle hydration LR at 75cc/hr with improvement    #BPH  - cw tamsulosin     #h/o COPD  - Stable, c/w inhalers, nebs tx.     #DM 2  - resume metformin on discharge     #HTN/ HLD  - C/w home medications    #schizophrenia  - C/w olanzapine and Trazodone      Per radiology, no fracture on CT scan - pt is medically stable for discharge to back to [facility] today  family aware of the fall risk  pt reminded to always ask for assistance prior to getting up

## 2025-04-16 NOTE — DISCHARGE NOTE NURSING/CASE MANAGEMENT/SOCIAL WORK - PATIENT PORTAL LINK FT
You can access the FollowMyHealth Patient Portal offered by [facility] by registering at the following website: [email or web address]. By joining [employer]’s FollowMyHealth portal, you will also be able to view your health information using other applications (apps) compatible with our system.

## 2025-04-17 LAB
-  AMOXICILLIN/CLAVULANIC ACID: SIGNIFICANT CHANGE UP
-  AMPICILLIN/SULBACTAM: SIGNIFICANT CHANGE UP
-  AMPICILLIN: SIGNIFICANT CHANGE UP
-  AZTREONAM: SIGNIFICANT CHANGE UP
-  CEFAZOLIN: SIGNIFICANT CHANGE UP
-  CEFEPIME: SIGNIFICANT CHANGE UP
-  CEFOXITIN: SIGNIFICANT CHANGE UP
-  CEFTRIAXONE: SIGNIFICANT CHANGE UP
-  CEFUROXIME: SIGNIFICANT CHANGE UP
-  CIPROFLOXACIN: SIGNIFICANT CHANGE UP
-  ERTAPENEM: SIGNIFICANT CHANGE UP
-  GENTAMICIN: SIGNIFICANT CHANGE UP
-  LEVOFLOXACIN: SIGNIFICANT CHANGE UP
-  MEROPENEM: SIGNIFICANT CHANGE UP
-  NITROFURANTOIN: SIGNIFICANT CHANGE UP
-  PIPERACILLIN/TAZOBACTAM: SIGNIFICANT CHANGE UP
-  TOBRAMYCIN: SIGNIFICANT CHANGE UP
-  TRIMETHOPRIM/SULFAMETHOXAZOLE: SIGNIFICANT CHANGE UP
CULTURE RESULTS: ABNORMAL
METHOD TYPE: SIGNIFICANT CHANGE UP
ORGANISM # SPEC MICROSCOPIC CNT: ABNORMAL
ORGANISM # SPEC MICROSCOPIC CNT: SIGNIFICANT CHANGE UP
SPECIMEN SOURCE: SIGNIFICANT CHANGE UP

## 2025-04-19 ENCOUNTER — EMERGENCY (EMERGENCY)
Facility: HOSPITAL | Age: 85
LOS: 0 days | Discharge: HOME | End: 2025-04-19
Attending: EMERGENCY MEDICINE
Payer: MEDICARE

## 2025-04-19 VITALS
SYSTOLIC BLOOD PRESSURE: 126 MMHG | DIASTOLIC BLOOD PRESSURE: 72 MMHG | TEMPERATURE: 99 F | HEART RATE: 80 BPM | RESPIRATION RATE: 18 BRPM | OXYGEN SATURATION: 95 %

## 2025-04-19 VITALS
SYSTOLIC BLOOD PRESSURE: 200 MMHG | DIASTOLIC BLOOD PRESSURE: 90 MMHG | TEMPERATURE: 97 F | HEIGHT: 66 IN | OXYGEN SATURATION: 96 % | HEART RATE: 106 BPM | RESPIRATION RATE: 20 BRPM

## 2025-04-19 DIAGNOSIS — Z12.11 ENCOUNTER FOR SCREENING FOR MALIGNANT NEOPLASM OF COLON: Chronic | ICD-10-CM

## 2025-04-19 DIAGNOSIS — E78.5 HYPERLIPIDEMIA, UNSPECIFIED: ICD-10-CM

## 2025-04-19 DIAGNOSIS — N30.00 ACUTE CYSTITIS WITHOUT HEMATURIA: ICD-10-CM

## 2025-04-19 DIAGNOSIS — I10 ESSENTIAL (PRIMARY) HYPERTENSION: ICD-10-CM

## 2025-04-19 DIAGNOSIS — E11.9 TYPE 2 DIABETES MELLITUS WITHOUT COMPLICATIONS: ICD-10-CM

## 2025-04-19 DIAGNOSIS — T83.098A OTHER MECHANICAL COMPLICATION OF OTHER URINARY CATHETER, INITIAL ENCOUNTER: ICD-10-CM

## 2025-04-19 DIAGNOSIS — F20.9 SCHIZOPHRENIA, UNSPECIFIED: ICD-10-CM

## 2025-04-19 DIAGNOSIS — J44.9 CHRONIC OBSTRUCTIVE PULMONARY DISEASE, UNSPECIFIED: ICD-10-CM

## 2025-04-19 DIAGNOSIS — Z85.72 PERSONAL HISTORY OF NON-HODGKIN LYMPHOMAS: ICD-10-CM

## 2025-04-19 LAB
ANION GAP SERPL CALC-SCNC: 14 MMOL/L — SIGNIFICANT CHANGE UP (ref 7–14)
APPEARANCE UR: ABNORMAL
BILIRUB UR-MCNC: NEGATIVE — SIGNIFICANT CHANGE UP
BUN SERPL-MCNC: 23 MG/DL — HIGH (ref 10–20)
CALCIUM SERPL-MCNC: 9.2 MG/DL — SIGNIFICANT CHANGE UP (ref 8.4–10.5)
CHLORIDE SERPL-SCNC: 97 MMOL/L — LOW (ref 98–110)
CO2 SERPL-SCNC: 22 MMOL/L — SIGNIFICANT CHANGE UP (ref 17–32)
COLOR SPEC: YELLOW — SIGNIFICANT CHANGE UP
CREAT SERPL-MCNC: 1.4 MG/DL — SIGNIFICANT CHANGE UP (ref 0.7–1.5)
CULTURE RESULTS: SIGNIFICANT CHANGE UP
CULTURE RESULTS: SIGNIFICANT CHANGE UP
DIFF PNL FLD: ABNORMAL
EGFR: 50 ML/MIN/1.73M2 — LOW
EGFR: 50 ML/MIN/1.73M2 — LOW
GLUCOSE SERPL-MCNC: 112 MG/DL — HIGH (ref 70–99)
GLUCOSE UR QL: NEGATIVE MG/DL — SIGNIFICANT CHANGE UP
HCT VFR BLD CALC: 32 % — LOW (ref 42–52)
HGB BLD-MCNC: 11.2 G/DL — LOW (ref 14–18)
KETONES UR-MCNC: NEGATIVE MG/DL — SIGNIFICANT CHANGE UP
LEUKOCYTE ESTERASE UR-ACNC: ABNORMAL
MCHC RBC-ENTMCNC: 30.9 PG — SIGNIFICANT CHANGE UP (ref 27–31)
MCHC RBC-ENTMCNC: 35 G/DL — SIGNIFICANT CHANGE UP (ref 32–37)
MCV RBC AUTO: 88.2 FL — SIGNIFICANT CHANGE UP (ref 80–94)
NITRITE UR-MCNC: NEGATIVE — SIGNIFICANT CHANGE UP
NRBC BLD AUTO-RTO: 0 /100 WBCS — SIGNIFICANT CHANGE UP (ref 0–0)
PH UR: 6.5 — SIGNIFICANT CHANGE UP (ref 5–8)
PLATELET # BLD AUTO: 240 K/UL — SIGNIFICANT CHANGE UP (ref 130–400)
PMV BLD: 10.8 FL — HIGH (ref 7.4–10.4)
POTASSIUM SERPL-MCNC: 4.3 MMOL/L — SIGNIFICANT CHANGE UP (ref 3.5–5)
POTASSIUM SERPL-SCNC: 4.3 MMOL/L — SIGNIFICANT CHANGE UP (ref 3.5–5)
PROT UR-MCNC: 300 MG/DL
RBC # BLD: 3.63 M/UL — LOW (ref 4.7–6.1)
RBC # FLD: 14.1 % — SIGNIFICANT CHANGE UP (ref 11.5–14.5)
SODIUM SERPL-SCNC: 133 MMOL/L — LOW (ref 135–146)
SP GR SPEC: 1.02 — SIGNIFICANT CHANGE UP (ref 1–1.03)
SPECIMEN SOURCE: SIGNIFICANT CHANGE UP
SPECIMEN SOURCE: SIGNIFICANT CHANGE UP
UROBILINOGEN FLD QL: 0.2 MG/DL — SIGNIFICANT CHANGE UP (ref 0.2–1)
WBC # BLD: 11.55 K/UL — HIGH (ref 4.8–10.8)
WBC # FLD AUTO: 11.55 K/UL — HIGH (ref 4.8–10.8)

## 2025-04-19 PROCEDURE — 36000 PLACE NEEDLE IN VEIN: CPT

## 2025-04-19 PROCEDURE — 81001 URINALYSIS AUTO W/SCOPE: CPT

## 2025-04-19 PROCEDURE — 36415 COLL VENOUS BLD VENIPUNCTURE: CPT

## 2025-04-19 PROCEDURE — 99283 EMERGENCY DEPT VISIT LOW MDM: CPT | Mod: 25

## 2025-04-19 PROCEDURE — 87186 SC STD MICRODIL/AGAR DIL: CPT

## 2025-04-19 PROCEDURE — 51702 INSERT TEMP BLADDER CATH: CPT

## 2025-04-19 PROCEDURE — 80048 BASIC METABOLIC PNL TOTAL CA: CPT

## 2025-04-19 PROCEDURE — 85027 COMPLETE CBC AUTOMATED: CPT

## 2025-04-19 PROCEDURE — 87077 CULTURE AEROBIC IDENTIFY: CPT

## 2025-04-19 PROCEDURE — 87086 URINE CULTURE/COLONY COUNT: CPT

## 2025-04-19 PROCEDURE — 99284 EMERGENCY DEPT VISIT MOD MDM: CPT | Mod: 25

## 2025-04-19 RX ORDER — CEFDINIR 250 MG/5ML
1 POWDER, FOR SUSPENSION ORAL
Qty: 20 | Refills: 0
Start: 2025-04-19 | End: 2025-04-28

## 2025-04-21 ENCOUNTER — INPATIENT (INPATIENT)
Facility: HOSPITAL | Age: 85
LOS: 3 days | Discharge: NURSING, REHAB OR LONG-TERM CARE | DRG: 872 | End: 2025-04-25
Attending: STUDENT IN AN ORGANIZED HEALTH CARE EDUCATION/TRAINING PROGRAM | Admitting: STUDENT IN AN ORGANIZED HEALTH CARE EDUCATION/TRAINING PROGRAM
Payer: MEDICARE

## 2025-04-21 VITALS
HEIGHT: 66 IN | DIASTOLIC BLOOD PRESSURE: 56 MMHG | SYSTOLIC BLOOD PRESSURE: 95 MMHG | TEMPERATURE: 98 F | HEART RATE: 62 BPM

## 2025-04-21 DIAGNOSIS — Z12.11 ENCOUNTER FOR SCREENING FOR MALIGNANT NEOPLASM OF COLON: Chronic | ICD-10-CM

## 2025-04-21 DIAGNOSIS — A41.89 OTHER SPECIFIED SEPSIS: ICD-10-CM

## 2025-04-21 LAB
ALBUMIN SERPL ELPH-MCNC: 3.8 G/DL — SIGNIFICANT CHANGE UP (ref 3.5–5.2)
ALP SERPL-CCNC: 105 U/L — SIGNIFICANT CHANGE UP (ref 30–115)
ALT FLD-CCNC: 12 U/L — SIGNIFICANT CHANGE UP (ref 0–41)
ANION GAP SERPL CALC-SCNC: 16 MMOL/L — HIGH (ref 7–14)
APPEARANCE UR: ABNORMAL
APTT BLD: 30.7 SEC — SIGNIFICANT CHANGE UP (ref 27–39.2)
AST SERPL-CCNC: 28 U/L — SIGNIFICANT CHANGE UP (ref 0–41)
BACTERIA # UR AUTO: ABNORMAL /HPF
BASE EXCESS BLDV CALC-SCNC: -2.9 MMOL/L — LOW (ref -2–3)
BASOPHILS # BLD AUTO: 0 K/UL — SIGNIFICANT CHANGE UP (ref 0–0.2)
BASOPHILS NFR BLD AUTO: 0 % — SIGNIFICANT CHANGE UP (ref 0–1)
BILIRUB SERPL-MCNC: <0.2 MG/DL — SIGNIFICANT CHANGE UP (ref 0.2–1.2)
BILIRUB UR-MCNC: NEGATIVE — SIGNIFICANT CHANGE UP
BUN SERPL-MCNC: 55 MG/DL — HIGH (ref 10–20)
BURR CELLS BLD QL SMEAR: PRESENT — SIGNIFICANT CHANGE UP
CA-I SERPL-SCNC: 1.19 MMOL/L — SIGNIFICANT CHANGE UP (ref 1.15–1.33)
CALCIUM SERPL-MCNC: 8.9 MG/DL — SIGNIFICANT CHANGE UP (ref 8.4–10.5)
CAST: 41 /LPF — HIGH (ref 0–4)
CHLORIDE SERPL-SCNC: 94 MMOL/L — LOW (ref 98–110)
CO2 SERPL-SCNC: 21 MMOL/L — SIGNIFICANT CHANGE UP (ref 17–32)
COLOR SPEC: SIGNIFICANT CHANGE UP
CREAT SERPL-MCNC: 2.5 MG/DL — HIGH (ref 0.7–1.5)
DIFF PNL FLD: ABNORMAL
EGFR: 25 ML/MIN/1.73M2 — LOW
EGFR: 25 ML/MIN/1.73M2 — LOW
EOSINOPHIL # BLD AUTO: 0 K/UL — SIGNIFICANT CHANGE UP (ref 0–0.7)
EOSINOPHIL NFR BLD AUTO: 0 % — SIGNIFICANT CHANGE UP (ref 0–8)
GAS PNL BLDV: 130 MMOL/L — LOW (ref 136–145)
GAS PNL BLDV: SIGNIFICANT CHANGE UP
GAS PNL BLDV: SIGNIFICANT CHANGE UP
GIANT PLATELETS BLD QL SMEAR: PRESENT — SIGNIFICANT CHANGE UP
GLUCOSE SERPL-MCNC: 107 MG/DL — HIGH (ref 70–99)
GLUCOSE UR QL: NEGATIVE MG/DL — SIGNIFICANT CHANGE UP
HCO3 BLDV-SCNC: 24 MMOL/L — SIGNIFICANT CHANGE UP (ref 22–29)
HCT VFR BLD CALC: 23.9 % — LOW (ref 42–52)
HCT VFR BLDA CALC: 30 % — LOW (ref 39–51)
HGB BLD CALC-MCNC: 10 G/DL — LOW (ref 12.6–17.4)
HGB BLD-MCNC: 8 G/DL — LOW (ref 14–18)
INR BLD: 1.15 RATIO — SIGNIFICANT CHANGE UP (ref 0.65–1.3)
KETONES UR-MCNC: NEGATIVE MG/DL — SIGNIFICANT CHANGE UP
LACTATE BLDV-MCNC: 1.6 MMOL/L — SIGNIFICANT CHANGE UP (ref 0.5–2)
LACTATE SERPL-SCNC: 1.9 MMOL/L — SIGNIFICANT CHANGE UP (ref 0.7–2)
LEUKOCYTE ESTERASE UR-ACNC: ABNORMAL
LYMPHOCYTES # BLD AUTO: 0.53 K/UL — LOW (ref 1.2–3.4)
LYMPHOCYTES # BLD AUTO: 6 % — LOW (ref 20.5–51.1)
MANUAL SMEAR VERIFICATION: SIGNIFICANT CHANGE UP
MCHC RBC-ENTMCNC: 30.7 PG — SIGNIFICANT CHANGE UP (ref 27–31)
MCHC RBC-ENTMCNC: 33.5 G/DL — SIGNIFICANT CHANGE UP (ref 32–37)
MCV RBC AUTO: 91.6 FL — SIGNIFICANT CHANGE UP (ref 80–94)
MONOCYTES # BLD AUTO: 0.91 K/UL — HIGH (ref 0.1–0.6)
MONOCYTES NFR BLD AUTO: 10.3 % — HIGH (ref 1.7–9.3)
NEUTROPHILS # BLD AUTO: 7.35 K/UL — HIGH (ref 1.4–6.5)
NEUTROPHILS NFR BLD AUTO: 82.9 % — HIGH (ref 42.2–75.2)
NITRITE UR-MCNC: NEGATIVE — SIGNIFICANT CHANGE UP
PCO2 BLDV: 48 MMHG — SIGNIFICANT CHANGE UP (ref 42–55)
PH BLDV: 7.3 — LOW (ref 7.32–7.43)
PH UR: 6 — SIGNIFICANT CHANGE UP (ref 5–8)
PLAT MORPH BLD: NORMAL — SIGNIFICANT CHANGE UP
PLATELET # BLD AUTO: 183 K/UL — SIGNIFICANT CHANGE UP (ref 130–400)
PMV BLD: 10.1 FL — SIGNIFICANT CHANGE UP (ref 7.4–10.4)
PO2 BLDV: 28 MMHG — SIGNIFICANT CHANGE UP (ref 25–45)
POIKILOCYTOSIS BLD QL AUTO: SLIGHT — SIGNIFICANT CHANGE UP
POLYCHROMASIA BLD QL SMEAR: SLIGHT — SIGNIFICANT CHANGE UP
POTASSIUM BLDV-SCNC: 4.9 MMOL/L — SIGNIFICANT CHANGE UP (ref 3.5–5.1)
POTASSIUM SERPL-MCNC: 5.1 MMOL/L — HIGH (ref 3.5–5)
POTASSIUM SERPL-SCNC: 5.1 MMOL/L — HIGH (ref 3.5–5)
PROT SERPL-MCNC: 7.2 G/DL — SIGNIFICANT CHANGE UP (ref 6–8)
PROT UR-MCNC: 100 MG/DL
PROTHROM AB SERPL-ACNC: 13.6 SEC — HIGH (ref 9.95–12.87)
RBC # BLD: 2.61 M/UL — LOW (ref 4.7–6.1)
RBC # FLD: 14.6 % — HIGH (ref 11.5–14.5)
RBC BLD AUTO: ABNORMAL
RBC CASTS # UR COMP ASSIST: 8 /HPF — HIGH (ref 0–4)
SAO2 % BLDV: 44.1 % — LOW (ref 67–88)
SODIUM SERPL-SCNC: 131 MMOL/L — LOW (ref 135–146)
SP GR SPEC: 1.02 — SIGNIFICANT CHANGE UP (ref 1–1.03)
SQUAMOUS # UR AUTO: 4 /HPF — SIGNIFICANT CHANGE UP (ref 0–5)
TROPONIN T, HIGH SENSITIVITY RESULT: 31 NG/L — HIGH (ref 6–21)
TROPONIN T, HIGH SENSITIVITY RESULT: 39 NG/L — HIGH (ref 6–21)
UROBILINOGEN FLD QL: 0.2 MG/DL — SIGNIFICANT CHANGE UP (ref 0.2–1)
VARIANT LYMPHS # BLD: 0.8 % — SIGNIFICANT CHANGE UP (ref 0–5)
VARIANT LYMPHS NFR BLD MANUAL: 0.8 % — SIGNIFICANT CHANGE UP (ref 0–5)
WBC # BLD: 8.87 K/UL — SIGNIFICANT CHANGE UP (ref 4.8–10.8)
WBC # FLD AUTO: 8.87 K/UL — SIGNIFICANT CHANGE UP (ref 4.8–10.8)
WBC UR QL: >998 /HPF — HIGH (ref 0–5)

## 2025-04-21 PROCEDURE — 83540 ASSAY OF IRON: CPT

## 2025-04-21 PROCEDURE — 97162 PT EVAL MOD COMPLEX 30 MIN: CPT | Mod: GP

## 2025-04-21 PROCEDURE — 97530 THERAPEUTIC ACTIVITIES: CPT | Mod: GP

## 2025-04-21 PROCEDURE — 80053 COMPREHEN METABOLIC PANEL: CPT

## 2025-04-21 PROCEDURE — 80202 ASSAY OF VANCOMYCIN: CPT

## 2025-04-21 PROCEDURE — 83735 ASSAY OF MAGNESIUM: CPT

## 2025-04-21 PROCEDURE — 83935 ASSAY OF URINE OSMOLALITY: CPT

## 2025-04-21 PROCEDURE — 87070 CULTURE OTHR SPECIMN AEROBIC: CPT

## 2025-04-21 PROCEDURE — 0241U: CPT

## 2025-04-21 PROCEDURE — 87077 CULTURE AEROBIC IDENTIFY: CPT

## 2025-04-21 PROCEDURE — 83550 IRON BINDING TEST: CPT

## 2025-04-21 PROCEDURE — 86900 BLOOD TYPING SEROLOGIC ABO: CPT

## 2025-04-21 PROCEDURE — 93306 TTE W/DOPPLER COMPLETE: CPT

## 2025-04-21 PROCEDURE — 80048 BASIC METABOLIC PNL TOTAL CA: CPT

## 2025-04-21 PROCEDURE — 84466 ASSAY OF TRANSFERRIN: CPT

## 2025-04-21 PROCEDURE — 71045 X-RAY EXAM CHEST 1 VIEW: CPT | Mod: 26

## 2025-04-21 PROCEDURE — 84133 ASSAY OF URINE POTASSIUM: CPT

## 2025-04-21 PROCEDURE — 82962 GLUCOSE BLOOD TEST: CPT

## 2025-04-21 PROCEDURE — 86850 RBC ANTIBODY SCREEN: CPT

## 2025-04-21 PROCEDURE — 85025 COMPLETE CBC W/AUTO DIFF WBC: CPT

## 2025-04-21 PROCEDURE — 74177 CT ABD & PELVIS W/CONTRAST: CPT | Mod: 26

## 2025-04-21 PROCEDURE — 87640 STAPH A DNA AMP PROBE: CPT

## 2025-04-21 PROCEDURE — 70450 CT HEAD/BRAIN W/O DYE: CPT | Mod: 26

## 2025-04-21 PROCEDURE — 36415 COLL VENOUS BLD VENIPUNCTURE: CPT

## 2025-04-21 PROCEDURE — 81001 URINALYSIS AUTO W/SCOPE: CPT

## 2025-04-21 PROCEDURE — 93010 ELECTROCARDIOGRAM REPORT: CPT

## 2025-04-21 PROCEDURE — 84300 ASSAY OF URINE SODIUM: CPT

## 2025-04-21 PROCEDURE — 99291 CRITICAL CARE FIRST HOUR: CPT | Mod: GC

## 2025-04-21 PROCEDURE — 86901 BLOOD TYPING SEROLOGIC RH(D): CPT

## 2025-04-21 PROCEDURE — 84540 ASSAY OF URINE/UREA-N: CPT

## 2025-04-21 PROCEDURE — 82728 ASSAY OF FERRITIN: CPT

## 2025-04-21 PROCEDURE — 82570 ASSAY OF URINE CREATININE: CPT

## 2025-04-21 PROCEDURE — 93970 EXTREMITY STUDY: CPT

## 2025-04-21 PROCEDURE — 87641 MR-STAPH DNA AMP PROBE: CPT

## 2025-04-21 PROCEDURE — 99223 1ST HOSP IP/OBS HIGH 75: CPT

## 2025-04-21 PROCEDURE — 87186 SC STD MICRODIL/AGAR DIL: CPT

## 2025-04-21 PROCEDURE — 84484 ASSAY OF TROPONIN QUANT: CPT

## 2025-04-21 PROCEDURE — 84156 ASSAY OF PROTEIN URINE: CPT

## 2025-04-21 PROCEDURE — 84145 PROCALCITONIN (PCT): CPT

## 2025-04-21 RX ORDER — ACETAMINOPHEN 500 MG/5ML
650 LIQUID (ML) ORAL EVERY 6 HOURS
Refills: 0 | Status: DISCONTINUED | OUTPATIENT
Start: 2025-04-21 | End: 2025-04-25

## 2025-04-21 RX ORDER — PIPERACILLIN-TAZO-DEXTROSE,ISO 2.25G/50ML
3.38 IV SOLUTION, PIGGYBACK PREMIX FROZEN(ML) INTRAVENOUS ONCE
Refills: 0 | Status: COMPLETED | OUTPATIENT
Start: 2025-04-21 | End: 2025-04-21

## 2025-04-21 RX ORDER — VANCOMYCIN HCL IN 5 % DEXTROSE 1.5G/250ML
1000 PLASTIC BAG, INJECTION (ML) INTRAVENOUS ONCE
Refills: 0 | Status: COMPLETED | OUTPATIENT
Start: 2025-04-21 | End: 2025-04-21

## 2025-04-21 RX ORDER — SODIUM CHLORIDE 9 G/1000ML
1000 INJECTION, SOLUTION INTRAVENOUS ONCE
Refills: 0 | Status: COMPLETED | OUTPATIENT
Start: 2025-04-21 | End: 2025-04-21

## 2025-04-21 RX ORDER — SODIUM CHLORIDE 9 G/1000ML
2000 INJECTION, SOLUTION INTRAVENOUS ONCE
Refills: 0 | Status: COMPLETED | OUTPATIENT
Start: 2025-04-21 | End: 2025-04-21

## 2025-04-21 RX ORDER — PIPERACILLIN-TAZO-DEXTROSE,ISO 2.25G/50ML
3.38 IV SOLUTION, PIGGYBACK PREMIX FROZEN(ML) INTRAVENOUS ONCE
Refills: 0 | Status: DISCONTINUED | OUTPATIENT
Start: 2025-04-21 | End: 2025-04-21

## 2025-04-21 RX ORDER — DOXYCYCLINE HYCLATE 100 MG
100 TABLET ORAL ONCE
Refills: 0 | Status: COMPLETED | OUTPATIENT
Start: 2025-04-21 | End: 2025-04-21

## 2025-04-21 RX ADMIN — SODIUM CHLORIDE 1000 MILLILITER(S): 9 INJECTION, SOLUTION INTRAVENOUS at 23:12

## 2025-04-21 RX ADMIN — Medication 200 GRAM(S): at 23:12

## 2025-04-21 RX ADMIN — SODIUM CHLORIDE 2000 MILLILITER(S): 9 INJECTION, SOLUTION INTRAVENOUS at 21:09

## 2025-04-21 RX ADMIN — Medication 250 MILLIGRAM(S): at 20:09

## 2025-04-21 RX ADMIN — Medication 100 MILLIGRAM(S): at 23:12

## 2025-04-21 RX ADMIN — SODIUM CHLORIDE 2000 MILLILITER(S): 9 INJECTION, SOLUTION INTRAVENOUS at 20:09

## 2025-04-21 NOTE — H&P ADULT - NSHPPHYSICALEXAM_GEN_ALL_CORE
GENERAL:   HEENT:  NECK:   CARDIO:   RESPIRATORY:   ABDOMEN:   EXTREMITIES:   NEURO:   SKIN: GENERAL: NAD  HEENT: Sclera clear, EOMI  NECK: Supple, no stiffness, no JVD   CARDIO: Bradycardic, regular rhythm   RESPIRATORY: Unlabored respirations, b/l air entry, no wheezing or crackles   ABDOMEN: Soft, nontender, nondistended; +BS  EXTREMITIES: No clubbing, cyanosis, or clubbing  : Cobos in place, draining clear yellow urine   NEURO: AOx1, lethargic, not responding to questions   SKIN: Warm and dry

## 2025-04-21 NOTE — H&P ADULT - NSHPLABSRESULTS_GEN_ALL_CORE
LABS:                          8.0    8.87  )-----------( 183      ( 2025 21:36 )             23.9     04-21    131[L]  |  94[L]  |  55[H]  ----------------------------<  107[H]  5.1[H]   |  21  |  2.5[H]    Ca    8.9      2025 20:33    TPro  7.2  /  Alb  3.8  /  TBili  <0.2  /  DBili  x   /  AST  28  /  ALT  12  /  AlkPhos  105  04-21    LIVER FUNCTIONS - ( 2025 20:33 )  Alb: 3.8 g/dL / Pro: 7.2 g/dL / ALK PHOS: 105 U/L / ALT: 12 U/L / AST: 28 U/L / GGT: x           PT/INR - ( 2025 20:33 )   PT: 13.60 sec;   INR: 1.15 ratio         PTT - ( 2025 20:33 )  PTT:30.7 sec  Urinalysis Basic - ( 2025 21:39 )    Color: Dark Yellow / Appearance: Turbid / S.023 / pH: x  Gluc: x / Ketone: Negative mg/dL  / Bili: Negative / Urobili: 0.2 mg/dL   Blood: x / Protein: 100 mg/dL / Nitrite: Negative   Leuk Esterase: Large / RBC: 8 /HPF / WBC >998 /HPF   Sq Epi: x / Non Sq Epi: 4 /HPF / Bacteria: Many /HPF

## 2025-04-21 NOTE — H&P ADULT - PATIENT'S PREFERRED PRONOUN
Using a  [id number] pt states he got a letter in the mail for a cln please call thank you - pt prefers a Saturday procedure date Him/He

## 2025-04-21 NOTE — ED PROVIDER NOTE - CLINICAL SUMMARY MEDICAL DECISION MAKING FREE TEXT BOX
84-year-old male with past medical history of COPD, hypertension, diabetes, schizophrenia, hyperlipidemia, non-Hodgkin's lymphoma, chronic urinary retention presents to the emergency department from [facility] for reported episode of syncope with vomiting. on exam oriented x 1-2. hypothermic. moving all four extremities. nontender abdomen. labs with hgb drop, DAVID, trop 39 --> 31. placed on bare hugger for rewarming. UA with e. feacalis on culture from a few days ago- will treat with broad spectrum abx. CT imaging as noted. ICU consult appreciated- admitted for further management.

## 2025-04-21 NOTE — H&P ADULT - ASSESSMENT
# syncopal episode 2/2 hypotension iso possible urosepsis vs PNA?   - patient with recent ED visit 4/13/2024 for urinary retention, bains was changed, UA was positive, discharged on PO vantin   - patient returned 4/14-4/16 for two falls at nursing home, the bains was not draining well and therefore was deflated and advanced  - UCx 4/14 w/ Proteus, ID recommending monitoring off abx, possible colonizer   - UCx from 4/19 with E Faecalis   - UCx from 1/2025 with carbapenem resistant Klebsiella   - CT abdomen with new RLL opacity, possibly infectious   - f/u CT chest   - s/p 3L IVF in ED; if remains hypotensive would start pressors   - infectious w/u: procal, BCx, UCx, MRSA, RVP, urine strep/legionella   - ID eval     # DAVID   - creatinine 2.5 on admission, baseline 1.5   - s/p 3L IVF in ED, will hold off on further fluids for now  - no hydro on CT abdomen and pelvis   - monitor creatinine       # hyperdense nodule in R bladder - up to 7mm - suspicious for bladder neoplasm   # hx non-hodgkin's lymphoma   - urology eval     # BPH  - cw tamsulosin     #h/o COPD   -stable, c/w inhalers, nebs tx.     #DM 2  - hold metformin. SSI here     #HTN/ HLD    #schizophrenia  - c/w olanzapine and Trazodone      #Misc  - DVT Prophylaxis: Heparin  - GI Prophylaxis: None  - Diet: DASH/ CCS  - Activity: As tolerated  - IV Fluids: none  - Code Status: Full Code    Dispo: Likely DC in 24-48 hours      # syncopal episode 2/2 hypotension iso possible urosepsis vs PNA?   - patient with recent ED visit 4/13/2024 for urinary retention, bains was changed, UA was positive, discharged on PO vantin   - patient returned 4/14-4/16 for two falls at nursing home, the bains was not draining well and therefore was deflated and advanced  - UCx 4/14 w/ Proteus, ID recommending monitoring off abx, possible colonizer   - UCx from 4/19 with E Faecalis   - UCx from 1/2025 with carbapenem resistant Klebsiella   - CT abdomen with new RLL opacity, possibly infectious   - s/p 3L IVF in ED; if remains hypotensive would start pressors   - infectious w/u: procal, BCx, UCx, MRSA, RVP, urine strep/legionella   - ID eval     # DAVID   - creatinine 2.5 on admission, baseline 1.5   - s/p 3L IVF in ED, will hold off on further fluids for now  - no hydro on CT abdomen and pelvis   - monitor creatinine     # NSTEMI?   - trop 39   - repeat trop     # hyperdense nodule in R bladder - up to 7mm - suspicious for bladder neoplasm   # hx non-hodgkin's lymphoma   - urology eval     # BPH  - cw tamsulosin     #h/o COPD   -stable, c/w inhalers, nebs tx.     #DM 2  - hold metformin. SSI here     #HTN/ HLD    #schizophrenia  - c/w olanzapine and Trazodone      #Misc    - DVT Prophylaxis: Heparin  - GI Prophylaxis: None  - Diet: DASH/ CCS  - Activity: As tolerated  - IV Fluids: none  - Code Status: Full Code         # syncopal episode 2/2 hypotension iso possible urosepsis vs PNA?   - patient with recent ED visit 4/13/2024 for urinary retention, bains was changed, UA was positive, discharged on PO vantin   - patient returned 4/14-4/16 for two falls at nursing home, the bains was not draining well and therefore was deflated and advanced  - UCx 4/14 w/ Proteus, ID recommending monitoring off abx, possible colonizer   - UCx from 4/19 with E Faecalis   - UCx from 1/2025 with carbapenem resistant Klebsiella   - CT abdomen with new RLL opacity, possibly infectious   - s/p 3L IVF in ED; if remains hypotensive would start pressors   - infectious w/u: procal, BCx, UCx, MRSA, RVP, urine strep/legionella   - ID eval     # DAVID on CKD3  - creatinine 2.5 on admission, baseline 1.5   - s/p 3L IVF in ED, will hold off on further fluids for now  - no hydro on CT abdomen and pelvis   - monitor creatinine     # acute on chronic anemia   - Hgb 8.0 on admission baseline ~10-11    # NSTEMI?   - trop 39   - repeat trop       # hyperdense nodule in R bladder - up to 7mm - suspicious for bladder neoplasm   # hx non-hodgkin's lymphoma   - urology eval     # BPH  - cw tamsulosin     #h/o COPD   -stable, c/w inhalers, nebs tx.     #DM 2  - hold metformin. SSI here     #HTN/ HLD    #schizophrenia  - c/w olanzapine and Trazodone      #Misc    - DVT Prophylaxis: Heparin  - GI Prophylaxis: None  - Diet: DASH/ CCS  - Activity: As tolerated  - IV Fluids: none  - Code Status: Full Code         84-year-old male with past medical history of COPD, hypertension, diabetes, schizophrenia, hyperlipidemia, non-Hodgkin's lymphoma, chronic urinary retention presents to the emergency department from [facility] for AMS. Per ED documentation, nurse states she was feeding patient in the afternoon when he suddenly spit his food out and lowered his head down onto the table, was not responding to her questions for a few minutes.     # syncope? iso hypotension 2/2 possible urosepsis vs PNA (possible aspiration?)   # AMS 2/2 TME   # hx urinary retention with chronic bains - exchanged 4/13/25  # hx MDRO UCx (1/2025) with carbapenem resistant Klebsiella   - patient with recent ED visit 4/13/2024 for urinary retention, bains was changed, UA was positive, discharged on PO vantin   - patient returned 4/14-4/16 for two falls at nursing home, the bains was not draining well and therefore was deflated and advanced  - UCx 4/14 w/ Proteus, ID recommending monitoring off abx, possible colonizer   - UCx from 4/19 with E Faecalis   - CT abdomen with new RLL opacity, possibly infectious   - CTH with no acute pathology, stable chronic right frontal lobe infarct and stable chronic left cerebellar infarct.  - patient hypothermic with soft BP in ED   - s/p 3L IVF in ED with improvement   - infectious w/u: procal, BCx, UCx, MRSA, RVP, urine strep/legionella   - pt was not discharged on abx but has cefdinir 300mg BID on med list from 4/20/25?  - cefepime and vanc for now   - ID eval   - orthostats   - EEG   - speech and swallow eval   - fall precautions     # DAVID on CKD3  - creatinine 2.5 on admission, baseline 1.2-1.3   - no hydro on CT abdomen and pelvis   - s/p 3L IVF in ED, will hold off on further fluids for now  - hold home gabapentin 300mg BID for now   - encourage PO intake     # hyperdense nodule in R bladder - up to 7mm - suspicious for bladder neoplasm   # hx non-hodgkin's lymphoma   - urology eval   - onc eval     # acute on chronic anemia   - Hgb 8.0 on admission baseline ~10-11  - no active signs of bleed   - iron studies   - maintain active type and screen   - transfuse Hgb <7    # NSTEMI   - trop 39 -> 31   - EKG with bradycardia, no ST changes     # sinus bradycardia   - HR in 50s on monitor   - hold home metoprolol 25mg BID   - tele monitoring   - EKG in AM     # BPH  - cw tamsulosin and finasteride     #h/o COPD   -stable  - home inhalers     #DM 2  - hold metformin 500mg BID   - SSI     #HTN  - hold home metoprolol 25mg BID   - hold home nifedipine 90mg daily     #schizophrenia  - c/w olanzapine and Trazodone (holding parameters for lethargy and AMS)    #Misc  - DVT Prophylaxis: Heparin  - GI Prophylaxis: None  - Diet: DASH/CC  - Activity: As tolerated  - IV Fluids: none  - Code Status: Full Code         84-year-old male with past medical history of COPD, hypertension, diabetes, schizophrenia, hyperlipidemia, non-Hodgkin's lymphoma, chronic urinary retention presents to the emergency department from [facility] for AMS. Per ED documentation, nurse states she was feeding patient in the afternoon when he suddenly spit his food out and lowered his head down onto the table, was not responding to her questions for a few minutes.     # syncope? iso hypotension 2/2 possible urosepsis vs PNA (possible aspiration?)   # AMS 2/2 TME   # hx urinary retention with chronic bains - exchanged 4/13/25  # hx MDRO UCx (1/2025) with carbapenem resistant Klebsiella   - patient with recent ED visit 4/13/2024 for urinary retention, bains was changed, UA was positive, discharged on PO vantin   - patient returned 4/14-4/16 for two falls at nursing home, the bains was not draining well and therefore was deflated and advanced  - UCx 4/14 w/ Proteus, ID recommending monitoring off abx, possible colonizer   - UCx from 4/19 with E Faecalis   - CT abdomen with new RLL opacity, possibly infectious   - CTH with no acute pathology, stable chronic right frontal lobe infarct and stable chronic left cerebellar infarct.  - patient hypothermic with soft BP in ED   - s/p 3L IVF in ED with improvement   - infectious w/u: procal, BCx, UCx, MRSA, RVP, urine strep/legionella   - pt was not discharged on abx but has cefdinir 300mg BID on med list from 4/20/25?  - cefepime and vanc for now   - ID eval   - orthostats   - EEG   - speech and swallow eval   - fall precautions     # DAVID on CKD3  - creatinine 2.5 on admission, baseline 1.2-1.3   - no hydro on CT abdomen and pelvis   - s/p 3L IVF in ED, will hold off on further fluids for now  - hold home gabapentin 300mg BID for now   - encourage PO intake     # hyperdense nodule in R bladder - up to 7mm - suspicious for bladder neoplasm   # hx non-hodgkin's lymphoma   - urology eval   - onc eval     # acute on chronic anemia   - Hgb 8.0 on admission baseline ~10-11  - no active signs of bleed   - iron studies   - maintain active type and screen   - transfuse Hgb <7    # elevated trop  - trop 39 -> 31   - EKG with bradycardia, no ST changes     # sinus bradycardia   - HR in 50s on monitor   - hold home metoprolol 25mg BID   - tele monitoring   - EKG in AM     # BPH  - cw tamsulosin and finasteride     #h/o COPD   -stable  - home inhalers     #DM 2  - hold metformin 500mg BID   - SSI     #HTN  - hold home metoprolol 25mg BID   - hold home nifedipine 90mg daily     #schizophrenia  - c/w olanzapine and Trazodone (holding parameters for lethargy and AMS)    #Misc  - DVT Prophylaxis: Heparin  - GI Prophylaxis: None  - Diet: DASH/CC  - Activity: As tolerated  - IV Fluids: none  - Code Status: Full Code         84-year-old male with past medical history of COPD, hypertension, diabetes, schizophrenia, hyperlipidemia, non-Hodgkin's lymphoma, chronic urinary retention presents to the emergency department from [facility] for AMS. Per ED documentation, nurse states she was feeding patient in the afternoon when he suddenly spit his food out and lowered his head down onto the table, was not responding to her questions for a few minutes.     # syncope? iso hypotension 2/2 possible urosepsis vs PNA (possible aspiration?)   # AMS 2/2 TME   # hx urinary retention with chronic bains - exchanged 4/13/25  # hx MDRO UCx (1/2025) with carbapenem resistant Klebsiella   - patient with recent ED visit 4/13/2024 for urinary retention, bains was changed, UA was positive, discharged on PO vantin   - patient returned 4/14-4/16 for two falls at nursing home, the bains was not draining well and therefore was deflated and advanced  - UCx 4/14 w/ Proteus, ID recommending monitoring off abx, possible colonizer   - UCx from 4/19 with E Faecalis   - CT abdomen with new RLL opacity, possibly infectious   - CTH with no acute pathology, stable chronic right frontal lobe infarct and stable chronic left cerebellar infarct.  - patient hypothermic with soft BP in ED   - s/p 3L IVF in ED with improvement   - infectious w/u: procal, BCx, UCx, MRSA, RVP, urine strep/legionella   - pt was not discharged on abx but started cefdinir 300mg BID at [facility] home on 4/20/25 per med list   - cefepime and vanc for now   - ID eval   - orthostats   - EEG   - speech and swallow eval   - fall precautions     # DAVID on CKD3  - creatinine 2.5 on admission, baseline 1.2-1.3   - no hydro on CT abdomen and pelvis   - s/p 3L IVF in ED, will hold off on further fluids for now  - hold home gabapentin 300mg BID for now   - encourage PO intake     # hyperdense nodule in R bladder - up to 7mm - suspicious for bladder neoplasm   # hx non-hodgkin's lymphoma   - urology eval   - onc eval     # acute on chronic anemia   - Hgb 8.0 on admission baseline ~10-11  - no active signs of bleed   - iron studies   - maintain active type and screen   - transfuse Hgb <7    # elevated trop  - trop 39 -> 31   - EKG with bradycardia, no ST changes     # sinus bradycardia   - HR in 50s on monitor   - hold home metoprolol 25mg BID   - tele monitoring   - EKG in AM     # BPH  - cw tamsulosin and finasteride     #h/o COPD   -stable  - home inhalers     #DM 2  - hold metformin 500mg BID   - SSI     #HTN  - hold home metoprolol 25mg BID   - hold home nifedipine 90mg daily     #schizophrenia  - c/w olanzapine and Trazodone (holding parameters for lethargy and AMS)    #Misc  - DVT Prophylaxis: Heparin  - GI Prophylaxis: None  - Diet: DASH/CC  - Activity: As tolerated  - IV Fluids: none  - Code Status: Full Code

## 2025-04-21 NOTE — H&P ADULT - HISTORY OF PRESENT ILLNESS
84-year-old male with past medical history of COPD, hypertension, diabetes, schizophrenia, hyperlipidemia, non-Hodgkin's lymphoma, chronic urinary retention presents to the emergency department from [facility] for reported episode of syncope with vomiting. Seen 3 days ago in ED for UTI secondary to chronic bains. Patient is AAOx2 but very poor historian. States he "doesn't feel well" but cannot elaborate. Patient has had chronic bains in since admission in January where he was treated for MDRO in the urine, retention, and uretal dilation for bains catheter placement by urology, last exchanged 4/14.       Vitals:   · BP Systolic	 95 mm Hg  · BP Diastolic	 56 mm Hg  · Heart Rate	62 /min  ·Temp (F)	97.9 Degrees F    Labs:   WBC 8.87  Hgb 8.0 (baseline 10-11)   platelets 183  Na 131  K 5.1  Cr 2.5 (baseline 1.2-1.3)  trop 39   VBG with pH 7.30/CO2 48/O2 38/HCO3 24/lactate 1.6  UA with many bacteria, large LE, >900 WBC     Imaging:  CT abdomen and pelvis: Suspicious hyperdense nodule within the posterior right bladder measuring 7 mm on image 90 of series 4. Findings are suspicious for bladder neoplasm.However, this area is limited evaluation as the bladder is collapsed around a Bains catheter. New right lower lobe opacity possibly infectious in nature    Management in ED:   100mg doxy IV x1   1g vanc IV x1   3.375g Zosyn IV x1  84-year-old male with past medical history of COPD, hypertension, diabetes, schizophrenia, hyperlipidemia, non-Hodgkin's lymphoma, chronic urinary retention presents to the emergency department from [facility] for AMS. Per ED documentation, nurse states she was feeding patient in the afternoon when he suddenly spit his food out and lowered his head down onto the table, was not responding to her questions for a few minutes. Stated he doesn't feel well afterwards. Seen 3 days ago in ED for UTI secondary to chronic bains. Per prior notes, patient is AAOx2 at baseline but very poor historian. Patient has had chronic bains in since admission in January where he was treated for MDRO in the urine, retention, and uretal dilation for bains catheter placement by urology, last exchanged 4/14/25. Unable to obtain further history as patient would not respond to questions, attempted to call [facility] for collateral without success.       Vitals:   · BP Systolic	 95 mm Hg  · BP Diastolic	 56 mm Hg  · Heart Rate	62 /min  ·Temp (F)	97.9 Degrees F    Labs:   WBC 8.87  Hgb 8.0 (baseline 10-11)   platelets 183  Na 131  K 5.1  Cr 2.5 (baseline 1.2-1.3)  trop 39   VBG with pH 7.30/CO2 48/O2 38/HCO3 24/lactate 1.6  UA with many bacteria, large LE, >900 WBC     Imaging:  CT abdomen and pelvis: Suspicious hyperdense nodule within the posterior right bladder measuring 7 mm on image 90 of series 4. Findings are suspicious for bladder neoplasm. However, this area is limited evaluation as the bladder is collapsed around a Bains catheter. New right lower lobe opacity possibly infectious in nature    Management in ED:   100mg doxy IV x1   1g vanc IV x1   3.375g Zosyn IV x1

## 2025-04-21 NOTE — ED ADULT TRIAGE NOTE - BP NONINVASIVE SYSTOLIC (MM HG)
Spoke with patient via phone, confirmed that she can send Leave of Absence Paperwork for Dr. Bolanos's Care Team as a file attachment on [employer]fer[employer] or by faxing it to [facility], at [phone].    Confirmed again that her surgery start time and arrival time are still tentative and subject to change. She will receive a call from Pre-admission 1-3 days before surgery to confirm surgery start time and arrival time.    Alis Mccarthy  Sabra-op Coordinator  [facility]  Direct Phone: [phone]   [phone]

## 2025-04-21 NOTE — ED PROVIDER NOTE - PROGRESS NOTE DETAILS
Kinyarwanda: Was able to reach nursing staff at [facility]. Nurse states she was feeding patient in the afternoon when he suddenly spit his food out and lowered his head down onto the table, was not responding to her questions for a few minutes. Stated he doesn't feel well afterwards. Lao: I attempted to reach [facility] for collateral but was told a nurse is not available at this time.

## 2025-04-21 NOTE — ED PROVIDER NOTE - OBJECTIVE STATEMENT
84-year-old male with past medical history of COPD, hypertension, diabetes, schizophrenia, hyperlipidemia, non-Hodgkin's lymphoma, chronic urinary retention presents to the emergency department from [facility] for reported episode of syncope with vomiting. Patient is AAOx2 but very poor historian. States he "doesn't feel well" but cannot elaborate. 84-year-old male with past medical history of COPD, hypertension, diabetes, schizophrenia, hyperlipidemia, non-Hodgkin's lymphoma, chronic urinary retention presents to the emergency department from [facility] for reported episode of syncope with vomiting. Seen 3 days ago in ED for UTI secondary to chronic bains which was changed ~Apr 16th. Patient is AAOx2 but very poor historian. States he "doesn't feel well" but cannot elaborate. 84-year-old male with past medical history of COPD, hypertension, diabetes, schizophrenia, hyperlipidemia, non-Hodgkin's lymphoma, chronic urinary retention presents to the emergency department from [facility] for reported episode of syncope with vomiting. Seen 3 days ago in ED for UTI secondary to chronic bains which was changed ~Apr 16th. Patient is AAOx2 but very poor historian. States he "doesn't feel well" but cannot elaborate.

## 2025-04-21 NOTE — ED PROVIDER NOTE - PHYSICAL EXAMINATION
Physical Exam: VS reviewed.   Constitutional: Patient appears toxic, uncomfortable. Leaning to right and UE contorted.  EYES: Conjunctiva and sclera clear, EOMI  CARD: S1S2 RRR  RESP: CTAB  ABD: Soft, NT/ND, no guarding.   MSK: Moving all extremities well.  Neuro: Awake, alert, oriented. Answering questions appropriately. No focal deficits.

## 2025-04-21 NOTE — ED PROVIDER NOTE - IV ALTEPLASE EXCL REL HIDDEN
Ice or heat whatever feels better, activity as tolerated    Take ibuprofen 800 mg and Tylenol 1000 mg every 6-8 hours as needed for pain    Flexeril as prescribed, avoid alcohol and driving when taking    Follow-up primary care for further evaluation    Return here for fever, weakness, loss of control of bowel or bladder or any other concern   show

## 2025-04-21 NOTE — ED PROVIDER NOTE - ATTENDING CONTRIBUTION TO CARE
Patient required multiple reassessments- sepsis- ICU consult.     I personally evaluated the patient. I reviewed the Resident’s or Physician Assistant’s note (as assigned above), and agree with the findings and plan except as documented in my note.

## 2025-04-21 NOTE — H&P ADULT - ATTENDING COMMENTS
HPI:   84-year-old male with past medical history of COPD, hypertension, diabetes, schizophrenia, hyperlipidemia, non-Hodgkin's lymphoma, chronic urinary retention presents to the emergency department from [facility] for AMS. Per ED documentation, nurse states she was feeding patient in the afternoon when he suddenly spit his food out and lowered his head down onto the table, was not responding to her questions for a few minutes. Stated he doesn't feel well afterwards. Seen 3 days ago in ED for UTI secondary to chronic bains. Per prior notes, patient is AAOx2 at baseline but very poor historian. Patient has had chronic bains in since admission in January where he was treated for MDRO in the urine, retention, and uretal dilation for bains catheter placement by urology, last exchanged 4/14/25. Unable to obtain further history as patient would not respond to questions, attempted to call [facility] for collateral without success.       Vitals:   · BP Systolic	 95 mm Hg  · BP Diastolic	 56 mm Hg  · Heart Rate	62 /min  ·Temp (F)	97.9 Degrees F    Labs:   WBC 8.87  Hgb 8.0 (baseline 10-11)   platelets 183  Na 131  K 5.1  Cr 2.5 (baseline 1.2-1.3)  trop 39   VBG with pH 7.30/CO2 48/O2 38/HCO3 24/lactate 1.6  UA with many bacteria, large LE, >900 WBC     Imaging:  CT abdomen and pelvis: Suspicious hyperdense nodule within the posterior right bladder measuring 7 mm on image 90 of series 4. Findings are suspicious for bladder neoplasm. However, this area is limited evaluation as the bladder is collapsed around a Bains catheter. New right lower lobe opacity possibly infectious in nature    Management in ED:   100mg doxy IV x1   1g vanc IV x1   3.375g Zosyn IV x1 (21 Apr 2025 23:30)  REVIEW OF SYSTEMS: see cc/HPI   CONSTITUTIONAL: No weakness, fevers or chills  EYES/ENT: No visual changes;  No vertigo or throat pain   NECK: No pain or stiffness  RESPIRATORY: No cough, wheezing, hemoptysis; No shortness of breath  CARDIOVASCULAR: No chest pain or palpitations  GASTROINTESTINAL: No abdominal or epigastric pain. No nausea, vomiting, or hematemesis; No diarrhea or constipation. No melena or hematochezia.  GENITOURINARY: No dysuria, frequency or hematuria  NEUROLOGICAL: No numbness or weakness  SKIN: No itching, rashes  ENDO: No hyperglycemia, No thyroid disorder, No dyslipidemia   HEM: No bleeding, No easy bruising, No anemia   PSYCHE: No psychosis, No mood disorder No hallucinations No delusion   MSK: No deformity, No fracture, No Joint swelling    Physical Exam:  General: WN/WD NAD, Flat affect  Neurology: A&Ox1 vs uncooperative, nonfocal, follows commands  Eyes: PERRLA/ EOMI  ENT/Neck: Neck supple, trachea midline, No JVD  Respiratory: CTA B/L, No wheezing, rales, rhonchi  CV: Bradycardic rate, regular rhythm, S1S2, no murmurs, rubs or gallops  Abdominal: Soft, NT, ND +BS,   Extremities: No edema, + peripheral pulses  Skin: No Rashes, Hematoma, Ecchymosis    A/p  Urosepsis   Pneumonia     DAVID on CKDIII 2/2 dehydration and s/p IV fluid resuscitation     Acute on chronic anemia     Elevated trop - not escalation   Bradycardia     H/o COPD     DM type II     NOTE INCOMPLETE     PATIENT SEE by ATTENDING  4/21/25 HPI:   84-year-old male with past medical history of COPD, hypertension, diabetes, schizophrenia, hyperlipidemia, non-Hodgkin's lymphoma, chronic urinary retention presents to the emergency department from [facility] for AMS. Per ED documentation, nurse states she was feeding patient in the afternoon when he suddenly spit his food out and lowered his head down onto the table, was not responding to her questions for a few minutes. Stated he doesn't feel well afterwards. Seen 3 days ago in ED for UTI secondary to chronic bains. Per prior notes, patient is AAOx2 at baseline but very poor historian. Patient has had chronic bains in since admission in January where he was treated for MDRO in the urine, retention, and uretal dilation for bains catheter placement by urology, last exchanged 4/14/25. Unable to obtain further history as patient would not respond to questions, attempted to call [facility] for collateral without success.       Vitals:   · BP Systolic 95 mm Hg/ BP Diastolic 56 mm Hg ·   Heart Rate 62 /min  ·Temp (F)	97.9 Degrees F  Labs:   WBC 8.87  Hgb 8.0 (baseline 10-11)   platelets 183  Na 131  K 5.1  Cr 2.5 (baseline 1.2-1.3)  trop 39   VBG with pH 7.30/CO2 48/O2 38/HCO3 24/lactate 1.6  UA with many bacteria, large LE, >900 WBC     Imaging:  CT abdomen and pelvis: Suspicious hyperdense nodule within the posterior right bladder measuring 7 mm on image 90 of series 4. Findings are suspicious for bladder neoplasm. However, this area is limited evaluation as the bladder is collapsed around a Bains catheter. New right lower lobe opacity possibly infectious in nature    Management in ED:   100mg doxy IV x1   1g vanc IV x1   3.375g Zosyn IV x1 (21 Apr 2025 23:30)  REVIEW OF SYSTEMS: see cc/HPI   CONSTITUTIONAL: No weakness, fevers or chills  EYES/ENT: No visual changes;  No vertigo or throat pain   NECK: No pain or stiffness  RESPIRATORY: No cough, wheezing, hemoptysis; No shortness of breath  CARDIOVASCULAR: No chest pain or palpitations  GASTROINTESTINAL: No abdominal or epigastric pain. No nausea, vomiting, or hematemesis; No diarrhea or constipation. No melena or hematochezia.  GENITOURINARY: No dysuria, frequency or hematuria  NEUROLOGICAL: No numbness or weakness  SKIN: No itching, rashes  ENDO: No hyperglycemia, No thyroid disorder, No dyslipidemia   HEM: No bleeding, No easy bruising, No anemia   PSYCHE: No psychosis, No mood disorder No hallucinations No delusion   MSK: No deformity, No fracture, No Joint swelling    Physical Exam:  General: WN/WD NAD, Flat affect, Warming blanket in place   Neurology: A&Ox1 vs uncooperative, nonfocal, follows commands  Eyes: PERRLA/ EOMI  ENT/Neck: Neck supple, trachea midline, No JVD  Respiratory: CTA B/L, No wheezing, rales, rhonchi  CV: Bradycardic rate, regular rhythm, S1S2, no murmurs, rubs or gallops  Abdominal: Soft, NT, ND +BS,   Extremities: No edema, + peripheral pulses  Skin: No Rashes, Hematoma, Ecchymosis    A/p  Urosepsis / Toxic metabolic encephalopathy   Pneumonia   -IV abx   -check blood Cx /Ucx   -ID eval     DAVID on CKDIII 2/2 dehydration and s/p IV fluid resuscitation   -IV fluids   -check serial CMP    Hyperdense nodule in R side of bladder     Acute on chronic anemia - no acute sign of bleeding   -serial CBC and check     Elevated trop - not escalation   Bradycardia   -agree w/ holding BBlocker     H/o COPD   -PRN bronchodilators    DM type II   -hold oral agents and F/s w/ ISS     Schizophrenia   -c/w olanzapine and Trazadone    PATIENT SEE by ATTENDING  4/21/25 ( note revised 4/22) HPI:   84-year-old male with past medical history of COPD, hypertension, diabetes, schizophrenia, hyperlipidemia, non-Hodgkin's lymphoma, chronic urinary retention presents to the emergency department from [facility] for AMS. Per ED documentation, nurse states she was feeding patient in the afternoon when he suddenly spit his food out and lowered his head down onto the table, was not responding to her questions for a few minutes. Stated he doesn't feel well afterwards. Seen 3 days ago in ED for UTI secondary to chronic bains. Per prior notes, patient is AAOx2 at baseline but very poor historian. Patient has had chronic bains in since admission in January where he was treated for MDRO in the urine, retention, and uretal dilation for bains catheter placement by urology, last exchanged 4/14/25. Unable to obtain further history as patient would not respond to questions, attempted to call [facility] for collateral without success.       Vitals:   · BP Systolic 95 mm Hg/ BP Diastolic 56 mm Hg ·   Heart Rate 62 /min  ·Temp (F)	97.9 Degrees F  Labs:   WBC 8.87  Hgb 8.0 (baseline 10-11)   platelets 183  Na 131  K 5.1  Cr 2.5 (baseline 1.2-1.3)  trop 39   VBG with pH 7.30/CO2 48/O2 38/HCO3 24/lactate 1.6  UA with many bacteria, large LE, >900 WBC     Imaging:  CT abdomen and pelvis: Suspicious hyperdense nodule within the posterior right bladder measuring 7 mm on image 90 of series 4. Findings are suspicious for bladder neoplasm. However, this area is limited evaluation as the bladder is collapsed around a Bains catheter. New right lower lobe opacity possibly infectious in nature    Management in ED:   100mg doxy IV x1   1g vanc IV x1   3.375g Zosyn IV x1 (21 Apr 2025 23:30)  REVIEW OF SYSTEMS: see cc/HPI   CONSTITUTIONAL: No weakness, fevers or chills  EYES/ENT: No visual changes;  No vertigo or throat pain   NECK: No pain or stiffness  RESPIRATORY: No cough, wheezing, hemoptysis; No shortness of breath  CARDIOVASCULAR: No chest pain or palpitations  GASTROINTESTINAL: No abdominal or epigastric pain. No nausea, vomiting, or hematemesis; No diarrhea or constipation. No melena or hematochezia.  GENITOURINARY: No dysuria, frequency or hematuria  NEUROLOGICAL: No numbness or weakness  SKIN: No itching, rashes  ENDO: No hyperglycemia, No thyroid disorder, No dyslipidemia   HEM: No bleeding, No easy bruising, No anemia   PSYCHE: No psychosis, No mood disorder No hallucinations No delusion   MSK: No deformity, No fracture, No Joint swelling    Physical Exam:  General: WN/WD NAD, Flat affect, Warming blanket in place   Neurology: A&Ox1 vs uncooperative, nonfocal, follows commands  Eyes: PERRLA/ EOMI  ENT/Neck: Neck supple, trachea midline, No JVD  Respiratory: CTA B/L, No wheezing, rales, rhonchi  CV: Bradycardic rate, regular rhythm, S1S2, no murmurs, rubs or gallops  Abdominal: Soft, NT, ND +BS,   Extremities: No edema, + peripheral pulses  Skin: No Rashes, Hematoma, Ecchymosis    A/p  Urosepsis / Toxic metabolic encephalopathy   Pneumonia   -IV abx   -check blood Cx /Ucx   -ID eval     DAVID on CKDIII 2/2 dehydration and s/p IV fluid resuscitation   -IV fluids   -check serial CMP    Hyperdense nodule in R side of bladder   -urology   -keep Bains for now    Acute on chronic anemia - no acute sign of bleeding   -serial CBC and check     Elevated trop - not escalation   Bradycardia   -agree w/ holding BBlocker     H/o COPD   -PRN bronchodilators    DM type II   -hold oral agents and F/s w/ ISS     Schizophrenia   -c/w olanzapine and Trazadone    PATIENT SEE by ATTENDING  4/21/25 ( note revised 4/22)

## 2025-04-21 NOTE — ED ADULT NURSE NOTE - NSFALLHARMRISKINTERV_ED_ALL_ED
Assistance OOB with selected safe patient handling equipment if applicable/Assistance with ambulation/Communicate risk of Fall with Harm to all staff, patient, and family/Encourage patient to sit up slowly, dangle for a short time, stand at bedside before walking/Orthostatic vital signs/Provide visual cue: red socks, yellow wristband, yellow gown, etc/Reinforce activity limits and safety measures with patient and family/Review medications for side effects contributing to fall risk/Toileting schedule using arm’s reach rule for commode and bathroom/Bed in lowest position, wheels locked, appropriate side rails in place/Call bell, personal items and telephone in reach/Instruct patient to call for assistance before getting out of bed/chair/stretcher/Non-slip footwear applied when patient is off stretcher/[place] to call system/Physically safe environment - no spills, clutter or unnecessary equipment/Purposeful Proactive Rounding/Room/bathroom lighting operational, light cord in reach

## 2025-04-21 NOTE — ED ADULT NURSE NOTE - OBJECTIVE STATEMENT
pt BIBEMS from [facility] due to syncope, vomitting, and weakness. Pt was here 3 days ago due to a UTI. Pt is a poor historian and states that he "doesn't feel well" but is unable to elaborate.

## 2025-04-22 ENCOUNTER — RESULT REVIEW (OUTPATIENT)
Age: 85
End: 2025-04-22

## 2025-04-22 LAB
-  AMPICILLIN: SIGNIFICANT CHANGE UP
-  CIPROFLOXACIN: SIGNIFICANT CHANGE UP
-  LEVOFLOXACIN: SIGNIFICANT CHANGE UP
-  NITROFURANTOIN: SIGNIFICANT CHANGE UP
-  TETRACYCLINE: SIGNIFICANT CHANGE UP
-  VANCOMYCIN: SIGNIFICANT CHANGE UP
ANION GAP SERPL CALC-SCNC: 12 MMOL/L — SIGNIFICANT CHANGE UP (ref 7–14)
APPEARANCE UR: ABNORMAL
BASOPHILS # BLD AUTO: 0.02 K/UL — SIGNIFICANT CHANGE UP (ref 0–0.2)
BASOPHILS NFR BLD AUTO: 0.2 % — SIGNIFICANT CHANGE UP (ref 0–1)
BILIRUB UR-MCNC: NEGATIVE — SIGNIFICANT CHANGE UP
BUN SERPL-MCNC: 42 MG/DL — HIGH (ref 10–20)
CALCIUM SERPL-MCNC: 9 MG/DL — SIGNIFICANT CHANGE UP (ref 8.4–10.5)
CHLORIDE SERPL-SCNC: 98 MMOL/L — SIGNIFICANT CHANGE UP (ref 98–110)
CO2 SERPL-SCNC: 24 MMOL/L — SIGNIFICANT CHANGE UP (ref 17–32)
COLOR SPEC: YELLOW — SIGNIFICANT CHANGE UP
CREAT ?TM UR-MCNC: 41 MG/DL — SIGNIFICANT CHANGE UP
CREAT SERPL-MCNC: 1.8 MG/DL — HIGH (ref 0.7–1.5)
CULTURE RESULTS: ABNORMAL
DIFF PNL FLD: ABNORMAL
EGFR: 37 ML/MIN/1.73M2 — LOW
EGFR: 37 ML/MIN/1.73M2 — LOW
EOSINOPHIL # BLD AUTO: 0.06 K/UL — SIGNIFICANT CHANGE UP (ref 0–0.7)
EOSINOPHIL NFR BLD AUTO: 0.7 % — SIGNIFICANT CHANGE UP (ref 0–8)
FLUAV AG NPH QL: SIGNIFICANT CHANGE UP
FLUBV AG NPH QL: SIGNIFICANT CHANGE UP
GLUCOSE BLDC GLUCOMTR-MCNC: 91 MG/DL — SIGNIFICANT CHANGE UP (ref 70–99)
GLUCOSE BLDC GLUCOMTR-MCNC: 96 MG/DL — SIGNIFICANT CHANGE UP (ref 70–99)
GLUCOSE SERPL-MCNC: 98 MG/DL — SIGNIFICANT CHANGE UP (ref 70–99)
GLUCOSE UR QL: NEGATIVE MG/DL — SIGNIFICANT CHANGE UP
HCT VFR BLD CALC: 28.6 % — LOW (ref 42–52)
HGB BLD-MCNC: 9.9 G/DL — LOW (ref 14–18)
IMM GRANULOCYTES NFR BLD AUTO: 0.9 % — HIGH (ref 0.1–0.3)
IRON SATN MFR SERPL: 20 UG/DL — LOW (ref 35–150)
IRON SATN MFR SERPL: 8 % — LOW (ref 15–50)
KETONES UR-MCNC: NEGATIVE MG/DL — SIGNIFICANT CHANGE UP
LEUKOCYTE ESTERASE UR-ACNC: ABNORMAL
LYMPHOCYTES # BLD AUTO: 1.28 K/UL — SIGNIFICANT CHANGE UP (ref 1.2–3.4)
LYMPHOCYTES # BLD AUTO: 15.9 % — LOW (ref 20.5–51.1)
MAGNESIUM SERPL-MCNC: 2 MG/DL — SIGNIFICANT CHANGE UP (ref 1.8–2.4)
MCHC RBC-ENTMCNC: 30.7 PG — SIGNIFICANT CHANGE UP (ref 27–31)
MCHC RBC-ENTMCNC: 34.6 G/DL — SIGNIFICANT CHANGE UP (ref 32–37)
MCV RBC AUTO: 88.8 FL — SIGNIFICANT CHANGE UP (ref 80–94)
METHOD TYPE: SIGNIFICANT CHANGE UP
MONOCYTES # BLD AUTO: 0.79 K/UL — HIGH (ref 0.1–0.6)
MONOCYTES NFR BLD AUTO: 9.8 % — HIGH (ref 1.7–9.3)
MRSA PCR RESULT.: NEGATIVE — SIGNIFICANT CHANGE UP
NEUTROPHILS # BLD AUTO: 5.84 K/UL — SIGNIFICANT CHANGE UP (ref 1.4–6.5)
NEUTROPHILS NFR BLD AUTO: 72.5 % — SIGNIFICANT CHANGE UP (ref 42.2–75.2)
NITRITE UR-MCNC: NEGATIVE — SIGNIFICANT CHANGE UP
NRBC BLD AUTO-RTO: 0 /100 WBCS — SIGNIFICANT CHANGE UP (ref 0–0)
ORGANISM # SPEC MICROSCOPIC CNT: ABNORMAL
ORGANISM # SPEC MICROSCOPIC CNT: SIGNIFICANT CHANGE UP
OSMOLALITY UR: 333 MOS/KG — SIGNIFICANT CHANGE UP (ref 50–1200)
PH UR: 6.5 — SIGNIFICANT CHANGE UP (ref 5–8)
PLATELET # BLD AUTO: 231 K/UL — SIGNIFICANT CHANGE UP (ref 130–400)
PMV BLD: 10.3 FL — SIGNIFICANT CHANGE UP (ref 7.4–10.4)
POTASSIUM SERPL-MCNC: 4.8 MMOL/L — SIGNIFICANT CHANGE UP (ref 3.5–5)
POTASSIUM SERPL-SCNC: 4.8 MMOL/L — SIGNIFICANT CHANGE UP (ref 3.5–5)
POTASSIUM UR-SCNC: 19 MMOL/L — SIGNIFICANT CHANGE UP
PROT ?TM UR-MCNC: 30 MG/DL — SIGNIFICANT CHANGE UP
PROT UR-MCNC: 30 MG/DL
PROT/CREAT UR-RTO: 0.7 RATIO — HIGH (ref 0–0.2)
RBC # BLD: 3.22 M/UL — LOW (ref 4.7–6.1)
RBC # FLD: 14.4 % — SIGNIFICANT CHANGE UP (ref 11.5–14.5)
RSV RNA NPH QL NAA+NON-PROBE: SIGNIFICANT CHANGE UP
SARS-COV-2 RNA SPEC QL NAA+PROBE: SIGNIFICANT CHANGE UP
SODIUM SERPL-SCNC: 134 MMOL/L — LOW (ref 135–146)
SODIUM UR-SCNC: 51 MMOL/L — SIGNIFICANT CHANGE UP
SOURCE RESPIRATORY: SIGNIFICANT CHANGE UP
SP GR SPEC: 1.02 — SIGNIFICANT CHANGE UP (ref 1–1.03)
SPECIMEN SOURCE: SIGNIFICANT CHANGE UP
TIBC SERPL-MCNC: 238 UG/DL — SIGNIFICANT CHANGE UP (ref 220–430)
TRANSFERRIN SERPL-MCNC: 193 MG/DL — LOW (ref 200–360)
TROPONIN T, HIGH SENSITIVITY RESULT: 29 NG/L — HIGH (ref 6–21)
UIBC SERPL-MCNC: 218 UG/DL — SIGNIFICANT CHANGE UP (ref 110–370)
UROBILINOGEN FLD QL: 0.2 MG/DL — SIGNIFICANT CHANGE UP (ref 0.2–1)
UUN UR-MCNC: 394 MG/DL — SIGNIFICANT CHANGE UP
WBC # BLD: 8.06 K/UL — SIGNIFICANT CHANGE UP (ref 4.8–10.8)
WBC # FLD AUTO: 8.06 K/UL — SIGNIFICANT CHANGE UP (ref 4.8–10.8)

## 2025-04-22 PROCEDURE — 93306 TTE W/DOPPLER COMPLETE: CPT | Mod: 26

## 2025-04-22 PROCEDURE — 99291 CRITICAL CARE FIRST HOUR: CPT

## 2025-04-22 PROCEDURE — 99233 SBSQ HOSP IP/OBS HIGH 50: CPT

## 2025-04-22 RX ORDER — CEFEPIME 2 G/20ML
1000 INJECTION, POWDER, FOR SOLUTION INTRAVENOUS EVERY 24 HOURS
Refills: 0 | Status: DISCONTINUED | OUTPATIENT
Start: 2025-04-22 | End: 2025-04-22

## 2025-04-22 RX ORDER — VANCOMYCIN HCL IN 5 % DEXTROSE 1.5G/250ML
750 PLASTIC BAG, INJECTION (ML) INTRAVENOUS EVERY 24 HOURS
Refills: 0 | Status: DISCONTINUED | OUTPATIENT
Start: 2025-04-22 | End: 2025-04-22

## 2025-04-22 RX ORDER — INSULIN LISPRO 100 U/ML
INJECTION, SOLUTION INTRAVENOUS; SUBCUTANEOUS
Refills: 0 | Status: DISCONTINUED | OUTPATIENT
Start: 2025-04-22 | End: 2025-04-25

## 2025-04-22 RX ORDER — SODIUM CHLORIDE 9 G/1000ML
1000 INJECTION, SOLUTION INTRAVENOUS
Refills: 0 | Status: DISCONTINUED | OUTPATIENT
Start: 2025-04-22 | End: 2025-04-25

## 2025-04-22 RX ORDER — OFLOXACIN 3 MG/ML
1 SOLUTION OPHTHALMIC
Refills: 0 | Status: DISCONTINUED | OUTPATIENT
Start: 2025-04-22 | End: 2025-04-25

## 2025-04-22 RX ORDER — ALBUTEROL SULFATE 2.5 MG/3ML
1 VIAL, NEBULIZER (ML) INHALATION
Refills: 0 | Status: DISCONTINUED | OUTPATIENT
Start: 2025-04-22 | End: 2025-04-25

## 2025-04-22 RX ORDER — TAMSULOSIN HYDROCHLORIDE 0.4 MG/1
0.4 CAPSULE ORAL AT BEDTIME
Refills: 0 | Status: DISCONTINUED | OUTPATIENT
Start: 2025-04-22 | End: 2025-04-25

## 2025-04-22 RX ORDER — HEPARIN SODIUM 1000 [USP'U]/ML
5000 INJECTION INTRAVENOUS; SUBCUTANEOUS EVERY 12 HOURS
Refills: 0 | Status: DISCONTINUED | OUTPATIENT
Start: 2025-04-22 | End: 2025-04-25

## 2025-04-22 RX ORDER — OLANZAPINE 10 MG/1
10 TABLET ORAL DAILY
Refills: 0 | Status: DISCONTINUED | OUTPATIENT
Start: 2025-04-22 | End: 2025-04-25

## 2025-04-22 RX ORDER — FINASTERIDE 1 MG/1
5 TABLET, FILM COATED ORAL DAILY
Refills: 0 | Status: DISCONTINUED | OUTPATIENT
Start: 2025-04-22 | End: 2025-04-25

## 2025-04-22 RX ORDER — MEROPENEM 1 G/30ML
500 INJECTION INTRAVENOUS EVERY 12 HOURS
Refills: 0 | Status: DISCONTINUED | OUTPATIENT
Start: 2025-04-22 | End: 2025-04-22

## 2025-04-22 RX ORDER — DEXTROSE 50 % IN WATER 50 %
15 SYRINGE (ML) INTRAVENOUS ONCE
Refills: 0 | Status: DISCONTINUED | OUTPATIENT
Start: 2025-04-22 | End: 2025-04-25

## 2025-04-22 RX ORDER — DEXTROSE 50 % IN WATER 50 %
25 SYRINGE (ML) INTRAVENOUS ONCE
Refills: 0 | Status: DISCONTINUED | OUTPATIENT
Start: 2025-04-22 | End: 2025-04-25

## 2025-04-22 RX ORDER — GLUCAGON 3 MG/1
1 POWDER NASAL ONCE
Refills: 0 | Status: DISCONTINUED | OUTPATIENT
Start: 2025-04-22 | End: 2025-04-25

## 2025-04-22 RX ORDER — NIFEDIPINE 30 MG
1 TABLET, EXTENDED RELEASE 24 HR ORAL
Refills: 0 | DISCHARGE

## 2025-04-22 RX ORDER — TRAZODONE HCL 100 MG
50 TABLET ORAL AT BEDTIME
Refills: 0 | Status: DISCONTINUED | OUTPATIENT
Start: 2025-04-22 | End: 2025-04-25

## 2025-04-22 RX ORDER — AMOXICILLIN AND CLAVULANATE POTASSIUM 500; 125 MG/1; MG/1
1 TABLET, FILM COATED ORAL EVERY 12 HOURS
Refills: 0 | Status: DISCONTINUED | OUTPATIENT
Start: 2025-04-22 | End: 2025-04-25

## 2025-04-22 RX ORDER — DEXTROSE 50 % IN WATER 50 %
12.5 SYRINGE (ML) INTRAVENOUS ONCE
Refills: 0 | Status: DISCONTINUED | OUTPATIENT
Start: 2025-04-22 | End: 2025-04-25

## 2025-04-22 RX ADMIN — Medication 250 MILLIGRAM(S): at 05:49

## 2025-04-22 RX ADMIN — CEFEPIME 100 MILLIGRAM(S): 2 INJECTION, POWDER, FOR SOLUTION INTRAVENOUS at 05:28

## 2025-04-22 RX ADMIN — AMOXICILLIN AND CLAVULANATE POTASSIUM 1 TABLET(S): 500; 125 TABLET, FILM COATED ORAL at 17:33

## 2025-04-22 RX ADMIN — HEPARIN SODIUM 5000 UNIT(S): 1000 INJECTION INTRAVENOUS; SUBCUTANEOUS at 17:33

## 2025-04-22 RX ADMIN — TAMSULOSIN HYDROCHLORIDE 0.4 MILLIGRAM(S): 0.4 CAPSULE ORAL at 22:13

## 2025-04-22 RX ADMIN — OFLOXACIN 1 DROP(S): 3 SOLUTION OPHTHALMIC at 17:34

## 2025-04-22 RX ADMIN — FINASTERIDE 5 MILLIGRAM(S): 1 TABLET, FILM COATED ORAL at 12:33

## 2025-04-22 RX ADMIN — Medication 50 MILLIGRAM(S): at 22:13

## 2025-04-22 RX ADMIN — HEPARIN SODIUM 5000 UNIT(S): 1000 INJECTION INTRAVENOUS; SUBCUTANEOUS at 05:28

## 2025-04-22 RX ADMIN — Medication 1 APPLICATION(S): at 05:27

## 2025-04-22 RX ADMIN — Medication 75 MILLILITER(S): at 14:13

## 2025-04-22 RX ADMIN — OLANZAPINE 10 MILLIGRAM(S): 10 TABLET ORAL at 12:34

## 2025-04-22 RX ADMIN — MEROPENEM 100 MILLIGRAM(S): 1 INJECTION INTRAVENOUS at 09:57

## 2025-04-22 NOTE — CONSULT NOTE ADULT - SUBJECTIVE AND OBJECTIVE BOX
UROLOGY CONSULT: Pt is an 85y/o M with pmh COPD, DM, schizophrenia, lymphoma, urinary retention s/p urethral dilation of bulbar urethra/placement of cut 12 coude over wire by Dr Fontenot 2025, s/p routine bains exchange by ER 2025 with regular 16Fr bains, sent in from Home for AMS. Unable to obtain hx from pt 2/2 AMS and at baseline poor historian.     PAST MEDICAL & SURGICAL HISTORY:  COPD (chronic obstructive pulmonary disease)  Paranoid schizophrenia  Schizophrenia  Diabetes type 2, controlled  COPD (chronic obstructive pulmonary disease)  Dyslipidemia  Chronic retention of urine  Dyslipidemia  Encounter for screening colonoscopy    MEDICATIONS  (STANDING):  chlorhexidine 2% Cloths 1 Application(s) Topical <User Schedule>  dextrose 5%. 1000 milliLiter(s) (100 mL/Hr) IV Continuous <Continuous>  dextrose 5%. 1000 milliLiter(s) (50 mL/Hr) IV Continuous <Continuous>  dextrose 50% Injectable 25 Gram(s) IV Push once  dextrose 50% Injectable 12.5 Gram(s) IV Push once  dextrose 50% Injectable 25 Gram(s) IV Push once  finasteride 5 milliGRAM(s) Oral daily  glucagon  Injectable 1 milliGRAM(s) IntraMuscular once  heparin   Injectable 5000 Unit(s) SubCutaneous every 12 hours  insulin lispro (ADMELOG) corrective regimen sliding scale   SubCutaneous three times a day before meals  meropenem  IVPB 500 milliGRAM(s) IV Intermittent every 12 hours  OLANZapine 10 milliGRAM(s) Oral daily  tamsulosin 0.4 milliGRAM(s) Oral at bedtime  traZODone 50 milliGRAM(s) Oral at bedtime  vancomycin  IVPB 750 milliGRAM(s) IV Intermittent every 24 hours    MEDICATIONS  (PRN):  acetaminophen     Tablet .. 650 milliGRAM(s) Oral every 6 hours PRN Temp greater or equal to 38C (100.4F), Mild Pain (1 - 3)  albuterol    90 MICROgram(s) HFA Inhaler 1 Puff(s) Inhalation four times a day PRN for shortness of breath and/or wheezing  dextrose Oral Gel 15 Gram(s) Oral once PRN Blood Glucose LESS THAN 70 milliGRAM(s)/deciliter    Allergies  No Known Allergies    REVIEW OF SYSTEMS   [ ] Due to altered mental status/intubation, subjective information were not able to be obtained from patient. History was obtained, to the extent possible, from review of the chart and collateral sources of information.    Vital Signs Last 24 Hrs  T(C): 35.6 (2025 03:15), Max: 36.6 (2025 17:49)  T(F): 96 (2025 03:15), Max: 97.9 (2025 17:49)  HR: 62 (2025 03:15) (46 - 62)  BP: 124/60 (2025 03:15) (95/56 - 124/60)  BP(mean): 78 (2025 23:15) (77 - 84)  RR: 20 (2025 03:15) (18 - 20)  SpO2: 96% (2025 03:15) (91% - 97%)    PHYSICAL EXAM:  GEN: NAD  SKIN: Good color, non diaphoretic.  RESP: Non-labored breathing. No use of accessory muscles.  CARDIO: +S1/S2  ABDO: Soft, NT/ND, no palpable bladder, no suprapubic tenderness  BACK: No CVAT B/L  : + Non circumcised male. B/L descended testicles x 2.  + Indwelling bains in place, draining cloudy yellow urine.   EXT: JIMENEZ x 4    LABS:             8.0    8.87  )-----------( 183      ( 2025 21:36 )             23.9     131[L]  |  94[L]  |  55[H]  ----------------------------<  107[H]  5.1[H]   |  21  |  2.5[H]    Ca    8.9      2025 20:33  TPro  7.2  /  Alb  3.8  /  TBili  <0.2  /  DBili  x   /  AST  28  /  ALT  12  /  AlkPhos  105  04-21  PT/INR - ( 2025 20:33 )   PT: 13.60 sec;   INR: 1.15 ratio    PTT - ( 2025 20:33 )  PTT:30.7 sec    Urinalysis Basic - ( 2025 21:39 )  Color: Dark Yellow / Appearance: Turbid / S.023 / pH: x  Gluc: x / Ketone: Negative mg/dL  / Bili: Negative / Urobili: 0.2 mg/dL   Blood: x / Protein: 100 mg/dL / Nitrite: Negative   Leuk Esterase: Large / RBC: 8 /HPF / WBC >998 /HPF   Sq Epi: x / Non Sq Epi: 4 /HPF / Bacteria: Many /HPF    RADIOLOGY & ADDITIONAL STUDIES:  ACC: [id number] EXAM:  CT ABDOMEN AND PELVIS IC   ORDERED BY: RA WOODS     PROCEDURE DATE:  2025    INTERPRETATION:  CLINICAL STATEMENT: Vomiting  TECHNIQUE: Contiguous axial CT images were obtained from the lower chest   to the pubic symphysis .  90 cc administered of Omnipaque 350 (10 cc   discarded).  Oral contrast without oral contrast.  Reformatted images in   the coronal and sagittal planes were acquired.  COMPARISON CT: 2025  Study is limited in evaluation due to motion artifact and artifact caused   by adjacent arms.    FINDINGS:  LOWER CHEST: New Right lower lobe opacity, possibly infectious in nature..  HEPATOBILIARY: Gallstones. Subcentimeter hepatic hypodensities, too small   to characterize.  SPLEEN: Unremarkable..  PANCREAS: Unremarkable..  ADRENAL GLANDS: Bilateral thickening.  KIDNEYS: Bilateral renal cysts and subcentimeter hypodensities, too small   to characterize. No hydronephrosis.  ABDOMINOPELVIC NODES: Stable nonspecific prominent retroperitoneal lymph   nodes, similar in appearance since 2025...  PELVIC ORGANS: There is a hyperdense nodule within the posterior right   bladder measuring 7 mm on image 90 of series 4. Findings are suspicious   for bladder neoplasm. However, this area is limited evaluation as the   bladder is collapsed around a Abins catheter. This can be further   evaluated with a CT cystogram or possibly urogram  PERITONEUM/MESENTERY/BOWEL: The appendix is unremarkable. There is no   free air or obstruction..  BONES: There is fusion of T12 and L1. Partially imaged hardware to the   right humerus. Degenerative change...  OTHER: Diffuse vascular calcifications. Infrarenal abdominal aorta   measures 2.8 cm...    IMPRESSION:   Suspicious hyperdense nodule within the posterior right bladder   measuring 7 mm on image 90 of series 4. Findings are suspicious for   bladder neoplasm.  However, this area is limited evaluation as the bladder is collapsed   around a Bains catheter.  This can be further evaluated with a CT cystogram or possibly urogram.  New right lower lobe opacity possibly infectious in nature    LISA ULRICH MD; Attending Radiologist  This document has been electronically signed. 2025 10:30PM

## 2025-04-22 NOTE — PHARMACOTHERAPY INTERVENTION NOTE - COMMENTS
As per policy, ordered a vancomycin trough level for 4/23@ 1600 prior to the 3rd dose on vancomycin regimen 750mg IV q24hrs to assist with further vancomycin dosing optimization.     Diane Linares, PharmD  Clinical Pharmacy Specialist, Infectious Diseases  Tele-Antimicrobial Stewardship Program (Tele-ASP)  Tele-ASP Phone: [phone]

## 2025-04-22 NOTE — PROGRESS NOTE ADULT - ASSESSMENT
IMPRESSION:    AMS/ TME/ Head CT noted  Recurrent UTI  HO Chronic Cobos   Acute on chronic renal failure  Aspiration event  Bladder lesion possible neoplasm  HO COPD   HO schizophrenia     PLAN:    CNS:  Continue psych medication as OP. EEG    HEENT: Oral care    PULMONARY:  HOB @ 45 degrees.  Aspiration precautions, Keep Sao2 92 to 96%    CARDIOVASCULAR:  Gently hydration, echo, CE    GI: GI prophylaxis.  Feeding per speech and swallow.  Bowel regimen     RENAL:  Follow up lytes.  Correct as needed. Uro eval    INFECTIOUS DISEASE: Follow up cultures.  Meropenem and Vanc  until cultures     HEMATOLOGICAL:  DVT prophylaxis, LE doppler    ENDOCRINE:  Follow up FS.      MUSCULOSKELETAL:   OOB to chair with fall precautions  SDU  GOC  poor prognosis

## 2025-04-22 NOTE — SWALLOW BEDSIDE ASSESSMENT ADULT - COMMENTS
Known to ST services from past admission on March 2025 recc for soft & bite sized with thin liquids.

## 2025-04-22 NOTE — CONSULT NOTE ADULT - ASSESSMENT
83y/o M with urinary retention, chronic Bains, and UTI, with possible bladder mass seen on CT    - No acute urologic intervention at this time  - Follow up with Dr. Fontenot as outpatient for CT Urogram, Urine cytology, cystoscopy  - Continue bains catheter   - IV Abx as per ID  - Recall prn  - Will d/w attending .

## 2025-04-22 NOTE — PHYSICAL THERAPY INITIAL EVALUATION ADULT - GENERAL OBSERVATIONS, REHAB EVAL
Pt off unit at echo. PT to f/u at next available session.
[id number] Pt received and left semifowlers in bed, NAD, +bains +tele, pt agreeable to PT session with encouragement

## 2025-04-22 NOTE — PHYSICAL THERAPY INITIAL EVALUATION ADULT - GAIT DEVIATIONS NOTED, PT EVAL
decreased sunil/increased time in double stance/decreased velocity of limb motion/decreased step length/decreased weight-shifting ability

## 2025-04-22 NOTE — PROGRESS NOTE ADULT - ASSESSMENT
84 yold male to with Pmhx of Copd, Htn, Dm, schizophrenia, Hld, non-hodgkins lymphoma, urinary retention with chronic Bains (changed yesterday), presents to the ED from [facility] for evaluation of AMS. Patient     # Near syncope 2/2 TME, likely UTI  # Sepsis not present on admission, 2/2 UTI vs PNA  # hx urinary retention with chronic bains - exchanged 4/13/25  # hx MDRO UCx (1/2025) with carbapenem resistant Klebsiella   - Vitals: BP 95/56 with hypothermia-> c/w bare hugger for now  - ED: s/p 100mg doxy IV x1, 1g vanc IV x1, 3.375g Zosyn IV x1  - UA 4/22- many bacteria, large LE, >900 WBC   - UCx: 4/19 with E Faecalis, 4/14 with proteus mirabilis  - Both bugs are sensitive to vancomycin and meropenem, respectively-> will c/w maribeth and vanc for now until cxs result  - CT abdomen with new RLL opacity, possibly infectious   - CTH with no acute pathology, stable chronic right frontal lobe infarct and stable chronic left cerebellar infarct.  - patient hypothermic with soft BP in ED  >s/p 3L IVF in ED with improvement   - infectious w/u pending f/u : procal, BCx, UCx, MRSA, RVP, urine strep/legionella   - pt was not discharged on abx but started cefdinir 300mg BID at NH home on 4/20/25 per med list   - ID consult pending    #NEW- Bladder mass  # hx non-hodgkin's lymphoma   # hyperdense nodule in R bladder - up to 7mm - suspicious for bladder neoplasm   -  Suspicious hyperdense nodule within the posterior right bladder measuring 7 mm on image 90 of series 4. Findings are suspicious for bladder neoplasm. Not seen on CTAP on 4/14  - Urology consulted for bladder mass, recs appreciated: OP f/u on DC  - No acute urologic intervention at this time  - Follow up with Dr. Fontenot as outpatient for CT Urogram, Urine cytology, cystoscopy  - Continue bains catheter   - Hem onc consulted for bladder mass  Will follow up OP after tissue sampling  - Pt is asymptomatic with good urine output in the bains, draining urine well.    # DAVID on CKD3  - creatinine 2.5 on admission, baseline 1.2-1.3   - GFR on admission is 25, BL is 50-60  - Patient recieved CTAP with IV contrast in the ED  - hold home gabapentin 300mg BID for now   - monitor urine output, trend daily BMPS  - urine studies,  - calculate Fena     # acute on chronic anemia   - Hgb 8.0 on admission baseline ~10-11  - no active signs of bleed   - iron studies   - maintain active type and screen   - transfuse Hgb <7    # elevated trop  - trop 39 -> 31   - EKG with bradycardia, no ST changes     # sinus bradycardia   - HR in 50s on monitor   - hold home metoprolol 25mg BID   - tele monitoring-> EKG    # BPH  - cw tamsulosin and finasteride     #h/o COPD   -stable  - home inhalers     #DM 2  - hold metformin 500mg BID   - SSI     #HTN  - Pt with soft BPs in the ED, will hold home anti-hypertensives for now  - monitor HD  - hold home metoprolol 25mg BID   - hold home nifedipine 90mg daily     #schizophrenia  - c/w olanzapine and Trazodone (holding parameters for lethargy and AMS)    #Misc  - DVT Prophylaxis: Heparin  - GI Prophylaxis: None  - Diet: DASH/CC  - Activity: As tolerated  - IV Fluids: none  - Code Status: Full Code    Pending: infectious work up, ID consult, SS, PT/OT,, C/w IV abx, monitor MS, fever curve. WBC   84 yold male to with Pmhx of Copd, Htn, Dm, schizophrenia, Hld, non-hodgkins lymphoma, urinary retention with chronic Bains (changed yesterday), Hx of MDR UTIs presents to the ED from [facility] for evaluation of AMS    # Near syncope 2/2 TME, likely UTI  # Sepsis not present on admission, 2/2 UTI vs PNA  # hx urinary retention with chronic bains - exchanged 4/13/25  # hx MDRO UCx (1/2025) with carbapenem resistant Klebsiella   - Vitals: BP 95/56 with hypothermia-> c/w bare hugger for now  - ED: s/p 100mg doxy IV x1, 1g vanc IV x1, 3.375g Zosyn IV x1  - UA 4/22- many bacteria, large LE, >900 WBC   - UCx: 4/19 with E Faecalis, 4/14 with proteus mirabilis  - Both bugs are sensitive to vancomycin and meropenem, respectively-> will c/w maribeth and vanc for now until cxs result  - CT abdomen with new RLL opacity, possibly infectious   - CTH with no acute pathology, stable chronic right frontal lobe infarct and stable chronic left cerebellar infarct.  - patient hypothermic with soft BP in ED  >s/p 3L IVF in ED with improvement   - infectious w/u pending f/u : procal, BCx, UCx, MRSA, RVP, urine strep/legionella   - pt was not discharged on abx but started cefdinir 300mg BID at NH home on 4/20/25 per med list   - ID consult pending    #NEW- Bladder mass  # hx non-hodgkin's lymphoma   # hyperdense nodule in R bladder - up to 7mm - suspicious for bladder neoplasm   -  Suspicious hyperdense nodule within the posterior right bladder measuring 7 mm on image 90 of series 4. Findings are suspicious for bladder neoplasm. Not seen on CTAP on 4/14  - Urology consulted for bladder mass, recs appreciated: OP f/u on DC  - No acute urologic intervention at this time  - Follow up with Dr. Fontenot as outpatient for CT Urogram, Urine cytology, cystoscopy  - Continue bains catheter   - Hem onc consulted for bladder mass  Will follow up OP after tissue sampling  - Pt is asymptomatic with good urine output in the bains, draining urine well.    # DAVID on CKD3  - creatinine 2.5 on admission, baseline 1.2-1.3   - GFR on admission is 25, BL is 50-60  - Patient recieved CTAP with IV contrast in the ED  - hold home gabapentin 300mg BID for now   - monitor urine output, trend daily BMPS  - urine studies,  - calculate Fena     # acute on chronic anemia   - Hgb 8.0 on admission baseline ~10-11  - no active signs of bleed   - iron studies   - maintain active type and screen   - transfuse Hgb <7    # elevated trop  - trop 39 -> 31   - EKG with bradycardia, no ST changes     # sinus bradycardia   - HR in 50s on monitor   - hold home metoprolol 25mg BID   - tele monitoring-> EKG    # BPH  - cw tamsulosin and finasteride     #h/o COPD   -stable  - home inhalers     #DM 2  - hold metformin 500mg BID   - SSI     #HTN  - Pt with soft BPs in the ED, will hold home anti-hypertensives for now  - monitor HD  - hold home metoprolol 25mg BID   - hold home nifedipine 90mg daily     #schizophrenia  - c/w olanzapine and Trazodone (holding parameters for lethargy and AMS)    #Misc  - DVT Prophylaxis: Heparin  - GI Prophylaxis: None  - Diet: DASH/CC  - Activity: As tolerated  - IV Fluids: none  - Code Status: Full Code    Pending: infectious work up, ID consult, SS, PT/OT,, C/w IV abx, monitor MS, fever curve. WBC   84 yold male to with Pmhx of Copd, Htn, Dm, schizophrenia, Hld, non-hodgkins lymphoma, urinary retention with chronic Bains (changed yesterday), Hx of MDR UTIs presents to the ED from [facility] for evaluation of AMS    # Near syncope 2/2 TME, likely UTI  # Sepsis not present on admission, 2/2 UTI vs PNA  # hx urinary retention with chronic bains - exchanged 4/13/25  # hx MDRO UCx (1/2025) with carbapenem resistant Klebsiella   - Vitals: BP 95/56 with hypothermia-> c/w bare hugger for now  - ED: s/p 100mg doxy IV x1, 1g vanc IV x1, 3.375g Zosyn IV x1  - UA 4/22- many bacteria, large LE, >900 WBC   - UCx: 4/19 with E Faecalis, 4/14 with proteus mirabilis  - Both bugs are sensitive to vancomycin and meropenem, respectively-> will c/w maribeth and vanc for now until cxs result  - CT abdomen with new RLL opacity, possibly infectious   - CTH with no acute pathology, stable chronic right frontal lobe infarct and stable chronic left cerebellar infarct.  - patient hypothermic with soft BP in ED  >s/p 3L IVF in ED with improvement   - infectious w/u pending f/u : procal, BCx, UCx, MRSA, RVP, urine strep/legionella   - pt was not discharged on abx but started cefdinir 300mg BID at NH home on 4/20/25 per med list   - ID consult pending    #NEW- Bladder mass  # hx non-hodgkin's lymphoma   # hyperdense nodule in R bladder - up to 7mm - suspicious for bladder neoplasm   -  Suspicious hyperdense nodule within the posterior right bladder measuring 7 mm on image 90 of series 4. Findings are suspicious for bladder neoplasm. Not seen on CTAP on 4/14  - Urology consulted for bladder mass, recs appreciated: OP f/u on DC  - No acute urologic intervention at this time  - Follow up with Dr. Fontenot as outpatient for CT Urogram, Urine cytology, cystoscopy  - Continue bains catheter   - Hem onc consulted for bladder mass  Will follow up OP after tissue sampling  - Pt is asymptomatic with good urine output in the bains, draining urine well.    # DAVID on CKD3  - creatinine 2.5 on admission, baseline 1.2-1.3   - GFR on admission is 25, BL is 50-60  - Patient received CTAP with IV contrast in the ED  - hold home gabapentin 300mg BID for now   - monitor urine output, trend daily BMPS  - urine studies noted  - calculate Fena-> 2.4% indicating intrinsic cause of renal failure    # acute on chronic anemia   - Hgb 8.0 on admission baseline ~10-11  - no active signs of bleed   - iron studies   - maintain active type and screen   - transfuse Hgb <7    # elevated trop  - trop 39 -> 31   - EKG with bradycardia, no ST changes     # sinus bradycardia   - HR in 50s on monitor   - hold home metoprolol 25mg BID   - tele monitoring-> EKG    # BPH  - cw tamsulosin and finasteride     #h/o COPD   -stable  - home inhalers     #DM 2  - hold metformin 500mg BID   - SSI     #HTN  - Pt with soft BPs in the ED, will hold home anti-hypertensives for now  - monitor HD  - hold home metoprolol 25mg BID   - hold home nifedipine 90mg daily     #schizophrenia  - c/w olanzapine and Trazodone (holding parameters for lethargy and AMS)    #Misc  - DVT Prophylaxis: Heparin  - GI Prophylaxis: None  - Diet: DASH/CC  - Activity: As tolerated  - IV Fluids: none  - Code Status: Full Code    Pending: infectious work up, ID consult, SS, PT/OT,, C/w IV abx, monitor MS, fever curve. WBC   84 yold male to with Pmhx of Copd, Htn, Dm, schizophrenia, Hld, non-hodgkins lymphoma, urinary retention with chronic Bains (changed yesterday), Hx of MDR UTIs presents to the ED from [facility] for evaluation of AMS    # Near syncope 2/2 TME, likely UTI  # Sepsis not present on admission, 2/2 UTI vs PNA  # hx urinary retention with chronic bains - exchanged 4/13/25  # hx MDRO UCx (1/2025) with carbapenem resistant Klebsiella   - Vitals: BP 95/56 with hypothermia-> c/w bare hugger for now  - ED: s/p 100mg doxy IV x1, 1g vanc IV x1, 3.375g Zosyn IV x1  - UA 4/22- many bacteria, large LE, >900 WBC   - UCx: 4/19 with E Faecalis, 4/14 with proteus mirabilis  - Both bugs are sensitive to vancomycin and meropenem, respectively-> will c/w maribeth and vanc for now until cxs result  - CT abdomen with new RLL opacity, possibly infectious   - CTH with no acute pathology, stable chronic right frontal lobe infarct and stable chronic left cerebellar infarct.  - patient hypothermic with soft BP in ED  >s/p 3L IVF in ED with improvement   - infectious w/u pending f/u : procal, BCx, UCx, MRSA, RVP, urine strep/legionella   - pt was not discharged on abx but started cefdinir 300mg BID at NH home on 4/20/25 per med list   - ID consult pending-> augmentin 500 mg BID from 7 days (4/22/2025-4/27/2025)    #NEW- Bladder mass  # hx non-hodgkin's lymphoma   # hyperdense nodule in R bladder - up to 7mm - suspicious for bladder neoplasm   -  Suspicious hyperdense nodule within the posterior right bladder measuring 7 mm on image 90 of series 4. Findings are suspicious for bladder neoplasm. Not seen on CTAP on 4/14  - Urology consulted for bladder mass, recs appreciated: OP f/u on DC  - No acute urologic intervention at this time  - Follow up with Dr. Fontenot as outpatient for CT Urogram, Urine cytology, cystoscopy  - Continue bains catheter   - Hem onc consulted for bladder mass  Will follow up OP after tissue sampling  - Pt is asymptomatic with good urine output in the bains, draining urine well.    # DAVID on CKD3  # Possibly due to ATN 2/2 contrast induced nephropathy  - creatinine 2.5 on admission, baseline 1.2-1.3-> today 1.8  - GFR on admission is 25, BL is 50-60  - Patient received CTAP with IV contrast in the ED  - hold home gabapentin 300mg BID for now   - monitor urine output, trend daily BMPS  - urine studies noted  - calculate Fena-> 2.4% indicating intrinsic cause of renal failure  - start on NS 75 cc/hr for 24 hours and monitor Creat    # acute on chronic anemia   - Hgb 8.0 on admission baseline ~10-11  - no active signs of bleed   - iron studies   - maintain active type and screen   - transfuse Hgb <7    # elevated trop  - trop 39 -> 31   - EKG with bradycardia, no ST changes     # sinus bradycardia   - HR in 50s on monitor   - hold home metoprolol 25mg BID   - tele monitoring-> EKG    # BPH  - cw tamsulosin and finasteride     #h/o COPD   -stable  - home inhalers     #DM 2  - hold metformin 500mg BID   - SSI     #HTN  - Pt with soft BPs in the ED, will hold home anti-hypertensives for now  - monitor HD  - hold home metoprolol 25mg BID   - hold home nifedipine 90mg daily     #schizophrenia  - c/w olanzapine and Trazodone (holding parameters for lethargy and AMS)    #Misc  - DVT Prophylaxis: Heparin  - GI Prophylaxis: None  - Diet: DASH/CC  - Activity: As tolerated  - IV Fluids: none  - Code Status: Full Code    Pending: infectious work up, ID consult, SS, PT/OT,, C/w IV abx, monitor MS, fever curve. WBC   84 yold male to with Pmhx of Copd, Htn, Dm, schizophrenia, Hld, non-hodgkins lymphoma, urinary retention with chronic Bains (changed yesterday), Hx of MDR UTIs presents to the ED from [facility] for evaluation of AMS    # Near syncope 2/2 TME, likely UTI  # Sepsis not present on admission, 2/2 UTI vs PNA  # hx urinary retention with chronic bains - exchanged 4/13/25  # hx MDRO UCx (1/2025) with carbapenem resistant Klebsiella   - Vitals: BP 95/56 with hypothermia-> c/w bare hugger for now  - ED: s/p 100mg doxy IV x1, 1g vanc IV x1, 3.375g Zosyn IV x1  - UA 4/22- many bacteria, large LE, >900 WBC   - UCx: 4/19 with E Faecalis, 4/14 with proteus mirabilis  - Both bugs are sensitive to vancomycin and meropenem, respectively-> will c/w maribeth and vanc for now until cxs result  - CT abdomen with new RLL opacity, possibly infectious   - CTH with no acute pathology, stable chronic right frontal lobe infarct and stable chronic left cerebellar infarct.  - patient hypothermic with soft BP in ED  >s/p 3L IVF in ED with improvement   - infectious w/u pending f/u : procal, BCx, UCx, MRSA, RVP, urine strep/legionella   - pt was not discharged on abx but started cefdinir 300mg BID at NH home on 4/20/25 per med list   - ID consult pending-> augmentin 500 mg BID from 7 days (4/22/2025-4/27/2025)    #NEW- Bladder mass  # hx non-hodgkin's lymphoma   # hyperdense nodule in R bladder - up to 7mm - suspicious for bladder neoplasm   -  Suspicious hyperdense nodule within the posterior right bladder measuring 7 mm on image 90 of series 4. Findings are suspicious for bladder neoplasm. Not seen on CTAP on 4/14  - Urology consulted for bladder mass, recs appreciated: OP f/u on DC  - No acute urologic intervention at this time  - Follow up with Dr. Fontenot as outpatient for CT Urogram, Urine cytology, cystoscopy  - Continue bains catheter   - Hem onc consulted for bladder mass  Will follow up OP after tissue sampling  - Pt is asymptomatic with good urine output in the bains, draining urine well.    #R eye bacterial conjunctivitis  - yellow pus discharge draining from the R eye  - will start on Ofloxacin drops 3x daily to the R eye for 5 days  - monitor for signs of infection/resolving conjunctivitis    # DAVID on CKD3  # Possibly due to ATN 2/2 contrast induced nephropathy  - creatinine 2.5 on admission, baseline 1.2-1.3-> today 1.8  - GFR on admission is 25, BL is 50-60  - Patient received CTAP with IV contrast in the ED  - hold home gabapentin 300mg BID for now   - monitor urine output, trend daily BMPS  - urine studies noted  - calculate Fena-> 2.4% indicating intrinsic cause of renal failure  - start on NS 75 cc/hr for 24 hours and monitor Creat    # acute on chronic anemia   - Hgb 8.0 on admission baseline ~10-11  - no active signs of bleed   - iron studies   - maintain active type and screen   - transfuse Hgb <7    # elevated trop  - trop 39 -> 31   - EKG with bradycardia, no ST changes     # sinus bradycardia   - HR in 50s on monitor   - hold home metoprolol 25mg BID   - tele monitoring-> EKG    # BPH  - cw tamsulosin and finasteride     #h/o COPD   -stable  - home inhalers     #DM 2  - hold metformin 500mg BID   - SSI     #HTN  - Pt with soft BPs in the ED, will hold home anti-hypertensives for now  - monitor HD  - hold home metoprolol 25mg BID   - hold home nifedipine 90mg daily     #schizophrenia  - c/w olanzapine and Trazodone (holding parameters for lethargy and AMS)    #Misc  - DVT Prophylaxis: Heparin  - GI Prophylaxis: None  - Diet: DASH/CC  - Activity: As tolerated  - IV Fluids: none  - Code Status: Full Code    Pending: infectious work up, ID consult, SS, PT/OT, IV abx narrow to PO augmentin, monitor MS, fever curve. WBC, monitor R eye s/p treatment

## 2025-04-22 NOTE — PROGRESS NOTE ADULT - SUBJECTIVE AND OBJECTIVE BOX
Patient is a 84y old  Male who presents with a chief complaint of AMS    84-year-old male with past medical history of COPD, hypertension, diabetes, schizophrenia, hyperlipidemia, non-Hodgkin's lymphoma, chronic urinary retention presents to the emergency department from [facility] for AMS. Per ED documentation, nurse states she was feeding patient in the afternoon when he suddenly spit his food out and lowered his head down onto the table, was not responding to her questions for a few minutes. Stated he doesn't feel well afterwards. Seen 3 days ago in ED for UTI secondary to chronic bains. Per prior notes, patient is AAOx2 at baseline but very poor historian. Patient has had chronic bains in since admission in January where he was treated for MDRO in the urine, retention, and uretal dilation for bains catheter placement by urology, last exchanged 25. Unable to obtain further history as patient would not respond to questions, attempted to call [facility] for collateral without success.       Vitals:   · BP Systolic	 95 mm Hg  · BP Diastolic	 56 mm Hg  · Heart Rate	62 /min  ·Temp (F)	97.9 Degrees F    Labs:   WBC 8.87  Hgb 8.0 (baseline 10-11)   platelets 183  Na 131  K 5.1  Cr 2.5 (baseline 1.2-1.3)  trop 39   VBG with pH 7.30/CO2 48/O2 38/HCO3 24/lactate 1.6  UA with many bacteria, large LE, >900 WBC     Imaging:  CT abdomen and pelvis: Suspicious hyperdense nodule within the posterior right bladder measuring 7 mm on image 90 of series 4. Findings are suspicious for bladder neoplasm. However, this area is limited evaluation as the bladder is collapsed around a Bains catheter. New right lower lobe opacity possibly infectious in nature    Management in ED:   100mg doxy IV x1   1g vanc IV x1   3.375g Zosyn IV x1 (2025 23:30)  admitted to SDU called to evaluate    PAST MEDICAL & SURGICAL HISTORY:  COPD (chronic obstructive pulmonary disease)      Paranoid schizophrenia      Schizophrenia      Diabetes type 2, controlled      COPD (chronic obstructive pulmonary disease)      Dyslipidemia      Chronic retention of urine      Dyslipidemia      Encounter for screening colonoscopy          SOCIAL HX:   Smoking uto        REVIEW OF SYSTEMS see hpi    Allergies    No Known Allergies    Intolerances        acetaminophen     Tablet .. 650 milliGRAM(s) Oral every 6 hours PRN  albuterol    90 MICROgram(s) HFA Inhaler 1 Puff(s) Inhalation four times a day PRN  cefepime   IVPB 1000 milliGRAM(s) IV Intermittent every 24 hours  chlorhexidine 2% Cloths 1 Application(s) Topical <User Schedule>  dextrose 5%. 1000 milliLiter(s) IV Continuous <Continuous>  dextrose 5%. 1000 milliLiter(s) IV Continuous <Continuous>  dextrose 50% Injectable 25 Gram(s) IV Push once  dextrose 50% Injectable 12.5 Gram(s) IV Push once  dextrose 50% Injectable 25 Gram(s) IV Push once  dextrose Oral Gel 15 Gram(s) Oral once PRN  finasteride 5 milliGRAM(s) Oral daily  glucagon  Injectable 1 milliGRAM(s) IntraMuscular once  heparin   Injectable 5000 Unit(s) SubCutaneous every 12 hours  insulin lispro (ADMELOG) corrective regimen sliding scale   SubCutaneous three times a day before meals  OLANZapine 10 milliGRAM(s) Oral daily  tamsulosin 0.4 milliGRAM(s) Oral at bedtime  traZODone 50 milliGRAM(s) Oral at bedtime  vancomycin  IVPB 750 milliGRAM(s) IV Intermittent every 24 hours  : Home Meds:      PHYSICAL EXAM    ICU Vital Signs Last 24 Hrs  T(C): 35.6 (2025 03:15), Max: 36.6 (2025 17:49)  T(F): 96 (2025 03:15), Max: 97.9 (2025 17:49)  HR: 62 (2025 03:15) (46 - 62)  BP: 124/60 (2025 03:15) (95/56 - 124/60)  BP(mean): 78 (2025 23:15) (77 - 84)  RR: 20 (2025 03:15) (18 - 20)  SpO2: 96% (2025 03:15) (91% - 97%)    O2 Parameters below as of 2025 03:15            General: ill looking  Lungs: Bilateral rhonchi  Cardiovascular: NAN 2.6  Abdomen: Soft, Positive BS  Extremities: No clubbing  Neurological: Non focal         LABS:                          8.0    8.87  )-----------( 183      ( 2025 21:36 )             23.9                                               04-21    131[L]  |  94[L]  |  55[H]  ----------------------------<  107[H]  5.1[H]   |  21  |  2.5[H]    Ca    8.9      2025 20:33    TPro  7.2  /  Alb  3.8  /  TBili  <0.2  /  DBili  x   /  AST  28  /  ALT  12  /  AlkPhos  105  04-21      PT/INR - ( 2025 20:33 )   PT: 13.60 sec;   INR: 1.15 ratio         PTT - ( 2025 20:33 )  PTT:30.7 sec                                       Urinalysis Basic - ( 2025 21:39 )    Color: Dark Yellow / Appearance: Turbid / S.023 / pH: x  Gluc: x / Ketone: Negative mg/dL  / Bili: Negative / Urobili: 0.2 mg/dL   Blood: x / Protein: 100 mg/dL / Nitrite: Negative   Leuk Esterase: Large / RBC: 8 /HPF / WBC >998 /HPF   Sq Epi: x / Non Sq Epi: 4 /HPF / Bacteria: Many /HPF                                                  LIVER FUNCTIONS - ( 2025 20:33 )  Alb: 3.8 g/dL / Pro: 7.2 g/dL / ALK PHOS: 105 U/L / ALT: 12 U/L / AST: 28 U/L / GGT: x                                                  Urinalysis with Rflx Culture (collected 2025 21:39)    Culture - Urine (collected 2025 14:30)  Source: Catheterized Catheterized  Preliminary Report (2025 15:41):    >100,000 CFU/ml Enterococcus faecalis                                                                                             MEDICATIONS  (STANDING):  cefepime   IVPB 1000 milliGRAM(s) IV Intermittent every 24 hours  chlorhexidine 2% Cloths 1 Application(s) Topical <User Schedule>  dextrose 5%. 1000 milliLiter(s) (50 mL/Hr) IV Continuous <Continuous>  dextrose 5%. 1000 milliLiter(s) (100 mL/Hr) IV Continuous <Continuous>  dextrose 50% Injectable 25 Gram(s) IV Push once  dextrose 50% Injectable 12.5 Gram(s) IV Push once  dextrose 50% Injectable 25 Gram(s) IV Push once  finasteride 5 milliGRAM(s) Oral daily  glucagon  Injectable 1 milliGRAM(s) IntraMuscular once  heparin   Injectable 5000 Unit(s) SubCutaneous every 12 hours  insulin lispro (ADMELOG) corrective regimen sliding scale   SubCutaneous three times a day before meals  OLANZapine 10 milliGRAM(s) Oral daily  tamsulosin 0.4 milliGRAM(s) Oral at bedtime  traZODone 50 milliGRAM(s) Oral at bedtime  vancomycin  IVPB 750 milliGRAM(s) IV Intermittent every 24 hours    MEDICATIONS  (PRN):  acetaminophen     Tablet .. 650 milliGRAM(s) Oral every 6 hours PRN Temp greater or equal to 38C (100.4F), Mild Pain (1 - 3)  albuterol    90 MICROgram(s) HFA Inhaler 1 Puff(s) Inhalation four times a day PRN for shortness of breath and/or wheezing  dextrose Oral Gel 15 Gram(s) Oral once PRN Blood Glucose LESS THAN 70 milliGRAM(s)/deciliter         normal...

## 2025-04-22 NOTE — PATIENT PROFILE ADULT - NURSING HOMES
Statement Selected [facility], [place], operated by [facility] [place], [facility]

## 2025-04-22 NOTE — PROGRESS NOTE ADULT - ATTENDING COMMENTS
84 y/male to with Pmhx of COPD, HTN/HLD, DM, schizophrenia, non-hodgkins lymphoma, urinary retention with chronic Bains (changed yesterday), Hx of MDR UTIs presents to the ED from [facility] for evaluation of AMS. Admitted to SDU for TME 2/2 UTI.    #Near syncope/AMS 2/2 TME i/s/o Acute cystitis  #Urinary retention with chronic bains - exchanged 4/19/25  #Bladder Mass  #hx MDRO UTI  - ID eval noted  - PO augmentin 500mg BID x7 days  - f/u urine cx  - urology eval noted for bladder mass. outpatient follow up for  CT Urogram, Urine cytology, cystoscopy    #DAVID on CKD 3, likely ALEJANDRA  - Cr 2.5, downtrending  - received IV contrast  - IVF hydration  - monitor BMP    #R eye bacterial conjunctivitis - ofloxacin drops x5 days    #acute on chronic anemia - f/u iron studies    #hypothermia - bare hugger, supportive care    Pending/Handoff: f/u urine cultures

## 2025-04-22 NOTE — SWALLOW BEDSIDE ASSESSMENT ADULT - SLP PERTINENT HISTORY OF CURRENT PROBLEM
84-year-old male with past medical history of COPD, hypertension, diabetes, schizophrenia, hyperlipidemia, non-Hodgkin's lymphoma, chronic urinary retention presents to the emergency department from [facility] for AMS. Per ED documentation, nurse states she was feeding patient in the afternoon when he suddenly spit his food out and lowered his head down onto the table, was not responding to her questions for a few minutes. Stated he doesn't feel well afterwards. Seen 3 days ago in ED for UTI secondary to chronic bains. Per prior notes, patient is AAOx2 at baseline but very poor historian. Patient has had chronic bains in since admission in January where he was treated for MDRO in the urine, retention, and uretal dilation for bains catheter placement by urology, last exchanged 4/14/25. Unable to obtain further history as patient would not respond to questions, attempted to call [facility] for collateral without success.

## 2025-04-22 NOTE — CONSULT NOTE ADULT - SUBJECTIVE AND OBJECTIVE BOX
ADA VILLAGOMEZ  84y, Male  Allergy: No Known Allergies      CHIEF COMPLAINT:   AMS (2025 05:44)      LOS  1d    HPI  HPI:   84-year-old male with past medical history of COPD, hypertension, diabetes, schizophrenia, hyperlipidemia, non-Hodgkin's lymphoma, chronic urinary retention presents to the emergency department from [facility] for AMS. Per ED documentation, nurse states she was feeding patient in the afternoon when he suddenly spit his food out and lowered his head down onto the table, was not responding to her questions for a few minutes. Stated he doesn't feel well afterwards. Seen 3 days ago in ED for UTI secondary to chronic bains. Per prior notes, patient is AAOx2 at baseline but very poor historian. Patient has had chronic bains in since admission in January where he was treated for MDRO in the urine, retention, and uretal dilation for bains catheter placement by urology, last exchanged 25. Unable to obtain further history as patient would not respond to questions, attempted to call [facility] for collateral without success.       Vitals:   · BP Systolic	 95 mm Hg  · BP Diastolic	 56 mm Hg  · Heart Rate	62 /min  ·Temp (F)	97.9 Degrees F    Labs:   WBC 8.87  Hgb 8.0 (baseline 10-11)   platelets 183  Na 131  K 5.1  Cr 2.5 (baseline 1.2-1.3)  trop 39   VBG with pH 7.30/CO2 48/O2 38/HCO3 24/lactate 1.6  UA with many bacteria, large LE, >900 WBC     Imaging:  CT abdomen and pelvis: Suspicious hyperdense nodule within the posterior right bladder measuring 7 mm on image 90 of series 4. Findings are suspicious for bladder neoplasm. However, this area is limited evaluation as the bladder is collapsed around a Bains catheter. New right lower lobe opacity possibly infectious in nature    Management in ED:   100mg doxy IV x1   1g vanc IV x1   3.375g Zosyn IV x1 (2025 23:30)      INFECTIOUS DISEASE HISTORY:  ID consulted for UTI  Afebrile  No leukocytosis     Currently ordered for:  meropenem  IVPB 500 milliGRAM(s) IV Intermittent every 12 hours  vancomycin  IVPB 750 milliGRAM(s) IV Intermittent every 24 hours      PMH  PAST MEDICAL & SURGICAL HISTORY:  COPD (chronic obstructive pulmonary disease)      Paranoid schizophrenia      Schizophrenia      Diabetes type 2, controlled      COPD (chronic obstructive pulmonary disease)      Dyslipidemia      Chronic retention of urine      Dyslipidemia      Encounter for screening colonoscopy          FAMILY HISTORY  No pertinent family history in first degree relatives    No pertinent family history in first degree relatives    No pertinent family history in first degree relatives        SOCIAL HISTORY  Social History:  NB [facility] (28 May 2023 07:50)        ROS  ***    VITALS:  T(F): 97.4, Max: 97.9 (25 @ 17:49)  HR: 80  BP: 145/67  RR: 18Vital Signs Last 24 Hrs  T(C): 36.3 (2025 07:44), Max: 36.6 (2025 17:49)  T(F): 97.4 (2025 07:44), Max: 97.9 (2025 17:49)  HR: 80 (2025 07:44) (46 - 80)  BP: 145/67 (2025 07:44) (95/56 - 145/67)  BP(mean): 78 (2025 23:15) (77 - 84)  RR: 18 (2025 07:44) (18 - 20)  SpO2: 97% (2025 07:44) (91% - 97%)    Parameters below as of 2025 07:44  Patient On (Oxygen Delivery Method): room air        PHYSICAL EXAM:  ***    TESTS & MEASUREMENTS:                        8.0    8.87  )-----------( 183      ( 2025 21:36 )             23.9     04-21    131[L]  |  94[L]  |  55[H]  ----------------------------<  107[H]  5.1[H]   |  21  |  2.5[H]    Ca    8.9      2025 20:33    TPro  7.2  /  Alb  3.8  /  TBili  <0.2  /  DBili  x   /  AST  28  /  ALT  12  /  AlkPhos  105  04-21      LIVER FUNCTIONS - ( 2025 20:33 )  Alb: 3.8 g/dL / Pro: 7.2 g/dL / ALK PHOS: 105 U/L / ALT: 12 U/L / AST: 28 U/L / GGT: x           Urinalysis Basic - ( 2025 21:39 )    Color: Dark Yellow / Appearance: Turbid / S.023 / pH: x  Gluc: x / Ketone: Negative mg/dL  / Bili: Negative / Urobili: 0.2 mg/dL   Blood: x / Protein: 100 mg/dL / Nitrite: Negative   Leuk Esterase: Large / RBC: 8 /HPF / WBC >998 /HPF   Sq Epi: x / Non Sq Epi: 4 /HPF / Bacteria: Many /HPF        Urinalysis with Rflx Culture (collected 25 @ 21:39)    Culture - Urine (collected 25 @ 14:30)  Source: Catheterized Catheterized  Final Report (25 @ 07:57):    >100,000 CFU/ml Enterococcus faecalis    <10,000 CFU/ml Normal Urogenital jake present  Organism: Enterococcus faecalis (25 @ 07:57)  Organism: Enterococcus faecalis (25 @ 07:57)      -  Levofloxacin: R >4      -  Nitrofurantoin: S <=32 Should not be used to treat pyelonephritis.      -  Vancomycin: S 1      -  Ciprofloxacin: R >2      -  Ampicillin: S <=2 Predicts results to ampicillin/sulbactam, amoxacillin-clavulanate and  piperacillin-tazobactam.      -  Tetracycline: R >8      Method Type: IZABELLA    Urinalysis with Rflx Culture (collected 25 @ 18:19)    Culture - Urine (collected 25 @ 18:19)  Source: Clean Catch None  Final Report (25 @ 08:03):    >100,000 CFU/ml Proteus mirabilis  Organism: Proteus mirabilis (25 @ 08:03)  Organism: Proteus mirabilis (25 @ 08:03)      -  Levofloxacin: S <=0.5      -  Tobramycin: S <=2      -  Nitrofurantoin: R >64 Should not be used to treat pyelonephritis      -  Aztreonam: S <=4      -  Gentamicin: S <=2      -  Cefazolin: S <=2 For uncomplicated UTI with K. pneumoniae, E. coli, or P. mirablis: IZABELLA <=16 is sensitive and IZABELLA >=32 is resistant. This also predicts results for oral agents cefaclor, cefdinir, cefpodoxime, cefprozil, cefuroxime axetil, cephalexin and locarbef for uncomplicated UTI. Note that some isolates may be susceptible to these agents while testing resistant to cefazolin.      -  Cefepime: S <=2      -  Piperacillin/Tazobactam: S <=8      -  Ciprofloxacin: S <=0.25      -  Ceftriaxone: S <=1      -  Ampicillin: S <=8 These ampicillin results predict results for amoxicillin      Method Type: IZABELLA      -  Meropenem: S <=1      -  Ampicillin/Sulbactam: S <=4/2      -  Cefoxitin: S <=8      -  Cefuroxime: S <=4      -  Amoxicillin/Clavulanic Acid: S <=8/4      -  Trimethoprim/Sulfamethoxazole: S <=0.5/9.5      -  Ertapenem: S <=0.5    Culture - Blood (collected 25 @ 18:10)  Source: Blood Blood  Final Report (25 @ 23:07):    No growth at 5 days    Culture - Blood (collected 25 @ 18:00)  Source: Blood Blood  Final Report (25 @ 23:07):    No growth at 5 days    Urinalysis with Rflx Culture (collected 25 @ 15:05)    Culture - Urine (collected 25 @ 15:05)  Source: Catheterized None  Final Report (04-15-25 @ 19:27):    >100,000 CFU/ml Proteus mirabilis    <10,000 CFU/ml Normal Urogenital jake present  Organism: Proteus mirabilis (04-15-25 @ 19:27)  Organism: Proteus mirabilis (04-15-25 @ 19:27)      -  Levofloxacin: S <=0.5      -  Tobramycin: S <=2      -  Nitrofurantoin: R >64 Should not be used to treat pyelonephritis      -  Aztreonam: S <=4      -  Gentamicin: S <=2      -  Cefazolin: S <=2 For uncomplicated UTI with K. pneumoniae, E. coli, or P. mirablis: IZABELLA <=16 is sensitive and IZABELLA >=32 is resistant. This also predicts results for oral agents cefaclor, cefdinir, cefpodoxime, cefprozil, cefuroxime axetil, cephalexin and locarbef for uncomplicated UTI. Note that some isolates may be susceptible to these agents while testing resistant to cefazolin.      -  Cefepime: S <=2      -  Piperacillin/Tazobactam: S <=8      -  Ciprofloxacin: S <=0.25      -  Ceftriaxone: S <=1      -  Ampicillin: S <=8 These ampicillin results predict results for amoxicillin      Method Type: IZABELLA      -  Meropenem: S <=1      -  Ampicillin/Sulbactam: S <=4/2      -  Cefoxitin: S <=8      -  Cefuroxime: S <=4      -  Amoxicillin/Clavulanic Acid: S <=8/4      -  Trimethoprim/Sulfamethoxazole: S <=0.5/9.5      -  Ertapenem: S <=0.5    Urinalysis with Rflx Culture (collected 25 @ 04:20)    Culture - Urine (collected 25 @ 04:20)  Source: Catheterized None  Final Report (25 @ 12:23):    >=3 organisms. Probable collection contamination.        Lactate, Blood: 1.9 mmol/L (25 @ 20:33)  Blood Gas Venous - Lactate: 1.6 mmol/L (25 @ 20:16)      INFECTIOUS DISEASES TESTING      INFLAMMATORY MARKERS      RADIOLOGY & ADDITIONAL TESTS:  I have personally reviewed the radiographic imaging    CARDIOLOGY TESTING  EKG reviewed if performed    MEDICATIONS  chlorhexidine 2% Cloths 1 Topical <User Schedule>  dextrose 5%. 1000 IV Continuous <Continuous>  dextrose 5%. 1000 IV Continuous <Continuous>  dextrose 50% Injectable 25 IV Push once  dextrose 50% Injectable 12.5 IV Push once  dextrose 50% Injectable 25 IV Push once  finasteride 5 Oral daily  glucagon  Injectable 1 IntraMuscular once  heparin   Injectable 5000 SubCutaneous every 12 hours  insulin lispro (ADMELOG) corrective regimen sliding scale  SubCutaneous three times a day before meals  meropenem  IVPB 500 IV Intermittent every 12 hours  OLANZapine 10 Oral daily  tamsulosin 0.4 Oral at bedtime  traZODone 50 Oral at bedtime  vancomycin  IVPB 750 IV Intermittent every 24 hours        ANTIBIOTICS:  meropenem  IVPB 500 milliGRAM(s) IV Intermittent every 12 hours  vancomycin  IVPB 750 milliGRAM(s) IV Intermittent every 24 hours      ALLERGIES:  No Known Allergies   ADA VILLAGOMEZ  84y, Male  Allergy: No Known Allergies      CHIEF COMPLAINT:   AMS (2025 05:44)      LOS  1d    HPI  HPI:   84-year-old male with past medical history of COPD, hypertension, diabetes, schizophrenia, hyperlipidemia, non-Hodgkin's lymphoma, chronic urinary retention presents to the emergency department from [facility] for AMS. Per ED documentation, nurse states she was feeding patient in the afternoon when he suddenly spit his food out and lowered his head down onto the table, was not responding to her questions for a few minutes. Stated he doesn't feel well afterwards. Seen 3 days ago in ED for UTI secondary to chronic bains. Per prior notes, patient is AAOx2 at baseline but very poor historian. Patient has had chronic bains in since admission in January where he was treated for MDRO in the urine, retention, and uretal dilation for bains catheter placement by urology, last exchanged 25. Unable to obtain further history as patient would not respond to questions, attempted to call [facility] for collateral without success.       Vitals:   · BP Systolic	 95 mm Hg  · BP Diastolic	 56 mm Hg  · Heart Rate	62 /min  ·Temp (F)	97.9 Degrees F    Labs:   WBC 8.87  Hgb 8.0 (baseline 10-11)   platelets 183  Na 131  K 5.1  Cr 2.5 (baseline 1.2-1.3)  trop 39   VBG with pH 7.30/CO2 48/O2 38/HCO3 24/lactate 1.6  UA with many bacteria, large LE, >900 WBC     Imaging:  CT abdomen and pelvis: Suspicious hyperdense nodule within the posterior right bladder measuring 7 mm on image 90 of series 4. Findings are suspicious for bladder neoplasm. However, this area is limited evaluation as the bladder is collapsed around a Bains catheter. New right lower lobe opacity possibly infectious in nature    Management in ED:   100mg doxy IV x1   1g vanc IV x1   3.375g Zosyn IV x1 (2025 23:30)      INFECTIOUS DISEASE HISTORY:  ID consulted for UTI  Afebrile  No leukocytosis   reports some urinary frequency    Currently ordered for:  meropenem  IVPB 500 milliGRAM(s) IV Intermittent every 12 hours  vancomycin  IVPB 750 milliGRAM(s) IV Intermittent every 24 hours      PMH  PAST MEDICAL & SURGICAL HISTORY:  COPD (chronic obstructive pulmonary disease)      Paranoid schizophrenia      Schizophrenia      Diabetes type 2, controlled      COPD (chronic obstructive pulmonary disease)      Dyslipidemia      Chronic retention of urine      Dyslipidemia      Encounter for screening colonoscopy          FAMILY HISTORY  No pertinent family history in first degree relatives    No pertinent family history in first degree relatives    No pertinent family history in first degree relatives        SOCIAL HISTORY  Social History:  NB NH (28 May 2023 07:50)        ROS  General: Denies rigors, nightsweats  HEENT: Denies headache, rhinorrhea, sore throat, eye pain  CV: Denies CP, palpitations  PULM: Denies wheezing, hemoptysis  GI: Denies hematemesis, hematochezia, melena  : as noted above   MSK: Denies arthralgias, myalgias  SKIN: Denies rash, lesions  NEURO: Denies paresthesias, weakness  PSYCH: Denies depression, anxiety     VITALS:  T(F): 97.4, Max: 97.9 (25 @ 17:49)  HR: 80  BP: 145/67  RR: 18Vital Signs Last 24 Hrs  T(C): 36.3 (2025 07:44), Max: 36.6 (2025 17:49)  T(F): 97.4 (2025 07:44), Max: 97.9 (2025 17:49)  HR: 80 (2025 07:44) (46 - 80)  BP: 145/67 (2025 07:44) (95/56 - 145/67)  BP(mean): 78 (2025 23:15) (77 - 84)  RR: 18 (2025 07:44) (18 - 20)  SpO2: 97% (2025 07:44) (91% - 97%)    Parameters below as of 2025 07:44  Patient On (Oxygen Delivery Method): room air        PHYSICAL EXAM:  Gen: NAD, resting in bed  HEENT: Normocephalic, atraumatic  Neck: supple, no lymphadenopathy  CV: Regular rate & regular rhythm  Lungs: decreased BS at bases, no fremitus  Abdomen: Soft, BS present no suprapubic tenderness, no CVAT   Ext: Warm, well perfused  Neuro: non focal, awake  Skin: no rash, no erythema  Lines: no phlebitis   bains    TESTS & MEASUREMENTS:                        8.0    8.87  )-----------( 183      ( 2025 21:36 )             23.9         131[L]  |  94[L]  |  55[H]  ----------------------------<  107[H]  5.1[H]   |  21  |  2.5[H]    Ca    8.9      2025 20:33    TPro  7.2  /  Alb  3.8  /  TBili  <0.2  /  DBili  x   /  AST  28  /  ALT  12  /  AlkPhos  105        LIVER FUNCTIONS - ( 2025 20:33 )  Alb: 3.8 g/dL / Pro: 7.2 g/dL / ALK PHOS: 105 U/L / ALT: 12 U/L / AST: 28 U/L / GGT: x           Urinalysis Basic - ( 2025 21:39 )    Color: Dark Yellow / Appearance: Turbid / S.023 / pH: x  Gluc: x / Ketone: Negative mg/dL  / Bili: Negative / Urobili: 0.2 mg/dL   Blood: x / Protein: 100 mg/dL / Nitrite: Negative   Leuk Esterase: Large / RBC: 8 /HPF / WBC >998 /HPF   Sq Epi: x / Non Sq Epi: 4 /HPF / Bacteria: Many /HPF        Urinalysis with Rflx Culture (collected 25 @ 21:39)    Culture - Urine (collected 25 @ 14:30)  Source: Catheterized Catheterized  Final Report (25 @ 07:57):    >100,000 CFU/ml Enterococcus faecalis    <10,000 CFU/ml Normal Urogenital jake present  Organism: Enterococcus faecalis (25 @ 07:57)  Organism: Enterococcus faecalis (25 @ 07:57)      -  Levofloxacin: R >4      -  Nitrofurantoin: S <=32 Should not be used to treat pyelonephritis.      -  Vancomycin: S 1      -  Ciprofloxacin: R >2      -  Ampicillin: S <=2 Predicts results to ampicillin/sulbactam, amoxacillin-clavulanate and  piperacillin-tazobactam.      -  Tetracycline: R >8      Method Type: IZABELLA    Urinalysis with Rflx Culture (collected 25 @ 18:19)    Culture - Urine (collected 25 @ 18:19)  Source: Clean Catch None  Final Report (25 @ 08:03):    >100,000 CFU/ml Proteus mirabilis  Organism: Proteus mirabilis (25 @ 08:03)  Organism: Proteus mirabilis (25 @ 08:03)      -  Levofloxacin: S <=0.5      -  Tobramycin: S <=2      -  Nitrofurantoin: R >64 Should not be used to treat pyelonephritis      -  Aztreonam: S <=4      -  Gentamicin: S <=2      -  Cefazolin: S <=2 For uncomplicated UTI with K. pneumoniae, E. coli, or P. mirablis: IZABELLA <=16 is sensitive and IZABELLA >=32 is resistant. This also predicts results for oral agents cefaclor, cefdinir, cefpodoxime, cefprozil, cefuroxime axetil, cephalexin and locarbef for uncomplicated UTI. Note that some isolates may be susceptible to these agents while testing resistant to cefazolin.      -  Cefepime: S <=2      -  Piperacillin/Tazobactam: S <=8      -  Ciprofloxacin: S <=0.25      -  Ceftriaxone: S <=1      -  Ampicillin: S <=8 These ampicillin results predict results for amoxicillin      Method Type: IZABELLA      -  Meropenem: S <=1      -  Ampicillin/Sulbactam: S <=4/2      -  Cefoxitin: S <=8      -  Cefuroxime: S <=4      -  Amoxicillin/Clavulanic Acid: S <=8/4      -  Trimethoprim/Sulfamethoxazole: S <=0.5/9.5      -  Ertapenem: S <=0.5    Culture - Blood (collected 25 @ 18:10)  Source: Blood Blood  Final Report (25 @ 23:07):    No growth at 5 days    Culture - Blood (collected 25 @ 18:00)  Source: Blood Blood  Final Report (25 @ 23:07):    No growth at 5 days    Urinalysis with Rflx Culture (collected 25 @ 15:05)    Culture - Urine (collected 25 @ 15:05)  Source: Catheterized None  Final Report (04-15-25 @ 19:27):    >100,000 CFU/ml Proteus mirabilis    <10,000 CFU/ml Normal Urogenital jake present  Organism: Proteus mirabilis (04-15-25 @ 19:27)  Organism: Proteus mirabilis (04-15-25 @ 19:27)      -  Levofloxacin: S <=0.5      -  Tobramycin: S <=2      -  Nitrofurantoin: R >64 Should not be used to treat pyelonephritis      -  Aztreonam: S <=4      -  Gentamicin: S <=2      -  Cefazolin: S <=2 For uncomplicated UTI with K. pneumoniae, E. coli, or P. mirablis: IZABELLA <=16 is sensitive and IZABELLA >=32 is resistant. This also predicts results for oral agents cefaclor, cefdinir, cefpodoxime, cefprozil, cefuroxime axetil, cephalexin and locarbef for uncomplicated UTI. Note that some isolates may be susceptible to these agents while testing resistant to cefazolin.      -  Cefepime: S <=2      -  Piperacillin/Tazobactam: S <=8      -  Ciprofloxacin: S <=0.25      -  Ceftriaxone: S <=1      -  Ampicillin: S <=8 These ampicillin results predict results for amoxicillin      Method Type: IZABELLA      -  Meropenem: S <=1      -  Ampicillin/Sulbactam: S <=4/2      -  Cefoxitin: S <=8      -  Cefuroxime: S <=4      -  Amoxicillin/Clavulanic Acid: S <=8/4      -  Trimethoprim/Sulfamethoxazole: S <=0.5/9.5      -  Ertapenem: S <=0.5    Urinalysis with Rflx Culture (collected 25 @ 04:20)    Culture - Urine (collected 25 @ 04:20)  Source: Catheterized None  Final Report (25 @ 12:23):    >=3 organisms. Probable collection contamination.        Lactate, Blood: 1.9 mmol/L (25 @ 20:33)  Blood Gas Venous - Lactate: 1.6 mmol/L (25 @ 20:16)      INFECTIOUS DISEASES TESTING      INFLAMMATORY MARKERS      RADIOLOGY & ADDITIONAL TESTS:  I have personally reviewed the radiographic imaging    CARDIOLOGY TESTING  EKG reviewed if performed    MEDICATIONS  chlorhexidine 2% Cloths 1 Topical <User Schedule>  dextrose 5%. 1000 IV Continuous <Continuous>  dextrose 5%. 1000 IV Continuous <Continuous>  dextrose 50% Injectable 25 IV Push once  dextrose 50% Injectable 12.5 IV Push once  dextrose 50% Injectable 25 IV Push once  finasteride 5 Oral daily  glucagon  Injectable 1 IntraMuscular once  heparin   Injectable 5000 SubCutaneous every 12 hours  insulin lispro (ADMELOG) corrective regimen sliding scale  SubCutaneous three times a day before meals  meropenem  IVPB 500 IV Intermittent every 12 hours  OLANZapine 10 Oral daily  tamsulosin 0.4 Oral at bedtime  traZODone 50 Oral at bedtime  vancomycin  IVPB 750 IV Intermittent every 24 hours        ANTIBIOTICS:  meropenem  IVPB 500 milliGRAM(s) IV Intermittent every 12 hours  vancomycin  IVPB 750 milliGRAM(s) IV Intermittent every 24 hours      ALLERGIES:  No Known Allergies

## 2025-04-22 NOTE — PROGRESS NOTE ADULT - SUBJECTIVE AND OBJECTIVE BOX
SUBJECTIVE/OVERNIGHT EVENTS  Today is hospital day 1d. This morning the patient was seen and examined at bedside, resting comfortably in bed. Overnight patient was hypothermic to 94 and a bare hugger was placed. Patient is AOx2, able to repeat his name and tell me he is in a hospital, unable to tell me the year. Denies any symptoms.     MEDICATIONS  STANDING MEDICATIONS  chlorhexidine 2% Cloths 1 Application(s) Topical <User Schedule>  dextrose 5%. 1000 milliLiter(s) IV Continuous <Continuous>  dextrose 5%. 1000 milliLiter(s) IV Continuous <Continuous>  dextrose 50% Injectable 25 Gram(s) IV Push once  dextrose 50% Injectable 12.5 Gram(s) IV Push once  dextrose 50% Injectable 25 Gram(s) IV Push once  finasteride 5 milliGRAM(s) Oral daily  glucagon  Injectable 1 milliGRAM(s) IntraMuscular once  heparin   Injectable 5000 Unit(s) SubCutaneous every 12 hours  insulin lispro (ADMELOG) corrective regimen sliding scale   SubCutaneous three times a day before meals  meropenem  IVPB 500 milliGRAM(s) IV Intermittent every 12 hours  OLANZapine 10 milliGRAM(s) Oral daily  tamsulosin 0.4 milliGRAM(s) Oral at bedtime  traZODone 50 milliGRAM(s) Oral at bedtime  vancomycin  IVPB 750 milliGRAM(s) IV Intermittent every 24 hours    PRN MEDICATIONS  acetaminophen     Tablet .. 650 milliGRAM(s) Oral every 6 hours PRN  albuterol    90 MICROgram(s) HFA Inhaler 1 Puff(s) Inhalation four times a day PRN  dextrose Oral Gel 15 Gram(s) Oral once PRN    VITALS  T(F): 97.4 (25 @ 07:44), Max: 97.9 (25 @ 17:49)  HR: 80 (25 @ 07:44) (46 - 80)  BP: 145/67 (25 @ 07:44) (95/56 - 145/67)  RR: 18 (25 @ 07:44) (18 - 20)  SpO2: 97% (25 @ 07:44) (91% - 97%)  POCT Blood Glucose.: 91 mg/dL (25 @ 09:48)  POCT Blood Glucose.: 125 mg/dL (25 @ 19:44)    PHYSICAL EXAM  GENERAL  NAD, lying in bed comfortably     HEAD  Atraumatic without hematoma  or  laceration. PERRL. EOMI. +L eyelid with yellow discharge/crust.     NECK  Supple without  neck stiffness  or nuchal rigidity  no JVD.     HEART  Regular rate and rhthym, no murmurs rubs or gallops    LUNGS  Clear to auscultation bilaterally, no wheezing rales or rhonci +pectus carinatum    ABDOMEN  soft, nontender, nondistended, +BS    EXTREMITIES  no edema    NERVOUS SYSTEM  A&Ox3     CN II-XII:     ( X ) Intact     (  ) focal deficits  (Specify:     )     SKIN  No rashes or lesions      : Cobos in place    LABS             8.0    8.87  )-----------( 183      ( 25 @ 21:36 )             23.9     131  |  94  |  55  -------------------------<  107   25 @ 20:33  5.1  |  21  |  2.5    Ca      8.9     25 @ 20:33    TPro  7.2  /  Alb  3.8  /  TBili  <0.2  /  DBili  x   /  AST  28  /  ALT  12  /  AlkPhos  105  /  GGT  x     25 @ 20:33    PT/INR - ( 25 @ 20:33 )   PT: 13.60 sec[H];   INR: 1.15 ratio  PTT - ( 25 @ 20:33 )  PTT:30.7 sec    Troponin T, High Sensitivity Result: 31 ng/L (25 @ 22:58)  Troponin T, High Sensitivity Result: 39 ng/L (25 @ 20:33)    Urinalysis Basic - ( 2025 21:39 )    Color: Dark Yellow / Appearance: Turbid / S.023 / pH: x  Gluc: x / Ketone: Negative mg/dL  / Bili: Negative / Urobili: 0.2 mg/dL   Blood: x / Protein: 100 mg/dL / Nitrite: Negative   Leuk Esterase: Large / RBC: 8 /HPF / WBC >998 /HPF   Sq Epi: x / Non Sq Epi: 4 /HPF / Bacteria: Many /HPF          Urinalysis with Rflx Culture (collected 2025 21:39)    Culture - Urine (collected 2025 14:30)  Source: Catheterized Catheterized  Final Report (2025 07:57):    >100,000 CFU/ml Enterococcus faecalis    <10,000 CFU/ml Normal Urogenital jake present  Organism: Enterococcus faecalis (2025 07:57)  Organism: Enterococcus faecalis (2025 07:57)      IMAGING

## 2025-04-22 NOTE — PHYSICAL THERAPY INITIAL EVALUATION ADULT - PERTINENT HX OF CURRENT PROBLEM, REHAB EVAL
84-year-old male with past medical history of COPD, hypertension, diabetes, schizophrenia, hyperlipidemia, non-Hodgkin's lymphoma, chronic urinary retention presents to the emergency department from [facility] for AMS. Per ED documentation, nurse states she was feeding patient in the afternoon when he suddenly spit his food out and lowered his head down onto the table, was not responding to her questions for a few minutes. Stated he doesn't feel well afterwards. Seen 3 days ago in ED for UTI secondary to chronic bains. Per prior notes, patient is AAOx2 at baseline but very poor historian. Patient has had chronic bains in since admission in January where he was treated for MDRO in the urine, retention, and uretal dilation for bains catheter placement by urology, last exchanged 4/14/25. Unable to obtain further history as patient would not respond to questions, attempted to call [facility] for collateral without success.

## 2025-04-22 NOTE — CONSULT NOTE ADULT - ASSESSMENT
84-year-old male with past medical history of COPD, hypertension, diabetes, schizophrenia, hyperlipidemia, non-Hodgkin's lymphoma, chronic urinary retention presents to the emergency department from [facility] for AMS.     IMPRESSION  #    Afebrile ; Admission WBC 8  Chronic Cobos-  last exchanged 4/19/25 4/19 UCX   >100,000 CFU/ml Enterococcus faecalis S amp Nitrofurantoin: S <=32    4/14 UCX   >100,000 CFU/ml Proteus mirabilis Nitrofurantoin: R >64     4/13 UCX   >100,000 CFU/ml Proteus mirabilis    CT abdomen and pelvis: Suspicious hyperdense nodule within the posterior right bladder measuring 7 mm on image 90 of series 4. Findings are suspicious for bladder neoplasm. However, this area is limited evaluation as the bladder is collapsed around a Cobos catheter. 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    If any questions, please text or call on [employer] Teams  Please continue to update ID with any pertinent new clinical, laboratory or radiographic findings  84-year-old male with past medical history of COPD, hypertension, diabetes, schizophrenia, hyperlipidemia, non-Hodgkin's lymphoma, chronic urinary retention presents to the emergency department from [facility] for AMS.     IMPRESSION  #Possible Symptomatic acute cystitis  Reports increased urinary frequency    Afebrile ; Admission WBC 8  Chronic Cobos-  last exchanged 4/19/25 4/19 UCX   >100,000 CFU/ml Enterococcus faecalis S amp Nitrofurantoin: S <=32    4/14 UCX   >100,000 CFU/ml Proteus mirabilis Nitrofurantoin: R >64     4/13 UCX   >100,000 CFU/ml Proteus mirabilis    CT abdomen and pelvis: Suspicious hyperdense nodule within the posterior right bladder measuring 7 mm on image 90 of series 4. Findings are suspicious for bladder neoplasm. However, this area is limited evaluation as the bladder is collapsed around a Cobos catheter. 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      RECOMMENDATIONS  - PO augmentin 500mg BID x 7 days  - f/u UCX    If any questions, please text or call on Microsoft Teams  Please continue to update ID with any pertinent new clinical, laboratory or radiographic findings

## 2025-04-22 NOTE — PHYSICAL THERAPY INITIAL EVALUATION ADULT - ADDITIONAL COMMENTS
Per chart review, pt resides in [facility], ambulates with rollator. Pt poor historian, reports being independent.

## 2025-04-22 NOTE — SWALLOW BEDSIDE ASSESSMENT ADULT - SLP GENERAL OBSERVATIONS
Pt. received in bed awake and alert in bed. Oriented to person. Confusion present. Suspected baseline.

## 2025-04-23 LAB
ALBUMIN SERPL ELPH-MCNC: 3.4 G/DL — LOW (ref 3.5–5.2)
ALP SERPL-CCNC: 103 U/L — SIGNIFICANT CHANGE UP (ref 30–115)
ALT FLD-CCNC: 11 U/L — SIGNIFICANT CHANGE UP (ref 0–41)
ANION GAP SERPL CALC-SCNC: 12 MMOL/L — SIGNIFICANT CHANGE UP (ref 7–14)
AST SERPL-CCNC: 23 U/L — SIGNIFICANT CHANGE UP (ref 0–41)
BASOPHILS # BLD AUTO: 0.01 K/UL — SIGNIFICANT CHANGE UP (ref 0–0.2)
BASOPHILS NFR BLD AUTO: 0.1 % — SIGNIFICANT CHANGE UP (ref 0–1)
BILIRUB SERPL-MCNC: <0.2 MG/DL — SIGNIFICANT CHANGE UP (ref 0.2–1.2)
BUN SERPL-MCNC: 30 MG/DL — HIGH (ref 10–20)
CALCIUM SERPL-MCNC: 8.6 MG/DL — SIGNIFICANT CHANGE UP (ref 8.4–10.5)
CHLORIDE SERPL-SCNC: 101 MMOL/L — SIGNIFICANT CHANGE UP (ref 98–110)
CO2 SERPL-SCNC: 23 MMOL/L — SIGNIFICANT CHANGE UP (ref 17–32)
CREAT SERPL-MCNC: 1.4 MG/DL — SIGNIFICANT CHANGE UP (ref 0.7–1.5)
EGFR: 50 ML/MIN/1.73M2 — LOW
EGFR: 50 ML/MIN/1.73M2 — LOW
EOSINOPHIL # BLD AUTO: 0.07 K/UL — SIGNIFICANT CHANGE UP (ref 0–0.7)
EOSINOPHIL NFR BLD AUTO: 1 % — SIGNIFICANT CHANGE UP (ref 0–8)
FERRITIN SERPL-MCNC: 216 NG/ML — SIGNIFICANT CHANGE UP (ref 30–400)
GLUCOSE BLDC GLUCOMTR-MCNC: 102 MG/DL — HIGH (ref 70–99)
GLUCOSE BLDC GLUCOMTR-MCNC: 106 MG/DL — HIGH (ref 70–99)
GLUCOSE BLDC GLUCOMTR-MCNC: 90 MG/DL — SIGNIFICANT CHANGE UP (ref 70–99)
GLUCOSE BLDC GLUCOMTR-MCNC: 98 MG/DL — SIGNIFICANT CHANGE UP (ref 70–99)
GLUCOSE SERPL-MCNC: 88 MG/DL — SIGNIFICANT CHANGE UP (ref 70–99)
GRAM STN FLD: ABNORMAL
GRAM STN FLD: SIGNIFICANT CHANGE UP
HCT VFR BLD CALC: 27.5 % — LOW (ref 42–52)
HGB BLD-MCNC: 9.2 G/DL — LOW (ref 14–18)
IMM GRANULOCYTES NFR BLD AUTO: 0.4 % — HIGH (ref 0.1–0.3)
LEGIONELLA AG UR QL: NEGATIVE — SIGNIFICANT CHANGE UP
LYMPHOCYTES # BLD AUTO: 1.36 K/UL — SIGNIFICANT CHANGE UP (ref 1.2–3.4)
LYMPHOCYTES # BLD AUTO: 20 % — LOW (ref 20.5–51.1)
MAGNESIUM SERPL-MCNC: 2 MG/DL — SIGNIFICANT CHANGE UP (ref 1.8–2.4)
MCHC RBC-ENTMCNC: 30.7 PG — SIGNIFICANT CHANGE UP (ref 27–31)
MCHC RBC-ENTMCNC: 33.5 G/DL — SIGNIFICANT CHANGE UP (ref 32–37)
MCV RBC AUTO: 91.7 FL — SIGNIFICANT CHANGE UP (ref 80–94)
MONOCYTES # BLD AUTO: 0.58 K/UL — SIGNIFICANT CHANGE UP (ref 0.1–0.6)
MONOCYTES NFR BLD AUTO: 8.5 % — SIGNIFICANT CHANGE UP (ref 1.7–9.3)
NEUTROPHILS # BLD AUTO: 4.75 K/UL — SIGNIFICANT CHANGE UP (ref 1.4–6.5)
NEUTROPHILS NFR BLD AUTO: 70 % — SIGNIFICANT CHANGE UP (ref 42.2–75.2)
NRBC BLD AUTO-RTO: 0 /100 WBCS — SIGNIFICANT CHANGE UP (ref 0–0)
PLATELET # BLD AUTO: 224 K/UL — SIGNIFICANT CHANGE UP (ref 130–400)
PMV BLD: 10.6 FL — HIGH (ref 7.4–10.4)
POTASSIUM SERPL-MCNC: 4.6 MMOL/L — SIGNIFICANT CHANGE UP (ref 3.5–5)
POTASSIUM SERPL-SCNC: 4.6 MMOL/L — SIGNIFICANT CHANGE UP (ref 3.5–5)
PROCALCITONIN SERPL-MCNC: 0.21 NG/ML — HIGH (ref 0.02–0.1)
PROT SERPL-MCNC: 6.4 G/DL — SIGNIFICANT CHANGE UP (ref 6–8)
RBC # BLD: 3 M/UL — LOW (ref 4.7–6.1)
RBC # FLD: 14.4 % — SIGNIFICANT CHANGE UP (ref 11.5–14.5)
S PNEUM AG UR QL: NEGATIVE — SIGNIFICANT CHANGE UP
SODIUM SERPL-SCNC: 136 MMOL/L — SIGNIFICANT CHANGE UP (ref 135–146)
SPECIMEN SOURCE: SIGNIFICANT CHANGE UP
VANCOMYCIN TROUGH SERPL-MCNC: 8.4 UG/ML — SIGNIFICANT CHANGE UP (ref 5–10)
WBC # BLD: 6.8 K/UL — SIGNIFICANT CHANGE UP (ref 4.8–10.8)
WBC # FLD AUTO: 6.8 K/UL — SIGNIFICANT CHANGE UP (ref 4.8–10.8)

## 2025-04-23 PROCEDURE — 99232 SBSQ HOSP IP/OBS MODERATE 35: CPT | Mod: FS

## 2025-04-23 PROCEDURE — 93970 EXTREMITY STUDY: CPT | Mod: 26

## 2025-04-23 PROCEDURE — 99232 SBSQ HOSP IP/OBS MODERATE 35: CPT

## 2025-04-23 PROCEDURE — 99233 SBSQ HOSP IP/OBS HIGH 50: CPT

## 2025-04-23 RX ORDER — NIFEDIPINE 30 MG
30 TABLET, EXTENDED RELEASE 24 HR ORAL DAILY
Refills: 0 | Status: DISCONTINUED | OUTPATIENT
Start: 2025-04-23 | End: 2025-04-23

## 2025-04-23 RX ORDER — NIFEDIPINE 30 MG
60 TABLET, EXTENDED RELEASE 24 HR ORAL ONCE
Refills: 0 | Status: COMPLETED | OUTPATIENT
Start: 2025-04-23 | End: 2025-04-23

## 2025-04-23 RX ORDER — NIFEDIPINE 30 MG
90 TABLET, EXTENDED RELEASE 24 HR ORAL DAILY
Refills: 0 | Status: DISCONTINUED | OUTPATIENT
Start: 2025-04-24 | End: 2025-04-25

## 2025-04-23 RX ADMIN — HEPARIN SODIUM 5000 UNIT(S): 1000 INJECTION INTRAVENOUS; SUBCUTANEOUS at 18:04

## 2025-04-23 RX ADMIN — OFLOXACIN 1 DROP(S): 3 SOLUTION OPHTHALMIC at 18:04

## 2025-04-23 RX ADMIN — FINASTERIDE 5 MILLIGRAM(S): 1 TABLET, FILM COATED ORAL at 11:51

## 2025-04-23 RX ADMIN — OFLOXACIN 1 DROP(S): 3 SOLUTION OPHTHALMIC at 05:56

## 2025-04-23 RX ADMIN — HEPARIN SODIUM 5000 UNIT(S): 1000 INJECTION INTRAVENOUS; SUBCUTANEOUS at 05:56

## 2025-04-23 RX ADMIN — TAMSULOSIN HYDROCHLORIDE 0.4 MILLIGRAM(S): 0.4 CAPSULE ORAL at 21:41

## 2025-04-23 RX ADMIN — AMOXICILLIN AND CLAVULANATE POTASSIUM 1 TABLET(S): 500; 125 TABLET, FILM COATED ORAL at 18:04

## 2025-04-23 RX ADMIN — OLANZAPINE 10 MILLIGRAM(S): 10 TABLET ORAL at 12:07

## 2025-04-23 RX ADMIN — Medication 50 MILLIGRAM(S): at 21:41

## 2025-04-23 RX ADMIN — AMOXICILLIN AND CLAVULANATE POTASSIUM 1 TABLET(S): 500; 125 TABLET, FILM COATED ORAL at 05:55

## 2025-04-23 RX ADMIN — Medication 30 MILLIGRAM(S): at 08:13

## 2025-04-23 RX ADMIN — Medication 60 MILLIGRAM(S): at 16:40

## 2025-04-23 NOTE — PROGRESS NOTE ADULT - NS ATTEND AMEND GEN_ALL_CORE FT
Patient was seen and examined with Urology PA at bedside.   Labs and imaging were personally reviewed and interpreted by me and discussed with the patient.  CTAP imaging from this admission shows a questionable subcentimeter suspicious lesion in the posterior bladder wall.  Bilateral renal cysts. No hydronephrosis.  Urine cytology  Pt will require cystoscopy in outpatient setting. Can consider CT cystogram while inpatient per rad recs.

## 2025-04-23 NOTE — PROGRESS NOTE ADULT - ASSESSMENT
IMPRESSION:    AMS/ TME/ Head CT noted  Recurrent UTI  HO Chronic Cobos   Acute on chronic renal failure improving  Aspiration event  Bladder lesion possible neoplasm  HO COPD   HO schizophrenia     PLAN:    CNS:  Continue psych medication as OP.    HEENT: Oral care    PULMONARY:  HOB @ 45 degrees.  Aspiration precautions, Keep Sao2 92 to 96% on RA    CARDIOVASCULAR:  Keep equal balance    GI: GI prophylaxis.  Feeding per speech and swallow.  Bowel regimen     RENAL:  Follow up lytes.  Correct as needed. Uro eval    INFECTIOUS DISEASE: abx per ID    HEMATOLOGICAL:  DVT prophylaxis  ENDOCRINE:  Follow up FS.      MUSCULOSKELETAL:   OOB to chair with fall precautions    FLOOR        WDL

## 2025-04-23 NOTE — PROGRESS NOTE ADULT - SUBJECTIVE AND OBJECTIVE BOX
UROLOGY PROGRESS NOTE  Pt is a 84y M a/w AMS;  following for     MEDICATIONS  (STANDING):  amoxicillin  500 milliGRAM(s)/clavulanate 1 Tablet(s) Oral every 12 hours  chlorhexidine 2% Cloths 1 Application(s) Topical <User Schedule>  dextrose 5%. 1000 milliLiter(s) (50 mL/Hr) IV Continuous <Continuous>  dextrose 5%. 1000 milliLiter(s) (100 mL/Hr) IV Continuous <Continuous>  dextrose 50% Injectable 25 Gram(s) IV Push once  dextrose 50% Injectable 12.5 Gram(s) IV Push once  dextrose 50% Injectable 25 Gram(s) IV Push once  finasteride 5 milliGRAM(s) Oral daily  glucagon  Injectable 1 milliGRAM(s) IntraMuscular once  heparin   Injectable 5000 Unit(s) SubCutaneous every 12 hours  insulin lispro (ADMELOG) corrective regimen sliding scale   SubCutaneous three times a day before meals  NIFEdipine XL 30 milliGRAM(s) Oral daily  ofloxacin 0.3% Solution 1 Drop(s) Right EYE two times a day  OLANZapine 10 milliGRAM(s) Oral daily  sodium chloride 0.9%. 1000 milliLiter(s) (75 mL/Hr) IV Continuous <Continuous>  tamsulosin 0.4 milliGRAM(s) Oral at bedtime  traZODone 50 milliGRAM(s) Oral at bedtime    MEDICATIONS  (PRN):  acetaminophen     Tablet .. 650 milliGRAM(s) Oral every 6 hours PRN Temp greater or equal to 38C (100.4F), Mild Pain (1 - 3)  albuterol    90 MICROgram(s) HFA Inhaler 1 Puff(s) Inhalation four times a day PRN for shortness of breath and/or wheezing  dextrose Oral Gel 15 Gram(s) Oral once PRN Blood Glucose LESS THAN 70 milliGRAM(s)/deciliter      REVIEW OF SYSTEMS   [X] Due to altered mental status/intubation, subjective information were not able to be obtained from patient. History was obtained, to the extent possible, from review of the chart and collateral sources of information.    Vital Signs Last 24 Hrs  T(C): 36.4 (23 Apr 2025 07:27), Max: 36.4 (22 Apr 2025 13:48)  T(F): 97.6 (23 Apr 2025 07:27), Max: 97.6 (22 Apr 2025 13:48)  HR: 78 (23 Apr 2025 07:27) (70 - 78)  BP: 183/83 (23 Apr 2025 07:27) (124/63 - 183/83)  BP(mean): 104 (22 Apr 2025 23:50) (104 - 104)  RR: 18 (23 Apr 2025 07:27) (18 - 18)  SpO2: 100% (23 Apr 2025 07:27) (91% - 100%)    Parameters below as of 23 Apr 2025 07:27  Patient On (Oxygen Delivery Method): room air    PHYSICAL EXAM:  GEN: NAD  SKIN: Good color, non diaphoretic  RESP: Non-labored breathing  ABDO: Soft, ND  : Cobos leg bag with yellow urine   EXT: JIMENEZ x 4      I&O's Summary    22 Apr 2025 07:01  -  23 Apr 2025 07:00  --------------------------------------------------------  IN: 0 mL / OUT: 2100 mL / NET: -2100 mL        LABS:                        9.2    6.80  )-----------( 224      ( 23 Apr 2025 07:29 )             27.5     04-23    136  |  101  |  30[H]  ----------------------------<  88  4.6   |  23  |  1.4    Ca    8.6      23 Apr 2025 07:29  Mg     2.0     04-23    TPro  6.4  /  Alb  3.4[L]  /  TBili  <0.2  /  DBili  x   /  AST  23  /  ALT  11  /  AlkPhos  103  04-23         UROLOGY PROGRESS NOTE  Pt is a 84y M  with hx of retention s/p bains a/w AMS;  following for CTAP finding of R posterior bladder mass.Pt seen at bedside with Dr. Brady, bains bag noted with yellow urine.     MEDICATIONS  (STANDING):  amoxicillin  500 milliGRAM(s)/clavulanate 1 Tablet(s) Oral every 12 hours  chlorhexidine 2% Cloths 1 Application(s) Topical <User Schedule>  dextrose 5%. 1000 milliLiter(s) (50 mL/Hr) IV Continuous <Continuous>  dextrose 5%. 1000 milliLiter(s) (100 mL/Hr) IV Continuous <Continuous>  dextrose 50% Injectable 25 Gram(s) IV Push once  dextrose 50% Injectable 12.5 Gram(s) IV Push once  dextrose 50% Injectable 25 Gram(s) IV Push once  finasteride 5 milliGRAM(s) Oral daily  glucagon  Injectable 1 milliGRAM(s) IntraMuscular once  heparin   Injectable 5000 Unit(s) SubCutaneous every 12 hours  insulin lispro (ADMELOG) corrective regimen sliding scale   SubCutaneous three times a day before meals  NIFEdipine XL 30 milliGRAM(s) Oral daily  ofloxacin 0.3% Solution 1 Drop(s) Right EYE two times a day  OLANZapine 10 milliGRAM(s) Oral daily  sodium chloride 0.9%. 1000 milliLiter(s) (75 mL/Hr) IV Continuous <Continuous>  tamsulosin 0.4 milliGRAM(s) Oral at bedtime  traZODone 50 milliGRAM(s) Oral at bedtime    MEDICATIONS  (PRN):  acetaminophen     Tablet .. 650 milliGRAM(s) Oral every 6 hours PRN Temp greater or equal to 38C (100.4F), Mild Pain (1 - 3)  albuterol    90 MICROgram(s) HFA Inhaler 1 Puff(s) Inhalation four times a day PRN for shortness of breath and/or wheezing  dextrose Oral Gel 15 Gram(s) Oral once PRN Blood Glucose LESS THAN 70 milliGRAM(s)/deciliter      REVIEW OF SYSTEMS   [X] Due to altered mental status/intubation, subjective information were not able to be obtained from patient. History was obtained, to the extent possible, from review of the chart and collateral sources of information.    Vital Signs Last 24 Hrs  T(C): 36.4 (23 Apr 2025 07:27), Max: 36.4 (22 Apr 2025 13:48)  T(F): 97.6 (23 Apr 2025 07:27), Max: 97.6 (22 Apr 2025 13:48)  HR: 78 (23 Apr 2025 07:27) (70 - 78)  BP: 183/83 (23 Apr 2025 07:27) (124/63 - 183/83)  BP(mean): 104 (22 Apr 2025 23:50) (104 - 104)  RR: 18 (23 Apr 2025 07:27) (18 - 18)  SpO2: 100% (23 Apr 2025 07:27) (91% - 100%)    Parameters below as of 23 Apr 2025 07:27  Patient On (Oxygen Delivery Method): room air    PHYSICAL EXAM:  GEN: NAD  SKIN: Good color, non diaphoretic  RESP: Non-labored breathing  ABDO: Soft, ND  : Bains leg bag with yellow urine   EXT: JIMENEZ x 4      I&O's Summary    22 Apr 2025 07:01  -  23 Apr 2025 07:00  --------------------------------------------------------  IN: 0 mL / OUT: 2100 mL / NET: -2100 mL        LABS:                        9.2    6.80  )-----------( 224      ( 23 Apr 2025 07:29 )             27.5     04-23    136  |  101  |  30[H]  ----------------------------<  88  4.6   |  23  |  1.4    Ca    8.6      23 Apr 2025 07:29  Mg     2.0     04-23    TPro  6.4  /  Alb  3.4[L]  /  TBili  <0.2  /  DBili  x   /  AST  23  /  ALT  11  /  AlkPhos  103  04-23

## 2025-04-23 NOTE — PROGRESS NOTE ADULT - SUBJECTIVE AND OBJECTIVE BOX
Over Night Events: Events noted, on RA, ID/ speech/ uro noted, afebrile    PHYSICAL EXAM    ICU Vital Signs Last 24 Hrs  T(C): 36.3 (22 Apr 2025 23:50), Max: 36.4 (22 Apr 2025 13:48)  T(F): 97.4 (22 Apr 2025 23:50), Max: 97.6 (22 Apr 2025 13:48)  HR: 75 (22 Apr 2025 23:50) (70 - 80)  BP: 183/65 (22 Apr 2025 23:50) (124/63 - 183/65)  BP(mean): 104 (22 Apr 2025 23:50) (104 - 104)  RR: 18 (22 Apr 2025 23:50) (18 - 18)  SpO2: 100% (22 Apr 2025 23:50) (91% - 100%)    O2 Parameters below as of 22 Apr 2025 23:50  Patient On (Oxygen Delivery Method): room air            General: comfortable  Lungs: dec bs both bases  Cardiovascular: NAN 2.6  Abdomen: Soft, Positive BS  Extremities: No clubbing   Neurological: Non focal       04-22-25 @ 07:01  -  04-23-25 @ 05:56  --------------------------------------------------------  IN:  Total IN: 0 mL    OUT:    Indwelling Catheter - Urethral (mL): 2100 mL  Total OUT: 2100 mL    Total NET: -2100 mL          LABS:                          9.9    8.06  )-----------( 231      ( 22 Apr 2025 11:47 )             28.6                                               04-22    134[L]  |  98  |  42[H]  ----------------------------<  98  4.8   |  24  |  1.8[H]    Ca    9.0      22 Apr 2025 11:47  Mg     2.0     04-22    TPro  7.2  /  Alb  3.8  /  TBili  <0.2  /  DBili  x   /  AST  28  /  ALT  12  /  AlkPhos  105  04-21      PT/INR - ( 21 Apr 2025 20:33 )   PT: 13.60 sec;   INR: 1.15 ratio         PTT - ( 21 Apr 2025 20:33 )  PTT:30.7 sec                                       Urinalysis Basic - ( 22 Apr 2025 11:47 )    Color: x / Appearance: x / SG: x / pH: x  Gluc: 98 mg/dL / Ketone: x  / Bili: x / Urobili: x   Blood: x / Protein: x / Nitrite: x   Leuk Esterase: x / RBC: x / WBC x   Sq Epi: x / Non Sq Epi: x / Bacteria: x                                                  LIVER FUNCTIONS - ( 21 Apr 2025 20:33 )  Alb: 3.8 g/dL / Pro: 7.2 g/dL / ALK PHOS: 105 U/L / ALT: 12 U/L / AST: 28 U/L / GGT: x                                                  Culture - Eye (collected 22 Apr 2025 17:50)  Source: Eye Eye-Right  Gram Stain (23 Apr 2025 02:59):    No polymorphonuclear leukocytes seen    No organisms seen    by cytocentrifuge    Urinalysis with Rflx Culture (collected 21 Apr 2025 21:39)    Culture - Blood (collected 21 Apr 2025 20:33)  Source: Blood Blood-Peripheral  Preliminary Report (23 Apr 2025 03:01):    No growth at 24 hours    Culture - Blood (collected 21 Apr 2025 20:33)  Source: Blood Blood-Peripheral  Preliminary Report (23 Apr 2025 03:01):    No growth at 24 hours                                                                                           MEDICATIONS  (STANDING):  amoxicillin  500 milliGRAM(s)/clavulanate 1 Tablet(s) Oral every 12 hours  chlorhexidine 2% Cloths 1 Application(s) Topical <User Schedule>  dextrose 5%. 1000 milliLiter(s) (50 mL/Hr) IV Continuous <Continuous>  dextrose 5%. 1000 milliLiter(s) (100 mL/Hr) IV Continuous <Continuous>  dextrose 50% Injectable 25 Gram(s) IV Push once  dextrose 50% Injectable 12.5 Gram(s) IV Push once  dextrose 50% Injectable 25 Gram(s) IV Push once  finasteride 5 milliGRAM(s) Oral daily  glucagon  Injectable 1 milliGRAM(s) IntraMuscular once  heparin   Injectable 5000 Unit(s) SubCutaneous every 12 hours  insulin lispro (ADMELOG) corrective regimen sliding scale   SubCutaneous three times a day before meals  ofloxacin 0.3% Solution 1 Drop(s) Right EYE two times a day  OLANZapine 10 milliGRAM(s) Oral daily  sodium chloride 0.9%. 1000 milliLiter(s) (75 mL/Hr) IV Continuous <Continuous>  tamsulosin 0.4 milliGRAM(s) Oral at bedtime  traZODone 50 milliGRAM(s) Oral at bedtime    MEDICATIONS  (PRN):  acetaminophen     Tablet .. 650 milliGRAM(s) Oral every 6 hours PRN Temp greater or equal to 38C (100.4F), Mild Pain (1 - 3)  albuterol    90 MICROgram(s) HFA Inhaler 1 Puff(s) Inhalation four times a day PRN for shortness of breath and/or wheezing  dextrose Oral Gel 15 Gram(s) Oral once PRN Blood Glucose LESS THAN 70 milliGRAM(s)/deciliter

## 2025-04-23 NOTE — PROGRESS NOTE ADULT - ASSESSMENT
Pt is a 84y M  with hx of retention s/p bains a/w AMS;  following for CTAP finding of possible R posterior bladder mass.     - Follow up with urology as outpatient for possible cystoscopy   - Continue bains catheter   - IV Abx as per ID  - Recall prn

## 2025-04-23 NOTE — PROGRESS NOTE ADULT - ATTENDING COMMENTS
#Altered mental status/ toxic metabolic encephalopathy  presumed due to uti  cth neg  ct abd with possible bladder neoplasm  ucx gnr; f/u speciation  bcx ntd  augmentin  f/u id  d/w uro; outpt f/u for possible bladder ca  hypotension resolved; slowly resume bp meds    #Progress Note Handoff  Pending (specify): ucx, iv abx, sbp  Family discussion:  Disposition: snf #Altered mental status/ toxic metabolic encephalopathy  presumed due to uti  cth neg  ct abd with possible bladder neoplasm  ucx gnr; f/u speciation  bcx ntd  augmentin  f/u id  d/w uro; outpt f/u for possible bladder ca  hypotension resolved; slowly resume bp meds    #DAVID  presumed pre-renal  resolved s/p ivf  trend    #Progress Note Handoff  Pending (specify): ucx, iv abx, sbp  Family discussion:  Disposition: snf

## 2025-04-23 NOTE — PROGRESS NOTE ADULT - ASSESSMENT
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   - Vitals: BP 95/56 with hypothermia-> c/w bare hugger for now  - ED: s/p 100mg doxy IV x1, 1g vanc IV x1, 3.375g Zosyn IV x1  - UA 4/22- many bacteria, large LE, >900 WBC   - UCx: 4/19 with E Faecalis, 4/14 with proteus mirabilis, 4/21 growing gram negatives(fu final rs)  - CT abdomen with new RLL opacity, possibly infectious   -ID following: cw augmentin x 7 days  - infectious w/u : procal-neg, BCx-NGTD, UCx, MRSA-neg, RVP- neg, Fu urine strep/legionella   - pt was not discharged on abx but started cefdinir 300mg BID at NH home on 4/20/25 per med list   - ID consult pending-> augmentin 500 mg BID from 7 days (4/22/2025-4/27/2025)    #NEW- Bladder mass  # hx non-hodgkin's lymphoma   - CTAP:  Suspicious hyperdense nodule within the posterior right bladder measuring 7 mm on image 90 of series 4. Findings are suspicious for bladder neoplasm. Not seen on CTAP on 4/14  - Urology consulted for bladder mass, recs appreciated: OP f/u on DC  - Follow up with Dr. Fontenot as outpatient for CT Urogram, Urine cytology, cystoscopy  - Continue bains catheter   - Hem onc consulted for bladder mass  -Will follow up OP after tissue sampling    #R eye bacterial conjunctivitis  - yellow pus discharge draining from the R eye  - will start on Ofloxacin drops 3x daily to the R eye for 5 days  - monitor for signs of infection/resolving conjunctivitis    # DAVID on CKD3- resolving  # Possibly due to ATN/ ALEJANDRA  - creatinine 2.5 on admission, baseline 1.2-1.3-> 1.8->1.4  - Patient received CTAP with IV contrast in the ED  - hold home gabapentin 300mg BID for now   - monitor urine output, trend daily BMPS  - s/p  NS 75 cc/hr for 24 hours    # acute on chronic anemia   #MARIBEL  - Hgb 8.0 on admission baseline ~10-11  - no active signs of bleed   - iron studies noted. Iron supplementation on dc  - maintain active type and screen   - transfuse Hgb <7    # elevated trop  - trop 39 -> 31   - EKG with bradycardia, no ST changes     # sinus bradycardia   - HR in 50s on monitor   - hold home metoprolol 25mg BID   - tele monitoring-> EKG    # BPH  - cw tamsulosin and finasteride     #h/o COPD   -stable  - home inhalers     #DM 2  - hold metformin 500mg BID   - SSI     #HTN  - initially held home nifedipine 90. Start nifedipine 30, gradually uptitirate  - monitor HD    #schizophrenia  - c/w olanzapine and Trazodone (holding parameters for lethargy and AMS)    #Misc  - DVT Prophylaxis: Heparin  - GI Prophylaxis: None  - Diet: DASH/CC  - Activity: As tolerated  - IV Fluids: none  - Code Status: Full Code    Pending: Ucx, DAVID improvement,

## 2025-04-23 NOTE — PROGRESS NOTE ADULT - SUBJECTIVE AND OBJECTIVE BOX
SUBJECTIVE/OVERNIGHT EVENTS  Today is hospital day 2d. This morning patient was seen and examined at bedside, resting comfortably in bed. No acute or major events overnight.    HPI:   84-year-old male with past medical history of COPD, hypertension, diabetes, schizophrenia, hyperlipidemia, non-Hodgkin's lymphoma, chronic urinary retention presents to the emergency department from [facility] for AMS. Per ED documentation, nurse states she was feeding patient in the afternoon when he suddenly spit his food out and lowered his head down onto the table, was not responding to her questions for a few minutes. Stated he doesn't feel well afterwards. Seen 3 days ago in ED for UTI secondary to chronic bains. Per prior notes, patient is AAOx2 at baseline but very poor historian. Patient has had chronic bains in since admission in January where he was treated for MDRO in the urine, retention, and uretal dilation for bains catheter placement by urology, last exchanged 4/14/25. Unable to obtain further history as patient would not respond to questions, attempted to call [facility] for collateral without success.       Vitals:   · BP Systolic	 95 mm Hg  · BP Diastolic	 56 mm Hg  · Heart Rate	62 /min  ·Temp (F)	97.9 Degrees F    Labs:   WBC 8.87  Hgb 8.0 (baseline 10-11)   platelets 183  Na 131  K 5.1  Cr 2.5 (baseline 1.2-1.3)  trop 39   VBG with pH 7.30/CO2 48/O2 38/HCO3 24/lactate 1.6  UA with many bacteria, large LE, >900 WBC     Imaging:  CT abdomen and pelvis: Suspicious hyperdense nodule within the posterior right bladder measuring 7 mm on image 90 of series 4. Findings are suspicious for bladder neoplasm. However, this area is limited evaluation as the bladder is collapsed around a Bains catheter. New right lower lobe opacity possibly infectious in nature    Management in ED:   100mg doxy IV x1   1g vanc IV x1   3.375g Zosyn IV x1 (21 Apr 2025 23:30)      MEDICATIONS  STANDING MEDICATIONS  amoxicillin  500 milliGRAM(s)/clavulanate 1 Tablet(s) Oral every 12 hours  chlorhexidine 2% Cloths 1 Application(s) Topical <User Schedule>  dextrose 5%. 1000 milliLiter(s) IV Continuous <Continuous>  dextrose 5%. 1000 milliLiter(s) IV Continuous <Continuous>  dextrose 50% Injectable 25 Gram(s) IV Push once  dextrose 50% Injectable 12.5 Gram(s) IV Push once  dextrose 50% Injectable 25 Gram(s) IV Push once  finasteride 5 milliGRAM(s) Oral daily  glucagon  Injectable 1 milliGRAM(s) IntraMuscular once  heparin   Injectable 5000 Unit(s) SubCutaneous every 12 hours  insulin lispro (ADMELOG) corrective regimen sliding scale   SubCutaneous three times a day before meals  NIFEdipine XL 30 milliGRAM(s) Oral daily  ofloxacin 0.3% Solution 1 Drop(s) Right EYE two times a day  OLANZapine 10 milliGRAM(s) Oral daily  sodium chloride 0.9%. 1000 milliLiter(s) IV Continuous <Continuous>  tamsulosin 0.4 milliGRAM(s) Oral at bedtime  traZODone 50 milliGRAM(s) Oral at bedtime    PRN MEDICATIONS  acetaminophen     Tablet .. 650 milliGRAM(s) Oral every 6 hours PRN  albuterol    90 MICROgram(s) HFA Inhaler 1 Puff(s) Inhalation four times a day PRN  dextrose Oral Gel 15 Gram(s) Oral once PRN    VITALS  T(F): 97.6 (04-23-25 @ 07:27), Max: 97.6 (04-22-25 @ 13:48)  HR: 78 (04-23-25 @ 07:27) (70 - 78)  BP: 183/83 (04-23-25 @ 07:27) (124/63 - 183/83)  RR: 18 (04-23-25 @ 07:27) (18 - 18)  SpO2: 100% (04-23-25 @ 07:27) (91% - 100%)  POCT Blood Glucose.: 90 mg/dL (04-23-25 @ 07:48)  POCT Blood Glucose.: 96 mg/dL (04-22-25 @ 17:27)  POCT Blood Glucose.: 91 mg/dL (04-22-25 @ 09:48)          PHYSICAL EXAM      GENERAL: No acute distress, well-developed  CHEST/LUNG: Clear to auscultation bilaterally; b/l rhonchi heard  HEART: Regular rate and rhythm; No murmurs, rubs, or gallops  ABDOMEN: Soft, nontender, nondistended; Bowel sounds present, no organomegaly  NEUROLOGY: AAOx1-2, non-focal        PAST MEDICAL & SURGICAL HISTORY:  COPD (chronic obstructive pulmonary disease)      Paranoid schizophrenia      Schizophrenia      Diabetes type 2, controlled      COPD (chronic obstructive pulmonary disease)      Dyslipidemia      Chronic retention of urine      Dyslipidemia      Encounter for screening colonoscopy            LABS             9.2    6.80  )-----------( 224      ( 04-23-25 @ 07:29 )             27.5     136  |  101  |  30  -------------------------<  88   04-23-25 @ 07:29  4.6  |  23  |  1.4    Ca      8.6     04-23-25 @ 07:29  Mg     2.0     04-23-25 @ 07:29    TPro  6.4  /  Alb  3.4  /  TBili  <0.2  /  DBili  x   /  AST  23  /  ALT  11  /  AlkPhos  103  /  GGT  x     04-23-25 @ 07:29    PT/INR - ( 04-21-25 @ 20:33 )   PT: 13.60 sec[H];   INR: 1.15 ratio  PTT - ( 04-21-25 @ 20:33 )  PTT:30.7 sec    Troponin T, High Sensitivity Result: 29 ng/L (04-22-25 @ 11:47)  Troponin T, High Sensitivity Result: 31 ng/L (04-21-25 @ 22:58)  Troponin T, High Sensitivity Result: 39 ng/L (04-21-25 @ 20:33)    Urinalysis Basic - ( 23 Apr 2025 07:29 )    Color: x / Appearance: x / SG: x / pH: x  Gluc: 88 mg/dL / Ketone: x  / Bili: x / Urobili: x   Blood: x / Protein: x / Nitrite: x   Leuk Esterase: x / RBC: x / WBC x   Sq Epi: x / Non Sq Epi: x / Bacteria: x          Culture - Eye (collected 22 Apr 2025 17:50)  Source: Eye Eye-Right  Gram Stain (23 Apr 2025 02:59):    No polymorphonuclear leukocytes seen    No organisms seen    by cytocentrifuge    Urinalysis with Rflx Culture (collected 21 Apr 2025 21:39)    Culture - Urine (collected 21 Apr 2025 21:39)  Source: Catheterized None  Preliminary Report (23 Apr 2025 07:41):    >100,000 CFU/ml Gram Negative Rods    Culture - Blood (collected 21 Apr 2025 20:33)  Source: Blood Blood-Peripheral  Preliminary Report (23 Apr 2025 03:01):    No growth at 24 hours    Culture - Blood (collected 21 Apr 2025 20:33)  Source: Blood Blood-Peripheral  Preliminary Report (23 Apr 2025 03:01):    No growth at 24 hours      IMAGING

## 2025-04-24 LAB
-  CLINDAMYCIN: SIGNIFICANT CHANGE UP
-  ERYTHROMYCIN: SIGNIFICANT CHANGE UP
-  GENTAMICIN: SIGNIFICANT CHANGE UP
-  OXACILLIN: SIGNIFICANT CHANGE UP
-  PENICILLIN: SIGNIFICANT CHANGE UP
-  RIFAMPIN: SIGNIFICANT CHANGE UP
-  TETRACYCLINE: SIGNIFICANT CHANGE UP
-  TRIMETHOPRIM/SULFAMETHOXAZOLE: SIGNIFICANT CHANGE UP
-  VANCOMYCIN: SIGNIFICANT CHANGE UP
ALBUMIN SERPL ELPH-MCNC: 3.6 G/DL — SIGNIFICANT CHANGE UP (ref 3.5–5.2)
ALP SERPL-CCNC: 104 U/L — SIGNIFICANT CHANGE UP (ref 30–115)
ALT FLD-CCNC: 10 U/L — SIGNIFICANT CHANGE UP (ref 0–41)
ANION GAP SERPL CALC-SCNC: 16 MMOL/L — HIGH (ref 7–14)
AST SERPL-CCNC: 19 U/L — SIGNIFICANT CHANGE UP (ref 0–41)
BASOPHILS # BLD AUTO: 0.01 K/UL — SIGNIFICANT CHANGE UP (ref 0–0.2)
BASOPHILS NFR BLD AUTO: 0.2 % — SIGNIFICANT CHANGE UP (ref 0–1)
BILIRUB SERPL-MCNC: <0.2 MG/DL — SIGNIFICANT CHANGE UP (ref 0.2–1.2)
BUN SERPL-MCNC: 21 MG/DL — HIGH (ref 10–20)
CALCIUM SERPL-MCNC: 9.1 MG/DL — SIGNIFICANT CHANGE UP (ref 8.4–10.5)
CHLORIDE SERPL-SCNC: 97 MMOL/L — LOW (ref 98–110)
CO2 SERPL-SCNC: 20 MMOL/L — SIGNIFICANT CHANGE UP (ref 17–32)
CREAT SERPL-MCNC: 1.2 MG/DL — SIGNIFICANT CHANGE UP (ref 0.7–1.5)
EGFR: 60 ML/MIN/1.73M2 — SIGNIFICANT CHANGE UP
EGFR: 60 ML/MIN/1.73M2 — SIGNIFICANT CHANGE UP
EOSINOPHIL # BLD AUTO: 0.03 K/UL — SIGNIFICANT CHANGE UP (ref 0–0.7)
EOSINOPHIL NFR BLD AUTO: 0.5 % — SIGNIFICANT CHANGE UP (ref 0–8)
GLUCOSE BLDC GLUCOMTR-MCNC: 107 MG/DL — HIGH (ref 70–99)
GLUCOSE BLDC GLUCOMTR-MCNC: 118 MG/DL — HIGH (ref 70–99)
GLUCOSE BLDC GLUCOMTR-MCNC: 162 MG/DL — HIGH (ref 70–99)
GLUCOSE BLDC GLUCOMTR-MCNC: 97 MG/DL — SIGNIFICANT CHANGE UP (ref 70–99)
GLUCOSE SERPL-MCNC: 116 MG/DL — HIGH (ref 70–99)
HCT VFR BLD CALC: 32.3 % — LOW (ref 42–52)
HGB BLD-MCNC: 11 G/DL — LOW (ref 14–18)
IMM GRANULOCYTES NFR BLD AUTO: 0.7 % — HIGH (ref 0.1–0.3)
LYMPHOCYTES # BLD AUTO: 1.41 K/UL — SIGNIFICANT CHANGE UP (ref 1.2–3.4)
LYMPHOCYTES # BLD AUTO: 23.9 % — SIGNIFICANT CHANGE UP (ref 20.5–51.1)
MAGNESIUM SERPL-MCNC: 1.8 MG/DL — SIGNIFICANT CHANGE UP (ref 1.8–2.4)
MCHC RBC-ENTMCNC: 30.2 PG — SIGNIFICANT CHANGE UP (ref 27–31)
MCHC RBC-ENTMCNC: 34.1 G/DL — SIGNIFICANT CHANGE UP (ref 32–37)
MCV RBC AUTO: 88.7 FL — SIGNIFICANT CHANGE UP (ref 80–94)
METHOD TYPE: SIGNIFICANT CHANGE UP
MONOCYTES # BLD AUTO: 0.43 K/UL — SIGNIFICANT CHANGE UP (ref 0.1–0.6)
MONOCYTES NFR BLD AUTO: 7.3 % — SIGNIFICANT CHANGE UP (ref 1.7–9.3)
NEUTROPHILS # BLD AUTO: 3.97 K/UL — SIGNIFICANT CHANGE UP (ref 1.4–6.5)
NEUTROPHILS NFR BLD AUTO: 67.4 % — SIGNIFICANT CHANGE UP (ref 42.2–75.2)
NRBC BLD AUTO-RTO: 0 /100 WBCS — SIGNIFICANT CHANGE UP (ref 0–0)
PLATELET # BLD AUTO: 261 K/UL — SIGNIFICANT CHANGE UP (ref 130–400)
PMV BLD: 10 FL — SIGNIFICANT CHANGE UP (ref 7.4–10.4)
POTASSIUM SERPL-MCNC: 5 MMOL/L — SIGNIFICANT CHANGE UP (ref 3.5–5)
POTASSIUM SERPL-SCNC: 5 MMOL/L — SIGNIFICANT CHANGE UP (ref 3.5–5)
PROT SERPL-MCNC: 7.1 G/DL — SIGNIFICANT CHANGE UP (ref 6–8)
RBC # BLD: 3.64 M/UL — LOW (ref 4.7–6.1)
RBC # FLD: 14.1 % — SIGNIFICANT CHANGE UP (ref 11.5–14.5)
SODIUM SERPL-SCNC: 133 MMOL/L — LOW (ref 135–146)
WBC # BLD: 5.89 K/UL — SIGNIFICANT CHANGE UP (ref 4.8–10.8)
WBC # FLD AUTO: 5.89 K/UL — SIGNIFICANT CHANGE UP (ref 4.8–10.8)

## 2025-04-24 PROCEDURE — 99232 SBSQ HOSP IP/OBS MODERATE 35: CPT

## 2025-04-24 RX ADMIN — AMOXICILLIN AND CLAVULANATE POTASSIUM 1 TABLET(S): 500; 125 TABLET, FILM COATED ORAL at 05:59

## 2025-04-24 RX ADMIN — Medication 1 APPLICATION(S): at 06:05

## 2025-04-24 RX ADMIN — TAMSULOSIN HYDROCHLORIDE 0.4 MILLIGRAM(S): 0.4 CAPSULE ORAL at 21:30

## 2025-04-24 RX ADMIN — FINASTERIDE 5 MILLIGRAM(S): 1 TABLET, FILM COATED ORAL at 11:19

## 2025-04-24 RX ADMIN — HEPARIN SODIUM 5000 UNIT(S): 1000 INJECTION INTRAVENOUS; SUBCUTANEOUS at 17:33

## 2025-04-24 RX ADMIN — OFLOXACIN 1 DROP(S): 3 SOLUTION OPHTHALMIC at 05:59

## 2025-04-24 RX ADMIN — OFLOXACIN 1 DROP(S): 3 SOLUTION OPHTHALMIC at 17:33

## 2025-04-24 RX ADMIN — OLANZAPINE 10 MILLIGRAM(S): 10 TABLET ORAL at 11:18

## 2025-04-24 RX ADMIN — AMOXICILLIN AND CLAVULANATE POTASSIUM 1 TABLET(S): 500; 125 TABLET, FILM COATED ORAL at 17:33

## 2025-04-24 RX ADMIN — HEPARIN SODIUM 5000 UNIT(S): 1000 INJECTION INTRAVENOUS; SUBCUTANEOUS at 05:59

## 2025-04-24 RX ADMIN — Medication 90 MILLIGRAM(S): at 05:59

## 2025-04-24 RX ADMIN — Medication 50 MILLIGRAM(S): at 21:27

## 2025-04-24 NOTE — PROGRESS NOTE ADULT - ASSESSMENT
84-year-old male with past medical history of COPD, hypertension, diabetes, schizophrenia, hyperlipidemia, non-Hodgkin's lymphoma, chronic urinary retention presents to the emergency department from [facility] for AMS.     IMPRESSION  #Possible Symptomatic acute cystitis  Reports increased urinary frequency; UCX   >100,000 CFU/ml Klebsiella pneumoniae    Afebrile ; Admission WBC 8  Chronic Cobos-  last exchanged 4/19/25 4/19 UCX   >100,000 CFU/ml Enterococcus faecalis S amp Nitrofurantoin: S <=32    4/14 UCX   >100,000 CFU/ml Proteus mirabilis Nitrofurantoin: R >64     4/13 UCX   >100,000 CFU/ml Proteus mirabilis    CT abdomen and pelvis: Suspicious hyperdense nodule within the posterior right bladder measuring 7 mm on image 90 of series 4. Findings are suspicious for bladder neoplasm. However, this area is limited evaluation as the bladder is collapsed around a Cobos catheter. 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      RECOMMENDATIONS  - Worry that the UCX Kleb will be the same MDR as January. As he currently denies any urinary symptoms and has clinically improved. Would not target if MDR organism,   - PO augmentin 500mg BID x 7 days    I will be away until 4/28/25  On 4/25, Please text/call Dr. Barry or Dr. Bunn on Microsoft Teams with any questions.  4/26-4/27 Dr Barry will be covering   Please continue to update ID with any pertinent new clinical, laboratory or radiographic findings

## 2025-04-24 NOTE — PROGRESS NOTE ADULT - ASSESSMENT
84 yold male to with Pmhx of Copd, Htn, Dm, schizophrenia, Hld, non-hodgkins lymphoma, urinary retention with chronic Bains (changed yesterday), Hx of MDR UTIs presents to the ED from [facility] for evaluation of AMS.     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 Findings are suspicious for bladder neoplasm. However, this area is limited evaluation as the bladder is collapsed around a Bains catheter.This can be further evaluated with a CT cystogram or possibly urogram. New right lower lobe opacity possibly infectious in nature  - pt was not discharged on abx but started cefdinir 300mg BID at NH home on 4/20/25 per med list   - Vitals: BP 95/56 with hypothermia-> c/w bare hugger for now  - ED: s/p 100mg doxy IV x1, 1g vanc IV x1, 3.375g Zosyn IV x1  - UA 4/22- many bacteria, large LE, >900 WBC   - urine cx: 4/19 with E Faecalis, 4/14 with proteus mirabilis  - urine cx 4/21- prelim +  Klebsiella pneumoniae  - blood cx 4/21- prelim negative   - per ID: c/w Augmentin x 7 days (4/22/2025-4/27/2025)    #new bladder mass  #hx non-hodgkin's lymphoma   - CT Abdomen and Pelvis w/ IV Cont (04.21.25) Suspicious hyperdense nodule within the posterior right bladder measuring 7 mm on image 90 of series 4. Findings are suspicious for bladder neoplasm. However, this area is limited evaluation as the bladder is collapsed around a Bains catheter.This can be further evaluated with a CT cystogram or possibly urogram. New right lower lobe opacity possibly infectious in nature  - per urology: follow up with urology as outpatient for possible cystoscopy   - c/w bains catheter   - consider heme/onc consult    #R eye bacterial conjunctivitis  - yellow pus discharge draining from the R eye  - c/w Ofloxacin drops 3x daily to the R eye for 5 days  - monitor for signs of infection/resolving conjunctivitis    # DAVID on CKD3- resolving  # Possibly due to ATN/ ALEJANDRA  - creatinine 2.5 on admission, baseline 1.2-1.3-> 1.8->1.4  - Patient received CTAP with IV contrast   - hold home gabapentin 300mg BID for now   - s/p  NS 75 cc/hr for 24 hours    # acute on chronic anemia   #MARIBEL  - Hgb 8.0 on admission baseline ~10-11  - no active signs of bleed   - iron studies noted. Iron supplementation on dc  - maintain active type and screen   - transfuse Hgb <7    # elevated trop  - trop 39 -> 31   - EKG with bradycardia, no ST changes     # sinus bradycardia   - HR in 50s on monitor   - hold home metoprolol 25mg BID   - tele monitoring-> EKG    # BPH  - cw tamsulosin and finasteride     #h/o COPD  - c/w home inhalers     #DM 2  - hold metformin 500mg BID   - SSI     #HTN  - c/w home nifedipine 90mg daily     #schizophrenia  - c/w olanzapine and Trazodone (holding parameters for lethargy and AMS)    DVT ppx: Heparin  GI ppx: None  Diet: DASH/CC  Activity: As tolerated  Code Status: Full Code  Pending: Ucx and blood cx , DAVID improvement

## 2025-04-24 NOTE — PROGRESS NOTE ADULT - PROVIDER SPECIALTY LIST ADULT
Internal Medicine
Internal Medicine
Urology
Critical Care
Critical Care
Infectious Disease
Internal Medicine

## 2025-04-24 NOTE — PROGRESS NOTE ADULT - ATTENDING COMMENTS
***My note supersedes any discrepancies that may be above in the resident's note***    84 yold male to with Pmhx of Copd, Htn, Dm, schizophrenia, Hld, non-hodgkins lymphoma, urinary retention with chronic Bains (changed yesterday), Hx of MDR UTIs presents to the ED from [facility] for evaluation of AMS.     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 Findings are suspicious for bladder neoplasm. However, this area is limited evaluation as the bladder is collapsed around a Bains catheter.This can be further evaluated with a CT cystogram or possibly urogram. New right lower lobe opacity possibly infectious in nature  - pt was not discharged on abx but started cefdinir 300mg BID at NH home on 4/20/25 per med list   - Vitals: BP 95/56 with hypothermia; currently HDS, afebrile and sating well on RA  - no leukocytosis  - procal 0.21  - UA 4/22- many bacteria, large LE, >900 WBC   - urine cx: 4/19 with E Faecalis, 4/14 with proteus mirabilis  - urine cx 4/21- prelim +  Klebsiella pneumoniae  - blood cx 4/21- prelim negative   - ED: s/p 100mg doxy IV x1, 1g vanc IV x1, 3.375g Zosyn IV x1  - per ID: c/w Augmentin x 7 days (4/22/2025-4/27/2025)    #new bladder mass  #hx non-hodgkin's lymphoma   - CT Abdomen and Pelvis w/ IV Cont (04.21.25) Suspicious hyperdense nodule within the posterior right bladder measuring 7 mm on image 90 of series 4. Findings are suspicious for bladder neoplasm. However, this area is limited evaluation as the bladder is collapsed around a Bains catheter.This can be further evaluated with a CT cystogram or possibly urogram. New right lower lobe opacity possibly infectious in nature  - per urology: follow up with urology as outpatient for possible cystoscopy   - c/w bains catheter   - consider heme/onc consult    #R eye bacterial conjunctivitis  - yellow pus discharge draining from the R eye  - c/w Ofloxacin drops 3x daily to the R eye for 5 days  - monitor for signs of infection/resolving conjunctivitis    # DAVID on CKD3- RESOLVED  # Possibly due to ATN/ ALEJANDRA  - creatinine 2.5 on admission, baseline 1.2-1.3-> 1.8->1.4->1.2  - Patient received CTAP with IV contrast   - hold home gabapentin 300mg BID for now   - s/p  NS 75 cc/hr for 24 hours    # acute on chronic anemia   #MARIBEL  - Hgb 8.0 on admission baseline ~10-11  - no active signs of bleed   - iron studies noted. Iron supplementation on dc  - maintain active type and screen   - transfuse Hgb <7    # elevated trop  - trop 39 -> 31   - EKG with bradycardia, no ST changes     # sinus bradycardia   - HR in 50s on monitor   - hold home metoprolol 25mg BID   - tele monitoring-> EKG    # BPH  - cw tamsulosin and finasteride     #h/o COPD  - c/w home inhalers     #DM 2  - hold metformin 500mg BID   - SSI     #HTN  - c/w home nifedipine 90mg daily     #schizophrenia  - c/w olanzapine and Trazodone (holding parameters for lethargy and AMS)    DVT ppx: Heparin  GI ppx: None  Diet: DASH/CC  Activity: As tolerated  Code Status: Full Code      #Progress Note Handoff  Pending (specify):  dispo planning  Family discussion: updated  Disposition: dc back to [facility]. with outpatient follow up with urology

## 2025-04-24 NOTE — PROGRESS NOTE ADULT - TIME BILLING
I have personally seen and examined this patient.  I have reviewed all pertinent clinical information and reviewed all relevant imaging and diagnostic studies personally.   I counseled the patient about diagnostic testing and treatment plan.   I discussed my recommendations with the primary team
time spent on review of labs, imaging studies, old records, obtaining history, personally examining patient, counselling and communicating with patient/ family, entering orders for medications/tests/etc, discussions with other health care providers, documentation in electronic health records, independent interpretation of labs, imaging/procedure results and care coordination.

## 2025-04-24 NOTE — PROGRESS NOTE ADULT - SUBJECTIVE AND OBJECTIVE BOX
ADA VILLAGOMEZ  84y, Male  Allergy: No Known Allergies      LOS  3d    CHIEF COMPLAINT: AMS (23 Apr 2025 05:56)      INTERVAL EVENTS/HPI  - No acute events overnight Interview conducted in [place]. Declined use of  phone , denies any urinary symptoms ,   - T(F): , Max: 98.3 (04-24-25 @ 05:07)  - Denies any worsening symptoms  - Tolerating medication  - WBC Count: 5.89 (04-24-25 @ 06:51)  WBC Count: 6.80 (04-23-25 @ 07:29)     - Creatinine: 1.2 (04-24-25 @ 06:51)  Creatinine: 1.4 (04-23-25 @ 07:29)       ROS  General: Denies rigors, nightsweats  HEENT: Denies headache, rhinorrhea, sore throat, eye pain  CV: Denies CP, palpitations  PULM: Denies wheezing, hemoptysis  GI: Denies hematemesis, hematochezia, melena  : Denies discharge, hematuria  MSK: Denies arthralgias, myalgias  SKIN: Denies rash, lesions  NEURO: Denies paresthesias, weakness  PSYCH: Denies depression, anxiety    VITALS:  T(F): 98, Max: 98.3 (04-24-25 @ 05:07)  HR: 93  BP: 115/63  RR: 18Vital Signs Last 24 Hrs  T(C): 36.7 (24 Apr 2025 09:32), Max: 36.8 (24 Apr 2025 00:00)  T(F): 98 (24 Apr 2025 09:32), Max: 98.3 (24 Apr 2025 05:07)  HR: 93 (24 Apr 2025 09:32) (82 - 106)  BP: 115/63 (24 Apr 2025 09:32) (115/63 - 188/79)  BP(mean): 105 (24 Apr 2025 05:07) (94 - 105)  RR: 18 (24 Apr 2025 09:32) (18 - 19)  SpO2: 95% (24 Apr 2025 09:32) (95% - 99%)    Parameters below as of 24 Apr 2025 09:32  Patient On (Oxygen Delivery Method): room air        PHYSICAL EXAM:  Gen: NAD, resting in bed  HEENT: Normocephalic, atraumatic  Neck: supple, no lymphadenopathy  CV: Regular rate & regular rhythm  Lungs: decreased BS at bases, no fremitus  Abdomen: Soft, BS present  Ext: Warm, well perfused  bains   Neuro: non focal, awake  Skin: no rash, no erythema  Lines: no phlebitis    FH: Non-contributory  Social Hx: Non-contributory    TESTS & MEASUREMENTS:                        11.0   5.89  )-----------( 261      ( 24 Apr 2025 06:51 )             32.3     04-24    133[L]  |  97[L]  |  21[H]  ----------------------------<  116[H]  5.0   |  20  |  1.2    Ca    9.1      24 Apr 2025 06:51  Mg     1.8     04-24    TPro  7.1  /  Alb  3.6  /  TBili  <0.2  /  DBili  x   /  AST  19  /  ALT  10  /  AlkPhos  104  04-24      LIVER FUNCTIONS - ( 24 Apr 2025 06:51 )  Alb: 3.6 g/dL / Pro: 7.1 g/dL / ALK PHOS: 104 U/L / ALT: 10 U/L / AST: 19 U/L / GGT: x           Urinalysis Basic - ( 24 Apr 2025 06:51 )    Color: x / Appearance: x / SG: x / pH: x  Gluc: 116 mg/dL / Ketone: x  / Bili: x / Urobili: x   Blood: x / Protein: x / Nitrite: x   Leuk Esterase: x / RBC: x / WBC x   Sq Epi: x / Non Sq Epi: x / Bacteria: x        Culture - Eye (collected 04-22-25 @ 17:50)  Source: Eye Eye-Right  Gram Stain (04-23-25 @ 23:57):    No polymorphonuclear leukocytes seen    No organisms seen    by cytocentrifuge  Preliminary Report (04-23-25 @ 23:57):    Few Staphylococcus simulans    Urinalysis with Rflx Culture (collected 04-21-25 @ 21:39)    Culture - Urine (collected 04-21-25 @ 21:39)  Source: Catheterized None  Preliminary Report (04-23-25 @ 11:51):    >100,000 CFU/ml Klebsiella pneumoniae    Culture - Blood (collected 04-21-25 @ 20:33)  Source: Blood Blood-Peripheral  Preliminary Report (04-24-25 @ 03:01):    No growth at 48 Hours    Culture - Blood (collected 04-21-25 @ 20:33)  Source: Blood Blood-Peripheral  Preliminary Report (04-24-25 @ 03:01):    No growth at 48 Hours    Culture - Urine (collected 04-19-25 @ 14:30)  Source: Catheterized Catheterized  Final Report (04-22-25 @ 07:57):    >100,000 CFU/ml Enterococcus faecalis    <10,000 CFU/ml Normal Urogenital jake present  Organism: Enterococcus faecalis (04-22-25 @ 07:57)  Organism: Enterococcus faecalis (04-22-25 @ 07:57)      Method Type: IZABELLA      -  Ampicillin: S <=2 Predicts results to ampicillin/sulbactam, amoxacillin-clavulanate and  piperacillin-tazobactam.      -  Ciprofloxacin: R >2      -  Levofloxacin: R >4      -  Nitrofurantoin: S <=32 Should not be used to treat pyelonephritis.      -  Tetracycline: R >8      -  Vancomycin: S 1    Culture - Urine (collected 04-14-25 @ 18:19)  Source: Clean Catch None  Final Report (04-17-25 @ 08:03):    >100,000 CFU/ml Proteus mirabilis  Organism: Proteus mirabilis (04-17-25 @ 08:03)  Organism: Proteus mirabilis (04-17-25 @ 08:03)      Method Type: IZABELLA      -  Amoxicillin/Clavulanic Acid: S <=8/4      -  Ampicillin: S <=8 These ampicillin results predict results for amoxicillin      -  Ampicillin/Sulbactam: S <=4/2      -  Aztreonam: S <=4      -  Cefazolin: S <=2 For uncomplicated UTI with K. pneumoniae, E. coli, or P. mirablis: IZABELLA <=16 is sensitive and IZABELLA >=32 is resistant. This also predicts results for oral agents cefaclor, cefdinir, cefpodoxime, cefprozil, cefuroxime axetil, cephalexin and locarbef for uncomplicated UTI. Note that some isolates may be susceptible to these agents while testing resistant to cefazolin.      -  Cefepime: S <=2      -  Cefoxitin: S <=8      -  Ceftriaxone: S <=1      -  Cefuroxime: S <=4      -  Ciprofloxacin: S <=0.25      -  Ertapenem: S <=0.5      -  Gentamicin: S <=2      -  Levofloxacin: S <=0.5      -  Meropenem: S <=1      -  Nitrofurantoin: R >64 Should not be used to treat pyelonephritis      -  Piperacillin/Tazobactam: S <=8      -  Tobramycin: S <=2      -  Trimethoprim/Sulfamethoxazole: S <=0.5/9.5    Urinalysis with Rflx Culture (collected 04-14-25 @ 18:19)    Culture - Blood (collected 04-14-25 @ 18:10)  Source: Blood Blood  Final Report (04-19-25 @ 23:07):    No growth at 5 days    Culture - Blood (collected 04-14-25 @ 18:00)  Source: Blood Blood  Final Report (04-19-25 @ 23:07):    No growth at 5 days    Urinalysis with Rflx Culture (collected 04-13-25 @ 15:05)    Culture - Urine (collected 04-13-25 @ 15:05)  Source: Catheterized None  Final Report (04-15-25 @ 19:27):    >100,000 CFU/ml Proteus mirabilis    <10,000 CFU/ml Normal Urogenital jake present  Organism: Proteus mirabilis (04-15-25 @ 19:27)  Organism: Proteus mirabilis (04-15-25 @ 19:27)      Method Type: IZABELLA      -  Amoxicillin/Clavulanic Acid: S <=8/4      -  Ampicillin: S <=8 These ampicillin results predict results for amoxicillin      -  Ampicillin/Sulbactam: S <=4/2      -  Aztreonam: S <=4      -  Cefazolin: S <=2 For uncomplicated UTI with K. pneumoniae, E. coli, or P. mirablis: IZABELLA <=16 is sensitive and IZABELLA >=32 is resistant. This also predicts results for oral agents cefaclor, cefdinir, cefpodoxime, cefprozil, cefuroxime axetil, cephalexin and locarbef for uncomplicated UTI. Note that some isolates may be susceptible to these agents while testing resistant to cefazolin.      -  Cefepime: S <=2      -  Cefoxitin: S <=8      -  Ceftriaxone: S <=1      -  Cefuroxime: S <=4      -  Ciprofloxacin: S <=0.25      -  Ertapenem: S <=0.5      -  Gentamicin: S <=2      -  Levofloxacin: S <=0.5      -  Meropenem: S <=1      -  Nitrofurantoin: R >64 Should not be used to treat pyelonephritis      -  Piperacillin/Tazobactam: S <=8      -  Tobramycin: S <=2      -  Trimethoprim/Sulfamethoxazole: S <=0.5/9.5    Urinalysis with Rflx Culture (collected 03-31-25 @ 04:20)    Culture - Urine (collected 03-31-25 @ 04:20)  Source: Catheterized None  Final Report (04-01-25 @ 12:23):    >=3 organisms. Probable collection contamination.        Lactate, Blood: 1.9 mmol/L (04-21-25 @ 20:33)  Blood Gas Venous - Lactate: 1.6 mmol/L (04-21-25 @ 20:16)      INFECTIOUS DISEASES TESTING  Vancomycin Level, Trough: 8.4 (04-23-25 @ 16:13)  Procalcitonin: 0.21 (04-22-25 @ 11:47)  MRSA PCR Result.: Negative (04-22-25 @ 06:30)  MRSA PCR Result.: Negative (01-17-25 @ 18:56)  MRSA PCR Result.: Negative (01-08-25 @ 16:03)  Procalcitonin: 1.80 (01-05-25 @ 19:42)  Streptococcus pneumoniae Ag, Ur Result: Negative (11-23-24 @ 13:05)  Procalcitonin: 0.09 (11-23-24 @ 06:19)  Procalcitonin: 0.11 (11-22-24 @ 13:42)  Rapid RVP Result: NotDetec (11-22-24 @ 02:23)      INFLAMMATORY MARKERS      RADIOLOGY & ADDITIONAL TESTS:  I have personally reviewed the last available Chest xray  CXR  Xray Chest 1 View- PORTABLE-Urgent:   ACC: [id number] EXAM:  XR CHEST PORTABLE URGENT 1V   ORDERED BY: RA WOODS     PROCEDURE DATE:  04/21/2025          INTERPRETATION:  CLINICAL HISTORY: Assess the chest.    COMPARISON: Chest radiograph 4/14/2025.    TECHNIQUE: Frontal chest radiograph.    FINDINGS:    Support devices: None.    Cardiac/mediastinum/hilum: Unchanged.    Lung parenchyma/Pleura: Bilateral interstitial prominence. No   pneumothorax.    Skeleton/soft tissues: Unchanged.      IMPRESSION:    Bilateral interstitial prominence.    --- End of Report ---            SIMON STOREY MD; Attending Radiologist  This document has been electronically signed. Apr 21 2025  8:06PM (04-21-25 @ 19:48)      CT  CT Abdomen and Pelvis w/ IV Cont:   ACC: [id number] EXAM:  CT ABDOMEN AND PELVIS IC   ORDERED BY: RA WOODS     PROCEDURE DATE:  04/21/2025          INTERPRETATION:  CLINICAL STATEMENT: Vomiting    TECHNIQUE: Contiguous axial CT images were obtained from the lower chest   to the pubic symphysis .  90 cc administered of Omnipaque 350 (10 cc   discarded).  Oral contrast without oral contrast.  Reformatted images in   the coronal and sagittal planes were acquired.    COMPARISON CT: 4/14/2025    Study is limited in evaluation due to motion artifact and artifact caused   by adjacent arms.    FINDINGS:    LOWER CHEST: New Right lower lobe opacity, possibly infectious in nature..    HEPATOBILIARY: Gallstones. Subcentimeter hepatic hypodensities, too small   to characterize.    SPLEEN: Unremarkable..    PANCREAS: Unremarkable..    ADRENAL GLANDS: Bilateral thickening.    KIDNEYS: Bilateral renal cysts and subcentimeter hypodensities, too small   to characterize. No hydronephrosis.    ABDOMINOPELVIC NODES: Stable nonspecific prominent retroperitoneal lymph   nodes, similar in appearance since January 25, 2025...    PELVIC ORGANS: There is a hyperdense nodule within the posterior right   bladder measuring 7 mm on image 90 of series 4. Findings are suspicious   for bladder neoplasm. However, this area is limited evaluation as the   bladder is collapsed around a Bains catheter. This can be further   evaluated with a CT cystogram or possibly urogram    PERITONEUM/MESENTERY/BOWEL: The appendix is unremarkable. There is no   free air or obstruction..    BONES: There is fusion of T12 and L1. Partially imaged hardware to the   right humerus. Degenerative change...    OTHER: Diffuse vascular calcifications. Infrarenal abdominal aorta   measures 2.8 cm...    IMPRESSION:     Suspicious hyperdense nodule within the posterior right bladder   measuring 7 mm on image 90 of series 4. Findings are suspicious for   bladder neoplasm.    However, this area is limited evaluation as the bladder is collapsed   around a Bains catheter.    This can be further evaluated with a CT cystogram or possibly urogram.    New right lower lobe opacity possibly infectious in nature    RA WOODS MD; was made aware of the above findings on 4/21/2025 at   10:28 PM with read back    --- End ofReport ---            LISA ULRICH MD; Attending Radiologist  This document has been electronically signed. Apr 21 2025 10:30PM (04-21-25 @ 21:12)      CARDIOLOGY TESTING  12 Lead ECG:   Ventricular Rate 55 BPM    Atrial Rate 55 BPM    P-R Interval 162 ms    QRS Duration 80 ms    Q-T Interval 450 ms    QTC Calculation(Bazett) 430 ms    P Axis 47 degrees    R Axis 16 degrees    T Axis 13 degrees    Diagnosis Line Sinus bradycardia  Moderate voltage criteria for LVH, may be normal variant ( R in aVL ,   Sokolow-Atkins )  Borderline ECG    Confirmed by Sai Barnard ([id number]) on 4/22/2025 3:44:31 PM (04-21-25 @ 17:57)  12 Lead ECG:   Ventricular Rate 81 BPM    Atrial Rate 81 BPM    P-R Interval 182 ms    QRS Duration 82 ms    Q-T Interval 384 ms    QTC Calculation(Bazett) 446 ms    P Axis 66 degrees    R Axis 39 degrees    T Axis 81 degrees    Diagnosis Line Normal sinus rhythm  Normal ECG    Confirmed by Frandy Campa ([id number]) on 4/16/2025 8:55:16 PM (04-15-25 @ 04:38)      MEDICATIONS  amoxicillin  500 milliGRAM(s)/clavulanate 1 Oral every 12 hours  chlorhexidine 2% Cloths 1 Topical <User Schedule>  dextrose 5%. 1000 IV Continuous <Continuous>  dextrose 5%. 1000 IV Continuous <Continuous>  dextrose 50% Injectable 25 IV Push once  dextrose 50% Injectable 12.5 IV Push once  dextrose 50% Injectable 25 IV Push once  finasteride 5 Oral daily  glucagon  Injectable 1 IntraMuscular once  heparin   Injectable 5000 SubCutaneous every 12 hours  insulin lispro (ADMELOG) corrective regimen sliding scale  SubCutaneous three times a day before meals  NIFEdipine XL 90 Oral daily  ofloxacin 0.3% Solution 1 Right EYE two times a day  OLANZapine 10 Oral daily  sodium chloride 0.9%. 1000 IV Continuous <Continuous>  tamsulosin 0.4 Oral at bedtime  traZODone 50 Oral at bedtime      WEIGHT  Weight (kg): 65 (04-21-25 @ 19:29)  Creatinine: 1.2 mg/dL (04-24-25 @ 06:51)      ANTIBIOTICS:  amoxicillin  500 milliGRAM(s)/clavulanate 1 Tablet(s) Oral every 12 hours      All available historical records have been reviewed

## 2025-04-24 NOTE — PROGRESS NOTE ADULT - SUBJECTIVE AND OBJECTIVE BOX
SUBJECTIVE/OVERNIGHT EVENTS  Today is hospital day 3d. This morning patient was seen and examined at bedside, resting comfortably in bed. No acute or major events overnight.    MEDICATIONS  STANDING MEDICATIONS  amoxicillin  500 milliGRAM(s)/clavulanate 1 Tablet(s) Oral every 12 hours  chlorhexidine 2% Cloths 1 Application(s) Topical <User Schedule>  dextrose 5%. 1000 milliLiter(s) IV Continuous <Continuous>  dextrose 5%. 1000 milliLiter(s) IV Continuous <Continuous>  dextrose 50% Injectable 25 Gram(s) IV Push once  dextrose 50% Injectable 12.5 Gram(s) IV Push once  dextrose 50% Injectable 25 Gram(s) IV Push once  finasteride 5 milliGRAM(s) Oral daily  glucagon  Injectable 1 milliGRAM(s) IntraMuscular once  heparin   Injectable 5000 Unit(s) SubCutaneous every 12 hours  insulin lispro (ADMELOG) corrective regimen sliding scale   SubCutaneous three times a day before meals  NIFEdipine XL 90 milliGRAM(s) Oral daily  ofloxacin 0.3% Solution 1 Drop(s) Right EYE two times a day  OLANZapine 10 milliGRAM(s) Oral daily  sodium chloride 0.9%. 1000 milliLiter(s) IV Continuous <Continuous>  tamsulosin 0.4 milliGRAM(s) Oral at bedtime  traZODone 50 milliGRAM(s) Oral at bedtime    PRN MEDICATIONS  acetaminophen     Tablet .. 650 milliGRAM(s) Oral every 6 hours PRN  albuterol    90 MICROgram(s) HFA Inhaler 1 Puff(s) Inhalation four times a day PRN  dextrose Oral Gel 15 Gram(s) Oral once PRN    VITALS  T(F): 98 (04-24-25 @ 09:32), Max: 98.3 (04-24-25 @ 05:07)  HR: 93 (04-24-25 @ 09:32) (82 - 106)  BP: 115/63 (04-24-25 @ 09:32) (115/63 - 188/79)  RR: 18 (04-24-25 @ 09:32) (18 - 19)  SpO2: 95% (04-24-25 @ 09:32) (95% - 99%)  POCT Blood Glucose.: 118 mg/dL (04-24-25 @ 11:41)  POCT Blood Glucose.: 162 mg/dL (04-24-25 @ 09:29)  POCT Blood Glucose.: 102 mg/dL (04-23-25 @ 21:17)  POCT Blood Glucose.: 98 mg/dL (04-23-25 @ 16:46)    PHYSICAL EXAM - PT REFUSED EXAM   GENERAL  (  ) NAD, lying in bed comfortably     (  ) obtunded     (  ) lethargic     (  ) somnolent    HEAD  (  ) Atraumatic     (  ) hematoma     (  ) laceration (specify location:       )     NECK  (  ) Supple     (  ) neck stiffness     (  ) nuchal rigidity     (  )  no JVD     (  ) JVD present ( -- cm)    HEART  Rate -->  (  ) normal rate    (  ) bradycardic    (  ) tachycardic  Rhythm -->  (  ) regular    (  ) regularly irregular    (  ) irregularly irregular  Murmurs -->  (  ) normal s1/s2    (  ) systolic murmur    (  ) diastolic murmur    (  ) continuous murmur     (  ) S3 present    (  ) S4 present    LUNGS  (  )Unlabored respirations     (  ) tachypnea  (  ) B/L air entry     (  ) decreased breath sounds in:  (location     )    (  ) no adventitious sound     (  ) crackles     (  ) wheezing      (  ) rhonchi      (specify location:       )  (  ) chest wall tenderness (specify location:       )    ABDOMEN  (  ) Soft     (  ) tense   |   (  ) nondistended     (  ) distended   |   (  ) +BS     (  ) hypoactive bowel sounds     (  ) hyperactive bowel sounds  (  ) nontender     (  ) RUQ tenderness     (  ) RLQ tenderness     (  ) LLQ tenderness     (  ) epigastric tenderness     (  ) diffuse tenderness  (  ) Splenomegaly      (  ) Hepatomegaly      (  ) Jaundice     (  ) ecchymosis     EXTREMITIES  (  ) Normal     (  ) Rash     (  ) ecchymosis     (  ) varicose veins      (  ) pitting edema     (  ) non-pitting edema   (  ) ulceration     (  ) gangrene:     (location:     )    NERVOUS SYSTEM  (  ) A&Ox3     (  ) confused     (  ) lethargic  CN II-XII:     (  ) Intact     (  ) focal deficits  (Specify:     )   Upper extremities:     (  ) strength X/5     (  ) focal deficit (specify:    )  Lower extremities:     (  ) strength  X/5    (  ) focal deficit (specify:    )    SKIN  (  ) No rashes or lesions     (  ) maculopapular rash     (  ) pustules     (  ) vesicles     (  ) ulcer     (  ) ecchymosis     (specify location:     )    LABS    Mg     1.8     04-24-25 @ 06:51    TPro  7.1  /  Alb  3.6  /  TBili  <0.2  /  DBili  x   /  AST  19  /  ALT  10  /  AlkPhos  104  /  GGT  x     04-24-25 @ 06:51      Troponin T, High Sensitivity Result: 29 ng/L (04-22-25 @ 11:47)  Troponin T, High Sensitivity Result: 31 ng/L (04-21-25 @ 22:58)  Troponin T, High Sensitivity Result: 39 ng/L (04-21-25 @ 20:33)    Urinalysis Basic - ( 24 Apr 2025 06:51 )    Color: x / Appearance: x / SG: x / pH: x  Gluc: 116 mg/dL / Ketone: x  / Bili: x / Urobili: x   Blood: x / Protein: x / Nitrite: x   Leuk Esterase: x / RBC: x / WBC x   Sq Epi: x / Non Sq Epi: x / Bacteria: x    Culture - Eye (collected 22 Apr 2025 17:50)  Source: Eye Eye-Right  Gram Stain (23 Apr 2025 23:57):    No polymorphonuclear leukocytes seen    No organisms seen    by cytocentrifuge  Preliminary Report (23 Apr 2025 23:57):    Few Staphylococcus simulans    Urinalysis with Rflx Culture (collected 21 Apr 2025 21:39)    Culture - Urine (collected 21 Apr 2025 21:39)  Source: Catheterized None  Preliminary Report (23 Apr 2025 11:51):    >100,000 CFU/ml Klebsiella pneumoniae    Culture - Blood (collected 21 Apr 2025 20:33)  Source: Blood Blood-Peripheral  Preliminary Report (24 Apr 2025 03:01):    No growth at 48 Hours    Culture - Blood (collected 21 Apr 2025 20:33)  Source: Blood Blood-Peripheral  Preliminary Report (24 Apr 2025 03:01):    No growth at 48 Hours

## 2025-04-25 ENCOUNTER — TRANSCRIPTION ENCOUNTER (OUTPATIENT)
Age: 85
End: 2025-04-25

## 2025-04-25 VITALS
OXYGEN SATURATION: 95 % | RESPIRATION RATE: 17 BRPM | TEMPERATURE: 98 F | HEART RATE: 79 BPM | SYSTOLIC BLOOD PRESSURE: 126 MMHG | DIASTOLIC BLOOD PRESSURE: 59 MMHG

## 2025-04-25 LAB
-  AMOXICILLIN/CLAVULANIC ACID: SIGNIFICANT CHANGE UP
-  AMPICILLIN/SULBACTAM: SIGNIFICANT CHANGE UP
-  AMPICILLIN: SIGNIFICANT CHANGE UP
-  AZTREONAM: SIGNIFICANT CHANGE UP
-  CEFAZOLIN: SIGNIFICANT CHANGE UP
-  CEFEPIME: SIGNIFICANT CHANGE UP
-  CEFOXITIN: SIGNIFICANT CHANGE UP
-  CEFTRIAXONE: SIGNIFICANT CHANGE UP
-  CEFUROXIME: SIGNIFICANT CHANGE UP
-  CIPROFLOXACIN: SIGNIFICANT CHANGE UP
-  ERTAPENEM: SIGNIFICANT CHANGE UP
-  GENTAMICIN: SIGNIFICANT CHANGE UP
-  IMIPENEM: SIGNIFICANT CHANGE UP
-  LEVOFLOXACIN: SIGNIFICANT CHANGE UP
-  MEROPENEM: SIGNIFICANT CHANGE UP
-  NITROFURANTOIN: SIGNIFICANT CHANGE UP
-  PIPERACILLIN/TAZOBACTAM: SIGNIFICANT CHANGE UP
-  RESISTANCE GENE OXA: SIGNIFICANT CHANGE UP
-  TOBRAMYCIN: SIGNIFICANT CHANGE UP
-  TRIMETHOPRIM/SULFAMETHOXAZOLE: SIGNIFICANT CHANGE UP
ANION GAP SERPL CALC-SCNC: 13 MMOL/L — SIGNIFICANT CHANGE UP (ref 7–14)
BASOPHILS # BLD AUTO: 0.02 K/UL — SIGNIFICANT CHANGE UP (ref 0–0.2)
BASOPHILS NFR BLD AUTO: 0.3 % — SIGNIFICANT CHANGE UP (ref 0–1)
BUN SERPL-MCNC: 26 MG/DL — HIGH (ref 10–20)
CALCIUM SERPL-MCNC: 9.2 MG/DL — SIGNIFICANT CHANGE UP (ref 8.4–10.5)
CHLORIDE SERPL-SCNC: 97 MMOL/L — LOW (ref 98–110)
CO2 SERPL-SCNC: 22 MMOL/L — SIGNIFICANT CHANGE UP (ref 17–32)
CREAT SERPL-MCNC: 1.4 MG/DL — SIGNIFICANT CHANGE UP (ref 0.7–1.5)
EGFR: 50 ML/MIN/1.73M2 — LOW
EGFR: 50 ML/MIN/1.73M2 — LOW
EOSINOPHIL # BLD AUTO: 0.04 K/UL — SIGNIFICANT CHANGE UP (ref 0–0.7)
EOSINOPHIL NFR BLD AUTO: 0.6 % — SIGNIFICANT CHANGE UP (ref 0–8)
GLUCOSE BLDC GLUCOMTR-MCNC: 103 MG/DL — HIGH (ref 70–99)
GLUCOSE BLDC GLUCOMTR-MCNC: 97 MG/DL — SIGNIFICANT CHANGE UP (ref 70–99)
GLUCOSE SERPL-MCNC: 103 MG/DL — HIGH (ref 70–99)
HCT VFR BLD CALC: 31.4 % — LOW (ref 42–52)
HGB BLD-MCNC: 10.5 G/DL — LOW (ref 14–18)
IMM GRANULOCYTES NFR BLD AUTO: 0.6 % — HIGH (ref 0.1–0.3)
LYMPHOCYTES # BLD AUTO: 1.98 K/UL — SIGNIFICANT CHANGE UP (ref 1.2–3.4)
LYMPHOCYTES # BLD AUTO: 32 % — SIGNIFICANT CHANGE UP (ref 20.5–51.1)
MAGNESIUM SERPL-MCNC: 1.9 MG/DL — SIGNIFICANT CHANGE UP (ref 1.8–2.4)
MCHC RBC-ENTMCNC: 30 PG — SIGNIFICANT CHANGE UP (ref 27–31)
MCHC RBC-ENTMCNC: 33.4 G/DL — SIGNIFICANT CHANGE UP (ref 32–37)
MCV RBC AUTO: 89.7 FL — SIGNIFICANT CHANGE UP (ref 80–94)
METHOD TYPE: SIGNIFICANT CHANGE UP
METHOD TYPE: SIGNIFICANT CHANGE UP
MONOCYTES # BLD AUTO: 0.47 K/UL — SIGNIFICANT CHANGE UP (ref 0.1–0.6)
MONOCYTES NFR BLD AUTO: 7.6 % — SIGNIFICANT CHANGE UP (ref 1.7–9.3)
NEUTROPHILS # BLD AUTO: 3.64 K/UL — SIGNIFICANT CHANGE UP (ref 1.4–6.5)
NEUTROPHILS NFR BLD AUTO: 58.9 % — SIGNIFICANT CHANGE UP (ref 42.2–75.2)
NRBC BLD AUTO-RTO: 0 /100 WBCS — SIGNIFICANT CHANGE UP (ref 0–0)
PLATELET # BLD AUTO: 300 K/UL — SIGNIFICANT CHANGE UP (ref 130–400)
PMV BLD: 10.2 FL — SIGNIFICANT CHANGE UP (ref 7.4–10.4)
POTASSIUM SERPL-MCNC: 4.6 MMOL/L — SIGNIFICANT CHANGE UP (ref 3.5–5)
POTASSIUM SERPL-SCNC: 4.6 MMOL/L — SIGNIFICANT CHANGE UP (ref 3.5–5)
RBC # BLD: 3.5 M/UL — LOW (ref 4.7–6.1)
RBC # FLD: 14 % — SIGNIFICANT CHANGE UP (ref 11.5–14.5)
SODIUM SERPL-SCNC: 132 MMOL/L — LOW (ref 135–146)
WBC # BLD: 6.19 K/UL — SIGNIFICANT CHANGE UP (ref 4.8–10.8)
WBC # FLD AUTO: 6.19 K/UL — SIGNIFICANT CHANGE UP (ref 4.8–10.8)

## 2025-04-25 PROCEDURE — 99232 SBSQ HOSP IP/OBS MODERATE 35: CPT

## 2025-04-25 RX ORDER — AMOXICILLIN AND CLAVULANATE POTASSIUM 500; 125 MG/1; MG/1
1 TABLET, FILM COATED ORAL
Qty: 4 | Refills: 0
Start: 2025-04-25 | End: 2025-04-26

## 2025-04-25 RX ORDER — OFLOXACIN 3 MG/ML
1 SOLUTION OPHTHALMIC
Qty: 1 | Refills: 0
Start: 2025-04-25 | End: 2025-04-25

## 2025-04-25 RX ADMIN — Medication 90 MILLIGRAM(S): at 05:33

## 2025-04-25 RX ADMIN — FINASTERIDE 5 MILLIGRAM(S): 1 TABLET, FILM COATED ORAL at 12:31

## 2025-04-25 RX ADMIN — AMOXICILLIN AND CLAVULANATE POTASSIUM 1 TABLET(S): 500; 125 TABLET, FILM COATED ORAL at 05:34

## 2025-04-25 RX ADMIN — OFLOXACIN 1 DROP(S): 3 SOLUTION OPHTHALMIC at 05:35

## 2025-04-25 RX ADMIN — Medication 1 APPLICATION(S): at 05:36

## 2025-04-25 RX ADMIN — HEPARIN SODIUM 5000 UNIT(S): 1000 INJECTION INTRAVENOUS; SUBCUTANEOUS at 05:33

## 2025-04-25 RX ADMIN — OLANZAPINE 10 MILLIGRAM(S): 10 TABLET ORAL at 12:31

## 2025-04-25 NOTE — DISCHARGE NOTE PROVIDER - PROVIDER TOKENS
PROVIDER:[TOKEN:[[id number]],FOLLOWUP:[1 week]],PROVIDER:[TOKEN:[[id number]],FOLLOWUP:[1 week]],PROVIDER:[TOKEN:[[id number]],FOLLOWUP:[1 week]]

## 2025-04-25 NOTE — DISCHARGE NOTE PROVIDER - CARE PROVIDERS DIRECT ADDRESSES
[email or web address],DirectAddress_Unknown,[email or web address]

## 2025-04-25 NOTE — DISCHARGE NOTE NURSING/CASE MANAGEMENT/SOCIAL WORK - NSDCVIVACCINE_GEN_ALL_CORE_FT
Tdap; 15-May-2024 15:22; Rosa Adamson (ELLEN); [employer]; [phone] (Exp. Date: 23-Nov-2025); IntraMuscular; Deltoid Left.; 0.5 milliLiter(s); VIS (VIS Published: 09-May-2013, VIS Presented: 15-May-2024);

## 2025-04-25 NOTE — DISCHARGE NOTE NURSING/CASE MANAGEMENT/SOCIAL WORK - PATIENT PORTAL LINK FT
You can access the FollowMyHealth Patient Portal offered by [facility] by registering at the following website: [email or web address]. By joining [employer]’s FollowMyHealth portal, you will also be able to view your health information using other applications (apps) compatible with our system.

## 2025-04-25 NOTE — DISCHARGE NOTE PROVIDER - CARE PROVIDER_API CALL
Osbaldo Brady  Urology  [address] [place] [address]  Phone: [phone]  Fax: [phone]  Follow Up Time: 1 week    BRAN REAVES  [place] [address]  Phone: ()-  Fax: ()-  Follow Up Time: 1 week    Nam Matson  Infectious Disease  [address] [place] [address]  Phone: [phone]  Fax: [phone]  Follow Up Time: 1 week

## 2025-04-25 NOTE — DISCHARGE NOTE NURSING/CASE MANAGEMENT/SOCIAL WORK - FINANCIAL ASSISTANCE
[facility] provides services at a reduced cost to those who are determined to be eligible through [facility]’s financial assistance program. Information regarding [facility]’s financial assistance program can be found by going to [email or web address] or by calling [phone].

## 2025-04-25 NOTE — DISCHARGE NOTE PROVIDER - NSDCCPCAREPLAN_GEN_ALL_CORE_FT
PRINCIPAL DISCHARGE DIAGNOSIS  Diagnosis: Sepsis secondary to UTI  Assessment and Plan of Treatment: You were brought in from your nursing home to the emergency department for an altered mental status evaluation. 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   Upon admission, a new concerning finding was discovered on your CT scan. There's a small, suspicious nodule in your bladder, which could potentially be a bladder neoplasm, but assessment was difficult due to the position of your Cobos catheter. You shoud follow up with hematology/oncology after discharge for further work up. For your bladder condition, you should follow up with urology for possible cystoscopy.  Regarding your UTI, lab tests have shown significant bacteria and white blood cells in your urine, with your recent samples growing organisms like E. faecalis, Proteus mirabilis, and Klebsiella pneumoniae. We will discharge you on an antibiotic called Augmentin until 4/27/25.   In addition to these concerns, you’ve developed bacterial conjunctivitis in your right eye, which is being treated with Ofloxacin drops.   Your care is being coordinated across multiple specialties, including infectious diseases for your UTI, urology for the bladder nodule, and hematology/oncology for ongoing cancer care. Please follow up with all of your doctors on discharge.      SECONDARY DISCHARGE DIAGNOSES  Diagnosis: RLL pneumonia  Assessment and Plan of Treatment:

## 2025-04-25 NOTE — DISCHARGE NOTE PROVIDER - ATTENDING DISCHARGE PHYSICAL EXAMINATION:
Gen- elderly M, unkempt  Psych- no agitation, incongruent thought process  Neuro- AOx2-3  Chest-symmetrical chest rise  Uro- bains in place

## 2025-04-25 NOTE — DISCHARGE NOTE PROVIDER - DISCHARGE DATE
Dr. Valdez said that patient should be seen and that she has opening.  Message sent to patient.   25-Apr-2025

## 2025-04-25 NOTE — DISCHARGE NOTE PROVIDER - HOSPITAL COURSE
84 yold male to with Pmhx of Copd, Htn, Dm, schizophrenia, Hld, non-hodgkins lymphoma, urinary retention with chronic Bains (changed yesterday), Hx of MDR UTIs presents to the ED from [facility] for evaluation of AMS.     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 Findings are suspicious for bladder neoplasm. However, this area is limited evaluation as the bladder is collapsed around a Bains catheter.This can be further evaluated with a CT cystogram or possibly urogram. New right lower lobe opacity possibly infectious in nature  - pt was not discharged on abx but started cefdinir 300mg BID at NH home on 4/20/25 per med list   - Vitals: BP 95/56 with hypothermia-> c/w bare hugger for now  - ED: s/p 100mg doxy IV x1, 1g vanc IV x1, 3.375g Zosyn IV x1  - UA 4/22- many bacteria, large LE, >900 WBC   - urine cx: 4/19 with E Faecalis, 4/14 with proteus mirabilis  - urine cx 4/21- +  Klebsiella pneumoniae  - blood cx 4/21- negative   - per ID: c/w Augmentin x 7 days (4/22/2025-4/27/2025)    #new bladder mass  #hx non-hodgkin's lymphoma   - CT Abdomen and Pelvis w/ IV Cont (04.21.25) Suspicious hyperdense nodule within the posterior right bladder measuring 7 mm on image 90 of series 4. Findings are suspicious for bladder neoplasm. However, this area is limited evaluation as the bladder is collapsed around a Bains catheter.This can be further evaluated with a CT cystogram or possibly urogram. New right lower lobe opacity possibly infectious in nature  - per urology: follow up with urology as outpatient for possible cystoscopy   - c/w bains catheter   - heme/onc out patient     #R eye bacterial conjunctivitis  - yellow pus discharge draining from the R eye  - c/w Ofloxacin drops 3x daily to the R eye for 5 days  - monitor for signs of infection/resolving conjunctivitis    # DAVID on CKD3- resolving  # Possibly due to ATN/ ALEJANDRA  - creatinine 2.5 on admission, baseline 1.2-1.3-> 1.8->1.4  - Patient received CTAP with IV contrast   - hold home gabapentin 300mg BID for now   - s/p  NS 75 cc/hr for 24 hours    # acute on chronic anemia   #MARIBEL  - Hgb 8.0 on admission baseline ~10-11  - no active signs of bleed   - iron studies noted. Iron supplementation on dc  - maintain active type and screen   - transfuse Hgb <7    # elevated trop  - trop 39 -> 31   - EKG with bradycardia, no ST changes     # sinus bradycardia   - HR in 50s on monitor   - hold home metoprolol 25mg BID   - tele monitoring-> EKG    # BPH  - cw tamsulosin and finasteride     #h/o COPD  - c/w home inhalers     #DM 2  - hold metformin 500mg BID   - SSI     #HTN  - c/w home nifedipine 90mg daily     #schizophrenia  - c/w olanzapine and Trazodone (holding parameters for lethargy and AMS)

## 2025-04-25 NOTE — DISCHARGE NOTE NURSING/CASE MANAGEMENT/SOCIAL WORK - NSDCPEFALRISK_GEN_ALL_CORE
For information on Fall & Injury Prevention, visit: [email or web address] OR  [email or web address] OR  https://www.cdc.gov/steadi/patient.html

## 2025-04-25 NOTE — DISCHARGE NOTE PROVIDER - NSDCMRMEDTOKEN_GEN_ALL_CORE_FT
Albuterol (Eqv-ProAir HFA) 90 mcg/inh inhalation aerosol: 1 puff(s) inhaled 4 times a day as needed for  shortness of breath and/or wheezing  amoxicillin-clavulanate 500 mg-125 mg oral tablet: 1 tab(s) orally every 12 hours until 4/27  cefdinir 300 mg oral capsule: 1 cap(s) orally every 12 hours  Centrum oral tablet: 1 tab(s) orally once a day  finasteride 5 mg oral tablet: 1 tab(s) orally once a day  gabapentin 300 mg oral capsule: 1 cap(s) orally 2 times a day  metFORMIN 500 mg oral tablet: 1 tab(s) orally 2 times a day  metoprolol tartrate 25 mg oral tablet: 1 tab(s) orally 2 times a day  NIFEdipine 90 mg oral tablet, extended release: 1 tab(s) orally once a day  ofloxacin 0.3% ophthalmic solution: 1 drop(s) to each affected eye 2 times a day  OLANZapine 10 mg oral tablet: 1 tab(s) orally once a day  tamsulosin 0.4 mg oral capsule: 1 cap(s) orally once a day (at bedtime)  traZODone 50 mg oral tablet: 1 tab(s) orally once a day (at bedtime)

## 2025-04-26 ENCOUNTER — TRANSCRIPTION ENCOUNTER (OUTPATIENT)
Age: 85
End: 2025-04-26

## 2025-04-27 LAB
-  AMPICILLIN: SIGNIFICANT CHANGE UP
-  CIPROFLOXACIN: SIGNIFICANT CHANGE UP
-  LEVOFLOXACIN: SIGNIFICANT CHANGE UP
-  NITROFURANTOIN: SIGNIFICANT CHANGE UP
-  TETRACYCLINE: SIGNIFICANT CHANGE UP
-  VANCOMYCIN: SIGNIFICANT CHANGE UP
CULTURE RESULTS: ABNORMAL
CULTURE RESULTS: ABNORMAL
CULTURE RESULTS: SIGNIFICANT CHANGE UP
CULTURE RESULTS: SIGNIFICANT CHANGE UP
METHOD TYPE: SIGNIFICANT CHANGE UP
ORGANISM # SPEC MICROSCOPIC CNT: ABNORMAL
ORGANISM # SPEC MICROSCOPIC CNT: SIGNIFICANT CHANGE UP
ORGANISM # SPEC MICROSCOPIC CNT: SIGNIFICANT CHANGE UP
SPECIMEN SOURCE: SIGNIFICANT CHANGE UP

## 2025-04-28 ENCOUNTER — TRANSCRIPTION ENCOUNTER (OUTPATIENT)
Age: 85
End: 2025-04-28

## 2025-04-28 NOTE — CDI QUERY NOTE - NSCDINOTECODERNAME_GEN_A_CORE_FT
Lakia Lanier [facility] CCDS
Lakia Lanier [facility] CCDS
Clindamycin Counseling: I counseled the patient regarding use of clindamycin as an antibiotic for prophylactic and/or therapeutic purposes. Clindamycin is active against numerous classes of bacteria, including skin bacteria. Side effects may include nausea, diarrhea, gastrointestinal upset, rash, hives, yeast infections, and in rare cases, colitis.

## 2025-04-28 NOTE — CDI QUERY NOTE - NSCDIOTHERTXTBX_GEN_ALL_CORE_HH
Clinical documentation and/or evidence of the patient’s presentation, evaluation, and medical management, as evidenced below, may support a cause and effect link that is not documented in the medical record.  In order to ensure accurate coding and accuracy of the clinical record, the documentation in this patient’s medical record requires additional clarification.      If you think the supporting documentation and/or clinical evidence supports a cause and effect link, please include more specific documentation associated with these findings in your progress note and/or discharge summary.    Please clarify if there is a relationship between the UTI and chronic bains catheter, such as:  • UTI associated with chronic bains catheter was treated and evaluated  • UTI unrelated to chronic bains catheter  • Other (specify)    Supporting documentation and/or clinical evidence:    4/21 ED Provider Note: ... Seen 3 days ago in ED for UTI secondary to chronic bains which was changed ~Apr 16th 4/21 H&P Adult: ... historian. Patient has had chronic bains in since admission in January where he was treated for MDRO in the urine, retention, and uretal dilation for bains catheter placement by urology, last exchanged 4/14/25 4/22 Consult Note Adult-Infectious Disease Attending: ... Seen 3 days ago in ED for UTI secondary to chronic bains ... Possible Symptomatic acute cystitis ... Chronic Bains-  last exchanged 4/19/25 4/24 Progress Note Adult-Internal Medicine Resident/Attending: ... UTI. hx urinary retention with chronic bains - exchanged 4/13/25 ... urine cx: 4/19 with E Faecalis, 4/14 with proteus mirabilis- urine cx 4/21- prelim +  Klebsiella pneumoniae    4/25  Discharge Note Provider: ... UTI. hx urinary retention with chronic bains - exchanged 4/13/25       Thank you,  Lakia Lanier RN [facility] CCDS  [phone]

## 2025-04-28 NOTE — CDI QUERY NOTE - NSCDIOTHERTXTBX_GEN_ALL_CORE_HH
There is conflicting documentation in the medical record arising from the documentation of different providers.     In order to ensure accurate coding and accuracy of the clinical record, the documentation in this patient’s medical record requires additional clarification from the Attending of Record.      For reference, please see below the conflicting documentation noted in the medical record.    Based upon the patient’s presentation, evaluation, and medical management, please identify the appropriate diagnosis for this patient:  • Sepsis secondary to UTI was treated and resolved	  • Sepsis ruled out	  • Other (specify)    Conflicting documentation and/or clinical evidence is noted:     The diagnosis of sepsis secondary to UTI  was documented on 4/21 and 4/25 by ED and Discharge Provider:   The diagnosis of sepsis not present on admission was documented on [Date] by Attending and Discharge Provider]:     The following information is also documented in the medical record:    4/21 ED Provider Note: ... Principal Discharge DX: Sepsis secondary to UTI. Secondary Diagnosis: RLL pneumonia ... SEPSIS – was this patient treated for sepsis? Yes. Presentation suggestive of: Bacterial Etiology Suspicion of sepsis at: 21-Apr-2025 19:30    4/21 H&P Adult: ...  Seen 3 days ago in ED for UTI secondary to chronic bains ... syncope? iso hypotension 2/2 possible urosepsis vs PNA (possible aspiration?). AMS 2/2 TME ... patient hypothermic with soft BP in ED - s/p 3L IVF in ED with improvement ...  Attestation Statements: ... Urosepsis / Toxic metabolic encephalopathy. Pneumonia     4/22 Consult Note Adult-Infectious Disease Attending: ... Possible Symptomatic acute cystitis ... Sepsis ruled out on admission    4/22 Progress Note Adult-Internal Medicine Resident/Attending: ... Near syncope 2/2 TME, likely UTI. Sepsis not present on admission, 2/2 UTI vs PNA    4/25 Discharge Note Provider: ... sepsis not present on admission. UTI ... PRINCIPAL DISCHARGE DIAGNOSIS: Sepsis secondary to UTI ... SECONDARY DISCHARGE DIAGNOSES: RLL pneumonia    Nursing VS Flowsheet: 4/21 Temp 96.5, 95.1; BP 97/58, 99/56      Thank you,  Lakia Lanier RN [facility] CCDS  [phone] There is conflicting documentation in the medical record arising from the documentation of different providers.     In order to ensure accurate coding and accuracy of the clinical record, the documentation in this patient’s medical record requires additional clarification from the Attending of Record.      For reference, please see below the conflicting documentation noted in the medical record.    Based upon the patient’s presentation, evaluation, and medical management, please identify the appropriate diagnosis for this patient:  • Sepsis secondary to UTI was treated and resolved	  • Sepsis ruled out	  • Other (specify)    Conflicting documentation and/or clinical evidence is noted:     The diagnosis of sepsis secondary to UTI  was documented on 4/21 and 4/25 by ED and Discharge Provider:   The diagnosis of sepsis not present on admission was documented on [Date] by Attending and Discharge Provider]:     The following information is also documented in the medical record:    4/21 ED Provider Note: ... Principal Discharge DX: Sepsis secondary to UTI. Secondary Diagnosis: RLL pneumonia ... SEPSIS – was this patient treated for sepsis? Yes. Presentation suggestive of: Bacterial Etiology Suspicion of sepsis at: 21-Apr-2025 19:30    4/21 H&P Adult: ...  Seen 3 days ago in ED for UTI secondary to chronic bains ... syncope? iso hypotension 2/2 possible urosepsis vs PNA (possible aspiration?). AMS 2/2 TME ... patient hypothermic with soft BP in ED - s/p 3L IVF in ED with improvement ...  Attestation Statements: ... Urosepsis / Toxic metabolic encephalopathy. Pneumonia     4/22 Consult Note Adult-Infectious Disease Attending: ... Possible Symptomatic acute cystitis ... Sepsis ruled out on admission    4/22 Progress Note Adult-Internal Medicine Resident/Attending: ... Near syncope 2/2 TME, likely UTI. Sepsis not present on admission, 2/2 UTI vs PNA    4/25 Discharge Note Provider: ... sepsis not present on admission. UTI ... PRINCIPAL DISCHARGE DIAGNOSIS: Sepsis secondary to UTI ... SECONDARY DISCHARGE DIAGNOSES: RLL pneumonia    Nursing VS Flowsheet: 4/21 Temp 96.5, 95.1; BP 97/58, 99/56    Per MAR: Vancomycin IVPB. Indication: UTI. Administered 4/21                 Zosyn IVPB. Indication: Empiric dosing. Administered 4/21                 Doxycycline IVPB. Indication: PNA. Administered 4/21                 Vancomycin IVPB. Indication: Empiric dosing. Administered 4/22                 Meropenem IVPB. Indication: Empiric dosing. Administered 4/22                 Cefepime IVPB. Indication: uti vs pna. Administered 4/22    Thank you,  Lakia Lanier RN [facility] CCDS  [phone]

## 2025-05-01 ENCOUNTER — TRANSCRIPTION ENCOUNTER (OUTPATIENT)
Age: 85
End: 2025-05-01

## 2025-05-02 DIAGNOSIS — N40.1 BENIGN PROSTATIC HYPERPLASIA WITH LOWER URINARY TRACT SYMPTOMS: ICD-10-CM

## 2025-05-02 DIAGNOSIS — D50.9 IRON DEFICIENCY ANEMIA, UNSPECIFIED: ICD-10-CM

## 2025-05-02 DIAGNOSIS — E11.22 TYPE 2 DIABETES MELLITUS WITH DIABETIC CHRONIC KIDNEY DISEASE: ICD-10-CM

## 2025-05-02 DIAGNOSIS — E87.1 HYPO-OSMOLALITY AND HYPONATREMIA: ICD-10-CM

## 2025-05-02 DIAGNOSIS — G92.8 OTHER TOXIC ENCEPHALOPATHY: ICD-10-CM

## 2025-05-02 DIAGNOSIS — C85.90 NON-HODGKIN LYMPHOMA, UNSPECIFIED, UNSPECIFIED SITE: ICD-10-CM

## 2025-05-02 DIAGNOSIS — D84.81 IMMUNODEFICIENCY DUE TO CONDITIONS CLASSIFIED ELSEWHERE: ICD-10-CM

## 2025-05-02 DIAGNOSIS — D63.8 ANEMIA IN OTHER CHRONIC DISEASES CLASSIFIED ELSEWHERE: ICD-10-CM

## 2025-05-02 DIAGNOSIS — J44.9 CHRONIC OBSTRUCTIVE PULMONARY DISEASE, UNSPECIFIED: ICD-10-CM

## 2025-05-02 DIAGNOSIS — Z79.899 OTHER LONG TERM (CURRENT) DRUG THERAPY: ICD-10-CM

## 2025-05-02 DIAGNOSIS — N32.9 BLADDER DISORDER, UNSPECIFIED: ICD-10-CM

## 2025-05-02 DIAGNOSIS — Z79.84 LONG TERM (CURRENT) USE OF ORAL HYPOGLYCEMIC DRUGS: ICD-10-CM

## 2025-05-02 DIAGNOSIS — R00.1 BRADYCARDIA, UNSPECIFIED: ICD-10-CM

## 2025-05-02 DIAGNOSIS — J18.9 PNEUMONIA, UNSPECIFIED ORGANISM: ICD-10-CM

## 2025-05-02 DIAGNOSIS — E86.0 DEHYDRATION: ICD-10-CM

## 2025-05-02 DIAGNOSIS — R68.0 HYPOTHERMIA, NOT ASSOCIATED WITH LOW ENVIRONMENTAL TEMPERATURE: ICD-10-CM

## 2025-05-02 DIAGNOSIS — N14.11 CONTRAST-INDUCED NEPHROPATHY: ICD-10-CM
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
## 2025-05-02 DIAGNOSIS — T50.8X5S: ICD-10-CM

## 2025-05-02 DIAGNOSIS — R33.8 OTHER RETENTION OF URINE: ICD-10-CM

## 2025-05-02 DIAGNOSIS — T83.511A INFECTION AND INFLAMMATORY REACTION DUE TO INDWELLING URETHRAL CATHETER, INITIAL ENCOUNTER: ICD-10-CM

## 2025-05-02 DIAGNOSIS — N18.30 CHRONIC KIDNEY DISEASE, STAGE 3 UNSPECIFIED: ICD-10-CM

## 2025-05-02 DIAGNOSIS — H10.89 OTHER CONJUNCTIVITIS: ICD-10-CM

## 2025-05-02 DIAGNOSIS — N17.0 ACUTE KIDNEY FAILURE WITH TUBULAR NECROSIS: ICD-10-CM

## 2025-05-02 DIAGNOSIS — I12.9 HYPERTENSIVE CHRONIC KIDNEY DISEASE WITH STAGE 1 THROUGH STAGE 4 CHRONIC KIDNEY DISEASE, OR UNSPECIFIED CHRONIC KIDNEY DISEASE: ICD-10-CM
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
## 2025-05-02 DIAGNOSIS — E78.5 HYPERLIPIDEMIA, UNSPECIFIED: ICD-10-CM

## 2025-05-02 DIAGNOSIS — A41.59 OTHER GRAM-NEGATIVE SEPSIS: ICD-10-CM

## 2025-05-02 DIAGNOSIS — N39.0 URINARY TRACT INFECTION, SITE NOT SPECIFIED: ICD-10-CM

## 2025-05-02 DIAGNOSIS — D49.4 NEOPLASM OF UNSPECIFIED BEHAVIOR OF BLADDER: ICD-10-CM
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
## 2025-05-02 DIAGNOSIS — F20.0 PARANOID SCHIZOPHRENIA: ICD-10-CM

## 2025-05-03 ENCOUNTER — INPATIENT (INPATIENT)
Facility: HOSPITAL | Age: 85
LOS: 10 days | Discharge: NURSING, REHAB OR LONG-TERM CARE | DRG: 690 | End: 2025-05-13
Attending: INTERNAL MEDICINE | Admitting: INTERNAL MEDICINE
Payer: MEDICARE

## 2025-05-03 VITALS
HEART RATE: 105 BPM | DIASTOLIC BLOOD PRESSURE: 90 MMHG | SYSTOLIC BLOOD PRESSURE: 116 MMHG | TEMPERATURE: 100 F | HEIGHT: 66 IN | RESPIRATION RATE: 25 BRPM | OXYGEN SATURATION: 98 %

## 2025-05-03 DIAGNOSIS — N30.00 ACUTE CYSTITIS WITHOUT HEMATURIA: ICD-10-CM

## 2025-05-03 DIAGNOSIS — Z12.11 ENCOUNTER FOR SCREENING FOR MALIGNANT NEOPLASM OF COLON: Chronic | ICD-10-CM

## 2025-05-03 LAB
ALBUMIN SERPL ELPH-MCNC: 3.4 G/DL — LOW (ref 3.5–5.2)
ALP SERPL-CCNC: 88 U/L — SIGNIFICANT CHANGE UP (ref 30–115)
ALT FLD-CCNC: 6 U/L — SIGNIFICANT CHANGE UP (ref 0–41)
ANION GAP SERPL CALC-SCNC: 14 MMOL/L — SIGNIFICANT CHANGE UP (ref 7–14)
APPEARANCE UR: ABNORMAL
APTT BLD: 31.4 SEC — SIGNIFICANT CHANGE UP (ref 27–39.2)
AST SERPL-CCNC: 16 U/L — SIGNIFICANT CHANGE UP (ref 0–41)
BASOPHILS # BLD AUTO: 0.02 K/UL — SIGNIFICANT CHANGE UP (ref 0–0.2)
BASOPHILS NFR BLD AUTO: 0.1 % — SIGNIFICANT CHANGE UP (ref 0–1)
BILIRUB SERPL-MCNC: 0.4 MG/DL — SIGNIFICANT CHANGE UP (ref 0.2–1.2)
BILIRUB UR-MCNC: NEGATIVE — SIGNIFICANT CHANGE UP
BUN SERPL-MCNC: 42 MG/DL — HIGH (ref 10–20)
CALCIUM SERPL-MCNC: 8.6 MG/DL — SIGNIFICANT CHANGE UP (ref 8.4–10.5)
CHLORIDE SERPL-SCNC: 101 MMOL/L — SIGNIFICANT CHANGE UP (ref 98–110)
CO2 SERPL-SCNC: 19 MMOL/L — SIGNIFICANT CHANGE UP (ref 17–32)
COLOR SPEC: YELLOW — SIGNIFICANT CHANGE UP
CREAT SERPL-MCNC: 2.6 MG/DL — HIGH (ref 0.7–1.5)
DIFF PNL FLD: ABNORMAL
EGFR: 24 ML/MIN/1.73M2 — LOW
EGFR: 24 ML/MIN/1.73M2 — LOW
EOSINOPHIL # BLD AUTO: 0 K/UL — SIGNIFICANT CHANGE UP (ref 0–0.7)
EOSINOPHIL NFR BLD AUTO: 0 % — SIGNIFICANT CHANGE UP (ref 0–8)
FLUAV AG NPH QL: SIGNIFICANT CHANGE UP
FLUBV AG NPH QL: SIGNIFICANT CHANGE UP
GAS PNL BLDV: SIGNIFICANT CHANGE UP
GLUCOSE BLDC GLUCOMTR-MCNC: 131 MG/DL — HIGH (ref 70–99)
GLUCOSE BLDC GLUCOMTR-MCNC: 133 MG/DL — HIGH (ref 70–99)
GLUCOSE BLDC GLUCOMTR-MCNC: 154 MG/DL — HIGH (ref 70–99)
GLUCOSE BLDC GLUCOMTR-MCNC: 171 MG/DL — HIGH (ref 70–99)
GLUCOSE SERPL-MCNC: 108 MG/DL — HIGH (ref 70–99)
GLUCOSE UR QL: NEGATIVE MG/DL — SIGNIFICANT CHANGE UP
HCT VFR BLD CALC: 27.1 % — LOW (ref 42–52)
HGB BLD-MCNC: 9 G/DL — LOW (ref 14–18)
IMM GRANULOCYTES NFR BLD AUTO: 1.5 % — HIGH (ref 0.1–0.3)
INR BLD: 1.36 RATIO — HIGH (ref 0.65–1.3)
KETONES UR-MCNC: ABNORMAL MG/DL
LACTATE SERPL-SCNC: 1.9 MMOL/L — SIGNIFICANT CHANGE UP (ref 0.7–2)
LEUKOCYTE ESTERASE UR-ACNC: ABNORMAL
LYMPHOCYTES # BLD AUTO: 1.12 K/UL — LOW (ref 1.2–3.4)
LYMPHOCYTES # BLD AUTO: 5 % — LOW (ref 20.5–51.1)
MCHC RBC-ENTMCNC: 30.3 PG — SIGNIFICANT CHANGE UP (ref 27–31)
MCHC RBC-ENTMCNC: 33.2 G/DL — SIGNIFICANT CHANGE UP (ref 32–37)
MCV RBC AUTO: 91.2 FL — SIGNIFICANT CHANGE UP (ref 80–94)
MONOCYTES # BLD AUTO: 1.33 K/UL — HIGH (ref 0.1–0.6)
MONOCYTES NFR BLD AUTO: 6 % — SIGNIFICANT CHANGE UP (ref 1.7–9.3)
NEUTROPHILS # BLD AUTO: 19.37 K/UL — HIGH (ref 1.4–6.5)
NEUTROPHILS NFR BLD AUTO: 87.4 % — HIGH (ref 42.2–75.2)
NITRITE UR-MCNC: NEGATIVE — SIGNIFICANT CHANGE UP
NRBC BLD AUTO-RTO: 0 /100 WBCS — SIGNIFICANT CHANGE UP (ref 0–0)
PH UR: 6 — SIGNIFICANT CHANGE UP (ref 5–8)
PLATELET # BLD AUTO: 214 K/UL — SIGNIFICANT CHANGE UP (ref 130–400)
PMV BLD: 11.3 FL — HIGH (ref 7.4–10.4)
POTASSIUM SERPL-MCNC: 5.3 MMOL/L — HIGH (ref 3.5–5)
POTASSIUM SERPL-SCNC: 5.3 MMOL/L — HIGH (ref 3.5–5)
PROT SERPL-MCNC: 6.2 G/DL — SIGNIFICANT CHANGE UP (ref 6–8)
PROT UR-MCNC: 100 MG/DL
PROTHROM AB SERPL-ACNC: 16.2 SEC — HIGH (ref 9.95–12.87)
RBC # BLD: 2.97 M/UL — LOW (ref 4.7–6.1)
RBC # FLD: 15.3 % — HIGH (ref 11.5–14.5)
RSV RNA NPH QL NAA+NON-PROBE: SIGNIFICANT CHANGE UP
SARS-COV-2 RNA SPEC QL NAA+PROBE: SIGNIFICANT CHANGE UP
SODIUM SERPL-SCNC: 134 MMOL/L — LOW (ref 135–146)
SOURCE RESPIRATORY: SIGNIFICANT CHANGE UP
SP GR SPEC: 1.01 — SIGNIFICANT CHANGE UP (ref 1–1.03)
TROPONIN T, HIGH SENSITIVITY RESULT: 58 NG/L — CRITICAL HIGH (ref 6–21)
TROPONIN T, HIGH SENSITIVITY RESULT: 68 NG/L — CRITICAL HIGH (ref 6–21)
UROBILINOGEN FLD QL: 0.2 MG/DL — SIGNIFICANT CHANGE UP (ref 0.2–1)
WBC # BLD: 22.18 K/UL — HIGH (ref 4.8–10.8)
WBC # FLD AUTO: 22.18 K/UL — HIGH (ref 4.8–10.8)

## 2025-05-03 PROCEDURE — 80053 COMPREHEN METABOLIC PANEL: CPT

## 2025-05-03 PROCEDURE — 85025 COMPLETE CBC W/AUTO DIFF WBC: CPT

## 2025-05-03 PROCEDURE — 93970 EXTREMITY STUDY: CPT

## 2025-05-03 PROCEDURE — 85027 COMPLETE CBC AUTOMATED: CPT

## 2025-05-03 PROCEDURE — 80061 LIPID PANEL: CPT

## 2025-05-03 PROCEDURE — 99233 SBSQ HOSP IP/OBS HIGH 50: CPT

## 2025-05-03 PROCEDURE — 36415 COLL VENOUS BLD VENIPUNCTURE: CPT

## 2025-05-03 PROCEDURE — 97163 PT EVAL HIGH COMPLEX 45 MIN: CPT | Mod: GP

## 2025-05-03 PROCEDURE — 97110 THERAPEUTIC EXERCISES: CPT | Mod: GP

## 2025-05-03 PROCEDURE — 80048 BASIC METABOLIC PNL TOTAL CA: CPT

## 2025-05-03 PROCEDURE — 70450 CT HEAD/BRAIN W/O DYE: CPT | Mod: 26

## 2025-05-03 PROCEDURE — 71045 X-RAY EXAM CHEST 1 VIEW: CPT | Mod: 26

## 2025-05-03 PROCEDURE — 87186 SC STD MICRODIL/AGAR DIL: CPT

## 2025-05-03 PROCEDURE — 83036 HEMOGLOBIN GLYCOSYLATED A1C: CPT

## 2025-05-03 PROCEDURE — 0225U NFCT DS DNA&RNA 21 SARSCOV2: CPT

## 2025-05-03 PROCEDURE — 87150 DNA/RNA AMPLIFIED PROBE: CPT

## 2025-05-03 PROCEDURE — 93010 ELECTROCARDIOGRAM REPORT: CPT

## 2025-05-03 PROCEDURE — 83735 ASSAY OF MAGNESIUM: CPT

## 2025-05-03 PROCEDURE — 81001 URINALYSIS AUTO W/SCOPE: CPT

## 2025-05-03 PROCEDURE — 74177 CT ABD & PELVIS W/CONTRAST: CPT | Mod: 26

## 2025-05-03 PROCEDURE — 99285 EMERGENCY DEPT VISIT HI MDM: CPT | Mod: GC

## 2025-05-03 PROCEDURE — 87040 BLOOD CULTURE FOR BACTERIA: CPT

## 2025-05-03 PROCEDURE — 85610 PROTHROMBIN TIME: CPT

## 2025-05-03 PROCEDURE — 97530 THERAPEUTIC ACTIVITIES: CPT | Mod: GP

## 2025-05-03 PROCEDURE — 82962 GLUCOSE BLOOD TEST: CPT

## 2025-05-03 PROCEDURE — 83605 ASSAY OF LACTIC ACID: CPT

## 2025-05-03 PROCEDURE — 87077 CULTURE AEROBIC IDENTIFY: CPT

## 2025-05-03 PROCEDURE — 84484 ASSAY OF TROPONIN QUANT: CPT

## 2025-05-03 PROCEDURE — 87086 URINE CULTURE/COLONY COUNT: CPT

## 2025-05-03 PROCEDURE — 71045 X-RAY EXAM CHEST 1 VIEW: CPT

## 2025-05-03 RX ORDER — DEXTROSE 50 % IN WATER 50 %
25 SYRINGE (ML) INTRAVENOUS ONCE
Refills: 0 | Status: DISCONTINUED | OUTPATIENT
Start: 2025-05-03 | End: 2025-05-13

## 2025-05-03 RX ORDER — ACETAMINOPHEN 500 MG/5ML
650 LIQUID (ML) ORAL EVERY 6 HOURS
Refills: 0 | Status: DISCONTINUED | OUTPATIENT
Start: 2025-05-03 | End: 2025-05-13

## 2025-05-03 RX ORDER — FINASTERIDE 1 MG/1
5 TABLET, FILM COATED ORAL DAILY
Refills: 0 | Status: DISCONTINUED | OUTPATIENT
Start: 2025-05-03 | End: 2025-05-13

## 2025-05-03 RX ORDER — DEXTROSE 50 % IN WATER 50 %
15 SYRINGE (ML) INTRAVENOUS ONCE
Refills: 0 | Status: DISCONTINUED | OUTPATIENT
Start: 2025-05-03 | End: 2025-05-13

## 2025-05-03 RX ORDER — DEXTROSE 50 % IN WATER 50 %
12.5 SYRINGE (ML) INTRAVENOUS ONCE
Refills: 0 | Status: DISCONTINUED | OUTPATIENT
Start: 2025-05-03 | End: 2025-05-13

## 2025-05-03 RX ORDER — SODIUM CHLORIDE 9 G/1000ML
500 INJECTION, SOLUTION INTRAVENOUS
Refills: 0 | Status: DISCONTINUED | OUTPATIENT
Start: 2025-05-03 | End: 2025-05-09

## 2025-05-03 RX ORDER — ACETAMINOPHEN 500 MG/5ML
650 LIQUID (ML) ORAL EVERY 6 HOURS
Refills: 0 | Status: DISCONTINUED | OUTPATIENT
Start: 2025-05-03 | End: 2025-05-03

## 2025-05-03 RX ORDER — ONDANSETRON HCL/PF 4 MG/2 ML
4 VIAL (ML) INJECTION EVERY 8 HOURS
Refills: 0 | Status: DISCONTINUED | OUTPATIENT
Start: 2025-05-03 | End: 2025-05-13

## 2025-05-03 RX ORDER — VANCOMYCIN HCL IN 5 % DEXTROSE 1.5G/250ML
1000 PLASTIC BAG, INJECTION (ML) INTRAVENOUS ONCE
Refills: 0 | Status: COMPLETED | OUTPATIENT
Start: 2025-05-03 | End: 2025-05-03

## 2025-05-03 RX ORDER — SODIUM CHLORIDE 9 G/1000ML
500 INJECTION, SOLUTION INTRAVENOUS ONCE
Refills: 0 | Status: COMPLETED | OUTPATIENT
Start: 2025-05-03 | End: 2025-05-03

## 2025-05-03 RX ORDER — SODIUM CHLORIDE 9 G/1000ML
500 INJECTION, SOLUTION INTRAVENOUS ONCE
Refills: 0 | Status: DISCONTINUED | OUTPATIENT
Start: 2025-05-03 | End: 2025-05-03

## 2025-05-03 RX ORDER — CEFEPIME 2 G/20ML
2000 INJECTION, POWDER, FOR SOLUTION INTRAVENOUS ONCE
Refills: 0 | Status: COMPLETED | OUTPATIENT
Start: 2025-05-03 | End: 2025-05-03

## 2025-05-03 RX ORDER — ACETAMINOPHEN 500 MG/5ML
1000 LIQUID (ML) ORAL ONCE
Refills: 0 | Status: COMPLETED | OUTPATIENT
Start: 2025-05-03 | End: 2025-05-03

## 2025-05-03 RX ORDER — MAGNESIUM, ALUMINUM HYDROXIDE 200-200 MG
30 TABLET,CHEWABLE ORAL EVERY 4 HOURS
Refills: 0 | Status: DISCONTINUED | OUTPATIENT
Start: 2025-05-03 | End: 2025-05-13

## 2025-05-03 RX ORDER — GLUCAGON 3 MG/1
1 POWDER NASAL ONCE
Refills: 0 | Status: DISCONTINUED | OUTPATIENT
Start: 2025-05-03 | End: 2025-05-13

## 2025-05-03 RX ORDER — MELATONIN 5 MG
3 TABLET ORAL AT BEDTIME
Refills: 0 | Status: DISCONTINUED | OUTPATIENT
Start: 2025-05-03 | End: 2025-05-13

## 2025-05-03 RX ORDER — AMIKACIN SULFATE 250 MG/ML
750 VIAL (ML) INJECTION ONCE
Refills: 0 | Status: COMPLETED | OUTPATIENT
Start: 2025-05-03 | End: 2025-05-03

## 2025-05-03 RX ORDER — SODIUM CHLORIDE 9 G/1000ML
1000 INJECTION, SOLUTION INTRAVENOUS ONCE
Refills: 0 | Status: COMPLETED | OUTPATIENT
Start: 2025-05-03 | End: 2025-05-03

## 2025-05-03 RX ORDER — INSULIN LISPRO 100 U/ML
INJECTION, SOLUTION INTRAVENOUS; SUBCUTANEOUS
Refills: 0 | Status: DISCONTINUED | OUTPATIENT
Start: 2025-05-03 | End: 2025-05-13

## 2025-05-03 RX ORDER — SODIUM CHLORIDE 9 G/1000ML
1000 INJECTION, SOLUTION INTRAVENOUS
Refills: 0 | Status: DISCONTINUED | OUTPATIENT
Start: 2025-05-03 | End: 2025-05-13

## 2025-05-03 RX ORDER — ALBUTEROL SULFATE 2.5 MG/3ML
1 VIAL, NEBULIZER (ML) INHALATION
Refills: 0 | Status: DISCONTINUED | OUTPATIENT
Start: 2025-05-03 | End: 2025-05-13

## 2025-05-03 RX ORDER — SODIUM ZIRCONIUM CYCLOSILICATE 5 G/5G
10 POWDER, FOR SUSPENSION ORAL ONCE
Refills: 0 | Status: COMPLETED | OUTPATIENT
Start: 2025-05-03 | End: 2025-05-03

## 2025-05-03 RX ADMIN — SODIUM CHLORIDE 500 MILLILITER(S): 9 INJECTION, SOLUTION INTRAVENOUS at 02:39

## 2025-05-03 RX ADMIN — SODIUM ZIRCONIUM CYCLOSILICATE 10 GRAM(S): 5 POWDER, FOR SUSPENSION ORAL at 04:28

## 2025-05-03 RX ADMIN — Medication 400 MILLIGRAM(S): at 11:28

## 2025-05-03 RX ADMIN — CEFEPIME 100 MILLIGRAM(S): 2 INJECTION, POWDER, FOR SOLUTION INTRAVENOUS at 04:00

## 2025-05-03 RX ADMIN — FINASTERIDE 5 MILLIGRAM(S): 1 TABLET, FILM COATED ORAL at 12:59

## 2025-05-03 RX ADMIN — SODIUM CHLORIDE 1000 MILLILITER(S): 9 INJECTION, SOLUTION INTRAVENOUS at 12:10

## 2025-05-03 RX ADMIN — Medication 250 MILLIGRAM(S): at 11:36

## 2025-05-03 RX ADMIN — SODIUM CHLORIDE 100 MILLILITER(S): 9 INJECTION, SOLUTION INTRAVENOUS at 13:03

## 2025-05-03 RX ADMIN — Medication 103 MILLIGRAM(S): at 15:49

## 2025-05-03 RX ADMIN — SODIUM CHLORIDE 500 MILLILITER(S): 9 INJECTION, SOLUTION INTRAVENOUS at 03:48

## 2025-05-03 NOTE — H&P ADULT - HISTORY OF PRESENT ILLNESS
The patient is an 85 y/o male with Pmhx of Copd, Htn, ckd stage 2, Dm, schizophrenia, Hld, non-hodgkins lymphoma, urinary retention with chronic Bains (changed in ED on 5/3/25), Hx of MDR UTIs presents to the ED from [facility] for evaluation of AMS. Patient currently has no complaints at bedside, unable to speak/verbalize at this time, baseline aaox1, currently confused, no acute apparent distress.  According to ED documentation patient was unresponsive at NH, no response from [facility] at this time, not response from emergency contacts at this time either. Patient is being admitted for sepsis 2/2 uti in setting of chronic foely w/MDR organisms.     ED course:   Vitals:   bp 116/90  hr 105  rr 25  temp 100.2/37.9     imaging:   CTH no acute pathology     CTAP w/IV cont:     IMPRESSION:    Urinary bladder distended with Bains catheter balloon inflated within   prostatic urethra; repositioning recommended.  Urinary bladder mild pericystic stranding, could represent bladder   infection in the appropriate clinical setting.  Mild bilateral hydroureteronephrosis, could be attributed to bladder   outlet obstruction.  Urinary bladder right posterolateral subcentimeter nodular density, which   can be further evaluated as outpatient.  BPH.    bains repositioned in ED      Labs:  wbc 22k  hgb 9.0 (baseline around 10.0)   trop 68-->58  k+ 5.3  cr 2.6 (baseline 1.4)   lactate 1.9     s/p   500cc LR bolus   s/p cefepime and vanc  ucx bcx collected

## 2025-05-03 NOTE — ED CLERICAL - NS ED CLERK NOTE PRE-ARRIVAL INFORMATION; ADDITIONAL PRE-ARRIVAL INFORMATION
This patient is enrolled in the STAR readmission reduction initiative and has active care navigation. This patient can be followed up by the care navigation team within 24 hours.  To arrange close follow-up or to obtain additional clinical information, please call the contact number above. The on-call [facility] Hospitalist has been notified and will coordinate care in concert with the ED Physician including consults as necessary. Please reach out to the Hospitalist on-call directly (schedule on AMiON posted on the intranet). You may also call the Hospitalist Division at [phone] at either site. Consider CDU for management per guideline

## 2025-05-03 NOTE — ED PROVIDER NOTE - CLINICAL SUMMARY MEDICAL DECISION MAKING FREE TEXT BOX
84 yr old m that presents with change in mental status. on imaging, concern for cystitis and hydronephrosis. pt given ivf, iv antibiotics. (of note in the field pt had a liter of NS). pt also noted to have melissa. spoke to hospitalist whom agrees with admission. Labs were ordered and reviewed.  Imaging was ordered and reviewed by me.  Appropriate medications for patient's presenting complaints were ordered and effects were reassessed.  Patient's records (prior hospital, ED visit, and/or nursing home notes if available) were reviewed.  Additional history was obtained from EMS, family, and/or PCP (where available).  Escalation to admission/observation was considered.    Patient requires inpatient hospitalization - telemetry

## 2025-05-03 NOTE — ED ADULT TRIAGE NOTE - CHIEF COMPLAINT QUOTE
ASHISH from [facility]. found unconcious on his wheelchair by staff. BP enroute 70/40, fluids given en route. ASHISH from [facility]. found unconcious on his wheelchair by staff.

## 2025-05-03 NOTE — H&P ADULT - ASSESSMENT
The patient is an 83 y/o male with Pmhx of Copd, Htn, ckd stage 2, Dm, schizophrenia, Hld, non-hodgkins lymphoma, urinary retention with chronic Bains (changed in ED on 5/3/25), Hx of MDR UTIs presents to the ED from [facility] for evaluation of AMS. Patient currently has no complaints at bedside, unable to speak/verbalize at this time, baseline aaox1, currently confused, no acute apparent distress.  According to ED documentation patient was unresponsive at NH, no response from [facility] at this time, not response from emergency contacts at this time either. Patient is being admitted for sepsis 2/2 uti in setting of chronic foely w/MDR organisms.     ED course:   Vitals:   bp 116/90  hr 105  rr 25  temp 100.2/37.9     imaging:   CTH no acute pathology     CTAP w/IV cont:     IMPRESSION:    Urinary bladder distended with Bains catheter balloon inflated within   prostatic urethra; repositioning recommended.  Urinary bladder mild pericystic stranding, could represent bladder   infection in the appropriate clinical setting.  Mild bilateral hydroureteronephrosis, could be attributed to bladder   outlet obstruction.  Urinary bladder right posterolateral subcentimeter nodular density, which   can be further evaluated as outpatient.  BPH.    bains repositioned in ED      Labs:  wbc 22k  hgb 9.0 (baseline around 10.0)   trop 68-->58  k+ 5.3  cr 2.6 (baseline 1.4)   lactate 1.9     s/p   500cc LR bolus   s/p cefepime and vanc  ucx bcx collected    a/p     83 y/o male with Pmhx of Copd, Htn, ckd stage 2, Dm, schizophrenia, Hld, non-hodgkins lymphoma, urinary retention with chronic Bains (changed in ED on 5/3/25), Hx of MDR UTIs presents to the ED from [facility] for evaluation of AMS, being admitted for ams 2/2 sepsis from suspected UTI in setting of chronic bains and multiple UTIs w/MDR organisms.    #AMS 2/2 sepsis from UTI  #Chronic baisn for urinary retention  - baseline per previous documentation oriented x1  - currently confused, does not follow commands   - f/u ucx, bcx   - ID f/u   - s/p vancomycin and cefepime in ED  - 1x dose amikacin until ID f/u   - last culture on 4/25/25 admission showing E faecalis sensitive to vancomycin and carbapenem resistant kleb pneumo (likely colonization)   - additional 1L LR bolus followed by 100cc LR   - bains in placed draining well, monitor output     #DAVID on CKD2  - yarely pre-renal   - f/u repeat labs   - cw IVF     #Hyperkalemia  - f/u repeat labs     #troponin elevation   - demand ischemia in setting of sepsis     #COPD  #HTN  #Non Hodgkins lymphoma  - cw home inhaler prn   - hold bp medications, can re-introduce once bp stable and sepsis improves       #Misc  - diet dash   - activity as tolerated  - heparin sub-q  - gi ppx not indicated  - admit medicine    The patient is an 85 y/o male with Pmhx of Copd, Htn, ckd stage 2, Dm, schizophrenia, Hld, non-hodgkins lymphoma, urinary retention with chronic Bains (changed in ED on 5/3/25), Hx of MDR UTIs presents to the ED from [facility] for evaluation of AMS. Patient currently has no complaints at bedside, unable to speak/verbalize at this time, baseline aaox1, currently confused, no acute apparent distress.  According to ED documentation patient was unresponsive at NH, no response from [facility] at this time, not response from emergency contacts at this time either. Patient is being admitted for sepsis 2/2 uti in setting of chronic foely w/MDR organisms.     ED course:   Vitals:   bp 116/90  hr 105  rr 25  temp 100.2/37.9     imaging:   CTH no acute pathology     CTAP w/IV cont:     IMPRESSION:    Urinary bladder distended with Bains catheter balloon inflated within   prostatic urethra; repositioning recommended.  Urinary bladder mild pericystic stranding, could represent bladder   infection in the appropriate clinical setting.  Mild bilateral hydroureteronephrosis, could be attributed to bladder   outlet obstruction.  Urinary bladder right posterolateral subcentimeter nodular density, which   can be further evaluated as outpatient.  BPH.    bains repositioned in ED      Labs:  wbc 22k  hgb 9.0 (baseline around 10.0)   trop 68-->58  k+ 5.3  cr 2.6 (baseline 1.4)   lactate 1.9     s/p   500cc LR bolus   s/p cefepime and vanc  ucx bcx collected    a/p     85 y/o male with Pmhx of Copd, Htn, ckd stage 2, Dm, schizophrenia, Hld, non-hodgkins lymphoma, urinary retention with chronic Bains (changed in ED on 5/3/25), Hx of MDR UTIs presents to the ED from [facility] for evaluation of AMS, being admitted for ams 2/2 sepsis from suspected UTI in setting of chronic bains and multiple UTIs w/MDR organisms.    #AMS 2/2 sepsis from UTI  #Chronic bains for urinary retention  - baseline per previous documentation oriented x1  - currently confused, does not follow commands   - f/u ucx, bcx   - ID f/u   - s/p vancomycin and cefepime in ED  - 1x dose amikacin until ID f/u   - last culture on 4/25/25 admission showing E faecalis sensitive to vancomycin and carbapenem resistant kleb pneumo (likely colonization)   - additional 1L LR bolus followed by 100cc LR   - bains in placed draining well, monitor output   - patient on gabapentin, trazodone, olanzipine, and tamsulosin at home   - hold gabapentin and psych meds in setting of alteration from baseline, can resume tamsulosin once bp improves  - most recent bp 99/56 hence additional bolus     #DAVID on CKD2  - yarely pre-renal   - f/u repeat labs   - cw IVF     #Hyperkalemia  - f/u repeat labs     #troponin elevation   - demand ischemia in setting of sepsis     #COPD  #HTN  #Non Hodgkins lymphoma  - cw home inhaler prn   - hold bp medications, can re-introduce once bp stable and sepsis improves       #Misc  - diet dash   - activity as tolerated  - heparin sub-q  - gi ppx not indicated  - admit medicine

## 2025-05-03 NOTE — H&P ADULT - ATTENDING COMMENTS
The patient is seen and examined the history labs and imaging are reviewed. I agree with the resident note unless otherwise noted.    R/O Bladder neoplasm based on previous CT, urology eval   UTI in setting of chronic bains and multiple UTIs w/MDR organisms.  Metabolic encephalopathy  2/2 sepsis from UTI  Sepsis on admission   Chronic anemia   Chronic Bains  H/O Mod-severe AS   DAVID on CKD 2  Obstructing Bains, repositioned in ED   Elevated troponin   Hyperkalemia  COPD  H/O HTN  H/O Non Hodgkins lymphoma  H/O HLD   H/O DM

## 2025-05-03 NOTE — H&P ADULT - NSHPPHYSICALEXAM_GEN_ALL_CORE
GENERAL: NAD  HEAD:  Atraumatic, Normocephalic  EYES: conjunctiva and sclera clear  ENT: Moist mucous membranes  NECK: Supple, No JVD  CHEST/LUNG: Clear to auscultation bilaterally; No rales, rhonchi, wheezing, or rubs. Unlabored respirations  HEART: Regular rate and rhythm; No murmurs, rubs, or gallops  ABDOMEN: Soft, nontender, nondistended  : bains present, placed in ED, has chronic bains that was replaced   EXTREMITIES:  No clubbing, cyanosis, or edema  NERVOUS SYSTEM:  A&Ox0, confused, does not follow commands at this time

## 2025-05-03 NOTE — ED PROVIDER NOTE - ATTENDING CONTRIBUTION TO CARE
84 yr old m w/ a pmh significant for  COPD, HTN, DM, schizophrenia, hld, non hodkins lymphoma, urinary retention w/ a chronic bains who presents with change in mental status. As per [place], pt was found "unresponsive" in his wheelchair. EMS was called and when EMS arrived pt was back to baseline. NH denies any recent falls. In the ED pt noted to be febrile.     VITAL SIGNS: I have reviewed nursing notes and confirm.  CONSTITUTIONAL: non-toxic, well appearing  SKIN: no rash, no petechiae.  EYES: EOMI, pink conjunctiva, anicteric  ENT: tongue midline, no exudates, MMM  NECK: Supple; no meningismus, no JVD  CARD: RRR, no murmurs, equal radial pulses bilaterally 2+  RESP: CTAB, no respiratory distress  ABD: Soft, non-tender, non-distended, no peritoneal signs, no HSM, no CVA tenderness  NEURO: AAOX1, (person which is pts baseline). moving all four extremities.       84 yr old m that presents with change in mental status. on imaging, concern for cystitis and hydronephrosis. pt given ivf, iv antibiotics. (of note in the field pt had a liter of NS). pt also noted to have melissa. spoke to hospitalist whom agrees with admission.

## 2025-05-03 NOTE — H&P ADULT - NSHPLABSRESULTS_GEN_ALL_CORE
9.0    22.18 )-----------( 214      ( 03 May 2025 02:36 )             27.1       05-03    134[L]  |  101  |  42[H]  ----------------------------<  108[H]  5.3[H]   |  19  |  2.6[H]    Ca    8.6      03 May 2025 02:36    TPro  6.2  /  Alb  3.4[L]  /  TBili  0.4  /  DBili  x   /  AST  16  /  ALT  6   /  AlkPhos  88  05-03              PT/INR - ( 03 May 2025 02:36 )   PT: 16.20 sec;   INR: 1.36 ratio         PTT - ( 03 May 2025 02:36 )  PTT:31.4 sec

## 2025-05-03 NOTE — ED PROVIDER NOTE - OBJECTIVE STATEMENT
84-year-old male with past medical history of COPD, hypertension, diabetes, schizophrenia, hyperlipidemia, non-Hodgkin's lymphoma, urinary retention with chronic Cobos presenting to the ED for AMS.  Patient currently has no complaints at bedside.  Upon speaking with new [facility], patient was found to be "unresponsive" in his wheelchair.  EMS was called and upon EMS arrival patient "came to".  No falls or trauma..  [facility] is unable to provide additional information since nursing staff has left.

## 2025-05-03 NOTE — PATIENT PROFILE ADULT - FALL HARM RISK - HARM RISK INTERVENTIONS

## 2025-05-03 NOTE — ED PROVIDER NOTE - PHYSICAL EXAMINATION
CONSTITUTIONAL: well-appearing, in NAD  SKIN: Warm dry, normal skin turgor  HEAD: NCAT  EYES: EOMI, PERRLA, no scleral icterus, conjunctiva pink  ENT: normal pharynx with no erythema or exudates  NECK: Supple; non tender. Full ROM.  CARD: RRR  RESP: clear to ausculation b/l. No crackles or wheezing.  ABD: soft, Suprapubic and RLQ TTP   EXT: Full ROM,   NEURO: sensory and motor at baseline.   PSYCH: Cooperative, appropriate.

## 2025-05-04 LAB
ALBUMIN SERPL ELPH-MCNC: 2.9 G/DL — LOW (ref 3.5–5.2)
ALP SERPL-CCNC: 98 U/L — SIGNIFICANT CHANGE UP (ref 30–115)
ALT FLD-CCNC: 11 U/L — SIGNIFICANT CHANGE UP (ref 0–41)
ANION GAP SERPL CALC-SCNC: 14 MMOL/L — SIGNIFICANT CHANGE UP (ref 7–14)
AST SERPL-CCNC: 62 U/L — HIGH (ref 0–41)
BASOPHILS # BLD AUTO: 0.02 K/UL — SIGNIFICANT CHANGE UP (ref 0–0.2)
BASOPHILS NFR BLD AUTO: 0.1 % — SIGNIFICANT CHANGE UP (ref 0–1)
BILIRUB SERPL-MCNC: 0.3 MG/DL — SIGNIFICANT CHANGE UP (ref 0.2–1.2)
BUN SERPL-MCNC: 37 MG/DL — HIGH (ref 10–20)
CALCIUM SERPL-MCNC: 8.6 MG/DL — SIGNIFICANT CHANGE UP (ref 8.4–10.5)
CHLORIDE SERPL-SCNC: 104 MMOL/L — SIGNIFICANT CHANGE UP (ref 98–110)
CHOLEST SERPL-MCNC: 102 MG/DL — SIGNIFICANT CHANGE UP
CO2 SERPL-SCNC: 22 MMOL/L — SIGNIFICANT CHANGE UP (ref 17–32)
CREAT SERPL-MCNC: 2.3 MG/DL — HIGH (ref 0.7–1.5)
EGFR: 28 ML/MIN/1.73M2 — LOW
EGFR: 28 ML/MIN/1.73M2 — LOW
EOSINOPHIL # BLD AUTO: 0 K/UL — SIGNIFICANT CHANGE UP (ref 0–0.7)
EOSINOPHIL NFR BLD AUTO: 0 % — SIGNIFICANT CHANGE UP (ref 0–8)
GLUCOSE BLDC GLUCOMTR-MCNC: 109 MG/DL — HIGH (ref 70–99)
GLUCOSE BLDC GLUCOMTR-MCNC: 132 MG/DL — HIGH (ref 70–99)
GLUCOSE BLDC GLUCOMTR-MCNC: 135 MG/DL — HIGH (ref 70–99)
GLUCOSE BLDC GLUCOMTR-MCNC: 179 MG/DL — HIGH (ref 70–99)
GLUCOSE SERPL-MCNC: 105 MG/DL — HIGH (ref 70–99)
HCT VFR BLD CALC: 27.9 % — LOW (ref 42–52)
HDLC SERPL-MCNC: 33 MG/DL — LOW
HGB BLD-MCNC: 9.4 G/DL — LOW (ref 14–18)
IMM GRANULOCYTES NFR BLD AUTO: 1.8 % — HIGH (ref 0.1–0.3)
INR BLD: 1.39 RATIO — HIGH (ref 0.65–1.3)
LACTATE SERPL-SCNC: 0.7 MMOL/L — SIGNIFICANT CHANGE UP (ref 0.7–2)
LDLC SERPL-MCNC: 49 MG/DL — SIGNIFICANT CHANGE UP
LIPID PNL WITH DIRECT LDL SERPL: 49 MG/DL — SIGNIFICANT CHANGE UP
LYMPHOCYTES # BLD AUTO: 0.88 K/UL — LOW (ref 1.2–3.4)
LYMPHOCYTES # BLD AUTO: 4.7 % — LOW (ref 20.5–51.1)
MCHC RBC-ENTMCNC: 30.6 PG — SIGNIFICANT CHANGE UP (ref 27–31)
MCHC RBC-ENTMCNC: 33.7 G/DL — SIGNIFICANT CHANGE UP (ref 32–37)
MCV RBC AUTO: 90.9 FL — SIGNIFICANT CHANGE UP (ref 80–94)
MONOCYTES # BLD AUTO: 1.11 K/UL — HIGH (ref 0.1–0.6)
MONOCYTES NFR BLD AUTO: 5.9 % — SIGNIFICANT CHANGE UP (ref 1.7–9.3)
NEUTROPHILS # BLD AUTO: 16.38 K/UL — HIGH (ref 1.4–6.5)
NEUTROPHILS NFR BLD AUTO: 87.5 % — HIGH (ref 42.2–75.2)
NONHDLC SERPL-MCNC: 69 MG/DL — SIGNIFICANT CHANGE UP
NRBC BLD AUTO-RTO: 0 /100 WBCS — SIGNIFICANT CHANGE UP (ref 0–0)
PLATELET # BLD AUTO: 162 K/UL — SIGNIFICANT CHANGE UP (ref 130–400)
PMV BLD: 11.3 FL — HIGH (ref 7.4–10.4)
POTASSIUM SERPL-MCNC: 4.3 MMOL/L — SIGNIFICANT CHANGE UP (ref 3.5–5)
POTASSIUM SERPL-SCNC: 4.3 MMOL/L — SIGNIFICANT CHANGE UP (ref 3.5–5)
PROT SERPL-MCNC: 5.9 G/DL — LOW (ref 6–8)
PROTHROM AB SERPL-ACNC: 16.5 SEC — HIGH (ref 9.95–12.87)
RBC # BLD: 3.07 M/UL — LOW (ref 4.7–6.1)
RBC # FLD: 15.3 % — HIGH (ref 11.5–14.5)
SODIUM SERPL-SCNC: 140 MMOL/L — SIGNIFICANT CHANGE UP (ref 135–146)
TRIGL SERPL-MCNC: 106 MG/DL — SIGNIFICANT CHANGE UP
WBC # BLD: 18.73 K/UL — HIGH (ref 4.8–10.8)
WBC # FLD AUTO: 18.73 K/UL — HIGH (ref 4.8–10.8)

## 2025-05-04 PROCEDURE — 99233 SBSQ HOSP IP/OBS HIGH 50: CPT

## 2025-05-04 RX ORDER — HEPARIN SODIUM 1000 [USP'U]/ML
5000 INJECTION INTRAVENOUS; SUBCUTANEOUS EVERY 8 HOURS
Refills: 0 | Status: DISCONTINUED | OUTPATIENT
Start: 2025-05-04 | End: 2025-05-13

## 2025-05-04 RX ADMIN — HEPARIN SODIUM 5000 UNIT(S): 1000 INJECTION INTRAVENOUS; SUBCUTANEOUS at 22:04

## 2025-05-04 RX ADMIN — Medication 650 MILLIGRAM(S): at 17:49

## 2025-05-04 RX ADMIN — INSULIN LISPRO 2: 100 INJECTION, SOLUTION INTRAVENOUS; SUBCUTANEOUS at 12:01

## 2025-05-04 RX ADMIN — Medication 650 MILLIGRAM(S): at 19:08

## 2025-05-04 RX ADMIN — FINASTERIDE 5 MILLIGRAM(S): 1 TABLET, FILM COATED ORAL at 12:05

## 2025-05-04 NOTE — CONSULT NOTE ADULT - ASSESSMENT
83 y/o male with Pmhx of Copd, Htn, ckd stage 2, Dm, schizophrenia, Hld, non-hodgkins lymphoma, urinary retention with chronic Cobos (changed in ED on 5/3/25), Hx of MDR UTIs presents to the ED from [facility] for evaluation of AMS. Patient currently has no complaints at bedside, unable to speak/verbalize at this time, baseline aaox1, currently confused, no acute apparent distress.  According to ED documentation patient was unresponsive at NH, no response from [facility] at this time, not response from emergency contacts at this time either. Patient is being admitted for sepsis 2/2 uti in setting of chronic foely w/MDR organisms.       IMPRESSION  #Sepsis on admission due to complicated acute cystitis  Tm 103.6 P>90 Admission WBC 22    UA pyuria > 900    4/21 UCX   >100,000 CFU/ml Klebsiella pneumoniae  Chronic Cobos-  last exchanged 5/3    4/19 UCX   >100,000 CFU/ml Enterococcus faecalis S amp Nitrofurantoin: S <=32    4/14 UCX   >100,000 CFU/ml Proteus mirabilis Nitrofurantoin: R >64     4/13 UCX   >100,000 CFU/ml Proteus mirabilis  < from: CT Abdomen and Pelvis w/ IV Cont (05.03.25 @ 03:26) >  Urinary bladder distended with Cobos catheter balloon inflated within   prostatic urethra; repositioning recommended.  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 Patient currently has no complaints at bedside, unable to speak/verbalize at this time, baseline aaox1, currently confused, no acute apparent distress.  According to ED documentation patient was unresponsive at NH, no response from [facility] at this time, not response from emergency contacts at this time either. Patient is being admitted for sepsis 2/2 uti in setting of chronic foely w/MDR organisms.       IMPRESSION  #Sepsis on admission due to complicated acute cystitis  Tm 103.6 P>90 Admission WBC 22    UA pyuria > 900    4/21 UCX   >100,000 CFU/ml Klebsiella pneumoniae  Chronic Cobos-  last exchanged 5/3    4/19 UCX   >100,000 CFU/ml Enterococcus faecalis S amp Nitrofurantoin: S <=32    4/14 UCX   >100,000 CFU/ml Proteus mirabilis Nitrofurantoin: R >64     4/13 UCX   >100,000 CFU/ml Proteus mirabilis  < from: CT Abdomen and Pelvis w/ IV Cont (05.03.25 @ 03:26) >  Urinary bladder distended with Cobos catheter balloon inflated within   prostatic urethra; repositioning recommended.  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

## 2025-05-04 NOTE — CONSULT NOTE ADULT - NS ATTEST RISK PROBLEM GEN_ALL_CORE FT
- Patient has an illness which poses a threat to life or bodily function without treatment  - I reviewed prior external records (NH, outside hospital)  - I recommend ordering the above unique tests  - I reviewed the completed testing ordered by the primary team  - My assessment required an independent historian (NH records)  - I independently interpreted the most recent microbiological data; I analyzed the culture report & interpreted the MICs; I used my expertise in Infectious Diseases to tailor the antimicrobials    CXR- no PNA  - I discussed my recommendations with the primary team martita / Quoc

## 2025-05-04 NOTE — PROGRESS NOTE ADULT - SUBJECTIVE AND OBJECTIVE BOX
SUBJECTIVE/OVERNIGHT EVENTS  Today is hospital day 1d. This morning the patient was seen and examined at bedside, resting comfortably in bed. No acute or major events overnight.    MEDICATIONS  STANDING MEDICATIONS  dextrose 5%. 1000 milliLiter(s) IV Continuous <Continuous>  dextrose 5%. 1000 milliLiter(s) IV Continuous <Continuous>  dextrose 50% Injectable 25 Gram(s) IV Push once  dextrose 50% Injectable 12.5 Gram(s) IV Push once  dextrose 50% Injectable 25 Gram(s) IV Push once  finasteride 5 milliGRAM(s) Oral daily  glucagon  Injectable 1 milliGRAM(s) IntraMuscular once  insulin lispro (ADMELOG) corrective regimen sliding scale   SubCutaneous three times a day before meals  lactated ringers. 500 milliLiter(s) IV Continuous <Continuous>    PRN MEDICATIONS  acetaminophen  Suppository .. 650 milliGRAM(s) Rectal every 6 hours PRN  albuterol    90 MICROgram(s) HFA Inhaler 1 Puff(s) Inhalation four times a day PRN  aluminum hydroxide/magnesium hydroxide/simethicone Suspension 30 milliLiter(s) Oral every 4 hours PRN  dextrose Oral Gel 15 Gram(s) Oral once PRN  melatonin 3 milliGRAM(s) Oral at bedtime PRN  ondansetron Injectable 4 milliGRAM(s) IV Push every 8 hours PRN    VITALS  T(F): 97.5 (05-04-25 @ 08:13), Max: 99.1 (05-03-25 @ 15:32)  HR: 113 (05-04-25 @ 08:13) (102 - 136)  BP: 134/62 (05-04-25 @ 08:13) (125/63 - 156/66)  RR: 18 (05-04-25 @ 08:13) (18 - 18)  SpO2: 97% (05-04-25 @ 08:13) (94% - 99%)  POCT Blood Glucose.: 179 mg/dL (05-04-25 @ 11:45)  POCT Blood Glucose.: 109 mg/dL (05-04-25 @ 08:09)  POCT Blood Glucose.: 131 mg/dL (05-03-25 @ 22:17)  POCT Blood Glucose.: 171 mg/dL (05-03-25 @ 16:41)  POCT Blood Glucose.: 133 mg/dL (05-03-25 @ 14:45)    PHYSICAL EXAM  GENERAL  NAD, lying in bed comfortably      HEAD  Atraumatic without hematoma  or  laceration. PERRL. EOMI.    NECK  Supple without  neck stiffness  or nuchal rigidity  no JVD.     HEART  Regular rate and rhthym, no murmurs rubs or gallops    LUNGS  Clear to auscultation bilaterally, no wheezing rales or rhonci    ABDOMEN  soft, nontender, nondistended, +BS    EXTREMITIES  no edema    NERVOUS SYSTEM  A&Ox3     CN II-XII:     ( X ) Intact     (  ) focal deficits  (Specify:     )     SKIN  No rashes or lesions      LABS             9.4    18.73 )-----------( 162      ( 05-04-25 @ 07:29 )             27.9     140  |  104  |  37  -------------------------<  105   05-04-25 @ 07:29  4.3  |  22  |  2.3    Ca      8.6     05-04-25 @ 07:29    TPro  5.9  /  Alb  2.9  /  TBili  0.3  /  DBili  x   /  AST  62  /  ALT  11  /  AlkPhos  98  /  GGT  x     05-04-25 @ 07:29    PT/INR - ( 05-04-25 @ 07:29 )   PT: 16.50 sec[H];   INR: 1.39 ratio[H]  PTT - ( 05-03-25 @ 02:36 )  PTT:31.4 sec    Troponin T, High Sensitivity Result: 58 ng/L (05-03-25 @ 04:50)  Troponin T, High Sensitivity Result: 68 ng/L (05-03-25 @ 02:36)    Urinalysis Basic - ( 04 May 2025 07:29 )    Color: x / Appearance: x / SG: x / pH: x  Gluc: 105 mg/dL / Ketone: x  / Bili: x / Urobili: x   Blood: x / Protein: x / Nitrite: x   Leuk Esterase: x / RBC: x / WBC x   Sq Epi: x / Non Sq Epi: x / Bacteria: x          Urinalysis with Rflx Culture (collected 03 May 2025 04:50)    Culture - Blood (collected 03 May 2025 02:36)  Source: Blood Blood-Peripheral  Preliminary Report (04 May 2025 10:01):    No growth at 24 hours    Culture - Blood (collected 03 May 2025 02:36)  Source: Blood Blood-Peripheral  Preliminary Report (04 May 2025 10:01):    No growth at 24 hours      IMAGING

## 2025-05-04 NOTE — PROGRESS NOTE ADULT - ASSESSMENT
85 y/o male with Pmhx of Copd, Htn, ckd stage 2, Dm, schizophrenia, Hld, non-hodgkins lymphoma, urinary retention with chronic Bains (changed in ED on 5/3/25), Hx of MDR UTIs presents to the ED from [facility] for evaluation of AMS, being admitted for ams 2/2 sepsis from suspected UTI in setting of chronic bains and multiple UTIs w/MDR organisms.    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)   - additional 1L LR bolus followed by 100cc LR   - bains in placed draining well, monitor output   - patient on gabapentin, trazodone, olanzipine, and tamsulosin at home   - hold gabapentin and psych meds in setting of alteration from baseline, can resume tamsulosin once bp improves  - most recent bp 99/56 hence additional bolus     #DAVID on CKD2  - yarely pre-renal   - f/u repeat labs  (2.3 now <- 2.6 on admission)  - cw IVF     #Hyperkalemia- STABLE  - 4.2 (5.3 on admission)  - f/u repeat labs     #troponin elevation   - demand ischemia in setting of sepsis   - downtrending 58 (from 68)    #COPD  #HTN  #Non Hodgkins lymphoma  - cw home inhaler prn   - hold bp medications, can re-introduce once bp stable and sepsis improves       #Misc  - diet dash   - activity as tolerated  - heparin sub-q  - gi ppx not indicated  - admit medicine     Pending: follow cultures, monitor urinary output, monitor mental status   85 y/o male with Pmhx of Copd, Htn, ckd stage 2, Dm, schizophrenia, Hld, non-hodgkins lymphoma, urinary retention with chronic Bains (changed in ED on 5/3/25), Hx of MDR UTIs presents to the ED from [facility] for evaluation of AMS, being admitted for ams 2/2 sepsis from suspected UTI in setting of chronic bains and multiple UTIs w/MDR organisms.    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)   - additional 1L LR bolus followed by 100cc LR   - bains in placed draining well, monitor output   - patient on gabapentin, trazodone, olanzipine, and tamsulosin at home   - hold gabapentin and psych meds in setting of alteration from baseline, can resume tamsulosin once bp improves  - most recent bp 99/56 hence additional bolus     #DAVID on CKD2  - yarely pre-renal   - f/u repeat labs  (2.3 now <- 2.6 on admission)  - cw IVF     #Hyperkalemia- STABLE  - 4.2 (5.3 on admission)  - f/u repeat labs     #troponin elevation   - demand ischemia in setting of sepsis   - downtrending 58 (from 68)    #COPD  #HTN  #Non Hodgkins lymphoma  - cw home inhaler prn   - hold bp medications, can re-introduce once bp stable and sepsis improves       #Misc  - diet dash   - activity as tolerated  - heparin sub-q  - gi ppx not indicated  - admit medicine     Pending: follow cultures, monitor urinary output, monitor mental status

## 2025-05-04 NOTE — PHYSICAL THERAPY INITIAL EVALUATION ADULT - PERTINENT HX OF CURRENT PROBLEM, REHAB EVAL
The patient is an 83 y/o male with Pmhx of Copd, Htn, ckd stage 2, Dm, schizophrenia, Hld, non-hodgkins lymphoma, urinary retention with chronic Cobos (changed in ED on 5/3/25), Hx of MDR UTIs presents to the ED from [facility] for evaluation of AMS. Patient currently has no complaints at bedside, unable to speak/verbalize at this time, baseline aaox1, currently confused, no acute apparent distress.  According to ED documentation patient was unresponsive at NH, no response from [facility] at this time, not response from emergency contacts at this time either. Patient is being admitted for sepsis 2/2 uti in setting of chronic foely w/MDR organisms.

## 2025-05-04 NOTE — PHYSICAL THERAPY INITIAL EVALUATION ADULT - GENERAL OBSERVATIONS, REHAB EVAL
8:14-8:38am Pt encountered supine in bed, A & O x 1, confused but in NAD, +bains, +tele,  poor historian but able to follows simple command. Pt is dependent in bed mobility and unable to maintain static sitting balance secondary to weakness.   Pt will benefit from skilled PT 3-5x/wk for thera ex, functional mobility training, balance training and pre-gait training to improve functional mobility.

## 2025-05-04 NOTE — CONSULT NOTE ADULT - SUBJECTIVE AND OBJECTIVE BOX
ADA VILLAGOMEZ  84y, Male  Allergy: No Known Allergies      CHIEF COMPLAINT:   ams 2/2 sepsis uti in setting of chronic bains (03 May 2025 11:49)      LOS  1d    HPI  HPI:  The patient is an 85 y/o male with Pmhx of Copd, Htn, ckd stage 2, Dm, schizophrenia, Hld, non-hodgkins lymphoma, urinary retention with chronic Bains (changed in ED on 5/3/25), Hx of MDR UTIs presents to the ED from [facility] for evaluation of AMS. Patient currently has no complaints at bedside, unable to speak/verbalize at this time, baseline aaox1, currently confused, no acute apparent distress.  According to ED documentation patient was unresponsive at NH, no response from [facility] at this time, not response from emergency contacts at this time either. Patient is being admitted for sepsis 2/2 uti in setting of chronic foely w/MDR organisms.     ED course:   Vitals:   bp 116/90  hr 105  rr 25  temp 100.2/37.9     imaging:   CTH no acute pathology     CTAP w/IV cont:     IMPRESSION:    Urinary bladder distended with Bains catheter balloon inflated within   prostatic urethra; repositioning recommended.  Urinary bladder mild pericystic stranding, could represent bladder   infection in the appropriate clinical setting.  Mild bilateral hydroureteronephrosis, could be attributed to bladder   outlet obstruction.  Urinary bladder right posterolateral subcentimeter nodular density, which   can be further evaluated as outpatient.  BPH.    banis repositioned in ED      Labs:  wbc 22k  hgb 9.0 (baseline around 10.0)   trop 68-->58  k+ 5.3  cr 2.6 (baseline 1.4)   lactate 1.9     s/p   500cc LR bolus   s/p cefepime and vanc  ucx bcx collected   (03 May 2025 11:49)      INFECTIOUS DISEASE HISTORY:  ID consulted for     Currently ordered for:      [facility]  PAST MEDICAL & SURGICAL HISTORY:  COPD (chronic obstructive pulmonary disease)      Paranoid schizophrenia      Schizophrenia      Diabetes type 2, controlled      COPD (chronic obstructive pulmonary disease)      Dyslipidemia      Chronic retention of urine      Dyslipidemia      Encounter for screening colonoscopy          FAMILY HISTORY  No pertinent family history in first degree relatives    No pertinent family history in first degree relatives    No pertinent family history in first degree relatives        SOCIAL HISTORY  Social History:  [facility] (28 May 2023 07:50)        ROS  ***    VITALS:  T(F): 98.8, Max: 103.6 (25 @ 11:09)  HR: 120  BP: 156/66  RR: 18Vital Signs Last 24 Hrs  T(C): 37.1 (03 May 2025 23:59), Max: 39.8 (03 May 2025 11:09)  T(F): 98.8 (03 May 2025 23:59), Max: 103.6 (03 May 2025 11:09)  HR: 120 (03 May 2025 23:59) (102 - 136)  BP: 156/66 (03 May 2025 23:59) (97/62 - 156/66)  BP(mean): 85 (03 May 2025 18:02) (85 - 85)  RR: 18 (03 May 2025 23:59) (18 - 18)  SpO2: 99% (03 May 2025 23:59) (94% - 99%)    Parameters below as of 03 May 2025 23:59  Patient On (Oxygen Delivery Method): room air        PHYSICAL EXAM:  ***    TESTS & MEASUREMENTS:                        9.0    22.18 )-----------( 214      ( 03 May 2025 02:36 )             27.1     05-03    134[L]  |  101  |  42[H]  ----------------------------<  108[H]  5.3[H]   |  19  |  2.6[H]    Ca    8.6      03 May 2025 02:36    TPro  6.2  /  Alb  3.4[L]  /  TBili  0.4  /  DBili  x   /  AST  16  /  ALT  6   /  AlkPhos  88  05-03      LIVER FUNCTIONS - ( 03 May 2025 02:36 )  Alb: 3.4 g/dL / Pro: 6.2 g/dL / ALK PHOS: 88 U/L / ALT: 6 U/L / AST: 16 U/L / GGT: x           Urinalysis Basic - ( 03 May 2025 04:50 )    Color: Yellow / Appearance: Turbid / S.015 / pH: x  Gluc: x / Ketone: Trace mg/dL  / Bili: Negative / Urobili: 0.2 mg/dL   Blood: x / Protein: 100 mg/dL / Nitrite: Negative   Leuk Esterase: Large / RBC: 6 /HPF / WBC >998 /HPF   Sq Epi: x / Non Sq Epi: 6 /HPF / Bacteria: Many /HPF        Urinalysis with Rflx Culture (collected 25 @ 04:50)    Culture - Eye (collected 25 @ 17:50)  Source: Eye Eye-Right  Gram Stain (25 @ 23:57):    No polymorphonuclear leukocytes seen    No organisms seen    by cytocentrifuge  Final Report (25 @ 18:53):    Few Staphylococcus simulans  Organism: Staphylococcus simulans (25 @ 18:53)  Organism: Staphylococcus simulans (25 @ 18:53)      -  Clindamycin: S <=0.25      -  Oxacillin: R >2      -  Gentamicin: S <=4 Should not be used as monotherapy      -  Vancomycin: S 1      -  Tetracycline: S <=4      Method Type: IZABELLA      -  Penicillin: R >2      -  Rifampin: S <=1 Should not be used as monotherapy      -  Erythromycin: S <=0.25      -  Trimethoprim/Sulfamethoxazole: S <=0.5/9.5    Urinalysis with Rflx Culture (collected 25 @ 21:39)    Culture - Urine (collected 25 @ 21:39)  Source: Catheterized None  Final Report (25 @ 06:16):    >100,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)    50,000 - 99,000 CFU/mL Enterococcus faecalis  Organism: Enterococcus faecalis (25 @ 06:16)      -  Levofloxacin: R >4      -  Nitrofurantoin: S <=32 Should not be used to treat pyelonephritis.      -  Vancomycin: S 1      -  Ciprofloxacin: R >2      -  Ampicillin: S <=2 Predicts results to ampicillin/sulbactam, amoxacillin-clavulanate and  piperacillin-tazobactam.      -  Tetracycline: R >8      Method Type: IZABELLA  Organism: Klebsiella pneumoniae (Carbapenem Resistant)  Klebsiella pneumoniae (Carbapenem Resistant)  Enterococcus faecalis (25 @ 06:16)  Organism: Klebsiella pneumoniae (Carbapenem Resistant) (25 @ 06:16)      -  Levofloxacin: R >4      -  Tobramycin: R >8      -  Nitrofurantoin: R >64 Should not be used to treat pyelonephritis      -  Aztreonam: R >16      -  Gentamicin: R >8      -  Cefepime: R >16      -  Cefazolin: R >16 For uncomplicated UTI with K. pneumoniae, E. coli, or P. mirablis: IZABELLA <=16 is sensitive and IZABELLA >=32 is resistant. This also predicts results for oral agents cefaclor, cefdinir, cefpodoxime, cefprozil, cefuroxime axetil, cephalexin and locarbef for uncomplicated UTI. Note that some isolates may be susceptible to these agents while testing resistant to cefazolin.      -  Piperacillin/Tazobactam: R >64      -  Ciprofloxacin: R >2      -  Imipenem: R 8      -  Ceftriaxone: R >32      -  Ampicillin: R >16 These ampicillin results predict results for amoxicillin      Method Type: IZABELLA      -  Meropenem: R >8      -  Ampicillin/Sulbactam: R >16/8      -  Cefoxitin: R >16      -  Cefuroxime: R >16      -  Amoxicillin/Clavulanic Acid: R >16/8      -  Trimethoprim/Sulfamethoxazole: R >2/38      -  Ertapenem: R >1  Organism: Klebsiella pneumoniae (Carbapenem Resistant) (25 @ 06:16)      -  Resistance Gene OXA: Detec      Method Type: CarbaR    Culture - Blood (collected 25 @ 20:33)  Source: Blood Blood-Peripheral  Final Report (25 @ 03:00):    No growth at 5 days    Culture - Blood (collected 25 @ 20:33)  Source: Blood Blood-Peripheral  Final Report (25 @ 03:00):    No growth at 5 days    Culture - Urine (collected 25 @ 14:30)  Source: Catheterized Catheterized  Final Report (25 @ 07:57):    >100,000 CFU/ml Enterococcus faecalis    <10,000 CFU/ml Normal Urogenital jake present  Organism: Enterococcus faecalis (25 @ 07:57)  Organism: Enterococcus faecalis (25 @ 07:57)      -  Levofloxacin: R >4      -  Nitrofurantoin: S <=32 Should not be used to treat pyelonephritis.      -  Vancomycin: S 1      -  Ciprofloxacin: R >2      -  Ampicillin: S <=2 Predicts results to ampicillin/sulbactam, amoxacillin-clavulanate and  piperacillin-tazobactam.      -  Tetracycline: R >8      Method Type: IZABELLA    Urinalysis with Rflx Culture (collected 25 @ 18:19)    Culture - Urine (collected 25 @ 18:19)  Source: Clean Catch None  Final Report (25 @ 08:03):    >100,000 CFU/ml Proteus mirabilis  Organism: Proteus mirabilis (25 @ 08:03)  Organism: Proteus mirabilis (25 @ 08:03)      -  Levofloxacin: S <=0.5      -  Tobramycin: S <=2      -  Nitrofurantoin: R >64 Should not be used to treat pyelonephritis      -  Aztreonam: S <=4      -  Gentamicin: S <=2      -  Cefazolin: S <=2 For uncomplicated UTI with K. pneumoniae, E. coli, or P. mirablis: IZABELLA <=16 is sensitive and IZABELLA >=32 is resistant. This also predicts results for oral agents cefaclor, cefdinir, cefpodoxime, cefprozil, cefuroxime axetil, cephalexin and locarbef for uncomplicated UTI. Note that some isolates may be susceptible to these agents while testing resistant to cefazolin.      -  Cefepime: S <=2      -  Piperacillin/Tazobactam: S <=8      -  Ciprofloxacin: S <=0.25      -  Ceftriaxone: S <=1      -  Ampicillin: S <=8 These ampicillin results predict results for amoxicillin      Method Type: IZABELLA      -  Meropenem: S <=1      -  Ampicillin/Sulbactam: S <=4/2      -  Cefoxitin: S <=8      -  Cefuroxime: S <=4      -  Amoxicillin/Clavulanic Acid: S <=8/4      -  Trimethoprim/Sulfamethoxazole: S <=0.5/9.5      -  Ertapenem: S <=0.5    Culture - Blood (collected 25 @ 18:10)  Source: Blood Blood  Final Report (25 @ 23:07):    No growth at 5 days    Culture - Blood (collected 25 @ 18:00)  Source: Blood Blood  Final Report (25 @ 23:07):    No growth at 5 days    Urinalysis with Rflx Culture (collected 25 @ 15:05)    Culture - Urine (collected 25 @ 15:05)  Source: Catheterized None  Final Report (04-15-25 @ 19:27):    >100,000 CFU/ml Proteus mirabilis    <10,000 CFU/ml Normal Urogenital jake present  Organism: Proteus mirabilis (04-15-25 @ 19:27)  Organism: Proteus mirabilis (04-15-25 @ 19:27)      -  Levofloxacin: S <=0.5      -  Tobramycin: S <=2      -  Nitrofurantoin: R >64 Should not be used to treat pyelonephritis      -  Aztreonam: S <=4      -  Gentamicin: S <=2      -  Cefazolin: S <=2 For uncomplicated UTI with K. pneumoniae, E. coli, or P. mirablis: IZABELLA <=16 is sensitive and IZABELLA >=32 is resistant. This also predicts results for oral agents cefaclor, cefdinir, cefpodoxime, cefprozil, cefuroxime axetil, cephalexin and locarbef for uncomplicated UTI. Note that some isolates may be susceptible to these agents while testing resistant to cefazolin.      -  Cefepime: S <=2      -  Piperacillin/Tazobactam: S <=8      -  Ciprofloxacin: S <=0.25      -  Ceftriaxone: S <=1      -  Ampicillin: S <=8 These ampicillin results predict results for amoxicillin      Method Type: IZABELLA      -  Meropenem: S <=1      -  Ampicillin/Sulbactam: S <=4/2      -  Cefoxitin: S <=8      -  Cefuroxime: S <=4      -  Amoxicillin/Clavulanic Acid: S <=8/4      -  Trimethoprim/Sulfamethoxazole: S <=0.5/9.5      -  Ertapenem: S <=0.5    Urinalysis with Rflx Culture (collected 25 @ 04:20)    Culture - Urine (collected 25 @ 04:20)  Source: Catheterized None  Final Report (25 @ 12:23):    >=3 organisms. Probable collection contamination.        Lactate, Blood: 1.9 mmol/L (25 @ 02:36)  Blood Gas Venous - Lactate: 1.7 mmol/L (25 @ 02:14)      INFECTIOUS DISEASES TESTING  Procalcitonin: 0.21 ng/mL (25 @ 11:47)  MRSA PCR Result.: Negative (25 @ 06:30)      INFLAMMATORY MARKERS      RADIOLOGY & ADDITIONAL TESTS:  I have personally reviewed the radiographic imaging    CARDIOLOGY TESTING  EKG reviewed if performed    MEDICATIONS  dextrose 5%. 1000 IV Continuous <Continuous>  dextrose 5%. 1000 IV Continuous <Continuous>  dextrose 50% Injectable 25 IV Push once  dextrose 50% Injectable 12.5 IV Push once  dextrose 50% Injectable 25 IV Push once  finasteride 5 Oral daily  glucagon  Injectable 1 IntraMuscular once  insulin lispro (ADMELOG) corrective regimen sliding scale  SubCutaneous three times a day before meals  lactated ringers. 500 IV Continuous <Continuous>        ANTIBIOTICS:      ALLERGIES:  No Known Allergies   ADA VILLAGOMEZ  84y, Male  Allergy: No Known Allergies      CHIEF COMPLAINT:   ams 2/2 sepsis uti in setting of chronic bains (03 May 2025 11:49)      LOS  1d    HPI  HPI:  The patient is an 85 y/o male with Pmhx of Copd, Htn, ckd stage 2, Dm, schizophrenia, Hld, non-hodgkins lymphoma, urinary retention with chronic Bains (changed in ED on 5/3/25), Hx of MDR UTIs presents to the ED from [facility] for evaluation of AMS. Patient currently has no complaints at bedside, unable to speak/verbalize at this time, baseline aaox1, currently confused, no acute apparent distress.  According to ED documentation patient was unresponsive at NH, no response from [facility] at this time, not response from emergency contacts at this time either. Patient is being admitted for sepsis 2/2 uti in setting of chronic foely w/MDR organisms.     ED course:   Vitals:   bp 116/90  hr 105  rr 25  temp 100.2/37.9     imaging:   CTH no acute pathology     CTAP w/IV cont:     IMPRESSION:    Urinary bladder distended with Bains catheter balloon inflated within   prostatic urethra; repositioning recommended.  Urinary bladder mild pericystic stranding, could represent bladder   infection in the appropriate clinical setting.  Mild bilateral hydroureteronephrosis, could be attributed to bladder   outlet obstruction.  Urinary bladder right posterolateral subcentimeter nodular density, which   can be further evaluated as outpatient.  BPH.    bains repositioned in ED      Labs:  wbc 22k  hgb 9.0 (baseline around 10.0)   trop 68-->58  k+ 5.3  cr 2.6 (baseline 1.4)   lactate 1.9     s/p   500cc LR bolus   s/p cefepime and vanc  ucx bcx collected   (03 May 2025 11:49)      INFECTIOUS DISEASE HISTORY:  ID consulted for sepsis   UA pyuria   Admission WBC 22  t103.6  patient responding "no" to every question    Currently ordered for:      PMH  PAST MEDICAL & SURGICAL HISTORY:  COPD (chronic obstructive pulmonary disease)      Paranoid schizophrenia      Schizophrenia      Diabetes type 2, controlled      COPD (chronic obstructive pulmonary disease)      Dyslipidemia      Chronic retention of urine      Dyslipidemia      Encounter for screening colonoscopy          FAMILY HISTORY  No pertinent family history in first degree relatives    No pertinent family history in first degree relatives    No pertinent family history in first degree relatives        SOCIAL HISTORY  Social History:  [facility] (28 May 2023 07:50)        ROS  unable to obtain history secondary to patient's mental status and/or sedation     VITALS:  T(F): 98.8, Max: 103.6 (25 @ 11:09)  HR: 120  BP: 156/66  RR: 18Vital Signs Last 24 Hrs  T(C): 37.1 (03 May 2025 23:59), Max: 39.8 (03 May 2025 11:09)  T(F): 98.8 (03 May 2025 23:59), Max: 103.6 (03 May 2025 11:09)  HR: 120 (03 May 2025 23:59) (102 - 136)  BP: 156/66 (03 May 2025 23:59) (97/62 - 156/66)  BP(mean): 85 (03 May 2025 18:02) (85 - 85)  RR: 18 (03 May 2025 23:59) (18 - 18)  SpO2: 99% (03 May 2025 23:59) (94% - 99%)    Parameters below as of 03 May 2025 23:59  Patient On (Oxygen Delivery Method): room air        PHYSICAL EXAM:  Gen: NAD, resting in bed  HEENT: Normocephalic, atraumatic  Neck: supple, no lymphadenopathy  CV: Regular rate & regular rhythm  Lungs: decreased BS at bases, no fremitus  Abdomen: Soft, BS present no suprapubic tenderness, no CVAT   Ext: Warm, well perfused  Neuro: non focal, awake  Skin: no rash, no erythema  Lines: no phlebitis     TESTS & MEASUREMENTS:                        9.0    22.18 )-----------( 214      ( 03 May 2025 02:36 )             27.1         134[L]  |  101  |  42[H]  ----------------------------<  108[H]  5.3[H]   |  19  |  2.6[H]    Ca    8.6      03 May 2025 02:36    TPro  6.2  /  Alb  3.4[L]  /  TBili  0.4  /  DBili  x   /  AST  16  /  ALT  6   /  AlkPhos  88  05-03      LIVER FUNCTIONS - ( 03 May 2025 02:36 )  Alb: 3.4 g/dL / Pro: 6.2 g/dL / ALK PHOS: 88 U/L / ALT: 6 U/L / AST: 16 U/L / GGT: x           Urinalysis Basic - ( 03 May 2025 04:50 )    Color: Yellow / Appearance: Turbid / S.015 / pH: x  Gluc: x / Ketone: Trace mg/dL  / Bili: Negative / Urobili: 0.2 mg/dL   Blood: x / Protein: 100 mg/dL / Nitrite: Negative   Leuk Esterase: Large / RBC: 6 /HPF / WBC >998 /HPF   Sq Epi: x / Non Sq Epi: 6 /HPF / Bacteria: Many /HPF        Urinalysis with Rflx Culture (collected 25 @ 04:50)    Culture - Eye (collected 25 @ 17:50)  Source: Eye Eye-Right  Gram Stain (25 @ 23:57):    No polymorphonuclear leukocytes seen    No organisms seen    by cytocentrifuge  Final Report (25 @ 18:53):    Few Staphylococcus simulans  Organism: Staphylococcus simulans (25 @ 18:53)  Organism: Staphylococcus simulans (25 @ 18:53)      -  Clindamycin: S <=0.25      -  Oxacillin: R >2      -  Gentamicin: S <=4 Should not be used as monotherapy      -  Vancomycin: S 1      -  Tetracycline: S <=4      Method Type: IZABELLA      -  Penicillin: R >2      -  Rifampin: S <=1 Should not be used as monotherapy      -  Erythromycin: S <=0.25      -  Trimethoprim/Sulfamethoxazole: S <=0.5/9.5    Urinalysis with Rflx Culture (collected 25 @ 21:39)    Culture - Urine (collected 25 @ 21:39)  Source: Catheterized None  Final Report (25 @ 06:16):    >100,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)    50,000 - 99,000 CFU/mL Enterococcus faecalis  Organism: Enterococcus faecalis (25 @ 06:16)      -  Levofloxacin: R >4      -  Nitrofurantoin: S <=32 Should not be used to treat pyelonephritis.      -  Vancomycin: S 1      -  Ciprofloxacin: R >2      -  Ampicillin: S <=2 Predicts results to ampicillin/sulbactam, amoxacillin-clavulanate and  piperacillin-tazobactam.      -  Tetracycline: R >8      Method Type: IZABELLA  Organism: Klebsiella pneumoniae (Carbapenem Resistant)  Klebsiella pneumoniae (Carbapenem Resistant)  Enterococcus faecalis (25 @ 06:16)  Organism: Klebsiella pneumoniae (Carbapenem Resistant) (25 @ 06:16)      -  Levofloxacin: R >4      -  Tobramycin: R >8      -  Nitrofurantoin: R >64 Should not be used to treat pyelonephritis      -  Aztreonam: R >16      -  Gentamicin: R >8      -  Cefepime: R >16      -  Cefazolin: R >16 For uncomplicated UTI with K. pneumoniae, E. coli, or P. mirablis: IZABELLA <=16 is sensitive and IZABELLA >=32 is resistant. This also predicts results for oral agents cefaclor, cefdinir, cefpodoxime, cefprozil, cefuroxime axetil, cephalexin and locarbef for uncomplicated UTI. Note that some isolates may be susceptible to these agents while testing resistant to cefazolin.      -  Piperacillin/Tazobactam: R >64      -  Ciprofloxacin: R >2      -  Imipenem: R 8      -  Ceftriaxone: R >32      -  Ampicillin: R >16 These ampicillin results predict results for amoxicillin      Method Type: IZABELLA      -  Meropenem: R >8      -  Ampicillin/Sulbactam: R >16/8      -  Cefoxitin: R >16      -  Cefuroxime: R >16      -  Amoxicillin/Clavulanic Acid: R >16/8      -  Trimethoprim/Sulfamethoxazole: R >2/38      -  Ertapenem: R >1  Organism: Klebsiella pneumoniae (Carbapenem Resistant) (25 @ 06:16)      -  Resistance Gene OXA: Detec      Method Type: CarbaR    Culture - Blood (collected 25 @ 20:33)  Source: Blood Blood-Peripheral  Final Report (25 @ 03:00):    No growth at 5 days    Culture - Blood (collected 25 @ 20:33)  Source: Blood Blood-Peripheral  Final Report (25 @ 03:00):    No growth at 5 days    Culture - Urine (collected 25 @ 14:30)  Source: Catheterized Catheterized  Final Report (25 @ 07:57):    >100,000 CFU/ml Enterococcus faecalis    <10,000 CFU/ml Normal Urogenital jake present  Organism: Enterococcus faecalis (25 @ 07:57)  Organism: Enterococcus faecalis (25 @ 07:57)      -  Levofloxacin: R >4      -  Nitrofurantoin: S <=32 Should not be used to treat pyelonephritis.      -  Vancomycin: S 1      -  Ciprofloxacin: R >2      -  Ampicillin: S <=2 Predicts results to ampicillin/sulbactam, amoxacillin-clavulanate and  piperacillin-tazobactam.      -  Tetracycline: R >8      Method Type: IZABELLA    Urinalysis with Rflx Culture (collected 25 @ 18:19)    Culture - Urine (collected 25 @ 18:19)  Source: Clean Catch None  Final Report (25 @ 08:03):    >100,000 CFU/ml Proteus mirabilis  Organism: Proteus mirabilis (25 @ 08:03)  Organism: Proteus mirabilis (25 @ 08:03)      -  Levofloxacin: S <=0.5      -  Tobramycin: S <=2      -  Nitrofurantoin: R >64 Should not be used to treat pyelonephritis      -  Aztreonam: S <=4      -  Gentamicin: S <=2      -  Cefazolin: S <=2 For uncomplicated UTI with K. pneumoniae, E. coli, or P. mirablis: IZABELLA <=16 is sensitive and IZABELLA >=32 is resistant. This also predicts results for oral agents cefaclor, cefdinir, cefpodoxime, cefprozil, cefuroxime axetil, cephalexin and locarbef for uncomplicated UTI. Note that some isolates may be susceptible to these agents while testing resistant to cefazolin.      -  Cefepime: S <=2      -  Piperacillin/Tazobactam: S <=8      -  Ciprofloxacin: S <=0.25      -  Ceftriaxone: S <=1      -  Ampicillin: S <=8 These ampicillin results predict results for amoxicillin      Method Type: IZABELLA      -  Meropenem: S <=1      -  Ampicillin/Sulbactam: S <=4/2      -  Cefoxitin: S <=8      -  Cefuroxime: S <=4      -  Amoxicillin/Clavulanic Acid: S <=8/4      -  Trimethoprim/Sulfamethoxazole: S <=0.5/9.5      -  Ertapenem: S <=0.5    Culture - Blood (collected 25 @ 18:10)  Source: Blood Blood  Final Report (25 @ 23:07):    No growth at 5 days    Culture - Blood (collected 25 @ 18:00)  Source: Blood Blood  Final Report (25 @ 23:07):    No growth at 5 days    Urinalysis with Rflx Culture (collected 25 @ 15:05)    Culture - Urine (collected 25 @ 15:05)  Source: Catheterized None  Final Report (04-15-25 @ 19:27):    >100,000 CFU/ml Proteus mirabilis    <10,000 CFU/ml Normal Urogenital jake present  Organism: Proteus mirabilis (04-15-25 @ 19:27)  Organism: Proteus mirabilis (04-15-25 @ 19:27)      -  Levofloxacin: S <=0.5      -  Tobramycin: S <=2      -  Nitrofurantoin: R >64 Should not be used to treat pyelonephritis      -  Aztreonam: S <=4      -  Gentamicin: S <=2      -  Cefazolin: S <=2 For uncomplicated UTI with K. pneumoniae, E. coli, or P. mirablis: IZABELLA <=16 is sensitive and IZABELLA >=32 is resistant. This also predicts results for oral agents cefaclor, cefdinir, cefpodoxime, cefprozil, cefuroxime axetil, cephalexin and locarbef for uncomplicated UTI. Note that some isolates may be susceptible to these agents while testing resistant to cefazolin.      -  Cefepime: S <=2      -  Piperacillin/Tazobactam: S <=8      -  Ciprofloxacin: S <=0.25      -  Ceftriaxone: S <=1      -  Ampicillin: S <=8 These ampicillin results predict results for amoxicillin      Method Type: IZABELLA      -  Meropenem: S <=1      -  Ampicillin/Sulbactam: S <=4/2      -  Cefoxitin: S <=8      -  Cefuroxime: S <=4      -  Amoxicillin/Clavulanic Acid: S <=8/4      -  Trimethoprim/Sulfamethoxazole: S <=0.5/9.5      -  Ertapenem: S <=0.5    Urinalysis with Rflx Culture (collected 25 @ 04:20)    Culture - Urine (collected 25 @ 04:20)  Source: Catheterized None  Final Report (25 @ 12:23):    >=3 organisms. Probable collection contamination.        Lactate, Blood: 1.9 mmol/L (25 @ 02:36)  Blood Gas Venous - Lactate: 1.7 mmol/L (25 @ 02:14)      INFECTIOUS DISEASES TESTING  Procalcitonin: 0.21 ng/mL (25 @ 11:47)  MRSA PCR Result.: Negative (25 @ 06:30)      INFLAMMATORY MARKERS      RADIOLOGY & ADDITIONAL TESTS:  I have personally reviewed the radiographic imaging    CARDIOLOGY TESTING  EKG reviewed if performed    MEDICATIONS  dextrose 5%. 1000 IV Continuous <Continuous>  dextrose 5%. 1000 IV Continuous <Continuous>  dextrose 50% Injectable 25 IV Push once  dextrose 50% Injectable 12.5 IV Push once  dextrose 50% Injectable 25 IV Push once  finasteride 5 Oral daily  glucagon  Injectable 1 IntraMuscular once  insulin lispro (ADMELOG) corrective regimen sliding scale  SubCutaneous three times a day before meals  lactated ringers. 500 IV Continuous <Continuous>        ANTIBIOTICS:      ALLERGIES:  No Known Allergies

## 2025-05-04 NOTE — PROGRESS NOTE ADULT - NS ATTEST RISK PROBLEM GEN_ALL_CORE FT
sepsis, team discussed antibiotics therapy with ID, labs reviewed, labs ordered, cultures reviewed, imaging reviewed

## 2025-05-04 NOTE — PHYSICAL THERAPY INITIAL EVALUATION ADULT - BALANCE TRAINING, PT EVAL
Inc sitting balance s/d to G/F and progress to standing balance/pre-gait training as melchor by VIPIN.

## 2025-05-05 LAB
-  CARBAPENEM RESISTANCE: SIGNIFICANT CHANGE UP
-  CTX-M RESISTANCE MARKER: SIGNIFICANT CHANGE UP
-  ESBL: SIGNIFICANT CHANGE UP
-  K. PNEUMONIAE GROUP: SIGNIFICANT CHANGE UP
-  OXA RESISTANCE MARKER: SIGNIFICANT CHANGE UP
A1C WITH ESTIMATED AVERAGE GLUCOSE RESULT: 6 % — HIGH (ref 4–5.6)
ALBUMIN SERPL ELPH-MCNC: 3 G/DL — LOW (ref 3.5–5.2)
ALBUMIN SERPL ELPH-MCNC: 3.2 G/DL — LOW (ref 3.5–5.2)
ALP SERPL-CCNC: 104 U/L — SIGNIFICANT CHANGE UP (ref 30–115)
ALP SERPL-CCNC: 123 U/L — HIGH (ref 30–115)
ALT FLD-CCNC: 15 U/L — SIGNIFICANT CHANGE UP (ref 0–41)
ALT FLD-CCNC: 17 U/L — SIGNIFICANT CHANGE UP (ref 0–41)
ANION GAP SERPL CALC-SCNC: 14 MMOL/L — SIGNIFICANT CHANGE UP (ref 7–14)
ANION GAP SERPL CALC-SCNC: 19 MMOL/L — HIGH (ref 7–14)
AST SERPL-CCNC: 49 U/L — HIGH (ref 0–41)
AST SERPL-CCNC: 49 U/L — HIGH (ref 0–41)
BASOPHILS # BLD AUTO: 0.01 K/UL — SIGNIFICANT CHANGE UP (ref 0–0.2)
BASOPHILS NFR BLD AUTO: 0.1 % — SIGNIFICANT CHANGE UP (ref 0–1)
BILIRUB SERPL-MCNC: 0.3 MG/DL — SIGNIFICANT CHANGE UP (ref 0.2–1.2)
BILIRUB SERPL-MCNC: 0.3 MG/DL — SIGNIFICANT CHANGE UP (ref 0.2–1.2)
BUN SERPL-MCNC: 43 MG/DL — HIGH (ref 10–20)
BUN SERPL-MCNC: 43 MG/DL — HIGH (ref 10–20)
CALCIUM SERPL-MCNC: 8.2 MG/DL — LOW (ref 8.4–10.5)
CALCIUM SERPL-MCNC: 8.7 MG/DL — SIGNIFICANT CHANGE UP (ref 8.4–10.5)
CHLORIDE SERPL-SCNC: 107 MMOL/L — SIGNIFICANT CHANGE UP (ref 98–110)
CHLORIDE SERPL-SCNC: 107 MMOL/L — SIGNIFICANT CHANGE UP (ref 98–110)
CO2 SERPL-SCNC: 19 MMOL/L — SIGNIFICANT CHANGE UP (ref 17–32)
CO2 SERPL-SCNC: 24 MMOL/L — SIGNIFICANT CHANGE UP (ref 17–32)
CREAT SERPL-MCNC: 2.1 MG/DL — HIGH (ref 0.7–1.5)
CREAT SERPL-MCNC: 2.3 MG/DL — HIGH (ref 0.7–1.5)
EGFR: 28 ML/MIN/1.73M2 — LOW
EGFR: 28 ML/MIN/1.73M2 — LOW
EGFR: 31 ML/MIN/1.73M2 — LOW
EGFR: 31 ML/MIN/1.73M2 — LOW
EOSINOPHIL # BLD AUTO: 0.03 K/UL — SIGNIFICANT CHANGE UP (ref 0–0.7)
EOSINOPHIL NFR BLD AUTO: 0.2 % — SIGNIFICANT CHANGE UP (ref 0–8)
ESTIMATED AVERAGE GLUCOSE: 126 MG/DL — HIGH (ref 68–114)
GLUCOSE BLDC GLUCOMTR-MCNC: 105 MG/DL — HIGH (ref 70–99)
GLUCOSE BLDC GLUCOMTR-MCNC: 111 MG/DL — HIGH (ref 70–99)
GLUCOSE BLDC GLUCOMTR-MCNC: 128 MG/DL — HIGH (ref 70–99)
GLUCOSE BLDC GLUCOMTR-MCNC: 130 MG/DL — HIGH (ref 70–99)
GLUCOSE SERPL-MCNC: 103 MG/DL — HIGH (ref 70–99)
GLUCOSE SERPL-MCNC: 129 MG/DL — HIGH (ref 70–99)
GRAM STN FLD: ABNORMAL
HCT VFR BLD CALC: 26.3 % — LOW (ref 42–52)
HCT VFR BLD CALC: 32.5 % — LOW (ref 42–52)
HGB BLD-MCNC: 10.8 G/DL — LOW (ref 14–18)
HGB BLD-MCNC: 8.9 G/DL — LOW (ref 14–18)
IMM GRANULOCYTES NFR BLD AUTO: 1 % — HIGH (ref 0.1–0.3)
LYMPHOCYTES # BLD AUTO: 0.84 K/UL — LOW (ref 1.2–3.4)
LYMPHOCYTES # BLD AUTO: 6.1 % — LOW (ref 20.5–51.1)
MAGNESIUM SERPL-MCNC: 2.1 MG/DL — SIGNIFICANT CHANGE UP (ref 1.8–2.4)
MCHC RBC-ENTMCNC: 30.5 PG — SIGNIFICANT CHANGE UP (ref 27–31)
MCHC RBC-ENTMCNC: 31 PG — SIGNIFICANT CHANGE UP (ref 27–31)
MCHC RBC-ENTMCNC: 33.2 G/DL — SIGNIFICANT CHANGE UP (ref 32–37)
MCHC RBC-ENTMCNC: 33.8 G/DL — SIGNIFICANT CHANGE UP (ref 32–37)
MCV RBC AUTO: 91.6 FL — SIGNIFICANT CHANGE UP (ref 80–94)
MCV RBC AUTO: 91.8 FL — SIGNIFICANT CHANGE UP (ref 80–94)
METHOD TYPE: SIGNIFICANT CHANGE UP
MONOCYTES # BLD AUTO: 0.9 K/UL — HIGH (ref 0.1–0.6)
MONOCYTES NFR BLD AUTO: 6.5 % — SIGNIFICANT CHANGE UP (ref 1.7–9.3)
NEUTROPHILS # BLD AUTO: 11.95 K/UL — HIGH (ref 1.4–6.5)
NEUTROPHILS NFR BLD AUTO: 86.1 % — HIGH (ref 42.2–75.2)
NRBC BLD AUTO-RTO: 0 /100 WBCS — SIGNIFICANT CHANGE UP (ref 0–0)
NRBC BLD AUTO-RTO: 0 /100 WBCS — SIGNIFICANT CHANGE UP (ref 0–0)
PLATELET # BLD AUTO: 154 K/UL — SIGNIFICANT CHANGE UP (ref 130–400)
PLATELET # BLD AUTO: 164 K/UL — SIGNIFICANT CHANGE UP (ref 130–400)
PMV BLD: 11 FL — HIGH (ref 7.4–10.4)
PMV BLD: 11.9 FL — HIGH (ref 7.4–10.4)
POTASSIUM SERPL-MCNC: 3.9 MMOL/L — SIGNIFICANT CHANGE UP (ref 3.5–5)
POTASSIUM SERPL-MCNC: 4 MMOL/L — SIGNIFICANT CHANGE UP (ref 3.5–5)
POTASSIUM SERPL-SCNC: 3.9 MMOL/L — SIGNIFICANT CHANGE UP (ref 3.5–5)
POTASSIUM SERPL-SCNC: 4 MMOL/L — SIGNIFICANT CHANGE UP (ref 3.5–5)
PROT SERPL-MCNC: 6 G/DL — SIGNIFICANT CHANGE UP (ref 6–8)
PROT SERPL-MCNC: 6.7 G/DL — SIGNIFICANT CHANGE UP (ref 6–8)
RAPID RVP RESULT: SIGNIFICANT CHANGE UP
RBC # BLD: 2.87 M/UL — LOW (ref 4.7–6.1)
RBC # BLD: 3.54 M/UL — LOW (ref 4.7–6.1)
RBC # FLD: 15 % — HIGH (ref 11.5–14.5)
RBC # FLD: 15.2 % — HIGH (ref 11.5–14.5)
SARS-COV-2 RNA SPEC QL NAA+PROBE: SIGNIFICANT CHANGE UP
SODIUM SERPL-SCNC: 145 MMOL/L — SIGNIFICANT CHANGE UP (ref 135–146)
SODIUM SERPL-SCNC: 145 MMOL/L — SIGNIFICANT CHANGE UP (ref 135–146)
SPECIMEN SOURCE: SIGNIFICANT CHANGE UP
WBC # BLD: 13.87 K/UL — HIGH (ref 4.8–10.8)
WBC # BLD: 16 K/UL — HIGH (ref 4.8–10.8)
WBC # FLD AUTO: 13.87 K/UL — HIGH (ref 4.8–10.8)
WBC # FLD AUTO: 16 K/UL — HIGH (ref 4.8–10.8)

## 2025-05-05 PROCEDURE — 71045 X-RAY EXAM CHEST 1 VIEW: CPT | Mod: 26

## 2025-05-05 PROCEDURE — 99233 SBSQ HOSP IP/OBS HIGH 50: CPT

## 2025-05-05 PROCEDURE — 93970 EXTREMITY STUDY: CPT | Mod: 26

## 2025-05-05 RX ORDER — METOPROLOL SUCCINATE 50 MG/1
25 TABLET, EXTENDED RELEASE ORAL
Refills: 0 | Status: DISCONTINUED | OUTPATIENT
Start: 2025-05-05 | End: 2025-05-09

## 2025-05-05 RX ORDER — NIFEDIPINE 30 MG
90 TABLET, EXTENDED RELEASE 24 HR ORAL DAILY
Refills: 0 | Status: DISCONTINUED | OUTPATIENT
Start: 2025-05-05 | End: 2025-05-13

## 2025-05-05 RX ORDER — NIFEDIPINE 30 MG
90 TABLET, EXTENDED RELEASE 24 HR ORAL DAILY
Refills: 0 | Status: DISCONTINUED | OUTPATIENT
Start: 2025-05-05 | End: 2025-05-05

## 2025-05-05 RX ORDER — TAMSULOSIN HYDROCHLORIDE 0.4 MG/1
0.4 CAPSULE ORAL AT BEDTIME
Refills: 0 | Status: DISCONTINUED | OUTPATIENT
Start: 2025-05-05 | End: 2025-05-13

## 2025-05-05 RX ADMIN — HEPARIN SODIUM 5000 UNIT(S): 1000 INJECTION INTRAVENOUS; SUBCUTANEOUS at 21:26

## 2025-05-05 RX ADMIN — FINASTERIDE 5 MILLIGRAM(S): 1 TABLET, FILM COATED ORAL at 12:20

## 2025-05-05 RX ADMIN — HEPARIN SODIUM 5000 UNIT(S): 1000 INJECTION INTRAVENOUS; SUBCUTANEOUS at 05:35

## 2025-05-05 RX ADMIN — Medication 10 MILLIGRAM(S): at 06:37

## 2025-05-05 RX ADMIN — METOPROLOL SUCCINATE 25 MILLIGRAM(S): 50 TABLET, EXTENDED RELEASE ORAL at 17:32

## 2025-05-05 RX ADMIN — HEPARIN SODIUM 5000 UNIT(S): 1000 INJECTION INTRAVENOUS; SUBCUTANEOUS at 14:24

## 2025-05-05 RX ADMIN — TAMSULOSIN HYDROCHLORIDE 0.4 MILLIGRAM(S): 0.4 CAPSULE ORAL at 21:26

## 2025-05-05 RX ADMIN — Medication 90 MILLIGRAM(S): at 08:49

## 2025-05-05 NOTE — PROGRESS NOTE ADULT - ASSESSMENT
83 y/o male with Pmhx of Copd, Htn, ckd stage 2, Dm, schizophrenia, Hld, non-hodgkins lymphoma, urinary retention with chronic Bains (changed in ED on 5/3/25), Hx of MDR UTIs presents to the ED from [facility] for evaluation of AMS, being admitted for ams 2/2 sepsis from suspected UTI in setting of chronic bains and multiple UTIs w/MDR organisms.    #AMS 2/2 sepsis from UTI  #Chronic bains for urinary retention  #fevers   - baseline per previous documentation oriented x1  - currently confused, does not follow commands   - last culture on 4/25/25 admission showing E faecalis sensitive to vancomycin and carbapenem resistant kleb pneumo (likely colonization)   - blood cx negative   - urine cx + gram neg rods   - per ID: Avycaz 0.94g q12h IV  - s/p vancomycin and cefepime in ED  - 1x dose amikacin until ID f/u   - bains in placed draining well, monitor output   - patient on gabapentin, trazodone, olanzipine, and tamsulosin at home   - hold gabapentin and psych meds in setting of alteration from baseline  - c/w home tamsulosin 0.4mg daily     #DAVID on CKD2 - improving   - yarely pre-renal   - Cr 2.3 > 2.1   - s/p IVF     #Hyperkalemia- resolved   - K 4.2 >3.9    #troponin elevation   - demand ischemia in setting of sepsis   - downtrending 58 (from 68)    #COPD  - c/w home inhalers     #HTN  #tachycardia   - BP elevated over night  - c/w home Nifedipine 90mg daily   - c/w home metoprolol 25mg BID     #Non Hodgkins lymphoma    Diet: diet dash   Activity:  AAT  DVT ppx: heparin sub-q  GI ppx: not indicated  Pending: monitor urinary output, monitor mental status 83 y/o male with Pmhx of Copd, Htn, ckd stage 2, Dm, schizophrenia, Hld, non-hodgkins lymphoma, urinary retention with chronic Bains (changed in ED on 5/3/25), Hx of MDR UTIs presents to the ED from [facility] for evaluation of AMS, being admitted for ams 2/2 sepsis from suspected UTI in setting of chronic bains and multiple UTIs w/MDR organisms.    #AMS 2/2 sepsis from UTI  #Chronic bains for urinary retention  #fevers   - baseline per previous documentation oriented x1  - currently confused, does not follow commands   - last culture on 4/25/25 admission showing E faecalis sensitive to vancomycin and carbapenem resistant kleb pneumo (likely colonization)   - blood cx negative   - urine cx + gram neg rods   - f/u full RVP   - f/u repeat CXR  - f/u LE duplex   - per ID: Avycaz 0.94g q12h IV  - s/p vancomycin and cefepime in ED  - 1x dose amikacin until ID f/u   - bains in placed draining well, monitor output   - patient on gabapentin, trazodone, olanzipine, and tamsulosin at home   - hold gabapentin and psych meds in setting of alteration from baseline  - c/w home tamsulosin 0.4mg daily     #DAVID on CKD2 - improving   - yarely pre-renal   - Cr 2.3 > 2.1   - s/p IVF     #Hyperkalemia- resolved   - K 4.2 >3.9    #troponin elevation   - demand ischemia in setting of sepsis   - downtrending 58 (from 68)    #COPD  - c/w home inhalers     #HTN  #tachycardia   - BP elevated over night  - c/w home Nifedipine 90mg daily   - c/w home metoprolol 25mg BID     #Non Hodgkins lymphoma    Diet: diet dash   Activity:  AAT  DVT ppx: heparin sub-q  GI ppx: not indicated  Pending: monitor urinary output, monitor mental status, RVP, duplex, CXR

## 2025-05-05 NOTE — PROGRESS NOTE ADULT - SUBJECTIVE AND OBJECTIVE BOX
SUBJECTIVE/OVERNIGHT EVENTS  Today is hospital day 2d. This morning patient was seen and examined at bedside, resting comfortably in bed. No acute or major events overnight.    MEDICATIONS  STANDING MEDICATIONS  ceftazidime/avibactam IVPB 0.94 Gram(s) IV Intermittent every 12 hours  dextrose 5%. 1000 milliLiter(s) IV Continuous <Continuous>  dextrose 5%. 1000 milliLiter(s) IV Continuous <Continuous>  dextrose 50% Injectable 25 Gram(s) IV Push once  dextrose 50% Injectable 12.5 Gram(s) IV Push once  dextrose 50% Injectable 25 Gram(s) IV Push once  finasteride 5 milliGRAM(s) Oral daily  glucagon  Injectable 1 milliGRAM(s) IntraMuscular once  heparin   Injectable 5000 Unit(s) SubCutaneous every 8 hours  insulin lispro (ADMELOG) corrective regimen sliding scale   SubCutaneous three times a day before meals  lactated ringers. 500 milliLiter(s) IV Continuous <Continuous>  metoprolol tartrate 25 milliGRAM(s) Oral two times a day  NIFEdipine XL 90 milliGRAM(s) Oral daily    PRN MEDICATIONS  acetaminophen  Suppository .. 650 milliGRAM(s) Rectal every 6 hours PRN  albuterol    90 MICROgram(s) HFA Inhaler 1 Puff(s) Inhalation four times a day PRN  aluminum hydroxide/magnesium hydroxide/simethicone Suspension 30 milliLiter(s) Oral every 4 hours PRN  dextrose Oral Gel 15 Gram(s) Oral once PRN  melatonin 3 milliGRAM(s) Oral at bedtime PRN  ondansetron Injectable 4 milliGRAM(s) IV Push every 8 hours PRN    VITALS  T(F): 97 (05-05-25 @ 08:07), Max: 103.7 (05-04-25 @ 17:51)  HR: 101 (05-05-25 @ 09:37) (95 - 121)  BP: 138/63 (05-05-25 @ 09:37) (120/71 - 176/74)  RR: 18 (05-05-25 @ 08:07) (18 - 18)  SpO2: 100% (05-05-25 @ 08:07) (95% - 100%)  POCT Blood Glucose.: 105 mg/dL (05-05-25 @ 08:35)  POCT Blood Glucose.: 135 mg/dL (05-04-25 @ 22:50)  POCT Blood Glucose.: 132 mg/dL (05-04-25 @ 17:34)  POCT Blood Glucose.: 179 mg/dL (05-04-25 @ 11:45)    PHYSICAL EXAM  GENERAL  ( + ) NAD, lying in bed comfortably     (  ) obtunded     (  ) lethargic     (  ) somnolent    HEAD  (+  ) Atraumatic     (  ) hematoma     (  ) laceration (specify location:       )     NECK  ( + ) Supple     (  ) neck stiffness     (  ) nuchal rigidity     (  )  no JVD     (  ) JVD present ( -- cm)    HEART  Rate -->  ( + ) normal rate    (  ) bradycardic    (  ) tachycardic  Rhythm -->  (+  ) regular    (  ) regularly irregular    (  ) irregularly irregular  Murmurs -->  (  ) normal s1/s2    (  ) systolic murmur    (  ) diastolic murmur    (  ) continuous murmur     (  ) S3 present    (  ) S4 present    LUNGS  (+  )Unlabored respirations     (  ) tachypnea  (+  ) B/L air entry     (  ) decreased breath sounds in:  (location     )    (  ) no adventitious sound     (  ) crackles     (  ) wheezing      (  ) rhonchi      (specify location:       )  (  ) chest wall tenderness (specify location:       )    ABDOMEN  ( + ) Soft     (  ) tense   |   (  ) nondistended     (  ) distended   |   (  ) +BS     (  ) hypoactive bowel sounds     (  ) hyperactive bowel sounds  ( + ) nontender     (  ) RUQ tenderness     (  ) RLQ tenderness     (  ) LLQ tenderness     (  ) epigastric tenderness     (  ) diffuse tenderness  (  ) Splenomegaly      (  ) Hepatomegaly      (  ) Jaundice     (  ) ecchymosis     EXTREMITIES  ( + ) Normal     (  ) Rash     (  ) ecchymosis     (  ) varicose veins      (  ) pitting edema     (  ) non-pitting edema   (  ) ulceration     (  ) gangrene:     (location:     )    NERVOUS SYSTEM  ( + ) A&Ox1     (  ) confused     (  ) lethargic  CN II-XII:     (  ) Intact     (  ) focal deficits  (Specify:     )   Upper extremities:     (  ) strength X/5     (  ) focal deficit (specify:    )  Lower extremities:     (  ) strength  X/5    (  ) focal deficit (specify:    )    SKIN  (+  ) No rashes or lesions     (  ) maculopapular rash     (  ) pustules     (  ) vesicles     (  ) ulcer     (  ) ecchymosis     (specify location:     )    LABS             10.8   13.87 )-----------( 154      ( 05-05-25 @ 08:18 )             32.5     145  |  107  |  43  -------------------------<  103   05-05-25 @ 08:18  3.9  |  19  |  2.1    Ca      8.7     05-05-25 @ 08:18  Mg     2.1     05-05-25 @ 08:18    TPro  6.7  /  Alb  3.2  /  TBili  0.3  /  DBili  x   /  AST  49  /  ALT  17  /  AlkPhos  123  /  GGT  x     05-05-25 @ 08:18    PT/INR - ( 05-04-25 @ 07:29 )   PT: 16.50 sec[H];   INR: 1.39 ratio[H]    Troponin T, High Sensitivity Result: 58 ng/L (05-03-25 @ 04:50)  Troponin T, High Sensitivity Result: 68 ng/L (05-03-25 @ 02:36)    Urinalysis Basic - ( 05 May 2025 08:18 )    Color: x / Appearance: x / SG: x / pH: x  Gluc: 103 mg/dL / Ketone: x  / Bili: x / Urobili: x   Blood: x / Protein: x / Nitrite: x   Leuk Esterase: x / RBC: x / WBC x   Sq Epi: x / Non Sq Epi: x / Bacteria: x    Urinalysis with Rflx Culture (collected 03 May 2025 04:50)    Culture - Urine (collected 03 May 2025 04:50)  Source: Clean Catch None  Preliminary Report (04 May 2025 12:43):    >100,000 CFU/ml Gram Negative Rods    Culture - Blood (collected 03 May 2025 02:36)  Source: Blood Blood-Peripheral  Preliminary Report (05 May 2025 10:01):    No growth at 48 Hours    Culture - Blood (collected 03 May 2025 02:36)  Source: Blood Blood-Peripheral  Preliminary Report (05 May 2025 10:01):    No growth at 48 Hours

## 2025-05-06 LAB
ANION GAP SERPL CALC-SCNC: 17 MMOL/L — HIGH (ref 7–14)
BASOPHILS # BLD AUTO: 0.01 K/UL — SIGNIFICANT CHANGE UP (ref 0–0.2)
BASOPHILS NFR BLD AUTO: 0.1 % — SIGNIFICANT CHANGE UP (ref 0–1)
BUN SERPL-MCNC: 45 MG/DL — HIGH (ref 10–20)
CALCIUM SERPL-MCNC: 8.9 MG/DL — SIGNIFICANT CHANGE UP (ref 8.4–10.5)
CHLORIDE SERPL-SCNC: 110 MMOL/L — SIGNIFICANT CHANGE UP (ref 98–110)
CO2 SERPL-SCNC: 22 MMOL/L — SIGNIFICANT CHANGE UP (ref 17–32)
CREAT SERPL-MCNC: 1.9 MG/DL — HIGH (ref 0.7–1.5)
EGFR: 35 ML/MIN/1.73M2 — LOW
EGFR: 35 ML/MIN/1.73M2 — LOW
EOSINOPHIL # BLD AUTO: 0.01 K/UL — SIGNIFICANT CHANGE UP (ref 0–0.7)
EOSINOPHIL NFR BLD AUTO: 0.1 % — SIGNIFICANT CHANGE UP (ref 0–8)
GLUCOSE BLDC GLUCOMTR-MCNC: 112 MG/DL — HIGH (ref 70–99)
GLUCOSE BLDC GLUCOMTR-MCNC: 116 MG/DL — HIGH (ref 70–99)
GLUCOSE BLDC GLUCOMTR-MCNC: 133 MG/DL — HIGH (ref 70–99)
GLUCOSE SERPL-MCNC: 103 MG/DL — HIGH (ref 70–99)
HCT VFR BLD CALC: 30.7 % — LOW (ref 42–52)
HGB BLD-MCNC: 10.3 G/DL — LOW (ref 14–18)
IMM GRANULOCYTES NFR BLD AUTO: 0.8 % — HIGH (ref 0.1–0.3)
LYMPHOCYTES # BLD AUTO: 0.88 K/UL — LOW (ref 1.2–3.4)
LYMPHOCYTES # BLD AUTO: 9.5 % — LOW (ref 20.5–51.1)
MAGNESIUM SERPL-MCNC: 2.1 MG/DL — SIGNIFICANT CHANGE UP (ref 1.8–2.4)
MCHC RBC-ENTMCNC: 30.5 PG — SIGNIFICANT CHANGE UP (ref 27–31)
MCHC RBC-ENTMCNC: 33.6 G/DL — SIGNIFICANT CHANGE UP (ref 32–37)
MCV RBC AUTO: 90.8 FL — SIGNIFICANT CHANGE UP (ref 80–94)
MONOCYTES # BLD AUTO: 0.56 K/UL — SIGNIFICANT CHANGE UP (ref 0.1–0.6)
MONOCYTES NFR BLD AUTO: 6 % — SIGNIFICANT CHANGE UP (ref 1.7–9.3)
NEUTROPHILS # BLD AUTO: 7.73 K/UL — HIGH (ref 1.4–6.5)
NEUTROPHILS NFR BLD AUTO: 83.5 % — HIGH (ref 42.2–75.2)
NRBC BLD AUTO-RTO: 0 /100 WBCS — SIGNIFICANT CHANGE UP (ref 0–0)
PLATELET # BLD AUTO: 199 K/UL — SIGNIFICANT CHANGE UP (ref 130–400)
PMV BLD: 11.9 FL — HIGH (ref 7.4–10.4)
POTASSIUM SERPL-MCNC: 3.8 MMOL/L — SIGNIFICANT CHANGE UP (ref 3.5–5)
POTASSIUM SERPL-SCNC: 3.8 MMOL/L — SIGNIFICANT CHANGE UP (ref 3.5–5)
RBC # BLD: 3.38 M/UL — LOW (ref 4.7–6.1)
RBC # FLD: 15.2 % — HIGH (ref 11.5–14.5)
SODIUM SERPL-SCNC: 149 MMOL/L — HIGH (ref 135–146)
WBC # BLD: 9.26 K/UL — SIGNIFICANT CHANGE UP (ref 4.8–10.8)
WBC # FLD AUTO: 9.26 K/UL — SIGNIFICANT CHANGE UP (ref 4.8–10.8)

## 2025-05-06 PROCEDURE — 99233 SBSQ HOSP IP/OBS HIGH 50: CPT

## 2025-05-06 RX ADMIN — Medication 90 MILLIGRAM(S): at 06:18

## 2025-05-06 RX ADMIN — METOPROLOL SUCCINATE 25 MILLIGRAM(S): 50 TABLET, EXTENDED RELEASE ORAL at 06:18

## 2025-05-06 RX ADMIN — HEPARIN SODIUM 5000 UNIT(S): 1000 INJECTION INTRAVENOUS; SUBCUTANEOUS at 06:18

## 2025-05-06 RX ADMIN — HEPARIN SODIUM 5000 UNIT(S): 1000 INJECTION INTRAVENOUS; SUBCUTANEOUS at 14:32

## 2025-05-06 RX ADMIN — METOPROLOL SUCCINATE 25 MILLIGRAM(S): 50 TABLET, EXTENDED RELEASE ORAL at 17:26

## 2025-05-06 RX ADMIN — TAMSULOSIN HYDROCHLORIDE 0.4 MILLIGRAM(S): 0.4 CAPSULE ORAL at 22:54

## 2025-05-06 RX ADMIN — FINASTERIDE 5 MILLIGRAM(S): 1 TABLET, FILM COATED ORAL at 11:17

## 2025-05-06 RX ADMIN — Medication 3 MILLIGRAM(S): at 22:54

## 2025-05-06 RX ADMIN — HEPARIN SODIUM 5000 UNIT(S): 1000 INJECTION INTRAVENOUS; SUBCUTANEOUS at 22:53

## 2025-05-06 NOTE — PROGRESS NOTE ADULT - ASSESSMENT
83 y/o male with Pmhx of Copd, Htn, ckd stage 2, Dm, schizophrenia, Hld, non-hodgkins lymphoma, urinary retention with chronic Bains (changed in ED on 5/3/25), Hx of MDR UTIs presents to the ED from [facility] for evaluation of AMS, being admitted for ams 2/2 sepsis from suspected UTI in setting of chronic bains and multiple UTIs w/MDR organisms.    #Complicated Acute Pyelonephritis in a setting Chronic bains for urinary retention  #?AMS 2/2 sepsis from UTI  - baseline per previous documentation oriented x1  - currently confused, does not follow commands   - last culture on 4/25/25 admission showing E faecalis sensitive to vancomycin and carbapenem resistant kleb pneumo (likely colonization)   - RVP neg, COVID neg  - blood cx positive for Klebsiella pneumoniae CR  - urine cx + gram neg rods   - s/p vancomycin and cefepime in ED  - s/p amikacin 750mg x 1   - CXR - cardiomegaly. Improved opacities  - B/l LE duplex - No DVT  - per ID: Avycaz 0.94g q12h IV  - Home meds: gabapentin, trazodone, olanzipine, tamsulosin, finasteride  - c/w home tamsulosin 0.4mg daily   - 5/6: Bains removed, TOV today, encourage po intake, WBC trending down    #DAVID on CKD2 - improving   - yarely pre-renal   - Cr 2.3 > 2.1 > 1.9  - s/p IVF     #Chronic normocytic Anemia  - Hgb 10.3 (baseline around 10.0), MCV 90.8    #Hyperkalemia- resolved   - K 4.2 >3.9    #troponin elevation   - demand ischemia in setting of sepsis   - downtrending 68 > 58    #COPD  - c/w home inhalers     #HTN  #Sinus tachycardia on EKG  - c/w home Nifedipine 90mg daily   - c/w home metoprolol 25mg BID     #Non Hodgkins lymphoma  - outpt f/u    #Nutrition/Fluids/Electrolytes   - replete K<4 and Mg <2  - ensure regular BMs    #DVT Px  SCDs  Heparin SQ    #Progress Note Handoff  Pending: Clinical improvement and stability__x____  Disposition: PITER vs SNF       My note supersedes the residents note should a discrepancy arise.    Chart and notes personally reviewed.  Care Discussed with Consultants/Other Providers/ Housestaff [ x] YES [ ] NO   Radiology, labs, old records personally reviewed.    discussed w/ housestaff, nursing, case management    Attestation Statements:    Attestation Statements:  Risk Statement (NON-critical care).     On this date of service, level of risk to patient is considered: High.     Due to: pt with illness causing risk to life or bodily fxn    Time-based billing (NON-critical care).     50 minutes spent on total encounter. The necessity of the time spent during the encounter on this date of service was due to:     time spent on review of labs, imaging studies, old records, obtaining history, personally examining patient, counselling and communicating with patient/ family, entering orders for medications/tests/etc, discussions with other health care providers, documentation in electronic health records, independent interpretation of labs, imaging/procedure results and care coordination.    Activity:  AAT  DVT ppx: heparin sub-q  Pending: monitor urinary output, monitor mental status

## 2025-05-06 NOTE — PROGRESS NOTE ADULT - SUBJECTIVE AND OBJECTIVE BOX
Patient is a 84y old  Male who presents with a chief complaint of ams 2/2 sepsis uti in setting of chronic bains (06 May 2025 13:21)    INTERVAL HPI/OVERNIGHT EVENTS: Patient was examined and seen at bedside. This morning pt is resting in bed and staff reports no new issues or overnight events. Pt refuses to answer questions  ROS: Denies any complaints  InitialHPI:  The patient is an 83 y/o male with Pmhx of Copd, Htn, ckd stage 2, Dm, schizophrenia, Hld, non-hodgkins lymphoma, urinary retention with chronic Bains (changed in ED on 5/3/25), Hx of MDR UTIs presents to the ED from [facility] for evaluation of AMS. Patient currently has no complaints at bedside, unable to speak/verbalize at this time, baseline aaox1, currently confused, no acute apparent distress.  According to ED documentation patient was unresponsive at NH, no response from [facility] at this time, not response from emergency contacts at this time either. Patient is being admitted for sepsis 2/2 uti in setting of chronic foely w/MDR organisms.     ED course:   Vitals:   bp 116/90  hr 105  rr 25  temp 100.2/37.9     imaging:   CTH no acute pathology     CTAP w/IV cont:     IMPRESSION:    Urinary bladder distended with Bains catheter balloon inflated within   prostatic urethra; repositioning recommended.  Urinary bladder mild pericystic stranding, could represent bladder infection in the appropriate clinical setting.  Mild bilateral hydroureteronephrosis, could be attributed to bladder outlet obstruction.  Urinary bladder right posterolateral subcentimeter nodular density, which can be further evaluated as outpatient.  BPH.    bains changed in ED      Labs:  wbc 22k  hgb 9.0 (baseline around 10.0)   trop 68-->58  k+ 5.3  cr 2.6 (baseline 1.4)   lactate 1.9     s/p   500cc LR bolus   s/p cefepime and vanc  ucx bcx collected   (03 May 2025 11:49)    PAST MEDICAL & SURGICAL HISTORY:  COPD (chronic obstructive pulmonary disease)      Paranoid schizophrenia      Schizophrenia      Diabetes type 2, controlled      COPD (chronic obstructive pulmonary disease)      Dyslipidemia      Chronic retention of urine      Dyslipidemia      Encounter for screening colonoscopy          General: NAD, alert, chronically ill appearing  HEENT:  EOMI, no LAD  CV: S1 S2  Resp: decreased breath sounds at bases  GI: NT/ND/S +BS  MS: no clubbing/cyanosis/edema, + pulses b/l  Neuro: appears to move all extremities    MEDICATIONS  (STANDING):  ceftazidime/avibactam IVPB 0.94 Gram(s) IV Intermittent every 12 hours  chlorhexidine 2% Cloths 1 Application(s) Topical <User Schedule>  dextrose 5%. 1000 milliLiter(s) (100 mL/Hr) IV Continuous <Continuous>  dextrose 5%. 1000 milliLiter(s) (50 mL/Hr) IV Continuous <Continuous>  dextrose 50% Injectable 25 Gram(s) IV Push once  dextrose 50% Injectable 12.5 Gram(s) IV Push once  dextrose 50% Injectable 25 Gram(s) IV Push once  finasteride 5 milliGRAM(s) Oral daily  glucagon  Injectable 1 milliGRAM(s) IntraMuscular once  heparin   Injectable 5000 Unit(s) SubCutaneous every 8 hours  insulin lispro (ADMELOG) corrective regimen sliding scale   SubCutaneous three times a day before meals  lactated ringers. 500 milliLiter(s) (100 mL/Hr) IV Continuous <Continuous>  metoprolol tartrate 25 milliGRAM(s) Oral two times a day  NIFEdipine XL 90 milliGRAM(s) Oral daily  tamsulosin 0.4 milliGRAM(s) Oral at bedtime    MEDICATIONS  (PRN):  acetaminophen  Suppository .. 650 milliGRAM(s) Rectal every 6 hours PRN Temp greater or equal to 38C (100.4F), Moderate Pain (4 - 6)  albuterol    90 MICROgram(s) HFA Inhaler 1 Puff(s) Inhalation four times a day PRN for shortness of breath and/or wheezing  aluminum hydroxide/magnesium hydroxide/simethicone Suspension 30 milliLiter(s) Oral every 4 hours PRN Dyspepsia  dextrose Oral Gel 15 Gram(s) Oral once PRN Blood Glucose LESS THAN 70 milliGRAM(s)/deciliter  melatonin 3 milliGRAM(s) Oral at bedtime PRN Insomnia  ondansetron Injectable 4 milliGRAM(s) IV Push every 8 hours PRN Nausea and/or Vomiting    Vital Signs Last 24 Hrs  T(C): 36.8 (06 May 2025 13:05), Max: 36.8 (05 May 2025 16:10)  T(F): 98.2 (06 May 2025 13:05), Max: 98.3 (05 May 2025 16:10)  HR: 103 (06 May 2025 13:05) (98 - 107)  BP: 138/68 (06 May 2025 13:05) (130/67 - 145/64)  BP(mean): 92 (06 May 2025 13:05) (90 - 92)  RR: 18 (06 May 2025 13:05) (18 - 18)  SpO2: 95% (06 May 2025 13:05) (92% - 96%)    Parameters below as of 06 May 2025 13:05  Patient On (Oxygen Delivery Method): room air      CAPILLARY BLOOD GLUCOSE      POCT Blood Glucose.: 116 mg/dL (06 May 2025 11:21)  POCT Blood Glucose.: 112 mg/dL (06 May 2025 07:58)  POCT Blood Glucose.: 130 mg/dL (05 May 2025 22:46)  POCT Blood Glucose.: 128 mg/dL (05 May 2025 17:25)                          10.3   9.26  )-----------( 199      ( 06 May 2025 05:31 )             30.7     05-06    149[H]  |  110  |  45[H]  ----------------------------<  103[H]  3.8   |  22  |  1.9[H]    Ca    8.9      06 May 2025 05:31  Mg     2.1     05-06    TPro  6.7  /  Alb  3.2[L]  /  TBili  0.3  /  DBili  x   /  AST  49[H]  /  ALT  17  /  AlkPhos  123[H]  05-05    LIVER FUNCTIONS - ( 05 May 2025 08:18 )  Alb: 3.2 g/dL / Pro: 6.7 g/dL / ALK PHOS: 123 U/L / ALT: 17 U/L / AST: 49 U/L / GGT: x                 Urinalysis Basic - ( 06 May 2025 05:31 )    Color: x / Appearance: x / SG: x / pH: x  Gluc: 103 mg/dL / Ketone: x  / Bili: x / Urobili: x   Blood: x / Protein: x / Nitrite: x   Leuk Esterase: x / RBC: x / WBC x   Sq Epi: x / Non Sq Epi: x / Bacteria: x              Culture - Blood (collected 04 May 2025 19:01)  Source: Blood Blood  Preliminary Report (06 May 2025 01:03):    No growth at 24 hours    Culture - Blood (collected 04 May 2025 19:01)  Source: Blood Blood  Gram Stain (05 May 2025 16:02):    Growth in aerobic bottle: Gram Negative Rods  Preliminary Report (06 May 2025 12:02):    Growth in aerobic bottle: Klebsiella pneumoniae    Direct identification is available within approximately 3-5    hours either by Blood Panel Multiplexed PCR or Direct    MALDI-TOF. Details: [email or web address]  Organism: Blood Culture PCR (05 May 2025 17:59)  Organism: Blood Culture PCR (05 May 2025 17:59)      Chart, Consultant(s) Notes Reviewed:  [x ] YES  [ ] NO  Care Discussed with Consultants/Other Providers/ Housestaff [ x] YES  [ ] NO  Radiology, labs, old available records personally reviewed.

## 2025-05-06 NOTE — PROGRESS NOTE ADULT - SUBJECTIVE AND OBJECTIVE BOX
ADA VILLAGOMEZ  84y, Male  Allergy: No Known Allergies      LOS  3d    CHIEF COMPLAINT: ams 2/2 sepsis uti in setting of chronic bains (05 May 2025 10:25)      INTERVAL EVENTS/HPI  - No acute events overnight  - T(F): , Max: 98.3 (05-05-25 @ 16:10)  - Tolerating medication  - WBC Count: 13.87 (05-05-25 @ 08:18) downtrending   WBC Count: 16.00 (05-05-25 @ 01:58)     - Creatinine: 2.1 (05-05-25 @ 08:18)  Creatinine: 2.3 (05-05-25 @ 01:58)       ROS  ***    VITALS:  T(F): 98.2, Max: 98.3 (05-05-25 @ 16:10)  HR: 100  BP: 144/62  RR: 18Vital Signs Last 24 Hrs  T(C): 36.8 (06 May 2025 05:37), Max: 36.8 (05 May 2025 16:10)  T(F): 98.2 (06 May 2025 05:37), Max: 98.3 (05 May 2025 16:10)  HR: 100 (06 May 2025 05:37) (98 - 107)  BP: 144/62 (06 May 2025 05:37) (130/67 - 168/77)  BP(mean): 90 (06 May 2025 05:37) (90 - 92)  RR: 18 (06 May 2025 05:37) (18 - 18)  SpO2: 95% (06 May 2025 05:37) (92% - 100%)    Parameters below as of 06 May 2025 05:37  Patient On (Oxygen Delivery Method): room air        PHYSICAL EXAM:  ***    FH: Non-contributory  Social Hx: Non-contributory    TESTS & MEASUREMENTS:                        10.8   13.87 )-----------( 154      ( 05 May 2025 08:18 )             32.5     05-05    145  |  107  |  43[H]  ----------------------------<  103[H]  3.9   |  19  |  2.1[H]    Ca    8.7      05 May 2025 08:18  Mg     2.1     05-05    TPro  6.7  /  Alb  3.2[L]  /  TBili  0.3  /  DBili  x   /  AST  49[H]  /  ALT  17  /  AlkPhos  123[H]  05-05      LIVER FUNCTIONS - ( 05 May 2025 08:18 )  Alb: 3.2 g/dL / Pro: 6.7 g/dL / ALK PHOS: 123 U/L / ALT: 17 U/L / AST: 49 U/L / GGT: x           Urinalysis Basic - ( 05 May 2025 08:18 )    Color: x / Appearance: x / SG: x / pH: x  Gluc: 103 mg/dL / Ketone: x  / Bili: x / Urobili: x   Blood: x / Protein: x / Nitrite: x   Leuk Esterase: x / RBC: x / WBC x   Sq Epi: x / Non Sq Epi: x / Bacteria: x        Culture - Blood (collected 05-04-25 @ 19:01)  Source: Blood Blood  Preliminary Report (05-06-25 @ 01:03):    No growth at 24 hours    Culture - Blood (collected 05-04-25 @ 19:01)  Source: Blood Blood  Gram Stain (05-05-25 @ 16:02):    Growth in aerobic bottle: Gram Negative Rods  Preliminary Report (05-05-25 @ 16:03):    Growth in aerobic bottle: Gram Negative Rods    Direct identification is available within approximately 3-5    hours either by Blood Panel Multiplexed PCR or Direct    MALDI-TOF. Details: [email or web address]  Organism: Blood Culture PCR (05-05-25 @ 17:59)  Organism: Blood Culture PCR (05-05-25 @ 17:59)      -  CTX-M Resistance Marker: Detec      Method Type: PCR      -  K. pneumoniae group: Detec (K. pneumoniae, K. quasipneumoniae, K. variicola)      -  Carbapenem Resistance: Detec      -  OXA Resistance Marker: Detec      -  ESBL: Detec    Urinalysis with Rflx Culture (collected 05-03-25 @ 04:50)    Culture - Urine (collected 05-03-25 @ 04:50)  Source: Clean Catch None  Preliminary Report (05-04-25 @ 12:43):    >100,000 CFU/ml Gram Negative Rods    Culture - Blood (collected 05-03-25 @ 02:36)  Source: Blood Blood-Peripheral  Preliminary Report (05-05-25 @ 10:01):    No growth at 48 Hours    Culture - Blood (collected 05-03-25 @ 02:36)  Source: Blood Blood-Peripheral  Preliminary Report (05-05-25 @ 10:01):    No growth at 48 Hours    Culture - Eye (collected 04-22-25 @ 17:50)  Source: Eye Eye-Right  Gram Stain (04-23-25 @ 23:57):    No polymorphonuclear leukocytes seen    No organisms seen    by cytocentrifuge  Final Report (04-27-25 @ 18:53):    Few Staphylococcus simulans  Organism: Staphylococcus simulans (04-27-25 @ 18:53)  Organism: Staphylococcus simulans (04-27-25 @ 18:53)      -  Clindamycin: S <=0.25      -  Oxacillin: R >2      -  Gentamicin: S <=4 Should not be used as monotherapy      -  Vancomycin: S 1      -  Tetracycline: S <=4      Method Type: IZABELLA      -  Penicillin: R >2      -  Rifampin: S <=1 Should not be used as monotherapy      -  Erythromycin: S <=0.25      -  Trimethoprim/Sulfamethoxazole: S <=0.5/9.5    Urinalysis with Rflx Culture (collected 04-21-25 @ 21:39)    Culture - Urine (collected 04-21-25 @ 21:39)  Source: Catheterized None  Final Report (04-27-25 @ 06:16):    >100,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)    50,000 - 99,000 CFU/mL Enterococcus faecalis  Organism: Enterococcus faecalis (04-27-25 @ 06:16)      -  Levofloxacin: R >4      -  Nitrofurantoin: S <=32 Should not be used to treat pyelonephritis.      -  Vancomycin: S 1      -  Ciprofloxacin: R >2      -  Ampicillin: S <=2 Predicts results to ampicillin/sulbactam, amoxacillin-clavulanate and  piperacillin-tazobactam.      -  Tetracycline: R >8      Method Type: IZABELLA  Organism: Klebsiella pneumoniae (Carbapenem Resistant)  Klebsiella pneumoniae (Carbapenem Resistant)  Enterococcus faecalis (04-27-25 @ 06:16)  Organism: Klebsiella pneumoniae (Carbapenem Resistant) (04-27-25 @ 06:16)      -  Levofloxacin: R >4      -  Tobramycin: R >8      -  Nitrofurantoin: R >64 Should not be used to treat pyelonephritis      -  Aztreonam: R >16      -  Gentamicin: R >8      -  Cefepime: R >16      -  Cefazolin: R >16 For uncomplicated UTI with K. pneumoniae, E. coli, or P. mirablis: IZABELLA <=16 is sensitive and IZABELLA >=32 is resistant. This also predicts results for oral agents cefaclor, cefdinir, cefpodoxime, cefprozil, cefuroxime axetil, cephalexin and locarbef for uncomplicated UTI. Note that some isolates may be susceptible to these agents while testing resistant to cefazolin.      -  Piperacillin/Tazobactam: R >64      -  Ciprofloxacin: R >2      -  Imipenem: R 8      -  Ceftriaxone: R >32      -  Ampicillin: R >16 These ampicillin results predict results for amoxicillin      Method Type: IZABELLA      -  Meropenem: R >8      -  Ampicillin/Sulbactam: R >16/8      -  Cefoxitin: R >16      -  Cefuroxime: R >16      -  Amoxicillin/Clavulanic Acid: R >16/8      -  Trimethoprim/Sulfamethoxazole: R >2/38      -  Ertapenem: R >1  Organism: Klebsiella pneumoniae (Carbapenem Resistant) (04-27-25 @ 06:16)      -  Resistance Gene OXA: Detec      Method Type: CarbaR    Culture - Blood (collected 04-21-25 @ 20:33)  Source: Blood Blood-Peripheral  Final Report (04-27-25 @ 03:00):    No growth at 5 days    Culture - Blood (collected 04-21-25 @ 20:33)  Source: Blood Blood-Peripheral  Final Report (04-27-25 @ 03:00):    No growth at 5 days    Culture - Urine (collected 04-19-25 @ 14:30)  Source: Catheterized Catheterized  Final Report (04-22-25 @ 07:57):    >100,000 CFU/ml Enterococcus faecalis    <10,000 CFU/ml Normal Urogenital jake present  Organism: Enterococcus faecalis (04-22-25 @ 07:57)  Organism: Enterococcus faecalis (04-22-25 @ 07:57)      -  Levofloxacin: R >4      -  Nitrofurantoin: S <=32 Should not be used to treat pyelonephritis.      -  Vancomycin: S 1      -  Ciprofloxacin: R >2      -  Ampicillin: S <=2 Predicts results to ampicillin/sulbactam, amoxacillin-clavulanate and  piperacillin-tazobactam.      -  Tetracycline: R >8      Method Type: IZABELLA    Urinalysis with Rflx Culture (collected 04-14-25 @ 18:19)    Culture - Urine (collected 04-14-25 @ 18:19)  Source: Clean Catch None  Final Report (04-17-25 @ 08:03):    >100,000 CFU/ml Proteus mirabilis  Organism: Proteus mirabilis (04-17-25 @ 08:03)  Organism: Proteus mirabilis (04-17-25 @ 08:03)      -  Levofloxacin: S <=0.5      -  Tobramycin: S <=2      -  Nitrofurantoin: R >64 Should not be used to treat pyelonephritis      -  Aztreonam: S <=4      -  Gentamicin: S <=2      -  Cefazolin: S <=2 For uncomplicated UTI with K. pneumoniae, E. coli, or P. mirablis: IZABELLA <=16 is sensitive and IZABELLA >=32 is resistant. This also predicts results for oral agents cefaclor, cefdinir, cefpodoxime, cefprozil, cefuroxime axetil, cephalexin and locarbef for uncomplicated UTI. Note that some isolates may be susceptible to these agents while testing resistant to cefazolin.      -  Cefepime: S <=2      -  Piperacillin/Tazobactam: S <=8      -  Ciprofloxacin: S <=0.25      -  Ceftriaxone: S <=1      -  Ampicillin: S <=8 These ampicillin results predict results for amoxicillin      Method Type: IZABELLA      -  Meropenem: S <=1      -  Ampicillin/Sulbactam: S <=4/2      -  Cefoxitin: S <=8      -  Cefuroxime: S <=4      -  Amoxicillin/Clavulanic Acid: S <=8/4      -  Trimethoprim/Sulfamethoxazole: S <=0.5/9.5      -  Ertapenem: S <=0.5    Culture - Blood (collected 04-14-25 @ 18:10)  Source: Blood Blood  Final Report (04-19-25 @ 23:07):    No growth at 5 days    Culture - Blood (collected 04-14-25 @ 18:00)  Source: Blood Blood  Final Report (04-19-25 @ 23:07):    No growth at 5 days    Urinalysis with Rflx Culture (collected 04-13-25 @ 15:05)    Culture - Urine (collected 04-13-25 @ 15:05)  Source: Catheterized None  Final Report (04-15-25 @ 19:27):    >100,000 CFU/ml Proteus mirabilis    <10,000 CFU/ml Normal Urogenital jake present  Organism: Proteus mirabilis (04-15-25 @ 19:27)  Organism: Proteus mirabilis (04-15-25 @ 19:27)      -  Levofloxacin: S <=0.5      -  Tobramycin: S <=2      -  Nitrofurantoin: R >64 Should not be used to treat pyelonephritis      -  Aztreonam: S <=4      -  Gentamicin: S <=2      -  Cefazolin: S <=2 For uncomplicated UTI with K. pneumoniae, E. coli, or P. mirablis: IZABELLA <=16 is sensitive and IZABELLA >=32 is resistant. This also predicts results for oral agents cefaclor, cefdinir, cefpodoxime, cefprozil, cefuroxime axetil, cephalexin and locarbef for uncomplicated UTI. Note that some isolates may be susceptible to these agents while testing resistant to cefazolin.      -  Cefepime: S <=2      -  Piperacillin/Tazobactam: S <=8      -  Ciprofloxacin: S <=0.25      -  Ceftriaxone: S <=1      -  Ampicillin: S <=8 These ampicillin results predict results for amoxicillin      Method Type: IZABELLA      -  Meropenem: S <=1      -  Ampicillin/Sulbactam: S <=4/2      -  Cefoxitin: S <=8      -  Cefuroxime: S <=4      -  Amoxicillin/Clavulanic Acid: S <=8/4      -  Trimethoprim/Sulfamethoxazole: S <=0.5/9.5      -  Ertapenem: S <=0.5        Lactate, Blood: 0.7 mmol/L (05-04-25 @ 19:03)  Lactate, Blood: 1.9 mmol/L (05-03-25 @ 02:36)  Blood Gas Venous - Lactate: 1.7 mmol/L (05-03-25 @ 02:14)      INFECTIOUS DISEASES TESTING  Rapid RVP Result: NotDetec (05-05-25 @ 09:00)  Vancomycin Level, Trough: 8.4 (04-23-25 @ 16:13)  Procalcitonin: 0.21 (04-22-25 @ 11:47)  MRSA PCR Result.: Negative (04-22-25 @ 06:30)  MRSA PCR Result.: Negative (01-17-25 @ 18:56)  MRSA PCR Result.: Negative (01-08-25 @ 16:03)  Procalcitonin: 1.80 (01-05-25 @ 19:42)  Streptococcus pneumoniae Ag, Ur Result: Negative (11-23-24 @ 13:05)  Procalcitonin: 0.09 (11-23-24 @ 06:19)  Procalcitonin: 0.11 (11-22-24 @ 13:42)  Rapid RVP Result: NotDetec (11-22-24 @ 02:23)  strept    INFLAMMATORY MARKERS      RADIOLOGY & ADDITIONAL TESTS:  I have personally reviewed the last available Chest xray  CXR  Xray Chest 1 View- PORTABLE-Urgent:   ACC: [id number] EXAM:  XR CHEST PORTABLE URGENT 1V   ORDERED BY: MINDA CAUSEY     PROCEDURE DATE:  05/05/2025          INTERPRETATION:  CLINICAL HISTORY: Cystitis    COMPARISON: 2 days prior.    TECHNIQUE: Portable frontal chest radiograph.    FINDINGS:    Support devices: Telemetry leads overlie the chest.    Cardiac/mediastinum/hilum: Stable cardiomegaly.    Lung parenchyma/Pleura: No focal parenchymal opacities, pleural   effusions, or pneumothorax.    Skeleton/soft tissues: Postoperative changes of the right shoulder      IMPRESSION:    Cardiomegaly. Improved opacities.    --- End of Report ---            JACKLYN MOJICA MD; Attending Interventional Radiologist  This document has been electronically signed. May  5 2025  9:12AM (05-05-25 @ 09:11)      CT      CARDIOLOGY TESTING  12 Lead ECG:   Ventricular Rate 102 BPM    Atrial Rate 102 BPM    P-R Interval 164 ms    QRS Duration 76 ms    Q-T Interval 340 ms    QTC Calculation(Bazett) 443 ms    P Axis 70 degrees    R Axis 53 degrees    T Axis 24 degrees    Diagnosis Line Sinus tachycardia  Otherwise normal ECG    Confirmed by Cristi Rosario ([id number]) on 5/3/2025 8:55:05 AM (05-03-25 @ 03:40)      MEDICATIONS  ceftazidime/avibactam IVPB 0.94 IV Intermittent every 12 hours  dextrose 5%. 1000 IV Continuous <Continuous>  dextrose 5%. 1000 IV Continuous <Continuous>  dextrose 50% Injectable 25 IV Push once  dextrose 50% Injectable 12.5 IV Push once  dextrose 50% Injectable 25 IV Push once  finasteride 5 Oral daily  glucagon  Injectable 1 IntraMuscular once  heparin   Injectable 5000 SubCutaneous every 8 hours  insulin lispro (ADMELOG) corrective regimen sliding scale  SubCutaneous three times a day before meals  lactated ringers. 500 IV Continuous <Continuous>  metoprolol tartrate 25 Oral two times a day  NIFEdipine XL 90 Oral daily  tamsulosin 0.4 Oral at bedtime      WEIGHT  Weight (kg): 60.8 (05-06-25 @ 02:35)  Creatinine: 2.1 mg/dL (05-05-25 @ 08:18)      ANTIBIOTICS:  ceftazidime/avibactam IVPB 0.94 Gram(s) IV Intermittent every 12 hours      All available historical records have been reviewed       ADA VILLAGOMEZ  84y, Male  Allergy: No Known Allergies      LOS  3d    CHIEF COMPLAINT: ams 2/2 sepsis uti in setting of chronic bains (05 May 2025 10:25)      INTERVAL EVENTS/HPI  - No acute events overnight  - T(F): , Max: 98.3 (05-05-25 @ 16:10)  - Tolerating medication  - WBC Count: 13.87 (05-05-25 @ 08:18) downtrending   WBC Count: 16.00 (05-05-25 @ 01:58)     - Creatinine: 2.1 (05-05-25 @ 08:18)  Creatinine: 2.3 (05-05-25 @ 01:58)       ROS  unable to obtain history secondary to patient's mental status and/or sedation     VITALS:  T(F): 98.2, Max: 98.3 (05-05-25 @ 16:10)  HR: 100  BP: 144/62  RR: 18Vital Signs Last 24 Hrs  T(C): 36.8 (06 May 2025 05:37), Max: 36.8 (05 May 2025 16:10)  T(F): 98.2 (06 May 2025 05:37), Max: 98.3 (05 May 2025 16:10)  HR: 100 (06 May 2025 05:37) (98 - 107)  BP: 144/62 (06 May 2025 05:37) (130/67 - 168/77)  BP(mean): 90 (06 May 2025 05:37) (90 - 92)  RR: 18 (06 May 2025 05:37) (18 - 18)  SpO2: 95% (06 May 2025 05:37) (92% - 100%)    Parameters below as of 06 May 2025 05:37  Patient On (Oxygen Delivery Method): room air        PHYSICAL EXAM:  Gen: NAD, resting in bed chronically ill appearing   HEENT: Normocephalic, atraumatic  Neck: supple, no lymphadenopathy  CV: Regular rate & regular rhythm  Lungs: decreased BS at bases, no fremitus  Abdomen: Soft, BS present  Ext: Warm, well perfused  Neuro: non focal, awake  Skin: no rash, no erythema  Lines: no phlebitis     FH: Non-contributory  Social Hx: Non-contributory    TESTS & MEASUREMENTS:                        10.8   13.87 )-----------( 154      ( 05 May 2025 08:18 )             32.5     05-05    145  |  107  |  43[H]  ----------------------------<  103[H]  3.9   |  19  |  2.1[H]    Ca    8.7      05 May 2025 08:18  Mg     2.1     05-05    TPro  6.7  /  Alb  3.2[L]  /  TBili  0.3  /  DBili  x   /  AST  49[H]  /  ALT  17  /  AlkPhos  123[H]  05-05      LIVER FUNCTIONS - ( 05 May 2025 08:18 )  Alb: 3.2 g/dL / Pro: 6.7 g/dL / ALK PHOS: 123 U/L / ALT: 17 U/L / AST: 49 U/L / GGT: x           Urinalysis Basic - ( 05 May 2025 08:18 )    Color: x / Appearance: x / SG: x / pH: x  Gluc: 103 mg/dL / Ketone: x  / Bili: x / Urobili: x   Blood: x / Protein: x / Nitrite: x   Leuk Esterase: x / RBC: x / WBC x   Sq Epi: x / Non Sq Epi: x / Bacteria: x        Culture - Blood (collected 05-04-25 @ 19:01)  Source: Blood Blood  Preliminary Report (05-06-25 @ 01:03):    No growth at 24 hours    Culture - Blood (collected 05-04-25 @ 19:01)  Source: Blood Blood  Gram Stain (05-05-25 @ 16:02):    Growth in aerobic bottle: Gram Negative Rods  Preliminary Report (05-05-25 @ 16:03):    Growth in aerobic bottle: Gram Negative Rods    Direct identification is available within approximately 3-5    hours either by Blood Panel Multiplexed PCR or Direct    MALDI-TOF. Details: [email or web address]  Organism: Blood Culture PCR (05-05-25 @ 17:59)  Organism: Blood Culture PCR (05-05-25 @ 17:59)      -  CTX-M Resistance Marker: Detec      Method Type: PCR      -  K. pneumoniae group: Detec (K. pneumoniae, K. quasipneumoniae, K. variicola)      -  Carbapenem Resistance: Detec      -  OXA Resistance Marker: Detec      -  ESBL: Detec    Urinalysis with Rflx Culture (collected 05-03-25 @ 04:50)    Culture - Urine (collected 05-03-25 @ 04:50)  Source: Clean Catch None  Preliminary Report (05-04-25 @ 12:43):    >100,000 CFU/ml Gram Negative Rods    Culture - Blood (collected 05-03-25 @ 02:36)  Source: Blood Blood-Peripheral  Preliminary Report (05-05-25 @ 10:01):    No growth at 48 Hours    Culture - Blood (collected 05-03-25 @ 02:36)  Source: Blood Blood-Peripheral  Preliminary Report (05-05-25 @ 10:01):    No growth at 48 Hours    Culture - Eye (collected 04-22-25 @ 17:50)  Source: Eye Eye-Right  Gram Stain (04-23-25 @ 23:57):    No polymorphonuclear leukocytes seen    No organisms seen    by cytocentrifuge  Final Report (04-27-25 @ 18:53):    Few Staphylococcus simulans  Organism: Staphylococcus simulans (04-27-25 @ 18:53)  Organism: Staphylococcus simulans (04-27-25 @ 18:53)      -  Clindamycin: S <=0.25      -  Oxacillin: R >2      -  Gentamicin: S <=4 Should not be used as monotherapy      -  Vancomycin: S 1      -  Tetracycline: S <=4      Method Type: IZABELLA      -  Penicillin: R >2      -  Rifampin: S <=1 Should not be used as monotherapy      -  Erythromycin: S <=0.25      -  Trimethoprim/Sulfamethoxazole: S <=0.5/9.5    Urinalysis with Rflx Culture (collected 04-21-25 @ 21:39)    Culture - Urine (collected 04-21-25 @ 21:39)  Source: Catheterized None  Final Report (04-27-25 @ 06:16):    >100,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)    50,000 - 99,000 CFU/mL Enterococcus faecalis  Organism: Enterococcus faecalis (04-27-25 @ 06:16)      -  Levofloxacin: R >4      -  Nitrofurantoin: S <=32 Should not be used to treat pyelonephritis.      -  Vancomycin: S 1      -  Ciprofloxacin: R >2      -  Ampicillin: S <=2 Predicts results to ampicillin/sulbactam, amoxacillin-clavulanate and  piperacillin-tazobactam.      -  Tetracycline: R >8      Method Type: IZABELLA  Organism: Klebsiella pneumoniae (Carbapenem Resistant)  Klebsiella pneumoniae (Carbapenem Resistant)  Enterococcus faecalis (04-27-25 @ 06:16)  Organism: Klebsiella pneumoniae (Carbapenem Resistant) (04-27-25 @ 06:16)      -  Levofloxacin: R >4      -  Tobramycin: R >8      -  Nitrofurantoin: R >64 Should not be used to treat pyelonephritis      -  Aztreonam: R >16      -  Gentamicin: R >8      -  Cefepime: R >16      -  Cefazolin: R >16 For uncomplicated UTI with K. pneumoniae, E. coli, or P. mirablis: IZABELLA <=16 is sensitive and IZABELLA >=32 is resistant. This also predicts results for oral agents cefaclor, cefdinir, cefpodoxime, cefprozil, cefuroxime axetil, cephalexin and locarbef for uncomplicated UTI. Note that some isolates may be susceptible to these agents while testing resistant to cefazolin.      -  Piperacillin/Tazobactam: R >64      -  Ciprofloxacin: R >2      -  Imipenem: R 8      -  Ceftriaxone: R >32      -  Ampicillin: R >16 These ampicillin results predict results for amoxicillin      Method Type: IZABELLA      -  Meropenem: R >8      -  Ampicillin/Sulbactam: R >16/8      -  Cefoxitin: R >16      -  Cefuroxime: R >16      -  Amoxicillin/Clavulanic Acid: R >16/8      -  Trimethoprim/Sulfamethoxazole: R >2/38      -  Ertapenem: R >1  Organism: Klebsiella pneumoniae (Carbapenem Resistant) (04-27-25 @ 06:16)      -  Resistance Gene OXA: Detec      Method Type: CarbaR    Culture - Blood (collected 04-21-25 @ 20:33)  Source: Blood Blood-Peripheral  Final Report (04-27-25 @ 03:00):    No growth at 5 days    Culture - Blood (collected 04-21-25 @ 20:33)  Source: Blood Blood-Peripheral  Final Report (04-27-25 @ 03:00):    No growth at 5 days    Culture - Urine (collected 04-19-25 @ 14:30)  Source: Catheterized Catheterized  Final Report (04-22-25 @ 07:57):    >100,000 CFU/ml Enterococcus faecalis    <10,000 CFU/ml Normal Urogenital jake present  Organism: Enterococcus faecalis (04-22-25 @ 07:57)  Organism: Enterococcus faecalis (04-22-25 @ 07:57)      -  Levofloxacin: R >4      -  Nitrofurantoin: S <=32 Should not be used to treat pyelonephritis.      -  Vancomycin: S 1      -  Ciprofloxacin: R >2      -  Ampicillin: S <=2 Predicts results to ampicillin/sulbactam, amoxacillin-clavulanate and  piperacillin-tazobactam.      -  Tetracycline: R >8      Method Type: IZABELLA    Urinalysis with Rflx Culture (collected 04-14-25 @ 18:19)    Culture - Urine (collected 04-14-25 @ 18:19)  Source: Clean Catch None  Final Report (04-17-25 @ 08:03):    >100,000 CFU/ml Proteus mirabilis  Organism: Proteus mirabilis (04-17-25 @ 08:03)  Organism: Proteus mirabilis (04-17-25 @ 08:03)      -  Levofloxacin: S <=0.5      -  Tobramycin: S <=2      -  Nitrofurantoin: R >64 Should not be used to treat pyelonephritis      -  Aztreonam: S <=4      -  Gentamicin: S <=2      -  Cefazolin: S <=2 For uncomplicated UTI with K. pneumoniae, E. coli, or P. mirablis: IZABELLA <=16 is sensitive and IZABELLA >=32 is resistant. This also predicts results for oral agents cefaclor, cefdinir, cefpodoxime, cefprozil, cefuroxime axetil, cephalexin and locarbef for uncomplicated UTI. Note that some isolates may be susceptible to these agents while testing resistant to cefazolin.      -  Cefepime: S <=2      -  Piperacillin/Tazobactam: S <=8      -  Ciprofloxacin: S <=0.25      -  Ceftriaxone: S <=1      -  Ampicillin: S <=8 These ampicillin results predict results for amoxicillin      Method Type: IZABELLA      -  Meropenem: S <=1      -  Ampicillin/Sulbactam: S <=4/2      -  Cefoxitin: S <=8      -  Cefuroxime: S <=4      -  Amoxicillin/Clavulanic Acid: S <=8/4      -  Trimethoprim/Sulfamethoxazole: S <=0.5/9.5      -  Ertapenem: S <=0.5    Culture - Blood (collected 04-14-25 @ 18:10)  Source: Blood Blood  Final Report (04-19-25 @ 23:07):    No growth at 5 days    Culture - Blood (collected 04-14-25 @ 18:00)  Source: Blood Blood  Final Report (04-19-25 @ 23:07):    No growth at 5 days    Urinalysis with Rflx Culture (collected 04-13-25 @ 15:05)    Culture - Urine (collected 04-13-25 @ 15:05)  Source: Catheterized None  Final Report (04-15-25 @ 19:27):    >100,000 CFU/ml Proteus mirabilis    <10,000 CFU/ml Normal Urogenital jake present  Organism: Proteus mirabilis (04-15-25 @ 19:27)  Organism: Proteus mirabilis (04-15-25 @ 19:27)      -  Levofloxacin: S <=0.5      -  Tobramycin: S <=2      -  Nitrofurantoin: R >64 Should not be used to treat pyelonephritis      -  Aztreonam: S <=4      -  Gentamicin: S <=2      -  Cefazolin: S <=2 For uncomplicated UTI with K. pneumoniae, E. coli, or P. mirablis: IZABELLA <=16 is sensitive and IZABELLA >=32 is resistant. This also predicts results for oral agents cefaclor, cefdinir, cefpodoxime, cefprozil, cefuroxime axetil, cephalexin and locarbef for uncomplicated UTI. Note that some isolates may be susceptible to these agents while testing resistant to cefazolin.      -  Cefepime: S <=2      -  Piperacillin/Tazobactam: S <=8      -  Ciprofloxacin: S <=0.25      -  Ceftriaxone: S <=1      -  Ampicillin: S <=8 These ampicillin results predict results for amoxicillin      Method Type: IZABELLA      -  Meropenem: S <=1      -  Ampicillin/Sulbactam: S <=4/2      -  Cefoxitin: S <=8      -  Cefuroxime: S <=4      -  Amoxicillin/Clavulanic Acid: S <=8/4      -  Trimethoprim/Sulfamethoxazole: S <=0.5/9.5      -  Ertapenem: S <=0.5        Lactate, Blood: 0.7 mmol/L (05-04-25 @ 19:03)  Lactate, Blood: 1.9 mmol/L (05-03-25 @ 02:36)  Blood Gas Venous - Lactate: 1.7 mmol/L (05-03-25 @ 02:14)      INFECTIOUS DISEASES TESTING  Rapid RVP Result: NotDetec (05-05-25 @ 09:00)  Vancomycin Level, Trough: 8.4 (04-23-25 @ 16:13)  Procalcitonin: 0.21 (04-22-25 @ 11:47)  MRSA PCR Result.: Negative (04-22-25 @ 06:30)  MRSA PCR Result.: Negative (01-17-25 @ 18:56)  MRSA PCR Result.: Negative (01-08-25 @ 16:03)  Procalcitonin: 1.80 (01-05-25 @ 19:42)  Streptococcus pneumoniae Ag, Ur Result: Negative (11-23-24 @ 13:05)  Procalcitonin: 0.09 (11-23-24 @ 06:19)  Procalcitonin: 0.11 (11-22-24 @ 13:42)  Rapid RVP Result: NotDetec (11-22-24 @ 02:23)  strept    INFLAMMATORY MARKERS      RADIOLOGY & ADDITIONAL TESTS:  I have personally reviewed the last available Chest xray  CXR  Xray Chest 1 View- PORTABLE-Urgent:   ACC: [id number] EXAM:  XR CHEST PORTABLE URGENT 1V   ORDERED BY: MINDA CAUSEY     PROCEDURE DATE:  05/05/2025          INTERPRETATION:  CLINICAL HISTORY: Cystitis    COMPARISON: 2 days prior.    TECHNIQUE: Portable frontal chest radiograph.    FINDINGS:    Support devices: Telemetry leads overlie the chest.    Cardiac/mediastinum/hilum: Stable cardiomegaly.    Lung parenchyma/Pleura: No focal parenchymal opacities, pleural   effusions, or pneumothorax.    Skeleton/soft tissues: Postoperative changes of the right shoulder      IMPRESSION:    Cardiomegaly. Improved opacities.    --- End of Report ---            JACKLYN MOJICA MD; Attending Interventional Radiologist  This document has been electronically signed. May  5 2025  9:12AM (05-05-25 @ 09:11)      CT      CARDIOLOGY TESTING  12 Lead ECG:   Ventricular Rate 102 BPM    Atrial Rate 102 BPM    P-R Interval 164 ms    QRS Duration 76 ms    Q-T Interval 340 ms    QTC Calculation(Bazett) 443 ms    P Axis 70 degrees    R Axis 53 degrees    T Axis 24 degrees    Diagnosis Line Sinus tachycardia  Otherwise normal ECG    Confirmed by Cristi Rosario ([id number]) on 5/3/2025 8:55:05 AM (05-03-25 @ 03:40)      MEDICATIONS  ceftazidime/avibactam IVPB 0.94 IV Intermittent every 12 hours  dextrose 5%. 1000 IV Continuous <Continuous>  dextrose 5%. 1000 IV Continuous <Continuous>  dextrose 50% Injectable 25 IV Push once  dextrose 50% Injectable 12.5 IV Push once  dextrose 50% Injectable 25 IV Push once  finasteride 5 Oral daily  glucagon  Injectable 1 IntraMuscular once  heparin   Injectable 5000 SubCutaneous every 8 hours  insulin lispro (ADMELOG) corrective regimen sliding scale  SubCutaneous three times a day before meals  lactated ringers. 500 IV Continuous <Continuous>  metoprolol tartrate 25 Oral two times a day  NIFEdipine XL 90 Oral daily  tamsulosin 0.4 Oral at bedtime      WEIGHT  Weight (kg): 60.8 (05-06-25 @ 02:35)  Creatinine: 2.1 mg/dL (05-05-25 @ 08:18)      ANTIBIOTICS:  ceftazidime/avibactam IVPB 0.94 Gram(s) IV Intermittent every 12 hours      All available historical records have been reviewed

## 2025-05-06 NOTE — PROGRESS NOTE ADULT - SUBJECTIVE AND OBJECTIVE BOX
SUBJECTIVE/OVERNIGHT EVENTS  Today is hospital day 3d. This morning patient was seen and examined at bedside, pt is awake, and does not follow commands. No acute or major events overnight.      CODE STATUS: FULL      MEDICATIONS  STANDING MEDICATIONS  ceftazidime/avibactam IVPB 0.94 Gram(s) IV Intermittent every 12 hours  chlorhexidine 2% Cloths 1 Application(s) Topical <User Schedule>  dextrose 5%. 1000 milliLiter(s) IV Continuous <Continuous>  dextrose 5%. 1000 milliLiter(s) IV Continuous <Continuous>  dextrose 50% Injectable 25 Gram(s) IV Push once  dextrose 50% Injectable 12.5 Gram(s) IV Push once  dextrose 50% Injectable 25 Gram(s) IV Push once  finasteride 5 milliGRAM(s) Oral daily  glucagon  Injectable 1 milliGRAM(s) IntraMuscular once  heparin   Injectable 5000 Unit(s) SubCutaneous every 8 hours  insulin lispro (ADMELOG) corrective regimen sliding scale   SubCutaneous three times a day before meals  lactated ringers. 500 milliLiter(s) IV Continuous <Continuous>  metoprolol tartrate 25 milliGRAM(s) Oral two times a day  NIFEdipine XL 90 milliGRAM(s) Oral daily  tamsulosin 0.4 milliGRAM(s) Oral at bedtime    PRN MEDICATIONS  acetaminophen  Suppository .. 650 milliGRAM(s) Rectal every 6 hours PRN  albuterol    90 MICROgram(s) HFA Inhaler 1 Puff(s) Inhalation four times a day PRN  aluminum hydroxide/magnesium hydroxide/simethicone Suspension 30 milliLiter(s) Oral every 4 hours PRN  dextrose Oral Gel 15 Gram(s) Oral once PRN  melatonin 3 milliGRAM(s) Oral at bedtime PRN  ondansetron Injectable 4 milliGRAM(s) IV Push every 8 hours PRN    VITALS  T(F): 98.2 (05-06-25 @ 13:05), Max: 98.3 (05-05-25 @ 16:10)  HR: 103 (05-06-25 @ 13:05) (98 - 107)  BP: 138/68 (05-06-25 @ 13:05) (130/67 - 145/64)  RR: 18 (05-06-25 @ 13:05) (18 - 18)  SpO2: 95% (05-06-25 @ 13:05) (92% - 96%)  POCT Blood Glucose.: 116 mg/dL (05-06-25 @ 11:21)  POCT Blood Glucose.: 112 mg/dL (05-06-25 @ 07:58)  POCT Blood Glucose.: 130 mg/dL (05-05-25 @ 22:46)  POCT Blood Glucose.: 128 mg/dL (05-05-25 @ 17:25)    PHYSICAL EXAM  GENERAL: NAD, lying in bed comfortably  HEAD:  Atraumatic, normocephalic  EYES: EOMI, PERRL  NECK: Supple, trachea midline, no JVD  HEART: Regular rate and rhythm  LUNGS: Clear to auscultation bilaterally, no crackles, wheezing, or rhonchi  ABDOMEN: Soft, nontender, nondistended, +BS  EXTREMITIES: 2+ peripheral pulses bilaterally. No clubbing, cyanosis, or edema  NERVOUS SYSTEM:  A&Ox1, moving all extremities    (  ) Indwelling Cobos Catheter   Date inserted:    Reason (  ) Critical illness     (  ) urinary retention    (  ) Accurate Ins/Outs Monitoring     (  ) CMO patient    (  ) Central Line  Date inserted:  Location: (  ) Right IJ   (  ) Left IJ   (  ) Right Fem   (  ) Left Fem    (  ) SPC  (  ) pigtail  (  ) PEG tube  (  ) colostomy  (  ) jejunostomy  (  ) U-Dall    LABS             10.3   9.26  )-----------( 199      ( 05-06-25 @ 05:31 )             30.7     149  |  110  |  45  -------------------------<  103   05-06-25 @ 05:31  3.8  |  22  |  1.9    Ca      8.9     05-06-25 @ 05:31  Mg     2.1     05-06-25 @ 05:31    TPro  6.7  /  Alb  3.2  /  TBili  0.3  /  DBili  x   /  AST  49  /  ALT  17  /  AlkPhos  123  /  GGT  x     05-05-25 @ 08:18        Urinalysis Basic - ( 06 May 2025 05:31 )    Color: x / Appearance: x / SG: x / pH: x  Gluc: 103 mg/dL / Ketone: x  / Bili: x / Urobili: x   Blood: x / Protein: x / Nitrite: x   Leuk Esterase: x / RBC: x / WBC x   Sq Epi: x / Non Sq Epi: x / Bacteria: x          Culture - Blood (collected 04 May 2025 19:01)  Source: Blood Blood  Preliminary Report (06 May 2025 01:03):    No growth at 24 hours    Culture - Blood (collected 04 May 2025 19:01)  Source: Blood Blood  Gram Stain (05 May 2025 16:02):    Growth in aerobic bottle: Gram Negative Rods  Preliminary Report (06 May 2025 12:02):    Growth in aerobic bottle: Klebsiella pneumoniae    Direct identification is available within approximately 3-5    hours either by Blood Panel Multiplexed PCR or Direct    MALDI-TOF. Details: [email or web address]  Organism: Blood Culture PCR (05 May 2025 17:59)  Organism: Blood Culture PCR (05 May 2025 17:59)      IMAGING

## 2025-05-06 NOTE — PROGRESS NOTE ADULT - ASSESSMENT
85 y/o male with Pmhx of Copd, Htn, ckd stage 2, Dm, schizophrenia, Hld, non-hodgkins lymphoma, urinary retention with chronic Bains (changed in ED on 5/3/25), Hx of MDR UTIs presents to the ED from [facility] for evaluation of AMS, being admitted for ams 2/2 sepsis from suspected UTI in setting of chronic bains and multiple UTIs w/MDR organisms.    #Complicated Acute Cystitis  #AMS 2/2 sepsis from UTI  #Chronic bains for urinary retention  - baseline per previous documentation oriented x1  - currently confused, does not follow commands   - last culture on 4/25/25 admission showing E faecalis sensitive to vancomycin and carbapenem resistant kleb pneumo (likely colonization)   - RVP neg, COVID neg  - blood cx positive for Klebsiella pneumoniae  - urine cx + gram neg rods   - s/p vancomycin and cefepime in ED  - s/p amikacin 750mg x 1   - CXR - cardiomegaly. Improved opacities  - B/l LE duplex - No DVT  - per ID: Avycaz 0.94g q12h IV  - Home meds: gabapentin, trazodone, olanzipine, tamsulosin, finasteride  - held gabapentin and psych meds in setting of alteration from baseline  - c/w home tamsulosin 0.4mg daily   - 5/6: Bains removed, TOV today, encourage po intake, WBC trending down    #DAVID on CKD2 - improving   - yarely pre-renal   - Cr 2.3 > 2.1 > 1.9  - s/p IVF     #Chronic normocytic Anemia  - Hgb 10.3 (baseline around 10.0), MCV 90.8    #Hyperkalemia- resolved   - K 4.2 >3.9    #troponin elevation   - demand ischemia in setting of sepsis   - downtrending 68 > 58    #COPD  - c/w home inhalers     #HTN  #Sinus tachycardia on EKG  - c/w home Nifedipine 90mg daily   - c/w home metoprolol 25mg BID     #Non Hodgkins lymphoma  - outpt f/u    Diet: diet dash   Activity:  AAT  DVT ppx: heparin sub-q  Pending: monitor urinary output, monitor mental status

## 2025-05-06 NOTE — PROGRESS NOTE ADULT - ASSESSMENT
83 y/o male with Pmhx of Copd, Htn, ckd stage 2, Dm, schizophrenia, Hld, non-hodgkins lymphoma, urinary retention with chronic Cobos (changed in ED on 5/3/25), Hx of MDR UTIs presents to the ED from [facility] for evaluation of AMS. Patient currently has no complaints at bedside, unable to speak/verbalize at this time, baseline aaox1, currently confused, no acute apparent distress.  According to ED documentation patient was unresponsive at NH, no response from [facility] at this time, not response from emergency contacts at this time either. Patient is being admitted for sepsis 2/2 uti in setting of chronic foely w/MDR organisms.       IMPRESSION  #MDR Kleb bacteremia with Sepsis on admission due to complicated acute cystitis  Tm 103.6 P>90 Admission WBC 22    UA pyuria > 900; UCX GNR    5/5 BCX Kleb MDR OXA    4/21 UCX   >100,000 CFU/ml Klebsiella pneumoniae  Chronic Cobos-  last exchanged 5/3    4/19 UCX   >100,000 CFU/ml Enterococcus faecalis S amp Nitrofurantoin: S <=32    4/14 UCX   >100,000 CFU/ml Proteus mirabilis Nitrofurantoin: R >64     4/13 UCX   >100,000 CFU/ml Proteus mirabilis  < from: CT Abdomen and Pelvis w/ IV Cont (05.03.25 @ 03:26) >  Urinary bladder distended with Cobos catheter balloon inflated within   prostatic urethra; repositioning recommended.  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

## 2025-05-07 LAB
-  AMPICILLIN/SULBACTAM: SIGNIFICANT CHANGE UP
-  AMPICILLIN: SIGNIFICANT CHANGE UP
-  AZTREONAM: SIGNIFICANT CHANGE UP
-  CEFAZOLIN: SIGNIFICANT CHANGE UP
-  CEFEPIME: SIGNIFICANT CHANGE UP
-  CEFOXITIN: SIGNIFICANT CHANGE UP
-  CEFTAZIDIME/AVIBACTAM: SIGNIFICANT CHANGE UP
-  CEFTOLOZANE/TAZOBACTAM: SIGNIFICANT CHANGE UP
-  CEFTRIAXONE: SIGNIFICANT CHANGE UP
-  CIPROFLOXACIN: SIGNIFICANT CHANGE UP
-  ERTAPENEM: SIGNIFICANT CHANGE UP
-  GENTAMICIN: SIGNIFICANT CHANGE UP
-  IMIPENEM: SIGNIFICANT CHANGE UP
-  LEVOFLOXACIN: SIGNIFICANT CHANGE UP
-  MEROPENEM/VABORBACTAM: SIGNIFICANT CHANGE UP
-  MEROPENEM: SIGNIFICANT CHANGE UP
-  PIPERACILLIN/TAZOBACTAM: SIGNIFICANT CHANGE UP
-  TIGECYCLINE: SIGNIFICANT CHANGE UP
-  TOBRAMYCIN: SIGNIFICANT CHANGE UP
-  TRIMETHOPRIM/SULFAMETHOXAZOLE: SIGNIFICANT CHANGE UP
ANION GAP SERPL CALC-SCNC: 18 MMOL/L — HIGH (ref 7–14)
BASOPHILS # BLD AUTO: 0.08 K/UL — SIGNIFICANT CHANGE UP (ref 0–0.2)
BASOPHILS NFR BLD AUTO: 0.9 % — SIGNIFICANT CHANGE UP (ref 0–1)
BUN SERPL-MCNC: 51 MG/DL — HIGH (ref 10–20)
CALCIUM SERPL-MCNC: 8.9 MG/DL — SIGNIFICANT CHANGE UP (ref 8.4–10.5)
CHLORIDE SERPL-SCNC: 114 MMOL/L — HIGH (ref 98–110)
CO2 SERPL-SCNC: 21 MMOL/L — SIGNIFICANT CHANGE UP (ref 17–32)
CREAT SERPL-MCNC: 1.8 MG/DL — HIGH (ref 0.7–1.5)
CULTURE RESULTS: ABNORMAL
EGFR: 37 ML/MIN/1.73M2 — LOW
EGFR: 37 ML/MIN/1.73M2 — LOW
EOSINOPHIL # BLD AUTO: 0 K/UL — SIGNIFICANT CHANGE UP (ref 0–0.7)
EOSINOPHIL NFR BLD AUTO: 0 % — SIGNIFICANT CHANGE UP (ref 0–8)
GLUCOSE BLDC GLUCOMTR-MCNC: 102 MG/DL — HIGH (ref 70–99)
GLUCOSE BLDC GLUCOMTR-MCNC: 104 MG/DL — HIGH (ref 70–99)
GLUCOSE BLDC GLUCOMTR-MCNC: 107 MG/DL — HIGH (ref 70–99)
GLUCOSE SERPL-MCNC: 99 MG/DL — SIGNIFICANT CHANGE UP (ref 70–99)
HCT VFR BLD CALC: 32.1 % — LOW (ref 42–52)
HGB BLD-MCNC: 10.3 G/DL — LOW (ref 14–18)
LYMPHOCYTES # BLD AUTO: 1.02 K/UL — LOW (ref 1.2–3.4)
LYMPHOCYTES # BLD AUTO: 11.6 % — LOW (ref 20.5–51.1)
MAGNESIUM SERPL-MCNC: 2.4 MG/DL — SIGNIFICANT CHANGE UP (ref 1.8–2.4)
MCHC RBC-ENTMCNC: 29.9 PG — SIGNIFICANT CHANGE UP (ref 27–31)
MCHC RBC-ENTMCNC: 32.1 G/DL — SIGNIFICANT CHANGE UP (ref 32–37)
MCV RBC AUTO: 93 FL — SIGNIFICANT CHANGE UP (ref 80–94)
METHOD TYPE: SIGNIFICANT CHANGE UP
MONOCYTES # BLD AUTO: 0.47 K/UL — SIGNIFICANT CHANGE UP (ref 0.1–0.6)
MONOCYTES NFR BLD AUTO: 5.3 % — SIGNIFICANT CHANGE UP (ref 1.7–9.3)
NEUTROPHILS # BLD AUTO: 6.91 K/UL — HIGH (ref 1.4–6.5)
NEUTROPHILS NFR BLD AUTO: 78.6 % — HIGH (ref 42.2–75.2)
ORGANISM # SPEC MICROSCOPIC CNT: ABNORMAL
ORGANISM # SPEC MICROSCOPIC CNT: ABNORMAL
ORGANISM # SPEC MICROSCOPIC CNT: SIGNIFICANT CHANGE UP
PLATELET # BLD AUTO: 202 K/UL — SIGNIFICANT CHANGE UP (ref 130–400)
PMV BLD: 12 FL — HIGH (ref 7.4–10.4)
POTASSIUM SERPL-MCNC: 4.4 MMOL/L — SIGNIFICANT CHANGE UP (ref 3.5–5)
POTASSIUM SERPL-SCNC: 4.4 MMOL/L — SIGNIFICANT CHANGE UP (ref 3.5–5)
RBC # BLD: 3.45 M/UL — LOW (ref 4.7–6.1)
RBC # FLD: 15.5 % — HIGH (ref 11.5–14.5)
SODIUM SERPL-SCNC: 153 MMOL/L — HIGH (ref 135–146)
SPECIMEN SOURCE: SIGNIFICANT CHANGE UP
WBC # BLD: 8.79 K/UL — SIGNIFICANT CHANGE UP (ref 4.8–10.8)
WBC # FLD AUTO: 8.79 K/UL — SIGNIFICANT CHANGE UP (ref 4.8–10.8)

## 2025-05-07 PROCEDURE — 99233 SBSQ HOSP IP/OBS HIGH 50: CPT

## 2025-05-07 PROCEDURE — 99497 ADVNCD CARE PLAN 30 MIN: CPT | Mod: 25

## 2025-05-07 RX ORDER — SODIUM CHLORIDE 9 G/1000ML
1000 INJECTION, SOLUTION INTRAVENOUS
Refills: 0 | Status: DISCONTINUED | OUTPATIENT
Start: 2025-05-07 | End: 2025-05-08

## 2025-05-07 RX ADMIN — SODIUM CHLORIDE 60 MILLILITER(S): 9 INJECTION, SOLUTION INTRAVENOUS at 11:16

## 2025-05-07 RX ADMIN — Medication 90 MILLIGRAM(S): at 05:32

## 2025-05-07 RX ADMIN — METOPROLOL SUCCINATE 25 MILLIGRAM(S): 50 TABLET, EXTENDED RELEASE ORAL at 05:32

## 2025-05-07 RX ADMIN — FINASTERIDE 5 MILLIGRAM(S): 1 TABLET, FILM COATED ORAL at 11:15

## 2025-05-07 RX ADMIN — METOPROLOL SUCCINATE 25 MILLIGRAM(S): 50 TABLET, EXTENDED RELEASE ORAL at 17:12

## 2025-05-07 RX ADMIN — HEPARIN SODIUM 5000 UNIT(S): 1000 INJECTION INTRAVENOUS; SUBCUTANEOUS at 05:32

## 2025-05-07 RX ADMIN — TAMSULOSIN HYDROCHLORIDE 0.4 MILLIGRAM(S): 0.4 CAPSULE ORAL at 22:50

## 2025-05-07 RX ADMIN — HEPARIN SODIUM 5000 UNIT(S): 1000 INJECTION INTRAVENOUS; SUBCUTANEOUS at 22:50

## 2025-05-07 RX ADMIN — HEPARIN SODIUM 5000 UNIT(S): 1000 INJECTION INTRAVENOUS; SUBCUTANEOUS at 13:23

## 2025-05-07 RX ADMIN — Medication 1 APPLICATION(S): at 05:32

## 2025-05-07 NOTE — PROGRESS NOTE ADULT - ASSESSMENT
85 y/o male with Pmhx of Copd, Htn, ckd stage 2, Dm, schizophrenia, Hld, non-hodgkins lymphoma, urinary retention with chronic Bains (changed in ED on 5/3/25), Hx of MDR UTIs presents to the ED from [facility] for evaluation of AMS, being admitted for ams 2/2 sepsis from suspected UTI in setting of chronic bains and multiple UTIs w/MDR organisms.    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 Improved opacities  - B/l LE duplex - No DVT  - per ID: Avycaz 0.94g q12h IV  - Home meds: gabapentin, trazodone, olanzipine, tamsulosin, finasteride  - c/w home tamsulosin 0.4mg daily   - 5/6: Bains removed, TOV today, encourage po intake, WBC trending down  5/7 failed voiding trial. As per adult home/sister, pt has been gradually declining, wheelchair bound. Does not talk much.    #DAVID on CKD2 - improving   - yarely pre-renal   - Cr 2.3 > 2.1 > 1.9  - s/p IVF     #Hypernatremia  IVFs    #Chronic normocytic Anemia  - Hgb 10.3 (baseline around 10.0), MCV 90.8    #Hyperkalemia- resolved   - K 4.2 >3.9    #troponin elevation   - demand ischemia in setting of sepsis   - downtrending 68 > 58    #COPD  - c/w home inhalers     #HTN  #Sinus tachycardia on EKG  - c/w home Nifedipine 90mg daily   - c/w home metoprolol 25mg BID     #Non Hodgkins lymphoma  - outpt f/u    #Nutrition/Fluids/Electrolytes   - replete K<4 and Mg <2  - ensure regular BMs    #DVT Px  SCDs  Heparin SQ    High risk pt. Px is guarded.  GOC: d/w sister on 5/7. She would not want feeding tubes, she understands that pt is gradually declining. She wants to think before making him DNR/DNI. Ultimate plan for Nsg home in the [facility].    #Progress Note Handoff  Pending: Clinical improvement and stability__x____  Disposition: PITER vs SNF       My note supersedes the residents note should a discrepancy arise.    Chart and notes personally reviewed.  Care Discussed with Consultants/Other Providers/ Housestaff [ x] YES [ ] NO   Radiology, labs, old records personally reviewed.    discussed w/ housestaff, nursing, case management    Attestation Statements:    Attestation Statements:  Risk Statement (NON-critical care).     On this date of service, level of risk to patient is considered: High.     Due to: pt with illness causing risk to life or bodily fxn    Time-based billing (NON-critical care).     50 minutes spent on total encounter. The necessity of the time spent during the encounter on this date of service was due to:     time spent on review of labs, imaging studies, old records, obtaining history, personally examining patient, counselling and communicating with patient/ family, entering orders for medications/tests/etc, discussions with other health care providers, documentation in electronic health records, independent interpretation of labs, imaging/procedure results and care coordination.    Activity:  AAT  DVT ppx: heparin sub-q  Pending: monitor urinary output, monitor mental status

## 2025-05-07 NOTE — PROGRESS NOTE ADULT - SUBJECTIVE AND OBJECTIVE BOX
ADA VILLAGOMEZ  84y, Male  Allergy: No Known Allergies      LOS  4d    CHIEF COMPLAINT: ams 2/2 sepsis uti in setting of chronic bains (07 May 2025 15:44)      INTERVAL EVENTS/HPI  - T(F): , Max: 98.1 (05-06-25 @ 21:06)  - WBC Count: 8.79 (05-07-25 @ 06:53)  WBC Count: 9.26 (05-06-25 @ 05:31)     - Creatinine: 1.8 (05-07-25 @ 06:53)  Creatinine: 1.9 (05-06-25 @ 05:31)     -   -   -     ROS  cannot obtain secondary to patient's sedation and/or mental status    VITALS:  T(F): 97.2, Max: 98.1 (05-06-25 @ 21:06)  HR: 100  BP: 167/68  RR: 18Vital Signs Last 24 Hrs  T(C): 36.2 (07 May 2025 12:58), Max: 36.7 (06 May 2025 21:06)  T(F): 97.2 (07 May 2025 12:58), Max: 98.1 (06 May 2025 21:06)  HR: 100 (07 May 2025 12:58) (87 - 103)  BP: 167/68 (07 May 2025 12:58) (142/63 - 167/68)  BP(mean): 100 (07 May 2025 12:58) (89 - 100)  RR: 18 (07 May 2025 12:58) (18 - 18)  SpO2: 98% (07 May 2025 12:58) (96% - 98%)    Parameters below as of 07 May 2025 12:58  Patient On (Oxygen Delivery Method): room air        PHYSICAL EXAM:  Gen: chronically ill appearing   HEENT: Normocephalic, atraumatic  Neck: supple, no lymphadenopathy  CV: Regular rate & regular rhythm  Lungs: decreased BS at bases, no fremitus  Abdomen: Soft, BS present  Ext: Warm, well perfused  Neuro: non focal, not following commands  Skin: no rash, no erythema  Lines: no phlebitis     FH: Non-contributory  Social Hx: Non-contributory    TESTS & MEASUREMENTS:                        10.3   8.79  )-----------( 202      ( 07 May 2025 06:53 )             32.1     05-07    153[H]  |  114[H]  |  51[H]  ----------------------------<  99  4.4   |  21  |  1.8[H]    Ca    8.9      07 May 2025 06:53  Mg     2.4     05-07          Urinalysis Basic - ( 07 May 2025 06:53 )    Color: x / Appearance: x / SG: x / pH: x  Gluc: 99 mg/dL / Ketone: x  / Bili: x / Urobili: x   Blood: x / Protein: x / Nitrite: x   Leuk Esterase: x / RBC: x / WBC x   Sq Epi: x / Non Sq Epi: x / Bacteria: x        Culture - Blood (collected 05-04-25 @ 19:01)  Source: Blood Blood  Preliminary Report (05-07-25 @ 01:02):    No growth at 48 Hours    Culture - Blood (collected 05-04-25 @ 19:01)  Source: Blood Blood  Gram Stain (05-05-25 @ 16:02):    Growth in aerobic bottle: Gram Negative Rods  Final Report (05-07-25 @ 08:17):    Growth in aerobic bottle: Klebsiella pneumoniae (Carbapenem Resistant)    Direct identification is available within approximately 3-5    hours either by Blood Panel Multiplexed PCR or Direct    MALDI-TOF. Details: [email or web address]  Organism: Blood Culture PCR  Klebsiella pneumoniae (Carbapenem Resistant) (05-07-25 @ 08:17)  Organism: Blood Culture PCR (05-07-25 @ 08:17)      -  CTX-M Resistance Marker: Detec      Method Type: PCR      -  K. pneumoniae group: Detec (K. pneumoniae, K. quasipneumoniae, K. variicola)      -  Carbapenem Resistance: Detec      -  OXA Resistance Marker: Detec      -  ESBL: Detec  Organism: Klebsiella pneumoniae (Carbapenem Resistant) (05-07-25 @ 08:17)      -  Levofloxacin: R >4      -  Tobramycin: R >8      -  Ceftazidime/Avibactam: S <=4      -  Aztreonam: R >16      -  Gentamicin: R >8      -  Cefepime: R >16      -  Cefazolin: R >16      -  Piperacillin/Tazobactam: R >64      -  Ciprofloxacin: R >2      -  Imipenem: R 4      -  Ceftriaxone: R >32      -  Ampicillin: R >16 These ampicillin results predict results for amoxicillin      Method Type: IZABELLA      -  Meropenem: R 8      -  Ampicillin/Sulbactam: R >16/8      -  Cefoxitin: R >16      -  Meropenem/Vaborbactam: I 8      -  Trimethoprim/Sulfamethoxazole: R >2/38      -  Ceftolozane/tazobactam: R >8      -  Tigecycline: S <=2 Interpretations based on FDA breakpoints      -  Ertapenem: R >1    Urinalysis with Rflx Culture (collected 05-03-25 @ 04:50)    Culture - Urine (collected 05-03-25 @ 04:50)  Source: Clean Catch None  Preliminary Report (05-07-25 @ 15:11):    >100,000 CFU/ml Klebsiella pneumoniae    >100,000 CFU/ml Proteus mirabilis    Culture - Blood (collected 05-03-25 @ 02:36)  Source: Blood Blood-Peripheral  Preliminary Report (05-07-25 @ 10:00):    No growth at 4 days    Culture - Blood (collected 05-03-25 @ 02:36)  Source: Blood Blood-Peripheral  Preliminary Report (05-07-25 @ 10:00):    No growth at 4 days    Culture - Eye (collected 04-22-25 @ 17:50)  Source: Eye Eye-Right  Gram Stain (04-23-25 @ 23:57):    No polymorphonuclear leukocytes seen    No organisms seen    by cytocentrifuge  Final Report (04-27-25 @ 18:53):    Few Staphylococcus simulans  Organism: Staphylococcus simulans (04-27-25 @ 18:53)  Organism: Staphylococcus simulans (04-27-25 @ 18:53)      -  Clindamycin: S <=0.25      -  Oxacillin: R >2      -  Gentamicin: S <=4 Should not be used as monotherapy      -  Vancomycin: S 1      -  Tetracycline: S <=4      Method Type: IZABELLA      -  Penicillin: R >2      -  Rifampin: S <=1 Should not be used as monotherapy      -  Erythromycin: S <=0.25      -  Trimethoprim/Sulfamethoxazole: S <=0.5/9.5    Urinalysis with Rflx Culture (collected 04-21-25 @ 21:39)    Culture - Urine (collected 04-21-25 @ 21:39)  Source: Catheterized None  Final Report (04-27-25 @ 06:16):    >100,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)    50,000 - 99,000 CFU/mL Enterococcus faecalis  Organism: Enterococcus faecalis (04-27-25 @ 06:16)      -  Levofloxacin: R >4      -  Nitrofurantoin: S <=32 Should not be used to treat pyelonephritis.      -  Vancomycin: S 1      -  Ciprofloxacin: R >2      -  Ampicillin: S <=2 Predicts results to ampicillin/sulbactam, amoxacillin-clavulanate and  piperacillin-tazobactam.      -  Tetracycline: R >8      Method Type: IZABELLA  Organism: Klebsiella pneumoniae (Carbapenem Resistant)  Klebsiella pneumoniae (Carbapenem Resistant)  Enterococcus faecalis (04-27-25 @ 06:16)  Organism: Klebsiella pneumoniae (Carbapenem Resistant) (04-27-25 @ 06:16)      -  Levofloxacin: R >4      -  Tobramycin: R >8      -  Nitrofurantoin: R >64 Should not be used to treat pyelonephritis      -  Aztreonam: R >16      -  Gentamicin: R >8      -  Cefepime: R >16      -  Cefazolin: R >16 For uncomplicated UTI with K. pneumoniae, E. coli, or P. mirablis: IZABELLA <=16 is sensitive and IZABELLA >=32 is resistant. This also predicts results for oral agents cefaclor, cefdinir, cefpodoxime, cefprozil, cefuroxime axetil, cephalexin and locarbef for uncomplicated UTI. Note that some isolates may be susceptible to these agents while testing resistant to cefazolin.      -  Piperacillin/Tazobactam: R >64      -  Ciprofloxacin: R >2      -  Imipenem: R 8      -  Ceftriaxone: R >32      -  Ampicillin: R >16 These ampicillin results predict results for amoxicillin      Method Type: IZABELLA      -  Meropenem: R >8      -  Ampicillin/Sulbactam: R >16/8      -  Cefoxitin: R >16      -  Cefuroxime: R >16      -  Amoxicillin/Clavulanic Acid: R >16/8      -  Trimethoprim/Sulfamethoxazole: R >2/38      -  Ertapenem: R >1  Organism: Klebsiella pneumoniae (Carbapenem Resistant) (04-27-25 @ 06:16)      -  Resistance Gene OXA: Detec      Method Type: CarbaR    Culture - Blood (collected 04-21-25 @ 20:33)  Source: Blood Blood-Peripheral  Final Report (04-27-25 @ 03:00):    No growth at 5 days    Culture - Blood (collected 04-21-25 @ 20:33)  Source: Blood Blood-Peripheral  Final Report (04-27-25 @ 03:00):    No growth at 5 days    Culture - Urine (collected 04-19-25 @ 14:30)  Source: Catheterized Catheterized  Final Report (04-22-25 @ 07:57):    >100,000 CFU/ml Enterococcus faecalis    <10,000 CFU/ml Normal Urogenital jake present  Organism: Enterococcus faecalis (04-22-25 @ 07:57)  Organism: Enterococcus faecalis (04-22-25 @ 07:57)      -  Levofloxacin: R >4      -  Nitrofurantoin: S <=32 Should not be used to treat pyelonephritis.      -  Vancomycin: S 1      -  Ciprofloxacin: R >2      -  Ampicillin: S <=2 Predicts results to ampicillin/sulbactam, amoxacillin-clavulanate and  piperacillin-tazobactam.      -  Tetracycline: R >8      Method Type: IZABELLA    Urinalysis with Rflx Culture (collected 04-14-25 @ 18:19)    Culture - Urine (collected 04-14-25 @ 18:19)  Source: Clean Catch None  Final Report (04-17-25 @ 08:03):    >100,000 CFU/ml Proteus mirabilis  Organism: Proteus mirabilis (04-17-25 @ 08:03)  Organism: Proteus mirabilis (04-17-25 @ 08:03)      -  Levofloxacin: S <=0.5      -  Tobramycin: S <=2      -  Nitrofurantoin: R >64 Should not be used to treat pyelonephritis      -  Aztreonam: S <=4      -  Gentamicin: S <=2      -  Cefazolin: S <=2 For uncomplicated UTI with K. pneumoniae, E. coli, or P. mirablis: IZABELLA <=16 is sensitive and IZABELLA >=32 is resistant. This also predicts results for oral agents cefaclor, cefdinir, cefpodoxime, cefprozil, cefuroxime axetil, cephalexin and locarbef for uncomplicated UTI. Note that some isolates may be susceptible to these agents while testing resistant to cefazolin.      -  Cefepime: S <=2      -  Piperacillin/Tazobactam: S <=8      -  Ciprofloxacin: S <=0.25      -  Ceftriaxone: S <=1      -  Ampicillin: S <=8 These ampicillin results predict results for amoxicillin      Method Type: IZABELLA      -  Meropenem: S <=1      -  Ampicillin/Sulbactam: S <=4/2      -  Cefoxitin: S <=8      -  Cefuroxime: S <=4      -  Amoxicillin/Clavulanic Acid: S <=8/4      -  Trimethoprim/Sulfamethoxazole: S <=0.5/9.5      -  Ertapenem: S <=0.5    Culture - Blood (collected 04-14-25 @ 18:10)  Source: Blood Blood  Final Report (04-19-25 @ 23:07):    No growth at 5 days    Culture - Blood (collected 04-14-25 @ 18:00)  Source: Blood Blood  Final Report (04-19-25 @ 23:07):    No growth at 5 days    Urinalysis with Rflx Culture (collected 04-13-25 @ 15:05)    Culture - Urine (collected 04-13-25 @ 15:05)  Source: Catheterized None  Final Report (04-15-25 @ 19:27):    >100,000 CFU/ml Proteus mirabilis    <10,000 CFU/ml Normal Urogenital jake present  Organism: Proteus mirabilis (04-15-25 @ 19:27)  Organism: Proteus mirabilis (04-15-25 @ 19:27)      -  Levofloxacin: S <=0.5      -  Tobramycin: S <=2      -  Nitrofurantoin: R >64 Should not be used to treat pyelonephritis      -  Aztreonam: S <=4      -  Gentamicin: S <=2      -  Cefazolin: S <=2 For uncomplicated UTI with K. pneumoniae, E. coli, or P. mirablis: IZABELLA <=16 is sensitive and IZABELLA >=32 is resistant. This also predicts results for oral agents cefaclor, cefdinir, cefpodoxime, cefprozil, cefuroxime axetil, cephalexin and locarbef for uncomplicated UTI. Note that some isolates may be susceptible to these agents while testing resistant to cefazolin.      -  Cefepime: S <=2      -  Piperacillin/Tazobactam: S <=8      -  Ciprofloxacin: S <=0.25      -  Ceftriaxone: S <=1      -  Ampicillin: S <=8 These ampicillin results predict results for amoxicillin      Method Type: IZABELLA      -  Meropenem: S <=1      -  Ampicillin/Sulbactam: S <=4/2      -  Cefoxitin: S <=8      -  Cefuroxime: S <=4      -  Amoxicillin/Clavulanic Acid: S <=8/4      -  Trimethoprim/Sulfamethoxazole: S <=0.5/9.5      -  Ertapenem: S <=0.5        Lactate, Blood: 0.7 mmol/L (05-04-25 @ 19:03)  Lactate, Blood: 1.9 mmol/L (05-03-25 @ 02:36)  Blood Gas Venous - Lactate: 1.7 mmol/L (05-03-25 @ 02:14)      INFECTIOUS DISEASES TESTING  Rapid RVP Result: NotDetec (05-05-25 @ 09:00)  Procalcitonin: 0.21 (04-22-25 @ 11:47)  MRSA PCR Result.: Negative (04-22-25 @ 06:30)  MRSA PCR Result.: Negative (01-17-25 @ 18:56)  MRSA PCR Result.: Negative (01-08-25 @ 16:03)  Procalcitonin: 1.80 (01-05-25 @ 19:42)  Procalcitonin: 0.09 (11-23-24 @ 06:19)  Procalcitonin: 0.11 (11-22-24 @ 13:42)  Rapid RVP Result: NotDetec (11-22-24 @ 02:23)      INFLAMMATORY MARKERS      RADIOLOGY & ADDITIONAL TESTS:  I have personally reviewed the last available Chest xray  CXR  Xray Chest 1 View- PORTABLE-Urgent:   ACC: [id number] EXAM:  XR CHEST PORTABLE URGENT 1V   ORDERED BY: MINDA CAUSEY     PROCEDURE DATE:  05/05/2025          INTERPRETATION:  CLINICAL HISTORY: Cystitis    COMPARISON: 2 days prior.    TECHNIQUE: Portable frontal chest radiograph.    FINDINGS:    Support devices: Telemetry leads overlie the chest.    Cardiac/mediastinum/hilum: Stable cardiomegaly.    Lung parenchyma/Pleura: No focal parenchymal opacities, pleural   effusions, or pneumothorax.    Skeleton/soft tissues: Postoperative changes of the right shoulder      IMPRESSION:    Cardiomegaly. Improved opacities.    --- End of Report ---            JACKLYN MOJICA MD; Attending Interventional Radiologist  This document has been electronically signed. May  5 2025  9:12AM (05-05-25 @ 09:11)      CT      CARDIOLOGY TESTING  12 Lead ECG:   Ventricular Rate 102 BPM    Atrial Rate 102 BPM    P-R Interval 164 ms    QRS Duration 76 ms    Q-T Interval 340 ms    QTC Calculation(Bazett) 443 ms    P Axis 70 degrees    R Axis 53 degrees    T Axis 24 degrees    Diagnosis Line Sinus tachycardia  Otherwise normal ECG    Confirmed by Cristi Rosario ([id number]) on 5/3/2025 8:55:05 AM (05-03-25 @ 03:40)      MEDICATIONS  ceftazidime/avibactam IVPB 0.94  chlorhexidine 2% Cloths 1  dextrose 5%. 1000  dextrose 5%. 1000  dextrose 50% Injectable 25  dextrose 50% Injectable 12.5  dextrose 50% Injectable 25  finasteride 5  glucagon  Injectable 1  heparin   Injectable 5000  insulin lispro (ADMELOG) corrective regimen sliding scale   lactated ringers. 500  metoprolol tartrate 25  NIFEdipine XL 90  sodium chloride 0.45%. 1000  tamsulosin 0.4      WEIGHT  Weight (kg): 60.8 (05-06-25 @ 02:35)  Creatinine: 1.8 mg/dL (05-07-25 @ 06:53)      ANTIBIOTICS:  ceftazidime/avibactam IVPB 0.94 Gram(s) IV Intermittent every 12 hours      All available historical records have been reviewed

## 2025-05-07 NOTE — PROGRESS NOTE ADULT - ASSESSMENT
85 y/o male with Pmhx of Copd, Htn, ckd stage 2, Dm, schizophrenia, Hld, non-hodgkins lymphoma, urinary retention with chronic Cobos (changed in ED on 5/3/25), Hx of MDR UTIs presents to the ED from [facility] for evaluation of AMS. Patient currently has no complaints at bedside, unable to speak/verbalize at this time, baseline aaox1, currently confused, no acute apparent distress.  According to ED documentation patient was unresponsive at NH, no response from [facility] at this time, not response from emergency contacts at this time either. Patient is being admitted for sepsis 2/2 uti in setting of chronic foely w/MDR organisms.       IMPRESSION  #MDR Kleb bacteremia with Sepsis on admission due to complicated acute cystitis  Tm 103.6 P>90 Admission WBC 22    UA pyuria > 900; 5/3 UCX   >100,000 CFU/ml Klebsiella pneumoniae    >100,000 CFU/ml Proteus mirabilis    5/5 BCX Kleb MDR OXA    4/21 UCX   >100,000 CFU/ml Klebsiella pneumoniae  Chronic Cobos-  last exchanged 5/3    4/19 UCX   >100,000 CFU/ml Enterococcus faecalis S amp Nitrofurantoin: S <=32    4/14 UCX   >100,000 CFU/ml Proteus mirabilis Nitrofurantoin: R >64     4/13 UCX   >100,000 CFU/ml Proteus mirabilis  < from: CT Abdomen and Pelvis w/ IV Cont (05.03.25 @ 03:26) >  Urinary bladder distended with Cobos catheter balloon inflated within   prostatic urethra; repositioning recommended.  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

## 2025-05-07 NOTE — PROGRESS NOTE ADULT - SUBJECTIVE AND OBJECTIVE BOX
Patient is a 84y old  Male who presents with a chief complaint of ams 2/2 sepsis uti in setting of chronic bains (06 May 2025 13:21)    INTERVAL HPI/OVERNIGHT EVENTS: Patient was examined and seen at bedside. This morning pt is resting in bed and staff reports no new issues or overnight events. Pt refuses/unable to answer questions. Sister at bedside. Poor PO intake.  ROS: Denies any complaints  InitialHPI:  The patient is an 83 y/o male with Pmhx of Copd, Htn, ckd stage 2, Dm, schizophrenia, Hld, non-hodgkins lymphoma, urinary retention with chronic Bains (changed in ED on 5/3/25), Hx of MDR UTIs presents to the ED from [facility] for evaluation of AMS. Patient currently has no complaints at bedside, unable to speak/verbalize at this time, baseline aaox1, currently confused, no acute apparent distress.  According to ED documentation patient was unresponsive at NH, no response from [facility] at this time, not response from emergency contacts at this time either. Patient is being admitted for sepsis 2/2 uti in setting of chronic foely w/MDR organisms.     ED course:   Vitals:   bp 116/90  hr 105  rr 25  temp 100.2/37.9     imaging:   CTH no acute pathology     CTAP w/IV cont:     IMPRESSION:    Urinary bladder distended with Bains catheter balloon inflated within   prostatic urethra; repositioning recommended.  Urinary bladder mild pericystic stranding, could represent bladder infection in the appropriate clinical setting.  Mild bilateral hydroureteronephrosis, could be attributed to bladder outlet obstruction.  Urinary bladder right posterolateral subcentimeter nodular density, which can be further evaluated as outpatient.  BPH.    bains changed in ED      Labs:  wbc 22k  hgb 9.0 (baseline around 10.0)   trop 68-->58  k+ 5.3  cr 2.6 (baseline 1.4)   lactate 1.9     s/p   500cc LR bolus   s/p cefepime and vanc  ucx bcx collected   (03 May 2025 11:49)    PAST MEDICAL & SURGICAL HISTORY:  COPD (chronic obstructive pulmonary disease)      Paranoid schizophrenia      Schizophrenia      Diabetes type 2, controlled      COPD (chronic obstructive pulmonary disease)      Dyslipidemia      Chronic retention of urine      Dyslipidemia      Encounter for screening colonoscopy          General: NAD, alert, chronically ill appearing, following some simple commands  HEENT:  EOMI, no LAD  CV: S1 S2  Resp: decreased breath sounds at bases  GI: NT/ND/S +BS  MS: no clubbing/cyanosis/edema, + pulses b/l  Neuro: appears to move all extremities          Home Medications:  Albuterol (Eqv-ProAir HFA) 90 mcg/inh inhalation aerosol: 1 puff(s) inhaled 4 times a day as needed for  shortness of breath and/or wheezing (14 Apr 2025 22:55)  gabapentin 300 mg oral capsule: 1 cap(s) orally 2 times a day (14 Apr 2025 22:56)  NIFEdipine 90 mg oral tablet, extended release: 1 tab(s) orally once a day (22 Apr 2025 01:09)  tamsulosin 0.4 mg oral capsule: 1 cap(s) orally once a day (at bedtime) (14 Apr 2025 22:56)  traZODone 50 mg oral tablet: 1 tab(s) orally once a day (at bedtime) (14 Apr 2025 22:56)    MEDICATIONS  (STANDING):  ceftazidime/avibactam IVPB 0.94 Gram(s) IV Intermittent every 12 hours  chlorhexidine 2% Cloths 1 Application(s) Topical <User Schedule>  dextrose 5%. 1000 milliLiter(s) (100 mL/Hr) IV Continuous <Continuous>  dextrose 5%. 1000 milliLiter(s) (50 mL/Hr) IV Continuous <Continuous>  dextrose 50% Injectable 25 Gram(s) IV Push once  dextrose 50% Injectable 12.5 Gram(s) IV Push once  dextrose 50% Injectable 25 Gram(s) IV Push once  finasteride 5 milliGRAM(s) Oral daily  glucagon  Injectable 1 milliGRAM(s) IntraMuscular once  heparin   Injectable 5000 Unit(s) SubCutaneous every 8 hours  insulin lispro (ADMELOG) corrective regimen sliding scale   SubCutaneous three times a day before meals  lactated ringers. 500 milliLiter(s) (100 mL/Hr) IV Continuous <Continuous>  metoprolol tartrate 25 milliGRAM(s) Oral two times a day  NIFEdipine XL 90 milliGRAM(s) Oral daily  sodium chloride 0.45%. 1000 milliLiter(s) (60 mL/Hr) IV Continuous <Continuous>  tamsulosin 0.4 milliGRAM(s) Oral at bedtime    MEDICATIONS  (PRN):  acetaminophen  Suppository .. 650 milliGRAM(s) Rectal every 6 hours PRN Temp greater or equal to 38C (100.4F), Moderate Pain (4 - 6)  albuterol    90 MICROgram(s) HFA Inhaler 1 Puff(s) Inhalation four times a day PRN for shortness of breath and/or wheezing  aluminum hydroxide/magnesium hydroxide/simethicone Suspension 30 milliLiter(s) Oral every 4 hours PRN Dyspepsia  dextrose Oral Gel 15 Gram(s) Oral once PRN Blood Glucose LESS THAN 70 milliGRAM(s)/deciliter  melatonin 3 milliGRAM(s) Oral at bedtime PRN Insomnia  ondansetron Injectable 4 milliGRAM(s) IV Push every 8 hours PRN Nausea and/or Vomiting    Vital Signs Last 24 Hrs  T(C): 36.2 (07 May 2025 12:58), Max: 36.7 (06 May 2025 21:06)  T(F): 97.2 (07 May 2025 12:58), Max: 98.1 (06 May 2025 21:06)  HR: 100 (07 May 2025 12:58) (87 - 103)  BP: 167/68 (07 May 2025 12:58) (142/63 - 167/68)  BP(mean): 100 (07 May 2025 12:58) (89 - 100)  RR: 18 (07 May 2025 12:58) (18 - 18)  SpO2: 98% (07 May 2025 12:58) (96% - 98%)    Parameters below as of 07 May 2025 12:58  Patient On (Oxygen Delivery Method): room air      CAPILLARY BLOOD GLUCOSE      POCT Blood Glucose.: 102 mg/dL (07 May 2025 11:20)  POCT Blood Glucose.: 107 mg/dL (07 May 2025 07:35)  POCT Blood Glucose.: 133 mg/dL (06 May 2025 17:23)    LABS:                        10.3   8.79  )-----------( 202      ( 07 May 2025 06:53 )             32.1     05-07    153[H]  |  114[H]  |  51[H]  ----------------------------<  99  4.4   |  21  |  1.8[H]    Ca    8.9      07 May 2025 06:53  Mg     2.4     05-07              Urinalysis Basic - ( 07 May 2025 06:53 )    Color: x / Appearance: x / SG: x / pH: x  Gluc: 99 mg/dL / Ketone: x  / Bili: x / Urobili: x   Blood: x / Protein: x / Nitrite: x   Leuk Esterase: x / RBC: x / WBC x   Sq Epi: x / Non Sq Epi: x / Bacteria: x              Culture - Blood (collected 04 May 2025 19:01)  Source: Blood Blood  Preliminary Report (07 May 2025 01:02):    No growth at 48 Hours    Culture - Blood (collected 04 May 2025 19:01)  Source: Blood Blood  Gram Stain (05 May 2025 16:02):    Growth in aerobic bottle: Gram Negative Rods  Final Report (07 May 2025 08:17):    Growth in aerobic bottle: Klebsiella pneumoniae (Carbapenem Resistant)    Direct identification is available within approximately 3-5    hours either by Blood Panel Multiplexed PCR or Direct    MALDI-TOF. Details: [email or web address]  Organism: Blood Culture PCR  Klebsiella pneumoniae (Carbapenem Resistant) (07 May 2025 08:17)  Organism: Klebsiella pneumoniae (Carbapenem Resistant) (07 May 2025 08:17)  Organism: Blood Culture PCR (07 May 2025 08:17)      Consultant Notes Reviewed:  [x ] YES  [ ] NO  Care Discussed with Consultants/Other Providers/ Housestaff [ x] YES  [ ] NO  Radiology, labs, EKGs, new studies personally reviewed.

## 2025-05-07 NOTE — PROGRESS NOTE ADULT - ASSESSMENT
85 y/o male with Pmhx of Copd, Htn, ckd stage 2, Dm, schizophrenia, Hld, non-hodgkins lymphoma, urinary retention with chronic Bains (changed in ED on 5/3/25), Hx of MDR UTIs presents to the ED from [facility] for evaluation of AMS, being admitted for ams 2/2 sepsis from suspected UTI in setting of chronic bains and multiple UTIs w/MDR organisms.    #Complicated Acute Pyelonephritis in a setting Chronic bains for urinary retention  #AMS 2/2 sepsis from UTI  - baseline per previous documentation oriented x1  - currently confused, does not follow commands   - last culture on 4/25/25 admission showing E faecalis sensitive to vancomycin and carbapenem resistant kleb pneumo (likely colonization)   - RVP neg, COVID neg  - blood cx positive for Klebsiella pneumoniae  - urine cx + gram neg rods   - s/p vancomycin and cefepime in ED  - s/p amikacin 750mg x 1   - CXR - cardiomegaly. Improved opacities  - B/l LE duplex - No DVT  - per ID: Avycaz 0.94g q12h IV x 7-10 days pending clinical response (no other treatment options)  - Home meds: gabapentin, trazodone, olanzipine, tamsulosin, finasteride  - held gabapentin and psych meds in setting of alteration from baseline  - c/w home tamsulosin 0.4mg daily   - 5/6: Bains removed, TOV today, encourage po intake, WBC trending down  - 5/7: Pt failed TOV, replaced bains today    #Poor Po intake  - calorie count    #DAVID on CKD2 - improving   - yarely pre-renal   - Cr 2.3 > 2.1 > 1.9  - s/p IVF     #Chronic normocytic Anemia  - Hgb 10.3 (baseline around 10.0), MCV 90.8    #Hyperkalemia- resolved   - K 4.2 >3.9    #troponin elevation   - demand ischemia in setting of sepsis   - downtrending 68 > 58    #COPD  - c/w home inhalers     #HTN  #Sinus tachycardia on EKG  - c/w home Nifedipine 90mg daily   - c/w home metoprolol 25mg BID     #Non Hodgkins lymphoma  - outpt f/u    Diet: diet dash   Activity:  AAT  DVT ppx: heparin sub-q  Pending: monitor urinary output, monitor mental status 83 y/o male with Pmhx of Copd, Htn, ckd stage 2, Dm, schizophrenia, Hld, non-hodgkins lymphoma, urinary retention with chronic Bains (changed in ED on 5/3/25), Hx of MDR UTIs presents to the ED from [facility] for evaluation of AMS, being admitted for ams 2/2 sepsis from suspected UTI in setting of chronic bains and multiple UTIs w/MDR organisms.    #Complicated Acute Pyelonephritis with chronic bains for urinary retention  #AMS 2/2 sepsis from UTI  #Hx of MDR UTIs  - baseline per previous documentation oriented x1  - currently confused, does not follow commands   - last culture on 4/25/25 admission showing E faecalis sensitive to vancomycin and carbapenem resistant kleb pneumo (likely colonization)   - RVP neg, COVID neg  - blood cx positive for Klebsiella pneumoniae  - urine cx + gram neg rods   - s/p vancomycin and cefepime in ED  - s/p amikacin 750mg x 1   - CXR - cardiomegaly. Improved opacities  - B/l LE duplex - No DVT  - per ID: Avycaz 0.94g q12h IV x 7-10 days pending clinical response (no other treatment options)  - Home meds: gabapentin, trazodone, olanzipine, tamsulosin, finasteride  - held gabapentin and psych meds in setting of alteration from baseline  - c/w home tamsulosin 0.4mg daily   - 5/6: Bains removed, TOV today, encourage po intake, WBC trending down  - 5/7: Pt failed TOV, replaced bains today    #Poor Po intake  - calorie count    #DAVID on CKD2 - improving   - yarely pre-renal   - Cr 2.3 > 2.1 > 1.9  - s/p IVF     #Chronic normocytic Anemia  - Hgb 10.3 (baseline around 10.0), MCV 90.8    #Hyperkalemia- resolved   - K 4.2 >3.9    #troponin elevation   - demand ischemia in setting of sepsis   - downtrending 68 > 58    #COPD  - c/w home inhalers     #HTN  #Sinus tachycardia on EKG  - c/w home Nifedipine 90mg daily   - c/w home metoprolol 25mg BID     #Non Hodgkins lymphoma  - outpt f/u    Diet: diet dash   Activity:  AAT  DVT ppx: heparin sub-q  Pending: monitor urinary output, monitor mental status

## 2025-05-07 NOTE — PROGRESS NOTE ADULT - SUBJECTIVE AND OBJECTIVE BOX
SUBJECTIVE/OVERNIGHT EVENTS  Today is hospital day 4d. This morning patient was seen and examined at bedside, awake, opens eyes, does not respond to questions or follow commands. No acute or major events overnight.      CODE STATUS: FULL      MEDICATIONS  STANDING MEDICATIONS  ceftazidime/avibactam IVPB 0.94 Gram(s) IV Intermittent every 12 hours  chlorhexidine 2% Cloths 1 Application(s) Topical <User Schedule>  dextrose 5%. 1000 milliLiter(s) IV Continuous <Continuous>  dextrose 5%. 1000 milliLiter(s) IV Continuous <Continuous>  dextrose 50% Injectable 25 Gram(s) IV Push once  dextrose 50% Injectable 12.5 Gram(s) IV Push once  dextrose 50% Injectable 25 Gram(s) IV Push once  finasteride 5 milliGRAM(s) Oral daily  glucagon  Injectable 1 milliGRAM(s) IntraMuscular once  heparin   Injectable 5000 Unit(s) SubCutaneous every 8 hours  insulin lispro (ADMELOG) corrective regimen sliding scale   SubCutaneous three times a day before meals  lactated ringers. 500 milliLiter(s) IV Continuous <Continuous>  metoprolol tartrate 25 milliGRAM(s) Oral two times a day  NIFEdipine XL 90 milliGRAM(s) Oral daily  sodium chloride 0.45%. 1000 milliLiter(s) IV Continuous <Continuous>  tamsulosin 0.4 milliGRAM(s) Oral at bedtime    PRN MEDICATIONS  acetaminophen  Suppository .. 650 milliGRAM(s) Rectal every 6 hours PRN  albuterol    90 MICROgram(s) HFA Inhaler 1 Puff(s) Inhalation four times a day PRN  aluminum hydroxide/magnesium hydroxide/simethicone Suspension 30 milliLiter(s) Oral every 4 hours PRN  dextrose Oral Gel 15 Gram(s) Oral once PRN  melatonin 3 milliGRAM(s) Oral at bedtime PRN  ondansetron Injectable 4 milliGRAM(s) IV Push every 8 hours PRN    VITALS  T(F): 97.2 (05-07-25 @ 12:58), Max: 98.1 (05-06-25 @ 21:06)  HR: 100 (05-07-25 @ 12:58) (87 - 103)  BP: 167/68 (05-07-25 @ 12:58) (142/63 - 167/68)  RR: 18 (05-07-25 @ 12:58) (18 - 18)  SpO2: 98% (05-07-25 @ 12:58) (96% - 98%)  POCT Blood Glucose.: 102 mg/dL (05-07-25 @ 11:20)  POCT Blood Glucose.: 107 mg/dL (05-07-25 @ 07:35)  POCT Blood Glucose.: 133 mg/dL (05-06-25 @ 17:23)    PHYSICAL EXAM  GENERAL: NAD, chronically ill-appearing  HEAD:  Atraumatic, normocephalic  EYES: EOMI, PERRL  NECK: Supple, trachea midline, no JVD  HEART: Regular rate and rhythm  LUNGS: decreased BS in b/l bases  ABDOMEN: Soft, nontender, nondistended, +BS  EXTREMITIES: 2+ peripheral pulses bilaterally. No clubbing, cyanosis, or edema  NERVOUS SYSTEM:  A&Ox1, moving all extremities    (  ) Indwelling Cobos Catheter   Date inserted:    Reason (  ) Critical illness     (  ) urinary retention    (  ) Accurate Ins/Outs Monitoring     (  ) CMO patient    (  ) Central Line  Date inserted:  Location: (  ) Right IJ   (  ) Left IJ   (  ) Right Fem   (  ) Left Fem    (  ) SPC  (  ) pigtail  (  ) PEG tube  (  ) colostomy  (  ) jejunostomy  (  ) U-Dall    LABS             10.3   8.79  )-----------( 202      ( 05-07-25 @ 06:53 )             32.1     153  |  114  |  51  -------------------------<  99   05-07-25 @ 06:53  4.4  |  21  |  1.8    Ca      8.9     05-07-25 @ 06:53  Mg     2.4     05-07-25 @ 06:53          Urinalysis Basic - ( 07 May 2025 06:53 )    Color: x / Appearance: x / SG: x / pH: x  Gluc: 99 mg/dL / Ketone: x  / Bili: x / Urobili: x   Blood: x / Protein: x / Nitrite: x   Leuk Esterase: x / RBC: x / WBC x   Sq Epi: x / Non Sq Epi: x / Bacteria: x          Culture - Blood (collected 04 May 2025 19:01)  Source: Blood Blood  Preliminary Report (07 May 2025 01:02):    No growth at 48 Hours    Culture - Blood (collected 04 May 2025 19:01)  Source: Blood Blood  Gram Stain (05 May 2025 16:02):    Growth in aerobic bottle: Gram Negative Rods  Final Report (07 May 2025 08:17):    Growth in aerobic bottle: Klebsiella pneumoniae (Carbapenem Resistant)    Direct identification is available within approximately 3-5    hours either by Blood Panel Multiplexed PCR or Direct    MALDI-TOF. Details: [email or web address]  Organism: Blood Culture PCR  Klebsiella pneumoniae (Carbapenem Resistant) (07 May 2025 08:17)  Organism: Klebsiella pneumoniae (Carbapenem Resistant) (07 May 2025 08:17)  Organism: Blood Culture PCR (07 May 2025 08:17)      IMAGING

## 2025-05-08 DIAGNOSIS — Z71.89 OTHER SPECIFIED COUNSELING: ICD-10-CM

## 2025-05-08 DIAGNOSIS — N17.9 ACUTE KIDNEY FAILURE, UNSPECIFIED: ICD-10-CM

## 2025-05-08 DIAGNOSIS — A41.9 SEPSIS, UNSPECIFIED ORGANISM: ICD-10-CM

## 2025-05-08 DIAGNOSIS — Z51.5 ENCOUNTER FOR PALLIATIVE CARE: ICD-10-CM

## 2025-05-08 LAB
ANION GAP SERPL CALC-SCNC: 17 MMOL/L — HIGH (ref 7–14)
BASOPHILS # BLD AUTO: 0.01 K/UL — SIGNIFICANT CHANGE UP (ref 0–0.2)
BASOPHILS NFR BLD AUTO: 0.1 % — SIGNIFICANT CHANGE UP (ref 0–1)
BUN SERPL-MCNC: 45 MG/DL — HIGH (ref 10–20)
CALCIUM SERPL-MCNC: 9 MG/DL — SIGNIFICANT CHANGE UP (ref 8.4–10.5)
CHLORIDE SERPL-SCNC: 118 MMOL/L — HIGH (ref 98–110)
CO2 SERPL-SCNC: 21 MMOL/L — SIGNIFICANT CHANGE UP (ref 17–32)
CREAT SERPL-MCNC: 1.6 MG/DL — HIGH (ref 0.7–1.5)
CULTURE RESULTS: SIGNIFICANT CHANGE UP
CULTURE RESULTS: SIGNIFICANT CHANGE UP
EGFR: 43 ML/MIN/1.73M2 — LOW
EGFR: 43 ML/MIN/1.73M2 — LOW
EOSINOPHIL # BLD AUTO: 0.04 K/UL — SIGNIFICANT CHANGE UP (ref 0–0.7)
EOSINOPHIL NFR BLD AUTO: 0.4 % — SIGNIFICANT CHANGE UP (ref 0–8)
GLUCOSE BLDC GLUCOMTR-MCNC: 107 MG/DL — HIGH (ref 70–99)
GLUCOSE BLDC GLUCOMTR-MCNC: 107 MG/DL — HIGH (ref 70–99)
GLUCOSE BLDC GLUCOMTR-MCNC: 120 MG/DL — HIGH (ref 70–99)
GLUCOSE BLDC GLUCOMTR-MCNC: 89 MG/DL — SIGNIFICANT CHANGE UP (ref 70–99)
GLUCOSE SERPL-MCNC: 88 MG/DL — SIGNIFICANT CHANGE UP (ref 70–99)
HCT VFR BLD CALC: 35 % — LOW (ref 42–52)
HGB BLD-MCNC: 11.2 G/DL — LOW (ref 14–18)
IMM GRANULOCYTES NFR BLD AUTO: 2 % — HIGH (ref 0.1–0.3)
LYMPHOCYTES # BLD AUTO: 1.96 K/UL — SIGNIFICANT CHANGE UP (ref 1.2–3.4)
LYMPHOCYTES # BLD AUTO: 18.8 % — LOW (ref 20.5–51.1)
MAGNESIUM SERPL-MCNC: 2.4 MG/DL — SIGNIFICANT CHANGE UP (ref 1.8–2.4)
MCHC RBC-ENTMCNC: 30.2 PG — SIGNIFICANT CHANGE UP (ref 27–31)
MCHC RBC-ENTMCNC: 32 G/DL — SIGNIFICANT CHANGE UP (ref 32–37)
MCV RBC AUTO: 94.3 FL — HIGH (ref 80–94)
MONOCYTES # BLD AUTO: 0.4 K/UL — SIGNIFICANT CHANGE UP (ref 0.1–0.6)
MONOCYTES NFR BLD AUTO: 3.8 % — SIGNIFICANT CHANGE UP (ref 1.7–9.3)
NEUTROPHILS # BLD AUTO: 7.78 K/UL — HIGH (ref 1.4–6.5)
NEUTROPHILS NFR BLD AUTO: 74.9 % — SIGNIFICANT CHANGE UP (ref 42.2–75.2)
NRBC BLD AUTO-RTO: 0 /100 WBCS — SIGNIFICANT CHANGE UP (ref 0–0)
PLATELET # BLD AUTO: 249 K/UL — SIGNIFICANT CHANGE UP (ref 130–400)
PMV BLD: 11.6 FL — HIGH (ref 7.4–10.4)
POTASSIUM SERPL-MCNC: 3.5 MMOL/L — SIGNIFICANT CHANGE UP (ref 3.5–5)
POTASSIUM SERPL-SCNC: 3.5 MMOL/L — SIGNIFICANT CHANGE UP (ref 3.5–5)
RBC # BLD: 3.71 M/UL — LOW (ref 4.7–6.1)
RBC # FLD: 15.4 % — HIGH (ref 11.5–14.5)
SODIUM SERPL-SCNC: 156 MMOL/L — HIGH (ref 135–146)
SPECIMEN SOURCE: SIGNIFICANT CHANGE UP
SPECIMEN SOURCE: SIGNIFICANT CHANGE UP
WBC # BLD: 10.4 K/UL — SIGNIFICANT CHANGE UP (ref 4.8–10.8)
WBC # FLD AUTO: 10.4 K/UL — SIGNIFICANT CHANGE UP (ref 4.8–10.8)

## 2025-05-08 PROCEDURE — 99497 ADVNCD CARE PLAN 30 MIN: CPT | Mod: 25

## 2025-05-08 PROCEDURE — 99222 1ST HOSP IP/OBS MODERATE 55: CPT

## 2025-05-08 PROCEDURE — 99233 SBSQ HOSP IP/OBS HIGH 50: CPT

## 2025-05-08 RX ORDER — SODIUM CHLORIDE 9 G/1000ML
1000 INJECTION, SOLUTION INTRAVENOUS
Refills: 0 | Status: DISCONTINUED | OUTPATIENT
Start: 2025-05-08 | End: 2025-05-11

## 2025-05-08 RX ORDER — DRONABINOL 10 MG/1
2.5 CAPSULE ORAL EVERY 8 HOURS
Refills: 0 | Status: DISCONTINUED | OUTPATIENT
Start: 2025-05-08 | End: 2025-05-08

## 2025-05-08 RX ORDER — DRONABINOL 10 MG/1
2.5 CAPSULE ORAL
Refills: 0 | Status: DISCONTINUED | OUTPATIENT
Start: 2025-05-08 | End: 2025-05-13

## 2025-05-08 RX ADMIN — SODIUM CHLORIDE 60 MILLILITER(S): 9 INJECTION, SOLUTION INTRAVENOUS at 14:34

## 2025-05-08 RX ADMIN — FINASTERIDE 5 MILLIGRAM(S): 1 TABLET, FILM COATED ORAL at 13:00

## 2025-05-08 RX ADMIN — Medication 90 MILLIGRAM(S): at 05:04

## 2025-05-08 RX ADMIN — SODIUM CHLORIDE 60 MILLILITER(S): 9 INJECTION, SOLUTION INTRAVENOUS at 10:47

## 2025-05-08 RX ADMIN — HEPARIN SODIUM 5000 UNIT(S): 1000 INJECTION INTRAVENOUS; SUBCUTANEOUS at 05:04

## 2025-05-08 RX ADMIN — Medication 1 APPLICATION(S): at 05:12

## 2025-05-08 RX ADMIN — METOPROLOL SUCCINATE 25 MILLIGRAM(S): 50 TABLET, EXTENDED RELEASE ORAL at 05:04

## 2025-05-08 NOTE — PROGRESS NOTE ADULT - ASSESSMENT
83 y/o male with Pmhx of Copd, Htn, ckd stage 2, Dm, schizophrenia, Hld, non-hodgkins lymphoma, urinary retention with chronic Bains (changed in ED on 5/3/25), Hx of MDR UTIs presents to the ED from [facility] for evaluation of AMS, being admitted for ams 2/2 sepsis from suspected UTI in setting of chronic bains and multiple UTIs w/MDR organisms.    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 Improved opacities  - B/l LE duplex - No DVT  - per ID: Avycaz 0.94g q12h IV  - Home meds: gabapentin, trazodone, olanzipine, tamsulosin, finasteride  - c/w home tamsulosin 0.4mg daily   - 5/6: Bains removed, TOV today, encourage po intake, WBC trending down  5/7 failed voiding trial. As per adult home/sister, pt has been gradually declining, wheelchair bound. Does not talk much.    #DAVID on CKD2 - improving   - yarely pre-renal   - Cr 2.3 > 2.1 > 1.9  - s/p IVF     #Hypernatremia  IVFs D5W    #Chronic normocytic Anemia  - Hgb 10.3 (baseline around 10.0), MCV 90.8    #Hyperkalemia- resolved   - K 4.2 >3.9    #troponin elevation   - demand ischemia in setting of sepsis   - downtrending 68 > 58    #COPD  - c/w home inhalers     #HTN  #Sinus tachycardia on EKG  - c/w home Nifedipine 90mg daily   - c/w home metoprolol 25mg BID     #Non Hodgkins lymphoma  - outpt f/u    #Nutrition/Fluids/Electrolytes   - replete K<4 and Mg <2  - ensure regular BMs  -add Marinol to boost appetite    #DVT Px  SCDs  Heparin SQ    High risk pt. Px is guarded.  GOC: d/w sister on 5/7. She would not want feeding tubes, she understands that pt is gradually declining. She wants to think before making him DNR/DNI. [facility] plan [facility] Nsg home [facility] the [facility]. Palliative c/s placed to help with GOC as current full code does not go along with "no feeding tubes".    #Progress Note Handoff  Pending: Clinical improvement and stability__x____  Family: sister is aware and agrees w/ plan  Disposition: SNF       My note supersedes the residents note should a discrepancy arise.    Chart and notes personally reviewed.  Care Discussed with Consultants/Other Providers/ Housestaff [ x] YES [ ] NO   Radiology, labs, old records personally reviewed.    discussed w/ housestaff, nursing, case management    Attestation Statements:    Attestation Statements:  Risk Statement (NON-critical care).     On this date of service, level of risk to patient is considered: High.     Due to: pt with illness causing risk to life or bodily fxn    Time-based billing (NON-critical care).     50 minutes spent on total encounter. The necessity of the time spent during the encounter on this date of service was due to:     time spent on review of labs, imaging studies, old records, obtaining history, personally examining patient, counselling and communicating with patient/ family, entering orders for medications/tests/etc, discussions with other health care providers, documentation in electronic health records, independent interpretation of labs, imaging/procedure results and care coordination.

## 2025-05-08 NOTE — PROGRESS NOTE ADULT - ASSESSMENT
83 y/o male with Pmhx of Copd, Htn, ckd stage 2, Dm, schizophrenia, Hld, non-hodgkins lymphoma, urinary retention with chronic Bains (changed in ED on 5/3/25), Hx of MDR UTIs presents to the ED from [facility] for evaluation of AMS, being admitted for ams 2/2 sepsis from suspected UTI in setting of chronic bains and multiple UTIs w/MDR organisms.    #Complicated Acute Pyelonephritis with chronic bains for urinary retention  #AMS 2/2 sepsis from UTI  #Hx of MDR UTIs  - baseline per previous documentation oriented x1  - currently confused, does not follow commands   - last culture on 4/25/25 admission showing E faecalis sensitive to vancomycin and carbapenem resistant kleb pneumo (likely colonization)   - RVP neg, COVID neg  - blood cx positive for Klebsiella pneumoniae  - urine cx + gram neg rods   - s/p vancomycin and cefepime in ED  - s/p amikacin 750mg x 1   - CXR - cardiomegaly. Improved opacities  - B/l LE duplex - No DVT  - per ID: Avycaz 0.94g q12h IV x 7-10 days total (Day 5) pending clinical response (no other treatment options)  - Home meds: gabapentin, trazodone, olanzipine, tamsulosin, finasteride  - held gabapentin and psych meds in setting of alteration from baseline  - c/w home tamsulosin 0.4mg daily   - 5/6: Bains removed, TOV today, encourage po intake, WBC trending down  - 5/7: Pt failed TOV, replaced bains today  - 5/8: Pt with poor po intake, pt was nonverbal.    #Poor Po intake  - calorie count  - family does not want NGT  - Start Marinol to boost appetite    #DAVID on CKD2 - improving   - yarely pre-renal   - Cr 2.3 > 2.1 > 1.9  - s/p IVF     #Chronic normocytic Anemia  - Hgb 10.3 (baseline around 10.0), MCV 90.8    #Hyperkalemia- resolved   - K 4.2 >3.9    #troponin elevation   - demand ischemia in setting of sepsis   - downtrending 68 > 58    #COPD  - c/w home inhalers     #HTN  #Sinus tachycardia on EKG  - c/w home Nifedipine 90mg daily   - c/w home metoprolol 25mg BID     #Non Hodgkins lymphoma  - outpt f/u    Diet: diet dash   Activity:  AAT  DVT ppx: heparin sub-q  Pending: monitor urinary output, monitor mental status

## 2025-05-08 NOTE — CONSULT NOTE ADULT - SUBJECTIVE AND OBJECTIVE BOX
HPI: 84M with PMH of COPD, HTN, CKD2, DM, schizophrenia, HLD, NHL, urinary retention s/p chronic bains, history of MDR UTIs, here for evaluation of AMS, and found to have sepsis from complicated acute pyelonephritis and UTI. Has a poor functional status at baseline, with dementia, and is wheelchair bound. Course c/b DAVID as well. Full code for now, sister is surrogate, palliative called for GOC.    PERTINENT PM/SXH:   No pertinent past medical history    COPD (chronic obstructive pulmonary disease)    Paranoid schizophrenia    Schizophrenia    Diabetes type 2, controlled    COPD (chronic obstructive pulmonary disease)    Dyslipidemia    Chronic retention of urine    Dyslipidemia      No significant past surgical history    Encounter for screening colonoscopy      FAMILY HISTORY:  no pertinent family history      SOCIAL HISTORY:   Significant other/partner[ ]  Children[ ]  Islam/Spirituality:  Substance hx:  [ ]   Tobacco hx:  [ ]   Alcohol hx: [ ]   Living Situation: [ ]Home  [ ]Long term care  [ ]Rehab [ ]Other  Home Services: [ ] HHA [ ] Visting RN [ ] Hospice  Occupation:  Home Opioid hx:  [ ] Y [ ] N [ X] I-Stop Reference No: [id number]    ADVANCE DIRECTIVES:    [ ] Full Code [ ] DNR  MOLST  [ ]  Living Will  [ ]   DECISION MAKER(s):  [ ] Health Care Proxy(s)  [ ] Surrogate(s)  [ ] Guardian           Name(s): Phone Number(s): sister    BASELINE (I)ADL(s) (prior to admission):  [place]: [ ]Total  [ ] Moderate [ ]Dependent  Palliative Performance Status Version 2:         %    http://npcrc.org/files/news/palliative_performance_scale_ppsv2.pdf    Allergies    No Known Allergies    Intolerances    MEDICATIONS  (STANDING):  ceftazidime/avibactam IVPB 0.94 Gram(s) IV Intermittent every 12 hours  chlorhexidine 2% Cloths 1 Application(s) Topical <User Schedule>  dextrose 5%. 1000 milliLiter(s) (100 mL/Hr) IV Continuous <Continuous>  dextrose 5%. 1000 milliLiter(s) (50 mL/Hr) IV Continuous <Continuous>  dextrose 50% Injectable 25 Gram(s) IV Push once  dextrose 50% Injectable 12.5 Gram(s) IV Push once  dextrose 50% Injectable 25 Gram(s) IV Push once  finasteride 5 milliGRAM(s) Oral daily  glucagon  Injectable 1 milliGRAM(s) IntraMuscular once  heparin   Injectable 5000 Unit(s) SubCutaneous every 8 hours  insulin lispro (ADMELOG) corrective regimen sliding scale   SubCutaneous three times a day before meals  lactated ringers. 500 milliLiter(s) (100 mL/Hr) IV Continuous <Continuous>  metoprolol tartrate 25 milliGRAM(s) Oral two times a day  NIFEdipine XL 90 milliGRAM(s) Oral daily  sodium chloride 0.45%. 1000 milliLiter(s) (60 mL/Hr) IV Continuous <Continuous>  tamsulosin 0.4 milliGRAM(s) Oral at bedtime    MEDICATIONS  (PRN):  acetaminophen  Suppository .. 650 milliGRAM(s) Rectal every 6 hours PRN Temp greater or equal to 38C (100.4F), Moderate Pain (4 - 6)  albuterol    90 MICROgram(s) HFA Inhaler 1 Puff(s) Inhalation four times a day PRN for shortness of breath and/or wheezing  aluminum hydroxide/magnesium hydroxide/simethicone Suspension 30 milliLiter(s) Oral every 4 hours PRN Dyspepsia  dextrose Oral Gel 15 Gram(s) Oral once PRN Blood Glucose LESS THAN 70 milliGRAM(s)/deciliter  melatonin 3 milliGRAM(s) Oral at bedtime PRN Insomnia  ondansetron Injectable 4 milliGRAM(s) IV Push every 8 hours PRN Nausea and/or Vomiting      PRESENT SYMPTOMS: [ ]Unable to obtain due to poor mentation   Source if other than patient:  [ ]Family   [ ]Team   [ X]All review of systems negative including pain and dyspnea unless noted below    All components of pain assessment below addressed. Blank spaces indicate that the patient did/could not complete the assessment.  Pain: [ ]yes [ ]no  QOL impact -   Location -                    Aggravating factors -  Quality -  Radiation -  Timing-  Severity (0-10 scale):  Minimal acceptable level (0-10 scale):     CPOT:    https://www.sccm.org/getattachment/nen47d05-6p3h-9w5y-6k3g-9682d1982h3m/Critical-Care-Pain-Observation-Tool-(CPOT)    PAIN AD Score:   http://geriatrictoolkit.[place].[facility]/cog/painad.pdf (press ctrl +  left click to view)    Dyspnea:                           [ ]None[ ]Mild [ ]Moderate [ ]Severe     Respiratory Distress Observation Scale (RDOS):   A score of 0 to 2 signifies little or no respiratory distress, 3 signifies mild distress, scores 4 to 6 indicate moderate distress, and scores greater than 7 signify severe distress  [email or web address]://[email or web address].ca/sites/default/files/PDFS/174699-jovsrhcocqx-ohtucgik-rmvjzwyibjs-yebsw.pdf    Anxiety:                             [ ]None[ ]Mild [ ]Moderate [ ]Severe   Fatigue:                             [ ]None[ ]Mild [ ]Moderate [ ]Severe   Nausea:                             [ ]None[ ]Mild [ ]Moderate [ ]Severe   Loss of appetite:              [ ]None[ ]Mild [ ]Moderate [ ]Severe   Constipation:                    [ ]None[ ]Mild [ ]Moderate [ ]Severe    Other Symptoms:    PHYSICAL EXAM:  Vital Signs Last 24 Hrs  T(C): 36.4 (08 May 2025 05:01), Max: 36.4 (08 May 2025 05:01)  T(F): 97.5 (08 May 2025 05:01), Max: 97.5 (08 May 2025 05:01)  HR: 96 (08 May 2025 06:45) (95 - 101)  BP: 158/70 (08 May 2025 06:45) (158/70 - 182/82)  BP(mean): 99 (08 May 2025 06:45) (96 - 100)  RR: 18 (08 May 2025 05:01) (18 - 18)  SpO2: 95% (08 May 2025 05:01) (95% - 98%)    Parameters below as of 08 May 2025 05:01  Patient On (Oxygen Delivery Method): room air     I&O's Summary    07 May 2025 07:01  -  08 May 2025 07:00  --------------------------------------------------------  IN: 120 mL / OUT: 1100 mL / NET: -980 mL        GENERAL:  [X ] No acute distress [ ]Lethargic  [ ]Unarousable  [ ]Verbal  [ ]Non-Verbal [ ]Cachexia    BEHAVIORAL/PSYCH:  [ ]Alert and Oriented x  [ ] Anxiety [ ] Delirium [ ] Agitation [X ] Calm   EYES: [ ] No scleral icterus [ ] Scleral icterus [ ] Closed  ENMT:  [ ]Dry mouth  [ ]No external oral lesions [ X] No external ear or nose lesions  CARDIOVASCULAR:  [ ]Regular [ ]Irregular [ ]Tachy [ ]Not Tachy  [ ]Huy [ ] Edema [ ] No edema  PULMONARY:  [ ]Tachypnea  [ ]Audible excessive secretions [X ] No labored breathing [ ] labored breathing  GASTROINTESTINAL: [ ]Soft  [ ]Distended  [ X]Not distended [ ]Non tender [ ]Tender  MUSCULOSKELETAL: [ ]No clubbing [ ] clubbing  [ X] No cyanosis [ ] cyanosis  NEUROLOGIC: [ ]No focal deficits  [ ]Follows commands  [ ]Does not follow commands  [ ]Cognitive impairment  [ ]Dysphagia  [ ]Dysarthria  [ ]Paresis   SKIN: [ ] Jaundiced [X ] Non-jaundiced [ ]Rash [ ]No Rash [ ] Warm [ ] Dry  MISC/LINES: [ ] ET tube [ ] Trach [ ]NGT/OGT [ ]PEG [ ]Bains    CRITICAL CARE:  [ ] Shock Present  [ ]Septic [ ]Cardiogenic [ ]Neurologic [ ]Hypovolemic  [ ]  Vasopressors [ ]  Inotropes   [ ]Respiratory failure present [ ]Mechanical ventilation [ ]Non-invasive ventilatory support [ ]High flow  [ ]Acute  [ ]Chronic [ ]Hypoxic  [ ]Hypercarbic [ ]Other  [ ]Other organ failure     LABS: reviewed by me, notable for: CBC WNL, elevated Na, UA WNL                        11.2   10.40 )-----------( 249      ( 08 May 2025 05:57 )             35.0   05-08    156[H]  |  118[H]  |  45[H]  ----------------------------<  88  3.5   |  21  |  1.6[H]    Ca    9.0      08 May 2025 05:57  Mg     2.4     05-08        Urinalysis Basic - ( 08 May 2025 05:57 )    Color: x / Appearance: x / SG: x / pH: x  Gluc: 88 mg/dL / Ketone: x  / Bili: x / Urobili: x   Blood: x / Protein: x / Nitrite: x   Leuk Esterase: x / RBC: x / WBC x   Sq Epi: x / Non Sq Epi: x / Bacteria: x      RADIOLOGY & ADDITIONAL STUDIES: CXR personally reviewed by me: 5/5 no major opacities    PROTEIN CALORIE MALNUTRITION PRESENT: [ ]mild [ ]moderate [ ]severe [ ]underweight [ ]morbid obesity  https://www.andeal.org/vault/2440/web/files/ONC/Table_Clinical%20Characteristics%20to%20Document%20Malnutrition-White%20JV%20et%20al%202012.pdf    Height (cm): 167.6 (05-03-25 @ 00:03), 167.6 (04-21-25 @ 17:49), 167.6 (04-19-25 @ 12:44)  Weight (kg): 60.8 (05-06-25 @ 02:35), 65 (04-21-25 @ 19:29), 65.8 (04-14-25 @ 14:50)  BMI (kg/m2): 21.6 (05-06-25 @ 02:35), 23.1 (05-03-25 @ 00:03), 23.1 (04-21-25 @ 19:29)    [ ]PPSV2 < or = to 30% [ ]significant weight loss  [ ]poor nutritional intake  [ ]anasarca      [ ]Artificial Nutrition          Palliative Care Spiritual/Emotional Screening Tool Question  Severity (0-4):                    OR                    [X ] Unable to determine/NA  Score of 2 or greater indicates recommendation of Chaplaincy referral  Chaplaincy Referral: [ ] Yes [ ] Refused [ ] Following     Caregiver [place]:  [ ] Yes [ ] No    OR    [x ] Unable to determine. Will assess at later time if appropriate.  Social Work Referral [ ]  Patient and Family Centered Care Referral [ ]    Anticipatory Grief Present: [ ] Yes [ ] No    OR     [ x] Unable to determine. Will assess at later time if appropriate.  Social Work Referral [ ]  Patient and Family Centered Care Referral [ ]    REFERRALS:   [ ]Chaplaincy  [ ]Hospice  [ ]Child Life  [ ]Social Work  [ ]Case management [ ]Holistic Therapy     Palliative care education provided to patient and/or family    Goals of Care Document:     ______________  Gerson Vasquez MD  Palliative Medicine  [facility]   of Geriatric and Palliative Medicine  [phone]   HPI: 84M with PMH of COPD, HTN, CKD2, DM, schizophrenia, HLD, NHL, urinary retention s/p chronic bains, history of MDR UTIs, here for evaluation of AMS, and found to have sepsis from complicated acute pyelonephritis and UTI. Has a poor functional status at baseline, with dementia, and is wheelchair bound. Course c/b DAVID as well. Full code for now, sister is surrogate, palliative called for GO.    PERTINENT PM/SXH:   No pertinent past medical history    COPD (chronic obstructive pulmonary disease)    Paranoid schizophrenia    Schizophrenia    Diabetes type 2, controlled    COPD (chronic obstructive pulmonary disease)    Dyslipidemia    Chronic retention of urine    Dyslipidemia      No significant past surgical history    Encounter for screening colonoscopy      FAMILY HISTORY:  no pertinent family history      SOCIAL HISTORY:   Significant other/partner[ ]  Children[ ]  Jain/Spirituality:  Substance hx:  [ ]   Tobacco hx:  [ ]   Alcohol hx: [ ]   Living Situation: [ ]Home  [ ]Long term care  [ ]Rehab [X ]Other assisted living  Home Services: [ ] HHA [ ] Visting RN [ ] Hospice  Occupation:  Home Opioid hx:  [ ] Y [ ] N [ X] I-Stop Reference No: [id number]    ADVANCE DIRECTIVES:    [ ] Full Code [ ] DNR  MOLST  [ ]  Living Will  [ ]   DECISION MAKER(s):  [ ] Health Care Proxy(s)  [ ] Surrogate(s)  [ ] Guardian           Name(s): Phone Number(s): sister Fatmata [phone]    BASELINE (I)ADL(s) (prior to admission):  [place]: [ ]Total  [ ] Moderate [ ]Dependent  Palliative Performance Status Version 2:         %    http://npcrc.org/files/news/palliative_performance_scale_ppsv2.pdf    Allergies    No Known Allergies    Intolerances    MEDICATIONS  (STANDING):  ceftazidime/avibactam IVPB 0.94 Gram(s) IV Intermittent every 12 hours  chlorhexidine 2% Cloths 1 Application(s) Topical <User Schedule>  dextrose 5%. 1000 milliLiter(s) (100 mL/Hr) IV Continuous <Continuous>  dextrose 5%. 1000 milliLiter(s) (50 mL/Hr) IV Continuous <Continuous>  dextrose 50% Injectable 25 Gram(s) IV Push once  dextrose 50% Injectable 12.5 Gram(s) IV Push once  dextrose 50% Injectable 25 Gram(s) IV Push once  finasteride 5 milliGRAM(s) Oral daily  glucagon  Injectable 1 milliGRAM(s) IntraMuscular once  heparin   Injectable 5000 Unit(s) SubCutaneous every 8 hours  insulin lispro (ADMELOG) corrective regimen sliding scale   SubCutaneous three times a day before meals  lactated ringers. 500 milliLiter(s) (100 mL/Hr) IV Continuous <Continuous>  metoprolol tartrate 25 milliGRAM(s) Oral two times a day  NIFEdipine XL 90 milliGRAM(s) Oral daily  sodium chloride 0.45%. 1000 milliLiter(s) (60 mL/Hr) IV Continuous <Continuous>  tamsulosin 0.4 milliGRAM(s) Oral at bedtime    MEDICATIONS  (PRN):  acetaminophen  Suppository .. 650 milliGRAM(s) Rectal every 6 hours PRN Temp greater or equal to 38C (100.4F), Moderate Pain (4 - 6)  albuterol    90 MICROgram(s) HFA Inhaler 1 Puff(s) Inhalation four times a day PRN for shortness of breath and/or wheezing  aluminum hydroxide/magnesium hydroxide/simethicone Suspension 30 milliLiter(s) Oral every 4 hours PRN Dyspepsia  dextrose Oral Gel 15 Gram(s) Oral once PRN Blood Glucose LESS THAN 70 milliGRAM(s)/deciliter  melatonin 3 milliGRAM(s) Oral at bedtime PRN Insomnia  ondansetron Injectable 4 milliGRAM(s) IV Push every 8 hours PRN Nausea and/or Vomiting      PRESENT SYMPTOMS: [ X]Unable to obtain due to poor mentation   Source if other than patient:  [ ]Family   [ ]Team   [ X]All review of systems negative including pain and dyspnea unless noted below    All components of pain assessment below addressed. Blank spaces indicate that the patient did/could not complete the assessment.  Pain: [ ]yes [ ]no  QOL impact -   Location -                    Aggravating factors -  Quality -  Radiation -  Timing-  Severity (0-10 scale):  Minimal acceptable level (0-10 scale):     CPOT:    https://www.sccm.org/getattachment/zme43d92-3j7s-1f1n-1s2u-7012r8496i4w/Critical-Care-Pain-Observation-Tool-(CPOT)    PAIN AD Score: 0  http://geriatrictoolkit.[facility]/cog/painad.pdf (press ctrl +  left click to view)    Dyspnea:                           [ ]None[ ]Mild [ ]Moderate [ ]Severe     Respiratory Distress Observation Scale (RDOS): 1  A score of 0 to 2 signifies little or no respiratory distress, 3 signifies mild distress, scores 4 to 6 indicate moderate distress, and scores greater than 7 signify severe distress  https://www.[facility].ca/sites/default/files/PDFS/192083-adpjrnnpkhi-egzaujid-pgidpzhwejb-hruuh.pdf    Anxiety:                             [ ]None[ ]Mild [ ]Moderate [ ]Severe   Fatigue:                             [ ]None[ ]Mild [ ]Moderate [ ]Severe   Nausea:                             [ ]None[ ]Mild [ ]Moderate [ ]Severe   Loss of appetite:              [ ]None[ ]Mild [ ]Moderate [ ]Severe   Constipation:                    [ ]None[ ]Mild [ ]Moderate [ ]Severe    Other Symptoms:    PHYSICAL EXAM:  Vital Signs Last 24 Hrs  T(C): 36.4 (08 May 2025 05:01), Max: 36.4 (08 May 2025 05:01)  T(F): 97.5 (08 May 2025 05:01), Max: 97.5 (08 May 2025 05:01)  HR: 96 (08 May 2025 06:45) (95 - 101)  BP: 158/70 (08 May 2025 06:45) (158/70 - 182/82)  BP(mean): 99 (08 May 2025 06:45) (96 - 100)  RR: 18 (08 May 2025 05:01) (18 - 18)  SpO2: 95% (08 May 2025 05:01) (95% - 98%)    Parameters below as of 08 May 2025 05:01  Patient On (Oxygen Delivery Method): room air     I&O's Summary    07 May 2025 07:01  -  08 May 2025 07:00  --------------------------------------------------------  IN: 120 mL / OUT: 1100 mL / NET: -980 mL        GENERAL:  [X ] No acute distress [ ]Lethargic  [ ]Unarousable  [ ]Verbal  [ ]Non-Verbal [ ]Cachexia    BEHAVIORAL/PSYCH:  [ ]Alert and Oriented x  [ ] Anxiety [ ] Delirium [ ] Agitation [X ] Calm   EYES: [ ] No scleral icterus [ ] Scleral icterus [ ] Closed  ENMT:  [ ]Dry mouth  [ ]No external oral lesions [ X] No external ear or nose lesions  CARDIOVASCULAR:  [ ]Regular [ ]Irregular [ ]Tachy [ ]Not Tachy  [ ]Huy [ ] Edema [ ] No edema  PULMONARY:  [ ]Tachypnea  [ ]Audible excessive secretions [X ] No labored breathing [ ] labored breathing  GASTROINTESTINAL: [ ]Soft  [ ]Distended  [ X]Not distended [ ]Non tender [ ]Tender  MUSCULOSKELETAL: [ ]No clubbing [ ] clubbing  [ X] No cyanosis [ ] cyanosis  NEUROLOGIC: [ ]No focal deficits  [ ]Follows commands  [ ]Does not follow commands  [ ]Cognitive impairment  [ ]Dysphagia  [ ]Dysarthria  [ ]Paresis   SKIN: [ ] Jaundiced [X ] Non-jaundiced [ ]Rash [ ]No Rash [ ] Warm [ ] Dry  MISC/LINES: [ ] ET tube [ ] Trach [ ]NGT/OGT [ ]PEG [ ]Bains    CRITICAL CARE:  [ ] Shock Present  [ ]Septic [ ]Cardiogenic [ ]Neurologic [ ]Hypovolemic  [ ]  Vasopressors [ ]  Inotropes   [ ]Respiratory failure present [ ]Mechanical ventilation [ ]Non-invasive ventilatory support [ ]High flow  [ ]Acute  [ ]Chronic [ ]Hypoxic  [ ]Hypercarbic [ ]Other  [ ]Other organ failure     LABS: reviewed by me, notable for: CBC WNL, elevated Na, UA WNL                        11.2   10.40 )-----------( 249      ( 08 May 2025 05:57 )             35.0   05-08    156[H]  |  118[H]  |  45[H]  ----------------------------<  88  3.5   |  21  |  1.6[H]    Ca    9.0      08 May 2025 05:57  Mg     2.4     05-08        Urinalysis Basic - ( 08 May 2025 05:57 )    Color: x / Appearance: x / SG: x / pH: x  Gluc: 88 mg/dL / Ketone: x  / Bili: x / Urobili: x   Blood: x / Protein: x / Nitrite: x   Leuk Esterase: x / RBC: x / WBC x   Sq Epi: x / Non Sq Epi: x / Bacteria: x      RADIOLOGY & ADDITIONAL STUDIES: CXR personally reviewed by me: 5/5 no major opacities    PROTEIN CALORIE MALNUTRITION PRESENT: [ ]mild [ ]moderate [ ]severe [ ]underweight [ ]morbid obesity  https://www.andeal.org/vault/2440/web/files/ONC/Table_Clinical%20Characteristics%20to%20Document%20Malnutrition-White%20JV%20et%20al%202012.pdf    Height (cm): 167.6 (05-03-25 @ 00:03), 167.6 (04-21-25 @ 17:49), 167.6 (04-19-25 @ 12:44)  Weight (kg): 60.8 (05-06-25 @ 02:35), 65 (04-21-25 @ 19:29), 65.8 (04-14-25 @ 14:50)  BMI (kg/m2): 21.6 (05-06-25 @ 02:35), 23.1 (05-03-25 @ 00:03), 23.1 (04-21-25 @ 19:29)    [ ]PPSV2 < or = to 30% [ ]significant weight loss  [ ]poor nutritional intake  [ ]anasarca      [ ]Artificial Nutrition          Palliative Care Spiritual/Emotional Screening Tool Question  Severity (0-4):                    OR                    [X ] Unable to determine/NA  Score of 2 or greater indicates recommendation of Chaplaincy referral  Chaplaincy Referral: [ ] Yes [ ] Refused [ ] Following     Caregiver [place]:  [ ] Yes [ ] No    OR    [x ] Unable to determine. Will assess at later time if appropriate.  Social Work Referral [ ]  Patient and Family Centered Care Referral [ ]    Anticipatory Grief Present: [ ] Yes [ ] No    OR     [ x] Unable to determine. Will assess at later time if appropriate.  Social Work Referral [ ]  Patient and Family Centered Care Referral [ ]    REFERRALS:   [ ]Chaplaincy  [ ]Hospice  [ ]Child Life  [ ]Social Work  [ ]Case management [ ]Holistic Therapy     Palliative care education provided to patient and/or family    Goals of Care Document:     ______________  Gerson Vasquez MD  Palliative Medicine  [facility]   of Geriatric and Palliative Medicine  [phone]

## 2025-05-08 NOTE — CONSULT NOTE ADULT - CONVERSATION DETAILS
Discussed with patient's sister. Patient lives at assisted living, and sister feels he has some level of dementia now. At baseline now, he is unable to have a major conversation, and at baseline has a shuffling gait and more limited ambulation, but often neglects to use a walker. She states he often is hospitalized for infections, goes to [facility], back to assisted living, and back again. She is considering to have him go to [facility] and then relocate to nearer to where sister lives (near [facility]), to a possible SNF there. Sister states she would not want a feeding tube, or CPR, but needs to discuss with her elder sister first.    16 mins spent

## 2025-05-08 NOTE — PROGRESS NOTE ADULT - SUBJECTIVE AND OBJECTIVE BOX
Patient is a 84y old  Male who presents with a chief complaint of ams 2/2 sepsis uti in setting of chronic bains (06 May 2025 13:21)    INTERVAL HPI/OVERNIGHT EVENTS: Patient was examined and seen at bedside. This morning pt is resting in bed and staff reports no new issues or overnight events. Pt refuses/unable to answer questions. Poor PO intake.  ROS: Denies any complaints  InitialHPI:  The patient is an 85 y/o male with Pmhx of Copd, Htn, ckd stage 2, Dm, schizophrenia, Hld, non-hodgkins lymphoma, urinary retention with chronic Bains (changed in ED on 5/3/25), Hx of MDR UTIs presents to the ED from [facility] for evaluation of AMS. Patient currently has no complaints at bedside, unable to speak/verbalize at this time, baseline aaox1, currently confused, no acute apparent distress.  According to ED documentation patient was unresponsive at NH, no response from [facility] at this time, not response from emergency contacts at this time either. Patient is being admitted for sepsis 2/2 uti in setting of chronic foely w/MDR organisms.     ED course:   Vitals:   bp 116/90  hr 105  rr 25  temp 100.2/37.9     imaging:   CTH no acute pathology     CTAP w/IV cont:     IMPRESSION:    Urinary bladder distended with Bains catheter balloon inflated within   prostatic urethra; repositioning recommended.  Urinary bladder mild pericystic stranding, could represent bladder infection in the appropriate clinical setting.  Mild bilateral hydroureteronephrosis, could be attributed to bladder outlet obstruction.  Urinary bladder right posterolateral subcentimeter nodular density, which can be further evaluated as outpatient.  BPH.    bains changed in ED      Labs:  wbc 22k  hgb 9.0 (baseline around 10.0)   trop 68-->58  k+ 5.3  cr 2.6 (baseline 1.4)   lactate 1.9     s/p   500cc LR bolus   s/p cefepime and vanc  ucx bcx collected   (03 May 2025 11:49)    PAST MEDICAL & SURGICAL HISTORY:  COPD (chronic obstructive pulmonary disease)      Paranoid schizophrenia      Schizophrenia      Diabetes type 2, controlled      COPD (chronic obstructive pulmonary disease)      Dyslipidemia      Chronic retention of urine      Dyslipidemia      Encounter for screening colonoscopy          General: NAD, alert, chronically ill appearing  HEENT: no LAD  CV: S1 S2  Resp: decreased breath sounds at bases  GI: NT/ND/S +BS  MS: no clubbing/cyanosis/edema, + pulses b/l  Neuro: appears to move all extremities          Home Medications:  Albuterol (Eqv-ProAir HFA) 90 mcg/inh inhalation aerosol: 1 puff(s) inhaled 4 times a day as needed for  shortness of breath and/or wheezing (14 Apr 2025 22:55)  gabapentin 300 mg oral capsule: 1 cap(s) orally 2 times a day (14 Apr 2025 22:56)  NIFEdipine 90 mg oral tablet, extended release: 1 tab(s) orally once a day (22 Apr 2025 01:09)  tamsulosin 0.4 mg oral capsule: 1 cap(s) orally once a day (at bedtime) (14 Apr 2025 22:56)  traZODone 50 mg oral tablet: 1 tab(s) orally once a day (at bedtime) (14 Apr 2025 22:56)    MEDICATIONS  (STANDING):  ceftazidime/avibactam IVPB 0.94 Gram(s) IV Intermittent every 12 hours  chlorhexidine 2% Cloths 1 Application(s) Topical <User Schedule>  dextrose 5%. 1000 milliLiter(s) (60 mL/Hr) IV Continuous <Continuous>  dextrose 5%. 1000 milliLiter(s) (100 mL/Hr) IV Continuous <Continuous>  dextrose 5%. 1000 milliLiter(s) (50 mL/Hr) IV Continuous <Continuous>  dextrose 50% Injectable 25 Gram(s) IV Push once  dextrose 50% Injectable 12.5 Gram(s) IV Push once  dextrose 50% Injectable 25 Gram(s) IV Push once  finasteride 5 milliGRAM(s) Oral daily  glucagon  Injectable 1 milliGRAM(s) IntraMuscular once  heparin   Injectable 5000 Unit(s) SubCutaneous every 8 hours  insulin lispro (ADMELOG) corrective regimen sliding scale   SubCutaneous three times a day before meals  lactated ringers. 500 milliLiter(s) (100 mL/Hr) IV Continuous <Continuous>  metoprolol tartrate 25 milliGRAM(s) Oral two times a day  NIFEdipine XL 90 milliGRAM(s) Oral daily  tamsulosin 0.4 milliGRAM(s) Oral at bedtime    MEDICATIONS  (PRN):  acetaminophen  Suppository .. 650 milliGRAM(s) Rectal every 6 hours PRN Temp greater or equal to 38C (100.4F), Moderate Pain (4 - 6)  albuterol    90 MICROgram(s) HFA Inhaler 1 Puff(s) Inhalation four times a day PRN for shortness of breath and/or wheezing  aluminum hydroxide/magnesium hydroxide/simethicone Suspension 30 milliLiter(s) Oral every 4 hours PRN Dyspepsia  dextrose Oral Gel 15 Gram(s) Oral once PRN Blood Glucose LESS THAN 70 milliGRAM(s)/deciliter  melatonin 3 milliGRAM(s) Oral at bedtime PRN Insomnia  ondansetron Injectable 4 milliGRAM(s) IV Push every 8 hours PRN Nausea and/or Vomiting    Vital Signs Last 24 Hrs  T(C): 36.4 (08 May 2025 05:01), Max: 36.4 (08 May 2025 05:01)  T(F): 97.5 (08 May 2025 05:01), Max: 97.5 (08 May 2025 05:01)  HR: 96 (08 May 2025 06:45) (95 - 101)  BP: 158/70 (08 May 2025 06:45) (158/70 - 182/82)  BP(mean): 99 (08 May 2025 06:45) (96 - 100)  RR: 18 (08 May 2025 05:01) (18 - 18)  SpO2: 95% (08 May 2025 05:01) (95% - 98%)    Parameters below as of 08 May 2025 05:01  Patient On (Oxygen Delivery Method): room air      CAPILLARY BLOOD GLUCOSE      POCT Blood Glucose.: 89 mg/dL (08 May 2025 11:41)  POCT Blood Glucose.: 107 mg/dL (08 May 2025 07:37)  POCT Blood Glucose.: 104 mg/dL (07 May 2025 17:15)    LABS:                        11.2   10.40 )-----------( 249      ( 08 May 2025 05:57 )             35.0     05-08    156[H]  |  118[H]  |  45[H]  ----------------------------<  88  3.5   |  21  |  1.6[H]    Ca    9.0      08 May 2025 05:57  Mg     2.4     05-08              Urinalysis Basic - ( 08 May 2025 05:57 )    Color: x / Appearance: x / SG: x / pH: x  Gluc: 88 mg/dL / Ketone: x  / Bili: x / Urobili: x   Blood: x / Protein: x / Nitrite: x   Leuk Esterase: x / RBC: x / WBC x   Sq Epi: x / Non Sq Epi: x / Bacteria: x              Consultant Notes Reviewed:  [x ] YES  [ ] NO  Care Discussed with Consultants/Other Providers/ Housestaff [ x] YES  [ ] NO  Radiology, labs, EKGs, new studies personally reviewed.

## 2025-05-08 NOTE — PROGRESS NOTE ADULT - SUBJECTIVE AND OBJECTIVE BOX
SUBJECTIVE/OVERNIGHT EVENTS  Today is hospital day 5d. This morning patient was seen and examined at bedside, resting comfortably in bed. No acute or major events overnight.      CODE STATUS: FULL      MEDICATIONS  STANDING MEDICATIONS  ceftazidime/avibactam IVPB 0.94 Gram(s) IV Intermittent every 12 hours  chlorhexidine 2% Cloths 1 Application(s) Topical <User Schedule>  dextrose 5%. 1000 milliLiter(s) IV Continuous <Continuous>  dextrose 5%. 1000 milliLiter(s) IV Continuous <Continuous>  dextrose 5%. 1000 milliLiter(s) IV Continuous <Continuous>  dextrose 50% Injectable 25 Gram(s) IV Push once  dextrose 50% Injectable 12.5 Gram(s) IV Push once  dextrose 50% Injectable 25 Gram(s) IV Push once  dronabinol 2.5 milliGRAM(s) Oral two times a day  finasteride 5 milliGRAM(s) Oral daily  glucagon  Injectable 1 milliGRAM(s) IntraMuscular once  heparin   Injectable 5000 Unit(s) SubCutaneous every 8 hours  insulin lispro (ADMELOG) corrective regimen sliding scale   SubCutaneous three times a day before meals  lactated ringers. 500 milliLiter(s) IV Continuous <Continuous>  metoprolol tartrate 25 milliGRAM(s) Oral two times a day  NIFEdipine XL 90 milliGRAM(s) Oral daily  tamsulosin 0.4 milliGRAM(s) Oral at bedtime    PRN MEDICATIONS  acetaminophen  Suppository .. 650 milliGRAM(s) Rectal every 6 hours PRN  albuterol    90 MICROgram(s) HFA Inhaler 1 Puff(s) Inhalation four times a day PRN  aluminum hydroxide/magnesium hydroxide/simethicone Suspension 30 milliLiter(s) Oral every 4 hours PRN  dextrose Oral Gel 15 Gram(s) Oral once PRN  melatonin 3 milliGRAM(s) Oral at bedtime PRN  ondansetron Injectable 4 milliGRAM(s) IV Push every 8 hours PRN    VITALS  T(F): 97.1 (05-08-25 @ 13:11), Max: 97.5 (05-08-25 @ 05:01)  HR: 100 (05-08-25 @ 13:11) (95 - 101)  BP: 166/75 (05-08-25 @ 13:11) (158/70 - 182/82)  RR: 18 (05-08-25 @ 13:11) (18 - 18)  SpO2: 98% (05-08-25 @ 13:11) (95% - 98%)  POCT Blood Glucose.: 89 mg/dL (05-08-25 @ 11:41)  POCT Blood Glucose.: 107 mg/dL (05-08-25 @ 07:37)  POCT Blood Glucose.: 104 mg/dL (05-07-25 @ 17:15)    PHYSICAL EXAM  GENERAL: NAD, chronically ill-appearing  HEAD:  Atraumatic, normocephalic  EYES: EOMI, PERRL  NECK: Supple, trachea midline, no JVD  HEART: Regular rate and rhythm  LUNGS: decreased BS in b/l bases  ABDOMEN: Soft, nontender, nondistended, +BS  EXTREMITIES: 2+ peripheral pulses bilaterally. No clubbing, cyanosis, or edema  NERVOUS SYSTEM:  Awake, nonverbal, moving all extremities    (  ) Indwelling Cobos Catheter   Date inserted:    Reason (  ) Critical illness     (  ) urinary retention    (  ) Accurate Ins/Outs Monitoring     (  ) CMO patient    (  ) Central Line  Date inserted:  Location: (  ) Right IJ   (  ) Left IJ   (  ) Right Fem   (  ) Left Fem    (  ) SPC  (  ) pigtail  (  ) PEG tube  (  ) colostomy  (  ) jejunostomy  (  ) U-Dall    LABS             11.2   10.40 )-----------( 249      ( 05-08-25 @ 05:57 )             35.0     156  |  118  |  45  -------------------------<  88   05-08-25 @ 05:57  3.5  |  21  |  1.6    Ca      9.0     05-08-25 @ 05:57  Mg     2.4     05-08-25 @ 05:57          Urinalysis Basic - ( 08 May 2025 05:57 )    Color: x / Appearance: x / SG: x / pH: x  Gluc: 88 mg/dL / Ketone: x  / Bili: x / Urobili: x   Blood: x / Protein: x / Nitrite: x   Leuk Esterase: x / RBC: x / WBC x   Sq Epi: x / Non Sq Epi: x / Bacteria: x          IMAGING

## 2025-05-08 NOTE — CONSULT NOTE ADULT - ASSESSMENT
84M with PMH of COPD, HTN, CKD2, DM, schizophrenia, HLD, NHL, urinary retention s/p chronic bains, history of MDR UTIs, here for evaluation of AMS, and found to have sepsis from complicated acute pyelonephritis and UTI. Has a poor functional status at baseline, with dementia, and is wheelchair bound. Course c/b ADVID as well. Full code for now, sister is surrogate, palliative called for GOC.   84M with PMH of COPD, HTN, CKD2, DM, schizophrenia, HLD, NHL, urinary retention s/p chronic bains, history of MDR UTIs, here for evaluation of AMS, and found to have sepsis from complicated acute pyelonephritis and UTI. Has a poor functional status at baseline, with dementia, and is wheelchair bound. Course c/b DAVID as well. Full code for now, sister is surrogate, palliative called for GOC.    - see GOC above  - monitor SCr  - unable to obtain history from patient  - c/w IV avycaz  - will follow    ______________  Gerson Vasquez MD  Palliative Medicine  [facility]   of Geriatric and Palliative Medicine  [phone]

## 2025-05-09 LAB
ANION GAP SERPL CALC-SCNC: 15 MMOL/L — HIGH (ref 7–14)
ANION GAP SERPL CALC-SCNC: 18 MMOL/L — HIGH (ref 7–14)
BASOPHILS # BLD AUTO: 0.03 K/UL — SIGNIFICANT CHANGE UP (ref 0–0.2)
BASOPHILS NFR BLD AUTO: 0.3 % — SIGNIFICANT CHANGE UP (ref 0–1)
BUN SERPL-MCNC: 37 MG/DL — HIGH (ref 10–20)
BUN SERPL-MCNC: 42 MG/DL — HIGH (ref 10–20)
CALCIUM SERPL-MCNC: 9 MG/DL — SIGNIFICANT CHANGE UP (ref 8.4–10.5)
CALCIUM SERPL-MCNC: 9.2 MG/DL — SIGNIFICANT CHANGE UP (ref 8.4–10.5)
CHLORIDE SERPL-SCNC: 116 MMOL/L — HIGH (ref 98–110)
CHLORIDE SERPL-SCNC: 119 MMOL/L — HIGH (ref 98–110)
CO2 SERPL-SCNC: 19 MMOL/L — SIGNIFICANT CHANGE UP (ref 17–32)
CO2 SERPL-SCNC: 25 MMOL/L — SIGNIFICANT CHANGE UP (ref 17–32)
CREAT SERPL-MCNC: 1.8 MG/DL — HIGH (ref 0.7–1.5)
CREAT SERPL-MCNC: 1.8 MG/DL — HIGH (ref 0.7–1.5)
EGFR: 37 ML/MIN/1.73M2 — LOW
EOSINOPHIL # BLD AUTO: 0.07 K/UL — SIGNIFICANT CHANGE UP (ref 0–0.7)
EOSINOPHIL NFR BLD AUTO: 0.7 % — SIGNIFICANT CHANGE UP (ref 0–8)
GLUCOSE BLDC GLUCOMTR-MCNC: 115 MG/DL — HIGH (ref 70–99)
GLUCOSE BLDC GLUCOMTR-MCNC: 124 MG/DL — HIGH (ref 70–99)
GLUCOSE BLDC GLUCOMTR-MCNC: 139 MG/DL — HIGH (ref 70–99)
GLUCOSE SERPL-MCNC: 116 MG/DL — HIGH (ref 70–99)
GLUCOSE SERPL-MCNC: 125 MG/DL — HIGH (ref 70–99)
HCT VFR BLD CALC: 36.4 % — LOW (ref 42–52)
HGB BLD-MCNC: 11.6 G/DL — LOW (ref 14–18)
IMM GRANULOCYTES NFR BLD AUTO: 1.1 % — HIGH (ref 0.1–0.3)
LYMPHOCYTES # BLD AUTO: 1.98 K/UL — SIGNIFICANT CHANGE UP (ref 1.2–3.4)
LYMPHOCYTES # BLD AUTO: 21.1 % — SIGNIFICANT CHANGE UP (ref 20.5–51.1)
MAGNESIUM SERPL-MCNC: 2.4 MG/DL — SIGNIFICANT CHANGE UP (ref 1.8–2.4)
MCHC RBC-ENTMCNC: 30.5 PG — SIGNIFICANT CHANGE UP (ref 27–31)
MCHC RBC-ENTMCNC: 31.9 G/DL — LOW (ref 32–37)
MCV RBC AUTO: 95.8 FL — HIGH (ref 80–94)
MONOCYTES # BLD AUTO: 0.47 K/UL — SIGNIFICANT CHANGE UP (ref 0.1–0.6)
MONOCYTES NFR BLD AUTO: 5 % — SIGNIFICANT CHANGE UP (ref 1.7–9.3)
NEUTROPHILS # BLD AUTO: 6.72 K/UL — HIGH (ref 1.4–6.5)
NEUTROPHILS NFR BLD AUTO: 71.8 % — SIGNIFICANT CHANGE UP (ref 42.2–75.2)
NRBC BLD AUTO-RTO: 0 /100 WBCS — SIGNIFICANT CHANGE UP (ref 0–0)
PLATELET # BLD AUTO: 201 K/UL — SIGNIFICANT CHANGE UP (ref 130–400)
PMV BLD: 11.2 FL — HIGH (ref 7.4–10.4)
POTASSIUM SERPL-MCNC: 3.3 MMOL/L — LOW (ref 3.5–5)
POTASSIUM SERPL-MCNC: 3.7 MMOL/L — SIGNIFICANT CHANGE UP (ref 3.5–5)
POTASSIUM SERPL-SCNC: 3.3 MMOL/L — LOW (ref 3.5–5)
POTASSIUM SERPL-SCNC: 3.7 MMOL/L — SIGNIFICANT CHANGE UP (ref 3.5–5)
RBC # BLD: 3.8 M/UL — LOW (ref 4.7–6.1)
RBC # FLD: 15.5 % — HIGH (ref 11.5–14.5)
SODIUM SERPL-SCNC: 156 MMOL/L — HIGH (ref 135–146)
SODIUM SERPL-SCNC: 156 MMOL/L — HIGH (ref 135–146)
WBC # BLD: 9.37 K/UL — SIGNIFICANT CHANGE UP (ref 4.8–10.8)
WBC # FLD AUTO: 9.37 K/UL — SIGNIFICANT CHANGE UP (ref 4.8–10.8)

## 2025-05-09 PROCEDURE — 99233 SBSQ HOSP IP/OBS HIGH 50: CPT

## 2025-05-09 RX ORDER — LABETALOL HYDROCHLORIDE 200 MG/1
5 TABLET, FILM COATED ORAL ONCE
Refills: 0 | Status: COMPLETED | OUTPATIENT
Start: 2025-05-09 | End: 2025-05-09

## 2025-05-09 RX ORDER — LABETALOL HYDROCHLORIDE 200 MG/1
100 TABLET, FILM COATED ORAL EVERY 8 HOURS
Refills: 0 | Status: DISCONTINUED | OUTPATIENT
Start: 2025-05-09 | End: 2025-05-13

## 2025-05-09 RX ORDER — LABETALOL HYDROCHLORIDE 200 MG/1
10 TABLET, FILM COATED ORAL ONCE
Refills: 0 | Status: DISCONTINUED | OUTPATIENT
Start: 2025-05-09 | End: 2025-05-09

## 2025-05-09 RX ADMIN — LABETALOL HYDROCHLORIDE 100 MILLIGRAM(S): 200 TABLET, FILM COATED ORAL at 14:10

## 2025-05-09 RX ADMIN — DRONABINOL 2.5 MILLIGRAM(S): 10 CAPSULE ORAL at 17:53

## 2025-05-09 RX ADMIN — LABETALOL HYDROCHLORIDE 5 MILLIGRAM(S): 200 TABLET, FILM COATED ORAL at 06:25

## 2025-05-09 RX ADMIN — METOPROLOL SUCCINATE 25 MILLIGRAM(S): 50 TABLET, EXTENDED RELEASE ORAL at 04:27

## 2025-05-09 RX ADMIN — HEPARIN SODIUM 5000 UNIT(S): 1000 INJECTION INTRAVENOUS; SUBCUTANEOUS at 14:10

## 2025-05-09 RX ADMIN — LABETALOL HYDROCHLORIDE 100 MILLIGRAM(S): 200 TABLET, FILM COATED ORAL at 21:36

## 2025-05-09 RX ADMIN — LABETALOL HYDROCHLORIDE 100 MILLIGRAM(S): 200 TABLET, FILM COATED ORAL at 09:32

## 2025-05-09 RX ADMIN — Medication 90 MILLIGRAM(S): at 04:27

## 2025-05-09 RX ADMIN — LABETALOL HYDROCHLORIDE 5 MILLIGRAM(S): 200 TABLET, FILM COATED ORAL at 02:35

## 2025-05-09 RX ADMIN — SODIUM CHLORIDE 75 MILLILITER(S): 9 INJECTION, SOLUTION INTRAVENOUS at 09:31

## 2025-05-09 RX ADMIN — FINASTERIDE 5 MILLIGRAM(S): 1 TABLET, FILM COATED ORAL at 12:25

## 2025-05-09 RX ADMIN — LABETALOL HYDROCHLORIDE 5 MILLIGRAM(S): 200 TABLET, FILM COATED ORAL at 03:22

## 2025-05-09 RX ADMIN — TAMSULOSIN HYDROCHLORIDE 0.4 MILLIGRAM(S): 0.4 CAPSULE ORAL at 21:36

## 2025-05-09 RX ADMIN — HEPARIN SODIUM 5000 UNIT(S): 1000 INJECTION INTRAVENOUS; SUBCUTANEOUS at 21:36

## 2025-05-09 RX ADMIN — SODIUM CHLORIDE 75 MILLILITER(S): 9 INJECTION, SOLUTION INTRAVENOUS at 10:40

## 2025-05-09 NOTE — PROGRESS NOTE ADULT - SUBJECTIVE AND OBJECTIVE BOX
Patient is a 84y old  Male who presents with a chief complaint of ams 2/2 sepsis uti in setting of chronic bains (06 May 2025 13:21)    INTERVAL HPI/OVERNIGHT EVENTS: Patient was examined and seen at bedside. This morning pt is resting in bed and staff reports no new issues or overnight events. Pt refuses/unable to answer questions. Poor PO intake.  ROS: Denies any complaints  InitialHPI:  The patient is an 85 y/o male with Pmhx of Copd, Htn, ckd stage 2, Dm, schizophrenia, Hld, non-hodgkins lymphoma, urinary retention with chronic Bains (changed in ED on 5/3/25), Hx of MDR UTIs presents to the ED from [facility] for evaluation of AMS. Patient currently has no complaints at bedside, unable to speak/verbalize at this time, baseline aaox1, currently confused, no acute apparent distress.  According to ED documentation patient was unresponsive at NH, no response from [facility] at this time, not response from emergency contacts at this time either. Patient is being admitted for sepsis 2/2 uti in setting of chronic foely w/MDR organisms.     ED course:   Vitals:   bp 116/90  hr 105  rr 25  temp 100.2/37.9     imaging:   CTH no acute pathology     CTAP w/IV cont:     IMPRESSION:    Urinary bladder distended with Bains catheter balloon inflated within   prostatic urethra; repositioning recommended.  Urinary bladder mild pericystic stranding, could represent bladder infection in the appropriate clinical setting.  Mild bilateral hydroureteronephrosis, could be attributed to bladder outlet obstruction.  Urinary bladder right posterolateral subcentimeter nodular density, which can be further evaluated as outpatient.  BPH.    bains changed in ED      Labs:  wbc 22k  hgb 9.0 (baseline around 10.0)   trop 68-->58  k+ 5.3  cr 2.6 (baseline 1.4)   lactate 1.9     s/p   500cc LR bolus   s/p cefepime and vanc  ucx bcx collected   (03 May 2025 11:49)    PAST MEDICAL & SURGICAL HISTORY:  COPD (chronic obstructive pulmonary disease)      Paranoid schizophrenia      Schizophrenia      Diabetes type 2, controlled      COPD (chronic obstructive pulmonary disease)      Dyslipidemia      Chronic retention of urine      Dyslipidemia      Encounter for screening colonoscopy          General: NAD, alert, chronically ill appearing  HEENT: no LAD  CV: S1 S2  Resp: decreased breath sounds at bases  GI: NT/ND/S +BS  MS: no clubbing/cyanosis/edema, + pulses b/l  Neuro: appears to move all extremities            Home Medications:  Albuterol (Eqv-ProAir HFA) 90 mcg/inh inhalation aerosol: 1 puff(s) inhaled 4 times a day as needed for  shortness of breath and/or wheezing (14 Apr 2025 22:55)  gabapentin 300 mg oral capsule: 1 cap(s) orally 2 times a day (14 Apr 2025 22:56)  NIFEdipine 90 mg oral tablet, extended release: 1 tab(s) orally once a day (22 Apr 2025 01:09)  tamsulosin 0.4 mg oral capsule: 1 cap(s) orally once a day (at bedtime) (14 Apr 2025 22:56)  traZODone 50 mg oral tablet: 1 tab(s) orally once a day (at bedtime) (14 Apr 2025 22:56)    MEDICATIONS  (STANDING):  ceftazidime/avibactam IVPB 0.94 Gram(s) IV Intermittent every 12 hours  chlorhexidine 2% Cloths 1 Application(s) Topical <User Schedule>  dextrose 5%. 1000 milliLiter(s) (100 mL/Hr) IV Continuous <Continuous>  dextrose 5%. 1000 milliLiter(s) (50 mL/Hr) IV Continuous <Continuous>  dextrose 5%. 1000 milliLiter(s) (75 mL/Hr) IV Continuous <Continuous>  dextrose 50% Injectable 25 Gram(s) IV Push once  dextrose 50% Injectable 12.5 Gram(s) IV Push once  dextrose 50% Injectable 25 Gram(s) IV Push once  dronabinol 2.5 milliGRAM(s) Oral two times a day  finasteride 5 milliGRAM(s) Oral daily  glucagon  Injectable 1 milliGRAM(s) IntraMuscular once  heparin   Injectable 5000 Unit(s) SubCutaneous every 8 hours  insulin lispro (ADMELOG) corrective regimen sliding scale   SubCutaneous three times a day before meals  labetalol 100 milliGRAM(s) Oral every 8 hours  NIFEdipine XL 90 milliGRAM(s) Oral daily  tamsulosin 0.4 milliGRAM(s) Oral at bedtime    MEDICATIONS  (PRN):  acetaminophen  Suppository .. 650 milliGRAM(s) Rectal every 6 hours PRN Temp greater or equal to 38C (100.4F), Moderate Pain (4 - 6)  albuterol    90 MICROgram(s) HFA Inhaler 1 Puff(s) Inhalation four times a day PRN for shortness of breath and/or wheezing  aluminum hydroxide/magnesium hydroxide/simethicone Suspension 30 milliLiter(s) Oral every 4 hours PRN Dyspepsia  dextrose Oral Gel 15 Gram(s) Oral once PRN Blood Glucose LESS THAN 70 milliGRAM(s)/deciliter  melatonin 3 milliGRAM(s) Oral at bedtime PRN Insomnia  ondansetron Injectable 4 milliGRAM(s) IV Push every 8 hours PRN Nausea and/or Vomiting    Vital Signs Last 24 Hrs  T(C): 37.1 (09 May 2025 13:00), Max: 37.1 (09 May 2025 13:00)  T(F): 98.7 (09 May 2025 13:00), Max: 98.7 (09 May 2025 13:00)  HR: 98 (09 May 2025 13:00) (79 - 98)  BP: 164/69 (09 May 2025 13:00) (159/70 - 202/81)  BP(mean): 101 (09 May 2025 13:00) (99 - 122)  RR: 18 (09 May 2025 13:00) (18 - 18)  SpO2: 98% (09 May 2025 13:00) (97% - 99%)    Parameters below as of 09 May 2025 13:00  Patient On (Oxygen Delivery Method): room air      CAPILLARY BLOOD GLUCOSE      POCT Blood Glucose.: 115 mg/dL (09 May 2025 12:18)  POCT Blood Glucose.: 139 mg/dL (09 May 2025 05:17)  POCT Blood Glucose.: 120 mg/dL (08 May 2025 23:31)  POCT Blood Glucose.: 107 mg/dL (08 May 2025 17:22)    LABS:                        11.6   9.37  )-----------( 201      ( 09 May 2025 07:04 )             36.4     05-09    156[H]  |  119[H]  |  42[H]  ----------------------------<  116[H]  3.7   |  19  |  1.8[H]    Ca    9.0      09 May 2025 07:04  Mg     2.4     05-09              Urinalysis Basic - ( 09 May 2025 07:04 )    Color: x / Appearance: x / SG: x / pH: x  Gluc: 116 mg/dL / Ketone: x  / Bili: x / Urobili: x   Blood: x / Protein: x / Nitrite: x   Leuk Esterase: x / RBC: x / WBC x   Sq Epi: x / Non Sq Epi: x / Bacteria: x              Consultant Notes Reviewed:  [x ] YES  [ ] NO  Care Discussed with Consultants/Other Providers/ Housestaff [ x] YES  [ ] NO  Radiology, labs, EKGs, new studies personally reviewed.

## 2025-05-09 NOTE — PROGRESS NOTE ADULT - SUBJECTIVE AND OBJECTIVE BOX
SUBJECTIVE/OVERNIGHT EVENTS  Today is hospital day 6d. This morning patient was seen and examined at bedside, resting comfortably in bed. Overnight, pt was persistently hypertensive.      CODE STATUS: FULL      MEDICATIONS  STANDING MEDICATIONS  ceftazidime/avibactam IVPB 0.94 Gram(s) IV Intermittent every 12 hours  chlorhexidine 2% Cloths 1 Application(s) Topical <User Schedule>  dextrose 5%. 1000 milliLiter(s) IV Continuous <Continuous>  dextrose 5%. 1000 milliLiter(s) IV Continuous <Continuous>  dextrose 5%. 1000 milliLiter(s) IV Continuous <Continuous>  dextrose 50% Injectable 25 Gram(s) IV Push once  dextrose 50% Injectable 12.5 Gram(s) IV Push once  dextrose 50% Injectable 25 Gram(s) IV Push once  dronabinol 2.5 milliGRAM(s) Oral two times a day  finasteride 5 milliGRAM(s) Oral daily  glucagon  Injectable 1 milliGRAM(s) IntraMuscular once  heparin   Injectable 5000 Unit(s) SubCutaneous every 8 hours  insulin lispro (ADMELOG) corrective regimen sliding scale   SubCutaneous three times a day before meals  labetalol 100 milliGRAM(s) Oral every 8 hours  NIFEdipine XL 90 milliGRAM(s) Oral daily  tamsulosin 0.4 milliGRAM(s) Oral at bedtime    PRN MEDICATIONS  acetaminophen  Suppository .. 650 milliGRAM(s) Rectal every 6 hours PRN  albuterol    90 MICROgram(s) HFA Inhaler 1 Puff(s) Inhalation four times a day PRN  aluminum hydroxide/magnesium hydroxide/simethicone Suspension 30 milliLiter(s) Oral every 4 hours PRN  dextrose Oral Gel 15 Gram(s) Oral once PRN  melatonin 3 milliGRAM(s) Oral at bedtime PRN  ondansetron Injectable 4 milliGRAM(s) IV Push every 8 hours PRN    VITALS  T(F): 98.7 (05-09-25 @ 13:00), Max: 98.7 (05-09-25 @ 13:00)  HR: 98 (05-09-25 @ 13:00) (79 - 98)  BP: 164/69 (05-09-25 @ 13:00) (159/70 - 202/81)  RR: 18 (05-09-25 @ 13:00) (18 - 18)  SpO2: 98% (05-09-25 @ 13:00) (97% - 99%)  POCT Blood Glucose.: 115 mg/dL (05-09-25 @ 12:18)  POCT Blood Glucose.: 139 mg/dL (05-09-25 @ 05:17)  POCT Blood Glucose.: 120 mg/dL (05-08-25 @ 23:31)  POCT Blood Glucose.: 107 mg/dL (05-08-25 @ 17:22)    PHYSICAL EXAM  GENERAL: NAD, chronically ill-appearing  HEAD:  Atraumatic, normocephalic  EYES: EOMI, PERRL  NECK: Supple, trachea midline, no JVD  HEART: Regular rate and rhythm  LUNGS: decreased BS in b/l bases  ABDOMEN: Soft, nontender, nondistended, +BS  EXTREMITIES: 2+ peripheral pulses bilaterally. No clubbing, cyanosis, or edema  NERVOUS SYSTEM:  Awake, nonverbal, moving all extremities    (  ) Indwelling Cobos Catheter   Date inserted:    Reason (  ) Critical illness     (  ) urinary retention    (  ) Accurate Ins/Outs Monitoring     (  ) CMO patient    (  ) Central Line  Date inserted:  Location: (  ) Right IJ   (  ) Left IJ   (  ) Right Fem   (  ) Left Fem    (  ) SPC  (  ) pigtail  (  ) PEG tube  (  ) colostomy  (  ) jejunostomy  (  ) U-Dall    LABS             11.6   9.37  )-----------( 201      ( 05-09-25 @ 07:04 )             36.4     156  |  119  |  42  -------------------------<  116   05-09-25 @ 07:04  3.7  |  19  |  1.8    Ca      9.0     05-09-25 @ 07:04  Mg     2.4     05-09-25 @ 07:04          Urinalysis Basic - ( 09 May 2025 07:04 )    Color: x / Appearance: x / SG: x / pH: x  Gluc: 116 mg/dL / Ketone: x  / Bili: x / Urobili: x   Blood: x / Protein: x / Nitrite: x   Leuk Esterase: x / RBC: x / WBC x   Sq Epi: x / Non Sq Epi: x / Bacteria: x          IMAGING

## 2025-05-09 NOTE — PROGRESS NOTE ADULT - ASSESSMENT
83 y/o male with Pmhx of Copd, Htn, ckd stage 2, Dm, schizophrenia, Hld, non-hodgkins lymphoma, urinary retention with chronic Bains (changed in ED on 5/3/25), Hx of MDR UTIs presents to the ED from [facility] for evaluation of AMS, being admitted for ams 2/2 sepsis from suspected UTI in setting of chronic bains and multiple UTIs w/MDR organisms.    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 Improved opacities  - B/l LE duplex - No DVT  - per ID: Avycaz 0.94g q12h IV x7-10d total  - Home meds: gabapentin, trazodone, olanzipine, tamsulosin, finasteride  - c/w home tamsulosin 0.4mg daily   - 5/6: Bains removed, TOV today, encourage po intake, WBC trending down  5/7 failed voiding trial. As per adult home/sister, pt has been gradually declining, wheelchair bound. Does not talk much.    #DAVID on CKD2 - improving   - yarely pre-renal   - Cr 2.3 > 2.1 > 1.9  - s/p IVF     #Hypernatremia  IVFs D5W  5/9 increased rate as Na not improving. Repeat labs at 4pm to ensure that not overcorrect    #Chronic normocytic Anemia  - Hgb 10.3 (baseline around 10.0), MCV 90.8    #Hyperkalemia- resolved   - K 4.2 >3.9    #troponin elevation   - demand ischemia in setting of sepsis   - downtrending 68 > 58    #COPD  - c/w home inhalers     #HTN  #Sinus tachycardia on EKG  - c/w home Nifedipine 90mg daily   - c/w home metoprolol 25mg BID   5/9 changed Metoprolol to Labetalol    #Non Hodgkins lymphoma  - outpt f/u    #Nutrition/Fluids/Electrolytes   - replete K<4 and Mg <2  - ensure regular BMs  -add Marinol to boost appetite    #DVT Px  SCDs  Heparin SQ    High risk pt. Px is guarded.  GOC: d/w sister on 5/7. She would not want feeding tubes, she understands that pt is gradually declining. She wants to think before making him DNR/DNI. Ultimate plan for Nsg home in the [facility]. Palliative c/s placed to help with GOC as current full code does not go along with "no feeding tubes".    #Progress Note Handoff  Pending: Clinical improvement and stability__x__Improved sodium level, completion of ABx__  Family: sister is aware and agrees w/ plan  Disposition: SNF       My note supersedes the residents note should a discrepancy arise.    Chart and notes personally reviewed.  Care Discussed with Consultants/Other Providers/ Housestaff [ x] YES [ ] NO   Radiology, labs, old records personally reviewed.    discussed w/ housestaff, nursing, case management    Attestation Statements:    Attestation Statements:  Risk Statement (NON-critical care).     On this date of service, level of risk to patient is considered: High.     Due to: pt with illness causing risk to life or bodily fxn    Time-based billing (NON-critical care).     50 minutes spent on total encounter. The necessity of the time spent during the encounter on this date of service was due to:     time spent on review of labs, imaging studies, old records, obtaining history, personally examining patient, counselling and communicating with patient/ family, entering orders for medications/tests/etc, discussions with other health care providers, documentation in electronic health records, independent interpretation of labs, imaging/procedure results and care coordination.

## 2025-05-09 NOTE — PROGRESS NOTE ADULT - ASSESSMENT
84M with PMH of COPD, HTN, CKD2, DM, schizophrenia, HLD, NHL, urinary retention s/p chronic bains, history of MDR UTIs, here for evaluation of AMS, and found to have sepsis from complicated acute pyelonephritis and UTI. Has a poor functional status at baseline, with dementia, and is wheelchair bound. Course c/b DAVID as well. Full code for now, sister is surrogate, palliative called for GOC.    - see GOC above, DNR/DNI  - monitor SCr  - unable to obtain history from patient  - c/w IV avycaz  - will follow    ______________  Gerson Vasquez MD  Palliative Medicine  [facility]   of Geriatric and Palliative Medicine  [phone]

## 2025-05-09 NOTE — PROGRESS NOTE ADULT - SUBJECTIVE AND OBJECTIVE BOX
HPI: 84M with PMH of COPD, HTN, CKD2, DM, schizophrenia, HLD, NHL, urinary retention s/p chronic bains, history of MDR UTIs, here for evaluation of AMS, and found to have sepsis from complicated acute pyelonephritis and UTI. Has a poor functional status at baseline, with dementia, and is wheelchair bound. Course c/b DAVID as well. Full code for now, sister is surrogate, palliative called for [facility].    INTERVAL EVENTS:  5/9: patient comfortable for now    ADVANCE DIRECTIVES:    [ ] Full Code [ ] DNR  MOLST  [ ]  Living Will  [ ]     DECISION MAKER(s):  [ ] Health Care Proxy(s)  [ ] Surrogate(s)  [ ] Guardian           Name(s): Phone Number(s): sister Fatmata [phone]    BASELINE (I)ADL(s) (prior to admission):  [place]: [ ]Total  [ ] Moderate [ ]Dependent  Palliative Performance Status Version 2:         %    [email or web address]    Allergies    No Known Allergies    Intolerances    MEDICATIONS  (STANDING):  ceftazidime/avibactam IVPB 0.94 Gram(s) IV Intermittent every 12 hours  chlorhexidine 2% Cloths 1 Application(s) Topical <User Schedule>  dextrose 5%. 1000 milliLiter(s) (100 mL/Hr) IV Continuous <Continuous>  dextrose 5%. 1000 milliLiter(s) (50 mL/Hr) IV Continuous <Continuous>  dextrose 5%. 1000 milliLiter(s) (75 mL/Hr) IV Continuous <Continuous>  dextrose 50% Injectable 25 Gram(s) IV Push once  dextrose 50% Injectable 12.5 Gram(s) IV Push once  dextrose 50% Injectable 25 Gram(s) IV Push once  dronabinol 2.5 milliGRAM(s) Oral two times a day  finasteride 5 milliGRAM(s) Oral daily  glucagon  Injectable 1 milliGRAM(s) IntraMuscular once  heparin   Injectable 5000 Unit(s) SubCutaneous every 8 hours  insulin lispro (ADMELOG) corrective regimen sliding scale   SubCutaneous three times a day before meals  labetalol 100 milliGRAM(s) Oral every 8 hours  NIFEdipine XL 90 milliGRAM(s) Oral daily  tamsulosin 0.4 milliGRAM(s) Oral at bedtime    MEDICATIONS  (PRN):  acetaminophen  Suppository .. 650 milliGRAM(s) Rectal every 6 hours PRN Temp greater or equal to 38C (100.4F), Moderate Pain (4 - 6)  albuterol    90 MICROgram(s) HFA Inhaler 1 Puff(s) Inhalation four times a day PRN for shortness of breath and/or wheezing  aluminum hydroxide/magnesium hydroxide/simethicone Suspension 30 milliLiter(s) Oral every 4 hours PRN Dyspepsia  dextrose Oral Gel 15 Gram(s) Oral once PRN Blood Glucose LESS THAN 70 milliGRAM(s)/deciliter  melatonin 3 milliGRAM(s) Oral at bedtime PRN Insomnia  ondansetron Injectable 4 milliGRAM(s) IV Push every 8 hours PRN Nausea and/or Vomiting      PRESENT SYMPTOMS: [ X]Unable to obtain due to poor mentation   Source if other than patient:  [ ]Family   [ ]Team   [ X]All review of systems negative including pain and dyspnea unless noted below    All components of pain assessment below addressed. Blank spaces indicate that the patient did/could not complete the assessment.  Pain: [ ]yes [ ]no  QOL impact -   Location -                    Aggravating factors -  Quality -  Radiation -  Timing-  Severity (0-10 scale):  Minimal acceptable level (0-10 scale):     CPOT:    https://www.[facility].org/getattachment/lwd86n57-2v4q-6f0z-9z3f-3652p2636v6m/Critical-Care-Pain-Observation-Tool-(CPOT)    PAIN AD Score: 0  [email or web address]/[email or web address].pdf (press ctrl +  left click to view)    Dyspnea:                           [ ]None[ ]Mild [ ]Moderate [ ]Severe     Respiratory Distress Observation Scale (RDOS): 1  A score of 0 to 2 signifies little or no respiratory distress, 3 signifies mild distress, scores 4 to 6 indicate moderate distress, and scores greater than 7 signify severe distress  [email or web address].ca/sites/default/[email or web address]-yhofkuwjshw-qlzhlwzi-ttyujyakiqh-[email or web address]    Anxiety:                             [ ]None[ ]Mild [ ]Moderate [ ]Severe   Fatigue:                             [ ]None[ ]Mild [ ]Moderate [ ]Severe   Nausea:                             [ ]None[ ]Mild [ ]Moderate [ ]Severe   Loss of appetite:              [ ]None[ ]Mild [ ]Moderate [ ]Severe   Constipation:                    [ ]None[ ]Mild [ ]Moderate [ ]Severe    Other Symptoms:    PHYSICAL EXAM:  Vital Signs Last 24 Hrs  T(C): 37.1 (09 May 2025 13:00), Max: 37.1 (09 May 2025 13:00)  T(F): 98.7 (09 May 2025 13:00), Max: 98.7 (09 May 2025 13:00)  HR: 98 (09 May 2025 13:00) (79 - 98)  BP: 164/69 (09 May 2025 13:00) (159/70 - 202/81)  BP(mean): 101 (09 May 2025 13:00) (99 - 122)  RR: 18 (09 May 2025 13:00) (18 - 18)  SpO2: 98% (09 May 2025 13:00) (97% - 99%)    Parameters below as of 09 May 2025 13:00  Patient On (Oxygen Delivery Method): room air            GENERAL:  [X ] No acute distress [ ]Lethargic  [ ]Unarousable  [ ]Verbal  [ ]Non-Verbal [ ]Cachexia    BEHAVIORAL/PSYCH:  [ ]Alert and Oriented x  [ ] Anxiety [ ] Delirium [ ] Agitation [X ] Calm   EYES: [ ] No scleral icterus [ ] Scleral icterus [ ] Closed  ENMT:  [ ]Dry mouth  [ ]No external oral lesions [ X] No external ear or nose lesions  CARDIOVASCULAR:  [ ]Regular [ ]Irregular [ ]Tachy [ ]Not Tachy  [ ]Huy [ ] Edema [ ] No edema  PULMONARY:  [ ]Tachypnea  [ ]Audible excessive secretions [X ] No labored breathing [ ] labored breathing  GASTROINTESTINAL: [ ]Soft  [ ]Distended  [ X]Not distended [ ]Non tender [ ]Tender  MUSCULOSKELETAL: [ ]No clubbing [ ] clubbing  [ X] No cyanosis [ ] cyanosis  NEUROLOGIC: [ ]No focal deficits  [ ]Follows commands  [ ]Does not follow commands  [ ]Cognitive impairment  [ ]Dysphagia  [ ]Dysarthria  [ ]Paresis   SKIN: [ ] Jaundiced [X ] Non-jaundiced [ ]Rash [ ]No Rash [ ] Warm [ ] Dry  MISC/LINES: [ ] ET tube [ ] Trach [ ]NGT/OGT [ ]PEG [ ]Bains    CRITICAL CARE:  [ ] Shock Present  [ ]Septic [ ]Cardiogenic [ ]Neurologic [ ]Hypovolemic  [ ]  Vasopressors [ ]  Inotropes   [ ]Respiratory failure present [ ]Mechanical ventilation [ ]Non-invasive ventilatory support [ ]High flow  [ ]Acute  [ ]Chronic [ ]Hypoxic  [ ]Hypercarbic [ ]Other  [ ]Other organ failure     LABS: reviewed by me, notable for: CBC WNL, elevated Na, Ca WNL                                   11.6   9.37  )-----------( 201      ( 09 May 2025 07:04 )             36.4     05-09    156[H]  |  119[H]  |  42[H]  ----------------------------<  116[H]  3.7   |  19  |  1.8[H]    Ca    9.0      09 May 2025 07:04  Mg     2.4     05-09          RADIOLOGY & ADDITIONAL STUDIES: CXR personally reviewed by me: 5/5 no major opacities    PROTEIN CALORIE MALNUTRITION PRESENT: [ ]mild [ ]moderate [ ]severe [ ]underweight [ ]morbid obesity  https://www.andeal.org/vault/2440/web/files/ONC/Table_Clinical%20Characteristics%20to%20Document%20Malnutrition-White%20JV%20et%20al%202012.pdf    Height (cm): 167.6 (05-03-25 @ 00:03), 167.6 (04-21-25 @ 17:49), 167.6 (04-19-25 @ 12:44)  Weight (kg): 60.8 (05-06-25 @ 02:35), 65 (04-21-25 @ 19:29), 65.8 (04-14-25 @ 14:50)  BMI (kg/m2): 21.6 (05-06-25 @ 02:35), 23.1 (05-03-25 @ 00:03), 23.1 (04-21-25 @ 19:29)    [ ]PPSV2 < or = to 30% [ ]significant weight loss  [ ]poor nutritional intake  [ ]anasarca      [ ]Artificial Nutrition          Palliative Care Spiritual/Emotional Screening Tool Question  Severity (0-4):                    OR                    [X ] Unable to determine/NA  Score of 2 or greater indicates recommendation of Chaplaincy referral  Chaplaincy Referral: [ ] Yes [ ] Refused [ ] Following     Caregiver [place]:  [ ] Yes [ ] No    OR    [x ] Unable to determine. Will assess at later time if appropriate.  Social Work Referral [ ]  Patient and Family Centered Care Referral [ ]    Anticipatory Grief Present: [ ] Yes [ ] No    OR     [ x] Unable to determine. Will assess at later time if appropriate.  Social Work Referral [ ]  Patient and Family Centered Care Referral [ ]    REFERRALS:   [ ]Chaplaincy  [ ]Hospice  [ ]Child Life  [ ]Social Work  [ ]Case management [ ]Holistic Therapy     Palliative care education provided to patient and/or family    Goals of Care Document:     ______________  Gerson Vasquez MD  Palliative Medicine  [facility]   of Geriatric and Palliative Medicine  [phone]

## 2025-05-09 NOTE — PROGRESS NOTE ADULT - ASSESSMENT
85 y/o male with Pmhx of Copd, Htn, ckd stage 2, Dm, schizophrenia, Hld, non-hodgkins lymphoma, urinary retention with chronic Bains (changed in ED on 5/3/25), Hx of MDR UTIs presents to the ED from [facility] for evaluation of AMS, being admitted for ams 2/2 sepsis from suspected UTI in setting of chronic bains and multiple UTIs w/MDR organisms.    #Complicated Acute Pyelonephritis with chronic bains for urinary retention  #AMS 2/2 sepsis from UTI  #Hx of MDR UTIs  - baseline per previous documentation oriented x1  - currently confused, does not follow commands   - last culture on 4/25/25 admission showing E faecalis sensitive to vancomycin and carbapenem resistant kleb pneumo (likely colonization)   - RVP neg, COVID neg  - blood cx positive for Klebsiella pneumoniae  - urine cx + gram neg rods   - s/p vancomycin and cefepime in ED  - s/p amikacin 750mg x 1   - CXR - cardiomegaly. Improved opacities  - B/l LE duplex - No DVT  - per ID: Avycaz 0.94g q12h IV x 7-10 days total (Day 5) pending clinical response (no other treatment options)  - Home meds: gabapentin, trazodone, olanzipine, tamsulosin, finasteride  - held gabapentin and psych meds in setting of alteration from baseline  - c/w home tamsulosin 0.4mg daily   - 5/6: Bains removed, TOV today, encourage po intake, WBC trending down  - 5/7: Pt failed TOV, replaced bains today  - 5/8-9: Pt with poor po intake, pt was nonverbal. Per Sister, pt mental status is at baseline.    #Poor Po intake  - calorie count  - family does not want NGT  - C/w Marinol to boost appetite  - c/w IVF D5    #DAVID on CKD2 - improving   - yarely pre-renal   - Cr 2.3 > 2.1 > 1.9  - s/p IVF     #Chronic normocytic Anemia  - Hgb 10.3 (baseline around 10.0), MCV 90.8    #Hyperkalemia- resolved   - K 4.2 >3.9    #troponin elevation   - demand ischemia in setting of sepsis   - downtrending 68 > 58    #COPD  - c/w home inhalers     #HTN (uncontrolled)  #Sinus tachycardia on EKG  - c/w home Nifedipine 90mg daily   - c/w home metoprolol 25mg BID   - Start Labetalol 100mg q8    #Non Hodgkins lymphoma  - outpt f/u    Diet: diet dash   Activity:  AAT  DVT ppx: heparin sub-q  Pending: monitor urinary output

## 2025-05-09 NOTE — PROGRESS NOTE ADULT - CONVERSATION DETAILS
d/w sister on 5/7. She would not want feeding tubes, she understands that pt is gradually declining. She wants to think before making him DNR/DNI. Ultimate plan for Nsg home in the [facility].
Discussed with sister Fatmata, who spoke with her family, and they are opting for DNR/DNI, and agree to MOLST completion. Updated medical resident.

## 2025-05-09 NOTE — ED PROVIDER NOTE - CARE PROVIDER_API CALL
Follow up with your primary care doctor or clinics listed below if you do not have a doctor  Return immediately for any new or worsening symptoms or any new concerns
Babak Fernandez)  Urology  [address] [place] [address]  Phone: [phone]  Fax: [phone]  Follow Up Time:

## 2025-05-10 LAB
ANION GAP SERPL CALC-SCNC: 18 MMOL/L — HIGH (ref 7–14)
BASOPHILS # BLD AUTO: 0.02 K/UL — SIGNIFICANT CHANGE UP (ref 0–0.2)
BASOPHILS NFR BLD AUTO: 0.2 % — SIGNIFICANT CHANGE UP (ref 0–1)
BUN SERPL-MCNC: 36 MG/DL — HIGH (ref 10–20)
CALCIUM SERPL-MCNC: 8.7 MG/DL — SIGNIFICANT CHANGE UP (ref 8.4–10.5)
CHLORIDE SERPL-SCNC: 113 MMOL/L — HIGH (ref 98–110)
CO2 SERPL-SCNC: 19 MMOL/L — SIGNIFICANT CHANGE UP (ref 17–32)
CREAT SERPL-MCNC: 1.6 MG/DL — HIGH (ref 0.7–1.5)
CULTURE RESULTS: SIGNIFICANT CHANGE UP
EGFR: 43 ML/MIN/1.73M2 — LOW
EGFR: 43 ML/MIN/1.73M2 — LOW
EOSINOPHIL # BLD AUTO: 0.1 K/UL — SIGNIFICANT CHANGE UP (ref 0–0.7)
EOSINOPHIL NFR BLD AUTO: 1.2 % — SIGNIFICANT CHANGE UP (ref 0–8)
GLUCOSE BLDC GLUCOMTR-MCNC: 104 MG/DL — HIGH (ref 70–99)
GLUCOSE BLDC GLUCOMTR-MCNC: 109 MG/DL — HIGH (ref 70–99)
GLUCOSE BLDC GLUCOMTR-MCNC: 113 MG/DL — HIGH (ref 70–99)
GLUCOSE BLDC GLUCOMTR-MCNC: 114 MG/DL — HIGH (ref 70–99)
GLUCOSE SERPL-MCNC: 109 MG/DL — HIGH (ref 70–99)
HCT VFR BLD CALC: 35.3 % — LOW (ref 42–52)
HGB BLD-MCNC: 11.1 G/DL — LOW (ref 14–18)
IMM GRANULOCYTES NFR BLD AUTO: 1 % — HIGH (ref 0.1–0.3)
LYMPHOCYTES # BLD AUTO: 2.04 K/UL — SIGNIFICANT CHANGE UP (ref 1.2–3.4)
LYMPHOCYTES # BLD AUTO: 24.5 % — SIGNIFICANT CHANGE UP (ref 20.5–51.1)
MAGNESIUM SERPL-MCNC: 2.2 MG/DL — SIGNIFICANT CHANGE UP (ref 1.8–2.4)
MCHC RBC-ENTMCNC: 30.3 PG — SIGNIFICANT CHANGE UP (ref 27–31)
MCHC RBC-ENTMCNC: 31.4 G/DL — LOW (ref 32–37)
MCV RBC AUTO: 96.4 FL — HIGH (ref 80–94)
MONOCYTES # BLD AUTO: 0.47 K/UL — SIGNIFICANT CHANGE UP (ref 0.1–0.6)
MONOCYTES NFR BLD AUTO: 5.6 % — SIGNIFICANT CHANGE UP (ref 1.7–9.3)
NEUTROPHILS # BLD AUTO: 5.63 K/UL — SIGNIFICANT CHANGE UP (ref 1.4–6.5)
NEUTROPHILS NFR BLD AUTO: 67.5 % — SIGNIFICANT CHANGE UP (ref 42.2–75.2)
NRBC BLD AUTO-RTO: 0 /100 WBCS — SIGNIFICANT CHANGE UP (ref 0–0)
PLATELET # BLD AUTO: 145 K/UL — SIGNIFICANT CHANGE UP (ref 130–400)
PMV BLD: 12 FL — HIGH (ref 7.4–10.4)
POTASSIUM SERPL-MCNC: 3.7 MMOL/L — SIGNIFICANT CHANGE UP (ref 3.5–5)
POTASSIUM SERPL-SCNC: 3.7 MMOL/L — SIGNIFICANT CHANGE UP (ref 3.5–5)
RBC # BLD: 3.66 M/UL — LOW (ref 4.7–6.1)
RBC # FLD: 15.4 % — HIGH (ref 11.5–14.5)
SODIUM SERPL-SCNC: 150 MMOL/L — HIGH (ref 135–146)
SPECIMEN SOURCE: SIGNIFICANT CHANGE UP
WBC # BLD: 8.34 K/UL — SIGNIFICANT CHANGE UP (ref 4.8–10.8)
WBC # FLD AUTO: 8.34 K/UL — SIGNIFICANT CHANGE UP (ref 4.8–10.8)

## 2025-05-10 PROCEDURE — 99233 SBSQ HOSP IP/OBS HIGH 50: CPT

## 2025-05-10 RX ADMIN — HEPARIN SODIUM 5000 UNIT(S): 1000 INJECTION INTRAVENOUS; SUBCUTANEOUS at 21:39

## 2025-05-10 RX ADMIN — TAMSULOSIN HYDROCHLORIDE 0.4 MILLIGRAM(S): 0.4 CAPSULE ORAL at 21:39

## 2025-05-10 RX ADMIN — FINASTERIDE 5 MILLIGRAM(S): 1 TABLET, FILM COATED ORAL at 11:49

## 2025-05-10 RX ADMIN — LABETALOL HYDROCHLORIDE 100 MILLIGRAM(S): 200 TABLET, FILM COATED ORAL at 14:09

## 2025-05-10 RX ADMIN — SODIUM CHLORIDE 75 MILLILITER(S): 9 INJECTION, SOLUTION INTRAVENOUS at 11:49

## 2025-05-10 RX ADMIN — DRONABINOL 2.5 MILLIGRAM(S): 10 CAPSULE ORAL at 17:54

## 2025-05-10 RX ADMIN — DRONABINOL 2.5 MILLIGRAM(S): 10 CAPSULE ORAL at 04:54

## 2025-05-10 RX ADMIN — HEPARIN SODIUM 5000 UNIT(S): 1000 INJECTION INTRAVENOUS; SUBCUTANEOUS at 14:09

## 2025-05-10 RX ADMIN — Medication 1 APPLICATION(S): at 04:56

## 2025-05-10 RX ADMIN — LABETALOL HYDROCHLORIDE 100 MILLIGRAM(S): 200 TABLET, FILM COATED ORAL at 04:56

## 2025-05-10 RX ADMIN — Medication 90 MILLIGRAM(S): at 04:55

## 2025-05-10 RX ADMIN — HEPARIN SODIUM 5000 UNIT(S): 1000 INJECTION INTRAVENOUS; SUBCUTANEOUS at 04:55

## 2025-05-10 NOTE — PROGRESS NOTE ADULT - SUBJECTIVE AND OBJECTIVE BOX
SUBJECTIVE/OVERNIGHT EVENTS  Today is hospital day 7d. This morning patient was seen and examined at bedside, resting comfortably in bed. No acute or major events overnight.      CODE STATUS: DNR/DNI: Trial NIV      MEDICATIONS  STANDING MEDICATIONS  ceftazidime/avibactam IVPB 0.94 Gram(s) IV Intermittent every 12 hours  chlorhexidine 2% Cloths 1 Application(s) Topical <User Schedule>  dextrose 5%. 1000 milliLiter(s) IV Continuous <Continuous>  dextrose 5%. 1000 milliLiter(s) IV Continuous <Continuous>  dextrose 5%. 1000 milliLiter(s) IV Continuous <Continuous>  dextrose 50% Injectable 25 Gram(s) IV Push once  dextrose 50% Injectable 12.5 Gram(s) IV Push once  dextrose 50% Injectable 25 Gram(s) IV Push once  dronabinol 2.5 milliGRAM(s) Oral two times a day  finasteride 5 milliGRAM(s) Oral daily  glucagon  Injectable 1 milliGRAM(s) IntraMuscular once  heparin   Injectable 5000 Unit(s) SubCutaneous every 8 hours  insulin lispro (ADMELOG) corrective regimen sliding scale   SubCutaneous three times a day before meals  labetalol 100 milliGRAM(s) Oral every 8 hours  NIFEdipine XL 90 milliGRAM(s) Oral daily  tamsulosin 0.4 milliGRAM(s) Oral at bedtime    PRN MEDICATIONS  acetaminophen  Suppository .. 650 milliGRAM(s) Rectal every 6 hours PRN  albuterol    90 MICROgram(s) HFA Inhaler 1 Puff(s) Inhalation four times a day PRN  aluminum hydroxide/magnesium hydroxide/simethicone Suspension 30 milliLiter(s) Oral every 4 hours PRN  dextrose Oral Gel 15 Gram(s) Oral once PRN  melatonin 3 milliGRAM(s) Oral at bedtime PRN  ondansetron Injectable 4 milliGRAM(s) IV Push every 8 hours PRN    VITALS  T(F): 97.4 (05-10-25 @ 04:49), Max: 98.7 (05-09-25 @ 13:00)  HR: 76 (05-10-25 @ 07:00) (76 - 98)  BP: 170/67 (05-10-25 @ 07:00) (155/78 - 187/70)  RR: 18 (05-10-25 @ 04:49) (18 - 18)  SpO2: 97% (05-10-25 @ 04:49) (97% - 98%)  POCT Blood Glucose.: 104 mg/dL (05-10-25 @ 07:35)  POCT Blood Glucose.: 109 mg/dL (05-10-25 @ 00:00)  POCT Blood Glucose.: 124 mg/dL (05-09-25 @ 16:45)  POCT Blood Glucose.: 115 mg/dL (05-09-25 @ 12:18)    PHYSICAL EXAM  GENERAL: NAD, chronically ill-appearing  HEAD:  Atraumatic, normocephalic  EYES: EOMI, PERRL  NECK: Supple, trachea midline, no JVD  HEART: Regular rate and rhythm  LUNGS: decreased BS in b/l bases  ABDOMEN: Soft, nontender, nondistended, +BS  EXTREMITIES: 2+ peripheral pulses bilaterally. No clubbing, cyanosis, or edema  NERVOUS SYSTEM:  Awake, nonverbal, moving all extremities    (  ) Indwelling Cobos Catheter   Date inserted:    Reason (  ) Critical illness     (  ) urinary retention    (  ) Accurate Ins/Outs Monitoring     (  ) CMO patient    (  ) Central Line  Date inserted:  Location: (  ) Right IJ   (  ) Left IJ   (  ) Right Fem   (  ) Left Fem    (  ) SPC  (  ) pigtail  (  ) PEG tube  (  ) colostomy  (  ) jejunostomy  (  ) U-Dall    LABS             11.1   8.34  )-----------( 145      ( 05-10-25 @ 07:05 )             35.3     156  |  116  |  37  -------------------------<  125   05-09-25 @ 17:29  3.3  |  25  |  1.8    Ca      9.2     05-09-25 @ 17:29  Mg     2.4     05-09-25 @ 07:04          Urinalysis Basic - ( 09 May 2025 17:29 )    Color: x / Appearance: x / SG: x / pH: x  Gluc: 125 mg/dL / Ketone: x  / Bili: x / Urobili: x   Blood: x / Protein: x / Nitrite: x   Leuk Esterase: x / RBC: x / WBC x   Sq Epi: x / Non Sq Epi: x / Bacteria: x          IMAGING

## 2025-05-10 NOTE — PROGRESS NOTE ADULT - ASSESSMENT
83 y/o male with Pmhx of Copd, Htn, ckd stage 2, Dm, schizophrenia, Hld, non-hodgkins lymphoma, urinary retention with chronic Bains (changed in ED on 5/3/25), Hx of MDR UTIs presents to the ED from [facility] for evaluation of AMS, being admitted for ams 2/2 sepsis from suspected UTI in setting of chronic bains and multiple UTIs w/MDR organisms.    #Complicated Acute Pyelonephritis with chronic bains for urinary retention  #AMS 2/2 sepsis from UTI  #Hx of MDR UTIs  #gradual functional decline  - baseline per previous documentation oriented x1  - currently confused, does not follow commands   - last culture on 4/25/25 admission showing E faecalis sensitive to vancomycin and carbapenem resistant kleb pneumo (likely colonization)   - RVP neg, COVID neg  - blood cx positive for Klebsiella pneumoniae  - urine cx + gram neg rods   - s/p vancomycin and cefepime in ED  - s/p amikacin 750mg x 1   - CXR - cardiomegaly. Improved opacities  - B/l LE duplex - No DVT  - per ID: Avycaz 0.94g q12h IV x 7-10 days total (Day 5) pending clinical response (no other treatment options)  - Home meds: gabapentin, trazodone, olanzipine, tamsulosin, finasteride  - held gabapentin and psych meds in setting of alteration from baseline  - c/w home tamsulosin 0.4mg daily   - 5/6: Bains removed, TOV today, encourage po intake, WBC trending down  - 5/7: Pt failed TOV, replaced bains today  - 5/8-9: Pt with poor po intake, pt was nonverbal. Per Sister, pt mental status is at baseline.    #Poor Po intake  - calorie count  - family does not want NGT  - C/w Marinol to boost appetite    #Hypernatremia  - c/w IVF D5    #DAVID on CKD2 - improving   - yarely pre-renal   - Cr 2.3 > 2.1 > 1.9  - s/p IVF     #Chronic normocytic Anemia  - Hgb 10.3 (baseline around 10.0), MCV 90.8    #Hypokalemia  - replete    #troponin elevation   - demand ischemia in setting of sepsis   - downtrending 68 > 58    #COPD  - c/w home inhalers     #HTN (uncontrolled)  #Sinus tachycardia on EKG  - c/w home Nifedipine 90mg daily   - c/w home metoprolol 25mg BID   - C/w Labetalol 100mg q8    #Non Hodgkins lymphoma  - outpt f/u    Diet: diet dash   Activity:  AAT  DVT ppx: heparin sub-q  Code: DNR/DNI: Trial NIV  Pending: monitor urinary output 83 y/o male with Pmhx of Copd, Htn, ckd stage 2, Dm, schizophrenia, Hld, non-hodgkins lymphoma, urinary retention with chronic Bains (changed in ED on 5/3/25), Hx of MDR UTIs presents to the ED from [facility] for evaluation of AMS, being admitted for ams 2/2 sepsis from suspected UTI in setting of chronic bains and multiple UTIs w/MDR organisms.    #Complicated Acute Pyelonephritis with chronic bains for urinary retention  #AMS 2/2 sepsis from UTI  #Hx of MDR UTIs  #gradual functional decline  - baseline per previous documentation oriented x1  - currently confused, does not follow commands   - last culture on 4/25/25 admission showing E faecalis sensitive to vancomycin and carbapenem resistant kleb pneumo (likely colonization)   - RVP neg, COVID neg  - blood cx positive for Klebsiella pneumoniae  - urine cx + gram neg rods   - s/p vancomycin and cefepime in ED  - s/p amikacin 750mg x 1   - CXR - cardiomegaly. Improved opacities  - B/l LE duplex - No DVT  - per ID: Avycaz 0.94g q12h IV x 7-10 days total (day 7 on 5/10) pending clinical response (no other treatment options)  - Home meds: gabapentin, trazodone, olanzipine, tamsulosin, finasteride  - held gabapentin and psych meds in setting of alteration from baseline  - c/w home tamsulosin 0.4mg daily   - 5/6: Bains removed, TOV today, encourage po intake, WBC trending down  - 5/7: Pt failed TOV, replaced bains today  - 5/8-9: Pt with poor po intake, pt was nonverbal. Per Sister, pt mental status is at baseline.    #Poor Po intake  - calorie count  - family does not want NGT  - C/w Marinol to boost appetite    #Hypernatremia  - c/w IVF D5    #DAVID on CKD2 - improving   - yarely pre-renal   - Cr 2.3 > 2.1 > 1.9 > 1.6    #Chronic normocytic Anemia  - Hgb 10.3 (baseline around 10.0), MCV 90.8    #Hypokalemia (resolved)    #troponin elevation   - demand ischemia in setting of sepsis   - downtrending 68 > 58    #COPD  - c/w home inhalers     #HTN (uncontrolled)  #Sinus tachycardia on EKG  - c/w home Nifedipine 90mg daily   - c/w home metoprolol 25mg BID   - C/w Labetalol 100mg q8    #Non Hodgkins lymphoma  - outpt f/u    Diet: diet dash   Activity:  AAT  DVT ppx: heparin sub-q  Code: DNR/DNI: Trial NIV  Pending: monitor urinary output

## 2025-05-10 NOTE — PROGRESS NOTE ADULT - ATTENDING COMMENTS
seen and examined with resident team and primary nurse earlier today.  when I saw patient he was sleeping and did not respond to verbal stimuli.  looked comfortable.  was not grimacing or calling out.  was not tachypneic.  Vital Signs Last 24 Hrs  T(C): 36.9 (10 May 2025 12:56), Max: 36.9 (10 May 2025 12:56)  T(F): 98.5 (10 May 2025 12:56), Max: 98.5 (10 May 2025 12:56)  HR: 77 (10 May 2025 12:56) (76 - 84)  BP: 147/70 (10 May 2025 12:56) (147/70 - 187/70)  BP(mean): 95 (10 May 2025 12:56) (95 - 104)  RR: 18 (10 May 2025 12:56) (18 - 18)  SpO2: 97% (10 May 2025 12:56) (97% - 98%)    Parameters below as of 10 May 2025 12:56  Patient On (Oxygen Delivery Method): room air    conj pink, no jaundice  neck supple. no JVD  lungs clear to auscultation. no crackles or wheezing  heart pos. systolic murmur. regular rate  abd BS pos. soft nontender  extremities no edema. skin turgor slightly diminished                          11.1   8.34  )-----------( 145      ( 10 May 2025 07:05 )             35.3     05-10    150[H]  |  113[H]  |  36[H]  ----------------------------<  109[H]  3.7   |  19  |  1.6[H]    Ca    8.7      10 May 2025 07:05  Mg     2.2     05-10      MEDICATIONS  (STANDING):  ceftazidime/avibactam IVPB 0.94 Gram(s) IV Intermittent every 12 hours  chlorhexidine 2% Cloths 1 Application(s) Topical <User Schedule>  dextrose 5%. 1000 milliLiter(s) (75 mL/Hr) IV Continuous <Continuous>  dextrose 5%. 1000 milliLiter(s) (100 mL/Hr) IV Continuous <Continuous>  dextrose 5%. 1000 milliLiter(s) (50 mL/Hr) IV Continuous <Continuous>  dextrose 50% Injectable 25 Gram(s) IV Push once  dextrose 50% Injectable 12.5 Gram(s) IV Push once  dextrose 50% Injectable 25 Gram(s) IV Push once  dronabinol 2.5 milliGRAM(s) Oral two times a day  finasteride 5 milliGRAM(s) Oral daily  glucagon  Injectable 1 milliGRAM(s) IntraMuscular once  heparin   Injectable 5000 Unit(s) SubCutaneous every 8 hours  insulin lispro (ADMELOG) corrective regimen sliding scale   SubCutaneous three times a day before meals  labetalol 100 milliGRAM(s) Oral every 8 hours  NIFEdipine XL 90 milliGRAM(s) Oral daily  tamsulosin 0.4 milliGRAM(s) Oral at bedtime     A: complicated UTI on day #7 ceftazidime/avibactam  sepsis /bacteremia with Klebsiella pneumoniae, likely urine source   hypernatremia, water deficit calculated 3.5 liters but Na improved today on current regimen D5W  worsening MS - no improvement despite treating infection  tachycardic - with IVF now NSR  HTN - BP improved today  poor nutritional status - family does not want feeding tube  DAVID, likely volume depletion, improving  P:  continue D5W, monitor serum Na  encourage feeds, assisted feeds  monitor renal function  10 days IV antibiotics per ID recommendations  prognosis poor, family aware
Interval history: Pt seen and examined at bedside. No cp or sob.   Vital Signs (24 Hrs):  T(C): 36.4 (05-04-25 @ 08:13), Max: 37.3 (05-03-25 @ 15:32)  HR: 113 (05-04-25 @ 08:13) (102 - 136)  BP: 134/62 (05-04-25 @ 08:13) (125/63 - 156/66)  RR: 18 (05-04-25 @ 08:13) (18 - 18)  SpO2: 97% (05-04-25 @ 08:13) (94% - 99%)  Wt(kg): --  Daily     Daily     I&O's Summary    03 May 2025 07:01  -  04 May 2025 07:00  --------------------------------------------------------  IN: 0 mL / OUT: 800 mL / NET: -800 mL      PHYSICAL EXAM:  GENERAL: NAD, well-developed  HEAD:  Atraumatic, Normocephalic  EYES: EOMI, PERRLA, conjunctiva and sclera clear  NECK: Supple, No JVD  CHEST/LUNG: Clear to auscultation bilaterally; No wheeze  HEART: Regular rate and rhythm; No murmurs, rubs, or gallops  ABDOMEN: Soft, Nontender, Nondistended; Bowel sounds present  EXTREMITIES:  2+ Peripheral Pulses, No clubbing, cyanosis, or edema  PSYCH: AAOx1  NEUROLOGY: non-focal  SKIN: No rashes or lesions  Labs reviewed  Imaging reviewed independently and reviewed read  EKG reviewed independently and reviewed read    Plan as above. DW resident. Agree to plan. Made edits  #Progress Note Handoff  Pending (specify):  follow up infectious work up   Family discussion: house staff updated pt family  Disposition: snf vs home  Decision to admit the pt is based on acuity as above
Interval history: Pt seen and examined at bedside. No cp or sob.  febrile overnight   Vital Signs (24 Hrs):  T(C): 36.1 (05-05-25 @ 08:07), Max: 39.8 (05-04-25 @ 17:51)  HR: 101 (05-05-25 @ 09:37) (95 - 121)  BP: 138/63 (05-05-25 @ 09:37) (120/71 - 176/74)  RR: 18 (05-05-25 @ 08:07) (18 - 18)  SpO2: 100% (05-05-25 @ 08:07) (95% - 100%)    PHYSICAL EXAM:  GENERAL: NAD, well-developed  HEAD:  Atraumatic, Normocephalic  EYES: EOMI, PERRLA, conjunctiva and sclera clear  NECK: Supple, No JVD  CHEST/LUNG: Clear to auscultation bilaterally; No wheeze  HEART: Regular rate and rhythm; No murmurs, rubs, or gallops  ABDOMEN: Soft, Nontender, Nondistended; Bowel sounds present  EXTREMITIES:  2+ Peripheral Pulses, No clubbing, cyanosis, or edema  PSYCH: AAOx3  NEUROLOGY: non-focal  SKIN: No rashes or lesions  Labs reviewed  Imaging reviewed independently and reviewed read  < from: Xray Chest 1 View- PORTABLE-Urgent (Xray Chest 1 View- PORTABLE-Urgent .) (05.05.25 @ 09:11) >    IMPRESSION:    Cardiomegaly. Improved opacities.    < end of copied text >      EKG reviewed independently and reviewed read    Plan as above. DW resident. Agree to plan. Made edits    #Progress Note Handoff  Pending (specify):  follow up cultures, ID, duplex, rvp  Family discussion: house staff updated pt family  Disposition: NH vs snf   Decision to admit the pt is based on acuity as above

## 2025-05-11 LAB
ANION GAP SERPL CALC-SCNC: 16 MMOL/L — HIGH (ref 7–14)
BASOPHILS # BLD AUTO: 0.02 K/UL — SIGNIFICANT CHANGE UP (ref 0–0.2)
BASOPHILS NFR BLD AUTO: 0.2 % — SIGNIFICANT CHANGE UP (ref 0–1)
BUN SERPL-MCNC: 36 MG/DL — HIGH (ref 10–20)
CALCIUM SERPL-MCNC: 8.3 MG/DL — LOW (ref 8.4–10.5)
CHLORIDE SERPL-SCNC: 106 MMOL/L — SIGNIFICANT CHANGE UP (ref 98–110)
CO2 SERPL-SCNC: 20 MMOL/L — SIGNIFICANT CHANGE UP (ref 17–32)
CREAT SERPL-MCNC: 2 MG/DL — HIGH (ref 0.7–1.5)
EGFR: 33 ML/MIN/1.73M2 — LOW
EGFR: 33 ML/MIN/1.73M2 — LOW
EOSINOPHIL # BLD AUTO: 0.08 K/UL — SIGNIFICANT CHANGE UP (ref 0–0.7)
EOSINOPHIL NFR BLD AUTO: 0.8 % — SIGNIFICANT CHANGE UP (ref 0–8)
GLUCOSE BLDC GLUCOMTR-MCNC: 102 MG/DL — HIGH (ref 70–99)
GLUCOSE BLDC GLUCOMTR-MCNC: 105 MG/DL — HIGH (ref 70–99)
GLUCOSE BLDC GLUCOMTR-MCNC: 147 MG/DL — HIGH (ref 70–99)
GLUCOSE SERPL-MCNC: 97 MG/DL — SIGNIFICANT CHANGE UP (ref 70–99)
HCT VFR BLD CALC: 29.5 % — LOW (ref 42–52)
HGB BLD-MCNC: 9.6 G/DL — LOW (ref 14–18)
IMM GRANULOCYTES NFR BLD AUTO: 1.8 % — HIGH (ref 0.1–0.3)
LYMPHOCYTES # BLD AUTO: 19.7 % — LOW (ref 20.5–51.1)
LYMPHOCYTES # BLD AUTO: 2 K/UL — SIGNIFICANT CHANGE UP (ref 1.2–3.4)
MAGNESIUM SERPL-MCNC: 2 MG/DL — SIGNIFICANT CHANGE UP (ref 1.8–2.4)
MCHC RBC-ENTMCNC: 30.4 PG — SIGNIFICANT CHANGE UP (ref 27–31)
MCHC RBC-ENTMCNC: 32.5 G/DL — SIGNIFICANT CHANGE UP (ref 32–37)
MCV RBC AUTO: 93.4 FL — SIGNIFICANT CHANGE UP (ref 80–94)
MONOCYTES # BLD AUTO: 0.45 K/UL — SIGNIFICANT CHANGE UP (ref 0.1–0.6)
MONOCYTES NFR BLD AUTO: 4.4 % — SIGNIFICANT CHANGE UP (ref 1.7–9.3)
NEUTROPHILS # BLD AUTO: 7.42 K/UL — HIGH (ref 1.4–6.5)
NEUTROPHILS NFR BLD AUTO: 73.1 % — SIGNIFICANT CHANGE UP (ref 42.2–75.2)
NRBC BLD AUTO-RTO: 0 /100 WBCS — SIGNIFICANT CHANGE UP (ref 0–0)
PLATELET # BLD AUTO: 144 K/UL — SIGNIFICANT CHANGE UP (ref 130–400)
PMV BLD: 11.7 FL — HIGH (ref 7.4–10.4)
POTASSIUM SERPL-MCNC: 3 MMOL/L — LOW (ref 3.5–5)
POTASSIUM SERPL-SCNC: 3 MMOL/L — LOW (ref 3.5–5)
RBC # BLD: 3.16 M/UL — LOW (ref 4.7–6.1)
RBC # FLD: 15.1 % — HIGH (ref 11.5–14.5)
SODIUM SERPL-SCNC: 142 MMOL/L — SIGNIFICANT CHANGE UP (ref 135–146)
WBC # BLD: 10.15 K/UL — SIGNIFICANT CHANGE UP (ref 4.8–10.8)
WBC # FLD AUTO: 10.15 K/UL — SIGNIFICANT CHANGE UP (ref 4.8–10.8)

## 2025-05-11 PROCEDURE — 99232 SBSQ HOSP IP/OBS MODERATE 35: CPT

## 2025-05-11 RX ORDER — SODIUM CHLORIDE 9 G/1000ML
1000 INJECTION, SOLUTION INTRAVENOUS
Refills: 0 | Status: DISCONTINUED | OUTPATIENT
Start: 2025-05-11 | End: 2025-05-13

## 2025-05-11 RX ADMIN — TAMSULOSIN HYDROCHLORIDE 0.4 MILLIGRAM(S): 0.4 CAPSULE ORAL at 21:48

## 2025-05-11 RX ADMIN — SODIUM CHLORIDE 75 MILLILITER(S): 9 INJECTION, SOLUTION INTRAVENOUS at 09:13

## 2025-05-11 RX ADMIN — Medication 90 MILLIGRAM(S): at 06:25

## 2025-05-11 RX ADMIN — HEPARIN SODIUM 5000 UNIT(S): 1000 INJECTION INTRAVENOUS; SUBCUTANEOUS at 13:48

## 2025-05-11 RX ADMIN — DRONABINOL 2.5 MILLIGRAM(S): 10 CAPSULE ORAL at 18:45

## 2025-05-11 RX ADMIN — SODIUM CHLORIDE 50 MILLILITER(S): 9 INJECTION, SOLUTION INTRAVENOUS at 13:47

## 2025-05-11 RX ADMIN — FINASTERIDE 5 MILLIGRAM(S): 1 TABLET, FILM COATED ORAL at 11:57

## 2025-05-11 RX ADMIN — LABETALOL HYDROCHLORIDE 100 MILLIGRAM(S): 200 TABLET, FILM COATED ORAL at 21:48

## 2025-05-11 RX ADMIN — HEPARIN SODIUM 5000 UNIT(S): 1000 INJECTION INTRAVENOUS; SUBCUTANEOUS at 06:25

## 2025-05-11 RX ADMIN — DRONABINOL 2.5 MILLIGRAM(S): 10 CAPSULE ORAL at 06:25

## 2025-05-11 RX ADMIN — LABETALOL HYDROCHLORIDE 100 MILLIGRAM(S): 200 TABLET, FILM COATED ORAL at 13:48

## 2025-05-11 RX ADMIN — LABETALOL HYDROCHLORIDE 100 MILLIGRAM(S): 200 TABLET, FILM COATED ORAL at 06:25

## 2025-05-11 RX ADMIN — Medication 1 APPLICATION(S): at 06:25

## 2025-05-11 NOTE — PROGRESS NOTE ADULT - SUBJECTIVE AND OBJECTIVE BOX
seen and examined earlier today.  when I saw patient today he was much more alert than yesterday.  he responded verbally and was able to answer questions.  family member was at bedside and she was feeding patient and he was eating breakfast.    Vital Signs Last 24 Hrs  T(C): 36.4 (11 May 2025 05:00), Max: 36.9 (10 May 2025 12:56)  T(F): 97.6 (11 May 2025 05:00), Max: 98.5 (10 May 2025 12:56)  HR: 84 (11 May 2025 05:00) (77 - 84)  BP: 152/56 (11 May 2025 05:00) (113/63 - 152/56)  BP(mean): 88 (11 May 2025 05:00) (79 - 95)  RR: 18 (11 May 2025 05:00) (18 - 18)  SpO2: 95% (11 May 2025 05:00) (94% - 97%)    Parameters below as of 11 May 2025 05:00  Patient On (Oxygen Delivery Method): room air      conj pink, no jaundice  neck supple. no JVD  lungs clear to auscultation. no crackles or wheezing  heart pos. systolic murmur. regular rate  abd BS pos. soft nontender  extremities no edema. skin turgor slightly diminished                            9.6    10.15 )-----------( 144      ( 11 May 2025 06:59 )             29.5     05-11    142  |  106  |  36[H]  ----------------------------<  97  3.0[L]   |  20  |  2.0[H]    Ca    8.3[L]      11 May 2025 06:59  Mg     2.0     05-11        MEDICATIONS  (STANDING):  ceftazidime/avibactam IVPB 1.25 Gram(s) IV Intermittent every 8 hours  chlorhexidine 2% Cloths 1 Application(s) Topical <User Schedule>  dextrose 5%. 1000 milliLiter(s) (100 mL/Hr) IV Continuous <Continuous>  dextrose 5%. 1000 milliLiter(s) (50 mL/Hr) IV Continuous <Continuous>  dextrose 50% Injectable 25 Gram(s) IV Push once  dextrose 50% Injectable 12.5 Gram(s) IV Push once  dextrose 50% Injectable 25 Gram(s) IV Push once  dronabinol 2.5 milliGRAM(s) Oral two times a day  finasteride 5 milliGRAM(s) Oral daily  glucagon  Injectable 1 milliGRAM(s) IntraMuscular once  heparin   Injectable 5000 Unit(s) SubCutaneous every 8 hours  insulin lispro (ADMELOG) corrective regimen sliding scale   SubCutaneous three times a day before meals  labetalol 100 milliGRAM(s) Oral every 8 hours  lactated ringers 1000 milliLiter(s) (50 mL/Hr) IV Continuous <Continuous>  NIFEdipine XL 90 milliGRAM(s) Oral daily  tamsulosin 0.4 milliGRAM(s) Oral at bedtime

## 2025-05-11 NOTE — PHARMACOTHERAPY INTERVENTION NOTE - NSPHARMCOMMASP
ASP - Renal dose adjustment
ASP - Duration of therapy
ASP - Renal dose adjustment
ASP - Therapy recommended/ Alternative therapy

## 2025-05-11 NOTE — PHARMACOTHERAPY INTERVENTION NOTE - COMMENTS
As discussed, agreed to extend ceftazidime/avibactam order for another 3 days, consistent with ID note (7-10 total days of therapy).     Audra Vincent, PharmD, BCIDP  Clinical Pharmacy Specialist, Infectious Diseases  Tele-Antimicrobial Stewardship Program (Tele-ASP)  Available on [employer] Teams  
Physician Lead Consult: Contacted for assistance with Avycaz order    Recommended ceftazidime-avibactam 0.94g IV q12h, dosed based on a current eGFR of 28mL/min, as per ID consult recommendations.    Obed Washington, PharmD, [facility]  Clinical Pharmacy Specialist, Infectious Diseases  Tele-Antimicrobial Stewardship Program (Tele-ASP)  Tele-ASP Phone: [phone] 
As per policy, adjusted ceftazidime/avibactam dose from 1.25 g IV q8h to 0.94 g IV q12h since the CrCl is now 25 mL/min.    Audra Vincent, PharmD, [facility]  Clinical Pharmacy Specialist, Infectious Diseases  Tele-Antimicrobial Stewardship Program (Tele-ASP)  Available on Microsoft Teams  
Recommended to adjust ceftazidime/avibactam dose from 0.94 g IV q12h to 1.25 g IV q8h since the CrCl is now 31 mL/min.    Audra Vincent, PharmD, [facility]  Clinical Pharmacy Specialist, Infectious Diseases  Tele-Antimicrobial Stewardship Program (Tele-ASP)  Available on [employer] Teams

## 2025-05-11 NOTE — PROGRESS NOTE ADULT - ASSESSMENT
A: complicated UTI on day #8/10 ceftazidime/avibactam  sepsis /bacteremia with Klebsiella pneumoniae, likely urine source   hypernatremia, resolved  hypokalemia   MS much improved  tachycardic - resolved  HTN - BP not at target but acceptable  poor nutritional status - family does not want feeding tube, patient eating with assistance  DAVID, likely volume depletion, worsened today but non-oliguric  P:  change IVF to Ringer's lactate with 20 meq K/Liter, 50 ml/hour  monitor Na, K, renal function on this regimen  encourage feeds, assisted feeds  10 days IV antibiotics per ID recommendations, will be completed after 2 more days  once antibiotics complete and electrolytes normalize can anticipate DC Tues. 5/13/2025 to [facility] for STR (bed is available)  prognosis poor, family aware .

## 2025-05-12 LAB
ANION GAP SERPL CALC-SCNC: 14 MMOL/L — SIGNIFICANT CHANGE UP (ref 7–14)
BASOPHILS # BLD AUTO: 0.02 K/UL — SIGNIFICANT CHANGE UP (ref 0–0.2)
BASOPHILS NFR BLD AUTO: 0.2 % — SIGNIFICANT CHANGE UP (ref 0–1)
BUN SERPL-MCNC: 35 MG/DL — HIGH (ref 10–20)
CALCIUM SERPL-MCNC: 8.4 MG/DL — SIGNIFICANT CHANGE UP (ref 8.4–10.5)
CHLORIDE SERPL-SCNC: 109 MMOL/L — SIGNIFICANT CHANGE UP (ref 98–110)
CO2 SERPL-SCNC: 20 MMOL/L — SIGNIFICANT CHANGE UP (ref 17–32)
CREAT SERPL-MCNC: 2 MG/DL — HIGH (ref 0.7–1.5)
EGFR: 33 ML/MIN/1.73M2 — LOW
EGFR: 33 ML/MIN/1.73M2 — LOW
EOSINOPHIL # BLD AUTO: 0.06 K/UL — SIGNIFICANT CHANGE UP (ref 0–0.7)
EOSINOPHIL NFR BLD AUTO: 0.7 % — SIGNIFICANT CHANGE UP (ref 0–8)
GLUCOSE BLDC GLUCOMTR-MCNC: 100 MG/DL — HIGH (ref 70–99)
GLUCOSE BLDC GLUCOMTR-MCNC: 101 MG/DL — HIGH (ref 70–99)
GLUCOSE SERPL-MCNC: 98 MG/DL — SIGNIFICANT CHANGE UP (ref 70–99)
HCT VFR BLD CALC: 28.2 % — LOW (ref 42–52)
HGB BLD-MCNC: 9.2 G/DL — LOW (ref 14–18)
IMM GRANULOCYTES NFR BLD AUTO: 1.6 % — HIGH (ref 0.1–0.3)
LYMPHOCYTES # BLD AUTO: 1.66 K/UL — SIGNIFICANT CHANGE UP (ref 1.2–3.4)
LYMPHOCYTES # BLD AUTO: 18.2 % — LOW (ref 20.5–51.1)
MAGNESIUM SERPL-MCNC: 2 MG/DL — SIGNIFICANT CHANGE UP (ref 1.8–2.4)
MCHC RBC-ENTMCNC: 30.4 PG — SIGNIFICANT CHANGE UP (ref 27–31)
MCHC RBC-ENTMCNC: 32.6 G/DL — SIGNIFICANT CHANGE UP (ref 32–37)
MCV RBC AUTO: 93.1 FL — SIGNIFICANT CHANGE UP (ref 80–94)
MONOCYTES # BLD AUTO: 0.4 K/UL — SIGNIFICANT CHANGE UP (ref 0.1–0.6)
MONOCYTES NFR BLD AUTO: 4.4 % — SIGNIFICANT CHANGE UP (ref 1.7–9.3)
NEUTROPHILS # BLD AUTO: 6.83 K/UL — HIGH (ref 1.4–6.5)
NEUTROPHILS NFR BLD AUTO: 74.9 % — SIGNIFICANT CHANGE UP (ref 42.2–75.2)
NRBC BLD AUTO-RTO: 0 /100 WBCS — SIGNIFICANT CHANGE UP (ref 0–0)
PLATELET # BLD AUTO: 140 K/UL — SIGNIFICANT CHANGE UP (ref 130–400)
PMV BLD: 12 FL — HIGH (ref 7.4–10.4)
POTASSIUM SERPL-MCNC: 3.4 MMOL/L — LOW (ref 3.5–5)
POTASSIUM SERPL-SCNC: 3.4 MMOL/L — LOW (ref 3.5–5)
RBC # BLD: 3.03 M/UL — LOW (ref 4.7–6.1)
RBC # FLD: 15 % — HIGH (ref 11.5–14.5)
SODIUM SERPL-SCNC: 143 MMOL/L — SIGNIFICANT CHANGE UP (ref 135–146)
WBC # BLD: 9.12 K/UL — SIGNIFICANT CHANGE UP (ref 4.8–10.8)
WBC # FLD AUTO: 9.12 K/UL — SIGNIFICANT CHANGE UP (ref 4.8–10.8)

## 2025-05-12 PROCEDURE — 99232 SBSQ HOSP IP/OBS MODERATE 35: CPT

## 2025-05-12 RX ADMIN — SODIUM CHLORIDE 50 MILLILITER(S): 9 INJECTION, SOLUTION INTRAVENOUS at 22:29

## 2025-05-12 RX ADMIN — Medication 1 APPLICATION(S): at 05:12

## 2025-05-12 NOTE — PROGRESS NOTE ADULT - ASSESSMENT
85 y/o male with Pmhx of Copd, Htn, ckd stage 2, Dm, schizophrenia, Hld, non-hodgkins lymphoma, urinary retention with chronic Bains (changed in ED on 5/3/25), Hx of MDR UTIs p/w  AMS. Found to have MDR Kleb pna UTI w/ kleb pna bacteremia     #MDR Kleb bacteremia with Sepsis on admission due to complicated acute cystitis  #AMS 2/2 sepsis from UTI b/l oreiented x 1   - bcx and ucx  +ve Klebsiella pneumoniae  - urine cx + gram neg rods   - ID: Avycaz 0.94g q12h IV x 7-10 days total (day 9 on 5/12) pending clinical response (no other treatment options)  - holding gabapentin and olanzapine for ams   - c/w home tamsulosin 0.4mg daily   - Failed TOV 5/7, bains plaved     #Poor Po intake  - calorie count  - family does not want NGT  - C/w Marinol to boost appetite    #Hypernatremia Resolvewd s/p D5W    #DAVID on CKD2 2/2 poor po intake   - Cr at 2, b/l ~1.4>1.5     #Chronic normocytic Anemia  - Hgb at b/l     #Hypokalemia (resolved)    #troponin elevation   - demand ischemia in setting of sepsis   - Trop 68 > 58    #COPD  - c/w home inhalers     #HTN (uncontrolled)  #Sinus tachycardia on EKG  - c/w home Nifedipine 90mg daily   - c/w home metoprolol 25mg BID   - C/w Labetalol 100mg q8    #Non Hodgkins lymphoma  - outpt f/u    Diet: diet dash   Activity:  AAT  DVT ppx: heparin sub-q  Code: DNR/DNI: Trial NIV  Pending: monitor urinary output     85 y/o male with Pmhx of Copd, Htn, ckd stage 2, Dm, schizophrenia, Hld, non-hodgkins lymphoma, urinary retention with chronic Bains (changed in ED on 5/3/25), Hx of MDR UTIs p/w  AMS. Found to have MDR Kleb pna UTI w/ kleb pna bacteremia     #MDR Kleb bacteremia with Sepsis on admission due to complicated acute cystitis  #AMS 2/2 sepsis from UTI b/l oreiented x 1   - bcx and ucx  +ve Klebsiella pneumoniae  - urine cx + gram neg rods   - ID: Avycaz 0.94g q12h IV x 7-10 days total (day 9 on 5/12) pending clinical response (no other treatment options)  - holding gabapentin and olanzapine for ams   - c/w home tamsulosin 0.4mg daily   - Failed TOV 5/7, bains plaved     #Poor Po intake  - calorie count  - family does not want NGT  - C/w Marinol to boost appetite    #Hypernatremia Resolvewd s/p D5W    #DAVID on CKD2 2/2 poor po intake   - Cr at 2, b/l ~1.4>1.5     #Chronic normocytic Anemia  - Hgb at b/l     #Hypokalemia (resolved)    #troponin elevation   - demand ischemia in setting of sepsis   - Trop 68 > 58    #COPD  - c/w home inhalers     #HTN (uncontrolled)  #Sinus tachycardia on EKG  - c/w home Nifedipine 90mg daily   - c/w home metoprolol 25mg BID   - C/w Labetalol 100mg q8    #Non Hodgkins lymphoma  - outpt f/u    Diet: diet dash   Activity:  AAT  DVT ppx: heparin sub-q  Code: DNR/DNI: Trial NIV  DISPO: c/w avucaz x 1 more day, likely dc 5/13     85 y/o male with Pmhx of Copd, Htn, ckd stage 2, Dm, schizophrenia, Hld, non-hodgkins lymphoma, urinary retention with chronic Bains (changed in ED on 5/3/25), Hx of MDR UTIs p/w  AMS. Found to have MDR Kleb pna UTI w/ kleb pna bacteremia     #MDR Kleb bacteremia with Sepsis on admission due to complicated acute cystitis  #AMS 2/2 sepsis from UTI b/l oreiented x 1   - bcx and ucx  +ve Klebsiella pneumoniae  - urine cx + gram neg rods   - ID: Avycaz 0.94g q12h IV x 7-10 days total (day 9 on 5/12) pending clinical response (no other treatment options)  - holding gabapentin and olanzapine for ams   - c/w home tamsulosin 0.4mg daily   - Failed TOV 5/7, bains plaved     #Poor Po intake  - calorie count  - family does not want NGT  - C/w Marinol to boost appetite    #Hypernatremia Resolvewd s/p D5W    #DAVID on CKD2 2/2 poor po intake   - Cr at 2, b/l ~1.4>1.5     #Chronic normocytic Anemia  - Hgb at b/l     #Hypokalemia (resolved)    #troponin elevation   - demand ischemia in setting of sepsis   - Trop 68 > 58    #COPD  - c/w home inhalers     #HTN (uncontrolled)  #Sinus tachycardia on EKG  - c/w home Nifedipine 90mg daily   - c/w home metoprolol 25mg BID   - C/w Labetalol 100mg q8    #Non Hodgkins lymphoma  - outpt f/u    Diet: diet dash   Activity:  AAT  DVT ppx: heparin sub-q  Code: DNR/DNI: Trial NIV  DISPO: c/w Avycaz x 1 more day, likely dc 5/13     83 y/o male with Pmhx of Copd, Htn, ckd stage 2, Dm, schizophrenia, Hld, non-hodgkins lymphoma, urinary retention with chronic Bains (changed in ED on 5/3/25), Hx of MDR UTIs p/w  AMS. Found to have MDR Kleb pna UTI w/ kleb pna bacteremia     #MDR Kleb bacteremia with Sepsis on admission due to complicated acute cystitis  #AMS 2/2 sepsis from UTI b/l oreiented x 1   - bcx and ucx  +ve Klebsiella pneumoniae  - s/p avycaz per ID  - holding gabapentin and olanzapine for ams   - c/w home tamsulosin 0.4mg daily   - Failed TOV 5/7, bains plaved     #Poor Po intake  - calorie count  - family does not want NGT  - C/w Marinol to boost appetite    #Hypernatremia Resolvewd s/p D5W    #DAVID on CKD2 2/2 poor po intake   - Cr at 2, b/l ~1.4>1.5     #Chronic normocytic Anemia  - Hgb at b/l     #Hypokalemia (resolved)    #troponin elevation   - demand ischemia in setting of sepsis   - Trop 68 > 58    #COPD  - c/w home inhalers     #HTN (uncontrolled)  #Sinus tachycardia on EKG  - c/w home Nifedipine 90mg daily   - c/w home metoprolol 25mg BID   - C/w Labetalol 100mg q8    #Non Hodgkins lymphoma  - outpt f/u    Diet: diet dash   Activity:  AAT  DVT ppx: heparin sub-q  Code: DNR/DNI: Trial NIV  DISPO: likely dc 5/13

## 2025-05-12 NOTE — PROGRESS NOTE ADULT - ASSESSMENT
83 y/o male with Pmhx of Copd, Htn, ckd stage 2, Dm, schizophrenia, Hld, non-hodgkins lymphoma, urinary retention with chronic Cobos (changed in ED on 5/3/25), Hx of MDR UTIs presents to the ED from [facility] for evaluation of AMS. Patient currently has no complaints at bedside, unable to speak/verbalize at this time, baseline aaox1, currently confused, no acute apparent distress.  According to ED documentation patient was unresponsive at NH, no response from [facility] at this time, not response from emergency contacts at this time either. Patient is being admitted for sepsis 2/2 uti in setting of chronic foely w/MDR organisms.       IMPRESSION  #MDR Kleb bacteremia with Sepsis on admission due to complicated acute cystitis  Tm 103.6 P>90 Admission WBC 22    UA pyuria > 900; 5/3 UCX   >100,000 CFU/ml Klebsiella pneumoniae    >100,000 CFU/ml Proteus mirabilis    5/5 BCX Kleb MDR OXA    4/21 UCX   >100,000 CFU/ml Klebsiella pneumoniae  Chronic Cobos-  last exchanged 5/3    4/19 UCX   >100,000 CFU/ml Enterococcus faecalis S amp Nitrofurantoin: S <=32    4/14 UCX   >100,000 CFU/ml Proteus mirabilis Nitrofurantoin: R >64     4/13 UCX   >100,000 CFU/ml Proteus mirabilis  < from: CT Abdomen and Pelvis w/ IV Cont (05.03.25 @ 03:26) >  Urinary bladder distended with Cobos catheter balloon inflated within   prostatic urethra; repositioning recommended.  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

## 2025-05-12 NOTE — PROGRESS NOTE ADULT - SUBJECTIVE AND OBJECTIVE BOX
ADA VILLAGOMEZ  84y, Male  Allergy: No Known Allergies      LOS  9d    CHIEF COMPLAINT: ams 2/2 sepsis uti in setting of chronic bains (12 May 2025 06:48)      INTERVAL EVENTS/HPI  - T(F): , Max: 98 (05-11-25 @ 20:13)  - WBC Count: 9.12 (05-12-25 @ 05:39)  WBC Count: 10.15 (05-11-25 @ 06:59)     - Creatinine: 2.0 (05-12-25 @ 05:39)  Creatinine: 2.0 (05-11-25 @ 06:59)     -   -   -     ROS  cannot obtain secondary to patient's sedation and/or mental status    VITALS:  T(F): 97.8, Max: 98 (05-11-25 @ 20:13)  HR: 78  BP: 118/58  RR: 18Vital Signs Last 24 Hrs  T(C): 36.6 (12 May 2025 05:12), Max: 36.7 (11 May 2025 20:13)  T(F): 97.8 (12 May 2025 05:12), Max: 98 (11 May 2025 20:13)  HR: 78 (12 May 2025 05:12) (78 - 86)  BP: 118/58 (12 May 2025 05:12) (118/58 - 154/81)  BP(mean): 76 (12 May 2025 05:12) (76 - 81)  RR: 18 (12 May 2025 05:12) (18 - 18)  SpO2: 99% (11 May 2025 20:13) (99% - 99%)    Parameters below as of 11 May 2025 20:13  Patient On (Oxygen Delivery Method): room air        PHYSICAL EXAM:  Gen: chronically ill appearing   HEENT: Normocephalic, atraumatic  Neck: supple, no lymphadenopathy  CV: Regular rate & regular rhythm  Lungs: decreased BS at bases, no fremitus  Abdomen: Soft, BS present  Ext: Warm, well perfused  Neuro: non focal,  Skin: no rash, no erythema  Lines: no phlebitis     FH: Non-contributory  Social Hx: Non-contributory    TESTS & MEASUREMENTS:                        9.2    9.12  )-----------( 140      ( 12 May 2025 05:39 )             28.2     05-12    143  |  109  |  35[H]  ----------------------------<  98  3.4[L]   |  20  |  2.0[H]    Ca    8.4      12 May 2025 05:39  Mg     2.0     05-12          Urinalysis Basic - ( 12 May 2025 05:39 )    Color: x / Appearance: x / SG: x / pH: x  Gluc: 98 mg/dL / Ketone: x  / Bili: x / Urobili: x   Blood: x / Protein: x / Nitrite: x   Leuk Esterase: x / RBC: x / WBC x   Sq Epi: x / Non Sq Epi: x / Bacteria: x        Culture - Blood (collected 05-04-25 @ 19:01)  Source: Blood Blood  Final Report (05-10-25 @ 01:01):    No growth at 5 days    Culture - Blood (collected 05-04-25 @ 19:01)  Source: Blood Blood  Gram Stain (05-05-25 @ 16:02):    Growth in aerobic bottle: Gram Negative Rods  Final Report (05-07-25 @ 08:17):    Growth in aerobic bottle: Klebsiella pneumoniae (Carbapenem Resistant)    Direct identification is available within approximately 3-5    hours either by Blood Panel Multiplexed PCR or Direct    MALDI-TOF. Details: [email or web address]  Organism: Blood Culture PCR  Klebsiella pneumoniae (Carbapenem Resistant) (05-07-25 @ 08:17)  Organism: Blood Culture PCR (05-07-25 @ 08:17)      -  CTX-M Resistance Marker: Detec      Method Type: PCR      -  K. pneumoniae group: Detec (K. pneumoniae, K. quasipneumoniae, K. variicola)      -  Carbapenem Resistance: Detec      -  OXA Resistance Marker: Detec      -  ESBL: Detec  Organism: Klebsiella pneumoniae (Carbapenem Resistant) (05-07-25 @ 08:17)      -  Levofloxacin: R >4      -  Tobramycin: R >8      -  Ceftazidime/Avibactam: S <=4      -  Aztreonam: R >16      -  Gentamicin: R >8      -  Cefazolin: R >16      -  Cefepime: R >16      -  Piperacillin/Tazobactam: R >64      -  Ciprofloxacin: R >2      -  Imipenem: R 4      -  Ceftriaxone: R >32      -  Ampicillin: R >16 These ampicillin results predict results for amoxicillin      Method Type: IZABELLA      -  Meropenem: R 8      -  Ampicillin/Sulbactam: R >16/8      -  Cefoxitin: R >16      -  Meropenem/Vaborbactam: I 8      -  Ceftolozane/tazobactam: R >8      -  Trimethoprim/Sulfamethoxazole: R >2/38      -  Ertapenem: R >1      -  Tigecycline: S <=2 Interpretations based on FDA breakpoints    Urinalysis with Rflx Culture (collected 05-03-25 @ 04:50)    Culture - Urine (collected 05-03-25 @ 04:50)  Source: Clean Catch None  Final Report (05-09-25 @ 08:29):    >100,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)    >100,000 CFU/ml Proteus mirabilis  Organism: Klebsiella pneumoniae (Carbapenem Resistant)  Klebsiella pneumoniae (Carbapenem Resistant)  Proteus mirabilis (05-09-25 @ 08:29)  Organism: Klebsiella pneumoniae (Carbapenem Resistant) (05-09-25 @ 08:29)      -  Levofloxacin: R >4      -  Tobramycin: R >8      -  Nitrofurantoin: R >64 Should not be used to treat pyelonephritis      -  Aztreonam: R >16      -  Gentamicin: R >8      -  Cefazolin: R >16 For uncomplicated UTI with K. pneumoniae, E. coli, or P. mirablis: IZABELLA <=16 is sensitive and IZABELLA >=32 is resistant. This also predicts results for oral agents cefaclor, cefdinir, cefpodoxime, cefprozil, cefuroxime axetil, cephalexin and locarbef for uncomplicated UTI. Note that some isolates may be susceptible to these agents while testing resistant to cefazolin.      -  Cefepime: R >16      -  Piperacillin/Tazobactam: R >64      -  Ciprofloxacin: R >2      -  Imipenem: R 4      -  Ceftriaxone: R >32      -  Ampicillin: R >16 These ampicillin results predict results for amoxicillin      Method Type: IZABELLA      -  Meropenem: R 8      -  Ampicillin/Sulbactam: R >16/8      -  Cefoxitin: R >16      -  Cefuroxime: R >16      -  Amoxicillin/Clavulanic Acid: R >16/8      -  Trimethoprim/Sulfamethoxazole: R >2/38      -  Ertapenem: R >1      -  Tigecycline: S <=2 Interpretations based on FDA breakpoints  Organism: Klebsiella pneumoniae (Carbapenem Resistant) (05-09-25 @ 08:29)      -  Resistance Gene OXA: Detec      Method Type: Saeid  Organism: Proteus mirabilis (05-09-25 @ 08:29)      -  Levofloxacin: S <=0.5      -  Tobramycin: S <=2      -  Nitrofurantoin: R >64 Should not be used to treat pyelonephritis      -  Aztreonam: S <=4      -  Gentamicin: S <=2      -  Cefepime: S <=2      -  Cefazolin: S <=2 For uncomplicated UTI with K. pneumoniae, E. coli, or P. mirablis: IZABELLA <=16 is sensitive and IZABELLA >=32 is resistant. This also predicts results for oral agents cefaclor, cefdinir, cefpodoxime, cefprozil, cefuroxime axetil, cephalexin and locarbef for uncomplicated UTI. Note that some isolates may be susceptible to these agents while testing resistant to cefazolin.      -  Piperacillin/Tazobactam: S <=8      -  Ciprofloxacin: S <=0.25      -  Ceftriaxone: S <=1      -  Ampicillin: S <=8 These ampicillin results predict results for amoxicillin      Method Type: IZABELLA      -  Meropenem: S <=1      -  Ampicillin/Sulbactam: S <=4/2      -  Cefoxitin: S <=8      -  Cefuroxime: S <=4      -  Amoxicillin/Clavulanic Acid: S <=8/4      -  Trimethoprim/Sulfamethoxazole: S <=0.5/9.5      -  Ertapenem: S <=0.5    Culture - Blood (collected 05-03-25 @ 02:36)  Source: Blood Blood-Peripheral  Final Report (05-08-25 @ 10:01):    No growth at 5 days    Culture - Blood (collected 05-03-25 @ 02:36)  Source: Blood Blood-Peripheral  Final Report (05-08-25 @ 10:01):    No growth at 5 days    Culture - Eye (collected 04-22-25 @ 17:50)  Source: Eye Eye-Right  Gram Stain (04-23-25 @ 23:57):    No polymorphonuclear leukocytes seen    No organisms seen    by cytocentrifuge  Final Report (04-27-25 @ 18:53):    Few Staphylococcus simulans  Organism: Staphylococcus simulans (04-27-25 @ 18:53)  Organism: Staphylococcus simulans (04-27-25 @ 18:53)      -  Clindamycin: S <=0.25      -  Oxacillin: R >2      -  Gentamicin: S <=4 Should not be used as monotherapy      -  Vancomycin: S 1      -  Tetracycline: S <=4      Method Type: IZABELLA      -  Penicillin: R >2      -  Rifampin: S <=1 Should not be used as monotherapy      -  Erythromycin: S <=0.25      -  Trimethoprim/Sulfamethoxazole: S <=0.5/9.5    Urinalysis with Rflx Culture (collected 04-21-25 @ 21:39)    Culture - Urine (collected 04-21-25 @ 21:39)  Source: Catheterized None  Final Report (04-27-25 @ 06:16):    >100,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)    50,000 - 99,000 CFU/mL Enterococcus faecalis  Organism: Enterococcus faecalis (04-27-25 @ 06:16)      -  Levofloxacin: R >4      -  Nitrofurantoin: S <=32 Should not be used to treat pyelonephritis.      -  Vancomycin: S 1      -  Ciprofloxacin: R >2      -  Ampicillin: S <=2 Predicts results to ampicillin/sulbactam, amoxacillin-clavulanate and  piperacillin-tazobactam.      -  Tetracycline: R >8      Method Type: IZABELLA  Organism: Klebsiella pneumoniae (Carbapenem Resistant)  Klebsiella pneumoniae (Carbapenem Resistant)  Enterococcus faecalis (04-27-25 @ 06:16)  Organism: Klebsiella pneumoniae (Carbapenem Resistant) (04-27-25 @ 06:16)      -  Levofloxacin: R >4      -  Tobramycin: R >8      -  Nitrofurantoin: R >64 Should not be used to treat pyelonephritis      -  Aztreonam: R >16      -  Gentamicin: R >8      -  Cefepime: R >16      -  Cefazolin: R >16 For uncomplicated UTI with K. pneumoniae, E. coli, or P. mirablis: IZABELLA <=16 is sensitive and IZABELLA >=32 is resistant. This also predicts results for oral agents cefaclor, cefdinir, cefpodoxime, cefprozil, cefuroxime axetil, cephalexin and locarbef for uncomplicated UTI. Note that some isolates may be susceptible to these agents while testing resistant to cefazolin.      -  Piperacillin/Tazobactam: R >64      -  Ciprofloxacin: R >2      -  Imipenem: R 8      -  Ceftriaxone: R >32      -  Ampicillin: R >16 These ampicillin results predict results for amoxicillin      Method Type: IZABELLA      -  Meropenem: R >8      -  Ampicillin/Sulbactam: R >16/8      -  Cefoxitin: R >16      -  Cefuroxime: R >16      -  Amoxicillin/Clavulanic Acid: R >16/8      -  Trimethoprim/Sulfamethoxazole: R >2/38      -  Ertapenem: R >1  Organism: Klebsiella pneumoniae (Carbapenem Resistant) (04-27-25 @ 06:16)      -  Resistance Gene OXA: Detec      Method Type: CarbaR    Culture - Blood (collected 04-21-25 @ 20:33)  Source: Blood Blood-Peripheral  Final Report (04-27-25 @ 03:00):    No growth at 5 days    Culture - Blood (collected 04-21-25 @ 20:33)  Source: Blood Blood-Peripheral  Final Report (04-27-25 @ 03:00):    No growth at 5 days    Culture - Urine (collected 04-19-25 @ 14:30)  Source: Catheterized Catheterized  Final Report (04-22-25 @ 07:57):    >100,000 CFU/ml Enterococcus faecalis    <10,000 CFU/ml Normal Urogenital jake present  Organism: Enterococcus faecalis (04-22-25 @ 07:57)  Organism: Enterococcus faecalis (04-22-25 @ 07:57)      -  Levofloxacin: R >4      -  Nitrofurantoin: S <=32 Should not be used to treat pyelonephritis.      -  Vancomycin: S 1      -  Ciprofloxacin: R >2      -  Ampicillin: S <=2 Predicts results to ampicillin/sulbactam, amoxacillin-clavulanate and  piperacillin-tazobactam.      -  Tetracycline: R >8      Method Type: IZABELLA    Urinalysis with Rflx Culture (collected 04-14-25 @ 18:19)    Culture - Urine (collected 04-14-25 @ 18:19)  Source: Clean Catch None  Final Report (04-17-25 @ 08:03):    >100,000 CFU/ml Proteus mirabilis  Organism: Proteus mirabilis (04-17-25 @ 08:03)  Organism: Proteus mirabilis (04-17-25 @ 08:03)      -  Levofloxacin: S <=0.5      -  Tobramycin: S <=2      -  Nitrofurantoin: R >64 Should not be used to treat pyelonephritis      -  Aztreonam: S <=4      -  Gentamicin: S <=2      -  Cefepime: S <=2      -  Cefazolin: S <=2 For uncomplicated UTI with K. pneumoniae, E. coli, or P. mirablis: IZABELLA <=16 is sensitive and IZABELLA >=32 is resistant. This also predicts results for oral agents cefaclor, cefdinir, cefpodoxime, cefprozil, cefuroxime axetil, cephalexin and locarbef for uncomplicated UTI. Note that some isolates may be susceptible to these agents while testing resistant to cefazolin.      -  Piperacillin/Tazobactam: S <=8      -  Ciprofloxacin: S <=0.25      -  Ceftriaxone: S <=1      -  Ampicillin: S <=8 These ampicillin results predict results for amoxicillin      Method Type: IZABELLA      -  Meropenem: S <=1      -  Ampicillin/Sulbactam: S <=4/2      -  Cefoxitin: S <=8      -  Cefuroxime: S <=4      -  Amoxicillin/Clavulanic Acid: S <=8/4      -  Trimethoprim/Sulfamethoxazole: S <=0.5/9.5      -  Ertapenem: S <=0.5    Culture - Blood (collected 04-14-25 @ 18:10)  Source: Blood Blood  Final Report (04-19-25 @ 23:07):    No growth at 5 days    Culture - Blood (collected 04-14-25 @ 18:00)  Source: Blood Blood  Final Report (04-19-25 @ 23:07):    No growth at 5 days    Urinalysis with Rflx Culture (collected 04-13-25 @ 15:05)    Culture - Urine (collected 04-13-25 @ 15:05)  Source: Catheterized None  Final Report (04-15-25 @ 19:27):    >100,000 CFU/ml Proteus mirabilis    <10,000 CFU/ml Normal Urogenital jake present  Organism: Proteus mirabilis (04-15-25 @ 19:27)  Organism: Proteus mirabilis (04-15-25 @ 19:27)      -  Levofloxacin: S <=0.5      -  Tobramycin: S <=2      -  Nitrofurantoin: R >64 Should not be used to treat pyelonephritis      -  Aztreonam: S <=4      -  Gentamicin: S <=2      -  Cefepime: S <=2      -  Cefazolin: S <=2 For uncomplicated UTI with K. pneumoniae, E. coli, or P. mirablis: IZABELLA <=16 is sensitive and IZABELLA >=32 is resistant. This also predicts results for oral agents cefaclor, cefdinir, cefpodoxime, cefprozil, cefuroxime axetil, cephalexin and locarbef for uncomplicated UTI. Note that some isolates may be susceptible to these agents while testing resistant to cefazolin.      -  Piperacillin/Tazobactam: S <=8      -  Ciprofloxacin: S <=0.25      -  Ceftriaxone: S <=1      -  Ampicillin: S <=8 These ampicillin results predict results for amoxicillin      Method Type: IZABELLA      -  Meropenem: S <=1      -  Ampicillin/Sulbactam: S <=4/2      -  Cefoxitin: S <=8      -  Cefuroxime: S <=4      -  Amoxicillin/Clavulanic Acid: S <=8/4      -  Trimethoprim/Sulfamethoxazole: S <=0.5/9.5      -  Ertapenem: S <=0.5            INFECTIOUS DISEASES TESTING  Rapid RVP Result: NotDetec (05-05-25 @ 09:00)  Procalcitonin: 0.21 (04-22-25 @ 11:47)  MRSA PCR Result.: Negative (04-22-25 @ 06:30)  MRSA PCR Result.: Negative (01-17-25 @ 18:56)  MRSA PCR Result.: Negative (01-08-25 @ 16:03)  Procalcitonin: 1.80 (01-05-25 @ 19:42)  Procalcitonin: 0.09 (11-23-24 @ 06:19)  Procalcitonin: 0.11 (11-22-24 @ 13:42)  Rapid RVP Result: NotDetec (11-22-24 @ 02:23)      INFLAMMATORY MARKERS      RADIOLOGY & ADDITIONAL TESTS:  I have personally reviewed the last available Chest xray  CXR      CT      CARDIOLOGY TESTING  12 Lead ECG:   Ventricular Rate 102 BPM    Atrial Rate 102 BPM    P-R Interval 164 ms    QRS Duration 76 ms    Q-T Interval 340 ms    QTC Calculation(Bazett) 443 ms    P Axis 70 degrees    R Axis 53 degrees    T Axis 24 degrees    Diagnosis Line Sinus tachycardia  Otherwise normal ECG    Confirmed by Cristi Rosario ([id number]) on 5/3/2025 8:55:05 AM (05-03-25 @ 03:40)      MEDICATIONS  ceftazidime/avibactam IVPB 0.94  chlorhexidine 2% Cloths 1  dextrose 5%. 1000  dextrose 5%. 1000  dextrose 50% Injectable 25  dextrose 50% Injectable 12.5  dextrose 50% Injectable 25  dronabinol 2.5  finasteride 5  glucagon  Injectable 1  heparin   Injectable 5000  insulin lispro (ADMELOG) corrective regimen sliding scale   labetalol 100  lactated ringers 1000  NIFEdipine XL 90  tamsulosin 0.4      WEIGHT  Weight (kg): 60.8 (05-06-25 @ 02:35)  Creatinine: 2.0 mg/dL (05-12-25 @ 05:39)      ANTIBIOTICS:  ceftazidime/avibactam IVPB 0.94 Gram(s) IV Intermittent every 12 hours      All available historical records have been reviewed

## 2025-05-12 NOTE — PROGRESS NOTE ADULT - ASSESSMENT
83 y/o male with Pmhx of Copd, Htn, ckd stage 2, Dm, schizophrenia, Hld, non-hodgkins lymphoma, urinary retention with chronic Bains (changed in ED on 5/3/25), Hx of MDR UTIs presents to the ED from [facility] for evaluation of AMS, being admitted for ams 2/2 sepsis from suspected UTI in setting of chronic bains and multiple UTIs w/MDR organisms.    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 Improved opacities  - B/l LE duplex - No DVT  - per ID: Avycaz 0.94g q12h IV x7-10d total  - Home meds: gabapentin, trazodone, olanzipine, tamsulosin, finasteride  - c/w home tamsulosin 0.4mg daily   - 5/6: Bains removed, TOV today, encourage po intake, WBC trending down  5/7 failed voiding trial. As per adult home/sister, pt has been gradually declining, wheelchair bound. Does not talk much.    #DAVID on CKD2 - improving   - yarely pre-renal   - Cr 2.3 > 2.1 > 1.9  - s/p IVF     #Hypernatremia  IVFs D5W  5/9 increased rate as Na not improving. Repeat labs at 4pm to ensure that not overcorrect    #Chronic normocytic Anemia  - Hgb 10.3 (baseline around 10.0), MCV 90.8    #Hyperkalemia- resolved   - K 4.2 >3.9    #troponin elevation   - demand ischemia in setting of sepsis   - downtrending 68 > 58    #COPD  - c/w home inhalers     #HTN  #Sinus tachycardia on EKG  - c/w home Nifedipine 90mg daily   - c/w home metoprolol 25mg BID   5/9 changed Metoprolol to Labetalol    #Non Hodgkins lymphoma  - outpt f/u    #Nutrition/Fluids/Electrolytes   - replete K<4 and Mg <2  - ensure regular BMs  -add Marinol to boost appetite    #DVT Px  SCDs  Heparin SQ    High risk pt. Px is guarded.  GOC: d/w sister on 5/7. She would not want feeding tubes, she understands that pt is gradually declining. She wants to think before making him DNR/DNI. Ultimate plan for Nsg home in the [facility]. Palliative c/s placed to help with GOC as current full code does not go along with "no feeding tubes".  Made DNR/DNI on 5/9    #Progress Note Handoff  Pending: Clinical improvement and stability__  Family: sister is aware and agrees w/ plan  Disposition: SNF       My note supersedes the residents note should a discrepancy arise.    Chart and notes personally reviewed.  Care Discussed with Consultants/Other Providers/ Housestaff [ x] YES [ ] NO   Radiology, labs, old records personally reviewed.    discussed w/ housestaff, nursing, case management    Attestation Statements:    Attestation Statements:  Risk Statement (NON-critical care).     On this date of service, level of risk to patient is considered: High.     Due to: pt with illness causing risk to life or bodily fxn    Time-based billing (NON-critical care).     50 minutes spent on total encounter. The necessity of the time spent during the encounter on this date of service was due to:     time spent on review of labs, imaging studies, old records, obtaining history, personally examining patient, counselling and communicating with patient/ family, entering orders for medications/tests/etc, discussions with other health care providers, documentation in electronic health records, independent interpretation of labs, imaging/procedure results and care coordination.

## 2025-05-12 NOTE — CHART NOTE - NSCHARTNOTEFT_GEN_A_CORE
RN made me aware of the patient vitals sig for rectal temp of 103.7. Patient is admitted for TME 2.2 urosepsis currently on Avycaz. Exam nonrevealing for thrombophlebitis, bains draining without any issues, no other complaints of abd pain, urinary issues, cough, etc. Given significant fever, will obtain blood cultures, cbc, cmp, lactate. Source of fever is most likely from UTI vs other etiologies.     Plan:  Suppository tylneol x1  Cooling blanket  Labs
Will sign off, please reconsult PRN    ______________  Gerson Vasquez MD  Palliative Medicine  [facility]   of Geriatric and Palliative Medicine  [phone]

## 2025-05-12 NOTE — PROGRESS NOTE ADULT - SUBJECTIVE AND OBJECTIVE BOX
Patient is a 84y old  Male who presents with a chief complaint of ams 2/2 sepsis uti in setting of chronic bains (06 May 2025 13:21)    INTERVAL HPI/OVERNIGHT EVENTS: Patient was examined and seen at bedside. This morning pt is resting in bed and staff reports no new issues or overnight events. Drank water from me. More alert.  ROS: Denies any complaints  InitialHPI:  The patient is an 85 y/o male with Pmhx of Copd, Htn, ckd stage 2, Dm, schizophrenia, Hld, non-hodgkins lymphoma, urinary retention with chronic Bains (changed in ED on 5/3/25), Hx of MDR UTIs presents to the ED from [facility] for evaluation of AMS. Patient currently has no complaints at bedside, unable to speak/verbalize at this time, baseline aaox1, currently confused, no acute apparent distress.  According to ED documentation patient was unresponsive at NH, no response from [facility] at this time, not response from emergency contacts at this time either. Patient is being admitted for sepsis 2/2 uti in setting of chronic foely w/MDR organisms.     ED course:   Vitals:   bp 116/90  hr 105  rr 25  temp 100.2/37.9     imaging:   CTH no acute pathology     CTAP w/IV cont:     IMPRESSION:    Urinary bladder distended with Bains catheter balloon inflated within   prostatic urethra; repositioning recommended.  Urinary bladder mild pericystic stranding, could represent bladder infection in the appropriate clinical setting.  Mild bilateral hydroureteronephrosis, could be attributed to bladder outlet obstruction.  Urinary bladder right posterolateral subcentimeter nodular density, which can be further evaluated as outpatient.  BPH.    bains changed in ED      Labs:  wbc 22k  hgb 9.0 (baseline around 10.0)   trop 68-->58  k+ 5.3  cr 2.6 (baseline 1.4)   lactate 1.9     s/p   500cc LR bolus   s/p cefepime and vanc  ucx bcx collected   (03 May 2025 11:49)    PAST MEDICAL & SURGICAL HISTORY:  COPD (chronic obstructive pulmonary disease)      Paranoid schizophrenia      Schizophrenia      Diabetes type 2, controlled      COPD (chronic obstructive pulmonary disease)      Dyslipidemia      Chronic retention of urine      Dyslipidemia      Encounter for screening colonoscopy          General: NAD, alert, chronically ill appearing  HEENT: no LAD  CV: S1 S2  Resp: decreased breath sounds at bases  GI: NT/ND/S +BS  MS: no clubbing/cyanosis/edema, + pulses b/l  Neuro: appears to move all extremities            Home Medications:  Albuterol (Eqv-ProAir HFA) 90 mcg/inh inhalation aerosol: 1 puff(s) inhaled 4 times a day as needed for  shortness of breath and/or wheezing (14 Apr 2025 22:55)  gabapentin 300 mg oral capsule: 1 cap(s) orally 2 times a day (14 Apr 2025 22:56)  NIFEdipine 90 mg oral tablet, extended release: 1 tab(s) orally once a day (22 Apr 2025 01:09)  tamsulosin 0.4 mg oral capsule: 1 cap(s) orally once a day (at bedtime) (14 Apr 2025 22:56)  traZODone 50 mg oral tablet: 1 tab(s) orally once a day (at bedtime) (14 Apr 2025 22:56)    MEDICATIONS  (STANDING):  chlorhexidine 2% Cloths 1 Application(s) Topical <User Schedule>  dextrose 5%. 1000 milliLiter(s) (100 mL/Hr) IV Continuous <Continuous>  dextrose 5%. 1000 milliLiter(s) (50 mL/Hr) IV Continuous <Continuous>  dextrose 50% Injectable 25 Gram(s) IV Push once  dextrose 50% Injectable 12.5 Gram(s) IV Push once  dextrose 50% Injectable 25 Gram(s) IV Push once  dronabinol 2.5 milliGRAM(s) Oral two times a day  finasteride 5 milliGRAM(s) Oral daily  glucagon  Injectable 1 milliGRAM(s) IntraMuscular once  heparin   Injectable 5000 Unit(s) SubCutaneous every 8 hours  insulin lispro (ADMELOG) corrective regimen sliding scale   SubCutaneous three times a day before meals  labetalol 100 milliGRAM(s) Oral every 8 hours  lactated ringers 1000 milliLiter(s) (50 mL/Hr) IV Continuous <Continuous>  NIFEdipine XL 90 milliGRAM(s) Oral daily  tamsulosin 0.4 milliGRAM(s) Oral at bedtime    MEDICATIONS  (PRN):  acetaminophen  Suppository .. 650 milliGRAM(s) Rectal every 6 hours PRN Temp greater or equal to 38C (100.4F), Moderate Pain (4 - 6)  albuterol    90 MICROgram(s) HFA Inhaler 1 Puff(s) Inhalation four times a day PRN for shortness of breath and/or wheezing  aluminum hydroxide/magnesium hydroxide/simethicone Suspension 30 milliLiter(s) Oral every 4 hours PRN Dyspepsia  dextrose Oral Gel 15 Gram(s) Oral once PRN Blood Glucose LESS THAN 70 milliGRAM(s)/deciliter  melatonin 3 milliGRAM(s) Oral at bedtime PRN Insomnia  ondansetron Injectable 4 milliGRAM(s) IV Push every 8 hours PRN Nausea and/or Vomiting    Vital Signs Last 24 Hrs  T(C): 36.5 (12 May 2025 13:13), Max: 36.6 (12 May 2025 05:12)  T(F): 97.7 (12 May 2025 13:13), Max: 97.8 (12 May 2025 05:12)  HR: 90 (12 May 2025 13:13) (78 - 90)  BP: 136/67 (12 May 2025 13:13) (118/58 - 136/67)  BP(mean): 90 (12 May 2025 13:13) (76 - 90)  RR: 18 (12 May 2025 13:13) (18 - 18)  SpO2: 99% (12 May 2025 13:13) (99% - 99%)    Parameters below as of 12 May 2025 13:13  Patient On (Oxygen Delivery Method): room air      CAPILLARY BLOOD GLUCOSE      POCT Blood Glucose.: 101 mg/dL (12 May 2025 11:58)  POCT Blood Glucose.: 100 mg/dL (12 May 2025 07:48)    LABS:                        9.2    9.12  )-----------( 140      ( 12 May 2025 05:39 )             28.2     05-12    143  |  109  |  35[H]  ----------------------------<  98  3.4[L]   |  20  |  2.0[H]    Ca    8.4      12 May 2025 05:39  Mg     2.0     05-12              Urinalysis Basic - ( 12 May 2025 05:39 )    Color: x / Appearance: x / SG: x / pH: x  Gluc: 98 mg/dL / Ketone: x  / Bili: x / Urobili: x   Blood: x / Protein: x / Nitrite: x   Leuk Esterase: x / RBC: x / WBC x   Sq Epi: x / Non Sq Epi: x / Bacteria: x              Consultant Notes Reviewed:  [x ] YES  [ ] NO  Care Discussed with Consultants/Other Providers/ Housestaff [ x] YES  [ ] NO  Radiology, labs, EKGs, new studies personally reviewed.

## 2025-05-12 NOTE — PROGRESS NOTE ADULT - SUBJECTIVE AND OBJECTIVE BOX
ADA VILLAGOMEZ 84y Male  MRN#: [id number]   Hospital Day: 9d    HPI:  The patient is an 83 y/o male with Pmhx of Copd, Htn, ckd stage 2, Dm, schizophrenia, Hld, non-hodgkins lymphoma, urinary retention with chronic Bains (changed in ED on 5/3/25), Hx of MDR UTIs presents to the ED from [facility] for evaluation of AMS. Patient currently has no complaints at bedside, unable to speak/verbalize at this time, baseline aaox1, currently confused, no acute apparent distress.  According to ED documentation patient was unresponsive at NH, no response from [facility] at this time, not response from emergency contacts at this time either. Patient is being admitted for sepsis 2/2 uti in setting of chronic foely w/MDR organisms.     ED course:   Vitals:   bp 116/90  hr 105  rr 25  temp 100.2/37.9     imaging:   CTH no acute pathology     CTAP w/IV cont:     IMPRESSION:    Urinary bladder distended with Bains catheter balloon inflated within   prostatic urethra; repositioning recommended.  Urinary bladder mild pericystic stranding, could represent bladder   infection in the appropriate clinical setting.  Mild bilateral hydroureteronephrosis, could be attributed to bladder   outlet obstruction.  Urinary bladder right posterolateral subcentimeter nodular density, which   can be further evaluated as outpatient.  BPH.    bains repositioned in ED      Labs:  wbc 22k  hgb 9.0 (baseline around 10.0)   trop 68-->58  k+ 5.3  cr 2.6 (baseline 1.4)   lactate 1.9     s/p   500cc LR bolus   s/p cefepime and vanc  ucx bcx collected   (03 May 2025 11:49)      SUBJECTIVE      OBJECTIVE  PAST MEDICAL & SURGICAL HISTORY  COPD (chronic obstructive pulmonary disease)    Paranoid schizophrenia    Schizophrenia    Diabetes type 2, controlled    COPD (chronic obstructive pulmonary disease)    Dyslipidemia    Chronic retention of urine    Dyslipidemia    Encounter for screening colonoscopy      ALLERGIES:  No Known Allergies    MEDICATIONS:  STANDING MEDICATIONS  ceftazidime/avibactam IVPB 0.94 Gram(s) IV Intermittent every 12 hours  chlorhexidine 2% Cloths 1 Application(s) Topical <User Schedule>  dextrose 5%. 1000 milliLiter(s) IV Continuous <Continuous>  dextrose 5%. 1000 milliLiter(s) IV Continuous <Continuous>  dextrose 50% Injectable 25 Gram(s) IV Push once  dextrose 50% Injectable 12.5 Gram(s) IV Push once  dextrose 50% Injectable 25 Gram(s) IV Push once  dronabinol 2.5 milliGRAM(s) Oral two times a day  finasteride 5 milliGRAM(s) Oral daily  glucagon  Injectable 1 milliGRAM(s) IntraMuscular once  heparin   Injectable 5000 Unit(s) SubCutaneous every 8 hours  insulin lispro (ADMELOG) corrective regimen sliding scale   SubCutaneous three times a day before meals  labetalol 100 milliGRAM(s) Oral every 8 hours  lactated ringers 1000 milliLiter(s) IV Continuous <Continuous>  NIFEdipine XL 90 milliGRAM(s) Oral daily  tamsulosin 0.4 milliGRAM(s) Oral at bedtime    PRN MEDICATIONS  acetaminophen  Suppository .. 650 milliGRAM(s) Rectal every 6 hours PRN  albuterol    90 MICROgram(s) HFA Inhaler 1 Puff(s) Inhalation four times a day PRN  aluminum hydroxide/magnesium hydroxide/simethicone Suspension 30 milliLiter(s) Oral every 4 hours PRN  dextrose Oral Gel 15 Gram(s) Oral once PRN  melatonin 3 milliGRAM(s) Oral at bedtime PRN  ondansetron Injectable 4 milliGRAM(s) IV Push every 8 hours PRN      VITAL SIGNS: Last 24 Hours  T(C): 36.6 (12 May 2025 05:12), Max: 36.7 (11 May 2025 20:13)  T(F): 97.8 (12 May 2025 05:12), Max: 98 (11 May 2025 20:13)  HR: 78 (12 May 2025 05:12) (78 - 86)  BP: 118/58 (12 May 2025 05:12) (118/58 - 154/81)  BP(mean): 76 (12 May 2025 05:12) (76 - 81)  RR: 18 (12 May 2025 05:12) (18 - 18)  SpO2: 99% (11 May 2025 20:13) (99% - 99%)    LABS:                        9.6    10.15 )-----------( 144      ( 11 May 2025 06:59 )             29.5     05-11    142  |  106  |  36[H]  ----------------------------<  97  3.0[L]   |  20  |  2.0[H]    Ca    8.3[L]      11 May 2025 06:59  Mg     2.0     05-11        Urinalysis Basic - ( 11 May 2025 06:59 )    Color: x / Appearance: x / SG: x / pH: x  Gluc: 97 mg/dL / Ketone: x  / Bili: x / Urobili: x   Blood: x / Protein: x / Nitrite: x   Leuk Esterase: x / RBC: x / WBC x   Sq Epi: x / Non Sq Epi: x / Bacteria: x                    PHYSICAL EXAM:  GENERAL: NAD, well-developed.  HEAD:  Atraumatic, Normocephalic.  EYES: conjunctiva and sclera clear  CHEST/LUNG: GBAE. No wheezing or crackles   HEART: regular rate and rhythm; S1/S2.  ABDOMEN: Soft, Nontender, Nondistended  EXTREMITIES: No edema.   PSYCH: AAOx3.  NEUROLOGY: non-focal; moves all extremities     ADA VILLAGOMEZ 84y Male  MRN#: [id number]   Hospital Day: 9d    HPI:  The patient is an 83 y/o male with Pmhx of Copd, Htn, ckd stage 2, Dm, schizophrenia, Hld, non-hodgkins lymphoma, urinary retention with chronic Bains (changed in ED on 5/3/25), Hx of MDR UTIs presents to the ED from [facility] for evaluation of AMS. Patient currently has no complaints at bedside, unable to speak/verbalize at this time, baseline aaox1, currently confused, no acute apparent distress.  According to ED documentation patient was unresponsive at NH, no response from [facility] at this time, not response from emergency contacts at this time either. Patient is being admitted for sepsis 2/2 uti in setting of chronic foely w/MDR organisms.     ED course:   Vitals:   bp 116/90  hr 105  rr 25  temp 100.2/37.9     imaging:   CTH no acute pathology     CTAP w/IV cont:     IMPRESSION:    Urinary bladder distended with Bains catheter balloon inflated within   prostatic urethra; repositioning recommended.  Urinary bladder mild pericystic stranding, could represent bladder   infection in the appropriate clinical setting.  Mild bilateral hydroureteronephrosis, could be attributed to bladder   outlet obstruction.  Urinary bladder right posterolateral subcentimeter nodular density, which   can be further evaluated as outpatient.  BPH.    bains repositioned in ED      Labs:  wbc 22k  hgb 9.0 (baseline around 10.0)   trop 68-->58  k+ 5.3  cr 2.6 (baseline 1.4)   lactate 1.9     s/p   500cc LR bolus   s/p cefepime and vanc  ucx bcx collected   (03 May 2025 11:49)      SUBJECTIVE  Pt seen and evaluated at bedside. No acute overnight events.     OBJECTIVE  PAST MEDICAL & SURGICAL HISTORY  COPD (chronic obstructive pulmonary disease)    Paranoid schizophrenia    Schizophrenia    Diabetes type 2, controlled    COPD (chronic obstructive pulmonary disease)    Dyslipidemia    Chronic retention of urine    Dyslipidemia    Encounter for screening colonoscopy      ALLERGIES:  No Known Allergies    MEDICATIONS:  STANDING MEDICATIONS  ceftazidime/avibactam IVPB 0.94 Gram(s) IV Intermittent every 12 hours  chlorhexidine 2% Cloths 1 Application(s) Topical <User Schedule>  dextrose 5%. 1000 milliLiter(s) IV Continuous <Continuous>  dextrose 5%. 1000 milliLiter(s) IV Continuous <Continuous>  dextrose 50% Injectable 25 Gram(s) IV Push once  dextrose 50% Injectable 12.5 Gram(s) IV Push once  dextrose 50% Injectable 25 Gram(s) IV Push once  dronabinol 2.5 milliGRAM(s) Oral two times a day  finasteride 5 milliGRAM(s) Oral daily  glucagon  Injectable 1 milliGRAM(s) IntraMuscular once  heparin   Injectable 5000 Unit(s) SubCutaneous every 8 hours  insulin lispro (ADMELOG) corrective regimen sliding scale   SubCutaneous three times a day before meals  labetalol 100 milliGRAM(s) Oral every 8 hours  lactated ringers 1000 milliLiter(s) IV Continuous <Continuous>  NIFEdipine XL 90 milliGRAM(s) Oral daily  tamsulosin 0.4 milliGRAM(s) Oral at bedtime    PRN MEDICATIONS  acetaminophen  Suppository .. 650 milliGRAM(s) Rectal every 6 hours PRN  albuterol    90 MICROgram(s) HFA Inhaler 1 Puff(s) Inhalation four times a day PRN  aluminum hydroxide/magnesium hydroxide/simethicone Suspension 30 milliLiter(s) Oral every 4 hours PRN  dextrose Oral Gel 15 Gram(s) Oral once PRN  melatonin 3 milliGRAM(s) Oral at bedtime PRN  ondansetron Injectable 4 milliGRAM(s) IV Push every 8 hours PRN      VITAL SIGNS: Last 24 Hours  T(C): 36.6 (12 May 2025 05:12), Max: 36.7 (11 May 2025 20:13)  T(F): 97.8 (12 May 2025 05:12), Max: 98 (11 May 2025 20:13)  HR: 78 (12 May 2025 05:12) (78 - 86)  BP: 118/58 (12 May 2025 05:12) (118/58 - 154/81)  BP(mean): 76 (12 May 2025 05:12) (76 - 81)  RR: 18 (12 May 2025 05:12) (18 - 18)  SpO2: 99% (11 May 2025 20:13) (99% - 99%)    LABS:                        9.6    10.15 )-----------( 144      ( 11 May 2025 06:59 )             29.5     05-11    142  |  106  |  36[H]  ----------------------------<  97  3.0[L]   |  20  |  2.0[H]    Ca    8.3[L]      11 May 2025 06:59  Mg     2.0     05-11        Urinalysis Basic - ( 11 May 2025 06:59 )    Color: x / Appearance: x / SG: x / pH: x  Gluc: 97 mg/dL / Ketone: x  / Bili: x / Urobili: x   Blood: x / Protein: x / Nitrite: x   Leuk Esterase: x / RBC: x / WBC x   Sq Epi: x / Non Sq Epi: x / Bacteria: x                    PHYSICAL EXAM:  GENERAL: NAD, well-developed.  HEAD:  Atraumatic, Normocephalic.  EYES: conjunctiva and sclera clear  CHEST/LUNG: GBAE. No wheezing or crackles   HEART: regular rate and rhythm; S1/S2.  ABDOMEN: Soft, Nontender, Nondistended  EXTREMITIES: No edema.   PSYCH: AAOx3.  NEUROLOGY: non-focal; moves all extremities     ADA VILLAGOMEZ 84y Male  MRN#: [id number]   Hospital Day: 9d    HPI:  The patient is an 85 y/o male with Pmhx of Copd, Htn, ckd stage 2, Dm, schizophrenia, Hld, non-hodgkins lymphoma, urinary retention with chronic Bains (changed in ED on 5/3/25), Hx of MDR UTIs presents to the ED from [facility] for evaluation of AMS. Patient currently has no complaints at bedside, unable to speak/verbalize at this time, baseline aaox1, currently confused, no acute apparent distress.  According to ED documentation patient was unresponsive at NH, no response from [facility] at this time, not response from emergency contacts at this time either. Patient is being admitted for sepsis 2/2 uti in setting of chronic foely w/MDR organisms.     ED course:   Vitals:   bp 116/90  hr 105  rr 25  temp 100.2/37.9     imaging:   CTH no acute pathology     CTAP w/IV cont:     IMPRESSION:    Urinary bladder distended with Bains catheter balloon inflated within   prostatic urethra; repositioning recommended.  Urinary bladder mild pericystic stranding, could represent bladder   infection in the appropriate clinical setting.  Mild bilateral hydroureteronephrosis, could be attributed to bladder   outlet obstruction.  Urinary bladder right posterolateral subcentimeter nodular density, which   can be further evaluated as outpatient.  BPH.    bains repositioned in ED      Labs:  wbc 22k  hgb 9.0 (baseline around 10.0)   trop 68-->58  k+ 5.3  cr 2.6 (baseline 1.4)   lactate 1.9     s/p   500cc LR bolus   s/p cefepime and vanc  ucx bcx collected   (03 May 2025 11:49)      SUBJECTIVE  Pt seen and evaluated at bedside. No acute overnight events.     OBJECTIVE  PAST MEDICAL & SURGICAL HISTORY  COPD (chronic obstructive pulmonary disease)    Paranoid schizophrenia    Schizophrenia    Diabetes type 2, controlled    COPD (chronic obstructive pulmonary disease)    Dyslipidemia    Chronic retention of urine    Dyslipidemia    Encounter for screening colonoscopy      ALLERGIES:  No Known Allergies    MEDICATIONS:  STANDING MEDICATIONS  ceftazidime/avibactam IVPB 0.94 Gram(s) IV Intermittent every 12 hours  chlorhexidine 2% Cloths 1 Application(s) Topical <User Schedule>  dextrose 5%. 1000 milliLiter(s) IV Continuous <Continuous>  dextrose 5%. 1000 milliLiter(s) IV Continuous <Continuous>  dextrose 50% Injectable 25 Gram(s) IV Push once  dextrose 50% Injectable 12.5 Gram(s) IV Push once  dextrose 50% Injectable 25 Gram(s) IV Push once  dronabinol 2.5 milliGRAM(s) Oral two times a day  finasteride 5 milliGRAM(s) Oral daily  glucagon  Injectable 1 milliGRAM(s) IntraMuscular once  heparin   Injectable 5000 Unit(s) SubCutaneous every 8 hours  insulin lispro (ADMELOG) corrective regimen sliding scale   SubCutaneous three times a day before meals  labetalol 100 milliGRAM(s) Oral every 8 hours  lactated ringers 1000 milliLiter(s) IV Continuous <Continuous>  NIFEdipine XL 90 milliGRAM(s) Oral daily  tamsulosin 0.4 milliGRAM(s) Oral at bedtime    PRN MEDICATIONS  acetaminophen  Suppository .. 650 milliGRAM(s) Rectal every 6 hours PRN  albuterol    90 MICROgram(s) HFA Inhaler 1 Puff(s) Inhalation four times a day PRN  aluminum hydroxide/magnesium hydroxide/simethicone Suspension 30 milliLiter(s) Oral every 4 hours PRN  dextrose Oral Gel 15 Gram(s) Oral once PRN  melatonin 3 milliGRAM(s) Oral at bedtime PRN  ondansetron Injectable 4 milliGRAM(s) IV Push every 8 hours PRN      VITAL SIGNS: Last 24 Hours  T(C): 36.6 (12 May 2025 05:12), Max: 36.7 (11 May 2025 20:13)  T(F): 97.8 (12 May 2025 05:12), Max: 98 (11 May 2025 20:13)  HR: 78 (12 May 2025 05:12) (78 - 86)  BP: 118/58 (12 May 2025 05:12) (118/58 - 154/81)  BP(mean): 76 (12 May 2025 05:12) (76 - 81)  RR: 18 (12 May 2025 05:12) (18 - 18)  SpO2: 99% (11 May 2025 20:13) (99% - 99%)    LABS:                        9.6    10.15 )-----------( 144      ( 11 May 2025 06:59 )             29.5     05-11    142  |  106  |  36[H]  ----------------------------<  97  3.0[L]   |  20  |  2.0[H]    Ca    8.3[L]      11 May 2025 06:59  Mg     2.0     05-11        Urinalysis Basic - ( 11 May 2025 06:59 )    Color: x / Appearance: x / SG: x / pH: x  Gluc: 97 mg/dL / Ketone: x  / Bili: x / Urobili: x   Blood: x / Protein: x / Nitrite: x   Leuk Esterase: x / RBC: x / WBC x   Sq Epi: x / Non Sq Epi: x / Bacteria: x                    PHYSICAL EXAM:  GENERAL: NAD, well-developed.  HEAD:  Atraumatic, Normocephalic.  EYES: conjunctiva and sclera clear  CHEST/LUNG: GBAE. No wheezing or crackles   HEART: regular rate and rhythm; S1/S2.  ABDOMEN: Soft, Nontender, Nondistended  EXTREMITIES: No edema.   PSYCH: AAOx1.  NEUROLOGY: non-focal; moves all extremities

## 2025-05-12 NOTE — PROGRESS NOTE ADULT - TIME BILLING
I have personally seen and examined this patient.  I have reviewed all pertinent clinical information and reviewed all relevant imaging and diagnostic studies personally.   I counseled the patient about diagnostic testing and treatment plan.   I discussed my recommendations with the primary team
I have personally seen and examined this patient.  I have reviewed all pertinent clinical information and reviewed all relevant imaging and diagnostic studies personally.   I counseled the patient about diagnostic testing and treatment plan.   I discussed my recommendations with the primary team
I have personally seen and examined this patient.  I have reviewed all pertinent clinical information and reviewed all relevant imaging and diagnostic studies personally.   I counseled the patient about diagnostic testing and treatment plan.   I discussed my recommendations with the primary team Pillo Bean
time spent on review of labs, imaging studies, old records, obtaining history, personally examining patient, counselling and communicating with patient, entering orders for medications/tests/etc, discussions with other health care providers, documentation in electronic health records, independent interpretation of labs, imaging/procedure results and care coordination.

## 2025-05-13 ENCOUNTER — TRANSCRIPTION ENCOUNTER (OUTPATIENT)
Age: 85
End: 2025-05-13

## 2025-05-13 VITALS — SYSTOLIC BLOOD PRESSURE: 159 MMHG | HEART RATE: 98 BPM | DIASTOLIC BLOOD PRESSURE: 63 MMHG

## 2025-05-13 LAB
GLUCOSE BLDC GLUCOMTR-MCNC: 80 MG/DL — SIGNIFICANT CHANGE UP (ref 70–99)
GLUCOSE BLDC GLUCOMTR-MCNC: 82 MG/DL — SIGNIFICANT CHANGE UP (ref 70–99)
GLUCOSE BLDC GLUCOMTR-MCNC: 88 MG/DL — SIGNIFICANT CHANGE UP (ref 70–99)

## 2025-05-13 PROCEDURE — 99239 HOSP IP/OBS DSCHRG MGMT >30: CPT

## 2025-05-13 RX ORDER — OLANZAPINE 10 MG/1
10 TABLET ORAL DAILY
Refills: 0 | Status: DISCONTINUED | OUTPATIENT
Start: 2025-05-13 | End: 2025-05-13

## 2025-05-13 RX ORDER — HEPARIN SODIUM 1000 [USP'U]/ML
5000 INJECTION INTRAVENOUS; SUBCUTANEOUS
Qty: 0 | Refills: 0 | DISCHARGE
Start: 2025-05-13

## 2025-05-13 RX ORDER — DRONABINOL 10 MG/1
1 CAPSULE ORAL
Qty: 0 | Refills: 0 | DISCHARGE
Start: 2025-05-13

## 2025-05-13 RX ORDER — LABETALOL HYDROCHLORIDE 200 MG/1
1 TABLET, FILM COATED ORAL
Qty: 0 | Refills: 0 | DISCHARGE
Start: 2025-05-13

## 2025-05-13 RX ADMIN — LABETALOL HYDROCHLORIDE 100 MILLIGRAM(S): 200 TABLET, FILM COATED ORAL at 13:08

## 2025-05-13 RX ADMIN — HEPARIN SODIUM 5000 UNIT(S): 1000 INJECTION INTRAVENOUS; SUBCUTANEOUS at 13:07

## 2025-05-13 RX ADMIN — FINASTERIDE 5 MILLIGRAM(S): 1 TABLET, FILM COATED ORAL at 13:07

## 2025-05-13 RX ADMIN — OLANZAPINE 10 MILLIGRAM(S): 10 TABLET ORAL at 13:07

## 2025-05-13 RX ADMIN — Medication 40 MILLIEQUIVALENT(S): at 13:37

## 2025-05-13 NOTE — PROGRESS NOTE ADULT - SUBJECTIVE AND OBJECTIVE BOX
ADA VILLAGOMEZ 84y Male  MRN#: [id number]   Hospital Day: 10d    HPI:  The patient is an 85 y/o male with Pmhx of Copd, Htn, ckd stage 2, Dm, schizophrenia, Hld, non-hodgkins lymphoma, urinary retention with chronic Bains (changed in ED on 5/3/25), Hx of MDR UTIs presents to the ED from [facility] for evaluation of AMS. Patient currently has no complaints at bedside, unable to speak/verbalize at this time, baseline aaox1, currently confused, no acute apparent distress.  According to ED documentation patient was unresponsive at NH, no response from [facility] at this time, not response from emergency contacts at this time either. Patient is being admitted for sepsis 2/2 uti in setting of chronic foely w/MDR organisms.     ED course:   Vitals:   bp 116/90  hr 105  rr 25  temp 100.2/37.9     imaging:   CTH no acute pathology     CTAP w/IV cont:     IMPRESSION:    Urinary bladder distended with Bains catheter balloon inflated within   prostatic urethra; repositioning recommended.  Urinary bladder mild pericystic stranding, could represent bladder   infection in the appropriate clinical setting.  Mild bilateral hydroureteronephrosis, could be attributed to bladder   outlet obstruction.  Urinary bladder right posterolateral subcentimeter nodular density, which   can be further evaluated as outpatient.  BPH.    bains repositioned in ED      Labs:  wbc 22k  hgb 9.0 (baseline around 10.0)   trop 68-->58  k+ 5.3  cr 2.6 (baseline 1.4)   lactate 1.9     s/p   500cc LR bolus   s/p cefepime and vanc  ucx bcx collected   (03 May 2025 11:49)      SUBJECTIVE      OBJECTIVE  PAST MEDICAL & SURGICAL HISTORY  COPD (chronic obstructive pulmonary disease)    Paranoid schizophrenia    Schizophrenia    Diabetes type 2, controlled    COPD (chronic obstructive pulmonary disease)    Dyslipidemia    Chronic retention of urine    Dyslipidemia    Encounter for screening colonoscopy      ALLERGIES:  No Known Allergies    MEDICATIONS:  STANDING MEDICATIONS  chlorhexidine 2% Cloths 1 Application(s) Topical <User Schedule>  dextrose 5%. 1000 milliLiter(s) IV Continuous <Continuous>  dextrose 5%. 1000 milliLiter(s) IV Continuous <Continuous>  dextrose 50% Injectable 25 Gram(s) IV Push once  dextrose 50% Injectable 12.5 Gram(s) IV Push once  dextrose 50% Injectable 25 Gram(s) IV Push once  dronabinol 2.5 milliGRAM(s) Oral two times a day  finasteride 5 milliGRAM(s) Oral daily  glucagon  Injectable 1 milliGRAM(s) IntraMuscular once  heparin   Injectable 5000 Unit(s) SubCutaneous every 8 hours  insulin lispro (ADMELOG) corrective regimen sliding scale   SubCutaneous three times a day before meals  labetalol 100 milliGRAM(s) Oral every 8 hours  lactated ringers 1000 milliLiter(s) IV Continuous <Continuous>  NIFEdipine XL 90 milliGRAM(s) Oral daily  tamsulosin 0.4 milliGRAM(s) Oral at bedtime    PRN MEDICATIONS  acetaminophen  Suppository .. 650 milliGRAM(s) Rectal every 6 hours PRN  albuterol    90 MICROgram(s) HFA Inhaler 1 Puff(s) Inhalation four times a day PRN  aluminum hydroxide/magnesium hydroxide/simethicone Suspension 30 milliLiter(s) Oral every 4 hours PRN  dextrose Oral Gel 15 Gram(s) Oral once PRN  melatonin 3 milliGRAM(s) Oral at bedtime PRN  ondansetron Injectable 4 milliGRAM(s) IV Push every 8 hours PRN      VITAL SIGNS: Last 24 Hours  T(C): 36.5 (12 May 2025 13:13), Max: 36.5 (12 May 2025 13:13)  T(F): 97.7 (12 May 2025 13:13), Max: 97.7 (12 May 2025 13:13)  HR: 98 (12 May 2025 22:44) (90 - 98)  BP: 146/62 (12 May 2025 22:44) (136/67 - 146/62)  BP(mean): 90 (12 May 2025 22:44) (90 - 90)  RR: 18 (12 May 2025 22:44) (18 - 18)  SpO2: 97% (12 May 2025 22:44) (97% - 99%)    LABS:                        9.2    9.12  )-----------( 140      ( 12 May 2025 05:39 )             28.2     05-12    143  |  109  |  35[H]  ----------------------------<  98  3.4[L]   |  20  |  2.0[H]    Ca    8.4      12 May 2025 05:39  Mg     2.0     05-12        Urinalysis Basic - ( 12 May 2025 05:39 )    Color: x / Appearance: x / SG: x / pH: x  Gluc: 98 mg/dL / Ketone: x  / Bili: x / Urobili: x   Blood: x / Protein: x / Nitrite: x   Leuk Esterase: x / RBC: x / WBC x   Sq Epi: x / Non Sq Epi: x / Bacteria: x                    PHYSICAL EXAM:  GENERAL: NAD, well-developed.  HEAD:  Atraumatic, Normocephalic.  EYES: conjunctiva and sclera clear  CHEST/LUNG: GBAE. No wheezing or crackles   HEART: regular rate and rhythm; S1/S2.  ABDOMEN: Soft, Nontender, Nondistended  EXTREMITIES: No edema.   PSYCH: AAOx3.  NEUROLOGY: non-focal; moves all extremities     ADA VILLAGOMEZ 84y Male  MRN#: [id number]   Hospital Day: 10d    HPI:  The patient is an 83 y/o male with Pmhx of Copd, Htn, ckd stage 2, Dm, schizophrenia, Hld, non-hodgkins lymphoma, urinary retention with chronic Bains (changed in ED on 5/3/25), Hx of MDR UTIs presents to the ED from [facility] for evaluation of AMS. Patient currently has no complaints at bedside, unable to speak/verbalize at this time, baseline aaox1, currently confused, no acute apparent distress.  According to ED documentation patient was unresponsive at NH, no response from [facility] at this time, not response from emergency contacts at this time either. Patient is being admitted for sepsis 2/2 uti in setting of chronic foely w/MDR organisms.     ED course:   Vitals:   bp 116/90  hr 105  rr 25  temp 100.2/37.9     imaging:   CTH no acute pathology     CTAP w/IV cont:     IMPRESSION:    Urinary bladder distended with Bains catheter balloon inflated within   prostatic urethra; repositioning recommended.  Urinary bladder mild pericystic stranding, could represent bladder   infection in the appropriate clinical setting.  Mild bilateral hydroureteronephrosis, could be attributed to bladder   outlet obstruction.  Urinary bladder right posterolateral subcentimeter nodular density, which   can be further evaluated as outpatient.  BPH.    bains repositioned in ED      Labs:  wbc 22k  hgb 9.0 (baseline around 10.0)   trop 68-->58  k+ 5.3  cr 2.6 (baseline 1.4)   lactate 1.9     s/p   500cc LR bolus   s/p cefepime and vanc  ucx bcx collected   (03 May 2025 11:49)      SUBJECTIVE  Pt seen and evaluated at bedside. No acute overnight events.     OBJECTIVE  PAST MEDICAL & SURGICAL HISTORY  COPD (chronic obstructive pulmonary disease)    Paranoid schizophrenia    Schizophrenia    Diabetes type 2, controlled    COPD (chronic obstructive pulmonary disease)    Dyslipidemia    Chronic retention of urine    Dyslipidemia    Encounter for screening colonoscopy      ALLERGIES:  No Known Allergies    MEDICATIONS:  STANDING MEDICATIONS  chlorhexidine 2% Cloths 1 Application(s) Topical <User Schedule>  dextrose 5%. 1000 milliLiter(s) IV Continuous <Continuous>  dextrose 5%. 1000 milliLiter(s) IV Continuous <Continuous>  dextrose 50% Injectable 25 Gram(s) IV Push once  dextrose 50% Injectable 12.5 Gram(s) IV Push once  dextrose 50% Injectable 25 Gram(s) IV Push once  dronabinol 2.5 milliGRAM(s) Oral two times a day  finasteride 5 milliGRAM(s) Oral daily  glucagon  Injectable 1 milliGRAM(s) IntraMuscular once  heparin   Injectable 5000 Unit(s) SubCutaneous every 8 hours  insulin lispro (ADMELOG) corrective regimen sliding scale   SubCutaneous three times a day before meals  labetalol 100 milliGRAM(s) Oral every 8 hours  lactated ringers 1000 milliLiter(s) IV Continuous <Continuous>  NIFEdipine XL 90 milliGRAM(s) Oral daily  tamsulosin 0.4 milliGRAM(s) Oral at bedtime    PRN MEDICATIONS  acetaminophen  Suppository .. 650 milliGRAM(s) Rectal every 6 hours PRN  albuterol    90 MICROgram(s) HFA Inhaler 1 Puff(s) Inhalation four times a day PRN  aluminum hydroxide/magnesium hydroxide/simethicone Suspension 30 milliLiter(s) Oral every 4 hours PRN  dextrose Oral Gel 15 Gram(s) Oral once PRN  melatonin 3 milliGRAM(s) Oral at bedtime PRN  ondansetron Injectable 4 milliGRAM(s) IV Push every 8 hours PRN      VITAL SIGNS: Last 24 Hours  T(C): 36.5 (12 May 2025 13:13), Max: 36.5 (12 May 2025 13:13)  T(F): 97.7 (12 May 2025 13:13), Max: 97.7 (12 May 2025 13:13)  HR: 98 (12 May 2025 22:44) (90 - 98)  BP: 146/62 (12 May 2025 22:44) (136/67 - 146/62)  BP(mean): 90 (12 May 2025 22:44) (90 - 90)  RR: 18 (12 May 2025 22:44) (18 - 18)  SpO2: 97% (12 May 2025 22:44) (97% - 99%)    LABS:                        9.2    9.12  )-----------( 140      ( 12 May 2025 05:39 )             28.2     05-12    143  |  109  |  35[H]  ----------------------------<  98  3.4[L]   |  20  |  2.0[H]    Ca    8.4      12 May 2025 05:39  Mg     2.0     05-12        Urinalysis Basic - ( 12 May 2025 05:39 )    Color: x / Appearance: x / SG: x / pH: x  Gluc: 98 mg/dL / Ketone: x  / Bili: x / Urobili: x   Blood: x / Protein: x / Nitrite: x   Leuk Esterase: x / RBC: x / WBC x   Sq Epi: x / Non Sq Epi: x / Bacteria: x                    PHYSICAL EXAM:  GENERAL: NAD, appears more awake today  HEAD:  Atraumatic, Normocephalic.  EYES: conjunctiva and sclera clear  CHEST/LUNG: GBAE. No wheezing or crackles   HEART: regular rate and rhythm; S1/S2.  ABDOMEN: Soft, Nontender, Nondistended  EXTREMITIES: No edema.   PSYCH: AAOx0

## 2025-05-13 NOTE — DISCHARGE NOTE PROVIDER - NSDCCPCAREPLAN_GEN_ALL_CORE_FT
PRINCIPAL DISCHARGE DIAGNOSIS  Diagnosis: Acute cystitis  Assessment and Plan of Treatment: You came in for altered mental stratus. You were treated with antibiotics for a urinary tract infection. Pleasre follow up outpatient with your PCP in 1 week for your recent hospitalization. Please follow up with a oncology for your non-hodgkins lymphoma   As you prepare for discharge, it's important to be aware of certain alarming symptoms that would require immediate medical attention. If you experience severe shortness of breath, especially if it occurs suddenly or while at rest, chest pain or discomfort, a rapid or irregular heartbeat, significant swelling in your legs, ankles, or abdomen, sudden weight gain, confusion or severe fatigue, persistent dizziness or fainting, severe headache, or any new or worsening symptoms that concern you, please go to the Emergency Department right away. These symptoms could indicate a serious condition that needs urgent evaluation and treatment.       PRINCIPAL DISCHARGE DIAGNOSIS  Diagnosis: Acute cystitis  Assessment and Plan of Treatment: You came in for altered mental stratus. You were treated with antibiotics for a urinary tract infection. Pleasre follow up outpatient with your PCP in 1 week for your recent hospitalization. Please follow up with a oncology for your non-hodgkins lymphoma   As you prepare for discharge, it's important to be aware of certain alarming symptoms that would require immediate medical attention. If you experience severe shortness of breath, especially if it occurs suddenly or while at rest, chest pain or discomfort, a rapid or irregular heartbeat, significant swelling in your legs, ankles, or abdomen, sudden weight gain, confusion or severe fatigue, persistent dizziness or fainting, severe headache, or any new or worsening symptoms that concern you, please go to the Emergency Department right away. These symptoms could indicate a serious condition that needs urgent evaluation and treatment.        SECONDARY DISCHARGE DIAGNOSES  Diagnosis: Dementia  Assessment and Plan of Treatment: pt with poor oral intake, advanced dementia, progressive decline. He was made DNR/DNI. HCP does not want any feeding tubes. If does not improve and continues to decline, consider transitioning to hospice     PRINCIPAL DISCHARGE DIAGNOSIS  Diagnosis: Acute cystitis  Assessment and Plan of Treatment: You came in for altered mental stratus. You were treated with antibiotics for a urinary tract infection. Pleasre follow up outpatient with your PCP in 1 week for your recent hospitalization. Please follow up with a oncology for your non-hodgkins lymphoma as needed  Discuss with PMD the need for workup of Urinary bladder right posterolateral subcentimeter nodular density.        SECONDARY DISCHARGE DIAGNOSES  Diagnosis: Dementia  Assessment and Plan of Treatment: pt with poor oral intake, advanced dementia, progressive decline. He was made DNR/DNI. HCP does not want any feeding tubes. If does not improve and continues to decline, consider transitioning to hospice

## 2025-05-13 NOTE — DISCHARGE NOTE PROVIDER - CARE PROVIDER_API CALL
BRAN REAVES  [address] [place] [address]  Phone: [phone]  Fax: [phone]  Follow Up Time: 1 week    [facility]  [address] [place] [id number]  Phone: [phone]  Fax: [phone]  Follow Up Time: 1 week

## 2025-05-13 NOTE — DISCHARGE NOTE PROVIDER - PROVIDER TOKENS
PROVIDER:[TOKEN:[[id number]],FOLLOWUP:[1 week]],PROVIDER:[TOKEN:[[id number]],FOLLOWUP:[1 week]]

## 2025-05-13 NOTE — DISCHARGE NOTE NURSING/CASE MANAGEMENT/SOCIAL WORK - NSDCPEFALRISK_GEN_ALL_CORE
For information on Fall & Injury Prevention, visit: [email or web address] OR  [email or web address] OR  https://www.cdc.gov/steadi/patient.html

## 2025-05-13 NOTE — DISCHARGE NOTE PROVIDER - HOSPITAL COURSE
85 y/o male with Pmhx of Copd, Htn, ckd stage 2, Dm, schizophrenia, Hld, non-hodgkins lymphoma, urinary retention with chronic Bains (changed in ED on 5/3/25), Hx of MDR UTIs p/w  AMS. Found to have MDR Kleb pna UTI w/ kleb pna bacteremia     #MDR Kleb bacteremia with Sepsis on admission due to complicated acute cystitis  #AMS 2/2 sepsis from UTI b/l oreiented x 1   - bcx and ucx  +ve Klebsiella pneumoniae  - s/p avycaz per ID  - c/w home tamsulosin 0.4mg daily   - Failed TOV 5/7, bains plaved     #Poor Po intake  - calorie count  - family does not want NGT  - C/w Marinol to boost appetite    #Hypernatremia Resolvewd s/p D5W    #DAVID on CKD2 2/2 poor po intake   - Cr at 2, b/l ~1.4>1.5     #Chronic normocytic Anemia  - Hgb at b/l     #Hypokalemia (resolved)    #troponin elevation   - demand ischemia in setting of sepsis   - Trop 68 > 58    #COPD  - c/w home inhalers     #Schizophrenia  - c/w olanzapine     #HTN (uncontrolled)  #Sinus tachycardia on EKG  - c/w home Nifedipine 90mg daily   - c/w home metoprolol 25mg BID   - C/w Labetalol 100mg q8    #Non Hodgkins lymphoma  - outpt f/u   85 y/o male with Pmhx of Copd, Htn, ckd stage 2, Dm, schizophrenia, Hld, non-hodgkins lymphoma, urinary retention with chronic Bains (changed in ED on 5/3/25), Hx of MDR UTIs p/w  AMS. Found to have MDR Kleb pna UTI w/ kleb pna bacteremia     #MDR Kleb bacteremia with Sepsis on admission due to complicated acute cystitis  #AMS 2/2 sepsis from UTI b/l oreiented x 1   - bcx and ucx  +ve Klebsiella pneumoniae  - s/p avycaz per ID  - c/w home tamsulosin 0.4mg daily   - Failed TOV 5/7, bains plaved     #Poor Po intake  - calorie count  - family does not want NGT  - C/w Marinol to boost appetite    #Hypernatremia Resolvewd s/p D5W    #DAVID on CKD2 2/2 poor po intake   - Cr at 2, b/l ~1.4>1.5     #Chronic normocytic Anemia  - Hgb at b/l     #Hypokalemia (resolved)    #troponin elevation   - demand ischemia in setting of sepsis   - Trop 68 > 58    #COPD  - c/w home inhalers     #Schizophrenia  - c/w olanzapine     #HTN (uncontrolled)  #Sinus tachycardia on EKG  - c/w home Nifedipine 90mg daily    - C/w Labetalol 100mg q8    #Non Hodgkins lymphoma  - outpt f/u

## 2025-05-13 NOTE — PROGRESS NOTE ADULT - REASON FOR ADMISSION
ams 2/2 sepsis uti in setting of chronic bains

## 2025-05-13 NOTE — DISCHARGE NOTE NURSING/CASE MANAGEMENT/SOCIAL WORK - FINANCIAL ASSISTANCE
[facility] provides services at a reduced cost to those who are determined to be eligible through [facility]’s financial assistance program. Information regarding [facility]’s financial assistance program can be found by going to [email or web address] or by calling [phone].

## 2025-05-13 NOTE — PROGRESS NOTE ADULT - ASSESSMENT
85 y/o male with Pmhx of Copd, Htn, ckd stage 2, Dm, schizophrenia, Hld, non-hodgkins lymphoma, urinary retention with chronic Bains (changed in ED on 5/3/25), Hx of MDR UTIs p/w  AMS. Found to have MDR Kleb pna UTI w/ kleb pna bacteremia     #MDR Kleb bacteremia with Sepsis on admission due to complicated acute cystitis  #AMS 2/2 sepsis from UTI b/l oreiented x 1   - bcx and ucx  +ve Klebsiella pneumoniae  - s/p avycaz per ID  - holding gabapentin and olanzapine for ams   - c/w home tamsulosin 0.4mg daily   - Failed TOV 5/7, bains plaved     #Poor Po intake  - calorie count  - family does not want NGT  - C/w Marinol to boost appetite    #Hypernatremia Resolvewd s/p D5W    #DAVID on CKD2 2/2 poor po intake   - Cr at 2, b/l ~1.4>1.5     #Chronic normocytic Anemia  - Hgb at b/l     #Hypokalemia (resolved)    #troponin elevation   - demand ischemia in setting of sepsis   - Trop 68 > 58    #COPD  - c/w home inhalers     #HTN (uncontrolled)  #Sinus tachycardia on EKG  - c/w home Nifedipine 90mg daily   - c/w home metoprolol 25mg BID   - C/w Labetalol 100mg q8    #Non Hodgkins lymphoma  - outpt f/u    Diet: diet dash   Activity:  AAT  DVT ppx: heparin sub-q  Code: DNR/DNI: Trial NIV  DISPO: likely dc 5/13

## 2025-05-13 NOTE — PROGRESS NOTE ADULT - SUBJECTIVE AND OBJECTIVE BOX
Patient is a 84y old  Male who presents with a chief complaint of ams 2/2 sepsis uti in setting of chronic bains (06 May 2025 13:21)    INTERVAL HPI/OVERNIGHT EVENTS: Patient was examined and seen at bedside. This morning pt is resting in bed and staff reports no new issues or overnight events. Wants to be left alone. More alert.  ROS: Denies any complaints  InitialHPI:  The patient is an 83 y/o male with Pmhx of Copd, Htn, ckd stage 2, Dm, schizophrenia, Hld, non-hodgkins lymphoma, urinary retention with chronic Bains (changed in ED on 5/3/25), Hx of MDR UTIs presents to the ED from [facility] for evaluation of AMS. Patient currently has no complaints at bedside, unable to speak/verbalize at this time, baseline aaox1, currently confused, no acute apparent distress.  According to ED documentation patient was unresponsive at NH, no response from [facility] at this time, not response from emergency contacts at this time either. Patient is being admitted for sepsis 2/2 uti in setting of chronic foely w/MDR organisms.     ED course:   Vitals:   bp 116/90  hr 105  rr 25  temp 100.2/37.9     imaging:   CTH no acute pathology     CTAP w/IV cont:     IMPRESSION:    Urinary bladder distended with Bains catheter balloon inflated within   prostatic urethra; repositioning recommended.  Urinary bladder mild pericystic stranding, could represent bladder infection in the appropriate clinical setting.  Mild bilateral hydroureteronephrosis, could be attributed to bladder outlet obstruction.  Urinary bladder right posterolateral subcentimeter nodular density, which can be further evaluated as outpatient.  BPH.    bains changed in ED      Labs:  wbc 22k  hgb 9.0 (baseline around 10.0)   trop 68-->58  k+ 5.3  cr 2.6 (baseline 1.4)   lactate 1.9     s/p   500cc LR bolus   s/p cefepime and vanc  ucx bcx collected   (03 May 2025 11:49)    PAST MEDICAL & SURGICAL HISTORY:  COPD (chronic obstructive pulmonary disease)      Paranoid schizophrenia      Schizophrenia      Diabetes type 2, controlled      COPD (chronic obstructive pulmonary disease)      Dyslipidemia      Chronic retention of urine      Dyslipidemia      Encounter for screening colonoscopy          General: NAD, alert, chronically ill appearing  HEENT: no LAD  CV: S1 S2  Resp: decreased breath sounds at bases  GI: NT/ND/S +BS  MS: no clubbing/cyanosis/edema, + pulses b/l  Neuro: appears to move all extremities      Home Medications:  Albuterol (Eqv-ProAir HFA) 90 mcg/inh inhalation aerosol: 1 puff(s) inhaled 4 times a day as needed for  shortness of breath and/or wheezing (14 Apr 2025 22:55)  droNABinol 2.5 mg oral capsule: 1 cap(s) orally 2 times a day (13 May 2025 12:31)  heparin: 5,000 unit(s) subcutaneous 2 times a day (13 May 2025 12:50)  labetalol 100 mg oral tablet: 1 tab(s) orally every 8 hours (13 May 2025 12:31)  NIFEdipine 90 mg oral tablet, extended release: 1 tab(s) orally once a day (22 Apr 2025 01:09)  tamsulosin 0.4 mg oral capsule: 1 cap(s) orally once a day (at bedtime) (14 Apr 2025 22:56)    MEDICATIONS  (STANDING):  chlorhexidine 2% Cloths 1 Application(s) Topical <User Schedule>  dextrose 5%. 1000 milliLiter(s) (100 mL/Hr) IV Continuous <Continuous>  dextrose 5%. 1000 milliLiter(s) (50 mL/Hr) IV Continuous <Continuous>  dextrose 50% Injectable 25 Gram(s) IV Push once  dextrose 50% Injectable 12.5 Gram(s) IV Push once  dextrose 50% Injectable 25 Gram(s) IV Push once  dronabinol 2.5 milliGRAM(s) Oral two times a day  finasteride 5 milliGRAM(s) Oral daily  glucagon  Injectable 1 milliGRAM(s) IntraMuscular once  heparin   Injectable 5000 Unit(s) SubCutaneous every 8 hours  insulin lispro (ADMELOG) corrective regimen sliding scale   SubCutaneous three times a day before meals  labetalol 100 milliGRAM(s) Oral every 8 hours  lactated ringers 1000 milliLiter(s) (50 mL/Hr) IV Continuous <Continuous>  NIFEdipine XL 90 milliGRAM(s) Oral daily  OLANZapine 10 milliGRAM(s) Oral daily  tamsulosin 0.4 milliGRAM(s) Oral at bedtime    MEDICATIONS  (PRN):  acetaminophen  Suppository .. 650 milliGRAM(s) Rectal every 6 hours PRN Temp greater or equal to 38C (100.4F), Moderate Pain (4 - 6)  albuterol    90 MICROgram(s) HFA Inhaler 1 Puff(s) Inhalation four times a day PRN for shortness of breath and/or wheezing  aluminum hydroxide/magnesium hydroxide/simethicone Suspension 30 milliLiter(s) Oral every 4 hours PRN Dyspepsia  dextrose Oral Gel 15 Gram(s) Oral once PRN Blood Glucose LESS THAN 70 milliGRAM(s)/deciliter  melatonin 3 milliGRAM(s) Oral at bedtime PRN Insomnia  ondansetron Injectable 4 milliGRAM(s) IV Push every 8 hours PRN Nausea and/or Vomiting    Vital Signs Last 24 Hrs  T(C): 36.5 (13 May 2025 12:31), Max: 36.5 (13 May 2025 12:31)  T(F): 97.7 (13 May 2025 12:31), Max: 97.7 (13 May 2025 12:31)  HR: 98 (13 May 2025 13:00) (60 - 98)  BP: 159/63 (13 May 2025 13:00) (146/62 - 159/63)  BP(mean): 95 (13 May 2025 13:00) (90 - 106)  RR: 18 (13 May 2025 12:31) (18 - 18)  SpO2: 97% (12 May 2025 22:44) (97% - 97%)    Parameters below as of 12 May 2025 22:44  Patient On (Oxygen Delivery Method): room air      CAPILLARY BLOOD GLUCOSE      POCT Blood Glucose.: 88 mg/dL (13 May 2025 16:40)  POCT Blood Glucose.: 82 mg/dL (13 May 2025 11:14)  POCT Blood Glucose.: 80 mg/dL (13 May 2025 07:42)    LABS:                        9.2    9.12  )-----------( 140      ( 12 May 2025 05:39 )             28.2     05-12    143  |  109  |  35[H]  ----------------------------<  98  3.4[L]   |  20  |  2.0[H]    Ca    8.4      12 May 2025 05:39  Mg     2.0     05-12              Urinalysis Basic - ( 12 May 2025 05:39 )    Color: x / Appearance: x / SG: x / pH: x  Gluc: 98 mg/dL / Ketone: x  / Bili: x / Urobili: x   Blood: x / Protein: x / Nitrite: x   Leuk Esterase: x / RBC: x / WBC x   Sq Epi: x / Non Sq Epi: x / Bacteria: x              Consultant Notes Reviewed:  [x ] YES  [ ] NO  Care Discussed with Consultants/Other Providers/ Housestaff [ x] YES  [ ] NO  Radiology, labs, EKGs, new studies personally reviewed.

## 2025-05-13 NOTE — PROGRESS NOTE ADULT - ASSESSMENT
83 y/o male with Pmhx of Copd, Htn, ckd stage 2, Dm, schizophrenia, Hld, non-hodgkins lymphoma, urinary retention with chronic Bains (changed in ED on 5/3/25), Hx of MDR UTIs presents to the ED from [facility] for evaluation of AMS, being admitted for ams 2/2 sepsis from suspected UTI in setting of chronic bains and multiple UTIs w/MDR organisms.    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 Improved opacities  - B/l LE duplex - No DVT  - per ID: Avycaz 0.94g q12h IV x7-10d total  - Home meds: gabapentin, trazodone, olanzipine, tamsulosin, finasteride  - c/w home tamsulosin 0.4mg daily   - 5/6: Bains removed, TOV today, encourage po intake, WBC trending down  5/7 failed voiding trial. As per adult home/sister, pt has been gradually declining, wheelchair bound. Does not talk much.    #DAVID on CKD2 - improving   - yarely pre-renal   - Cr 2.3 > 2.1 > 1.9  - s/p IVF     #Hypernatremia  resolved    #Chronic normocytic Anemia  - Hgb 10.3 (baseline around 10.0), MCV 90.8    #Hyperkalemia- resolved   - K 4.2 >3.9    #troponin elevation   - demand ischemia in setting of sepsis   - downtrending 68 > 58    #COPD  - c/w home inhalers     #HTN  #Sinus tachycardia on EKG  - c/w home Nifedipine 90mg daily   - c/w home metoprolol 25mg BID   5/9 changed Metoprolol to Labetalol    #Non Hodgkins lymphoma  - outpt f/u    #Nutrition/Fluids/Electrolytes   - replete K<4 and Mg <2  - ensure regular BMs  -add Marinol to boost appetite    #DVT Px  SCDs  Heparin SQ    High risk pt. Px is guarded.  GOC: d/w sister on 5/7. She would not want feeding tubes, she understands that pt is gradually declining. She wants to think before making him DNR/DNI. Ultimate plan for Nsg home in the [facility]. Palliative c/s placed to help with GOC as current full code does not go along with "no feeding tubes".  Made DNR/DNI on 5/9  would benefit from hospice care long term (sister aware)    Disposition: SNF       My note supersedes the residents note should a discrepancy arise.    Chart and notes personally reviewed.  Care Discussed with Consultants/Other Providers/ Housestaff [ x] YES [ ] NO   Radiology, labs, old records personally reviewed.    discussed w/ housestaff, nursing, case management    Time-based billing (NON-critical care).     35 minutes spent on total encounter. The necessity of the time spent during the encounter on this date of service was due to:     time spent on review of labs, imaging studies, old records, obtaining history, personally examining patient, counselling and communicating with patient/ family, entering orders for medications/tests/etc, discussions with other health care providers, documentation in electronic health records, independent interpretation of labs, imaging/procedure results and care coordination.

## 2025-05-13 NOTE — DISCHARGE NOTE NURSING/CASE MANAGEMENT/SOCIAL WORK - NSDCVIVACCINE_GEN_ALL_CORE_FT
Tdap; 15-May-2024 15:22; Rosa Adamson (ELLEN); [employer]; [id number] (Exp. Date: 23-Nov-2025); IntraMuscular; Deltoid Left.; 0.5 milliLiter(s); VIS (VIS Published: 09-May-2013, VIS Presented: 15-May-2024);

## 2025-05-13 NOTE — DISCHARGE NOTE PROVIDER - NSDCMRMEDTOKEN_GEN_ALL_CORE_FT
Albuterol (Eqv-ProAir HFA) 90 mcg/inh inhalation aerosol: 1 puff(s) inhaled 4 times a day as needed for  shortness of breath and/or wheezing  amoxicillin-clavulanate 500 mg-125 mg oral tablet: 1 tab(s) orally every 12 hours until 4/27  Centrum oral tablet: 1 tab(s) orally once a day  finasteride 5 mg oral tablet: 1 tab(s) orally once a day  gabapentin 300 mg oral capsule: 1 cap(s) orally 2 times a day  metFORMIN 500 mg oral tablet: 1 tab(s) orally 2 times a day  metoprolol tartrate 25 mg oral tablet: 1 tab(s) orally 2 times a day  NIFEdipine 90 mg oral tablet, extended release: 1 tab(s) orally once a day  ofloxacin 0.3% ophthalmic solution: 1 drop(s) to each affected eye 2 times a day  OLANZapine 10 mg oral tablet: 1 tab(s) orally once a day  tamsulosin 0.4 mg oral capsule: 1 cap(s) orally once a day (at bedtime)  traZODone 50 mg oral tablet: 1 tab(s) orally once a day (at bedtime)   Albuterol (Eqv-ProAir HFA) 90 mcg/inh inhalation aerosol: 1 puff(s) inhaled 4 times a day as needed for  shortness of breath and/or wheezing  Centrum oral tablet: 1 tab(s) orally once a day  droNABinol 2.5 mg oral capsule: 1 cap(s) orally 2 times a day  finasteride 5 mg oral tablet: 1 tab(s) orally once a day  labetalol 100 mg oral tablet: 1 tab(s) orally every 8 hours  metFORMIN 500 mg oral tablet: 1 tab(s) orally 2 times a day  NIFEdipine 90 mg oral tablet, extended release: 1 tab(s) orally once a day  ofloxacin 0.3% ophthalmic solution: 1 drop(s) to each affected eye 2 times a day  OLANZapine 10 mg oral tablet: 1 tab(s) orally once a day  tamsulosin 0.4 mg oral capsule: 1 cap(s) orally once a day (at bedtime)  traZODone 50 mg oral tablet: 1 tab(s) orally once a day (at bedtime)   Albuterol (Eqv-ProAir HFA) 90 mcg/inh inhalation aerosol: 1 puff(s) inhaled 4 times a day as needed for  shortness of breath and/or wheezing  Centrum oral tablet: 1 tab(s) orally once a day  droNABinol 2.5 mg oral capsule: 1 cap(s) orally 2 times a day  finasteride 5 mg oral tablet: 1 tab(s) orally once a day  heparin: 5,000 unit(s) subcutaneous 2 times a day  labetalol 100 mg oral tablet: 1 tab(s) orally every 8 hours  NIFEdipine 90 mg oral tablet, extended release: 1 tab(s) orally once a day  ofloxacin 0.3% ophthalmic solution: 1 drop(s) to each affected eye 2 times a day  OLANZapine 10 mg oral tablet: 1 tab(s) orally once a day  tamsulosin 0.4 mg oral capsule: 1 cap(s) orally once a day (at bedtime)

## 2025-05-13 NOTE — DISCHARGE NOTE NURSING/CASE MANAGEMENT/SOCIAL WORK - PATIENT PORTAL LINK FT
You can access the FollowMyHealth Patient Portal offered by [facility] by registering at the following website: [email or web address]. By joining [facility]’s FollowMyHealth portal, you will also be able to view your health information using other applications (apps) compatible with our system.

## 2025-05-13 NOTE — PROGRESS NOTE ADULT - PROVIDER SPECIALTY LIST ADULT
Infectious Disease
Internal Medicine
Infectious Disease
Infectious Disease
Internal Medicine
Palliative Care

## 2025-05-22 DIAGNOSIS — Z66 DO NOT RESUSCITATE: ICD-10-CM

## 2025-05-22 DIAGNOSIS — N17.9 ACUTE KIDNEY FAILURE, UNSPECIFIED: ICD-10-CM

## 2025-05-22 DIAGNOSIS — N40.1 BENIGN PROSTATIC HYPERPLASIA WITH LOWER URINARY TRACT SYMPTOMS: ICD-10-CM

## 2025-05-22 DIAGNOSIS — E87.0 HYPEROSMOLALITY AND HYPERNATREMIA: ICD-10-CM

## 2025-05-22 DIAGNOSIS — I24.89 OTHER FORMS OF ACUTE ISCHEMIC HEART DISEASE: ICD-10-CM

## 2025-05-22 DIAGNOSIS — A41.50 GRAM-NEGATIVE SEPSIS, UNSPECIFIED: ICD-10-CM

## 2025-05-22 DIAGNOSIS — T83.511A INFECTION AND INFLAMMATORY REACTION DUE TO INDWELLING URETHRAL CATHETER, INITIAL ENCOUNTER: ICD-10-CM

## 2025-05-22 DIAGNOSIS — T83.598A INFECTION AND INFLAMMATORY REACTION DUE TO OTHER PROSTHETIC DEVICE, IMPLANT AND GRAFT IN URINARY SYSTEM, INITIAL ENCOUNTER: ICD-10-CM
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
## 2025-05-22 DIAGNOSIS — F20.9 SCHIZOPHRENIA, UNSPECIFIED: ICD-10-CM
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
## 2025-05-22 DIAGNOSIS — E11.22 TYPE 2 DIABETES MELLITUS WITH DIABETIC CHRONIC KIDNEY DISEASE: ICD-10-CM

## 2025-05-22 DIAGNOSIS — E87.6 HYPOKALEMIA: ICD-10-CM

## 2025-05-22 DIAGNOSIS — N30.90 CYSTITIS, UNSPECIFIED WITHOUT HEMATURIA: ICD-10-CM

## 2025-05-22 DIAGNOSIS — J44.9 CHRONIC OBSTRUCTIVE PULMONARY DISEASE, UNSPECIFIED: ICD-10-CM

## 2025-05-22 DIAGNOSIS — N18.2 CHRONIC KIDNEY DISEASE, STAGE 2 (MILD): ICD-10-CM

## 2025-05-22 DIAGNOSIS — G93.41 METABOLIC ENCEPHALOPATHY: ICD-10-CM

## 2025-07-22 NOTE — DISCHARGE NOTE PROVIDER - EXTENDED VTE YES NO FOR MLM ENOXAPARIN
